# Patient Record
Sex: FEMALE | Race: WHITE | NOT HISPANIC OR LATINO | Employment: OTHER | URBAN - METROPOLITAN AREA
[De-identification: names, ages, dates, MRNs, and addresses within clinical notes are randomized per-mention and may not be internally consistent; named-entity substitution may affect disease eponyms.]

---

## 2017-01-16 DIAGNOSIS — I82.B11 RIGHT SUBCLAVIAN VEIN THROMBOSIS (H): Primary | ICD-10-CM

## 2017-01-16 DIAGNOSIS — I87.1 SVC SYNDROME: ICD-10-CM

## 2017-01-16 DIAGNOSIS — R00.0 SINUS TACHYCARDIA: ICD-10-CM

## 2017-01-16 RX ORDER — METOPROLOL SUCCINATE 25 MG/1
25 TABLET, EXTENDED RELEASE ORAL DAILY
Qty: 90 TABLET | Refills: 3 | Status: SHIPPED
Start: 2017-01-16 | End: 2018-03-27

## 2017-01-17 NOTE — TELEPHONE ENCOUNTER
METOPROLOL  25MG      Last Written Prescription Date:  11/10/15  Last Fill Quantity: 100,   # refills: 3  Last Office Visit : 11/2/16  Future Office visit:  NONE  BP Readings from Last 3 Encounters:   11/02/16 128/85   03/02/16 119/86   11/10/15 138/95

## 2017-02-08 ENCOUNTER — OFFICE VISIT (OUTPATIENT)
Dept: INTERNAL MEDICINE | Facility: CLINIC | Age: 52
End: 2017-02-08

## 2017-02-08 VITALS
BODY MASS INDEX: 19.53 KG/M2 | DIASTOLIC BLOOD PRESSURE: 85 MMHG | WEIGHT: 110.2 LBS | HEART RATE: 75 BPM | SYSTOLIC BLOOD PRESSURE: 126 MMHG

## 2017-02-08 DIAGNOSIS — Z12.11 SCREEN FOR COLON CANCER: Primary | ICD-10-CM

## 2017-02-08 DIAGNOSIS — Z12.31 VISIT FOR SCREENING MAMMOGRAM: ICD-10-CM

## 2017-02-08 DIAGNOSIS — I82.B11 SUBCLAVIAN VEIN THROMBOSIS, RIGHT (H): ICD-10-CM

## 2017-02-08 DIAGNOSIS — Z11.59 NEED FOR HEPATITIS C SCREENING TEST: ICD-10-CM

## 2017-02-08 RX ORDER — OXYCODONE HCL 10 MG/1
10 TABLET, FILM COATED, EXTENDED RELEASE ORAL EVERY 12 HOURS
Qty: 30 TABLET | Refills: 0 | Status: ON HOLD | OUTPATIENT
Start: 2017-02-08 | End: 2022-02-17

## 2017-02-08 RX ORDER — ASPIRIN 81 MG/1
81 TABLET, CHEWABLE ORAL
COMMUNITY
Start: 2017-02-01 | End: 2018-03-19

## 2017-02-08 RX ORDER — OXYBUTYNIN CHLORIDE 5 MG/1
5 TABLET, EXTENDED RELEASE ORAL
COMMUNITY
End: 2017-11-07 | Stop reason: SINTOL

## 2017-02-08 RX ORDER — RIBOFLAVIN (VITAMIN B2) 100 MG
400 TABLET ORAL DAILY
Status: ON HOLD | COMMUNITY
Start: 2013-10-28 | End: 2022-02-17

## 2017-02-08 RX ORDER — HYDROMORPHONE HYDROCHLORIDE 3 MG/1
3 SUPPOSITORY RECTAL EVERY 8 HOURS PRN
Status: ON HOLD | COMMUNITY
Start: 2014-07-16 | End: 2022-02-17

## 2017-02-08 RX ORDER — ARIPIPRAZOLE 20 MG/1
30 TABLET ORAL
COMMUNITY
Start: 2016-04-01 | End: 2019-10-14

## 2017-02-08 RX ORDER — METOPROLOL TARTRATE 25 MG/1
25 TABLET, FILM COATED ORAL
COMMUNITY
Start: 2016-10-18 | End: 2017-04-11

## 2017-02-08 ASSESSMENT — PAIN SCALES - GENERAL: PAINLEVEL: NO PAIN (0)

## 2017-02-08 NOTE — MR AVS SNAPSHOT
After Visit Summary   2/8/2017    Sherly Yeung    MRN: [id number]           Patient Information     Date Of Birth          1965        Visit Information        Provider Department      2/8/2017 6:45 PM Chadwick Mg MD [facility]        Today's Diagnoses     Screen for colon cancer    -  1     Need for hepatitis C screening test         Visit for screening mammogram         Subclavian vein thrombosis, right           Care Instructions    Primary Care Center Medication Refill Request Information:  * Please contact your pharmacy regarding ANY request for medication refills.  ** [facility] Prescription Fax = [phone]  * Please allow 3 business days for routine medication refills.  * Please allow 5 business days for controlled substance medication refills.     Primary Care Center Test Result notification information:  *You will be notified with in 7-10 days of your appointment day regarding the results of your test.  If you are on MyChart you will be notified as soon as the provider has reviewed the results and signed off on them.      Breast Center/Oncology ([facility]) [phone]   Breast Center ([facility]) [phone]           Follow-ups after your visit        Future tests that were ordered for you today     Open Future Orders        Priority Expected Expires Ordered    Hepatitis C Screen Reflex to HCV RNA Quant and Genotype Routine 2/8/2017 2/8/2018 2/8/2017    MA SCREENING DIGITAL BILAT - Future  (s+30) Routine  2/8/2018 2/8/2017            Who to contact     Please call your clinic at [phone] to:    Ask questions about your health    Make or cancel appointments    Discuss your medicines    Learn about your test results    Speak to your doctor   If you have compliments or concerns about an experience at your clinic, or if you wish to file a complaint, please contact [facility] Patient Relations at [phone] or email us  at [email or web address]         Additional Information About Your Visit        [employer] Information     [employer] gives you secure access to your electronic health record. If you see a primary care provider, you can also send messages to your care team and make appointments. If you have questions, please call your primary care clinic.  If you do not have a primary care provider, please call [phone] and they will assist you.      [employer] is an electronic gateway that provides easy, online access to your medical records. With [employer], you can request a clinic appointment, read your test results, renew a prescription or communicate with your care team.     To access your existing account, please contact your [facility] or call [phone] for assistance.        Care EveryWhere ID     This is your Care EveryWhere ID. This could be used by other organizations to access your [place] medical records  [id number]        Your Vitals Were     Pulse Breastfeeding?                75 No           Blood Pressure from Last 3 Encounters:   02/08/17 126/85   11/02/16 128/85   03/02/16 119/86    Weight from Last 3 Encounters:   02/08/17 49.986 kg (110 lb 3.2 oz)   03/02/16 51.71 kg (114 lb)   11/10/15 49.896 kg (110 lb)                 Today's Medication Changes          These changes are accurate as of: 2/8/17  7:23 PM.  If you have any questions, ask your nurse or doctor.               These medicines have changed or have updated prescriptions.        Dose/Directions    ABILIFY 20 MG tablet   This may have changed:  Another medication with the same name was removed. Continue taking this medication, and follow the directions you see here.   Generic drug:  ARIPiprazole   Changed by:  Chadwick Mg MD        Dose:  20 mg   Take 20 mg by mouth   Refills:  0       * enoxaparin 30 MG/0.3ML injection   Commonly known as:  LOVENOX   This may have changed:  Another medication  with the same name was added. Make sure you understand how and when to take each.   Changed by:  Chadwick Mg MD        Dose:  60 mg   Inject 60 mg Subcutaneous   Refills:  0       * enoxaparin 60 MG/0.6ML injection   Commonly known as:  LOVENOX   This may have changed:  You were already taking a medication with the same name, and this prescription was added. Make sure you understand how and when to take each.   Used for:  Subclavian vein thrombosis, right   Changed by:  Chadwick Mg MD        Dose:  50 mg   Inject 0.5 mLs (50 mg) Subcutaneous 2 times daily   Quantity:  100 Syringe   Refills:  11       * Notice:  This list has 2 medication(s) that are the same as other medications prescribed for you. Read the directions carefully, and ask your doctor or other care provider to review them with you.      Stop taking these medicines if you haven't already. Please contact your care team if you have questions.     rivaroxaban ANTICOAGULANT 20 MG Tabs tablet   Commonly known as:  XARELTO   Stopped by:  Chadwick Mg MD                Where to get your medicines      These medications were sent to [id number] - [place] - [address]  [address] [id number]     Phone:  [phone]    - enoxaparin 60 MG/0.6ML injection      Some of these will need a paper prescription and others can be bought over the counter.  Ask your nurse if you have questions.     Bring a paper prescription for each of these medications    - oxyCODONE 10 MG 12 hr tablet             Primary Care Provider Office Phone # Fax #    Chadwick Mg [phone]        [facility] [address]  [facility] [address]        Thank you!     Thank you for choosing [facility]  for your care. Our goal is always to provide you with excellent care. Hearing back from our patients is one way we can continue to improve our  services. Please take a few minutes to complete the written survey that you may receive in the mail after your visit with us. Thank you!             Your Updated Medication List - Protect others around you: Learn how to safely use, store and throw away your medicines at www.disposemymeds.org.          This list is accurate as of: 2/8/17  7:23 PM.  Always use your most recent med list.                   Brand Name Dispense Instructions for use    ABILIFY 20 MG tablet   Generic drug:  ARIPiprazole      Take 20 mg by mouth       aspirin 81 MG chewable tablet      81 mg       B COMPLEX 1 PO      Take 1 tablet by mouth daily.       CYMBALTA PO      Take 120 mg by mouth daily       DITROPAN XL 5 MG 24 hr tablet   Generic drug:  oxybutynin      Take 5 mg by mouth       doxycycline 100 MG capsule    VIBRAMYCIN     Take 1 capsule (100 mg) by mouth every 12 hours       * enoxaparin 30 MG/0.3ML injection    LOVENOX     Inject 60 mg Subcutaneous       * enoxaparin 60 MG/0.6ML injection    LOVENOX    100 Syringe    Inject 0.5 mLs (50 mg) Subcutaneous 2 times daily       HYDROmorphone 3 MG Suppository    DILAUDID     Place 3 mg rectally       LANsoprazole 30 MG CR capsule    PREVACID    90 capsule    Take 1 capsule (30 mg) by mouth daily       LORazepam 0.5 MG tablet    ATIVAN    30 tablet    Take 1 tablet by mouth every 6 hours as needed (for dizziness ).       magnesium 250 MG tablet      Take 4 tablets by mouth daily       metoprolol 25 MG 24 hr tablet    TOPROL-XL    90 tablet    Take 1 tablet (25 mg) by mouth daily       metoprolol 25 MG tablet    LOPRESSOR     Take 25 mg by mouth       nortriptyline 50 MG capsule    PAMELOR     Take 50 mg by mouth At Bedtime.       oxyCODONE 10 MG 12 hr tablet    OxyCONTIN    30 tablet    Take 1 tablet (10 mg) by mouth every 12 hours       predniSONE 10 MG tablet    DELTASONE    42 tablet    Take 1 tablet (10 mg) by mouth daily Take 4 tabs (40 mg) orally daily for 4 days, 3 tabs (30 mg)  orally daily for 4 days, 2 tabs (20 mg) orally daily for 4 days, 1 tab (10mg) orally daily for 4 days, then 1/2 tab (5 mg) orally for 4 days.       riboflavin 100 MG Tabs tablet    vitamin  B-2     Take 400 mg by mouth       RITALIN 20 MG tablet   Generic drug:  methylphenidate          senna-docusate 8.6-50 MG per tablet    SENOKOT-S;PERICOLACE    60 tablet    Take 1-2 tablets by mouth 2 times daily.       SUMAtriptan Succinate 6 MG/0.5ML Kit      Inject 1 dose Subcutaneous at onset of headache. Indications: Migraine Headache       TOPAMAX 25 MG tablet   Generic drug:  topiramate      Take 100-200 mg by mouth 2 times daily       triamcinolone 0.1 % cream    KENALOG    30 g    Apply topically 2 times daily       vitamin D 1000 UNITS capsule      Take 2 capsules by mouth daily.       Zinc 50 MG Caps      Take 1 tablet by mouth daily.       ZUPLENZ 8 MG Film   Generic drug:  Ondansetron      Take 8 mg by mouth every 6 hours as needed.       * Notice:  This list has 2 medication(s) that are the same as other medications prescribed for you. Read the directions carefully, and ask your doctor or other care provider to review them with you.

## 2017-02-09 NOTE — PATIENT INSTRUCTIONS
Primary Care Center Medication Refill Request Information:  * Please contact your pharmacy regarding ANY request for medication refills.  ** [facility] Prescription Fax = [phone]  * Please allow 3 business days for routine medication refills.  * Please allow 5 business days for controlled substance medication refills.     Primary Care Center Test Result notification information:  *You will be notified with in 7-10 days of your appointment day regarding the results of your test.  If you are on MyChart you will be notified as soon as the provider has reviewed the results and signed off on them.      Breast Center/Oncology ([facility]) [phone]   Breast Center ([facility]) [phone]

## 2017-02-09 NOTE — PROGRESS NOTES
HISTORY OF PRESENT ILLNESS:  A 51-year-old presents today for reevaluation following treatment of her subclavian stenosis in [place].  She again suffered stenosis here, underwent stenting and angioplasty, has been doing well since that time.  As she clotted while she was taking the rivaroxaban, this was discontinued and she was started on Lovenox after a consultation with hematology.  She been taking 60 mg b.i.d.  despite weighing 50 kg and she has had some problems with easy bruising and bleeding with this.  She has otherwise been doing well, no swelling, pain, discomfort in the hands or otherwise.      OBJECTIVE:  Alert, oriented with a clear sensorium.  The lungs are clear.  The heart is negative.  She does have a bruit over the left subclavian artery.      ASSESSMENT:  Recurrent stenosis of the right subclavian, status post angioplasty and stenting.      PLAN:  I am going to keep her on the Lovenox.  I am going to reduce her dose to 50 mg subcu b.i.d. and gave her a prescription for this.  I also gave her an additional 30 oxycodone to have on hand for pain associated with any future clots or obstructions here.

## 2017-02-09 NOTE — NURSING NOTE
Chief Complaint   Patient presents with     Hospital F/U     Patient here for hospital follow up for blood clot.       Carole Sandoval CMA at 7:04 PM on 2/8/2017.

## 2017-04-11 ENCOUNTER — RADIANT APPOINTMENT (OUTPATIENT)
Dept: MAMMOGRAPHY | Facility: CLINIC | Age: 52
End: 2017-04-11

## 2017-04-11 ENCOUNTER — OFFICE VISIT (OUTPATIENT)
Dept: INTERNAL MEDICINE | Facility: CLINIC | Age: 52
End: 2017-04-11

## 2017-04-11 VITALS
DIASTOLIC BLOOD PRESSURE: 94 MMHG | OXYGEN SATURATION: 100 % | RESPIRATION RATE: 20 BRPM | BODY MASS INDEX: 19.61 KG/M2 | HEART RATE: 131 BPM | SYSTOLIC BLOOD PRESSURE: 137 MMHG | WEIGHT: 110.7 LBS

## 2017-04-11 DIAGNOSIS — Z11.59 NEED FOR HEPATITIS C SCREENING TEST: ICD-10-CM

## 2017-04-11 DIAGNOSIS — R00.0 SINUS TACHYCARDIA: ICD-10-CM

## 2017-04-11 DIAGNOSIS — D62 ANEMIA DUE TO BLOOD LOSS, ACUTE: Primary | ICD-10-CM

## 2017-04-11 DIAGNOSIS — E87.6 HYPOKALEMIA: ICD-10-CM

## 2017-04-11 DIAGNOSIS — Z12.11 SCREEN FOR COLON CANCER: ICD-10-CM

## 2017-04-11 DIAGNOSIS — D62 ANEMIA DUE TO BLOOD LOSS, ACUTE: ICD-10-CM

## 2017-04-11 DIAGNOSIS — Z12.31 VISIT FOR SCREENING MAMMOGRAM: ICD-10-CM

## 2017-04-11 LAB
ANION GAP SERPL CALCULATED.3IONS-SCNC: 7 MMOL/L (ref 3–14)
BASOPHILS # BLD AUTO: 0 10E9/L (ref 0–0.2)
BASOPHILS NFR BLD AUTO: 0.1 %
BUN SERPL-MCNC: 12 MG/DL (ref 7–30)
CALCIUM SERPL-MCNC: 9 MG/DL (ref 8.5–10.1)
CHLORIDE SERPL-SCNC: 112 MMOL/L (ref 94–109)
CO2 SERPL-SCNC: 25 MMOL/L (ref 20–32)
CREAT SERPL-MCNC: 0.75 MG/DL (ref 0.52–1.04)
DIFFERENTIAL METHOD BLD: ABNORMAL
EOSINOPHIL # BLD AUTO: 0 10E9/L (ref 0–0.7)
EOSINOPHIL NFR BLD AUTO: 0 %
ERYTHROCYTE [DISTWIDTH] IN BLOOD BY AUTOMATED COUNT: 14.5 % (ref 10–15)
GFR SERPL CREATININE-BSD FRML MDRD: 81 ML/MIN/1.7M2
GLUCOSE SERPL-MCNC: 96 MG/DL (ref 70–99)
HCT VFR BLD AUTO: 36.1 % (ref 35–47)
HCV AB SERPL QL IA: NORMAL
HGB BLD-MCNC: 11.6 G/DL (ref 11.7–15.7)
IMM GRANULOCYTES # BLD: 0.1 10E9/L (ref 0–0.4)
IMM GRANULOCYTES NFR BLD: 0.6 %
LYMPHOCYTES # BLD AUTO: 0.8 10E9/L (ref 0.8–5.3)
LYMPHOCYTES NFR BLD AUTO: 10.1 %
MCH RBC QN AUTO: 30.1 PG (ref 26.5–33)
MCHC RBC AUTO-ENTMCNC: 32.1 G/DL (ref 31.5–36.5)
MCV RBC AUTO: 94 FL (ref 78–100)
MONOCYTES # BLD AUTO: 0.6 10E9/L (ref 0–1.3)
MONOCYTES NFR BLD AUTO: 7.1 %
NEUTROPHILS # BLD AUTO: 6.9 10E9/L (ref 1.6–8.3)
NEUTROPHILS NFR BLD AUTO: 82.1 %
NRBC # BLD AUTO: 0 10*3/UL
NRBC BLD AUTO-RTO: 0 /100
PLATELET # BLD AUTO: 357 10E9/L (ref 150–450)
POTASSIUM SERPL-SCNC: 3.6 MMOL/L (ref 3.4–5.3)
RBC # BLD AUTO: 3.86 10E12/L (ref 3.8–5.2)
SODIUM SERPL-SCNC: 144 MMOL/L (ref 133–144)
WBC # BLD AUTO: 8.4 10E9/L (ref 4–11)

## 2017-04-11 PROCEDURE — 86803 HEPATITIS C AB TEST: CPT | Performed by: INTERNAL MEDICINE

## 2017-04-11 PROCEDURE — G0472 HEP C SCREEN HIGH RISK/OTHER: HCPCS | Performed by: INTERNAL MEDICINE

## 2017-04-11 RX ORDER — FONDAPARINUX SODIUM 7.5 MG/.6ML
7.5 INJECTION SUBCUTANEOUS DAILY
COMMUNITY
End: 2019-08-09 | Stop reason: ALTCHOICE

## 2017-04-11 ASSESSMENT — PAIN SCALES - GENERAL: PAINLEVEL: NO PAIN (0)

## 2017-04-11 NOTE — NURSING NOTE
Chief Complaint   Patient presents with     Hospital F/U     hospitalized 46/-4/7/17 at [facility] for clot right subclavian stent   Delaney Redd LPN 9:34 AM on 4/11/2017
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
## 2017-04-11 NOTE — PATIENT INSTRUCTIONS
Primary Care Center Medication Refill Request Information:  * Please contact your pharmacy regarding ANY request for medication refills.  ** [facility] Prescription Fax = [phone]  * Please allow 3 business days for routine medication refills.  * Please allow 5 business days for controlled substance medication refills.     Primary Care Center Test Result notification information:  *You will be notified with in 7-10 days of your appointment day regarding the results of your test.  If you are on MyChart you will be notified as soon as the provider has reviewed the results and signed off on them.

## 2017-04-11 NOTE — MR AVS SNAPSHOT
After Visit Summary   4/11/2017    Sherly Yeung    MRN: [id number]           Patient Information     Date Of Birth          1965        Visit Information        Provider Department      4/11/2017 9:25 AM Jose Arroyo MD [facility]        Today's Diagnoses     Anemia due to blood loss, acute    -  1    Screen for colon cancer        Need for hepatitis C screening test        Hypokalemia          Care Instructions    Primary Care Center Medication Refill Request Information:  * Please contact your pharmacy regarding ANY request for medication refills.  ** [facility] Prescription Fax = [phone]  * Please allow 3 business days for routine medication refills.  * Please allow 5 business days for controlled substance medication refills.     Primary Care Center Test Result notification information:  *You will be notified with in 7-10 days of your appointment day regarding the results of your test.  If you are on MyChart you will be notified as soon as the provider has reviewed the results and signed off on them.        Follow-ups after your visit        Who to contact     Please call your clinic at [phone] to:    Ask questions about your health    Make or cancel appointments    Discuss your medicines    Learn about your test results    Speak to your doctor   If you have compliments or concerns about an experience at your clinic, or if you wish to file a complaint, please contact [facility] Patient Relations at [phone] or email us at [email or web address]         Additional Information About Your Visit        MyChart Information     [employer] gives you secure access to your electronic health record. If you see a primary care provider, you can also send messages to your care team and make appointments. If you have questions, please call your primary care clinic.  If you do not have a primary care provider, please call [phone] and they  will assist you.      [employer] is an electronic gateway that provides easy, online access to your medical records. With [employer], you can request a clinic appointment, read your test results, renew a prescription or communicate with your care team.     To access your existing account, please contact your [facility] or call [phone] for assistance.        Care EveryWhere ID     This is your Care EveryWhere ID. This could be used by other organizations to access your [place] medical records  [id number]        Your Vitals Were     Pulse Respirations Pulse Oximetry BMI (Body Mass Index)          131 20 100% 19.61 kg/m2         Blood Pressure from Last 3 Encounters:   04/11/17 (!) 137/94   02/08/17 126/85   11/02/16 128/85    Weight from Last 3 Encounters:   04/11/17 50.2 kg (110 lb 11.2 oz)   02/08/17 50 kg (110 lb 3.2 oz)   03/02/16 51.7 kg (114 lb)                 Today's Medication Changes          These changes are accurate as of: 4/11/17 11:36 AM.  If you have any questions, ask your nurse or doctor.               Stop taking these medicines if you haven't already. Please contact your care team if you have questions.     enoxaparin 30 MG/0.3ML injection   Commonly known as:  LOVENOX   Stopped by:  Jose Arroyo MD           enoxaparin 60 MG/0.6ML injection   Commonly known as:  LOVENOX   Stopped by:  Jose Arroyo MD           metoprolol 25 MG tablet   Commonly known as:  LOPRESSOR   Stopped by:  Jose Arroyo MD           triamcinolone 0.1 % cream   Commonly known as:  KENALOG   Stopped by:  Jose Arroyo MD                    Primary Care Provider Office Phone # Fax #    [facility] [phone]        [facility] [address]  [facility] [address]        Thank you!     Thank you for choosing [facility]  for your care. Our goal is always to provide you with excellent care. Hearing back from our  patients is one way we can continue to improve our services. Please take a few minutes to complete the written survey that you may receive in the mail after your visit with us. Thank you!             Your Updated Medication List - Protect others around you: Learn how to safely use, store and throw away your medicines at www.disposemymeds.org.          This list is accurate as of: 4/11/17 11:36 AM.  Always use your most recent med list.                   Brand Name Dispense Instructions for use    ABILIFY 20 MG tablet   Generic drug:  ARIPiprazole      Take 20 mg by mouth       aspirin 81 MG chewable tablet      81 mg       B COMPLEX 1 PO      Take 1 tablet by mouth daily.       CYMBALTA PO      Take 120 mg by mouth daily       DITROPAN XL 5 MG 24 hr tablet   Generic drug:  oxybutynin      Take 5 mg by mouth       doxycycline 100 MG capsule    VIBRAMYCIN     Take 1 capsule (100 mg) by mouth every 12 hours       fondaparinux 7.5MG/0.6ML injection    ARIXTRA     Inject 7.5 mg Subcutaneous daily       HYDROmorphone 3 MG Suppository    DILAUDID     Place 3 mg rectally       LANsoprazole 30 MG CR capsule    PREVACID    90 capsule    Take 1 capsule (30 mg) by mouth daily       LORazepam 0.5 MG tablet    ATIVAN    30 tablet    Take 1 tablet by mouth every 6 hours as needed (for dizziness ).       magnesium 250 MG tablet      Take 4 tablets by mouth daily       metoprolol 25 MG 24 hr tablet    TOPROL-XL    90 tablet    Take 1 tablet (25 mg) by mouth daily       nortriptyline 50 MG capsule    PAMELOR     Take 50 mg by mouth At Bedtime.       oxyCODONE 10 MG 12 hr tablet    OxyCONTIN    30 tablet    Take 1 tablet (10 mg) by mouth every 12 hours       PLAVIX PO      Take 75 mg by mouth daily       predniSONE 10 MG tablet    DELTASONE    42 tablet    Take 1 tablet (10 mg) by mouth daily Take 4 tabs (40 mg) orally daily for 4 days, 3 tabs (30 mg) orally daily for 4 days, 2 tabs (20 mg) orally daily for 4 days, 1 tab (10mg) orally  daily for 4 days, then 1/2 tab (5 mg) orally for 4 days.       riboflavin 100 MG Tabs tablet    vitamin  B-2     Take 400 mg by mouth       RITALIN 20 MG tablet   Generic drug:  methylphenidate          senna-docusate 8.6-50 MG per tablet    SENOKOT-S;PERICOLACE    60 tablet    Take 1-2 tablets by mouth 2 times daily.       SUMAtriptan Succinate 6 MG/0.5ML Kit      Inject 1 dose Subcutaneous at onset of headache. Indications: Migraine Headache       TOPAMAX 25 MG tablet   Generic drug:  topiramate      Take 100-200 mg by mouth 2 times daily       vitamin D 1000 UNITS capsule      Take 2 capsules by mouth daily.       Zinc 50 MG Caps      Take 1 tablet by mouth daily.       ZUPLENZ 8 MG Film   Generic drug:  Ondansetron      Take 8 mg by mouth every 6 hours as needed.

## 2017-07-24 ENCOUNTER — MYC MEDICAL ADVICE (OUTPATIENT)
Dept: INTERNAL MEDICINE | Facility: CLINIC | Age: 52
End: 2017-07-24

## 2017-07-24 ENCOUNTER — OFFICE VISIT (OUTPATIENT)
Dept: INTERNAL MEDICINE | Facility: CLINIC | Age: 52
End: 2017-07-24

## 2017-07-24 VITALS
DIASTOLIC BLOOD PRESSURE: 90 MMHG | BODY MASS INDEX: 19.86 KG/M2 | HEART RATE: 98 BPM | SYSTOLIC BLOOD PRESSURE: 134 MMHG | WEIGHT: 112.1 LBS | RESPIRATION RATE: 20 BRPM

## 2017-07-24 DIAGNOSIS — I87.1 SUBCLAVIAN VEIN STENOSIS: ICD-10-CM

## 2017-07-24 DIAGNOSIS — Z12.11 SCREEN FOR COLON CANCER: ICD-10-CM

## 2017-07-24 DIAGNOSIS — I87.1 SVC SYNDROME: ICD-10-CM

## 2017-07-24 DIAGNOSIS — I82.B11 SUBCLAVIAN VEIN THROMBOSIS, RIGHT (H): Primary | ICD-10-CM

## 2017-07-24 DIAGNOSIS — I87.1 SUPERIOR VENA CAVA STENOSIS: ICD-10-CM

## 2017-07-24 DIAGNOSIS — M79.89 SWELLING OF LEFT LOWER EXTREMITY: ICD-10-CM

## 2017-07-24 ASSESSMENT — PAIN SCALES - GENERAL: PAINLEVEL: MODERATE PAIN (4)

## 2017-07-24 NOTE — PROGRESS NOTES
ASSESSMENT/PLAN:  Right chest pressure similar to previous episodes of stenosis.  She had a lot of venoplasties on the right subclavian on July 20th but she does have a history of restenosis.  Therefore, I would like her to have an ultrasound today to ensure that she is still patent.  She does not have the swelling or the pain typically associated with stenosis.    She also has swelling of the left leg.  I will check for DVT.  If this is not present, then I will ask for venous competency testing.    Floyd Tong MD, FACP      Chief complaint:  Sherly Yeung is a 51 year old female presents for   Chief Complaint   Patient presents with     Hospital F/U     patient states she is here for a hospital follow up        SUBJECTIVE:  She was in the hospital with right pressure and pain and right swelling.  She has a standing ultrasound order PRN this happening.  She went from April to July for the span for this procedure.  She has a lot of pressure now which is unusual for after the procedure.  It is hard to tell if this is because so much plasty work was done.  No warmth or redness or arm swelling.  The pressure has been there since the day after she had the last procedure and is getting worse.    On 7/20/17 she had:  1. Ultrasound-guided access right common femoral vein.    2. Subselection of the right innominate vein and subclavian vein.    3. Venography of the axillary vein, subclavian vein and innominate vein.    4. Venoplasty within the stents to 10 mm x 8 cm to 20 verenice x 3 m with a conquest balloon.    5. Venoplasty to 12 mm x 4 cm - 20 verenice x 2 m with a [employer] balloon    6. Venoplasty to 14 mm x 6 cm - 15 verenice into locations with Wilber balloon.    7. Completion venography of the right axillary, subclavian and innominate veins.    8. Ultrasound of the right internal jugular vein.      ROS:  Constitutional: no fevers or chills  Musc: She has left leg swelling for several months.  She says it is swelling despite  compression stockings.  No injury.  No pain or redness or warmth.      Medications and allergies were reviewed by me today.       Patient Active Problem List    Diagnosis Date Noted     Subclavian vein thrombosis, right (H) 09/29/2011     Priority: High     Nausea 04/28/2014     Priority: Medium     Headache 04/28/2014     Priority: Medium     Problem list name updated by automated process. Provider to review       Postoperative nausea 03/28/2014     Priority: Medium     Migraine 03/04/2014     Priority: Medium     Post-op pain 12/13/2013     Priority: Medium     Intestinal malabsorption 10/21/2013     Priority: Medium     Problem list name updated by automated process. Provider to review       Iron deficiency anemia 09/27/2013     Priority: Medium     Problem list name updated by automated process. 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 Removal of one of the stents in the vein implanted previously. Vein patch angioplasty of the subclavian vein from axillary to the innominate vein using saphenous vein harvested from the left upper thigh. Transsternal incision with repair of the manubrium. 7/10'  Then had restenosis of the right subclavian vein. She underwent venoplasty 1/11'.  5/11' underwent right axillary vein for venography; balloon venoplasty using 10 and 12 mm balloon.  08/15/2011, the patient underwent right axillary and subclavian venogram with placement of a right subclavian infusion catheter via right common femoral vein  access by Interventional Radiology. A long segment occlusion of the right axillary and subclavian vein was noted. Infusion catheter was placed across the occlusion and the patient placed on TPA 0.6 mg per hour through the catheter along with 500 units of heparin per hour through the sheath.  On 08/16/2011, right subclavian vein venogram was again performed with AngioJet thrombolysis of the right subclavian vein followed by venoplasty of the right subclavian vein and superior vena cava. Right upper extremity venous Doppler ultrasound on 08/17/2011 again revealed a nonocclusive thrombus within the mid right subclavian vein stent in the mid right subclavian vein.       Migraine headache 03/25/2011     Priority: Medium     (Problem list name updated by automated process. Provider to review and confirm.)       Depression 03/25/2011     Priority: Medium       PHYSICAL EXAM:  /90  Pulse 98  Resp 20  Wt 50.8 kg (112 lb 1.6 oz)  BMI 19.86 kg/m2  Constitutional: no distress, comfortable, pleasant   Musc: no swelling of the right arm, there is 1+ edema to the left calf/shin compared to the right

## 2017-07-24 NOTE — NURSING NOTE
Chief Complaint   Patient presents with     Hospital F/U     patient states she is here for a hospital follow up     NIDIA LUQUE at 8:17 AM on 7/24/2017.

## 2017-09-01 PROCEDURE — 82274 ASSAY TEST FOR BLOOD FECAL: CPT | Performed by: INTERNAL MEDICINE

## 2017-09-03 LAB — HEMOCCULT STL QL IA: NEGATIVE

## 2017-09-05 DIAGNOSIS — Z12.11 SCREEN FOR COLON CANCER: ICD-10-CM

## 2017-09-08 ENCOUNTER — TELEPHONE (OUTPATIENT)
Dept: INTERNAL MEDICINE | Facility: CLINIC | Age: 52
End: 2017-09-08

## 2017-09-08 NOTE — TELEPHONE ENCOUNTER
I spoke to Sherly today. She stated that she had blood drawn yesterday and is on blood thinners. She stated that she noticed that there is a hardened, purplish area where the blood was drawn on her left elbow area. She would like to know if she should come in or go to the ED. She states that it is not increasing in size or getting darker in color. She stated she has some tingling in her hand, and that it is sore in the area where blood was drawn, but otherwise not painful. She stated that there is slight swelling right in the area where blood was drawn, but no other swelling in her arm, neither below or above the blood draw area. I informed pt that she should apply heat to her arm. Informed her this should help relieve symptoms, but that the discoloration might spread with heat but should lighten in color. Informed her that if color darkens, if swelling/hardened area increases in size, if she has any pain or increased tingling, she should be seen in ED today. She voiced understanding and stated she has no further questions at this time.    Aleah Melton RN

## 2017-10-24 ASSESSMENT — ENCOUNTER SYMPTOMS
DEPRESSION: 1
JAUNDICE: 0
LIGHT-HEADEDNESS: 1
EXERCISE INTOLERANCE: 0
POOR WOUND HEALING: 1
TACHYCARDIA: 0
DIARRHEA: 0
NAIL CHANGES: 0
HYPOTENSION: 0
ABDOMINAL PAIN: 1
PALPITATIONS: 1
SLEEP DISTURBANCES DUE TO BREATHING: 0
CONSTIPATION: 0
BLOOD IN STOOL: 0
SYNCOPE: 1
PANIC: 0
BLOATING: 0
RECTAL PAIN: 0
HEARTBURN: 1
LEG SWELLING: 1
LEG PAIN: 0
NAUSEA: 1
ORTHOPNEA: 0
INSOMNIA: 0
BOWEL INCONTINENCE: 0
RECTAL BLEEDING: 0
NERVOUS/ANXIOUS: 1
CLAUDICATION: 0
DECREASED CONCENTRATION: 0
SKIN CHANGES: 0
HYPERTENSION: 0

## 2017-10-24 ASSESSMENT — PATIENT HEALTH QUESTIONNAIRE - PHQ9
10. IF YOU CHECKED OFF ANY PROBLEMS, HOW DIFFICULT HAVE THESE PROBLEMS MADE IT FOR YOU TO DO YOUR WORK, TAKE CARE OF THINGS AT HOME, OR GET ALONG WITH OTHER PEOPLE: SOMEWHAT DIFFICULT
SUM OF ALL RESPONSES TO PHQ QUESTIONS 1-9: 9
SUM OF ALL RESPONSES TO PHQ QUESTIONS 1-9: 9

## 2017-10-25 ASSESSMENT — PATIENT HEALTH QUESTIONNAIRE - PHQ9: SUM OF ALL RESPONSES TO PHQ QUESTIONS 1-9: 9

## 2017-11-07 ENCOUNTER — OFFICE VISIT (OUTPATIENT)
Dept: INTERNAL MEDICINE | Facility: CLINIC | Age: 52
End: 2017-11-07

## 2017-11-07 VITALS
DIASTOLIC BLOOD PRESSURE: 91 MMHG | OXYGEN SATURATION: 100 % | HEART RATE: 122 BPM | HEIGHT: 63 IN | WEIGHT: 113.3 LBS | BODY MASS INDEX: 20.07 KG/M2 | SYSTOLIC BLOOD PRESSURE: 125 MMHG | TEMPERATURE: 98.3 F

## 2017-11-07 DIAGNOSIS — I87.1 SUBCLAVIAN VEIN STENOSIS: ICD-10-CM

## 2017-11-07 DIAGNOSIS — K21.9 GASTROESOPHAGEAL REFLUX DISEASE WITHOUT ESOPHAGITIS: ICD-10-CM

## 2017-11-07 DIAGNOSIS — S43.101D: ICD-10-CM

## 2017-11-07 DIAGNOSIS — I82.B11 SUBCLAVIAN VEIN THROMBOSIS, RIGHT (H): Primary | ICD-10-CM

## 2017-11-07 DIAGNOSIS — I87.1 SUPERIOR VENA CAVA STENOSIS: ICD-10-CM

## 2017-11-07 DIAGNOSIS — N39.41 URGE INCONTINENCE: ICD-10-CM

## 2017-11-07 LAB
ALBUMIN SERPL-MCNC: 3.9 G/DL (ref 3.4–5)
ALP SERPL-CCNC: 112 U/L (ref 40–150)
ALT SERPL W P-5'-P-CCNC: 17 U/L (ref 0–50)
ANION GAP SERPL CALCULATED.3IONS-SCNC: 6 MMOL/L (ref 3–14)
AST SERPL W P-5'-P-CCNC: 15 U/L (ref 0–45)
BASOPHILS # BLD AUTO: 0 10E9/L (ref 0–0.2)
BASOPHILS NFR BLD AUTO: 0.4 %
BILIRUB SERPL-MCNC: 0.4 MG/DL (ref 0.2–1.3)
BUN SERPL-MCNC: 12 MG/DL (ref 7–30)
CALCIUM SERPL-MCNC: 8.9 MG/DL (ref 8.5–10.1)
CHLORIDE SERPL-SCNC: 110 MMOL/L (ref 94–109)
CO2 SERPL-SCNC: 25 MMOL/L (ref 20–32)
CREAT SERPL-MCNC: 0.82 MG/DL (ref 0.52–1.04)
DIFFERENTIAL METHOD BLD: NORMAL
EOSINOPHIL # BLD AUTO: 0.1 10E9/L (ref 0–0.7)
EOSINOPHIL NFR BLD AUTO: 0.9 %
ERYTHROCYTE [DISTWIDTH] IN BLOOD BY AUTOMATED COUNT: 13 % (ref 10–15)
GFR SERPL CREATININE-BSD FRML MDRD: 74 ML/MIN/1.7M2
GLUCOSE SERPL-MCNC: 96 MG/DL (ref 70–99)
HCT VFR BLD AUTO: 41.1 % (ref 35–47)
HGB BLD-MCNC: 13.1 G/DL (ref 11.7–15.7)
IMM GRANULOCYTES # BLD: 0 10E9/L (ref 0–0.4)
IMM GRANULOCYTES NFR BLD: 0.4 %
LYMPHOCYTES # BLD AUTO: 1.1 10E9/L (ref 0.8–5.3)
LYMPHOCYTES NFR BLD AUTO: 19.3 %
MCH RBC QN AUTO: 29.2 PG (ref 26.5–33)
MCHC RBC AUTO-ENTMCNC: 31.9 G/DL (ref 31.5–36.5)
MCV RBC AUTO: 92 FL (ref 78–100)
MONOCYTES # BLD AUTO: 0.4 10E9/L (ref 0–1.3)
MONOCYTES NFR BLD AUTO: 6.7 %
NEUTROPHILS # BLD AUTO: 4.1 10E9/L (ref 1.6–8.3)
NEUTROPHILS NFR BLD AUTO: 72.3 %
NRBC # BLD AUTO: 0 10*3/UL
NRBC BLD AUTO-RTO: 0 /100
PLATELET # BLD AUTO: 305 10E9/L (ref 150–450)
POTASSIUM SERPL-SCNC: 3.7 MMOL/L (ref 3.4–5.3)
PROT SERPL-MCNC: 7.1 G/DL (ref 6.8–8.8)
RBC # BLD AUTO: 4.48 10E12/L (ref 3.8–5.2)
SODIUM SERPL-SCNC: 141 MMOL/L (ref 133–144)
WBC # BLD AUTO: 5.7 10E9/L (ref 4–11)

## 2017-11-07 RX ORDER — TOLTERODINE 2 MG/1
2 CAPSULE, EXTENDED RELEASE ORAL DAILY
Qty: 60 CAPSULE | Refills: 6 | Status: SHIPPED | OUTPATIENT
Start: 2017-11-07 | End: 2018-02-07

## 2017-11-07 RX ORDER — LANSOPRAZOLE 30 MG/1
30 CAPSULE, DELAYED RELEASE ORAL 2 TIMES DAILY
Qty: 90 CAPSULE | Refills: 3 | Status: SHIPPED | OUTPATIENT
Start: 2017-11-07 | End: 2020-01-21

## 2017-11-07 ASSESSMENT — PAIN SCALES - GENERAL: PAINLEVEL: NO PAIN (0)

## 2017-11-07 NOTE — PATIENT INSTRUCTIONS
[facility]: [phone]     [facility] Medication Refill Request Information:  * Please contact your pharmacy regarding ANY request for medication refills.  ** [facility] Prescription Fax = [phone]  * Please allow 3 business days for routine medication refills.  * Please allow 5 business days for controlled substance medication refills.     [facility] Test Result notification information:  *You will be notified with in 7-10 days of your appointment day regarding the results of your test.  If you are on MyChart you will be notified as soon as the provider has reviewed the results and signed off on them.

## 2017-11-07 NOTE — NURSING NOTE
Chief Complaint   Patient presents with     Physical     Patient here for a physical and GI pain.     Marleny Johnson LPN at 9:35 AM on 11/7/2017.

## 2017-11-07 NOTE — MR AVS SNAPSHOT
After Visit Summary   11/7/2017    Sherly Yeung    MRN: [id number]           Patient Information     Date Of Birth          1965        Visit Information        Provider Department      11/7/2017 9:00 AM Chadwick Mg MD [facility]        Today's Diagnoses     Subclavian vein thrombosis, right (H)    -  1    Subclavian vein stenosis        Superior vena cava stenosis        Gastroesophageal reflux disease without esophagitis        Urge incontinence        Closed dislocation of right clavicle, subsequent encounter          Care Instructions    Primary Care Center: [phone]     Primary Care Center Medication Refill Request Information:  * Please contact your pharmacy regarding ANY request for medication refills.  ** [facility] Prescription Fax = [phone]  * Please allow 3 business days for routine medication refills.  * Please allow 5 business days for controlled substance medication refills.     Primary Care Center Test Result notification information:  *You will be notified with in 7-10 days of your appointment day regarding the results of your test.  If you are on MyChart you will be notified as soon as the provider has reviewed the results and signed off on them.            Follow-ups after your visit        Additional Services     THORACIC SURGERY REFERRAL       Your provider has referred you to:  [facility]: Thoracic Surgery Clinic [facility] [phone]   [email or web address]    Please be aware that coverage of these services is subject to the terms and limitations of your health insurance plan.  Call member services at your health plan with any benefit or coverage questions.      Please bring the following to your appointment:    >>   Any x-rays, CTs or MRIs which have been performed.  Contact the facility where they were done to arrange for  prior to your scheduled appointment.    >>   List of current medications    >>   This referral request   >>   Any documents/labs given to you for this referral                  Future tests that were ordered for you today     Open Standing Orders        Priority Remaining Interval Expires Ordered    US Upper Extremity Venous Duplex Right Routine 6/6 11/7/2018 11/7/2017          Open Future Orders        Priority Expected Expires Ordered    Comprehensive metabolic panel Routine  11/7/2018 11/7/2017    CBC with platelets differential Routine  11/7/2018 11/7/2017    H Pylori antigen stool Routine  12/7/2017 11/7/2017            Who to contact     Please call your clinic at [phone] to:    Ask questions about your health    Make or cancel appointments    Discuss your medicines    Learn about your test results    Speak to your doctor   If you have compliments or concerns about an experience at your clinic, or if you wish to file a complaint, please contact [facility] Patient Relations at [phone] or email us at [email or web address]         Additional Information About Your Visit        [employer] [facility] Information     [employer] gives you secure access to your electronic health record. If you see a primary care provider, you can also send messages to your care team and make appointments. If you have questions, please call your primary care clinic.  If you do not have a primary care provider, please call [phone] and they will assist you.      [employer] is an electronic gateway that provides easy, online access to your medical records. With [employer], you can request a clinic appointment, read your test results, renew a prescription or communicate with your care team.     To access your existing account, please contact your [facility] or call [phone] for assistance.        Care EveryWhere ID     This is your Care EveryWhere ID. This could be used by other organizations to access your [facility] medical records  [id number]    "     Your Vitals Were     Pulse Temperature Height Pulse Oximetry Breastfeeding? BMI (Body Mass Index)    122 98.3  F (36.8  C) (Oral) 1.595 m (5' 2.8\") 100% No 20.2 kg/m2       Blood Pressure from Last 3 Encounters:   11/07/17 (!) 125/91   07/24/17 134/90   04/11/17 (!) 137/94    Weight from Last 3 Encounters:   11/07/17 51.4 kg (113 lb 4.8 oz)   07/24/17 50.8 kg (112 lb 1.6 oz)   04/11/17 50.2 kg (110 lb 11.2 oz)              We Performed the Following     THORACIC SURGERY REFERRAL          Today's Medication Changes          These changes are accurate as of: 11/7/17 10:33 AM.  If you have any questions, ask your nurse or doctor.               Start taking these medicines.        Dose/Directions    tolterodine 2 MG 24 hr capsule   Commonly known as:  DETROL LA   Used for:  Urge incontinence   Started by:  Chadwick Mg MD        Dose:  2 mg   Take 1 capsule (2 mg) by mouth daily   Quantity:  60 capsule   Refills:  6         These medicines have changed or have updated prescriptions.        Dose/Directions    LANsoprazole 30 MG CR capsule   Commonly known as:  PREVACID   This may have changed:  when to take this   Used for:  Gastroesophageal reflux disease without esophagitis, Subclavian vein thrombosis, right (H)   Changed by:  Chadwick Mg MD        Dose:  30 mg   Take 1 capsule (30 mg) by mouth 2 times daily   Quantity:  90 capsule   Refills:  3         Stop taking these medicines if you haven't already. Please contact your care team if you have questions.     DITROPAN XL 5 MG 24 hr tablet   Generic drug:  oxybutynin   Stopped by:  Chadwick Mg MD                Where to get your medicines      These medications were sent to [id number] - [place] - [address]  [address] [address]     Phone:  [phone]     LANsoprazole 30 MG CR capsule    tolterodine 2 MG 24 hr capsule                Primary Care Provider Office " Phone # Fax #    Chadwick Kulwant Mg [phone]       [address] [place] [id number]        Equal Access to Services     SUSANNA PITTMAN : Hadmirna laurel [address], waaxda luqadaha, qaybta kaalmada elvia, tuan koenig laKrzysztofradha crump. So [facility] [phone].    ATENCIÓN: Si habla español, tiene a randle disposición servicios gratuitos de asistencia lingüística. Llame al [phone].    We comply with applicable federal civil rights laws and [place] laws. We do not discriminate on the basis of race, color, national origin, age, disability, sex, sexual orientation, or gender identity.            Thank you!     Thank you for choosing [facility]  for your care. Our goal is always to provide you with excellent care. Hearing back from our patients is one way we can continue to improve our services. Please take a few minutes to complete the written survey that you may receive in the mail after your visit with us. Thank you!             Your Updated Medication List - Protect others around you: Learn how to safely use, store and throw away your medicines at www.disposemymeds.org.          This list is accurate as of: 11/7/17 10:33 AM.  Always use your most recent med list.                   Brand Name Dispense Instructions for use Diagnosis    ABILIFY 20 MG tablet   Generic drug:  ARIPiprazole      Take 20 mg by mouth        aspirin 81 MG chewable tablet      81 mg        B COMPLEX 1 PO      Take 1 tablet by mouth daily.    Sternal pain, Disorder of bone and cartilage, unspecified, Fatigue, Anemia, unspecified       CYMBALTA PO      Take 120 mg by mouth daily        doxycycline 100 MG capsule    VIBRAMYCIN     Take 1 capsule (100 mg) by mouth every 12 hours    Rosacea       fondaparinux 7.5MG/0.6ML injection    ARIXTRA     Inject 7.5 mg Subcutaneous daily        HYDROmorphone 3 MG Suppository    DILAUDID     Place 3 mg rectally        LANsoprazole 30 MG  CR capsule    PREVACID    90 capsule    Take 1 capsule (30 mg) by mouth 2 times daily    Gastroesophageal reflux disease without esophagitis, Subclavian vein thrombosis, right (H)       LORazepam 0.5 MG tablet    ATIVAN    30 tablet    Take 1 tablet by mouth every 6 hours as needed (for dizziness ).    Dizziness and giddiness       magnesium 250 MG tablet      Take 4 tablets by mouth daily    Iron deficiency anemia, unspecified       metoprolol 25 MG 24 hr tablet    TOPROL-XL    90 tablet    Take 1 tablet (25 mg) by mouth daily    Right subclavian vein thrombosis (H), Sinus tachycardia, SVC syndrome       nortriptyline 50 MG capsule    PAMELOR     Take 50 mg by mouth At Bedtime.        oxyCODONE 10 MG 12 hr tablet    OxyCONTIN    30 tablet    Take 1 tablet (10 mg) by mouth every 12 hours    Subclavian vein thrombosis, right (H)       predniSONE 10 MG tablet    DELTASONE    42 tablet    Take 1 tablet (10 mg) by mouth daily Take 4 tabs (40 mg) orally daily for 4 days, 3 tabs (30 mg) orally daily for 4 days, 2 tabs (20 mg) orally daily for 4 days, 1 tab (10mg) orally daily for 4 days, then 1/2 tab (5 mg) orally for 4 days.    Migraine headache       riboflavin 100 MG Tabs tablet    vitamin  B-2     Take 400 mg by mouth        RITALIN 20 MG tablet   Generic drug:  methylphenidate       Subclavian vein thrombosis, right (H), SVC syndrome, Sinus tachycardia       senna-docusate 8.6-50 MG per tablet    SENOKOT-S;PERICOLACE    60 tablet    Take 1-2 tablets by mouth 2 times daily.    Constipation       SUMAtriptan Succinate 6 MG/0.5ML Kit      Inject 1 dose Subcutaneous at onset of headache. Indications: Migraine Headache        tolterodine 2 MG 24 hr capsule    DETROL LA    60 capsule    Take 1 capsule (2 mg) by mouth daily    Urge incontinence       TOPAMAX 25 MG tablet   Generic drug:  topiramate      Take 100-200 mg by mouth 2 times daily        vitamin D 1000 UNITS capsule      Take 2 capsules by mouth daily.    Sternal  pain, Disorder of bone and cartilage, unspecified, Fatigue, Anemia, unspecified       Zinc 50 MG Caps      Take 1 tablet by mouth daily.    Sternal pain, Disorder of bone and cartilage, unspecified, Fatigue, Anemia, unspecified       ZUPLENZ 8 MG Film   Generic drug:  Ondansetron      Take 8 mg by mouth every 6 hours as needed.    Sternal pain, Disorder of bone and cartilage, unspecified, Fatigue, Anemia, unspecified
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
## 2017-11-13 PROCEDURE — 87338 HPYLORI STOOL AG IA: CPT | Performed by: INTERNAL MEDICINE

## 2017-11-14 ENCOUNTER — OFFICE VISIT (OUTPATIENT)
Dept: ORTHOPEDICS | Facility: CLINIC | Age: 52
End: 2017-11-14

## 2017-11-14 VITALS
HEART RATE: 102 BPM | HEIGHT: 63 IN | DIASTOLIC BLOOD PRESSURE: 93 MMHG | WEIGHT: 113 LBS | SYSTOLIC BLOOD PRESSURE: 130 MMHG | BODY MASS INDEX: 20.02 KG/M2

## 2017-11-14 DIAGNOSIS — K21.9 GASTROESOPHAGEAL REFLUX DISEASE WITHOUT ESOPHAGITIS: ICD-10-CM

## 2017-11-14 DIAGNOSIS — M89.8X1 PAIN OF RIGHT CLAVICLE: Primary | ICD-10-CM

## 2017-11-14 DIAGNOSIS — Z86.59 HISTORY OF ANOREXIA NERVOSA: ICD-10-CM

## 2017-11-14 DIAGNOSIS — Z78.0 POSTMENOPAUSAL: ICD-10-CM

## 2017-11-14 LAB — DEPRECATED CALCIDIOL+CALCIFEROL SERPL-MC: 27 UG/L (ref 20–75)

## 2017-11-14 PROCEDURE — 82306 VITAMIN D 25 HYDROXY: CPT | Performed by: FAMILY MEDICINE

## 2017-11-14 NOTE — MR AVS SNAPSHOT
After Visit Summary   11/14/2017    Sherly Yeung    MRN: [id number]           Patient Information     Date Of Birth          1965        Visit Information        Provider Department      11/14/2017 11:30 AM Clemente Garsia MD [facility]        Today's Diagnoses     Pain of right clavicle    -  1    History of anorexia nervosa        Postmenopausal           Follow-ups after your visit        Your next 10 appointments already scheduled     Nov 14, 2017  2:00 PM CST   US VENOUS with UCUSV2   [facility] ([facility])    [address] [place] [id number] [phone]           Please bring a list of your medicines (including vitamins, minerals and over-the-counter drugs). Also, tell your doctor about any allergies you may have. Wear comfortable clothes and leave your valuables at home.  You do not need to do anything special to prepare for your exam.  Please call the Imaging Department at your exam site with any questions.              Future tests that were ordered for you today     Open Future Orders        Priority Expected Expires Ordered    CT Upper Extremity Right w/o Contrast Routine  11/14/2018 11/14/2017    Dexa hip/pelvis/spine* Routine  11/14/2018 11/14/2017    Vitamin D Deficiency Routine 11/14/2017 12/14/2017 11/14/2017    CT Chest w/o contrast Routine  11/14/2018 11/14/2017            Who to contact     Please call your clinic at [phone] to:    Ask questions about your health    Make or cancel appointments    Discuss your medicines    Learn about your test results    Speak to your doctor   If you have compliments or concerns about an experience at your clinic, or if you wish to file a complaint, please contact [facility] Patient Relations at [phone] or email us at [email or web address]         Additional Information About Your Visit        [employer]  "Information     [employer] gives you secure access to your electronic health record. If you see a primary care provider, you can also send messages to your care team and make appointments. If you have questions, please call your primary care clinic.  If you do not have a primary care provider, please call [phone] and they will assist you.      [employer] is an electronic gateway that provides easy, online access to your medical records. With [employer], you can request a clinic appointment, read your test results, renew a prescription or communicate with your care team.     To access your existing account, please contact your [facility] or call [phone] for assistance.        Care EveryWhere ID     This is your Care EveryWhere ID. This could be used by other organizations to access your [place] medical records  [id number]        Your Vitals Were     Pulse Height BMI (Body Mass Index)             102 5' 2.8\" (1.595 m) 20.14 kg/m2          Blood Pressure from Last 3 Encounters:   11/14/17 (!) 130/93   11/07/17 (!) 125/91   07/24/17 134/90    Weight from Last 3 Encounters:   11/14/17 113 lb (51.3 kg)   11/07/17 113 lb 4.8 oz (51.4 kg)   07/24/17 112 lb 1.6 oz (50.8 kg)               Primary Care Provider Office Phone # Fax #    Chadwick Kulwant [facility] [phone]       [address] [place] [id number]        Equal Access to Services     SUSANNA PITTMAN AH: Hadii aad ku hadasho Soomaali, waaxda luqadaha, qaybta kaalmada adeegyada, waxlaverne asaf gibbs . So [facility] [phone].    ATENCIÓN: Si habla español, tiene a randle disposición servicios gratuitos de asistencia lingüística. Llame al [phone].    We comply with applicable federal civil rights laws and [place] laws. We do not discriminate on the basis of race, color, national origin, age, disability, sex, sexual orientation, or gender identity.            Thank you!     Thank you for " choosing [facility]  for your care. Our goal is always to provide you with excellent care. Hearing back from our patients is one way we can continue to improve our services. Please take a few minutes to complete the written survey that you may receive in the mail after your visit with us. Thank you!             Your Updated Medication List - Protect others around you: Learn how to safely use, store and throw away your medicines at www.disposemymeds.org.          This list is accurate as of: 11/14/17 12:04 PM.  Always use your most recent med list.                   Brand Name Dispense Instructions for use Diagnosis    ABILIFY 20 MG tablet   Generic drug:  ARIPiprazole      Take 20 mg by mouth        aspirin 81 MG chewable tablet      81 mg        B COMPLEX 1 PO      Take 1 tablet by mouth daily.    Sternal pain, Disorder of bone and cartilage, unspecified, Fatigue, Anemia, unspecified       CYMBALTA PO      Take 120 mg by mouth daily        doxycycline 100 MG capsule    VIBRAMYCIN     Take 1 capsule (100 mg) by mouth every 12 hours    Rosacea       fondaparinux 7.5MG/0.6ML injection    ARIXTRA     Inject 7.5 mg Subcutaneous daily        HYDROmorphone 3 MG Suppository    DILAUDID     Place 3 mg rectally        LANsoprazole 30 MG CR capsule    PREVACID    90 capsule    Take 1 capsule (30 mg) by mouth 2 times daily    Gastroesophageal reflux disease without esophagitis, Subclavian vein thrombosis, right (H)       LORazepam 0.5 MG tablet    ATIVAN    30 tablet    Take 1 tablet by mouth every 6 hours as needed (for dizziness ).    Dizziness and giddiness       magnesium 250 MG tablet      Take 4 tablets by mouth daily    Iron deficiency anemia, unspecified       metoprolol 25 MG 24 hr tablet    TOPROL-XL    90 tablet    Take 1 tablet (25 mg) by mouth daily    Right subclavian vein thrombosis (H), Sinus tachycardia, SVC syndrome       nortriptyline 50 MG capsule    PAMELOR     Take 50 mg by mouth At Bedtime.         oxyCODONE 10 MG 12 hr tablet    OxyCONTIN    30 tablet    Take 1 tablet (10 mg) by mouth every 12 hours    Subclavian vein thrombosis, right (H)       predniSONE 10 MG tablet    DELTASONE    42 tablet    Take 1 tablet (10 mg) by mouth daily Take 4 tabs (40 mg) orally daily for 4 days, 3 tabs (30 mg) orally daily for 4 days, 2 tabs (20 mg) orally daily for 4 days, 1 tab (10mg) orally daily for 4 days, then 1/2 tab (5 mg) orally for 4 days.    Migraine headache       riboflavin 100 MG Tabs tablet    vitamin  B-2     Take 400 mg by mouth        RITALIN 20 MG tablet   Generic drug:  methylphenidate       Subclavian vein thrombosis, right (H), SVC syndrome, Sinus tachycardia       senna-docusate 8.6-50 MG per tablet    SENOKOT-S;PERICOLACE    60 tablet    Take 1-2 tablets by mouth 2 times daily.    Constipation       SUMAtriptan Succinate 6 MG/0.5ML Kit      Inject 1 dose Subcutaneous at onset of headache. Indications: Migraine Headache        tolterodine 2 MG 24 hr capsule    DETROL LA    60 capsule    Take 1 capsule (2 mg) by mouth daily    Urge incontinence       TOPAMAX 25 MG tablet   Generic drug:  topiramate      Take 100-200 mg by mouth 2 times daily        vitamin D 1000 UNITS capsule      Take 2 capsules by mouth daily.    Sternal pain, Disorder of bone and cartilage, unspecified, Fatigue, Anemia, unspecified       Zinc 50 MG Caps      Take 1 tablet by mouth daily.    Sternal pain, Disorder of bone and cartilage, unspecified, Fatigue, Anemia, unspecified       ZUPLENZ 8 MG Film   Generic drug:  Ondansetron      Take 8 mg by mouth every 6 hours as needed.    Sternal pain, Disorder of bone and cartilage, unspecified, Fatigue, Anemia, unspecified

## 2017-11-14 NOTE — PROGRESS NOTES
" Subjective:   Sherly Yeung is a 52 year old female who is c/o right clavicle pain. She had previous surgery for proximal fracture back in 2013. The pt reports reaching out and heard a click. She feels like clavicle is over sternum. She says the pain is in same area as before.    Jan 2013 she had a proximal right clavicular fracture and subsequently underwent surgery for fixation. Fracture etiology was unclear. In Nov 2012 she had a proximal right subclavian graft placed due to right subclavian stenosis. About 3 weeks ago she was reaching across her chest to grab a computer bag and felt a click in the area of the right sternoclavicular articulation and since that time it has not felt \"right\". She denies any obvious swelling or bruising following the incident. It is painful in the area and worse with right shoulder flexion or lifting or direct pressure to the area. No sob, no cough, no right arm pain or dysesthesia. She is no longer taking prednisone but takes it frequently with migraine HA.    She does not smoke. She does not take calcium or vitamin D. She does not have a special diet. Menopausal symptoms started about 6 months ago and she has not menstruated in the past 4 months. She denies a family history of osteoporosis. She is of  descent. She is fair skinned. She has a history of AN as a teenage. Has been or typical weight for the past 30+ years.     Background:   Date of injury: ~ 3 weeks ago  Duration of symptoms: 3 weeks  Mechanism of Injury: Acute; Unknown   Intensity: 3/10 at rest, 5/10 with activity   Aggravating factors: Reaching, pressure  Relieving Factors: Oxycodone, Tylenol  Prior Evaluation: Prior Physician Evalutation: PCP    PAST MEDICAL, SOCIAL, SURGICAL AND FAMILY HISTORY: She  has a past medical history of Degloving injury of arm (2009); Depression; Endometriosis (4/2012); Hypertension; Migraine headache; Rosacea; Subdural haemorrhage; SVC syndrome; Thoracic outlet syndrome; and " "Thrombophlebitis. She also has no past medical history of Arthritis; Asthma; Congestive heart failure, unspecified; COPD (chronic obstructive pulmonary disease) (H); Coronary artery disease; Diabetes mellitus (H); Difficult intubation; Malignant hyperthermia; Malignant neoplasm (H); Spinal headache; Thyroid disease; Type 2 diabetes mellitus without complications (H); or Unspecified cerebral artery occlusion with cerebral infarction.  She  has a past surgical history that includes vascular surgery; Head and neck surgery; Cardiac surgery; Abdomen surgery (1998); Soft tissue surgery (Feb 2009); Thoracic surgery (July 2010); angioplasty (03/20/2012); GYN surgery; orthopedic surgery; Resect first rib with subclavian vein patch (11/16/2012); and Incision and closure of sternum (1/3/2013).  Her family history includes CANCER in her mother.  She reports that she has never smoked. She has never used smokeless tobacco. She reports that she does not drink alcohol or use illicit drugs.    ALLERGIES: She is allergic to droperidol; phenergan dm [promethazine-dm]; androgens; bicitra [citric acid-sodium citrate]; d.h.e. 45 [dihydroergotamine mesylate]; depakote [divalproex sodium]; metoclopramide hcl; verapamil hcl cr; olanzapine; and prochlorperazine maleate.    CURRENT MEDICATIONS: She has a current medication list which includes the following prescription(s): lansoprazole, tolterodine, fondaparinux, aspirin, hydromorphone, riboflavin, aripiprazole, oxycodone, metoprolol, methylphenidate, prednisone, topiramate, doxycycline, duloxetine hcl, magnesium, ondansetron, zinc, b complex vitamins, vitamin d, lorazepam, senna-docusate, sumatriptan succinate, and nortriptyline.     REVIEW OF SYSTEMS: 10 point review of systems is negative except as noted above.     Exam:   BP (!) 130/93 (BP Location: Right arm, Patient Position: Sitting, Cuff Size: Adult Regular)  Pulse 102  Ht 5' 2.8\" (1.595 m)  Wt 113 lb (51.3 kg)  BMI 20.14 kg/m2    "   CONSTITUTIONAL: healthy, alert and no distress  HEAD: Normocephalic. No masses, lesions, tenderness or abnormalities  SKIN: no suspicious lesions or rashes; fair complexion  GAIT: normal  NEUROLOGIC: Non-focal  PSYCHIATRIC: affect normal/bright and mentation appears normal.    MUSCULOSKELETAL:   RIGHT CLAVICLE: TTP over the proximal clavicle. No clear crepitus. The proximal end is enlarged and irregular on palpation.    RIGHT SHOULDER: FROM, +crepitus, negative impingement signs.       Assessment/Plan:   (M89.8X1) Pain of right clavicle  (primary encounter diagnosis)  Comment: Will proceed with a limited CT of the right clavicle for further evaluation of prior fracture, fixation, possible OA of the SC junction or small avulsion fracture.  Plan: X-ray rt Clavicle, CT Chest w/o contrast, Dexa         hip/pelvis/spine*            (Z86.59) History of anorexia nervosa  Comment: Will check vit D and DXA. Patient has risk factors and currently menopausal. She has a history of fragility fracture and strong suspicion of underlying osteoporosis particularly with history of anorexia nervosa.  Plan: Vitamin D Deficiency, Dexa hip/pelvis/spine*            (Z78.0) Postmenopausal  Comment: As above. DXA for strong suspicion of osteoporosis.   Plan: Dexa hip/pelvis/spine*          She will obtain limited CT and vit D. Schedule DXA for next week and then follow up with me for 45 minutes pending her studies.       X-RAY INTERPRETATION:   2 views of the right clavicle are reviewed and the SC articulation is obstructed and not well seen due to overlying graft.     Thank you for allowing me to participate in her care. Please feel free to contact me if I can provide any further information. I will keep you apprised of her results.

## 2017-11-14 NOTE — LETTER
"  11/14/2017      RE: Sherly Yeung  [address] [place] [address]        Subjective:   Sherly Yeung is a 52 year old female who is c/o right clavicle pain. She had previous surgery for proximal fracture back in 2013. The pt reports reaching out and heard a click. She feels like clavicle is over sternum. She says the pain is in same area as before.    Jan 2013 she had a proximal right clavicular fracture and subsequently underwent surgery for fixation. Fracture etiology was unclear. In Nov 2012 she had a proximal right subclavian graft placed due to right subclavian stenosis. About 3 weeks ago she was reaching across her chest to grab a computer bag and felt a click in the area of the right sternoclavicular articulation and since that time it has not felt \"right\". She denies any obvious swelling or bruising following the incident. It is painful in the area and worse with right shoulder flexion or lifting or direct pressure to the area. No sob, no cough, no right arm pain or dysesthesia. She is no longer taking prednisone but takes it frequently with migraine HA.    She does not smoke. She does not take calcium or vitamin D. She does not have a special diet. Menopausal symptoms started about 6 months ago and she has not menstruated in the past 4 months. She denies a family history of osteoporosis. She is of  descent. She is fair skinned. She has a history of AN as a teenage. Has been or typical weight for the past 30+ years.     Background:   Date of injury: ~ 3 weeks ago  Duration of symptoms: 3 weeks  Mechanism of Injury: Acute; Unknown   Intensity: 3/10 at rest, 5/10 with activity   Aggravating factors: Reaching, pressure  Relieving Factors: Oxycodone, Tylenol  Prior Evaluation: Prior Physician Evalutation: PCP    PAST MEDICAL, SOCIAL, SURGICAL AND FAMILY HISTORY: She  has a past medical history of Degloving injury of arm (2009); Depression; Endometriosis (4/2012); Hypertension; Migraine headache; " Rosacea; Subdural haemorrhage; SVC syndrome; Thoracic outlet syndrome; and Thrombophlebitis. She also has no past medical history of Arthritis; Asthma; Congestive heart failure, unspecified; COPD (chronic obstructive pulmonary disease) (H); Coronary artery disease; Diabetes mellitus (H); Difficult intubation; Malignant hyperthermia; Malignant neoplasm (H); Spinal headache; Thyroid disease; Type 2 diabetes mellitus without complications (H); or Unspecified cerebral artery occlusion with cerebral infarction.  She  has a past surgical history that includes vascular surgery; Head and neck surgery; Cardiac surgery; Abdomen surgery (1998); Soft tissue surgery (Feb 2009); Thoracic surgery (July 2010); angioplasty (03/20/2012); GYN surgery; orthopedic surgery; Resect first rib with subclavian vein patch (11/16/2012); and Incision and closure of sternum (1/3/2013).  Her family history includes CANCER in her mother.  She reports that she has never smoked. She has never used smokeless tobacco. She reports that she does not drink alcohol or use illicit drugs.    ALLERGIES: She is allergic to droperidol; phenergan dm [promethazine-dm]; androgens; bicitra [citric acid-sodium citrate]; d.h.e. 45 [dihydroergotamine mesylate]; depakote [divalproex sodium]; metoclopramide hcl; verapamil hcl cr; olanzapine; and prochlorperazine maleate.    CURRENT MEDICATIONS: She has a current medication list which includes the following prescription(s): lansoprazole, tolterodine, fondaparinux, aspirin, hydromorphone, riboflavin, aripiprazole, oxycodone, metoprolol, methylphenidate, prednisone, topiramate, doxycycline, duloxetine hcl, magnesium, ondansetron, zinc, b complex vitamins, vitamin d, lorazepam, senna-docusate, sumatriptan succinate, and nortriptyline.     REVIEW OF SYSTEMS: 10 point review of systems is negative except as noted above.     Exam:   BP (!) 130/93 (BP Location: Right arm, Patient Position: Sitting, Cuff Size: Adult Regular)   "Pulse 102  Ht 5' 2.8\" (1.595 m)  Wt 113 lb (51.3 kg)  BMI 20.14 kg/m2      CONSTITUTIONAL: healthy, alert and no distress  HEAD: Normocephalic. No masses, lesions, tenderness or abnormalities  SKIN: no suspicious lesions or rashes; fair complexion  GAIT: normal  NEUROLOGIC: Non-focal  PSYCHIATRIC: affect normal/bright and mentation appears normal.    MUSCULOSKELETAL:   RIGHT CLAVICLE: TTP over the proximal clavicle. No clear crepitus. The proximal end is enlarged and irregular on palpation.    RIGHT SHOULDER: FROM, +crepitus, negative impingement signs.       Assessment/Plan:   (M89.8X1) Pain of right clavicle  (primary encounter diagnosis)  Comment: Will proceed with a limited CT of the right clavicle for further evaluation of prior fracture, fixation, possible OA of the SC junction or small avulsion fracture.  Plan: X-ray rt Clavicle, CT Chest w/o contrast, Dexa         hip/pelvis/spine*            (Z86.59) History of anorexia nervosa  Comment: Will check vit D and DXA. Patient has risk factors and currently menopausal. She has a history of fragility fracture and strong suspicion of underlying osteoporosis particularly with history of anorexia nervosa.  Plan: Vitamin D Deficiency, Dexa hip/pelvis/spine*            (Z78.0) Postmenopausal  Comment: As above. DXA for strong suspicion of osteoporosis.   Plan: Dexa hip/pelvis/spine*          She will obtain limited CT and vit D. Schedule DXA for next week and then follow up with me for 45 minutes pending her studies.       X-RAY INTERPRETATION:   2 views of the right clavicle are reviewed and the SC articulation is obstructed and not well seen due to overlying graft.     Thank you for allowing me to participate in her care. Please feel free to contact me if I can provide any further information. I will keep you apprised of her results.        Clemente Garsia MD    "

## 2017-11-15 LAB
H PYLORI AG STL QL IA: NORMAL
SPECIMEN SOURCE: NORMAL

## 2017-11-16 ENCOUNTER — TELEPHONE (OUTPATIENT)
Dept: ORTHOPEDICS | Facility: CLINIC | Age: 52
End: 2017-11-16

## 2017-11-16 NOTE — TELEPHONE ENCOUNTER
In basket message sent to [facility] T[facility] and Dr. Garsia regarding an incidental radiology finding on her clavicle xray from 11/14/17.

## 2017-12-05 ENCOUNTER — OFFICE VISIT (OUTPATIENT)
Dept: ORTHOPEDICS | Facility: CLINIC | Age: 52
End: 2017-12-05

## 2017-12-05 VITALS — HEIGHT: 63 IN | WEIGHT: 113 LBS | BODY MASS INDEX: 20.02 KG/M2

## 2017-12-05 DIAGNOSIS — T84.318S: Primary | ICD-10-CM

## 2017-12-05 DIAGNOSIS — M85.89 OSTEOPENIA OF MULTIPLE SITES: ICD-10-CM

## 2017-12-05 NOTE — LETTER
12/5/2017      RE: Sherly Yeung  [address] [place] [address]       Sherly is a 52-year-old female who comes in today for followup on her right proximal clavicle pain.  CT demonstrated that she had broken a wire from her prior operative fixation.  She notes that the medial right clavicle is already markedly improved with marked decrease in pain.  We have reviewed her DEXA which demonstrates that she has osteopenia.  We have reviewed her vitamin D level.  I have recommended watchful waiting for her broken wire.  If she is not feeling much improved at approximately 3 months, then she might consider a followup.  With regard to her osteopenia and a low normal vitamin D, I have recommended calcium 500 mg b.i.d. and vitamin D 2000 units daily.       I spent 25 minutes in face to face time with the patient and greater than 20 minutes in counseling and coordination of care.        Clemente Garsia MD

## 2017-12-05 NOTE — PROGRESS NOTES
Sherly is a 52-year-old female who comes in today for followup on her right proximal clavicle pain.  CT demonstrated that she had broken a wire from her prior operative fixation.  She notes that the medial right clavicle is already markedly improved with marked decrease in pain.  We have reviewed her DEXA which demonstrates that she has osteopenia.  We have reviewed her vitamin D level.  I have recommended watchful waiting for her broken wire.  If she is not feeling much improved at approximately 3 months, then she might consider a followup.  With regard to her osteopenia and a low normal vitamin D, I have recommended calcium 500 mg b.i.d. and vitamin D 2000 units daily.       I spent 25 minutes in face to face time with the patient and greater than 20 minutes in counseling and coordination of care.

## 2017-12-05 NOTE — MR AVS SNAPSHOT
"              After Visit Summary   12/5/2017    Sherly Yeung    MRN: [id number]           Patient Information     Date Of Birth          1965        Visit Information        Provider Department      12/5/2017 4:30 PM Clemente Garsia MD [facility]        Today's Diagnoses     Broken external fixator wire, sequela    -  1    Osteopenia of multiple sites           Follow-ups after your visit        Who to contact     Please call your clinic at [phone] to:    Ask questions about your health    Make or cancel appointments    Discuss your medicines    Learn about your test results    Speak to your doctor   If you have compliments or concerns about an experience at your clinic, or if you wish to file a complaint, please contact [facility] Patient Relations at [phone] or email us at [email or web address]         Additional Information About Your Visit        MyChart Information     [employer] gives you secure access to your electronic health record. If you see a primary care provider, you can also send messages to your care team and make appointments. If you have questions, please call your primary care clinic.  If you do not have a primary care provider, please call [phone] and they will assist you.      [employer] is an electronic gateway that provides easy, online access to your medical records. With [employer], you can request a clinic appointment, read your test results, renew a prescription or communicate with your care team.     To access your existing account, please contact your [facility] or call [phone] for assistance.        Care EveryWhere ID     This is your Care EveryWhere ID. This could be used by other organizations to access your [place] medical records  [id number]        Your Vitals Were     Height BMI (Body Mass Index)                5' 2.8\" (1.595 m) 20.14 kg/m2           Blood Pressure from " Last 3 Encounters:   11/14/17 (!) 130/93   11/07/17 (!) 125/91   07/24/17 134/90    Weight from Last 3 Encounters:   12/05/17 113 lb (51.3 kg)   11/14/17 113 lb (51.3 kg)   11/07/17 113 lb 4.8 oz (51.4 kg)              Today, you had the following     No orders found for display       Primary Care Provider Office Phone # Fax #    [facility]ad[facility] [phone]       [address] [place] [id number]        Equal Access to Services     [facility]: Hadii laurel correa hadbret Sojocy, waaxda luleora, qaybta kaalmada ademayonr, tuan gibbs . So [facility] [phone].    ATENCIÓN: Si habla español, tiene a randle disposición servicios gratuitos de asistencia lingüística. [facility] [phone].    We comply with applicable federal civil rights laws and [place] laws. We do not discriminate on the basis of race, color, national origin, age, disability, sex, sexual orientation, or gender identity.            Thank you!     Thank you for choosing [facility]  for your care. Our goal is always to provide you with excellent care. Hearing back from our patients is one way we can continue to improve our services. Please take a few minutes to complete the written survey that you may receive in the mail after your visit with us. Thank you!             Your Updated Medication List - Protect others around you: Learn how to safely use, store and throw away your medicines at www.disposemymeds.org.          This list is accurate as of: 12/5/17 11:59 PM.  Always use your most recent med list.                   Brand Name Dispense Instructions for use Diagnosis    ABILIFY 20 MG tablet   Generic drug:  ARIPiprazole      Take 20 mg by mouth        aspirin 81 MG chewable tablet      81 mg        B COMPLEX 1 PO      Take 1 tablet by mouth daily.    Sternal pain, Disorder of bone and cartilage, unspecified, Fatigue, Anemia, unspecified       CYMBALTA PO      Take 120 mg by  mouth daily        doxycycline 100 MG capsule    VIBRAMYCIN     Take 1 capsule (100 mg) by mouth every 12 hours    Rosacea       fondaparinux 7.5MG/0.6ML injection    ARIXTRA     Inject 7.5 mg Subcutaneous daily        HYDROmorphone 3 MG Suppository    DILAUDID     Place 3 mg rectally        LANsoprazole 30 MG CR capsule    PREVACID    90 capsule    Take 1 capsule (30 mg) by mouth 2 times daily    Gastroesophageal reflux disease without esophagitis, Subclavian vein thrombosis, right (H)       LORazepam 0.5 MG tablet    ATIVAN    30 tablet    Take 1 tablet by mouth every 6 hours as needed (for dizziness ).    Dizziness and giddiness       magnesium 250 MG tablet      Take 4 tablets by mouth daily    Iron deficiency anemia, unspecified       metoprolol 25 MG 24 hr tablet    TOPROL-XL    90 tablet    Take 1 tablet (25 mg) by mouth daily    Right subclavian vein thrombosis (H), Sinus tachycardia, SVC syndrome       nortriptyline 50 MG capsule    PAMELOR     Take 50 mg by mouth At Bedtime.        oxyCODONE 10 MG 12 hr tablet    OxyCONTIN    30 tablet    Take 1 tablet (10 mg) by mouth every 12 hours    Subclavian vein thrombosis, right (H)       predniSONE 10 MG tablet    DELTASONE    42 tablet    Take 1 tablet (10 mg) by mouth daily Take 4 tabs (40 mg) orally daily for 4 days, 3 tabs (30 mg) orally daily for 4 days, 2 tabs (20 mg) orally daily for 4 days, 1 tab (10mg) orally daily for 4 days, then 1/2 tab (5 mg) orally for 4 days.    Migraine headache       riboflavin 100 MG Tabs tablet    vitamin  B-2     Take 400 mg by mouth        RITALIN 20 MG tablet   Generic drug:  methylphenidate       Subclavian vein thrombosis, right (H), SVC syndrome, Sinus tachycardia       senna-docusate 8.6-50 MG per tablet    SENOKOT-S;PERICOLACE    60 tablet    Take 1-2 tablets by mouth 2 times daily.    Constipation       SUMAtriptan Succinate 6 MG/0.5ML Kit      Inject 1 dose Subcutaneous at onset of headache. Indications: Migraine  Headache        tolterodine 2 MG 24 hr capsule    DETROL LA    60 capsule    Take 1 capsule (2 mg) by mouth daily    Urge incontinence       TOPAMAX 25 MG tablet   Generic drug:  topiramate      Take 100-200 mg by mouth 2 times daily        vitamin D 1000 UNITS capsule      Take 2 capsules by mouth daily.    Sternal pain, Disorder of bone and cartilage, unspecified, Fatigue, Anemia, unspecified       Zinc 50 MG Caps      Take 1 tablet by mouth daily.    Sternal pain, Disorder of bone and cartilage, unspecified, Fatigue, Anemia, unspecified       ZUPLENZ 8 MG Film   Generic drug:  Ondansetron      Take 8 mg by mouth every 6 hours as needed.    Sternal pain, Disorder of bone and cartilage, unspecified, Fatigue, Anemia, unspecified

## 2018-01-15 ENCOUNTER — MYC MEDICAL ADVICE (OUTPATIENT)
Dept: INTERNAL MEDICINE | Facility: CLINIC | Age: 53
End: 2018-01-15

## 2018-01-15 DIAGNOSIS — R30.0 DYSURIA: Primary | ICD-10-CM

## 2018-01-15 RX ORDER — SULFAMETHOXAZOLE/TRIMETHOPRIM 800-160 MG
1 TABLET ORAL 2 TIMES DAILY
Qty: 6 TABLET | Refills: 0 | Status: SHIPPED | OUTPATIENT
Start: 2018-01-15 | End: 2018-01-22

## 2018-01-15 NOTE — TELEPHONE ENCOUNTER
Pt confirmed no fever, chills, nausea, abdominal pain, flank/back pain, or any other complications. Only frequency, urgency, dysuria, and odor. Confirmed allergies. Rx sent to pt's pharmacy per protocol. Advised pt to follow up in clinic if symptoms worsen or persist. She voiced understanding.    Aleah Melton RN

## 2018-01-22 ENCOUNTER — HOSPITAL ENCOUNTER (EMERGENCY)
Facility: CLINIC | Age: 53
Discharge: HOME | End: 2018-01-22
Attending: EMERGENCY MEDICINE | Admitting: EMERGENCY MEDICINE
Payer: MEDICARE

## 2018-01-22 ENCOUNTER — RADIANT APPOINTMENT (OUTPATIENT)
Dept: ULTRASOUND IMAGING | Facility: CLINIC | Age: 53
End: 2018-01-22
Attending: INTERNAL MEDICINE
Payer: MEDICARE

## 2018-01-22 ENCOUNTER — APPOINTMENT (OUTPATIENT)
Dept: CT IMAGING | Facility: CLINIC | Age: 53
End: 2018-01-22
Attending: EMERGENCY MEDICINE
Payer: MEDICARE

## 2018-01-22 ENCOUNTER — OFFICE VISIT (OUTPATIENT)
Dept: INTERNAL MEDICINE | Facility: CLINIC | Age: 53
End: 2018-01-22
Payer: MEDICARE

## 2018-01-22 ENCOUNTER — APPOINTMENT (OUTPATIENT)
Dept: GENERAL RADIOLOGY | Facility: CLINIC | Age: 53
End: 2018-01-22
Attending: EMERGENCY MEDICINE
Payer: MEDICARE

## 2018-01-22 VITALS
RESPIRATION RATE: 18 BRPM | HEART RATE: 99 BPM | TEMPERATURE: 97.8 F | WEIGHT: 117.4 LBS | DIASTOLIC BLOOD PRESSURE: 78 MMHG | OXYGEN SATURATION: 94 % | SYSTOLIC BLOOD PRESSURE: 109 MMHG | BODY MASS INDEX: 20.93 KG/M2

## 2018-01-22 VITALS
OXYGEN SATURATION: 99 % | WEIGHT: 116.4 LBS | DIASTOLIC BLOOD PRESSURE: 91 MMHG | HEART RATE: 91 BPM | SYSTOLIC BLOOD PRESSURE: 127 MMHG | BODY MASS INDEX: 20.75 KG/M2

## 2018-01-22 DIAGNOSIS — M54.2 NECK PAIN: ICD-10-CM

## 2018-01-22 DIAGNOSIS — R07.9 CHEST PAIN, UNSPECIFIED TYPE: ICD-10-CM

## 2018-01-22 DIAGNOSIS — R07.89 CHEST PRESSURE: Primary | ICD-10-CM

## 2018-01-22 DIAGNOSIS — I87.1 SUBCLAVIAN VEIN STENOSIS: ICD-10-CM

## 2018-01-22 DIAGNOSIS — I82.B11 SUBCLAVIAN VEIN THROMBOSIS, RIGHT (H): ICD-10-CM

## 2018-01-22 DIAGNOSIS — I87.1 SUPERIOR VENA CAVA STENOSIS: ICD-10-CM

## 2018-01-22 LAB
ALBUMIN SERPL-MCNC: 4.2 G/DL (ref 3.4–5)
ALBUMIN UR-MCNC: NEGATIVE MG/DL
ALP SERPL-CCNC: 126 U/L (ref 40–150)
ALT SERPL W P-5'-P-CCNC: 20 U/L (ref 0–50)
ANION GAP SERPL CALCULATED.3IONS-SCNC: 6 MMOL/L (ref 3–14)
APPEARANCE UR: CLEAR
AST SERPL W P-5'-P-CCNC: 18 U/L (ref 0–45)
BASOPHILS # BLD AUTO: 0 10E9/L (ref 0–0.2)
BASOPHILS NFR BLD AUTO: 0.6 %
BILIRUB SERPL-MCNC: 0.5 MG/DL (ref 0.2–1.3)
BILIRUB UR QL STRIP: NEGATIVE
BUN SERPL-MCNC: 14 MG/DL (ref 7–30)
CALCIUM SERPL-MCNC: 9 MG/DL (ref 8.5–10.1)
CHLORIDE SERPL-SCNC: 106 MMOL/L (ref 94–109)
CO2 SERPL-SCNC: 25 MMOL/L (ref 20–32)
COLOR UR AUTO: ABNORMAL
CREAT SERPL-MCNC: 0.98 MG/DL (ref 0.52–1.04)
DIFFERENTIAL METHOD BLD: NORMAL
EOSINOPHIL # BLD AUTO: 0.1 10E9/L (ref 0–0.7)
EOSINOPHIL NFR BLD AUTO: 1.3 %
ERYTHROCYTE [DISTWIDTH] IN BLOOD BY AUTOMATED COUNT: 13.8 % (ref 10–15)
GFR SERPL CREATININE-BSD FRML MDRD: 59 ML/MIN/1.7M2
GLUCOSE SERPL-MCNC: 82 MG/DL (ref 70–99)
GLUCOSE UR STRIP-MCNC: NEGATIVE MG/DL
HCT VFR BLD AUTO: 41.8 % (ref 35–47)
HGB BLD-MCNC: 13.7 G/DL (ref 11.7–15.7)
HGB UR QL STRIP: NEGATIVE
IMM GRANULOCYTES # BLD: 0 10E9/L (ref 0–0.4)
IMM GRANULOCYTES NFR BLD: 0.2 %
INTERPRETATION ECG - MUSE: NORMAL
INTERPRETATION ECG - MUSE: NORMAL
KETONES UR STRIP-MCNC: NEGATIVE MG/DL
LEUKOCYTE ESTERASE UR QL STRIP: ABNORMAL
LYMPHOCYTES # BLD AUTO: 0.8 10E9/L (ref 0.8–5.3)
LYMPHOCYTES NFR BLD AUTO: 15.5 %
MCH RBC QN AUTO: 29.9 PG (ref 26.5–33)
MCHC RBC AUTO-ENTMCNC: 32.8 G/DL (ref 31.5–36.5)
MCV RBC AUTO: 91 FL (ref 78–100)
MONOCYTES # BLD AUTO: 0.4 10E9/L (ref 0–1.3)
MONOCYTES NFR BLD AUTO: 7.3 %
NEUTROPHILS # BLD AUTO: 4 10E9/L (ref 1.6–8.3)
NEUTROPHILS NFR BLD AUTO: 75.1 %
NITRATE UR QL: NEGATIVE
NRBC # BLD AUTO: 0 10*3/UL
NRBC BLD AUTO-RTO: 0 /100
PH UR STRIP: 5.5 PH (ref 5–7)
PLATELET # BLD AUTO: 265 10E9/L (ref 150–450)
POTASSIUM SERPL-SCNC: 3.9 MMOL/L (ref 3.4–5.3)
PROT SERPL-MCNC: 7.8 G/DL (ref 6.8–8.8)
RBC # BLD AUTO: 4.58 10E12/L (ref 3.8–5.2)
RBC #/AREA URNS AUTO: 1 /HPF (ref 0–2)
SODIUM SERPL-SCNC: 138 MMOL/L (ref 133–144)
SOURCE: ABNORMAL
SP GR UR STRIP: 1.03 (ref 1–1.03)
SQUAMOUS #/AREA URNS AUTO: <1 /HPF (ref 0–1)
TRANS CELLS #/AREA URNS HPF: <1 /HPF (ref 0–1)
TROPONIN I SERPL-MCNC: <0.015 UG/L (ref 0–0.04)
UROBILINOGEN UR STRIP-MCNC: NORMAL MG/DL (ref 0–2)
WBC # BLD AUTO: 5.4 10E9/L (ref 4–11)
WBC #/AREA URNS AUTO: 9 /HPF (ref 0–2)

## 2018-01-22 PROCEDURE — 99284 EMERGENCY DEPT VISIT MOD MDM: CPT | Mod: Z6 | Performed by: EMERGENCY MEDICINE

## 2018-01-22 PROCEDURE — 71260 CT THORAX DX C+: CPT

## 2018-01-22 PROCEDURE — 25000128 H RX IP 250 OP 636: Performed by: STUDENT IN AN ORGANIZED HEALTH CARE EDUCATION/TRAINING PROGRAM

## 2018-01-22 PROCEDURE — 93005 ELECTROCARDIOGRAM TRACING: CPT | Performed by: EMERGENCY MEDICINE

## 2018-01-22 PROCEDURE — 80053 COMPREHEN METABOLIC PANEL: CPT | Performed by: EMERGENCY MEDICINE

## 2018-01-22 PROCEDURE — 71046 X-RAY EXAM CHEST 2 VIEWS: CPT

## 2018-01-22 PROCEDURE — 99285 EMERGENCY DEPT VISIT HI MDM: CPT | Mod: 25 | Performed by: EMERGENCY MEDICINE

## 2018-01-22 PROCEDURE — 84484 ASSAY OF TROPONIN QUANT: CPT | Performed by: EMERGENCY MEDICINE

## 2018-01-22 PROCEDURE — 25000125 ZZHC RX 250: Performed by: STUDENT IN AN ORGANIZED HEALTH CARE EDUCATION/TRAINING PROGRAM

## 2018-01-22 PROCEDURE — 85025 COMPLETE CBC W/AUTO DIFF WBC: CPT | Performed by: EMERGENCY MEDICINE

## 2018-01-22 PROCEDURE — 81001 URINALYSIS AUTO W/SCOPE: CPT | Performed by: EMERGENCY MEDICINE

## 2018-01-22 RX ORDER — IOPAMIDOL 755 MG/ML
51 INJECTION, SOLUTION INTRAVASCULAR ONCE
Status: COMPLETED | OUTPATIENT
Start: 2018-01-22 | End: 2018-01-22

## 2018-01-22 RX ADMIN — SODIUM CHLORIDE, PRESERVATIVE FREE 82 ML: 5 INJECTION INTRAVENOUS at 17:49

## 2018-01-22 RX ADMIN — IOPAMIDOL 51 ML: 755 INJECTION, SOLUTION INTRAVENOUS at 17:49

## 2018-01-22 ASSESSMENT — ENCOUNTER SYMPTOMS
ABDOMINAL PAIN: 0
BRUISES/BLEEDS EASILY: 1
NECK PAIN: 1
FEVER: 0
SHORTNESS OF BREATH: 1

## 2018-01-22 ASSESSMENT — PAIN SCALES - GENERAL: PAINLEVEL: MODERATE PAIN (4)

## 2018-01-22 NOTE — ED AVS SNAPSHOT
[facility], Emergency Department    [address] [address]    Phone:  [phone]                                       Sherly Yeung   MRN: [id number]    Department:  [facility], Emergency Department   Date of Visit:  1/22/2018           Patient Information     Date Of Birth          1965        Your diagnoses for this visit were:     Chest pain, unspecified type        You were seen by Giuseppe Michaud MD.        Discharge Instructions          *CHEST PAIN, UNCERTAIN CAUSE    Based on your exam today, the exact cause of your chest pain is not certain. Your condition does not seem serious at this time, and your pain does not appear to be coming from your heart. However, sometimes the signs of a serious problem take more time to appear. Therefore, watch for the warning signs listed below.  HOME CARE:  1. Rest today and avoid strenuous activity.  2. Take any prescribed medicine as directed.  FOLLOW UP with your doctor in 1-3 days.   GET PROMPT MEDICAL ATTENTION if any of the following occur:    A change in the type of pain: if it feels different, becomes more severe, lasts longer, or begins to spread into your shoulder, arm, neck, jaw or back    Shortness of breath or increased pain with breathing    Weakness, dizziness, or fainting    Cough with blood or dark colored sputum (phlegm)    Fever over 101  F (38.3  C)    Swelling, pain or redness in one leg    8123-0687 [employer]. [address] [place] [address]. All rights reserved. This information is not intended as a substitute for professional medical care. Always follow your healthcare professional's instructions.  This information has been modified by your health care provider with permission from the publisher.      24 Hour Appointment Hotline       To make an appointment at any [facility] clinic, call [phone] ([phone]). If you don't have a family doctor or clinic, we will help you find one.  [place] clinics are conveniently located to serve the needs of you and your family.             Review of your medicines      Our records show that you are taking the medicines listed below. If these are incorrect, please call your family doctor or clinic.        Dose / Directions Last dose taken    ABILIFY 20 MG tablet   Dose:  20 mg   Generic drug:  ARIPiprazole        Take 20 mg by mouth   Refills:  0        aspirin 81 MG chewable tablet   Dose:  81 mg        81 mg   Refills:  0        B COMPLEX 1 PO   Dose:  1 tablet        Take 1 tablet by mouth daily.   Refills:  0        CYMBALTA PO   Dose:  120 mg        Take 120 mg by mouth daily   Refills:  0        doxycycline 100 MG capsule   Commonly known as:  VIBRAMYCIN   Dose:  100 mg   Indication:  Rosacea        Take 1 capsule (100 mg) by mouth every 12 hours   Refills:  0        fondaparinux 7.5MG/0.6ML injection   Commonly known as:  ARIXTRA   Dose:  7.5 mg        Inject 7.5 mg Subcutaneous daily   Refills:  0        HYDROmorphone 3 MG Suppository   Commonly known as:  DILAUDID   Dose:  3 mg        Place 3 mg rectally   Refills:  0        LANsoprazole 30 MG CR capsule   Commonly known as:  PREVACID   Dose:  30 mg   Quantity:  90 capsule        Take 1 capsule (30 mg) by mouth 2 times daily   Refills:  3        LORazepam 0.5 MG tablet   Commonly known as:  ATIVAN   Dose:  0.5 mg   Quantity:  30 tablet        Take 1 tablet by mouth every 6 hours as needed (for dizziness ).   Refills:  0        magnesium 250 MG tablet   Dose:  4 tablet        Take 4 tablets by mouth daily   Refills:  0        metoprolol succinate 25 MG 24 hr tablet   Commonly known as:  TOPROL-XL   Dose:  25 mg   Quantity:  90 tablet        Take 1 tablet (25 mg) by mouth daily   Refills:  3        nortriptyline 50 MG capsule   Commonly known as:  PAMELOR   Dose:  50 mg        Take 50 mg by mouth At Bedtime.   Refills:  0        oxyCODONE 10 MG 12 hr tablet   Commonly known as:  OxyCONTIN   Dose:  10  mg   Quantity:  30 tablet        Take 1 tablet (10 mg) by mouth every 12 hours   Refills:  0        predniSONE 10 MG tablet   Commonly known as:  DELTASONE   Dose:  10 mg   Quantity:  42 tablet        Take 1 tablet (10 mg) by mouth daily Take 4 tabs (40 mg) orally daily for 4 days, 3 tabs (30 mg) orally daily for 4 days, 2 tabs (20 mg) orally daily for 4 days, 1 tab (10mg) orally daily for 4 days, then 1/2 tab (5 mg) orally for 4 days.   Refills:  0        riboflavin 100 MG Tabs tablet   Commonly known as:  vitamin  B-2   Dose:  400 mg        Take 400 mg by mouth   Refills:  0        RITALIN 20 MG tablet   Generic drug:  methylphenidate        Refills:  0        senna-docusate 8.6-50 MG per tablet   Commonly known as:  SENOKOT-S;PERICOLACE   Dose:  1-2 tablet   Quantity:  60 tablet        Take 1-2 tablets by mouth 2 times daily.   Refills:  1        SUMAtriptan Succinate 6 MG/0.5ML Kit   Dose:  1 dose.   Indication:  Migraine Headache        Inject 1 dose Subcutaneous at onset of headache. Indications: Migraine Headache   Refills:  0        tolterodine 2 MG 24 hr capsule   Commonly known as:  DETROL LA   Dose:  2 mg   Quantity:  60 capsule        Take 1 capsule (2 mg) by mouth daily   Refills:  6        TOPAMAX 25 MG tablet   Dose:  100-200 mg   Indication:  100 in AM and 200 in PM   Generic drug:  topiramate        Take 100-200 mg by mouth 2 times daily   Refills:  0        vitamin D 1000 UNITS capsule   Dose:  2 capsule        Take 2 capsules by mouth daily.   Refills:  0        Zinc 50 MG Caps   Dose:  1 tablet        Take 1 tablet by mouth daily.   Refills:  0        ZUPLENZ 8 MG Film   Dose:  8 mg   Generic drug:  Ondansetron        Take 8 mg by mouth every 6 hours as needed.   Refills:  0                Procedures and tests performed during your visit     CBC with platelets differential    Chest CT, IV contrast only - PE protocol    Comprehensive metabolic panel    EKG 12 lead    EKG 12-lead, tracing only     Peripheral IV catheter    Troponin I    UA with Microscopic    XR Chest 2 Views      Orders Needing Specimen Collection     None      Pending Results     Date and Time Order Name Status Description    1/22/2018 1727 Chest CT, IV contrast only - PE protocol Preliminary     1/22/2018 0847 US UPPER EXTREMITY VENOUS DUPLEX RIGHT Preliminary             Pending Culture Results     No orders found from 1/20/2018 to 1/23/2018.            Pending Results Instructions     If you had any lab results that were not finalized at the time of your Discharge, you can call the ED Lab Result RN at [phone]. You will be contacted by this team for any positive Lab results or changes in treatment. The nurses are available 7 days a week from 10A to 6:30P.  You can leave a message 24 hours per day and they will return your call.        Thank you for choosing [facility]       Thank you for choosing [facility] for your care. Our goal is always to provide you with excellent care. Hearing back from our patients is one way we can continue to improve our services. Please take a few minutes to complete the written survey that you may receive in the mail after you visit with us. Thank you!        [employer] Information     [employer] gives you secure access to your electronic health record. If you see a primary care provider, you can also send messages to your care team and make appointments. If you have questions, please call your primary care clinic.  If you do not have a primary care provider, please call [phone] and they will assist you.        Care EveryWhere ID     This is your Care EveryWhere ID. This could be used by other organizations to access your [facility] medical records  [id number]        Equal Access to Services     SUSANNA PITTMAN : Melina Barbour, wazhao brewer, qaybta kaaldesiree alcalaay asaf crump. So [facility] [phone].    ATENCIÓN: Si habla español, tiene a randle disposición servicios  katharine de asistencia lingüística. Alejandro skelton [phone].    We comply with applicable federal civil rights laws and [place] laws. We do not discriminate on the basis of race, color, national origin, age, disability, sex, sexual orientation, or gender identity.            After Visit Summary       This is your record. Keep this with you and show to your community pharmacist(s) and doctor(s) at your next visit.

## 2018-01-22 NOTE — NURSING NOTE
EKG done, patient tolerated procedure well.Delaney Redd LPN 1:45 PM on 1/22/2018  Lying and standing blood pressures done..Delaney Redd LPN 1:45 PM on 1/22/2018

## 2018-01-22 NOTE — PATIENT INSTRUCTIONS
[facility]: [phone]     [facility] Medication Refill Request Information:  * Please contact your pharmacy regarding ANY request for medication refills.  ** [facility] Prescription Fax = [phone]  * Please allow 3 business days for routine medication refills.  * Please allow 5 business days for controlled substance medication refills.     [facility] Test Result notification information:  *You will be notified with in 7-10 days of your appointment day regarding the results of your test.  If you are on MyChart you will be notified as soon as the provider has reviewed the results and signed off on them.

## 2018-01-22 NOTE — NURSING NOTE
Chief Complaint   Patient presents with     Chest Pain     Patient is here to discuss chest pressure. Pt first noticed it on 01/19/18.     Vera Leslie LPN at 12:40 PM on 1/22/2018.

## 2018-01-22 NOTE — ED AVS SNAPSHOT
[facility] [place], Emergency Department    [address] [place] [id number]    Phone:  [phone]                                       Sherly Yeung   MRN: [id number]    Department:  [facility], Emergency Department   Date of Visit:  1/22/2018           After Visit Summary Signature Page     I have received my discharge instructions, and my questions have been answered. I have discussed any challenges I see with this plan with the nurse or doctor.    ..........................................................................................................................................  Patient/Patient Representative Signature      ..........................................................................................................................................  Patient Representative Print Name and Relationship to Patient    ..................................................               ................................................  Date                                            Time    ..........................................................................................................................................  Reviewed by Signature/Title    ...................................................              ..............................................  Date                                                            Time

## 2018-01-22 NOTE — ED NOTES
Bed: IN02  Expected date: 1/22/18  Expected time: 2:35 PM  Means of arrival: Assist from Vehicle  Comments:  CC: chest pain x 2 d, h/o UE DVT      ETA:   2:30p  Plan: Out Pt U/s results, eval      Call taken by:  Jaswant  Patient Name: Sherly Yeung    [id number]
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
## 2018-01-22 NOTE — MR AVS SNAPSHOT
After Visit Summary   1/22/2018    Sherly Yeung    MRN: [id number]           Patient Information     Date Of Birth          1965        Visit Information        Provider Department      1/22/2018 12:40 PM Judy Lynch MD [facility]        Today's Diagnoses     Chest pressure    -  1      Care Instructions    [facility]: [phone]     Primary Care Center Medication Refill Request Information:  * Please contact your pharmacy regarding ANY request for medication refills.  ** [facility] Prescription Fax = [phone]  * Please allow 3 business days for routine medication refills.  * Please allow 5 business days for controlled substance medication refills.     Primary Care Center Test Result notification information:  *You will be notified with in 7-10 days of your appointment day regarding the results of your test.  If you are on MyChart you will be notified as soon as the provider has reviewed the results and signed off on them.            Follow-ups after your visit        Your next 10 appointments already scheduled     Jan 22, 2018  2:40 PM CST   (Arrive by 2:25 PM)   XR CHEST 2 VIEWS with [facility]   [facility], [place],  Radiology ([facility] [place])    [address] [id number] [phone]           Please bring a list of your current medicines to your exam. (Include vitamins, minerals and over-thecounter medicines.) Leave your valuables at home.  Tell your doctor if there is a chance you may be pregnant.  You do not need to do anything special for this exam.              Who to contact     Please call your clinic at [phone] to:    Ask questions about your health    Make or cancel appointments    Discuss your medicines    Learn about your test results    Speak to your doctor   If you have compliments or concerns about an experience at your clinic, or if you wish to file a complaint, please contact  [facility] Patient Relations at [phone] or email us at [email or web address]         Additional Information About Your Visit        [employer] Information     [employer] gives you secure access to your electronic health record. If you see a primary care provider, you can also send messages to your care team and make appointments. If you have questions, please call your primary care clinic.  If you do not have a primary care provider, please call [phone] and they will assist you.      [employer] is an electronic gateway that provides easy, online access to your medical records. With [employer], you can request a clinic appointment, read your test results, renew a prescription or communicate with your care team.     To access your existing account, please contact your [facility] Clinic or call [phone] for assistance.        Care EveryWhere ID     This is your Care EveryWhere ID. This could be used by other organizations to access your [place] medical records  [id number]        Your Vitals Were     Pulse Last Period Pulse Oximetry BMI (Body Mass Index)          91 08/22/2017 (Approximate) 99% 20.75 kg/m2         Blood Pressure from Last 3 Encounters:   01/22/18 (!) 149/101   01/22/18 (!) 127/91   11/14/17 (!) 130/93    Weight from Last 3 Encounters:   01/22/18 53.3 kg (117 lb 6.4 oz)   01/22/18 52.8 kg (116 lb 6.4 oz)   12/05/17 51.3 kg (113 lb)              Today, you had the following     No orders found for display         Today's Medication Changes          These changes are accurate as of: 1/22/18  2:38 PM.  If you have any questions, ask your nurse or doctor.               Stop taking these medicines if you haven't already. Please contact your care team if you have questions.     sulfamethoxazole-trimethoprim 800-160 MG per tablet   Commonly known as:  BACTRIM DS/SEPTRA DS   Stopped by:  Judy Lynch MD                    Primary Care  Provider Office Phone # Fax #    [facility] [phone]       [address] [place] [address]        Equal Access to Services     SUSANAN PITTMAN : Hadmirna laurel correa zack Barbour, waaxda luqadaha, qaybta kaalmada elvia, tuan koenig laKrzysztofradha crump. So [facility] [phone].    ATENCIÓN: Si habla español, tiene a randle disposición servicios gratuitos de asistencia lingüística. Llame al [phone].    We comply with applicable federal civil rights laws and [place] laws. We do not discriminate on the basis of race, color, national origin, age, disability, sex, sexual orientation, or gender identity.            Thank you!     Thank you for choosing [facility]  for your care. Our goal is always to provide you with excellent care. Hearing back from our patients is one way we can continue to improve our services. Please take a few minutes to complete the written survey that you may receive in the mail after your visit with us. Thank you!             Your Updated Medication List - Protect others around you: Learn how to safely use, store and throw away your medicines at www.disposemymeds.org.          This list is accurate as of: 1/22/18  2:38 PM.  Always use your most recent med list.                   Brand Name Dispense Instructions for use Diagnosis    ABILIFY 20 MG tablet   Generic drug:  ARIPiprazole      Take 20 mg by mouth        aspirin 81 MG chewable tablet      81 mg        B COMPLEX 1 PO      Take 1 tablet by mouth daily.    Sternal pain, Disorder of bone and cartilage, unspecified, Fatigue, Anemia, unspecified       CYMBALTA PO      Take 120 mg by mouth daily        doxycycline 100 MG capsule    VIBRAMYCIN     Take 1 capsule (100 mg) by mouth every 12 hours    Rosacea       fondaparinux 7.5MG/0.6ML injection    ARIXTRA     Inject 7.5 mg Subcutaneous daily        HYDROmorphone 3 MG Suppository    DILAUDID     Place 3 mg rectally         LANsoprazole 30 MG CR capsule    PREVACID    90 capsule    Take 1 capsule (30 mg) by mouth 2 times daily    Gastroesophageal reflux disease without esophagitis, Subclavian vein thrombosis, right (H)       LORazepam 0.5 MG tablet    ATIVAN    30 tablet    Take 1 tablet by mouth every 6 hours as needed (for dizziness ).    Dizziness and giddiness       magnesium 250 MG tablet      Take 4 tablets by mouth daily    Iron deficiency anemia, unspecified       metoprolol succinate 25 MG 24 hr tablet    TOPROL-XL    90 tablet    Take 1 tablet (25 mg) by mouth daily    Right subclavian vein thrombosis (H), Sinus tachycardia, SVC syndrome       nortriptyline 50 MG capsule    PAMELOR     Take 50 mg by mouth At Bedtime.        oxyCODONE 10 MG 12 hr tablet    OxyCONTIN    30 tablet    Take 1 tablet (10 mg) by mouth every 12 hours    Subclavian vein thrombosis, right (H)       predniSONE 10 MG tablet    DELTASONE    42 tablet    Take 1 tablet (10 mg) by mouth daily Take 4 tabs (40 mg) orally daily for 4 days, 3 tabs (30 mg) orally daily for 4 days, 2 tabs (20 mg) orally daily for 4 days, 1 tab (10mg) orally daily for 4 days, then 1/2 tab (5 mg) orally for 4 days.    Migraine headache       riboflavin 100 MG Tabs tablet    vitamin  B-2     Take 400 mg by mouth        RITALIN 20 MG tablet   Generic drug:  methylphenidate       Subclavian vein thrombosis, right (H), SVC syndrome, Sinus tachycardia       senna-docusate 8.6-50 MG per tablet    SENOKOT-S;PERICOLACE    60 tablet    Take 1-2 tablets by mouth 2 times daily.    Constipation       SUMAtriptan Succinate 6 MG/0.5ML Kit      Inject 1 dose Subcutaneous at onset of headache. Indications: Migraine Headache        tolterodine 2 MG 24 hr capsule    DETROL LA    60 capsule    Take 1 capsule (2 mg) by mouth daily    Urge incontinence       TOPAMAX 25 MG tablet   Generic drug:  topiramate      Take 100-200 mg by mouth 2 times daily        vitamin D 1000 UNITS capsule      Take 2  capsules by mouth daily.    Sternal pain, Disorder of bone and cartilage, unspecified, Fatigue, Anemia, unspecified       Zinc 50 MG Caps      Take 1 tablet by mouth daily.    Sternal pain, Disorder of bone and cartilage, unspecified, Fatigue, Anemia, unspecified       ZUPLENZ 8 MG Film   Generic drug:  Ondansetron      Take 8 mg by mouth every 6 hours as needed.    Sternal pain, Disorder of bone and cartilage, unspecified, Fatigue, Anemia, unspecified

## 2018-01-23 NOTE — DISCHARGE INSTRUCTIONS
*CHEST PAIN, UNCERTAIN CAUSE    Based on your exam today, the exact cause of your chest pain is not certain. Your condition does not seem serious at this time, and your pain does not appear to be coming from your heart. However, sometimes the signs of a serious problem take more time to appear. Therefore, watch for the warning signs listed below.  HOME CARE:  1. Rest today and avoid strenuous activity.  2. Take any prescribed medicine as directed.  FOLLOW UP with your doctor in 1-3 days.   GET PROMPT MEDICAL ATTENTION if any of the following occur:    A change in the type of pain: if it feels different, becomes more severe, lasts longer, or begins to spread into your shoulder, arm, neck, jaw or back    Shortness of breath or increased pain with breathing    Weakness, dizziness, or fainting    Cough with blood or dark colored sputum (phlegm)    Fever over 101  F (38.3  C)    Swelling, pain or redness in one leg    9972-8153 [employer]. [address] [place] [address]. All rights reserved. This information is not intended as a substitute for professional medical care. Always follow your healthcare professional's instructions.  This information has been modified by your health care provider with permission from the publisher.

## 2018-02-07 ENCOUNTER — OFFICE VISIT (OUTPATIENT)
Dept: INTERNAL MEDICINE | Facility: CLINIC | Age: 53
End: 2018-02-07
Payer: MEDICARE

## 2018-02-07 VITALS — SYSTOLIC BLOOD PRESSURE: 129 MMHG | HEART RATE: 116 BPM | DIASTOLIC BLOOD PRESSURE: 91 MMHG

## 2018-02-07 DIAGNOSIS — N39.41 URGE INCONTINENCE: ICD-10-CM

## 2018-02-07 RX ORDER — TOLTERODINE 4 MG/1
4 CAPSULE, EXTENDED RELEASE ORAL DAILY
Qty: 100 CAPSULE | Refills: 3 | Status: SHIPPED | OUTPATIENT
Start: 2018-02-07 | End: 2019-02-11

## 2018-02-07 RX ORDER — SPIRONOLACTONE 25 MG/1
25 TABLET ORAL EVERY EVENING
Status: ON HOLD | COMMUNITY
Start: 2018-01-15 | End: 2022-02-17

## 2018-02-07 ASSESSMENT — PAIN SCALES - GENERAL: PAINLEVEL: NO PAIN (0)

## 2018-02-07 NOTE — PATIENT INSTRUCTIONS
[facility]: [phone]     [facility] Medication Refill Request Information:  * Please contact your pharmacy regarding ANY request for medication refills.  ** [facility] Prescription Fax = [phone]  * Please allow 3 business days for routine medication refills.  * Please allow 5 business days for controlled substance medication refills.     [facility] Test Result notification information:  *You will be notified with in 7-10 days of your appointment day regarding the results of your test.  If you are on MyChart you will be notified as soon as the provider has reviewed the results and signed off on them.

## 2018-02-07 NOTE — PROGRESS NOTES
HPI       Ms. Yeung is a 52 year old  female who presents today for hospital follow up. 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 The venous congestion from this condition triggered a migraine headache, which was quite difficult to treat. She is feeling better now after dilatation of the involved veins. Unfortunately, she had to recently resign from her job because of stress related to her medication condition and headaches. She says that this has been an adjustment, and she doesn't have a specific mode of dealing with her stress. She is not exercising regularly, but is aware of the mental health and medical benefits. Otherwise, she is feeling well. She only notes that her urinary incontinence has been acting up a little (daily episodes, was previously completely controlled). She denies chest pain, shortness of breath, abdominal discomfort, and neurological symptoms. She does not smoke.    Patient Active Problem List   Diagnosis     SVC syndrome     Migraine headache     Depression     Chronic anticoagulation     Subclavian vein thrombosis, right (H)     Subclavian vein stenosis     Pain     Superior vena cava stenosis     Chest wall abscess     Sternal pain     Iron deficiency anemia     Intestinal malabsorption     Post-op pain     Migraine     Postoperative nausea     Nausea     Headache       Current Outpatient Prescriptions   Medication Sig Dispense Refill     spironolactone (ALDACTONE) 25 MG tablet Take 25 mg by mouth daily       LANsoprazole (PREVACID) 30 MG CR capsule Take 1 capsule (30 mg) by mouth 2 times daily 90 capsule 3     tolterodine (DETROL LA) 2 MG 24 hr capsule Take 1 capsule (2 mg) by mouth daily 60 capsule 6     fondaparinux (ARIXTRA) 7.5MG/0.6ML injection Inject 7.5 mg Subcutaneous daily       aspirin 81 MG chewable tablet 81 mg       HYDROmorphone (DILAUDID) 3 MG Suppository Place 3 mg rectally        riboflavin (VITAMIN  B-2) 100 MG TABS tablet Take 400 mg by mouth       ARIPiprazole (ABILIFY) 20 MG tablet Take 20 mg by mouth       oxyCODONE (OXYCONTIN) 10 MG 12 hr tablet Take 1 tablet (10 mg) by mouth every 12 hours 30 tablet 0     metoprolol (TOPROL-XL) 25 MG 24 hr tablet Take 1 tablet (25 mg) by mouth daily 90 tablet 3     methylphenidate (RITALIN) 20 MG tablet        topiramate (TOPAMAX) 25 MG tablet Take 100-200 mg by mouth 2 times daily        DULoxetine HCl (CYMBALTA PO) Take 120 mg by mouth daily       magnesium 250 MG tablet Take 4 tablets by mouth daily       Ondansetron (ZUPLENZ) 8 MG FILM Take 8 mg by mouth every 6 hours as needed.       Zinc 50 MG CAPS Take 1 tablet by mouth daily.       B Complex Vitamins (B COMPLEX 1 PO) Take 1 tablet by mouth daily.       Cholecalciferol (VITAMIN D) 1000 UNITS capsule Take 2 capsules by mouth daily.        LORazepam (ATIVAN) 0.5 MG tablet Take 1 tablet by mouth every 6 hours as needed (for dizziness ). 30 tablet 0     senna-docusate (SENOKOT-S;PERICOLACE) 8.6-50 MG per tablet Take 1-2 tablets by mouth 2 times daily. 60 tablet 1     SUMAtriptan Succinate 6 MG/0.5ML KIT Inject 1 dose Subcutaneous at onset of headache. Indications: Migraine Headache       nortriptyline (PAMELOR) 50 MG capsule Take 50 mg by mouth At Bedtime.         Allergies   Allergen Reactions     Droperidol Anxiety and Palpitations     Phenergan Dm [Promethazine-Dm] Rash     Androgens      Pt is unsure.  She was told to avoid them     Bicitra [Citric Acid-Sodium Citrate] Nausea and Vomiting     SOLN     D.H.E. 45 [Dihydroergotamine Mesylate] Nausea     SOLN     Depakote [Divalproex Sodium] Other (See Comments)     Exacerbate depression     Metoclopramide Hcl Nausea and Vomiting     Verapamil Hcl Cr Other (See Comments)     CP24; hypotension     Olanzapine Rash     Prochlorperazine Maleate Rash            Review of Systems:     CONSTITUTIONAL: no fatigue, no unexpected change in weight  SKIN:  no worrisome rashes  EYES: no vision problems or changes  ENT: no ear problems, no mouth problems, no throat problems  RESP: no cough, no shortness of breath  CV: no chest pain, no palpitations, no new or worsening peripheral edema  GI: no nausea, no vomiting, no constipation, no diarrhea  NEURO: no weakness, no dizziness, no syncope            Physical Exam:     BP (!) 129/91  Pulse 116  Breastfeeding? No     Appearance: Alert and appropriate, euthymic affect, nontoxic.  HEENT: NCAT, EOM are intact, normal conjuctivae, MMM.  Neck: Supple, no thyromegaly.  Pulmonary: CTAB. No crackles, rhonchi, or wheezing.   Cardiovascular: Tachy but regular, normal S1 and S2, without murmurs.     Neurologic: Alert and oriented x3, MAEE. Sensation to light touch intact. Normal gait.  Skin: No visible rashes or ecchymoses.    Assessment and Plan     Ms. Yeung is a 52 year old female with a history of SVC syndrome, thoracic outlet syndrome, and subclavian vein thrombosis/stenosis (s/p multiple angioplasty procedures) who presents for post-hospitalization follow up.     #Right subclavian stenosis  She has a history of repeated stenoses of the R subclavian as well as thoracic outlet syndrome. Episodes of stenosis usually manifest as anterior chest pressure radiating to the right side of the neck. 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 IR venogram identified stenoses of the axillary, subclavian, and brachiocephalic veins that improved with angioplasty. The last time this procedure was required was about 6 months ago. Decreasing the surveillance interval (3 mo) should help to identify this issue before it becomes severe.    - Continue fondaparinux and aspirin   - Repeat subclavian vein U/S in 3 months to reassess stenosis    #Migraine  She typically requires hospitalization for refractory migraines when her stenosis recurs. Otherwise, she gets about two  per week. Her pain management is currently organized by Dr Tyson (neurology) in [facility] She is now completing a prednisone taper following her recent hospitalization. Otherwise, prophylactic medications have been useful to reduce the frequency of episodes.   - Prednisone taper per d/c paperwork  - Continue prophylactic medications (beta blocker, topiramate, nortriptyline)    #Urge incontinence  Pt notes intermittent urinary incontinence accompanied by a sudden need to urinate. Rarely, this happens with increased intra-abdominal pressure (eg, sneezing). This was previously controlled with oxybutynin, but this medication caused dry mouth. She is now experiencing daily symptoms with tolterodine. We will try to increase the dose and see if this controls her symptoms.  - Increase tolterodine to 4 mg daily     #Health care maintenance  - Up to date with mammogram, lipid screen, colonoscopy    #Follow up  - As needed    Options for treatment and follow-up care were reviewed with the patient. Sherly Yeung engaged in the decision making process and verbalized understanding of the options discussed and agreed with the final plan.    Carlito Esparza, MS4 2/7/2018 9:46 AM   Medical Student, Internal Medicine  [facility]      Provider Disclosure:     The above student acted as a scribe for this encounter.  I was present and performed the service. I have reviewed the review of systems and past medical, family and social history.  I have reviewed the student's documentation of the exam and I have performed the exam.   I have reviewed and verified the medical student's documentation and it is correct except as follows: None     The medical student acted as scribe and the encounter documented above was completely performed by myself.  Supervising Doctor Chadwick Mg

## 2018-02-07 NOTE — NURSING NOTE
Chief Complaint   Patient presents with     Hospital F/U     Patient here for hospital follow up.       Carole Sandoval CMA at 9:25 AM on 2/7/2018.

## 2018-02-07 NOTE — MR AVS SNAPSHOT
After Visit Summary   2/7/2018    Sherly Yeung    MRN: [id number]           Patient Information     Date Of Birth          1965        Visit Information        Provider Department      2/7/2018 9:00 AM Chadwick Mg MD [facility]        Today's Diagnoses     Urge incontinence          Care Instructions    [facility]: [phone]     Primary Care Center Medication Refill Request Information:  * Please contact your pharmacy regarding ANY request for medication refills.  ** [facility] Prescription Fax = [phone]  * Please allow 3 business days for routine medication refills.  * Please allow 5 business days for controlled substance medication refills.     Primary Care Center Test Result notification information:  *You will be notified with in 7-10 days of your appointment day regarding the results of your test.  If you are on MyChart you will be notified as soon as the provider has reviewed the results and signed off on them.            Follow-ups after your visit        Who to contact     Please call your clinic at [phone] to:    Ask questions about your health    Make or cancel appointments    Discuss your medicines    Learn about your test results    Speak to your doctor            Additional Information About Your Visit        MyChart Information     [employer] gives you secure access to your electronic health record. If you see a primary care provider, you can also send messages to your care team and make appointments. If you have questions, please call your primary care clinic.  If you do not have a primary care provider, please call [phone] and they will assist you.      [employer] is an electronic gateway that provides easy, online access to your medical records. With [employer], you can request a clinic appointment, read your test results, renew a prescription or communicate with your care team.     To access your existing account, please contact your  [facility] or call [phone] for assistance.        Care EveryWhere ID     This is your Care EveryWhere ID. This could be used by other organizations to access your [facility] medical records  [id number]        Your Vitals Were     Pulse Breastfeeding?                116 No           Blood Pressure from Last 3 Encounters:   02/07/18 (!) 129/91   01/22/18 109/78   01/22/18 (!) 127/91    Weight from Last 3 Encounters:   01/22/18 53.3 kg (117 lb 6.4 oz)   01/22/18 52.8 kg (116 lb 6.4 oz)   12/05/17 51.3 kg (113 lb)              Today, you had the following     No orders found for display         Today's Medication Changes          These changes are accurate as of 2/7/18 11:59 PM.  If you have any questions, ask your nurse or doctor.               These medicines have changed or have updated prescriptions.        Dose/Directions    tolterodine 4 MG 24 hr capsule   Commonly known as:  DETROL LA   This may have changed:    - medication strength  - how much to take   Used for:  Urge incontinence   Changed by:  Chadwick Mg MD        Dose:  4 mg   Take 1 capsule (4 mg) by mouth daily   Quantity:  100 capsule   Refills:  3         Stop taking these medicines if you haven't already. Please contact your care team if you have questions.     doxycycline 100 MG capsule   Commonly known as:  VIBRAMYCIN   Stopped by:  Chadwick Mg MD           predniSONE 10 MG tablet   Commonly known as:  DELTASONE   Stopped by:  Chadwick Mg MD                Where to get your medicines      These medications were sent to [id number] - [place] [address]  [address] [address]     Phone:  [phone]     tolterodine 4 MG 24 hr capsule                Primary Care Provider Office Phone # Fax #    Chadwick Mg [phone]       [address] [place] [address]        Equal  Access to Services     [facility]: Hadii laurel correa zack Barbour, wapageda luqadaha, qaybta kaalgrayson carenminniejersey, waxlaverne asaf barnesmamepadmini crump. So [facility] [place] [phone].    ATENCIÓN: Si habla español, tiene a randle disposición servicios gratuitos de asistencia lingüística. Llame al [phone].    We comply with applicable federal civil rights laws and [place] laws. We do not discriminate on the basis of race, color, national origin, age, disability, sex, sexual orientation, or gender identity.            Thank you!     Thank you for choosing [facility]  for your care. Our goal is always to provide you with excellent care. Hearing back from our patients is one way we can continue to improve our services. Please take a few minutes to complete the written survey that you may receive in the mail after your visit with us. Thank you!             Your Updated Medication List - Protect others around you: Learn how to safely use, store and throw away your medicines at www.disposemymeds.org.          This list is accurate as of 2/7/18 11:59 PM.  Always use your most recent med list.                   Brand Name Dispense Instructions for use Diagnosis    ABILIFY 20 MG tablet   Generic drug:  ARIPiprazole      Take 20 mg by mouth        aspirin 81 MG chewable tablet      81 mg        B COMPLEX 1 PO      Take 1 tablet by mouth daily.    Sternal pain, Disorder of bone and cartilage, unspecified, Fatigue, Anemia, unspecified       CYMBALTA PO      Take 120 mg by mouth daily        fondaparinux 7.5MG/0.6ML injection    ARIXTRA     Inject 7.5 mg Subcutaneous daily        HYDROmorphone 3 MG Suppository    DILAUDID     Place 3 mg rectally        LANsoprazole 30 MG CR capsule    PREVACID    90 capsule    Take 1 capsule (30 mg) by mouth 2 times daily    Gastroesophageal reflux disease without esophagitis, Subclavian vein thrombosis, right (H)       LORazepam 0.5 MG tablet    ATIVAN    30 tablet    Take 1  tablet by mouth every 6 hours as needed (for dizziness ).    Dizziness and giddiness       magnesium 250 MG tablet      Take 4 tablets by mouth daily    Iron deficiency anemia, unspecified       metoprolol succinate 25 MG 24 hr tablet    TOPROL-XL    90 tablet    Take 1 tablet (25 mg) by mouth daily    Right subclavian vein thrombosis (H), Sinus tachycardia, SVC syndrome       nortriptyline 50 MG capsule    PAMELOR     Take 50 mg by mouth At Bedtime.        oxyCODONE 10 MG 12 hr tablet    OxyCONTIN    30 tablet    Take 1 tablet (10 mg) by mouth every 12 hours    Subclavian vein thrombosis, right (H)       riboflavin 100 MG Tabs tablet    vitamin  B-2     Take 400 mg by mouth        RITALIN 20 MG tablet   Generic drug:  methylphenidate       Subclavian vein thrombosis, right (H), SVC syndrome, Sinus tachycardia       senna-docusate 8.6-50 MG per tablet    SENOKOT-S;PERICOLACE    60 tablet    Take 1-2 tablets by mouth 2 times daily.    Constipation       spironolactone 25 MG tablet    ALDACTONE     Take 25 mg by mouth daily        SUMAtriptan Succinate 6 MG/0.5ML Kit      Inject 1 dose Subcutaneous at onset of headache. Indications: Migraine Headache        tolterodine 4 MG 24 hr capsule    DETROL LA    100 capsule    Take 1 capsule (4 mg) by mouth daily    Urge incontinence       TOPAMAX 25 MG tablet   Generic drug:  topiramate      Take 100-200 mg by mouth 2 times daily        vitamin D 1000 UNITS capsule      Take 2 capsules by mouth daily.    Sternal pain, Disorder of bone and cartilage, unspecified, Fatigue, Anemia, unspecified       Zinc 50 MG Caps      Take 1 tablet by mouth daily.    Sternal pain, Disorder of bone and cartilage, unspecified, Fatigue, Anemia, unspecified       ZUPLENZ 8 MG Film   Generic drug:  Ondansetron      Take 8 mg by mouth every 6 hours as needed.    Sternal pain, Disorder of bone and cartilage, unspecified, Fatigue, Anemia, unspecified

## 2018-03-16 ENCOUNTER — MYC MEDICAL ADVICE (OUTPATIENT)
Dept: INTERNAL MEDICINE | Facility: CLINIC | Age: 53
End: 2018-03-16

## 2018-03-16 DIAGNOSIS — I87.1 SVC SYNDROME: Primary | ICD-10-CM

## 2018-03-19 RX ORDER — ASPIRIN 81 MG/1
81 TABLET, CHEWABLE ORAL DAILY
Qty: 30 TABLET | Refills: 2 | Status: SHIPPED | OUTPATIENT
Start: 2018-03-19 | End: 2018-04-30

## 2018-03-27 DIAGNOSIS — I82.B11 RIGHT SUBCLAVIAN VEIN THROMBOSIS (H): ICD-10-CM

## 2018-03-27 DIAGNOSIS — R00.0 SINUS TACHYCARDIA: ICD-10-CM

## 2018-03-27 DIAGNOSIS — I87.1 SVC SYNDROME: ICD-10-CM

## 2018-03-28 RX ORDER — METOPROLOL SUCCINATE 25 MG/1
25 TABLET, EXTENDED RELEASE ORAL DAILY
Qty: 90 TABLET | Refills: 0 | Status: SHIPPED | OUTPATIENT
Start: 2018-03-28 | End: 2018-09-20

## 2018-03-28 NOTE — TELEPHONE ENCOUNTER
metoprolol (TOPROL-XL) 25 MG 24 hr tablet  Last Written Prescription Date:  1/16/17  Last Fill Quantity: 90,   # refills: 3  Last Office Visit :2/7/18  Future Office visit:  None    Routing  because: bp

## 2018-04-30 DIAGNOSIS — I87.1 SVC SYNDROME: ICD-10-CM

## 2018-04-30 RX ORDER — ASPIRIN 81 MG/1
81 TABLET, CHEWABLE ORAL DAILY
Qty: 36 TABLET | Refills: 8 | Status: SHIPPED | OUTPATIENT
Start: 2018-04-30 | End: 2019-09-18

## 2018-05-01 ENCOUNTER — RADIANT APPOINTMENT (OUTPATIENT)
Dept: ULTRASOUND IMAGING | Facility: CLINIC | Age: 53
End: 2018-05-01
Attending: INTERNAL MEDICINE
Payer: MEDICARE

## 2018-05-01 DIAGNOSIS — I87.1 SUPERIOR VENA CAVA STENOSIS: ICD-10-CM

## 2018-05-01 DIAGNOSIS — I87.1 SUBCLAVIAN VEIN STENOSIS: ICD-10-CM

## 2018-05-01 DIAGNOSIS — I82.B11 SUBCLAVIAN VEIN THROMBOSIS, RIGHT (H): ICD-10-CM

## 2018-05-07 ENCOUNTER — RADIANT APPOINTMENT (OUTPATIENT)
Dept: MAMMOGRAPHY | Facility: CLINIC | Age: 53
End: 2018-05-07
Payer: MEDICARE

## 2018-05-07 DIAGNOSIS — Z12.31 VISIT FOR SCREENING MAMMOGRAM: ICD-10-CM

## 2018-05-15 ENCOUNTER — MYC MEDICAL ADVICE (OUTPATIENT)
Dept: INTERNAL MEDICINE | Facility: CLINIC | Age: 53
End: 2018-05-15

## 2018-05-15 DIAGNOSIS — R30.0 DYSURIA: Primary | ICD-10-CM

## 2018-05-16 RX ORDER — SULFAMETHOXAZOLE/TRIMETHOPRIM 800-160 MG
1 TABLET ORAL 2 TIMES DAILY
Qty: 6 TABLET | Refills: 0 | Status: SHIPPED | OUTPATIENT
Start: 2018-05-16 | End: 2018-11-21

## 2018-05-16 NOTE — TELEPHONE ENCOUNTER
Dear Dr. Mg,    I'm quite positive I have a UTI and would greatly appreciate if you could send in a script for treatment of it.  I have frequency, urgency, burning with urination and no fever or back pain.  The pharmacy I use is [facility] in [facility].  You can call me at [phone] if you have any questions.  Thank you,  Marcia Yeung    Pt called and she states she has had symptoms of UTI(as above) for 3 days. Denies blood in urine or back pain. Per UTI protocol will prescribe Bactrim DS for 3 days.  RX sent to [facility] in [place], [place].  Sumaya Marques RN 10:13 AM on 5/16/2018.

## 2018-07-27 ENCOUNTER — OFFICE VISIT (OUTPATIENT)
Dept: ONCOLOGY | Facility: CLINIC | Age: 53
End: 2018-07-27
Attending: GENETIC COUNSELOR, MS
Payer: MEDICARE

## 2018-07-27 DIAGNOSIS — Z80.41 FAMILY HISTORY OF MALIGNANT NEOPLASM OF OVARY: ICD-10-CM

## 2018-07-27 DIAGNOSIS — Z80.3 FAMILY HISTORY OF MALIGNANT NEOPLASM OF BREAST: ICD-10-CM

## 2018-07-27 DIAGNOSIS — Z80.41 FAMILY HISTORY OF MALIGNANT NEOPLASM OF OVARY: Primary | ICD-10-CM

## 2018-07-27 PROCEDURE — 36415 COLL VENOUS BLD VENIPUNCTURE: CPT

## 2018-07-27 PROCEDURE — 96040 ZZH GENETIC COUNSELING, EACH 30 MINUTES: CPT | Mod: ZF | Performed by: GENETIC COUNSELOR, MS

## 2018-07-27 NOTE — LETTER
7/27/2018       RE: Sherly Yeung  [address] [place] [address]     Dear Colleague,    Thank you for referring your patient, Sherly Yeung, to the [facility]. Please see a copy of my visit note below.    7/27/2018    Referring Provider: self referred     Presenting Information:   I met with Sherly Yeung today for genetic counseling at the Cancer Risk Management Program at the [facility] to discuss her family history of breast and ovarian cancer.  She is here today to review this history, cancer screening recommendations, and available genetic testing options.    Personal History:  Sherly is a 52 year old female.  She does not have any personal history of cancer.  She had her first menstrual period at age 17, her first child at age 27, and reports that she is perimenopause.  Sherly has her left ovary, fallopian tubes and uterus in place, and reports having a surgery to remove her right ovary due to endometriosis.  She is not regularly followed for ovarian cancer screening. She reports a past history of oral contraceptive use for approximately 8 years and no hormone replacement therapy.      Her most recent OB-GYN exam and Pap smear in December of 2017 were normal.  She has annual clinical breast exams and mammograms, and her most recent mammogram from 5/2018 was negative/normal.  Her breast density was noted as heterogeneously dense.  Fecal colorectal cancer screen (FIT) from 9/2017 was negative; she has not had a colonoscopy reportedly due to her use of blood thinners/risk for blood clot.   She had a history of one basal cell carcinoma on her face, and sees a dermatologist annually. She also sees a dentist regularly for oral cancer screening. She does not regularly do any other cancer screening at this time.     Family History: (Please see scanned pedigree for detailed family history information)    Her mother was diagnosed with breast cancer at age 67 and is now 76    Her  maternal grandmother reportedly underwent bilateral mastectomies in her early 40's due to fibrous breasts.  She passed away at age 92 and did not have a history of cancer    Her maternal grandfather passed away at age 40 reportedly due to heart problems.    One of her mother's paternal half sisters was diagnosed with bladder cancer at age 78    One paternal aunt was diagnosed with ovarian cancer at age 60 and passed away at 61     One paternal aunt was diagnosed with breast cancer at age 66 and is now 82.      One paternal uncle was diagnosed with a blood cancer in his 70's which is being treated with chemotherapy.     Her maternal ethnicity is English/Greenlandic. Her paternal ethnicity is [place]or[place].  There is no known Ashkenazi Jew ancestry on either side of her family.     Discussion:    Sherly's family history of breast and ovarian cancer is suggestive of a possible cancer susceptibility gene in the family. Given this history, we discussed Marcelino syndrome and Hereditary Breast and Ovarian Cancer syndrome as the two most likely hereditary explanations.    We reviewed the features of sporadic, familial, and hereditary cancers.  Based on her family history of ovarian cancer, Sherly meets current National Comprehensive Cancer Network (NCCN) criteria for genetic testing of BRCA1 and BRCA2.    We discussed the natural history and genetics of Hereditary Breast and Ovarian cancer syndrome due to BRCA1 and BRCA2 gene mutations. We also discussed the natural history and genetics of Marcelino syndrome due to mismatch repair gene mutations (MLH1, MSH2, MSH6, PMS2, EPCAM).  A detailed handout regarding hereditary gynecologic cancer risk genes and the information we discussed was provided to Sherly at the end of our appointment today and can be found in the after visit summary.  Topics included: inheritance pattern, cancer risks, cancer screening recommendations, and also risks, benefits and limitations of testing.    We discussed  that there are additional genes that could cause increased risk for ovarian and breast cancer.  As many of these genes present with overlapping features in a family and accurate cancer risk cannot always be established based upon the pedigree analysis alone, it would be reasonable for Sherly to consider panel genetic testing to analyze multiple genes at once.    We discussed that genetic testing for ovarian or breast cancer susceptibility genes is typically most informative when it is first performed on a family member with a personal history of one of these cancers cancer.  Testing is available to Sherly, but with limitations. If Sherly pursues testing at this time and receives a negative result, this does not rule out the possibility of a hereditary cancer syndrome in her and/or her family. Despite these limitations, Sherly expressed interest in proceeding with testing.    We reviewed genetic testing options for hereditary gynecologic and breast cancers: actionable high/moderate risk panel testing (CustomNext-Cancer, 16 genes, a combination of GynPlus and BRCAplus) and expanded high and moderate risk panel testing (OvaNext, 25 genes).  Sherly expressed an interest in learning as much information as possible from the testing. She opted for the OvaNext panel.    Genetic testing is available for 25 genes associated with hereditary gynecologic and breast cancers: OvaNext (PEPE, BARD1, BRCA1, BRCA2, BRIP1, CDH1, CHEK2, DICER1, EPCAM, MLH1, MRE11A, MSH2, MSH6, MUTYH, NBN, NF1, PALB2, PMS2, PTEN, RAD50, RAD51C, RAD51D, SMARCA4, STK11, and TP53).  o We discussed that some of the genes in the OvaNext panel are associated with specific hereditary cancer syndromes and have published management guidelines: Hereditary Breast and Ovarian Cancer syndrome (BRCA1, BRCA2), Marcelino syndrome (MLH1, MSH2, MSH6, PMS2, EPCAM), Hereditary Diffuse Gastric Cancer (CDH1), Cowden syndrome (PTEN), Li Fraumeni syndrome (TP53), Peutz-Jeghers  syndrome (STK11), MUTYH Associated Polyposis syndrome (MUTYH), and Neurofibromatosis type 1 (NF1).    o Risk-reducing salpingo-oophorectomy can be considered in women with mutations in BRIP1, RAD51C, or RAD51D.  Breast cancer risk management guidelines are available for PEPE, CHEK2, PALB2, NF1, and NBN.  o The remaining genes (BARD1, DICER1, MRE11A, RAD50, and SMARCA4) are associated with increased cancer risk and may allow us to make medical recommendations when mutations are identified.    o Sherly was provided with a handout from [employer] which lists the genes included in the OvaNext testing.    Consent was obtained and genetic testing for OvaNext was sent to [facility]. Turn around time: approximately 3-4 weeks after insurance authorization is obtained.    Medical Management: For Sherly, we reviewed that the information from genetic testing may determine:    additional cancer screening for which Sherly may qualify (i.e. mammogram and breast MRI, more frequent colonoscopies, more frequent dermatologic exams, etc.),    options for risk reducing surgeries Sherly could consider (i.e. bilateral mastectomy, surgery to remove the ovary/fallopian tubes and/or uterus, etc.),      and targeted chemotherapies for certain cancers in the future (i.e. immunotherapy for individuals with Marcelino syndrome, PARP inhibitors, etc.).     These recommendations will be discussed in detail once genetic testing is completed.     Plan:  1) Today Sherly elected to proceed with the OvaNext panel offered through [employer].  2) This information should be available 3-4 weeks after insurance authorization is obtained.  3) Sherly will return to clinic to discuss the results    Face to face time: 45 minutes    Petrona Melendez MS, [facility]  Licensed Genetic Counselor  [phone]

## 2018-07-27 NOTE — PROGRESS NOTES
7/27/2018    Referring Provider: self referred     Presenting Information:   I met with Sherly Yeung today for genetic counseling at the Cancer Risk Management Program at the [facility] to discuss her family history of breast and ovarian cancer.  She is here today to review this history, cancer screening recommendations, and available genetic testing options.    Personal History:  Sherly is a 52 year old female.  She does not have any personal history of cancer.  She had her first menstrual period at age 17, her first child at age 27, and reports that she is perimenopause.  Sherly has her left ovary, fallopian tubes and uterus in place, and reports having a surgery to remove her right ovary due to endometriosis.  She is not regularly followed for ovarian cancer screening. She reports a past history of oral contraceptive use for approximately 8 years and no hormone replacement therapy.      Her most recent OB-GYN exam and Pap smear in December of 2017 were normal.  She has annual clinical breast exams and mammograms, and her most recent mammogram from 5/2018 was negative/normal.  Her breast density was noted as heterogeneously dense.  Fecal colorectal cancer screen (FIT) from 9/2017 was negative; she has not had a colonoscopy reportedly due to her use of blood thinners/risk for blood clot.   She had a history of one basal cell carcinoma on her face, and sees a dermatologist annually. She also sees a dentist regularly for oral cancer screening. She does not regularly do any other cancer screening at this time.     Family History: (Please see scanned pedigree for detailed family history information)    Her mother was diagnosed with breast cancer at age 67 and is now 76    Her maternal grandmother reportedly underwent bilateral mastectomies in her early 40's due to fibrous breasts.  She passed away at age 92 and did not have a history of cancer    Her maternal grandfather passed away at age 40 reportedly due to  heart problems.    One of her mother's paternal half sisters was diagnosed with bladder cancer at age 78    One paternal aunt was diagnosed with ovarian cancer at age 60 and passed away at 61     One paternal aunt was diagnosed with breast cancer at age 66 and is now 82.      One paternal uncle was diagnosed with a blood cancer in his 70's which is being treated with chemotherapy.     Her maternal ethnicity is English/Colombian. Her paternal ethnicity is Bulgarian.  There is no known Ashkenazi Spiritism ancestry on either side of her family.     Discussion:    Sherly's family history of breast and ovarian cancer is suggestive of a possible cancer susceptibility gene in the family. Given this history, we discussed Marcelino syndrome and Hereditary Breast and Ovarian Cancer syndrome as the two most likely hereditary explanations.    We reviewed the features of sporadic, familial, and hereditary cancers.  Based on her family history of ovarian cancer, Sherly meets current National Comprehensive Cancer Network (NCCN) criteria for genetic testing of BRCA1 and BRCA2.    We discussed the natural history and genetics of Hereditary Breast and Ovarian cancer syndrome due to BRCA1 and BRCA2 gene mutations. We also discussed the natural history and genetics of Marcelino syndrome due to mismatch repair gene mutations (MLH1, MSH2, MSH6, PMS2, EPCAM).  A detailed handout regarding hereditary gynecologic cancer risk genes and the information we discussed was provided to Sherly at the end of our appointment today and can be found in the after visit summary.  Topics included: inheritance pattern, cancer risks, cancer screening recommendations, and also risks, benefits and limitations of testing.    We discussed that there are additional genes that could cause increased risk for ovarian and breast cancer.  As many of these genes present with overlapping features in a family and accurate cancer risk cannot always be established based upon the  pedigree analysis alone, it would be reasonable for Sherly to consider panel genetic testing to analyze multiple genes at once.    We discussed that genetic testing for ovarian or breast cancer susceptibility genes is typically most informative when it is first performed on a family member with a personal history of one of these cancers cancer.  Testing is available to Sherly, but with limitations. If Sherly pursues testing at this time and receives a negative result, this does not rule out the possibility of a hereditary cancer syndrome in her and/or her family. Despite these limitations, Sherly expressed interest in proceeding with testing.    We reviewed genetic testing options for hereditary gynecologic and breast cancers: actionable high/moderate risk panel testing (CustomNext-Cancer, 16 genes, a combination of GynPlus and BRCAplus) and expanded high and moderate risk panel testing (OvaNext, 25 genes).  Sherly expressed an interest in learning as much information as possible from the testing. She opted for the OvaNext panel.    Genetic testing is available for 25 genes associated with hereditary gynecologic and breast cancers: OvaNext (PEPE, BARD1, BRCA1, BRCA2, BRIP1, CDH1, CHEK2, DICER1, EPCAM, MLH1, MRE11A, MSH2, MSH6, MUTYH, NBN, NF1, PALB2, PMS2, PTEN, RAD50, RAD51C, RAD51D, SMARCA4, STK11, and TP53).  o We discussed that some of the genes in the OvaNext panel are associated with specific hereditary cancer syndromes and have published management guidelines: Hereditary Breast and Ovarian Cancer syndrome (BRCA1, BRCA2), Marcelino syndrome (MLH1, MSH2, MSH6, PMS2, EPCAM), Hereditary Diffuse Gastric Cancer (CDH1), Cowden syndrome (PTEN), Li Fraumeni syndrome (TP53), Peutz-Jeghers syndrome (STK11), MUTYH Associated Polyposis syndrome (MUTYH), and Neurofibromatosis type 1 (NF1).    o Risk-reducing salpingo-oophorectomy can be considered in women with mutations in BRIP1, RAD51C, or RAD51D.  Breast cancer risk  management guidelines are available for PEPE, CHEK2, PALB2, NF1, and NBN.  o The remaining genes (BARD1, DICER1, MRE11A, RAD50, and SMARCA4) are associated with increased cancer risk and may allow us to make medical recommendations when mutations are identified.    o Sherly was provided with a handout from [facility] [facility] which lists the genes included in the OvaNext testing.    Consent was obtained and genetic testing for OvaNext was sent to [facility]. Turn around time: approximately 3-4 weeks after insurance authorization is obtained.    Medical Management: For Sherly, we reviewed that the information from genetic testing may determine:    additional cancer screening for which Sherly may qualify (i.e. mammogram and breast MRI, more frequent colonoscopies, more frequent dermatologic exams, etc.),    options for risk reducing surgeries Sherly could consider (i.e. bilateral mastectomy, surgery to remove the ovary/fallopian tubes and/or uterus, etc.),      and targeted chemotherapies for certain cancers in the future (i.e. immunotherapy for individuals with Marcelino syndrome, PARP inhibitors, etc.).     These recommendations will be discussed in detail once genetic testing is completed.     Plan:  1) Today Sherly elected to proceed with the OvaNext panel offered through [facility].  2) This information should be available 3-4 weeks after insurance authorization is obtained.  3) Sherly will return to clinic to discuss the results    Face to face time: 45 minutes    Petrona Melendez MS, [facility]  Licensed Genetic Counselor  [phone]

## 2018-07-27 NOTE — LETTER
Cancer Risk Management  Program Locations    [facility]  [facility]  Mailing Address  Cancer Risk Management Program  [facility]  [address]  [place] [address]    New patient appointments  [phone]  July 27, 2018    Sherly Yeung  [address] [place] [address]      Dear Sherly,    It was a pleasure meeting with you at the [facility] on 7/27/2018.  Here is a copy of the progress note from your recent genetic counseling visit to the Cancer Risk Management Program.  If you have any additional questions, please feel free to call.    Referring Provider: self referred     Presenting Information:   I met with Sherly Yeung today for genetic counseling at the Cancer Risk Management Program at the [facility] to discuss her family history of breast and ovarian cancer.  She is here today to review this history, cancer screening recommendations, and available genetic testing options.    Personal History:  Sherly is a 52 year old female.  She does not have any personal history of cancer.  She had her first menstrual period at age 17, her first child at age 27, and reports that she is perimenopause.  Sherly has her left ovary, fallopian tubes and uterus in place, and reports having a surgery to remove her right ovary due to endometriosis.  She is not regularly followed for ovarian cancer screening. She reports a past history of oral contraceptive use for approximately 8 years and no hormone replacement therapy.      Her most recent OB-GYN exam and Pap smear in December of 2017 were normal.  She has annual clinical breast exams and mammograms, and her most recent mammogram from 5/2018 was negative/normal.  Her breast density was noted as heterogeneously dense.  Fecal colorectal cancer screen (FIT)  from 9/2017 was negative; she has not had a colonoscopy reportedly due to her use of blood thinners/risk for blood clot.   She had a history of one basal cell carcinoma on her face, and sees a dermatologist annually. She also sees a dentist regularly for oral cancer screening. She does not regularly do any other cancer screening at this time.     Family History: (Please see scanned pedigree for detailed family history information)    Her mother was diagnosed with breast cancer at age 67 and is now 76    Her maternal grandmother reportedly underwent bilateral mastectomies in her early 40's due to fibrous breasts.  She passed away at age 92 and did not have a history of cancer    Her maternal grandfather passed away at age 40 reportedly due to heart problems.    One of her mother's paternal half sisters was diagnosed with bladder cancer at age 78    One paternal aunt was diagnosed with ovarian cancer at age 60 and passed away at 61     One paternal aunt was diagnosed with breast cancer at age 66 and is now 82.      One paternal uncle was diagnosed with a blood cancer in his 70's which is being treated with chemotherapy.     Her maternal ethnicity is English/Bahraini. Her paternal ethnicity is [place].  There is no known Ashkenazi Muslim ancestry on either side of her family.     Discussion:    Sherly's family history of breast and ovarian cancer is suggestive of a possible cancer susceptibility gene in the family. Given this history, we discussed Marcelino syndrome and Hereditary Breast and Ovarian Cancer syndrome as the two most likely hereditary explanations.    We reviewed the features of sporadic, familial, and hereditary cancers.  Based on her family history of ovarian cancer, Sherly meets current National Comprehensive Cancer Network (NCCN) criteria for genetic testing of BRCA1 and BRCA2.    We discussed the natural history and genetics of Hereditary Breast and Ovarian cancer syndrome due to BRCA1 and BRCA2 gene  mutations. We also discussed the natural history and genetics of Marcelino syndrome due to mismatch repair gene mutations (MLH1, MSH2, MSH6, PMS2, EPCAM).  A detailed handout regarding hereditary gynecologic cancer risk genes and the information we discussed was provided to Sherly at the end of our appointment today and can be found in the after visit summary.  Topics included: inheritance pattern, cancer risks, cancer screening recommendations, and also risks, benefits and limitations of testing.    We discussed that there are additional genes that could cause increased risk for ovarian and breast cancer.  As many of these genes present with overlapping features in a family and accurate cancer risk cannot always be established based upon the pedigree analysis alone, it would be reasonable for Sherly to consider panel genetic testing to analyze multiple genes at once.    We discussed that genetic testing for ovarian or breast cancer susceptibility genes is typically most informative when it is first performed on a family member with a personal history of one of these cancers cancer.  Testing is available to Sherly, but with limitations. If Sherly pursues testing at this time and receives a negative result, this does not rule out the possibility of a hereditary cancer syndrome in her and/or her family. Despite these limitations, Sherly expressed interest in proceeding with testing.    We reviewed genetic testing options for hereditary gynecologic and breast cancers: actionable high/moderate risk panel testing (CustomNext-Cancer, 16 genes, a combination of GynPlus and BRCAplus) and expanded high and moderate risk panel testing (OvaNext, 25 genes).  Sherly expressed an interest in learning as much information as possible from the testing. She opted for the OvaNext panel.    Genetic testing is available for 25 genes associated with hereditary gynecologic and breast cancers: OvaNext (PEPE, BARD1, BRCA1, BRCA2, BRIP1, CDH1,  CHEK2, DICER1, EPCAM, MLH1, MRE11A, MSH2, MSH6, MUTYH, NBN, NF1, PALB2, PMS2, PTEN, RAD50, RAD51C, RAD51D, SMARCA4, STK11, and TP53).  o We discussed that some of the genes in the OvaNext panel are associated with specific hereditary cancer syndromes and have published management guidelines: Hereditary Breast and Ovarian Cancer syndrome (BRCA1, BRCA2), Marcelino syndrome (MLH1, MSH2, MSH6, PMS2, EPCAM), Hereditary Diffuse Gastric Cancer (CDH1), Cowden syndrome (PTEN), Li Fraumeni syndrome (TP53), Peutz-Jeghers syndrome (STK11), MUTYH Associated Polyposis syndrome (MUTYH), and Neurofibromatosis type 1 (NF1).    o Risk-reducing salpingo-oophorectomy can be considered in women with mutations in BRIP1, RAD51C, or RAD51D.  Breast cancer risk management guidelines are available for PEPE, CHEK2, PALB2, NF1, and NBN.  o The remaining genes (BARD1, DICER1, MRE11A, RAD50, and SMARCA4) are associated with increased cancer risk and may allow us to make medical recommendations when mutations are identified.    o Sherly was provided with a handout from [facility] which lists the genes included in the OvaNext testing.    Consent was obtained and genetic testing for OvaNext was sent to [facility]. Turn around time: approximately 3-4 weeks after insurance authorization is obtained.    Medical Management: For Sherly, we reviewed that the information from genetic testing may determine:    additional cancer screening for which Sherly may qualify (i.e. mammogram and breast MRI, more frequent colonoscopies, more frequent dermatologic exams, etc.),    options for risk reducing surgeries Sherly could consider (i.e. bilateral mastectomy, surgery to remove the ovary/fallopian tubes and/or uterus, etc.),      and targeted chemotherapies for certain cancers in the future (i.e. immunotherapy for individuals with Marcelino syndrome, PARP inhibitors, etc.).     These recommendations will be discussed in detail once genetic testing is  completed.     Plan:  1) Today Sherly elected to proceed with the OvaNext panel offered through [facility].  2) This information should be available 3-4 weeks after insurance authorization is obtained.  3) Sherly will return to clinic to discuss the results    Petrona Melendez MS, [facility]  Licensed Genetic Counselor  [phone]

## 2018-07-27 NOTE — NURSING NOTE
Chief Complaint   Patient presents with     Blood Draw     Genetics labs drawn from vpt by RN.     Genetics labs collected from venipuncture by RN.    Angela Bello RN

## 2018-07-27 NOTE — MR AVS SNAPSHOT
After Visit Summary   7/27/2018    Sherly Yeung    MRN: [id number]           Patient Information     Date Of Birth          1965        Visit Information        Provider Department      7/27/2018 11:15 AM Petrona Melendez GC;  2 [address] CONSULT [facility]        Today's Diagnoses     Family history of malignant neoplasm of ovary    -  1    Family history of malignant neoplasm of breast          Care Instructions        Assessing Cancer Risk  Only about 5-10% of cancers are thought to be due to an inherited cancer susceptibility gene.    These families often have:    Several people with the same or related types of cancer    Cancers diagnosed at a young age (before age 50)    Individuals with more than one primary cancer    Multiple generations of the family affected with cancer    Some people may be candidates for genetic testing of more than one gene.  For these families, genetic testing using a cancer panel may be offered.  These panels will test different genes known to increase the risk for breast, ovarian, uterine, and/or other cancers. All of the genes discussed below have published clinical management guidelines for individuals who are found to carry a mutation. The purpose of this handout is to serve as a brief summary of the genes analyzed by the panels used to inquire about hereditary breast and gynecologic cancer:  PEPE, BRCA1, BRCA2, BRIP1, CDH1, CHEK2, MLH1, MSH2, MSH6, PMS2, EPCAM, PTEN, PALB2, RAD51C, RAD51D, and TP53.  ______________________________________________________________________________  Hereditary Breast and Ovarian Cancer Syndrome   (BRCA1 and BRCA2)  A single mutation in one of the copies of BRCA1 or BRCA2 increases the risk for breast and ovarian cancer, among others.  The risk for pancreatic cancer and melanoma may also be slightly increased in some families.  The chart below shows the chance that someone with a BRCA mutation would develop cancer in  his or her lifetime1,2,3,4.        A person s ethnic background is also important to consider, as individuals of Ashkenazi [employer] ancestry have a higher chance of having a BRCA gene mutation.  There are three BRCA mutations that occur more frequently in this population.    Marcelino Syndrome   (MLH1, MSH2, MSH6, PMS2, and EPCAM)  Currently five genes are known to cause Marcelino Syndrome: MLH1, MSH2, MSH6, PMS2, and EPCAM.  A single mutation in one of the Marcelino Syndrome genes increases the risk for colon, endometrial, ovarian, and stomach cancers.  Other cancers that occur less commonly in Marcelino Syndrome include urinary tract, skin, and brain cancers.  The chart below shows the chance that a person with Marcelino syndrome would develop cancer in his or her lifetime5.      *Cancer risk varies depending on Marcelino syndrome gene found    Cowden Syndrome   (PTEN)  Cowden syndrome is a hereditary condition that increases the risk for breast, thyroid, endometrial, colon, and kidney cancer.  Cowden syndrome is caused by a mutation in the PTEN gene.  A single mutation in one of the copies of PTEN causes Cowden syndrome and increases cancer risk.  The chart below shows the chance that someone with a PTEN mutation would develop cancer in their lifetime6,7.  Other benign features seen in some individuals with Cowden syndrome include benign skin lesions (facial papules, keratoses, lipomas), learning disability, autism, thyroid nodules, colon polyps, and larger head size.      *One recent study found breast cancer risk to be increased to 85%    Li-Fraumeni Syndrome   (TP53)  Li-Fraumeni Syndrome (LFS) is a cancer predisposition syndrome caused by a mutation in the TP53 gene. A single mutation in one of the copies of TP53 increases the risk for multiple cancers. Individuals with LFS are at an increased risk for developing cancer at a young age. The lifetime risk for development of a LFS-associated cancer is 50% by age 30 and 90% by age 60.      Core Cancers: Sarcomas, Breast, Brain, Lung, Leukemias/Lymphomas, Adrenocortical carcinomas  Other Cancers: Gastrointestinal, Thyroid, Skin, Genitourinary    Hereditary Diffuse Gastric Cancer   (CDH1)  Currently, one gene is known to cause hereditary diffuse gastric cancer (HDGC): CDH1.  Individuals with HDGC are at increased risk for diffuse gastric cancer and lobular breast cancer. Of people diagnosed with HDGC, 30-50% have a mutation in the CDH1 gene.  This suggests there are likely other genes that may cause HDGC that have not been identified yet.      Lifetime Cancer Risks    General Population HDGC    Diffuse Gastric  <1% ~80%   Breast 12% 39-52%         Additional Genes  PEPE  PEPE is a moderate-risk breast cancer gene. Women who have a mutation in PEPE can have between a 2-4 fold increased risk for breast cancer compared to the general population8. PEPE mutations have also been associated with increased risk for pancreatic cancer, however an estimate of this cancer risk is not well understood9. Individuals who inherit two PEPE mutations have a condition called ataxia-telangiectasia (AT).  This rare autosomal recessive condition affects the nervous system and immune system, and is associated with progressive cerebellar ataxia beginning in childhood.  Individuals with ataxia-telangiectasia often have a weakened immune system and have an increased risk for childhood cancers.    PALB2  Mutations in PALB2 have been shown to increase the risk of breast cancer up to 33-58% in some families; where individuals fall within this risk range is dependent upon family [id number]. PALB2 mutations have also been associated with increased risk for pancreatic cancer, although this risk has not been quantified yet.  Individuals who inherit two PALB2 mutations--one from their mother and one from their father--have a condition called Fanconi Anemia.  This rare autosomal recessive condition is associated with short stature,  developmental delay, bone marrow failure, and increased risk for childhood cancers.    CHEK2   CHEK2 is a moderate-risk breast cancer gene.  Women who have a mutation in CHEK2 have around a 2-fold increased risk for breast cancer compared to the general population, and this risk may be higher depending upon family history.11,12,13 Mutations in CHEK2 have also been shown to increase the risk of a number of other cancers, including colon and prostate, however these cancer risks are currently not well understood.    BRIP1, RAD51C and RAD51D  Mutations in BRIP1, RAD51C, and RAD51D have been shown to increase the risk of ovarian cancer and possibly female breast cancer as well14,15 .       Lifetime Cancer Risk    General Population BRIP1 RAD51C RAD51D   Ovarian 1-2% ~5-8% ~5-9% ~7-15%               Inheritance  All of the cancer syndromes reviewed above are inherited in an autosomal dominant pattern.  This means that if a parent has a mutation, each of his or her children will have a 50% chance of inheriting that same mutation.  Therefore, each child--male or female--would have a 50% chance of being at increased risk for developing cancer.      Image obtained from Genetics Home Reference, 2013     Mutations in some genes can occur de sarah, which means that a person s mutation occurred for the first time in them and was not inherited from a parent.  Now that they have the mutation, however, it can be passed on to future generations.    Genetic Testing  Genetic testing involves a blood test and will look at the genetic information in the PEPE, BRCA1, BRCA2, BRIP1, CDH1, CHEK2, MLH1, MSH2, MSH6, PMS2, EPCAM, PTEN, PALB2, RAD51C, RAD51D, and TP53 genes for any harmful mutations that are associated with increased cancer risk.  If possible, it is recommended that the person(s) who has had cancer be tested before other family members.  That person will give us the most useful information about whether or not a specific gene is  associated with the cancer in the family.    Results  There are three possible results of genetic testing:    Positive--a harmful mutation was identified in one or more of the genes    Negative--no mutation was identified in any of the genes on this panel    Variant of unknown significance--a variation in one of the genes was identified, but it is unclear how this impacts cancer risk in the family    Advantages and Disadvantages   There are advantages and disadvantages to genetic testing.    Advantages    May clarify your cancer risk    Can help you make medical decisions    May explain the cancers in your family    May give useful information to your family members (if you share your results)    Disadvantages    Possible negative emotional impact of learning about inherited cancer risk    Uncertainty in interpreting a negative test result in some situations    Possible genetic discrimination concerns (see below)    Genetic Information Nondiscrimination Act (KRISTY)  KRISTY is a federal law that protects individuals from health insurance or employment discrimination based on a genetic test result alone.  Although rare, there are currently no legal discrimination protections in terms of life insurance, long term care, or disability insurances.  Visit the National Human Genome Research [place] website to learn more.    Reducing Cancer Risk  All of the genes described above have nationally recognized cancer screening guidelines that would be recommended for individuals who test positive.  In addition to increased cancer screening, surgeries may be offered or recommended to reduce cancer risk.  Recommendations are based upon an individual s genetic test result as well as their personal and family history of cancer.    Questions to Think About Regarding Genetic Testing:    What effect will the test result have on me and my relationship with my family members if I have an inherited gene mutation?  If I don t have a gene  mutation?    Should I share my test results, and how will my family react to this news, which may also affect them?    Are my children ready to learn new information that may one day affect their own health?    Hereditary Cancer Resources    FORCE: Facing Our Risk of Cancer Empowered facingourrisk.org   Bright Pink bebrightpink.org   Li-Fraumeni Syndrome Association lfsassociation.org   PTEN World PTENworld.[employer]   No stomach for cancer, Inc. nostomacforcancer.org   Stomach cancer relief network Scrnet.org   Collaborative Group of the Americas on Inherited Colorectal Cancer (CGA) cgaicc.com    Cancer Care cancercare.org   American Cancer Society (ACS) cancer.org   National Cancer [place] (NCI) cancer.gov     Cancer Risk Management Program [phone] ([phone])  ? Briana Nguyen, [place], [facility] [place]  [phone]  ? Hortensia Martini, MS, [facility] [place]  [phone]  ? Petrona Melendez, [place], [facility] [place]  [phone]  ? Fran Sebastian, [place], [facility] [place]  [phone]    References  1. Jarad MUÑOZ, Esequiel PDP, Alexander S, Risch CALLAWAY, Yadira JE, Edgardo JL, Dixieman N, Ashkan H, Jerry O, Laurent A, Katlyn B, Aurelia P, Mansarkis S, Ac DM, Igor N, Brenda E, Saroj H, Kareem E, Lubinski J, Gronwald J, Marcio B, Tulinius H, Thorlacius S, Eerola H, Mihaela H, Brittni K, Wesly OP. Average risks of breast and ovarian cancer associated with BRCA1 or BRCA2 mutations detected in case series unselected for family history: a combined analysis of 222 studies. Am J Hum Kim. 2003;72:1117-30.  2. Dain N, Jodie M, Marck G.  BRCA1 and BRCA2 Hereditary Breast and Ovarian Cancer. Gene Reviews online. 2013.  3. Reyes YC, Shaun S, Leilani G, Barragan S. Breast cancer risk among male BRCA1 and BRCA2 mutation carriers. J Natl Cancer Inst. 2007;99:1811-4.  4. Boyd AGUERO, Carroll I, Reggie J, Suzanna E, Jonathan ER, Jann F. Risk of breast cancer in male BRCA2 carriers. J Med Kim. 2010;47:710-1.  5. National Comprehensive Cancer Network. Clinical practice  guidelines in oncology, colorectal cancer screening. Available online (registration required). 2015.  6. Dmitri MH, Anthony J, Moriah J, Niles LA, Vaishali MS, Petar C. Lifetime cancer risks in individuals with germline PTEN mutations. Clin Cancer Res. 2012;18:400-7.  7. Ha STARKS. Cowden Syndrome: A Critical Review of the Clinical Literature. J Kim . 2009:18:13-27.  8. Ivonne A, Dre D, Olena S, Мария P, Trina T, Deep M, Yadiel B, Jayleen H, Tarun R, Tucker K, Mila L, Boyd DG, Ac D, Juliocesar DF, Noé MR, The Breast Cancer Susceptibility Collaboration (UK) & Kirit CM. PEPE mutations that cause ataxia-telangiectasia are breast cancer susceptibility alleles. Nature Genetics. 2006;38:873-875  9. Nawaf N , Delaney Y, Jeanette J, Iván L, Felicita GM , Madyson ML, Gallinger S, Camarillo AG, Syngal S, Shelly ML, Marlene J , Alvarez R, Gold SZ, Ernst JR, Mario VE, Andres M, Vogelstein B, Renzo N, Estradan RH, Brandee KW, and Edge AP. PEPE mutations in patients with hereditary pancreatic cancer. Cancer Discover. 2012;2:41-46  10. Jarad WOOD, et al. Breast-Cancer Risk in Families with Mutations in PALB2. NEJM. 2014; 371(6):497-506.  11. CHEK2 Breast Cancer Case-Control Consortium. CHEK2*1100delC and susceptibility to breast cancer: A collaborative analysis involving 10,860 breast cancer cases and 9,065 controls from 10 studies. Am J Hum Kim, 74 (2004), pp. 6623-6651  12. Rebekah T, Hilda S, Colby K, et al. Spectrum of Mutations in BRCA1, BRCA2, CHEK2, and TP53 in Families at High Risk of Breast Cancer. KRAIG. 2006;295(12):5434-7710.   13. Luzmaria CAN, Trav CARRANZA, Dee MUÑOZ, et al. Risk of breast cancer in women with a CHEK2 mutation with and without a family history of breast cancer. J Clin Oncol. 2011;29:7683-2006.  14. Saúl H, Deacon E, Sreedhar CARBALLO, et al. Contribution of germline mutations in the RAD51B, RAD51C, and RAD51D genes to ovarian cancer in the population. J Clin Oncol.  2015;33(26):6916-3805. Doi:10.1200/JCO.2015.61.2408.  15. Emily T, Jeane DF, Mehran P, et al. Mutations in BRIP1 confer high risk of ovarian cancer. Erin Kim. 2011;43(11):5213-1510. doi:10.1038/ng.955.            Follow-ups after your visit        Future tests that were ordered for you today     Open Future Orders        Priority Expected Expires Ordered    [facility]; [facility]: Laboratory Miscellaneous Order Routine  7/27/2019 7/27/2018            Who to contact     If you have questions or need follow up information about today's clinic visit or your schedule please contact [facility] directly at [phone].  Normal or non-critical lab and imaging results will be communicated to you by Flimmerhart, letter or phone within 4 business days after the clinic has received the results. If you do not hear from us within 7 days, please contact the clinic through VENNCOM[employer]t or phone. If you have a critical or abnormal lab result, we will notify you by phone as soon as possible.  Submit refill requests through [employer] or call your pharmacy and they will forward the refill request to us. Please allow 3 business days for your refill to be completed.          Additional Information About Your Visit        FlimmerharRingly Information     [employer] gives you secure access to your electronic health record. If you see a primary care provider, you can also send messages to your care team and make appointments. If you have questions, please call your primary care clinic.  If you do not have a primary care provider, please call [phone] and they will assist you.        Care EveryWhere ID     This is your Care EveryWhere ID. This could be used by other organizations to access your [place] medical records  [id number]         Blood Pressure from Last 3 Encounters:   02/07/18 (!) 129/91   01/22/18 109/78   01/22/18 (!) 127/91    Weight from Last 3 Encounters:   01/22/18 53.3 kg (117 lb 6.4 oz)   01/22/18 52.8 kg  (116 lb 6.4 oz)   12/05/17 51.3 kg (113 lb)               Primary Care Provider Office Phone # Fax #    [facility] [phone]       [address] [place] [id number]        Equal Access to Services     SUSANNA PITTMAN : Hadii aad ku hadasho Soomaali, waaxda luqadaha, qaybta kaalmada adeegyada, waxay kenyin hayaan tamar travispadmini larufino crump. So [facility] [phone].    ATENCIÓN: Si habla español, tiene a randle disposición servicios gratuitos de asistencia lingüística. [facility] [phone].    We comply with applicable federal civil rights laws and [place] laws. We do not discriminate on the basis of race, color, national origin, age, disability, sex, sexual orientation, or gender identity.            Thank you!     Thank you for choosing [facility]  for your care. Our goal is always to provide you with excellent care. Hearing back from our patients is one way we can continue to improve our services. Please take a few minutes to complete the written survey that you may receive in the mail after your visit with us. Thank you!             Your Updated Medication List - Protect others around you: Learn how to safely use, store and throw away your medicines at www.disposemymeds.org.          This list is accurate as of 7/27/18 12:34 PM.  Always use your most recent med list.                   Brand Name Dispense Instructions for use Diagnosis    ABILIFY 20 MG tablet   Generic drug:  ARIPiprazole      Take 20 mg by mouth        aspirin 81 MG chewable tablet     36 tablet    Take 1 tablet (81 mg) by mouth daily    SVC syndrome       B COMPLEX 1 PO      Take 1 tablet by mouth daily.    Sternal pain, Disorder of bone and cartilage, unspecified, Fatigue, Anemia, unspecified       CYMBALTA PO      Take 120 mg by mouth daily        fondaparinux 7.5MG/0.6ML injection    ARIXTRA     Inject 7.5 mg Subcutaneous daily        HYDROmorphone 3 MG Suppository    DILAUDID     Place 3 mg  rectally        LANsoprazole 30 MG CR capsule    PREVACID    90 capsule    Take 1 capsule (30 mg) by mouth 2 times daily    Gastroesophageal reflux disease without esophagitis, Subclavian vein thrombosis, right (H)       LORazepam 0.5 MG tablet    ATIVAN    30 tablet    Take 1 tablet by mouth every 6 hours as needed (for dizziness ).    Dizziness and giddiness       magnesium 250 MG tablet      Take 4 tablets by mouth daily    Iron deficiency anemia, unspecified       metoprolol succinate 25 MG 24 hr tablet    TOPROL-XL    90 tablet    Take 1 tablet (25 mg) by mouth daily    Right subclavian vein thrombosis (H), Sinus tachycardia, SVC syndrome       nortriptyline 50 MG capsule    PAMELOR     Take 50 mg by mouth At Bedtime.        oxyCODONE 10 MG 12 hr tablet    OxyCONTIN    30 tablet    Take 1 tablet (10 mg) by mouth every 12 hours    Subclavian vein thrombosis, right (H)       riboflavin 100 MG Tabs tablet    vitamin  B-2     Take 400 mg by mouth        RITALIN 20 MG tablet   Generic drug:  methylphenidate       Subclavian vein thrombosis, right (H), SVC syndrome, Sinus tachycardia       senna-docusate 8.6-50 MG per tablet    SENOKOT-S;PERICOLACE    60 tablet    Take 1-2 tablets by mouth 2 times daily.    Constipation       spironolactone 25 MG tablet    ALDACTONE     Take 25 mg by mouth daily        sulfamethoxazole-trimethoprim 800-160 MG per tablet    BACTRIM DS/SEPTRA DS    6 tablet    Take 1 tablet by mouth 2 times daily    Dysuria       SUMAtriptan Succinate 6 MG/0.5ML Kit      Inject 1 dose Subcutaneous at onset of headache. Indications: Migraine Headache        tolterodine 4 MG 24 hr capsule    DETROL LA    100 capsule    Take 1 capsule (4 mg) by mouth daily    Urge incontinence       TOPAMAX 25 MG tablet   Generic drug:  topiramate      Take 100-200 mg by mouth 2 times daily        vitamin D 1000 units capsule      Take 2 capsules by mouth daily.    Sternal pain, Disorder of bone and cartilage,  unspecified, Fatigue, Anemia, unspecified       Zinc 50 MG Caps      Take 1 tablet by mouth daily.    Sternal pain, Disorder of bone and cartilage, unspecified, Fatigue, Anemia, unspecified       ZUPLENZ 8 MG Film   Generic drug:  Ondansetron      Take 8 mg by mouth every 6 hours as needed.    Sternal pain, Disorder of bone and cartilage, unspecified, Fatigue, Anemia, unspecified

## 2018-07-29 LAB — MISCELLANEOUS TEST: NORMAL

## 2018-08-22 ENCOUNTER — OFFICE VISIT (OUTPATIENT)
Dept: ONCOLOGY | Facility: CLINIC | Age: 53
End: 2018-08-22
Attending: GENETIC COUNSELOR, MS
Payer: MEDICARE

## 2018-08-22 DIAGNOSIS — Z80.41 FAMILY HISTORY OF MALIGNANT NEOPLASM OF OVARY: Primary | ICD-10-CM

## 2018-08-22 DIAGNOSIS — Z80.3 FAMILY HISTORY OF MALIGNANT NEOPLASM OF BREAST: ICD-10-CM

## 2018-08-22 PROCEDURE — 40000072 ZZH STATISTIC GENETIC COUNSELING, < 16 MIN: Mod: ZF | Performed by: GENETIC COUNSELOR, MS

## 2018-08-22 NOTE — LETTER
8/22/2018       RE: Sherly Yeung  [address] [place] [address]     Dear Colleague,    Thank you for referring your patient, Sherly Yeung, to the [facility]. Please see a copy of my visit note below.    8/22/2018    Referring Provider: self referred    Presenting Information:  Sherly returned to the Cancer Risk Management Program at the [facility] to discuss her genetic testing results. Her blood was drawn on 7/27/2018.  OvaNext testing was ordered from [facility]. This testing was done because of her family history of breast and ovarian cancer.    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  This variant is called p.T460M.  She was also found to have a variant of uncertain significance (VUS) in the RAD51C gene.  This variant is called p.G264S.  Medical management decisions should NOT be made based on this test result alone.    Of note, Sherly is negative for mutations in the  PEPE, BARD1, BRCA1, BRCA2, BRIP1, CDH1, CHEK2, DICER1, EPCAM, MLH1, MRE11A, MSH2, MSH6, MUTYH, NBN, NF1, PALB2, PMS2, PTEN, RAD50, RAD51C, RAD51D, SMARCA4, STK11, and TP53  genes.    No mutations were found in any of the 25 genes analyzed.  This test involved sequencing and deletion/duplication analysis of all genes with the exception of EPCAM (deletions/duplications only).  We reviewed the autosomal dominant inheritance of these genes.  Sherly cannot pass on a mutation in any of these genes to her children based on this test result.  Mutations in these genes do not skip generations.      Testing did not detect an identifiable mutation associated with  Hereditary Breast and Ovarian Cancer syndrome (BRCA1, BRCA2), Marcelino syndrome (MLH1, MSH2, MSH6, PMS2, EPCAM), Hereditary Diffuse Gastric Cancer (CDH1), Cowden syndrome (PTEN), Li Fraumeni syndrome (TP53), Peutz-Jeghers syndrome (STK11), MUTYH  "Associated Polyposis (MAP), or Neurofibromatosis type 1 (NF1).     A copy of the test report can be found in the Laboratory tab, dated 7/27/2018, and named \"SEND OUTS MISC TEST\". The report is scanned in as a linked document.    Interpretation:  We discussed several different interpretations of this inconclusive test result.  It is not clear if these variants in the PEPE or RAD51C genes are associated with increased cancer risk.  1. These variants may be a benign changes that do not increase cancer risk.  2. One or both of these variants may be a harmful mutations associated with an increased risk for certain cancers:    We reviewed that PEPE is a moderate-risk breast cancer gene.  The risk for breast cancer in women with an PEPE mutation are estimated to be at a 2-4 fold increased risk for breast cancer compared to the general population.  This is approximately 24-48%, based on the population risk for breast cancer of ~12%. Some mutations in the PEPE gene may confer a higher risk for breast cancer.  We also discussed the association of PEPE mutations with increased risk for pancreatic and prostate cancer; however data is still limited in this area. We also discussed reproductive implications of having an PEPE mutation, as it pertains to having a child with ataxia-telangiectasia.  Ataxia-telangiectasia is a rare autosomal recessive disorder that typically presents in childhood, and affects the nervous system, immune system, and other systems. This condition occurs when a mother and father both have a mutation in the PEPE gene and pass it on to a child.  Individuals with this disorder have trouble with balance and coordinating movements (ataxia).  Affected individuals also have widened blood vessels called telangiectasias. People with ataxia-telangiectasia have an increased risk of developing childhood cancers.  If this variant is later classified as harmful, Sherly would be considered a carrier for ataxia-telangiectasia.  " For individuals of childbearing age with RAD51C mutations, genetic counseling and genetic testing may be advised for their partners.    Harmful mutations in RAD51C have been shown to increase the risk for ovarian cancer as well as female breast cancer.  The risk for ovarian cancer for women who have a RAD51C mutation is estimated to be 5-9%.  The risk for breast cancer has been reported to be increased in women who have a RAD51C mutation, however these risk numbers are not well defined.  Cancer risks in men are not currently available. In rare situations in which both parents have a mutation in the RAD51C gene, their children each have a 25% risk for Fanconi anemia. Fanconi anemia is a rare condition with onset in childhood.  Fanconi anemia often results in physical abnormalities, growth problems, bone marrow failure, and increased risk for cancer.  If this variant is later classified as harmful, Sherly would be considered a carrier for Fanconi Anemia.  For individuals of childbearing age with RAD51C mutations, genetic counseling and genetic testing may be advised for their partners.    The lab is working to determine if this variant is harmful or benign, and they will contact me if it is reclassified.  If these variants are determined to be a benign changes, there may be a different gene or combination of genes and environment that are associated with the cancers in Sherly's relatives.  It is also important to consider that her paternal aunt, who had a history of ovarian cancer, may have had a mutation in one of the genes tested and she did not inherit it.      Inheritance:  We reviewed the autosomal dominant inheritance of these variants in the PEPE and RAD51C genes.  Sherly has a 50% chance to pass each variant to each of her children.   Likewise, each of her siblings also has a 50% risk of having the same variant.  Because it is unclear what, if any, risk is associated with this variant, clinical genetic testing  is not recommended for relatives.    Family Studies:  The laboratory may offer additional research based testing for specific relatives in order to help reclassify this variant.    Screening:  Based on this inconclusive test result, it is important for Sherly and her relatives to refer back to the family history for appropriate cancer screening.      Sherly has a family history of breast cancer. Based on her personal and family history, Sherly has a 10.1-10.3% lifetime risk of developing breast cancer based on the MERARI(v8) and Brendan models, respectively.  Therefore, Sherly does not meet current National Comprehensive Cancer Network (NCCN) guidelines for high risk breast screening, which is offered to women with a 20% lifetime risk or higher.  However, it is still important for Sherly to continue with routine breast screening under the care of her physicians.    Due to her paternal aunt's history of ovarian cancer, Sherly and her close paternal relatives may remain at slightly increased risk for ovarian cancer.  We discussed available ovarian cancer screening (pelvic exams, CA-125 blood tests, and transvaginal ultrasounds) as well as the significant limitations of this screening.  As such, this screening is not typically recommended.  That being said, women in this family should discuss this screening and the signs and symptoms of ovarian cancer with their primary OB/GYN provider, as they may have individualized recommendations.    Other population cancer screening, such as recommendations by the American Cancer Society and the National Comprehensive Cancer Network (NCCN), are also appropriate for Sherly and her family.  These screening recommendations may change if there are changes to Sherly's personal and/or family history.  Final screening recommendations should be made by each individual's managing physician.      Additional Testing Considerations:  Although Sherly's genetic testing result was inconclusive,  other relatives may still carry a gene mutation associated with an increased risk for ovarian and breast cancer. Genetic counseling is recommended for her paternal aunt's close relatives, who was diagnosed with ovarian cancer, to discuss genetic testing options an screening recommendations.    Summary:  We do not have an explanation for Sherly's family history of breast and ovarian cancer.  Because of that, it is important that she continue with cancer screening based on her personal and family history as discussed above.    Genetic testing is rapidly advancing, and new cancer susceptibility genes will most likely be identified in the future.  Therefore, I encouraged Sherly to contact me annually or if there are changes in her personal or family history.  This may change how we assess her cancer risk, screening, and the testing we would offer.    Plan:  1.  I provided Sherly with a copy of her test results today.    2. She plans to follow-up with her primary care provider as needed.  3. She should contact me annually, or sooner if family history changes.  4. I will contact Sherly if the lab informs me that this VUS has been reclassified.  This may change screening and testing recommendations for relatives.    If Sherly has any further questions, I encouraged her to contact me at [phone].      Time spent face to face: 15 minutes.    Petrona Melendez MS, [facility]  Licensed Genetic Counselor  [phone]

## 2018-08-22 NOTE — PROGRESS NOTES
"8/22/2018    Referring Provider: self referred    Presenting Information:  Sherly returned to the Cancer Risk Management Program at the [facility] to discuss her genetic testing results. Her blood was drawn on 7/27/2018.  OvaNext testing was ordered from [facility]. This testing was done because of her family history of breast and ovarian cancer.    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  This variant is called p.T460M.  She was also found to have a variant of uncertain significance (VUS) in the RAD51C gene.  This variant is called p.G264S.  Medical management decisions should NOT be made based on this test result alone.    Of note, Sherly is negative for mutations in the  PEPE, BARD1, BRCA1, BRCA2, BRIP1, CDH1, CHEK2, DICER1, EPCAM, MLH1, MRE11A, MSH2, MSH6, MUTYH, NBN, NF1, PALB2, PMS2, PTEN, RAD50, RAD51C, RAD51D, SMARCA4, STK11, and TP53  genes.    No mutations were found in any of the 25 genes analyzed.  This test involved sequencing and deletion/duplication analysis of all genes with the exception of EPCAM (deletions/duplications only).  We reviewed the autosomal dominant inheritance of these genes.  Sherly cannot pass on a mutation in any of these genes to her children based on this test result.  Mutations in these genes do not skip generations.      Testing did not detect an identifiable mutation associated with  Hereditary Breast and Ovarian Cancer syndrome (BRCA1, BRCA2), Marcelino syndrome (MLH1, MSH2, MSH6, PMS2, EPCAM), Hereditary Diffuse Gastric Cancer (CDH1), Cowden syndrome (PTEN), Li Fraumeni syndrome (TP53), Peutz-Jeghers syndrome (STK11), MUTYH Associated Polyposis (MAP), or Neurofibromatosis type 1 (NF1).     A copy of the test report can be found in the Laboratory tab, dated 7/27/2018, and named \"SEND OUTS MISC TEST\". The report is scanned in as a linked " document.    Interpretation:  We discussed several different interpretations of this inconclusive test result.  It is not clear if these variants in the PEPE or RAD51C genes are associated with increased cancer risk.  1. These variants may be a benign changes that do not increase cancer risk.  2. One or both of these variants may be a harmful mutations associated with an increased risk for certain cancers:    We reviewed that PEPE is a moderate-risk breast cancer gene.  The risk for breast cancer in women with an PEPE mutation are estimated to be at a 2-4 fold increased risk for breast cancer compared to the general population.  This is approximately 24-48%, based on the population risk for breast cancer of ~12%. Some mutations in the PEPE gene may confer a higher risk for breast cancer.  We also discussed the association of PEPE mutations with increased risk for pancreatic and prostate cancer; however data is still limited in this area. We also discussed reproductive implications of having an PEPE mutation, as it pertains to having a child with ataxia-telangiectasia.  Ataxia-telangiectasia is a rare autosomal recessive disorder that typically presents in childhood, and affects the nervous system, immune system, and other systems. This condition occurs when a mother and father both have a mutation in the PEPE gene and pass it on to a child.  Individuals with this disorder have trouble with balance and coordinating movements (ataxia).  Affected individuals also have widened blood vessels called telangiectasias. People with ataxia-telangiectasia have an increased risk of developing childhood cancers.  If this variant is later classified as harmful, Sherly would be considered a carrier for ataxia-telangiectasia.  For individuals of childbearing age with RAD51C mutations, genetic counseling and genetic testing may be advised for their partners.    Harmful mutations in RAD51C have been shown to increase the risk for ovarian  cancer as well as female breast cancer.  The risk for ovarian cancer for women who have a RAD51C mutation is estimated to be 5-9%.  The risk for breast cancer has been reported to be increased in women who have a RAD51C mutation, however these risk numbers are not well defined.  Cancer risks in men are not currently available. In rare situations in which both parents have a mutation in the RAD51C gene, their children each have a 25% risk for Fanconi anemia. Fanconi anemia is a rare condition with onset in childhood.  Fanconi anemia often results in physical abnormalities, growth problems, bone marrow failure, and increased risk for cancer.  If this variant is later classified as harmful, Sherly would be considered a carrier for Fanconi Anemia.  For individuals of childbearing age with RAD51C mutations, genetic counseling and genetic testing may be advised for their partners.    The lab is working to determine if this variant is harmful or benign, and they will contact me if it is reclassified.  If these variants are determined to be a benign changes, there may be a different gene or combination of genes and environment that are associated with the cancers in Sherly's relatives.  It is also important to consider that her paternal aunt, who had a history of ovarian cancer, may have had a mutation in one of the genes tested and she did not inherit it.      Inheritance:  We reviewed the autosomal dominant inheritance of these variants in the PEPE and RAD51C genes.  Sherly has a 50% chance to pass each variant to each of her children.   Likewise, each of her siblings also has a 50% risk of having the same variant.  Because it is unclear what, if any, risk is associated with this variant, clinical genetic testing is not recommended for relatives.    Family Studies:  The laboratory may offer additional research based testing for specific relatives in order to help reclassify this variant.    Screening:  Based on this  inconclusive test result, it is important for Sherly and her relatives to refer back to the family history for appropriate cancer screening.      Sherly has a family history of breast cancer. Based on her personal and family history, Sherly has a 10.1-10.3% lifetime risk of developing breast cancer based on the MERARI(v8) and Brendan models, respectively.  Therefore, Sherly does not meet current National Comprehensive Cancer Network (NCCN) guidelines for high risk breast screening, which is offered to women with a 20% lifetime risk or higher.  However, it is still important for Sherly to continue with routine breast screening under the care of her physicians.    Due to her paternal aunt's history of ovarian cancer, Sherly and her close paternal relatives may remain at slightly increased risk for ovarian cancer.  We discussed available ovarian cancer screening (pelvic exams, CA-125 blood tests, and transvaginal ultrasounds) as well as the significant limitations of this screening.  As such, this screening is not typically recommended.  That being said, women in this family should discuss this screening and the signs and symptoms of ovarian cancer with their primary OB/GYN provider, as they may have individualized recommendations.    Other population cancer screening, such as recommendations by the American Cancer Society and the National Comprehensive Cancer Network (NCCN), are also appropriate for Sherly and her family.  These screening recommendations may change if there are changes to Sherly's personal and/or family history.  Final screening recommendations should be made by each individual's managing physician.      Additional Testing Considerations:  Although Sherly's genetic testing result was inconclusive, other relatives may still carry a gene mutation associated with an increased risk for ovarian and breast cancer. Genetic counseling is recommended for her paternal aunt's close relatives, who was diagnosed with  ovarian cancer, to discuss genetic testing options an screening recommendations.    Summary:  We do not have an explanation for Sherly's family history of breast and ovarian cancer.  Because of that, it is important that she continue with cancer screening based on her personal and family history as discussed above.    Genetic testing is rapidly advancing, and new cancer susceptibility genes will most likely be identified in the future.  Therefore, I encouraged Sherly to contact me annually or if there are changes in her personal or family history.  This may change how we assess her cancer risk, screening, and the testing we would offer.    Plan:  1.  I provided Sherly with a copy of her test results today.    2. She plans to follow-up with her primary care provider as needed.  3. She should contact me annually, or sooner if family history changes.  4. I will contact Sherly if the lab informs me that this VUS has been reclassified.  This may change screening and testing recommendations for relatives.    If Sherly has any further questions, I encouraged her to contact me at [phone].      Time spent face to face: 15 minutes.    Petrona Melendez MS, [facility]  Licensed Genetic Counselor  [phone]

## 2018-08-22 NOTE — LETTER
Cancer Risk Management  Program Locations    [facility]  [facility]  Mailing Address  Cancer Risk Management Program  [facility]  [address]  [place] [address]    New patient appointments  [phone]  August 24, 2018    Sherly Yeung  [address] [place] [address]      Dear Sherly,    It was a pleasure meeting with you at the [facility] on 8/22/2018.  Here is a copy of the progress note from your recent genetic counseling visit to the Cancer Risk Management Program.  If you have any additional questions, please feel free to call.    Referring Provider: self referred    Presenting Information:  Sherly returned to the Cancer Risk Management Program at the [facility] to discuss her genetic testing results. Her blood was drawn on 7/27/2018.  OvaNext testing was ordered from [facility]. This testing was done because of her family history of breast and ovarian cancer.    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  This variant is called p.T460M.  She was also found to have a variant of uncertain significance (VUS) in the RAD51C gene.  This variant is called p.G264S.  Medical management decisions should NOT be made based on this test result alone.    Of note, Sherly is negative for mutations in the  PEPE, BARD1, BRCA1, BRCA2, BRIP1, CDH1, CHEK2, DICER1, EPCAM, MLH1, MRE11A, MSH2, MSH6, MUTYH, NBN, NF1, PALB2, PMS2, PTEN, RAD50, RAD51C, RAD51D, SMARCA4, STK11, and TP53  genes.    No mutations were found in any of the 25 genes analyzed.  This test involved sequencing and deletion/duplication analysis of all genes with the exception of EPCAM (deletions/duplications only).  We reviewed  the autosomal dominant inheritance of these genes.  Sherly cannot pass on a mutation in any of these genes to her children based on this test result.  Mutations in these genes do not skip generations.      Testing did not detect an identifiable mutation associated with  Hereditary Breast and Ovarian Cancer syndrome (BRCA1, BRCA2), Marcelino syndrome (MLH1, MSH2, MSH6, PMS2, EPCAM), Hereditary Diffuse Gastric Cancer (CDH1), Cowden syndrome (PTEN), Li Fraumeni syndrome (TP53), Peutz-Jeghers syndrome (STK11), MUTYH Associated Polyposis (MAP), or Neurofibromatosis type 1 (NF1).     Interpretation:  We discussed several different interpretations of this inconclusive test result.  It is not clear if these variants in the PEPE or RAD51C genes are associated with increased cancer risk.  1. These variants may be a benign changes that do not increase cancer risk.  2. One or both of these variants may be a harmful mutations associated with an increased risk for certain cancers:    We reviewed that PEPE is a moderate-risk breast cancer gene.  The risk for breast cancer in women with an PEPE mutation are estimated to be at a 2-4 fold increased risk for breast cancer compared to the general population.  This is approximately 24-48%, based on the population risk for breast cancer of ~12%. Some mutations in the PEPE gene may confer a higher risk for breast cancer.  We also discussed the association of PEPE mutations with increased risk for pancreatic and prostate cancer; however data is still limited in this area. We also discussed reproductive implications of having an PEPE mutation, as it pertains to having a child with ataxia-telangiectasia.  Ataxia-telangiectasia is a rare autosomal recessive disorder that typically presents in childhood, and affects the nervous system, immune system, and other systems. This condition occurs when a mother and father both have a mutation in the PEPE gene and pass it on to a child.  Individuals with this  disorder have trouble with balance and coordinating movements (ataxia).  Affected individuals also have widened blood vessels called telangiectasias. People with ataxia-telangiectasia have an increased risk of developing childhood cancers.  If this variant is later classified as harmful, Sherly would be considered a carrier for ataxia-telangiectasia.  For individuals of childbearing age with RAD51C mutations, genetic counseling and genetic testing may be advised for their partners.    Harmful mutations in RAD51C have been shown to increase the risk for ovarian cancer as well as female breast cancer.  The risk for ovarian cancer for women who have a RAD51C mutation is estimated to be 5-9%.  The risk for breast cancer has been reported to be increased in women who have a RAD51C mutation, however these risk numbers are not well defined.  Cancer risks in men are not currently available. In rare situations in which both parents have a mutation in the RAD51C gene, their children each have a 25% risk for Fanconi anemia. Fanconi anemia is a rare condition with onset in childhood.  Fanconi anemia often results in physical abnormalities, growth problems, bone marrow failure, and increased risk for cancer.  If this variant is later classified as harmful, Sherly would be considered a carrier for Fanconi Anemia.  For individuals of childbearing age with RAD51C mutations, genetic counseling and genetic testing may be advised for their partners.    The lab is working to determine if this variant is harmful or benign, and they will contact me if it is reclassified.  If these variants are determined to be a benign changes, there may be a different gene or combination of genes and environment that are associated with the cancers in Sherly's relatives.  It is also important to consider that her paternal aunt, who had a history of ovarian cancer, may have had a mutation in one of the genes tested and she did not inherit it.       Inheritance:  We reviewed the autosomal dominant inheritance of these variants in the PEPE and RAD51C genes.  Sherly has a 50% chance to pass each variant to each of her children.   Likewise, each of her siblings also has a 50% risk of having the same variant.  Because it is unclear what, if any, risk is associated with this variant, clinical genetic testing is not recommended for relatives.    Family Studies:  The laboratory may offer additional research based testing for specific relatives in order to help reclassify this variant.    Screening:  Based on this inconclusive test result, it is important for Sherly and her relatives to refer back to the family history for appropriate cancer screening.      Sherly has a family history of breast cancer. Based on her personal and family history, Sherly has a 10.1-10.3% lifetime risk of developing breast cancer based on the MERARI(v8) and Brendan models, respectively.  Therefore, Sherly does not meet current National Comprehensive Cancer Network (NCCN) guidelines for high risk breast screening, which is offered to women with a 20% lifetime risk or higher.  However, it is still important for Sherly to continue with routine breast screening under the care of her physicians.    Due to her paternal aunt's history of ovarian cancer, Sherly and her close paternal relatives may remain at slightly increased risk for ovarian cancer.  We discussed available ovarian cancer screening (pelvic exams, CA-125 blood tests, and transvaginal ultrasounds) as well as the significant limitations of this screening.  As such, this screening is not typically recommended.  That being said, women in this family should discuss this screening and the signs and symptoms of ovarian cancer with their primary OB/GYN provider, as they may have individualized recommendations.    Other population cancer screening, such as recommendations by the American Cancer Society and the National Comprehensive Cancer  Network (NCCN), are also appropriate for Sherly and her family.  These screening recommendations may change if there are changes to Sherly's personal and/or family history.  Final screening recommendations should be made by each individual's managing physician.      Additional Testing Considerations:  Although Sherly's genetic testing result was inconclusive, other relatives may still carry a gene mutation associated with an increased risk for ovarian and breast cancer. Genetic counseling is recommended for her paternal aunt's close relatives, who was diagnosed with ovarian cancer, to discuss genetic testing options an screening recommendations.    Summary:  We do not have an explanation for Sherly's family history of breast and ovarian cancer.  Because of that, it is important that she continue with cancer screening based on her personal and family history as discussed above.    Genetic testing is rapidly advancing, and new cancer susceptibility genes will most likely be identified in the future.  Therefore, I encouraged Sherly to contact me annually or if there are changes in her personal or family history.  This may change how we assess her cancer risk, screening, and the testing we would offer.    Plan:  1.  I provided Sherly with a copy of her test results today.    2. She plans to follow-up with her primary care provider as needed.  3. She should contact me annually, or sooner if family history changes.  4. I will contact Sherly if the lab informs me that this VUS has been reclassified.  This may change screening and testing recommendations for relatives.    If Sherly has any further questions, I encouraged her to contact me at [phone].    Petrona Melendez MS, [facility]  Licensed Genetic Counselor  [phone]

## 2018-08-22 NOTE — PATIENT INSTRUCTIONS
Genetic Testing  You had a blood test that looked at the genetic information in one or more genes associated with increased cancer risk.  The testing looked for any harmful changes that would stop this particular gene from working like it should.  It is important to remember that everyone has variants in multiple genes in their bodies that do not affect how the gene works.  These changes are benign and do not cause disease or increase cancer risk.  The term often used to describe these variants is benign polymorphism.  If an individual is found to have a benign polymorphism, their genetic test result is reported as Negative.    Results  There are three possible results of any genetic test:    Positive--a harmful mutation was identified    Negative--no mutation was identified    Variant of unknown significance--a variation in one of the genes was identified, but it is unclear how this impacts cancer risk in the family    Variant of Unknown Significance (VUS)  A VUS was identified in your blood sample.  Scientists currently do not know if this variant is harmful or if it is a benign polymorphism not associated with any increased risk of cancer.   There are several reasons for this lack of knowledge, but likely your VUS has either never been seen before or has only been seen in a small number of individuals.  Until your VUS is studied in more detail and/or seen in more families, scientists are not able to predict if it is a harmful mutation or a benign polymorphism.  Therefore, the cause of the cancer in you and your relatives is still unknown.          Reclassification  Genetic testing laboratories and researchers are constantly working to determine the importance of variants of unknown significance.  Most laboratories will notify the genetic counselor when a patient s VUS has been reclassified as a harmful mutation or a benign polymorphism.      Some families may be candidates for participation in the  laboratory s variant research programs to help determine the importance of their VUS.  Only the testing laboratory can decide who is eligible for participation.  Participating in these programs does not guarantee that families will learn the significance of their VUS immediately.  It could be months or years before a VUS is reclassified.       Screening Recommendations  Cancer screening recommendations for patients with a VUS should be based on their personal and family history rather than the VUS itself.  This may include increased cancer screening for certain individuals in the family.  Your genetic counselor and health care provider can help make appropriate recommendations for you and your relatives.      It is usually not recommended that other relatives have genetic testing for the VUS unless it is done as part of the laboratory s variant research program because an individual s test results should not influence their cancer screening until we determine the importance of the VUS.  Your genetic counselor can help you and your relatives understand the risks and benefits of all genetic testing and cancer screening options.    Please call us if you have any questions or concerns.     Please call us if you have any questions or concerns.   Cancer Risk Management Program [phone]-[facility] ([phone])  ? Briana Nguyen, MS, [facility]  [phone]  ? Lana Werner, MS, [facility]  [phone]  ? Petrona Melendez, MS, [facility]  [phone]  ? Fran Sebastian, MS, [facility]  [phone]  ? Hortensia Martini, MS, [facility] [phone]

## 2018-08-22 NOTE — MR AVS SNAPSHOT
After Visit Summary   8/22/2018    Sherly Yeung    MRN: [id number]           Patient Information     Date Of Birth          1965        Visit Information        Provider Department      8/22/2018 1:15 PM Petrona Melendez GC;  [address] CONSULT [facility]        Today's Diagnoses     Family history of malignant neoplasm of ovary    -  1    Family history of malignant neoplasm of breast          Care Instructions        Genetic Testing  You had a blood test that looked at the genetic information in one or more genes associated with increased cancer risk.  The testing looked for any harmful changes that would stop this particular gene from working like it should.  It is important to remember that everyone has variants in multiple genes in their bodies that do not affect how the gene works.  These changes are benign and do not cause disease or increase cancer risk.  The term often used to describe these variants is benign polymorphism.  If an individual is found to have a benign polymorphism, their genetic test result is reported as Negative.    Results  There are three possible results of any genetic test:    Positive--a harmful mutation was identified    Negative--no mutation was identified    Variant of unknown significance--a variation in one of the genes was identified, but it is unclear how this impacts cancer risk in the family    Variant of Unknown Significance (VUS)  A VUS was identified in your blood sample.  Scientists currently do not know if this variant is harmful or if it is a benign polymorphism not associated with any increased risk of cancer.   There are several reasons for this lack of knowledge, but likely your VUS has either never been seen before or has only been seen in a small number of individuals.  Until your VUS is studied in more detail and/or seen in more families, scientists are not able to predict if it is a harmful mutation or a benign polymorphism.   Therefore, the cause of the cancer in you and your relatives is still unknown.          Reclassification  Genetic testing laboratories and researchers are constantly working to determine the importance of variants of unknown significance.  Most laboratories will notify the genetic counselor when a patient s VUS has been reclassified as a harmful mutation or a benign polymorphism.      Some families may be candidates for participation in the laboratory s variant research programs to help determine the importance of their VUS.  Only the testing laboratory can decide who is eligible for participation.  Participating in these programs does not guarantee that families will learn the significance of their VUS immediately.  It could be months or years before a VUS is reclassified.       Screening Recommendations  Cancer screening recommendations for patients with a VUS should be based on their personal and family history rather than the VUS itself.  This may include increased cancer screening for certain individuals in the family.  Your genetic counselor and health care provider can help make appropriate recommendations for you and your relatives.      It is usually not recommended that other relatives have genetic testing for the VUS unless it is done as part of the laboratory s variant research program because an individual s test results should not influence their cancer screening until we determine the importance of the VUS.  Your genetic counselor can help you and your relatives understand the risks and benefits of all genetic testing and cancer screening options.    Please call us if you have any questions or concerns.     Please call us if you have any questions or concerns.   Cancer Risk Management Program [phone]-[facility] ([phone])  ? Briana Nguyen, MS, [facility]  [phone]  ? Lana Werner, MS, [facility]  [phone]  ? Petrona Melendez, MS, [facility]  [phone]  ? Fran Sebastian, MS, [facility]  [phone]  ? Hortensia Martini MS,  [facility] [phone]            Follow-ups after your visit        Your next 10 appointments already scheduled     Aug 22, 2018  1:15 PM CDT   (Arrive by 1:00 PM)   RETURN WITH ROOM with Petrona Melendez GC,  [address] CONSULT [facility] ([facility])    [address]  [address]  [place] [address] [phone]              Who to contact     If you have questions or need follow up information about today's clinic visit or your schedule please contact [facility] directly at [phone].  Normal or non-critical lab and imaging results will be communicated to you by MyChart, letter or phone within 4 business days after the clinic has received the results. If you do not hear from us within 7 days, please contact the clinic through [employer] or phone. If you have a critical or abnormal lab result, we will notify you by phone as soon as possible.  Submit refill requests through [employer] or call your pharmacy and they will forward the refill request to us. Please allow 3 business days for your refill to be completed.          Additional Information About Your Visit        MyChart Information     [employer] gives you secure access to your electronic health record. If you see a primary care provider, you can also send messages to your care team and make appointments. If you have questions, please call your primary care clinic.  If you do not have a primary care provider, please call [phone] and they will assist you.        Care EveryWhere ID     This is your Care EveryWhere ID. This could be used by other organizations to access your [place] medical records  [id number]         Blood Pressure from Last 3 Encounters:   02/07/18 (!) 129/91   01/22/18 109/78   01/22/18 (!) 127/91    Weight from Last 3 Encounters:   01/22/18 53.3 kg (117 lb 6.4 oz)   01/22/18 52.8 kg (116 lb 6.4 oz)   12/05/17 51.3 kg (113 lb)              Today, you had the following      No orders found for display       Primary Care Provider Office Phone # Fax #    [facility] [phone]       [address] [place] [address]        Equal Access to Services     SUSANNA PITTMAN : Hadmirna laurel correa vanessao Soronaldali, waaxda luqadaha, qaybta kaalmada adeegyada, tuan koenig bernie crump. So [facility] [phone].    ATENCIÓN: Si habla español, tiene a randle disposición servicios gratuitos de asistencia lingüística. [facility] [phone].    We comply with applicable federal civil rights laws and [place] laws. We do not discriminate on the basis of race, color, national origin, age, disability, sex, sexual orientation, or gender identity.            Thank you!     Thank you for choosing [facility]  for your care. Our goal is always to provide you with excellent care. Hearing back from our patients is one way we can continue to improve our services. Please take a few minutes to complete the written survey that you may receive in the mail after your visit with us. Thank you!             Your Updated Medication List - Protect others around you: Learn how to safely use, store and throw away your medicines at www.disposemymeds.org.          This list is accurate as of 8/22/18 12:55 PM.  Always use your most recent med list.                   Brand Name Dispense Instructions for use Diagnosis    ABILIFY 20 MG tablet   Generic drug:  ARIPiprazole      Take 20 mg by mouth        aspirin 81 MG chewable tablet     36 tablet    Take 1 tablet (81 mg) by mouth daily    SVC syndrome       B COMPLEX 1 PO      Take 1 tablet by mouth daily.    Sternal pain, Disorder of bone and cartilage, unspecified, Fatigue, Anemia, unspecified       CYMBALTA PO      Take 120 mg by mouth daily        fondaparinux 7.5MG/0.6ML injection    ARIXTRA     Inject 7.5 mg Subcutaneous daily        HYDROmorphone 3 MG Suppository    DILAUDID     Place 3 mg rectally         LANsoprazole 30 MG CR capsule    PREVACID    90 capsule    Take 1 capsule (30 mg) by mouth 2 times daily    Gastroesophageal reflux disease without esophagitis, Subclavian vein thrombosis, right (H)       LORazepam 0.5 MG tablet    ATIVAN    30 tablet    Take 1 tablet by mouth every 6 hours as needed (for dizziness ).    Dizziness and giddiness       magnesium 250 MG tablet      Take 4 tablets by mouth daily    Iron deficiency anemia, unspecified       metoprolol succinate 25 MG 24 hr tablet    TOPROL-XL    90 tablet    Take 1 tablet (25 mg) by mouth daily    Right subclavian vein thrombosis (H), Sinus tachycardia, SVC syndrome       nortriptyline 50 MG capsule    PAMELOR     Take 50 mg by mouth At Bedtime.        oxyCODONE 10 MG 12 hr tablet    OxyCONTIN    30 tablet    Take 1 tablet (10 mg) by mouth every 12 hours    Subclavian vein thrombosis, right (H)       riboflavin 100 MG Tabs tablet    vitamin  B-2     Take 400 mg by mouth        RITALIN 20 MG tablet   Generic drug:  methylphenidate       Subclavian vein thrombosis, right (H), SVC syndrome, Sinus tachycardia       senna-docusate 8.6-50 MG per tablet    SENOKOT-S;PERICOLACE    60 tablet    Take 1-2 tablets by mouth 2 times daily.    Constipation       spironolactone 25 MG tablet    ALDACTONE     Take 25 mg by mouth daily        sulfamethoxazole-trimethoprim 800-160 MG per tablet    BACTRIM DS/SEPTRA DS    6 tablet    Take 1 tablet by mouth 2 times daily    Dysuria       SUMAtriptan Succinate 6 MG/0.5ML Kit      Inject 1 dose Subcutaneous at onset of headache. Indications: Migraine Headache        tolterodine 4 MG 24 hr capsule    DETROL LA    100 capsule    Take 1 capsule (4 mg) by mouth daily    Urge incontinence       TOPAMAX 25 MG tablet   Generic drug:  topiramate      Take 100-200 mg by mouth 2 times daily        vitamin D 1000 units capsule      Take 2 capsules by mouth daily.    Sternal pain, Disorder of bone and cartilage, unspecified, Fatigue,  Anemia, unspecified       Zinc 50 MG Caps      Take 1 tablet by mouth daily.    Sternal pain, Disorder of bone and cartilage, unspecified, Fatigue, Anemia, unspecified       ZUPLENZ 8 MG Film   Generic drug:  Ondansetron      Take 8 mg by mouth every 6 hours as needed.    Sternal pain, Disorder of bone and cartilage, unspecified, Fatigue, Anemia, unspecified

## 2018-08-29 ENCOUNTER — TELEPHONE (OUTPATIENT)
Dept: INTERNAL MEDICINE | Facility: CLINIC | Age: 53
End: 2018-08-29

## 2018-08-29 DIAGNOSIS — R30.0 DYSURIA: Primary | ICD-10-CM

## 2018-08-29 RX ORDER — SULFAMETHOXAZOLE/TRIMETHOPRIM 800-160 MG
1 TABLET ORAL 2 TIMES DAILY
Qty: 6 TABLET | Refills: 0 | Status: SHIPPED | OUTPATIENT
Start: 2018-08-29 | End: 2018-11-21

## 2018-08-29 NOTE — TELEPHONE ENCOUNTER
RN called patient and she states she has had urgency, frequency, burning on urination, and some odor to urine.  Denies blood in urine, fever, or back pain. Per UTI protocol will prescribe Bactrim DS for 3 days.    RX sent to [facility] in [place], [place].    Leslie Borrego RN on 8/29/2018 at 1:34 PM

## 2018-08-29 NOTE — TELEPHONE ENCOUNTER
[facility] Call Center    Phone Message    May a detailed message be left on voicemail: yes    Reason for Call: Other: Pt requesting a script for a UTI. Pt stated she has one and last time she just called the clinic and they sent a script to her pharmacy. Pt would like it sent to [facility] in [place]     Action Taken: Message routed to:  [facility] primary care

## 2018-09-16 LAB — LAB SCANNED RESULT: ABNORMAL

## 2018-09-20 DIAGNOSIS — R00.0 SINUS TACHYCARDIA: ICD-10-CM

## 2018-09-20 DIAGNOSIS — I87.1 SVC SYNDROME: ICD-10-CM

## 2018-09-20 DIAGNOSIS — I82.B11 RIGHT SUBCLAVIAN VEIN THROMBOSIS (H): ICD-10-CM

## 2018-09-23 RX ORDER — METOPROLOL SUCCINATE 25 MG/1
25 TABLET, EXTENDED RELEASE ORAL DAILY
Qty: 90 TABLET | Refills: 1 | Status: SHIPPED | OUTPATIENT
Start: 2018-09-23 | End: 2019-05-29

## 2018-11-21 ENCOUNTER — MYC MEDICAL ADVICE (OUTPATIENT)
Dept: OTHER | Age: 53
End: 2018-11-21

## 2018-11-21 DIAGNOSIS — R30.0 DYSURIA: ICD-10-CM

## 2018-11-21 RX ORDER — SULFAMETHOXAZOLE/TRIMETHOPRIM 800-160 MG
1 TABLET ORAL 2 TIMES DAILY
Qty: 6 TABLET | Refills: 0 | Status: SHIPPED | OUTPATIENT
Start: 2018-11-21 | End: 2018-11-21

## 2018-11-21 RX ORDER — SULFAMETHOXAZOLE/TRIMETHOPRIM 800-160 MG
1 TABLET ORAL 2 TIMES DAILY
Qty: 6 TABLET | Refills: 0 | Status: SHIPPED | OUTPATIENT
Start: 2018-11-21 | End: 2019-02-06

## 2019-02-06 ENCOUNTER — OFFICE VISIT (OUTPATIENT)
Dept: INTERNAL MEDICINE | Facility: CLINIC | Age: 54
End: 2019-02-06
Payer: MEDICARE

## 2019-02-06 VITALS
HEART RATE: 97 BPM | HEIGHT: 63 IN | DIASTOLIC BLOOD PRESSURE: 85 MMHG | WEIGHT: 114.4 LBS | BODY MASS INDEX: 20.27 KG/M2 | SYSTOLIC BLOOD PRESSURE: 120 MMHG | OXYGEN SATURATION: 100 %

## 2019-02-06 DIAGNOSIS — I82.B11 THROMBOSIS OF RIGHT SUBCLAVIAN VEIN (H): ICD-10-CM

## 2019-02-06 DIAGNOSIS — Z00.00 ENCOUNTER FOR ROUTINE ADULT HEALTH EXAMINATION WITHOUT ABNORMAL FINDINGS: Primary | ICD-10-CM

## 2019-02-06 DIAGNOSIS — Z12.31 ENCOUNTER FOR SCREENING MAMMOGRAM FOR BREAST CANCER: ICD-10-CM

## 2019-02-06 DIAGNOSIS — L30.9 ECZEMA, UNSPECIFIED TYPE: ICD-10-CM

## 2019-02-06 DIAGNOSIS — Z87.440 HISTORY OF RECURRENT UTIS: ICD-10-CM

## 2019-02-06 DIAGNOSIS — Z12.12 SCREENING FOR COLORECTAL CANCER: ICD-10-CM

## 2019-02-06 DIAGNOSIS — Z12.11 SCREENING FOR COLORECTAL CANCER: ICD-10-CM

## 2019-02-06 DIAGNOSIS — Z13.220 LIPID SCREENING: ICD-10-CM

## 2019-02-06 RX ORDER — TRIAMCINOLONE ACETONIDE 1 MG/G
CREAM TOPICAL 2 TIMES DAILY
Qty: 28.4 G | Refills: 1 | Status: SHIPPED | OUTPATIENT
Start: 2019-02-06 | End: 2019-10-14

## 2019-02-06 RX ORDER — SULFAMETHOXAZOLE AND TRIMETHOPRIM 400; 80 MG/1; MG/1
1 TABLET ORAL 2 TIMES DAILY
Qty: 6 TABLET | Refills: 3 | Status: SHIPPED | OUTPATIENT
Start: 2019-02-06 | End: 2019-09-28

## 2019-02-06 RX ORDER — TRIAMCINOLONE ACETONIDE 1 MG/G
CREAM TOPICAL AT BEDTIME
Qty: 15 G | Refills: 0 | Status: SHIPPED | OUTPATIENT
Start: 2019-02-06 | End: 2019-02-06

## 2019-02-06 ASSESSMENT — ENCOUNTER SYMPTOMS
ABDOMINAL PAIN: 1
DEPRESSION: 1
BLOATING: 0
JAUNDICE: 0
NERVOUS/ANXIOUS: 1
MEMORY LOSS: 0
RECTAL PAIN: 0
VOMITING: 1
HEADACHES: 1
HEARTBURN: 0
SPEECH CHANGE: 0
WEAKNESS: 0
HEMATURIA: 0
DISTURBANCES IN COORDINATION: 0
DIARRHEA: 0
NAUSEA: 1
SKIN CHANGES: 0
BLOOD IN STOOL: 0
DYSURIA: 1
POOR WOUND HEALING: 0
NAIL CHANGES: 0
NUMBNESS: 0
PANIC: 0
DIFFICULTY URINATING: 0
TINGLING: 0
SEIZURES: 0
INSOMNIA: 0
CONSTIPATION: 0
TREMORS: 0
FLANK PAIN: 0
PARALYSIS: 0
BOWEL INCONTINENCE: 0
DECREASED CONCENTRATION: 0
DIZZINESS: 0
LOSS OF CONSCIOUSNESS: 0

## 2019-02-06 ASSESSMENT — MIFFLIN-ST. JEOR: SCORE: 1085.1

## 2019-02-06 ASSESSMENT — PAIN SCALES - GENERAL: PAINLEVEL: NO PAIN (0)

## 2019-02-06 NOTE — PROGRESS NOTES
PRIMARY CARE CENTER      SUBJECTIVE:     Sherly Yeung is a 53 year old female with a PMHx of SVC syndrome, thoracic outlet syndrome, subclavian vein thrombosis/stenosis, and migraines presenting for annual physical exam.    Hospitalized in August 2018 for right subclavian veins.  Otherwise no hospitalizations or ER visits.      Continues to experience migraines that are not well controlled with current medication regimen.  Followed by [facility].  Significant nausea and vomiting associated with episodes.    Three episodes of urinary frequency, urgency, dysuria treated for UTI with three day courses of Bactrim with resolution in symptoms.      New onset rash on dorsal left hand associated with dry weather and increased handwashing in setting. Trains in handwashing as part of her new job.    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    Other concerns discussed today  1.  Raynaud's phenomena-- patient reports onset of discoloration of the bilateral hands with exposure to cold.  She denies any numbness or burning.  No symptoms in the feet.  2.  Right upper quadrant pain-- patient complains of right upper quadrant pain that is sharp, catching in nature with episodes lasting for approximately 30 minutes with resolution without intervention.  3.  Menopause--patient is perimenopausal with no menstrual periods for the last 11 months.  Followed by OB/GYN.   4.  Breast cancer screening-- annual breast cancer screening with mammography.  Patient with family history of breast cancer in first and second-degree relatives.  Recent genetic screening completed July 2018.  Genetic testing significant for variant of uncertain significance which is recommended not to be used in medical decision-making.    Routine health maintenance  Mammogram May 2018--normal mammography  Followed by OB/GYN for routine Pap smears     MEDICATIONS/ALLERGIES:     Medications and  allergies reviewed by me today.      ROS:     Constitutional, HEENT, cardiovascular, pulmonary, gi and gu systems are negative, except as otherwise noted.  Occasional swelling in right lower extremity worse than left   Answers for HPI/ROS submitted by the patient on 2/6/2019   General Symptoms: No  Skin Symptoms: Yes  HENT Symptoms: No  EYE SYMPTOMS: No  HEART SYMPTOMS: No  LUNG SYMPTOMS: No  INTESTINAL SYMPTOMS: Yes  URINARY SYMPTOMS: Yes  GYNECOLOGIC SYMPTOMS: No  BREAST SYMPTOMS: No  SKELETAL SYMPTOMS: No  BLOOD SYMPTOMS: No  NERVOUS SYSTEM SYMPTOMS: Yes  MENTAL HEALTH SYMPTOMS: Yes  Changes in hair: No  Changes in moles/birth marks: No  Itching: No  Rashes: No  Changes in nails: No  Acne: No  Hair in places you don't want it: No  Change in facial hair: No  Warts: No  Non-healing sores: No  Scarring: No  Flaking of skin: No  Color changes of hands/feet in cold : Yes  Sun sensitivity: No  Skin thickening: No  Heart burn or indigestion: No  Nausea: Yes  Vomiting: Yes  Abdominal pain: Yes  Bloating: No  Constipation: No  Diarrhea: No  Blood in stool: No  Black stools: No  Rectal or Anal pain: No  Fecal incontinence: No  Yellowing of skin or eyes: No  Vomit with blood: No  Change in stools: No  Trouble holding urine or incontinence: Yes  Pain or burning: Yes  Trouble starting or stopping: No  Increased frequency of urination: Yes  Blood in urine: No  Decreased frequency of urination: No  Frequent nighttime urination: No  Flank pain: No  Difficulty emptying bladder: No  Trouble with coordination: No  Dizziness or trouble with balance: No  Fainting or black-out spells: No  Memory loss: No  Headache: Yes  Seizures: No  Speech problems: No  Tingling: No  Tremor: No  Weakness: No  Difficulty walking: No  Paralysis: No  Numbness: No  Nervous or Anxious: Yes  Depression: Yes  Trouble sleeping: No  Trouble thinking or concentrating: No  Mood changes: No  Panic attacks: No   OBJECTIVE:     /85   Pulse 97   Ht 1.588 m  "(5' 2.5\")   Wt 51.9 kg (114 lb 6.4 oz)   SpO2 100%   BMI 20.59 kg/m     Wt Readings from Last 1 Encounters:   01/22/18 53.3 kg (117 lb 6.4 oz)       GENERAL APPEARANCE: healthy, alert and no distress     EYES: EOMI, PERRL      HENT: ear canals and TM's normal and mouth without ulcers or lesions     NECK: no asymmetry, masses, or scars and thyroid normal to palpation     RESP: lungs clear to auscultation bilaterally from the anterior- no rales, rhonchi or wheezes     CV: regular rate and rhythm, normal S1 S2, no S3 or S4 and no murmur, click or rub     ABDOMEN:  soft, nontender, no HSM or masses and bowel sounds normal     MS: Left upper extremity associated with prior degloving injury, Lower extremity edema of right greater than left     SKIN: 6-7 cm well-circumscribed erythematous and xerotic lesion of the dorsal aspect of the right left hand     NEURO: mentation intact and speech normal     PSYCH: Affect appropriate      ASSESSMENT/PLAN:     Sherly was seen today for physical.    Diagnoses and all orders for this visit:    # Encounter for routine adult health examination without abnormal findings    # History of recurrent UTIs  Multiple UTIs with urgency, frequency, dysuria for the past year.  Will prescribe Bactrim to be filled at onset of urinary symptoms.  -     sulfamethoxazole-trimethoprim (BACTRIM/SEPTRA) 400-80 MG tablet; Take 1 tablet by mouth 2 times daily for 3 days Take 1 tablet by mouth 2 times daily for 3days at onset of UTI symptoms    # Eczema, unspecified type  New onset multiple well-circumscribed eczematous rash over the left hand at site of graft.  Likely related to hypersensitivity in setting of increased handwashing.  Treat with topical triamcinolone.  -     triamcinolone (KENALOG) 0.1 % external cream; Apply topically 2 times daily Apply topically 2-3 times daily    # Screening for colorectal cancer  Annual colorectal cancer screening with FIT test.  Fit negative September 2017.  " Colonoscopy deferred in setting of chronic anticoagulation.  -     Fecal cancer screen FIT; Future    # History of thrombosis of right subclavian vein (H)  -     US Upper Extremity Venous Duplex Right; Standing    # Encounter for screening mammogram for breast cancer  -     Mammogram, routine screening; Future    # Lipid screening  Last lipid screen in 2016.  Cholesterol previously stable.  Patient not fasting this a.m.  Will defer lipid screening given low risk.       Pt should return to clinic for f/u with Dr. Mg in 1 year.     Options for treatment and follow-up care were reviewed with the patient. Sherly Yeung engaged in the decision making process and verbalized understanding of the options discussed and agreed with the final plan.    Tiara Cain MD  PGY-1, Internal Medicine  Feb 6, 2019    Pt was seen and plan of care discussed with Dr. Mg.   Pt was seen and examined with Dr. Cain.  I agree with her documentation as noted above.    My additional comments: None    Chadwick Mg

## 2019-02-06 NOTE — PATIENT INSTRUCTIONS
Primary Care Center Medication Refill Request Information:  * Please contact your pharmacy regarding ANY request for medication refills.  ** [facility] Prescription Fax = [phone]  * Please allow 3 business days for routine medication refills.  * Please allow 5 business days for controlled substance medication refills.     Primary Care Center Test Result notification information:  *You will be notified with in 7-10 days of your appointment day regarding the results of your test.  If you are on MyChart you will be notified as soon as the provider has reviewed the results and signed off on them.    Primary Care Center: [phone]     Breast Clinic [phone] ([facility], 2nd Floor N)    Radiology (Imaging Center) [phone] ([facility], 1st Floor S)

## 2019-02-06 NOTE — NURSING NOTE
Chief Complaint   Patient presents with     Physical     here for annual physical       MICKI Fregoso 8:33 AM on 2/6/2019.

## 2019-02-11 DIAGNOSIS — N39.41 URGE INCONTINENCE: ICD-10-CM

## 2019-02-12 RX ORDER — TOLTERODINE 4 MG/1
4 CAPSULE, EXTENDED RELEASE ORAL DAILY
Qty: 90 CAPSULE | Refills: 3 | Status: SHIPPED | OUTPATIENT
Start: 2019-02-12 | End: 2020-02-27

## 2019-02-15 ENCOUNTER — HEALTH MAINTENANCE LETTER (OUTPATIENT)
Age: 54
End: 2019-02-15

## 2019-02-25 ENCOUNTER — TRANSFERRED RECORDS (OUTPATIENT)
Dept: HEALTH INFORMATION MANAGEMENT | Facility: CLINIC | Age: 54
End: 2019-02-25

## 2019-04-10 NOTE — MR AVS SNAPSHOT
After Visit Summary   7/24/2017    Sherly Yeung    MRN: [id number]           Patient Information     Date Of Birth          1965        Visit Information        Provider Department      7/24/2017 8:00 AM Floyd Tong MD [facility]        Today's Diagnoses     Screen for colon cancer    -  1    Swelling of left lower extremity        Subclavian vein stenosis        Subclavian vein thrombosis, right (H)        Superior vena cava stenosis        SVC syndrome           Follow-ups after your visit        Future tests that were ordered for you today     Open Future Orders        Priority Expected Expires Ordered    Fecal colorectal cancer screen FIT - Future (S+30) Routine 8/14/2017 8/23/2017 7/24/2017     Lower Extremity Venous Duplex Left Routine  7/24/2018 7/24/2017            Who to contact     Please call your clinic at [phone] to:    Ask questions about your health    Make or cancel appointments    Discuss your medicines    Learn about your test results    Speak to your doctor   If you have compliments or concerns about an experience at your clinic, or if you wish to file a complaint, please contact [facility] Patient Relations at [phone] or email us at [email or web address]         Additional Information About Your Visit        MyChart Information     [employer] gives you secure access to your electronic health record. If you see a primary care provider, you can also send messages to your care team and make appointments. If you have questions, please call your primary care clinic.  If you do not have a primary care provider, please call [phone] and they will assist you.      [employer] is an electronic gateway that provides easy, online access to your medical records. With [employer], you can request a clinic appointment, read your test results, renew a prescription or communicate with your care team.     To access your existing  account, please contact your [facility] or call [phone] for assistance.        Care EveryWhere ID     This is your Care EveryWhere ID. This could be used by other organizations to access your [place] medical records  [id number]        Your Vitals Were     Pulse Respirations BMI (Body Mass Index)             98 20 19.86 kg/m2          Blood Pressure from Last 3 Encounters:   07/24/17 134/90   04/11/17 (!) 137/94   02/08/17 126/85    Weight from Last 3 Encounters:   07/24/17 50.8 kg (112 lb 1.6 oz)   04/11/17 50.2 kg (110 lb 11.2 oz)   02/08/17 50 kg (110 lb 3.2 oz)              We Performed the Following     US Upper Extremity Venous Duplex Right          Today's Medication Changes          These changes are accurate as of: 7/24/17  8:57 AM.  If you have any questions, ask your nurse or doctor.               Stop taking these medicines if you haven't already. Please contact your care team if you have questions.     PLAVIX PO   Stopped by:  Floyd Tong MD                    Primary Care Provider Office Phone # Fax #    Chadwick Kulwant Mg [phone]        [facility] [address]  [facility] [address]        Equal Access to Services     SUSANNA PITTMAN AH: Hadii laurel correa hadasho Soomaali, waaxda luqadaha, qaybta kaalmada adeegyada, tuan crump. So [facility] [phone].    ATENCIÓN: Si habla español, tiene a randle disposición servicios gratAlbuquerque Indian Health Centeros de asistencia lingüística. Llame al [phone].    We comply with applicable federal civil rights laws and [place] laws. We do not discriminate on the basis of race, color, national origin, age, disability sex, sexual orientation or gender identity.            Thank you!     Thank you for choosing [facility]  for your care. Our goal is always to provide you with excellent care. Hearing back from our patients is one way we can continue to improve our services.  Please take a few minutes to complete the written survey that you may receive in the mail after your visit with us. Thank you!             Your Updated Medication List - Protect others around you: Learn how to safely use, store and throw away your medicines at www.disposemymeds.org.          This list is accurate as of: 7/24/17  8:57 AM.  Always use your most recent med list.                   Brand Name Dispense Instructions for use Diagnosis    ABILIFY 20 MG tablet   Generic drug:  ARIPiprazole      Take 20 mg by mouth        aspirin 81 MG chewable tablet      81 mg        B COMPLEX 1 PO      Take 1 tablet by mouth daily.    Sternal pain, Disorder of bone and cartilage, unspecified, Fatigue, Anemia, unspecified       CYMBALTA PO      Take 120 mg by mouth daily        DITROPAN XL 5 MG 24 hr tablet   Generic drug:  oxybutynin      Take 5 mg by mouth        doxycycline 100 MG capsule    VIBRAMYCIN     Take 1 capsule (100 mg) by mouth every 12 hours    Rosacea       fondaparinux 7.5MG/0.6ML injection    ARIXTRA     Inject 7.5 mg Subcutaneous daily        HYDROmorphone 3 MG Suppository    DILAUDID     Place 3 mg rectally        LANsoprazole 30 MG CR capsule    PREVACID    90 capsule    Take 1 capsule (30 mg) by mouth daily    Gastroesophageal reflux disease without esophagitis, Subclavian vein thrombosis, right (H)       LORazepam 0.5 MG tablet    ATIVAN    30 tablet    Take 1 tablet by mouth every 6 hours as needed (for dizziness ).    Dizziness and giddiness       magnesium 250 MG tablet      Take 4 tablets by mouth daily    Iron deficiency anemia, unspecified       metoprolol 25 MG 24 hr tablet    TOPROL-XL    90 tablet    Take 1 tablet (25 mg) by mouth daily    Right subclavian vein thrombosis (H), Sinus tachycardia, SVC syndrome       nortriptyline 50 MG capsule    PAMELOR     Take 50 mg by mouth At Bedtime.        oxyCODONE 10 MG 12 hr tablet    OxyCONTIN    30 tablet    Take 1 tablet (10 mg) by mouth every 12  hours    Subclavian vein thrombosis, right (H)       predniSONE 10 MG tablet    DELTASONE    42 tablet    Take 1 tablet (10 mg) by mouth daily Take 4 tabs (40 mg) orally daily for 4 days, 3 tabs (30 mg) orally daily for 4 days, 2 tabs (20 mg) orally daily for 4 days, 1 tab (10mg) orally daily for 4 days, then 1/2 tab (5 mg) orally for 4 days.    Migraine headache       riboflavin 100 MG Tabs tablet    vitamin  B-2     Take 400 mg by mouth        RITALIN 20 MG tablet   Generic drug:  methylphenidate       Subclavian vein thrombosis, right (H), SVC syndrome, Sinus tachycardia       senna-docusate 8.6-50 MG per tablet    SENOKOT-S;PERICOLACE    60 tablet    Take 1-2 tablets by mouth 2 times daily.    Constipation       SUMAtriptan Succinate 6 MG/0.5ML Kit      Inject 1 dose Subcutaneous at onset of headache. Indications: Migraine Headache        TOPAMAX 25 MG tablet   Generic drug:  topiramate      Take 100-200 mg by mouth 2 times daily        vitamin D 1000 UNITS capsule      Take 2 capsules by mouth daily.    Sternal pain, Disorder of bone and cartilage, unspecified, Fatigue, Anemia, unspecified       Zinc 50 MG Caps      Take 1 tablet by mouth daily.    Sternal pain, Disorder of bone and cartilage, unspecified, Fatigue, Anemia, unspecified       ZUPLENZ 8 MG Film   Generic drug:  Ondansetron      Take 8 mg by mouth every 6 hours as needed.    Sternal pain, Disorder of bone and cartilage, unspecified, Fatigue, Anemia, unspecified          unable to assess

## 2019-05-10 ENCOUNTER — ANCILLARY PROCEDURE (OUTPATIENT)
Dept: MAMMOGRAPHY | Facility: CLINIC | Age: 54
End: 2019-05-10
Attending: INTERNAL MEDICINE
Payer: MEDICARE

## 2019-05-10 DIAGNOSIS — Z12.31 ENCOUNTER FOR SCREENING MAMMOGRAM FOR BREAST CANCER: ICD-10-CM

## 2019-05-29 ENCOUNTER — MYC REFILL (OUTPATIENT)
Dept: INTERNAL MEDICINE | Facility: CLINIC | Age: 54
End: 2019-05-29

## 2019-05-29 DIAGNOSIS — I82.B11 RIGHT SUBCLAVIAN VEIN THROMBOSIS (H): ICD-10-CM

## 2019-05-29 DIAGNOSIS — I87.1 SVC SYNDROME: ICD-10-CM

## 2019-05-29 DIAGNOSIS — R00.0 SINUS TACHYCARDIA: ICD-10-CM

## 2019-05-29 RX ORDER — METOPROLOL SUCCINATE 25 MG/1
25 TABLET, EXTENDED RELEASE ORAL DAILY
Qty: 90 TABLET | Refills: 2 | Status: SHIPPED | OUTPATIENT
Start: 2019-05-29 | End: 2020-03-03

## 2019-05-31 ENCOUNTER — TRANSFERRED RECORDS (OUTPATIENT)
Dept: HEALTH INFORMATION MANAGEMENT | Facility: CLINIC | Age: 54
End: 2019-05-31

## 2019-07-19 ENCOUNTER — TRANSFERRED RECORDS (OUTPATIENT)
Dept: HEALTH INFORMATION MANAGEMENT | Facility: CLINIC | Age: 54
End: 2019-07-19

## 2019-08-05 ENCOUNTER — TRANSFERRED RECORDS (OUTPATIENT)
Dept: HEALTH INFORMATION MANAGEMENT | Facility: CLINIC | Age: 54
End: 2019-08-05

## 2019-08-09 ENCOUNTER — TELEPHONE (OUTPATIENT)
Dept: INTERNAL MEDICINE | Facility: CLINIC | Age: 54
End: 2019-08-09

## 2019-08-09 ENCOUNTER — OFFICE VISIT (OUTPATIENT)
Dept: URGENT CARE | Facility: URGENT CARE | Age: 54
End: 2019-08-09
Payer: MEDICARE

## 2019-08-09 ENCOUNTER — TRANSFERRED RECORDS (OUTPATIENT)
Dept: HEALTH INFORMATION MANAGEMENT | Facility: CLINIC | Age: 54
End: 2019-08-09

## 2019-08-09 VITALS
OXYGEN SATURATION: 97 % | RESPIRATION RATE: 20 BRPM | SYSTOLIC BLOOD PRESSURE: 130 MMHG | HEART RATE: 83 BPM | BODY MASS INDEX: 20.52 KG/M2 | TEMPERATURE: 98 F | DIASTOLIC BLOOD PRESSURE: 82 MMHG | WEIGHT: 114 LBS

## 2019-08-09 DIAGNOSIS — K92.2 GASTROINTESTINAL HEMORRHAGE, UNSPECIFIED GASTROINTESTINAL HEMORRHAGE TYPE: Primary | ICD-10-CM

## 2019-08-09 DIAGNOSIS — Z79.01 CHRONIC ANTICOAGULATION: ICD-10-CM

## 2019-08-09 NOTE — TELEPHONE ENCOUNTER
Message left for patient. Recommendation for evaluation. Requested callback if she has not gone in for evaluation already.    Aleah Melton RN

## 2019-08-09 NOTE — PROGRESS NOTES
S: patient here with ?rectal bleeding, dark purplish blood, occurred this morning twice.  Currently on Eliquis due to chronic thromboembolism.  Patient denies any pain currently.  No vomiting, no diarrhea.  Patient states that had 1 episode before but was very light and never sought treatment    O: /82   Pulse 83   Temp 98  F (36.7  C)   Resp 20   Wt 51.7 kg (114 lb)   SpO2 97%   BMI 20.52 kg/m    Gen: alert, NAD  Psych: affect bright    A/P: GI bleed, on Eliquis.  Discuss with patient that due to blood thinner that further evaluation is needed in ER setting to assess for bleed.  Patient's vital is stable, will go to [facility] in [facility] ER for further evaluation via private car.    No charge for visit.  Luke Bhatia MD  August 9, 2019 4:33 PM

## 2019-08-09 NOTE — TELEPHONE ENCOUNTER
[facility] Call Center    Phone Message    May a detailed message be left on voicemail: yes    Reason for Call: Symptoms or Concerns     If patient has red-flag symptoms, warm transfer to triage line    Current symptom or concern: Per Pt is wanting to let Dr. Mg know that Pt is on blood thinner and Pt has passed a large amount of old blood in stool this morning, 8/9/2019     Symptoms have been present for:  1 day(s)    Has patient previously been seen for this? No    By Chadwick Mg    Date: 8/9/2019    Are there any new or worsening symptoms? Yes: Blood in stool      Action Taken: Message routed to:  [facility] [place]

## 2019-08-09 NOTE — TELEPHONE ENCOUNTER
"I received a callback from Marcia today. She reported 2 episodes of dark blood in her stool. She reported a large amount this morning as well as a smaller clot (\"the size of an egg yolk\") that was passed around 11am. She denies dizziness/lightheadedness, abdominal pain, chest pain, nausea, shortness of breath. She is currently taking eliquis. Last CBC from 2018.    We discussed follow up in clinic regarding new episode of bleeding. Recommended ED/911 for any new or worsening symptoms. No appointments available in clinic, so Marcia will be evaluated at her local urgent care.    Aleah Melton RN  "

## 2019-08-13 ENCOUNTER — TELEPHONE (OUTPATIENT)
Dept: GASTROENTEROLOGY | Facility: CLINIC | Age: 54
End: 2019-08-13

## 2019-08-13 ENCOUNTER — TELEPHONE (OUTPATIENT)
Dept: INTERNAL MEDICINE | Facility: CLINIC | Age: 54
End: 2019-08-13

## 2019-08-13 DIAGNOSIS — K62.5 RECTAL BLEEDING: Primary | ICD-10-CM

## 2019-08-13 NOTE — TELEPHONE ENCOUNTER
Email:  Dear Dr. Mg,    I was seen in the ER in [place](referred there from Urgent care) on Friday, Aug.9 for bleeding from the rectum.  I passed a large amount of dark maroon blood with a stool and then 2 hours later passed an egg yolk sized blood clot, again dark maroon.  Vitals were stable, blood work unremarkable, but they found diverticulosis on CT scan and figured that that's where the bleeding came from.  I'd had no further bleeding since 11am that morning.  They wanted to keep me overnight, but I convinced them to discharge me.  By the way, I just had a clean out procedure with Dr. Villarreal on July 19 and back in February he took me off of Arixtra and put me on Eliquis 5mg bid, which I'm still on.  Anyway, the recommendations were for me to see a GI Dr. and have a colonoscopy.  I tried to explain how I'm between a rock and a hard place for getting a colonoscopy.  I see you on September 9...should I see you sooner or what?  Sorry for the long winded email.  Let me know your thoughts.  Thanks,  Marcia Yeung    Called her and left a message that I will refer for the colonoscopy if she is having any additional bleeding she needs to be seen immediately otherwise we will assess as scheduled on September 9.  DAVID    She called back concerned about going off the Eliquis in light of her other issues.  She has Lovenox on hand and we will bridge with Lovenox.  Plan to stop the Eliquis 2 days prior to colonoscopy and bridge with 75 mg of Lovenox daily for 2 days prior to the colonoscopy and then hope to probably restart the Eliquis.  DAVID

## 2019-09-09 ENCOUNTER — OFFICE VISIT (OUTPATIENT)
Dept: INTERNAL MEDICINE | Facility: CLINIC | Age: 54
End: 2019-09-09
Payer: MEDICARE

## 2019-09-09 VITALS
SYSTOLIC BLOOD PRESSURE: 95 MMHG | BODY MASS INDEX: 22.3 KG/M2 | WEIGHT: 123.9 LBS | DIASTOLIC BLOOD PRESSURE: 68 MMHG | HEART RATE: 90 BPM | OXYGEN SATURATION: 95 %

## 2019-09-09 DIAGNOSIS — I82.B11 SUBCLAVIAN VEIN THROMBOSIS, RIGHT (H): Primary | ICD-10-CM

## 2019-09-09 DIAGNOSIS — R10.13 EPIGASTRIC PAIN: ICD-10-CM

## 2019-09-09 DIAGNOSIS — Z00.00 ROUTINE HEALTH MAINTENANCE: ICD-10-CM

## 2019-09-09 ASSESSMENT — PATIENT HEALTH QUESTIONNAIRE - PHQ9: SUM OF ALL RESPONSES TO PHQ QUESTIONS 1-9: 2

## 2019-09-09 ASSESSMENT — PAIN SCALES - GENERAL: PAINLEVEL: NO PAIN (0)

## 2019-09-09 NOTE — NURSING NOTE
Chief Complaint   Patient presents with     Hospital F/U     Pt here to follow up on two separate hospital visits: GI bleeding and subclavian clots      Letha Stinson EMT at 4:54 PM sign on 9/9/2019
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
## 2019-09-16 ENCOUNTER — TELEPHONE (OUTPATIENT)
Dept: ONCOLOGY | Facility: CLINIC | Age: 54
End: 2019-09-16

## 2019-09-18 DIAGNOSIS — I87.1 SVC SYNDROME: Primary | ICD-10-CM

## 2019-09-19 ENCOUNTER — TELEPHONE (OUTPATIENT)
Dept: GASTROENTEROLOGY | Facility: CLINIC | Age: 54
End: 2019-09-19

## 2019-09-19 NOTE — TELEPHONE ENCOUNTER
"Per staff message, [facility] anesthesia is requesting patient be rescheduled to [address] due to \"multiple co-morbidities.\"  Called patient and left a message informing her of this.  Provided direct number to reschedule.  "

## 2019-09-20 ENCOUNTER — TELEPHONE (OUTPATIENT)
Dept: INTERNAL MEDICINE | Facility: CLINIC | Age: 54
End: 2019-09-20

## 2019-09-20 RX ORDER — ASPIRIN 81 MG/1
81 TABLET, CHEWABLE ORAL DAILY
Qty: 36 TABLET | Refills: 9 | Status: SHIPPED | OUTPATIENT
Start: 2019-09-20 | End: 2020-10-23

## 2019-09-20 NOTE — TELEPHONE ENCOUNTER
[facility] Call Center    Phone Message    May a detailed message be left on voicemail: yes    Reason for Call: Other: Per Pt is wnating to get a call back in regards to knowing if the Physical that was done by Chadwick Royal would work as a Pre-op Pt states she is wanting to get a Pre-op done by Dr. Mg but cannot wait until 11/14/2019 after the surgery date. Pt is wanting to get a call back.  Pt state she is also wanting to know if that is not an option if Pt can be seen sooner for a pre-op with Chadwick Royal     Action Taken: Message routed to:  [facility] ([facility] [place]akeem

## 2019-09-20 NOTE — TELEPHONE ENCOUNTER
Spoke to patient to schedule her for a pre op, as she was just in on 9/9/19 for a hospital follow up and she would be out of the 30 days window for the pre-op and also it would not be able to be used as it was for ER follow up. Appointment was scheduled for pre-op. Marleny Johnson LPN 9/20/2019 11:54 AM

## 2019-09-25 ENCOUNTER — TELEPHONE (OUTPATIENT)
Dept: ONCOLOGY | Facility: CLINIC | Age: 54
End: 2019-09-25

## 2019-09-25 NOTE — TELEPHONE ENCOUNTER
9/25/2019    Presenting Information:  I spoke to Sherly Yeung by phone today to discuss her genetic testing results.  Her blood was originally drawn on 7/27/2018, in which O[employer] testing was ordered  from [facility] [employer]. This testing was done because of Sherly's family history of breast and ovarian cancer.  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    Recently [employer]ul [employer]ing. contacted me with a new report (dated 9/16/2019). 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  This means that the RAD51C variant found in Sherly is most likely NOT associated with an increased risk for cancer.  Sherly should continue with cancer screening based on personal medical and family history, as previously discussed.  She will be notified if there are updates regarding the classification of the remaining PEPE variant, and is encouraged to follow up annually. If there are any further questions or concerns, she should not hesitate to contact me at [phone].    Petrona Melendez MS, [facility]  Licensed Genetic Counselor  [phone]

## 2019-09-26 DIAGNOSIS — Z87.440 HISTORY OF RECURRENT UTIS: ICD-10-CM

## 2019-09-28 NOTE — TELEPHONE ENCOUNTER
sulfamethoxazole-trimethoprim (BACTRIM/SEPTRA) 400-80 MG tablet    Last Written Prescription Date: 2/6/19  Last Fill Quantity:6,   # refills: 3  Last Office Visit : 2/6/19  Future Office visit: none      Routing refill request to provider for review/approval because: med end date 2/9/19.   Not on protocol for assc dx.

## 2019-10-02 RX ORDER — SULFAMETHOXAZOLE AND TRIMETHOPRIM 400; 80 MG/1; MG/1
1 TABLET ORAL 2 TIMES DAILY
Qty: 6 TABLET | Refills: 0 | Status: SHIPPED | OUTPATIENT
Start: 2019-10-02 | End: 2019-12-09

## 2019-10-03 ENCOUNTER — TELEPHONE (OUTPATIENT)
Dept: GASTROENTEROLOGY | Facility: CLINIC | Age: 54
End: 2019-10-03

## 2019-10-08 ENCOUNTER — PRE VISIT (OUTPATIENT)
Dept: SURGERY | Facility: CLINIC | Age: 54
End: 2019-10-08

## 2019-10-08 PROBLEM — L70.8 OTHER ACNE: Status: ACTIVE | Noted: 2019-07-19

## 2019-10-08 PROBLEM — F50.00 ANOREXIA NERVOSA (H): Status: ACTIVE | Noted: 2019-07-19

## 2019-10-08 PROBLEM — Q67.6 PECTUS EXCAVATUM: Status: ACTIVE | Noted: 2019-07-19

## 2019-10-08 PROBLEM — K92.2 INTESTINAL BLEEDING: Status: ACTIVE | Noted: 2019-08-09

## 2019-10-08 PROBLEM — K21.9 GASTROESOPHAGEAL REFLUX DISEASE: Status: ACTIVE | Noted: 2019-02-15

## 2019-10-08 PROBLEM — R94.31 PROLONGED QT INTERVAL: Status: ACTIVE | Noted: 2019-07-20

## 2019-10-08 PROBLEM — R50.9 FEVER: Status: ACTIVE | Noted: 2019-10-08

## 2019-10-08 PROBLEM — E55.9 VITAMIN D DEFICIENCY: Status: ACTIVE | Noted: 2019-07-19

## 2019-10-08 PROBLEM — N60.19 DIFFUSE CYSTIC MASTOPATHY: Status: ACTIVE | Noted: 2019-10-08

## 2019-10-08 PROBLEM — F60.9 PERSONALITY DISORDER (H): Status: ACTIVE | Noted: 2019-07-19

## 2019-10-08 PROBLEM — F33.9 MAJOR DEPRESSIVE DISORDER, RECURRENT EPISODE (H): Status: ACTIVE | Noted: 2019-07-19

## 2019-10-08 PROBLEM — Z85.828 HISTORY OF BASAL CELL CARCINOMA: Status: ACTIVE | Noted: 2018-06-05

## 2019-10-08 PROBLEM — G56.00 CARPAL TUNNEL SYNDROME: Status: ACTIVE | Noted: 2019-07-19

## 2019-10-08 PROBLEM — S06.5XAA SUBDURAL HEMATOMA (H): Status: ACTIVE | Noted: 2019-10-08

## 2019-10-08 PROBLEM — F33.42 RECURRENT MAJOR DEPRESSIVE DISORDER, IN FULL REMISSION (H): Status: ACTIVE | Noted: 2017-01-31

## 2019-10-08 NOTE — TELEPHONE ENCOUNTER
FUTURE VISIT INFORMATION      SURGERY INFORMATION:    Date: 10/17/19    Location:  GI    Surgeon:  Kerwin Browne    Anesthesia Type:  MAC    RECORDS REQUESTED FROM:       [facility]    Pertinent Medical History: Subclavian vein thrombosis, SVC syndrome    Most recent EKG+ Tracin19- [place]- requested tracing- received and sent to scanning    Most recent ECHO: 14    Most recent PFT's: 2010

## 2019-10-11 ENCOUNTER — TELEPHONE (OUTPATIENT)
Dept: GASTROENTEROLOGY | Facility: CLINIC | Age: 54
End: 2019-10-11

## 2019-10-11 DIAGNOSIS — K62.5 RECTAL BLEEDING: Primary | ICD-10-CM

## 2019-10-11 RX ORDER — BISACODYL 5 MG/1
10 TABLET, DELAYED RELEASE ORAL DAILY
Qty: 4 TABLET | Refills: 0 | Status: SHIPPED | OUTPATIENT
Start: 2019-10-11 | End: 2019-10-13

## 2019-10-11 NOTE — TELEPHONE ENCOUNTER
Patient Name: Sherly Yeung   : 1965  MRN: [id number]       : [x] N/A         Patient scheduled for:  [x] EGD  [x] Colonoscopy     Indication for procedure.  [x] Rectal bleeding    Sedation Type:  [x] MAC       Procedure Provider:  Yuan      Referring Provider. Chadwick Mg    Arrival time verified: Thurs / 10.17.19 / 0730    Facility location verified:   [x][facility] - [address], [address]st Floor, [address]    Pt meets medical necessity for outpatient procedure in hospital Endoscopy Unit:     [x] N/A for this Payor (non-BCBS)      Prep Type:   [x]2-day Golytely eRx: [id number] - [place], [place] - [address]  [x]NPO /p Golytely    Anticoagulants or blood thinners:  [x] ASA 81mg  - may continue     [x] Eliquis +_ Lovenox  Eliquis LAST dose: Sun 10/13/19 - Lovenox  LAST dose Wed 10/16/19  INR: N/A    Electronic implanted devices: [x] No      H&P / Pre op physical completed: [x] Scheduled, Date 10/14/19 PAC     Additional Information: Questions answered from previous VM    _______________________________________________      Instructions given: [x] Rec'd & Read   [x] Reviewed         Pre procedure teaching completed: [x] Yes - Reviewed    [x] No questions regarding Sedation as ordered    Transportation from procedure & responsible adult to be with patient following procedure for a minimum of 6 hrs (Conscious Sedation) 24 hrs (MAC): [x] Yes Herminio Booth - confirmed will have post-procedure companionship as required    Flakita Black, RN, RN  [facility]

## 2019-10-14 ENCOUNTER — ANESTHESIA EVENT (OUTPATIENT)
Dept: SURGERY | Facility: CLINIC | Age: 54
End: 2019-10-14

## 2019-10-14 ENCOUNTER — OFFICE VISIT (OUTPATIENT)
Dept: SURGERY | Facility: CLINIC | Age: 54
End: 2019-10-14
Payer: MEDICARE

## 2019-10-14 VITALS
HEART RATE: 88 BPM | DIASTOLIC BLOOD PRESSURE: 80 MMHG | RESPIRATION RATE: 19 BRPM | OXYGEN SATURATION: 100 % | TEMPERATURE: 98.4 F | BODY MASS INDEX: 22.55 KG/M2 | SYSTOLIC BLOOD PRESSURE: 114 MMHG | WEIGHT: 127.3 LBS | HEIGHT: 63 IN

## 2019-10-14 DIAGNOSIS — Z01.818 PREOP EXAMINATION: Primary | ICD-10-CM

## 2019-10-14 RX ORDER — OXYCODONE HYDROCHLORIDE 5 MG/1
5-10 CAPSULE ORAL EVERY 4 HOURS PRN
Status: ON HOLD | COMMUNITY
End: 2022-02-17

## 2019-10-14 RX ORDER — TOPIRAMATE 100 MG/1
TABLET, FILM COATED ORAL
Status: ON HOLD | COMMUNITY
End: 2022-01-12

## 2019-10-14 ASSESSMENT — MIFFLIN-ST. JEOR: SCORE: 1151.56

## 2019-10-14 ASSESSMENT — PAIN SCALES - GENERAL: PAINLEVEL: NO PAIN (0)

## 2019-10-14 NOTE — PROGRESS NOTES
Preoperative Assessment Center medication history for October 14, 2019 is complete.    See Epic admission navigator for prior to admission medications.   Operating room staff will still need to confirm medications and last dose information on day of surgery.     Medication history interview sources:  patient, payer info    Changes made to PTA medication list (reason)  Added: oxycodone, emgality.   Deleted: kenalog  Changed: oxyContin, ritalin dose/sig, vit B2, topamax    Additional medication history information (including reliability of information, actions taken by pharmacist):  -- Patient is on multiple Opioids for her migraine headaches. Oral morphine equivalents/day <60 mg OME  -- Did take a course of bactrim for UTI w/in the past 30 days.   -- No recent (within 30 days) course of steroids  -- No recent (within 30 days) chronic daily medications stopped   -- Patient declines being on any other prescription or over-the-counter medications    Prior to Admission medications    Medication Sig Last Dose Taking? Auth Provider   ARIPiprazole (ABILIFY PO) Take 30 mg by mouth every evening Taking Yes Unknown, Entered By History   aspirin (EQL ASPIRIN LOW DOSE) 81 MG chewable tablet Take 1 tablet (81 mg) by mouth daily  Patient taking differently: Take 81 mg by mouth every evening  Taking Yes Chadwick Mg MD   B Complex Vitamins (B COMPLEX 1 PO) Take 1 tablet by mouth daily. Taking Yes Reported, Patient   Cholecalciferol (VITAMIN D) 1000 UNITS capsule Take 2,000 Units by mouth daily  Taking Yes Reported, Patient   DULoxetine HCl (CYMBALTA PO) Take 120 mg by mouth every evening  Taking Yes Unknown, Entered By History   enoxaparin (LOVENOX) 80 MG/0.8ML syringe Take 1 syringe/day for 3 days prior to colonoscopy when not taking apixaban Taking Yes Chadwick Mg MD   galcanezumab-gnlm (EMGALITY) 120 MG/ML injection Inject 120 mg Subcutaneous every 28 days Taking Yes Unknown, Entered By History    HYDROmorphone (DILAUDID) 3 MG Suppository Place 3 mg rectally every 8 hours as needed  Taking Yes Reported, Patient   LANsoprazole (PREVACID) 30 MG CR capsule Take 1 capsule (30 mg) by mouth 2 times daily Taking Yes Chadwick Mg MD   LORazepam (ATIVAN) 0.5 MG tablet Take 1 tablet by mouth every 6 hours as needed (for dizziness ). Taking Yes Chadwick Mg MD   magnesium 250 MG tablet Take 1 tablet by mouth 4 times daily  Taking Yes Reported, Patient   methylphenidate (RITALIN) 20 MG tablet Take 60 mg by mouth every morning  Taking Yes Reported, Patient   metoprolol succinate ER (TOPROL-XL) 25 MG 24 hr tablet Take 1 tablet (25 mg) by mouth daily  Patient taking differently: Take 25 mg by mouth every evening  Taking Yes Chadwick Mg MD   Ondansetron (ZUPLENZ) 8 MG FILM Take 8 mg by mouth every 6 hours as needed. Taking Yes Reported, Patient   oxyCODONE (OXY-IR) 5 MG capsule Take 5-10 mg by mouth every 4 hours as needed (severe chronic migraine) Taking Yes Unknown, Entered By History   oxyCODONE (OXYCONTIN) 10 MG 12 hr tablet Take 1 tablet (10 mg) by mouth every 12 hours  Patient taking differently: Take 10 mg by mouth every 12 hours as needed  Taking Yes Chadwick Mg MD   riboflavin (VITAMIN  B-2) 100 MG TABS tablet Take 400 mg by mouth daily  Taking Yes Reported, Patient   senna-docusate (SENOKOT-S;PERICOLACE) 8.6-50 MG per tablet Take 1-2 tablets by mouth 2 times daily.  Patient taking differently: Take 1-2 tablets by mouth 2 times daily as needed  Taking Yes Olivia Fischer PA-C   spironolactone (ALDACTONE) 25 MG tablet Take 25 mg by mouth every evening  Taking Yes Reported, Patient   SUMAtriptan Succinate 6 MG/0.5ML KIT Inject 1 dose Subcutaneous at onset of headache. Indications: Migraine Headache Taking Yes Reported, Patient   tolterodine ER (DETROL LA) 4 MG 24 hr capsule Take 1 capsule (4 mg) by mouth daily Taking Yes Chadwick Mg MD    topiramate (TOPAMAX) 100 MG tablet Take 100 mg by mouth in the morning and take 200 mg by mouth in the evening. Taking Yes Unknown, Entered By History   Zinc 50 MG CAPS Take 1 tablet by mouth daily. Taking Yes Reported, Patient   apixaban ANTICOAGULANT (ELIQUIS) 5 MG tablet Take 5 mg by mouth 2 times daily Managed by Kenneth Villarreal per patient. Not Taking  Reported, Patient   sulfamethoxazole-trimethoprim (BACTRIM/SEPTRA) 400-80 MG tablet Take 1 tablet by mouth 2 times daily Take 1 tablet by mouth 2 times daily for 3days at onset of UTI symptoms  Patient not taking: Reported on 10/14/2019 Not Taking  Carolyn Holguin MD          Medication history completed by: Wyatt Ward RP

## 2019-10-14 NOTE — PHARMACY - PREOPERATIVE ASSESSMENT CENTER
Anticoagulation Note - Preoperative Assessment Center (PAC) Pharmacist     Patient seen and interviewed during time of PAC Clinic appointment October 14, 2019.  The purpose of this note is to document the perioperative anticoagulation plan outlined by the providers caring for Sherly Yeung.     Current Regimen  Anticoagulation Regimen as of October 14, 2019: apixaban (Eliquis) 5 mg PO BID   Indication: subclavian syndrome with stenosis and thrombosis  Prescriber:  Dr. Villarreal   Expected Duration of therapy: indefinite  Current medications that may interact with this include: aspirin    Perioperative plan  Sherly Yeung is scheduled for EGD and colonoscopy on 10/17/19 with Dr. Yuan Browne and the perioperative anticoagulation plan is outlined by Dr. Mg in his 9/9/19 note. Plan is to hold apixaban x3 days pre op and bridge with lovenox 80 mg subcutaneous daily in the AM (last dose of enoxaparin early AM on 10/16/19).  Plan to resume apixaban immediately after colonoscopy.     Wyatt Ward RPH  October 14, 2019  9:42 AM
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
## 2019-10-17 ENCOUNTER — ANESTHESIA EVENT (OUTPATIENT)
Dept: GASTROENTEROLOGY | Facility: CLINIC | Age: 54
End: 2019-10-17
Payer: MEDICARE

## 2019-10-17 ENCOUNTER — ANESTHESIA (OUTPATIENT)
Dept: GASTROENTEROLOGY | Facility: CLINIC | Age: 54
End: 2019-10-17
Payer: MEDICARE

## 2019-10-17 ENCOUNTER — HOSPITAL ENCOUNTER (OUTPATIENT)
Facility: CLINIC | Age: 54
Discharge: HOME | End: 2019-10-17
Attending: INTERNAL MEDICINE | Admitting: INTERNAL MEDICINE
Payer: MEDICARE

## 2019-10-17 VITALS
SYSTOLIC BLOOD PRESSURE: 123 MMHG | HEART RATE: 85 BPM | OXYGEN SATURATION: 100 % | DIASTOLIC BLOOD PRESSURE: 90 MMHG | RESPIRATION RATE: 9 BRPM

## 2019-10-17 LAB
COLONOSCOPY: NORMAL
UPPER GI ENDOSCOPY: NORMAL

## 2019-10-17 PROCEDURE — 25000125 ZZHC RX 250: Performed by: NURSE ANESTHETIST, CERTIFIED REGISTERED

## 2019-10-17 PROCEDURE — 45378 DIAGNOSTIC COLONOSCOPY: CPT | Performed by: INTERNAL MEDICINE

## 2019-10-17 PROCEDURE — 37000009 ZZH ANESTHESIA TECHNICAL FEE, EACH ADDTL 15 MIN: Performed by: INTERNAL MEDICINE

## 2019-10-17 PROCEDURE — 25000128 H RX IP 250 OP 636: Performed by: NURSE ANESTHETIST, CERTIFIED REGISTERED

## 2019-10-17 PROCEDURE — 25800030 ZZH RX IP 258 OP 636: Performed by: NURSE ANESTHETIST, CERTIFIED REGISTERED

## 2019-10-17 PROCEDURE — 43235 EGD DIAGNOSTIC BRUSH WASH: CPT | Performed by: INTERNAL MEDICINE

## 2019-10-17 PROCEDURE — 37000008 ZZH ANESTHESIA TECHNICAL FEE, 1ST 30 MIN: Performed by: INTERNAL MEDICINE

## 2019-10-17 RX ORDER — PROPOFOL 10 MG/ML
INJECTION, EMULSION INTRAVENOUS CONTINUOUS PRN
Status: DISCONTINUED | OUTPATIENT
Start: 2019-10-17 | End: 2019-10-17

## 2019-10-17 RX ORDER — ONDANSETRON 4 MG/1
4 TABLET, ORALLY DISINTEGRATING ORAL EVERY 6 HOURS PRN
Status: DISCONTINUED | OUTPATIENT
Start: 2019-10-17 | End: 2019-10-17 | Stop reason: HOSPADM

## 2019-10-17 RX ORDER — FLUMAZENIL 0.1 MG/ML
0.2 INJECTION, SOLUTION INTRAVENOUS
Status: DISCONTINUED | OUTPATIENT
Start: 2019-10-17 | End: 2019-10-17 | Stop reason: HOSPADM

## 2019-10-17 RX ORDER — LIDOCAINE HYDROCHLORIDE 20 MG/ML
SOLUTION OROPHARYNGEAL PRN
Status: DISCONTINUED | OUTPATIENT
Start: 2019-10-17 | End: 2019-10-17

## 2019-10-17 RX ORDER — ONDANSETRON 2 MG/ML
INJECTION INTRAMUSCULAR; INTRAVENOUS PRN
Status: DISCONTINUED | OUTPATIENT
Start: 2019-10-17 | End: 2019-10-17

## 2019-10-17 RX ORDER — LIDOCAINE 40 MG/G
CREAM TOPICAL
Status: DISCONTINUED | OUTPATIENT
Start: 2019-10-17 | End: 2019-10-17 | Stop reason: HOSPADM

## 2019-10-17 RX ORDER — LIDOCAINE HYDROCHLORIDE 40 MG/ML
SOLUTION TOPICAL PRN
Status: DISCONTINUED | OUTPATIENT
Start: 2019-10-17 | End: 2019-10-17

## 2019-10-17 RX ORDER — ONDANSETRON 2 MG/ML
4 INJECTION INTRAMUSCULAR; INTRAVENOUS EVERY 6 HOURS PRN
Status: DISCONTINUED | OUTPATIENT
Start: 2019-10-17 | End: 2019-10-17 | Stop reason: HOSPADM

## 2019-10-17 RX ORDER — NALOXONE HYDROCHLORIDE 0.4 MG/ML
.1-.4 INJECTION, SOLUTION INTRAMUSCULAR; INTRAVENOUS; SUBCUTANEOUS
Status: DISCONTINUED | OUTPATIENT
Start: 2019-10-17 | End: 2019-10-17 | Stop reason: HOSPADM

## 2019-10-17 RX ORDER — LIDOCAINE HYDROCHLORIDE 20 MG/ML
INJECTION, SOLUTION INFILTRATION; PERINEURAL PRN
Status: DISCONTINUED | OUTPATIENT
Start: 2019-10-17 | End: 2019-10-17

## 2019-10-17 RX ORDER — SODIUM CHLORIDE, SODIUM LACTATE, POTASSIUM CHLORIDE, CALCIUM CHLORIDE 600; 310; 30; 20 MG/100ML; MG/100ML; MG/100ML; MG/100ML
INJECTION, SOLUTION INTRAVENOUS CONTINUOUS PRN
Status: DISCONTINUED | OUTPATIENT
Start: 2019-10-17 | End: 2019-10-17

## 2019-10-17 RX ORDER — ONDANSETRON 2 MG/ML
4 INJECTION INTRAMUSCULAR; INTRAVENOUS
Status: DISCONTINUED | OUTPATIENT
Start: 2019-10-17 | End: 2019-10-17 | Stop reason: HOSPADM

## 2019-10-17 RX ADMIN — LIDOCAINE HYDROCHLORIDE 40 MG: 20 INJECTION, SOLUTION INFILTRATION; PERINEURAL at 09:07

## 2019-10-17 RX ADMIN — ONDANSETRON 8 MG: 2 INJECTION INTRAMUSCULAR; INTRAVENOUS at 09:11

## 2019-10-17 RX ADMIN — LIDOCAINE HYDROCHLORIDE 5 ML: 20 SOLUTION ORAL; TOPICAL at 09:10

## 2019-10-17 RX ADMIN — SODIUM CHLORIDE, POTASSIUM CHLORIDE, SODIUM LACTATE AND CALCIUM CHLORIDE: 600; 310; 30; 20 INJECTION, SOLUTION INTRAVENOUS at 09:06

## 2019-10-17 RX ADMIN — HYDROMORPHONE HYDROCHLORIDE 0.5 MG: 1 INJECTION, SOLUTION INTRAMUSCULAR; INTRAVENOUS; SUBCUTANEOUS at 09:08

## 2019-10-17 RX ADMIN — MIDAZOLAM 2 MG: 1 INJECTION INTRAMUSCULAR; INTRAVENOUS at 09:02

## 2019-10-17 RX ADMIN — HYDROCORTISONE SODIUM SUCCINATE 100 MG: 100 INJECTION, POWDER, FOR SOLUTION INTRAMUSCULAR; INTRAVENOUS at 09:10

## 2019-10-17 RX ADMIN — PROPOFOL 100 MCG/KG/MIN: 10 INJECTION, EMULSION INTRAVENOUS at 09:07

## 2019-10-17 NOTE — ANESTHESIA CARE TRANSFER NOTE
Patient: Sherly Yeung    Procedure(s):  ESOPHAGOGASTRODUODENOSCOPY (EGD)  COLONOSCOPY    Diagnosis: Rectal bleeding [K62.5]; Colonoscopy EGD w/MAC- prep mailed (Anticoag/Oxycodone/Dilaudid)- 2- day prep mailed (nyasia. from 9/26 @ [facility])  Diagnosis Additional Information: No value filed.    Anesthesia Type:   MAC     Note:  Airway :Room Air  Patient transferred to:PACU  Comments: Sleepy but talking in PACUHandoff Report: Identifed the Patient, Identified the Reponsible Provider, Reviewed the pertinent medical history, Discussed the surgical course, Reviewed Intra-OP anesthesia mangement and issues during anesthesia, Set expectations for post-procedure period and Allowed opportunity for questions and acknowledgement of understanding      Vitals: (Last set prior to Anesthesia Care Transfer)    CRNA VITALS  10/17/2019 0917 - 10/17/2019 0956      10/17/2019             Pulse:  78    Ht Rate:  79    SpO2:  100 %                Electronically Signed By: ZAIRA Novoa CRNA  October 17, 2019  9:56 AM

## 2019-10-17 NOTE — ANESTHESIA POSTPROCEDURE EVALUATION
Anesthesia POST Procedure Evaluation    Patient: Sherly Yeung   MRN:     [id number] Gender:   female   Age:    53 year old :      1965        Preoperative Diagnosis: Rectal bleeding [K62.5]; Colonoscopy EGD w/MAC- prep mailed (Anticoag/Oxycodone/Dilaudid)- 2- day prep mailed (nyasia. from  @ [facility])   Procedure(s):  ESOPHAGOGASTRODUODENOSCOPY (EGD)  COLONOSCOPY   Postop Comments: No value filed.       Anesthesia Type:  Not documented  MAC    Reportable Event: NO     PAIN: Uncomplicated   Sign Out status: Comfortable, Well controlled pain     PONV: No PONV   Sign Out status:  No Nausea or Vomiting     Neuro/Psych: Uneventful perioperative course   Sign Out Status: Preoperative baseline; Age appropriate mentation     Airway/Resp.: Uneventful perioperative course   Sign Out Status: Non labored breathing, age appropriate RR; Resp. Status within EXPECTED Parameters     CV: Uneventful perioperative course   Sign Out status: Appropriate BP and perfusion indices; Appropriate HR/Rhythm     Disposition:   Sign Out in:  PACU  Disposition:  Phase II; Home  Recovery Course: Uneventful  Follow-Up: Not required           Last Anesthesia Record Vitals:  CRNA VITALS  10/17/2019 0917 - 10/17/2019 1017      10/17/2019             Pulse:  78    Ht Rate:  79    SpO2:  100 %          Last PACU Vitals:  Vitals Value Taken Time   BP     Temp     Pulse     Resp     SpO2     Temp src     NIBP 108/64 10/17/2019  9:46 AM   Pulse 78 10/17/2019  9:47 AM   SpO2 100 % 10/17/2019  9:47 AM   Resp     Temp     Ht Rate 79 10/17/2019  9:47 AM   Temp 2           Electronically Signed By: Celio Batista MD, 2019, 11:09 AM
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
## 2019-11-06 ENCOUNTER — HEALTH MAINTENANCE LETTER (OUTPATIENT)
Age: 54
End: 2019-11-06

## 2019-11-11 ENCOUNTER — TRANSFERRED RECORDS (OUTPATIENT)
Dept: HEALTH INFORMATION MANAGEMENT | Facility: CLINIC | Age: 54
End: 2019-11-11

## 2019-11-19 ENCOUNTER — OFFICE VISIT (OUTPATIENT)
Dept: INTERNAL MEDICINE | Facility: CLINIC | Age: 54
End: 2019-11-19
Payer: MEDICARE

## 2019-11-19 ENCOUNTER — ANCILLARY PROCEDURE (OUTPATIENT)
Dept: GENERAL RADIOLOGY | Facility: CLINIC | Age: 54
End: 2019-11-19
Attending: NURSE PRACTITIONER
Payer: MEDICARE

## 2019-11-19 VITALS — SYSTOLIC BLOOD PRESSURE: 123 MMHG | DIASTOLIC BLOOD PRESSURE: 89 MMHG | OXYGEN SATURATION: 99 % | HEART RATE: 99 BPM

## 2019-11-19 DIAGNOSIS — R06.89 DECREASED BREATH SOUNDS AT RIGHT LUNG BASE: Primary | ICD-10-CM

## 2019-11-19 ASSESSMENT — PAIN SCALES - GENERAL: PAINLEVEL: SEVERE PAIN (6)

## 2019-11-19 NOTE — NURSING NOTE
Chief Complaint   Patient presents with     Rib Pain     right rib pain       Raoul Curry CMA (AAMA) at 11:31 AM on 11/19/2019
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
## 2019-12-06 DIAGNOSIS — S22.39XA RIB FRACTURE: ICD-10-CM

## 2019-12-06 DIAGNOSIS — Z87.440 HISTORY OF RECURRENT UTIS: ICD-10-CM

## 2019-12-06 DIAGNOSIS — Z78.0 POSTMENOPAUSAL: ICD-10-CM

## 2019-12-06 DIAGNOSIS — M85.80 OSTEOPENIA: Primary | ICD-10-CM

## 2019-12-06 NOTE — TELEPHONE ENCOUNTER
sulfamethoxazole-trimethoprim (BACTRIM/SEPTRA) 400-80 MG tablet     Last Written Prescription Date:  10/2/2019  Last Fill Quantity: 6,   # refills: 0  Last Office Visit : 11/19/2019  Future Office visit:  None    Routing refill request to provider for review/approval because:  Refer to Provider for confirmation of diagnosis.         Olivia Khoury RN  Central Triage Red Flags/Med Refills

## 2019-12-09 RX ORDER — SULFAMETHOXAZOLE AND TRIMETHOPRIM 400; 80 MG/1; MG/1
1 TABLET ORAL 2 TIMES DAILY
Qty: 6 TABLET | Refills: 1 | Status: SHIPPED | OUTPATIENT
Start: 2019-12-09 | End: 2020-04-06

## 2019-12-09 NOTE — TELEPHONE ENCOUNTER
Per PCP, ok for Bactrim/Septra refills for patient due to history of frequent UTIs.     PCP also recommended dexa scan for patient. Last scan completed two years ago, patient over age of 50 with recent rib fracture. Order placed.    Message left for patient regarding orders. Provided phone number for imaging scheduling.    Aleah Genao RN ([place]ras[place])

## 2020-01-17 ENCOUNTER — ANCILLARY PROCEDURE (OUTPATIENT)
Dept: BONE DENSITY | Facility: CLINIC | Age: 55
End: 2020-01-17
Attending: INTERNAL MEDICINE
Payer: MEDICARE

## 2020-01-17 DIAGNOSIS — S22.39XA RIB FRACTURE: ICD-10-CM

## 2020-01-17 DIAGNOSIS — M85.80 OSTEOPENIA: ICD-10-CM

## 2020-01-17 DIAGNOSIS — Z78.0 POSTMENOPAUSAL: ICD-10-CM

## 2020-01-17 DIAGNOSIS — Z00.00 ROUTINE HEALTH MAINTENANCE: ICD-10-CM

## 2020-01-17 LAB
CHOLEST SERPL-MCNC: 188 MG/DL
HDLC SERPL-MCNC: 72 MG/DL
LDLC SERPL CALC-MCNC: 98 MG/DL
NONHDLC SERPL-MCNC: 116 MG/DL
TRIGL SERPL-MCNC: 90 MG/DL

## 2020-01-21 DIAGNOSIS — I82.B11 SUBCLAVIAN VEIN THROMBOSIS, RIGHT (H): ICD-10-CM

## 2020-01-21 DIAGNOSIS — M81.0 OSTEOPOROSIS, UNSPECIFIED OSTEOPOROSIS TYPE, UNSPECIFIED PATHOLOGICAL FRACTURE PRESENCE: Primary | ICD-10-CM

## 2020-01-21 DIAGNOSIS — K21.9 GASTROESOPHAGEAL REFLUX DISEASE WITHOUT ESOPHAGITIS: ICD-10-CM

## 2020-01-21 RX ORDER — LANSOPRAZOLE 30 MG/1
30 CAPSULE, DELAYED RELEASE ORAL 2 TIMES DAILY
Qty: 90 CAPSULE | Refills: 3 | Status: SHIPPED | OUTPATIENT
Start: 2020-01-21 | End: 2020-08-19

## 2020-01-21 RX ORDER — ALENDRONATE SODIUM 70 MG/1
70 TABLET ORAL
Qty: 12 TABLET | Refills: 3 | Status: SHIPPED | OUTPATIENT
Start: 2020-01-21

## 2020-01-21 NOTE — PROGRESS NOTES
Hi Dr. Mg,    Thank you so very much for the phone call this morning.  So sorry I missed it!    I was pretty sure Fosamax would be the drug you would suggest.  I have given it to many a patient, therefore I know about the specifics on how to take it.  So, if you could send a script for it to [facility] in [place], I would greatly appreciate it.  Also, while you're at it, please refill my Prevacid.  By the way, my  is 1965.    Thanks so much,  Marcia

## 2020-01-23 ENCOUNTER — ANCILLARY PROCEDURE (OUTPATIENT)
Dept: ULTRASOUND IMAGING | Facility: CLINIC | Age: 55
End: 2020-01-23
Attending: INTERNAL MEDICINE
Payer: MEDICARE

## 2020-01-23 DIAGNOSIS — I82.B11 THROMBOSIS OF RIGHT SUBCLAVIAN VEIN (H): ICD-10-CM

## 2020-02-11 ENCOUNTER — TRANSFERRED RECORDS (OUTPATIENT)
Dept: HEALTH INFORMATION MANAGEMENT | Facility: CLINIC | Age: 55
End: 2020-02-11

## 2020-02-16 ENCOUNTER — HEALTH MAINTENANCE LETTER (OUTPATIENT)
Age: 55
End: 2020-02-16

## 2020-02-18 ENCOUNTER — TELEPHONE (OUTPATIENT)
Dept: PSYCHIATRY | Facility: CLINIC | Age: 55
End: 2020-02-18

## 2020-02-18 NOTE — TELEPHONE ENCOUNTER
PSYCHIATRY CLINIC PHONE INTAKE     SERVICES REQUESTED / INTERESTED IN          Med Management    Presenting Problem and Brief History                              What would you like to be seen for? (brief description):  Pt is looking for a new provider because her current one is retiring. Current mental health medications on file are up to date. She was initially diagnosed in 2005. It took her a long time to find the proper combination of medications. No issues with the current medications. No issues with sleep.     Have you received a mental health diagnosis? Yes   Which one (s): Major Depression  Is there any history of developmental delay?  No   Are you currently seeing a mental health provider?  Yes            Who / month last seen:  Jena Yap Psychiatrist (private) in [place], [place]  Do you have mental health records elsewhere?  Yes  Will you sign a release so we can obtain them?  Yes    Have you ever been hospitalized for psychiatric reasons?  Yes  Describe:  Nothing in the last ten years.     Do you have current thoughts of self-harm?  No    Do you currently have thoughts of harming others?  No       Substance Use History     Do you have any history of alcohol / illicit drug use?  No  Describe:  NA  Have you ever received treatment for this?  No    Describe:  NA     Social History     Who is the patient's a guardian?  No    Name / number: NA  Have you had an ACT team in last 12 months?  No  Describe: NA   Do you have any current or past legal issues?  No  Describe: NA   OK to leave a detailed voicemail?  Yes    Medical/ Surgical History                                   Patient Active Problem List   Diagnosis     SVC syndrome     Migraine headache     Depression     Chronic anticoagulation     Subclavian vein thrombosis, right (H)     Subclavian vein stenosis     Pain     Superior vena cava stenosis     Chest wall abscess     Sternal pain     Iron deficiency anemia     Intestinal malabsorption     Post-op  pain     Migraine     Postoperative nausea     Nausea     Headache     AC separation     Acute blood loss anemia     Carpal tunnel syndrome     Closed fracture of clavicle     Closed fracture of metacarpal bone     Compression of vein     Constipation     Crushing injury of upper arm     Diffuse cystic mastopathy     Disorder of rotator cuff     Dysfunction of thyroid     Anorexia nervosa     Eating disorder     Endometriosis     Essential hypertension     Fever     Finger stiffness     Gastroesophageal reflux disease     History of basal cell carcinoma     Hypertensive heart and chronic kidney disease stage 2     Impaired cognition     Intestinal bleeding     Irritable bowel syndrome     Major depressive disorder, recurrent episode (H)     Median nerve dysfunction     Non-healing surgical wound     Obsessive-compulsive disorder     Other acne     Left shoulder pain     Pectus excavatum     Personality disorder (H)     Postprocedural hypotension     Prolonged QT interval     Pulmonary embolism and infarction (H)     Recurrent major depressive disorder, in full remission (H)     Status post skin graft     Subdural hematoma (H)     Traumatic brain injury (H)     Vitamin D deficiency          Medications             Current Outpatient Medications   Medication Sig Dispense Refill     alendronate (FOSAMAX) 70 MG tablet Take 1 tablet (70 mg) by mouth every 7 days Take with a full glass of water and do not eat or lay down for 30 minutes 12 tablet 3     apixaban ANTICOAGULANT (ELIQUIS) 5 MG tablet Take 5 mg by mouth 2 times daily Managed by Kenneth Villarreal per patient.       ARIPiprazole (ABILIFY PO) Take 30 mg by mouth every evening       aspirin (EQL ASPIRIN LOW DOSE) 81 MG chewable tablet Take 1 tablet (81 mg) by mouth daily (Patient taking differently: Take 81 mg by mouth every evening ) 36 tablet 9     B Complex Vitamins (B COMPLEX 1 PO) Take 1 tablet by mouth daily.       Cholecalciferol (VITAMIN D) 1000 UNITS capsule  Take 2,000 Units by mouth daily        DULoxetine HCl (CYMBALTA PO) Take 120 mg by mouth every evening        enoxaparin (LOVENOX) 80 MG/0.8ML syringe Take 1 syringe/day for 3 days prior to colonoscopy when not taking apixaban 3 Syringe 1     galcanezumab-gnlm (EMGALITY) 120 MG/ML injection Inject 120 mg Subcutaneous every 28 days       HYDROmorphone (DILAUDID) 3 MG Suppository Place 3 mg rectally every 8 hours as needed        LANsoprazole (PREVACID) 30 MG DR capsule Take 1 capsule (30 mg) by mouth 2 times daily 90 capsule 3     LORazepam (ATIVAN) 0.5 MG tablet Take 1 tablet by mouth every 6 hours as needed (for dizziness ). 30 tablet 0     magnesium 250 MG tablet Take 1 tablet by mouth 4 times daily        methylphenidate (RITALIN) 20 MG tablet Take 60 mg by mouth every morning        metoprolol succinate ER (TOPROL-XL) 25 MG 24 hr tablet Take 1 tablet (25 mg) by mouth daily (Patient taking differently: Take 25 mg by mouth every evening ) 90 tablet 2     Ondansetron (ZUPLENZ) 8 MG FILM Take 8 mg by mouth every 6 hours as needed.       oxyCODONE (OXY-IR) 5 MG capsule Take 5-10 mg by mouth every 4 hours as needed (severe chronic migraine)       oxyCODONE (OXYCONTIN) 10 MG 12 hr tablet Take 1 tablet (10 mg) by mouth every 12 hours (Patient taking differently: Take 10 mg by mouth every 12 hours as needed ) 30 tablet 0     riboflavin (VITAMIN  B-2) 100 MG TABS tablet Take 400 mg by mouth daily        senna-docusate (SENOKOT-S;PERICOLACE) 8.6-50 MG per tablet Take 1-2 tablets by mouth 2 times daily. (Patient taking differently: Take 1-2 tablets by mouth 2 times daily as needed ) 60 tablet 1     spironolactone (ALDACTONE) 25 MG tablet Take 25 mg by mouth every evening        sulfamethoxazole-trimethoprim (BACTRIM/SEPTRA) 400-80 MG tablet Take 1 tablet by mouth 2 times daily Take 1 tablet by mouth 2 times daily for 3days at onset of UTI symptoms 6 tablet 1     SUMAtriptan Succinate 6 MG/0.5ML KIT Inject 1 dose  Subcutaneous at onset of headache. Indications: Migraine Headache       tolterodine ER (DETROL LA) 4 MG 24 hr capsule Take 1 capsule (4 mg) by mouth daily 90 capsule 3     topiramate (TOPAMAX) 100 MG tablet Take 100 mg by mouth in the morning and take 200 mg by mouth in the evening.       Zinc 50 MG CAPS Take 1 tablet by mouth daily.           DISPOSITION      2/18/20 Intake completed. Prefers resident. Adding to resident/NP [facility].  Pily Blake,

## 2020-02-27 DIAGNOSIS — N39.41 URGE INCONTINENCE: ICD-10-CM

## 2020-02-27 RX ORDER — TOLTERODINE 4 MG/1
4 CAPSULE, EXTENDED RELEASE ORAL DAILY
Qty: 90 CAPSULE | Refills: 2 | Status: SHIPPED | OUTPATIENT
Start: 2020-02-27 | End: 2020-12-07

## 2020-02-29 DIAGNOSIS — I82.B11 RIGHT SUBCLAVIAN VEIN THROMBOSIS (H): ICD-10-CM

## 2020-02-29 DIAGNOSIS — I87.1 SVC SYNDROME: ICD-10-CM

## 2020-02-29 DIAGNOSIS — R00.0 SINUS TACHYCARDIA: ICD-10-CM

## 2020-03-03 RX ORDER — METOPROLOL SUCCINATE 25 MG/1
25 TABLET, EXTENDED RELEASE ORAL DAILY
Qty: 90 TABLET | Refills: 3 | Status: SHIPPED | OUTPATIENT
Start: 2020-03-03 | End: 2021-04-02

## 2020-03-03 NOTE — TELEPHONE ENCOUNTER
Metoprolol Succinate ER Oral Tablet Extended Release 24 Hour 25 MG  Last Written Prescription Date:  5/29/2019  Last Fill Quantity: 90,   # refills: 2  Last Office Visit : 11/19/2019  Future Office visit:  3/25/2020  90 Tabs, 3 Refills sent to pharmacy 3/3/2020.    Olivia Khoury RN  Central Triage Red Flags/Med Refills

## 2020-03-17 ENCOUNTER — TELEPHONE (OUTPATIENT)
Dept: INTERNAL MEDICINE | Facility: CLINIC | Age: 55
End: 2020-03-17

## 2020-03-17 NOTE — TELEPHONE ENCOUNTER
Prior Authorization Retail Medication Request    Medication/Dose: Lansoprazole DR 30mg cap    Insurance Name:  Medicare, Secondary: [facility]  Insurance ID:  [id number]      Pharmacy Information (if different than what is on RX)  [id number] - [place], [place] - [address] [facility]

## 2020-03-17 NOTE — TELEPHONE ENCOUNTER
Central Prior Authorization Team   [phone]    PA Initiation    Medication: LANsoprazole (PREVACID) 30 MG DR capsule  Insurance Company: [employer] - Phone [phone] Fax [phone]  Pharmacy Filling the Rx: [facility] #[id number] - [place], [place] - [address]  Filling Pharmacy Phone: [phone]  Filling Pharmacy Fax: [phone]  Start Date: 3/17/2020

## 2020-03-18 NOTE — TELEPHONE ENCOUNTER
Prior Authorization Approval    Authorization Effective Date: 2/16/2020  Authorization Expiration Date: 3/17/2021  Medication: LANsoprazole (PREVACID) 30 MG DR capsule-PA APPROVED   Approved Dose/Quantity:   Reference #:     Insurance Company: [employer] - Phone [phone] Fax [phone]  Expected CoPay:       CoPay Card Available:      Foundation Assistance Needed:    Which Pharmacy is filling the prescription (Not needed for infusion/clinic administered): [facility][id number] - [place], [place] - [address]  Pharmacy Notified: Yes- **Instructed pharmacy to notify patient when script is ready to /ship.**  Patient Notified: Yes

## 2020-03-24 ASSESSMENT — PAIN SCALES - GENERAL: PAINLEVEL: NO PAIN (0)

## 2020-03-25 ENCOUNTER — VIRTUAL VISIT (OUTPATIENT)
Dept: INTERNAL MEDICINE | Facility: CLINIC | Age: 55
End: 2020-03-25
Payer: MEDICARE

## 2020-03-25 DIAGNOSIS — I87.1 SVC SYNDROME: ICD-10-CM

## 2020-03-25 DIAGNOSIS — I82.B11 SUBCLAVIAN VEIN THROMBOSIS, RIGHT (H): ICD-10-CM

## 2020-03-25 DIAGNOSIS — R00.0 SINUS TACHYCARDIA: ICD-10-CM

## 2020-03-25 RX ORDER — METHYLPHENIDATE HYDROCHLORIDE 20 MG/1
60 TABLET ORAL EVERY MORNING
Qty: 90 TABLET | Refills: 0 | Status: SHIPPED | OUTPATIENT
Start: 2020-03-25 | End: 2020-07-27

## 2020-03-25 NOTE — PROGRESS NOTES
Called patient regarding her use of ritalin.  She has followed with psychiatry and been on ritalin 60mg daily along with cymbalta and abilify for 5 years by her report.  Her psychiatrist retired and she is unable to establish here for up to 6 months.  I agreed to continue to prescribe this for a limited time period until she establishes with psychiatry.  She reports no side effects or ill effects on the medication. Otherwise she is doing well by her report.  [place] rx monitoring confirms she has been getting #90 of the 20 mg ritalin/month.  I will bridge her on this dose until she see psychiatry.  Also asked for psychiatric records to be sent to us.  Chadwick Mg MD

## 2020-04-02 ENCOUNTER — TELEPHONE (OUTPATIENT)
Dept: ONCOLOGY | Facility: CLINIC | Age: 55
End: 2020-04-02

## 2020-04-02 DIAGNOSIS — Z87.440 HISTORY OF RECURRENT UTIS: ICD-10-CM

## 2020-04-02 NOTE — TELEPHONE ENCOUNTER
Marcia contacted me today with an update to her family history.  She reports that her paternal aunt, who was diagnosed with breast cancer at age 66, has had a recurrence of that cancer.  We reviewed that genetic counseling is recommended for her aunt (due to her personal history of breast cancer and family history of ovarian cancer).  Marcia is encouraged to contact me again if there are changes in her family history, or should other relatives complete genetic testing.  Additional questions were denied at this time.    Petrona Melendez MS, [facility]  Licensed, Certified Genetic Counselor  [facility]  Phone: [phone]

## 2020-04-06 RX ORDER — SULFAMETHOXAZOLE AND TRIMETHOPRIM 400; 80 MG/1; MG/1
TABLET ORAL
Qty: 6 TABLET | Refills: 1 | Status: SHIPPED | OUTPATIENT
Start: 2020-04-06 | End: 2020-07-29

## 2020-04-06 NOTE — TELEPHONE ENCOUNTER
sulfamethoxazole-trimethoprim (BACTRIM) 400-80 MG tablet       Last Written Prescription Date:  12-9-19  Last Fill Quantity: 6,   # refills: 1  Last Office Visit : 3-  Future Office visit:  none    Routing refill request to provider for review/approval because:  Not on protocol.

## 2020-04-28 ENCOUNTER — TRANSFERRED RECORDS (OUTPATIENT)
Dept: HEALTH INFORMATION MANAGEMENT | Facility: CLINIC | Age: 55
End: 2020-04-28

## 2020-05-11 ENCOUNTER — TRANSFERRED RECORDS (OUTPATIENT)
Dept: HEALTH INFORMATION MANAGEMENT | Facility: CLINIC | Age: 55
End: 2020-05-11

## 2020-07-01 DIAGNOSIS — N39.0 URINARY TRACT INFECTION WITHOUT HEMATURIA, SITE UNSPECIFIED: Primary | ICD-10-CM

## 2020-07-02 DIAGNOSIS — R82.90 NONSPECIFIC FINDING ON EXAMINATION OF URINE: Primary | ICD-10-CM

## 2020-07-02 DIAGNOSIS — Z87.440 HISTORY OF RECURRENT UTIS: Primary | ICD-10-CM

## 2020-07-02 DIAGNOSIS — N39.0 URINARY TRACT INFECTION WITHOUT HEMATURIA, SITE UNSPECIFIED: ICD-10-CM

## 2020-07-02 LAB
ALBUMIN UR-MCNC: >=300 MG/DL
APPEARANCE UR: ABNORMAL
BACTERIA #/AREA URNS HPF: ABNORMAL /HPF
BILIRUB UR QL STRIP: NEGATIVE
COLOR UR AUTO: ABNORMAL
GLUCOSE UR STRIP-MCNC: NEGATIVE MG/DL
HGB UR QL STRIP: ABNORMAL
KETONES UR STRIP-MCNC: NEGATIVE MG/DL
LEUKOCYTE ESTERASE UR QL STRIP: ABNORMAL
NITRATE UR QL: NEGATIVE
NON-SQ EPI CELLS #/AREA URNS LPF: ABNORMAL /LPF
PH UR STRIP: 6 PH (ref 5–7)
RBC #/AREA URNS AUTO: >100 /HPF
SOURCE: ABNORMAL
SP GR UR STRIP: >1.03 (ref 1–1.03)
URNS CMNT MICRO: ABNORMAL
UROBILINOGEN UR STRIP-ACNC: 0.2 EU/DL (ref 0.2–1)
WBC #/AREA URNS AUTO: ABNORMAL /HPF

## 2020-07-02 PROCEDURE — 87086 URINE CULTURE/COLONY COUNT: CPT | Performed by: NURSE PRACTITIONER

## 2020-07-02 PROCEDURE — 81001 URINALYSIS AUTO W/SCOPE: CPT | Performed by: NURSE PRACTITIONER

## 2020-07-02 RX ORDER — NITROFURANTOIN MACROCRYSTAL 100 MG
100 CAPSULE ORAL 2 TIMES DAILY
Qty: 10 CAPSULE | Refills: 1 | Status: SHIPPED | OUTPATIENT
Start: 2020-07-02 | End: 2020-07-29

## 2020-07-03 LAB
BACTERIA SPEC CULT: NORMAL
SPECIMEN SOURCE: NORMAL

## 2020-07-14 ENCOUNTER — ANCILLARY PROCEDURE (OUTPATIENT)
Dept: MAMMOGRAPHY | Facility: CLINIC | Age: 55
End: 2020-07-14
Attending: INTERNAL MEDICINE
Payer: MEDICARE

## 2020-07-14 DIAGNOSIS — Z12.31 VISIT FOR SCREENING MAMMOGRAM: ICD-10-CM

## 2020-07-24 NOTE — PATIENT INSTRUCTIONS
Treatment Plan Today:   - Restart taking methylphenidate, try splitting the dose with 40mg (2 tablets) in the morning and 20mg (1 tablet) around lunchtime.  - Get fasting labs (don't eat after midnight) at any [facility] lab.    ------------------------------------------------------------------------    Thank you for coming to the PSYCHIATRY CLINIC.    Lab Testing:  If you had lab testing today and your results are reassuring or normal they will be mailed to you or sent through [facility] within 7 days. If the lab tests need quick action we will call you with the results. The phone number we will call with results is # [phone] (home) . If this is not the best number please call our clinic and change the number.    Medication Refills:  If you need any refills please call your pharmacy and they will contact us. Our fax number for refills is [phone]. Please allow three business for refill processing. If you need to  your refill at a new pharmacy, please contact the new pharmacy directly. The new pharmacy will help you get your medications transferred.     Scheduling:  If you have any concerns about today's visit or wish to schedule another appointment please call our office during normal business hours [phone] (8-5:00 M-F)    Contact Us:  Please call [phone] during business hours (8-5:00 M-F).  If after clinic hours, or on the weekend, please call  [phone].    Financial Assistance [phone]  [facility] Billing [phone]  Central Billing Office, [employer]: [phone]  [facility]illing [phone]  Medical Records [phone]      MENTAL HEALTH CRISIS NUMBERS:  For a medical emergency please call  911 or go to the nearest ER.     [facility]:   [facility] -[phone]   Crisis Residence [facility] -[phone]   Walk-In Counseling Center [facility] -[phone]   [facility] -[phone] (adults)/[phone] (child)  CHILD: [facility]  needs assessment team - [phone]      [facility]:   [facility] - [phone]   Walk-in counseling [facility] - [phone]   Walk-in counseling [facility] - [phone]   Crisis Residence [facility] - [phone]  Urgent Care Adult Mental Clxsvk-[phone] mobile unit/ 24/7 crisis line    National Crisis Numbers:   National Suicide Prevention Lifeline: 8-535-238-TALK (934-775-1975)  Poison Control Center - 3-919-998-4395  [employer]/resources for a list of additional resources (SOS)  Trans Lifeline a hotline for transgender people 1-534.568.8048  [employer] a hotline for LGBT youth 4-833-098-3085  Crisis Text Line: For any crisis 24/7   To: 903525  see www.crisistextline.org  - IF MAKING A CALL FEELS TOO HARD, send a text!         Again thank you for choosing PSYCHIATRY CLINIC and please let us know how we can best partner with you to improve you and your family's health.    You may be receiving a survey regarding this appointment. We would love to have your feedback, both positive and negative. The survey is done by an external company, so your answers are anonymous.
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
## 2020-07-27 ENCOUNTER — VIRTUAL VISIT (OUTPATIENT)
Dept: PSYCHIATRY | Facility: CLINIC | Age: 55
End: 2020-07-27
Attending: PSYCHIATRY & NEUROLOGY
Payer: MEDICARE

## 2020-07-27 DIAGNOSIS — I82.B11 SUBCLAVIAN VEIN THROMBOSIS, RIGHT (H): ICD-10-CM

## 2020-07-27 DIAGNOSIS — R00.0 SINUS TACHYCARDIA: ICD-10-CM

## 2020-07-27 DIAGNOSIS — I87.1 SVC SYNDROME: ICD-10-CM

## 2020-07-27 DIAGNOSIS — F33.1 MODERATE EPISODE OF RECURRENT MAJOR DEPRESSIVE DISORDER (H): Primary | ICD-10-CM

## 2020-07-27 DIAGNOSIS — Z79.899 ENCOUNTER FOR LONG-TERM CURRENT USE OF MEDICATION: ICD-10-CM

## 2020-07-27 DIAGNOSIS — S06.9X5D TRAUMATIC BRAIN INJURY, WITH LOSS OF CONSCIOUSNESS GREATER THAN 24 HOURS WITH RETURN TO PRE-EXISTING CONSCIOUS LEVEL, SUBSEQUENT ENCOUNTER: ICD-10-CM

## 2020-07-27 PROCEDURE — 82607 VITAMIN B-12: CPT | Mod: GT | Performed by: STUDENT IN AN ORGANIZED HEALTH CARE EDUCATION/TRAINING PROGRAM

## 2020-07-27 RX ORDER — ARIPIPRAZOLE 30 MG/1
30 TABLET ORAL EVERY EVENING
Qty: 30 TABLET | Refills: 2 | Status: SHIPPED | OUTPATIENT
Start: 2020-07-27 | End: 2020-09-08

## 2020-07-27 RX ORDER — METHYLPHENIDATE HYDROCHLORIDE 20 MG/1
TABLET ORAL
Qty: 90 TABLET | Refills: 0 | Status: SHIPPED | OUTPATIENT
Start: 2020-07-27 | End: 2020-09-08

## 2020-07-27 RX ORDER — METHYLPHENIDATE HYDROCHLORIDE 20 MG/1
TABLET ORAL
Qty: 90 TABLET | Refills: 0 | Status: SHIPPED | OUTPATIENT
Start: 2020-08-24 | End: 2020-10-20

## 2020-07-27 RX ORDER — DULOXETIN HYDROCHLORIDE 60 MG/1
120 CAPSULE, DELAYED RELEASE ORAL EVERY EVENING
Qty: 60 CAPSULE | Refills: 2 | Status: SHIPPED | OUTPATIENT
Start: 2020-07-27 | End: 2020-09-08

## 2020-07-27 ASSESSMENT — PAIN SCALES - GENERAL: PAINLEVEL: NO PAIN (0)

## 2020-07-27 NOTE — PROGRESS NOTES
"VIDEO VISIT  Sherly Yeung is a 54 year old patient who is being evaluated via a billable video visit.      The patient has been notified of following:   \"This video visit will be conducted via a call between you and your physician/provider. We have found that certain health care needs can be provided without the need for an in-person physical exam. This service lets us provide the care you need with a video conversation. If a prescription is necessary we can send it directly to your pharmacy. If lab work is needed we can place an order for that and you can then stop by our lab to have the test done at a later time. Insurers are generally covering virtual visits as they would in-office visits so billing should not be different than normal.  If for some reason you do get billed incorrectly, you should contact the billing office to correct it and that number is in the AVS .    Patient has given verbal consent for video visit?:  Yes    How would you like to obtain your AVS?:  Kirstin    Patient would prefer that any video invitations be sent by: Send to e-mail at: [email or web address]      AVS SmartPhrase [PsychAVS] has been placed in 'Patient Instructions':  Yes     "
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
## 2020-07-29 ENCOUNTER — TELEPHONE (OUTPATIENT)
Dept: PSYCHIATRY | Facility: CLINIC | Age: 55
End: 2020-07-29

## 2020-07-29 NOTE — TELEPHONE ENCOUNTER
Mail ALONDRA   Received: Today   Message Contents   Coffin, Richard Breyen, MD Valena, Victoria, RN               Would you please mail Marcia an ALONDRA for paper records and verbal communication with her previous psychiatrist:   Jena Yap   [facility], [address]   [place] [address]   Phone: [phone]   Fax: [phone]     Thanks!   Kendall     Routed to Gudelia CARRANZA CMA to mail one ALONDRA and one consent to communicate for Jena Yap, previous psychiatrist with a note on the cover sheet to email or fax the release back to the clinic.

## 2020-08-03 NOTE — TELEPHONE ENCOUNTER
ALONDRA     Gudelia Kidd CMA  You 2 days ago       Done!    Message text       Gudelia Kidd CMA You 2 days ago       I will do this today    Message text      Placed a call to patient to update of the ALONDRA sent out in the mail. No answer at the number provided. LVM, requesting a call back. Clinic number provided.

## 2020-08-04 ENCOUNTER — TELEPHONE (OUTPATIENT)
Dept: PSYCHIATRY | Facility: CLINIC | Age: 55
End: 2020-08-04

## 2020-08-04 DIAGNOSIS — Z87.440 HISTORY OF RECURRENT UTIS: Primary | ICD-10-CM

## 2020-08-04 NOTE — TELEPHONE ENCOUNTER
[facility] Call Center    Phone Message    May a detailed message be left on voicemail: yes     Reason for Call: Other: Pt called stating the pharmacy informed her that she would need a PA for her methylphenidate     Action Taken: Message routed to:  Other: [facility] [place]    Travel Screening: Not Applicable

## 2020-08-04 NOTE — PROGRESS NOTES
Jacob Mg,    Just me, Marcia Yeung, wondering what I should do?  I'm continuing to have intermittent fairly intense burning/pain with urination, frequency, and incontinence(despite the Detrol LA).  When I have no burning or frequency, I have no incontinence, but when I do, the incontinence is bad.    I'm faithful at wiping front to back, drinking plenty of fluids(except when I have a migraine) and emptying my bladder fully every 2 hours.    Any ideas?  It's driving me crazy that I can't cure this problem.    Anxiously awaiting your response,    Marcia

## 2020-08-04 NOTE — TELEPHONE ENCOUNTER
Called the pharmacist to find out if the PA is needed for a formulary exception or quantity limit. She indicated that the rejection did not specify, and only stated that a PA is needed.     They are billing her prescriptions through [employer], with the following information:  ALEX [id number]  N FEPRX  ID # [id number]  [facility] [id number]    Submitted the PA through [employer]  Key: [id number]  Last name: Gamal  : 1965      APPROVAL  Rec'd approval of methylphenidate 20 mg tablets.  Effective 2020 to 2021.  PA # [id number].  Faxed approval letter to pharmacy.     Called the pharmacy to confirm that the prescription went through and no second PA is needed for quantity limits.    Notified patient via phone  that it's been approved.  She expressed appreciation.      Appeal approval letter sent to scanning.    Routed to Dr. Tolentino for KARI.

## 2020-08-05 ENCOUNTER — PRE VISIT (OUTPATIENT)
Dept: UROLOGY | Facility: CLINIC | Age: 55
End: 2020-08-05

## 2020-08-05 NOTE — TELEPHONE ENCOUNTER
Received 1 faxed page from [facility] [employer].  Received approval letter for medication Methylphenidate HCL 20 mg OR Tablets, the authorization is valid from 07/05/2020 to 08/04/2021.  Original copy was sent to scanning.  Routed to Suzie Rodarte RN.  .Olivia Redd LPN

## 2020-08-05 NOTE — TELEPHONE ENCOUNTER
MEDICAL RECORDS REQUEST   [facility]  Urology Clinic  [address] [place] [address]  PHONE: [phone]  Fax: [phone]        FUTURE VISIT INFORMATION                                                   Sherly Yeung, : 1965 scheduled for future visit at [facility]    APPOINTMENT INFORMATION:    Date: 20 10AM    Provider:  Justine Kelly PA-C    Reason for Visit/Diagnosis: Recurrent UTI     REFERRAL INFORMATION:    Referring provider:  JENNY BANUELOS    Specialty: MD    Referring providers clinic:  [facility]    Clinic contact number:  [phone]    RECORDS REQUESTED FOR VISIT                                                     NOTES  STATUS/DETAILS   OFFICE NOTE from referring provider  yes   OFFICE NOTE from other specialist  no   DISCHARGE SUMMARY from hospital  no   DISCHARGE REPORT from the ER  no   OPERATIVE REPORT  no   MEDICATION LIST  no   LABS     URINALYSIS (UA)  yes     PRE-VISIT CHECKLIST      Record collection complete Yes   Appointment appropriately scheduled           (right time/right provider) Yes   MyChart activation Yes   Questionnaire complete If no, please explain: In process     Completed by: Margie Curry

## 2020-08-05 NOTE — TELEPHONE ENCOUNTER
Visit Type : Recurrent UTI    Records/Orders: Record in epic     Pt Contacted: no    At Rooming: video

## 2020-08-06 DIAGNOSIS — Z87.440 HISTORY OF RECURRENT UTIS: ICD-10-CM

## 2020-08-06 DIAGNOSIS — Z79.899 ENCOUNTER FOR LONG-TERM CURRENT USE OF MEDICATION: ICD-10-CM

## 2020-08-06 LAB
ALBUMIN UR-MCNC: NEGATIVE MG/DL
APPEARANCE UR: CLEAR
BASOPHILS # BLD AUTO: 0 10E9/L (ref 0–0.2)
BASOPHILS NFR BLD AUTO: 0.3 %
BILIRUB UR QL STRIP: NEGATIVE
COLOR UR AUTO: YELLOW
DIFFERENTIAL METHOD BLD: NORMAL
EOSINOPHIL # BLD AUTO: 0.1 10E9/L (ref 0–0.7)
EOSINOPHIL NFR BLD AUTO: 1.6 %
ERYTHROCYTE [DISTWIDTH] IN BLOOD BY AUTOMATED COUNT: 12.3 % (ref 10–15)
GLUCOSE UR STRIP-MCNC: NEGATIVE MG/DL
HBA1C MFR BLD: 5.3 % (ref 0–5.6)
HCT VFR BLD AUTO: 40.9 % (ref 35–47)
HGB BLD-MCNC: 13.3 G/DL (ref 11.7–15.7)
HGB UR QL STRIP: ABNORMAL
KETONES UR STRIP-MCNC: NEGATIVE MG/DL
LEUKOCYTE ESTERASE UR QL STRIP: ABNORMAL
LYMPHOCYTES # BLD AUTO: 1.5 10E9/L (ref 0.8–5.3)
LYMPHOCYTES NFR BLD AUTO: 21.9 %
MCH RBC QN AUTO: 30.6 PG (ref 26.5–33)
MCHC RBC AUTO-ENTMCNC: 32.5 G/DL (ref 31.5–36.5)
MCV RBC AUTO: 94 FL (ref 78–100)
MONOCYTES # BLD AUTO: 0.5 10E9/L (ref 0–1.3)
MONOCYTES NFR BLD AUTO: 6.6 %
NEUTROPHILS # BLD AUTO: 4.7 10E9/L (ref 1.6–8.3)
NEUTROPHILS NFR BLD AUTO: 69.6 %
NITRATE UR QL: NEGATIVE
NON-SQ EPI CELLS #/AREA URNS LPF: ABNORMAL /LPF
PH UR STRIP: 6.5 PH (ref 5–7)
PLATELET # BLD AUTO: 319 10E9/L (ref 150–450)
RBC # BLD AUTO: 4.34 10E12/L (ref 3.8–5.2)
RBC #/AREA URNS AUTO: ABNORMAL /HPF
SOURCE: ABNORMAL
SP GR UR STRIP: 1.02 (ref 1–1.03)
UROBILINOGEN UR STRIP-ACNC: 0.2 EU/DL (ref 0.2–1)
VIT B12 SERPL-MCNC: 329 PG/ML (ref 193–986)
WBC # BLD AUTO: 6.8 10E9/L (ref 4–11)
WBC #/AREA URNS AUTO: ABNORMAL /HPF

## 2020-08-06 PROCEDURE — 82306 VITAMIN D 25 HYDROXY: CPT | Performed by: STUDENT IN AN ORGANIZED HEALTH CARE EDUCATION/TRAINING PROGRAM

## 2020-08-06 PROCEDURE — 99000 SPECIMEN HANDLING OFFICE-LAB: CPT | Performed by: PHYSICIAN ASSISTANT

## 2020-08-06 PROCEDURE — 82747 ASSAY OF FOLIC ACID RBC: CPT | Mod: 90 | Performed by: PHYSICIAN ASSISTANT

## 2020-08-06 PROCEDURE — 36415 COLL VENOUS BLD VENIPUNCTURE: CPT | Performed by: STUDENT IN AN ORGANIZED HEALTH CARE EDUCATION/TRAINING PROGRAM

## 2020-08-06 PROCEDURE — 80061 LIPID PANEL: CPT | Performed by: STUDENT IN AN ORGANIZED HEALTH CARE EDUCATION/TRAINING PROGRAM

## 2020-08-06 PROCEDURE — 81001 URINALYSIS AUTO W/SCOPE: CPT | Performed by: PHYSICIAN ASSISTANT

## 2020-08-06 PROCEDURE — 84443 ASSAY THYROID STIM HORMONE: CPT | Performed by: PHYSICIAN ASSISTANT

## 2020-08-06 PROCEDURE — 80053 COMPREHEN METABOLIC PANEL: CPT | Performed by: PHYSICIAN ASSISTANT

## 2020-08-06 PROCEDURE — 85025 COMPLETE CBC W/AUTO DIFF WBC: CPT | Performed by: PHYSICIAN ASSISTANT

## 2020-08-06 PROCEDURE — 82607 VITAMIN B-12: CPT | Performed by: STUDENT IN AN ORGANIZED HEALTH CARE EDUCATION/TRAINING PROGRAM

## 2020-08-06 PROCEDURE — 83036 HEMOGLOBIN GLYCOSYLATED A1C: CPT | Performed by: PHYSICIAN ASSISTANT

## 2020-08-07 LAB
ALBUMIN SERPL-MCNC: 3.7 G/DL (ref 3.4–5)
ALP SERPL-CCNC: 107 U/L (ref 40–150)
ALT SERPL W P-5'-P-CCNC: 17 U/L (ref 0–50)
ANION GAP SERPL CALCULATED.3IONS-SCNC: 6 MMOL/L (ref 3–14)
AST SERPL W P-5'-P-CCNC: 14 U/L (ref 0–45)
BILIRUB SERPL-MCNC: 0.3 MG/DL (ref 0.2–1.3)
BUN SERPL-MCNC: 14 MG/DL (ref 7–30)
CALCIUM SERPL-MCNC: 8.8 MG/DL (ref 8.5–10.1)
CHLORIDE SERPL-SCNC: 111 MMOL/L (ref 94–109)
CHOLEST SERPL-MCNC: 200 MG/DL
CO2 SERPL-SCNC: 23 MMOL/L (ref 20–32)
CREAT SERPL-MCNC: 0.92 MG/DL (ref 0.52–1.04)
DEPRECATED CALCIDIOL+CALCIFEROL SERPL-MC: 34 UG/L (ref 20–75)
FOLATE RBC-MCNC: 389 NG/ML
GFR SERPL CREATININE-BSD FRML MDRD: 70 ML/MIN/{1.73_M2}
GLUCOSE SERPL-MCNC: 85 MG/DL (ref 70–99)
HCT VFR BLD CALC: 40.9 %
HDLC SERPL-MCNC: 52 MG/DL
LDLC SERPL CALC-MCNC: 127 MG/DL
NONHDLC SERPL-MCNC: 148 MG/DL
POTASSIUM SERPL-SCNC: 4.6 MMOL/L (ref 3.4–5.3)
PROT SERPL-MCNC: 7.3 G/DL (ref 6.8–8.8)
SODIUM SERPL-SCNC: 140 MMOL/L (ref 133–144)
TRIGL SERPL-MCNC: 104 MG/DL
TSH SERPL DL<=0.005 MIU/L-ACNC: 1.87 MU/L (ref 0.4–4)

## 2020-08-11 ENCOUNTER — VIRTUAL VISIT (OUTPATIENT)
Dept: UROLOGY | Facility: CLINIC | Age: 55
End: 2020-08-11
Attending: INTERNAL MEDICINE
Payer: MEDICARE

## 2020-08-11 ENCOUNTER — PRE VISIT (OUTPATIENT)
Dept: UROLOGY | Facility: CLINIC | Age: 55
End: 2020-08-11

## 2020-08-11 ENCOUNTER — TELEPHONE (OUTPATIENT)
Dept: UROLOGY | Facility: CLINIC | Age: 55
End: 2020-08-11

## 2020-08-11 DIAGNOSIS — N39.0 RECURRENT UTI: Primary | ICD-10-CM

## 2020-08-11 DIAGNOSIS — R31.29 MICROSCOPIC HEMATURIA: ICD-10-CM

## 2020-08-11 ASSESSMENT — PAIN SCALES - GENERAL: PAINLEVEL: NO PAIN (0)
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
## 2020-08-11 NOTE — PATIENT INSTRUCTIONS
UROLOGY CLINIC VISIT PATIENT INSTRUCTIONS    Drop off a urine specimen at week or two before your cystoscopy appointment to check for infection. You can do this at any local [facility].     Follow up for a CT scan and cystoscopy with one of our urologists next available.    CYSTOSCOPY    What is a Cystoscopy?  This is a procedure done to check for problems inside the bladder.  Problems may include polyps (growths), tumors, inflammation (swelling and redness) and other concerns.    The doctor inserts a thin tube (called a cystoscope) into the bladder.  The tube is about the size of a pencil.  We will give you numbing medicine to reduce the pain or discomfort you may feel.    The tube allows the doctor to:  The doctor will be able to see inside the bladder by filling the bladder with water.  The water makes it easier to see any problems that may be present.    If needed, the doctor may use the tube to:  The doctor is able to take tissue samples (biopsies).  Samples are sent to the lab for testing.  The doctor can also burn off any small growths or tumors that are found.  This is call fulguration.    How should I get ready for the exam?  To prepare, stop taking any medications as instructed. Ask whether you should avoid eating or drinking anything after midnight before the procedure. Follow any other instructions your doctor gives you.    If you are having this procedure done at the clinic, you will be there for up to an hour.  You will receive care before and after the procedure.    Please tell your doctor if:  - You have a history of urinary tract infections.  - You know that you have a tumor in your bladder.  - You have bleeding problems.  - You have any allergies.  - You are or may be pregnant.      What happens after the exam?  You may go back to your normal diet and activity as you feel ready, unless your doctor tells you not to.    For the next two days, you may notice:  - Some blood in your  urine.  - Some burning when you urinate (use the toilet).  - An urge to urinate more often.  - Bladder spasms.    These are normal after the procedure. They should go away on their own after a day or two.      - You can help to relieve the above listed symptoms by:  - Drinking 6 to 8 large glasses of water each day (includes drinks at meals).  This will help clear the urine.  - Take warm baths to relieve pain and bladder spasms.  Do not add anything to the bath water.  - Your doctor may prescribe pain medicine.  You may also take Tylenol (acetaminophen) for pain.    When should I call my doctor?  - A fever over 100.0 F (38 C) for more than a day.  (Before you call the doctor, check your temperature under your tongue.)  - Chills.  - Failure to urinate: No urine comes out when you try to use the toilet.  (Try soaking in a bathtub full of warm water.  If still no urine, call your doctor.)  - A lot of blood in the urine or blood clots larger than a nickel.  - Pain in the back or abdomen (belly / stomach area).  - Pain or spasms that are not relieved by warm tub baths and pain medicine.  - Severe pain, burning or other problems while passing urine.  - Pain that gets worse after two days.        If you have any issues, questions or concerns in the meantime, do not hesitate to contact us at [phone] or via [email or web address].     It was a pleasure meeting with you today.  Thank you for allowing me and my team the privilege of caring for you today.  YOU are the reason we are here, and I truly hope we provided you with the excellent service you deserve.  Please let us know if there is anything else we can do for you so that we can be sure you are leaving completely satisfied with your care experience.

## 2020-08-14 DIAGNOSIS — K21.9 GASTROESOPHAGEAL REFLUX DISEASE WITHOUT ESOPHAGITIS: ICD-10-CM

## 2020-08-14 DIAGNOSIS — I82.B11 SUBCLAVIAN VEIN THROMBOSIS, RIGHT (H): ICD-10-CM

## 2020-08-18 ENCOUNTER — TELEPHONE (OUTPATIENT)
Dept: UROLOGY | Facility: CLINIC | Age: 55
End: 2020-08-18

## 2020-08-18 NOTE — TELEPHONE ENCOUNTER
[facility] Call Center    Phone Message    May a detailed message be left on voicemail: yes     Reason for Call: Other: On 8/18 Justine Kelly placed several orders for this Pt, She was to schedule a CT and  Cysto for the same day but there was no direction regarding the blood work that was ordered.  Did the Doctor want the Pt to do bloodwork asap, or prior to the Cysto on the same day to rule out infections?  Please provide direction to the Pt so she can schedule.     Action Taken: Message routed to:  [facility] ([facility] urology    Travel Screening: Not Applicable

## 2020-08-18 NOTE — TELEPHONE ENCOUNTER
LVM for Marcia stating that there is no blood work that we ordered. There is clear instructions on getting a urine sample a week prior to cystoscopy.  Elizabeth Oliver LPN

## 2020-08-19 RX ORDER — LANSOPRAZOLE 30 MG/1
30 CAPSULE, DELAYED RELEASE ORAL
Qty: 180 CAPSULE | Refills: 2 | Status: SHIPPED | OUTPATIENT
Start: 2020-08-19 | End: 2021-06-18

## 2020-08-25 ENCOUNTER — TRANSFERRED RECORDS (OUTPATIENT)
Dept: HEALTH INFORMATION MANAGEMENT | Facility: CLINIC | Age: 55
End: 2020-08-25

## 2020-09-08 ENCOUNTER — VIRTUAL VISIT (OUTPATIENT)
Dept: PSYCHIATRY | Facility: CLINIC | Age: 55
End: 2020-09-08
Attending: PSYCHIATRY & NEUROLOGY
Payer: MEDICARE

## 2020-09-08 ENCOUNTER — TELEPHONE (OUTPATIENT)
Dept: PSYCHIATRY | Facility: CLINIC | Age: 55
End: 2020-09-08

## 2020-09-08 DIAGNOSIS — F33.1 MODERATE EPISODE OF RECURRENT MAJOR DEPRESSIVE DISORDER (H): ICD-10-CM

## 2020-09-08 DIAGNOSIS — S06.9X5D TRAUMATIC BRAIN INJURY, WITH LOSS OF CONSCIOUSNESS GREATER THAN 24 HOURS WITH RETURN TO PRE-EXISTING CONSCIOUS LEVEL, SUBSEQUENT ENCOUNTER: ICD-10-CM

## 2020-09-08 DIAGNOSIS — R42 DIZZINESS AND GIDDINESS: ICD-10-CM

## 2020-09-08 RX ORDER — DULOXETIN HYDROCHLORIDE 60 MG/1
120 CAPSULE, DELAYED RELEASE ORAL EVERY EVENING
Qty: 60 CAPSULE | Refills: 1 | Status: SHIPPED | OUTPATIENT
Start: 2020-09-08 | End: 2020-10-20

## 2020-09-08 RX ORDER — LORAZEPAM 0.5 MG/1
0.5 TABLET ORAL EVERY 6 HOURS PRN
Qty: 30 TABLET | Refills: 0 | Status: CANCELLED | OUTPATIENT
Start: 2020-09-08

## 2020-09-08 RX ORDER — METHYLPHENIDATE HYDROCHLORIDE 20 MG/1
TABLET ORAL
Qty: 90 TABLET | Refills: 0 | Status: SHIPPED | OUTPATIENT
Start: 2020-09-24 | End: 2020-10-20

## 2020-09-08 RX ORDER — ARIPIPRAZOLE 30 MG/1
30 TABLET ORAL EVERY EVENING
Qty: 30 TABLET | Refills: 1 | Status: SHIPPED | OUTPATIENT
Start: 2020-09-08 | End: 2020-10-20

## 2020-09-08 RX ORDER — METHYLPHENIDATE HYDROCHLORIDE 20 MG/1
TABLET ORAL
Qty: 90 TABLET | Refills: 0 | Status: CANCELLED | OUTPATIENT
Start: 2020-09-08
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
## 2020-09-08 NOTE — TELEPHONE ENCOUNTER
On 9/8/2020, at 1032, writer called patient again at mobile to confirm Virtual Visit. Writer unable to make contact with patient. NOMAN Counsell, EMT

## 2020-09-08 NOTE — TELEPHONE ENCOUNTER
On 9/8/2020, at 10:08, writer called patient at [phone] to confirm Virtual Visit. Writer unable to make contact with patient. Writer left detailed voice message for call back. [phone] left as call back number. Dorene Kidd, [facility]

## 2020-09-08 NOTE — PATIENT INSTRUCTIONS
Nice to see you again today Marcia. As we discussed:   - Continue current medications  - Sign ALONDRA for previous psychiatrist (we will mail this to you)    ------------------------------------------------------------------------    Thank you for coming to the PSYCHIATRY CLINIC.    Lab Testing:  If you had lab testing today and your results are reassuring or normal they will be mailed to you or sent through [employer] within 7 days. If the lab tests need quick action we will call you with the results. The phone number we will call with results is # [phone] (home) . If this is not the best number please call our clinic and change the number.    Medication Refills:  If you need any refills please call your pharmacy and they will contact us. Our fax number for refills is [phone]. Please allow three business for refill processing. If you need to  your refill at a new pharmacy, please contact the new pharmacy directly. The new pharmacy will help you get your medications transferred.     Scheduling:  If you have any concerns about today's visit or wish to schedule another appointment please call our office during normal business hours [phone] (8-5:00 M-F)    Contact Us:  Please call [phone] during business hours (8-5:00 M-F).  If after clinic hours, or on the weekend, please call  [phone].    Financial Assistance [phone]  [facility] Billing [phone]  Central Billing Office, [facility] [facility]: [phone]  [facility] Billing [phone]  Medical Records [phone]      MENTAL HEALTH CRISIS NUMBERS:  For a medical emergency please call  911 or go to the nearest ER.     [facility]:   [facility] -[phone]   Crisis Residence [facility] -[phone]   Walk-In Counseling Center [facility] -[phone]   COPE 24/7 [facility] Mobile Team -[phone] (adults)/[phone] (child)  CHILD: [facility] needs assessment team - [phone]      [facility]  [place] - [phone]   Walk-in counseling [facility] - [phone]   Walk-in counseling [facility] - [phone]   Crisis Residence [facility] - [phone]  Urgent Care Adult Mental [facility]-[phone] mobile unit/ 24/7 crisis line    National Crisis Numbers:   National Suicide Prevention Lifeline: 2-310-150-TALK (554-900-0941)  Poison Control Center - 4-178-020-4739  [employer]/resources for a list of additional resources (SOS)  Trans Lifeline a hotline for transgender people 1-321.931.9776  The [employer] a hotline for LGBT youth 1-303.719.7766  Crisis Text Line: For any crisis 24/7   To: 075904  see www.crisistextline.org  - IF MAKING A CALL FEELS TOO HARD, send a text!         Again thank you for choosing PSYCHIATRY CLINIC and please let us know how we can best partner with you to improve you and your family's health.    You may be receiving a survey regarding this appointment. We would love to have your feedback, both positive and negative. The survey is done by an external company, so your answers are anonymous.

## 2020-09-23 ENCOUNTER — PRE VISIT (OUTPATIENT)
Dept: UROLOGY | Facility: CLINIC | Age: 55
End: 2020-09-23

## 2020-09-23 NOTE — TELEPHONE ENCOUNTER
Reason for visit: cystoscopy      Relevant information: Zainab, incontinence    Records/imaging/labs/orders: pt saw Justine Kelly    Pt called: no need for a call    At Rooming: dip

## 2020-09-30 DIAGNOSIS — R31.29 MICROSCOPIC HEMATURIA: ICD-10-CM

## 2020-09-30 DIAGNOSIS — N39.0 RECURRENT UTI: ICD-10-CM

## 2020-09-30 LAB
ALBUMIN UR-MCNC: NEGATIVE MG/DL
AMORPH CRY #/AREA URNS HPF: ABNORMAL /HPF
APPEARANCE UR: ABNORMAL
BILIRUB UR QL STRIP: NEGATIVE
COLOR UR AUTO: YELLOW
GLUCOSE UR STRIP-MCNC: NEGATIVE MG/DL
HGB UR QL STRIP: NEGATIVE
KETONES UR STRIP-MCNC: NEGATIVE MG/DL
LEUKOCYTE ESTERASE UR QL STRIP: NEGATIVE
NITRATE UR QL: NEGATIVE
PH UR STRIP: 8.5 PH (ref 5–7)
RBC #/AREA URNS AUTO: ABNORMAL /HPF
SOURCE: ABNORMAL
SP GR UR STRIP: 1.02 (ref 1–1.03)
UROBILINOGEN UR STRIP-ACNC: 0.2 EU/DL (ref 0.2–1)
WBC #/AREA URNS AUTO: ABNORMAL /HPF

## 2020-09-30 PROCEDURE — 81001 URINALYSIS AUTO W/SCOPE: CPT | Performed by: PHYSICIAN ASSISTANT

## 2020-09-30 PROCEDURE — 87086 URINE CULTURE/COLONY COUNT: CPT | Performed by: PHYSICIAN ASSISTANT

## 2020-10-01 ENCOUNTER — ANCILLARY PROCEDURE (OUTPATIENT)
Dept: CT IMAGING | Facility: CLINIC | Age: 55
End: 2020-10-01
Attending: PHYSICIAN ASSISTANT
Payer: MEDICARE

## 2020-10-01 ENCOUNTER — OFFICE VISIT (OUTPATIENT)
Dept: UROLOGY | Facility: CLINIC | Age: 55
End: 2020-10-01
Payer: MEDICARE

## 2020-10-01 VITALS — DIASTOLIC BLOOD PRESSURE: 93 MMHG | SYSTOLIC BLOOD PRESSURE: 135 MMHG | HEART RATE: 94 BPM

## 2020-10-01 DIAGNOSIS — S06.9X5D TRAUMATIC BRAIN INJURY, WITH LOSS OF CONSCIOUSNESS GREATER THAN 24 HOURS WITH RETURN TO PRE-EXISTING CONSCIOUS LEVEL, SUBSEQUENT ENCOUNTER: ICD-10-CM

## 2020-10-01 DIAGNOSIS — K59.00 CONSTIPATION, UNSPECIFIED CONSTIPATION TYPE: ICD-10-CM

## 2020-10-01 DIAGNOSIS — M62.89 PELVIC FLOOR DYSFUNCTION: ICD-10-CM

## 2020-10-01 DIAGNOSIS — N39.0 RECURRENT UTI: ICD-10-CM

## 2020-10-01 DIAGNOSIS — R31.29 MICROSCOPIC HEMATURIA: ICD-10-CM

## 2020-10-01 DIAGNOSIS — N39.41 URGENCY INCONTINENCE: ICD-10-CM

## 2020-10-01 DIAGNOSIS — N39.0 RECURRENT UTI: Primary | ICD-10-CM

## 2020-10-01 LAB
APPEARANCE UR: CLEAR
BACTERIA SPEC CULT: NORMAL
BILIRUB UR QL: NORMAL
COLOR UR: YELLOW
CREAT BLD-MCNC: 0.8 MG/DL (ref 0.52–1.04)
GFR SERPL CREATININE-BSD FRML MDRD: 75 ML/MIN/{1.73_M2}
GLUCOSE URINE: NORMAL MG/DL
HGB UR QL: NORMAL
KETONES UR QL: NORMAL MG/DL
LEUKOCYTE ESTERASE URINE: NORMAL
NITRITE UR QL STRIP: NORMAL
PH UR STRIP: 6.5 PH (ref 5–7)
PROTEIN ALBUMIN URINE: NORMAL MG/DL
SOURCE: NORMAL
SP GR UR STRIP: 1 (ref 1–1.03)
SPECIMEN SOURCE: NORMAL
UROBILINOGEN UR QL STRIP: 0.2 EU/DL (ref 0.2–1)

## 2020-10-01 PROCEDURE — 81003 URINALYSIS AUTO W/O SCOPE: CPT | Performed by: PATHOLOGY

## 2020-10-01 PROCEDURE — 36415 COLL VENOUS BLD VENIPUNCTURE: CPT | Performed by: PATHOLOGY

## 2020-10-01 PROCEDURE — 82565 ASSAY OF CREATININE: CPT | Performed by: PATHOLOGY

## 2020-10-01 PROCEDURE — 74178 CT ABD&PLV WO CNTR FLWD CNTR: CPT | Performed by: RADIOLOGY

## 2020-10-01 RX ORDER — METOPROLOL TARTRATE 25 MG/1
25 TABLET, FILM COATED ORAL
COMMUNITY
End: 2021-06-23

## 2020-10-01 RX ORDER — BUTALBITAL, ACETAMINOPHEN AND CAFFEINE 50; 325; 40 MG/1; MG/1; MG/1
TABLET ORAL
COMMUNITY
Start: 2020-07-02

## 2020-10-01 RX ORDER — ZINC GLUCONATE 50 MG
50 TABLET ORAL
Status: ON HOLD | COMMUNITY
End: 2022-01-12

## 2020-10-01 RX ORDER — CEFUROXIME AXETIL 250 MG/1
TABLET ORAL
COMMUNITY
Start: 2020-02-13

## 2020-10-01 RX ORDER — LIDOCAINE HYDROCHLORIDE 20 MG/ML
JELLY TOPICAL ONCE
Status: COMPLETED | OUTPATIENT
Start: 2020-10-01 | End: 2020-10-01

## 2020-10-01 RX ORDER — IOPAMIDOL 755 MG/ML
78 INJECTION, SOLUTION INTRAVASCULAR ONCE
Status: COMPLETED | OUTPATIENT
Start: 2020-10-01 | End: 2020-10-01

## 2020-10-01 RX ADMIN — IOPAMIDOL 78 ML: 755 INJECTION, SOLUTION INTRAVASCULAR at 12:01

## 2020-10-01 RX ADMIN — LIDOCAINE HYDROCHLORIDE: 20 JELLY TOPICAL at 13:10

## 2020-10-01 ASSESSMENT — PATIENT HEALTH QUESTIONNAIRE - PHQ9: SUM OF ALL RESPONSES TO PHQ QUESTIONS 1-9: 0

## 2020-10-01 ASSESSMENT — PAIN SCALES - GENERAL: PAINLEVEL: MILD PAIN (2)

## 2020-10-01 NOTE — NURSING NOTE
Chief Complaint   Patient presents with     Cystoscopy     Zainab, Incontinence       Blood pressure (!) 135/93, pulse 94, not currently breastfeeding. There is no height or weight on file to calculate BMI.    Patient Active Problem List   Diagnosis     SVC syndrome     Migraine headache     Depression     Chronic anticoagulation     Subclavian vein thrombosis, right (H)     Subclavian vein stenosis     Pain     Superior vena cava stenosis     Chest wall abscess     Sternal pain     Iron deficiency anemia     Intestinal malabsorption     Post-op pain     Migraine     Postoperative nausea     Nausea     Headache     AC separation     Acute blood loss anemia     Carpal tunnel syndrome     Closed fracture of clavicle     Closed fracture of metacarpal bone     Compression of vein     Constipation     Crushing injury of upper arm     Diffuse cystic mastopathy     Disorder of rotator cuff     Dysfunction of thyroid     Anorexia nervosa     Eating disorder     Endometriosis     Essential hypertension     Fever     Finger stiffness     Gastroesophageal reflux disease     History of basal cell carcinoma     Hypertensive heart and chronic kidney disease stage 2     Impaired cognition     Intestinal bleeding     Irritable bowel syndrome     Major depressive disorder, recurrent episode (H)     Median nerve dysfunction     Non-healing surgical wound     Obsessive-compulsive disorder     Other acne     Left shoulder pain     Pectus excavatum     Personality disorder (H)     Postprocedural hypotension     Prolonged QT interval     Pulmonary embolism and infarction (H)     Recurrent major depressive disorder, in full remission (H)     Status post skin graft     Subdural hematoma (H)     Traumatic brain injury (H)     Vitamin D deficiency       Allergies   Allergen Reactions     Droperidol Anxiety and Palpitations     Phenergan Dm [Promethazine-Dm] Rash     Aimovig [Erenumab-Aooe]      Pt reports swelling and rash      Androgens       Pt is unsure.  She was told to avoid them     Bicitra [Citric Acid-Sodium Citrate] Nausea and Vomiting     SOLN     D.H.E. 45 [Dihydroergotamine Mesylate] Nausea     SOLN     Depakote [Divalproex Sodium] Other (See Comments)     Exacerbate depression     Metoclopramide Hcl Nausea and Vomiting     Verapamil Hcl Cr Other (See Comments)     CP24; hypotension     Dihydroergotamine Nausea and Rash     SOLN  PN: LW Reaction: Nausea, vomitting   (DHE)     Olanzapine Rash     Prochlorperazine Maleate Rash       Current Outpatient Medications   Medication Sig Dispense Refill     LANsoprazole (PREVACID) 30 MG DR capsule Take 1 capsule (30 mg) by mouth 2 times daily 180 capsule 2     oxyCODONE (OXY-IR) 5 MG capsule Take 5-10 mg by mouth every 4 hours as needed (severe chronic migraine)       oxyCODONE (OXYCONTIN) 10 MG 12 hr tablet Take 1 tablet (10 mg) by mouth every 12 hours (Patient taking differently: Take 10 mg by mouth every 12 hours as needed ) 30 tablet 0     alendronate (FOSAMAX) 70 MG tablet Take 1 tablet (70 mg) by mouth every 7 days Take with a full glass of water and do not eat or lay down for 30 minutes 12 tablet 3     apixaban ANTICOAGULANT (ELIQUIS) 5 MG tablet Take 5 mg by mouth 2 times daily Managed by Kenneth Villarreal per patient.       ARIPiprazole (ABILIFY) 30 MG tablet Take 1 tablet (30 mg) by mouth every evening 30 tablet 1     aspirin (EQL ASPIRIN LOW DOSE) 81 MG chewable tablet Take 1 tablet (81 mg) by mouth daily (Patient taking differently: Take 81 mg by mouth every evening ) 36 tablet 9     B Complex Vitamins (B COMPLEX 1 PO) Take 1 tablet by mouth daily.       butalbital-acetaminophen-caffeine (ESGIC) -40 MG tablet Take 1-2 tablets by mouth at onset of headache. May repeat 1-2 tablets after 4 hrs. Max 6 tabets in 24 hrs. LIMIT to 2 days a week.       Cholecalciferol (VITAMIN D) 1000 UNITS capsule Take 2,000 Units by mouth daily        DULoxetine (CYMBALTA) 60 MG capsule Take 2 capsules (120 mg) by  mouth every evening 60 capsule 1     enoxaparin (LOVENOX) 80 MG/0.8ML syringe Take 1 syringe/day for 3 days prior to colonoscopy when not taking apixaban 3 Syringe 1     galcanezumab-gnlm (EMGALITY) 120 MG/ML injection Inject 120 mg Subcutaneous every 28 days       HYDROmorphone (DILAUDID) 3 MG Suppository Place 3 mg rectally every 8 hours as needed        LORazepam (ATIVAN) 0.5 MG tablet Take 1 tablet by mouth every 6 hours as needed (for dizziness ). 30 tablet 0     magnesium 250 MG tablet Take 1 tablet by mouth 4 times daily        Magnesium Oxide -Mg Supplement 400 MG CAPS Take 400 mg by mouth       methylphenidate (RITALIN) 20 MG tablet Take 2 tablets in the morning and 1 tablet at lunchtime. 90 tablet 0     methylphenidate (RITALIN) 20 MG tablet Take 2 tablets in the morning and 1 tablet at lunchtime 90 tablet 0     metoprolol succinate ER (TOPROL-XL) 25 MG 24 hr tablet Take 1 tablet (25 mg) by mouth daily 90 tablet 3     metoprolol tartrate (LOPRESSOR) 25 MG tablet Take 25 mg by mouth       Ondansetron (ZUPLENZ) 8 MG FILM Take 8 mg by mouth every 6 hours as needed.       riboflavin (VITAMIN  B-2) 100 MG TABS tablet Take 400 mg by mouth daily        senna-docusate (SENOKOT-S;PERICOLACE) 8.6-50 MG per tablet Take 1-2 tablets by mouth 2 times daily. (Patient taking differently: Take 1-2 tablets by mouth 2 times daily as needed ) 60 tablet 1     spironolactone (ALDACTONE) 25 MG tablet Take 25 mg by mouth every evening        SUMAtriptan (IMITREX STATDOSE) 6 MG/0.5ML pen injector kit 1 injection at onset of migraine. May repeat once after 2 hrs. Max 2 injections in 24 hrs. LIMIT TO 2 days a week.  (#10 for 30 days)       SUMAtriptan Succinate 6 MG/0.5ML KIT Inject 1 dose Subcutaneous at onset of headache. Indications: Migraine Headache       tolterodine ER (DETROL LA) 4 MG 24 hr capsule Take 1 capsule (4 mg) by mouth daily 90 capsule 2     topiramate (TOPAMAX) 100 MG tablet Take 100 mg by mouth in the morning and  take 200 mg by mouth in the evening.       Zinc 50 MG CAPS Take 1 tablet by mouth daily.       zinc gluconate 50 MG tablet Take 50 mg by mouth         Social History     Tobacco Use     Smoking status: Never Smoker     Smokeless tobacco: Never Used   Substance Use Topics     Alcohol use: No     Drug use: No       Invasive Procedure Safety Checklist:    Procedure: Cystoscopy    Action: Complete sections and checkboxes as appropriate.    Pre-procedure:  1. Patient ID Verified with 2 identifiers (Negin and  or MRN) : YES    2. Procedure and site verified with patient/designee (when able) : YES    3. Accurate consent documentation in medical record : YES    4. H&P (or appropriate assessment) documented in medical record : N/A  H&P must be up to 30 days prior to procedure an updated within 24 hours of                 Procedure as applicable.     5. Relevant diagnostic and radiology test results appropriately labeled and displayed as applicable : YES    6. Blood products, implants, devices, and/or special equipment available for the procedure as applicable : YES    7. Procedure site(s) marked with provider initials [Exclusions: none] : NO    8. Marking not required. Reason : Yes  Procedure does not require site marking    Time Out:     Time-Out performed immediately prior to starting procedure, including verbal and active participation of all team members addressing: YES    1. Correct patient identity.  2. Confirmed that the correct side and site are marked.  3. An accurate procedure to be done.  4. Agreement on the procedure to be done.  5. Correct patient position.  6. Relevant images and results are properly labeled and appropriately displayed.  7. The need to administer antibiotics or fluids for irrigation purposes during the procedure as applicable.  8. Safety precautions based on patient history or medication use.    During Procedure: Verification of correct person, site, and procedure occurs any time the  responsibility for care of the patient is transferred to another member of the care team.    The following medication was given:     MEDICATION: Lidocaine Uro-Jet 2% 200mg (20mg/mL)  ROUTE: Urethral   SITE: Urethra   DOSE: 10mL  LOT #: [id number]  : [employer]   EXPIRATION DATE: 6-22  NDC#: [address]-27   Was there drug waste? No    Prior to injection, verified patient identity using patient's name and date of birth.  Due to injection administration, patient instructed to remain in clinic for 15 minutes  afterwards, and to report any adverse reaction to me immediately.    Drug Amount Wasted:  None.  Vial/Syringe: Single dose vial      MICKI Gil  10/1/2020  12:46 PM

## 2020-10-01 NOTE — PATIENT INSTRUCTIONS
"Please use a daily fiber supplement that you mix in the water for constipation    Websites with free information:    [employer] patient website: www.voicesforpfd.org    Total Control Program: www.totalcontrolprogram.com    Please see one of the dedicated pelvic floor physical therapist ([facility] [phone])    Please contact us if you think you have an infection [phone]    Please return to see me in 6 months, sooner if needed    It was a pleasure meeting with you today.  Thank you for allowing me and my team the privilege of caring for you today.  YOU are the reason we are here, and I truly hope we provided you with the excellent service you deserve.  Please let us know if there is anything else we can do for you so that we can be sure you are leaving completely satisfied with your care experience.    AFTER YOUR CYSTOSCOPY        You have just completed a cystoscopy, or \"cysto\", which allowed your physician to learn more about your bladder (or to remove a stent placed after surgery). We suggest that you continue to avoid caffeine, fruit juice, and alcohol for the next 24 hours, however, you are encouraged to return to your normal activities.         A few things that are considered normal after your cystoscopy:     * Small amount of bleeding (or spotting) that clears within the next 24 hours     * Slight burning sensation with urination     * Sensation to of needing to avoid more frequently     * The feeling of \"air\" in your urine     * Mild discomfort that is relieved with Tylenol        Please contact our office promptly if you:     * Develop a fever above 101 degrees     * Are unable to urinate     * Develop bright red blood that does not stop     * Severe pain or swelling         Please contact our office with any concerns or questions @[phone]  "

## 2020-10-01 NOTE — LETTER
10/1/2020       RE: Sherly Yeugn  [address]  [facility] [address]     Dear Colleague,    Thank you for referring your patient, Sherly Yeung, to the [facility] [place] at [facility]. Please see a copy of my visit note below.    October 1, 2020    Return visit    Patient returns today for follow up of recurrent UTIs and microscopic hematuria.  She had a CT Urogram today - final read pending but no obvious abnormalities aside from constipation and uterine fibroids. She has not had urine cytology. She denies any changes in her health since last visit.    BP (!) 135/93 (BP Location: Left arm, Patient Position: Sitting)   Pulse 94   She is comfortable, in no distress, non-labored breathing.  Abdomen is soft, non-tender, non-distended.  Normal external female genitalia.  Negative CST.  Pelvic exam is remarkable for inability to engage pelvic floor musculature    Cystoscopy Note: After informed consent was obtained patient was prepped and draped in the standard fashion.  The flexible cystoscope was inserted into a normal appearing urethral meatus.  The urothelium was carefully examined and there were no tumors, masses, stones, foreign bodies, or other urothelial abnormalities noted.  Bilateral ureteral orifices were noted in the normal orthotopic position and both effluxed clera urine.  The cystoscope was retroflexed and the bladder neck was unremarkable.  The urethra was carefully examined upon removing the cystoscope and was unremarkable.  Patient tolerated the procedure without complications noted.      CT images reviewed by me without obvious nidus for the UTI or hematuria    A/P: 54 year old F with history of TBI and recureent UTI, microscopic hematuria s/p negative evaluation, constipation, and worsening urinary urgency incontinence, pelvic floor dysfunction    Fiber for the constipation    We discussed how her pelvic floor symptoms are related to  the physical exam findings and her pelvic floor myofascial dysfunction.  We discussed how the recommended treatment is dedicated pelvic floor therapy.  We discussed how the pelvic floor physical therapy works and patient is agreeable.  Referral was placed.      RTC 6 months, sooner if needed    Addendum:    The patient was seen and evaluated with the resident.  I was present for the entire cystoscopy.  The plan was formulated in conjunction with me and I agree with the above note with changes made as necessary.    Anne Youngblood MD MPH   of Urology    CC  Patient Care Team:  Chadwick Mg MD as PCP - [facility], Floyd JACKSON MD as MD (Internal Medicine)  Clemente Garsia MD as MD (Family Medicine - Sports Medicine)  Chadwick Mg MD as Assigned PCP  Justine Kelly PA-C as Physician Assistant (Physician Assistant)  Kimberly Yun, RN as Specialty Care Coordinator (Urology)  Anne Youngblood MD as MD (Urology)  SELF, REFERRED

## 2020-10-01 NOTE — PROGRESS NOTES
October 1, 2020    Return visit    Patient returns today for follow up of recurrent UTIs and microscopic hematuria.  She had a CT Urogram today - final read pending but no obvious abnormalities aside from constipation and uterine fibroids. She has not had urine cytology. She denies any changes in her health since last visit.    BP (!) 135/93 (BP Location: Left arm, Patient Position: Sitting)   Pulse 94   She is comfortable, in no distress, non-labored breathing.  Abdomen is soft, non-tender, non-distended.  Normal external female genitalia.  Negative CST.  Pelvic exam is remarkable for inability to engage pelvic floor musculature    Cystoscopy Note: After informed consent was obtained patient was prepped and draped in the standard fashion.  The flexible cystoscope was inserted into a normal appearing urethral meatus.  The urothelium was carefully examined and there were no tumors, masses, stones, foreign bodies, or other urothelial abnormalities noted.  Bilateral ureteral orifices were noted in the normal orthotopic position and both effluxed clera urine.  The cystoscope was retroflexed and the bladder neck was unremarkable.  The urethra was carefully examined upon removing the cystoscope and was unremarkable.  Patient tolerated the procedure without complications noted.      CT images reviewed by me without obvious nidus for the UTI or hematuria    A/P: 54 year old F with history of TBI and recureent UTI, microscopic hematuria s/p negative evaluation, constipation, and worsening urinary urgency incontinence, pelvic floor dysfunction    Fiber for the constipation    We discussed how her pelvic floor symptoms are related to the physical exam findings and her pelvic floor myofascial dysfunction.  We discussed how the recommended treatment is dedicated pelvic floor therapy.  We discussed how the pelvic floor physical therapy works and patient is agreeable.  Referral was placed.      RTC 6 months, sooner if  needed    Addendum:    The patient was seen and evaluated with the resident.  I was present for the entire cystoscopy.  The plan was formulated in conjunction with me and I agree with the above note with changes made as necessary.    Anne Youngblood MD MPH   of Urology    CC  Patient Care Team:  Chadwick Mg MD as PCP - [facility], Floyd JACKSON MD as MD (Internal Medicine)  Clemente Garsia MD as MD (Family Medicine - Sports Medicine)  Chadwick Mg MD as Assigned PCP  Justine Kelly PA-C as Physician Assistant (Physician Assistant)  Kimberly Yun, RN as Specialty Care Coordinator (Urology)  Anne Youngblood MD as MD (Urology)  SELF, REFERRED

## 2020-10-06 PROBLEM — N39.41 URGENCY INCONTINENCE: Status: ACTIVE | Noted: 2020-10-06

## 2020-10-06 PROBLEM — N39.0 RECURRENT UTI: Status: ACTIVE | Noted: 2020-10-06

## 2020-10-06 PROBLEM — R31.29 MICROSCOPIC HEMATURIA: Status: ACTIVE | Noted: 2020-10-06

## 2020-10-06 PROBLEM — M62.89 PELVIC FLOOR DYSFUNCTION: Status: ACTIVE | Noted: 2020-10-06

## 2020-10-14 ENCOUNTER — TRANSFERRED RECORDS (OUTPATIENT)
Dept: HEALTH INFORMATION MANAGEMENT | Facility: CLINIC | Age: 55
End: 2020-10-14

## 2020-10-20 ENCOUNTER — VIRTUAL VISIT (OUTPATIENT)
Dept: PSYCHIATRY | Facility: CLINIC | Age: 55
End: 2020-10-20
Attending: PSYCHIATRY & NEUROLOGY
Payer: MEDICARE

## 2020-10-20 DIAGNOSIS — F33.1 MODERATE EPISODE OF RECURRENT MAJOR DEPRESSIVE DISORDER (H): ICD-10-CM

## 2020-10-20 DIAGNOSIS — F33.42 RECURRENT MAJOR DEPRESSIVE DISORDER, IN FULL REMISSION (H): Primary | ICD-10-CM

## 2020-10-20 DIAGNOSIS — S06.9X5D TRAUMATIC BRAIN INJURY, WITH LOSS OF CONSCIOUSNESS GREATER THAN 24 HOURS WITH RETURN TO PRE-EXISTING CONSCIOUS LEVEL, SUBSEQUENT ENCOUNTER: ICD-10-CM

## 2020-10-20 PROBLEM — F60.9 PERSONALITY DISORDER (H): Status: RESOLVED | Noted: 2019-07-19 | Resolved: 2020-10-20

## 2020-10-20 PROBLEM — S06.5XAA SUBDURAL HEMATOMA (H): Status: RESOLVED | Noted: 2019-10-08 | Resolved: 2020-10-20

## 2020-10-20 PROBLEM — F50.00 ANOREXIA NERVOSA (H): Status: RESOLVED | Noted: 2019-07-19 | Resolved: 2020-10-20

## 2020-10-20 PROBLEM — K92.2 INTESTINAL BLEEDING: Status: RESOLVED | Noted: 2019-08-09 | Resolved: 2020-10-20

## 2020-10-20 PROCEDURE — 99207 PR SERVICE NOT STAFFED W/SUPERV PROV: CPT | Performed by: STUDENT IN AN ORGANIZED HEALTH CARE EDUCATION/TRAINING PROGRAM

## 2020-10-20 RX ORDER — METHYLPHENIDATE HYDROCHLORIDE 20 MG/1
TABLET ORAL
Qty: 90 TABLET | Refills: 0 | Status: SHIPPED | OUTPATIENT
Start: 2020-10-20 | End: 2021-01-04

## 2020-10-20 RX ORDER — ARIPIPRAZOLE 30 MG/1
30 TABLET ORAL EVERY EVENING
Qty: 30 TABLET | Refills: 2 | Status: SHIPPED | OUTPATIENT
Start: 2020-10-20 | End: 2021-01-04

## 2020-10-20 RX ORDER — DULOXETIN HYDROCHLORIDE 60 MG/1
120 CAPSULE, DELAYED RELEASE ORAL EVERY EVENING
Qty: 60 CAPSULE | Refills: 2 | Status: SHIPPED | OUTPATIENT
Start: 2020-10-20 | End: 2021-01-04

## 2020-10-20 RX ORDER — METHYLPHENIDATE HYDROCHLORIDE 20 MG/1
TABLET ORAL
Qty: 90 TABLET | Refills: 0 | Status: SHIPPED | OUTPATIENT
Start: 2020-11-20 | End: 2021-01-04

## 2020-10-20 ASSESSMENT — PAIN SCALES - GENERAL: PAINLEVEL: MILD PAIN (3)
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
## 2020-10-20 NOTE — PATIENT INSTRUCTIONS
Nice to see you again today Marcia. As we discussed:   - Continue current medications    ------------------------------------------------------------------------    Thank you for coming to the PSYCHIATRY CLINIC.    Lab Testing:  If you had lab testing today and your results are reassuring or normal they will be mailed to you or sent through [email or web address] within 7 days. If the lab tests need quick action we will call you with the results. The phone number we will call with results is # [phone] (home) . If this is not the best number please call our clinic and change the number.    Medication Refills:  If you need any refills please call your pharmacy and they will contact us. Our fax number for refills is [phone]. Please allow three business for refill processing. If you need to  your refill at a new pharmacy, please contact the new pharmacy directly. The new pharmacy will help you get your medications transferred.     Scheduling:  If you have any concerns about today's visit or wish to schedule another appointment please call our office during normal business hours [phone] (8-5:00 M-F)    Contact Us:  Please call [phone] during business hours (8-5:00 M-F).  If after clinic hours, or on the weekend, please call  [phone].    Financial Assistance [phone]  [facility] Billing [phone]  Central Billing Office, [facility]: [phone]  [facility] Billing [phone]  Medical Records [phone]      MENTAL HEALTH CRISIS NUMBERS:  For a medical emergency please call  [phone] or go to the nearest ER.     [facility]:   [facility] -[phone]   Crisis Residence [facility] -[phone]   Walk-In Counseling [facility] -[phone]   COPE 24/7 [facility] Mobile Team -[phone] (adults)/[phone] (child)  CHILD: [facility] needs assessment team - [phone]      [facility] - [phone]   Walk-in [facility]  [place] - [phone]   Walk-in counseling [facility] - [phone]   Crisis Residence [facility] - [phone]  Urgent Care Adult Mental Rlufxn-[phone] mobile unit/ 24/7 crisis line    National Crisis Numbers:   National Suicide Prevention Lifeline: 9-392-723-TALK (327-295-9029)  Poison Control Center - 1-307.696.7815  The Interest Network/resources for a list of additional resources (SOS)  Trans Lifeline a hotline for transgender people 1-939-156-0549  The Suresh Project a hotline for LGBT youth 1-636.782.5329  Crisis Text Line: For any crisis 24/7   To: 161986  see www.crisistextline.org  - IF MAKING A CALL FEELS TOO HARD, send a text!         Again thank you for choosing PSYCHIATRY CLINIC and please let us know how we can best partner with you to improve you and your family's health.    You may be receiving a survey regarding this appointment. We would love to have your feedback, both positive and negative. The survey is done by an external company, so your answers are anonymous.

## 2020-10-20 NOTE — PROGRESS NOTES
"VIDEO VISIT  Sherly Yeung is a 54 year old patient who is being evaluated via a billable video visit.      The patient has been notified of following:   \"This video visit will be conducted via a call between you and your physician/provider. We have found that certain health care needs can be provided without the need for an in-person physical exam. This service lets us provide the care you need with a video conversation. If a prescription is necessary we can send it directly to your pharmacy. If lab work is needed we can place an order for that and you can then stop by our lab to have the test done at a later time. Insurers are generally covering virtual visits as they would in-office visits so billing should not be different than normal.  If for some reason you do get billed incorrectly, you should contact the billing office to correct it and that number is in the AVS .    Video Conference to be completed via:  Ruth    Patient has given verbal consent for video visit?:  Yes    Patient would prefer that any video invitations be sent by: Send to e-mail at: [email or web address]      How would patient like to obtain AVS?:  Kirstin    AVS SmartPhrase [PsychAVS] has been placed in 'Patient Instructions':  Yes    "

## 2020-10-21 DIAGNOSIS — I87.1 SVC SYNDROME: ICD-10-CM

## 2020-10-22 ENCOUNTER — MYC MEDICAL ADVICE (OUTPATIENT)
Dept: INTERNAL MEDICINE | Facility: CLINIC | Age: 55
End: 2020-10-22

## 2020-10-23 RX ORDER — ASPIRIN 81 MG/1
81 TABLET, CHEWABLE ORAL DAILY
Qty: 36 TABLET | Refills: 9 | Status: SHIPPED | OUTPATIENT
Start: 2020-10-23 | End: 2022-03-16

## 2020-10-23 NOTE — TELEPHONE ENCOUNTER
Aspirin Oral Tablet Chewable 81 MG   Last Written Prescription Date:  9/20/2019  Last Fill Quantity: 36,   # refills: 9  Last Office Visit : 3/25/2020  Future Office visit:  12/2/2020  36 Tabs, 9 Refills sent to pharm 10/22/2020    Olivia Khoury RN  Central Triage Red Flags/Med Refills  Warnings Override History for aspirin (EQL ASPIRIN LOW DOSE) 81 MG chewable tablet [[id number]]    Overridden by Za Barrera RN on Sep 20, 2019 10:06 AM   Drug-Drug   1. SALICYLATES / DIRECT FACTOR XA INHIBITORS [Level: Major] [Reason: Tolerated medication/side effects in past]   Other Orders: apixaban ANTICOAGULANT (ELIQUIS) 5 MG tablet

## 2020-10-27 ENCOUNTER — RECORDS - HEALTHEAST (OUTPATIENT)
Dept: RADIOLOGY | Facility: CLINIC | Age: 55
End: 2020-10-27

## 2020-11-12 ENCOUNTER — TRANSFERRED RECORDS (OUTPATIENT)
Dept: HEALTH INFORMATION MANAGEMENT | Facility: CLINIC | Age: 55
End: 2020-11-12

## 2020-11-29 ENCOUNTER — HEALTH MAINTENANCE LETTER (OUTPATIENT)
Age: 55
End: 2020-11-29

## 2020-12-02 ENCOUNTER — OFFICE VISIT (OUTPATIENT)
Dept: INTERNAL MEDICINE | Facility: CLINIC | Age: 55
End: 2020-12-02
Payer: MEDICARE

## 2020-12-02 VITALS
DIASTOLIC BLOOD PRESSURE: 79 MMHG | BODY MASS INDEX: 22.82 KG/M2 | SYSTOLIC BLOOD PRESSURE: 115 MMHG | TEMPERATURE: 98.1 F | HEIGHT: 63 IN | WEIGHT: 128.8 LBS | HEART RATE: 52 BPM | OXYGEN SATURATION: 97 % | RESPIRATION RATE: 16 BRPM

## 2020-12-02 DIAGNOSIS — I87.1 SUBCLAVIAN VEIN STENOSIS: Primary | ICD-10-CM

## 2020-12-02 DIAGNOSIS — I82.B11 SUBCLAVIAN VEIN THROMBOSIS, RIGHT (H): ICD-10-CM

## 2020-12-02 PROCEDURE — 99214 OFFICE O/P EST MOD 30 MIN: CPT | Performed by: INTERNAL MEDICINE

## 2020-12-02 ASSESSMENT — MIFFLIN-ST. JEOR: SCORE: 1148.36

## 2020-12-02 ASSESSMENT — PAIN SCALES - GENERAL: PAINLEVEL: NO PAIN (0)

## 2020-12-02 NOTE — NURSING NOTE
Chief Complaint   Patient presents with     Hospital F/U     Patient comes in for a hospital follow up.          Nazario Root MA on 12/2/2020 at 1:58 PM
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
## 2020-12-02 NOTE — PATIENT INSTRUCTIONS
[facility] Medication Refill Request Information:  * Please contact your pharmacy regarding ANY request for medication refills.  ** [facility] Prescription Fax = [phone]  * Please allow 3 business days for routine medication refills.  * Please allow 5 business days for controlled substance medication refills.     [facility] Test Result notification information:  *You will be notified with in 7-10 days of your appointment day regarding the results of your test.  If you are on MyChart you will be notified as soon as the provider has reviewed the results and signed off on them.    [facility]: [phone]

## 2020-12-04 DIAGNOSIS — N39.41 URGE INCONTINENCE: ICD-10-CM

## 2020-12-07 RX ORDER — TOLTERODINE 4 MG/1
4 CAPSULE, EXTENDED RELEASE ORAL DAILY
Qty: 90 CAPSULE | Refills: 3 | Status: SHIPPED | OUTPATIENT
Start: 2020-12-07 | End: 2022-05-16

## 2021-01-04 ENCOUNTER — TELEPHONE (OUTPATIENT)
Dept: PSYCHIATRY | Facility: CLINIC | Age: 56
End: 2021-01-04

## 2021-01-04 ENCOUNTER — VIRTUAL VISIT (OUTPATIENT)
Dept: PSYCHIATRY | Facility: CLINIC | Age: 56
End: 2021-01-04
Attending: PSYCHIATRY & NEUROLOGY
Payer: MEDICARE

## 2021-01-04 DIAGNOSIS — F41.0 ANXIETY ATTACK: Primary | ICD-10-CM

## 2021-01-04 DIAGNOSIS — F33.42 RECURRENT MAJOR DEPRESSIVE DISORDER, IN FULL REMISSION (H): ICD-10-CM

## 2021-01-04 DIAGNOSIS — R42 DIZZINESS AND GIDDINESS: ICD-10-CM

## 2021-01-04 DIAGNOSIS — F33.1 MODERATE EPISODE OF RECURRENT MAJOR DEPRESSIVE DISORDER (H): ICD-10-CM

## 2021-01-04 DIAGNOSIS — F41.9 ANXIETY: ICD-10-CM

## 2021-01-04 DIAGNOSIS — F41.0 PANIC ATTACK: ICD-10-CM

## 2021-01-04 DIAGNOSIS — S06.9X5D TRAUMATIC BRAIN INJURY, WITH LOSS OF CONSCIOUSNESS GREATER THAN 24 HOURS WITH RETURN TO PRE-EXISTING CONSCIOUS LEVEL, SUBSEQUENT ENCOUNTER: ICD-10-CM

## 2021-01-04 PROCEDURE — 99215 OFFICE O/P EST HI 40 MIN: CPT | Mod: GC | Performed by: STUDENT IN AN ORGANIZED HEALTH CARE EDUCATION/TRAINING PROGRAM

## 2021-01-04 RX ORDER — ARIPIPRAZOLE 20 MG/1
20 TABLET ORAL EVERY EVENING
Qty: 30 TABLET | Refills: 3 | Status: SHIPPED | OUTPATIENT
Start: 2021-01-04 | End: 2021-03-01

## 2021-01-04 RX ORDER — METHYLPHENIDATE HYDROCHLORIDE 20 MG/1
TABLET ORAL
Qty: 90 TABLET | Refills: 0 | Status: SHIPPED | OUTPATIENT
Start: 2021-01-04 | End: 2021-03-01

## 2021-01-04 RX ORDER — QUETIAPINE FUMARATE 50 MG/1
50 TABLET, FILM COATED ORAL DAILY PRN
Qty: 30 TABLET | Refills: 0 | Status: SHIPPED | OUTPATIENT
Start: 2021-01-04 | End: 2022-03-16

## 2021-01-04 RX ORDER — METHYLPHENIDATE HYDROCHLORIDE 20 MG/1
TABLET ORAL
Qty: 90 TABLET | Refills: 0 | Status: SHIPPED | OUTPATIENT
Start: 2021-03-01 | End: 2021-04-19

## 2021-01-04 RX ORDER — METHYLPHENIDATE HYDROCHLORIDE 20 MG/1
TABLET ORAL
Qty: 90 TABLET | Refills: 0 | Status: SHIPPED | OUTPATIENT
Start: 2021-02-01 | End: 2021-04-19

## 2021-01-04 RX ORDER — DULOXETIN HYDROCHLORIDE 60 MG/1
120 CAPSULE, DELAYED RELEASE ORAL EVERY EVENING
Qty: 60 CAPSULE | Refills: 2 | Status: SHIPPED | OUTPATIENT
Start: 2021-01-04 | End: 2021-03-01

## 2021-01-04 RX ORDER — ARIPIPRAZOLE 5 MG/1
5 TABLET ORAL DAILY
Qty: 30 TABLET | Refills: 3 | Status: SHIPPED | OUTPATIENT
Start: 2021-01-04 | End: 2021-03-01
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
## 2021-01-04 NOTE — PATIENT INSTRUCTIONS
Nice to see you again today Marcia. As we discussed:   - Start to take quetiapine 50mg as needed for severe anxiety/panic instead of lorazepam.  - Decrease Abilify to 25mg daily.    ------------------------------------------------------------------------    Thank you for coming to the PSYCHIATRY CLINIC.    Lab Testing:  If you had lab testing today and your results are reassuring or normal they will be mailed to you or sent through [facility] within 7 days. If the lab tests need quick action we will call you with the results. The phone number we will call with results is # [phone] (home) . If this is not the best number please call our clinic and change the number.    Medication Refills:  If you need any refills please call your pharmacy and they will contact us. Our fax number for refills is [phone]. Please allow three business for refill processing. If you need to  your refill at a new pharmacy, please contact the new pharmacy directly. The new pharmacy will help you get your medications transferred.     Scheduling:  If you have any concerns about today's visit or wish to schedule another appointment please call our office during normal business hours [phone] (8-5:00 M-F)    Contact Us:  Please call [phone] during business hours (8-5:00 M-F).  If after clinic hours, or on the weekend, please call  [phone].    Financial Assistance [phone]  [facility] Billing [phone]  Central Billing Office, [facility]: [phone]  [facility] Billing [phone]  Medical Records [phone]      MENTAL HEALTH CRISIS NUMBERS:  For a medical emergency please call  911 or go to the nearest ER.     [facility]:   [facility] -[phone]   Crisis Residence [facility] -[phone]   Walk-In Counseling Center [facility] -[phone]   COPE 24/7 [facility] Team -[phone] (adults)/[phone] (child)  CHILD: [facility] needs assessment team - [phone]      [place]:   [facility] - [phone]   Walk-in counseling [facility] - [phone]   Walk-in counseling [facility] - [phone]   Crisis Residence [facility] - [phone]  Urgent Care Adult Mental Cvqnsd-[phone] mobile unit/ 24/7 crisis line    National Crisis Numbers:   National Suicide Prevention Lifeline: 6-219-120-TALK (582-746-5785)  Poison Control Center - 3-920-015-6535  Phone [facility]/resources for a list of additional resources (SOS)  Trans Lifeline a hotline for transgender people 1-570.125.1023  The [employer] Project a hotline for LGBT youth 1-313.133.9012  Crisis Text Line: For any crisis 24/7   To: 030511  see www.crisistextline.org  - IF MAKING A CALL FEELS TOO HARD, send a text!         Again thank you for choosing PSYCHIATRY CLINIC and please let us know how we can best partner with you to improve you and your family's health.    You may be receiving a survey regarding this appointment. We would love to have your feedback, both positive and negative. The survey is done by an external company, so your answers are anonymous.

## 2021-01-04 NOTE — TELEPHONE ENCOUNTER
On January 4, 2021, at 9:04 AM, writer called patient at [phone] to confirm Virtual Visit. Writer unable to make contact with patient. Writer left detailed voice message for call back. [phone] left as call back number. Angie Mclean, [facility]

## 2021-01-13 ENCOUNTER — THERAPY VISIT (OUTPATIENT)
Dept: PHYSICAL THERAPY | Facility: CLINIC | Age: 56
End: 2021-01-13
Payer: MEDICARE

## 2021-01-13 DIAGNOSIS — N39.41 URGENCY INCONTINENCE: ICD-10-CM

## 2021-01-13 DIAGNOSIS — M62.89 PELVIC FLOOR DYSFUNCTION: ICD-10-CM

## 2021-01-13 PROCEDURE — 97112 NEUROMUSCULAR REEDUCATION: CPT | Mod: GP | Performed by: PHYSICAL THERAPIST

## 2021-01-13 PROCEDURE — 97535 SELF CARE MNGMENT TRAINING: CPT | Mod: GP | Performed by: PHYSICAL THERAPIST

## 2021-01-13 PROCEDURE — 97162 PT EVAL MOD COMPLEX 30 MIN: CPT | Mod: GP | Performed by: PHYSICAL THERAPIST

## 2021-01-13 NOTE — LETTER
"DEPARTMENT OF HEALTH AND HUMAN SERVICES  CENTERS FOR MEDICARE & MEDICAID SERVICES    PLAN/UPDATED PLAN OF PROGRESS FOR OUTPATIENT REHABILITATION        PATIENTS NAME:  Sherly Yeung   : 1965  PROVIDER NUMBER:    [id number]  HICN:  [id number]  PROVIDER NAME: MAHAD AMARAL PT  MEDICAL RECORD NUMBER: [id number]   START OF CARE DATE:  SOC Date: 21   TYPE:  PT  PRIMARY/TREATMENT DIAGNOSIS: (Pertinent Medical Diagnosis)  Urgency incontinence  Pelvic floor dysfunction  VISITS FROM START OF CARE:        [facility] [facility] Initial Evaluation  Subjective:  Pelvic Dysfunction    SUBJECTIVE:  Patient reports onset of symptoms 4 years ago started with a little leaking, OB GYN sent to urologist, put of medication, \"went great\". Now in last year (2020) had a few UTI's, which are accompanied by worse leaking. UTI went away but leaking got significantly worse. Saw neurologist, found diverticulosis and constipation. Also completed cystoscopy, no significant with findings. Symptoms include bladder leaking, particularly with standing from sitting. Constipation better, softer and more regular stool. Not a current concern. No pain with urination, but has pelvic pain due to endometriosis. Has gone through menopause, but still having pain.Rates pain as 0/10 at its best and 8/10 at its worst. Typically have painful bouts 8-10 times a month. Uses medication and heat to relieve pain. Pain does not usually effect sleep.      Urination:   Going to the bathroom every 2 hours during the daytime, sometimes sooner.   Do you leak on the way to the bathroom or with a strong urge to void? Yes   Do you leak with cough,sneeze, jumping, running?Yes   Any other activities that cause leaking? Yes   Do you have triggers that make you feel you can't wait to go to the bathroom? Yes, hearing running water and thinking about going to the bathroom.  Type of pad and number used per day? Using biggest pad, 5-6 pads per " "day  When you leak what is the amount? Usually large amount, \"flood\"  How long can you delay the need to urinate? Not at all. Maybe 30 seconds.   How many times do you get up to urinate at night? 0-1   Can you stop the flow of urine when on the toilet? No  Do you feel empty when you are done? Yes, most of the time  Do you strain to pass urine? No  Do you have a slow or hesitant urinary stream? No  Do you have difficulty initiating the urine stream? No  Is urination painful? No  How many bladder infections have you had in last 12 months? 6  Fluid intake(one glass is 8oz or one cup) 4 glasses/day (mixture of water and Gatorade), 1 caffinated glasses/day, 0 alcohol glasses/day.  PATIENTS NAME:  Sherly Yeung   : 1965    Menstration:  Do you have a history of painful periods? Yes    Sexual History:  Are you currently sexually active? Yes  Do you experience pain with sexual activity? Yes. Nothing new, from endometriosis    Obstetrical History:  Have you ever been pregnant? Yes  Have you given birth? Yes  Did you have a vaginal delivery? Yes How many? 2  Both early delivery at 30 weeks, on bedrest for 4 months.     Did you have an episiotomy or tear? Yes. With first. Healed well.      Did you have a urodynamics study?  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 Now get them 1-2 times at week. Caused by stress, lack of sleep, dehydration, not eating regular. Also has hx of having blood clots.     PT goals: Not as much leaking, wearing thinner pads.     Verbal permission from patient to do internal pelvis muscle assessment? Yes       The history is provided by the patient. No  was used.   Patient Health History  Sherly Yeung being seen for Urinary incontinence, frequent UTI's.     Problem began: 2020.   Problem occurred: Started back in August when the incontinence became uncontrollable, even with the Detrol.   Pain is reported " as 2/10 on pain scale.  General health as reported by patient is good.  Pertinent medical history includes: anemia, changes in bowel/bladder, depression, history of fractures, menopausal, migraines/headaches and other. Other medical history details: Blood clots, Endometriosis, Thoracic outlet syndrome.   Red flags:  None as reported by patient.  Medical allergies: other. Other medical allergies details: several meds, listed on chart.   Surgeries include:  Orthopedic surgery and other. Other surgery history details: Removal R Ovary, Thoracic surgery x2, Venoplasty of R subclavian x25.    Current medications:  Anti-depressants, anti-seizure medication, bone density, pain medication and other. Other medications details: Blood thinner(Eliquis)and baby ASA, anti-anxiety, bladder med, stimulant, migraine med, botox,.    Current occupation is RN, currently on disability.   Primary job tasks include:  Computer work, driving, prolonged sitting, prolonged standing and other.   Other job/home tasks details: Vacuuming, mopping, washing clothes etc..                                PATIENTS NAME:  Sherly Yeung   : 1965        Pelvic Dysfunction Evaluation:      Diagnostic Tests:    Pelvic Exam:  Yes  Cystoscopy:  Yes, no significant finding reported     Flexibility:    Tightness present at:Adductors and Gluteals    Pelvic Clock Exam:  Pelvic clock exam: No specific trigger point felt. Generalized tension, left greater than right.     External Assessment:    Skin Condition:  Normal  Scars:  Non-tender and well healed  Bearing Down/Coughing:  Normal  Tissue Symmetry:  Normal  Introitus:  Normal  Muscle Contraction/Perineal Mobility:  Slight lift, no urogential triangle descent    Internal Assessment:    Sensory Exam:  Normal  Contraction/Grade:  Weak squeeze, 2 second hold (2)    Additional History:  Delivery History:  Episiotomies and vaginal delivery  Number of Pregnancies: 2  Number of Live Births: 2  Caffeine  Consumption:  1         Assessment/Plan:    Patient is a 55 year old female with pelvic complaints.    Patient has the following significant findings with corresponding treatment plan.                Diagnosis 1:  Pelvic Dysfunction    Pain -  manual therapy  Decreased strength - therapeutic exercise, therapeutic activities and home program  Decreased proprioception - neuro re-education, therapeutic activities and home program    Therapy Evaluation Codes:   1) History comprised of:   Personal factors that impact the plan of care:      Anxiety.    Comorbidity factors that impact the plan of care are:      Depression, Menopausal, Migraines/headaches and Endometriosis, anemia, hx of fractures.     Medications impacting care: Anti-depressant, Bone density and Anti-seizure.      PATIENTS NAME:  Sherly Yeung   : 1965      2) Examination of Body Systems comprised of:   Body structures and functions that impact the plan of care:      Pelvis.   Activity limitations that impact the plan of care are:      Frequency, [place], Stress incontinence, Urgency and Urge incontinence.  3) Clinical presentation characteristics are:   Evolving/Changing.  4) Decision-Making    Moderate complexity using standardized patient assessment instrument and/or measureable assessment of functional outcome.  Cumulative Therapy Evaluation is: Moderate complexity.    Previous and current functional limitations:  (See Goal Flow Sheet for this information)    Short term and Long term goals: (See Goal Flow Sheet for this information)     Communication ability:  Patient appears to be able to clearly communicate and understand verbal and written communication and follow directions correctly.  Treatment Explanation - The following has been discussed with the patient:   RX ordered/plan of care  Anticipated outcomes  Possible risks and side effects  This patient would benefit from PT intervention to resume normal activities.   Rehab potential is  "good.    Frequency:  1 X week, once daily  Duration:  for 4 weeks, then taper to 2x month for 2 months  Discharge Plan:  Achieve all LTG.  Independent in home treatment program.  Reach maximal therapeutic benefit.    Caregiver Signature/Credentials _____________________________ Date ________   Treating Provider: Brina Alcaraz SPT/Carolyn Jaeger PT, OCS       I have reviewed and certified the need for these services and plan of treatment while under my care.        PHYSICIAN'S SIGNATURE:   _____________________________________  Date___________   Anne See Quinn Youngblood    Certification period:  Beginning of Cert date period: 01/13/21 to  End of Cert period date: 04/12/21   Functional Level Progress Report: Please see attached \"Goal Flow sheet for Functional level.\"  ____X____ Continue Services or       ________ DC Services              Service dates: From  SOC Date: 01/13/21 date to present                         "

## 2021-01-13 NOTE — LETTER
"DEPARTMENT OF HEALTH AND HUMAN SERVICES  [facility] FOR [facility]IC[facility] & MEDICAID SERVICES    PLAN/UPDATED PLAN OF PROGRESS FOR OUTPATIENT REHABILITATION          PATIENTS NAME:  Sherly Yeung   : 1965  PROVIDER NUMBER:    [id number]  HICN:  [id number]   PROVIDER NAME: MAHAD AMARAL PT  MEDICAL RECORD NUMBER: [id number]   START OF CARE DATE: 21   TYPE:  PT  PRIMARY/TREATMENT DIAGNOSIS:  Urgency incontinence  Pelvic floor dysfunction    VISITS FROM START OF CARE:  Rxs Used: 1     [place] [facility] Initial Evaluation  Subjective:  Pelvic Dysfunction    SUBJECTIVE:  Patient referred to PT 10/01/20. Patient reports onset of symptoms 4 years ago started with a little leaking, OB GYN sent to urologist, put of medication, \"went great\". Now in last year (2020) had a few UTI's, which are accompanied by worse leaking. UTI went away but leaking got significantly worse. Saw neurologist, found diverticulosis and constipation. Also completed cystoscopy, no significant with findings. Symptoms include bladder leaking, particularly with standing from sitting. Constipation better, softer and more regular stool. Not a current concern. No pain with urination, but has pelvic pain due to endometriosis. Has gone through menopause, but still having pain.Rates pain as 0/10 at its best and 8/10 at its worst. Typically have painful bouts 8-10 times a month. Uses medication and heat to relieve pain. Pain does not usually effect sleep.      Urination:   Going to the bathroom every 2 hours during the daytime, sometimes sooner.   Do you leak on the way to the bathroom or with a strong urge to void? Yes   Do you leak with cough,sneeze, jumping, running?Yes   Any other activities that cause leaking? Yes   Do you have triggers that make you feel you can't wait to go to the bathroom? Yes, hearing running water and thinking about going to the bathroom.  Type of pad and number used per day? Using biggest pad, 5-6 pads per " "day  When you leak what is the amount? Usually large amount, \"flood\"  How long can you delay the need to urinate? Not at all. Maybe 30 seconds.   How many times do you get up to urinate at night? 0-1   Can you stop the flow of urine when on the toilet? No  Do you feel empty when you are done? Yes, most of the time  Do you strain to pass urine? No  Do you have a slow or hesitant urinary stream? No  Do you have difficulty initiating the urine stream? No  Is urination painful? No  How many bladder infections have you had in last 12 months? 6  Fluid intake(one glass is 8oz or one cup) 4 glasses/day (mixture of water and Gatorade), 1 caffinated glasses/day, 0 alcohol glasses/day.      PATIENTS NAME:  Sherly Yeung   : 1965    Menstration:  Do you have a history of painful periods? Yes    Sexual History:  Are you currently sexually active? Yes  Do you experience pain with sexual activity? Yes. Nothing new, from endometriosis    Obstetrical History:  Have you ever been pregnant? Yes  Have you given birth? Yes  Did you have a vaginal delivery? Yes How many? 2  Both early delivery at 30 weeks, on bedrest for 4 months.     Did you have an episiotomy or tear? Yes. With first. Healed well.      Did you have a urodynamics study?  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 Now get them 1-2 times at week. Caused by stress, lack of sleep, dehydration, not eating regular. Also has hx of having blood clots.     PT goals: Not as much leaking, wearing thinner pads.     Verbal permission from patient to do internal pelvis muscle assessment? Yes     The history is provided by the patient. No  was used.   Patient Health History  Sherly Yeung being seen for Urinary incontinence, frequent UTI's.     Problem began: 2020.   Problem occurred: Started back in August when the incontinence became uncontrollable, even with the Detrol.   Pain is reported " as 2/10 on pain scale.  General health as reported by patient is good.  Pertinent medical history includes: anemia, changes in bowel/bladder, depression, history of fractures, menopausal, migraines/headaches and other. Other medical history details: Blood clots, Endometriosis, Thoracic outlet syndrome.   Red flags:  None as reported by patient.  Medical allergies: other. Other medical allergies details: several meds, listed on chart.   Surgeries include:  Orthopedic surgery and other. Other surgery history details: Removal R Ovary, Thoracic surgery x2, Venoplasty of R subclavian x25.    Current medications:  Anti-depressants, anti-seizure medication, bone density, pain medication and other. Other medications details: Blood thinner(Eliquis)and baby ASA, anti-anxiety, bladder med, stimulant, migraine med, botox,.    Current occupation is RN, currently on disability.   Primary job tasks include:  Computer work, driving, prolonged sitting, prolonged standing and other.   Other job/home tasks details: Vacuuming, mopping, washing clothes etc..                   Pelvic Dysfunction Evaluation:      Diagnostic Tests:    Pelvic Exam:  Yes        PATIENTS NAME:  Sherly Yeung   : 1965    Cystoscopy:  Yes, no significant finding reported       Flexibility:    Tightness present at:Adductors and Gluteals    Pelvic Clock Exam:  Pelvic clock exam: No specific trigger point felt. Generalized tension, left greater than right.     External Assessment:    Skin Condition:  Normal  Scars:  Non-tender and well healed  Bearing Down/Coughing:  Normal  Tissue Symmetry:  Normal  Introitus:  Normal  Muscle Contraction/Perineal Mobility:  Slight lift, no urogential triangle descent  Internal Assessment:    Sensory Exam:  Normal  Contraction/Grade:  Weak squeeze, 2 second hold (2)    Additional History:  Delivery History:  Episiotomies and vaginal delivery  Number of Pregnancies: 2  Number of Live Births: 2  Caffeine Consumption:  1        Assessment/Plan:    Patient is a 55 year old female with pelvic complaints.    Patient has the following significant findings with corresponding treatment plan.                Diagnosis 1:  Pelvic Dysfunction    Pain -  manual therapy  Decreased strength - therapeutic exercise, therapeutic activities and home program  Decreased proprioception - neuro re-education, therapeutic activities and home program    Therapy Evaluation Codes:   1) History comprised of:   Personal factors that impact the plan of care:      Anxiety.    Comorbidity factors that impact the plan of care are:      Depression, Menopausal, Migraines/headaches and Endometriosis, anemia, hx of fractures.     Medications impacting care: Anti-depressant, Bone density and Anti-seizure.  2) Examination of Body Systems comprised of:   Body structures and functions that impact the plan of care:      Pelvis.   Activity limitations that impact the plan of care are:      Frequency, [place], Stress incontinence, Urgency and Urge incontinence.  3) Clinical presentation characteristics are:   Evolving/Changing.  4) Decision-Making    Moderate complexity using standardized patient assessment instrument and/or measureable assessment of functional outcome.  Cumulative Therapy Evaluation is: Moderate complexity.    Previous and current functional limitations:  (See Goal Flow Sheet for this information)    Short term and Long term goals: (See Goal Flow Sheet for this information)     PATIENTS NAME:  Sherly Yeung   : 1965    Communication ability:  Patient appears to be able to clearly communicate and understand verbal and written communication and follow directions correctly.  Treatment Explanation - The following has been discussed with the patient:   RX ordered/plan of care  Anticipated outcomes  Possible risks and side effects  This patient would benefit from PT intervention to resume normal activities.   Rehab potential is good.    Frequency:  1 X  "week, once daily  Duration:  for 4 weeks, then taper to 2x month for 2 months  Discharge Plan:  Achieve all LTG.  Independent in home treatment program.  Reach maximal therapeutic benefit.      Caregiver Signature/Credentials _____________________________ Date ________    Treating Provider: Brina Alcaraz SPT/Carolyn Jaeger PT, OCS     I have reviewed and certified the need for these services and plan of treatment while under my care.        PHYSICIAN'S SIGNATURE:   _____________________________________  Date___________                         Anne See Quinn Youngblood MD    Certification period:  Beginning of Cert date period: 01/13/21 to  End of Cert period date: 04/12/21     Functional Level Progress Report: Please see attached \"Goal Flow sheet for Functional level.\"    ____X____ Continue Services or       ________ DC Services                Service dates: From  SOC Date: 01/13/21 date to present                         "

## 2021-01-13 NOTE — PROGRESS NOTES
"[facility] Initial Evaluation  Subjective:  Pelvic Dysfunction    SUBJECTIVE:  Patient referred to PT 10/01/20. Patient reports onset of symptoms 4 years ago started with a little leaking, OB GYN sent to urologist, put of medication, \"went great\". Now in last year (August 2020) had a few UTI's, which are accompanied by worse leaking. UTI went away but leaking got significantly worse. Saw neurologist, found diverticulosis and constipation. Also completed cystoscopy, no significant with findings. Symptoms include bladder leaking, particularly with standing from sitting. Constipation better, softer and more regular stool. Not a current concern. No pain with urination, but has pelvic pain due to endometriosis. Has gone through menopause, but still having pain.Rates pain as 0/10 at its best and 8/10 at its worst. Typically have painful bouts 8-10 times a month. Uses medication and heat to relieve pain. Pain does not usually effect sleep.      Urination:   Going to the bathroom every 2 hours during the daytime, sometimes sooner.   Do you leak on the way to the bathroom or with a strong urge to void? Yes   Do you leak with cough,sneeze, jumping, running?Yes   Any other activities that cause leaking? Yes   Do you have triggers that make you feel you can't wait to go to the bathroom? Yes, hearing running water and thinking about going to the bathroom.  Type of pad and number used per day? Using biggest pad, 5-6 pads per day  When you leak what is the amount? Usually large amount, \"flood\"  How long can you delay the need to urinate? Not at all. Maybe 30 seconds.   How many times do you get up to urinate at night? 0-1   Can you stop the flow of urine when on the toilet? No  Do you feel empty when you are done? Yes, most of the time  Do you strain to pass urine? No  Do you have a slow or hesitant urinary stream? No  Do you have difficulty initiating the urine stream? No  Is urination painful? No  How many " "bladder infections have you had in last 12 months? 6  Fluid intake(one glass is 8oz or one cup) 4 glasses/day (mixture of water and Gatorade), 1 caffinated glasses/day, 0 alcohol glasses/day.    Menstration:  Do you have a history of painful periods? Yes    Sexual History:  Are you currently sexually active? Yes  Do you experience pain with sexual activity? Yes. Nothing new, from endometriosis    Obstetrical History:  Have you ever been pregnant? Yes  Have you given birth? Yes  Did you have a vaginal delivery? Yes How many? 2  Both early delivery at 30 weeks, on bedrest for 4 months.     Did you have an episiotomy or tear? Yes. With first. Healed well.      Did you have a urodynamics study?  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 Now get them 1-2 times at week. Caused by stress, lack of sleep, dehydration, not eating regular. Also has hx of having blood clots.     PT goals: Not as much leaking, wearing thinner pads.     Verbal permission from patient to do internal pelvis muscle assessment? Yes       The history is provided by the patient. No  was used.   Patient Health History  Sherly Yeung being seen for Urinary incontinence, frequent UTI's.     Problem began: 8/1/2020.   Problem occurred: Started back in August when the incontinence became uncontrollable, even with the Detrol.   Pain is reported as 2/10 on pain scale.  General health as reported by patient is good.  Pertinent medical history includes: anemia, changes in bowel/bladder, depression, history of fractures, menopausal, migraines/headaches and other. Other medical history details: Blood clots, Endometriosis, Thoracic outlet syndrome.   Red flags:  None as reported by patient.  Medical allergies: other. Other medical allergies details: several meds, listed on chart.   Surgeries include:  Orthopedic surgery and other. Other surgery history details: Removal R Ovary, " Thoracic surgery x2, Venoplasty of R subclavian x25.    Current medications:  Anti-depressants, anti-seizure medication, bone density, pain medication and other. Other medications details: Blood thinner(Eliquis)and baby ASA, anti-anxiety, bladder med, stimulant, migraine med, botox,.    Current occupation is RN, currently on disability.   Primary job tasks include:  Computer work, driving, prolonged sitting, prolonged standing and other.   Other job/home tasks details: Vacuuming, mopping, washing clothes etc..                                  Objective:  System                                 Pelvic Dysfunction Evaluation:      Diagnostic Tests:    Pelvic Exam:  Yes          Cystoscopy:  Yes, no significant finding reported             Flexibility:    Tightness present at:Adductors and Gluteals      Pelvic Clock Exam:  Pelvic clock exam: No specific trigger point felt. Generalized tension, left greater than right.                 External Assessment:    Skin Condition:  Normal  Scars:  Non-tender and well healed  Bearing Down/Coughing:  Normal  Tissue Symmetry:  Normal  Introitus:  Normal  Muscle Contraction/Perineal Mobility:  Slight lift, no urogential triangle descent  Internal Assessment:    Sensory Exam:  Normal  Contraction/Grade:  Weak squeeze, 2 second hold (2)          Additional History:  Delivery History:  Episiotomies and vaginal delivery  Number of Pregnancies: 2  Number of Live Births: 2  Caffeine Consumption:  1                     General     ROS    Assessment/Plan:    Patient is a 55 year old female with pelvic complaints.    Patient has the following significant findings with corresponding treatment plan.                Diagnosis 1:  Pelvic Dysfunction    Pain -  manual therapy  Decreased strength - therapeutic exercise, therapeutic activities and home program  Decreased proprioception - neuro re-education, therapeutic activities and home program    Therapy Evaluation Codes:   1) History comprised  of:   Personal factors that impact the plan of care:      Anxiety.    Comorbidity factors that impact the plan of care are:      Depression, Menopausal, Migraines/headaches and Endometriosis, anemia, hx of fractures.     Medications impacting care: Anti-depressant, Bone density and Anti-seizure.  2) Examination of Body Systems comprised of:   Body structures and functions that impact the plan of care:      Pelvis.   Activity limitations that impact the plan of care are:      Frequency, [place], Stress incontinence, Urgency and Urge incontinence.  3) Clinical presentation characteristics are:   Evolving/Changing.  4) Decision-Making    Moderate complexity using standardized patient assessment instrument and/or measureable assessment of functional outcome.  Cumulative Therapy Evaluation is: Moderate complexity.    Previous and current functional limitations:  (See Goal Flow Sheet for this information)    Short term and Long term goals: (See Goal Flow Sheet for this information)     Communication ability:  Patient appears to be able to clearly communicate and understand verbal and written communication and follow directions correctly.  Treatment Explanation - The following has been discussed with the patient:   RX ordered/plan of care  Anticipated outcomes  Possible risks and side effects  This patient would benefit from PT intervention to resume normal activities.   Rehab potential is good.    Frequency:  1 X week, once daily  Duration:  for 4 weeks, then taper to 2x month for 2 months  Discharge Plan:  Achieve all LTG.  Independent in home treatment program.  Reach maximal therapeutic benefit.    Please refer to the daily flowsheet for treatment today, total treatment time and time spent performing 1:1 timed codes.

## 2021-01-20 ENCOUNTER — THERAPY VISIT (OUTPATIENT)
Dept: PHYSICAL THERAPY | Facility: CLINIC | Age: 56
End: 2021-01-20
Payer: MEDICARE

## 2021-01-20 DIAGNOSIS — N39.41 URGENCY INCONTINENCE: Primary | ICD-10-CM

## 2021-01-20 DIAGNOSIS — M62.89 PELVIC FLOOR DYSFUNCTION: ICD-10-CM

## 2021-01-20 PROCEDURE — 97140 MANUAL THERAPY 1/> REGIONS: CPT | Mod: GP | Performed by: PHYSICAL THERAPIST

## 2021-01-20 PROCEDURE — 97535 SELF CARE MNGMENT TRAINING: CPT | Mod: GP | Performed by: PHYSICAL THERAPIST

## 2021-01-25 ENCOUNTER — THERAPY VISIT (OUTPATIENT)
Dept: PHYSICAL THERAPY | Facility: CLINIC | Age: 56
End: 2021-01-25
Payer: MEDICARE

## 2021-01-25 DIAGNOSIS — M62.89 PELVIC FLOOR DYSFUNCTION: ICD-10-CM

## 2021-01-25 DIAGNOSIS — N39.41 URGENCY INCONTINENCE: Primary | ICD-10-CM

## 2021-01-25 PROCEDURE — 97535 SELF CARE MNGMENT TRAINING: CPT | Mod: GP | Performed by: PHYSICAL THERAPIST

## 2021-01-25 PROCEDURE — 97112 NEUROMUSCULAR REEDUCATION: CPT | Mod: GP | Performed by: PHYSICAL THERAPIST

## 2021-02-04 ENCOUNTER — TRANSFERRED RECORDS (OUTPATIENT)
Dept: HEALTH INFORMATION MANAGEMENT | Facility: CLINIC | Age: 56
End: 2021-02-04

## 2021-02-11 ENCOUNTER — TRANSFERRED RECORDS (OUTPATIENT)
Dept: HEALTH INFORMATION MANAGEMENT | Facility: CLINIC | Age: 56
End: 2021-02-11

## 2021-02-26 ENCOUNTER — TRANSFERRED RECORDS (OUTPATIENT)
Dept: HEALTH INFORMATION MANAGEMENT | Facility: CLINIC | Age: 56
End: 2021-02-26

## 2021-02-28 ENCOUNTER — TRANSFERRED RECORDS (OUTPATIENT)
Dept: HEALTH INFORMATION MANAGEMENT | Facility: CLINIC | Age: 56
End: 2021-02-28

## 2021-03-01 ENCOUNTER — TELEPHONE (OUTPATIENT)
Dept: PSYCHIATRY | Facility: CLINIC | Age: 56
End: 2021-03-01

## 2021-03-01 ENCOUNTER — VIRTUAL VISIT (OUTPATIENT)
Dept: PSYCHIATRY | Facility: CLINIC | Age: 56
End: 2021-03-01
Attending: PSYCHIATRY & NEUROLOGY
Payer: MEDICARE

## 2021-03-01 DIAGNOSIS — S06.9X5D TRAUMATIC BRAIN INJURY, WITH LOSS OF CONSCIOUSNESS GREATER THAN 24 HOURS WITH RETURN TO PRE-EXISTING CONSCIOUS LEVEL, SUBSEQUENT ENCOUNTER: ICD-10-CM

## 2021-03-01 DIAGNOSIS — F33.1 MODERATE EPISODE OF RECURRENT MAJOR DEPRESSIVE DISORDER (H): ICD-10-CM

## 2021-03-01 DIAGNOSIS — F33.42 RECURRENT MAJOR DEPRESSIVE DISORDER, IN FULL REMISSION (H): ICD-10-CM

## 2021-03-01 PROCEDURE — 99214 OFFICE O/P EST MOD 30 MIN: CPT | Mod: GC | Performed by: STUDENT IN AN ORGANIZED HEALTH CARE EDUCATION/TRAINING PROGRAM

## 2021-03-01 RX ORDER — ARIPIPRAZOLE 20 MG/1
20 TABLET ORAL EVERY EVENING
Qty: 30 TABLET | Refills: 1 | Status: SHIPPED | OUTPATIENT
Start: 2021-03-01 | End: 2021-04-19 | Stop reason: DRUGHIGH

## 2021-03-01 RX ORDER — ARIPIPRAZOLE 5 MG/1
5 TABLET ORAL DAILY
Qty: 30 TABLET | Refills: 1 | Status: SHIPPED | OUTPATIENT
Start: 2021-03-01 | End: 2021-04-19

## 2021-03-01 RX ORDER — METHYLPHENIDATE HYDROCHLORIDE 20 MG/1
TABLET ORAL
Qty: 90 TABLET | Refills: 0 | Status: SHIPPED | OUTPATIENT
Start: 2021-03-29 | End: 2021-04-19

## 2021-03-01 RX ORDER — DULOXETIN HYDROCHLORIDE 60 MG/1
120 CAPSULE, DELAYED RELEASE ORAL EVERY EVENING
Qty: 60 CAPSULE | Refills: 2 | Status: SHIPPED | OUTPATIENT
Start: 2021-03-01 | End: 2021-04-19
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
## 2021-03-01 NOTE — PATIENT INSTRUCTIONS
Nice to see you again today Marcia. As we discussed:   - Start to take quetiapine 50mg as needed for severe anxiety/panic instead of lorazepam.  - Decrease Abilify to 15mg daily after you return from [place].    ------------------------------------------------------------------------    Thank you for coming to the PSYCHIATRY CLINIC.    Lab Testing:  If you had lab testing today and your results are reassuring or normal they will be mailed to you or sent through [employer] within 7 days. If the lab tests need quick action we will call you with the results. The phone number we will call with results is # [phone] (home) . If this is not the best number please call our clinic and change the number.    Medication Refills:  If you need any refills please call your pharmacy and they will contact us. Our fax number for refills is [phone]. Please allow three business for refill processing. If you need to  your refill at a new pharmacy, please contact the new pharmacy directly. The new pharmacy will help you get your medications transferred.     Scheduling:  If you have any concerns about today's visit or wish to schedule another appointment please call our office during normal business hours [phone] (8-5:00 M-F)    Contact Us:  Please call [phone] during business hours (8-5:00 M-F).  If after clinic hours, or on the weekend, please call  [phone].    Financial Assistance [phone]  [facility] Billing [phone]  Central Billing Office, [facility]: [phone]  [facility] Billing [phone]  Medical Records [phone]      MENTAL HEALTH CRISIS NUMBERS:  For a medical emergency please call  911 or go to the nearest ER.     [facility]:   [facility] -[phone]   Crisis Residence [facility] -[phone]   Walk-In Counseling Center [phone]   [facility] -[phone] (adults)/[phone] (child)  CHILD: [facility] needs assessment  team - [phone]      [facility]:   [facility] - [phone]   Walk-in counseling [facility] - [phone]   Walk-in counseling [facility] - [phone]   Crisis Residence [facility] - [phone]  Urgent Care Adult Mental [facility]-[phone] mobile unit/ 24/7 crisis line    National Crisis Numbers:   National Suicide Prevention Lifeline: 1-308-231-TALK (981-445-5817)  Poison Control Center - 7-076-770-8261  [employer]resources for a list of additional resources (SOS)  Trans Lifeline a hotline for transgender people 1-533.975.7980  [employer] a hotline for LGBT youth 6-866-937-1875  Crisis Text Line: For any crisis 24/7   To: 809991  see www.crisistextline.org  - IF MAKING A CALL FEELS TOO HARD, send a text!         Again thank you for choosing PSYCHIATRY CLINIC and please let us know how we can best partner with you to improve you and your family's health.    You may be receiving a survey regarding this appointment. We would love to have your feedback, both positive and negative. The survey is done by an external company, so your answers are anonymous.

## 2021-03-01 NOTE — TELEPHONE ENCOUNTER
On 3/1/2021, at 9:17, writer called patient at [phone] to confirm Virtual Visit. Writer unable to make contact with patient. Writer left detailed voice message for call back. [phone] left as call back number. Dorene Kidd CMA    On 3/1/2021, at 0941, writer called patient at mobile to confirm Virtual Visit. Writer unable to make contact with patient. NOMAN Counsell, EMT

## 2021-03-23 ENCOUNTER — THERAPY VISIT (OUTPATIENT)
Dept: PHYSICAL THERAPY | Facility: CLINIC | Age: 56
End: 2021-03-23
Payer: MEDICARE

## 2021-03-23 DIAGNOSIS — M62.89 PELVIC FLOOR DYSFUNCTION: ICD-10-CM

## 2021-03-23 DIAGNOSIS — N39.41 URGENCY INCONTINENCE: Primary | ICD-10-CM

## 2021-03-23 PROCEDURE — 97140 MANUAL THERAPY 1/> REGIONS: CPT | Mod: GP | Performed by: PHYSICAL THERAPIST

## 2021-03-23 PROCEDURE — 97110 THERAPEUTIC EXERCISES: CPT | Mod: GP | Performed by: PHYSICAL THERAPIST

## 2021-03-23 PROCEDURE — 97535 SELF CARE MNGMENT TRAINING: CPT | Mod: GP | Performed by: PHYSICAL THERAPIST

## 2021-03-30 DIAGNOSIS — I82.B11 RIGHT SUBCLAVIAN VEIN THROMBOSIS (H): ICD-10-CM

## 2021-03-30 DIAGNOSIS — I87.1 SVC SYNDROME: ICD-10-CM

## 2021-03-30 DIAGNOSIS — R00.0 SINUS TACHYCARDIA: ICD-10-CM

## 2021-04-01 ENCOUNTER — TELEPHONE (OUTPATIENT)
Dept: INTERNAL MEDICINE | Facility: CLINIC | Age: 56
End: 2021-04-01

## 2021-04-01 ENCOUNTER — MYC MEDICAL ADVICE (OUTPATIENT)
Dept: INTERNAL MEDICINE | Facility: CLINIC | Age: 56
End: 2021-04-01

## 2021-04-01 NOTE — TELEPHONE ENCOUNTER
[facility] Call Center    Phone Message    May a detailed message be left on voicemail: yes     Reason for Call: Order(s): Other: US of R chest and arm  Reason for requested: pt requesting had a clot and new stent was placed. Dr Villarreal would like a checkup on this.  Date needed: May 2021   Discuss with patient when order is placed  Provider name: Dr Mg    Also pt would like her medication approved before the weekend if possible.       Action Taken: Message routed to:  [facility] ([facility]    Travel Screening: Not Applicable

## 2021-04-02 RX ORDER — METOPROLOL SUCCINATE 25 MG/1
25 TABLET, EXTENDED RELEASE ORAL DAILY
Qty: 90 TABLET | Refills: 3 | Status: SHIPPED | OUTPATIENT
Start: 2021-04-02 | End: 2022-04-26

## 2021-04-02 NOTE — TELEPHONE ENCOUNTER
Metoprolol Succinate ER Oral Tablet Extended Release 24 Hour 25 MG  Last Written Prescription Date:  3/3/2020  Last Fill Quantity: 90,   # refills: 3  Last Office Visit : 12/2/2020  Future Office visit:  None  90 Tabs, 3 Refills sent to pharm 4/2/2021      Olivia Khoury RN  Central Triage Red Flags/Med Refills

## 2021-04-02 NOTE — TELEPHONE ENCOUNTER
Patient will need to request the US order from Dr. Villarreal or schedule a virtual hospital follow up with Dr. Mg or any available providers.       Raoul Curry CMA ([facility]) at 10:40 AM on 4/2/2021       Called and left patient VM that she will need to  schedule a virtual hospital follow up and US order      Raoul Curry CMA ([facility]) at 10:46 AM on 4/2/2021

## 2021-04-19 ENCOUNTER — VIRTUAL VISIT (OUTPATIENT)
Dept: PSYCHIATRY | Facility: CLINIC | Age: 56
End: 2021-04-19
Attending: PSYCHIATRY & NEUROLOGY
Payer: MEDICARE

## 2021-04-19 ENCOUNTER — TELEPHONE (OUTPATIENT)
Dept: PSYCHIATRY | Facility: CLINIC | Age: 56
End: 2021-04-19

## 2021-04-19 DIAGNOSIS — S06.9X5D TRAUMATIC BRAIN INJURY, WITH LOSS OF CONSCIOUSNESS GREATER THAN 24 HOURS WITH RETURN TO PRE-EXISTING CONSCIOUS LEVEL, SUBSEQUENT ENCOUNTER: ICD-10-CM

## 2021-04-19 DIAGNOSIS — F33.1 MODERATE EPISODE OF RECURRENT MAJOR DEPRESSIVE DISORDER (H): ICD-10-CM

## 2021-04-19 DIAGNOSIS — F33.42 RECURRENT MAJOR DEPRESSIVE DISORDER, IN FULL REMISSION (H): ICD-10-CM

## 2021-04-19 PROCEDURE — 99214 OFFICE O/P EST MOD 30 MIN: CPT | Mod: 95 | Performed by: STUDENT IN AN ORGANIZED HEALTH CARE EDUCATION/TRAINING PROGRAM

## 2021-04-19 RX ORDER — ARIPIPRAZOLE 5 MG/1
10 TABLET ORAL DAILY
Qty: 60 TABLET | Refills: 1 | Status: SHIPPED | OUTPATIENT
Start: 2021-04-19 | End: 2021-06-28

## 2021-04-19 RX ORDER — METHYLPHENIDATE HYDROCHLORIDE 20 MG/1
TABLET ORAL
Qty: 90 TABLET | Refills: 0 | Status: SHIPPED | OUTPATIENT
Start: 2021-05-03 | End: 2021-05-18

## 2021-04-19 RX ORDER — DULOXETIN HYDROCHLORIDE 60 MG/1
120 CAPSULE, DELAYED RELEASE ORAL EVERY EVENING
Qty: 60 CAPSULE | Refills: 2 | Status: SHIPPED | OUTPATIENT
Start: 2021-04-19 | End: 2021-05-18

## 2021-04-19 NOTE — PATIENT INSTRUCTIONS
Nice to see you again today Marcia. As we discussed:   - Decrease Abilify to 10mg daily.    ------------------------------------------------------------------------    **For crisis resources, please see the information at the end of this document**     Patient Education      Thank you for coming to the [facility] [place] [facility].    Lab Testing:  If you had lab testing today and your results are reassuring or normal they will be mailed to you or sent through [employer] within 7 days. If the lab tests need quick action we will call you with the results. The phone number we will call with results is # [phone] (home) . If this is not the best number please call our clinic and change the number.    Medication Refills:  If you need any refills please call your pharmacy and they will contact us. Our fax number for refills is [phone]. Please allow three business for refill processing. If you need to  your refill at a new pharmacy, please contact the new pharmacy directly. The new pharmacy will help you get your medications transferred.     Scheduling:  If you have any concerns about today's visit or wish to schedule another appointment please call our office during normal business hours [phone] (8-5:00 M-F)    Contact Us:  Please call [phone] during business hours (8-5:00 M-F).  If after clinic hours, or on the weekend, please call  [phone].    Financial Assistance [phone]  [facility] Billing [phone]  Central Billing Office, [facility]: [phone]  [place] Billing [phone]  Medical Records [phone]  [place] Patient Bill of Rights [email or web address]       MENTAL HEALTH CRISIS NUMBERS:  For a medical emergency please call  911 or go to the nearest ER.     [facility]:   [facility] -[phone]   Crisis Residence [facility] [place] [facility] -[phone]    Walk-In Counseling Center \[facility] [place] [facility]\"" -[phone]   COPE 24/7 [facility] Mobile Team -[phone] (adults)/[phone] (child)  CHILD: [facility] needs assessment team - [phone]      [facility]:   [facility] - [phone]   Walk-in counseling [facility] - [phone]   Walk-in counseling [facility] - [phone]   Crisis Residence [facility] - [phone]  Urgent Care Adult Mental Olvtxu-[phone] mobile unit/ 24/7 crisis line    National Crisis Numbers:   National Suicide Prevention Lifeline: 4-761-860-TALK (056-115-4147)  Poison Control Center - 1-769.644.3841  [place]/resources for a list of additional resources (SOS)  Trans Lifeline a hotline for transgender people 1-114.517.4667  The [employer] a hotline for LGBT youth 1-995.756.6953  Crisis Text Line: For any crisis 24/7   To: 159308  see www.crisistextline.org  - IF MAKING A CALL FEELS TOO HARD, send a text!         Again thank you for choosing [facility] and please let us know how we can best partner with you to improve you and your family's health.    You may be receiving a survey regarding this appointment. We would love to have your feedback, both positive and negative. The survey is done by an external company, so your answers are anonymous.

## 2021-04-19 NOTE — TELEPHONE ENCOUNTER
On 4/19/2021, at 9:53, writer called patient at [phone] to confirm Virtual Visit. Writer unable to make contact with patient. Writer left detailed voice message for call back. [phone] left as call back number. Dorene Kidd, [facility]
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
## 2021-04-26 ENCOUNTER — OFFICE VISIT (OUTPATIENT)
Dept: INTERNAL MEDICINE | Facility: CLINIC | Age: 56
End: 2021-04-26
Payer: MEDICARE

## 2021-04-26 VITALS
OXYGEN SATURATION: 100 % | HEART RATE: 99 BPM | WEIGHT: 137 LBS | BODY MASS INDEX: 24.27 KG/M2 | SYSTOLIC BLOOD PRESSURE: 115 MMHG | DIASTOLIC BLOOD PRESSURE: 80 MMHG

## 2021-04-26 DIAGNOSIS — D64.9 ANEMIA, UNSPECIFIED TYPE: ICD-10-CM

## 2021-04-26 DIAGNOSIS — R10.11 RUQ ABDOMINAL PAIN: ICD-10-CM

## 2021-04-26 DIAGNOSIS — I82.B11 SUBCLAVIAN VEIN THROMBOSIS, RIGHT (H): Primary | ICD-10-CM

## 2021-04-26 DIAGNOSIS — N39.0 URINARY TRACT INFECTION WITHOUT HEMATURIA, SITE UNSPECIFIED: ICD-10-CM

## 2021-04-26 DIAGNOSIS — I87.1 SVC SYNDROME: ICD-10-CM

## 2021-04-26 LAB
ALBUMIN SERPL-MCNC: 3.4 G/DL (ref 3.4–5)
ALBUMIN UR-MCNC: NEGATIVE MG/DL
ALP SERPL-CCNC: 107 U/L (ref 40–150)
ALT SERPL W P-5'-P-CCNC: 19 U/L (ref 0–50)
ANION GAP SERPL CALCULATED.3IONS-SCNC: 5 MMOL/L (ref 3–14)
APPEARANCE UR: CLEAR
AST SERPL W P-5'-P-CCNC: 13 U/L (ref 0–45)
BACTERIA #/AREA URNS HPF: ABNORMAL /HPF
BASOPHILS # BLD AUTO: 0 10E9/L (ref 0–0.2)
BASOPHILS NFR BLD AUTO: 0.2 %
BILIRUB SERPL-MCNC: 0.2 MG/DL (ref 0.2–1.3)
BILIRUB UR QL STRIP: NEGATIVE
BUN SERPL-MCNC: 12 MG/DL (ref 7–30)
CALCIUM SERPL-MCNC: 8.9 MG/DL (ref 8.5–10.1)
CHLORIDE SERPL-SCNC: 113 MMOL/L (ref 94–109)
CO2 SERPL-SCNC: 25 MMOL/L (ref 20–32)
COLOR UR AUTO: YELLOW
CREAT SERPL-MCNC: 0.91 MG/DL (ref 0.52–1.04)
CRP SERPL-MCNC: 3.8 MG/L (ref 0–8)
DIFFERENTIAL METHOD BLD: ABNORMAL
EOSINOPHIL # BLD AUTO: 0.1 10E9/L (ref 0–0.7)
EOSINOPHIL NFR BLD AUTO: 1.6 %
ERYTHROCYTE [DISTWIDTH] IN BLOOD BY AUTOMATED COUNT: 13.8 % (ref 10–15)
ERYTHROCYTE [SEDIMENTATION RATE] IN BLOOD BY WESTERGREN METHOD: 18 MM/H (ref 0–30)
GFR SERPL CREATININE-BSD FRML MDRD: 71 ML/MIN/{1.73_M2}
GLUCOSE SERPL-MCNC: 83 MG/DL (ref 70–99)
GLUCOSE UR STRIP-MCNC: NEGATIVE MG/DL
HCT VFR BLD AUTO: 35 % (ref 35–47)
HGB BLD-MCNC: 10.9 G/DL (ref 11.7–15.7)
HGB UR QL STRIP: ABNORMAL
IMM GRANULOCYTES # BLD: 0.1 10E9/L (ref 0–0.4)
IMM GRANULOCYTES NFR BLD: 1 %
KETONES UR STRIP-MCNC: NEGATIVE MG/DL
LEUKOCYTE ESTERASE UR QL STRIP: NEGATIVE
LIPASE SERPL-CCNC: 120 U/L (ref 73–393)
LYMPHOCYTES # BLD AUTO: 1.4 10E9/L (ref 0.8–5.3)
LYMPHOCYTES NFR BLD AUTO: 16.6 %
MCH RBC QN AUTO: 29.3 PG (ref 26.5–33)
MCHC RBC AUTO-ENTMCNC: 31.1 G/DL (ref 31.5–36.5)
MCV RBC AUTO: 94 FL (ref 78–100)
MONOCYTES # BLD AUTO: 0.7 10E9/L (ref 0–1.3)
MONOCYTES NFR BLD AUTO: 8.1 %
MUCOUS THREADS #/AREA URNS LPF: PRESENT /LPF
NEUTROPHILS # BLD AUTO: 6 10E9/L (ref 1.6–8.3)
NEUTROPHILS NFR BLD AUTO: 72.5 %
NITRATE UR QL: NEGATIVE
NRBC # BLD AUTO: 0 10*3/UL
NRBC BLD AUTO-RTO: 0 /100
PH UR STRIP: 6 PH (ref 5–7)
PLATELET # BLD AUTO: 331 10E9/L (ref 150–450)
POTASSIUM SERPL-SCNC: 4.3 MMOL/L (ref 3.4–5.3)
PROT SERPL-MCNC: 6.9 G/DL (ref 6.8–8.8)
RBC # BLD AUTO: 3.72 10E12/L (ref 3.8–5.2)
RBC #/AREA URNS AUTO: 14 /HPF (ref 0–2)
SODIUM SERPL-SCNC: 142 MMOL/L (ref 133–144)
SOURCE: ABNORMAL
SP GR UR STRIP: 1.02 (ref 1–1.03)
SQUAMOUS #/AREA URNS AUTO: <1 /HPF (ref 0–1)
UROBILINOGEN UR STRIP-MCNC: 0 MG/DL (ref 0–2)
WBC # BLD AUTO: 8.3 10E9/L (ref 4–11)
WBC #/AREA URNS AUTO: 15 /HPF (ref 0–5)

## 2021-04-26 PROCEDURE — 83690 ASSAY OF LIPASE: CPT | Performed by: PATHOLOGY

## 2021-04-26 PROCEDURE — 80053 COMPREHEN METABOLIC PANEL: CPT | Performed by: PATHOLOGY

## 2021-04-26 PROCEDURE — 81001 URINALYSIS AUTO W/SCOPE: CPT | Performed by: PATHOLOGY

## 2021-04-26 PROCEDURE — 85652 RBC SED RATE AUTOMATED: CPT | Performed by: PATHOLOGY

## 2021-04-26 PROCEDURE — 86140 C-REACTIVE PROTEIN: CPT | Performed by: PATHOLOGY

## 2021-04-26 PROCEDURE — 36415 COLL VENOUS BLD VENIPUNCTURE: CPT | Performed by: PATHOLOGY

## 2021-04-26 PROCEDURE — 85025 COMPLETE CBC W/AUTO DIFF WBC: CPT | Performed by: PATHOLOGY

## 2021-04-26 PROCEDURE — 87186 SC STD MICRODIL/AGAR DIL: CPT | Performed by: INTERNAL MEDICINE

## 2021-04-26 PROCEDURE — 87088 URINE BACTERIA CULTURE: CPT | Performed by: INTERNAL MEDICINE

## 2021-04-26 PROCEDURE — 87086 URINE CULTURE/COLONY COUNT: CPT | Performed by: INTERNAL MEDICINE

## 2021-04-26 PROCEDURE — 99214 OFFICE O/P EST MOD 30 MIN: CPT | Performed by: INTERNAL MEDICINE

## 2021-04-26 ASSESSMENT — PAIN SCALES - GENERAL: PAINLEVEL: NO PAIN (0)

## 2021-04-26 ASSESSMENT — PATIENT HEALTH QUESTIONNAIRE - PHQ9: SUM OF ALL RESPONSES TO PHQ QUESTIONS 1-9: 1
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
## 2021-04-27 ENCOUNTER — THERAPY VISIT (OUTPATIENT)
Dept: PHYSICAL THERAPY | Facility: CLINIC | Age: 56
End: 2021-04-27
Payer: MEDICARE

## 2021-04-27 DIAGNOSIS — M62.89 PELVIC FLOOR DYSFUNCTION: ICD-10-CM

## 2021-04-27 DIAGNOSIS — N39.41 URGENCY INCONTINENCE: Primary | ICD-10-CM

## 2021-04-27 PROCEDURE — 97112 NEUROMUSCULAR REEDUCATION: CPT | Mod: GP | Performed by: PHYSICAL THERAPIST

## 2021-04-27 PROCEDURE — 97140 MANUAL THERAPY 1/> REGIONS: CPT | Mod: GP | Performed by: PHYSICAL THERAPIST

## 2021-04-27 PROCEDURE — 97110 THERAPEUTIC EXERCISES: CPT | Mod: GP | Performed by: PHYSICAL THERAPIST

## 2021-04-27 NOTE — PROGRESS NOTES
Subjective:  HPI  Physical Exam                    Objective:  System    Physical Exam    General     ROS    Assessment/Plan:    PROGRESS  REPORT    Progress reporting period is from 04/27/21.       SUBJECTIVE  Subjective changes noted by patient:   8 min late. Doing well except for getting checked for gall bladder. Bladder has been worse the past week. Notices more leakage with urge, hard to stop once starts. Sit to stand also leaks. Voiding every 2 hours daytime, however, was able to delay for 6 hours.Very busy right now b/c getting  in 2-3 weeks and also new home just started to be built.     Current pain level is NA  .     Previous pain level was  NA  .   Changes in function:  Yes (See Goal flowsheet attached for changes in current functional level)  Adverse reaction to treatment or activity: None, last week was worse but not sure why, possibly hormonal or stress    OBJECTIVE  Changes noted in objective findings:  Yes,   Objective: Kegel 2+, slight delayed relaxation but only 1-2 seconds. Change focus of HEP to strength. No tenderness to palpation to pelvic floor.      ASSESSMENT/PLAN  Updated problem list and treatment plan: Diagnosis 1:  Pelvic floor dysfunction  Decreased ROM/flexibility - manual therapy and therapeutic exercise  Decreased strength - therapeutic exercise and therapeutic activities  Decreased proprioception - neuro re-education and therapeutic activities  Inflammation - self management/home program  Impaired muscle performance - neuro re-education  Decreased function - therapeutic activities  Impaired posture - neuro re-education  STG/LTGs have been met or progress has been made towards goals:  Yes (See Goal flow sheet completed today.)  Assessment of Progress: The patient's condition is improving.  Self Management Plans:  Patient has been instructed in a home treatment program.  Patient  has been instructed in self management of symptoms.    Sherly continues to require the following  intervention to meet STG and LTG's:  PT    Recommendations:  This patient would benefit from continued therapy.     Frequency:  2 X a month, once daily  Duration:  for 2 months        Please refer to the daily flowsheet for treatment today, total treatment time and time spent performing 1:1 timed codes.

## 2021-04-27 NOTE — LETTER
DEPARTMENT OF HEALTH AND HUMAN SERVICES  CENTERS FOR MEDICARE & MEDICAID SERVICES    PLAN/UPDATED PLAN OF PROGRESS FOR OUTPATIENT REHABILITATION          PATIENTS NAME:  Sherly Yeung   : 1965  PROVIDER NUMBER:    [id number]  HICN: [id number]  PROVIDER NAME: [facility]  MEDICAL RECORD NUMBER: [id number]   START OF CARE DATE:  21   TYPE:  PT  PRIMARY/TREATMENT DIAGNOSIS:   Urgency incontinence  Pelvic floor dysfunction    VISITS FROM START OF CARE:  Rxs Used: 5     PROGRESS  REPORT    Progress reporting period is from 21.       SUBJECTIVE  Subjective changes noted by patient:   8 min late. Doing well except for getting checked for gall bladder. Bladder has been worse the past week. Notices more leakage with urge, hard to stop once starts. Sit to stand also leaks. Voiding every 2 hours daytime, however, was able to delay for 6 hours.Very busy right now b/c getting  in 2-3 weeks and also new home just started to be built.     Current pain level is NA  .     Previous pain level was  NA  .   Changes in function:  Yes (See Goal flowsheet attached for changes in current functional level)  Adverse reaction to treatment or activity: None, last week was worse but not sure why, possibly hormonal or stress    OBJECTIVE  Changes noted in objective findings:  Yes,   Objective: Kegel 2+, slight delayed relaxation but only 1-2 seconds. Change focus of HEP to strength. No tenderness to palpation to pelvic floor.      ASSESSMENT/PLAN  Updated problem list and treatment plan: Diagnosis 1:  Pelvic floor dysfunction  Decreased ROM/flexibility - manual therapy and therapeutic exercise  Decreased strength - therapeutic exercise and therapeutic activities  Decreased proprioception - neuro re-education and therapeutic activities  Inflammation - self management/home program  Impaired muscle performance - neuro re-education  Decreased function - therapeutic  "activities  Impaired posture - neuro re-education  STG/LTGs have been met or progress has been made towards goals:  Yes (See Goal flow sheet completed today.)  Assessment of Progress: The patient's condition is improving.  Self Management Plans:  Patient has been instructed in a home treatment program.  Patient  has been instructed in self management of symptoms.    Sherly continues to require the following intervention to meet STG and LTG's:  PT     PATIENTS NAME:  Sherly Yeung   : 1965    Recommendations:  This patient would benefit from continued therapy.     Frequency:  2 X a month, once daily  Duration:  for 2 months        Caregiver Signature/Credentials _____________________________ Date ________       Treating Provider: Carolyn Jaeger, PT, OCS   I have reviewed and certified the need for these services and plan of treatment while under my care.        PHYSICIAN'S SIGNATURE:   _____________________________________  Date___________                         Anne See Quinn Youngblood MD     Certification period:  Beginning of Cert date period: 21 to  End of Cert period date: 21     Functional Level Progress Report: Please see attached \"Goal Flow sheet for Functional level.\"    ____X____ Continue Services or       ________ DC Services                Service dates: From  SOC Date: 21 date to present                         "

## 2021-04-28 ENCOUNTER — ANCILLARY PROCEDURE (OUTPATIENT)
Dept: ULTRASOUND IMAGING | Facility: CLINIC | Age: 56
End: 2021-04-28
Attending: INTERNAL MEDICINE
Payer: MEDICARE

## 2021-04-28 DIAGNOSIS — R10.11 RUQ ABDOMINAL PAIN: ICD-10-CM

## 2021-04-28 PROCEDURE — 76700 US EXAM ABDOM COMPLETE: CPT | Performed by: RADIOLOGY

## 2021-04-29 LAB
BACTERIA SPEC CULT: ABNORMAL
BACTERIA SPEC CULT: ABNORMAL
Lab: ABNORMAL
SPECIMEN SOURCE: ABNORMAL

## 2021-04-29 RX ORDER — SULFAMETHOXAZOLE/TRIMETHOPRIM 800-160 MG
1 TABLET ORAL 2 TIMES DAILY
Qty: 6 TABLET | Refills: 0 | Status: SHIPPED | OUTPATIENT
Start: 2021-04-29 | End: 2021-05-02

## 2021-04-30 ENCOUNTER — TELEPHONE (OUTPATIENT)
Dept: GASTROENTEROLOGY | Facility: CLINIC | Age: 56
End: 2021-04-30

## 2021-04-30 DIAGNOSIS — R10.11 RUQ ABDOMINAL PAIN: Primary | ICD-10-CM

## 2021-04-30 NOTE — TELEPHONE ENCOUNTER
Screening Questions    1. What insurance is in the chart? Medicare- primary; BCBS- secondary    2.  Ordering/Referring Provider: Chadwick Mg MD    3. BMI 23.0  5'3  130lb    4. Are you on daily home oxygen? no    5. Do you have a history of difficult airway? no    6. Have you had a heart, lung, or liver transplant? no    7. Have you had a stroke or Transient ischemic atttack (TIA) within 3 months? no    8. In the past year, have you had any heart related issues including cardiomyopathy or a MI within 6 months, that required cardiac stenting or other implantable devices? no    9. What type of implantable device do you have (any pacemaker, defib, LVAD)? no    10. Do you take nitroglycerin? If yes, how often? no    11. Are you currently taking any blood thinners? Yes, Art    12. Are you a diabetic? no    13. (Females) Are you currently pregnant? n/a  If yes, how many weeks?    14. Have you had a procedure in the past that was difficult to tolerate with conscious sedation?YES Any allergies to Fentanyl or Versed : Not allergic- cause major migaraine    15. Are you taking any scheduled prescription narcotics more than once daily? no    16. Do you have any chemical dependencies such as alcohol, street drugs, or methadone? no    17. Do you have any history of post-traumatic stress syndrome or mental health issues? Depression    18. Do you transfer independently? yes    19.  Do you have any issues with constipation? n/a      Additional comments: Sent message to order provider to get order updated to MAC. Will call patient to schedule once new order is in.

## 2021-05-12 ENCOUNTER — E-VISIT (OUTPATIENT)
Dept: URGENT CARE | Facility: URGENT CARE | Age: 56
End: 2021-05-12
Payer: MEDICARE

## 2021-05-12 DIAGNOSIS — J01.90 ACUTE BACTERIAL SINUSITIS: Primary | ICD-10-CM

## 2021-05-12 DIAGNOSIS — B96.89 ACUTE BACTERIAL SINUSITIS: Primary | ICD-10-CM

## 2021-05-12 PROCEDURE — 99422 OL DIG E/M SVC 11-20 MIN: CPT | Performed by: PREVENTIVE MEDICINE

## 2021-05-12 RX ORDER — FLUTICASONE PROPIONATE 50 MCG
2 SPRAY, SUSPENSION (ML) NASAL DAILY
Qty: 9.9 ML | Refills: 0 | Status: SHIPPED | OUTPATIENT
Start: 2021-05-12 | End: 2021-05-19

## 2021-05-18 ENCOUNTER — VIRTUAL VISIT (OUTPATIENT)
Dept: PSYCHIATRY | Facility: CLINIC | Age: 56
End: 2021-05-18
Attending: PSYCHIATRY & NEUROLOGY
Payer: MEDICARE

## 2021-05-18 ENCOUNTER — TELEPHONE (OUTPATIENT)
Dept: PSYCHIATRY | Facility: CLINIC | Age: 56
End: 2021-05-18

## 2021-05-18 DIAGNOSIS — S06.9X5D TRAUMATIC BRAIN INJURY, WITH LOSS OF CONSCIOUSNESS GREATER THAN 24 HOURS WITH RETURN TO PRE-EXISTING CONSCIOUS LEVEL, SUBSEQUENT ENCOUNTER: ICD-10-CM

## 2021-05-18 DIAGNOSIS — F33.42 RECURRENT MAJOR DEPRESSIVE DISORDER, IN FULL REMISSION (H): ICD-10-CM

## 2021-05-18 DIAGNOSIS — F33.1 MODERATE EPISODE OF RECURRENT MAJOR DEPRESSIVE DISORDER (H): ICD-10-CM

## 2021-05-18 PROCEDURE — 99214 OFFICE O/P EST MOD 30 MIN: CPT | Mod: 95 | Performed by: STUDENT IN AN ORGANIZED HEALTH CARE EDUCATION/TRAINING PROGRAM

## 2021-05-18 RX ORDER — METHYLPHENIDATE HYDROCHLORIDE 20 MG/1
TABLET ORAL
Qty: 90 TABLET | Refills: 0 | Status: SHIPPED | OUTPATIENT
Start: 2021-06-01 | End: 2021-06-28

## 2021-05-18 RX ORDER — DULOXETIN HYDROCHLORIDE 60 MG/1
120 CAPSULE, DELAYED RELEASE ORAL EVERY EVENING
Qty: 60 CAPSULE | Refills: 1 | Status: SHIPPED | OUTPATIENT
Start: 2021-05-18 | End: 2021-06-28

## 2021-05-18 NOTE — PATIENT INSTRUCTIONS
Nice to see you again today Marcia. As we discussed:   - Can decrease Abilify to 5mg daily when you return from your honeymoon at the end of May.    ------------------------------------------------------------------------    **For crisis resources, please see the information at the end of this document**     Patient Education      Thank you for coming to the [facility] [place] [facility].    Lab Testing:  If you had lab testing today and your results are reassuring or normal they will be mailed to you or sent through [employer] within 7 days. If the lab tests need quick action we will call you with the results. The phone number we will call with results is # [phone] (home) . If this is not the best number please call our clinic and change the number.    Medication Refills:  If you need any refills please call your pharmacy and they will contact us. Our fax number for refills is [phone]. Please allow three business for refill processing. If you need to  your refill at a new pharmacy, please contact the new pharmacy directly. The new pharmacy will help you get your medications transferred.     Scheduling:  If you have any concerns about today's visit or wish to schedule another appointment please call our office during normal business hours [phone] (8-5:00 M-F)    Contact Us:  Please call [phone] during business hours (8-5:00 M-F).  If after clinic hours, or on the weekend, please call  [phone].    Financial Assistance [phone]  [facility] Billing [phone]  Central Billing Office, [facility]: [phone]  [facility] Billing [phone]  Medical Records [phone]  [facility] Patient Bill of Rights [email or web address]       MENTAL HEALTH CRISIS NUMBERS:  For a medical emergency please call  911 or go to the nearest ER.     [facility]:   [facility] -[phone]    Crisis Residence [facility] -[phone]   Walk-In Counseling Center [facility] -[phone]   COPE 24/7 [facility] Mobile Team -[phone] (adults)/[phone] (child)  CHILD: [facility] needs assessment team - [phone]      [facility]:   [facility] - [phone]   Walk-in counseling [facility] - [phone]   Walk-in counseling [facility] - [phone]   Crisis Residence [facility]idence - [phone]  Urgent Care Adult Mental [facility]-[phone] mobile unit/ 24/7 crisis line    National Crisis Numbers:   National Suicide Prevention Lifeline: 4-877-396-TALK (278-079-1410)  Poison Control Center - 1-140.853.8352  [employer]/resources for a list of additional resources (SOS)  Trans Lifeline a hotline for transgender people 1-642.863.7141  The [employer] a hotline for LGBT youth 0-586-487-0499  Crisis Text Line: For any crisis 24/7   To: 470331  see www.crisistextline.org  - IF MAKING A CALL FEELS TOO HARD, send a text!         Again thank you for choosing [facility] M[facility] and please let us know how we can best partner with you to improve you and your family's health.    You may be receiving a survey regarding this appointment. We would love to have your feedback, both positive and negative. The survey is done by an external company, so your answers are anonymous.

## 2021-05-18 NOTE — TELEPHONE ENCOUNTER
On May 18, 2021, at 10:26 AM, writer called patient at [phone] to confirm Virtual Visit. Writer unable to make contact with patient. Writer left detailed voice message for call back. [phone] left as call back number. Angie Mclean, [facility]
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
## 2021-05-30 DIAGNOSIS — Z11.59 ENCOUNTER FOR SCREENING FOR OTHER VIRAL DISEASES: ICD-10-CM

## 2021-06-03 ENCOUNTER — ANCILLARY PROCEDURE (OUTPATIENT)
Dept: ULTRASOUND IMAGING | Facility: CLINIC | Age: 56
End: 2021-06-03
Attending: INTERNAL MEDICINE
Payer: MEDICARE

## 2021-06-03 DIAGNOSIS — I87.1 SVC SYNDROME: ICD-10-CM

## 2021-06-03 DIAGNOSIS — I82.B11 SUBCLAVIAN VEIN THROMBOSIS, RIGHT (H): ICD-10-CM

## 2021-06-03 DIAGNOSIS — R10.11 RUQ ABDOMINAL PAIN: ICD-10-CM

## 2021-06-03 LAB — RADIOLOGIST FLAGS: ABNORMAL

## 2021-06-03 PROCEDURE — 87338 HPYLORI STOOL AG IA: CPT | Performed by: INTERNAL MEDICINE

## 2021-06-03 PROCEDURE — 93971 EXTREMITY STUDY: CPT | Mod: RT | Performed by: RADIOLOGY

## 2021-06-04 ENCOUNTER — TELEPHONE (OUTPATIENT)
Dept: VASCULAR SURGERY | Facility: CLINIC | Age: 56
End: 2021-06-04

## 2021-06-04 LAB — H PYLORI AG STL QL IA: NEGATIVE

## 2021-06-04 NOTE — TELEPHONE ENCOUNTER
I called and spoke with Marcia.  She has been following Dr Villarreal, who currently is working in [place] regarding her stent.  She has no symptoms she normally experiences when her stent is occluded.  She had stent extention by Dr Villarreal 3 months ago.  She usually gets pain across right side chest, an pressure, she gets right arm swelling, right hand tingling and purplish color when her stent is occluded. She currently is asymptomatic, she has a sent a hard copy of her imaging done yesterday to Dr Villarreal.  She chooses to follow with him.  I have sent a [employer] message with my contact info should she ever need vascular needs here in [facility].   BENITA Lindsey RN, BSN  Interventional Radiology Nurse Coordinator   Phone:  [phone]

## 2021-06-11 ENCOUNTER — TELEPHONE (OUTPATIENT)
Dept: GASTROENTEROLOGY | Facility: CLINIC | Age: 56
End: 2021-06-11

## 2021-06-11 NOTE — TELEPHONE ENCOUNTER
Attempted to contact patient regarding upcoming EGD procedure on 6.23.2021 for pre assessment questions.  No answer.  Left message to return call to [phone] #2    COVID test 6.19.2021    Pt needs to f/u with PCP re: Eliquis dosing prior to procedure.    Paz Contreras RN

## 2021-06-14 ENCOUNTER — THERAPY VISIT (OUTPATIENT)
Dept: PHYSICAL THERAPY | Facility: CLINIC | Age: 56
End: 2021-06-14
Payer: MEDICARE

## 2021-06-14 DIAGNOSIS — M62.89 PELVIC FLOOR DYSFUNCTION: ICD-10-CM

## 2021-06-14 DIAGNOSIS — N39.41 URGENCY INCONTINENCE: Primary | ICD-10-CM

## 2021-06-14 PROCEDURE — 97110 THERAPEUTIC EXERCISES: CPT | Mod: GP | Performed by: PHYSICAL THERAPIST

## 2021-06-14 NOTE — TELEPHONE ENCOUNTER
2nd attempt to contact patient regarding upcoming EGD procedure on 6.23.2021 for pre assessment questions.  No answer.  Left message to return call to [phone] #2    COVID test 6.19.2021    Pt needs to f/u with PCP re: Eliquis dosing prior to procedure.    Paz Contreras RN

## 2021-06-15 ENCOUNTER — TRANSFERRED RECORDS (OUTPATIENT)
Dept: HEALTH INFORMATION MANAGEMENT | Facility: CLINIC | Age: 56
End: 2021-06-15

## 2021-06-15 DIAGNOSIS — I82.B11 SUBCLAVIAN VEIN THROMBOSIS, RIGHT (H): ICD-10-CM

## 2021-06-15 DIAGNOSIS — K21.9 GASTROESOPHAGEAL REFLUX DISEASE WITHOUT ESOPHAGITIS: ICD-10-CM

## 2021-06-15 NOTE — TELEPHONE ENCOUNTER
Writer reviewed pre-assessment questions with patient prior to upcoming EGD on 6.23.2021.  COVID test scheduled for 6.19.2021.    Reviewed EGD prep instructions with patient.      Pt instructed to f/u with PCP re: Eliquis dosing prior to procedure.    Designated  policy reviewed with patient.     Patient verbalized understanding.  No further questions or concerns.    Paz Contreras RN

## 2021-06-18 RX ORDER — LANSOPRAZOLE 30 MG/1
30 CAPSULE, DELAYED RELEASE ORAL
Qty: 180 CAPSULE | Refills: 3 | Status: ON HOLD | OUTPATIENT
Start: 2021-06-18 | End: 2022-02-17

## 2021-06-18 NOTE — TELEPHONE ENCOUNTER
Lansoprazole Oral Capsule Delayed Release 30 MG  Last Written Prescription Date:  8/19/2020  Last Fill Quantity: 180,   # refills: 2  Last Office Visit : 4/26/2021  Future Office visit:  None  180 Caps, 3 Refills sent to pharm 6/18/2021      Olivia Khoury RN  Central Triage Red Flags/Med Refills

## 2021-06-19 DIAGNOSIS — Z11.59 ENCOUNTER FOR SCREENING FOR OTHER VIRAL DISEASES: ICD-10-CM

## 2021-06-19 LAB
LABORATORY COMMENT REPORT: NORMAL
SARS-COV-2 RNA RESP QL NAA+PROBE: NEGATIVE
SARS-COV-2 RNA RESP QL NAA+PROBE: NORMAL
SPECIMEN SOURCE: NORMAL
SPECIMEN SOURCE: NORMAL

## 2021-06-19 PROCEDURE — U0003 INFECTIOUS AGENT DETECTION BY NUCLEIC ACID (DNA OR RNA); SEVERE ACUTE RESPIRATORY SYNDROME CORONAVIRUS 2 (SARS-COV-2) (CORONAVIRUS DISEASE [COVID-19]), AMPLIFIED PROBE TECHNIQUE, MAKING USE OF HIGH THROUGHPUT TECHNOLOGIES AS DESCRIBED BY CMS-2020-01-R: HCPCS | Performed by: INTERNAL MEDICINE

## 2021-06-19 PROCEDURE — U0005 INFEC AGEN DETEC AMPLI PROBE: HCPCS | Performed by: INTERNAL MEDICINE

## 2021-06-22 ENCOUNTER — ANESTHESIA EVENT (OUTPATIENT)
Dept: SURGERY | Facility: AMBULATORY SURGERY CENTER | Age: 56
End: 2021-06-22
Payer: MEDICARE
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
## 2021-06-23 ENCOUNTER — CARE COORDINATION (OUTPATIENT)
Dept: GASTROENTEROLOGY | Facility: CLINIC | Age: 56
End: 2021-06-23

## 2021-06-23 ENCOUNTER — ANESTHESIA (OUTPATIENT)
Dept: SURGERY | Facility: AMBULATORY SURGERY CENTER | Age: 56
End: 2021-06-23
Payer: MEDICARE

## 2021-06-23 ENCOUNTER — HOSPITAL ENCOUNTER (OUTPATIENT)
Facility: AMBULATORY SURGERY CENTER | Age: 56
End: 2021-06-23
Attending: INTERNAL MEDICINE
Payer: MEDICARE

## 2021-06-23 VITALS
HEART RATE: 87 BPM | BODY MASS INDEX: 22.15 KG/M2 | WEIGHT: 125 LBS | TEMPERATURE: 97.8 F | RESPIRATION RATE: 14 BRPM | OXYGEN SATURATION: 100 % | DIASTOLIC BLOOD PRESSURE: 82 MMHG | HEIGHT: 63 IN | SYSTOLIC BLOOD PRESSURE: 113 MMHG

## 2021-06-23 VITALS — HEART RATE: 78 BPM

## 2021-06-23 DIAGNOSIS — R10.13 ABDOMINAL PAIN, EPIGASTRIC: Primary | ICD-10-CM

## 2021-06-23 DIAGNOSIS — K21.9 GASTROESOPHAGEAL REFLUX DISEASE WITHOUT ESOPHAGITIS: ICD-10-CM

## 2021-06-23 LAB — UPPER GI ENDOSCOPY: NORMAL

## 2021-06-23 PROCEDURE — 43239 EGD BIOPSY SINGLE/MULTIPLE: CPT | Mod: 59

## 2021-06-23 PROCEDURE — 43251 EGD REMOVE LESION SNARE: CPT

## 2021-06-23 PROCEDURE — 88305 TISSUE EXAM BY PATHOLOGIST: CPT | Performed by: PATHOLOGY

## 2021-06-23 RX ORDER — NALOXONE HYDROCHLORIDE 0.4 MG/ML
0.4 INJECTION, SOLUTION INTRAMUSCULAR; INTRAVENOUS; SUBCUTANEOUS
Status: DISCONTINUED | OUTPATIENT
Start: 2021-06-23 | End: 2021-06-24 | Stop reason: HOSPADM

## 2021-06-23 RX ORDER — KETAMINE HYDROCHLORIDE 10 MG/ML
INJECTION, SOLUTION INTRAMUSCULAR; INTRAVENOUS PRN
Status: DISCONTINUED | OUTPATIENT
Start: 2021-06-23 | End: 2021-06-23

## 2021-06-23 RX ORDER — ONDANSETRON 4 MG/1
4 TABLET, ORALLY DISINTEGRATING ORAL EVERY 6 HOURS PRN
Status: DISCONTINUED | OUTPATIENT
Start: 2021-06-23 | End: 2021-06-24 | Stop reason: HOSPADM

## 2021-06-23 RX ORDER — NALOXONE HYDROCHLORIDE 0.4 MG/ML
0.2 INJECTION, SOLUTION INTRAMUSCULAR; INTRAVENOUS; SUBCUTANEOUS
Status: DISCONTINUED | OUTPATIENT
Start: 2021-06-23 | End: 2021-06-24 | Stop reason: HOSPADM

## 2021-06-23 RX ORDER — LIDOCAINE HYDROCHLORIDE 10 MG/ML
INJECTION, SOLUTION INFILTRATION; PERINEURAL PRN
Status: DISCONTINUED | OUTPATIENT
Start: 2021-06-23 | End: 2021-06-23

## 2021-06-23 RX ORDER — PROPOFOL 10 MG/ML
INJECTION, EMULSION INTRAVENOUS PRN
Status: DISCONTINUED | OUTPATIENT
Start: 2021-06-23 | End: 2021-06-23

## 2021-06-23 RX ORDER — GLYCOPYRROLATE 0.2 MG/ML
INJECTION, SOLUTION INTRAMUSCULAR; INTRAVENOUS PRN
Status: DISCONTINUED | OUTPATIENT
Start: 2021-06-23 | End: 2021-06-23

## 2021-06-23 RX ORDER — LIDOCAINE 40 MG/G
CREAM TOPICAL
Status: DISCONTINUED | OUTPATIENT
Start: 2021-06-23 | End: 2021-06-23 | Stop reason: HOSPADM

## 2021-06-23 RX ORDER — PROPOFOL 10 MG/ML
INJECTION, EMULSION INTRAVENOUS CONTINUOUS PRN
Status: DISCONTINUED | OUTPATIENT
Start: 2021-06-23 | End: 2021-06-23

## 2021-06-23 RX ORDER — SIMETHICONE
LIQUID (ML) MISCELLANEOUS PRN
Status: DISCONTINUED | OUTPATIENT
Start: 2021-06-23 | End: 2021-06-23 | Stop reason: HOSPADM

## 2021-06-23 RX ORDER — FLUMAZENIL 0.1 MG/ML
0.2 INJECTION, SOLUTION INTRAVENOUS
Status: ACTIVE | OUTPATIENT
Start: 2021-06-23 | End: 2021-06-23

## 2021-06-23 RX ORDER — ONDANSETRON 2 MG/ML
4 INJECTION INTRAMUSCULAR; INTRAVENOUS EVERY 6 HOURS PRN
Status: DISCONTINUED | OUTPATIENT
Start: 2021-06-23 | End: 2021-06-24 | Stop reason: HOSPADM

## 2021-06-23 RX ORDER — ONDANSETRON 2 MG/ML
4 INJECTION INTRAMUSCULAR; INTRAVENOUS
Status: COMPLETED | OUTPATIENT
Start: 2021-06-23 | End: 2021-06-23

## 2021-06-23 RX ADMIN — PROPOFOL 150 MCG/KG/MIN: 10 INJECTION, EMULSION INTRAVENOUS at 07:36

## 2021-06-23 RX ADMIN — KETAMINE HYDROCHLORIDE 15 MG: 10 INJECTION, SOLUTION INTRAMUSCULAR; INTRAVENOUS at 07:36

## 2021-06-23 RX ADMIN — PROPOFOL 60 MG: 10 INJECTION, EMULSION INTRAVENOUS at 07:37

## 2021-06-23 RX ADMIN — ONDANSETRON 4 MG: 2 INJECTION INTRAMUSCULAR; INTRAVENOUS at 08:17

## 2021-06-23 RX ADMIN — LIDOCAINE HYDROCHLORIDE 100 MG: 10 INJECTION, SOLUTION INFILTRATION; PERINEURAL at 07:36

## 2021-06-23 RX ADMIN — ONDANSETRON 4 MG: 2 INJECTION INTRAMUSCULAR; INTRAVENOUS at 07:36

## 2021-06-23 RX ADMIN — GLYCOPYRROLATE 0.2 MG: 0.2 INJECTION, SOLUTION INTRAMUSCULAR; INTRAVENOUS at 07:36

## 2021-06-23 ASSESSMENT — MIFFLIN-ST. JEOR: SCORE: 1131.13
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
## 2021-06-23 NOTE — ANESTHESIA POSTPROCEDURE EVALUATION
Patient: Sherly Yeung    Procedure(s):  ESOPHAGOGASTRODUODENOSCOPY, WITH BIOPSY AND POLYPECTOMY    Diagnosis:RUQ abdominal pain [R10.11]  Diagnosis Additional Information: No value filed.    Anesthesia Type:  MAC    Note:  Disposition: Outpatient   Postop Pain Control: Uneventful            Sign Out: Well controlled pain   PONV: No   Neuro/Psych: Uneventful            Sign Out: Acceptable/Baseline neuro status   Airway/Respiratory: Uneventful            Sign Out: Acceptable/Baseline resp. status   CV/Hemodynamics: Uneventful            Sign Out: Acceptable CV status; No obvious hypovolemia; No obvious fluid overload   Other NRE: NONE   DID A NON-ROUTINE EVENT OCCUR? No           Last vitals:  Vitals:    06/23/21 0809 06/23/21 0821 06/23/21 0840   BP: 116/79 117/84 113/82   Pulse:      Resp: 14 14 14   Temp: 36.6  C (97.8  F)     SpO2: 100% 100% 100%       Last vitals prior to Anesthesia Care Transfer:  CRNA VITALS  6/23/2021 0725 - 6/23/2021 0825      6/23/2021             Pulse:  81    SpO2:  100 %          Electronically Signed By: Chadwick Horvath MD  June 23, 2021  12:24 PM

## 2021-06-24 LAB — COPATH REPORT: NORMAL

## 2021-06-25 ENCOUNTER — TELEPHONE (OUTPATIENT)
Dept: INTERNAL MEDICINE | Facility: CLINIC | Age: 56
End: 2021-06-25

## 2021-06-25 NOTE — TELEPHONE ENCOUNTER
Prior Authorization Approval    Authorization Effective Date: 5/26/2021  Authorization Expiration Date: 6/25/2022  Medication: prior auth lansoprazole 30mg  - APPROVED  Approved Dose/Quantity: 60 FOR 30 DAYS    Insurance Company: [employer] - Phone [phone] Fax [phone]  Which Pharmacy is filling the prescription (Not needed for infusion/clinic administered): [id number] - [place], [place] - [address]  Pharmacy Notified: Yes  Patient Notified: Yes Pharmacy will notify patient once order is ready.

## 2021-06-25 NOTE — TELEPHONE ENCOUNTER
Prior Authorization Retail Medication Request    Medication/Dose:   LANsoprazole (PREVACID) 30 MG DR capsule 180 capsule 3 6/18/2021  No   Sig - Route: Take 1 capsule (30 mg) by mouth 2 times daily. - Oral       ICD code (if different than what is on RX):  Gastroparesis, Gastroesophageal reflux disease without esophagitis   Previously Tried and Failed:  na  Rationale:  na    Insurance Name:  medicare  Insurance ID:  [id number]    [facility] [employer]  [id number]    Pharmacy Information (if different than what is on RX)  Name:  [facility]  Phone:  na

## 2021-06-28 ENCOUNTER — VIRTUAL VISIT (OUTPATIENT)
Dept: PSYCHIATRY | Facility: CLINIC | Age: 56
End: 2021-06-28
Attending: PSYCHIATRY & NEUROLOGY
Payer: MEDICARE

## 2021-06-28 ENCOUNTER — PATIENT OUTREACH (OUTPATIENT)
Dept: GASTROENTEROLOGY | Facility: CLINIC | Age: 56
End: 2021-06-28

## 2021-06-28 DIAGNOSIS — F33.1 MODERATE EPISODE OF RECURRENT MAJOR DEPRESSIVE DISORDER (H): Primary | ICD-10-CM

## 2021-06-28 DIAGNOSIS — S06.9X5D TRAUMATIC BRAIN INJURY, WITH LOSS OF CONSCIOUSNESS GREATER THAN 24 HOURS WITH RETURN TO PRE-EXISTING CONSCIOUS LEVEL, SUBSEQUENT ENCOUNTER: ICD-10-CM

## 2021-06-28 DIAGNOSIS — F33.42 RECURRENT MAJOR DEPRESSIVE DISORDER, IN FULL REMISSION (H): ICD-10-CM

## 2021-06-28 PROCEDURE — 99214 OFFICE O/P EST MOD 30 MIN: CPT | Mod: 95 | Performed by: STUDENT IN AN ORGANIZED HEALTH CARE EDUCATION/TRAINING PROGRAM

## 2021-06-28 RX ORDER — METHYLPHENIDATE HYDROCHLORIDE 20 MG/1
TABLET ORAL
Qty: 90 TABLET | Refills: 0 | Status: SHIPPED | OUTPATIENT
Start: 2021-07-05 | End: 2021-08-16

## 2021-06-28 RX ORDER — METHYLPHENIDATE HYDROCHLORIDE 20 MG/1
TABLET ORAL
Qty: 90 TABLET | Refills: 0 | Status: ON HOLD | OUTPATIENT
Start: 2021-08-02 | End: 2022-02-17

## 2021-06-28 RX ORDER — DULOXETIN HYDROCHLORIDE 60 MG/1
120 CAPSULE, DELAYED RELEASE ORAL EVERY EVENING
Qty: 60 CAPSULE | Refills: 1 | Status: SHIPPED | OUTPATIENT
Start: 2021-06-28 | End: 2021-08-16

## 2021-06-28 RX ORDER — ARIPIPRAZOLE 5 MG/1
10 TABLET ORAL DAILY
Qty: 60 TABLET | Refills: 1 | Status: SHIPPED | OUTPATIENT
Start: 2021-06-28 | End: 2021-08-16

## 2021-06-28 ASSESSMENT — PAIN SCALES - GENERAL: PAINLEVEL: NO PAIN (0)
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
## 2021-06-28 NOTE — PATIENT INSTRUCTIONS
Nice to see you again today Marcia. As we discussed:   - No medication changes today.      ------------------------------------------------------------------------    **For crisis resources, please see the information at the end of this document**     Patient Education      Thank you for coming to the [facility] [place] [facility].    Lab Testing:  If you had lab testing today and your results are reassuring or normal they will be mailed to you or sent through [employer] within 7 days. If the lab tests need quick action we will call you with the results. The phone number we will call with results is # [phone] (home) . If this is not the best number please call our clinic and change the number.    Medication Refills:  If you need any refills please call your pharmacy and they will contact us. Our fax number for refills is [phone]. Please allow three business for refill processing. If you need to  your refill at a new pharmacy, please contact the new pharmacy directly. The new pharmacy will help you get your medications transferred.     Scheduling:  If you have any concerns about today's visit or wish to schedule another appointment please call our office during normal business hours [phone] (8-5:00 M-F)    Contact Us:  Please call [phone] during business hours (8-5:00 M-F).  If after clinic hours, or on the weekend, please call  [phone].    Financial Assistance [phone]  [facility] Billing [phone]  Central Billing Office, [facility]: [phone]  [facility] Billing [phone]  Medical Records [phone]  [facility] Patient Bill of Rights [email or web address]       MENTAL HEALTH CRISIS NUMBERS:  For a medical emergency please call  911 or go to the nearest ER.     [facility]:   [facility] -[phone]   [facility] -[phone]    Walk-In Counseling Center [facility] -[phone]   COPE 24/7 [facility] Mobile Team -[phone] (adults)/[phone] (child)  CHILD: [facility] needs assessment team - [phone]      [facility]:   [facility] - [phone]   Walk-in counseling [facility] - [phone]   Walk-in counseling [facility] - [phone]   Crisis Residence [facility] - [phone]  Urgent Care Adult Mental [facility]-[phone] mobile unit/ 24/7 crisis line    National Crisis Numbers:   National Suicide Prevention Lifeline: 4-188-824-TALK (416-830-0591)  Poison Control Center - 1-155.346.4662  RawData/resources for a list of additional resources (SOS)  Trans Lifeline a hotline for transgender people 1-713.868.4303  The [employer] Project a hotline for LGBT youth 1-934.573.7808  Crisis Text Line: For any crisis 24/7   To: 622302  see www.crisistextline.org  - IF MAKING A CALL FEELS TOO HARD, send a text!         Again thank you for choosing [facility] and please let us know how we can best partner with you to improve you and your family's health.    You may be receiving a survey regarding this appointment. We would love to have your feedback, both positive and negative. The survey is done by an external company, so your answers are anonymous.

## 2021-06-28 NOTE — PROGRESS NOTES
Pt has refractory GERD.     EGD with food in stomach. See report if needed     Needs gastric emptying study and then needs to be seen in esophageal clinic      Contacted patient to discuss gastric emptying   Discussed the test and patient given number to call to schedule.

## 2021-06-28 NOTE — PROGRESS NOTES
Assessment incorrectly stated decreased Abilify 10mg daily (was actually decrease to 5mg daily in June)

## 2021-06-28 NOTE — PROGRESS NOTES
"VIDEO VISIT  Sherly Yeung is a 55 year old patient who is being evaluated via a billable video visit.      The patient has been notified of following:   \"This video visit will be conducted via a call between you and your physician/provider. We have found that certain health care needs can be provided without the need for an in-person physical exam. This service lets us provide the care you need with a video conversation. If a prescription is necessary we can send it directly to your pharmacy. If lab work is needed we can place an order for that and you can then stop by our lab to have the test done at a later time. Insurers are generally covering virtual visits as they would in-office visits so billing should not be different than normal.  If for some reason you do get billed incorrectly, you should contact the billing office to correct it and that number is in the AVS .    Video Conference to be completed via:  Ruth    Patient has given verbal consent for video visit?:  Yes    Patient would prefer that any video invitations be sent by: Send to e-mail at: [email or web address]      How would patient like to obtain AVS?:  Kirstin    AVS SmartPhrase [PsychAVS] has been placed in 'Patient Instructions':  Yes    "

## 2021-06-28 NOTE — TELEPHONE ENCOUNTER
REFERRAL INFORMATION:    Referring Provider:  Dr. Mccartney     Referring Clinic:  [facility]     Reason for Visit/Diagnosis: EGD follow up      FUTURE VISIT INFORMATION:    Appointment Date: 2021    Appointment Time: 1:20 PM      NOTES STATUS DETAILS   OFFICE NOTE from Referring Provider N/A    OFFICE NOTE from Other Specialist Internal 2021 Office visit with Kobi Rodriges RD ([facility])     19 Office visit with Dr. Luke Bhatia ([facility])     HOSPITAL DISCHARGE SUMMARY/  ED VISITS Care Everywhere 19 ([facility])    OPERATIVE REPORT N/A    MEDICATION LIST Internal         ENDOSCOPY  Internal EGD: 2021, 10/17/19   COLONOSCOPY Internal 10/17/19   ERCP N/A    EUS N/A    STOOL TESTING Internal 6/3/2021   PERTINENT LABS Internal/ Care Everywhere    PATHOLOGY REPORTS (RELATED) Internal 2021   IMAGING (CT, MRI, EGD, MRCP, Small Bowel Follow Through/SBT, MR/CT Enterography) Internal [place] Gastric Emptyin2021  US Abdomen: 2021

## 2021-07-09 ENCOUNTER — HOSPITAL ENCOUNTER (OUTPATIENT)
Dept: NUCLEAR MEDICINE | Facility: CLINIC | Age: 56
Setting detail: NUCLEAR MEDICINE
Discharge: HOME | End: 2021-07-09
Attending: INTERNAL MEDICINE | Admitting: INTERNAL MEDICINE
Payer: MEDICARE

## 2021-07-09 DIAGNOSIS — R10.13 ABDOMINAL PAIN, EPIGASTRIC: ICD-10-CM

## 2021-07-09 DIAGNOSIS — K21.9 GASTROESOPHAGEAL REFLUX DISEASE WITHOUT ESOPHAGITIS: ICD-10-CM

## 2021-07-09 PROCEDURE — 343N000001 HC RX 343: Performed by: INTERNAL MEDICINE

## 2021-07-09 PROCEDURE — A9541 TC99M SULFUR COLLOID: HCPCS | Performed by: INTERNAL MEDICINE

## 2021-07-09 PROCEDURE — G1004 CDSM NDSC: HCPCS

## 2021-07-09 PROCEDURE — G1004 CDSM NDSC: HCPCS | Mod: GC | Performed by: RADIOLOGY

## 2021-07-09 PROCEDURE — 78264 GASTRIC EMPTYING IMG STUDY: CPT | Mod: 26 | Performed by: RADIOLOGY

## 2021-07-09 RX ADMIN — Medication 2 MILLICURIE: at 08:39

## 2021-07-19 ENCOUNTER — ANCILLARY PROCEDURE (OUTPATIENT)
Dept: MAMMOGRAPHY | Facility: CLINIC | Age: 56
End: 2021-07-19
Attending: INTERNAL MEDICINE
Payer: MEDICARE

## 2021-07-19 DIAGNOSIS — Z12.31 VISIT FOR SCREENING MAMMOGRAM: ICD-10-CM

## 2021-07-19 PROCEDURE — 77063 BREAST TOMOSYNTHESIS BI: CPT | Performed by: RADIOLOGY

## 2021-07-19 PROCEDURE — 77067 SCR MAMMO BI INCL CAD: CPT | Performed by: RADIOLOGY

## 2021-07-20 ENCOUNTER — PATIENT OUTREACH (OUTPATIENT)
Dept: GASTROENTEROLOGY | Facility: CLINIC | Age: 56
End: 2021-07-20

## 2021-07-27 ENCOUNTER — TELEPHONE (OUTPATIENT)
Dept: GASTROENTEROLOGY | Facility: CLINIC | Age: 56
End: 2021-07-27

## 2021-07-27 NOTE — TELEPHONE ENCOUNTER
Could not lvm. Sent mychart and mailed letter, to notify pt that 8/17 visit with Dr Montes is cancelled due to provider unavailable that date. Pt can reschedule to next available with Dr Montes or other GI provider for dx.

## 2021-07-30 NOTE — PROGRESS NOTES
[facility] Outpatient Medical Nutrition Therapy      Marcia is a 55 year old who is being evaluated via a billable video visit.      How would you like to obtain your AVS? MyChart  If the video visit is dropped, the invitation should be resent by: Send to e-mail at: [email or web address]  Will anyone else be joining your video visit? No      Video Start Time: 9:54 AM     Video-Visit Details    Type of service:  Video Visit    Video End Time:10:16 AM    Originating Location (pt. Location): Home    Distant Location (provider location):  [facility] [place]     Platform used for Video Visit: Network Hardware Resale    During this virtual visit the patient is located in [place], patient verifies this as the location during the entirety of this visit.     Kobi Rodriges, PhD, RD    Additional provider notes:  Referring Physician: Bib  Reason for RD Visit: Gastroparesis    Nutrition Plan: Gastroparesis diet    Recommendations for MD/Provider to order: None at this time    Malnutrition Diagnosis: Patient does not meet the criteria necessary for diagnosing malnutrition    Nutrition Assessment:  Patient is here for intial visit with Registered Dietitian (RD). Patient is a 55 year old female found to have mildly delayed gastric emptying on gastric emptying study 7/9/2021. Presents today for gastroparesis diet education. Today recalls that symptoms have remained relatively unchanged since emptying study 7/9. Continues to struggle with upper right quadrant pain and reflux. Symptoms worsen as the day goes on and are worst in the evening.    Symptom Review  1. Nausea/vomiting? No  2. Heartburn? Yes: NA  3. Bloating? No  4. Belching? NA  5. Feeling full quickly? No  6. Decreased appetite? Yes: Maybe a little bit  7. Weight loss/gain? No  8. Constipation/Diarrhea? NA  9. Urgency? NA  10. Incomplete Bowel Emptying? NA  11. Abdominal pain/pain with or without eating? NA  12. Feeling tired? NA    Dietary beliefs and  Practices  1.  Have you modified your diet since symptoms started?  No   2.  Are there specific foods you avoid? No  3.  Have you received prior advice on diet?  No   A. If so, source? NA  4.  Have you, or do you follow, a specific diet?  No   A. Which one(s)?  NA   B. Did/Do you find it beneficial?  NA    I. If able to articulate, what specifically is the benefit? NA  5.  Do you take any vitamin, mineral, or herbal supplements? Yes: Vitamin D; Magnesium  6.  Do you use any calorie/protein supplements?  No    Diet Recall:  (Typical Day)  Meal Name Time Food    Breakfast  Bowl of cereal (special K with berries OR raisin bran) or scrambled eggs or pancakes AND glass of OJ or water        Lunch  Cottage cheese and pretzels OR greek yogurt with blueberries        Dinner  Pasta and chicken OR pizza OR hamburger          Sometimes popcorn        Snacks  None   Beverages  Typically water or gatorade   Alcohol   None     Frequency of eating/taking out meals: 1/week  Food access/availability: Fine  Food preparation confidence/abilities: Fine    Anthropometrics:   Height: Data Unavailable  Weight: 123 pounds  BMI: There is no height or weight on file to calculate BMI.    Weight History:  Wt Readings from Last 10 Encounters:   06/23/21 56.7 kg (125 lb)   04/26/21 62.1 kg (137 lb)   12/02/20 58.4 kg (128 lb 12.8 oz)   10/14/19 57.7 kg (127 lb 4.8 oz)   09/09/19 56.2 kg (123 lb 14.4 oz)   08/09/19 51.7 kg (114 lb)   02/06/19 51.9 kg (114 lb 6.4 oz)   01/22/18 53.3 kg (117 lb 6.4 oz)   01/22/18 52.8 kg (116 lb 6.4 oz)   12/05/17 51.3 kg (113 lb)     Usual Weight: 115-120 pounds  Weight change in past 6 months: Stable    Labs: Reviewed  Pertinent Medications/vitamin and mineral supplements:   Current Outpatient Medications   Medication     alendronate (FOSAMAX) 70 MG tablet     ARIPiprazole (ABILIFY) 5 MG tablet     aspirin (ASA) 81 MG chewable tablet     B Complex Vitamins (B COMPLEX 1 PO)     butalbital-acetaminophen-caffeine  (ESGIC) -40 MG tablet     Cholecalciferol (VITAMIN D) 1000 UNITS capsule     DULoxetine (CYMBALTA) 60 MG capsule     HYDROmorphone (DILAUDID) 3 MG Suppository     LANsoprazole (PREVACID) 30 MG DR capsule     Magnesium Oxide -Mg Supplement 400 MG CAPS     methylphenidate (RITALIN) 20 MG tablet     [START ON 8/2/2021] methylphenidate (RITALIN) 20 MG tablet     metoprolol succinate ER (TOPROL-XL) 25 MG 24 hr tablet     Ondansetron (ZUPLENZ) 8 MG FILM     oxyCODONE (OXY-IR) 5 MG capsule     oxyCODONE (OXYCONTIN) 10 MG 12 hr tablet     QUEtiapine (SEROQUEL) 50 MG tablet     riboflavin (VITAMIN  B-2) 100 MG TABS tablet     spironolactone (ALDACTONE) 25 MG tablet     SUMAtriptan (IMITREX STATDOSE) 6 MG/0.5ML pen injector kit     SUMAtriptan Succinate 6 MG/0.5ML KIT     tolterodine ER (DETROL LA) 4 MG 24 hr capsule     topiramate (TOPAMAX) 100 MG tablet     Zinc 50 MG CAPS     zinc gluconate 50 MG tablet     No current facility-administered medications for this visit.       Food Allergies: None  Food Intolerances: None  Physical Activity: Tries to go for walks    MALNUTRITION:  % Weight Loss:  None noted  % Intake:  No decreased intake noted  Subcutaneous Fat Loss:  None observed  Muscle Loss:  None observed  Fluid Retention:  None noted    Nutrition Diagnosis:    Food and nutrition related knowledge deficit related to IBD as evidenced by need for diet education.    Nutrition Prescription: Gastroparesis diet    Nutrition Intervention:    Nutrition Education/Counseling:  The texture, volume, frequency, and make up of foods can affect how quickly or slowly they empty from your stomach. Liquids empty the stomach easier than solids. Studies have demonstrated increased symptoms, such as nausea, associated with meals in the following order: high-fat solid > low-fat solid > high-fat liquid > low-fat liquid.  If symptoms get worse during day, try solid food meals in morning, switching to more liquid meals later in the day.      Educational Materials Provided: None at this time  Patient verbalized understanding of education provided. See all recommendations under Goals.    Goals:  1. Eat small, frequent meals, and chew food thoroughly (especially meat). The larger the meal, the slower the stomach will empty. Smaller meals more often (6-8 or more if needed) may allow you to eat enough.    2. Switch to more liquid-type foods. Liquids empty the stomach more easily than solids do  --If symptoms get worse during day, try solid food meals in morning, switching to more liquid meals later in the day, such as soups and pureed foods     3. Eat well-cooked fruits and vegetables rather than raw fruits and vegetables    4. Choose mostly low-fat foods, especially solids    5. Avoid carbonated drinks, alcohol and smoking    6. Sit upright for at least 1 hour after eating. If you can take a walk or get up and move around after eating, this will help the stomach to empty faster.     Nutrition Monitoring and Evaluation: Will monitor adherence to nutrition recommendations at future RD visits.     Further Medical Nutrition Therapy: As needed  Next Appointment (if applicable): PRN  Patient was encouraged to call/contact RD with any further questions.

## 2021-08-02 ENCOUNTER — TELEPHONE (OUTPATIENT)
Dept: PSYCHIATRY | Facility: CLINIC | Age: 56
End: 2021-08-02

## 2021-08-02 ENCOUNTER — VIRTUAL VISIT (OUTPATIENT)
Dept: GASTROENTEROLOGY | Facility: CLINIC | Age: 56
End: 2021-08-02
Payer: MEDICARE

## 2021-08-02 DIAGNOSIS — Z71.3 NUTRITIONAL COUNSELING: ICD-10-CM

## 2021-08-02 DIAGNOSIS — R10.13 ABDOMINAL PAIN, EPIGASTRIC: ICD-10-CM

## 2021-08-02 DIAGNOSIS — K21.9 GASTROESOPHAGEAL REFLUX DISEASE WITHOUT ESOPHAGITIS: ICD-10-CM

## 2021-08-02 DIAGNOSIS — K30 DELAYED GASTRIC EMPTYING: Primary | ICD-10-CM

## 2021-08-02 PROCEDURE — 99207 PR MNT INDIVIDUAL INITIAL EA 15 MIN: CPT | Mod: 95 | Performed by: DIETITIAN, REGISTERED

## 2021-08-02 NOTE — TELEPHONE ENCOUNTER
Prior Authorization Approval    Authorization Effective Date: 7/3/2021  Authorization Expiration Date: 8/2/2022  Medication: methylphenidate (RITALIN) 20 MG tablet-APPROVED  Approved Dose/Quantity:   Reference #:     Insurance Company: [employer] - Phone [phone] Fax [phone]  Expected CoPay:       CoPay Card Available:      Foundation Assistance Needed:    Which Pharmacy is filling the prescription (Not needed for infusion/clinic administered): [id number] - [place], [place] - [address]  Pharmacy Notified: Yes-Pharmacy will notify patient when ready.  Patient Notified: No

## 2021-08-02 NOTE — TELEPHONE ENCOUNTER
Prior Authorization Retail Medication Request    Renewal    Medication/Dose:  methylphenidate (RITALIN) 20 MG tablet  ICD code (if different than what is on RX):  Same  Previously Tried and Failed:  See 08/04/2020 encounter for previous approval  Rationale:  See 08/04/2020 encounter for previous approval    Insurance Name:  MEDICARE  Insurance ID:  [id number]      Pharmacy Information (if different than what is on RX)  Name:  [id number] [place] - [address]  Phone:  [phone]

## 2021-08-02 NOTE — PATIENT INSTRUCTIONS
Hi Marcia,    It was great meeting you today. Below is a summary of what we discussed:    1. Eat small, frequent meals, and chew food thoroughly (especially meat). The larger the meal, the slower the stomach will empty. Smaller meals more often (6-8 or more if needed) may allow you to eat enough.    2. Switch to more liquid-type foods. Liquids empty the stomach more easily than solids do  --If symptoms get worse during day, try solid food meals in morning, switching to more liquid meals later in the day, such as soups and pureed foods     3. Eat well-cooked fruits and vegetables rather than raw fruits and vegetables    4. Choose mostly low-fat foods, especially solids    5. Avoid carbonated drinks, alcohol and smoking    6. Sit upright for at least 1 hour after eating. If you can take a walk or get up and move around after eating, this will help the stomach to empty faster.     Best regards,  Kobi Rodriges, PhD, RD

## 2021-08-13 ENCOUNTER — TELEPHONE (OUTPATIENT)
Dept: INTERNAL MEDICINE | Facility: CLINIC | Age: 56
End: 2021-08-13

## 2021-08-13 DIAGNOSIS — Z87.440 PERSONAL HISTORY OF URINARY TRACT INFECTION: Primary | ICD-10-CM

## 2021-08-13 RX ORDER — SULFAMETHOXAZOLE/TRIMETHOPRIM 800-160 MG
1 TABLET ORAL 2 TIMES DAILY
Qty: 6 TABLET | Refills: 0 | Status: SHIPPED | OUTPATIENT
Start: 2021-08-13 | End: 2022-01-12

## 2021-08-13 NOTE — TELEPHONE ENCOUNTER
Spoke to Marcia via telephone, starting yesterday she began experiencing burning with urination, urinary frequency and painful urination. Denies flank pain, fever, nausea, vomiting, abdominal pain, or flank pain. She meets protocol for UTI treatment. Sent Bactrim treatment to pharmacy. I told her that if symptoms do not resolve after treatment that we'd like for her to be evaluated. Patient agreed and voiced understanding.  Serenity Harrington RN

## 2021-08-13 NOTE — TELEPHONE ENCOUNTER
[facility] Call Center    Phone Message    May a detailed message be left on voicemail: yes     Reason for Call: Symptoms or Concerns     If patient has red-flag symptoms, warm transfer to triage line    Current symptom or concern: pt calling with an UTI, she has burning,urgency, frequency and urine looks cloudy.     Symptoms have been present for:  1 day(s)    Has patient previously been seen for this? Yes    By Dr Mg    Date: Doesn't remember    Are there any new or worsening symptoms? No      Action Taken: Message routed to:  [facility]    Travel Screening: Not Applicable

## 2021-08-16 ENCOUNTER — VIRTUAL VISIT (OUTPATIENT)
Dept: PSYCHIATRY | Facility: CLINIC | Age: 56
End: 2021-08-16
Attending: PSYCHIATRY & NEUROLOGY
Payer: MEDICARE

## 2021-08-16 DIAGNOSIS — F33.42 RECURRENT MAJOR DEPRESSIVE DISORDER, IN FULL REMISSION (H): ICD-10-CM

## 2021-08-16 DIAGNOSIS — S06.9X5D TRAUMATIC BRAIN INJURY, WITH LOSS OF CONSCIOUSNESS GREATER THAN 24 HOURS WITH RETURN TO PRE-EXISTING CONSCIOUS LEVEL, SUBSEQUENT ENCOUNTER: ICD-10-CM

## 2021-08-16 DIAGNOSIS — Z79.899 ENCOUNTER FOR LONG-TERM (CURRENT) USE OF MEDICATIONS: Primary | ICD-10-CM

## 2021-08-16 DIAGNOSIS — F33.1 MODERATE EPISODE OF RECURRENT MAJOR DEPRESSIVE DISORDER (H): ICD-10-CM

## 2021-08-16 PROCEDURE — 90792 PSYCH DIAG EVAL W/MED SRVCS: CPT | Mod: 95 | Performed by: STUDENT IN AN ORGANIZED HEALTH CARE EDUCATION/TRAINING PROGRAM

## 2021-08-16 RX ORDER — DULOXETIN HYDROCHLORIDE 60 MG/1
120 CAPSULE, DELAYED RELEASE ORAL EVERY EVENING
Qty: 60 CAPSULE | Refills: 1 | Status: SHIPPED | OUTPATIENT
Start: 2021-08-16 | End: 2021-08-16

## 2021-08-16 RX ORDER — DULOXETIN HYDROCHLORIDE 60 MG/1
120 CAPSULE, DELAYED RELEASE ORAL EVERY EVENING
Qty: 60 CAPSULE | Refills: 1 | Status: SHIPPED | OUTPATIENT
Start: 2021-08-16 | End: 2022-04-18

## 2021-08-16 RX ORDER — ARIPIPRAZOLE 5 MG/1
10 TABLET ORAL DAILY
Qty: 60 TABLET | Refills: 1 | Status: SHIPPED | OUTPATIENT
Start: 2021-08-16 | End: 2021-08-16

## 2021-08-16 RX ORDER — METHYLPHENIDATE HYDROCHLORIDE 20 MG/1
TABLET ORAL
Qty: 90 TABLET | Refills: 0 | Status: ON HOLD | OUTPATIENT
Start: 2021-08-30 | End: 2022-02-17

## 2021-08-16 RX ORDER — ARIPIPRAZOLE 5 MG/1
5 TABLET ORAL DAILY
Qty: 60 TABLET | Refills: 1 | Status: SHIPPED | OUTPATIENT
Start: 2021-08-16 | End: 2022-05-16

## 2021-08-16 ASSESSMENT — PAIN SCALES - GENERAL: PAINLEVEL: NO PAIN (0)

## 2021-08-16 NOTE — PATIENT INSTRUCTIONS
**For crisis resources, please see the information at the end of this document**     Patient Education      Thank you for coming to the [facility] [place] [facility].    Lab Testing:  If you had lab testing today and your results are reassuring or normal they will be mailed to you or sent through [employer] within 7 days. If the lab tests need quick action we will call you with the results. The phone number we will call with results is # [phone] (home) . If this is not the best number please call our clinic and change the number.    Medication Refills:  If you need any refills please call your pharmacy and they will contact us. Our fax number for refills is [phone]. Please allow three business for refill processing. If you need to  your refill at a new pharmacy, please contact the new pharmacy directly. The new pharmacy will help you get your medications transferred.     Scheduling:  If you have any concerns about today's visit or wish to schedule another appointment please call our office during normal business hours [phone] (8-5:00 M-F)    Contact Us:  Please call [phone] during business hours (8-5:00 M-F).  If after clinic hours, or on the weekend, please call  [phone].    Financial Assistance [phone]  [facility] Billing [phone]  Central Billing Office, [facility]: [phone]  [facility] Billing [phone]  Medical Records [phone]  [facility] Patient Bill of Rights [email or web address]       MENTAL HEALTH CRISIS NUMBERS:  For a medical emergency please call  911 or go to the nearest ER.     [facility]:   [facility] -[phone]   Crisis Residence [facility] Residence -[phone]   Walk-In Counseling Center [facility] -[phone]   COPE 24/7 [place] Mobile Team -[phone] (adults)/[phone] (child)  CHILD: [facility] needs assessment  team - [phone]      [facility]:   [facility] - [phone]   Walk-in counseling [facility] - [phone]   Walk-in counseling [facility] - [phone]   Crisis Residence [facility] Residence - [phone]  Urgent Care Adult [facility]-[phone] mobile unit/ 24/7 crisis line    National Crisis Numbers:   National Suicide Prevention Lifeline: 9-255-326-TALK (112-770-5741)  Poison Control Center - 3-347-857-7764  [employer]/resources for a list of additional resources (SOS)  Trans Lifeline a hotline for transgender people 1-693.874.2974  The [employer] a hotline for LGBT youth 8-371-957-9309  Crisis Text Line: For any crisis 24/7   To: 931451  see www.crisistextline.org  - IF MAKING A CALL FEELS TOO HARD, send a text!         Again thank you for choosing [facility] MENTAL HEALTH & ADDICTION [facility] and please let us know how we can best partner with you to improve you and your family's health.    You may be receiving a survey regarding this appointment. We would love to have your feedback, both positive and negative. The survey is done by an external company, so your answers are anonymous.
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
## 2021-08-16 NOTE — PROGRESS NOTES
"VIDEO VISIT  Sherly Yeung is a 55 year old patient who is being evaluated via a billable video visit.      The patient has been notified of following:   \"This video visit will be conducted via a call between you and your physician/provider. We have found that certain health care needs can be provided without the need for an in-person physical exam. This service lets us provide the care you need with a video conversation. If a prescription is necessary we can send it directly to your pharmacy. If lab work is needed we can place an order for that and you can then stop by our lab to have the test done at a later time. Insurers are generally covering virtual visits as they would in-office visits so billing should not be different than normal.  If for some reason you do get billed incorrectly, you should contact the billing office to correct it and that number is in the AVS .    Video Conference to be completed via:  Ruth    Patient has given verbal consent for video visit?:  Yes    Patient would prefer that any video invitations be sent by: Send to e-mail at: [email or web address] ?[email or web address]??      How would patient like to obtain AVS?:  Kirstin    AVS SmartPhrase [PsychAVS] has been placed in 'Patient Instructions':  Yes    "

## 2021-08-17 ENCOUNTER — PRE VISIT (OUTPATIENT)
Dept: GASTROENTEROLOGY | Facility: CLINIC | Age: 56
End: 2021-08-17

## 2021-09-18 DIAGNOSIS — N39.41 URGE INCONTINENCE: ICD-10-CM

## 2021-09-19 ENCOUNTER — HEALTH MAINTENANCE LETTER (OUTPATIENT)
Age: 56
End: 2021-09-19

## 2021-09-20 RX ORDER — TOLTERODINE 4 MG/1
CAPSULE, EXTENDED RELEASE ORAL
Qty: 90 CAPSULE | Refills: 0 | OUTPATIENT
Start: 2021-09-20

## 2021-09-22 ENCOUNTER — VIRTUAL VISIT (OUTPATIENT)
Dept: URGENT CARE | Facility: CLINIC | Age: 56
End: 2021-09-22
Payer: MEDICARE

## 2021-09-22 DIAGNOSIS — Z20.822 ENCOUNTER FOR LABORATORY TESTING FOR COVID-19 VIRUS: ICD-10-CM

## 2021-09-22 DIAGNOSIS — R05.9 COUGH: Primary | ICD-10-CM

## 2021-09-22 PROCEDURE — 99441 PR PHYSICIAN TELEPHONE EVALUATION 5-10 MIN: CPT | Mod: 95

## 2021-09-22 NOTE — PATIENT INSTRUCTIONS
Your symptoms show that you may have coronavirus (COVID-19). This illness can cause fever, cough and trouble breathing. Many people get a mild case and get better on their own. Some people can get very sick.    Will I be tested for COVID-19?  We would like to test you for Covid-19 virus. I have placed orders for this test.     To schedule: go to your [employer] home page and scroll down to the section that says  You have an appointment that needs to be scheduled  and click the large green button that says  Schedule Now  and follow the steps to find the next available openings.    If you are unable to complete these [employer] scheduling steps, please call [phone] to schedule your testing.     Return to work/school/ guidance:  Please let your workplace manager and staffing office know when your quarantine ends     We can t give you an exact date as it depends on the above. You can calculate this on your own or work with your manager/staffing office to calculate this. (For example if you were exposed on 10/4, you would have to quarantine for 14 full days. That would be through 10/18. You could return on 10/19.)      If you receive a positive COVID-19 test result, follow the guidance of the those who are giving you the results. Usually the return to work is 10 (or in some cases 20 days from symptom onset.) If you work at [employer] [employer], you must also be cleared by Employee Occupational Health and Safety to return to work.        If you receive a negative COVID-19 test result and did not have a high risk exposure to someone with a known positive COVID-19 test, you can return to work once you're free of fever for 24 hours without fever-reducing medication and your symptoms are improving or resolved.      If you receive a negative COVID-19 test and If you had a high risk exposure to someone who has tested positive for COVID-19 then you can return to work 14 days after your last contact with the positive  individual    Note: If you have ongoing exposure to the covid positive person, this quarantine period may be more than 14 days. (For example, if you are continued to be exposed to your child who tested positive and cannot isolate from them, then the quarantine of 7-14 days can't start until your child is no longer contagious. This is typically 10 days from onset of the child's symptoms. So the total duration may be 17-24 days in this case.)    Sign up for [facility].   We know it's scary to hear that you might have COVID-19. We want to track your symptoms to make sure you're okay over the next 2 weeks. Please look for an email from [facility]--this is a free, online program that we'll use to keep in touch. To sign up, follow the link in the email you will receive. Learn more at [email or web address]    How can I take care of myself?    Get lots of rest. Drink extra fluids (unless a doctor has told you not to)    Take Tylenol (acetaminophen) or ibuprofen for fever or pain. If you have liver or kidney problems, ask your family doctor if it's okay to take Tylenol o ibuprofen    If you have other health problems (like cancer, heart failure, an organ transplant or severe kidney disease): Call your specialty clinic if you don't feel better in the next 2 days.    Know when to call 911. Emergency warning signs include:  o Trouble breathing or shortness of breath  o Pain or pressure in the chest that doesn't go away  o Feeling confused like you haven't felt before, or not being able to wake up  o Bluish-colored lips or face    Where can I get more information?  [facility] - About COVID-19:   [email or web address]    CDC - What to Do If You're Sick:   www.cdc.gov/coronavirus/2019-ncov/about/steps-when-sick.html

## 2021-09-22 NOTE — PROGRESS NOTES
Sherly is a 55 year old female who presents for a billable telephone visit.   ASSESSMENT/PLAN:  Diagnoses and all orders for this visit:    Cough    Encounter for laboratory testing for COVID-19 virus    COVID test pending. Patient given number [phone] to schedule test. At this time, quarantine at home. Monitor for new or worsening symptoms.     SUBJECTIVE:  Marcia calls with reports of cough, congestion for several days. She denies known exposure to COVID. She has not been vaccinated.     ROS: Pertinent ROS neg other than the symptoms noted above in the HPI.     OBJECTIVE:  Vitals not done due to this being a virtual visit  GEN: No distress  RESP: No cough, no audible wheezing, able to talk in full sentences  Remainder of exam unable to be completed due to telephone visits    Grant Lorenzana PA-C    Call length: 5 minutes

## 2021-09-23 ENCOUNTER — LAB (OUTPATIENT)
Dept: FAMILY MEDICINE | Facility: CLINIC | Age: 56
End: 2021-09-23
Attending: PHYSICIAN ASSISTANT
Payer: MEDICARE

## 2021-09-23 DIAGNOSIS — R05.9 COUGH: ICD-10-CM

## 2021-09-23 DIAGNOSIS — Z20.822 ENCOUNTER FOR LABORATORY TESTING FOR COVID-19 VIRUS: ICD-10-CM

## 2021-09-23 PROCEDURE — U0003 INFECTIOUS AGENT DETECTION BY NUCLEIC ACID (DNA OR RNA); SEVERE ACUTE RESPIRATORY SYNDROME CORONAVIRUS 2 (SARS-COV-2) (CORONAVIRUS DISEASE [COVID-19]), AMPLIFIED PROBE TECHNIQUE, MAKING USE OF HIGH THROUGHPUT TECHNOLOGIES AS DESCRIBED BY CMS-2020-01-R: HCPCS

## 2021-09-23 PROCEDURE — U0005 INFEC AGEN DETEC AMPLI PROBE: HCPCS

## 2021-09-24 LAB — SARS-COV-2 RNA RESP QL NAA+PROBE: POSITIVE

## 2021-10-02 ENCOUNTER — PATIENT OUTREACH (OUTPATIENT)
Dept: CARE COORDINATION | Facility: CLINIC | Age: 56
End: 2021-10-02

## 2021-10-02 NOTE — TELEPHONE ENCOUNTER
Second attempt made to reach patient. Left message to send message via [employer] if there is a better time to call. No further attempts will be made at this time.

## 2021-10-02 NOTE — TELEPHONE ENCOUNTER
Received notification of patient's report of home oximetry reading last night of 89%. Called and left message. Will make second attempt shortly.

## 2021-10-04 ENCOUNTER — TELEPHONE (OUTPATIENT)
Dept: PSYCHIATRY | Facility: CLINIC | Age: 56
End: 2021-10-04

## 2021-10-04 NOTE — TELEPHONE ENCOUNTER
[facility] Call Center    Phone Message    May a detailed message be left on voicemail: yes     Reason for Call: Other:   Patient called to cancel today's appointment due to being in the hospital with COVID pneumonia. She said she will call to reschedule and request med refills once she is out of the hospital.      Action Taken: Message routed to:  Other: Suzie Rodarte, RNCC    Travel Screening: Not Applicable

## 2021-10-05 ENCOUNTER — TRANSFERRED RECORDS (OUTPATIENT)
Dept: HEALTH INFORMATION MANAGEMENT | Facility: CLINIC | Age: 56
End: 2021-10-05

## 2021-10-06 ENCOUNTER — TRANSFERRED RECORDS (OUTPATIENT)
Dept: HEALTH INFORMATION MANAGEMENT | Facility: CLINIC | Age: 56
End: 2021-10-06

## 2021-10-06 ENCOUNTER — TELEPHONE (OUTPATIENT)
Dept: PSYCHIATRY | Facility: CLINIC | Age: 56
End: 2021-10-06

## 2021-10-06 NOTE — TELEPHONE ENCOUNTER
[facility] Call Center    Phone Message    May a detailed message be left on voicemail: yes     Reason for Call: Other: Concerns: Received call from Dr. Mantilla at [facility]. Patient is hospitalized there with COVID. Dr. Mantilla looking to connect with patient's psychiatrist as patient's condition is worsening and Dr. Mantilla would like to consult on what steps should be taken next related to patient's mental health  Dr. Mantilla left his direct personal line hoping to speak with Dr. Dove or an attending ASAP: [phone]    Action Taken: Message routed to:  Other: [facility]    Travel Screening: Not Applicable

## 2021-10-06 NOTE — TELEPHONE ENCOUNTER
Received message that Marcia is hospitalized for Covid-19 and her physician was calling to discuss psychiatric care. Called Dr. Mantilla, who is her primary team physician and [facility] in [place]. He reports that they have restarted her home medication regimen for now. He states that patient's mother was worried patient was having worsening depression and asked her primary team to call this writer for updates. Dr. Mantilla reports that the patient herself is not describing worsening mood, however has some anxiety about being hospitalized. He says if her mood worsens, he will call their inpatient on-call psychiatrist - says they do not do specialty rounding as [facility] [place] a [facility]. He reports that he plans to ensure patient has close follow up appointment at the clinic when she is discharged - but he is unsure when that will be at this time. This writer agreed with this plan of having close follow up and that the on-call psychiatrist should be called if there were any mood changes since patient is out of state and without an assessment, it is difficult to advise med changes.     Edda Ward DO, MPH  PGY-3 Psychiatry Resident

## 2021-10-09 ENCOUNTER — NURSE TRIAGE (OUTPATIENT)
Dept: NURSING | Facility: CLINIC | Age: 56
End: 2021-10-09

## 2021-10-10 ENCOUNTER — HOSPITAL ENCOUNTER (OUTPATIENT)
Age: 56
End: 2021-10-10
Payer: MEDICARE

## 2021-10-10 ENCOUNTER — TRANSFERRED RECORDS (OUTPATIENT)
Dept: HEALTH INFORMATION MANAGEMENT | Facility: CLINIC | Age: 56
End: 2021-10-10

## 2021-10-10 NOTE — TELEPHONE ENCOUNTER
Patient's niece Justine calling reporting patient is currently at at [facility] and requesting patient to be transferred to to the [facility]. Informed Justine the provider at [facility] needs to call Patient Placement at [phone] to request patient to be transferred to the hospital. Caller verbalized understanding. Denies further questions.      Wilfredo Macias RN  [facility] Nurse [facility]visors

## 2021-12-23 ENCOUNTER — TRANSFERRED RECORDS (OUTPATIENT)
Dept: HEALTH INFORMATION MANAGEMENT | Facility: CLINIC | Age: 56
End: 2021-12-23
Payer: MEDICARE

## 2022-01-03 ENCOUNTER — TELEPHONE (OUTPATIENT)
Dept: PSYCHIATRY | Facility: CLINIC | Age: 57
End: 2022-01-03
Payer: MEDICARE

## 2022-01-03 NOTE — TELEPHONE ENCOUNTER
[facility] Call Center    Phone Message    May a detailed message be left on voicemail: yes     Reason for Call: Medication Refill Request    Has the patient contacted the pharmacy for the refill? Yes   Name of medication being requested: Ativan  Provider who prescribed the medication: Derrell   Pharmacy:      [facility], [address]      Date medication is needed: Pt taking med PRN, will be out in three days    Action Taken: Message routed to:  Other: nursing pool    Travel Screening: Not Applicable

## 2022-01-04 NOTE — TELEPHONE ENCOUNTER
Writer reviewed patient's chart. Patient last seen by Dr. Dove in August. Appears she was hospitalized in October and discharged to a rehab center. Reviewed notes from the admission to the center and Ativan was prescribed by that team.    Called [facility] back and confirmed that writer can disregard this refill request. EUGENE Anne, confirmed the patient has two short-term Ativan scripts. One is from the in-home physician and the other is from her PCP. Dayana agreed to follow up with both of these providers.

## 2022-01-07 NOTE — TELEPHONE ENCOUNTER
[facility] Call Center    Phone Message    May a detailed message be left on voicemail: yes     Reason for Call: Medication Refill Request    Has the patient contacted the pharmacy for the refill? Yes   Name of medication being requested: ativan  Provider who prescribed the medication: unknown  [facility]acy: [facility]# [phone]  Date medication is needed: ASAP. Pt is out as of today. Nurse requested the order is sent to pharmacy and a faxed to [facility] at [phone]         Action Taken: Other: [facility] psych pool    Travel Screening: Not Applicable

## 2022-01-09 ENCOUNTER — HEALTH MAINTENANCE LETTER (OUTPATIENT)
Age: 57
End: 2022-01-09

## 2022-01-11 ENCOUNTER — APPOINTMENT (OUTPATIENT)
Dept: ULTRASOUND IMAGING | Facility: CLINIC | Age: 57
DRG: 871 | End: 2022-01-11
Attending: EMERGENCY MEDICINE
Payer: MEDICARE

## 2022-01-11 ENCOUNTER — HOSPITAL ENCOUNTER (INPATIENT)
Facility: CLINIC | Age: 57
LOS: 37 days | Discharge: NURSING, REHAB OR LONG-TERM CARE | DRG: 871 | End: 2022-02-18
Attending: EMERGENCY MEDICINE | Admitting: PEDIATRICS
Payer: MEDICARE

## 2022-01-11 ENCOUNTER — APPOINTMENT (OUTPATIENT)
Dept: CT IMAGING | Facility: CLINIC | Age: 57
DRG: 871 | End: 2022-01-11
Attending: EMERGENCY MEDICINE
Payer: MEDICARE

## 2022-01-11 ENCOUNTER — APPOINTMENT (OUTPATIENT)
Dept: MRI IMAGING | Facility: CLINIC | Age: 57
DRG: 871 | End: 2022-01-11
Attending: EMERGENCY MEDICINE
Payer: MEDICARE

## 2022-01-11 DIAGNOSIS — K83.8 BILE DUCT PROLIFERATION: ICD-10-CM

## 2022-01-11 DIAGNOSIS — R74.01 TRANSAMINITIS: ICD-10-CM

## 2022-01-11 DIAGNOSIS — K83.8 DILATED BILE DUCT: ICD-10-CM

## 2022-01-11 DIAGNOSIS — U07.1 ACUTE RESPIRATORY DISTRESS SYNDROME (ARDS) DUE TO COVID-19 VIRUS (H): Primary | ICD-10-CM

## 2022-01-11 DIAGNOSIS — S06.9X5D TRAUMATIC BRAIN INJURY, WITH LOSS OF CONSCIOUSNESS GREATER THAN 24 HOURS WITH RETURN TO PRE-EXISTING CONSCIOUS LEVEL, SUBSEQUENT ENCOUNTER: ICD-10-CM

## 2022-01-11 DIAGNOSIS — Z11.52 ENCOUNTER FOR SCREENING LABORATORY TESTING FOR SEVERE ACUTE RESPIRATORY SYNDROME CORONAVIRUS 2 (SARS-COV-2): ICD-10-CM

## 2022-01-11 DIAGNOSIS — J80 ACUTE RESPIRATORY DISTRESS SYNDROME (ARDS) DUE TO COVID-19 VIRUS (H): Primary | ICD-10-CM

## 2022-01-11 LAB
ABO/RH(D): NORMAL
ALBUMIN SERPL-MCNC: 3.2 G/DL (ref 3.4–5)
ALP SERPL-CCNC: 213 U/L (ref 40–150)
ALT SERPL W P-5'-P-CCNC: 200 U/L (ref 0–50)
ANION GAP SERPL CALCULATED.3IONS-SCNC: 8 MMOL/L (ref 3–14)
ANTIBODY SCREEN: NEGATIVE
AST SERPL W P-5'-P-CCNC: 219 U/L (ref 0–45)
BASOPHILS # BLD AUTO: 0 10E3/UL (ref 0–0.2)
BASOPHILS NFR BLD AUTO: 0 %
BILIRUB SERPL-MCNC: 0.9 MG/DL (ref 0.2–1.3)
BUN SERPL-MCNC: 10 MG/DL (ref 7–30)
CA-I BLD-MCNC: 5.5 MG/DL (ref 4.4–5.2)
CALCIUM SERPL-MCNC: 10.3 MG/DL (ref 8.5–10.1)
CHLORIDE BLD-SCNC: 107 MMOL/L (ref 94–109)
CO2 SERPL-SCNC: 25 MMOL/L (ref 20–32)
CPB POCT: NO
CREAT SERPL-MCNC: 0.63 MG/DL (ref 0.52–1.04)
CRP SERPL-MCNC: 5.1 MG/L (ref 0–8)
EOSINOPHIL # BLD AUTO: 0.1 10E3/UL (ref 0–0.7)
EOSINOPHIL NFR BLD AUTO: 1 %
ERYTHROCYTE [DISTWIDTH] IN BLOOD BY AUTOMATED COUNT: 15.7 % (ref 10–15)
ERYTHROCYTE [SEDIMENTATION RATE] IN BLOOD BY WESTERGREN METHOD: 38 MM/HR (ref 0–30)
GFR SERPL CREATININE-BSD FRML MDRD: >90 ML/MIN/1.73M2
GLUCOSE BLD-MCNC: 110 MG/DL (ref 70–99)
GLUCOSE BLD-MCNC: 113 MG/DL (ref 70–99)
HCO3 BLDV-SCNC: 27 MMOL/L (ref 21–28)
HCT VFR BLD AUTO: 38.9 % (ref 35–47)
HCT VFR BLD CALC: 41 % (ref 35–47)
HGB BLD-MCNC: 12.2 G/DL (ref 11.7–15.7)
HGB BLD-MCNC: 13.9 G/DL (ref 11.7–15.7)
IMM GRANULOCYTES # BLD: 0.1 10E3/UL
IMM GRANULOCYTES NFR BLD: 1 %
INR PPP: 1.27 (ref 0.85–1.15)
LIPASE SERPL-CCNC: 162 U/L (ref 73–393)
LYMPHOCYTES # BLD AUTO: 1 10E3/UL (ref 0.8–5.3)
LYMPHOCYTES NFR BLD AUTO: 9 %
MAGNESIUM SERPL-MCNC: 2.2 MG/DL (ref 1.6–2.3)
MCH RBC QN AUTO: 29 PG (ref 26.5–33)
MCHC RBC AUTO-ENTMCNC: 31.4 G/DL (ref 31.5–36.5)
MCV RBC AUTO: 93 FL (ref 78–100)
MONOCYTES # BLD AUTO: 0.7 10E3/UL (ref 0–1.3)
MONOCYTES NFR BLD AUTO: 6 %
NEUTROPHILS # BLD AUTO: 9 10E3/UL (ref 1.6–8.3)
NEUTROPHILS NFR BLD AUTO: 83 %
NRBC # BLD AUTO: 0 10E3/UL
NRBC BLD AUTO-RTO: 0 /100
PCO2 BLDV: 48 MM HG (ref 40–50)
PH BLDV: 7.36 [PH] (ref 7.32–7.43)
PLATELET # BLD AUTO: 390 10E3/UL (ref 150–450)
PO2 BLDV: 38 MM HG (ref 25–47)
POTASSIUM BLD-SCNC: 3 MMOL/L (ref 3.4–5.3)
POTASSIUM BLD-SCNC: 3.1 MMOL/L (ref 3.4–5.3)
PROT SERPL-MCNC: 8.1 G/DL (ref 6.8–8.8)
RADIOLOGIST FLAGS: ABNORMAL
RBC # BLD AUTO: 4.2 10E6/UL (ref 3.8–5.2)
SAO2 % BLDV: 69 % (ref 94–100)
SODIUM BLD-SCNC: 142 MMOL/L (ref 133–144)
SODIUM SERPL-SCNC: 140 MMOL/L (ref 133–144)
SPECIMEN EXPIRATION DATE: NORMAL
TROPONIN I SERPL HS-MCNC: 4 NG/L
WBC # BLD AUTO: 10.8 10E3/UL (ref 4–11)

## 2022-01-11 PROCEDURE — 74177 CT ABD & PELVIS W/CONTRAST: CPT | Mod: ME

## 2022-01-11 PROCEDURE — 85610 PROTHROMBIN TIME: CPT | Performed by: EMERGENCY MEDICINE

## 2022-01-11 PROCEDURE — 84484 ASSAY OF TROPONIN QUANT: CPT | Performed by: EMERGENCY MEDICINE

## 2022-01-11 PROCEDURE — 74183 MRI ABD W/O CNTR FLWD CNTR: CPT | Mod: MG

## 2022-01-11 PROCEDURE — G1004 CDSM NDSC: HCPCS | Mod: GC | Performed by: RADIOLOGY

## 2022-01-11 PROCEDURE — A9585 GADOBUTROL INJECTION: HCPCS | Performed by: EMERGENCY MEDICINE

## 2022-01-11 PROCEDURE — 76705 ECHO EXAM OF ABDOMEN: CPT

## 2022-01-11 PROCEDURE — 82947 ASSAY GLUCOSE BLOOD QUANT: CPT

## 2022-01-11 PROCEDURE — 96376 TX/PRO/DX INJ SAME DRUG ADON: CPT | Performed by: EMERGENCY MEDICINE

## 2022-01-11 PROCEDURE — 74177 CT ABD & PELVIS W/CONTRAST: CPT | Mod: 26 | Performed by: RADIOLOGY

## 2022-01-11 PROCEDURE — 93005 ELECTROCARDIOGRAM TRACING: CPT | Performed by: EMERGENCY MEDICINE

## 2022-01-11 PROCEDURE — 85025 COMPLETE CBC W/AUTO DIFF WBC: CPT | Performed by: EMERGENCY MEDICINE

## 2022-01-11 PROCEDURE — 255N000002 HC RX 255 OP 636: Performed by: EMERGENCY MEDICINE

## 2022-01-11 PROCEDURE — 86140 C-REACTIVE PROTEIN: CPT | Performed by: EMERGENCY MEDICINE

## 2022-01-11 PROCEDURE — G1004 CDSM NDSC: HCPCS | Performed by: RADIOLOGY

## 2022-01-11 PROCEDURE — 74183 MRI ABD W/O CNTR FLWD CNTR: CPT | Mod: 26 | Performed by: RADIOLOGY

## 2022-01-11 PROCEDURE — 99285 EMERGENCY DEPT VISIT HI MDM: CPT | Mod: 25 | Performed by: EMERGENCY MEDICINE

## 2022-01-11 PROCEDURE — 82947 ASSAY GLUCOSE BLOOD QUANT: CPT | Performed by: EMERGENCY MEDICINE

## 2022-01-11 PROCEDURE — 96375 TX/PRO/DX INJ NEW DRUG ADDON: CPT | Performed by: EMERGENCY MEDICINE

## 2022-01-11 PROCEDURE — 85652 RBC SED RATE AUTOMATED: CPT | Performed by: EMERGENCY MEDICINE

## 2022-01-11 PROCEDURE — 250N000011 HC RX IP 250 OP 636: Performed by: EMERGENCY MEDICINE

## 2022-01-11 PROCEDURE — 36415 COLL VENOUS BLD VENIPUNCTURE: CPT | Performed by: EMERGENCY MEDICINE

## 2022-01-11 PROCEDURE — 82330 ASSAY OF CALCIUM: CPT

## 2022-01-11 PROCEDURE — 83735 ASSAY OF MAGNESIUM: CPT | Performed by: EMERGENCY MEDICINE

## 2022-01-11 PROCEDURE — 258N000003 HC RX IP 258 OP 636: Performed by: EMERGENCY MEDICINE

## 2022-01-11 PROCEDURE — 83690 ASSAY OF LIPASE: CPT | Performed by: EMERGENCY MEDICINE

## 2022-01-11 PROCEDURE — 76705 ECHO EXAM OF ABDOMEN: CPT | Mod: 26 | Performed by: RADIOLOGY

## 2022-01-11 PROCEDURE — 99285 EMERGENCY DEPT VISIT HI MDM: CPT | Performed by: EMERGENCY MEDICINE

## 2022-01-11 PROCEDURE — 96365 THER/PROPH/DIAG IV INF INIT: CPT | Mod: 59 | Performed by: EMERGENCY MEDICINE

## 2022-01-11 PROCEDURE — 250N000009 HC RX 250: Performed by: EMERGENCY MEDICINE

## 2022-01-11 PROCEDURE — 96361 HYDRATE IV INFUSION ADD-ON: CPT | Performed by: EMERGENCY MEDICINE

## 2022-01-11 PROCEDURE — 86901 BLOOD TYPING SEROLOGIC RH(D): CPT | Performed by: EMERGENCY MEDICINE

## 2022-01-11 RX ORDER — SODIUM CHLORIDE 9 MG/ML
INJECTION, SOLUTION INTRAVENOUS CONTINUOUS
Status: DISCONTINUED | OUTPATIENT
Start: 2022-01-11 | End: 2022-01-12

## 2022-01-11 RX ORDER — ONDANSETRON 2 MG/ML
4 INJECTION INTRAMUSCULAR; INTRAVENOUS ONCE
Status: COMPLETED | OUTPATIENT
Start: 2022-01-11 | End: 2022-01-11

## 2022-01-11 RX ORDER — POTASSIUM CHLORIDE 7.45 MG/ML
10 INJECTION INTRAVENOUS ONCE
Status: COMPLETED | OUTPATIENT
Start: 2022-01-11 | End: 2022-01-11

## 2022-01-11 RX ORDER — IOPAMIDOL 755 MG/ML
70 INJECTION, SOLUTION INTRAVASCULAR ONCE
Status: COMPLETED | OUTPATIENT
Start: 2022-01-11 | End: 2022-01-11

## 2022-01-11 RX ORDER — GADOBUTROL 604.72 MG/ML
7.5 INJECTION INTRAVENOUS ONCE
Status: COMPLETED | OUTPATIENT
Start: 2022-01-11 | End: 2022-01-11

## 2022-01-11 RX ORDER — POTASSIUM CHLORIDE 7.45 MG/ML
10 INJECTION INTRAVENOUS ONCE
Status: COMPLETED | OUTPATIENT
Start: 2022-01-11 | End: 2022-01-12

## 2022-01-11 RX ORDER — HYDROMORPHONE HYDROCHLORIDE 1 MG/ML
0.5 INJECTION, SOLUTION INTRAMUSCULAR; INTRAVENOUS; SUBCUTANEOUS
Status: DISCONTINUED | OUTPATIENT
Start: 2022-01-11 | End: 2022-01-12

## 2022-01-11 RX ORDER — HYDROMORPHONE HYDROCHLORIDE 1 MG/ML
0.5 INJECTION, SOLUTION INTRAMUSCULAR; INTRAVENOUS; SUBCUTANEOUS EVERY 30 MIN PRN
Status: DISCONTINUED | OUTPATIENT
Start: 2022-01-11 | End: 2022-01-12

## 2022-01-11 RX ORDER — LORAZEPAM 2 MG/ML
0.5 INJECTION INTRAMUSCULAR ONCE
Status: COMPLETED | OUTPATIENT
Start: 2022-01-11 | End: 2022-01-11

## 2022-01-11 RX ADMIN — SODIUM CHLORIDE: 9 INJECTION, SOLUTION INTRAVENOUS at 23:43

## 2022-01-11 RX ADMIN — HYDROMORPHONE HYDROCHLORIDE 0.5 MG: 1 INJECTION, SOLUTION INTRAMUSCULAR; INTRAVENOUS; SUBCUTANEOUS at 19:31

## 2022-01-11 RX ADMIN — SODIUM CHLORIDE 1000 ML: 9 INJECTION, SOLUTION INTRAVENOUS at 17:45

## 2022-01-11 RX ADMIN — POTASSIUM CHLORIDE 10 MEQ: 7.46 INJECTION, SOLUTION INTRAVENOUS at 19:23

## 2022-01-11 RX ADMIN — SODIUM CHLORIDE, PRESERVATIVE FREE 68 ML: 5 INJECTION INTRAVENOUS at 18:52

## 2022-01-11 RX ADMIN — IOPAMIDOL 70 ML: 755 INJECTION, SOLUTION INTRAVENOUS at 18:52

## 2022-01-11 RX ADMIN — HYDROMORPHONE HYDROCHLORIDE 0.5 MG: 1 INJECTION, SOLUTION INTRAMUSCULAR; INTRAVENOUS; SUBCUTANEOUS at 20:32

## 2022-01-11 RX ADMIN — GADOBUTROL 5 ML: 604.72 INJECTION INTRAVENOUS at 23:02

## 2022-01-11 RX ADMIN — LORAZEPAM 0.5 MG: 2 INJECTION INTRAMUSCULAR; INTRAVENOUS at 21:58

## 2022-01-11 RX ADMIN — ONDANSETRON 4 MG: 2 INJECTION INTRAMUSCULAR; INTRAVENOUS at 18:45

## 2022-01-11 RX ADMIN — HYDROMORPHONE HYDROCHLORIDE 0.5 MG: 1 INJECTION, SOLUTION INTRAMUSCULAR; INTRAVENOUS; SUBCUTANEOUS at 18:40

## 2022-01-11 ASSESSMENT — ENCOUNTER SYMPTOMS
ABDOMINAL PAIN: 1
VOMITING: 1
SHORTNESS OF BREATH: 0
NAUSEA: 1
FEVER: 0
CHILLS: 0

## 2022-01-11 ASSESSMENT — MIFFLIN-ST. JEOR: SCORE: 1080.77

## 2022-01-11 NOTE — LETTER
Transition Communication Hand-off for Care Transitions to Next Level of Care Provider    Name: Sherly Yeung  : 1965  MRN #: [id number]  Primary Care Provider: Chadwick Mg     Primary Clinic: [address] [place] [id number]     Reason for Hospitalization:  Transaminitis [R74.01]  Dilated bile duct [K83.8]  Admit Date/Time: 2022  3:51 PM  Discharge Date: 22  Payor Source: Payor: MEDICARE / Plan: MEDICARE / Product Type: Medicare /     Readmission Assessment Measure (GEOVANNY) Risk Score/category: 26%         Reason for Communication Hand-off Referral: Avoidable readmission within 30 days    Discharge Plan: [place] [facility]    Concern for non-adherence with plan of care:   N  Discharge Needs Assessment:  Needs      Most Recent Value   Equipment Currently Used at Home none   # of Referrals Placed by CTS Post Acute Facilities        Follow-up plan:    Future Appointments   Date Time Provider Department Center   2022  3:30 PM Catrachito Angeles, PT [facility] O   2022  7:00 PM Esthela Hilliard, OTR [facility] O   3/8/2022 10:00 AM Herminio Espinosa MD [facility]   3/16/2022  3:00 PM Chadwick Mg MD [facility]       Any outstanding tests or procedures:        Referrals     Future Labs/Procedures    General Surg Adult Referral     Comments:    Please be aware that coverage of these services is subject to the terms and limitations of your health insurance plan.  Call member services at your health plan with any benefit or coverage questions.  Please call to schedule your appointment      Occupational Therapy Adult Consult     Comments:    Evaluate and treat as clinically indicated.    Reason:  DIAGNOSE AND TREAT    Physical Therapy Adult Consult     Comments:    Evaluate and treat as clinically indicated.    Reason:  DIAGNOSE AND TREAT            Key Recommendations:      Reny Dawson, LICSW    AVS/Discharge Summary is the source of truth; this  is a helpful guide for improved communication of patient story

## 2022-01-11 NOTE — LETTER
March 14, 2022      Marcia Yeung  [id number]  [place] [address]        Dear MsDeniz,    We are writing to inform you of your test results.    {results letter list:125020}    No results found from the In Basket message.    If you have any questions or concerns, please call the clinic at the number listed above.       Sincerely,

## 2022-01-11 NOTE — ED TRIAGE NOTES
Pt BIBA from TCU for abdominal pain that radiates down to RLQ since sunday. Has been having nausea and vomiting. Had tried Zofran, omeprazole and miralax at the TCU with no improvement. Minimal bowel movements. Had Covid in September. Wears 3L NC baseline now.
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
## 2022-01-11 NOTE — LETTER
Health Information Management Services               Recipient:  [facility]      Sender:  Keiry Garcia, [facility], MSW  Social Work Services, 6D support   [facility]  Direct: [phone] Pager: [phone]      Date: January 26, 2022  Patient Name:  Sherly Yeung  Routing Message:  Marcia would like to return, if you'll take her!  10 day quarantine for COVID+ ends today 1/26.           The documents accompanying this notice contain confidential information belonging to the sender.  This information is intended only for the use of the individual or entity named above.  The authorized recipient of this information is prohibited from disclosing this information to any other party and is required to destroy the information after its stated need has been fulfilled, unless otherwise required by state law.      If you are not the intended recipient, you are hereby notified that any disclosure, copy, distribution or action taken in reliance on the contents of these documents is strictly prohibited.  If you have received this document in error, please return it by fax to [phone] with a note on the cover sheet explaining why you are returning it (e.g. not your patient, not your provider, etc.).  If you need further assistance, please call [facility] Centralized Transcription at [phone].  Documents may also be returned by mail to [facility], , [facility] [address], [place], [place] [address].

## 2022-01-11 NOTE — LETTER
Health Information Management Services               Recipient: [facility]          Sender: Leanne HINTON; PH: [phone]          Date: February 4, 2022  Patient Name:  Sherly Yeung  Routing Message:  Referral for Marcia Yeung. Please let me know if you can accept her next week. Thank you, Leanne.           The documents accompanying this notice contain confidential information belonging to the sender.  This information is intended only for the use of the individual or entity named above.  The authorized recipient of this information is prohibited from disclosing this information to any other party and is required to destroy the information after its stated need has been fulfilled, unless otherwise required by state law.      If you are not the intended recipient, you are hereby notified that any disclosure, copy, distribution or action taken in reliance on the contents of these documents is strictly prohibited.  If you have received this document in error, please return it by fax to [phone] with a note on the cover sheet explaining why you are returning it (e.g. not your patient, not your provider, etc.).  If you need further assistance, please call [facility] Centralized Transcription at [phone].  Documents may also be returned by mail to [employer], , [facility] [address], [place], [place] [address].

## 2022-01-12 ENCOUNTER — ANESTHESIA EVENT (OUTPATIENT)
Dept: SURGERY | Facility: CLINIC | Age: 57
DRG: 871 | End: 2022-01-12
Payer: MEDICARE

## 2022-01-12 ENCOUNTER — APPOINTMENT (OUTPATIENT)
Dept: GENERAL RADIOLOGY | Facility: CLINIC | Age: 57
DRG: 871 | End: 2022-01-12
Attending: INTERNAL MEDICINE
Payer: MEDICARE

## 2022-01-12 ENCOUNTER — ANESTHESIA (OUTPATIENT)
Dept: SURGERY | Facility: CLINIC | Age: 57
DRG: 871 | End: 2022-01-12
Payer: MEDICARE

## 2022-01-12 ENCOUNTER — APPOINTMENT (OUTPATIENT)
Dept: GENERAL RADIOLOGY | Facility: CLINIC | Age: 57
DRG: 871 | End: 2022-01-12
Attending: EMERGENCY MEDICINE
Payer: MEDICARE

## 2022-01-12 PROBLEM — K83.8 DILATED BILE DUCT: Status: ACTIVE | Noted: 2022-01-12

## 2022-01-12 PROBLEM — R74.01 TRANSAMINITIS: Status: ACTIVE | Noted: 2022-01-12

## 2022-01-12 LAB
ALBUMIN SERPL-MCNC: 2.6 G/DL (ref 3.4–5)
ALBUMIN SERPL-MCNC: 2.8 G/DL (ref 3.4–5)
ALP SERPL-CCNC: 169 U/L (ref 40–150)
ALP SERPL-CCNC: 182 U/L (ref 40–150)
ALT SERPL W P-5'-P-CCNC: 191 U/L (ref 0–50)
ALT SERPL W P-5'-P-CCNC: 205 U/L (ref 0–50)
ANION GAP SERPL CALCULATED.3IONS-SCNC: 5 MMOL/L (ref 3–14)
ANION GAP SERPL CALCULATED.3IONS-SCNC: 5 MMOL/L (ref 3–14)
AST SERPL W P-5'-P-CCNC: 178 U/L (ref 0–45)
AST SERPL W P-5'-P-CCNC: 212 U/L (ref 0–45)
ATRIAL RATE - MUSE: 106 BPM
BILIRUB SERPL-MCNC: 0.9 MG/DL (ref 0.2–1.3)
BILIRUB SERPL-MCNC: 1.9 MG/DL (ref 0.2–1.3)
BUN SERPL-MCNC: 8 MG/DL (ref 7–30)
BUN SERPL-MCNC: 8 MG/DL (ref 7–30)
CALCIUM SERPL-MCNC: 9.4 MG/DL (ref 8.5–10.1)
CALCIUM SERPL-MCNC: 9.6 MG/DL (ref 8.5–10.1)
CHLORIDE BLD-SCNC: 114 MMOL/L (ref 94–109)
CHLORIDE BLD-SCNC: 116 MMOL/L (ref 94–109)
CO2 SERPL-SCNC: 23 MMOL/L (ref 20–32)
CO2 SERPL-SCNC: 25 MMOL/L (ref 20–32)
CREAT SERPL-MCNC: 0.55 MG/DL (ref 0.52–1.04)
CREAT SERPL-MCNC: 0.56 MG/DL (ref 0.52–1.04)
DIASTOLIC BLOOD PRESSURE - MUSE: NORMAL MMHG
ERCP: NORMAL
ERYTHROCYTE [DISTWIDTH] IN BLOOD BY AUTOMATED COUNT: 15.9 % (ref 10–15)
ERYTHROCYTE [DISTWIDTH] IN BLOOD BY AUTOMATED COUNT: 16.2 % (ref 10–15)
GFR SERPL CREATININE-BSD FRML MDRD: >90 ML/MIN/1.73M2
GFR SERPL CREATININE-BSD FRML MDRD: >90 ML/MIN/1.73M2
GLUCOSE BLD-MCNC: 93 MG/DL (ref 70–99)
GLUCOSE BLD-MCNC: 97 MG/DL (ref 70–99)
GLUCOSE BLDC GLUCOMTR-MCNC: 78 MG/DL (ref 70–99)
HCT VFR BLD AUTO: 32.5 % (ref 35–47)
HCT VFR BLD AUTO: 36.8 % (ref 35–47)
HGB BLD-MCNC: 11.3 G/DL (ref 11.7–15.7)
HGB BLD-MCNC: 9.8 G/DL (ref 11.7–15.7)
HOLD SPECIMEN: NORMAL
INTERPRETATION ECG - MUSE: NORMAL
LACTATE SERPL-SCNC: 0.7 MMOL/L (ref 0.7–2)
MCH RBC QN AUTO: 28.7 PG (ref 26.5–33)
MCH RBC QN AUTO: 28.9 PG (ref 26.5–33)
MCHC RBC AUTO-ENTMCNC: 30.2 G/DL (ref 31.5–36.5)
MCHC RBC AUTO-ENTMCNC: 30.7 G/DL (ref 31.5–36.5)
MCV RBC AUTO: 93 FL (ref 78–100)
MCV RBC AUTO: 96 FL (ref 78–100)
P AXIS - MUSE: 82 DEGREES
PLATELET # BLD AUTO: 290 10E3/UL (ref 150–450)
PLATELET # BLD AUTO: 344 10E3/UL (ref 150–450)
POTASSIUM BLD-SCNC: 3.2 MMOL/L (ref 3.4–5.3)
POTASSIUM BLD-SCNC: 3.6 MMOL/L (ref 3.4–5.3)
POTASSIUM BLD-SCNC: 3.9 MMOL/L (ref 3.4–5.3)
PR INTERVAL - MUSE: 130 MS
PROT SERPL-MCNC: 6.8 G/DL (ref 6.8–8.8)
PROT SERPL-MCNC: 7 G/DL (ref 6.8–8.8)
QRS DURATION - MUSE: 68 MS
QT - MUSE: 322 MS
QTC - MUSE: 427 MS
R AXIS - MUSE: -17 DEGREES
RBC # BLD AUTO: 3.39 10E6/UL (ref 3.8–5.2)
RBC # BLD AUTO: 3.94 10E6/UL (ref 3.8–5.2)
SARS-COV-2 RNA RESP QL NAA+PROBE: NEGATIVE
SODIUM SERPL-SCNC: 144 MMOL/L (ref 133–144)
SODIUM SERPL-SCNC: 144 MMOL/L (ref 133–144)
SYSTOLIC BLOOD PRESSURE - MUSE: NORMAL MMHG
T AXIS - MUSE: 0 DEGREES
VENTRICULAR RATE- MUSE: 106 BPM
WBC # BLD AUTO: 13 10E3/UL (ref 4–11)
WBC # BLD AUTO: 13.3 10E3/UL (ref 4–11)

## 2022-01-12 PROCEDURE — 250N000009 HC RX 250: Performed by: NURSE ANESTHETIST, CERTIFIED REGISTERED

## 2022-01-12 PROCEDURE — 99222 1ST HOSP IP/OBS MODERATE 55: CPT | Mod: GC | Performed by: SURGERY

## 2022-01-12 PROCEDURE — 250N000013 HC RX MED GY IP 250 OP 250 PS 637: Performed by: PEDIATRICS

## 2022-01-12 PROCEDURE — C1726 CATH, BAL DIL, NON-VASCULAR: HCPCS | Performed by: INTERNAL MEDICINE

## 2022-01-12 PROCEDURE — G0378 HOSPITAL OBSERVATION PER HR: HCPCS

## 2022-01-12 PROCEDURE — 84450 TRANSFERASE (AST) (SGOT): CPT | Performed by: NURSE PRACTITIONER

## 2022-01-12 PROCEDURE — 258N000003 HC RX IP 258 OP 636: Performed by: EMERGENCY MEDICINE

## 2022-01-12 PROCEDURE — 250N000013 HC RX MED GY IP 250 OP 250 PS 637: Performed by: NURSE PRACTITIONER

## 2022-01-12 PROCEDURE — 250N000011 HC RX IP 250 OP 636: Performed by: EMERGENCY MEDICINE

## 2022-01-12 PROCEDURE — 36415 COLL VENOUS BLD VENIPUNCTURE: CPT | Performed by: NURSE PRACTITIONER

## 2022-01-12 PROCEDURE — 96374 THER/PROPH/DIAG INJ IV PUSH: CPT

## 2022-01-12 PROCEDURE — 258N000003 HC RX IP 258 OP 636: Performed by: INTERNAL MEDICINE

## 2022-01-12 PROCEDURE — 250N000011 HC RX IP 250 OP 636: Performed by: NURSE PRACTITIONER

## 2022-01-12 PROCEDURE — 71045 X-RAY EXAM CHEST 1 VIEW: CPT

## 2022-01-12 PROCEDURE — C9803 HOPD COVID-19 SPEC COLLECT: HCPCS | Performed by: EMERGENCY MEDICINE

## 2022-01-12 PROCEDURE — 272N000001 HC OR GENERAL SUPPLY STERILE: Performed by: INTERNAL MEDICINE

## 2022-01-12 PROCEDURE — 255N000002 HC RX 255 OP 636: Performed by: INTERNAL MEDICINE

## 2022-01-12 PROCEDURE — 0F798DZ DILATION OF COMMON BILE DUCT WITH INTRALUMINAL DEVICE, VIA NATURAL OR ARTIFICIAL OPENING ENDOSCOPIC: ICD-10-PCS | Performed by: INTERNAL MEDICINE

## 2022-01-12 PROCEDURE — 99222 1ST HOSP IP/OBS MODERATE 55: CPT | Mod: GC | Performed by: INTERNAL MEDICINE

## 2022-01-12 PROCEDURE — 250N000011 HC RX IP 250 OP 636: Performed by: NURSE ANESTHETIST, CERTIFIED REGISTERED

## 2022-01-12 PROCEDURE — 84155 ASSAY OF PROTEIN SERUM: CPT | Performed by: NURSE PRACTITIONER

## 2022-01-12 PROCEDURE — 258N000003 HC RX IP 258 OP 636: Performed by: NURSE PRACTITIONER

## 2022-01-12 PROCEDURE — 258N000003 HC RX IP 258 OP 636: Performed by: ANESTHESIOLOGY

## 2022-01-12 PROCEDURE — 83605 ASSAY OF LACTIC ACID: CPT | Performed by: NURSE PRACTITIONER

## 2022-01-12 PROCEDURE — 96376 TX/PRO/DX INJ SAME DRUG ADON: CPT

## 2022-01-12 PROCEDURE — 71045 X-RAY EXAM CHEST 1 VIEW: CPT | Mod: 26 | Performed by: RADIOLOGY

## 2022-01-12 PROCEDURE — 999N000181 XR SURGERY CARM FLUORO GREATER THAN 5 MIN W STILLS: Mod: TC

## 2022-01-12 PROCEDURE — 0FC98ZZ EXTIRPATION OF MATTER FROM COMMON BILE DUCT, VIA NATURAL OR ARTIFICIAL OPENING ENDOSCOPIC: ICD-10-PCS | Performed by: INTERNAL MEDICINE

## 2022-01-12 PROCEDURE — U0005 INFEC AGEN DETEC AMPLI PROBE: HCPCS | Performed by: EMERGENCY MEDICINE

## 2022-01-12 PROCEDURE — 250N000013 HC RX MED GY IP 250 OP 250 PS 637: Performed by: PHYSICIAN ASSISTANT

## 2022-01-12 PROCEDURE — 99224 PR SUBSEQUENT OBSERVATION CARE,LEVEL I: CPT | Performed by: EMERGENCY MEDICINE

## 2022-01-12 PROCEDURE — 93005 ELECTROCARDIOGRAM TRACING: CPT

## 2022-01-12 PROCEDURE — C1769 GUIDE WIRE: HCPCS | Performed by: INTERNAL MEDICINE

## 2022-01-12 PROCEDURE — 250N000011 HC RX IP 250 OP 636: Performed by: PEDIATRICS

## 2022-01-12 PROCEDURE — 85014 HEMATOCRIT: CPT | Performed by: NURSE PRACTITIONER

## 2022-01-12 PROCEDURE — 96376 TX/PRO/DX INJ SAME DRUG ADON: CPT | Performed by: EMERGENCY MEDICINE

## 2022-01-12 PROCEDURE — 710N000010 HC RECOVERY PHASE 1, LEVEL 2, PER MIN: Performed by: INTERNAL MEDICINE

## 2022-01-12 PROCEDURE — 999N000141 HC STATISTIC PRE-PROCEDURE NURSING ASSESSMENT: Performed by: INTERNAL MEDICINE

## 2022-01-12 PROCEDURE — 96375 TX/PRO/DX INJ NEW DRUG ADDON: CPT

## 2022-01-12 PROCEDURE — 250N000011 HC RX IP 250 OP 636: Performed by: INTERNAL MEDICINE

## 2022-01-12 PROCEDURE — 250N000011 HC RX IP 250 OP 636: Performed by: ANESTHESIOLOGY

## 2022-01-12 PROCEDURE — 250N000011 HC RX IP 250 OP 636: Performed by: PHYSICIAN ASSISTANT

## 2022-01-12 PROCEDURE — 87040 BLOOD CULTURE FOR BACTERIA: CPT | Performed by: STUDENT IN AN ORGANIZED HEALTH CARE EDUCATION/TRAINING PROGRAM

## 2022-01-12 PROCEDURE — 96361 HYDRATE IV INFUSION ADD-ON: CPT | Performed by: EMERGENCY MEDICINE

## 2022-01-12 PROCEDURE — 99223 1ST HOSP IP/OBS HIGH 75: CPT | Mod: AI | Performed by: PEDIATRICS

## 2022-01-12 PROCEDURE — 250N000009 HC RX 250: Performed by: NURSE PRACTITIONER

## 2022-01-12 PROCEDURE — 84132 ASSAY OF SERUM POTASSIUM: CPT | Performed by: NURSE PRACTITIONER

## 2022-01-12 PROCEDURE — 82962 GLUCOSE BLOOD TEST: CPT

## 2022-01-12 PROCEDURE — 96366 THER/PROPH/DIAG IV INF ADDON: CPT | Performed by: EMERGENCY MEDICINE

## 2022-01-12 PROCEDURE — 250N000009 HC RX 250: Performed by: INTERNAL MEDICINE

## 2022-01-12 PROCEDURE — C1877 STENT, NON-COAT/COV W/O DEL: HCPCS | Performed by: INTERNAL MEDICINE

## 2022-01-12 PROCEDURE — 120N000002 HC R&B MED SURG/OB UMMC

## 2022-01-12 PROCEDURE — 370N000017 HC ANESTHESIA TECHNICAL FEE, PER MIN: Performed by: INTERNAL MEDICINE

## 2022-01-12 PROCEDURE — 250N000009 HC RX 250: Performed by: ANESTHESIOLOGY

## 2022-01-12 PROCEDURE — 360N000083 HC SURGERY LEVEL 3 W/ FLUORO, PER MIN: Performed by: INTERNAL MEDICINE

## 2022-01-12 DEVICE — ZIMMON BILIARY STENT
Type: IMPLANTABLE DEVICE | Site: BILE DUCT | Status: NON-FUNCTIONAL
Brand: ZIMMON
Removed: 2022-02-14

## 2022-01-12 DEVICE — IMPLANTABLE DEVICE
Type: IMPLANTABLE DEVICE | Site: BILE DUCT | Status: NON-FUNCTIONAL
Removed: 2022-02-14

## 2022-01-12 RX ORDER — NALOXONE HYDROCHLORIDE 0.4 MG/ML
0.2 INJECTION, SOLUTION INTRAMUSCULAR; INTRAVENOUS; SUBCUTANEOUS
Status: DISCONTINUED | OUTPATIENT
Start: 2022-01-12 | End: 2022-02-18 | Stop reason: HOSPADM

## 2022-01-12 RX ORDER — HYDROMORPHONE HYDROCHLORIDE 1 MG/ML
.5-1 INJECTION, SOLUTION INTRAMUSCULAR; INTRAVENOUS; SUBCUTANEOUS
Status: DISCONTINUED | OUTPATIENT
Start: 2022-01-12 | End: 2022-01-13

## 2022-01-12 RX ORDER — SODIUM CHLORIDE 9 MG/ML
INJECTION, SOLUTION INTRAVENOUS CONTINUOUS
Status: DISCONTINUED | OUTPATIENT
Start: 2022-01-12 | End: 2022-01-14

## 2022-01-12 RX ORDER — PROPOFOL 10 MG/ML
INJECTION, EMULSION INTRAVENOUS CONTINUOUS PRN
Status: DISCONTINUED | OUTPATIENT
Start: 2022-01-12 | End: 2022-01-12

## 2022-01-12 RX ORDER — AMPICILLIN AND SULBACTAM 2; 1 G/1; G/1
3 INJECTION, POWDER, FOR SOLUTION INTRAMUSCULAR; INTRAVENOUS ONCE
Status: DISCONTINUED | OUTPATIENT
Start: 2022-01-12 | End: 2022-01-12

## 2022-01-12 RX ORDER — GUAIFENESIN 600 MG/1
15 TABLET, EXTENDED RELEASE ORAL DAILY
Status: DISCONTINUED | OUTPATIENT
Start: 2022-01-13 | End: 2022-02-18 | Stop reason: HOSPADM

## 2022-01-12 RX ORDER — IOPAMIDOL 510 MG/ML
INJECTION, SOLUTION INTRAVASCULAR PRN
Status: DISCONTINUED | OUTPATIENT
Start: 2022-01-12 | End: 2022-01-12 | Stop reason: HOSPADM

## 2022-01-12 RX ORDER — ONDANSETRON 2 MG/ML
4 INJECTION INTRAMUSCULAR; INTRAVENOUS EVERY 6 HOURS PRN
Status: DISCONTINUED | OUTPATIENT
Start: 2022-01-12 | End: 2022-01-12

## 2022-01-12 RX ORDER — LIDOCAINE 40 MG/G
CREAM TOPICAL
Status: DISCONTINUED | OUTPATIENT
Start: 2022-01-12 | End: 2022-01-20

## 2022-01-12 RX ORDER — LORAZEPAM 1 MG/1
1 TABLET ORAL EVERY 6 HOURS PRN
COMMUNITY
End: 2022-03-17

## 2022-01-12 RX ORDER — ONDANSETRON 4 MG/1
4 TABLET, ORALLY DISINTEGRATING ORAL EVERY 6 HOURS PRN
Status: DISCONTINUED | OUTPATIENT
Start: 2022-01-12 | End: 2022-01-12

## 2022-01-12 RX ORDER — AMOXICILLIN 250 MG
2 CAPSULE ORAL 2 TIMES DAILY PRN
Status: DISCONTINUED | OUTPATIENT
Start: 2022-01-12 | End: 2022-02-18 | Stop reason: HOSPADM

## 2022-01-12 RX ORDER — NALOXONE HYDROCHLORIDE 0.4 MG/ML
0.4 INJECTION, SOLUTION INTRAMUSCULAR; INTRAVENOUS; SUBCUTANEOUS
Status: DISCONTINUED | OUTPATIENT
Start: 2022-01-12 | End: 2022-02-18 | Stop reason: HOSPADM

## 2022-01-12 RX ORDER — EPINEPHRINE IN SOD CHLOR,ISO 1 MG/10 ML
SYRINGE (ML) INTRAVENOUS PRN
Status: DISCONTINUED | OUTPATIENT
Start: 2022-01-12 | End: 2022-01-12 | Stop reason: HOSPADM

## 2022-01-12 RX ORDER — INDOMETHACIN 50 MG/1
100 SUPPOSITORY RECTAL
Status: DISCONTINUED | OUTPATIENT
Start: 2022-01-12 | End: 2022-01-12 | Stop reason: HOSPADM

## 2022-01-12 RX ORDER — TOLTERODINE TARTRATE 2 MG/1
2 TABLET, EXTENDED RELEASE ORAL 2 TIMES DAILY
Status: DISCONTINUED | OUTPATIENT
Start: 2022-01-12 | End: 2022-02-18 | Stop reason: HOSPADM

## 2022-01-12 RX ORDER — CHLORHEXIDINE GLUCONATE ORAL RINSE 1.2 MG/ML
15 SOLUTION DENTAL 2 TIMES DAILY
Status: ON HOLD | COMMUNITY
End: 2022-01-12

## 2022-01-12 RX ORDER — VENLAFAXINE HYDROCHLORIDE 75 MG/1
75 CAPSULE, EXTENDED RELEASE ORAL 2 TIMES DAILY
Status: DISCONTINUED | OUTPATIENT
Start: 2022-01-12 | End: 2022-01-12

## 2022-01-12 RX ORDER — DULOXETIN HYDROCHLORIDE 60 MG/1
120 CAPSULE, DELAYED RELEASE ORAL EVERY EVENING
Status: DISCONTINUED | OUTPATIENT
Start: 2022-01-12 | End: 2022-01-30

## 2022-01-12 RX ORDER — VENLAFAXINE 75 MG/1
75 TABLET ORAL 2 TIMES DAILY WITH MEALS
Status: DISCONTINUED | OUTPATIENT
Start: 2022-01-12 | End: 2022-01-12

## 2022-01-12 RX ORDER — AMOXICILLIN 250 MG
1 CAPSULE ORAL 2 TIMES DAILY PRN
COMMUNITY

## 2022-01-12 RX ORDER — SODIUM CHLORIDE, SODIUM LACTATE, POTASSIUM CHLORIDE, CALCIUM CHLORIDE 600; 310; 30; 20 MG/100ML; MG/100ML; MG/100ML; MG/100ML
INJECTION, SOLUTION INTRAVENOUS CONTINUOUS PRN
Status: DISCONTINUED | OUTPATIENT
Start: 2022-01-12 | End: 2022-01-12

## 2022-01-12 RX ORDER — VENLAFAXINE 75 MG/1
37.5 TABLET ORAL 2 TIMES DAILY
COMMUNITY
End: 2022-04-26

## 2022-01-12 RX ORDER — VORICONAZOLE 200 MG/1
200 TABLET, FILM COATED ORAL 2 TIMES DAILY
Status: DISCONTINUED | OUTPATIENT
Start: 2022-01-12 | End: 2022-01-12

## 2022-01-12 RX ORDER — TOPIRAMATE 200 MG/1
200 TABLET, FILM COATED ORAL EVERY EVENING
Status: DISCONTINUED | OUTPATIENT
Start: 2022-01-12 | End: 2022-01-12

## 2022-01-12 RX ORDER — HYDROMORPHONE HYDROCHLORIDE 1 MG/ML
0.5 INJECTION, SOLUTION INTRAMUSCULAR; INTRAVENOUS; SUBCUTANEOUS
Status: DISCONTINUED | OUTPATIENT
Start: 2022-01-12 | End: 2022-01-12

## 2022-01-12 RX ORDER — VORICONAZOLE 200 MG/1
200 TABLET, FILM COATED ORAL 2 TIMES DAILY
Status: ON HOLD | COMMUNITY
End: 2022-02-17

## 2022-01-12 RX ORDER — DEXAMETHASONE SODIUM PHOSPHATE 4 MG/ML
INJECTION, SOLUTION INTRA-ARTICULAR; INTRALESIONAL; INTRAMUSCULAR; INTRAVENOUS; SOFT TISSUE PRN
Status: DISCONTINUED | OUTPATIENT
Start: 2022-01-12 | End: 2022-01-12

## 2022-01-12 RX ORDER — PANTOPRAZOLE SODIUM 40 MG/1
40 TABLET, DELAYED RELEASE ORAL 2 TIMES DAILY
Status: ON HOLD | COMMUNITY
End: 2022-01-12

## 2022-01-12 RX ORDER — VORICONAZOLE 40 MG/ML
200 POWDER, FOR SUSPENSION ORAL EVERY 12 HOURS SCHEDULED
Status: DISCONTINUED | OUTPATIENT
Start: 2022-01-12 | End: 2022-01-12

## 2022-01-12 RX ORDER — FENTANYL CITRATE 50 UG/ML
INJECTION, SOLUTION INTRAMUSCULAR; INTRAVENOUS PRN
Status: DISCONTINUED | OUTPATIENT
Start: 2022-01-12 | End: 2022-01-12

## 2022-01-12 RX ORDER — INDOMETHACIN 50 MG/1
SUPPOSITORY RECTAL PRN
Status: DISCONTINUED | OUTPATIENT
Start: 2022-01-12 | End: 2022-01-12 | Stop reason: HOSPADM

## 2022-01-12 RX ORDER — PIPERACILLIN SODIUM, TAZOBACTAM SODIUM 3; .375 G/15ML; G/15ML
3.38 INJECTION, POWDER, LYOPHILIZED, FOR SOLUTION INTRAVENOUS EVERY 6 HOURS
Status: DISCONTINUED | OUTPATIENT
Start: 2022-01-13 | End: 2022-01-13

## 2022-01-12 RX ORDER — LIDOCAINE HYDROCHLORIDE 20 MG/ML
INJECTION, SOLUTION INFILTRATION; PERINEURAL PRN
Status: DISCONTINUED | OUTPATIENT
Start: 2022-01-12 | End: 2022-01-12

## 2022-01-12 RX ORDER — LORAZEPAM 2 MG/ML
0.5 INJECTION INTRAMUSCULAR EVERY 4 HOURS PRN
Status: DISCONTINUED | OUTPATIENT
Start: 2022-01-12 | End: 2022-01-13

## 2022-01-12 RX ORDER — AMPICILLIN AND SULBACTAM 2; 1 G/1; G/1
3 INJECTION, POWDER, FOR SOLUTION INTRAMUSCULAR; INTRAVENOUS EVERY 6 HOURS
Status: DISCONTINUED | OUTPATIENT
Start: 2022-01-12 | End: 2022-01-12

## 2022-01-12 RX ORDER — LIDOCAINE 40 MG/G
CREAM TOPICAL
Status: DISCONTINUED | OUTPATIENT
Start: 2022-01-12 | End: 2022-01-12 | Stop reason: HOSPADM

## 2022-01-12 RX ORDER — ONDANSETRON 4 MG/1
4 TABLET, ORALLY DISINTEGRATING ORAL EVERY 6 HOURS PRN
Status: DISCONTINUED | OUTPATIENT
Start: 2022-01-12 | End: 2022-01-14

## 2022-01-12 RX ORDER — TOPIRAMATE 100 MG/1
100 TABLET, FILM COATED ORAL DAILY
Status: DISCONTINUED | OUTPATIENT
Start: 2022-01-12 | End: 2022-02-18 | Stop reason: HOSPADM

## 2022-01-12 RX ORDER — TOPIRAMATE 200 MG/1
100 TABLET, FILM COATED ORAL 2 TIMES DAILY
COMMUNITY
End: 2022-02-18

## 2022-01-12 RX ORDER — AMOXICILLIN 250 MG
1 CAPSULE ORAL 2 TIMES DAILY
Status: DISCONTINUED | OUTPATIENT
Start: 2022-01-12 | End: 2022-01-23

## 2022-01-12 RX ORDER — POTASSIUM CHLORIDE 7.45 MG/ML
10 INJECTION INTRAVENOUS
Status: COMPLETED | OUTPATIENT
Start: 2022-01-12 | End: 2022-01-12

## 2022-01-12 RX ORDER — CEFTRIAXONE 2 G/1
2 INJECTION, POWDER, FOR SOLUTION INTRAMUSCULAR; INTRAVENOUS ONCE
Status: COMPLETED | OUTPATIENT
Start: 2022-01-12 | End: 2022-01-12

## 2022-01-12 RX ORDER — VENLAFAXINE 75 MG/1
75 TABLET ORAL 2 TIMES DAILY
Status: DISCONTINUED | OUTPATIENT
Start: 2022-01-12 | End: 2022-01-12

## 2022-01-12 RX ORDER — LIDOCAINE 40 MG/G
CREAM TOPICAL
Status: DISCONTINUED | OUTPATIENT
Start: 2022-01-12 | End: 2022-02-18 | Stop reason: HOSPADM

## 2022-01-12 RX ORDER — AMOXICILLIN 250 MG
1 CAPSULE ORAL 2 TIMES DAILY
Status: DISCONTINUED | OUTPATIENT
Start: 2022-01-12 | End: 2022-01-12

## 2022-01-12 RX ORDER — VENLAFAXINE 75 MG/1
75 TABLET ORAL 2 TIMES DAILY
Status: DISCONTINUED | OUTPATIENT
Start: 2022-01-12 | End: 2022-02-18 | Stop reason: HOSPADM

## 2022-01-12 RX ORDER — FLUMAZENIL 0.1 MG/ML
0.2 INJECTION, SOLUTION INTRAVENOUS
Status: ACTIVE | OUTPATIENT
Start: 2022-01-12 | End: 2022-01-13

## 2022-01-12 RX ORDER — TOLTERODINE 4 MG/1
4 CAPSULE, EXTENDED RELEASE ORAL DAILY
Status: DISCONTINUED | OUTPATIENT
Start: 2022-01-12 | End: 2022-01-12

## 2022-01-12 RX ORDER — VORICONAZOLE 40 MG/ML
200 POWDER, FOR SUSPENSION ORAL EVERY 12 HOURS SCHEDULED
Status: DISCONTINUED | OUTPATIENT
Start: 2022-01-12 | End: 2022-01-24

## 2022-01-12 RX ORDER — SODIUM CHLORIDE, SODIUM LACTATE, POTASSIUM CHLORIDE, CALCIUM CHLORIDE 600; 310; 30; 20 MG/100ML; MG/100ML; MG/100ML; MG/100ML
INJECTION, SOLUTION INTRAVENOUS CONTINUOUS
Status: DISCONTINUED | OUTPATIENT
Start: 2022-01-12 | End: 2022-01-12

## 2022-01-12 RX ORDER — QUETIAPINE FUMARATE 50 MG/1
50 TABLET, FILM COATED ORAL DAILY PRN
Status: DISCONTINUED | OUTPATIENT
Start: 2022-01-12 | End: 2022-02-18 | Stop reason: HOSPADM

## 2022-01-12 RX ORDER — ONDANSETRON 2 MG/ML
INJECTION INTRAMUSCULAR; INTRAVENOUS PRN
Status: DISCONTINUED | OUTPATIENT
Start: 2022-01-12 | End: 2022-01-12

## 2022-01-12 RX ORDER — PIPERACILLIN SODIUM, TAZOBACTAM SODIUM 3; .375 G/15ML; G/15ML
3.38 INJECTION, POWDER, LYOPHILIZED, FOR SOLUTION INTRAVENOUS EVERY 6 HOURS
Status: DISCONTINUED | OUTPATIENT
Start: 2022-01-12 | End: 2022-01-12

## 2022-01-12 RX ORDER — ONDANSETRON 2 MG/ML
4 INJECTION INTRAMUSCULAR; INTRAVENOUS EVERY 6 HOURS PRN
Status: DISCONTINUED | OUTPATIENT
Start: 2022-01-12 | End: 2022-01-14

## 2022-01-12 RX ORDER — PROPOFOL 10 MG/ML
INJECTION, EMULSION INTRAVENOUS PRN
Status: DISCONTINUED | OUTPATIENT
Start: 2022-01-12 | End: 2022-01-12

## 2022-01-12 RX ORDER — AMOXICILLIN 250 MG
1 CAPSULE ORAL 2 TIMES DAILY PRN
Status: DISCONTINUED | OUTPATIENT
Start: 2022-01-12 | End: 2022-02-18 | Stop reason: HOSPADM

## 2022-01-12 RX ADMIN — SENNOSIDES AND DOCUSATE SODIUM 1 TABLET: 8.6; 5 TABLET ORAL at 21:52

## 2022-01-12 RX ADMIN — HYDROMORPHONE HYDROCHLORIDE 0.5 MG: 1 INJECTION, SOLUTION INTRAMUSCULAR; INTRAVENOUS; SUBCUTANEOUS at 03:27

## 2022-01-12 RX ADMIN — ONDANSETRON 4 MG: 2 INJECTION INTRAMUSCULAR; INTRAVENOUS at 15:49

## 2022-01-12 RX ADMIN — TOLTERODINE TARTRATE 2 MG: 2 TABLET, FILM COATED ORAL at 21:46

## 2022-01-12 RX ADMIN — TOPIRAMATE 100 MG: 100 TABLET, FILM COATED ORAL at 09:23

## 2022-01-12 RX ADMIN — POTASSIUM CHLORIDE 10 MEQ: 7.46 INJECTION, SOLUTION INTRAVENOUS at 06:04

## 2022-01-12 RX ADMIN — TOLTERODINE 4 MG: 4 CAPSULE, EXTENDED RELEASE ORAL at 18:23

## 2022-01-12 RX ADMIN — SUGAMMADEX 150 MG: 100 INJECTION, SOLUTION INTRAVENOUS at 15:21

## 2022-01-12 RX ADMIN — ONDANSETRON 4 MG: 2 INJECTION INTRAMUSCULAR; INTRAVENOUS at 03:27

## 2022-01-12 RX ADMIN — PROPOFOL 150 MCG/KG/MIN: 10 INJECTION, EMULSION INTRAVENOUS at 14:27

## 2022-01-12 RX ADMIN — POTASSIUM CHLORIDE 10 MEQ: 7.46 INJECTION, SOLUTION INTRAVENOUS at 00:18

## 2022-01-12 RX ADMIN — VENLAFAXINE 75 MG: 75 TABLET ORAL at 21:47

## 2022-01-12 RX ADMIN — CEFTRIAXONE SODIUM 2 G: 2 INJECTION, POWDER, FOR SOLUTION INTRAMUSCULAR; INTRAVENOUS at 11:23

## 2022-01-12 RX ADMIN — HYDROMORPHONE HYDROCHLORIDE 0.5 MG: 1 INJECTION, SOLUTION INTRAMUSCULAR; INTRAVENOUS; SUBCUTANEOUS at 19:58

## 2022-01-12 RX ADMIN — HYDROMORPHONE HYDROCHLORIDE 0.5 MG: 1 INJECTION, SOLUTION INTRAMUSCULAR; INTRAVENOUS; SUBCUTANEOUS at 12:30

## 2022-01-12 RX ADMIN — HYDROMORPHONE HYDROCHLORIDE 0.5 MG: 1 INJECTION, SOLUTION INTRAMUSCULAR; INTRAVENOUS; SUBCUTANEOUS at 05:23

## 2022-01-12 RX ADMIN — VORICONAZOLE 200 MG: 40 POWDER, FOR SUSPENSION ORAL at 21:47

## 2022-01-12 RX ADMIN — ONDANSETRON 4 MG: 2 INJECTION INTRAMUSCULAR; INTRAVENOUS at 15:16

## 2022-01-12 RX ADMIN — PHENYLEPHRINE HYDROCHLORIDE 150 MCG: 10 INJECTION INTRAVENOUS at 14:44

## 2022-01-12 RX ADMIN — VORICONAZOLE 200 MG: 40 POWDER, FOR SUSPENSION ORAL at 11:37

## 2022-01-12 RX ADMIN — VENLAFAXINE 75 MG: 75 TABLET ORAL at 18:23

## 2022-01-12 RX ADMIN — POTASSIUM CHLORIDE 10 MEQ: 7.46 INJECTION, SOLUTION INTRAVENOUS at 04:57

## 2022-01-12 RX ADMIN — SODIUM CHLORIDE: 9 INJECTION, SOLUTION INTRAVENOUS at 11:23

## 2022-01-12 RX ADMIN — HYDROMORPHONE HYDROCHLORIDE 0.5 MG: 1 INJECTION, SOLUTION INTRAMUSCULAR; INTRAVENOUS; SUBCUTANEOUS at 09:20

## 2022-01-12 RX ADMIN — HYDROMORPHONE HYDROCHLORIDE 0.5 MG: 1 INJECTION, SOLUTION INTRAMUSCULAR; INTRAVENOUS; SUBCUTANEOUS at 17:48

## 2022-01-12 RX ADMIN — AMPICILLIN SODIUM AND SULBACTAM SODIUM 3 G: 2; 1 INJECTION, POWDER, FOR SOLUTION INTRAMUSCULAR; INTRAVENOUS at 18:06

## 2022-01-12 RX ADMIN — DEXAMETHASONE SODIUM PHOSPHATE 4 MG: 4 INJECTION, SOLUTION INTRA-ARTICULAR; INTRALESIONAL; INTRAMUSCULAR; INTRAVENOUS; SOFT TISSUE at 14:22

## 2022-01-12 RX ADMIN — PHENYLEPHRINE HYDROCHLORIDE 100 MCG: 10 INJECTION INTRAVENOUS at 14:41

## 2022-01-12 RX ADMIN — PROPOFOL 50 MG: 10 INJECTION, EMULSION INTRAVENOUS at 14:22

## 2022-01-12 RX ADMIN — SODIUM CHLORIDE 1000 ML: 9 INJECTION, SOLUTION INTRAVENOUS at 01:25

## 2022-01-12 RX ADMIN — FENTANYL CITRATE 50 MCG: 50 INJECTION, SOLUTION INTRAMUSCULAR; INTRAVENOUS at 14:20

## 2022-01-12 RX ADMIN — SODIUM CHLORIDE, POTASSIUM CHLORIDE, SODIUM LACTATE AND CALCIUM CHLORIDE: 600; 310; 30; 20 INJECTION, SOLUTION INTRAVENOUS at 14:13

## 2022-01-12 RX ADMIN — LIDOCAINE HYDROCHLORIDE 60 MG: 20 INJECTION, SOLUTION INFILTRATION; PERINEURAL at 14:22

## 2022-01-12 RX ADMIN — ORAL VEHICLES - SUSP 12.5 MG: SUSPENSION at 11:38

## 2022-01-12 RX ADMIN — TOPIRAMATE 200 MG: 100 TABLET ORAL at 21:47

## 2022-01-12 RX ADMIN — HYDROMORPHONE HYDROCHLORIDE 0.5 MG: 1 INJECTION, SOLUTION INTRAMUSCULAR; INTRAVENOUS; SUBCUTANEOUS at 00:13

## 2022-01-12 RX ADMIN — SODIUM CHLORIDE, POTASSIUM CHLORIDE, SODIUM LACTATE AND CALCIUM CHLORIDE: 600; 310; 30; 20 INJECTION, SOLUTION INTRAVENOUS at 03:33

## 2022-01-12 RX ADMIN — SODIUM CHLORIDE, POTASSIUM CHLORIDE, SODIUM LACTATE AND CALCIUM CHLORIDE 500 ML: 600; 310; 30; 20 INJECTION, SOLUTION INTRAVENOUS at 16:18

## 2022-01-12 RX ADMIN — Medication 7.5 MG: at 18:23

## 2022-01-12 RX ADMIN — METOPROLOL TARTRATE 12.5 MG: 25 TABLET ORAL at 05:18

## 2022-01-12 RX ADMIN — HYDROMORPHONE HYDROCHLORIDE 0.5 MG: 1 INJECTION, SOLUTION INTRAMUSCULAR; INTRAVENOUS; SUBCUTANEOUS at 06:33

## 2022-01-12 RX ADMIN — ROCURONIUM BROMIDE 40 MG: 50 INJECTION, SOLUTION INTRAVENOUS at 14:22

## 2022-01-12 RX ADMIN — AMPICILLIN SODIUM AND SULBACTAM SODIUM 3 G: 2; 1 INJECTION, POWDER, FOR SOLUTION INTRAMUSCULAR; INTRAVENOUS at 10:52

## 2022-01-12 ASSESSMENT — ACTIVITIES OF DAILY LIVING (ADL)
ADLS_ACUITY_SCORE: 21
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
## 2022-01-12 NOTE — PHARMACY-ADMISSION MEDICATION HISTORY
Admission Medication History Completed by Pharmacy    See [facility] Admission Navigator for allergy information, preferred outpatient pharmacy, prior to admission medications and immunization status.     Medication History Sources:     Patient    Sure Scripts    Changes made to PTA medication list (reason):    Added: None    Deleted: fosamax, peridex, vitamin D, pantoprazole    Changed: oxycodone to every 4-6 hours as needed, topiramate to 200 mg in the AM and 100 mg in the PM    Additional Information:    Per patient, quetiapine is a new prescription as needed for anxiety and she hasn't needed to use it yet    Prior to Admission medications    Medication Sig Last Dose Taking? Auth Provider   apixaban ANTICOAGULANT (ELIQUIS) 5 MG tablet Take 5 mg by mouth 2 times daily  Yes Reported, Patient   ARIPiprazole (ABILIFY) 5 MG tablet Take 1 tablet (5 mg) by mouth daily  Yes Edda Sanchez MD   aspirin (ASA) 81 MG chewable tablet Take 1 tablet (81 mg) by mouth daily  Yes Chadwick Mg MD   B Complex Vitamins (B COMPLEX 1 PO) Take 1 tablet by mouth daily.  Yes Reported, Patient   butalbital-acetaminophen-caffeine (ESGIC) -40 MG tablet Take 1-2 tablets by mouth at onset of headache. May repeat 1-2 tablets after 4 hrs. Max 6 tabets in 24 hrs. LIMIT to 2 days a week.  Yes Reported, Patient   DULoxetine (CYMBALTA) 60 MG capsule Take 2 capsules (120 mg) by mouth every evening  Yes Edda Sanchez MD   HYDROmorphone (DILAUDID) 3 MG Suppository Place 3 mg rectally every 8 hours as needed   Yes Reported, Patient   LANsoprazole (PREVACID) 30 MG DR capsule Take 1 capsule (30 mg) by mouth 2 times daily.  Yes Chadwick Mg MD   LORazepam (ATIVAN) 1 MG tablet Take 1 mg by mouth every 6 hours as needed for agitation or anxiety  Yes Reported, Patient   Magnesium Oxide -Mg Supplement 400 MG CAPS Take 400 mg by mouth  Yes Reported, Patient   methylphenidate (RITALIN) 20 MG tablet Take 2 tablets  in the morning and 1 tablet at lunchtime  Yes Coffin, Richard Breyen, MD   metoprolol succinate ER (TOPROL-XL) 25 MG 24 hr tablet Take 1 tablet (25 mg) by mouth daily.   Yes Chadwick Mg MD   Ondansetron (ZUPLENZ) 8 MG FILM Take 8 mg by mouth every 6 hours as needed.  Yes Reported, Patient   oxyCODONE (OXY-IR) 5 MG capsule Take 5-10 mg by mouth every 4 hours as needed (severe chronic migraine)  Yes Unknown, Entered By History   QUEtiapine (SEROQUEL) 50 MG tablet Take 1 tablet (50 mg) by mouth daily as needed (for severe anxiety)  Patient taking differently: Take 50 mg by mouth daily as needed (for severe anxiety) Recently started on but hasn't needed this yet  Yes Coffin, Richard Breyen, MD   riboflavin (VITAMIN  B-2) 100 MG TABS tablet Take 400 mg by mouth daily   Yes Reported, Patient   senna-docusate (SENOKOT-S/PERICOLACE) 8.6-50 MG tablet Take 1 tablet by mouth 2 times daily  Yes Reported, Patient   spironolactone (ALDACTONE) 25 MG tablet Take 25 mg by mouth every evening   Yes Reported, Patient   SUMAtriptan (IMITREX STATDOSE) 6 MG/0.5ML pen injector kit 1 injection at onset of migraine. May repeat once after 2 hrs. Max 2 injections in 24 hrs. LIMIT TO 2 days a week.  (#10 for 30 days)  Yes Reported, Patient   tolterodine ER (DETROL LA) 4 MG 24 hr capsule Take 1 capsule (4 mg) by mouth daily  Yes Chadwick Mg MD   topiramate (TOPAMAX) 200 MG tablet Take 100 mg by mouth 2 times daily 200 mg in the AM and 100 mg in the PM  Yes Reported, Patient   venlafaxine (EFFEXOR) 75 MG tablet Take 75 mg by mouth 2 times daily  Yes Reported, Patient   voriconazole (VFEND) 200 MG tablet Take 200 mg by mouth 2 times daily  Yes Reported, Patient   alendronate (FOSAMAX) 70 MG tablet Take 1 tablet (70 mg) by mouth every 7 days Take with a full glass of water and do not eat or lay down for 30 minutes  no Chadwick Mg MD                            Date completed: 01/12/22    Medication history  completed by: AM[facility]

## 2022-01-12 NOTE — ANESTHESIA POSTPROCEDURE EVALUATION
Patient: Sherly Yeung    Procedure: Procedure(s):  ENDOSCOPIC RETROGRADE CHOLANGIOPANCREATOGRAPHY with stone removal, gallbladder and common bile duct stent placement       Diagnosis:Bowel obstruction (H) [K56.609]  Diagnosis Additional Information: No value filed.    Anesthesia Type:  No value filed.    Note:  Disposition: Inpatient   Postop Pain Control: Uneventful            Sign Out: Well controlled pain   PONV: No   Neuro/Psych: Uneventful            Sign Out: Acceptable/Baseline neuro status   Airway/Respiratory: Uneventful            Sign Out: Acceptable/Baseline resp. status   CV/Hemodynamics: Uneventful            Sign Out: Acceptable CV status; No obvious hypovolemia; No obvious fluid overload   Other NRE: NONE   DID A NON-ROUTINE EVENT OCCUR? No           Last vitals:  Vitals Value Taken Time   /89 01/12/22 1600   Temp     Pulse 99 01/12/22 1605   Resp     SpO2 100 % 01/12/22 1605   Vitals shown include unvalidated device data.    Electronically Signed By: Sanjay Mcgowan MD  January 12, 2022  4:18 PM
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
## 2022-01-12 NOTE — BRIEF OP NOTE
[facility] Brief Operative Note    Pre-operative diagnosis: Bowel obstruction (H) [K56.609]   Post-operative diagnosis As prior    Procedure: Procedure(s):  ENDOSCOPIC RETROGRADE CHOLANGIOPANCREATOGRAPHY with stone removal, gallbladder and common bile duct stent placement   Surgeon(s): Surgeon(s) and Role:     * Guru Khari Wilson MD - Primary     * Deep Ordoñez MD   Estimated blood loss: * No values recorded between 1/12/2022  2:43 PM and 1/12/2022  3:29 PM *    Specimens: * No specimens in log *   Findings:    - Selective biliary cannulation and uncomplicated biliary sphincterotomy.  - Cholangiogram with moderately dilated common bile and intrahepatic ducts.   - Choledocholithiasis was found.  Complete removal was accomplished by biliary sphincterotomy and balloon extraction.   - Cystic duct successfully cannulated and dilated to 4 mm.   - Placement of a 7 Fr x 14 cm DPT transpapillary Zimmon plastic stent was placed into the gallbladder.   - Placement of a 10 Fr x 5 cm Sofflex plastic stent was placed into the common bile duct.       Plan  - [facility] to [facility] care.   - Avoid anticoagulants, aspirin and nonsteroidal anti-inflammatory medicines for 3 days.   - Continue with antimicrobial therapy for now, and observe patient's clinical course. Expect improvement in symptoms and LFTs with stones removal, gallbladder and biliary stenting.   - Repeat ERCP with Dr. Wilson in 4 weeks to remove common bile duct stent. If patient develops fever, chills, jaundice or passes dark urine or has worsening of LFTs before scheduled ERCP, will recommend patient to call us immediately for consideration of an earlier urgent ERCP  - Inpatient pancreas-biliary consult service will closely follow patient and aid in further management decisions.  - The findings and recommendations were discussed with the patient and their family.
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
## 2022-01-12 NOTE — CONSULTS
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 test was validated by the [facility]. This laboratory is certified under  the Clinical Laboratory Improvement Amendments of 1988 (CLIA-88) as  qualified to perform high complexity laboratory testing.     CBC with platelets   Result Value Ref Range    WBC Count 13.0 (H) 4.0 - 11.0 10e3/uL    RBC Count 3.94 3.80 - 5.20 10e6/uL    Hemoglobin 11.3 (L) 11.7 - 15.7 g/dL    Hematocrit 36.8 35.0 - 47.0 %    MCV 93 78 - 100 fL    MCH 28.7 26.5 - 33.0 pg    MCHC 30.7 (L) 31.5 - 36.5 g/dL    RDW 15.9 (H) 10.0 - 15.0 %    Platelet Count 344 150 - 450 10e3/uL   Comprehensive metabolic panel   Result Value Ref Range    Sodium 144 133 - 144 mmol/L    Potassium 3.2 (L) 3.4 - 5.3 mmol/L    Chloride 116 (H) 94 - 109 mmol/L    Carbon Dioxide (CO2) 23 20 - 32 mmol/L    Anion Gap 5 3 - 14 mmol/L    Urea Nitrogen 8 7 - 30 mg/dL    Creatinine 0.55 0.52 - 1.04 mg/dL    Calcium 9.4 8.5 - 10.1 mg/dL    Glucose 97 70 - 99 mg/dL    Alkaline Phosphatase 182 (H) 40 - 150 U/L     (H) 0 - 45 U/L     (H) 0 - 50 U/L    Protein Total 7.0 6.8 - 8.8 g/dL    Albumin 2.8 (L) 3.4 - 5.0 g/dL    Bilirubin Total 1.9 (H) 0.2 - 1.3 mg/dL    GFR  Estimate >90 >60 mL/min/1.73m2
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
## 2022-01-12 NOTE — ED NOTES
"     Emergency Department Patient Sign-out       Brief HPI:  This is a 56 year old female signed out to me by Dr. Michaud .  See initial ED Provider note for details of the presentation.            Significant Events prior to my assuming care: Patient with US concerning for choleycstitis. MRCP pending. ED obs for GI if MRCP does not show cholecystitis.       Exam:   Patient Vitals for the past 24 hrs:   BP Temp Temp src Pulse Resp SpO2 Height Weight   01/11/22 2330 126/78 -- -- 112 -- 99 % -- --   01/11/22 2321 -- -- -- 111 -- 98 % -- --   01/11/22 2130 118/74 -- -- 119 -- 97 % -- --   01/11/22 2100 126/80 -- -- (!) 121 -- 98 % -- --   01/11/22 2045 -- -- -- (!) 122 -- 99 % -- --   01/11/22 2030 (!) 144/95 -- -- 112 -- 100 % -- --   01/11/22 2015 -- -- -- 108 -- 100 % -- --   01/11/22 2000 (!) 146/102 -- -- 114 -- 100 % -- --   01/11/22 1945 -- -- -- 114 -- 100 % -- --   01/11/22 1930 (!) 143/94 -- -- 120 -- 100 % -- --   01/11/22 1830 (!) 166/99 -- -- 107 -- 100 % -- --   01/11/22 1815 (!) 134/102 -- -- 111 -- 100 % -- --   01/11/22 1800 (!) 137/107 -- -- 117 -- 99 % -- --   01/11/22 1745 (!) 154/103 -- -- 115 -- 100 % -- --   01/11/22 1730 -- -- -- 117 -- 99 % -- --   01/11/22 1715 -- -- -- 116 -- 100 % -- --   01/11/22 1700 -- -- -- 118 -- 100 % -- --   01/11/22 1645 -- -- -- 103 -- 100 % -- --   01/11/22 1630 -- -- -- 116 -- 100 % -- --   01/11/22 1615 -- -- -- 109 -- 100 % -- --   01/11/22 1558 (!) 162/98 99.2  F (37.3  C) Oral 112 16 -- 1.6 m (5' 3\") 52.2 kg (115 lb)           ED RESULTS:   Results for orders placed or performed during the hospital encounter of 01/11/22 (from the past 24 hour(s))   CBC with platelets differential     Status: Abnormal    Collection Time: 01/11/22  4:07 PM    Narrative    The following orders were created for panel order CBC with platelets differential.  Procedure                               Abnormality         Status                     ---------                               " -----------         ------                     CBC with platelets and d...[[id number]]  Abnormal            Final result                 Please view results for these tests on the individual orders.   Comprehensive metabolic panel     Status: Abnormal    Collection Time: 01/11/22  4:07 PM   Result Value Ref Range    Sodium 140 133 - 144 mmol/L    Potassium 3.0 (L) 3.4 - 5.3 mmol/L    Chloride 107 94 - 109 mmol/L    Carbon Dioxide (CO2) 25 20 - 32 mmol/L    Anion Gap 8 3 - 14 mmol/L    Urea Nitrogen 10 7 - 30 mg/dL    Creatinine 0.63 0.52 - 1.04 mg/dL    Calcium 10.3 (H) 8.5 - 10.1 mg/dL    Glucose 110 (H) 70 - 99 mg/dL    Alkaline Phosphatase 213 (H) 40 - 150 U/L     (H) 0 - 45 U/L     (H) 0 - 50 U/L    Protein Total 8.1 6.8 - 8.8 g/dL    Albumin 3.2 (L) 3.4 - 5.0 g/dL    Bilirubin Total 0.9 0.2 - 1.3 mg/dL    GFR Estimate >90 >60 mL/min/1.73m2   Lipase     Status: Normal    Collection Time: 01/11/22  4:07 PM   Result Value Ref Range    Lipase 162 73 - 393 U/L   Troponin I     Status: Normal    Collection Time: 01/11/22  4:07 PM   Result Value Ref Range    Troponin I High Sensitivity 4 <54 ng/L   Magnesium     Status: Normal    Collection Time: 01/11/22  4:07 PM   Result Value Ref Range    Magnesium 2.2 1.6 - 2.3 mg/dL   CRP inflammation     Status: Normal    Collection Time: 01/11/22  4:07 PM   Result Value Ref Range    CRP Inflammation 5.1 0.0 - 8.0 mg/L   Erythrocyte sedimentation rate auto     Status: Abnormal    Collection Time: 01/11/22  4:07 PM   Result Value Ref Range    Erythrocyte Sedimentation Rate 38 (H) 0 - 30 mm/hr   ABO/Rh type and screen     Status: None    Collection Time: 01/11/22  4:07 PM    Narrative    The following orders were created for panel order ABO/Rh type and screen.  Procedure                               Abnormality         Status                     ---------                               -----------         ------                     Adult Type and Screen[[id number]]                             Final result                 Please view results for these tests on the individual orders.   INR     Status: Abnormal    Collection Time: 01/11/22  4:07 PM   Result Value Ref Range    INR 1.27 (H) 0.85 - 1.15   CBC with platelets and differential     Status: Abnormal    Collection Time: 01/11/22  4:07 PM   Result Value Ref Range    WBC Count 10.8 4.0 - 11.0 10e3/uL    RBC Count 4.20 3.80 - 5.20 10e6/uL    Hemoglobin 12.2 11.7 - 15.7 g/dL    Hematocrit 38.9 35.0 - 47.0 %    MCV 93 78 - 100 fL    MCH 29.0 26.5 - 33.0 pg    MCHC 31.4 (L) 31.5 - 36.5 g/dL    RDW 15.7 (H) 10.0 - 15.0 %    Platelet Count 390 150 - 450 10e3/uL    % Neutrophils 83 %    % Lymphocytes 9 %    % Monocytes 6 %    % Eosinophils 1 %    % Basophils 0 %    % Immature Granulocytes 1 %    NRBCs per 100 WBC 0 <1 /100    Absolute Neutrophils 9.0 (H) 1.6 - 8.3 10e3/uL    Absolute Lymphocytes 1.0 0.8 - 5.3 10e3/uL    Absolute Monocytes 0.7 0.0 - 1.3 10e3/uL    Absolute Eosinophils 0.1 0.0 - 0.7 10e3/uL    Absolute Basophils 0.0 0.0 - 0.2 10e3/uL    Absolute Immature Granulocytes 0.1 <=0.4 10e3/uL    Absolute NRBCs 0.0 10e3/uL   Adult Type and Screen     Status: None    Collection Time: 01/11/22  4:07 PM   Result Value Ref Range    ABO/RH(D) AB POS     Antibody Screen Negative Negative    SPECIMEN EXPIRATION DATE [id number]    iStat Gases Electrolytes ICA Glucose Venous, POCT     Status: Abnormal    Collection Time: 01/11/22  4:15 PM   Result Value Ref Range    CPB Applied No     Hematocrit POCT 41 35 - 47 %    Calcium, Ionized Whole Blood POCT 5.5 (H) 4.4 - 5.2 mg/dL    Glucose Whole Blood POCT 113 (H) 70 - 99 mg/dL    Bicarbonate Venous POCT 27 21 - 28 mmol/L    Hemoglobin POCT 13.9 11.7 - 15.7 g/dL    Potassium POCT 3.1 (L) 3.4 - 5.3 mmol/L    Sodium POCT 142 133 - 144 mmol/L    pCO2 Venous POCT 48 40 - 50 mm Hg    pO2 Venous POCT 38 25 - 47 mm Hg    pH Venous POCT 7.36 7.32 - 7.43    O2 Sat, Venous POCT 69 (L) 94 - 100 %    EKG 12-lead, tracing only     Status: None (Preliminary result)    Collection Time: 01/11/22  6:04 PM   Result Value Ref Range    Systolic Blood Pressure  mmHg    Diastolic Blood Pressure  mmHg    Ventricular Rate 106 BPM    Atrial Rate 106 BPM    SC Interval 130 ms    QRS Duration 68 ms     ms    QTc 427 ms    P Axis 82 degrees    R AXIS -17 degrees    T Axis 0 degrees    Interpretation ECG       Sinus tachycardia  Anterior infarct , age undetermined  Abnormal ECG     CT Abdomen Pelvis w Contrast     Status: Abnormal    Collection Time: 01/11/22  7:03 PM   Result Value Ref Range    Radiologist flags Distended gallbladder and biliary ductal dilation (Urgent)     Narrative    EXAMINATION: CT ABDOMEN PELVIS W CONTRAST, 1/11/2022 7:03 PM    TECHNIQUE:  Helical CT images from the lung bases through the  symphysis pubis were obtained with IV contrast. Contrast dose:  iopamidol (ISOVUE-370) solution 70 mL    COMPARISON: CT abdomen 10/1/2020 and CT chest abdomen and pelvis  1/2/2013    HISTORY: Bowel obstruction suspected    FINDINGS:    Abdomen and pelvis:   Subcentimeter cyst in the left hepatic lobe. No focal images hepatic  lesion. Largely distended gallbladder with mild surrounding  inflammatory changes in the common bile duct is dilated to 1.5 cm.  Mild intrahepatic biliary ductal dilation. The spleen, adrenal glands  and pancreas are unremarkable. No pancreatic ductal dilation.  Bilateral renal cysts. Symmetric nephrographic enhancement of the  kidneys. Focal volume loss in the mid to lower pole of the left  kidney, likely sequela of prior infectious or ischemic insult. Urinary  bladder is unremarkable. No hydronephrosis or hydroureter.    No pelvic mass. Uterus is unremarkable. Large rectal stool ball.  Colonic diverticulosis without adjacent inflammatory change. Normal  appendix. Duodenal diverticulum. The small and large bowel is normal  in caliber without evidence of obstruction. No intra-abdominal  free  air or free fluid. Percutaneous gastrostomy tube.    Lung bases: Bibasilar, right greater than left interstitial fibrotic  changes and traction bronchiectasis in the bilateral lower lobes  lingula and right middle lobe. Interstitial thickening and hazy  pulmonary opacities. No pleural effusion. Pectus excavatum. Heart size  is normal    Bones and soft tissues: No acute or suspicious appearing osseous  lesion.      Impression    IMPRESSION:   1. Hydropic gallbladder with mild surrounding inflammatory changes.  Associated extra and intrahepatic biliary ductal dilation. No  calcified stones or other obstructing lesion is visualized. Consider  initial further evaluation with ultrasound to better assess the  gallbladder. If there is clinical concern for biliary obstruction,  ERCP or MRCP may be considered as well.  2. Large rectal stool ball. No findings to suggest bowel obstruction.  3. Colonic diverticulosis without adjacent inflammatory change.  4. Bibasilar reticular opacities and traction bronchiectasis  suspicious for fibrosis related to prior infection or interstitial  lung disease.     [Urgent Result: Distended gallbladder and biliary ductal dilation  concerning for biliary obstruction.]    Finding was identified on 1/11/2022 7:04 PM.     Dr. Michaud was contacted by Dr. Hoffman at 1/11/2022 7:20 PM and  verbalized understanding of the urgent finding.      I have personally reviewed the examination and initial interpretation  and I agree with the findings.    CARLOS HORNE,          SYSTEM ID:  [id number]   Abdomen US, limited (RUQ only)     Status: None    Collection Time: 01/11/22  8:53 PM    Narrative    EXAMINATION: Limited Abdominal Ultrasound, 1/11/2022 8:53 PM     COMPARISON: Same-day CT    HISTORY: Ductal dilation, abdominal pain, atelectasis or nausea  vomiting    FINDINGS:   Fluid: No evidence of ascites or pleural effusions.    Liver: The liver demonstrates normal echotexture, measuring 13.6 cm  in  craniocaudal dimension. There is no focal mass.     Gallbladder: Distended gallbladder containing layering echogenic,  nonshadowing sludge. Small amount of pericholecystic fluid is present  between the gallbladder and the liver. There is gallbladder wall  thickening which is largely isolated to the aspects of the gallbladder  wall adjacent to the gallbladder fossa. Sonographic Carson sign is  negative although patient has been medicated. Right hepatic duct  measures 4.1 cm and left hepatic duct measures 3.2 cm    Bile Ducts: Intra and extra hepatic ducts are dilated.  The common  bile duct measures 8.5 mm in diameter and contains low-level internal  echoes, which may be artifactual.    Pancreas: Visualized portions of the head and body of the pancreas are  unremarkable.     Kidney: The right kidney measures 9.7 cm long. There is no  hydronephrosis or hydroureter, no shadowing renal calculi, cystic  lesion or mass.       Impression    IMPRESSION:     1. Sludge in the distended gallbladder with mild gallbladder wall  thickening and pericholecystic fluid, equivocal for acute  cholecystitis given lack of a sonographic Carson's sign (noting the  patient has received pain medications).   2. Mild intra and extrahepatic biliary dilation, as seen on same-day  CT, with questionable echogenic debris/sludge but no appreciable stone  within the visualized upper common bile duct. The lower common bile  duct is not well visualized and could be further evaluated with MRCP  or ERCP, as clinically indicated.  3. The constellation of findings described above can be seen with  biliary stasis or obstruction.    I have personally reviewed the examination and initial interpretation  and I agree with the findings.    CARLOS HORNE DO         SYSTEM ID:  [id number]       ED MEDICATIONS:   Medications   0.9% sodium chloride BOLUS (0 mLs Intravenous Stopped 1/11/22 2024)     Followed by   sodium chloride 0.9% infusion ( Intravenous New  Bag 1/11/22 2343)   HYDROmorphone (PF) (DILAUDID) injection 0.5 mg (0.5 mg Intravenous Given 1/12/22 0013)   HYDROmorphone (PF) (DILAUDID) injection 0.5 mg (0.5 mg Intravenous Given 1/11/22 1931)   potassium chloride 10 mEq in 100 mL sterile water intermittent infusion (premix) (10 mEq Intravenous New Bag 1/12/22 0018)   potassium chloride 10 mEq in 100 mL sterile water intermittent infusion (premix) (0 mEq Intravenous Stopped 1/11/22 2024)   CT scan flush (68 mLs Intravenous Given 1/11/22 1852)   iopamidol (ISOVUE-370) solution 70 mL (70 mLs Intravenous Given 1/11/22 1852)   ondansetron (ZOFRAN) injection 4 mg (4 mg Intravenous Given 1/11/22 1845)   LORazepam (ATIVAN) injection 0.5 mg (0.5 mg Intravenous Given 1/11/22 2158)   gadobutrol (GADAVIST) injection 7.5 mL (5 mLs Intravenous Given 1/11/22 2302)         Impression:    ICD-10-CM    1. Transaminitis  R74.01    2. Dilated bile duct  K83.8        Plan:    MRCP without obvious signs of cholecystitis. Patient will go to ED obs for GI consult, likely ERCP.        MD Reid Velez, Juan Cornejo MD  01/12/22 8999

## 2022-01-12 NOTE — PROGRESS NOTES
CLINICAL NUTRITION SERVICES - ASSESSMENT NOTE     Nutrition Prescription      Malnutrition Status:    Unable to determine due to incomplete information at this time    Recommendations already ordered by Registered Dietitian (RD):  (When appropriate to resume TF per MD):  If patient consumes <50% of a meal, she receives a 237 ml (1 can) bolus of Jevity 1.5   - 60 ml free water flush before/after each bolus feeding  - daily MVI with minerals to ensure micronutrient needs being met    Future/Additional Recommendations:  -Close monitoring of ongoing weight values to determine if change to regimen needs to be made  -Monitor hydration status, need for more free water     REASON FOR ASSESSMENT  hSerly Yeung is a/an 56 year old female assessed by the dietitian for Provider Order - Registered Dietitian to Assess and Order TF per Medical Nutrition Therapy Protocol    NUTRITION HISTORY  Per chart review, patient received TF during hospitalization for COVID pneumonia.  She had a PEG placed on 11/22/21 and discharged to TCU on 11/23/21 with the following TF orders: Osmolite 1.5 @ 40 ml/hr x 22 hours + 1 pkt Prosource TID and free water 150 ml q 4 hours.     Per patient, she reports that about 2.5 weeks ago her TCU changed her feeding regimen to receive 200 ml Jevity 1.5 if she eats <50% of her meal.  She reports this typically happens about twice daily.  She seems to tolerate this well.  She last received her TF/PO intake yesterday around noon.  However, her overall intake was been decreased quite a bit since Sunday d/t poor tolerance.      She receives all of her medications crushed via her G-tube.  She does not receive free water flushes via her FT.  She admits to struggling with getting in adequate water by mouth.     CURRENT NUTRITION ORDERS  Diet: Full Liquids tonight (following ERCP procedure)  She reports she doesn't plan to order any food tonight.     LABS  Labs reviewed    MEDICATIONS  Senokot  "BID    ANTHROPOMETRICS  Height: 160 cm (5' 3\")  Most Recent Weight: 52.2 kg (115 lb)    IBW: 52.3 kg  BMI: Normal BMI  Weight History: Weight is down slightly 3.2 kg (5.8%) from discharge to TCU (55.4 kg on 11/23).   Dosing Weight: 52 kg (actual wt)    ASSESSED NUTRITION NEEDS  Estimated Energy Needs: 0528-1031 kcals/day (25 - 30 kcals/kg)  Justification: Maintenance  Estimated Protein Needs: 42-52 grams protein/day (0.8 - 1 grams of pro/kg)+  Justification: Maintenance  Estimated Fluid Needs: 1 mL/kcal  Justification: Maintenance    PHYSICAL FINDINGS  See malnutrition section below.    MALNUTRITION  % Intake: Decreased intake does not meet criteria  % Weight Loss: Weight loss does not meet criteria  Subcutaneous Fat Loss: Unable to assess  Muscle Loss: Unable to assess  Fluid Accumulation/Edema: Unable to assess  Malnutrition Diagnosis: Unable to determine due to incomplete information at this time  (Interview with patient conducted over the phone as patient was in the OR during the afternoon).     NUTRITION DIAGNOSIS  Inadequate oral intake related to nausea/vomiting as evidenced by reduced intake over the last 3-4 days, weight loss of 5.8% in the last ~6 weeks.       INTERVENTIONS  Implementation  Nutrition Education: Provided education on RD role, reviewed usual TF regimen with patient and plan for resumption when appropriate per MD.    Enteral Nutrition - see above     Goals  Weight maintenance ~52 kg.      Monitoring/Evaluation  Progress toward goals will be monitored and evaluated per protocol.    Eve Allen MS, RD, LD, CCTD  7A/Obs units, pager [phone]    "

## 2022-01-12 NOTE — PLAN OF CARE
"/69 (BP Location: Left arm, Patient Position: Supine)   Pulse 113   Temp 99.4  F (37.4  C) (Axillary)   Resp 17   Ht 1.6 m (5' 3\")   Wt 52.2 kg (115 lb)   SpO2 99%   BMI 20.37 kg/m      Time: 3329-0958  Status:Admitted under observation status from TCU with 3-day history of intermittent abdominal pain, nausea, vomiting. CT showing distended gallbladder without stones and mild surrounding inflammatory changes. Was hospitalized in 10/2021 for COVID-19.   Neuro/Pain:  A&Ox4, c/o abdominal pain, Dilaudid 0.5 mgQ1hr, given x 3 since in Obs.   Activity: stayed in bed. Can turn in bed with minimal assist.   Cardiac/vs: tachycardia with -140s, Metoprolol 12.5 mg once. Afebrile.   Respiratory: on 3 LNC sating > 95%, diminished lung sound.   GI/: no bm this shift. Active bowel sound. Incontinent of bladder once.   Diet: NPO   Skin: no skin deficit 3  LDAs: PIV infusing LR at 125 ml/hr, G tube clamped.   Labs/Imaging: K+ replaced recheck at 0800. Triggered sepsis, LA 0.7.   Change this shift: transferred to OBS from ER.   Plan: continue on NPO, no surgical intervention per surgery team note. GI to see her.     Goals to be met before discharging note:    diagnostic tests and consults completed and resulted: in progress  -vital signs normal or at patient baseline: in progress, tachycardia   -adequate pain control on oral analgesics: not met.   -returns to baseline functional status: not met   "

## 2022-01-12 NOTE — ANESTHESIA POSTPROCEDURE EVALUATION
Patient: Sherly Yeung    Procedure: Procedure(s):  ENDOSCOPIC RETROGRADE CHOLANGIOPANCREATOGRAPHY with stone removal, gallbladder and common bile duct stent placement       Diagnosis:Bowel obstruction (H) [K56.609]  Diagnosis Additional Information: No value filed.    Anesthesia Type:  General    Note:  Disposition: Outpatient   Postop Pain Control: Uneventful            Sign Out: Well controlled pain   PONV: No   Neuro/Psych: Uneventful            Sign Out: Acceptable/Baseline neuro status   Airway/Respiratory: Uneventful            Sign Out: Acceptable/Baseline resp. status   CV/Hemodynamics: Uneventful            Sign Out: Acceptable CV status; No obvious hypovolemia; No obvious fluid overload   Other NRE: NONE   DID A NON-ROUTINE EVENT OCCUR? No           Last vitals:  Vitals Value Taken Time   /89 01/12/22 1600   Temp 36.8  C (98.3  F) 01/12/22 1600   Pulse 99 01/12/22 1605   Resp 16 01/12/22 1600   SpO2 100 % 01/12/22 1605   Vitals shown include unvalidated device data.    Electronically Signed By: Angel Luis Whitehead MD  January 12, 2022  4:44 PM

## 2022-01-12 NOTE — PROGRESS NOTES
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     SOLN     D.H.E. 45 [Dihydroergotamine Mesylate] Nausea     SOLN     Depakote [Divalproex Sodium] Other (See Comments)     Exacerbate depression     Metoclopramide Hcl Nausea and Vomiting     Verapamil Hcl Cr Other (See Comments)     CP24; hypotension     Dihydroergotamine Nausea and Rash     SOLN  PN: LW Reaction: Nausea, vomitting   (DHE)     Olanzapine Rash     Prochlorperazine Maleate Rash             Medications:   No current facility-administered medications on file prior to   encounter.  apixaban ANTICOAGULANT (ELIQUIS) 5 MG tablet, Take 5 mg by mouth   2 times daily  chlorhexidine (PERIDEX) 0.12 % solution, Swish and spit 15 mLs in   mouth 2 times daily  LORazepam (ATIVAN) 1 MG tablet, Take 1 mg by mouth every 6 hours   as needed for agitation or anxiety  pantoprazole (PROTONIX) 40 MG EC tablet, Take 40 mg by mouth 2   times daily  senna-docusate (SENOKOT-S/PERICOLACE) 8.6-50 MG tablet, Take 1   tablet by mouth 2 times daily  topiramate (TOPAMAX) 200 MG tablet, Take 200 mg by mouth every   evening  venlafaxine (EFFEXOR) 75 MG tablet, Take 75 mg by mouth 2 times   daily  voriconazole  (VFEND) 200 MG tablet, Take 200 mg by mouth 2 times   daily  alendronate (FOSAMAX) 70 MG tablet, Take 1 tablet (70 mg) by   mouth every 7 days Take with a full glass of water and do not eat   or lay down for 30 minutes  ARIPiprazole (ABILIFY) 5 MG tablet, Take 1 tablet (5 mg) by mouth   daily  aspirin (ASA) 81 MG chewable tablet, Take 1 tablet (81 mg) by   mouth daily  B Complex Vitamins (B COMPLEX 1 PO), Take 1 tablet by mouth   daily.  butalbital-acetaminophen-caffeine (ESGIC) -40 MG tablet,   Take 1-2 tablets by mouth at onset of headache. May repeat 1-2   tablets after 4 hrs. Max 6 tabets in 24 hrs. LIMIT to 2 days a   week.  Cholecalciferol (VITAMIN D) 1000 UNITS capsule, Take 2,000 Units   by mouth daily   DULoxetine (CYMBALTA) 60 MG capsule, Take 2 capsules (120 mg) by   mouth every evening  HYDROmorphone (DILAUDID) 3 MG Suppository, Place 3 mg rectally   every 8 hours as needed   LANsoprazole (PREVACID) 30 MG DR capsule, Take 1 capsule (30 mg)   by mouth 2 times daily.  Magnesium Oxide -Mg Supplement 400 MG CAPS, Take 400 mg by mouth  methylphenidate (RITALIN) 20 MG tablet, Take 2 tablets in the   morning and 1 tablet at lunchtime  methylphenidate (RITALIN) 20 MG tablet, Take 2 tablets in the   morning and 1 tablet at lunchtime  metoprolol succinate ER (TOPROL-XL) 25 MG 24 hr tablet, Take 1   tablet (25 mg) by mouth daily.   Ondansetron (ZUPLENZ) 8 MG FILM, Take 8 mg by mouth every 6 hours   as needed.  oxyCODONE (OXY-IR) 5 MG capsule, Take 5-10 mg by mouth every 4   hours as needed (severe chronic migraine)  oxyCODONE (OXYCONTIN) 10 MG 12 hr tablet, Take 1 tablet (10 mg)   by mouth every 12 hours (Patient taking differently: Take 10 mg   by mouth every 12 hours as needed )  QUEtiapine (SEROQUEL) 50 MG tablet, Take 1 tablet (50 mg) by   mouth daily as needed (for severe anxiety)  riboflavin (VITAMIN  B-2) 100 MG TABS tablet, Take 400 mg by   mouth daily   spironolactone (ALDACTONE) 25 MG tablet, Take  "25 mg by mouth   every evening   SUMAtriptan (IMITREX STATDOSE) 6 MG/0.5ML pen injector kit, 1   injection at onset of migraine. May repeat once after 2 hrs. Max   2 injections in 24 hrs. LIMIT TO 2 days a week.  (#10 for 30   days)  SUMAtriptan Succinate 6 MG/0.5ML KIT, Inject 1 dose Subcutaneous   at onset of headache. Indications: Migraine Headache  tolterodine ER (DETROL LA) 4 MG 24 hr capsule, Take 1 capsule (4   mg) by mouth daily  topiramate (TOPAMAX) 100 MG tablet, Take 100 mg by mouth in the   morning and take 200 mg by mouth in the evening.  Zinc 50 MG CAPS, Take 1 tablet by mouth daily.  zinc gluconate 50 MG tablet, Take 50 mg by mouth              Review of Systems:      All other review of systems negative, except for what is   mentioned above        Physical Exam:   BP (!) 156/97 (BP Location: Left arm, Patient Position:   Semi-Brown's)   Pulse (!) 139   Temp 99  F (37.2  C) (Oral)     Resp 16   Ht 1.6 m (5' 3\")   Wt 52.2 kg (115 lb)   SpO2 100%     BMI 20.37 kg/m    General: sitting up in bed, no acute distress  Resp: non-labored on supplemental O2, no accessory muscle use  Cardiac: Sinus tachycardia  Abdomen: Soft, non-distended, minimally tender RUQ (rates it   4/10), negative Carson's sign, no rebound or guarding, no   peritoneal signs, no erythema around PEG site.  Extremities: No LE edema or obvious joint abnormalities  Skin: Warm and dry, no jaundice or rash  Neuro: A&Ox3, CN 2-12 intact, GOMES          Data:     Results for orders placed or performed during the hospital   encounter of 01/11/22 (from the past 24 hour(s))   CBC with platelets differential    Narrative    The following orders were created for panel order CBC with   platelets differential.  Procedure                               Abnormality           Status                     ---------                               -----------           ------                     CBC with platelets and d...[[id number]]  Abnormal            " Final   result                 Please view results for these tests on the individual orders.   Comprehensive metabolic panel   Result Value Ref Range    Sodium 140 133 - 144 mmol/L    Potassium 3.0 (L) 3.4 - 5.3 mmol/L    Chloride 107 94 - 109 mmol/L    Carbon Dioxide (CO2) 25 20 - 32 mmol/L    Anion Gap 8 3 - 14 mmol/L    Urea Nitrogen 10 7 - 30 mg/dL    Creatinine 0.63 0.52 - 1.04 mg/dL    Calcium 10.3 (H) 8.5 - 10.1 mg/dL    Glucose 110 (H) 70 - 99 mg/dL    Alkaline Phosphatase 213 (H) 40 - 150 U/L     (H) 0 - 45 U/L     (H) 0 - 50 U/L    Protein Total 8.1 6.8 - 8.8 g/dL    Albumin 3.2 (L) 3.4 - 5.0 g/dL    Bilirubin Total 0.9 0.2 - 1.3 mg/dL    GFR Estimate >90 >60 mL/min/1.73m2   Lipase   Result Value Ref Range    Lipase 162 73 - 393 U/L   Troponin I   Result Value Ref Range    Troponin I High Sensitivity 4 <54 ng/L   Magnesium   Result Value Ref Range    Magnesium 2.2 1.6 - 2.3 mg/dL   CRP inflammation   Result Value Ref Range    CRP Inflammation 5.1 0.0 - 8.0 mg/L   Erythrocyte sedimentation rate auto   Result Value Ref Range    Erythrocyte Sedimentation Rate 38 (H) 0 - 30 mm/hr   ABO/Rh type and screen    Narrative    The following orders were created for panel order ABO/Rh type   and screen.  Procedure                               Abnormality           Status                     ---------                               -----------           ------                     Adult Type and Screen[[id number]]                            Final   result                 Please view results for these tests on the individual orders.   INR   Result Value Ref Range    INR 1.27 (H) 0.85 - 1.15   CBC with platelets and differential   Result Value Ref Range    WBC Count 10.8 4.0 - 11.0 10e3/uL    RBC Count 4.20 3.80 - 5.20 10e6/uL    Hemoglobin 12.2 11.7 - 15.7 g/dL    Hematocrit 38.9 35.0 - 47.0 %    MCV 93 78 - 100 fL    MCH 29.0 26.5 - 33.0 pg    MCHC 31.4 (L) 31.5 - 36.5 g/dL    RDW 15.7 (H) 10.0 - 15.0 %     Platelet Count 390 150 - 450 10e3/uL    % Neutrophils 83 %    % Lymphocytes 9 %    % Monocytes 6 %    % Eosinophils 1 %    % Basophils 0 %    % Immature Granulocytes 1 %    NRBCs per 100 WBC 0 <1 /100    Absolute Neutrophils 9.0 (H) 1.6 - 8.3 10e3/uL    Absolute Lymphocytes 1.0 0.8 - 5.3 10e3/uL    Absolute Monocytes 0.7 0.0 - 1.3 10e3/uL    Absolute Eosinophils 0.1 0.0 - 0.7 10e3/uL    Absolute Basophils 0.0 0.0 - 0.2 10e3/uL    Absolute Immature Granulocytes 0.1 <=0.4 10e3/uL    Absolute NRBCs 0.0 10e3/uL   Adult Type and Screen   Result Value Ref Range    ABO/RH(D) AB POS     Antibody Screen Negative Negative    SPECIMEN EXPIRATION DATE [id number]    iStat Gases Electrolytes ICA Glucose Venous, POCT   Result Value Ref Range    CPB Applied No     Hematocrit POCT 41 35 - 47 %    Calcium, Ionized Whole Blood POCT 5.5 (H) 4.4 - 5.2 mg/dL    Glucose Whole Blood POCT 113 (H) 70 - 99 mg/dL    Bicarbonate Venous POCT 27 21 - 28 mmol/L    Hemoglobin POCT 13.9 11.7 - 15.7 g/dL    Potassium POCT 3.1 (L) 3.4 - 5.3 mmol/L    Sodium POCT 142 133 - 144 mmol/L    pCO2 Venous POCT 48 40 - 50 mm Hg    pO2 Venous POCT 38 25 - 47 mm Hg    pH Venous POCT 7.36 7.32 - 7.43    O2 Sat, Venous POCT 69 (L) 94 - 100 %   EKG 12-lead, tracing only   Result Value Ref Range    Systolic Blood Pressure  mmHg    Diastolic Blood Pressure  mmHg    Ventricular Rate 106 BPM    Atrial Rate 106 BPM    WY Interval 130 ms    QRS Duration 68 ms     ms    QTc 427 ms    P Axis 82 degrees    R AXIS -17 degrees    T Axis 0 degrees    Interpretation ECG       Sinus tachycardia  Anterior infarct , age undetermined  Abnormal ECG     CT Abdomen Pelvis w Contrast   Result Value Ref Range    Radiologist flags Distended gallbladder and biliary ductal   dilation (Urgent)     Narrative    EXAMINATION: CT ABDOMEN PELVIS W CONTRAST, 1/11/2022 7:03 PM    TECHNIQUE:  Helical CT images from the lung bases through the  symphysis pubis were obtained with IV  contrast. Contrast dose:  iopamidol (ISOVUE-370) solution 70 mL    COMPARISON: CT abdomen 10/1/2020 and CT chest abdomen and pelvis  1/2/2013    HISTORY: Bowel obstruction suspected    FINDINGS:    Abdomen and pelvis:   Subcentimeter cyst in the left hepatic lobe. No focal images   hepatic  lesion. Largely distended gallbladder with mild surrounding  inflammatory changes in the common bile duct is dilated to 1.5   cm.  Mild intrahepatic biliary ductal dilation. The spleen, adrenal   glands  and pancreas are unremarkable. No pancreatic ductal dilation.  Bilateral renal cysts. Symmetric nephrographic enhancement of the  kidneys. Focal volume loss in the mid to lower pole of the left  kidney, likely sequela of prior infectious or ischemic insult.   Urinary  bladder is unremarkable. No hydronephrosis or hydroureter.    No pelvic mass. Uterus is unremarkable. Large rectal stool ball.  Colonic diverticulosis without adjacent inflammatory change.   Normal  appendix. Duodenal diverticulum. The small and large bowel is   normal  in caliber without evidence of obstruction. No intra-abdominal   free  air or free fluid. Percutaneous gastrostomy tube.    Lung bases: Bibasilar, right greater than left interstitial   fibrotic  changes and traction bronchiectasis in the bilateral lower lobes  lingula and right middle lobe. Interstitial thickening and hazy  pulmonary opacities. No pleural effusion. Pectus excavatum. Heart   size  is normal    Bones and soft tissues: No acute or suspicious appearing osseous  lesion.      Impression    IMPRESSION:   1. Hydropic gallbladder with mild surrounding inflammatory   changes.  Associated extra and intrahepatic biliary ductal dilation. No  calcified stones or other obstructing lesion is visualized.   Consider  initial further evaluation with ultrasound to better assess the  gallbladder. If there is clinical concern for biliary   obstruction,  ERCP or MRCP may be considered as well.  2. Large  rectal stool ball. No findings to suggest bowel   obstruction.  3. Colonic diverticulosis without adjacent inflammatory change.  4. Bibasilar reticular opacities and traction bronchiectasis  suspicious for fibrosis related to prior infection or   interstitial  lung disease.     [Urgent Result: Distended gallbladder and biliary ductal dilation  concerning for biliary obstruction.]    Finding was identified on 1/11/2022 7:04 PM.     Dr. Michaud was contacted by Dr. Hoffman at 1/11/2022 7:20 PM   and  verbalized understanding of the urgent finding.      I have personally reviewed the examination and initial   interpretation  and I agree with the findings.    CARLOS HORNE DO         SYSTEM ID:  [id number]   Abdomen US, limited (RUQ only)    Narrative    EXAMINATION: Limited Abdominal Ultrasound, 1/11/2022 8:53 PM     COMPARISON: Same-day CT    HISTORY: Ductal dilation, abdominal pain, atelectasis or nausea  vomiting    FINDINGS:   Fluid: No evidence of ascites or pleural effusions.    Liver: The liver demonstrates normal echotexture, measuring 13.6   cm in  craniocaudal dimension. There is no focal mass.     Gallbladder: Distended gallbladder containing layering echogenic,  nonshadowing sludge. Small amount of pericholecystic fluid is   present  between the gallbladder and the liver. There is gallbladder wall  thickening which is largely isolated to the aspects of the   gallbladder  wall adjacent to the gallbladder fossa. Sonographic Carson sign   is  negative although patient has been medicated. Right hepatic duct  measures 4.1 cm and left hepatic duct measures 3.2 cm    Bile Ducts: Intra and extra hepatic ducts are dilated.  The   common  bile duct measures 8.5 mm in diameter and contains low-level   internal  echoes, which may be artifactual.    Pancreas: Visualized portions of the head and body of the   pancreas are  unremarkable.     Kidney: The right kidney measures 9.7 cm long. There is no  hydronephrosis or  hydroureter, no shadowing renal calculi, cystic  lesion or mass.       Impression    IMPRESSION:     1. Sludge in the distended gallbladder with mild gallbladder wall  thickening and pericholecystic fluid, equivocal for acute  cholecystitis given lack of a sonographic Carson's sign (noting   the  patient has received pain medications).   2. Mild intra and extrahepatic biliary dilation, as seen on   same-day  CT, with questionable echogenic debris/sludge but no appreciable   stone  within the visualized upper common bile duct. The lower common   bile  duct is not well visualized and could be further evaluated with   MRCP  or ERCP, as clinically indicated.  3. The constellation of findings described above can be seen with  biliary stasis or obstruction.    I have personally reviewed the examination and initial   interpretation  and I agree with the findings.    CARLOS HORNE DO         SYSTEM ID:  [id number]   MR Abdomen MRCP w/o & w Contrast    Narrative    EXAM: MR ABDOMEN MRCP W/O and W CONTRAST  LOCATION: [facility]  DATE/TIME: 1/11/2022 11:00 PM    INDICATION: Abdominal pain, biliary obstruction suspected (Ped   0-18y)  COMPARISON: Ultrasound and CT from today.  TECHNIQUE: Routine MR liver/pancreas protocol including axial and   coronal MRCP sequences. 2D and 3D reconstruction performed by MR   technologist including MIP reconstruction and slab   cholangiograms. If performed with contrast, additional dynamic T1   post   IV contrast images.  CONTRAST: 5 mL Gadavist.     FINDINGS:     MRCP: There is modest dilatation of the extrahepatic bile duct   that measures approximately 9 mm throughout its course from harjit   hepatis to the ampulla. No filling defects or evidence for   choledocholithiasis identified. No obstructing mass. Ampullary   stenosis possible. There is minimal prominence of central   intrahepatic bile ducts as well as the cystic duct.    Gallbladder is  dilated but no definite stones are seen within the   gallbladder. There is no gallbladder wall thickening but there is   localized pericholecystic edema surrounding the gallbladder.   Phrygian cap is also present.    LIVER: Liver parenchyma unremarkable.    PANCREAS: Normal.    ADDITIONAL FINDINGS: Duodenal diverticulum near the level of the   pancreatic head and ampulla.  Pectus excavatum configuration of lower sternum. Gastric tube   present. Small left renal cyst.      Impression    IMPRESSION:  1.  There is localized pericholecystic edema which is concerning   but no definite gallbladder wall thickening and no stones evident   on the this exam. Does patient have clinical signs/symptoms of   cholecystitis?  2.  Modest dilatation of extrahepatic bile duct which measures   approximately 9 mm from harjit hepatis to the ampulla without   stone or obstructing mass. Ampullary stenosis not excluded.   Asymptomatic COVID-19 Virus (Coronavirus) by PCR Midturbinate    Specimen: Midturbinate; Swab   Result Value Ref Range    SARS CoV2 PCR Negative Negative, Testing sent to reference lab.   Results will be returned via unsolicited result    Narrative    Testing was performed using the Xpert Xpress SARS-CoV-2 Assay on   the  Cepheid Gene-Xpert Instrument Systems. Additional information   about  this Emergency Use Authorization (EUA) assay can be found via the   Lab  Guide. This test should be ordered for the detection of   SARS-CoV-2 in  individuals who meet SARS-CoV-2 clinical and/or epidemiological  [Narrative was truncated due to length]   Lactic Acid STAT   Result Value Ref Range    Lactic Acid 0.7 0.7 - 2.0 mmol/L   Potassium   Result Value Ref Range    Potassium 3.9 3.4 - 5.3 mmol/L   Extra Tube    Narrative    The following orders were created for panel order Extra Tube.  Procedure                               Abnormality         Status                     ---------                               -----------          "------                     Extra Purple Top Tube[[id number]]                            Final result                 Please view results for these tests on the individual orders.   Extra Purple Top Tube   Result Value Ref Range    Hold Specimen JIC        Currently the patient states that she has seen gastroenterology and she is aware that she is headed for ERCP.  Patient states her symptoms have not improved that much and she still complains of abdominal pain in her upper abdomen and ongoing nausea.    Objective:  General Appearance:  Ill-appearing (And in some mild distress with O2 sats around 90% on supplemental oxygen).    Vital signs: (most recent): Blood pressure 117/72, pulse 113, temperature 99.4  F (37.4  C), temperature source Axillary, resp. rate 17, height 1.6 m (5' 3\"), weight 52.2 kg (115 lb), SpO2 99 %, not currently breastfeeding.      Labs still pending this morning include a comprehensive panel CBC 2 blood cultures    Chest x-ray done a few minutes ago reveals probable infiltrates in the left lung field.  Official dictation pending.    Gastroenterology wanted the patient to receive some Unasyn prior to their ERCP at this time the patient will receive Unasyn 3 g IV every 6 hours along with a dose of Rocephin because of her chest x-ray findings.    Assessment:    Condition: In stable condition.  Unchanged.   (From all the testing above the patient appears to have a mild cholecystitis causing her to be ill and now associated with a left pulmonary infiltrate for which she has been started on IV antibiotics and will be transferred to the medicine service.).     Case was discussed with medicine and patient will be transferred to their service for further management.      Wally Orta MD, MD  1/12/2022    "

## 2022-01-12 NOTE — ANESTHESIA CARE TRANSFER NOTE
Patient: Sherly Yeung    Procedure: Procedure(s):  ENDOSCOPIC RETROGRADE CHOLANGIOPANCREATOGRAPHY with stone removal, gallbladder and common bile duct stent placement       Diagnosis: Bowel obstruction (H) [K56.609]  Diagnosis Additional Information: No value filed.    Anesthesia Type:   No value filed.     Note:    Oropharynx: oropharynx clear of all foreign objects  Level of Consciousness: awake  Oxygen Supplementation: nasal cannula  Level of Supplemental Oxygen (L/min / FiO2): 3  Independent Airway: airway patency satisfactory and stable  Dentition: dentition unchanged  Vital Signs Stable: post-procedure vital signs reviewed and stable  Report to RN Given: handoff report given  Patient transferred to: PACU    Handoff Report: Identifed the Patient, Identified the Reponsible Provider, Reviewed the pertinent medical history, Discussed the surgical course, Reviewed Intra-OP anesthesia mangement and issues during anesthesia, Set expectations for post-procedure period and Allowed opportunity for questions and acknowledgement of understanding      Vitals:  Vitals Value Taken Time   BP     Temp     Pulse     Resp     SpO2         Electronically Signed By: ZAIRA Brown CRNA  January 12, 2022  3:31 PM

## 2022-01-13 ENCOUNTER — APPOINTMENT (OUTPATIENT)
Dept: ULTRASOUND IMAGING | Facility: CLINIC | Age: 57
DRG: 871 | End: 2022-01-13
Attending: PHYSICIAN ASSISTANT
Payer: MEDICARE

## 2022-01-13 ENCOUNTER — APPOINTMENT (OUTPATIENT)
Dept: PHYSICAL THERAPY | Facility: CLINIC | Age: 57
DRG: 871 | End: 2022-01-13
Attending: PHYSICIAN ASSISTANT
Payer: MEDICARE

## 2022-01-13 LAB
ALBUMIN SERPL-MCNC: 2.5 G/DL (ref 3.4–5)
ALP SERPL-CCNC: 158 U/L (ref 40–150)
ALT SERPL W P-5'-P-CCNC: 167 U/L (ref 0–50)
ANION GAP SERPL CALCULATED.3IONS-SCNC: 9 MMOL/L (ref 3–14)
AST SERPL W P-5'-P-CCNC: 126 U/L (ref 0–45)
ATRIAL RATE - MUSE: 113 BPM
BILIRUB SERPL-MCNC: 0.4 MG/DL (ref 0.2–1.3)
BUN SERPL-MCNC: 11 MG/DL (ref 7–30)
CALCIUM SERPL-MCNC: 9.2 MG/DL (ref 8.5–10.1)
CHLORIDE BLD-SCNC: 110 MMOL/L (ref 94–109)
CO2 SERPL-SCNC: 23 MMOL/L (ref 20–32)
CREAT SERPL-MCNC: 0.51 MG/DL (ref 0.52–1.04)
DIASTOLIC BLOOD PRESSURE - MUSE: NORMAL MMHG
ERYTHROCYTE [DISTWIDTH] IN BLOOD BY AUTOMATED COUNT: 15.8 % (ref 10–15)
GFR SERPL CREATININE-BSD FRML MDRD: >90 ML/MIN/1.73M2
GLUCOSE BLD-MCNC: 97 MG/DL (ref 70–99)
GLUCOSE BLDC GLUCOMTR-MCNC: 118 MG/DL (ref 70–99)
GLUCOSE BLDC GLUCOMTR-MCNC: 96 MG/DL (ref 70–99)
HCT VFR BLD AUTO: 31.1 % (ref 35–47)
HGB BLD-MCNC: 9.9 G/DL (ref 11.7–15.7)
INTERPRETATION ECG - MUSE: NORMAL
MCH RBC QN AUTO: 29.1 PG (ref 26.5–33)
MCHC RBC AUTO-ENTMCNC: 31.8 G/DL (ref 31.5–36.5)
MCV RBC AUTO: 92 FL (ref 78–100)
P AXIS - MUSE: 66 DEGREES
PLATELET # BLD AUTO: 258 10E3/UL (ref 150–450)
POTASSIUM BLD-SCNC: 3.5 MMOL/L (ref 3.4–5.3)
PR INTERVAL - MUSE: 132 MS
PROT SERPL-MCNC: 6.8 G/DL (ref 6.8–8.8)
QRS DURATION - MUSE: 72 MS
QT - MUSE: 336 MS
QTC - MUSE: 460 MS
R AXIS - MUSE: -10 DEGREES
RADIOLOGIST FLAGS: ABNORMAL
RBC # BLD AUTO: 3.4 10E6/UL (ref 3.8–5.2)
SODIUM SERPL-SCNC: 142 MMOL/L (ref 133–144)
SYSTOLIC BLOOD PRESSURE - MUSE: NORMAL MMHG
T AXIS - MUSE: -32 DEGREES
VENTRICULAR RATE- MUSE: 113 BPM
WBC # BLD AUTO: 8 10E3/UL (ref 4–11)

## 2022-01-13 PROCEDURE — 97530 THERAPEUTIC ACTIVITIES: CPT | Mod: GP

## 2022-01-13 PROCEDURE — 93971 EXTREMITY STUDY: CPT | Mod: RT

## 2022-01-13 PROCEDURE — 250N000013 HC RX MED GY IP 250 OP 250 PS 637: Performed by: NURSE PRACTITIONER

## 2022-01-13 PROCEDURE — 93971 EXTREMITY STUDY: CPT | Mod: 26 | Performed by: RADIOLOGY

## 2022-01-13 PROCEDURE — 97161 PT EVAL LOW COMPLEX 20 MIN: CPT | Mod: GP

## 2022-01-13 PROCEDURE — 82040 ASSAY OF SERUM ALBUMIN: CPT | Performed by: PHYSICIAN ASSISTANT

## 2022-01-13 PROCEDURE — 250N000013 HC RX MED GY IP 250 OP 250 PS 637: Performed by: INTERNAL MEDICINE

## 2022-01-13 PROCEDURE — 96361 HYDRATE IV INFUSION ADD-ON: CPT

## 2022-01-13 PROCEDURE — 93005 ELECTROCARDIOGRAM TRACING: CPT

## 2022-01-13 PROCEDURE — 250N000011 HC RX IP 250 OP 636: Performed by: PHYSICIAN ASSISTANT

## 2022-01-13 PROCEDURE — 250N000011 HC RX IP 250 OP 636: Performed by: INTERNAL MEDICINE

## 2022-01-13 PROCEDURE — 120N000002 HC R&B MED SURG/OB UMMC

## 2022-01-13 PROCEDURE — 999N000128 HC STATISTIC PERIPHERAL IV START W/O US GUIDANCE

## 2022-01-13 PROCEDURE — 36415 COLL VENOUS BLD VENIPUNCTURE: CPT | Performed by: PHYSICIAN ASSISTANT

## 2022-01-13 PROCEDURE — 80053 COMPREHEN METABOLIC PANEL: CPT | Performed by: PHYSICIAN ASSISTANT

## 2022-01-13 PROCEDURE — 99233 SBSQ HOSP IP/OBS HIGH 50: CPT | Performed by: INTERNAL MEDICINE

## 2022-01-13 PROCEDURE — 3E0G76Z INTRODUCTION OF NUTRITIONAL SUBSTANCE INTO UPPER GI, VIA NATURAL OR ARTIFICIAL OPENING: ICD-10-PCS | Performed by: INTERNAL MEDICINE

## 2022-01-13 PROCEDURE — 85027 COMPLETE CBC AUTOMATED: CPT | Performed by: PHYSICIAN ASSISTANT

## 2022-01-13 PROCEDURE — 250N000013 HC RX MED GY IP 250 OP 250 PS 637: Performed by: PEDIATRICS

## 2022-01-13 PROCEDURE — 97110 THERAPEUTIC EXERCISES: CPT | Mod: GP

## 2022-01-13 PROCEDURE — 93010 ELECTROCARDIOGRAM REPORT: CPT | Performed by: INTERNAL MEDICINE

## 2022-01-13 PROCEDURE — 250N000011 HC RX IP 250 OP 636: Performed by: PEDIATRICS

## 2022-01-13 PROCEDURE — 250N000013 HC RX MED GY IP 250 OP 250 PS 637: Performed by: PHYSICIAN ASSISTANT

## 2022-01-13 PROCEDURE — 258N000003 HC RX IP 258 OP 636: Performed by: EMERGENCY MEDICINE

## 2022-01-13 RX ORDER — LEVOFLOXACIN 5 MG/ML
750 INJECTION, SOLUTION INTRAVENOUS EVERY 24 HOURS
Status: DISCONTINUED | OUTPATIENT
Start: 2022-01-13 | End: 2022-01-13

## 2022-01-13 RX ORDER — PIPERACILLIN SODIUM, TAZOBACTAM SODIUM 3; .375 G/15ML; G/15ML
3.38 INJECTION, POWDER, LYOPHILIZED, FOR SOLUTION INTRAVENOUS EVERY 6 HOURS
Status: DISCONTINUED | OUTPATIENT
Start: 2022-01-13 | End: 2022-01-14

## 2022-01-13 RX ORDER — HYDROMORPHONE HYDROCHLORIDE 1 MG/ML
.5-1 INJECTION, SOLUTION INTRAMUSCULAR; INTRAVENOUS; SUBCUTANEOUS EVERY 4 HOURS PRN
Status: DISCONTINUED | OUTPATIENT
Start: 2022-01-13 | End: 2022-01-14

## 2022-01-13 RX ORDER — OXYCODONE HYDROCHLORIDE 5 MG/1
5-10 TABLET ORAL EVERY 4 HOURS PRN
Status: DISCONTINUED | OUTPATIENT
Start: 2022-01-13 | End: 2022-01-14

## 2022-01-13 RX ADMIN — TOLTERODINE TARTRATE 2 MG: 2 TABLET, FILM COATED ORAL at 07:47

## 2022-01-13 RX ADMIN — HYDROMORPHONE HYDROCHLORIDE 0.5 MG: 1 INJECTION, SOLUTION INTRAMUSCULAR; INTRAVENOUS; SUBCUTANEOUS at 00:35

## 2022-01-13 RX ADMIN — TOPIRAMATE 100 MG: 100 TABLET, FILM COATED ORAL at 07:47

## 2022-01-13 RX ADMIN — HYDROMORPHONE HYDROCHLORIDE 0.5 MG: 1 INJECTION, SOLUTION INTRAMUSCULAR; INTRAVENOUS; SUBCUTANEOUS at 14:02

## 2022-01-13 RX ADMIN — VENLAFAXINE 75 MG: 75 TABLET ORAL at 20:59

## 2022-01-13 RX ADMIN — SODIUM CHLORIDE: 9 INJECTION, SOLUTION INTRAVENOUS at 00:06

## 2022-01-13 RX ADMIN — HYDROMORPHONE HYDROCHLORIDE 0.5 MG: 1 INJECTION, SOLUTION INTRAMUSCULAR; INTRAVENOUS; SUBCUTANEOUS at 19:40

## 2022-01-13 RX ADMIN — TOPIRAMATE 200 MG: 100 TABLET ORAL at 18:03

## 2022-01-13 RX ADMIN — OXYCODONE HYDROCHLORIDE 5 MG: 5 TABLET ORAL at 12:17

## 2022-01-13 RX ADMIN — SENNOSIDES AND DOCUSATE SODIUM 1 TABLET: 8.6; 5 TABLET ORAL at 07:47

## 2022-01-13 RX ADMIN — SENNOSIDES AND DOCUSATE SODIUM 1 TABLET: 8.6; 5 TABLET ORAL at 21:03

## 2022-01-13 RX ADMIN — SODIUM CHLORIDE: 9 INJECTION, SOLUTION INTRAVENOUS at 12:23

## 2022-01-13 RX ADMIN — Medication 7.5 MG: at 09:53

## 2022-01-13 RX ADMIN — PIPERACILLIN AND TAZOBACTAM 3.38 G: 3; .375 INJECTION, POWDER, LYOPHILIZED, FOR SOLUTION INTRAVENOUS at 00:05

## 2022-01-13 RX ADMIN — OXYCODONE HYDROCHLORIDE 5 MG: 5 TABLET ORAL at 18:02

## 2022-01-13 RX ADMIN — HYDROMORPHONE HYDROCHLORIDE 0.5 MG: 1 INJECTION, SOLUTION INTRAMUSCULAR; INTRAVENOUS; SUBCUTANEOUS at 08:01

## 2022-01-13 RX ADMIN — VORICONAZOLE 200 MG: 40 POWDER, FOR SUSPENSION ORAL at 21:02

## 2022-01-13 RX ADMIN — VORICONAZOLE 200 MG: 40 POWDER, FOR SUSPENSION ORAL at 07:48

## 2022-01-13 RX ADMIN — PIPERACILLIN AND TAZOBACTAM 3.38 G: 3; .375 INJECTION, POWDER, LYOPHILIZED, FOR SOLUTION INTRAVENOUS at 14:02

## 2022-01-13 RX ADMIN — PIPERACILLIN AND TAZOBACTAM 3.38 G: 3; .375 INJECTION, POWDER, LYOPHILIZED, FOR SOLUTION INTRAVENOUS at 05:53

## 2022-01-13 RX ADMIN — DULOXETINE 120 MG: 30 CAPSULE, DELAYED RELEASE ORAL at 21:00

## 2022-01-13 RX ADMIN — DOCUSATE SODIUM 50 MG AND SENNOSIDES 8.6 MG 1 TABLET: 8.6; 5 TABLET, FILM COATED ORAL at 21:00

## 2022-01-13 RX ADMIN — TOLTERODINE TARTRATE 2 MG: 2 TABLET, FILM COATED ORAL at 21:00

## 2022-01-13 RX ADMIN — HYDROMORPHONE HYDROCHLORIDE 0.5 MG: 1 INJECTION, SOLUTION INTRAMUSCULAR; INTRAVENOUS; SUBCUTANEOUS at 03:44

## 2022-01-13 RX ADMIN — HYDROMORPHONE HYDROCHLORIDE 0.5 MG: 1 INJECTION, SOLUTION INTRAMUSCULAR; INTRAVENOUS; SUBCUTANEOUS at 05:53

## 2022-01-13 RX ADMIN — PIPERACILLIN AND TAZOBACTAM 3.38 G: 3; .375 INJECTION, POWDER, LYOPHILIZED, FOR SOLUTION INTRAVENOUS at 19:42

## 2022-01-13 RX ADMIN — HYDROMORPHONE HYDROCHLORIDE 0.5 MG: 1 INJECTION, SOLUTION INTRAMUSCULAR; INTRAVENOUS; SUBCUTANEOUS at 09:53

## 2022-01-13 RX ADMIN — Medication 15 ML: at 07:47

## 2022-01-13 RX ADMIN — VENLAFAXINE 75 MG: 75 TABLET ORAL at 07:47

## 2022-01-13 ASSESSMENT — ACTIVITIES OF DAILY LIVING (ADL)
ADLS_ACUITY_SCORE: 15
ADLS_ACUITY_SCORE: 13
ADLS_ACUITY_SCORE: 21
ADLS_ACUITY_SCORE: 13
ADLS_ACUITY_SCORE: 19
ADLS_ACUITY_SCORE: 19
ADLS_ACUITY_SCORE: 13
ADLS_ACUITY_SCORE: 19
ADLS_ACUITY_SCORE: 19
ADLS_ACUITY_SCORE: 13
ADLS_ACUITY_SCORE: 19
ADLS_ACUITY_SCORE: 13
ADLS_ACUITY_SCORE: 19
ADLS_ACUITY_SCORE: 19
ADLS_ACUITY_SCORE: 15
ADLS_ACUITY_SCORE: 15
ADLS_ACUITY_SCORE: 13
ADLS_ACUITY_SCORE: 19
ADLS_ACUITY_SCORE: 13
ADLS_ACUITY_SCORE: 19
ADLS_ACUITY_SCORE: 19
ADLS_ACUITY_SCORE: 21
ADLS_ACUITY_SCORE: 15
ADLS_ACUITY_SCORE: 19

## 2022-01-13 NOTE — CONSULTS
Care Management Initial Consult    General Information  Assessment completed with: patient, care team members (MARY JANE Shaikh), chart review       Primary Care Provider verified and updated as needed:  yes  Readmission within the last 30 days: no         Advance Care Planning: none      Communication Assessment  Patient's communication style: english   Hearing Difficulty or Deaf: no   Wear Glasses or Blind: no    Cognitive  Cognitive/Neuro/Behavioral: WDL    Living Environment:   People in home: Patient typically lives with her  Higinio in a house in [facility]   Current living Arrangements: Facility resident- [facility]  Able to return to prior arrangements: yes     Family/Social Support:  Care provided by: Facility staff  Provides care for: no one - limited ability to care for self     Current Resources:   Patient receiving home care services: no       Community Resources:  none  Equipment currently used at home: NA  Supplies currently used at home: NA    Employment/Financial:  Employment Status:  Pt is unemployed and receives disability (SSDI) due to migraines per pt report. Pt reported she had been working up until a couple years ago.     Financial Concerns: none      Lifestyle & Psychosocial Needs:  Social Determinants of Health     Tobacco Use: Low Risk      Smoking Tobacco Use: Never Smoker     Smokeless Tobacco Use: Never Used   Alcohol Use: Not on file   Financial Resource Strain: Not on file   Food Insecurity: Not on file   Transportation Needs: Not on file   Physical Activity: Not on file   Stress: Not on file   Social Connections: Not on file   Intimate Partner Violence: Not on file   Depression: Not at risk     PHQ-2 Score: 0   Housing Stability: Not on file       Functional Status:  Prior to admission patient needed assistance: no     Mental Health Status: Pt reports she was hospitalized for depression in 2005 and has been on antidepressants since then.       Per H&P  "pt has the following diagnoses: anorexia, depression, major depression, OCD, and personality disorder.     Patient has a history of mental illness. Patient reported she was diagnosed with anorexia as a teenager. Patient was also receiving ECT treatments \"a long time ago\". Patient reports being hospitalized in 2005 for \"a major depression\", and that she has been taking antidepressants ever since. Patient reports her mental illness is well managed.    In terms of her current mental health pt reported that \"everything is fine\" and noted that her previous concerns with depression are \"all taken care of\".     Chemical Dependency Status: No concerns         Values/Beliefs:  Spiritual, Cultural Beliefs, Caodaism Practices, Values that affect care: Pt is currently \"trying out new churches\". Pt reports being very spiritual and currently identifies as Spiritism.              Additional Information:    Sherly Yeung is a 56 year old female admitted on 1/11/2022. She has a history of SDH, PE/DVT (on Xarelto), HTN, Covid infection subclavian vein stenosis s/p stenting who presents to the Emergency Department with abdominal pain, nausea, and vomiting beginning Sunday, 1/9.     SW met with patient in her room and introduced self. Patient reports she has been hospitalized multiple times since late September 2021 due to COVID. Patient had previously been staying at [facility] for about 4 weeks before admitting to [facility]. Patient reports that prior to COVID, her and her  Higinio had been living in a camper with her brother in [facility] while they were in the process of buying a house. Patient reports they closed on their house, which is located in [facility], on September 30th 2021.     Patient has been with her  Higinio for three years. They were  in May of 2021. Patient reports that Higinio has been very supportive and reported \"god sent him to me\". Patient has two children, as 29 year old son, and a " "26 year old daughter. Both children live in [place]. Patient denies having any community resources, or needing assistance in any way. Patient is unemployed and receiving SSDI. Patient does not have any financial concerns.     Patient reports being very spiritual and [place], and that this is a great support for her. Pt is Adventist, and currently \"trying out new churches\" due to having recently moved.     Patient reported she plans to return to [facility], reported things have been \"ok\" there and said she has been there almost 4 weeks. Patient reports that she cannot safely utilize wheelchair transportation at this time due to concerns about ability to sit upright for duration of transport. Stretcher transportation may be necessary. SW explained that insurance may not cover transportation.     Patient reported no further questions or concerns at this time. Patient encouraged to reach out to  if she does have any further questions or if needs arise.     SW contacted [facility] (p. [phone], f. [phone])- left vm for Apoorva in admissions inquiring if pt has a bed hold, waiting to hear back. Vera later left a vm reporting pt has a TCU bed hold and can return when medically ready.     JORGITO Ayala, LGSW   7C    Phone: [phone]  Pager: [phone]      "

## 2022-01-13 NOTE — PROGRESS NOTES
General Surgery Progress Note  2022   ------------------------------------------------------------------------------------------------  Subjective:  Seen in AM. Placed on Zosyn . ERCP performed , did demonstrate stones - removed with stent placement x2. Potential discharge today.   ------------------------------------------------------------------------------------------------  Objective:  Temp:  [96  F (35.6  C)-99.9  F (37.7  C)] 97.8  F (36.6  C)  Pulse:  [] 96  Resp:  [12-20] 18  BP: (117-166)/(67-93) 131/67  SpO2:  [99 %-100 %] 99 %    I/O last 3 completed shifts:  In: 2156.67 [I.V.:1976.67; NG/GT:180]  Out: 0       Gen: Awake, interactive, NAD  Resp: breathing comfortably on room air  CV: regular rate, appears well perfused  Abd: soft, non distended, but some tenderness in RUQ w/ medium palpation - non guarding, non rebound, non peritonitic.   Ext: palpable pulses, no edema     Labs:  Recent Labs   Lab 22  1135 22  0338 22  1615 22  1607   WBC 13.3* 13.0*  --  10.8   HGB 9.8* 11.3* 13.9 12.2    344  --  390       Recent Labs   Lab 22  0256 22  0009 22  1353 22  1135 22  0813 22  0338 22  1615 22  1607   NA  --   --   --  144  --  144 142 140   POTASSIUM  --   --   --  3.6 3.9 3.2* 3.1* 3.0*   CHLORIDE  --   --   --  114*  --  116*  --  107   CO2  --   --   --  25  --  23  --  25   BUN  --   --   --  8  --  8  --  10   CR  --   --   --  0.56  --  0.55  --  0.63   GLC 96 118* 78 93  --  97 113* 110*   TERESA  --   --   --  9.6  --  9.4  --  10.3*   MAG  --   --   --   --   --   --   --  2.2       Imagin/11  CT Abdomen Pelvis  Distended gallbladder and bilary duct dilation, but no visualization of stones or obstructing lesions. Large rectal stool w/o obstruction.     US RUQ  Distended gallbladder w/ mild wall thickening and pericholecystic fluid. Mild extra/intra hepatic dilation. No stone appreciated. CBD  diameter 8.5mm.     MRCP  Gallbladder dilation w/o definitive wall thickening. Modest dilatation of extrahepatic duct measuring 9mm. No obstructing mass or stone visualized.      =======================================================    Assessment/Plan:   56F PMHx GERD, COVID 10/2021 (on Eliquis s/p infection 2/2 PE/DVT), Trach/PEG s/p trach decannulation, continued PEG feedings + PO nutrition, HTN, Subclavian vein stenosis sp stenting. Presents from TCU for 3 day prior to admit hx of intermittent abdominal pain / nausea / emesis. CT demonstrates distended gallbladder w/ mild surrounding inflammation but  w/o stones. RUQ US trace pericholecystic fluids and non uniform gallbladder wall thickening. CBD diameter 8.5mm. MRCP showing edema w/o wall thickening or stones.     Surgical consult requested 11/12 2/2 new onset tachycardia. WBC has improved since yesterday (13 -> 8). Now s/p ERCP w/ removal of found choledocholithiasis and placement of stents.     - No acute surgical intervention at this time  - Continue current management per GI and primary   -> Full liquids   -> Zosyn, Voriconazole, s/p CTX x1 (11/12)   -> Holding anticoag   -> Zofran PRN   -> Dilaudid PRN    -> f/u as an outpatient at patient's convenience (no time constraints), to discuss elective cholecystectomy     Seen, examined, and discussed with chief resident, who will discuss with staff.    Donny Padilla DO  PGY1 Family Medicine Resident   [facility]    Department of Family Medicine and Replaced by [facility]

## 2022-01-13 NOTE — PROGRESS NOTES
01/13/22 1035   Quick Adds   Type of Visit Initial PT Evaluation       Present no   Living Environment   People in home spouse   Current Living Arrangements house   Home Accessibility no concerns   Transportation Anticipated family or friend will provide   Living Environment Comments Pt reports her and her  just recently had a new home built that is all one level, no stairs, and is wheelchair accessible. Pt reports a walk in shower with built in shower seat and grab bars. Pt was hospitalized in September and has been in hospital or at rehab since September. Pt admitted to hospital currently from [facility]. Pt reports she was at [facility] for about [facility] and had been working with PT/OT.    Self-Care   Usual Activity Tolerance good   Current Activity Tolerance fair   Equipment Currently Used at Home none   Activity/Exercise/Self-Care Comment Pt reports at her baseline is completely independent with ADL's, IADL's, and mobility without use of AD. Since her hospitalization and time at [facility], pt reports she has not done much walking. At [facility] she was able to progress to taking a few steps at EOB with walker, but has not ambulated more than that since before September.    Disability/Function   Hearing Difficulty or Deaf no   Wear Glasses or Blind no   Concentrating, Remembering or Making Decisions Difficulty no   Difficulty Communicating no   Difficulty Eating/Swallowing no   Walking or Climbing Stairs Difficulty no   Dressing/Bathing Difficulty no   Toileting issues no   Doing Errands Independently Difficulty (such as shopping) no   Fall history within last six months no   Change in Functional Status Since Onset of Current Illness/Injury yes   General Information   Onset of Illness/Injury or Date of Surgery 01/11/22   Referring Physician Juan Aguilar PA-C   Patient/Family Therapy Goals Statement (PT) to get stronger before returning home   Pertinent History of Current Problem (include personal  factors and/or comorbidities that impact the POC) per chart: 56 year old female with a history of SDH, DVT and PE (on rivaroxaban), subclavian vein stenosis s/p stenting, COVID 19 on 10/2021 complicated by AHRF and intubation/trach placement, PEG placement presents to us from TCU with central abdominal pain radiating rightwards (upper and lower quadrants) onset Sunday (1/9). Since arrival to ED patient has newly elevated WBCs, LFTs and bilirubin. CT scan revealed GB distention and mild inflammation associated with intra and extra hepatic biliary dilation. Follow up US negative for stones revealed GB wall thickening and pericholecystic fluid collection. Suspecting biliary obstruction for which MRCP was done which showed moderate extrahepatic biliary ductal dilation but did not show choledocholithiasis or any filling defects. S/p ERCP on 1/12/22 with both cystic and common bile duct stenting. Continues to have mild to moderate RUQ pain following her procedure.    Existing Precautions/Restrictions abdominal;oxygen therapy device and L/min;fall   Weight-Bearing Status - LUE full weight-bearing   Weight-Bearing Status - RUE full weight-bearing   Weight-Bearing Status - LLE full weight-bearing   Weight-Bearing Status - RLE full weight-bearing   General Observations Activity: up with assist   Cognition   Orientation Status (Cognition) oriented x 4   Affect/Mental Status (Cognition) WFL   Follows Commands (Cognition) WFL   Pain Assessment   Patient Currently in Pain Yes, see Vital Sign flowsheet  (5/10 abdominal pain)   Integumentary/Edema   Integumentary/Edema Comments G tube site   Posture    Posture Forward head position;Protracted shoulders   Range of Motion (ROM)   ROM Comment B LE WFL   Strength   Strength Comments B LE grossly antigravity strength, general weakness observed   Bed Mobility   Comment (Bed Mobility) sup to sit with CGA   Transfers   Transfer Safety Comments sit to stand with CGA-Christelle   Gait/Stairs  (Locomotion)   Comment (Gait/Stairs) Did not ambulate during session as pt with tachycardia/symptoms when sitting EOB. Will assess in future session as appropriate as pt ambulates at baseline.   Balance   Balance Comments good seated balance; Ax1 standing balance   Sensory Examination   Sensory Perception patient reports no sensory changes   Clinical Impression   Criteria for Skilled Therapeutic Intervention yes, treatment indicated   PT Diagnosis (PT) impaired functional mobility   Influenced by the following impairments pain, decreased activity tolerance, weakness   Functional limitations due to impairments bed mobility, transfers, gait   Clinical Presentation Stable/Uncomplicated   Clinical Presentation Rationale clinical judgement   Clinical Decision Making (Complexity) low complexity   Therapy Frequency (PT) 5x/week   Predicted Duration of Therapy Intervention (days/wks) 1-2 weeks   Planned Therapy Interventions (PT) balance training;bed mobility training;gait training;home exercise program;neuromuscular re-education;patient/family education;stair training;strengthening;transfer training;progressive activity/exercise   Risk & Benefits of therapy have been explained evaluation/treatment results reviewed;care plan/treatment goals reviewed   Clinical Impression Comments Pt presents from TCU with the aformentioned impairments that are affecting safety and independence with functional mobility tasks. Pt to benefit from skilled inpatient PT to progress towards PLOF and maximize functional outcomes.   PT Discharge Planning    PT Discharge Recommendation (DC Rec) Transitional Care Facility;home with assist;home with home care physical therapy   PT Rationale for DC Rec Pt currently below functional mobility baseline, deconditioned, and requiring assist with all mobility. Pt admitted from TCU, recommend to return to TCU for progression of functional independence prior to return to home. Pt ind at baseline, currently not  ambulating safe household distances. If pt were to d/c home, would require Ax1 with mobility, wheelchair, FWW, commode, home therapies.    PT Brief overview of current status  Ax1 bed mobility and transfers   Total Evaluation Time   Total Evaluation Time (Minutes) 8

## 2022-01-13 NOTE — PHARMACY-CONSULT NOTE
Pharmacy Tube Feeding Consult    Medication reviewed for administration by feeding tube and for potential food/drug interactions.    Recommendation: No changes are needed at this time.     Pharmacy will continue to follow as new medications are ordered.    Ozzie CasianoD, [facility]  Inpatient clinical pharmacist

## 2022-01-13 NOTE — PROGRESS NOTES
"[id number] progress note. Care from: 07:00 - 15:00     BP (!) 148/85 (BP Location: Left arm)   Pulse 111   Temp 97.3  F (36.3  C) (Oral)   Resp 16   Ht 1.6 m (5' 3\")   Wt 52.2 kg (115 lb)   SpO2 100%   BMI 20.37 kg/m         Vitals: VSS     Neuro: WDL   o Pain: Persistent abdominal pain requiring IV dilaudid and PO oxycodone PRN. Attempting to wean off IV pain control and switch to PO   o Mood/Behavior: Calm, cooperative, withdrawn     Activity: Pt worked with PT today but was unable to do more than sit at bedside due to tachycardia to the 140s. Pt has fair bed mobility otherwise     Cardiovascular: Persistent hypertension and tachycardia    Respiratory: SpO2 stable on 1-2 LPM O2 via NC at rest. Pt easily desaturates with activity requiring 3+ LPM    GI & Nutrition: Full liquid diet though patient not eating much. Pt had jello for breakfast. Pt has orders for bolus feedings TID via J tube. However, pt thinks she was on a different enteral feeding solution at the TCU than what is ordered now. She is calling to verify and will let us know. She's refusing the bolus feeds until she verifies with the TCU which formula she was on.   o Nausea: Denies   o Bowel Movement: No BM for 3+ days. PRN senna given     : Urine incontinence - pt states this is not her baseline but has been incontinent ever since getting sick     Skin, Wounds & LDAs: Skin largely intact. PIV intact, infusing NS at 100 mL/hr.     Events & Plan Updates: Working towards adequate pain control and eventual transfer back to TCU.   "

## 2022-01-13 NOTE — PLAN OF CARE
"/79 (BP Location: Left arm)   Pulse 94   Temp (!) 96  F (35.6  C) (Axillary)   Resp 18   Ht 1.6 m (5' 3\")   Wt 52.2 kg (115 lb)   SpO2 100%   BMI 20.37 kg/m        Neuros: Alert and oriented x4. Denies any numbness/tingling.     Cardiac: Tachy but asymptomatic. Denies any chest pain.     Respiratory: 3L via NC at baseline. Denies any SOB.     GI/Gu: Incontinent. Pt changed x2 during shift. No BM this shift.     Skin/Incisions: pt very fair in color. G tube and PIV. PIV infusing with NS @ 100ml/hr.     Pain: Abd pain- PRN IV dilaudid given.     Diet: Full liquid diet. Pt supposed to have bolus TF, but pt refused.     Activity: Help with repositioning in bed. Not out of bed this shift.     Plan: Continue with POC. Possibly discharge back to facility today.     "

## 2022-01-13 NOTE — PLAN OF CARE
Arrived on 7B approximately 1645. Alert and able to make needs known.  at bedside. LR flush started in PACU completed. Nausea 4/10. Aromatherapy helpful. Too early to give more Zofran. Skin check done with only finding is old trach site and skin that is very fair and sensitive and pink but blanches easily. Pt had abdominal pain after being here an hour and was given Dilaudid 0.5 with improvement. Swallow check done with water with passing. Able to have full liquids but only wanted water and ice chips this evening. Large incontinence of urine. Pt states she uses briefs and does not use bedpan. Plan is to stay overnight and discharge back to facility tomorrow. Unasyn given and home meds restarted. All medication goes down g-tube. HR and pulse elevated with pain and improved after pain meds.

## 2022-01-13 NOTE — PROGRESS NOTES
Brief Medicine Note:    Patient transferred from ED OBS. Has telemetry orders but unable to complete on current unit. -115. Prior EKG with sinus tachycardia.    - Discontinue telemetry  - Obtain EKG to ensure sinus rhythm  - If abnormal rhythm, may need to transfer to different unit    Angela Redd PA-C  Internal Medicine [facility] Hospitalist  [facility]  Pager: [phone]

## 2022-01-13 NOTE — PROGRESS NOTES
[facility]    Medicine Progress Note - Hospitalist Service, Gold 8       Date of Admission:  1/11/2022    Assessment & Plan          Sherly Yeung is a 56 year old female admitted on 1/11/2022. She has a history of SDH, PE/DVT (on Xarelto), HTN, Covid infection subclavian vein stenosis s/p stenting who presents to the Emergency Department with abdominal pain, nausea, and vomiting beginning Sunday, 1/9 and found to have cholangitis and possible acute cholecystitis.     # Choledocholithiasis c/b cholangitis, s/p CBD stenting (1/12/22)  # Probable Acute cholecystitis s/p transpapillary cystic duct stenting (1/12/22)  # Transaminitis   # Sepsis (Tachy, Leukocytosis)  - S/p ERCP, removal of stone and stent placement  - Will continue Abx for 7-10 days unless BCx are positive    - Holding AC for 72 hrs  - Pain control, ADAT  - Awaiting Gen-Surg recommendations for possible cholecystectomy   - Appreciate GI rec's    # Tachycardia  - Likely related to infection, pain and anxiety  - Will get EKG, monitor     # Hypokalemia  - replete and monitor     # Hx of Covid Pneumonia  # Pulmonary aspergillosis  - COVID-pneumonia requiring intubation in October, not vaccinated. Was c/b aspergillus pna, currently on vori.   - Continue voriconazole through 1/22/22    # Paget Banegas syndrome  # 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  She has a history of multiple blood clots.  She has been maintained on apixaban.  - HOLD apixaban for 72 days after ERCP     # HTN:   -Continue PTA metoprolol     # Depression  # Hx of Eating Disorder:  -Continue PTA venlafaxine, abilify, ativan PRN     # Adult failure to thrive  Patient has a gastric tube.  She does eat 3 meals a day, with tube feed to follow each feed as she does not consume enough calories by mouth at this time.  - Continue PTA Jevity 200 mL tube feed after each meal when patient when  diet is advanced    # Normocytic anemia  - stable, monitor        Diet: Full Liquid Diet  Adult Formula Bolus Feeding: TID Jevity 1.5; Route: Gastrostomy; 1; Can(s); Medication - Feeding Tube Flush Frequency: At least 15-30 mL water before and after medication administration and with tube clogging; Amount to Send (Nutrition use): 3 can...    DVT Prophylaxis: Pneumatic Compression Devices (hold AC due to ERCP)  Scott Catheter: Not present  Central Lines: None  Code Status: Full Code      Disposition Plan   Expected Discharge: 01/15/2022     Anticipated discharge location:  Awaiting care coordination huddle  Delays:           The patient's care was discussed with the Bedside Nurse and Patient.    Catrachito De La O MD  Hospitalist Service, [address]  Securely message with the Vocera Web Console (learn more here)  Text page via [employer] Paging/Directory    Please see sign in/sign out for up to date coverage information    Clinically Significant Risk Factors Present on Admission             # Coagulation Defect: home medication list includes an anticoagulant medication  # Platelet Defect: home medication list includes an antiplatelet medication       ______________________________________________________________________    Interval History   Patient s/p ERCP, having pain but improved from prior to ERCP, denies fever or chills, does have sanford palpitations     Data reviewed today: I reviewed all medications, new labs and imaging results over the last 24 hours. I personally reviewed no images or EKG's today.    Physical Exam   Vital Signs: Temp: 97.3  F (36.3  C) Temp src: Oral BP: (!) 148/85 Pulse: 111   Resp: 16 SpO2: 100 % O2 Device: Nasal cannula Oxygen Delivery: 3 LPM  Weight: 115 lbs 0 oz    Constitutional: Awake, alert, cooperative, appears in pain   Respiratory: Clear to auscultation bilaterally  Cardiovascular: Tachycardic but regular, no edema   GI: Normal bowel  sounds, mild TTP on RUQ  Skin/Integumen: No rashes, no cyanosis    Data   Recent Labs   Lab 01/13/22  0716 01/13/22  0256 01/13/22  0009 01/12/22  1353 01/12/22  1135 01/12/22  0813 01/12/22  0338 01/11/22  1615 01/11/22  1607   WBC 8.0  --   --   --  13.3*  --  13.0*  --  10.8   HGB 9.9*  --   --   --  9.8*  --  11.3*   < > 12.2   MCV 92  --   --   --  96  --  93  --  93     --   --   --  290  --  344  --  390   INR  --   --   --   --   --   --   --   --  1.27*     --   --   --  144  --  144   < > 140   POTASSIUM 3.5  --   --   --  3.6 3.9 3.2*   < > 3.0*   CHLORIDE 110*  --   --   --  114*  --  116*  --  107   CO2 23  --   --   --  25  --  23  --  25   BUN 11  --   --   --  8  --  8  --  10   CR 0.51*  --   --   --  0.56  --  0.55  --  0.63   ANIONGAP 9  --   --   --  5  --  5  --  8   TERESA 9.2  --   --   --  9.6  --  9.4  --  10.3*   GLC 97 96 118*   < > 93  --  97   < > 110*   ALBUMIN 2.5*  --   --   --  2.6*  --  2.8*  --  3.2*   PROTTOTAL 6.8  --   --   --  6.8  --  7.0  --  8.1   BILITOTAL 0.4  --   --   --  0.9  --  1.9*  --  0.9   ALKPHOS 158*  --   --   --  169*  --  182*  --  213*   *  --   --   --  191*  --  205*  --  200*   *  --   --   --  178*  --  212*  --  219*   LIPASE  --   --   --   --   --   --   --   --  162    < > = values in this interval not displayed.     Recent Results (from the past 24 hour(s))   XR Surgery JUVENAL G/T 5 Min Fluoro w Stills    Narrative    This exam was marked as non-reportable because it will not be read by a   radiologist or a [place] non-radiologist provider.

## 2022-01-13 NOTE — PROGRESS NOTES
BRIEF GI PROGRESS NOTE    DOS: 1/13/2022    ASSESSMENT & PLAN:    #. Acute cholecystitis s/p transpapillary cystic duct stenting (1/12/22)  #. Choledocholithiasis c/b cholangitis, s/p CBD stenting (1/12/22)  #. Direct hyperbilirubinemia, abnormal liver chemistries  #. Sepsis, leukocytosis      56 year old female with a history of SDH, DVT and PE (on rivaroxaban), subclavian vein stenosis s/p stenting, COVID 19 on 10/2021 complicated by AHRF and intubation/trach placement, PEG placement presents to us from TCU with central abdominal pain radiating rightwards (upper and lower quadrants) onset Sunday (1/9). Since arrival to ED patient has newly elevated WBCs, LFTs and bilirubin. CT scan revealed GB distention and mild inflammation associated with intra and extra hepatic biliary dilation. Follow up US negative for stones revealed GB wall thickening and pericholecystic fluid collection. Suspecting biliary obstruction for which MRCP was done which showed moderate extrahepatic biliary ductal dilation but did not show choledocholithiasis or any filling defects. S/p ERCP on 1/12/22 with both cystic and common bile duct stenting. Continues to have mild to moderate RUQ pain following her procedure.     RECOMMENDATIONS:  -- wait 72 hours from ERCP to resume AC  -- recommend ongoing consultation with general surgery as patient continues to have pain despite transcystic stenting  -- for cholangitis: antibiotic course: 7-10 days if blood cultures remain negative, 14 days if they become positive  -- ADAT from GI perspective  -- daily liver chemistries, WBC while hospitalized  -- if pain ongoing without improvement during the day today (1/13/22), would repeat CTAP with IV contrast to evaluate GB and stent position  -- pancreaticobiliary GI will continue to follow, please do not hesitate to reach out with questions or concerns     Discussed with GI Staff Dr. Wilson.    Rick Lua MD, PGY4  Gastroenterology  "Fellow  [facility]  See AMCOM/Tami for GI on-call information    I, Rick Lua MD, independently examined this patient and am in agreement with the exam, assessment, and plan as written by the medical student. Edits as appropriate.     Tolerated ERCP yesterday though continues to have RUQ pain on exam today. May need to repeat cross sectional imaging if pain does not improve. WBC and liver chemistry trend reassuring. Continue antibiotics.     ====================================================  S: Tolerated ERCP yesterday. Continues to have RUQ pain, with only minimal improvement following stent.     Patient joined at bedside by , all questions answered and plan of care from a GI perspective understood.     O:   Physical Exam:  BP (!) 148/85 (BP Location: Left arm)   Pulse 111   Temp 97.3  F (36.3  C) (Oral)   Resp 16   Ht 1.6 m (5' 3\")   Wt 52.2 kg (115 lb)   SpO2 100%   BMI 20.37 kg/m      CONSTITUTIONAL: NAD, lying down in bed  HEENT:  NC/AT.  OP Clear.  MMM. PERRLA.  EOMI.   LUNGS: CTAB w/ no wheezes. Speaking in full sentences.  CARDIOVASCULAR: tachycardic, nonplethoric   ABDOMEN: soft, tender in RUQ and epigastrium, no rebound  NEURO: A&Ox3, CN II-XII grossly intact, non-focal.   PSYCHIATRIC:  Normal affect.  SKIN: Warm, Dry, No rashes.     BMPRecent Labs   Lab 01/13/22  0716 01/13/22  0256 01/13/22  0009 01/12/22  1353 01/12/22  1135 01/12/22  0813 01/12/22  0338 01/11/22  1615 01/11/22  1607     --   --   --  144  --  144 142 140   POTASSIUM 3.5  --   --   --  3.6 3.9 3.2* 3.1* 3.0*   CHLORIDE 110*  --   --   --  114*  --  116*  --  107   TERESA 9.2  --   --   --  9.6  --  9.4  --  10.3*   CO2 23  --   --   --  25  --  23  --  25   BUN 11  --   --   --  8  --  8  --  10   CR 0.51*  --   --   --  0.56  --  0.55  --  0.63   GLC 97 96 118* 78 93  --  97 113* 110*     CBC  Recent Labs   Lab 01/13/22  0716 01/12/22  1135 01/12/22  0338 01/11/22  1615 01/11/22  1607   WBC 8.0 " 13.3* 13.0*  --  10.8   RBC 3.40* 3.39* 3.94  --  4.20   HGB 9.9* 9.8* 11.3* 13.9 12.2   HCT 31.1* 32.5* 36.8 41 38.9   MCV 92 96 93  --  93   MCH 29.1 28.9 28.7  --  29.0   MCHC 31.8 30.2* 30.7*  --  31.4*   RDW 15.8* 16.2* 15.9*  --  15.7*    290 344  --  390     INR  Recent Labs   Lab 01/11/22  1607   INR 1.27*     LFTs  Recent Labs   Lab 01/13/22  0716 01/12/22  1135 01/12/22  0338 01/11/22  1607   ALKPHOS 158* 169* 182* 213*   * 178* 212* 219*   * 191* 205* 200*   BILITOTAL 0.4 0.9 1.9* 0.9   PROTTOTAL 6.8 6.8 7.0 8.1   ALBUMIN 2.5* 2.6* 2.8* 3.2*      PANC  Recent Labs   Lab 01/11/22  1607   LIPASE 162     ERCP: 1/12/22    Findings:        The  film was normal. The esophagus was successfully intubated        under direct vision. The scope was advanced to a normal major papilla in        the descending duodenum without detailed examination of the pharynx,        larynx and associated structures, and upper GI tract. The upper GI tract        was grossly normal. The major papilla was adjacent to a duodenal        diverticulum. The bile duct was deeply cannulated with the short-nosed        traction sphincterotome. Contrast was injected. I personally interpreted        the bile duct images. Ductal flow of contrast was adequate. Image        quality was excellent. Contrast extended to the hepatic ducts. The main        bile duct was moderately dilated and diffusely dilated. The largest        diameter was 10 mm. A 10 mm biliary sphincterotomy was made with a        braided traction (standard) sphincterotome using ERBE electrocautery.        There was no post-sphincterotomy bleeding. The biliary tree was swept        with a 12 mm balloon starting at the bifurcation. All stones were        removed. The bile duct was deeply cannulated with the short-nosed        traction sphincterotome. Contrast was injected. Dilation of the cystic        duct with a 4 mm balloon dilator was successful. One 7  Fr by 15 cm        transpapillary plastic stent with a single external pigtail and a single        internal pigtail was placed into the gallbladder. Bile flowed through        the stent. The stent was in good position. One 10 Fr by 5 cm plastic        stent with a single external flap and a single internal flap was placed        into the common bile duct. Bile flowed through the stent. The stent was        in good position.                                                                                     Impression:            - Major papilla adjacent to a medium-size duodenal                          diverticulum.                          - Selective biliary cannulation and uncomplicated                          biliary sphincterotomy.                          - Cholangiogram with moderately dilated common bile                          and intrahepatic ducts.                          - Choledocholithiasis was found. Complete removal was                          accomplished by biliary sphincterotomy and balloon                          extraction.                          - Cystic duct successfully cannulated and dilated to 4                          mm.                          - Placement of a 7 Fr x 15 cm double pigtailed                          transpapillary Zimmon plastic stent was placed into                          the gallbladder.                          - Placement of a 10 Fr x 5 cm Sofflex plastic stent                          was placed into the common bile duct.   Recommendation:        - [facility] to [facility] care.                          - Avoid anticoagulants (Apixiban), aspirin and                          nonsteroidal anti-inflammatory medicines for 3 days.                          - Continue with antimicrobial therapy for now, and                          observe patient's clinical course. Expect improvement                          in symptoms and LFTs with stones  removal, gallbladder                          and biliary stenting.                          - Repeat ERCP with Dr. Wilson in 4 weeks to                          remove common bile duct stent and place second gall                          bladder stent if she is not getting cholecystectomy.                          If she is scheduled for cholecystectomy we can remove                          biliary and transcystic stent during the same session                          in the OR with an EGD. If patient develops fever,                          chills, jaundice or passes dark urine or has worsening                          of LFTs before scheduled ERCP, will recommend patient                          to call us immediately for consideration of an earlier                          urgent ERCP                          - Inpatient pancreas-biliary consult service will                          closely follow patient and aid in further management                          decisions.                          - The findings and recommendations were discussed with                          the patient and their family.                                                                                       ____________________   Guru Wilson,   1/12/2022 4:20:14 PM   I was physically present for the entire viewing portion of the exam.

## 2022-01-14 ENCOUNTER — APPOINTMENT (OUTPATIENT)
Dept: PHYSICAL THERAPY | Facility: CLINIC | Age: 57
DRG: 871 | End: 2022-01-14
Payer: MEDICARE

## 2022-01-14 LAB
ALBUMIN SERPL-MCNC: 2.4 G/DL (ref 3.4–5)
ALP SERPL-CCNC: 139 U/L (ref 40–150)
ALT SERPL W P-5'-P-CCNC: 159 U/L (ref 0–50)
ANION GAP SERPL CALCULATED.3IONS-SCNC: 7 MMOL/L (ref 3–14)
AST SERPL W P-5'-P-CCNC: 104 U/L (ref 0–45)
ATRIAL RATE - MUSE: 117 BPM
BILIRUB SERPL-MCNC: 0.3 MG/DL (ref 0.2–1.3)
BUN SERPL-MCNC: 6 MG/DL (ref 7–30)
CALCIUM SERPL-MCNC: 9.1 MG/DL (ref 8.5–10.1)
CHLORIDE BLD-SCNC: 115 MMOL/L (ref 94–109)
CO2 SERPL-SCNC: 24 MMOL/L (ref 20–32)
CREAT SERPL-MCNC: 0.59 MG/DL (ref 0.52–1.04)
DIASTOLIC BLOOD PRESSURE - MUSE: NORMAL MMHG
ERYTHROCYTE [DISTWIDTH] IN BLOOD BY AUTOMATED COUNT: 15.9 % (ref 10–15)
GFR SERPL CREATININE-BSD FRML MDRD: >90 ML/MIN/1.73M2
GLUCOSE BLD-MCNC: 78 MG/DL (ref 70–99)
HCT VFR BLD AUTO: 31 % (ref 35–47)
HGB BLD-MCNC: 9.6 G/DL (ref 11.7–15.7)
INTERPRETATION ECG - MUSE: NORMAL
MAGNESIUM SERPL-MCNC: 1.8 MG/DL (ref 1.6–2.3)
MCH RBC QN AUTO: 28.8 PG (ref 26.5–33)
MCHC RBC AUTO-ENTMCNC: 31 G/DL (ref 31.5–36.5)
MCV RBC AUTO: 93 FL (ref 78–100)
P AXIS - MUSE: NORMAL DEGREES
PLATELET # BLD AUTO: 298 10E3/UL (ref 150–450)
POTASSIUM BLD-SCNC: 2.8 MMOL/L (ref 3.4–5.3)
POTASSIUM BLD-SCNC: 3.1 MMOL/L (ref 3.4–5.3)
POTASSIUM BLD-SCNC: 3.3 MMOL/L (ref 3.4–5.3)
PR INTERVAL - MUSE: 120 MS
PROT SERPL-MCNC: 6.5 G/DL (ref 6.8–8.8)
QRS DURATION - MUSE: 74 MS
QT - MUSE: 440 MS
QTC - MUSE: 613 MS
R AXIS - MUSE: -5 DEGREES
RBC # BLD AUTO: 3.33 10E6/UL (ref 3.8–5.2)
SODIUM SERPL-SCNC: 146 MMOL/L (ref 133–144)
SYSTOLIC BLOOD PRESSURE - MUSE: NORMAL MMHG
T AXIS - MUSE: 15 DEGREES
VENTRICULAR RATE- MUSE: 117 BPM
WBC # BLD AUTO: 10.7 10E3/UL (ref 4–11)

## 2022-01-14 PROCEDURE — 80053 COMPREHEN METABOLIC PANEL: CPT | Performed by: PHYSICIAN ASSISTANT

## 2022-01-14 PROCEDURE — 93010 ELECTROCARDIOGRAM REPORT: CPT | Performed by: INTERNAL MEDICINE

## 2022-01-14 PROCEDURE — 93005 ELECTROCARDIOGRAM TRACING: CPT

## 2022-01-14 PROCEDURE — 250N000013 HC RX MED GY IP 250 OP 250 PS 637: Performed by: PEDIATRICS

## 2022-01-14 PROCEDURE — 99231 SBSQ HOSP IP/OBS SF/LOW 25: CPT | Performed by: INTERNAL MEDICINE

## 2022-01-14 PROCEDURE — 84132 ASSAY OF SERUM POTASSIUM: CPT | Performed by: INTERNAL MEDICINE

## 2022-01-14 PROCEDURE — 250N000013 HC RX MED GY IP 250 OP 250 PS 637: Performed by: INTERNAL MEDICINE

## 2022-01-14 PROCEDURE — 99233 SBSQ HOSP IP/OBS HIGH 50: CPT | Performed by: INTERNAL MEDICINE

## 2022-01-14 PROCEDURE — 36415 COLL VENOUS BLD VENIPUNCTURE: CPT | Performed by: INTERNAL MEDICINE

## 2022-01-14 PROCEDURE — 97110 THERAPEUTIC EXERCISES: CPT | Mod: GP

## 2022-01-14 PROCEDURE — 83735 ASSAY OF MAGNESIUM: CPT | Performed by: INTERNAL MEDICINE

## 2022-01-14 PROCEDURE — 250N000013 HC RX MED GY IP 250 OP 250 PS 637: Performed by: NURSE PRACTITIONER

## 2022-01-14 PROCEDURE — 250N000011 HC RX IP 250 OP 636: Performed by: INTERNAL MEDICINE

## 2022-01-14 PROCEDURE — 250N000013 HC RX MED GY IP 250 OP 250 PS 637: Performed by: PHYSICIAN ASSISTANT

## 2022-01-14 PROCEDURE — 120N000002 HC R&B MED SURG/OB UMMC

## 2022-01-14 PROCEDURE — 85027 COMPLETE CBC AUTOMATED: CPT | Performed by: PHYSICIAN ASSISTANT

## 2022-01-14 PROCEDURE — 97530 THERAPEUTIC ACTIVITIES: CPT | Mod: GP

## 2022-01-14 PROCEDURE — 250N000011 HC RX IP 250 OP 636: Performed by: NURSE PRACTITIONER

## 2022-01-14 PROCEDURE — 36415 COLL VENOUS BLD VENIPUNCTURE: CPT | Performed by: PHYSICIAN ASSISTANT

## 2022-01-14 RX ORDER — LEVOFLOXACIN 5 MG/ML
750 INJECTION, SOLUTION INTRAVENOUS EVERY 24 HOURS
Status: DISCONTINUED | OUTPATIENT
Start: 2022-01-14 | End: 2022-01-14

## 2022-01-14 RX ORDER — MAGNESIUM SULFATE HEPTAHYDRATE 40 MG/ML
2 INJECTION, SOLUTION INTRAVENOUS ONCE
Status: COMPLETED | OUTPATIENT
Start: 2022-01-14 | End: 2022-01-14

## 2022-01-14 RX ORDER — POTASSIUM CHLORIDE 750 MG/1
20 TABLET, EXTENDED RELEASE ORAL ONCE
Status: COMPLETED | OUTPATIENT
Start: 2022-01-15 | End: 2022-01-15

## 2022-01-14 RX ORDER — AMOXICILLIN AND CLAVULANATE POTASSIUM 400; 57 MG/5ML; MG/5ML
875 POWDER, FOR SUSPENSION ORAL 2 TIMES DAILY
Status: DISCONTINUED | OUTPATIENT
Start: 2022-01-14 | End: 2022-01-15

## 2022-01-14 RX ORDER — DULOXETIN HYDROCHLORIDE 60 MG/1
120 CAPSULE, DELAYED RELEASE ORAL EVERY EVENING
Status: DISCONTINUED | OUTPATIENT
Start: 2022-01-14 | End: 2022-01-14

## 2022-01-14 RX ORDER — POTASSIUM CHLORIDE 1.5 G/1.58G
40 POWDER, FOR SOLUTION ORAL ONCE
Status: COMPLETED | OUTPATIENT
Start: 2022-01-14 | End: 2022-01-14

## 2022-01-14 RX ORDER — ONDANSETRON 4 MG/1
8 TABLET, ORALLY DISINTEGRATING ORAL EVERY 6 HOURS PRN
Status: DISCONTINUED | OUTPATIENT
Start: 2022-01-14 | End: 2022-01-21

## 2022-01-14 RX ORDER — LORAZEPAM 1 MG/1
1 TABLET ORAL EVERY 6 HOURS PRN
Status: DISCONTINUED | OUTPATIENT
Start: 2022-01-14 | End: 2022-01-19

## 2022-01-14 RX ORDER — OXYCODONE HYDROCHLORIDE 5 MG/1
5-10 TABLET ORAL
Status: DISCONTINUED | OUTPATIENT
Start: 2022-01-14 | End: 2022-01-19

## 2022-01-14 RX ORDER — POTASSIUM CHLORIDE 7.45 MG/ML
10 INJECTION INTRAVENOUS
Status: COMPLETED | OUTPATIENT
Start: 2022-01-14 | End: 2022-01-14

## 2022-01-14 RX ADMIN — POTASSIUM CHLORIDE 40 MEQ: 1.5 POWDER, FOR SOLUTION ORAL at 11:16

## 2022-01-14 RX ADMIN — OXYCODONE HYDROCHLORIDE 10 MG: 5 TABLET ORAL at 17:41

## 2022-01-14 RX ADMIN — OXYCODONE HYDROCHLORIDE 10 MG: 5 TABLET ORAL at 13:24

## 2022-01-14 RX ADMIN — PIPERACILLIN AND TAZOBACTAM 3.38 G: 3; .375 INJECTION, POWDER, LYOPHILIZED, FOR SOLUTION INTRAVENOUS at 02:00

## 2022-01-14 RX ADMIN — ONDANSETRON 4 MG: 2 INJECTION INTRAMUSCULAR; INTRAVENOUS at 02:00

## 2022-01-14 RX ADMIN — TOLTERODINE TARTRATE 2 MG: 2 TABLET, FILM COATED ORAL at 20:24

## 2022-01-14 RX ADMIN — AMOXICILLIN AND CLAVULANATE POTASSIUM 875 MG: 400; 57 POWDER, FOR SUSPENSION ORAL at 14:36

## 2022-01-14 RX ADMIN — AMOXICILLIN AND CLAVULANATE POTASSIUM 875 MG: 400; 57 POWDER, FOR SUSPENSION ORAL at 22:48

## 2022-01-14 RX ADMIN — POTASSIUM CHLORIDE 10 MEQ: 7.46 INJECTION, SOLUTION INTRAVENOUS at 19:02

## 2022-01-14 RX ADMIN — TOPIRAMATE 100 MG: 100 TABLET, FILM COATED ORAL at 08:39

## 2022-01-14 RX ADMIN — TOPIRAMATE 200 MG: 100 TABLET ORAL at 17:42

## 2022-01-14 RX ADMIN — HYDROMORPHONE HYDROCHLORIDE 0.5 MG: 1 INJECTION, SOLUTION INTRAMUSCULAR; INTRAVENOUS; SUBCUTANEOUS at 05:39

## 2022-01-14 RX ADMIN — PIPERACILLIN AND TAZOBACTAM 3.38 G: 3; .375 INJECTION, POWDER, LYOPHILIZED, FOR SOLUTION INTRAVENOUS at 08:33

## 2022-01-14 RX ADMIN — POTASSIUM CHLORIDE 10 MEQ: 7.46 INJECTION, SOLUTION INTRAVENOUS at 17:42

## 2022-01-14 RX ADMIN — OXYCODONE HYDROCHLORIDE 5 MG: 5 TABLET ORAL at 02:00

## 2022-01-14 RX ADMIN — DULOXETINE 120 MG: 30 CAPSULE, DELAYED RELEASE ORAL at 20:24

## 2022-01-14 RX ADMIN — TOLTERODINE TARTRATE 2 MG: 2 TABLET, FILM COATED ORAL at 08:39

## 2022-01-14 RX ADMIN — VORICONAZOLE 200 MG: 40 POWDER, FOR SUSPENSION ORAL at 08:38

## 2022-01-14 RX ADMIN — OXYCODONE HYDROCHLORIDE 10 MG: 5 TABLET ORAL at 08:38

## 2022-01-14 RX ADMIN — SENNOSIDES AND DOCUSATE SODIUM 1 TABLET: 8.6; 5 TABLET ORAL at 08:39

## 2022-01-14 RX ADMIN — MAGNESIUM SULFATE HEPTAHYDRATE 2 G: 40 INJECTION, SOLUTION INTRAVENOUS at 14:36

## 2022-01-14 RX ADMIN — SENNOSIDES AND DOCUSATE SODIUM 1 TABLET: 8.6; 5 TABLET ORAL at 20:24

## 2022-01-14 RX ADMIN — VORICONAZOLE 200 MG: 40 POWDER, FOR SUSPENSION ORAL at 20:25

## 2022-01-14 RX ADMIN — Medication 7.5 MG: at 08:40

## 2022-01-14 RX ADMIN — VENLAFAXINE 75 MG: 75 TABLET ORAL at 08:38

## 2022-01-14 RX ADMIN — HYDROMORPHONE HYDROCHLORIDE 0.5 MG: 1 INJECTION, SOLUTION INTRAMUSCULAR; INTRAVENOUS; SUBCUTANEOUS at 05:37

## 2022-01-14 RX ADMIN — OXYCODONE HYDROCHLORIDE 10 MG: 5 TABLET ORAL at 20:26

## 2022-01-14 RX ADMIN — VENLAFAXINE 75 MG: 75 TABLET ORAL at 20:24

## 2022-01-14 RX ADMIN — Medication 15 ML: at 08:38

## 2022-01-14 ASSESSMENT — ACTIVITIES OF DAILY LIVING (ADL)
ADLS_ACUITY_SCORE: 15
ADLS_ACUITY_SCORE: 19
ADLS_ACUITY_SCORE: 19
ADLS_ACUITY_SCORE: 15
ADLS_ACUITY_SCORE: 19
ADLS_ACUITY_SCORE: 15
ADLS_ACUITY_SCORE: 19
ADLS_ACUITY_SCORE: 15
ADLS_ACUITY_SCORE: 19
ADLS_ACUITY_SCORE: 15
ADLS_ACUITY_SCORE: 15

## 2022-01-14 ASSESSMENT — MIFFLIN-ST. JEOR: SCORE: 940.15

## 2022-01-14 NOTE — PROGRESS NOTES
"Care Management Follow Up    Length of Stay (days): 2    Expected Discharge Date: 01/15/2022     Concerns to be Addressed: discharge planning     Patient plan of care discussed at interdisciplinary rounds: Yes    Anticipated Discharge Disposition: Transitional Care- return to [facility]     Anticipated Discharge Services:    Anticipated Discharge DME:      Patient/family educated on Medicare website which has current facility and service quality ratings: No as pt admitted from a facility and has a bed hold  Education Provided on the Discharge Plan:  Yes  Patient/Family in Agreement with the Plan: yes    Referrals Placed by CM/SW: Post Acute Facilities  Private pay costs discussed: Not applicable    Additional Information:  \"Sherly Yeung is a 56 year old female admitted on 1/11/2022. She has a history of SDH, PE/DVT (on Xarelto), HTN, Covid infection subclavian vein stenosis s/p stenting who presents to the Emergency Department with abdominal pain, nausea, and vomiting beginning Sunday, 1/9\".     Pt has a TCU bed hold at [facility] and plans to return when ready.     Received update from Dr. De La O reporting he anticipates pt will be ready for discharge Saturday without IV abx. MARY JANE informed Dr. De La O that pt has been receiving IV pain meds and IV Zofran and that pt needs to be stable without either of these for at least 24 hours prior to discharge.     MARY JANE contacted [facility] (p. [phone], f. [phone])- [facility] for admissions providing update and inquiring if pt would be able to return over the weekend. Updated referral/clinical information was sent for review via Epic, waiting to hear back. MARY JANE called admissions again at 3:45pm- no answer. MARY JANE then called the main facility number to confirm admissions staff are in the building today and was told they are and there is no different admissions phone number. MARY JANE asked to speak with the Director of Nursing and was transferred to [facility] ([phone])- left  " providing update about potential discharge Saturday and needing to know if this is ok with the facility and asked for weekend admissions contact information, waiting to hear back.     JORGITO Brar, [address]   [phone] (pager) [phone]  1/14/2022

## 2022-01-14 NOTE — PLAN OF CARE
"/59 (BP Location: Left arm)   Pulse 113   Temp 97  F (36.1  C) (Oral)   Resp 18   Ht 1.6 m (5' 3\")   Wt 52.2 kg (115 lb)   SpO2 98%   BMI 20.37 kg/m      VSS, continues to be tachy, 2L O2, g-tube clamped, complained of pain gave oxycodone and IV dilaudid each x 1, NS at 100 ml/hr, large incontinence of urine, slept between cares, tolerating taking PO medications, gave IV zofran x 1, zosyn given as ordered, continue with plan of care  "

## 2022-01-14 NOTE — PROGRESS NOTES
Brief Medicine Note:    Cross cover notified patient with worsening swelling of RUE this evening. Patient with hx of paget martinez syndrome with prior thrombosis of RUE at right axillary vein. Did have IVF running in this arm as well today, however apixaban also on hold due to recent ERCP.    Ordered repeat RUE venous US. Discussed with radiology - known thrombosis is essentially unchanged from prior. Updated RN and patients swelling is improving with elevation of arm and able to move arm more easily.     Angela Redd PA-C  Internal Medicine [facility] Hospitalist  [facility]  Pager: [phone]

## 2022-01-14 NOTE — PROVIDER NOTIFICATION
Purpose of Notification: BP parameters?   Notified Person: Dr. De La O  Notification Time: 1641  Notification Interaction: Paged    [id number]. Sherly Yeung. Metoprolol started today. Do you want to add parameters? Pt initial BP of 100/58 with recheck of 114/62 with HR of 110. Thanks, Annee [phone]    Addendum: 1423  Received callback from Dr. De La O. Hold metoprolol dose.

## 2022-01-14 NOTE — PLAN OF CARE
Activity: patient not out of bed this shift  Neuros: alert and oriented x 4  Cardiac:denies chest pain  Respiratory:room air  GI/:LBM 1/9 - senna given, incontinent of urine  Diet:full liquid diet, patient give 1 bolus tube feed this shift, patient took medication orally this shift  Skin:right arm swollen this shift - peripheral IV removed - service notified and ultrasound ordered - no change from previous ultrasound  Lines/Drains:PIV LFA  Plan:monitor right arm - keep elevated, pain management

## 2022-01-14 NOTE — PROGRESS NOTES
[facility]    Medicine Progress Note - Hospitalist Service, Gold 8       Date of Admission:  1/11/2022    Assessment & Plan          Sherly Yeung is a 56 year old female admitted on 1/11/2022. She has a history of SDH, PE/DVT (on Xarelto), HTN, Covid infection subclavian vein stenosis s/p stenting who presents to the Emergency Department with abdominal pain, nausea, and vomiting beginning Sunday, 1/9 and found to have cholangitis and possible acute cholecystitis.     Today's plan  - Change abx to Augmentin  - Advance diet  - Avoid IV narcotics and antiemetics  - Likely will be ready for discharge tomorrow, follow-up with Surgery and GI    # Choledocholithiasis c/b cholangitis, s/p CBD stenting (1/12/22)  # Probable Acute cholecystitis s/p transpapillary cystic duct stenting (1/12/22)  # Transaminitis   # Sepsis (Tachy, Leukocytosis)  - S/p ERCP, removal of stone and stent placement  - Will continue Abx for 7-10 days unless BCx are positive    - Restart AC tomorrow  - Pain control, ADAT  - Follow as outpatient with Gen-surg for cholecystectomy   - Appreciate GI rec's    # Sinus Tachycardia  - Likely related to infection, pain and anxiety  - Monitor     # Prolonged QTc  - In the setting of tachycardia  - Recheck EKG     # Hypokalemia  - Replete per nursing protocol     # Hx of Covid Pneumonia  # Pulmonary aspergillosis  - COVID-pneumonia requiring intubation in October, not vaccinated. Was c/b aspergillus pna, currently on vori.   - Continue voriconazole through 1/22/22    # Paget Banegas syndrome  # 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  She has a history of multiple blood clots.  She has been maintained on apixaban.  - HOLD apixaban for 72 days after ERCP     # HTN:   -Continue PTA metoprolol     # Depression  # Hx of Eating Disorder:  -Continue PTA venlafaxine, abilify, ativan PRN     # Adult failure to  thrive  Patient has a gastric tube.  She does eat 3 meals a day, with tube feed to follow each feed as she does not consume enough calories by mouth at this time.  - Continue PTA Jevity 200 mL tube feed after each meal when patient when diet is advanced    # Normocytic anemia  - stable, monitor        Diet: Adult Formula Bolus Feeding: TID Jevity 1.5; Route: Gastrostomy; 1; Can(s); Medication - Feeding Tube Flush Frequency: At least 15-30 mL water before and after medication administration and with tube clogging; Amount to Send (Nutrition use): 3 can...  Regular Diet Adult    DVT Prophylaxis: Pneumatic Compression Devices (hold AC due to ERCP)  Scott Catheter: Not present  Central Lines: None  Code Status: Full Code      Disposition Plan   Expected Discharge: 01/15/2022     Anticipated discharge location:  Awaiting care coordination huddle  Delays:           The patient's care was discussed with the Bedside Nurse and Patient.    Catrachito De La O MD  Hospitalist Service, [address]  Securely message with the Vocera Web Console (learn more here)  Text page via [employer] Paging/Directory    Please see sign in/sign out for up to date coverage information    Clinically Significant Risk Factors Present on Admission                  ______________________________________________________________________    Interval History   Much more comfortable today, having some pain but significantly improved, denies fever or chills      Data reviewed today: I reviewed all medications, new labs and imaging results over the last 24 hours. I personally reviewed no images or EKG's today.    Physical Exam   Vital Signs: Temp: (!) 95.7  F (35.4  C) Temp src: Axillary BP: (!) 143/80 Pulse: 97   Resp: 20 SpO2: 97 % O2 Device: Nasal cannula Oxygen Delivery: 1.5 LPM  Weight: 115 lbs 0 oz    Constitutional: Awake, alert, cooperative, appears in pain   Respiratory: Clear to auscultation  bilaterally  Cardiovascular: Tachycardic but regular, no edema   GI: Normal bowel sounds, mild TTP on RUQ  Skin/Integumen: No rashes, no cyanosis    Data   Recent Labs   Lab 01/14/22  0751 01/13/22  0716 01/13/22  0256 01/12/22  1353 01/12/22  1135 01/11/22  1615 01/11/22  1607   WBC 10.7 8.0  --   --  13.3*   < > 10.8   HGB 9.6* 9.9*  --   --  9.8*   < > 12.2   MCV 93 92  --   --  96   < > 93    258  --   --  290   < > 390   INR  --   --   --   --   --   --  1.27*   * 142  --   --  144   < > 140   POTASSIUM 2.8* 3.5  --   --  3.6   < > 3.0*   CHLORIDE 115* 110*  --   --  114*   < > 107   CO2 24 23  --   --  25   < > 25   BUN 6* 11  --   --  8   < > 10   CR 0.59 0.51*  --   --  0.56   < > 0.63   ANIONGAP 7 9  --   --  5   < > 8   TERESA 9.1 9.2  --   --  9.6   < > 10.3*   GLC 78 97 96   < > 93   < > 110*   ALBUMIN 2.4* 2.5*  --   --  2.6*   < > 3.2*   PROTTOTAL 6.5* 6.8  --   --  6.8   < > 8.1   BILITOTAL 0.3 0.4  --   --  0.9   < > 0.9   ALKPHOS 139 158*  --   --  169*   < > 213*   * 167*  --   --  191*   < > 200*   * 126*  --   --  178*   < > 219*   LIPASE  --   --   --   --   --   --  162    < > = values in this interval not displayed.     Recent Results (from the past 24 hour(s))   US Upper Extremity Venous Duplex Right   Result Value    Radiologist flags Occlusion of the low right internal jugular vein, (Urgent)    Narrative    EXAMINATION: DOPPLER VENOUS ULTRASOUND OF THE RIGHT UPPER EXTREMITY,  1/13/2022 5:59 PM     COMPARISON: Right upper extremity ultrasound 6/3/2021    HISTORY: Hx paget martinez syndrome with known right axillary vein  thrombosis; worsening swelling and AC on hold currently;     TECHNIQUE:  Gray-scale evaluation with compression, spectral flow and  color Doppler assessment of the deep venous system of the right upper  extremity.    FINDINGS:  Right: Partial compressibility of the low right internal jugular vein.  Completed occlusion of the stent within the  innominate, subclavian and  axillary vein. There is normal compressibility of the brachial,  basilic and cephalic veins.      Impression    IMPRESSION:  Occlusion of the low right internal jugular vein, innominate,  subclavian, and axillary vein.    [Urgent Result: Occlusion of the low right internal jugular vein,  innominate vein stent, and axillary vein.]    Finding was identified on 1/13/2022 6:15 PM.     Angela Tramaine was contacted by Dr. Jean Turpin at 1/13/2022 6:25 PM  and verbalized understanding of the urgent finding.     I have personally reviewed the examination and initial interpretation  and I agree with the findings.    JUDI AVALOS MD         SYSTEM ID:  [id number]

## 2022-01-14 NOTE — PROGRESS NOTES
"CLINICAL NUTRITION SERVICES - BRIEF NOTE   (See RD note on 1/12 for full assessment)     Reason for RD note:   Checking with pt on diet and TF tolerance.     New Findings/Chart Review:  Pt notes tolerating full liquid diet and just advancing to solids today.  Pt only had one of 3 boluses of TF as she was concerned TF formula was incorrect. She was able to reach her care facility and confirm the TF ordered was correct.  RD was not made aware of this and only noted 1/14 in prior day's charting.  Pt denies any LUQ pain after meals or TF boluses.     Noted 1/14 wt of 84kg.  This is obviously inaccurate.  Pt's admission wt of 115 # is also not correct as pt appears to be obese. NST showed RN scale which read \"84 lb.\"     Nutrition focused physical assessment completed: no noted fat or muscle wasting.      Interventions:  Requested pt be reweighed.  Continue current plan.        Future/Additional Recommendations:       Nutrition will continue to follow per protocol.    Fanny Payne RD, LD   7B (M-F) Pager: [phone]  RD Weekend Pager: [phone]    "

## 2022-01-14 NOTE — PROGRESS NOTES
BRIEF GI PROGRESS NOTE    DOS: 1/13/2022    ASSESSMENT & PLAN:    #. Acute cholecystitis s/p transpapillary cystic duct stenting (1/12/22)  #. Choledocholithiasis c/b cholangitis, s/p CBD stenting (1/12/22)  #. Direct hyperbilirubinemia, abnormal liver chemistries  #. Sepsis, leukocytosis      56 year old female with a history of SDH, DVT and PE (on rivaroxaban), subclavian vein stenosis s/p stenting, COVID 19 on 10/2021 complicated by AHRF and intubation/trach placement, PEG placement presents to us from TCU with central abdominal pain radiating rightwards (upper and lower quadrants) onset Sunday (1/9). Since arrival to ED patient has newly elevated WBCs, LFTs and bilirubin. CT scan revealed GB distention and mild inflammation associated with intra and extra hepatic biliary dilation. Follow up US negative for stones revealed GB wall thickening and pericholecystic fluid collection. Suspecting biliary obstruction for which MRCP was done which showed moderate extrahepatic biliary ductal dilation but did not show choledocholithiasis or any filling defects. S/p ERCP on 1/12/22 with both cystic and common bile duct stenting. Continues to have mild to moderate RUQ pain following her procedure demonstrating improvement in pain 1/14/21 with increased PO fluid diet tolerance.     RECOMMENDATIONS:  -- OK to resume anticoagulation on 1/15/2021  -- timing of outpatient evaluation for cholecystectomy per general surgery service, they will arrange clinic visit first  -- For cholangitis/cholecystitis: antibiotic course: 7-10 days if blood cultures remain negative, 14 days if they become positive, when transitioning to PO can target biliary-enteric pathogens (ciprofloxacin/metronidazole or cefpodoxime/metronidazole, etc)  -- Aggressive bowel regimen, patient will benefit from daily osmotic laxative at time of discharge  -- ADAT from GI perspective  -- Daily liver chemistries, WBC while hospitalized  -- If pain worsens this  "hospitalization would repeat CTAP with IV contrast to evaluate GB and stent position but unlikely given patients improvement.   -- pancreaticobiliary GI will sign off at this time. Thank you for involving us in the care of patient. Please feel free to reach out if you have any questions.     FOLLOW UP PLANS:  -- outpatient ERCP in 4 weeks for removal of CBD stent and placement of additional cystic duct stent (we have arranged)    Discussed with GI Staff Dr. Wilson.    Zahira Lua MD, PGY4  Gastroenterology Fellow  [facility]  See [facility] for GI on-call information    I, Rick Lua MD, independently examined this patient and am in agreement with the exam, assessment, and plan as written by the medical student. Edits as appropriate.     ==================================================================    S:  Continues to have RUQ pain with palpation but reports improvement since yesterday. Tolerating oral liquid diet.   O:   Physical Exam:  BP (!) 143/80 (BP Location: Left arm, Patient Position: Semi-Brown's)   Pulse 97   Temp (!) 95.7  F (35.4  C) (Axillary)   Resp 20   Ht 1.6 m (5' 3\")   Wt 52.2 kg (115 lb)   SpO2 97%   BMI 20.37 kg/m      CONSTITUTIONAL: NAD, lying down in bed  HEENT:  NC/AT.  OP Clear.  MMM. PERRLA.  EOMI.   LUNGS: CTAB w/ no wheezes. Speaking in full sentences.  CARDIOVASCULAR: tachycardic, nonplethoric   ABDOMEN: soft, tender in RUQ and epigastrium, no rebound  NEURO: A&Ox3, CN II-XII grossly intact, non-focal.   PSYCHIATRIC:  Normal affect.  SKIN: Warm, Dry, No rashes.     BMP  Recent Labs   Lab 01/14/22  0751 01/13/22  0716 01/13/22  0256 01/13/22  0009 01/12/22  1353 01/12/22  1135 01/12/22  0813 01/12/22  0338   * 142  --   --   --  144  --  144   POTASSIUM 2.8* 3.5  --   --   --  3.6 3.9 3.2*   CHLORIDE 115* 110*  --   --   --  114*  --  116*   TERESA 9.1 9.2  --   --   --  9.6  --  9.4   CO2 24 23  --   --   --  25  --  23 "   BUN 6* 11  --   --   --  8  --  8   CR 0.59 0.51*  --   --   --  0.56  --  0.55   GLC 78 97 96 118*   < > 93  --  97    < > = values in this interval not displayed.     CBC  Recent Labs   Lab 01/14/22  0751 01/13/22  0716 01/12/22  1135 01/12/22  0338   WBC 10.7 8.0 13.3* 13.0*   RBC 3.33* 3.40* 3.39* 3.94   HGB 9.6* 9.9* 9.8* 11.3*   HCT 31.0* 31.1* 32.5* 36.8   MCV 93 92 96 93   MCH 28.8 29.1 28.9 28.7   MCHC 31.0* 31.8 30.2* 30.7*   RDW 15.9* 15.8* 16.2* 15.9*    258 290 344     INR  Recent Labs   Lab 01/11/22  1607   INR 1.27*     LFTs  Recent Labs   Lab 01/14/22  0751 01/13/22  0716 01/12/22  1135 01/12/22  0338   ALKPHOS 139 158* 169* 182*   * 126* 178* 212*   * 167* 191* 205*   BILITOTAL 0.3 0.4 0.9 1.9*   PROTTOTAL 6.5* 6.8 6.8 7.0   ALBUMIN 2.4* 2.5* 2.6* 2.8*      PANC  Recent Labs   Lab 01/11/22  1607   LIPASE 162     ERCP: 1/12/22    Findings:        The  film was normal. The esophagus was successfully intubated        under direct vision. The scope was advanced to a normal major papilla in        the descending duodenum without detailed examination of the pharynx,        larynx and associated structures, and upper GI tract. The upper GI tract        was grossly normal. The major papilla was adjacent to a duodenal        diverticulum. The bile duct was deeply cannulated with the short-nosed        traction sphincterotome. Contrast was injected. I personally interpreted        the bile duct images. Ductal flow of contrast was adequate. Image        quality was excellent. Contrast extended to the hepatic ducts. The main        bile duct was moderately dilated and diffusely dilated. The largest        diameter was 10 mm. A 10 mm biliary sphincterotomy was made with a        braided traction (standard) sphincterotome using ERBE electrocautery.        There was no post-sphincterotomy bleeding. The biliary tree was swept        with a 12 mm balloon starting at the bifurcation. All  stones were        removed. The bile duct was deeply cannulated with the short-nosed        traction sphincterotome. Contrast was injected. Dilation of the cystic        duct with a 4 mm balloon dilator was successful. One 7 Fr by 15 cm        transpapillary plastic stent with a single external pigtail and a single        internal pigtail was placed into the gallbladder. Bile flowed through        the stent. The stent was in good position. One 10 Fr by 5 cm plastic        stent with a single external flap and a single internal flap was placed        into the common bile duct. Bile flowed through the stent. The stent was        in good position.                                                                                     Impression:            - Major papilla adjacent to a medium-size duodenal                          diverticulum.                          - Selective biliary cannulation and uncomplicated                          biliary sphincterotomy.                          - Cholangiogram with moderately dilated common bile                          and intrahepatic ducts.                          - Choledocholithiasis was found. Complete removal was                          accomplished by biliary sphincterotomy and balloon                          extraction.                          - Cystic duct successfully cannulated and dilated to 4                          mm.                          - Placement of a 7 Fr x 15 cm double pigtailed                          transpapillary Zimmon plastic stent was placed into                          the gallbladder.                          - Placement of a 10 Fr x 5 cm Sofflex plastic stent                          was placed into the common bile duct.   Recommendation:        - [facility] care.                          - Avoid anticoagulants (Apixiban), aspirin and                          nonsteroidal anti-inflammatory medicines  for 3 days.                          - Continue with antimicrobial therapy for now, and                          observe patient's clinical course. Expect improvement                          in symptoms and LFTs with stones removal, gallbladder                          and biliary stenting.                          - Repeat ERCP with Dr. Wilson in 4 weeks to                          remove common bile duct stent and place second gall                          bladder stent if she is not getting cholecystectomy.                          If she is scheduled for cholecystectomy we can remove                          biliary and transcystic stent during the same session                          in the OR with an EGD. If patient develops fever,                          chills, jaundice or passes dark urine or has worsening                          of LFTs before scheduled ERCP, will recommend patient                          to call us immediately for consideration of an earlier                          urgent ERCP                          - Inpatient pancreas-biliary consult service will                          closely follow patient and aid in further management                          decisions.                          - The findings and recommendations were discussed with                          the patient and their family.                                                                                       ____________________   Guru Wilson,   1/12/2022 4:20:14 PM   I was physically present for the entire viewing portion of the exam.

## 2022-01-14 NOTE — PLAN OF CARE
"/58 (BP Location: Left arm)   Pulse 105   Temp 97.2  F (36.2  C) (Oral)   Resp 18   Ht 1.6 m (5' 3\")   Wt 38.1 kg (84 lb)   SpO2 98%   BMI 14.88 kg/m      Neuros: A&Ox4; calls appropriately.    Cardiac: Continues to be tachy but asymptomatic. EKG completed at bedside. Denies cardiac chest pain.   Respiratory: sats mid 90s on 1.5L NC. Denies any SOB.   GI/: +BS, no BM this shift. Incontinent of urine.    Drains: G tube clamped with bolus TFs. (L) PIV infusing TKO with intermittent abx and electrolyte replacements.    Pain/Nausea: C/O abdominal pain. Currently managed with PRN oxycodone. Denies nausea.  Diet: Regular diet. Pt ate approximately 50% of lunch, pt agreed to have one tube feed can bolus.    Activity: Worked with PT today. Stood at bedside.    Lab: M.8; replaced. Re-check tomorrow. K: 3.1; currently being replaced. Recheck scheduled at .   Plan: Continue with POC. Possibly discharge back to TCU tomorrow.      "

## 2022-01-15 ENCOUNTER — APPOINTMENT (OUTPATIENT)
Dept: CT IMAGING | Facility: CLINIC | Age: 57
DRG: 871 | End: 2022-01-15
Attending: INTERNAL MEDICINE
Payer: MEDICARE

## 2022-01-15 LAB
ALBUMIN SERPL-MCNC: 2.4 G/DL (ref 3.4–5)
ALP SERPL-CCNC: 148 U/L (ref 40–150)
ALT SERPL W P-5'-P-CCNC: 131 U/L (ref 0–50)
ANION GAP SERPL CALCULATED.3IONS-SCNC: 7 MMOL/L (ref 3–14)
AST SERPL W P-5'-P-CCNC: 66 U/L (ref 0–45)
BILIRUB SERPL-MCNC: 0.3 MG/DL (ref 0.2–1.3)
BUN SERPL-MCNC: 6 MG/DL (ref 7–30)
CALCIUM SERPL-MCNC: 8.9 MG/DL (ref 8.5–10.1)
CHLORIDE BLD-SCNC: 111 MMOL/L (ref 94–109)
CO2 SERPL-SCNC: 24 MMOL/L (ref 20–32)
CREAT SERPL-MCNC: 0.51 MG/DL (ref 0.52–1.04)
ERYTHROCYTE [DISTWIDTH] IN BLOOD BY AUTOMATED COUNT: 16.1 % (ref 10–15)
GFR SERPL CREATININE-BSD FRML MDRD: >90 ML/MIN/1.73M2
GLUCOSE BLD-MCNC: 78 MG/DL (ref 70–99)
HCT VFR BLD AUTO: 31.4 % (ref 35–47)
HGB BLD-MCNC: 9.5 G/DL (ref 11.7–15.7)
HOLD SPECIMEN: NORMAL
HOLD SPECIMEN: NORMAL
MAGNESIUM SERPL-MCNC: 2.4 MG/DL (ref 1.6–2.3)
MCH RBC QN AUTO: 28.5 PG (ref 26.5–33)
MCHC RBC AUTO-ENTMCNC: 30.3 G/DL (ref 31.5–36.5)
MCV RBC AUTO: 94 FL (ref 78–100)
PLATELET # BLD AUTO: 321 10E3/UL (ref 150–450)
POTASSIUM BLD-SCNC: 3.4 MMOL/L (ref 3.4–5.3)
POTASSIUM BLD-SCNC: 3.5 MMOL/L (ref 3.4–5.3)
PROT SERPL-MCNC: 6.3 G/DL (ref 6.8–8.8)
RBC # BLD AUTO: 3.33 10E6/UL (ref 3.8–5.2)
SODIUM SERPL-SCNC: 142 MMOL/L (ref 133–144)
WBC # BLD AUTO: 11.7 10E3/UL (ref 4–11)

## 2022-01-15 PROCEDURE — 74177 CT ABD & PELVIS W/CONTRAST: CPT | Mod: MG

## 2022-01-15 PROCEDURE — 36415 COLL VENOUS BLD VENIPUNCTURE: CPT | Performed by: STUDENT IN AN ORGANIZED HEALTH CARE EDUCATION/TRAINING PROGRAM

## 2022-01-15 PROCEDURE — 250N000013 HC RX MED GY IP 250 OP 250 PS 637: Performed by: PHYSICIAN ASSISTANT

## 2022-01-15 PROCEDURE — 74177 CT ABD & PELVIS W/CONTRAST: CPT | Mod: 26 | Performed by: RADIOLOGY

## 2022-01-15 PROCEDURE — 85014 HEMATOCRIT: CPT | Performed by: PHYSICIAN ASSISTANT

## 2022-01-15 PROCEDURE — 250N000013 HC RX MED GY IP 250 OP 250 PS 637: Performed by: PEDIATRICS

## 2022-01-15 PROCEDURE — 250N000013 HC RX MED GY IP 250 OP 250 PS 637: Performed by: INTERNAL MEDICINE

## 2022-01-15 PROCEDURE — 250N000013 HC RX MED GY IP 250 OP 250 PS 637: Performed by: NURSE PRACTITIONER

## 2022-01-15 PROCEDURE — 120N000002 HC R&B MED SURG/OB UMMC

## 2022-01-15 PROCEDURE — 80053 COMPREHEN METABOLIC PANEL: CPT | Performed by: PHYSICIAN ASSISTANT

## 2022-01-15 PROCEDURE — 99233 SBSQ HOSP IP/OBS HIGH 50: CPT | Performed by: INTERNAL MEDICINE

## 2022-01-15 PROCEDURE — 84132 ASSAY OF SERUM POTASSIUM: CPT | Performed by: STUDENT IN AN ORGANIZED HEALTH CARE EDUCATION/TRAINING PROGRAM

## 2022-01-15 PROCEDURE — 36415 COLL VENOUS BLD VENIPUNCTURE: CPT | Performed by: PHYSICIAN ASSISTANT

## 2022-01-15 PROCEDURE — 250N000013 HC RX MED GY IP 250 OP 250 PS 637: Performed by: STUDENT IN AN ORGANIZED HEALTH CARE EDUCATION/TRAINING PROGRAM

## 2022-01-15 PROCEDURE — 83735 ASSAY OF MAGNESIUM: CPT | Performed by: INTERNAL MEDICINE

## 2022-01-15 PROCEDURE — 250N000011 HC RX IP 250 OP 636: Performed by: INTERNAL MEDICINE

## 2022-01-15 RX ORDER — POLYETHYLENE GLYCOL 3350 17 G/17G
17 POWDER, FOR SOLUTION ORAL 2 TIMES DAILY
Status: DISCONTINUED | OUTPATIENT
Start: 2022-01-15 | End: 2022-01-24

## 2022-01-15 RX ORDER — IOPAMIDOL 755 MG/ML
53 INJECTION, SOLUTION INTRAVASCULAR ONCE
Status: COMPLETED | OUTPATIENT
Start: 2022-01-15 | End: 2022-01-15

## 2022-01-15 RX ORDER — OXYCODONE HYDROCHLORIDE 5 MG/1
5 TABLET ORAL ONCE
Status: COMPLETED | OUTPATIENT
Start: 2022-01-15 | End: 2022-01-15

## 2022-01-15 RX ORDER — CIPROFLOXACIN 500 MG/1
500 TABLET, FILM COATED ORAL EVERY 12 HOURS SCHEDULED
Status: DISCONTINUED | OUTPATIENT
Start: 2022-01-15 | End: 2022-01-16

## 2022-01-15 RX ADMIN — DULOXETINE 120 MG: 30 CAPSULE, DELAYED RELEASE ORAL at 22:04

## 2022-01-15 RX ADMIN — VENLAFAXINE 75 MG: 75 TABLET ORAL at 08:34

## 2022-01-15 RX ADMIN — CIPROFLOXACIN HYDROCHLORIDE 500 MG: 500 TABLET, FILM COATED ORAL at 08:56

## 2022-01-15 RX ADMIN — OXYCODONE HYDROCHLORIDE 10 MG: 5 TABLET ORAL at 21:56

## 2022-01-15 RX ADMIN — VENLAFAXINE 75 MG: 75 TABLET ORAL at 22:04

## 2022-01-15 RX ADMIN — SODIUM PHOSPHATE 1 ENEMA: 7; 19 ENEMA RECTAL at 14:20

## 2022-01-15 RX ADMIN — OXYCODONE HYDROCHLORIDE 5 MG: 5 TABLET ORAL at 11:01

## 2022-01-15 RX ADMIN — Medication 7.5 MG: at 08:35

## 2022-01-15 RX ADMIN — Medication 500 MG: at 11:01

## 2022-01-15 RX ADMIN — APIXABAN 5 MG: 5 TABLET, FILM COATED ORAL at 08:56

## 2022-01-15 RX ADMIN — Medication 500 MG: at 21:58

## 2022-01-15 RX ADMIN — TOPIRAMATE 200 MG: 100 TABLET ORAL at 18:17

## 2022-01-15 RX ADMIN — Medication 15 ML: at 11:01

## 2022-01-15 RX ADMIN — Medication 500 MG: at 14:21

## 2022-01-15 RX ADMIN — SENNOSIDES AND DOCUSATE SODIUM 1 TABLET: 8.6; 5 TABLET ORAL at 08:34

## 2022-01-15 RX ADMIN — IOPAMIDOL 53 ML: 755 INJECTION, SOLUTION INTRAVENOUS at 11:59

## 2022-01-15 RX ADMIN — CIPROFLOXACIN HYDROCHLORIDE 500 MG: 500 TABLET, FILM COATED ORAL at 21:59

## 2022-01-15 RX ADMIN — OXYCODONE HYDROCHLORIDE 10 MG: 5 TABLET ORAL at 08:32

## 2022-01-15 RX ADMIN — VORICONAZOLE 200 MG: 40 POWDER, FOR SUSPENSION ORAL at 08:33

## 2022-01-15 RX ADMIN — VORICONAZOLE 200 MG: 40 POWDER, FOR SUSPENSION ORAL at 21:58

## 2022-01-15 RX ADMIN — TOPIRAMATE 100 MG: 100 TABLET, FILM COATED ORAL at 08:35

## 2022-01-15 RX ADMIN — POLYETHYLENE GLYCOL 3350, SODIUM SULFATE ANHYDROUS, SODIUM BICARBONATE, SODIUM CHLORIDE, POTASSIUM CHLORIDE 2000 ML: 236; 22.74; 6.74; 5.86; 2.97 POWDER, FOR SOLUTION ORAL at 16:53

## 2022-01-15 RX ADMIN — OXYCODONE HYDROCHLORIDE 10 MG: 5 TABLET ORAL at 18:27

## 2022-01-15 RX ADMIN — POLYETHYLENE GLYCOL 3350 17 G: 17 POWDER, FOR SOLUTION ORAL at 08:56

## 2022-01-15 RX ADMIN — POTASSIUM CHLORIDE 20 MEQ: 750 TABLET, EXTENDED RELEASE ORAL at 00:52

## 2022-01-15 RX ADMIN — APIXABAN 5 MG: 5 TABLET, FILM COATED ORAL at 21:58

## 2022-01-15 RX ADMIN — OXYCODONE HYDROCHLORIDE 10 MG: 5 TABLET ORAL at 00:52

## 2022-01-15 RX ADMIN — TOLTERODINE TARTRATE 2 MG: 2 TABLET, FILM COATED ORAL at 08:35

## 2022-01-15 RX ADMIN — TOLTERODINE TARTRATE 2 MG: 2 TABLET, FILM COATED ORAL at 21:59

## 2022-01-15 RX ADMIN — OXYCODONE HYDROCHLORIDE 10 MG: 5 TABLET ORAL at 12:14

## 2022-01-15 ASSESSMENT — ACTIVITIES OF DAILY LIVING (ADL)
ADLS_ACUITY_SCORE: 19
ADLS_ACUITY_SCORE: 15
ADLS_ACUITY_SCORE: 19
ADLS_ACUITY_SCORE: 15
ADLS_ACUITY_SCORE: 15
ADLS_ACUITY_SCORE: 19
ADLS_ACUITY_SCORE: 15
ADLS_ACUITY_SCORE: 19
ADLS_ACUITY_SCORE: 15
ADLS_ACUITY_SCORE: 19

## 2022-01-15 ASSESSMENT — MIFFLIN-ST. JEOR: SCORE: 1127.03

## 2022-01-15 NOTE — PLAN OF CARE
"[id number]  Afebrile, VSS.   Abdominal pain continue, abdominal CT done and finding is \"Moderate colonic stool burden in the cecum, ascending and  transverse colon\".   Miralax, senna and enema given with no success.  GoLytely (2000 mL) started at 1700, and to be given about 200 mL every 1 hour. x2 given.   Today 3x PRN 10 mg oxycodone and one time 5 mg given.         NEURO: Alert and oriented x4.      RESPIRATORY: Room Air, denies dizziness, and SOB. Lungs sound, clear, equal bilateral.     CARDIO: VSS, denies dizziness, and extremity pain.      GI/:  Denies N/V, BS active, No BM, AUOP     SKIN: Intact.      ACTIVITY: Assist x1     PAIN: Yes, Abdominal    DLA: PIV, NS locked.     BG: No      PLAN: Continue monitoring.     "

## 2022-01-15 NOTE — PROGRESS NOTES
[facility]    Medicine Progress Note - Hospitalist Service, Gold 8       Date of Admission:  1/11/2022    Assessment & Plan          Sherly Yeung is a 56 year old female admitted on 1/11/2022. She has a history of SDH, PE/DVT (on Xarelto), HTN, Covid infection subclavian vein stenosis s/p stenting who presents to the Emergency Department with abdominal pain, nausea, and vomiting beginning Sunday, 1/9 and found to have cholangitis and possible acute cholecystitis.     Today's plan  - Switch abx to cipro/flagyl  - Repeat CT today given ongoing pain  - Avoid IV narcotics and antiemetics  - Restart apixaban    # Choledocholithiasis c/b cholangitis, s/p CBD stenting (1/12/22)  # Probable Acute cholecystitis s/p transpapillary cystic duct stenting (1/12/22)  # Transaminitis   # Sepsis (Tachy, Leukocytosis)  - S/p ERCP, removal of stone and stent placement  - Will continue Abx for 7-10 days unless BCx are positive    - Restart AC  - Pain control, ADAT  - Follow as outpatient with Gen-surg for cholecystectomy   - Appreciate GI rec's  - Repeat CT with contrast     # Sinus Tachycardia  - Likely related to infection, pain and anxiety  - Monitor     # Prolonged QTc, resolved  - In the setting of tachycardia  - Recheck EKG, resolved    # Hypokalemia  - Replete per nursing protocol     # Hx of Covid Pneumonia  # Pulmonary aspergillosis  - COVID-pneumonia requiring intubation in October, not vaccinated. Was c/b aspergillus pna, currently on vori.   - Continue voriconazole through 1/22/22    # Paget Banegas syndrome  # 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  She has a history of multiple blood clots.  She has been maintained on apixaban.  - HOLD apixaban for 72 days after ERCP     # HTN:   -Continue PTA metoprolol     # Depression  # Hx of Eating Disorder:  -Continue PTA venlafaxine, abilify, ativan PRN     # Adult failure  to thrive  Patient has a gastric tube.  She does eat 3 meals a day, with tube feed to follow each feed as she does not consume enough calories by mouth at this time.  - Continue PTA Jevity 200 mL tube feed after each meal when patient when diet is advanced    # Normocytic anemia  - stable, monitor        Diet: Adult Formula Bolus Feeding: TID Jevity 1.5; Route: Gastrostomy; 1; Can(s); Medication - Feeding Tube Flush Frequency: At least 15-30 mL water before and after medication administration and with tube clogging; Amount to Send (Nutrition use): 3 can...  Regular Diet Adult    DVT Prophylaxis: Pneumatic Compression Devices (hold AC due to ERCP)  Scott Catheter: Not present  Central Lines: None  Code Status: Full Code      Disposition Plan   Expected Discharge: 01/15/2022     Anticipated discharge location:  Awaiting care coordination huddle  Delays:           The patient's care was discussed with the Bedside Nurse and Patient.    Catrachito De La O MD  Hospitalist Service, [address]  Securely message with the Vocera Web Console (learn more here)  Text page via [employer] Paging/Directory    Please see sign in/sign out for up to date coverage information    Clinically Significant Risk Factors Present on Admission                  ______________________________________________________________________    Interval History   Patient complaining of ongoing RUQ pain, no fever or chills,     Data reviewed today: I reviewed all medications, new labs and imaging results over the last 24 hours. I personally reviewed no images or EKG's today.    Physical Exam   Vital Signs: Temp: 98.1  F (36.7  C) Temp src: Oral BP: 109/68 Pulse: 116   Resp: 18 SpO2: 93 % O2 Device: Nasal cannula Oxygen Delivery: 1.5 LPM  Weight: 84 lbs 0 oz    Constitutional: Awake, alert, cooperative, appears in pain   Respiratory: Clear to auscultation bilaterally  Cardiovascular: Tachycardic but regular, no  edema   GI: Normal bowel sounds, mild TTP on RUQ  Skin/Integumen: No rashes, no cyanosis    Data   Recent Labs   Lab 01/15/22  0905 01/15/22  0732 01/14/22  2206 01/14/22  1551 01/14/22  0751 01/13/22  0716 01/11/22  1615 01/11/22  1607   WBC  --  11.7*  --   --  10.7 8.0   < > 10.8   HGB  --  9.5*  --   --  9.6* 9.9*   < > 12.2   MCV  --  94  --   --  93 92   < > 93   PLT  --  321  --   --  298 258   < > 390   INR  --   --   --   --   --   --   --  1.27*   NA  --  142  --   --  146* 142   < > 140   POTASSIUM 3.5 3.4 3.3*   < > 2.8* 3.5   < > 3.0*   CHLORIDE  --  111*  --   --  115* 110*   < > 107   CO2  --  24  --   --  24 23   < > 25   BUN  --  6*  --   --  6* 11   < > 10   CR  --  0.51*  --   --  0.59 0.51*   < > 0.63   ANIONGAP  --  7  --   --  7 9   < > 8   TERESA  --  8.9  --   --  9.1 9.2   < > 10.3*   GLC  --  78  --   --  78 97   < > 110*   ALBUMIN  --  2.4*  --   --  2.4* 2.5*   < > 3.2*   PROTTOTAL  --  6.3*  --   --  6.5* 6.8   < > 8.1   BILITOTAL  --  0.3  --   --  0.3 0.4   < > 0.9   ALKPHOS  --  148  --   --  139 158*   < > 213*   ALT  --  131*  --   --  159* 167*   < > 200*   AST  --  66*  --   --  104* 126*   < > 219*   LIPASE  --   --   --   --   --   --   --  162    < > = values in this interval not displayed.     No results found for this or any previous visit (from the past 24 hour(s)).

## 2022-01-15 NOTE — PROGRESS NOTES
Care Management Follow Up    Length of Stay (days): 3    Expected Discharge Date: 01/15/2022     Concerns to be Addressed: discharge planning     Patient plan of care discussed at interdisciplinary rounds: No, weekend    Anticipated Discharge Disposition: Transitional Care - returning to [facility]    Patient/family educated on Medicare website which has current facility and service quality ratings:  (No as pt admitted from a facility and has a bed hold)  Education Provided on the Discharge Plan:    Patient/Family in Agreement with the Plan: yes    Referrals Placed by CM/SW: Post Acute Facilities  Private pay costs discussed: Not applicable    Additional Information:  SW following for TCU discharge with a bed hold at [facility].    SW paged Dr. De La O for an update on medical status.    SW called [facility] (p. [phone], f. [phone]), spoke to Stefanie who confirmed they are able to accept her back over the weekend.     SW heard back from Dr. De La O who informed MARY JANE that Stefanie will remain inpt at least until Sunday due to increased pain.  SW will f/u on Sunday with  for update.  [facility] admissions updated as well.     SW will continue to follow.    JORGITO Sesay, [facility]  Weekend Adult Acute Care     Searchble on [facility] - search SOCIAL WORK - for text paging options    4A, 4C, 4E, 5A and 5B; pager [phone]  6A, 6B, 6C and 6D; pager [phone]  7A, 7B, 7C, 7D and 5C; pager [phone]  [facility] pager: [phone]     RNCC/Care Coordinator; job code [id number] or [phone]    -For hours 1600 - midnight Pager: [phone]    Radha Garcia [facility]

## 2022-01-15 NOTE — PROVIDER NOTIFICATION
#[id number]    Marcia Yeung   Pt c/o ABD pain, Last oxycodone given at 830.  she ask more pain medications.   Can she get any other dose or wait till 1130 for next dose?  She is about going to [place] soon.   Thank you  RN [id number]

## 2022-01-15 NOTE — PLAN OF CARE
"/61 (BP Location: Left arm)   Pulse 107   Temp (!) 96.6  F (35.9  C) (Oral)   Resp 18   Ht 1.6 m (5' 3\")   Wt 38.1 kg (84 lb)   SpO2 96%   BMI 14.88 kg/m   1.5L n/c.  Neuros: A&Ox4; calls appropriately.    Cardiac: Continues to be tachy but asymptomatic. Denies chest pain.   Respiratory: sats mid 90s on 1.5L NC. Denies any SOB at rest  but SOB with excertion.  GI/: +BS, no BM this shift. Incontinent of urine.    Drains: G tube clamped with bolus TFs. (L) PIV infusing TKO with intermittent abx and electrolyte replacements.    Pain/Nausea: C/O abdominal pain. Currently managed with PRN oxycodone. Denies nausea.  Diet: Regular diet. Has tube feed can bolus when not eating.Did eat yesterday.  Activity:Up with assist of 1  Lab: . K: 3.3 at 2200 and replacd at midnight.Recheck with am labs.  Plan: Continue with POC. Discharge to TCU but still on O2 and desats if off.     "

## 2022-01-16 ENCOUNTER — APPOINTMENT (OUTPATIENT)
Dept: CT IMAGING | Facility: CLINIC | Age: 57
DRG: 871 | End: 2022-01-16
Attending: PHYSICIAN ASSISTANT
Payer: MEDICARE

## 2022-01-16 ENCOUNTER — APPOINTMENT (OUTPATIENT)
Dept: GENERAL RADIOLOGY | Facility: CLINIC | Age: 57
DRG: 871 | End: 2022-01-16
Attending: PHYSICIAN ASSISTANT
Payer: MEDICARE

## 2022-01-16 LAB
ALBUMIN SERPL-MCNC: 2.3 G/DL (ref 3.4–5)
ALP SERPL-CCNC: 154 U/L (ref 40–150)
ALT SERPL W P-5'-P-CCNC: 101 U/L (ref 0–50)
ANION GAP SERPL CALCULATED.3IONS-SCNC: 6 MMOL/L (ref 3–14)
AST SERPL W P-5'-P-CCNC: 46 U/L (ref 0–45)
BASE EXCESS BLDV CALC-SCNC: -0.7 MMOL/L (ref -7.7–1.9)
BILIRUB SERPL-MCNC: 0.3 MG/DL (ref 0.2–1.3)
BUN SERPL-MCNC: 4 MG/DL (ref 7–30)
CALCIUM SERPL-MCNC: 9 MG/DL (ref 8.5–10.1)
CHLORIDE BLD-SCNC: 110 MMOL/L (ref 94–109)
CO2 SERPL-SCNC: 25 MMOL/L (ref 20–32)
CREAT SERPL-MCNC: 0.49 MG/DL (ref 0.52–1.04)
CRP SERPL-MCNC: 180 MG/L (ref 0–8)
ERYTHROCYTE [DISTWIDTH] IN BLOOD BY AUTOMATED COUNT: 16.1 % (ref 10–15)
FLUAV RNA SPEC QL NAA+PROBE: NEGATIVE
FLUBV RNA RESP QL NAA+PROBE: NEGATIVE
GFR SERPL CREATININE-BSD FRML MDRD: >90 ML/MIN/1.73M2
GLUCOSE BLD-MCNC: 126 MG/DL (ref 70–99)
HCO3 BLDV-SCNC: 25 MMOL/L (ref 21–28)
HCT VFR BLD AUTO: 29.7 % (ref 35–47)
HGB BLD-MCNC: 9.2 G/DL (ref 11.7–15.7)
LACTATE SERPL-SCNC: 0.7 MMOL/L (ref 0.7–2)
LACTATE SERPL-SCNC: 1.7 MMOL/L (ref 0.7–2)
MAGNESIUM SERPL-MCNC: 2.1 MG/DL (ref 1.6–2.3)
MCH RBC QN AUTO: 28.8 PG (ref 26.5–33)
MCHC RBC AUTO-ENTMCNC: 31 G/DL (ref 31.5–36.5)
MCV RBC AUTO: 93 FL (ref 78–100)
MRSA DNA SPEC QL NAA+PROBE: NEGATIVE
NT-PROBNP SERPL-MCNC: 912 PG/ML (ref 0–125)
O2/TOTAL GAS SETTING VFR VENT: 7 %
PCO2 BLDV: 43 MM HG (ref 40–50)
PH BLDV: 7.37 [PH] (ref 7.32–7.43)
PLATELET # BLD AUTO: 310 10E3/UL (ref 150–450)
PO2 BLDV: 70 MM HG (ref 25–47)
POTASSIUM BLD-SCNC: 3.1 MMOL/L (ref 3.4–5.3)
PROCALCITONIN SERPL-MCNC: 0.28 NG/ML
PROT SERPL-MCNC: 6.2 G/DL (ref 6.8–8.8)
RBC # BLD AUTO: 3.19 10E6/UL (ref 3.8–5.2)
RSV RNA SPEC NAA+PROBE: NEGATIVE
SA TARGET DNA: NEGATIVE
SARS-COV-2 RNA RESP QL NAA+PROBE: POSITIVE
SODIUM SERPL-SCNC: 141 MMOL/L (ref 133–144)
TROPONIN I SERPL HS-MCNC: 4 NG/L
WBC # BLD AUTO: 14.1 10E3/UL (ref 4–11)

## 2022-01-16 PROCEDURE — 87641 MR-STAPH DNA AMP PROBE: CPT | Performed by: PHYSICIAN ASSISTANT

## 2022-01-16 PROCEDURE — 82550 ASSAY OF CK (CPK): CPT | Performed by: PHYSICIAN ASSISTANT

## 2022-01-16 PROCEDURE — 36415 COLL VENOUS BLD VENIPUNCTURE: CPT | Performed by: PHYSICIAN ASSISTANT

## 2022-01-16 PROCEDURE — 250N000013 HC RX MED GY IP 250 OP 250 PS 637: Performed by: INTERNAL MEDICINE

## 2022-01-16 PROCEDURE — 250N000011 HC RX IP 250 OP 636: Performed by: INTERNAL MEDICINE

## 2022-01-16 PROCEDURE — 93005 ELECTROCARDIOGRAM TRACING: CPT

## 2022-01-16 PROCEDURE — 83880 ASSAY OF NATRIURETIC PEPTIDE: CPT | Performed by: PHYSICIAN ASSISTANT

## 2022-01-16 PROCEDURE — 80053 COMPREHEN METABOLIC PANEL: CPT | Performed by: PHYSICIAN ASSISTANT

## 2022-01-16 PROCEDURE — 120N000003 HC R&B IMCU UMMC

## 2022-01-16 PROCEDURE — 82040 ASSAY OF SERUM ALBUMIN: CPT | Performed by: PHYSICIAN ASSISTANT

## 2022-01-16 PROCEDURE — 250N000013 HC RX MED GY IP 250 OP 250 PS 637: Performed by: NURSE PRACTITIONER

## 2022-01-16 PROCEDURE — 83605 ASSAY OF LACTIC ACID: CPT | Performed by: PHYSICIAN ASSISTANT

## 2022-01-16 PROCEDURE — 71045 X-RAY EXAM CHEST 1 VIEW: CPT | Mod: 26 | Performed by: RADIOLOGY

## 2022-01-16 PROCEDURE — 82803 BLOOD GASES ANY COMBINATION: CPT | Performed by: PHYSICIAN ASSISTANT

## 2022-01-16 PROCEDURE — 84484 ASSAY OF TROPONIN QUANT: CPT | Performed by: PHYSICIAN ASSISTANT

## 2022-01-16 PROCEDURE — 258N000003 HC RX IP 258 OP 636: Performed by: INTERNAL MEDICINE

## 2022-01-16 PROCEDURE — 83735 ASSAY OF MAGNESIUM: CPT | Performed by: INTERNAL MEDICINE

## 2022-01-16 PROCEDURE — 83605 ASSAY OF LACTIC ACID: CPT | Performed by: INTERNAL MEDICINE

## 2022-01-16 PROCEDURE — 87040 BLOOD CULTURE FOR BACTERIA: CPT | Performed by: PHYSICIAN ASSISTANT

## 2022-01-16 PROCEDURE — 93010 ELECTROCARDIOGRAM REPORT: CPT | Performed by: INTERNAL MEDICINE

## 2022-01-16 PROCEDURE — 84145 PROCALCITONIN (PCT): CPT | Performed by: PHYSICIAN ASSISTANT

## 2022-01-16 PROCEDURE — 250N000013 HC RX MED GY IP 250 OP 250 PS 637: Performed by: PHYSICIAN ASSISTANT

## 2022-01-16 PROCEDURE — 71275 CT ANGIOGRAPHY CHEST: CPT | Mod: ME

## 2022-01-16 PROCEDURE — 87633 RESP VIRUS 12-25 TARGETS: CPT | Performed by: PHYSICIAN ASSISTANT

## 2022-01-16 PROCEDURE — 99233 SBSQ HOSP IP/OBS HIGH 50: CPT | Performed by: INTERNAL MEDICINE

## 2022-01-16 PROCEDURE — 85027 COMPLETE CBC AUTOMATED: CPT | Performed by: PHYSICIAN ASSISTANT

## 2022-01-16 PROCEDURE — 86140 C-REACTIVE PROTEIN: CPT | Performed by: PHYSICIAN ASSISTANT

## 2022-01-16 PROCEDURE — 250N000013 HC RX MED GY IP 250 OP 250 PS 637: Performed by: PEDIATRICS

## 2022-01-16 PROCEDURE — 36415 COLL VENOUS BLD VENIPUNCTURE: CPT | Performed by: INTERNAL MEDICINE

## 2022-01-16 PROCEDURE — 87637 SARSCOV2&INF A&B&RSV AMP PRB: CPT | Performed by: PHYSICIAN ASSISTANT

## 2022-01-16 PROCEDURE — 250N000011 HC RX IP 250 OP 636: Performed by: PHYSICIAN ASSISTANT

## 2022-01-16 PROCEDURE — 71275 CT ANGIOGRAPHY CHEST: CPT | Mod: 26 | Performed by: RADIOLOGY

## 2022-01-16 PROCEDURE — G1004 CDSM NDSC: HCPCS

## 2022-01-16 PROCEDURE — 71045 X-RAY EXAM CHEST 1 VIEW: CPT

## 2022-01-16 RX ORDER — IOPAMIDOL 755 MG/ML
60 INJECTION, SOLUTION INTRAVASCULAR ONCE
Status: COMPLETED | OUTPATIENT
Start: 2022-01-16 | End: 2022-01-16

## 2022-01-16 RX ORDER — VANCOMYCIN HYDROCHLORIDE 1 G/200ML
1000 INJECTION, SOLUTION INTRAVENOUS EVERY 12 HOURS
Status: DISCONTINUED | OUTPATIENT
Start: 2022-01-17 | End: 2022-01-17

## 2022-01-16 RX ADMIN — VORICONAZOLE 200 MG: 40 POWDER, FOR SUSPENSION ORAL at 09:17

## 2022-01-16 RX ADMIN — Medication 7.5 MG: at 09:17

## 2022-01-16 RX ADMIN — POLYETHYLENE GLYCOL 3350 17 G: 17 POWDER, FOR SOLUTION ORAL at 09:17

## 2022-01-16 RX ADMIN — OXYCODONE HYDROCHLORIDE 10 MG: 5 TABLET ORAL at 14:42

## 2022-01-16 RX ADMIN — CEFEPIME HYDROCHLORIDE 2 G: 2 INJECTION, POWDER, FOR SOLUTION INTRAVENOUS at 22:27

## 2022-01-16 RX ADMIN — TOLTERODINE TARTRATE 2 MG: 2 TABLET, FILM COATED ORAL at 09:18

## 2022-01-16 RX ADMIN — APIXABAN 5 MG: 5 TABLET, FILM COATED ORAL at 22:32

## 2022-01-16 RX ADMIN — CIPROFLOXACIN HYDROCHLORIDE 500 MG: 500 TABLET, FILM COATED ORAL at 09:18

## 2022-01-16 RX ADMIN — APIXABAN 5 MG: 5 TABLET, FILM COATED ORAL at 09:18

## 2022-01-16 RX ADMIN — Medication 500 MG: at 14:47

## 2022-01-16 RX ADMIN — TOLTERODINE TARTRATE 2 MG: 2 TABLET, FILM COATED ORAL at 22:32

## 2022-01-16 RX ADMIN — Medication 15 ML: at 12:03

## 2022-01-16 RX ADMIN — VORICONAZOLE 200 MG: 40 POWDER, FOR SUSPENSION ORAL at 22:32

## 2022-01-16 RX ADMIN — VANCOMYCIN HYDROCHLORIDE 1500 MG: 10 INJECTION, POWDER, LYOPHILIZED, FOR SOLUTION INTRAVENOUS at 23:18

## 2022-01-16 RX ADMIN — TOPIRAMATE 100 MG: 100 TABLET, FILM COATED ORAL at 09:17

## 2022-01-16 RX ADMIN — VENLAFAXINE 75 MG: 75 TABLET ORAL at 09:18

## 2022-01-16 RX ADMIN — OXYCODONE HYDROCHLORIDE 10 MG: 5 TABLET ORAL at 00:57

## 2022-01-16 RX ADMIN — SENNOSIDES AND DOCUSATE SODIUM 1 TABLET: 8.6; 5 TABLET ORAL at 09:18

## 2022-01-16 RX ADMIN — DULOXETINE 120 MG: 30 CAPSULE, DELAYED RELEASE ORAL at 22:32

## 2022-01-16 RX ADMIN — ONDANSETRON 8 MG: 4 TABLET, ORALLY DISINTEGRATING ORAL at 12:01

## 2022-01-16 RX ADMIN — OXYCODONE HYDROCHLORIDE 10 MG: 5 TABLET ORAL at 23:40

## 2022-01-16 RX ADMIN — VENLAFAXINE 75 MG: 75 TABLET ORAL at 22:32

## 2022-01-16 RX ADMIN — Medication 500 MG: at 22:32

## 2022-01-16 RX ADMIN — Medication 500 MG: at 09:18

## 2022-01-16 RX ADMIN — IOPAMIDOL 60 ML: 755 INJECTION, SOLUTION INTRAVENOUS at 21:04

## 2022-01-16 RX ADMIN — OXYCODONE HYDROCHLORIDE 10 MG: 5 TABLET ORAL at 09:16

## 2022-01-16 RX ADMIN — TOPIRAMATE 200 MG: 100 TABLET ORAL at 19:04

## 2022-01-16 ASSESSMENT — ACTIVITIES OF DAILY LIVING (ADL)
ADLS_ACUITY_SCORE: 15

## 2022-01-16 ASSESSMENT — MIFFLIN-ST. JEOR: SCORE: 1116.13

## 2022-01-16 NOTE — PLAN OF CARE
[id number]  Pt felt better for most of the day till 1700 when felt acutely hypoxic. O2 increased to 3 to 4 to keep the O2 saturation above 90%, and at 1730 pt felt saturation up to 93% on reassessment. At 1830 pt started eating dinner and at 1900 her sat dropped again, and required up to 5 LPM of O2 to keep above 90%. Provider notified.     NEURO: Alert and oriented x4.      RESPIRATORY: Room Air, denies dizziness, and SOB. Lungs sound, clear at UL, and diminished at LL bilateral.     CARDIO: VSS, denies dizziness, and extremity pain.      GI/:  Denies N/V, BS active, Incontinence of BM and urine.      SKIN: No change.      ACTIVITY: Assist x1      PAIN: Denies pain.    DLA: PIV     BG: No      PLAN: Continue monitoring.

## 2022-01-16 NOTE — PLAN OF CARE
Vital signs:  Temp: 97.2  F (36.2  C) Temp src: Oral BP: 115/63 Pulse: 108   Resp: 18 SpO2: 99 % O2 Device: Nasal cannula Oxygen Delivery: 1.5 LPM     NEURO:  A&Ox4. Forgetful. Generalized weakness.  RESPIRATORY: LSC, denies SOB. sats high 90s on 1.5LNC.   CARDIAC: tachycardic within parameters  GI/: voids spont. Loose watery BMx2 this shift. Occasional urinary and bowel incontinence.   DIET: reg. Bolus tube feeds during day/evening. Fair appetite, requesting breakfast at 0600.   PAIN/NAUSEA: R abdominal pain, controlled w/ prn oxycodone x1.   INCISION/DRAINS/SKIN: G-tube clamped.   IV ACCESS: PIV SL   ACTIVITY: in bed most of shift, up to commode w/ A1.   LAB: reviewed.   CHANGES: no acute changes. Is having bowel movements.   PLAN: continue w/ POC.

## 2022-01-16 NOTE — PROGRESS NOTES
Care Management Follow Up    Length of Stay (days): 4    Expected Discharge Date: 1/17/22     Concerns to be Addressed: discharge planning     Patient plan of care discussed at interdisciplinary rounds: No, weekend    Anticipated Discharge Disposition: Transitional Care     Anticipated Discharge Services:  Therapies    Patient/family educated on Medicare website which has current facility and service quality ratings:  (No as pt admitted from a facility and has a bed hold)  Education Provided on the Discharge Plan:  yes  Patient/Family in Agreement with the Plan: yes    Referrals Placed by CM/SW: Post Acute Facilities  Private pay costs discussed: Not applicable    Additional Information:  SW following for TCU discharge with a bed hold at [facility].     SW paged Dr. De La O for an update on medical status.  Dr. De La O states Marcia is not med ready today, possibly will be on Mon 1/17.      SW called [facility] (p. [phone], f. [phone]), spoke to Stefanie and provided update.      SW team will continue to follow.     JORGITO Sesay, [facility]  Weekend Adult Acute Care     Searchble on [facility] - search SOCIAL WORK - for text paging options    4A, 4C, 4E, 5A and 5B; pager [phone]  6A, 6B, 6C and 6D; pager [phone]  7A, 7B, 7C, 7D and 5C; pager [phone]  [facility] pager: [phone]     RNCC/Care Coordinator; job code [phone] or [phone]    -For hours 1600 - midnight Pager: [phone]    GERMAN Vega

## 2022-01-16 NOTE — PLAN OF CARE
[id number]  Status: admitted 1/11 with abd pain, transaminitis  Vitals: tachycardic within parameters. Sating 95% on 2L. Other VSS  Neuros: A&Ox4. Forgetful. Generalized weakness  IV: PIV SL'd  Diet: regular diet. Bolus TF ordered, didn't get feedings on day shift, pt refused feedings tonight.  Bowel status: having BMs now after go lytely  : voids  Skin: G tube clamped.   Pain: abd pain, oxycodone q3h PRN  Activity: up with assist of  to commode  Plan: pain control. Will need TCU at discharge.

## 2022-01-16 NOTE — PROGRESS NOTES
[facility]    Medicine Progress Note - Hospitalist Service, Gold 8       Date of Admission:  1/11/2022    Assessment & Plan          Sherly Yeung is a 56 year old female admitted on 1/11/2022. She has a history of SDH, PE/DVT (on Xarelto), HTN, Covid infection subclavian vein stenosis s/p stenting who presents to the Emergency Department with abdominal pain, nausea, and vomiting beginning Sunday, 1/9 and found to have cholangitis and possible acute cholecystitis.     Today's plan  - Cont cipro/flagyl  - CT with constipation, s/p Golytely   - Avoid IV narcotics and antiemetics  - Restarted apixaban    # Choledocholithiasis c/b cholangitis, s/p CBD stenting (1/12/22)  # Probable Acute cholecystitis s/p transpapillary cystic duct stenting (1/12/22)  # Transaminitis   # Sepsis (Tachy, Leukocytosis)  - S/p ERCP, removal of stone and stent placement  - Will continue Abx for 7-10 days unless BCx are positive    - Restart AC  - Pain control, ADAT  - Follow as outpatient with Gen-surg for cholecystectomy   - Appreciate GI rec's  - Repeat CT with contrast     # Opioid induced constipation  - Ongoing abdominal pain, CT with large stool burden.   - S/p Golytly with improvement in pain     # Sinus Tachycardia  - Likely related to infection, pain and anxiety  - Monitor     # Prolonged QTc, resolved  - In the setting of tachycardia  - Recheck EKG, resolved    # Hypokalemia  - Replete per nursing protocol     # Hx of Covid Pneumonia  # Pulmonary aspergillosis  - COVID-pneumonia requiring intubation in October, not vaccinated. Was c/b aspergillus pna, currently on vori.   - Continue voriconazole through 1/22/22    # Paget Banegas syndrome  # 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  She has a history of multiple blood clots.  She has been maintained on apixaban.  - HOLD apixaban for 72 days after ERCP     # HTN:   -Continue PTA  metoprolol     # Depression  # Hx of Eating Disorder:  -Continue PTA venlafaxine, abilify, ativan PRN     # Adult failure to thrive  Patient has a gastric tube.  She does eat 3 meals a day, with tube feed to follow each feed as she does not consume enough calories by mouth at this time.  - Continue PTA Jevity 200 mL tube feed after each meal when patient when diet is advanced    # Normocytic anemia  - stable, monitor        Diet: Adult Formula Bolus Feeding: TID Jevity 1.5; Route: Gastrostomy; 1; Can(s); Medication - Feeding Tube Flush Frequency: At least 15-30 mL water before and after medication administration and with tube clogging; Amount to Send (Nutrition use): 3 can...  Regular Diet Adult    DVT Prophylaxis: Pneumatic Compression Devices (hold AC due to ERCP)  Scott Catheter: Not present  Central Lines: None  Code Status: Full Code      Disposition Plan   Expected Discharge:      Anticipated discharge location:  Awaiting care coordination huddle  Delays:           The patient's care was discussed with the Bedside Nurse and Patient.    Catrachito De La O MD  Hospitalist Service, [address]  Securely message with the Vocera Web Console (learn more here)  Text page via my[employer] Paging/Directory    Please see sign in/sign out for up to date coverage information    Clinically Significant Risk Factors Present on Admission                  ______________________________________________________________________    Interval History   Patient much more improved today, having frequent stool since starting Golytly     Data reviewed today: I reviewed all medications, new labs and imaging results over the last 24 hours. I personally reviewed no images or EKG's today.    Physical Exam   Vital Signs: Temp: 98.1  F (36.7  C) Temp src: Oral BP: 114/73 Pulse: 117   Resp: 18 SpO2: 93 % O2 Device: Nasal cannula Oxygen Delivery: 1.5 LPM  Weight: 125 lbs 3.2  oz    Constitutional: Awake, alert, cooperative, appears in pain   Respiratory: Clear to auscultation bilaterally  Cardiovascular: Tachycardic but regular, no edema   GI: Normal bowel sounds, mild TTP on RUQ  Skin/Integumen: No rashes, no cyanosis    Data   Recent Labs   Lab 01/16/22  0844 01/15/22  0905 01/15/22  0732 01/14/22  1551 01/14/22  0751 01/11/22  1615 01/11/22  1607   WBC 14.1*  --  11.7*  --  10.7   < > 10.8   HGB 9.2*  --  9.5*  --  9.6*   < > 12.2   MCV 93  --  94  --  93   < > 93     --  321  --  298   < > 390   INR  --   --   --   --   --   --  1.27*     --  142  --  146*   < > 140   POTASSIUM 3.1* 3.5 3.4   < > 2.8*   < > 3.0*   CHLORIDE 110*  --  111*  --  115*   < > 107   CO2 25  --  24  --  24   < > 25   BUN 4*  --  6*  --  6*   < > 10   CR 0.49*  --  0.51*  --  0.59   < > 0.63   ANIONGAP 6  --  7  --  7   < > 8   TERESA 9.0  --  8.9  --  9.1   < > 10.3*   *  --  78  --  78   < > 110*   ALBUMIN 2.3*  --  2.4*  --  2.4*   < > 3.2*   PROTTOTAL 6.2*  --  6.3*  --  6.5*   < > 8.1   BILITOTAL 0.3  --  0.3  --  0.3   < > 0.9   ALKPHOS 154*  --  148  --  139   < > 213*   *  --  131*  --  159*   < > 200*   AST 46*  --  66*  --  104*   < > 219*   LIPASE  --   --   --   --   --   --  162    < > = values in this interval not displayed.     No results found for this or any previous visit (from the past 24 hour(s)).

## 2022-01-17 LAB
ALBUMIN SERPL-MCNC: 2.4 G/DL (ref 3.4–5)
ALP SERPL-CCNC: 138 U/L (ref 40–150)
ALT SERPL W P-5'-P-CCNC: 78 U/L (ref 0–50)
ANION GAP SERPL CALCULATED.3IONS-SCNC: 7 MMOL/L (ref 3–14)
AST SERPL W P-5'-P-CCNC: 29 U/L (ref 0–45)
ATRIAL RATE - MUSE: 114 BPM
ATRIAL RATE - MUSE: 129 BPM
BACTERIA BLD CULT: NO GROWTH
BACTERIA BLD CULT: NO GROWTH
BILIRUB SERPL-MCNC: 0.3 MG/DL (ref 0.2–1.3)
BUN SERPL-MCNC: 4 MG/DL (ref 7–30)
C PNEUM DNA SPEC QL NAA+PROBE: NOT DETECTED
CALCIUM SERPL-MCNC: 9.4 MG/DL (ref 8.5–10.1)
CHLORIDE BLD-SCNC: 109 MMOL/L (ref 94–109)
CK SERPL-CCNC: 47 U/L (ref 30–225)
CO2 SERPL-SCNC: 25 MMOL/L (ref 20–32)
CREAT SERPL-MCNC: 0.53 MG/DL (ref 0.52–1.04)
CRP SERPL-MCNC: 220 MG/L (ref 0–8)
D DIMER PPP FEU-MCNC: 0.87 UG/ML FEU (ref 0–0.5)
DIASTOLIC BLOOD PRESSURE - MUSE: NORMAL MMHG
DIASTOLIC BLOOD PRESSURE - MUSE: NORMAL MMHG
ERYTHROCYTE [DISTWIDTH] IN BLOOD BY AUTOMATED COUNT: 16.2 % (ref 10–15)
FLUAV H1 2009 PAND RNA SPEC QL NAA+PROBE: NOT DETECTED
FLUAV H1 RNA SPEC QL NAA+PROBE: NOT DETECTED
FLUAV H3 RNA SPEC QL NAA+PROBE: NOT DETECTED
FLUAV RNA SPEC QL NAA+PROBE: NOT DETECTED
FLUBV RNA SPEC QL NAA+PROBE: NOT DETECTED
GFR SERPL CREATININE-BSD FRML MDRD: >90 ML/MIN/1.73M2
GLUCOSE BLD-MCNC: 123 MG/DL (ref 70–99)
HADV DNA SPEC QL NAA+PROBE: NOT DETECTED
HCOV PNL SPEC NAA+PROBE: NOT DETECTED
HCT VFR BLD AUTO: 29.3 % (ref 35–47)
HGB BLD-MCNC: 9.2 G/DL (ref 11.7–15.7)
HMPV RNA SPEC QL NAA+PROBE: NOT DETECTED
HPIV1 RNA SPEC QL NAA+PROBE: NOT DETECTED
HPIV2 RNA SPEC QL NAA+PROBE: NOT DETECTED
HPIV3 RNA SPEC QL NAA+PROBE: NOT DETECTED
HPIV4 RNA SPEC QL NAA+PROBE: NOT DETECTED
INTERPRETATION ECG - MUSE: NORMAL
INTERPRETATION ECG - MUSE: NORMAL
LDH SERPL L TO P-CCNC: 372 U/L (ref 81–234)
M PNEUMO DNA SPEC QL NAA+PROBE: NOT DETECTED
MAGNESIUM SERPL-MCNC: 2.1 MG/DL (ref 1.6–2.3)
MCH RBC QN AUTO: 29.1 PG (ref 26.5–33)
MCHC RBC AUTO-ENTMCNC: 31.4 G/DL (ref 31.5–36.5)
MCV RBC AUTO: 93 FL (ref 78–100)
P AXIS - MUSE: 55 DEGREES
P AXIS - MUSE: 70 DEGREES
PLATELET # BLD AUTO: 357 10E3/UL (ref 150–450)
POTASSIUM BLD-SCNC: 2.8 MMOL/L (ref 3.4–5.3)
POTASSIUM BLD-SCNC: 3.4 MMOL/L (ref 3.4–5.3)
POTASSIUM BLD-SCNC: 3.5 MMOL/L (ref 3.4–5.3)
PR INTERVAL - MUSE: 126 MS
PR INTERVAL - MUSE: 156 MS
PROT SERPL-MCNC: 6.5 G/DL (ref 6.8–8.8)
QRS DURATION - MUSE: 68 MS
QRS DURATION - MUSE: 80 MS
QT - MUSE: 274 MS
QT - MUSE: 338 MS
QTC - MUSE: 401 MS
QTC - MUSE: 465 MS
R AXIS - MUSE: -13 DEGREES
R AXIS - MUSE: 29 DEGREES
RBC # BLD AUTO: 3.16 10E6/UL (ref 3.8–5.2)
RSV RNA SPEC QL NAA+PROBE: NOT DETECTED
RSV RNA SPEC QL NAA+PROBE: NOT DETECTED
RV+EV RNA SPEC QL NAA+PROBE: NOT DETECTED
SODIUM SERPL-SCNC: 141 MMOL/L (ref 133–144)
SYSTOLIC BLOOD PRESSURE - MUSE: NORMAL MMHG
SYSTOLIC BLOOD PRESSURE - MUSE: NORMAL MMHG
T AXIS - MUSE: -34 DEGREES
T AXIS - MUSE: -38 DEGREES
TROPONIN I SERPL HS-MCNC: 5 NG/L
VENTRICULAR RATE- MUSE: 114 BPM
VENTRICULAR RATE- MUSE: 129 BPM
WBC # BLD AUTO: 16.4 10E3/UL (ref 4–11)

## 2022-01-17 PROCEDURE — XW043E5 INTRODUCTION OF REMDESIVIR ANTI-INFECTIVE INTO CENTRAL VEIN, PERCUTANEOUS APPROACH, NEW TECHNOLOGY GROUP 5: ICD-10-PCS | Performed by: PHYSICIAN ASSISTANT

## 2022-01-17 PROCEDURE — 99233 SBSQ HOSP IP/OBS HIGH 50: CPT | Performed by: INTERNAL MEDICINE

## 2022-01-17 PROCEDURE — 250N000011 HC RX IP 250 OP 636: Performed by: PHYSICIAN ASSISTANT

## 2022-01-17 PROCEDURE — 250N000013 HC RX MED GY IP 250 OP 250 PS 637: Performed by: NURSE PRACTITIONER

## 2022-01-17 PROCEDURE — 84132 ASSAY OF SERUM POTASSIUM: CPT | Performed by: INTERNAL MEDICINE

## 2022-01-17 PROCEDURE — 36415 COLL VENOUS BLD VENIPUNCTURE: CPT | Performed by: INTERNAL MEDICINE

## 2022-01-17 PROCEDURE — 120N000003 HC R&B IMCU UMMC

## 2022-01-17 PROCEDURE — 80053 COMPREHEN METABOLIC PANEL: CPT | Performed by: PHYSICIAN ASSISTANT

## 2022-01-17 PROCEDURE — 83735 ASSAY OF MAGNESIUM: CPT | Performed by: INTERNAL MEDICINE

## 2022-01-17 PROCEDURE — 82040 ASSAY OF SERUM ALBUMIN: CPT | Performed by: PHYSICIAN ASSISTANT

## 2022-01-17 PROCEDURE — 83615 LACTATE (LD) (LDH) ENZYME: CPT | Performed by: PHYSICIAN ASSISTANT

## 2022-01-17 PROCEDURE — 250N000013 HC RX MED GY IP 250 OP 250 PS 637: Performed by: PHYSICIAN ASSISTANT

## 2022-01-17 PROCEDURE — 250N000013 HC RX MED GY IP 250 OP 250 PS 637: Performed by: INTERNAL MEDICINE

## 2022-01-17 PROCEDURE — 85027 COMPLETE CBC AUTOMATED: CPT | Performed by: PHYSICIAN ASSISTANT

## 2022-01-17 PROCEDURE — 258N000003 HC RX IP 258 OP 636: Performed by: PHYSICIAN ASSISTANT

## 2022-01-17 PROCEDURE — 36415 COLL VENOUS BLD VENIPUNCTURE: CPT | Performed by: PHYSICIAN ASSISTANT

## 2022-01-17 PROCEDURE — 250N000011 HC RX IP 250 OP 636: Performed by: HOSPITALIST

## 2022-01-17 PROCEDURE — 250N000011 HC RX IP 250 OP 636: Performed by: INTERNAL MEDICINE

## 2022-01-17 PROCEDURE — 86140 C-REACTIVE PROTEIN: CPT | Performed by: PHYSICIAN ASSISTANT

## 2022-01-17 PROCEDURE — 999N000157 HC STATISTIC RCP TIME EA 10 MIN

## 2022-01-17 PROCEDURE — 250N000013 HC RX MED GY IP 250 OP 250 PS 637: Performed by: PEDIATRICS

## 2022-01-17 PROCEDURE — 85379 FIBRIN DEGRADATION QUANT: CPT | Performed by: PHYSICIAN ASSISTANT

## 2022-01-17 PROCEDURE — 250N000009 HC RX 250: Performed by: PHYSICIAN ASSISTANT

## 2022-01-17 RX ORDER — TOPIRAMATE 100 MG/1
200 TABLET, FILM COATED ORAL EVERY EVENING
Status: DISCONTINUED | OUTPATIENT
Start: 2022-01-17 | End: 2022-02-18 | Stop reason: HOSPADM

## 2022-01-17 RX ORDER — POTASSIUM CHLORIDE 1.5 G/1.58G
20 POWDER, FOR SOLUTION ORAL ONCE
Status: COMPLETED | OUTPATIENT
Start: 2022-01-17 | End: 2022-01-17

## 2022-01-17 RX ORDER — ONDANSETRON 2 MG/ML
4 INJECTION INTRAMUSCULAR; INTRAVENOUS ONCE
Status: COMPLETED | OUTPATIENT
Start: 2022-01-17 | End: 2022-01-17

## 2022-01-17 RX ORDER — CARBOXYMETHYLCELLULOSE SODIUM 5 MG/ML
1 SOLUTION/ DROPS OPHTHALMIC
Status: DISCONTINUED | OUTPATIENT
Start: 2022-01-17 | End: 2022-02-18 | Stop reason: HOSPADM

## 2022-01-17 RX ORDER — POTASSIUM CHLORIDE 1.5 G/1.58G
40 POWDER, FOR SOLUTION ORAL ONCE
Status: COMPLETED | OUTPATIENT
Start: 2022-01-17 | End: 2022-01-17

## 2022-01-17 RX ORDER — FUROSEMIDE 10 MG/ML
20 INJECTION INTRAMUSCULAR; INTRAVENOUS ONCE
Status: COMPLETED | OUTPATIENT
Start: 2022-01-17 | End: 2022-01-17

## 2022-01-17 RX ORDER — ONDANSETRON 2 MG/ML
4 INJECTION INTRAMUSCULAR; INTRAVENOUS EVERY 6 HOURS PRN
Status: DISCONTINUED | OUTPATIENT
Start: 2022-01-17 | End: 2022-02-18 | Stop reason: HOSPADM

## 2022-01-17 RX ADMIN — OXYCODONE HYDROCHLORIDE 5 MG: 5 TABLET ORAL at 22:06

## 2022-01-17 RX ADMIN — Medication 500 MG: at 09:19

## 2022-01-17 RX ADMIN — SODIUM CHLORIDE 50 ML: 9 INJECTION, SOLUTION INTRAVENOUS at 01:31

## 2022-01-17 RX ADMIN — OXYCODONE HYDROCHLORIDE 5 MG: 5 TABLET ORAL at 12:25

## 2022-01-17 RX ADMIN — TOLTERODINE TARTRATE 2 MG: 2 TABLET, FILM COATED ORAL at 20:38

## 2022-01-17 RX ADMIN — POTASSIUM CHLORIDE 20 MEQ: 1.5 POWDER, FOR SOLUTION ORAL at 16:42

## 2022-01-17 RX ADMIN — Medication 15 ML: at 12:25

## 2022-01-17 RX ADMIN — REMDESIVIR 200 MG: 100 INJECTION, POWDER, LYOPHILIZED, FOR SOLUTION INTRAVENOUS at 01:28

## 2022-01-17 RX ADMIN — OXYCODONE HYDROCHLORIDE 5 MG: 5 TABLET ORAL at 09:16

## 2022-01-17 RX ADMIN — Medication 37.5 MG: at 09:16

## 2022-01-17 RX ADMIN — APIXABAN 5 MG: 5 TABLET, FILM COATED ORAL at 09:16

## 2022-01-17 RX ADMIN — BARICITINIB 4 MG: 2 TABLET, FILM COATED ORAL at 09:17

## 2022-01-17 RX ADMIN — VENLAFAXINE 75 MG: 75 TABLET ORAL at 20:37

## 2022-01-17 RX ADMIN — VENLAFAXINE 75 MG: 75 TABLET ORAL at 09:28

## 2022-01-17 RX ADMIN — OXYCODONE HYDROCHLORIDE 5 MG: 5 TABLET ORAL at 05:45

## 2022-01-17 RX ADMIN — VORICONAZOLE 200 MG: 40 POWDER, FOR SUSPENSION ORAL at 20:37

## 2022-01-17 RX ADMIN — DEXAMETHASONE 6 MG: 2 TABLET ORAL at 01:32

## 2022-01-17 RX ADMIN — DULOXETINE 120 MG: 30 CAPSULE, DELAYED RELEASE ORAL at 20:37

## 2022-01-17 RX ADMIN — CEFEPIME HYDROCHLORIDE 2 G: 2 INJECTION, POWDER, FOR SOLUTION INTRAVENOUS at 22:06

## 2022-01-17 RX ADMIN — POTASSIUM CHLORIDE 40 MEQ: 1.5 POWDER, FOR SOLUTION ORAL at 09:18

## 2022-01-17 RX ADMIN — Medication 500 MG: at 14:46

## 2022-01-17 RX ADMIN — DEXAMETHASONE 6 MG: 2 TABLET ORAL at 12:25

## 2022-01-17 RX ADMIN — ONDANSETRON 4 MG: 2 INJECTION INTRAMUSCULAR; INTRAVENOUS at 01:45

## 2022-01-17 RX ADMIN — FUROSEMIDE 20 MG: 10 INJECTION, SOLUTION INTRAVENOUS at 09:23

## 2022-01-17 RX ADMIN — Medication 500 MG: at 20:37

## 2022-01-17 RX ADMIN — TOPIRAMATE 100 MG: 100 TABLET, FILM COATED ORAL at 09:18

## 2022-01-17 RX ADMIN — CEFEPIME HYDROCHLORIDE 2 G: 2 INJECTION, POWDER, FOR SOLUTION INTRAVENOUS at 05:35

## 2022-01-17 RX ADMIN — Medication 7.5 MG: at 09:16

## 2022-01-17 RX ADMIN — VORICONAZOLE 200 MG: 40 POWDER, FOR SUSPENSION ORAL at 09:18

## 2022-01-17 RX ADMIN — TOLTERODINE TARTRATE 2 MG: 2 TABLET, FILM COATED ORAL at 09:28

## 2022-01-17 RX ADMIN — ONDANSETRON 4 MG: 2 INJECTION INTRAMUSCULAR; INTRAVENOUS at 10:53

## 2022-01-17 RX ADMIN — CEFEPIME HYDROCHLORIDE 2 G: 2 INJECTION, POWDER, FOR SOLUTION INTRAVENOUS at 14:46

## 2022-01-17 RX ADMIN — TOPIRAMATE 200 MG: 200 TABLET ORAL at 20:37

## 2022-01-17 RX ADMIN — OXYCODONE HYDROCHLORIDE 5 MG: 5 TABLET ORAL at 19:04

## 2022-01-17 RX ADMIN — REMDESIVIR 100 MG: 100 INJECTION, POWDER, LYOPHILIZED, FOR SOLUTION INTRAVENOUS at 23:22

## 2022-01-17 RX ADMIN — APIXABAN 5 MG: 5 TABLET, FILM COATED ORAL at 20:38

## 2022-01-17 ASSESSMENT — ACTIVITIES OF DAILY LIVING (ADL)
ADLS_ACUITY_SCORE: 15
ADLS_ACUITY_SCORE: 16
ADLS_ACUITY_SCORE: 15
ADLS_ACUITY_SCORE: 16
ADLS_ACUITY_SCORE: 15
ADLS_ACUITY_SCORE: 16
ADLS_ACUITY_SCORE: 16
ADLS_ACUITY_SCORE: 15
ADLS_ACUITY_SCORE: 16
ADLS_ACUITY_SCORE: 16
ADLS_ACUITY_SCORE: 15
ADLS_ACUITY_SCORE: 16
ADLS_ACUITY_SCORE: 15
ADLS_ACUITY_SCORE: 16

## 2022-01-17 ASSESSMENT — MIFFLIN-ST. JEOR: SCORE: 1144.13

## 2022-01-17 NOTE — CODE/RAPID RESPONSE
Rapid Response Team Note    Assessment   In assessment a rapid response was called on Sherly Yeung due to acute hypoxic respiratory failure. This presentation is likely due to possible PE vs. HAP vs pulmonary edema worsened by cholangitis. .     Plan   -  CXR, ABG, Lactic acid, EKG, NT-proBNP, trop, CTA chest to rule out PE  -  Telemetry   -  The Internal Medicine primary team was able to be reached and they are in agreement with the above plan.  -  Disposition: The patient will be transferred to [facility].  -  Reassessment and plan follow-up will be performed by the primary team    ADDENDUM:  Elevated procal, rising WBC, and bilateral interstitial opacities. Will obtain blood cultures and broaden Abx to Vanc/Cefepime, and continue flagyl. Hold cipro. MRSA swab and RVP, and COVID 19 PCR added.     Susan Wyatt PA-C  [facility] RRT [facility] Job Code Contact #[phone]    Hospital Course   Brief Summary of events leading to rapid response:   RRT was called for acute hypoxic respiratory failure. Patient with increasing O2 needs today, initially on 1.5L earlier today, with intermittent desaturation with increasing O2 needs, now at 7L oxymask. Patient reports having dyspnea starting earlier today. Denies any F/C, cough, hemoptysis, CP or pleurisy. Denies any swelling in LE.     Of note, patient has a hx of DVT/PE on anti-coagulation, which was held for ERCP, and resumed today.     Admission Diagnosis:   Transaminitis [R74.01]  Dilated bile duct [K83.8]     Physical Exam   Temp: 98.1  F (36.7  C) Temp  Min: 97.2  F (36.2  C)  Max: 98.1  F (36.7  C)  Resp: 18 Resp  Min: 18  Max: 18  SpO2: 92 % SpO2  Min: 92 %  Max: 99 %  Pulse: 119 Pulse  Min: 103  Max: 119    No data recorded  BP: 115/61 Systolic (24hrs), Av , Min:104 , Max:115   Diastolic (24hrs), Av, Min:60, Max:73     I/Os: I/O last 3 completed shifts:  In: 1620 [P.O.:1350; NG/GT:270]  Out: 400 [Urine:400]     Exam:   General: acutely ill appearing  Mental Status:  baseline mental status.  CV: tachycardic rate, regular rhythm  Resp: Moderately labored breathing on 7L oximask. Rales in middle and lower lobes bilaterally. No Wheezing or rhonchi.   Abd: Soft. Non-tender. Normoactive BS.   Extremities: No LE edema.     Significant Results and Procedures   Lactic Acid:   Recent Labs   Lab Test 01/16/22  0948 01/12/22  0452   LACT 1.7 0.7     CBC:   Recent Labs   Lab Test 01/16/22  0844 01/15/22  0732 01/14/22  0751   WBC 14.1* 11.7* 10.7   HGB 9.2* 9.5* 9.6*   HCT 29.7* 31.4* 31.0*    321 298        Sepsis Evaluation   The patient is known to have an infection.  NO EVIDENCE OF SEPSIS at this time.  Vital sign, physical exam, and lab findings are due to PE vs HAP vs pulmonary edema. .

## 2022-01-17 NOTE — PROGRESS NOTES
Care Management Follow Up    Length of Stay (days): 5    Expected Discharge Date: 01/24/2022     Concerns to be Addressed:  Disposition planning  Patient plan of care discussed at interdisciplinary rounds: Yes    Anticipated Discharge Disposition: Return to TCU placement     Anticipated Discharge Services:  TCU placement  Anticipated Discharge DME:  Not applicable at this time    Patient/family educated on Medicare website which has current facility and service quality ratings:  No as pt prefers return to SNF of origin where bed is being held  Education Provided on the Discharge Plan:  Yes  Patient/Family in Agreement with the Plan: Yes    Referrals Placed by CM/SW: Post Acute Facilities  Private pay costs discussed: Not applicable at this time    Additional Information:  SW is following pt for discharge planning.  TCU placement is anticipated and pt has a bed hold at [facility]. SW phoned Dr. De La O who states that pt was ready for discharge however, pt tested Covid positive on 1/16/2022 and is not receiving high flow 02 (30 LPM, 70%%FI02).    SW will require covid isolation for 10 days.  SW phoned Admissions at [facility] ([facility] who confirms that they will continue to hold pt's bed.  [facility] states that they accept covid positive patients, however, 02 needs need to be 6 liters or less nasal cannula.      SW will continue to follow for discharge planning.    SAEID Schneider  Social Work, 6A  Phone:  [phone]  Pager:  [phone]  1/17/2022          TRISTAN Noland

## 2022-01-17 NOTE — PLAN OF CARE
Neuro: A&Ox4. PERRLA. Deconditioned. Other neuros grossly intact.  Cardiac: Sinus Tach. SBPs 120s-130s. Afebrile.  Respiratory: Sating >92% on HFNC. Needs increased from 70%->90% FiO2 @ 30 LPM. Tachypneic. Abdominal muscle use noted.  GI/: Incontinent of B/B. Adequate urine output via purewick. Loose BMx2.  Diet/appetite: Tolerating reg diet. Poor/no appetite. Bolus feeding given x2  Activity:  Declined OOB this shift.  Pain: C/O abdominal pain. PRN oxy 5mg x2 with relief.  Skin: No new deficits noted.  LDA's: 1x PIV LUE TKO    Mg protocol: Hi protocol. WDL. Recheck in AM  K+ protocol: Reg protocol. Low. Replaced x2 recheck pending.  Phos protocol: N/A    Plan: Continue with POC. Notify primary team with changes.

## 2022-01-17 NOTE — PLAN OF CARE
"Transfer  Transferred from:   Via:bed  Reason for transfer: Pt appropriate for 6B-worsened patient condition  Family: Aware of transfer  Belongings: Received with pt  Chart: Received with pt  Medications: Meds received from old unit with pt  Code Status verified on armband: yes  2 RN Skin Assessment Completed By: Luis Miguel KNIGHT & Elizabeth KNIGHT  Med rec completed: yes (pharm)  Bed surface reassessed with algorithm and charted: yes  New bed surface ordered: no  Suction/Ambu bag/Flowmeter at bedside: yes    Report received from: Elvira KNIGHT  Pt status:  Vital signs:  Temp: 99.2  F (37.3  C) Temp src: Oral BP: 126/77 Pulse: 118   Resp: 22 SpO2: 96 % O2 Device: Oxymask  Height: 160 cm (5' 3\") Weight: 55.7 kg (122 lb 12.7 oz)              "

## 2022-01-17 NOTE — PROGRESS NOTES
Transfer  Transferred to:6B  Via:bed  Reason for transfer: Pt inappropriate for 7B- worsening patient condition( pt needing more oxygen, 90 to 91% at 5 liters and c/o shortness of breath. Oxymask applied and oxygen increased to 7 liters and saturation went up to 95 to 96%). Provider and rapid team came to assess.  Family: Aware of transfer. Left a message for Herminio to call writer(significant other).  Belongings: Sent with pt  Chart: Sent with pt  Medications: Meds from bin sent with pt  Report called to: 6B RN ar 2034.

## 2022-01-17 NOTE — PLAN OF CARE
Neuro: A&Ox4. Calm and cooperative with gloria DURANT.    Cardiac: ST. HR ranging between 110-120s. SBP ranging between 110-120.    Respiratory: Sating 94% on HFNC 70% 30 LPM.    GI/: PEG tube in place, clamped. Adequate urine output using purewick. BM X1 this shift.    Diet/appetite: Regular diet ordered. Pt has requested to have medications crushed and administered via PEG tube.    Activity:  Assist of 1-2, using lift. Pt has not been OOB throughout this shift.    Pain: At acceptable level on current regimen. PRN Oxycodone administered twice this shift.    Skin: R-sided chest bruise, Scaling/Peeling on both LE. Old tracheostomy site.    LDA's: HFNC 70% 30 LPM. L-PIV infusing. Purewick in place. PEG tube clamped.    Plan: Continue with POC. Notify primary team with changes.

## 2022-01-17 NOTE — PROGRESS NOTES
Brief Cross Cover Medicine Note    Please see prior rapid response note, for details regarding rising O2 requirements. COVID 19 PCR returned positive. Patient had COVID 19 back in September 2021, but had a negative COVID test on admission on 1/12/22.    With infiltrates on imaging and hypoxia, will start Dexamethasone 6 mg daily x 10 days and Remdesivir 200 mg x1 followed by 100 mg daily for 5 days. Patient also meets criteria for baricitinib, and patient agreed to take. Trend COVID 19 labs. With elevated procal, will continue with broad spectrum abx to cover for possible overlying HAP.     Susan Wyatt PA-C  Hospitalist Service

## 2022-01-17 NOTE — PROVIDER NOTIFICATION
"   01/16/22 2000   Call Information   Date of Call 01/16/22   Time of Call 1958   Name of person requesting the team Merly   Title of person requesting team RN   RRT Arrival time 2000   Time RRT ended 2115   Reason for call   Type of RRT Adult   Primary reason for call Respiratory   Respiratory Rate greater than 24;Labored breathing;Other (describe)  (Sudden increase in O2 needs)   Was patient transferred from the ED, ICU, or PACU within last 24 hours prior to RRT call? No   SBAR   Situation Increase in O2 need 1.5L NC today to 7L OxyMask.   Background per MD note \"Sherly Yeung is a 56 year old female admitted on 1/11/2022. She has a history of SDH, PE/DVT (on Xarelto), HTN, Covid infection subclavian vein stenosis s/p stenting who presents to the Emergency Department with abdominal pain, nausea, and vomiting beginning Sunday, 1/9 and found to have cholangitis and possible acute cholecystitis.\"   Notable History/Conditions Cardiac;Hypertension;Neurological   Assessment A/O, sitting up in bed, interactive, pleasant. Describes some mild SOB currently, but waxes and wanes, and worsens slightly with activity. RR 24, SpO2 95% on 7L OxyMask. Temp 99.2 oral. BP stable.    Interventions Labs;Other (describe);O2 per N/C or mask;Portable monitor  (CT)   Patient Outcome   Patient Outcome Transferred to  ([facility])   RRT Team   Attending/Primary/Covering Physician Cedrick 8   Date Attending Physician notified 01/16/22   Time Attending Physician notified 2005   Physician(s) NANCY Pennington   Lead RN Мария Tucker   Other staff NANCY Roth   Post RRT Intervention Assessment   Post RRT Assessment Other (see comment)  (Stable)   Date Follow Up Done 01/16/22   Time Follow Up Done 0025     "

## 2022-01-17 NOTE — PROGRESS NOTES
[facility]    Medicine Progress Note - Hospitalist Service, Gold 8       Date of Admission:  1/11/2022    Assessment & Plan            Sherly Yeung is a 56 year old female admitted on 1/11/2022. She has a history of SDH, PE/DVT (on Xarelto), HTN, Covid infection subclavian vein stenosis s/p stenting who presents to the Emergency Department with abdominal pain, nausea, and vomiting beginning Sunday, 1/9 and found to have cholangitis and possible acute cholecystitis.     Today's plan  - Resp failure overnight, on HFNC, tested positive for COVID, possible inhouse exposure  - Started on Dex/Rem/Baricitinib   - Broadened to Cefepime/vanc (was on cipro/flagyl), will stop vanc today (Nares -ve)  - Avoid IV narcotics and antiemetics    # Acute hypoxic respiratory failure   # COVID 19 PNA  # Concern for bacterial PNA  # Hx of Recent COVID PNA in October 2021 requiring MV  # Hx of Aspergillus PNA  Patient became hypoxic and required HFNC on 1/6, CT with diffuse GGO c/w COVID which was positive. Unclear exposure as she was negative on admission but did have visitors through the weekend. Does have recent COVID PNA requiring intubation in October, not vaccinated. Was c/b aspergillus pna, currently on vori.   - Abx broadened to cefepime/vanc. Vanc stopped on 1/17 given negative nares  - Cont Dex/Remdisivir/Baricitinib   - Supplemental O2 to keep sat >92  - Will diurese gently   - Continue voriconazole through 1/22/22    # Choledocholithiasis c/b cholangitis, s/p CBD stenting (1/12/22)  # Probable Acute cholecystitis s/p transpapillary cystic duct stenting (1/12/22)  # Transaminitis   # Sepsis (Tachy, Leukocytosis)  - S/p ERCP, removal of stone and stent placement  - Abx for 10 days (Was cipro/flagyl, broadened to cef/flagyl due to resp failure)  - Follow as outpatient with Gen-surg for cholecystectomy   - Appreciate GI rec's (F/u in 1 month)    # Opioid induced constipation  - Ongoing  abdominal pain, CT with large stool burden.   - S/p Golytly with improvement in pain     # Sinus Tachycardia  - Likely related to infection, pain and anxiety  - Monitor     # Prolonged QTc, resolved  - In the setting of tachycardia  - Recheck EKG, resolved    # Hypokalemia  - Replete per nursing protocol    # Paget Banegas syndrome  # 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  She has a history of multiple blood clots.  She has been maintained on apixaban.  - HOLD apixaban for 72 days after ERCP     # HTN:   -Continue PTA metoprolol     # Depression  # Hx of Eating Disorder:  -Continue PTA venlafaxine, abilify, ativan PRN     # Adult failure to thrive  Patient has a gastric tube.  She does eat 3 meals a day, with tube feed to follow each feed as she does not consume enough calories by mouth at this time.  - Continue PTA Jevity 200 mL tube feed after each meal when patient when diet is advanced    # Normocytic anemia  - stable, monitor        Diet: Adult Formula Bolus Feeding: TID Jevity 1.5; Route: Gastrostomy; 1; Can(s); Medication - Feeding Tube Flush Frequency: At least 15-30 mL water before and after medication administration and with tube clogging; Amount to Send (Nutrition use): 3 can...  Regular Diet Adult    DVT Prophylaxis: Pneumatic Compression Devices (hold AC due to ERCP)  Scott Catheter: Not present  Central Lines: None  Code Status: Full Code      Disposition Plan   Expected Discharge: 01/24/2022     Anticipated discharge location:  Awaiting care coordination huddle  Delays:         The patient's care was discussed with the Bedside Nurse and Patient.    Catrachito De La O MD  Hospitalist Service, [address]  Securely message with the Vocera Web Console (learn more here)  Text page via [employer] Paging/Directory    Please see sign in/sign out for up to date coverage information    Clinically  Significant Risk Factors Present on Admission                     ______________________________________________________________________    Interval History   Patient developed significant resp distress last night, RRT called, required HFNC. Does report SOB. Tested positive for COVID, chest CT with infiltrates but no PE    Data reviewed today: I reviewed all medications, new labs and imaging results over the last 24 hours. I personally reviewed no images or EKG's today.    Physical Exam   Vital Signs: Temp: 98  F (36.7  C) Temp src: Oral BP: 138/87 Pulse: (!) 123   Resp: 24 SpO2: 95 % O2 Device: High Flow Nasal Cannula (HFNC) Oxygen Delivery: 30 LPM  Weight: 128 lbs 15.51 oz    Constitutional: Awake, alert, cooperative, appears in pain   Respiratory: Clear to auscultation bilaterally  Cardiovascular: Tachycardic but regular, no edema   GI: Normal bowel sounds, mild TTP on RUQ  Skin/Integumen: No rashes, no cyanosis    Data   Recent Labs   Lab 01/17/22  0456 01/16/22  0844 01/15/22  0905 01/15/22  0732 01/11/22  1615 01/11/22  1607   WBC 16.4* 14.1*  --  11.7*   < > 10.8   HGB 9.2* 9.2*  --  9.5*   < > 12.2   MCV 93 93  --  94   < > 93    310  --  321   < > 390   INR  --   --   --   --   --  1.27*    141  --  142   < > 140   POTASSIUM 2.8* 3.1* 3.5 3.4   < > 3.0*   CHLORIDE 109 110*  --  111*   < > 107   CO2 25 25  --  24   < > 25   BUN 4* 4*  --  6*   < > 10   CR 0.53 0.49*  --  0.51*   < > 0.63   ANIONGAP 7 6  --  7   < > 8   TERESA 9.4 9.0  --  8.9   < > 10.3*   * 126*  --  78   < > 110*   ALBUMIN 2.4* 2.3*  --  2.4*   < > 3.2*   PROTTOTAL 6.5* 6.2*  --  6.3*   < > 8.1   BILITOTAL 0.3 0.3  --  0.3   < > 0.9   ALKPHOS 138 154*  --  148   < > 213*   ALT 78* 101*  --  131*   < > 200*   AST 29 46*  --  66*   < > 219*   LIPASE  --   --   --   --   --  162    < > = values in this interval not displayed.     Recent Results (from the past 24 hour(s))   XR Chest Port 1 View    Narrative    EXAMINATION:  XR  CHEST PORT 1 VIEW 1/16/2022 7:52 PM.    COMPARISON: 1/12/2022..    HISTORY:  dyspnea    FINDINGS: AP view of the chest. Vascular stents project over the right  upper chest with numerous clips. Multiple fractured cerclage wires  again in the right upper hemithorax. Increasing interstitial and  airspace opacities throughout both lungs. Small left-sided pleural  effusion. Heart size normal.      Impression    IMPRESSION: Increasing diffuse interstitial and airspace opacities  throughout both lungs, left greater than right. Likely small  left-sided pleural effusion. Posttreatment changes similar to prior.    I have personally reviewed the examination and initial interpretation  and I agree with the findings.    NUBIA LOPEZ MD         SYSTEM ID:  [id number]   CT Chest Pulmonary Embolism w Contrast    Narrative    Examination: CT CHEST PULMONARY EMBOLISM W CONTRAST, 1/16/2022 9:14 PM    Comparison: CT 1/22/2018    History: PE suspected, high prob.    Technique: Axial thin slice images from the lung apices to the  diaphragm were obtained following the injection of intravenous  contrast media in the pulmonary arterial phase.     Contrast dose: isovue 370    Findings:   Right subclavian stent, nonopacified with contrast although possibly  due to bolus timing. Cerclage wires within the sternoclavicular joint  on the right similar to prior. Pectus excavatum deformity. This is  best categorized as severe given the Halterr Index of 252.4 mm/21.4 mm  =11.8.    Mediastinum/hilum: The heart size is normal with unchanged leftward  displacement. The great vessels are normal in caliber. There is no  evidence of a filling defect in the pulmonary arteries to suggest a  pulmonary embolism. Pulmonary artery normal size, 2.7 cm. No evidence  of axillary, mediastinal or hilar lymphadenopathy by size criteria. No  pericardial effusion is noted.    Diffuse interstitial changes of the lungs with diffuse areas of  groundglass opacities  and peripheral predominant areas of  consolidation and reticulation. Mosaic appearance in the right upper  lobe. Cystic changes within the anterior left upper lobe. Areas of  septal thickening and groundglass (crazy paving). Trace bilateral  pleural effusions.. No evidence of pleural effusion is noted.    Upper Abdomen: Gastrostomy tube balloon within the lumen of the  stomach. Other solid organs of the upper abdomen are unremarkable.  Simple renal cyst. Elevated right hemidiaphragm. Biliary stent with  pigtail in the gallbladder. Other biliary stent in adequate position.    Bones/Soft tissues: No suspicious bony lesions.      Impression    IMPRESSION:     1. No evidence for pulmonary embolism.  2. Diffuse groundglass and interstitial opacities throughout the lungs  with peripheral predominant areas of consolidation. There is also some  background interstitial appearance of the lungs with cysts in the left  upper lobe. COVID-19 can have this appearance. ARDS is a  consideration. Other causes of atypical infection are not excluded.  3. Right subclavian stent patency is not well assessed due to bolus  timing.  4. Severe pectus excavatum.    In the event of a positive result for acute pulmonary embolism  resulting in right heart strain, consider calling the   [facility] patient placement ([phone]) for PERT (Pulmonary Embolism  Response Team) Activation?    PERT -- Pulmonary Embolism Response Team (Multidisciplinary team  including cardiology, interventional radiology, critical care,  hematology)    I have personally reviewed the examination and initial interpretation  and I agree with the findings.    NUBIA LOPEZ MD         SYSTEM ID:  [id number]

## 2022-01-17 NOTE — PHARMACY-VANCOMYCIN DOSING SERVICE
Pharmacy Vancomycin Initial Note  Date of Service 2022  Patient's  1965  56 year old, female    Indication: Sepsis    Current estimated CrCl = Estimated Creatinine Clearance: 112.7 mL/min (A) (based on SCr of 0.49 mg/dL (L)).    Creatinine for last 3 days  2022:  7:51 AM Creatinine 0.59 mg/dL  1/15/2022:  7:32 AM Creatinine 0.51 mg/dL  2022:  8:44 AM Creatinine 0.49 mg/dL    Recent Vancomycin Level(s) for last 3 days  No results found for requested labs within last 72 hours.      Vancomycin IV Administrations (past 72 hours)      No vancomycin orders with administrations in past 72 hours.                Nephrotoxins and other renal medications (From now, onward)    None          Contrast Orders - past 72 hours (72h ago, onward)            Start     Dose/Rate Route Frequency Ordered Stop    22 2100  iopamidol (ISOVUE-370) solution 60 mL         60 mL Intravenous ONCE 228 22 2104    01/15/22 1130  iopamidol (ISOVUE-370) solution 53 mL         53 mL Intravenous ONCE 01/15/22 1118 01/15/22 1159          Insight[employer] Prediction of Planned Initial Vancomycin Regimen    Loading dose: 1500 mg   Regimen: 1000 mg IV every 12 hours  Exposure target: AUC24 (range)400-600 mg/L.hr   AUC24,ss: 466 mg/L.hr  Probability of AUC24 > 400: 65 %  Ctrough,ss: 13 mg/L  Probability of Ctrough,ss > 20: 20 %  Probability of nephrotoxicity (Lodise JOHNATHON ): 8 %        Plan:  1. Start vancomycin 1500 mg IV x 1 dose then 1000 mg IV q12h.   2. Vancomycin monitoring method: AUC  3. Vancomycin therapeutic monitoring goal: 400-600 mg*h/L  4. Pharmacy will check vancomycin levels as appropriate in 1-3 Days.    5. Serum creatinine levels will be ordered daily for the first week of therapy and at least twice weekly for subsequent weeks.      Sara Shelton, PharmD, BCPS

## 2022-01-18 ENCOUNTER — HOSPITAL ENCOUNTER (OUTPATIENT)
Facility: CLINIC | Age: 57
End: 2022-01-18
Attending: INTERNAL MEDICINE | Admitting: INTERNAL MEDICINE
Payer: MEDICARE

## 2022-01-18 ENCOUNTER — CARE COORDINATION (OUTPATIENT)
Dept: GASTROENTEROLOGY | Facility: CLINIC | Age: 57
End: 2022-01-18
Payer: MEDICARE

## 2022-01-18 ENCOUNTER — PREP FOR PROCEDURE (OUTPATIENT)
Dept: GASTROENTEROLOGY | Facility: CLINIC | Age: 57
End: 2022-01-18
Payer: MEDICARE

## 2022-01-18 DIAGNOSIS — Z46.89 ENCOUNTER FOR REMOVAL OF BILIARY STENT: Primary | ICD-10-CM

## 2022-01-18 LAB
ANION GAP SERPL CALCULATED.3IONS-SCNC: 6 MMOL/L (ref 3–14)
BASE EXCESS BLDV CALC-SCNC: 1.1 MMOL/L (ref -7.7–1.9)
BUN SERPL-MCNC: 13 MG/DL (ref 7–30)
CALCIUM SERPL-MCNC: 9.2 MG/DL (ref 8.5–10.1)
CHLORIDE BLD-SCNC: 110 MMOL/L (ref 94–109)
CO2 SERPL-SCNC: 26 MMOL/L (ref 20–32)
CREAT SERPL-MCNC: 0.49 MG/DL (ref 0.52–1.04)
CRP SERPL-MCNC: 220 MG/L (ref 0–8)
D DIMER PPP FEU-MCNC: 1.46 UG/ML FEU (ref 0–0.5)
ERYTHROCYTE [DISTWIDTH] IN BLOOD BY AUTOMATED COUNT: 16.8 % (ref 10–15)
GFR SERPL CREATININE-BSD FRML MDRD: >90 ML/MIN/1.73M2
GLUCOSE BLD-MCNC: 200 MG/DL (ref 70–99)
GLUCOSE BLDC GLUCOMTR-MCNC: 157 MG/DL (ref 70–99)
GLUCOSE BLDC GLUCOMTR-MCNC: 161 MG/DL (ref 70–99)
GLUCOSE BLDC GLUCOMTR-MCNC: 177 MG/DL (ref 70–99)
HBA1C MFR BLD: 5.5 % (ref 0–5.6)
HCO3 BLDV-SCNC: 27 MMOL/L (ref 21–28)
HCT VFR BLD AUTO: 29.3 % (ref 35–47)
HGB BLD-MCNC: 9 G/DL (ref 11.7–15.7)
MAGNESIUM SERPL-MCNC: 2.3 MG/DL (ref 1.6–2.3)
MCH RBC QN AUTO: 28.4 PG (ref 26.5–33)
MCHC RBC AUTO-ENTMCNC: 30.7 G/DL (ref 31.5–36.5)
MCV RBC AUTO: 92 FL (ref 78–100)
O2/TOTAL GAS SETTING VFR VENT: 40 %
PCO2 BLDV: 48 MM HG (ref 40–50)
PH BLDV: 7.37 [PH] (ref 7.32–7.43)
PLATELET # BLD AUTO: 453 10E3/UL (ref 150–450)
PO2 BLDV: 42 MM HG (ref 25–47)
POTASSIUM BLD-SCNC: 3.4 MMOL/L (ref 3.4–5.3)
POTASSIUM BLD-SCNC: 3.9 MMOL/L (ref 3.4–5.3)
RBC # BLD AUTO: 3.17 10E6/UL (ref 3.8–5.2)
SODIUM SERPL-SCNC: 142 MMOL/L (ref 133–144)
WBC # BLD AUTO: 17.4 10E3/UL (ref 4–11)

## 2022-01-18 PROCEDURE — 83735 ASSAY OF MAGNESIUM: CPT | Performed by: INTERNAL MEDICINE

## 2022-01-18 PROCEDURE — 250N000013 HC RX MED GY IP 250 OP 250 PS 637: Performed by: NURSE PRACTITIONER

## 2022-01-18 PROCEDURE — 36415 COLL VENOUS BLD VENIPUNCTURE: CPT | Performed by: PHYSICIAN ASSISTANT

## 2022-01-18 PROCEDURE — 86140 C-REACTIVE PROTEIN: CPT | Performed by: PHYSICIAN ASSISTANT

## 2022-01-18 PROCEDURE — 250N000012 HC RX MED GY IP 250 OP 636 PS 637: Performed by: STUDENT IN AN ORGANIZED HEALTH CARE EDUCATION/TRAINING PROGRAM

## 2022-01-18 PROCEDURE — 999N000043 HC STATISTIC CTO2 CONT OXYGEN TECH TIME EA 90 MIN

## 2022-01-18 PROCEDURE — 999N000215 HC STATISTIC HFNC ADULT NON-CPAP

## 2022-01-18 PROCEDURE — 250N000013 HC RX MED GY IP 250 OP 250 PS 637: Performed by: PHYSICIAN ASSISTANT

## 2022-01-18 PROCEDURE — 36415 COLL VENOUS BLD VENIPUNCTURE: CPT | Performed by: STUDENT IN AN ORGANIZED HEALTH CARE EDUCATION/TRAINING PROGRAM

## 2022-01-18 PROCEDURE — 250N000009 HC RX 250: Performed by: STUDENT IN AN ORGANIZED HEALTH CARE EDUCATION/TRAINING PROGRAM

## 2022-01-18 PROCEDURE — 85379 FIBRIN DEGRADATION QUANT: CPT | Performed by: PHYSICIAN ASSISTANT

## 2022-01-18 PROCEDURE — 250N000011 HC RX IP 250 OP 636: Performed by: PHYSICIAN ASSISTANT

## 2022-01-18 PROCEDURE — 250N000013 HC RX MED GY IP 250 OP 250 PS 637: Performed by: INTERNAL MEDICINE

## 2022-01-18 PROCEDURE — 83036 HEMOGLOBIN GLYCOSYLATED A1C: CPT | Performed by: STUDENT IN AN ORGANIZED HEALTH CARE EDUCATION/TRAINING PROGRAM

## 2022-01-18 PROCEDURE — 85027 COMPLETE CBC AUTOMATED: CPT | Performed by: PHYSICIAN ASSISTANT

## 2022-01-18 PROCEDURE — 120N000003 HC R&B IMCU UMMC

## 2022-01-18 PROCEDURE — 258N000003 HC RX IP 258 OP 636: Performed by: STUDENT IN AN ORGANIZED HEALTH CARE EDUCATION/TRAINING PROGRAM

## 2022-01-18 PROCEDURE — 250N000013 HC RX MED GY IP 250 OP 250 PS 637: Performed by: STUDENT IN AN ORGANIZED HEALTH CARE EDUCATION/TRAINING PROGRAM

## 2022-01-18 PROCEDURE — 99233 SBSQ HOSP IP/OBS HIGH 50: CPT | Performed by: STUDENT IN AN ORGANIZED HEALTH CARE EDUCATION/TRAINING PROGRAM

## 2022-01-18 PROCEDURE — 84132 ASSAY OF SERUM POTASSIUM: CPT | Performed by: STUDENT IN AN ORGANIZED HEALTH CARE EDUCATION/TRAINING PROGRAM

## 2022-01-18 PROCEDURE — 250N000013 HC RX MED GY IP 250 OP 250 PS 637: Performed by: PEDIATRICS

## 2022-01-18 PROCEDURE — 258N000003 HC RX IP 258 OP 636: Performed by: PHYSICIAN ASSISTANT

## 2022-01-18 PROCEDURE — 250N000011 HC RX IP 250 OP 636: Performed by: STUDENT IN AN ORGANIZED HEALTH CARE EDUCATION/TRAINING PROGRAM

## 2022-01-18 PROCEDURE — 82803 BLOOD GASES ANY COMBINATION: CPT | Performed by: STUDENT IN AN ORGANIZED HEALTH CARE EDUCATION/TRAINING PROGRAM

## 2022-01-18 PROCEDURE — 999N000157 HC STATISTIC RCP TIME EA 10 MIN

## 2022-01-18 PROCEDURE — 250N000013 HC RX MED GY IP 250 OP 250 PS 637: Performed by: HOSPITALIST

## 2022-01-18 PROCEDURE — 82310 ASSAY OF CALCIUM: CPT | Performed by: PHYSICIAN ASSISTANT

## 2022-01-18 RX ORDER — FUROSEMIDE 10 MG/ML
20 INJECTION INTRAMUSCULAR; INTRAVENOUS ONCE
Status: COMPLETED | OUTPATIENT
Start: 2022-01-18 | End: 2022-01-18

## 2022-01-18 RX ORDER — DEXTROSE MONOHYDRATE 25 G/50ML
25-50 INJECTION, SOLUTION INTRAVENOUS
Status: DISCONTINUED | OUTPATIENT
Start: 2022-01-18 | End: 2022-01-20

## 2022-01-18 RX ORDER — POTASSIUM CHLORIDE 20MEQ/15ML
20 LIQUID (ML) ORAL ONCE
Status: COMPLETED | OUTPATIENT
Start: 2022-01-18 | End: 2022-01-18

## 2022-01-18 RX ORDER — NICOTINE POLACRILEX 4 MG
15-30 LOZENGE BUCCAL
Status: DISCONTINUED | OUTPATIENT
Start: 2022-01-18 | End: 2022-01-20

## 2022-01-18 RX ADMIN — SODIUM CHLORIDE 50 ML: 9 INJECTION, SOLUTION INTRAVENOUS at 23:38

## 2022-01-18 RX ADMIN — TOPIRAMATE 200 MG: 200 TABLET ORAL at 20:06

## 2022-01-18 RX ADMIN — LORAZEPAM 1 MG: 1 TABLET ORAL at 13:07

## 2022-01-18 RX ADMIN — Medication 500 MG: at 08:01

## 2022-01-18 RX ADMIN — INSULIN ASPART 1 UNITS: 100 INJECTION, SOLUTION INTRAVENOUS; SUBCUTANEOUS at 16:08

## 2022-01-18 RX ADMIN — VENLAFAXINE 75 MG: 75 TABLET ORAL at 08:04

## 2022-01-18 RX ADMIN — Medication 18.75 MG: at 11:33

## 2022-01-18 RX ADMIN — BARICITINIB 4 MG: 2 TABLET, FILM COATED ORAL at 08:05

## 2022-01-18 RX ADMIN — Medication 15 ML: at 11:27

## 2022-01-18 RX ADMIN — VORICONAZOLE 200 MG: 40 POWDER, FOR SUSPENSION ORAL at 08:01

## 2022-01-18 RX ADMIN — VENLAFAXINE 75 MG: 75 TABLET ORAL at 20:06

## 2022-01-18 RX ADMIN — Medication 500 MG: at 13:07

## 2022-01-18 RX ADMIN — SODIUM CHLORIDE 50 ML: 9 INJECTION, SOLUTION INTRAVENOUS at 00:29

## 2022-01-18 RX ADMIN — OXYCODONE HYDROCHLORIDE 5 MG: 5 TABLET ORAL at 20:30

## 2022-01-18 RX ADMIN — DEXAMETHASONE 6 MG: 2 TABLET ORAL at 13:06

## 2022-01-18 RX ADMIN — SENNOSIDES AND DOCUSATE SODIUM 1 TABLET: 8.6; 5 TABLET ORAL at 08:06

## 2022-01-18 RX ADMIN — FUROSEMIDE 20 MG: 10 INJECTION, SOLUTION INTRAVENOUS at 11:27

## 2022-01-18 RX ADMIN — QUETIAPINE FUMARATE 50 MG: 50 TABLET ORAL at 04:41

## 2022-01-18 RX ADMIN — CEFEPIME HYDROCHLORIDE 2 G: 2 INJECTION, POWDER, FOR SOLUTION INTRAVENOUS at 05:43

## 2022-01-18 RX ADMIN — APIXABAN 5 MG: 5 TABLET, FILM COATED ORAL at 08:04

## 2022-01-18 RX ADMIN — TOLTERODINE TARTRATE 2 MG: 2 TABLET, FILM COATED ORAL at 08:05

## 2022-01-18 RX ADMIN — VORICONAZOLE 200 MG: 40 POWDER, FOR SUSPENSION ORAL at 20:05

## 2022-01-18 RX ADMIN — CEFEPIME HYDROCHLORIDE 2 G: 2 INJECTION, POWDER, FOR SOLUTION INTRAVENOUS at 14:15

## 2022-01-18 RX ADMIN — Medication 500 MG: at 20:05

## 2022-01-18 RX ADMIN — TOPIRAMATE 100 MG: 100 TABLET, FILM COATED ORAL at 08:03

## 2022-01-18 RX ADMIN — Medication 7.5 MG: at 08:05

## 2022-01-18 RX ADMIN — DULOXETINE 120 MG: 30 CAPSULE, DELAYED RELEASE ORAL at 20:05

## 2022-01-18 RX ADMIN — CEFEPIME HYDROCHLORIDE 2 G: 2 INJECTION, POWDER, FOR SOLUTION INTRAVENOUS at 23:39

## 2022-01-18 RX ADMIN — APIXABAN 5 MG: 5 TABLET, FILM COATED ORAL at 20:07

## 2022-01-18 RX ADMIN — POTASSIUM CHLORIDE 20 MEQ: 20 SOLUTION ORAL at 08:01

## 2022-01-18 RX ADMIN — TOLTERODINE TARTRATE 2 MG: 2 TABLET, FILM COATED ORAL at 20:07

## 2022-01-18 RX ADMIN — Medication 18.75 MG: at 21:38

## 2022-01-18 RX ADMIN — INSULIN ASPART 1 UNITS: 100 INJECTION, SOLUTION INTRAVENOUS; SUBCUTANEOUS at 11:45

## 2022-01-18 RX ADMIN — REMDESIVIR 100 MG: 100 INJECTION, POWDER, LYOPHILIZED, FOR SOLUTION INTRAVENOUS at 21:38

## 2022-01-18 ASSESSMENT — ACTIVITIES OF DAILY LIVING (ADL)
ADLS_ACUITY_SCORE: 15

## 2022-01-18 ASSESSMENT — MIFFLIN-ST. JEOR: SCORE: 1142.13

## 2022-01-18 NOTE — PROGRESS NOTES
Pt still admitted, per ERCP note from 22    4 weeks to   remove common bile duct stent and place second gall  bladder stent if she is not getting cholecystectomy.    If she is scheduled for cholecystectomy we can remove   biliary and transcystic stent during the same session    in the OR with an EGD  Please assist in scheduling:     Procedure/Imaging/Clinic: ERCP  Physician: Dr. Wilson  Timin weeks  Procedure length:provider average  Anesthesia:gen  Dx: biliary stent removal  Tier:2  Location: [facility]     Orders placed, message sent to scheduling

## 2022-01-18 NOTE — PROGRESS NOTES
[facility]    Medicine Progress Note - Hospitalist Service, Gold 8       Date of Admission:  1/11/2022    Assessment & Plan                   Sherly Yeung is a 56 year old female admitted on 1/11/2022. She has a history of SDH, PE/DVT (on Xarelto), HTN, Covid infection subclavian vein stenosis s/p stenting who presents to the Emergency Department with abdominal pain, nausea, and vomiting beginning Sunday, 1/9 and found to have cholangitis and possible acute cholecystitis. Hospital stay complicated by COVID and acute hypoxemic respiratory failure on 1/17    Interval Changes:  -Continue barcitinib, remdesivir, dexamethasone  -Discussed potential ICU transfer with patient and family and they are agreeable if she were to decompensate further  -IV lasix  -CHange metoprolol XL to tartage to allow crushing  -Sliding scale insulin    # Acute hypoxic respiratory failure   # COVID 19 PNA  # Concern for bacterial PNA  # Hx of Recent COVID PNA in October 2021 requiring MV  # Hx of Aspergillus PNA  Patient became hypoxic and required HFNC on 1/6, CT with diffuse GGO c/w COVID which was positive. Unclear exposure as she was negative on admission but did have visitors through the weekend.   Does have recent COVID PNA requiring intubation in October 2020  - not vaccinated.  -Previous hospital stay was complicated by aspergillus pna  -Vancomycin stopped on 1/17 when MRSA nares negative  -Barcitinib started on 1/17, remdesivir/dexamethasone started on 1/17  Plan:  - Continue cefepime/flagyl (coverage of choledocolithiasis)   - Cont Dex/Remdisivir/Baricitinib   - Supplemental O2 to keep sat >92  - Will diurese gently   - Continue voriconazole through 1/22/22    # Choledocholithiasis c/b cholangitis, s/p CBD stenting (1/12/22)  # Probable Acute cholecystitis s/p transpapillary cystic duct stenting (1/12/22)  # Transaminitis   # Sepsis (Tachy, Leukocytosis)  - S/p ERCP, removal of stone and  stent placement  - Abx for 10 days (1/12 - 1/22) - (Was cipro/flagyl, broadened to cef/flagyl due to resp failure)  - Follow as outpatient with Gen-surg for cholecystectomy (order on discharge placed)  - Appreciate GI rec's (F/u in 1 month)    # Opioid induced constipation  - Ongoing abdominal pain, CT with large stool burden.   - S/p Golytly with improvement in pain     # Sinus Tachycardia  - Likely related to infection, pain and anxiety  - Monitor     # Prolonged QTc, resolved    # Hypokalemia- Replete per nursing protocol    # Paget Banegas syndrome  # 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  She has a history of multiple blood clots.  She has been maintained on apixaban.  -Continue PTA apixaban     # HTN-Continue PTA metoprolol     # Depression  # Hx of Eating Disorder:  -Continue PTA venlafaxine, abilify, ativan PRN     # Adult failure to thrive  Patient has a gastric tube.  She does eat 3 meals a day, with tube feed to follow each feed as she does not consume enough calories by mouth at this time.  - Continue PTA Jevity 200 mL tube feed after each meal when patient when diet is advanced    # Normocytic anemia  - stable, monitor          Diet: Adult Formula Bolus Feeding: TID Jevity 1.5; Route: Gastrostomy; 1; Can(s); Medication - Feeding Tube Flush Frequency: At least 15-30 mL water before and after medication administration and with tube clogging; Amount to Send (Nutrition use): 3 can...  Regular Diet Adult    DVT Prophylaxis: DOAC  Scott Catheter: Not present  Central Lines: None  Code Status: Full Code      Disposition Plan   Expected Discharge: 01/24/2022     Anticipated discharge location:  Awaiting care coordination huddle  Delays:            The patient's care was discussed with the Bedside Nurse, Care Coordinator/, Patient and Patient's Family.    Rodolfo Hennessy MD  Hospitalist Service, [address]  [facility]  Securely message with the [employer] Web Console (learn more here)  Text page via [facility] Paging/Directory    Please see sign in/sign out for up to date coverage information    Clinically Significant Risk Factors Present on Admission                    ______________________________________________________________________    Interval History   Patient states she is feeling worse today. She notes her breathing is more labored. SHe notes some improvement with lasix.She has sore throat. No chest pain. No nausea or vomiting. Reports swallowing pill is more difficult. DIsucssed her tenous respiratory status and discussed potential of ICU transfer. SHe is okay with intubation and ICU level care.    Her  was notified this morning. He states that there preference would be for early tracheostomy if she needed intubation because she was so difficult to wean off the ventilator and had significant anxiety.     Data reviewed today: I reviewed all medications, new labs and imaging results over the last 24 hours. I personally reviewed no images or EKG's today.    Physical Exam   Vital Signs: Temp: 97.8  F (36.6  C) Temp src: Oral BP: (!) 134/90 Pulse: 99   Resp: 24 SpO2: 91 % O2 Device: High Flow Nasal Cannula (HFNC) Oxygen Delivery: 35 LPM  Weight: 128 lbs 15.51 oz  Constitutional: ill appearing, lying in bed  Eyes: no icteurs  Nose: HFNC in place  ENT: supple, elevated JVD  Respiratory: crackles in bilateral lung fields, no wheezing, tachypnea, accessory muscle usage  Cardiovascular: tachycardic, normal s1, s2, no murmur or rub  GI: soft, non-tender, G tube in place, no guarding  Skin: no rash  Musculoskeletal: no lower extremity edema  Neurologic: holding eye contact, following commands, moving all extremities     Data   Recent Labs   Lab 01/18/22  1232 01/18/22  1136 01/18/22  0432 01/17/22 2010 01/17/22  1349 01/17/22  0456 01/16/22  0844 01/11/22  1615 01/11/22  1607   WBC  --   --  17.4*  --    --  16.4* 14.1*   < > 10.8   HGB  --   --  9.0*  --   --  9.2* 9.2*   < > 12.2   MCV  --   --  92  --   --  93 93   < > 93   PLT  --   --  453*  --   --  357 310   < > 390   INR  --   --   --   --   --   --   --   --  1.27*   NA  --   --  142  --   --  141 141   < > 140   POTASSIUM 3.9  --  3.4 3.5   < > 2.8* 3.1*   < > 3.0*   CHLORIDE  --   --  110*  --   --  109 110*   < > 107   CO2  --   --  26  --   --  25 25   < > 25   BUN  --   --  13  --   --  4* 4*   < > 10   CR  --   --  0.49*  --   --  0.53 0.49*   < > 0.63   ANIONGAP  --   --  6  --   --  7 6   < > 8   TERESA  --   --  9.2  --   --  9.4 9.0   < > 10.3*   GLC  --  161* 200*  --   --  123* 126*   < > 110*   ALBUMIN  --   --   --   --   --  2.4* 2.3*   < > 3.2*   PROTTOTAL  --   --   --   --   --  6.5* 6.2*   < > 8.1   BILITOTAL  --   --   --   --   --  0.3 0.3   < > 0.9   ALKPHOS  --   --   --   --   --  138 154*   < > 213*   ALT  --   --   --   --   --  78* 101*   < > 200*   AST  --   --   --   --   --  29 46*   < > 219*   LIPASE  --   --   --   --   --   --   --   --  162    < > = values in this interval not displayed.

## 2022-01-18 NOTE — PLAN OF CARE
"Vital signs:  Temp: 97.8  F (36.6  C) Temp src: Oral BP: (!) 134/90 Pulse: 99   Resp: 24 SpO2: 91 % O2 Device: High Flow Nasal Cannula (HFNC) Oxygen Delivery: 35 LPM Height: 160 cm (5' 3\") Weight: 58.5 kg (128 lb 15.5 oz)      Neuro: A&Ox4. Calm and able to follow commands. Intermittently anxious, PRN Seroquel given 1x. Deconditioned.   Cardiac: STach -120s.  VSS. Afebrile.    Respiratory: Sating > 90% on HFNC. Needs increased form 75% FiO2 30L when sleeping to 95% FiO2 35L to maintain sats. Currently on 85% FiO2, 35L. Tachypneic. Abdominal use noted. Desats with minimal activity.   GI/: Adequate urine output via purewick. No BM during shift. Intermittent nausea.   Diet/appetite: Regular diet. No appetite. 1 bolus feed given.   Activity:  Not oob during shift.   Pain: C/O abdominal and back pain. PRN Oxycodone given 2x per MAR.   Skin: No new deficits noted.  LDA's: PIV x1 running TKO for antibiotics. PEG. Purewick.     Plan: Continue with POC. Notify primary team with changes.   "

## 2022-01-18 NOTE — PLAN OF CARE
Neuro: Patient alert and oriented x3. Follows commands. Intermittently anxious, PRN Ativan given 1x.   Cardiac: ST, -120s. BP's stable. Afebrile.            Respiratory: Sating > 90% on HFNC % at 40L. Tachypneic. Abdominal muscle use. VBG WNL. Desats with minimal activity.   GI/: Adequate urine output via purewick, lasix given x1. 1 BM this shift.   Diet/appetite: Regular diet. No appetite. 2 out of 3 bolus feeds given via peg tube.   Activity: Turns in bed. Has not gotten up d/t oxygen needs.   Pain: Complains of some abdominal pain but improving. Has not requested pain medication.   Skin: No new deficits noted.   LDA's: PIV x1 running TKO for antibiotics.  Plan: Potassium replaced, redraw in AM. Continue with POC. Notify primary team with changes.

## 2022-01-19 ENCOUNTER — ANESTHESIA EVENT (OUTPATIENT)
Dept: INTENSIVE CARE | Facility: CLINIC | Age: 57
DRG: 871 | End: 2022-01-19
Payer: MEDICARE

## 2022-01-19 ENCOUNTER — APPOINTMENT (OUTPATIENT)
Dept: GENERAL RADIOLOGY | Facility: CLINIC | Age: 57
DRG: 871 | End: 2022-01-19
Payer: MEDICARE

## 2022-01-19 ENCOUNTER — ANESTHESIA (OUTPATIENT)
Dept: INTENSIVE CARE | Facility: CLINIC | Age: 57
DRG: 871 | End: 2022-01-19
Payer: MEDICARE

## 2022-01-19 PROBLEM — J80 ACUTE RESPIRATORY DISTRESS SYNDROME (ARDS) DUE TO COVID-19 VIRUS (H): Status: ACTIVE | Noted: 2022-01-19

## 2022-01-19 PROBLEM — U07.1 ACUTE RESPIRATORY DISTRESS SYNDROME (ARDS) DUE TO COVID-19 VIRUS (H): Status: ACTIVE | Noted: 2022-01-19

## 2022-01-19 LAB
ALBUMIN SERPL-MCNC: 2.6 G/DL (ref 3.4–5)
ALP SERPL-CCNC: 121 U/L (ref 40–150)
ALT SERPL W P-5'-P-CCNC: 47 U/L (ref 0–50)
ANION GAP SERPL CALCULATED.3IONS-SCNC: 3 MMOL/L (ref 3–14)
AST SERPL W P-5'-P-CCNC: 18 U/L (ref 0–45)
BASE EXCESS BLDA CALC-SCNC: 0.1 MMOL/L (ref -9–1.8)
BASE EXCESS BLDA CALC-SCNC: 0.6 MMOL/L (ref -9–1.8)
BASE EXCESS BLDA CALC-SCNC: 1.8 MMOL/L (ref -9–1.8)
BASE EXCESS BLDV CALC-SCNC: -1.8 MMOL/L (ref -7.7–1.9)
BASE EXCESS BLDV CALC-SCNC: 1.7 MMOL/L (ref -7.7–1.9)
BASOPHILS # BLD AUTO: 0 10E3/UL (ref 0–0.2)
BASOPHILS NFR BLD AUTO: 0 %
BILIRUB DIRECT SERPL-MCNC: 0.1 MG/DL (ref 0–0.2)
BILIRUB SERPL-MCNC: 0.3 MG/DL (ref 0.2–1.3)
BUN SERPL-MCNC: 22 MG/DL (ref 7–30)
CALCIUM SERPL-MCNC: 9.4 MG/DL (ref 8.5–10.1)
CHLORIDE BLD-SCNC: 111 MMOL/L (ref 94–109)
CO2 SERPL-SCNC: 29 MMOL/L (ref 20–32)
CREAT SERPL-MCNC: 0.48 MG/DL (ref 0.52–1.04)
CRP SERPL-MCNC: 120 MG/L (ref 0–8)
D DIMER PPP FEU-MCNC: 1.23 UG/ML FEU (ref 0–0.5)
EOSINOPHIL # BLD AUTO: 0 10E3/UL (ref 0–0.7)
EOSINOPHIL NFR BLD AUTO: 0 %
ERYTHROCYTE [DISTWIDTH] IN BLOOD BY AUTOMATED COUNT: 17 % (ref 10–15)
GFR SERPL CREATININE-BSD FRML MDRD: >90 ML/MIN/1.73M2
GLUCOSE BLD-MCNC: 95 MG/DL (ref 70–99)
GLUCOSE BLDC GLUCOMTR-MCNC: 102 MG/DL (ref 70–99)
GLUCOSE BLDC GLUCOMTR-MCNC: 146 MG/DL (ref 70–99)
GLUCOSE BLDC GLUCOMTR-MCNC: 151 MG/DL (ref 70–99)
GLUCOSE BLDC GLUCOMTR-MCNC: 159 MG/DL (ref 70–99)
HCO3 BLD-SCNC: 29 MMOL/L (ref 21–28)
HCO3 BLD-SCNC: 29 MMOL/L (ref 21–28)
HCO3 BLD-SCNC: 30 MMOL/L (ref 21–28)
HCO3 BLDV-SCNC: 27 MMOL/L (ref 21–28)
HCO3 BLDV-SCNC: 29 MMOL/L (ref 21–28)
HCT VFR BLD AUTO: 30.3 % (ref 35–47)
HGB BLD-MCNC: 9.3 G/DL (ref 11.7–15.7)
IMM GRANULOCYTES # BLD: 0.2 10E3/UL
IMM GRANULOCYTES NFR BLD: 1 %
LYMPHOCYTES # BLD AUTO: 0.4 10E3/UL (ref 0.8–5.3)
LYMPHOCYTES NFR BLD AUTO: 3 %
MAGNESIUM SERPL-MCNC: 2.4 MG/DL (ref 1.6–2.3)
MCH RBC QN AUTO: 28.6 PG (ref 26.5–33)
MCHC RBC AUTO-ENTMCNC: 30.7 G/DL (ref 31.5–36.5)
MCV RBC AUTO: 93 FL (ref 78–100)
MONOCYTES # BLD AUTO: 0.8 10E3/UL (ref 0–1.3)
MONOCYTES NFR BLD AUTO: 5 %
NEUTROPHILS # BLD AUTO: 14 10E3/UL (ref 1.6–8.3)
NEUTROPHILS NFR BLD AUTO: 91 %
O2/TOTAL GAS SETTING VFR VENT: 50 %
O2/TOTAL GAS SETTING VFR VENT: 50 %
O2/TOTAL GAS SETTING VFR VENT: 55 %
O2/TOTAL GAS SETTING VFR VENT: 55 %
O2/TOTAL GAS SETTING VFR VENT: 60 %
OXYHGB MFR BLD: 95 % (ref 92–100)
PCO2 BLD: 67 MM HG (ref 35–45)
PCO2 BLD: 70 MM HG (ref 35–45)
PCO2 BLD: 73 MM HG (ref 35–45)
PCO2 BLDV: 52 MM HG (ref 40–50)
PCO2 BLDV: 65 MM HG (ref 40–50)
PH BLD: 7.21 [PH] (ref 7.35–7.45)
PH BLD: 7.24 [PH] (ref 7.35–7.45)
PH BLD: 7.24 [PH] (ref 7.35–7.45)
PH BLDV: 7.23 [PH] (ref 7.32–7.43)
PH BLDV: 7.35 [PH] (ref 7.32–7.43)
PHOSPHATE SERPL-MCNC: 1.2 MG/DL (ref 2.5–4.5)
PHOSPHATE SERPL-MCNC: 3.1 MG/DL (ref 2.5–4.5)
PLATELET # BLD AUTO: 445 10E3/UL (ref 150–450)
PO2 BLD: 111 MM HG (ref 80–105)
PO2 BLD: 76 MM HG (ref 80–105)
PO2 BLD: 84 MM HG (ref 80–105)
PO2 BLDV: 28 MM HG (ref 25–47)
PO2 BLDV: 64 MM HG (ref 25–47)
POTASSIUM BLD-SCNC: 3.4 MMOL/L (ref 3.4–5.3)
POTASSIUM BLD-SCNC: 3.5 MMOL/L (ref 3.4–5.3)
POTASSIUM BLD-SCNC: 3.9 MMOL/L (ref 3.4–5.3)
PROT SERPL-MCNC: 7 G/DL (ref 6.8–8.8)
RBC # BLD AUTO: 3.25 10E6/UL (ref 3.8–5.2)
SODIUM SERPL-SCNC: 143 MMOL/L (ref 133–144)
WBC # BLD AUTO: 15.7 10E3/UL (ref 4–11)
WBC # BLD AUTO: ABNORMAL 10*3/UL

## 2022-01-19 PROCEDURE — 93010 ELECTROCARDIOGRAM REPORT: CPT | Performed by: INTERNAL MEDICINE

## 2022-01-19 PROCEDURE — 250N000013 HC RX MED GY IP 250 OP 250 PS 637: Performed by: PHYSICIAN ASSISTANT

## 2022-01-19 PROCEDURE — 36600 WITHDRAWAL OF ARTERIAL BLOOD: CPT

## 2022-01-19 PROCEDURE — 84132 ASSAY OF SERUM POTASSIUM: CPT | Performed by: STUDENT IN AN ORGANIZED HEALTH CARE EDUCATION/TRAINING PROGRAM

## 2022-01-19 PROCEDURE — 999N000157 HC STATISTIC RCP TIME EA 10 MIN

## 2022-01-19 PROCEDURE — 999N000043 HC STATISTIC CTO2 CONT OXYGEN TECH TIME EA 90 MIN

## 2022-01-19 PROCEDURE — 250N000013 HC RX MED GY IP 250 OP 250 PS 637: Performed by: HOSPITALIST

## 2022-01-19 PROCEDURE — 250N000011 HC RX IP 250 OP 636: Performed by: NURSE ANESTHETIST, CERTIFIED REGISTERED

## 2022-01-19 PROCEDURE — 80053 COMPREHEN METABOLIC PANEL: CPT | Performed by: PHYSICIAN ASSISTANT

## 2022-01-19 PROCEDURE — 999N000065 XR CHEST PORT 1 VIEW

## 2022-01-19 PROCEDURE — 85027 COMPLETE CBC AUTOMATED: CPT | Performed by: PHYSICIAN ASSISTANT

## 2022-01-19 PROCEDURE — 94660 CPAP INITIATION&MGMT: CPT

## 2022-01-19 PROCEDURE — 71045 X-RAY EXAM CHEST 1 VIEW: CPT | Mod: 26

## 2022-01-19 PROCEDURE — 258N000003 HC RX IP 258 OP 636: Performed by: NURSE PRACTITIONER

## 2022-01-19 PROCEDURE — 84100 ASSAY OF PHOSPHORUS: CPT | Performed by: STUDENT IN AN ORGANIZED HEALTH CARE EDUCATION/TRAINING PROGRAM

## 2022-01-19 PROCEDURE — 999N000215 HC STATISTIC HFNC ADULT NON-CPAP

## 2022-01-19 PROCEDURE — 36415 COLL VENOUS BLD VENIPUNCTURE: CPT | Performed by: STUDENT IN AN ORGANIZED HEALTH CARE EDUCATION/TRAINING PROGRAM

## 2022-01-19 PROCEDURE — 82803 BLOOD GASES ANY COMBINATION: CPT | Performed by: PHYSICIAN ASSISTANT

## 2022-01-19 PROCEDURE — 999N000248 HC STATISTIC IV INSERT WITH US BY RN

## 2022-01-19 PROCEDURE — 86140 C-REACTIVE PROTEIN: CPT | Performed by: PHYSICIAN ASSISTANT

## 2022-01-19 PROCEDURE — 250N000009 HC RX 250: Performed by: NURSE ANESTHETIST, CERTIFIED REGISTERED

## 2022-01-19 PROCEDURE — 250N000013 HC RX MED GY IP 250 OP 250 PS 637: Performed by: NURSE PRACTITIONER

## 2022-01-19 PROCEDURE — 93005 ELECTROCARDIOGRAM TRACING: CPT

## 2022-01-19 PROCEDURE — 250N000011 HC RX IP 250 OP 636: Performed by: PHYSICIAN ASSISTANT

## 2022-01-19 PROCEDURE — 258N000003 HC RX IP 258 OP 636: Performed by: NURSE ANESTHETIST, CERTIFIED REGISTERED

## 2022-01-19 PROCEDURE — 82805 BLOOD GASES W/O2 SATURATION: CPT | Performed by: STUDENT IN AN ORGANIZED HEALTH CARE EDUCATION/TRAINING PROGRAM

## 2022-01-19 PROCEDURE — 250N000011 HC RX IP 250 OP 636: Performed by: STUDENT IN AN ORGANIZED HEALTH CARE EDUCATION/TRAINING PROGRAM

## 2022-01-19 PROCEDURE — 250N000013 HC RX MED GY IP 250 OP 250 PS 637: Performed by: PEDIATRICS

## 2022-01-19 PROCEDURE — 99291 CRITICAL CARE FIRST HOUR: CPT | Performed by: STUDENT IN AN ORGANIZED HEALTH CARE EDUCATION/TRAINING PROGRAM

## 2022-01-19 PROCEDURE — 5A09457 ASSISTANCE WITH RESPIRATORY VENTILATION, 24-96 CONSECUTIVE HOURS, CONTINUOUS POSITIVE AIRWAY PRESSURE: ICD-10-PCS | Performed by: STUDENT IN AN ORGANIZED HEALTH CARE EDUCATION/TRAINING PROGRAM

## 2022-01-19 PROCEDURE — 250N000013 HC RX MED GY IP 250 OP 250 PS 637: Performed by: STUDENT IN AN ORGANIZED HEALTH CARE EDUCATION/TRAINING PROGRAM

## 2022-01-19 PROCEDURE — 94002 VENT MGMT INPAT INIT DAY: CPT

## 2022-01-19 PROCEDURE — 250N000009 HC RX 250: Performed by: PHYSICIAN ASSISTANT

## 2022-01-19 PROCEDURE — 250N000009 HC RX 250: Performed by: STUDENT IN AN ORGANIZED HEALTH CARE EDUCATION/TRAINING PROGRAM

## 2022-01-19 PROCEDURE — 82803 BLOOD GASES ANY COMBINATION: CPT | Performed by: STUDENT IN AN ORGANIZED HEALTH CARE EDUCATION/TRAINING PROGRAM

## 2022-01-19 PROCEDURE — 71045 X-RAY EXAM CHEST 1 VIEW: CPT

## 2022-01-19 PROCEDURE — 84100 ASSAY OF PHOSPHORUS: CPT | Performed by: PHYSICIAN ASSISTANT

## 2022-01-19 PROCEDURE — 370N000003 HC ANESTHESIA WARD SERVICE

## 2022-01-19 PROCEDURE — 999N000128 HC STATISTIC PERIPHERAL IV START W/O US GUIDANCE

## 2022-01-19 PROCEDURE — 03HY32Z INSERTION OF MONITORING DEVICE INTO UPPER ARTERY, PERCUTANEOUS APPROACH: ICD-10-PCS | Performed by: STUDENT IN AN ORGANIZED HEALTH CARE EDUCATION/TRAINING PROGRAM

## 2022-01-19 PROCEDURE — 250N000009 HC RX 250: Performed by: NURSE PRACTITIONER

## 2022-01-19 PROCEDURE — 85379 FIBRIN DEGRADATION QUANT: CPT | Performed by: PHYSICIAN ASSISTANT

## 2022-01-19 PROCEDURE — 200N000002 HC R&B ICU UMMC

## 2022-01-19 PROCEDURE — 82248 BILIRUBIN DIRECT: CPT | Performed by: STUDENT IN AN ORGANIZED HEALTH CARE EDUCATION/TRAINING PROGRAM

## 2022-01-19 PROCEDURE — 250N000013 HC RX MED GY IP 250 OP 250 PS 637: Performed by: INTERNAL MEDICINE

## 2022-01-19 PROCEDURE — 36415 COLL VENOUS BLD VENIPUNCTURE: CPT | Performed by: PHYSICIAN ASSISTANT

## 2022-01-19 PROCEDURE — 250N000011 HC RX IP 250 OP 636

## 2022-01-19 PROCEDURE — 83735 ASSAY OF MAGNESIUM: CPT | Performed by: HOSPITALIST

## 2022-01-19 PROCEDURE — 71045 X-RAY EXAM CHEST 1 VIEW: CPT | Mod: 26 | Performed by: STUDENT IN AN ORGANIZED HEALTH CARE EDUCATION/TRAINING PROGRAM

## 2022-01-19 RX ORDER — ACETAMINOPHEN 325 MG/1
650 TABLET ORAL EVERY 4 HOURS PRN
Status: DISCONTINUED | OUTPATIENT
Start: 2022-01-19 | End: 2022-02-18 | Stop reason: HOSPADM

## 2022-01-19 RX ORDER — DEXMEDETOMIDINE HYDROCHLORIDE 4 UG/ML
.1-1.2 INJECTION, SOLUTION INTRAVENOUS CONTINUOUS
Status: DISCONTINUED | OUTPATIENT
Start: 2022-01-19 | End: 2022-01-20

## 2022-01-19 RX ORDER — FLUMAZENIL 0.1 MG/ML
0.2 INJECTION, SOLUTION INTRAVENOUS ONCE
Status: COMPLETED | OUTPATIENT
Start: 2022-01-19 | End: 2022-01-19

## 2022-01-19 RX ORDER — METOPROLOL TARTRATE 1 MG/ML
5 INJECTION, SOLUTION INTRAVENOUS ONCE
Status: COMPLETED | OUTPATIENT
Start: 2022-01-19 | End: 2022-01-19

## 2022-01-19 RX ORDER — ETOMIDATE 2 MG/ML
INJECTION INTRAVENOUS PRN
Status: DISCONTINUED | OUTPATIENT
Start: 2022-01-19 | End: 2022-01-19

## 2022-01-19 RX ORDER — AMINO AC/PROTEIN HYDR/WHEY PRO 10G-100/30
1 LIQUID (ML) ORAL
Status: DISCONTINUED | OUTPATIENT
Start: 2022-01-19 | End: 2022-02-03

## 2022-01-19 RX ORDER — METOPROLOL TARTRATE 25 MG/1
25 TABLET, FILM COATED ORAL 2 TIMES DAILY
Status: DISCONTINUED | OUTPATIENT
Start: 2022-01-19 | End: 2022-01-22

## 2022-01-19 RX ORDER — POTASSIUM CHLORIDE 20MEQ/15ML
20 LIQUID (ML) ORAL ONCE
Status: COMPLETED | OUTPATIENT
Start: 2022-01-19 | End: 2022-01-19

## 2022-01-19 RX ORDER — ACETAMINOPHEN 650 MG/1
650 SUPPOSITORY RECTAL EVERY 4 HOURS PRN
Status: DISCONTINUED | OUTPATIENT
Start: 2022-01-19 | End: 2022-02-18 | Stop reason: HOSPADM

## 2022-01-19 RX ORDER — DEXTROSE MONOHYDRATE 100 MG/ML
INJECTION, SOLUTION INTRAVENOUS CONTINUOUS PRN
Status: DISCONTINUED | OUTPATIENT
Start: 2022-01-19 | End: 2022-02-18 | Stop reason: HOSPADM

## 2022-01-19 RX ADMIN — QUETIAPINE FUMARATE 50 MG: 50 TABLET ORAL at 01:37

## 2022-01-19 RX ADMIN — PHENYLEPHRINE HYDROCHLORIDE 100 MCG: 10 INJECTION INTRAVENOUS at 21:03

## 2022-01-19 RX ADMIN — FENTANYL CITRATE 50 MCG/HR: 50 INJECTION, SOLUTION INTRAMUSCULAR; INTRAVENOUS at 21:20

## 2022-01-19 RX ADMIN — Medication 15 ML: at 11:26

## 2022-01-19 RX ADMIN — DULOXETINE 120 MG: 30 CAPSULE, DELAYED RELEASE ORAL at 21:38

## 2022-01-19 RX ADMIN — LORAZEPAM 1 MG: 1 TABLET ORAL at 14:05

## 2022-01-19 RX ADMIN — Medication 500 MG: at 13:19

## 2022-01-19 RX ADMIN — CEFEPIME HYDROCHLORIDE 2 G: 2 INJECTION, POWDER, FOR SOLUTION INTRAVENOUS at 08:49

## 2022-01-19 RX ADMIN — Medication 500 MG: at 08:37

## 2022-01-19 RX ADMIN — TOLTERODINE TARTRATE 2 MG: 2 TABLET, FILM COATED ORAL at 08:47

## 2022-01-19 RX ADMIN — APIXABAN 5 MG: 5 TABLET, FILM COATED ORAL at 08:35

## 2022-01-19 RX ADMIN — POTASSIUM CHLORIDE 20 MEQ: 20 SOLUTION ORAL at 08:36

## 2022-01-19 RX ADMIN — DEXAMETHASONE 6 MG: 2 TABLET ORAL at 13:19

## 2022-01-19 RX ADMIN — Medication 150 MCG: at 22:38

## 2022-01-19 RX ADMIN — CEFEPIME HYDROCHLORIDE 2 G: 2 INJECTION, POWDER, FOR SOLUTION INTRAVENOUS at 16:06

## 2022-01-19 RX ADMIN — SENNOSIDES AND DOCUSATE SODIUM 1 TABLET: 8.6; 5 TABLET ORAL at 08:38

## 2022-01-19 RX ADMIN — FLUMAZENIL 0.2 MG: 0.1 INJECTION, SOLUTION INTRAVENOUS at 18:33

## 2022-01-19 RX ADMIN — APIXABAN 5 MG: 5 TABLET, FILM COATED ORAL at 21:37

## 2022-01-19 RX ADMIN — Medication 7.5 MG: at 08:34

## 2022-01-19 RX ADMIN — INSULIN ASPART 1 UNITS: 100 INJECTION, SOLUTION INTRAVENOUS; SUBCUTANEOUS at 16:52

## 2022-01-19 RX ADMIN — Medication 500 MG: at 21:46

## 2022-01-19 RX ADMIN — TOPIRAMATE 200 MG: 200 TABLET ORAL at 21:44

## 2022-01-19 RX ADMIN — SODIUM PHOSPHATE, MONOBASIC, MONOHYDRATE AND SODIUM PHOSPHATE, DIBASIC, ANHYDROUS 15 MMOL: 276; 142 INJECTION, SOLUTION INTRAVENOUS at 18:34

## 2022-01-19 RX ADMIN — VORICONAZOLE 200 MG: 40 POWDER, FOR SUSPENSION ORAL at 22:32

## 2022-01-19 RX ADMIN — METOPROLOL TARTRATE 5 MG: 1 INJECTION, SOLUTION INTRAVENOUS at 16:01

## 2022-01-19 RX ADMIN — VENLAFAXINE 75 MG: 75 TABLET ORAL at 21:48

## 2022-01-19 RX ADMIN — VENLAFAXINE 75 MG: 75 TABLET ORAL at 08:36

## 2022-01-19 RX ADMIN — VORICONAZOLE 200 MG: 40 POWDER, FOR SUSPENSION ORAL at 08:37

## 2022-01-19 RX ADMIN — FLUMAZENIL 0.2 MG: 0.1 INJECTION, SOLUTION INTRAVENOUS at 18:27

## 2022-01-19 RX ADMIN — ACETAMINOPHEN 650 MG: 325 TABLET, FILM COATED ORAL at 16:46

## 2022-01-19 RX ADMIN — TOLTERODINE TARTRATE 2 MG: 2 TABLET, FILM COATED ORAL at 21:49

## 2022-01-19 RX ADMIN — BARICITINIB 4 MG: 2 TABLET, FILM COATED ORAL at 08:33

## 2022-01-19 RX ADMIN — MIDAZOLAM 4 MG: 1 INJECTION INTRAMUSCULAR; INTRAVENOUS at 22:05

## 2022-01-19 RX ADMIN — ETOMIDATE 16 MG: 40 INJECTION, SOLUTION INTRAVENOUS at 21:03

## 2022-01-19 RX ADMIN — ROCURONIUM BROMIDE 100 MG: 50 INJECTION, SOLUTION INTRAVENOUS at 21:03

## 2022-01-19 RX ADMIN — TOPIRAMATE 100 MG: 100 TABLET, FILM COATED ORAL at 08:45

## 2022-01-19 RX ADMIN — Medication 18.75 MG: at 10:21

## 2022-01-19 ASSESSMENT — ACTIVITIES OF DAILY LIVING (ADL)
ADLS_ACUITY_SCORE: 15
ADLS_ACUITY_SCORE: 13
ADLS_ACUITY_SCORE: 13
ADLS_ACUITY_SCORE: 11
ADLS_ACUITY_SCORE: 13
ADLS_ACUITY_SCORE: 15
ADLS_ACUITY_SCORE: 13
ADLS_ACUITY_SCORE: 15
ADLS_ACUITY_SCORE: 13
ADLS_ACUITY_SCORE: 13
ADLS_ACUITY_SCORE: 15
ADLS_ACUITY_SCORE: 13
ADLS_ACUITY_SCORE: 13
ADLS_ACUITY_SCORE: 15
ADLS_ACUITY_SCORE: 15
ADLS_ACUITY_SCORE: 13
ADLS_ACUITY_SCORE: 13
ADLS_ACUITY_SCORE: 15
ADLS_ACUITY_SCORE: 11
ADLS_ACUITY_SCORE: 15
ADLS_ACUITY_SCORE: 13
ADLS_ACUITY_SCORE: 11

## 2022-01-19 ASSESSMENT — MIFFLIN-ST. JEOR: SCORE: 1140.13

## 2022-01-19 NOTE — PROGRESS NOTES
CLINICAL NUTRITION SERVICES - REASSESSMENT NOTE     Nutrition Prescription    RECOMMENDATIONS FOR MDs/PROVIDERS TO ORDER:  Modify from conditional bolus feedings to 24-hr TFs as below given increased respiratory needs.     Malnutrition Status:    Unable to assess due to inability to perform NFPA. At risk.     Recommendations already ordered by Registered Dietitian (RD):   Addendum: Modify TF regimen as below:   Osmolite 1.5 Markie @ goal of  40ml/hr  (960ml/day)  will provide: 1440 kcals, 60 g PRO, 731 ml free H20, 195 g CHO, and 0 g fiber daily + 2 pkt Prosource daily for total provisions of 1520 Kcals (29 Kcal/kg) and 82 gm protein (1.6 gm/kg).     Maintain HOB >30-45 degrees per gastric feeding access     Future/Additional Recommendations:  --Monitor TF tolerance, PO intake, lytes, stooling, respiratory needs, weights, POC     EVALUATION OF THE PROGRESS TOWARD GOALS   Diet: Regular since 1/14    PO Intake: PO intake variable; % of meals per food record flowsheet review. Has a conditional TF order in place PRN, see below. Per review of recent meal orders, has only been ordering fruit ice.     Nutrition Support: Conditional bolus feeding regimen as below  --If patient consumes <50% of a meal, she receives a 237 ml (1 can) bolus of Jevity 1.5 60 ml free water flush before/after each bolus feeding     EN Intake: Average 7-day TF intakes: 205 mL formula   --1 carton 1/14, 2 cartons 1/17, 3 cartons 1/18   308 Kcals (6 Kcal/kg), and 13 g pro (0.3 g/kg). This is meeting 24% of low-end est Kcal needs, and 31% of low-end est protein needs. + PO intake     NEW FINDINGS   General:    Transferred from  to [facility] 1/16    Pt now on Covid isolation after testing positive after RRT for acute hypoxic respiratory failure 1/16. On BiPAP today. Possible need to transfer to ICU noted.     Nutrition/GI:    Refusing conditional bolus feeds at times per chart review; increased need d/t respiratory status. Needed full 3 cartons  "yesterday. Monitor for need to increase nutrition provisions given new BiPAP need, and current order does not meet 100% of est nutrition needs. Intake so far today has only been 2 fruit ice per RN.     \"Hx of eating disorder\" per MD notes; appears be anorexia nervosa, in remission per chart review.     Having 2-5 BMS per day per review of flowsheet. On scheduled bowel meds (Miralax held today, but Senna given)     Weights:    Current weight up from admit; monitor trends for accuracy and monitor fluid status    Labs:    Reviewed available labs and glucose trends    Medications:    Decadron    MSSI     MVI with minerals    Miralax, Senna     Topiramate     MALNUTRITION  % Intake: </= 50% for >/= 5 days (severe)  % Weight Loss: None noted  Muscle Loss: Unable to assess  Subcutaneous Fat Loss: Unable to assess  Fluid Accumulation/Edema: Does not meet criteria [nutrition related vs other etiology?]  Malnutrition Diagnosis: Unable to assess due to inability to perform NFPA. At risk.     Previous Goals   Weight maintenance ~52 kg.   Evaluation: Met but possibly confounded d/t fluid status.     Previous Nutrition Diagnosis  Inadequate oral intake related to nausea/vomiting as evidenced by reduced intake over the last 3-4 days, weight loss of 5.8% in the last ~6 weeks.     Evaluation: Declining    CURRENT NUTRITION DIAGNOSIS  Inadequate protein-energy intake related to respiratory status, decreased appetite/intakes, refusal of some bolus feedings as evidenced by food intake/room-service review, avg EN intake only meeting 24% low-end energy needs and 31% low-end protein needs.      INTERVENTIONS  Implementation  Collaboration with other providers - Bedside RN  Enteral Nutrition - Modify to continuous regimen    Goals  Patient to consume % of nutritionally adequate meal trays TID, or the equivalent with conditional TFs.    Monitoring/Evaluation  Progress toward goals will be monitored and evaluated per " protocol.    Alberto Jackson RDN, LD, CNSC  [address] pager: [phone]   ASCOM: [id number]  [address] Phone: *[phone]

## 2022-01-19 NOTE — PLAN OF CARE
Neuro: Patient alert and oriented x4 up until this afternoon. After bipap, unresponsive. Will arouse to voice. Flumazenil given.   Cardiac: ST, 110's-140's. Once time dose of IV metoprolol given. BP's stable. Febrile, 100.2 - tylenol given.  Respiratory: Sating > 92% on HFNC 50-60% at 40L. Bipap this afternoon, now at 14/5 and 55% FIO2. Tachypneic. Abdominal muscle use. Desats with minimal activity.   GI/: Adequate urine output via purewick. 2 loose BM this shift.   Diet/appetite: Regular diet. No appetite. Bolus feeds switched to continuous feeds d/t respiratory status. TF now at 40 ml/hr into PEG tube.   Activity: Turns in bed. Has not gotten up d/t oxygen needs and desatting with any activity.   Pain: Complains of some abdominal pain but improving. Has not requested pain medication.   Skin: No new deficits noted. Mepilex on coccyx.   LDA's: PIV x1 running TKO for antibiotics.  Plan: Potassium replaced, redraw in AM. Chest x-ray completed. Transferring to ICU, report given. Continue with POC. Notify primary team with changes.

## 2022-01-19 NOTE — PROGRESS NOTES
"Brief Medicine Note    Contacted by nursing regarding worsening VBG, clinically appearing worse.     From signout, patient has been tachypneic in the 30s for 3 days, was on HFNC, now on BiPAP, repeat VBG @ 1600. Appears to be tiring out. COVID+, repeat infection. Previously vented/trached w/ last COVID infection, not trached currently.    VB.23/65/64/27 (7.35/52/28/29) on BiPAP 12/6, 50% FiO2    /86 (BP Location: Left arm)   Pulse 119   Temp 100.2  F (37.9  C)   Resp (!) 33   Ht 1.6 m (5' 3\")   Wt 58.1 kg (128 lb 1.4 oz)   SpO2 95%   BMI 22.69 kg/m    General: Drowsy. Opens eyes to voice, nods yes/no responses.   CV: Tachycardic  Resp: Tachypneic, increased WOB. Lungs diminished bilaterally.    A/P:  #Worsening AHRF 2/2 COVID PNA  - BiPAP settings changed to 14/6 by RT, on 55% FiO2.  - Discussed w/ MICU attending, Dr. Conley. Recommends pCXR, ABG, reverse lorazepam (received 1mg @ 1400) w/ flumazenil. Consider diuresis. Likely will need ICU, will attempt to maximize IMC level care in the interim. Appreciate MICU recs.    *ADDENDUM 1830:  CXR w/ no significant change  ABG worse: 7.21/73/76/29  Increased somnolence, difficult to arouse. Flumazenil not yet given d/t PRN order had been previously discontinued - this was reordered and requested be given now -> no improvement in mental status.  - D/W MICU attending Dr. Izaguirre and Fellow Dr. Bennett. Pt accepted in transfer to MICU for anticipated intubation.  - Family (spouse Herminio) updated    Fawn Velarde PA-C  Hospitalist Service  Contact information available through [facility]    "

## 2022-01-19 NOTE — H&P
MEDICAL ICU H&P  01/12/2022     Date of Hospital Admission: 1/11/2022  Date of ICU Admission: 1/19/2022  Reason for Critical Care Admission: Hypercarbic Respiratory Failure  Date of Service (when I saw the patient): 1/19/2022     ASSESSMENT: Sherly Yeung is a 56 year old woman with SDH, PE/DVT, HTN and prior COVID PNA (+ in 10/2021, required tracheostomy) who was admitted on 1/11/2022 for acute cholecystitis/cholangitis s/p CBD stent who became progressively more hypoxemic on 1/17 and was transferred to the ICU on 1/19 intubated for acute hypoxemic hypercarbic respiratory failure    PLAN:     Neuro:    # Depression  # Anorexia Nervosa  -Continue PTA venlafaxine, abilify, ativan PRN    # Pain and sedation  - precedex, fentanyl     Pulmonary:  # Acute hypoxic respiratory failure  # Acute hypercapnic respiratory failure   # ARDS  Patient became hypoxic and required HFNC, CT-PE on 1/16 negative for PE but showed diffuse GGO c/w COVID+ test on 1/16. Unclear exposure as she was negative on admission but did have visitors through the weekend.  - ARDS protocol  - ABG PRN off A-Line  - Daily SBT,  asking about risks/benefits of early tracheostomy.      Cardiovascular:  # HTN- Holding PTA metoprolol    # Prolonged QTc - monitor QTC with EKG PRN     GI/Nutrition:  # Choledocholithiasis c/b cholangitis, s/p CBD stenting (1/12/22)  # Probable Acute cholecystitis s/p transpapillary cystic duct stenting (1/12/22)  # Transaminitis   - S/p ERCP, removal of stone and stent placement  - Abx for 10 days (1/12 - 1/22) - (Was cipro/flagyl, broadened to cef/flagyl due to resp failure)  - Follow as outpatient with Gen-surg for cholecystectomy (order on discharge placed)  - Appreciate GI rec's (F/u in 1 month)    # Severe Malnutrition  Patient has a gastric tube.  She does eat 3 meals a day, with tube feed to follow each feed as she does not consume enough calories by mouth at this time.  - Continue PTA Jevity 200 mL tube feed  -  Nutrition to adjust tube feeds in AM      Renal/Fluids/Electrolytes:  # Respiratory Acidosis  Will attempt to correct with increased minute ventillation and monitor with blood gases.    # Hypophosphatemia  Phosphorous 1.2 in the setting of increased breathing  - Phosphorous replacement protocol     Endocrine:  #High Dose Steroids  - Monitor for hyperglycemia and maintain goal of <200   - px PPI while intubated and on high dose steroids  - OK to defer oscal, PJP prophylaxis in setting of short steroid course  - Will evaluate for adrenal insufficiency in the future if requiring pressors     ID:  # COVID 19 PNA  # Aspergillus PNA  # Hospital Acquired Pneumonia  Recent history of COVID PNA in 10/21 requiring tracheostomy. Unvaccinated. Previous hospital stay complicated by Aspergillus PNA.  MRSA nares negative 1/16; vanco stopped 1/17. Started on barcitinib, remdesivir, and dexamethasone on 1/17.  - Discontinue remdesivir now that she's intubated. (No mortality benfit in intubated patient as per COV-BARRIER trial)  - Consider increasing steroid dosing to Dex 12 mg daily for 10 days (Based on COVID-STEROID-2 Trail) vs. Dex 20 mg x 5 days, 10 mg x5 days (based on CODEX & COVID-ARDS trial).  - Continue voriconazole until 1/22/2022  - Continue cefepime+flagyl for empiric HAP coverage (1/16-1/23), repeat sputum culture ordered from ETT.     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  She has a history of multiple blood clots.  She has been maintained on apixaban.  -Continue PTA apixaban    # Normocytic anemia- baseline 9-10, stable, monitor      Musculoskeletal:  No acute issues     Skin:  No acute issues     General Cares/Prophylaxis:    DVT Prophylaxis: DOAC  GI Prophylaxis: PPI  Restraints: none  Family Communication:  updated  Code Status: FULL     Lines/tubes/drains:  - A-line for frequent ABGs      Disposition:  - Medical ICU     Patient seen and findings/plan discussed with medical ICU staff, Dr. Catrachito Izaguirre MD.     Ashok Conteh MD  [phone]     -----------------------------------------------------------------------     HISTORY PRESENTING ILLNESS:     History obtained through chart review.    Patient with increasing hypoxemia on 1/16 and found to be COVID+ in the hospital (previously negative on admission). On 1/19 at 1700, patient had increasing somnolence and hypercarbia. She had been given ativan 1 mg prior to mental status change at 1600. Flumazenil given at 1830 with minimal improvement in mental status. She was transferred to the ICU for closer monitoring and possible intubation. Around 2000, she was unarousable by sternal rub.  updated and patient was intubated.     REVIEW OF SYSTEMS: Unable to obtain due to BIPAP     PAST MEDICAL HISTORY: See prior H&P from admit     SURGICAL HISTORY: See prior H&P from admit     SOCIAL HISTORY: See prior H&P from admit     FAMILY HISTORY: See prior H&P from admit     ALLERGIES: See prior H&P from admit     MEDICATIONS:  Current Facility-Administered Medications   Medication     acetaminophen (TYLENOL) tablet 650 mg    Or     acetaminophen (TYLENOL) Suppository 650 mg     apixaban ANTICOAGULANT (ELIQUIS) tablet 5 mg     ARIPiprazole (ABILIFY) half-tab 7.5 mg     [START ON 1/20/2022] baricitinib (OLUMIANT) tablet 4 mg     carboxymethylcellulose PF (REFRESH PLUS) 0.5 % ophthalmic solution 1 drop     ceFEPIme (MAXIPIME) 2 g vial to attach to  ml bag for ADULTS or 50 ml bag for PEDS     [START ON 1/20/2022] dexamethasone (DECADRON) tablet 6 mg     dexmedetomidine (PRECEDEX) 400 mcg in 0.9% sodium chloride 100 mL     dextrose 10% infusion     glucose gel 15-30 g    Or     dextrose 50 % injection 25-50 mL    Or     glucagon injection 1 mg     DULoxetine (CYMBALTA) DR capsule 120 mg     fentaNYL (SUBLIMAZE) 50 mcg/mL bolus from infusion pump 12.5-25  mcg     fentaNYL (SUBLIMAZE) infusion     insulin aspart (NovoLOG) injection (RAPID ACTING)     insulin aspart (NovoLOG) injection (RAPID ACTING)     lidocaine (LMX4) cream     lidocaine (LMX4) cream     lidocaine 1 % 0.1-1 mL     lidocaine 1 % 0.1-1 mL     Medication instructions: Do NOT use nebulized medications     melatonin tablet 1 mg     [Held by provider] metoprolol tartrate (LOPRESSOR) tablet 25 mg     metronidazole (FIRST-METRONIDAZOLE) suspension 500 mg     multivitamins w/minerals liquid 15 mL     naloxone (NARCAN) injection 0.2 mg    Or     naloxone (NARCAN) injection 0.4 mg    Or     naloxone (NARCAN) injection 0.2 mg    Or     naloxone (NARCAN) injection 0.4 mg     No lozenges or gum should be given while patient on BIPAP/AVAPS/AVAPS AE     norepinephrine (LEVOPHED) 4 mg/250 mL NS infusion ADULT (PERIPHERAL)     ondansetron (ZOFRAN) injection 4 mg     ondansetron (ZOFRAN-ODT) ODT tab 8 mg     [START ON 1/20/2022] pantoprazole (PROTONIX) 2 mg/mL suspension 40 mg    Or     [START ON 1/20/2022] pantoprazole (PROTONIX) IV push injection 40 mg     Patient is already receiving anticoagulation with heparin, enoxaparin (LOVENOX), warfarin (COUMADIN)  or other anticoagulant medication     Patient may continue current oral medications     polyethylene glycol (MIRALAX) Packet 17 g     Potassium Phosphate 15 mMol/250 mL intermittent infusion 15 mmol     protein modular (PROSOURCE TF) 1 packet     QUEtiapine (SEROquel) tablet 50 mg     senna-docusate (SENOKOT-S/PERICOLACE) 8.6-50 MG per tablet 1 tablet    Or     senna-docusate (SENOKOT-S/PERICOLACE) 8.6-50 MG per tablet 2 tablet     senna-docusate (SENOKOT-S/PERICOLACE) 8.6-50 MG per tablet 1 tablet     sodium chloride (PF) 0.9% PF flush 3 mL     sodium chloride (PF) 0.9% PF flush 3 mL     sodium chloride (PF) 0.9% PF flush 3 mL     sodium phosphate 15 mmol in D5W intermittent infusion     tolterodine (DETROL) tablet 2 mg     topiramate (TOPAMAX) tablet 100 mg      "topiramate (TOPAMAX) tablet 200 mg     venlafaxine (EFFEXOR) tablet 75 mg     voriconazole (VFEND) suspension 200 mg        PHYSICAL EXAMINATION:  BP 91/58   Pulse 76   Temp 98.8  F (37.1  C) (Axillary)   Resp 16   Ht 1.6 m (5' 3\")   Wt 58.1 kg (128 lb 1.4 oz)   SpO2 100%   BMI 22.69 kg/m      General: Somnolent, respiratory distress  HEENT: No scleral icterus, MMM  Neuro: Spontaneously moving all 4 extremities  Pulm/Resp: Labored breathing, tachypnic, bilateral coarse lung sounds on BIPAP prior to intubation  CV: tachycardic, no m/g/r  Abdomen: Soft, non-distended, non-tender  Extremities: Right arm 4+ edema, 2+ bilateral pedal edema     LABS: Reviewed.     IMAGING:Reviewed.    "

## 2022-01-19 NOTE — PLAN OF CARE
"Vital signs:  Temp: 99  F (37.2  C) Temp src: Oral BP: 117/71 Pulse: 111   Resp: (!) 32 SpO2: 96 % O2 Device: High Flow Nasal Cannula (HFNC) Oxygen Delivery: 40 LPM Height: 160 cm (5' 3\") Weight: 58.1 kg (128 lb 1.4 oz)      Neuro: A&Ox4. Calm and able to follow commands. Intermittently anxious, PRN Seroquel given 1x. Deconditioned.   Cardiac: STach -120s.  VSS. Afebrile.    Respiratory: Sating > 90% on HFNC. 55% FiO2 and 40L. O2 needs increase to 75% FiO2 40L with activity. Tachypneic. Abdominal and accessory muscle use noted. Desats with minimal activity.   GI/: Adequate urine output via purewick. No BM during shift. No nausea during shift  Diet/appetite: Regular diet. No appetite. 1 bolus feed given.   Activity:  Not oob during shift.   Pain: C/O abdominal and back pain. PRN Oxycodone given 1x per MAR.   Skin: No new deficits noted.  LDA's: PIV x1 running TKO for antibiotics. PEG. Purewick.     Plan: Continue with POC. Notify primary team with changes.   "

## 2022-01-19 NOTE — PROGRESS NOTES
[facility]    Medicine Progress Note - Hospitalist Service, Gold 8       Date of Admission:  1/11/2022    Assessment & Plan                     Sherly Yeung is a 56 year old female admitted on 1/11/2022. She has a history of SDH, PE/DVT (on Xarelto), HTN, Covid infection subclavian vein stenosis s/p stenting who presents to the Emergency Department with abdominal pain, nausea, and vomiting beginning Sunday, 1/9 and found to have cholangitis and possible acute cholecystitis. Hospital stay complicated by COVID and acute hypoxemic respiratory failure on 1/17    Interval Changes:  -More somnolent this AM, repeat VBG with mild hypercarbia, likely CO2 retention in setting of significant tachypnea  -Plan for bipap this afternoon for a few hours and repeat VBG  -Plan for Bipap at bedtime and with naps, discussed with patient and in agreement  -Continue barctibinb, remdesivir, dexamethasone  -Continue IV antibiotics       # Acute hypoxic respiratory failure  #Acute hypercapnic respiratory failure   # COVID 19 PNA  # Concern for bacterial PNA  # Hx of Recent COVID PNA in October 2021 requiring MV  # Hx of Aspergillus PNA  Patient became hypoxic and required HFNC on 1/6, CT with diffuse GGO c/w COVID which was positive. Unclear exposure as she was negative on admission but did have visitors through the weekend.   Does have recent COVID PNA requiring intubation in October 2020  - not vaccinated.  -Previous hospital stay was complicated by aspergillus pna  -Vancomycin stopped on 1/17 when MRSA nares negative  -Barcitinib started on 1/17, remdesivir/dexamethasone started on 1/17  -Progressive hypercarbia on 1/19, requiring bipap with naps and qhs  Plan:  - Continue cefepime/flagyl (coverage of choledocolithiasis)   - Cont Dex/Remdisivir/Baricitinib   - Supplemental O2 to keep sat >92  - Continue voriconazole through 1/22/22  -Bipap with naps and qhs    # Choledocholithiasis c/b  cholangitis, s/p CBD stenting (1/12/22)  # Probable Acute cholecystitis s/p transpapillary cystic duct stenting (1/12/22)  # Transaminitis   # Sepsis (Tachy, Leukocytosis)  - S/p ERCP, removal of stone and stent placement  - Abx for 10 days (1/12 - 1/22) - (Was cipro/flagyl, broadened to cef/flagyl due to resp failure)  - Follow as outpatient with Gen-surg for cholecystectomy (order on discharge placed)  - Appreciate GI rec's (F/u in 1 month)    # Opioid induced constipation  - Ongoing abdominal pain, CT with large stool burden.   - S/p Golytly with improvement in pain     # Sinus Tachycardia  - Likely related to infection, pain and anxiety  - Monitor     # Prolonged QTc, resolved    # Hypokalemia- Replete per nursing protocol    # Paget Banegas syndrome  # 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  She has a history of multiple blood clots.  She has been maintained on apixaban.  -Continue PTA apixaban     # HTN-Continue PTA metoprolol     # Depression  # Hx of Eating Disorder:  -Continue PTA venlafaxine, abilify, ativan PRN     # Adult failure to thrive  Patient has a gastric tube.  She does eat 3 meals a day, with tube feed to follow each feed as she does not consume enough calories by mouth at this time.  - Continue PTA Jevity 200 mL tube feed after each meal when patient when diet is advanced    # Normocytic anemia- stable, monitor          Diet: Adult Formula Bolus Feeding: TID Jevity 1.5; Route: Gastrostomy; 1; Can(s); Medication - Feeding Tube Flush Frequency: At least 15-30 mL water before and after medication administration and with tube clogging; Amount to Send (Nutrition use): 3 can...  Regular Diet Adult    DVT Prophylaxis: DOAC  Scott Catheter: Not present  Central Lines: None  Code Status: Full Code      Disposition Plan   Expected Discharge: 01/24/2022     Anticipated discharge location:  Awaiting care coordination huddle  Delays:             The patient's care was discussed with the Bedside Nurse, Care Coordinator/, Patient and Patient's Family.    Rodolfo Hennessy MD  Hospitalist Service, [address]  Securely message with the Vocera Web Console (learn more here)  Text page via [employer] Paging/Directory    Please see sign in/sign out for up to date coverage information    Clinically Significant Risk Factors Present on Admission                   Critical Care Services Progress Note:     Sherly Yeung remains critically ill with mental status changes, severe respiratory distress, hypoxic respiratory failure and hypercarbic respiratory failure     I personally examined and evaluated the patient today.   The patient s prognosis today is tentative  I have evaluated all laboratory values and imaging studies from the past 24 hours.  Key findings and decisions made today included   Hypercarbia on VBG - 7.35/52 along with CBC notable for leukocytosis and anemia  I personally managed the metabolic abnormalities, antibiotic therapy and non-invasive positive pressure ventilator.   Consults ongoing and ordered are none  Procedures that will happen today are: non-invasive positive pressure ventilation  All treatments were placed at my direction.  I formulated today s plan with the house staff team or resident(s) and agree with the findings and plan in the associated note.       The above plans and care have been discussed with self and spouse and all questions and concerns were addressed.     I spent a total of >40 minutes (excluding procedure time) personally providing and directing critical care services at the bedside and on the critical care unit for Sherly Yeung including monitoring and titration of advanced respiratory symptoms (bipap and HFNC) as well as intrepretation of blood gas along with anti-microbial administration for COVID 19 and lab monitoring for toxicity in conjunction with  nursing staff.      Rodolfo Hennessy MD        ______________________________________________________________________    Interval History   This morning patient somnolent but denies new complaints. She reports her breathing is better today than yesterday. No fever or chill. She continues to desaturates with minimal activity. Dysphagia still present.     Data reviewed today: I reviewed all medications, new labs and imaging results over the last 24 hours. I personally reviewed no images or EKG's today.    Physical Exam   Vital Signs: Temp: 99  F (37.2  C) Temp src: Oral BP: 117/71 Pulse: 111   Resp: (!) 32 SpO2: 96 % O2 Device: High Flow Nasal Cannula (HFNC) Oxygen Delivery: 40 LPM  Weight: 128 lbs 1.4 oz  Constitutional: ill appearing, lying in bed  Eyes: no icteurs  Nose: HFNC in place  ENT: supple  Respiratory: crackles in bilateral lung fields, no wheezing, + tachypnea, + accessory muscle usage  Cardiovascular: tachycardic, normal s1, s2, no murmur or rub  GI: soft, non-tender, G tube in place, no guarding  : purewick catheter in place  Skin: no rash  Musculoskeletal: no lower extremity edema  Neurologic: holding eye contact, following commands, moving all extremities     Data   Recent Labs   Lab 01/19/22  0519 01/18/22  2229 01/18/22  1608 01/18/22  1232 01/18/22  1136 01/18/22  0432 01/17/22  1349 01/17/22  0456   WBC 15.7*  --   --   --   --  17.4*  --  16.4*   HGB 9.3*  --   --   --   --  9.0*  --  9.2*   MCV 93  --   --   --   --  92  --  93     --   --   --   --  453*  --  357     --   --   --   --  142  --  141   POTASSIUM 3.4  --   --  3.9  --  3.4   < > 2.8*   CHLORIDE 111*  --   --   --   --  110*  --  109   CO2 29  --   --   --   --  26  --  25   BUN 22  --   --   --   --  13  --  4*   CR 0.48*  --   --   --   --  0.49*  --  0.53   ANIONGAP 3  --   --   --   --  6  --  7   TERESA 9.4  --   --   --   --  9.2  --  9.4   GLC 95 177* 157*  --    < > 200*  --  123*   ALBUMIN 2.6*  --   --   --    --   --   --  2.4*   PROTTOTAL 7.0  --   --   --   --   --   --  6.5*   BILITOTAL 0.3  --   --   --   --   --   --  0.3   ALKPHOS 121  --   --   --   --   --   --  138   ALT 47  --   --   --   --   --   --  78*   AST 18  --   --   --   --   --   --  29    < > = values in this interval not displayed.

## 2022-01-19 NOTE — PROVIDER NOTIFICATION
Time of notification: 1640 PM  Provider notified: NANCY Day   Patient status: Provider notified of worsening venous blood gas. Provider to bedside. Patient lethargic but arousing to voice. HR was 140's, 5 mg of IV metoprolol given, now 120's. BP's 130's/70's. Febrile 100.2 - tylenol given, fan on patient. Patient has been on bipap since 1415, 10/5 and 55%. After Blood gas, switched to 14/5 and 55%. Tachypnea in 30's.   Orders received: ABG to be drawn at 1730. Chest x-ray ordered.   Will continue to monitor and notify provider with any changes.     Time of notification: 6:09 PM  Provider notified: NANCY Day   Patient status: Patient less arousable.  Will open eyes to sternal rub but will not keep eyes open long.   ABG drawn. Waiting for results.

## 2022-01-20 LAB
ANION GAP SERPL CALCULATED.3IONS-SCNC: 3 MMOL/L (ref 3–14)
BASE EXCESS BLDA CALC-SCNC: 0.7 MMOL/L (ref -9–1.8)
BASE EXCESS BLDA CALC-SCNC: 0.8 MMOL/L (ref -9–1.8)
BASE EXCESS BLDA CALC-SCNC: 0.9 MMOL/L (ref -9–1.8)
BASE EXCESS BLDA CALC-SCNC: 1.1 MMOL/L (ref -9–1.8)
BASE EXCESS BLDV CALC-SCNC: NORMAL MMOL/L
BUN SERPL-MCNC: 21 MG/DL (ref 7–30)
CALCIUM SERPL-MCNC: 8.3 MG/DL (ref 8.5–10.1)
CHLORIDE BLD-SCNC: 112 MMOL/L (ref 94–109)
CO2 SERPL-SCNC: 29 MMOL/L (ref 20–32)
CREAT SERPL-MCNC: 0.41 MG/DL (ref 0.52–1.04)
CRP SERPL-MCNC: 52 MG/L (ref 0–8)
D DIMER PPP FEU-MCNC: 0.9 UG/ML FEU (ref 0–0.5)
ERYTHROCYTE [DISTWIDTH] IN BLOOD BY AUTOMATED COUNT: 17.1 % (ref 10–15)
GFR SERPL CREATININE-BSD FRML MDRD: >90 ML/MIN/1.73M2
GLUCOSE BLD-MCNC: 250 MG/DL (ref 70–99)
GLUCOSE BLDC GLUCOMTR-MCNC: 136 MG/DL (ref 70–99)
GLUCOSE BLDC GLUCOMTR-MCNC: 166 MG/DL (ref 70–99)
GLUCOSE BLDC GLUCOMTR-MCNC: 173 MG/DL (ref 70–99)
GLUCOSE BLDC GLUCOMTR-MCNC: 176 MG/DL (ref 70–99)
HCO3 BLD-SCNC: 27 MMOL/L (ref 21–28)
HCO3 BLD-SCNC: 27 MMOL/L (ref 21–28)
HCO3 BLD-SCNC: 28 MMOL/L (ref 21–28)
HCO3 BLD-SCNC: 28 MMOL/L (ref 21–28)
HCO3 BLDV-SCNC: NORMAL MMOL/L
HCT VFR BLD AUTO: 25.8 % (ref 35–47)
HGB BLD-MCNC: 7.8 G/DL (ref 11.7–15.7)
INR PPP: 3 (ref 0.85–1.15)
MAGNESIUM SERPL-MCNC: 2.5 MG/DL (ref 1.6–2.3)
MCH RBC QN AUTO: 28.6 PG (ref 26.5–33)
MCHC RBC AUTO-ENTMCNC: 30.2 G/DL (ref 31.5–36.5)
MCV RBC AUTO: 95 FL (ref 78–100)
O2/TOTAL GAS SETTING VFR VENT: 40 %
O2/TOTAL GAS SETTING VFR VENT: 40 %
O2/TOTAL GAS SETTING VFR VENT: 45 %
O2/TOTAL GAS SETTING VFR VENT: 45 %
O2/TOTAL GAS SETTING VFR VENT: 60 %
OXYHGB MFR BLD: 99 % (ref 92–100)
OXYHGB MFR BLDV: NORMAL %
PCO2 BLD: 51 MM HG (ref 35–45)
PCO2 BLD: 52 MM HG (ref 35–45)
PCO2 BLD: 57 MM HG (ref 35–45)
PCO2 BLD: 59 MM HG (ref 35–45)
PCO2 BLDV: NORMAL MM[HG]
PH BLD: 7.29 [PH] (ref 7.35–7.45)
PH BLD: 7.3 [PH] (ref 7.35–7.45)
PH BLD: 7.33 [PH] (ref 7.35–7.45)
PH BLD: 7.33 [PH] (ref 7.35–7.45)
PH BLDV: NORMAL [PH]
PHOSPHATE SERPL-MCNC: 1.8 MG/DL (ref 2.5–4.5)
PHOSPHATE SERPL-MCNC: 2.8 MG/DL (ref 2.5–4.5)
PLATELET # BLD AUTO: 278 10E3/UL (ref 150–450)
PO2 BLD: 101 MM HG (ref 80–105)
PO2 BLD: 115 MM HG (ref 80–105)
PO2 BLD: 160 MM HG (ref 80–105)
PO2 BLD: 233 MM HG (ref 80–105)
PO2 BLDV: NORMAL MM[HG]
POTASSIUM BLD-SCNC: 3.5 MMOL/L (ref 3.4–5.3)
PROCALCITONIN SERPL-MCNC: <0.05 NG/ML
RBC # BLD AUTO: 2.73 10E6/UL (ref 3.8–5.2)
SODIUM SERPL-SCNC: 144 MMOL/L (ref 133–144)
WBC # BLD AUTO: 5.9 10E3/UL (ref 4–11)

## 2022-01-20 PROCEDURE — 82805 BLOOD GASES W/O2 SATURATION: CPT | Performed by: NURSE PRACTITIONER

## 2022-01-20 PROCEDURE — 84100 ASSAY OF PHOSPHORUS: CPT | Performed by: INTERNAL MEDICINE

## 2022-01-20 PROCEDURE — 82803 BLOOD GASES ANY COMBINATION: CPT | Performed by: INTERNAL MEDICINE

## 2022-01-20 PROCEDURE — 250N000013 HC RX MED GY IP 250 OP 250 PS 637: Performed by: NURSE PRACTITIONER

## 2022-01-20 PROCEDURE — 258N000003 HC RX IP 258 OP 636: Performed by: STUDENT IN AN ORGANIZED HEALTH CARE EDUCATION/TRAINING PROGRAM

## 2022-01-20 PROCEDURE — 86140 C-REACTIVE PROTEIN: CPT | Performed by: PHYSICIAN ASSISTANT

## 2022-01-20 PROCEDURE — 250N000013 HC RX MED GY IP 250 OP 250 PS 637: Performed by: STUDENT IN AN ORGANIZED HEALTH CARE EDUCATION/TRAINING PROGRAM

## 2022-01-20 PROCEDURE — 250N000013 HC RX MED GY IP 250 OP 250 PS 637: Performed by: INTERNAL MEDICINE

## 2022-01-20 PROCEDURE — 250N000011 HC RX IP 250 OP 636: Performed by: PHYSICIAN ASSISTANT

## 2022-01-20 PROCEDURE — 94003 VENT MGMT INPAT SUBQ DAY: CPT

## 2022-01-20 PROCEDURE — 36620 INSERTION CATHETER ARTERY: CPT

## 2022-01-20 PROCEDURE — 85379 FIBRIN DEGRADATION QUANT: CPT | Performed by: PHYSICIAN ASSISTANT

## 2022-01-20 PROCEDURE — 87040 BLOOD CULTURE FOR BACTERIA: CPT | Performed by: STUDENT IN AN ORGANIZED HEALTH CARE EDUCATION/TRAINING PROGRAM

## 2022-01-20 PROCEDURE — 85027 COMPLETE CBC AUTOMATED: CPT | Performed by: STUDENT IN AN ORGANIZED HEALTH CARE EDUCATION/TRAINING PROGRAM

## 2022-01-20 PROCEDURE — 84145 PROCALCITONIN (PCT): CPT | Performed by: STUDENT IN AN ORGANIZED HEALTH CARE EDUCATION/TRAINING PROGRAM

## 2022-01-20 PROCEDURE — 250N000009 HC RX 250: Performed by: STUDENT IN AN ORGANIZED HEALTH CARE EDUCATION/TRAINING PROGRAM

## 2022-01-20 PROCEDURE — 200N000002 HC R&B ICU UMMC

## 2022-01-20 PROCEDURE — 999N000015 HC STATISTIC ARTERIAL MONITORING DAILY

## 2022-01-20 PROCEDURE — C9113 INJ PANTOPRAZOLE SODIUM, VIA: HCPCS | Performed by: STUDENT IN AN ORGANIZED HEALTH CARE EDUCATION/TRAINING PROGRAM

## 2022-01-20 PROCEDURE — 36415 COLL VENOUS BLD VENIPUNCTURE: CPT | Performed by: STUDENT IN AN ORGANIZED HEALTH CARE EDUCATION/TRAINING PROGRAM

## 2022-01-20 PROCEDURE — 84100 ASSAY OF PHOSPHORUS: CPT | Performed by: STUDENT IN AN ORGANIZED HEALTH CARE EDUCATION/TRAINING PROGRAM

## 2022-01-20 PROCEDURE — 999N000157 HC STATISTIC RCP TIME EA 10 MIN

## 2022-01-20 PROCEDURE — 87205 SMEAR GRAM STAIN: CPT | Performed by: INTERNAL MEDICINE

## 2022-01-20 PROCEDURE — 83735 ASSAY OF MAGNESIUM: CPT | Performed by: HOSPITALIST

## 2022-01-20 PROCEDURE — 80048 BASIC METABOLIC PNL TOTAL CA: CPT | Performed by: PHYSICIAN ASSISTANT

## 2022-01-20 PROCEDURE — 250N000011 HC RX IP 250 OP 636: Performed by: STUDENT IN AN ORGANIZED HEALTH CARE EDUCATION/TRAINING PROGRAM

## 2022-01-20 PROCEDURE — 250N000013 HC RX MED GY IP 250 OP 250 PS 637: Performed by: PEDIATRICS

## 2022-01-20 PROCEDURE — 250N000013 HC RX MED GY IP 250 OP 250 PS 637: Performed by: PHYSICIAN ASSISTANT

## 2022-01-20 PROCEDURE — 85610 PROTHROMBIN TIME: CPT | Performed by: STUDENT IN AN ORGANIZED HEALTH CARE EDUCATION/TRAINING PROGRAM

## 2022-01-20 RX ORDER — HYDROXYZINE HYDROCHLORIDE 25 MG/1
25 TABLET, FILM COATED ORAL EVERY 6 HOURS PRN
Status: DISCONTINUED | OUTPATIENT
Start: 2022-01-20 | End: 2022-02-18 | Stop reason: HOSPADM

## 2022-01-20 RX ORDER — DEXMEDETOMIDINE HYDROCHLORIDE 4 UG/ML
.1-1.2 INJECTION, SOLUTION INTRAVENOUS CONTINUOUS
Status: DISCONTINUED | OUTPATIENT
Start: 2022-01-20 | End: 2022-01-20

## 2022-01-20 RX ORDER — NICOTINE POLACRILEX 4 MG
15-30 LOZENGE BUCCAL
Status: DISCONTINUED | OUTPATIENT
Start: 2022-01-20 | End: 2022-02-18 | Stop reason: HOSPADM

## 2022-01-20 RX ORDER — HYDROXYZINE HYDROCHLORIDE 25 MG/1
50 TABLET, FILM COATED ORAL EVERY 6 HOURS PRN
Status: DISCONTINUED | OUTPATIENT
Start: 2022-01-20 | End: 2022-02-18 | Stop reason: HOSPADM

## 2022-01-20 RX ORDER — DEXTROSE MONOHYDRATE 25 G/50ML
25-50 INJECTION, SOLUTION INTRAVENOUS
Status: DISCONTINUED | OUTPATIENT
Start: 2022-01-20 | End: 2022-02-18 | Stop reason: HOSPADM

## 2022-01-20 RX ADMIN — CEFEPIME HYDROCHLORIDE 2 G: 2 INJECTION, POWDER, FOR SOLUTION INTRAVENOUS at 08:43

## 2022-01-20 RX ADMIN — BARICITINIB 4 MG: 2 TABLET, FILM COATED ORAL at 08:42

## 2022-01-20 RX ADMIN — SENNOSIDES AND DOCUSATE SODIUM 1 TABLET: 8.6; 5 TABLET ORAL at 20:03

## 2022-01-20 RX ADMIN — POTASSIUM & SODIUM PHOSPHATES POWDER PACK 280-160-250 MG 2 PACKET: 280-160-250 PACK at 14:18

## 2022-01-20 RX ADMIN — VORICONAZOLE 200 MG: 40 POWDER, FOR SUSPENSION ORAL at 08:43

## 2022-01-20 RX ADMIN — PANTOPRAZOLE SODIUM 40 MG: 40 INJECTION, POWDER, FOR SOLUTION INTRAVENOUS at 08:43

## 2022-01-20 RX ADMIN — TOPIRAMATE 200 MG: 200 TABLET ORAL at 20:02

## 2022-01-20 RX ADMIN — POTASSIUM & SODIUM PHOSPHATES POWDER PACK 280-160-250 MG 2 PACKET: 280-160-250 PACK at 08:43

## 2022-01-20 RX ADMIN — Medication 500 MG: at 20:03

## 2022-01-20 RX ADMIN — Medication 15 ML: at 12:14

## 2022-01-20 RX ADMIN — VENLAFAXINE 75 MG: 75 TABLET ORAL at 20:03

## 2022-01-20 RX ADMIN — POTASSIUM & SODIUM PHOSPHATES POWDER PACK 280-160-250 MG 2 PACKET: 280-160-250 PACK at 20:03

## 2022-01-20 RX ADMIN — CEFEPIME HYDROCHLORIDE 2 G: 2 INJECTION, POWDER, FOR SOLUTION INTRAVENOUS at 16:13

## 2022-01-20 RX ADMIN — Medication 25 MCG: at 05:03

## 2022-01-20 RX ADMIN — Medication 25 MCG: at 03:29

## 2022-01-20 RX ADMIN — Medication 500 MG: at 14:18

## 2022-01-20 RX ADMIN — POLYETHYLENE GLYCOL 3350 17 G: 17 POWDER, FOR SOLUTION ORAL at 08:43

## 2022-01-20 RX ADMIN — APIXABAN 5 MG: 5 TABLET, FILM COATED ORAL at 20:03

## 2022-01-20 RX ADMIN — FENTANYL CITRATE 100 MCG/HR: 50 INJECTION, SOLUTION INTRAMUSCULAR; INTRAVENOUS at 08:24

## 2022-01-20 RX ADMIN — Medication 500 MG: at 08:42

## 2022-01-20 RX ADMIN — DEXAMETHASONE 6 MG: 2 TABLET ORAL at 12:14

## 2022-01-20 RX ADMIN — VENLAFAXINE 75 MG: 75 TABLET ORAL at 08:42

## 2022-01-20 RX ADMIN — TOLTERODINE TARTRATE 2 MG: 2 TABLET, FILM COATED ORAL at 08:43

## 2022-01-20 RX ADMIN — CEFEPIME HYDROCHLORIDE 2 G: 2 INJECTION, POWDER, FOR SOLUTION INTRAVENOUS at 00:37

## 2022-01-20 RX ADMIN — NOREPINEPHRINE BITARTRATE 0.03 MCG/KG/MIN: 1 SOLUTION INTRAVENOUS at 01:41

## 2022-01-20 RX ADMIN — Medication 7.5 MG: at 10:17

## 2022-01-20 RX ADMIN — INSULIN ASPART 1 UNITS: 100 INJECTION, SOLUTION INTRAVENOUS; SUBCUTANEOUS at 08:43

## 2022-01-20 RX ADMIN — TOLTERODINE TARTRATE 2 MG: 2 TABLET, FILM COATED ORAL at 20:02

## 2022-01-20 RX ADMIN — TOPIRAMATE 100 MG: 100 TABLET, FILM COATED ORAL at 08:42

## 2022-01-20 RX ADMIN — DEXMEDETOMIDINE HYDROCHLORIDE 0.2 MCG/KG/HR: 400 INJECTION INTRAVENOUS at 00:26

## 2022-01-20 RX ADMIN — Medication 1 PACKET: at 16:13

## 2022-01-20 RX ADMIN — SENNOSIDES AND DOCUSATE SODIUM 1 TABLET: 8.6; 5 TABLET ORAL at 08:43

## 2022-01-20 RX ADMIN — APIXABAN 5 MG: 5 TABLET, FILM COATED ORAL at 08:42

## 2022-01-20 RX ADMIN — VORICONAZOLE 200 MG: 40 POWDER, FOR SUSPENSION ORAL at 20:17

## 2022-01-20 RX ADMIN — POTASSIUM PHOSPHATE, MONOBASIC AND POTASSIUM PHOSPHATE, DIBASIC 15 MMOL: 224; 236 INJECTION, SOLUTION, CONCENTRATE INTRAVENOUS at 05:03

## 2022-01-20 RX ADMIN — Medication 1 PACKET: at 08:43

## 2022-01-20 RX ADMIN — POLYETHYLENE GLYCOL 3350 17 G: 17 POWDER, FOR SOLUTION ORAL at 20:02

## 2022-01-20 ASSESSMENT — ACTIVITIES OF DAILY LIVING (ADL)
ADLS_ACUITY_SCORE: 13
ADLS_ACUITY_SCORE: 15
ADLS_ACUITY_SCORE: 15
ADLS_ACUITY_SCORE: 13

## 2022-01-20 ASSESSMENT — MIFFLIN-ST. JEOR: SCORE: 1117.13

## 2022-01-20 NOTE — PLAN OF CARE
ICU End of Shift Summary. See flowsheets for vital signs and detailed assessment.    Changes this shift:   1900: Pt arrived to MICU alert and answering questions..tenous breathing on bipap 55% fiO2. MD assessed pt and wanted another ABG.   2030: RN went into room to assess pt. Pt was unresponsive to sternal rub. MD notified, decision was to intubate for airway protection.   2100: anesthesia at bedside to intubate. Bedside RN pushed etomidate, rocuronium, phenylephrine given IV push. Hemodynamically stable, no pressors needed. Initial Vent settings: CMV, 60/16/450/12. PIPs high in 50s. Chest xray done, ETT advanced 2 cm. Recheck ETT in right place, 24 cm @lip. Trending ABGs and adjusted vent settings. Vent changed to CMV, 40/16/320/5. PIPs now less than 20.   0000: Neuro assessment done, pt answering yes/no questions appropriately, denies pain. Fent. 100 mcg. Dex was started, pt became hypotensive. Dex was turned off.  0400: Pt has not made urine. Pt nodded she needed to urinate. Bladder scan was performed after urination, 536 ml still present. MD put in order for avila d/t retention.   0500: Pt urinated 200 ml before Avila being  Placed, 400 ml out.     Phosphate replaced d/t lab 1.8. Recheck 4 hrs after infusion is complete.     Plan: Wean vent settings as able, continue to plan of care.     Problem: Ventilator-Induced Lung Injury (Mechanical Ventilation, Invasive)  Goal: Absence of Ventilator-Induced Lung Injury  Outcome: No Change  Intervention: Prevent Ventilator-Associated Pneumonia  Recent Flowsheet Documentation  Taken 1/20/2022 0400 by Belkys Crouch, RN  VAP Prevention Bundle:    HOB elevation maintained    oral care regularly provided    vent circuit breaks minimized  Oral Care: swabbed with antiseptic solution  Head of Bed (HOB) Positioning: HOB at 30 degrees  VAP Prevention Contraindications: condition unstable  VAP Prevention Measures: completed  Taken 1/20/2022 0000 by Belkys Crouch, RN  Oral  Care: swabbed with antiseptic solution  Head of Bed (HOB) Positioning: HOB at 30 degrees  Taken 1/19/2022 2100 by Belkys Crouch, RN  Head of Bed ([facility]) Positioning: HOB at 30 degrees  Taken 1/19/2022 2000 by Belkys Crouch, RN  Head of Bed ([facility]) Positioning: HOB at 30 degrees

## 2022-01-20 NOTE — PROGRESS NOTES
Admitted/transferred from: 6B  Reason for admission/transfer: Increased O2 needs  Patient status upon admission/transfer: On BiPAP, 55% 14/5, tachypneic.   Interventions: Monitor O2 needs  Plan: Possible intubation  2 RN skin assessment: completed by Devika Rodrigez V  Result of skin assessment and interventions/actions: Scaling and peeling on bilateral heels. Generalized bruising. Flushing of the cheeks.   Height, weight, drug calc weight: Done  Patient belongings (see Flowsheet - Adult Profile for details): Phone - in safe.

## 2022-01-20 NOTE — PROCEDURES
PROCEDURE:   Ultrasound guided left Radial artery line placement.     INDICATION: Blood pressure monitoring, shock    PROCEDURE :   Ashok Conteh MD    SUPERVISOR:  Dr. Catrachito Izaguirre MD    CONSENT: Obtained and in the paper chart    UNIVERSAL PROTOCOL: Patient Identification was verified, time out was performed, site marking was done. Imaging data reviewed. Full Barrier precaution done: Hands washed, mask, gloves, gown, cap and eye protection all used.     PROCEDURE SUMMARY:   The patient was prepped and draped in the usual sterile manner using chlorhexidine scrub.    The left radial artery was palpated and visualized on ultrasound.  The artery was successfully cannulated on the first pass. Pulsatile, arterial blood was visualized and the artery was then threaded with a guide wire using the Seldinger technique.  The needle was removed and ultrasound visualized the wire in the artery, a catheter was threaded over the wire, the wire was removed, and the catheter was sutured into place. A good wave-form was obtained. The patient tolerated the procedure well without any immediate complications. The area was cleaned and a dressing was applied.     Dr Catrachito Izaguirre MD was available for the key portions of the procedure.    ESTIMATED BLOOD LOSS: Minimal    Ashok Conteh MD  Internal Medicine  Pager# [phone]

## 2022-01-20 NOTE — ANESTHESIA PROCEDURE NOTES
Airway       Patient location during procedure: ICU       Procedure Start/Stop Times: 1/19/2022 9:02 PM and 1/19/2022 9:05 PM  Staff -        Anesthesiologist:  Catrachito Izaguirre MD       CRNA: Jessica Padilla APRN CRNA       Performed By: CRNA  Consent for Airway        Urgency: emergent       Consent: The procedure was performed in an emergent situation.  Report Obtained from Primary Care Team       History regarding most recent potassium obtained: Yes       History regarding presence/absence of renal failure obtained:Yes       History regarding stroke/CVA obtained:Yes       History regarding presence/absence of NM disorder: YesIndications and Patient Condition       Indications for airway management: airway protection, altered level of consciousness and respiratory insufficiency       Mallampati: Not Assessed     Induction type:intravenous       Mask difficulty assessment: 0 - not attempted    Final Airway Details       Final airway type: endotracheal airway       Successful airway: ETT - single  Endotracheal Airway Details        ETT size (mm): 7.0       Successful intubation technique: video laryngoscopy       Grade View of Cords: 1       Adjucts: stylet       Position: Center       Measured from: lips       Secured at (cm): 22    Post intubation assessment        ETT secured, Vent settings by primary/ICU team, Primary/ICU team to review CXR and Sedation to be ordered by primary/ICU team       Placement verified by: capnometry, equal breath sounds and chest rise        Number of attempts at approach: 1       Secured with: commercial tube mora       Ease of procedure: easy       Dentition: Intact and Unchanged

## 2022-01-20 NOTE — PROGRESS NOTES
MICU PROGRESS NOTE  01/12/2022     Date of Hospital Admission: 1/11/2022  Date of ICU Admission: 1/19/2022  Reason for Critical Care Admission: Hypercarbic Respiratory Failure  Date of Service (when I saw the patient): 1/20/2022     ASSESSMENT: Sherly Yeung is a 56 year old woman with SDH, PE/DVT, HTN and prior COVID PNA (+ in 10/2021, required tracheostomy) who was admitted on 1/11/2022 for acute cholecystitis/cholangitis s/p CBD stent who became progressively more hypoxemic on 1/17 and was transferred to the ICU on 1/19 intubated for acute hypoxemic hypercarbic respiratory failure    PLAN:  Increase RR to 16>>20  Stop sedation  PST 7/5 as tolerated  Consider extubation if mental status improved     Neuro:  # Depression  # Anorexia Nervosa  -Continue PTA venlafaxine and abilify    # Pain and sedation  - Precedex drip off overnight  - Fentanyl drip with PRN boluses      Pulmonary:  # Acute on chronic hypercapnic/hypoxic respiratory failure   # ARDS in the setting of hx of COVID pneumonia  Patient became hypoxic and required HFNC, CT-PE on 1/16 negative for PE but showed diffuse GGO c/w COVID+ test on 1/16. Unclear exposure as she was negative on admission but did have visitors through the weekend. She also has hx of COVID in October 2021, s/p trach and PEG (now decannulated), has been at TCU with 3L NC  - Daily SBT,  asking about risks/benefits of early tracheostomy.    - Intubated 1/19 due to increase somnolence, likely 2/2 C02 retention     Cardiovascular:  # HTN  - Holding PTA metoprolol    # Prolonged QTc   - monitor QTC with EKG PRN     GI/Nutrition:  # Choledocholithiasis c/b cholangitis, s/p CBD stenting (1/12/22)  # Probable Acute cholecystitis s/p transpapillary cystic duct stenting (1/12/22)  # Transaminitis, resolved   - S/p ERCP, removal of stone and stent placement  - Abx for 10 days (1/12 - 1/22) - (Was cipro/flagyl, broadened to cef/flagyl due to resp failure)  - Follow as outpatient with  Gen-surg for cholecystectomy (order on discharge placed)  - Appreciate GI rec's (F/u in 1 month)    # Severe Malnutrition  Patient has a gastric tube.  She does eat 3 meals a day, with tube feed to follow each feed as she does not consume enough calories by mouth at this time.  - Continue PTA Jevity 200 mL tube feed  - Nutrition to adjust tube feeds in AM   - PPI while intubated     Renal/Fluids/Electrolytes:  # uncompensated respiratory acidosis  Will attempt to correct with increased minute ventillation and monitor with blood gases.    # Hypophosphatemia  - Phosphorous replacement protocol     Endocrine:  # Stress and steroid induced hyperglycemia  - Monitor for hyperglycemia and maintain goal of <180   - Sl  - Will evaluate for adrenal insufficiency in the future if requiring pressors     ID:  # COVID 19 PNA  # Aspergillus PNA  # Hospital Acquired Pneumonia  Recent history of COVID PNA in 10/21 requiring tracheostomy. Unvaccinated. Previous hospital stay complicated by Aspergillus PNA.  MRSA nares negative 1/16; vanco stopped 1/17. Started on barcitinib, remdesivir, and dexamethasone on 1/17.  - Discontinue remdesivir now that she's intubated. (No mortality benfit in intubated patient as per COV-BARRIER trial)  - Will not increase steroid dose at this time with her aspergillus pna  - Continue voriconazole until 1/22/2022  - Continue cefepime+flagyl for empiric HAP coverage and GI coverage (1/16-1/23)  - Sputum culture ordered  - Blood cultures ordered  - Procal negative, CRP trending down, WBC count normalized     Hematology:    # Paget Schroetter syndrome  # Hx of Acute embolism and thrombosis of right axillary vein  Patient is status post stent placement in right arm.  She has a history of multiple blood clots.  She has been maintained on apixaban.  -Continue PTA apixaban    # Normocytic anemia  - baseline Hgb 9-10, stable, monitor      Musculoskeletal:  No acute issues     Skin:  No acute issues     General  "Cares/Prophylaxis:    DVT Prophylaxis: DOAC  GI Prophylaxis: PPI  Restraints: none  Family Communication: Will update  via phone  Code Status: FULL     Lines/tubes/drains:  - A-line for frequent ABGs  - ETT  - PIV  - Scott catheter  - PEG tube     Disposition:  - Medical ICU     Patient seen and findings/plan discussed with medical ICU staff, Dr. Toby Garcia.     Kerry Bowers NP-C     -----------------------------------------------------------------------  Interval Events:  RRT called on patient up on the floor for worsening mental status and hypercapneic respiratory failure. BiPAP was initiated without improvement in symptoms.  Patient transferred to ICU, intubated on arrival.  ABG improving since admit.        PHYSICAL EXAMINATION:  BP 91/58 (BP Location: Right arm)   Pulse 101   Temp 97.4  F (36.3  C) (Axillary)   Resp 19   Ht 1.6 m (5' 3\")   Wt 55.8 kg (123 lb 0.3 oz)   SpO2 98%   BMI 21.79 kg/m      General: Lying in bed, intubated, sedated  HEENT: No scleral icterus, MMM  Neuro: Spontaneously moving all 4 extremities; follows commands, wakes to voice  Pulm/Resp: Breath sounds clear bilaterally throughout  CV: NSR, no m/g/r; extremities warm and well perfused, pulses palpable  Abdomen: Soft, non-distended, non-tender  Extremities: Warm, trace edema     LABS: Reviewed.     IMAGING:Reviewed.    "

## 2022-01-21 LAB
ALBUMIN SERPL-MCNC: 2.4 G/DL (ref 3.4–5)
ALP SERPL-CCNC: 112 U/L (ref 40–150)
ALT SERPL W P-5'-P-CCNC: 31 U/L (ref 0–50)
ANION GAP SERPL CALCULATED.3IONS-SCNC: 3 MMOL/L (ref 3–14)
AST SERPL W P-5'-P-CCNC: 12 U/L (ref 0–45)
BACTERIA BLD CULT: NO GROWTH
BACTERIA BLD CULT: NO GROWTH
BASE EXCESS BLDA CALC-SCNC: 1.9 MMOL/L (ref -9–1.8)
BASE EXCESS BLDA CALC-SCNC: 3 MMOL/L (ref -9–1.8)
BILIRUB SERPL-MCNC: 0.2 MG/DL (ref 0.2–1.3)
BUN SERPL-MCNC: 22 MG/DL (ref 7–30)
CALCIUM SERPL-MCNC: 8.6 MG/DL (ref 8.5–10.1)
CHLORIDE BLD-SCNC: 114 MMOL/L (ref 94–109)
CO2 SERPL-SCNC: 27 MMOL/L (ref 20–32)
CREAT SERPL-MCNC: 0.4 MG/DL (ref 0.52–1.04)
ERYTHROCYTE [DISTWIDTH] IN BLOOD BY AUTOMATED COUNT: 17.1 % (ref 10–15)
GFR SERPL CREATININE-BSD FRML MDRD: >90 ML/MIN/1.73M2
GLUCOSE BLD-MCNC: 178 MG/DL (ref 70–99)
GLUCOSE BLDC GLUCOMTR-MCNC: 100 MG/DL (ref 70–99)
GLUCOSE BLDC GLUCOMTR-MCNC: 107 MG/DL (ref 70–99)
GLUCOSE BLDC GLUCOMTR-MCNC: 131 MG/DL (ref 70–99)
GLUCOSE BLDC GLUCOMTR-MCNC: 132 MG/DL (ref 70–99)
GLUCOSE BLDC GLUCOMTR-MCNC: 157 MG/DL (ref 70–99)
GLUCOSE BLDC GLUCOMTR-MCNC: 97 MG/DL (ref 70–99)
HCO3 BLD-SCNC: 29 MMOL/L (ref 21–28)
HCO3 BLD-SCNC: 30 MMOL/L (ref 21–28)
HCT VFR BLD AUTO: 30.7 % (ref 35–47)
HGB BLD-MCNC: 9.1 G/DL (ref 11.7–15.7)
INR PPP: 1.56 (ref 0.85–1.15)
MAGNESIUM SERPL-MCNC: 2.6 MG/DL (ref 1.6–2.3)
MCH RBC QN AUTO: 28.4 PG (ref 26.5–33)
MCHC RBC AUTO-ENTMCNC: 29.6 G/DL (ref 31.5–36.5)
MCV RBC AUTO: 96 FL (ref 78–100)
O2/TOTAL GAS SETTING VFR VENT: 40 %
O2/TOTAL GAS SETTING VFR VENT: 40 %
PCO2 BLD: 52 MM HG (ref 35–45)
PCO2 BLD: 69 MM HG (ref 35–45)
PH BLD: 7.25 [PH] (ref 7.35–7.45)
PH BLD: 7.36 [PH] (ref 7.35–7.45)
PHOSPHATE SERPL-MCNC: 2.2 MG/DL (ref 2.5–4.5)
PLATELET # BLD AUTO: 352 10E3/UL (ref 150–450)
PO2 BLD: 116 MM HG (ref 80–105)
PO2 BLD: 26 MM HG (ref 80–105)
POTASSIUM BLD-SCNC: 4.1 MMOL/L (ref 3.4–5.3)
PROT SERPL-MCNC: 6.4 G/DL (ref 6.8–8.8)
RBC # BLD AUTO: 3.2 10E6/UL (ref 3.8–5.2)
SODIUM SERPL-SCNC: 144 MMOL/L (ref 133–144)
WBC # BLD AUTO: 9 10E3/UL (ref 4–11)

## 2022-01-21 PROCEDURE — 999N000157 HC STATISTIC RCP TIME EA 10 MIN

## 2022-01-21 PROCEDURE — 82803 BLOOD GASES ANY COMBINATION: CPT | Performed by: STUDENT IN AN ORGANIZED HEALTH CARE EDUCATION/TRAINING PROGRAM

## 2022-01-21 PROCEDURE — 82040 ASSAY OF SERUM ALBUMIN: CPT | Performed by: INTERNAL MEDICINE

## 2022-01-21 PROCEDURE — 250N000013 HC RX MED GY IP 250 OP 250 PS 637: Performed by: NURSE PRACTITIONER

## 2022-01-21 PROCEDURE — 250N000011 HC RX IP 250 OP 636: Performed by: STUDENT IN AN ORGANIZED HEALTH CARE EDUCATION/TRAINING PROGRAM

## 2022-01-21 PROCEDURE — 80053 COMPREHEN METABOLIC PANEL: CPT | Performed by: INTERNAL MEDICINE

## 2022-01-21 PROCEDURE — 250N000013 HC RX MED GY IP 250 OP 250 PS 637: Performed by: PEDIATRICS

## 2022-01-21 PROCEDURE — 250N000013 HC RX MED GY IP 250 OP 250 PS 637: Performed by: INTERNAL MEDICINE

## 2022-01-21 PROCEDURE — 84100 ASSAY OF PHOSPHORUS: CPT | Performed by: STUDENT IN AN ORGANIZED HEALTH CARE EDUCATION/TRAINING PROGRAM

## 2022-01-21 PROCEDURE — 36415 COLL VENOUS BLD VENIPUNCTURE: CPT | Performed by: INTERNAL MEDICINE

## 2022-01-21 PROCEDURE — 85610 PROTHROMBIN TIME: CPT | Performed by: STUDENT IN AN ORGANIZED HEALTH CARE EDUCATION/TRAINING PROGRAM

## 2022-01-21 PROCEDURE — 250N000013 HC RX MED GY IP 250 OP 250 PS 637: Performed by: STUDENT IN AN ORGANIZED HEALTH CARE EDUCATION/TRAINING PROGRAM

## 2022-01-21 PROCEDURE — 36600 WITHDRAWAL OF ARTERIAL BLOOD: CPT

## 2022-01-21 PROCEDURE — 999N000015 HC STATISTIC ARTERIAL MONITORING DAILY

## 2022-01-21 PROCEDURE — 250N000011 HC RX IP 250 OP 636: Performed by: PHYSICIAN ASSISTANT

## 2022-01-21 PROCEDURE — 200N000002 HC R&B ICU UMMC

## 2022-01-21 PROCEDURE — 94003 VENT MGMT INPAT SUBQ DAY: CPT

## 2022-01-21 PROCEDURE — 83735 ASSAY OF MAGNESIUM: CPT | Performed by: INTERNAL MEDICINE

## 2022-01-21 PROCEDURE — 99291 CRITICAL CARE FIRST HOUR: CPT | Performed by: INTERNAL MEDICINE

## 2022-01-21 PROCEDURE — C9113 INJ PANTOPRAZOLE SODIUM, VIA: HCPCS | Performed by: STUDENT IN AN ORGANIZED HEALTH CARE EDUCATION/TRAINING PROGRAM

## 2022-01-21 PROCEDURE — 82803 BLOOD GASES ANY COMBINATION: CPT | Performed by: INTERNAL MEDICINE

## 2022-01-21 PROCEDURE — 250N000011 HC RX IP 250 OP 636: Performed by: INTERNAL MEDICINE

## 2022-01-21 PROCEDURE — 250N000013 HC RX MED GY IP 250 OP 250 PS 637: Performed by: PHYSICIAN ASSISTANT

## 2022-01-21 PROCEDURE — 85027 COMPLETE CBC AUTOMATED: CPT | Performed by: STUDENT IN AN ORGANIZED HEALTH CARE EDUCATION/TRAINING PROGRAM

## 2022-01-21 PROCEDURE — 999N000248 HC STATISTIC IV INSERT WITH US BY RN

## 2022-01-21 RX ADMIN — TOLTERODINE TARTRATE 2 MG: 2 TABLET, FILM COATED ORAL at 10:31

## 2022-01-21 RX ADMIN — CEFEPIME HYDROCHLORIDE 2 G: 2 INJECTION, POWDER, FOR SOLUTION INTRAVENOUS at 17:31

## 2022-01-21 RX ADMIN — PANTOPRAZOLE SODIUM 40 MG: 40 INJECTION, POWDER, FOR SOLUTION INTRAVENOUS at 07:48

## 2022-01-21 RX ADMIN — DEXAMETHASONE 6 MG: 2 TABLET ORAL at 13:13

## 2022-01-21 RX ADMIN — Medication 15 ML: at 13:13

## 2022-01-21 RX ADMIN — TOLTERODINE TARTRATE 2 MG: 2 TABLET, FILM COATED ORAL at 20:18

## 2022-01-21 RX ADMIN — TOPIRAMATE 100 MG: 100 TABLET, FILM COATED ORAL at 07:45

## 2022-01-21 RX ADMIN — Medication 7.5 MG: at 07:47

## 2022-01-21 RX ADMIN — POTASSIUM & SODIUM PHOSPHATES POWDER PACK 280-160-250 MG 1 PACKET: 280-160-250 PACK at 13:13

## 2022-01-21 RX ADMIN — APIXABAN 5 MG: 5 TABLET, FILM COATED ORAL at 20:16

## 2022-01-21 RX ADMIN — HYDROXYZINE HYDROCHLORIDE 50 MG: 50 TABLET ORAL at 00:52

## 2022-01-21 RX ADMIN — BARICITINIB 4 MG: 2 TABLET, FILM COATED ORAL at 07:48

## 2022-01-21 RX ADMIN — POTASSIUM & SODIUM PHOSPHATES POWDER PACK 280-160-250 MG 1 PACKET: 280-160-250 PACK at 17:09

## 2022-01-21 RX ADMIN — POTASSIUM & SODIUM PHOSPHATES POWDER PACK 280-160-250 MG 1 PACKET: 280-160-250 PACK at 20:15

## 2022-01-21 RX ADMIN — CEFEPIME HYDROCHLORIDE 2 G: 2 INJECTION, POWDER, FOR SOLUTION INTRAVENOUS at 00:51

## 2022-01-21 RX ADMIN — VENLAFAXINE 75 MG: 75 TABLET ORAL at 20:18

## 2022-01-21 RX ADMIN — TOPIRAMATE 200 MG: 200 TABLET ORAL at 20:16

## 2022-01-21 RX ADMIN — VENLAFAXINE 75 MG: 75 TABLET ORAL at 07:49

## 2022-01-21 RX ADMIN — POLYETHYLENE GLYCOL 3350 17 G: 17 POWDER, FOR SOLUTION ORAL at 07:46

## 2022-01-21 RX ADMIN — CEFEPIME HYDROCHLORIDE 2 G: 2 INJECTION, POWDER, FOR SOLUTION INTRAVENOUS at 07:45

## 2022-01-21 RX ADMIN — Medication 1 PACKET: at 17:09

## 2022-01-21 RX ADMIN — Medication 1 PACKET: at 08:22

## 2022-01-21 RX ADMIN — Medication 500 MG: at 20:16

## 2022-01-21 RX ADMIN — SENNOSIDES AND DOCUSATE SODIUM 1 TABLET: 8.6; 5 TABLET ORAL at 07:45

## 2022-01-21 RX ADMIN — HYDROXYZINE HYDROCHLORIDE 50 MG: 50 TABLET ORAL at 07:45

## 2022-01-21 RX ADMIN — Medication 40 MG: at 20:18

## 2022-01-21 RX ADMIN — Medication 500 MG: at 07:47

## 2022-01-21 RX ADMIN — VORICONAZOLE 200 MG: 40 POWDER, FOR SUSPENSION ORAL at 07:47

## 2022-01-21 RX ADMIN — Medication 500 MG: at 13:14

## 2022-01-21 RX ADMIN — VORICONAZOLE 200 MG: 40 POWDER, FOR SUSPENSION ORAL at 22:00

## 2022-01-21 RX ADMIN — APIXABAN 5 MG: 5 TABLET, FILM COATED ORAL at 07:45

## 2022-01-21 ASSESSMENT — ACTIVITIES OF DAILY LIVING (ADL)
ADLS_ACUITY_SCORE: 13
ADLS_ACUITY_SCORE: 17
ADLS_ACUITY_SCORE: 13
ADLS_ACUITY_SCORE: 13
ADLS_ACUITY_SCORE: 17
ADLS_ACUITY_SCORE: 13
ADLS_ACUITY_SCORE: 17
ADLS_ACUITY_SCORE: 17
ADLS_ACUITY_SCORE: 13
ADLS_ACUITY_SCORE: 17
ADLS_ACUITY_SCORE: 17
ADLS_ACUITY_SCORE: 13
ADLS_ACUITY_SCORE: 13
ADLS_ACUITY_SCORE: 17
ADLS_ACUITY_SCORE: 13
ADLS_ACUITY_SCORE: 17
ADLS_ACUITY_SCORE: 17

## 2022-01-21 NOTE — PROGRESS NOTES
Patient was extubated to 3L NC at 0915 without immediate complications. Cuff leak present and had good productive cough. Encouraged deep breathing with IS and coughing.     Will continue to monitor    Louie Fitch, RT

## 2022-01-21 NOTE — PLAN OF CARE
ICU End of Shift Summary. See flowsheets for vital signs and detailed assessment.    Changes this shift: Patient alert and oriented x 4.  Extubated this morning at 0915.  3L via NC, tolerating well.  Up in chair at 1700.  Speaking with family on her personal cell phone.  Multiple loose BMs through shift.  Sinus tachycardia in the 100-120 range. Afebrile. Passed swallow study, will notify team of this.     Plan: Continue to assess patient and notify team of any changes. BiPap overnight.    Problem: Respiratory Compromise (Pneumonia)  Goal: Effective Oxygenation and Ventilation  Intervention: Promote Airway Secretion Clearance  Recent Flowsheet Documentation  Taken 1/21/2022 1200 by Pao Cannon RN  Cough And Deep Breathing: unable to perform  Taken 1/21/2022 0800 by Pao Cannon RN  Cough And Deep Breathing: unable to perform     Problem: Respiratory Compromise (Pneumonia)  Goal: Effective Oxygenation and Ventilation  1/21/2022 1525 by Pao Cannon RN  Outcome: Improving  1/21/2022 1525 by Pao Cannon RN  Outcome: Improving  Intervention: Promote Airway Secretion Clearance  Recent Flowsheet Documentation  Taken 1/21/2022 1200 by Pao Cannon RN  Cough And Deep Breathing: unable to perform  Taken 1/21/2022 0800 by Pao Cannon RN  Cough And Deep Breathing: unable to perform  Intervention: Optimize Oxygenation and Ventilation  Recent Flowsheet Documentation  Taken 1/21/2022 1400 by Pao Cannon RN  Head of Bed (HOB) Positioning: HOB at 30 degrees  Taken 1/21/2022 1200 by Pao Cannon RN  Head of Bed (HOB) Positioning: HOB at 30 degrees  Taken 1/21/2022 1000 by Pao Cannon RN  Head of Bed (HOB) Positioning: HOB at 30 degrees  Taken 1/21/2022 0800 by Pao aCnnon RN  Airway/Ventilation Management: airway patency maintained     Problem: Adult Inpatient Plan of Care  Goal: Plan of Care Review  Outcome: Improving

## 2022-01-21 NOTE — PLAN OF CARE
ICU End of Shift Summary. See flowsheets for vital signs and detailed assessment.    Changes this shift: No acute events overnight. Drowsy, following commands. Endorses some anxiety, PRN atarax ordered, given x1 with good effect. Afebrile, -120's. BP stable with no interventions. Art line not drawing back, no waveform, MD notified and removed, pt tolerated well. PS 7/5 40% overnight, tolerated well. No BM overnight despite bowel meds. Scott remains with good output.     Plan:  Extubate in morning. Continue to monitor and alert team of any changes.    REMA, SAÚL  79y, Male  Allergy: No Known Allergies      LOS  4d    CHIEF COMPLAINT: COVID PNA (16 Dec 2021 09:32)      INTERVAL EVENTS/HPI  - No acute events overnight  - T(F): , Max: 98.6 (12-15-21 @ 20:30)  - WBC Count: 6.60 (12-16-21 @ 04:30)  WBC Count: 7.86 (12-15-21 @ 04:30)   - remains on 6L NC -- denies any shortness of breath   - Creatinine, Serum: 1.9 (12-16-21 @ 04:30)  Creatinine, Serum: 1.7 (12-15-21 @ 04:30)       ROS  General: Denies rigors, nightsweats  HEENT: Denies headache, rhinorrhea, sore throat, eye pain  CV: Denies CP, palpitations  PULM: Denies wheezing, hemoptysis  GI: Denies hematemesis, hematochezia, melena  : Denies discharge, hematuria  MSK: Denies arthralgias, myalgias  SKIN: Denies rash, lesions  NEURO: Denies paresthesias, weakness  PSYCH: Denies depression, anxiety    VITALS:  T(F): 97.5, Max: 98.6 (12-15-21 @ 20:30)  HR: 80  BP: 138/68  RR: 20Vital Signs Last 24 Hrs  T(C): 36.4 (16 Dec 2021 13:00), Max: 37 (15 Dec 2021 20:30)  T(F): 97.5 (16 Dec 2021 13:00), Max: 98.6 (15 Dec 2021 20:30)  HR: 80 (16 Dec 2021 13:00) (72 - 80)  BP: 138/68 (16 Dec 2021 13:00) (138/68 - 189/93)  BP(mean): --  RR: 20 (16 Dec 2021 13:00) (18 - 20)  SpO2: 98% (16 Dec 2021 13:00) (93% - 98%)    PHYSICAL EXAM:  Gen: NAD, resting in bed  HEENT: Normocephalic, atraumatic  Neck: supple, no lymphadenopathy  CV: Regular rate & regular rhythm  Lungs: decreased BS at bases, no fremitus  Abdomen: Soft, BS present  Ext: Warm, well perfused  Neuro: non focal, awake  Skin: no rash, no erythema  Lines: no phlebitis    FH: Non-contributory  Social Hx: Non-contributory    TESTS & MEASUREMENTS:                        13.3   6.60  )-----------( 267      ( 16 Dec 2021 04:30 )             39.0     12-16    147<H>  |  103  |  71<HH>  ----------------------------<  119<H>  3.9   |  21  |  1.9<H>    Ca    9.5      16 Dec 2021 04:30  Mg     2.4     12-16    TPro  6.5  /  Alb  3.4<L>  /  TBili  0.6  /  DBili  x   /  AST  80<H>  /  ALT  65<H>  /  AlkPhos  59  12-16    eGFR if : 38 mL/min/1.73M2 (12-16-21 @ 04:30)  eGFR if Non African American: 33 mL/min/1.73M2 (12-16-21 @ 04:30)    LIVER FUNCTIONS - ( 16 Dec 2021 04:30 )  Alb: 3.4 g/dL / Pro: 6.5 g/dL / ALK PHOS: 59 U/L / ALT: 65 U/L / AST: 80 U/L / GGT: x               Culture - Blood (collected 12-12-21 @ 13:28)  Source: .Blood Blood  Preliminary Report (12-13-21 @ 19:02):    No growth to date.    Culture - Blood (collected 12-12-21 @ 13:28)  Source: .Blood Blood  Preliminary Report (12-13-21 @ 19:02):    No growth to date.        Blood Gas Venous - Lactate: 1.60 mmol/L (12-12-21 @ 12:10)      INFECTIOUS DISEASES TESTING  Procalcitonin, Serum: 0.21 (12-15-21 @ 04:30)  Procalcitonin, Serum: 0.25 (12-12-21 @ 20:00)  Rapid RVP Result: Detected (12-12-21 @ 11:40)      INFLAMMATORY MARKERS  C-Reactive Protein, Serum: 42 mg/L (12-15-21 @ 04:30)  Sedimentation Rate, Erythrocyte: 88 mm/Hr (12-14-21 @ 04:30)  C-Reactive Protein, Serum: 85 mg/L (12-12-21 @ 20:00)      RADIOLOGY & ADDITIONAL TESTS:  I have personally reviewed the last available Chest xray  CXR  Xray Chest 1 View- PORTABLE-Urgent:   ACC: [id number] EXAM:  XR CHEST PORTABLE URGENT 1V                          PROCEDURE DATE:  12/14/2021          INTERPRETATION:  STUDY INDICATION: worsening SOB    TECHNIQUE:  Portable frontal view of the chest obtained.    COMPARISON: 12/13/2021.    FINDINGS/  IMPRESSION:    Increased left mid to lower lung opacities. Apparently decreased right   basal opacity which may be in part secondary to differences in   diaphragmatic positioning.    Stable cardiomediastinal silhouette. Unchanged osseous structures.      --- End of Report ---            NOEMI MOREIRA MD; Attending Radiologist  This document has been electronically signed. Dec 14 2021  2:16PM (12-14-21 @ 10:47)      CT      CARDIOLOGY TESTING  12 Lead ECG:   Ventricular Rate 77 BPM    Atrial Rate 250 BPM    QRS Duration 138 ms    Q-T Interval 444 ms    QTC Calculation(Bazett) 502 ms    R Axis 89 degrees    T Axis -73 degrees    Diagnosis Line Sinus rhythm with frequent Premature ventricular complexes  Left bundle branch block  Abnormal ECG    Confirmed by Shari Khan MD ([id number]) on 12/12/2021 6:35:14 PM (12-12-21 @ 12:43)      MEDICATIONS  apixaban 5 Oral every 12 hours  aspirin  chewable 81 Oral daily  atorvastatin 20 Oral at bedtime  dexAMETHasone  Injectable 6 IV Push daily  influenza  Vaccine (HIGH DOSE) 0.7 IntraMuscular once  latanoprost 0.005% Ophthalmic Solution 1 Both EYES at bedtime  metoprolol succinate ER 50 Oral daily  sodium bicarbonate 1300 Oral three times a day      WEIGHT  Weight (kg): 104.326 (12-12-21 @ 11:48)  Creatinine, Serum: 1.9 mg/dL (12-16-21 @ 04:30)      ANTIBIOTICS:      All available historical records have been reviewed

## 2022-01-21 NOTE — PROGRESS NOTES
MICU PROGRESS NOTE  01/12/2022     Date of Hospital Admission: 1/11/2022  Date of ICU Admission: 1/19/2022  Reason for Critical Care Admission: Hypercarbic Respiratory Failure  Date of Service (when I saw the patient): 1/20/2022     ASSESSMENT: Sherly Yeung is a 56 year old woman with SDH, PE/DVT, HTN and prior COVID PNA (+ in 10/2021, required tracheostomy) who was admitted on 1/11/2022 for acute cholecystitis/cholangitis s/p CBD stent who became progressively more hypoxemic on 1/17 and was transferred to the ICU on 1/19 intubated for acute hypoxemic hypercarbic respiratory failure.    PLAN:  Extubate to NC   BiPAP at night to help with retention  Bedside swallow study this afternoon  Continue Flagyl and Cefepime per GI recs    Neuro:  # Major Depressive disorder  # Anorexia Nervosa  - Continue PTA venlafaxine and abilify  - Does have a remote history of needing ECT weekly x2 years    # Pain and sedation  - All sedation held since 1/20 AM  - Discontinue in anticipation for extubation     Pulmonary:  # Acute on chronic hypercapnic/hypoxic respiratory failure   # ARDS in the setting of hx of COVID pneumonia  Patient became hypoxic and required HFNC, CT-PE on 1/16 negative for PE but showed diffuse GGO c/w COVID+ test on 1/16. Unclear exposure as she was negative on admission but did have visitors through the weekend. She also has hx of COVID in October 2021, s/p trach and PEG (now decannulated), has been at TCU with 3L NC  - Daily SBT,  asking about risks/benefits of early tracheostomy.    - Intubated 1/19 due to increase somnolence, likely 2/2 C02 retention     Cardiovascular:  # HTN  - Holding PTA metoprolol    # Prolonged QTc   - monitor QTC with EKG PRN     GI/Nutrition:  # Choledocholithiasis c/b cholangitis, s/p CBD stenting (1/12/22)  # Probable Acute cholecystitis s/p transpapillary cystic duct stenting (1/12/22)  # Transaminitis, resolved   - S/p ERCP, removal of stone and stent placement  - Abx for  10 days (1/12 - 1/22) - (Was cipro/flagyl, broadened to cef/flagyl due to resp failure)  - Follow as outpatient with Gen-surg for cholecystectomy (order on discharge placed)  - Appreciate GI rec's (F/u in 1 month)    # Severe Malnutrition  Patient has a gastric tube.  She does eat 3 meals a day, with tube feed to follow each feed as she does not consume enough calories by mouth at this time.  - Continue PTA Jevity 200 mL tube feed  - Nutrition to adjust tube feeds as patient begins to eat again  - Continue PTA PPI     Renal/Fluids/Electrolytes:  # Hypophosphatemia, improved  - Phosphorous replacement protocol     Endocrine:  # Stress and steroid induced hyperglycemia  - Monitor for hyperglycemia and maintain goal of <180   - Medium resistance SSI     ID:  # COVID 19 PNA  # Aspergillus PNA  # Hospital Acquired Pneumonia  Recent history of COVID PNA in 10/21 requiring tracheostomy. Unvaccinated. Previous hospital stay complicated by Aspergillus PNA.  MRSA nares negative 1/16; vanco stopped 1/17. Started on barcitinib, remdesivir, and dexamethasone on 1/17.  - Discontinue remdesivir now that she's intubated. (No mortality benfit in intubated patient as per COV-BARRIER trial)  - Will not increase steroid dose at this time with her aspergillus pna  - Continue voriconazole until 1/22/2022  - Continue cefepime+flagyl for empiric HAP coverage and GI coverage (1/16-1/23)  - Cultures with NGTD  - Procal negative, CRP trending down, WBC count normalized     Hematology:    # Paget Schroetter syndrome  # Hx of Acute embolism and thrombosis of right axillary vein  Patient is status post stent placement in right arm.  She has a history of multiple blood clots.  She has been maintained on apixaban.  -Continue PTA apixaban    # Normocytic anemia  - baseline Hgb 9-10, stable, monitor      Musculoskeletal:  No acute issues     Skin:  No acute issues     General Cares/Prophylaxis:    DVT Prophylaxis: DOAC  GI Prophylaxis:  "PPI  Restraints: none  Family Communication: Will update  via phone  Code Status: FULL     Lines/tubes/drains:  - PIV  - Scott catheter  - PEG tube     Disposition:  - Medical ICU     Patient seen and findings/plan discussed with medical ICU staff, Dr. Cristina Bowers NP-C     Critical care time spent on the issues related to this note: 40 mins   -----------------------------------------------------------------------  Interval Events:  Overnight events reviewed.  Patient remained on pressure support all night 7/5 and tolerated that well.  Her ABGs were trended and improved.      PHYSICAL EXAMINATION:  BP (!) 141/87   Pulse 118   Temp 98.5  F (36.9  C) (Axillary)   Resp 24   Ht 1.6 m (5' 3\")   Wt 55.8 kg (123 lb 0.3 oz)   SpO2 100%   BMI 21.79 kg/m      General: Lying in bed, intubated, sedated  HEENT: No scleral icterus, MMM  Neuro: Spontaneously moving all 4 extremities; follows commands, wakes to voice  Pulm/Resp: Breath sounds clear bilaterally throughout  CV: NSR, no m/g/r; extremities warm and well perfused, pulses palpable  Abdomen: Soft, non-distended, non-tender  Extremities: cool, right arm swelling (chronic), trace edema to lower extremities     LABS: Reviewed.     IMAGING:Reviewed.    "

## 2022-01-21 NOTE — PLAN OF CARE
ICU End of Shift Summary. See flowsheets for vital signs and detailed assessment.    Changes this shift: Sedation turned off, pt alert and following commands. Sinus tach in the low 100s, afebrile, normotensive. Pressure support 7/5 since 1030. No bowel movement. Tube feeds advanced to goal. Phosphate replaced, recheck within normal limits. Updated  over the phone.    Plan: Pressure support throughout the night if able, trend ABGs, possible extubation tomorrow.

## 2022-01-22 ENCOUNTER — APPOINTMENT (OUTPATIENT)
Dept: PHYSICAL THERAPY | Facility: CLINIC | Age: 57
DRG: 871 | End: 2022-01-22
Payer: MEDICARE

## 2022-01-22 ENCOUNTER — APPOINTMENT (OUTPATIENT)
Dept: GENERAL RADIOLOGY | Facility: CLINIC | Age: 57
DRG: 871 | End: 2022-01-22
Attending: NURSE PRACTITIONER
Payer: MEDICARE

## 2022-01-22 LAB
ALBUMIN SERPL-MCNC: 2.6 G/DL (ref 3.4–5)
ALP SERPL-CCNC: 110 U/L (ref 40–150)
ALT SERPL W P-5'-P-CCNC: 29 U/L (ref 0–50)
ANION GAP SERPL CALCULATED.3IONS-SCNC: 3 MMOL/L (ref 3–14)
AST SERPL W P-5'-P-CCNC: 13 U/L (ref 0–45)
BACTERIA SPT CULT: NO GROWTH
BASE EXCESS BLDV CALC-SCNC: 1 MMOL/L (ref -7.7–1.9)
BILIRUB SERPL-MCNC: 0.2 MG/DL (ref 0.2–1.3)
BUN SERPL-MCNC: 22 MG/DL (ref 7–30)
C DIFF TOX B STL QL: POSITIVE
CALCIUM SERPL-MCNC: 8.7 MG/DL (ref 8.5–10.1)
CHLORIDE BLD-SCNC: 111 MMOL/L (ref 94–109)
CO2 SERPL-SCNC: 28 MMOL/L (ref 20–32)
CREAT SERPL-MCNC: 0.48 MG/DL (ref 0.52–1.04)
ERYTHROCYTE [DISTWIDTH] IN BLOOD BY AUTOMATED COUNT: 17.1 % (ref 10–15)
GFR SERPL CREATININE-BSD FRML MDRD: >90 ML/MIN/1.73M2
GLUCOSE BLD-MCNC: 149 MG/DL (ref 70–99)
GLUCOSE BLDC GLUCOMTR-MCNC: 112 MG/DL (ref 70–99)
GLUCOSE BLDC GLUCOMTR-MCNC: 137 MG/DL (ref 70–99)
GLUCOSE BLDC GLUCOMTR-MCNC: 184 MG/DL (ref 70–99)
GLUCOSE BLDC GLUCOMTR-MCNC: 213 MG/DL (ref 70–99)
GLUCOSE BLDC GLUCOMTR-MCNC: 216 MG/DL (ref 70–99)
GLUCOSE BLDC GLUCOMTR-MCNC: 98 MG/DL (ref 70–99)
GRAM STAIN RESULT: NORMAL
GRAM STAIN RESULT: NORMAL
HCO3 BLDV-SCNC: 30 MMOL/L (ref 21–28)
HCT VFR BLD AUTO: 30.5 % (ref 35–47)
HGB BLD-MCNC: 9.1 G/DL (ref 11.7–15.7)
INR PPP: 1.27 (ref 0.85–1.15)
MAGNESIUM SERPL-MCNC: 2.7 MG/DL (ref 1.6–2.3)
MCH RBC QN AUTO: 28.7 PG (ref 26.5–33)
MCHC RBC AUTO-ENTMCNC: 29.8 G/DL (ref 31.5–36.5)
MCV RBC AUTO: 96 FL (ref 78–100)
O2/TOTAL GAS SETTING VFR VENT: 55 %
OXYHGB MFR BLDV: 55 % (ref 70–75)
PCO2 BLDV: 66 MM HG (ref 40–50)
PH BLDV: 7.26 [PH] (ref 7.32–7.43)
PHOSPHATE SERPL-MCNC: 2.6 MG/DL (ref 2.5–4.5)
PLATELET # BLD AUTO: 420 10E3/UL (ref 150–450)
PO2 BLDV: 32 MM HG (ref 25–47)
POTASSIUM BLD-SCNC: 4.2 MMOL/L (ref 3.4–5.3)
PROT SERPL-MCNC: 6.6 G/DL (ref 6.8–8.8)
RBC # BLD AUTO: 3.17 10E6/UL (ref 3.8–5.2)
SODIUM SERPL-SCNC: 142 MMOL/L (ref 133–144)
WBC # BLD AUTO: 16.7 10E3/UL (ref 4–11)

## 2022-01-22 PROCEDURE — 97530 THERAPEUTIC ACTIVITIES: CPT | Mod: GP | Performed by: PHYSICAL THERAPIST

## 2022-01-22 PROCEDURE — 250N000013 HC RX MED GY IP 250 OP 250 PS 637: Performed by: STUDENT IN AN ORGANIZED HEALTH CARE EDUCATION/TRAINING PROGRAM

## 2022-01-22 PROCEDURE — 250N000013 HC RX MED GY IP 250 OP 250 PS 637: Performed by: PEDIATRICS

## 2022-01-22 PROCEDURE — 99291 CRITICAL CARE FIRST HOUR: CPT | Performed by: INTERNAL MEDICINE

## 2022-01-22 PROCEDURE — 82040 ASSAY OF SERUM ALBUMIN: CPT | Performed by: INTERNAL MEDICINE

## 2022-01-22 PROCEDURE — 83735 ASSAY OF MAGNESIUM: CPT | Performed by: INTERNAL MEDICINE

## 2022-01-22 PROCEDURE — 250N000011 HC RX IP 250 OP 636: Performed by: INTERNAL MEDICINE

## 2022-01-22 PROCEDURE — 85610 PROTHROMBIN TIME: CPT | Performed by: STUDENT IN AN ORGANIZED HEALTH CARE EDUCATION/TRAINING PROGRAM

## 2022-01-22 PROCEDURE — 999N000157 HC STATISTIC RCP TIME EA 10 MIN

## 2022-01-22 PROCEDURE — 71045 X-RAY EXAM CHEST 1 VIEW: CPT | Mod: 26 | Performed by: RADIOLOGY

## 2022-01-22 PROCEDURE — 250N000013 HC RX MED GY IP 250 OP 250 PS 637: Performed by: PHYSICIAN ASSISTANT

## 2022-01-22 PROCEDURE — 85014 HEMATOCRIT: CPT | Performed by: STUDENT IN AN ORGANIZED HEALTH CARE EDUCATION/TRAINING PROGRAM

## 2022-01-22 PROCEDURE — 36415 COLL VENOUS BLD VENIPUNCTURE: CPT | Performed by: NURSE PRACTITIONER

## 2022-01-22 PROCEDURE — 82785 ASSAY OF IGE: CPT | Performed by: NURSE PRACTITIONER

## 2022-01-22 PROCEDURE — 94660 CPAP INITIATION&MGMT: CPT

## 2022-01-22 PROCEDURE — 87493 C DIFF AMPLIFIED PROBE: CPT | Performed by: NURSE PRACTITIONER

## 2022-01-22 PROCEDURE — 250N000013 HC RX MED GY IP 250 OP 250 PS 637: Performed by: NURSE PRACTITIONER

## 2022-01-22 PROCEDURE — 200N000002 HC R&B ICU UMMC

## 2022-01-22 PROCEDURE — 80053 COMPREHEN METABOLIC PANEL: CPT | Performed by: INTERNAL MEDICINE

## 2022-01-22 PROCEDURE — 82805 BLOOD GASES W/O2 SATURATION: CPT | Performed by: NURSE PRACTITIONER

## 2022-01-22 PROCEDURE — 84100 ASSAY OF PHOSPHORUS: CPT | Performed by: INTERNAL MEDICINE

## 2022-01-22 PROCEDURE — 250N000013 HC RX MED GY IP 250 OP 250 PS 637: Performed by: INTERNAL MEDICINE

## 2022-01-22 PROCEDURE — 250N000011 HC RX IP 250 OP 636: Performed by: STUDENT IN AN ORGANIZED HEALTH CARE EDUCATION/TRAINING PROGRAM

## 2022-01-22 PROCEDURE — 71045 X-RAY EXAM CHEST 1 VIEW: CPT

## 2022-01-22 RX ORDER — VANCOMYCIN HYDROCHLORIDE 50 MG/ML
125 KIT ORAL 4 TIMES DAILY
Status: DISCONTINUED | OUTPATIENT
Start: 2022-01-22 | End: 2022-01-22

## 2022-01-22 RX ORDER — VANCOMYCIN HYDROCHLORIDE 125 MG/1
125 CAPSULE ORAL 4 TIMES DAILY
Status: DISCONTINUED | OUTPATIENT
Start: 2022-01-22 | End: 2022-01-22 | Stop reason: CLARIF

## 2022-01-22 RX ORDER — VANCOMYCIN HYDROCHLORIDE 50 MG/ML
125 KIT ORAL 4 TIMES DAILY
Status: DISCONTINUED | OUTPATIENT
Start: 2022-01-22 | End: 2022-02-07

## 2022-01-22 RX ADMIN — Medication 6.25 MG: at 10:11

## 2022-01-22 RX ADMIN — VANCOMYCIN HYDROCHLORIDE 125 MG: KIT at 16:57

## 2022-01-22 RX ADMIN — TOPIRAMATE 200 MG: 200 TABLET ORAL at 20:33

## 2022-01-22 RX ADMIN — APIXABAN 5 MG: 5 TABLET, FILM COATED ORAL at 09:22

## 2022-01-22 RX ADMIN — Medication 40 MG: at 20:34

## 2022-01-22 RX ADMIN — CEFEPIME HYDROCHLORIDE 2 G: 2 INJECTION, POWDER, FOR SOLUTION INTRAVENOUS at 09:22

## 2022-01-22 RX ADMIN — BARICITINIB 4 MG: 2 TABLET, FILM COATED ORAL at 09:52

## 2022-01-22 RX ADMIN — Medication 500 MG: at 09:21

## 2022-01-22 RX ADMIN — Medication 1 PACKET: at 16:58

## 2022-01-22 RX ADMIN — Medication 40 MG: at 09:50

## 2022-01-22 RX ADMIN — VORICONAZOLE 200 MG: 40 POWDER, FOR SUSPENSION ORAL at 20:35

## 2022-01-22 RX ADMIN — VENLAFAXINE 75 MG: 75 TABLET ORAL at 20:37

## 2022-01-22 RX ADMIN — TOLTERODINE TARTRATE 2 MG: 2 TABLET, FILM COATED ORAL at 20:37

## 2022-01-22 RX ADMIN — Medication 500 MG: at 14:32

## 2022-01-22 RX ADMIN — Medication 6.25 MG: at 20:34

## 2022-01-22 RX ADMIN — Medication 500 MG: at 20:34

## 2022-01-22 RX ADMIN — VENLAFAXINE 75 MG: 75 TABLET ORAL at 09:52

## 2022-01-22 RX ADMIN — VANCOMYCIN HYDROCHLORIDE 125 MG: KIT at 20:36

## 2022-01-22 RX ADMIN — CEFEPIME HYDROCHLORIDE 2 G: 2 INJECTION, POWDER, FOR SOLUTION INTRAVENOUS at 23:00

## 2022-01-22 RX ADMIN — Medication 1 PACKET: at 09:55

## 2022-01-22 RX ADMIN — APIXABAN 5 MG: 5 TABLET, FILM COATED ORAL at 20:33

## 2022-01-22 RX ADMIN — TOPIRAMATE 100 MG: 100 TABLET, FILM COATED ORAL at 09:22

## 2022-01-22 RX ADMIN — HYDROXYZINE HYDROCHLORIDE 50 MG: 50 TABLET ORAL at 22:09

## 2022-01-22 RX ADMIN — CEFEPIME HYDROCHLORIDE 2 G: 2 INJECTION, POWDER, FOR SOLUTION INTRAVENOUS at 00:15

## 2022-01-22 RX ADMIN — HYDROXYZINE HYDROCHLORIDE 50 MG: 50 TABLET ORAL at 00:15

## 2022-01-22 RX ADMIN — Medication 7.5 MG: at 09:23

## 2022-01-22 RX ADMIN — TOPIRAMATE 100 MG: 100 TABLET, FILM COATED ORAL at 09:50

## 2022-01-22 RX ADMIN — CEFEPIME HYDROCHLORIDE 2 G: 2 INJECTION, POWDER, FOR SOLUTION INTRAVENOUS at 16:57

## 2022-01-22 RX ADMIN — TOLTERODINE TARTRATE 2 MG: 2 TABLET, FILM COATED ORAL at 09:54

## 2022-01-22 RX ADMIN — Medication 15 ML: at 12:29

## 2022-01-22 RX ADMIN — VORICONAZOLE 200 MG: 40 POWDER, FOR SUSPENSION ORAL at 09:22

## 2022-01-22 RX ADMIN — DEXAMETHASONE 6 MG: 2 TABLET ORAL at 12:29

## 2022-01-22 ASSESSMENT — ACTIVITIES OF DAILY LIVING (ADL)
ADLS_ACUITY_SCORE: 17
ADLS_ACUITY_SCORE: 19
ADLS_ACUITY_SCORE: 17
ADLS_ACUITY_SCORE: 17
ADLS_ACUITY_SCORE: 19
ADLS_ACUITY_SCORE: 17
ADLS_ACUITY_SCORE: 19
ADLS_ACUITY_SCORE: 17

## 2022-01-22 ASSESSMENT — MIFFLIN-ST. JEOR
SCORE: 1091.13
SCORE: 1085.13

## 2022-01-22 NOTE — PLAN OF CARE
ICU End of Shift Summary. See flowsheets for vital signs and detailed assessment.     Changes this shift: Patient remained alert and oriented x 4. Placed on Bipap around 0100, pt on and off sleeping for approx 4 hrs. Atarax given per pt request prior to Bipap. Two large loose BMs overnightt. Sinus tachycardia in the 100-130's range. Afebrile.      Plan: Continue to assess patient and notify team of any changes. Advance diet as able.

## 2022-01-22 NOTE — PLAN OF CARE
Major Shift Events:  Pt positive for C. DIFF today and placed in enteric precautions. Multiple loose bowel movements today. Up to chair with therapy and RN today. VBG  CO2 was 66 this am and will therefore stay on the 4C today. Metoprolol (home med) started today. Regular diet initiated.     Plan: BiPAP overnight and when sleeping. Start vancomycin treatment for C Diff.      Pt alert and oriented. -120s. BP stable. 3L via NC. TF@40cc/hr (goal).     For vital signs and complete assessments, please see documentation flowsheets.

## 2022-01-22 NOTE — PROGRESS NOTES
MEDICAL ICU PROGRESS NOTE  01/22/2022      Date of Service (when I saw the patient): 01/22/2022    ASSESSMENT: Sherly Yeung is a 56 year old woman with SDH, PE/DVT, HTN and prior COVID PNA (+ in 10/2021, required tracheostomy) who was admitted on 1/11/2022 for acute cholecystitis/cholangitis s/p CBD stent (1/12/22) who became progressively more hypoxemic on 1/17 and was transferred to the ICU on 1/19 intubated for acute hypoxemic hypercarbic respiratory failure.     PLAN:  Remain in ICU today for monitoring  CXR  Repeat VBG in the morning, BiPAP with sleep  Resume half home dose metoprolol  Resume regular diet  Check C diff     Neuro:  # Major depressive disorder  # Anorexia nervosa  - Continue PTA venlafaxine, Abilify, Topamax  - Does have a remote history of needing ECT weekly x2 years     Pulmonary:  # Acute on chronic hypercapnic/hypoxic respiratory failure   # ARDS in the setting of hx of COVID pneumonia  Patient became hypoxic and required HFNC, CT-PE on 1/16 negative for PE but showed diffuse GGO c/w COVID+ test on 1/16. Unclear exposure as she was negative on admission but did have visitors through the weekend. She also has hx of COVID in October 2021, s/p trach and PEG (now decannulated), has been at TCU with 3L NC. Extubated 1/21/22 with persistent hypercarbia.   - Pulmonary hygiene with deep breathing, OOB  - BiPAP with sleep  - CXR today given persistent hypercarbia  - Monitor in ICU one more night  - VBG in the morning     Cardiovascular:  # HTN  - Resume half dose of PTA metoprolol     # Prolonged QTc   - Monitor QTC with EKG PRN (415 on 1/19)     GI/Nutrition:  # Choledocholithiasis c/b cholangitis, s/p CBD stenting (1/12/22)  # Probable acute cholecystitis s/p transpapillary cystic duct stenting (1/12/22)  # Transaminitis, resolved   S/p ERCP, removal of stone and stent placement 1/12/22.  - Abx for 10 days (1/12 - 1/22) - (Was cipro/flagyl, broadened to cef/flagyl due to resp failure)  - Follow as  outpatient with Gen-surg for cholecystectomy (order on discharge placed)  - Appreciate GI rec's (F/u in 1 month)     # At risk for malnutrition  Patient has a gastric tube. She did eat 3 meals a day, with tube feed to follow each feed as she had not been consuming enough calories by mouth at this time.  - Continue PTA Jevity 200 mL tube feed  - Nutrition to adjust tube feeds as patient begins to eat again  - Start regular diet     Renal/Fluids/Electrolytes:  # Hypophosphatemia, improved  - Phosphorous replacement protocol     Endocrine:  # Stress and steroid induced hyperglycemia  - Medium resistance sliding scale insulin      ID:  # COVID 19 PNA  # Aspergillus PNA  Recent history of COVID PNA in 10/21 requiring tracheostomy. Unvaccinated. Previous hospital stay complicated by Aspergillus PNA.  MRSA nares negative 1/16; vanco stopped 1/17. Started on barcitinib, remdesivir, and dexamethasone on 1/17. Remdesivir stopped with intubation.   - Continue barcitinib and dexamethasone  - Continue voriconazole for the time being (had initially planned 60 days of therapy from day of discharge - which would be 11/23/21-1/22/22)    # Concern for HCAP (1/16)  # New infection? (1/22)  Had been broadened to cefepime/Flagyl 1/16 with decompensation. 1/22/22 with new leukocytosis. Afebrile, no new cough or abdominal pain. Diarrhea present (although had been getting bowel regimen)  - Continue cefepime+flagyl for empiric HAP coverage and GI coverage (1/16-1/23)  - Blood and sputum cultures from 1/20/22 with NGTD  - Check C diff     Hematology:    # Paget Schroetter syndrome  # Hx of Acute embolism and thrombosis of right axillary vein  Patient is status post stent placement in right arm.  She has a history of multiple blood clots. She has been maintained on apixaban.  - Continue PTA apixaban     # Normocytic anemia of critical illness  Baseline Hgb prior to initial Covid infection was 11-13.  - CBC daily     Musculoskeletal:  No acute  issues     Skin:  No acute issues     General Cares/Prophylaxis:    DVT Prophylaxis: Apixaban  GI Prophylaxis: PPI  Restraints: none  Family Communication: , Herminio, updated via phone at 12:00 pm   Code Status: FULL     Lines/tubes/drains:  - PIV  - Scott catheter  - PEG tube     Disposition:  - Medical ICU    Patient seen and findings/plan discussed with medical ICU staff, Dr. Curiel.    Honey Remy, ACNP    ====================================  INTERVAL HISTORY:   No acute events overnight. This morning has no complaints. Denies cough, chest pain, abdominal pain, nausea, vomiting, headache. Wore BiPAP overnight.    OBJECTIVE:   1. VITAL SIGNS:   Temp:  [97.1  F (36.2  C)-98.9  F (37.2  C)] 98  F (36.7  C)  Pulse:  [104-135] 113  Resp:  [18-24] 22  BP: (132-163)/() 145/84  FiO2 (%):  [40 %-55 %] 55 %  SpO2:  [88 %-100 %] 98 %  Ventilation Mode: CPAP/PS  (Continuous positive airway pressure with Pressure Support)  FiO2 (%): 55 %  PEEP (cm H2O): 5 cmH2O  Pressure Support (cm H2O): 7 cmH2O  Oxygen Concentration (%): 40 %  Resp: 22    2. INTAKE/ OUTPUT:   I/O last 3 completed shifts:  In: 2466 [P.O.:100; I.V.:396; NG/GT:1010]  Out: 2525 [Urine:2525]    3. PHYSICAL EXAMINATION:  General: Awake, alert, pleasant  HEENT: Mucous membranes dry  Neuro: Alert, oriented; no focal deficits  Pulm/Resp: Few crackles and diminished breath sounds in bases; paradoxical breathing, nonlabored  CV: RRR, S1/S2 without m/r/g; pedal pulses 2+ without LE edema  Abdomen: Soft, non-distended, non-tender  : Scott catheter in place, urine yellow and clear  Incisions/Skin: No rashes noted    4. LABS:   Arterial Blood Gases   Recent Labs   Lab 01/21/22  0829 01/21/22  0103 01/20/22  1822 01/20/22  1247   PH 7.25* 7.36 7.30* 7.29*   PCO2 69* 52* 57* 59*   PO2 26* 116* 115* 101   HCO3 30* 29* 28 28     Complete Blood Count   Recent Labs   Lab 01/21/22  0501 01/20/22  0304 01/19/22  0519 01/18/22  0432   WBC 9.0 5.9 15.7* 17.4*    HGB 9.1* 7.8* 9.3* 9.0*    278 445 453*     Basic Metabolic Panel  Recent Labs   Lab 01/22/22  0436 01/22/22  0022 01/21/22 2027 01/21/22  1701 01/21/22  0754 01/21/22  0501 01/20/22  0847 01/20/22  0304 01/19/22 2057 01/19/22  1652 01/19/22  1137 01/19/22  1025 01/19/22  0519 01/18/22  1136 01/18/22  0432   NA  --   --   --   --   --  144  --  144  --   --   --   --  143  --  142   POTASSIUM  --   --   --   --   --  4.1  --  3.5 3.9  --  3.5  --  3.4   < > 3.4   CHLORIDE  --   --   --   --   --  114*  --  112*  --   --   --   --  111*  --  110*   CO2  --   --   --   --   --  27  --  29  --   --   --   --  29  --  26   BUN  --   --   --   --   --  22  --  21  --   --   --   --  22  --  13   CR  --   --   --   --   --  0.40*  --  0.41*  --   --   --   --  0.48*  --  0.49*   * 98 97 107*   < > 178*   < > 250*  --    < >  --    < > 95   < > 200*    < > = values in this interval not displayed.     Liver Function Tests  Recent Labs   Lab 01/21/22  0501 01/21/22 0342 01/20/22 0304 01/19/22  0519 01/17/22  0456 01/16/22  0844   AST 12  --   --  18 29 46*   ALT 31  --   --  47 78* 101*   ALKPHOS 112  --   --  121 138 154*   BILITOTAL 0.2  --   --  0.3 0.3 0.3   ALBUMIN 2.4*  --   --  2.6* 2.4* 2.3*   INR  --  1.56* 3.00*  --   --   --      Coagulation Profile  Recent Labs   Lab 01/21/22 0342 01/20/22  0304   INR 1.56* 3.00*       5. RADIOLOGY:   No results found for this or any previous visit (from the past 24 hour(s)).

## 2022-01-23 ENCOUNTER — APPOINTMENT (OUTPATIENT)
Dept: OCCUPATIONAL THERAPY | Facility: CLINIC | Age: 57
DRG: 871 | End: 2022-01-23
Attending: PHYSICIAN ASSISTANT
Payer: MEDICARE

## 2022-01-23 ENCOUNTER — APPOINTMENT (OUTPATIENT)
Dept: GENERAL RADIOLOGY | Facility: CLINIC | Age: 57
DRG: 871 | End: 2022-01-23
Attending: INTERNAL MEDICINE
Payer: MEDICARE

## 2022-01-23 LAB
ALBUMIN SERPL-MCNC: 2.6 G/DL (ref 3.4–5)
ALP SERPL-CCNC: 115 U/L (ref 40–150)
ALT SERPL W P-5'-P-CCNC: 28 U/L (ref 0–50)
ANION GAP SERPL CALCULATED.3IONS-SCNC: 6 MMOL/L (ref 3–14)
AST SERPL W P-5'-P-CCNC: 16 U/L (ref 0–45)
BASE EXCESS BLDV CALC-SCNC: 0.5 MMOL/L (ref -7.7–1.9)
BILIRUB SERPL-MCNC: 0.3 MG/DL (ref 0.2–1.3)
BUN SERPL-MCNC: 18 MG/DL (ref 7–30)
CALCIUM SERPL-MCNC: 8.8 MG/DL (ref 8.5–10.1)
CHLORIDE BLD-SCNC: 108 MMOL/L (ref 94–109)
CO2 SERPL-SCNC: 25 MMOL/L (ref 20–32)
CREAT SERPL-MCNC: 0.41 MG/DL (ref 0.52–1.04)
ERYTHROCYTE [DISTWIDTH] IN BLOOD BY AUTOMATED COUNT: 17.2 % (ref 10–15)
GFR SERPL CREATININE-BSD FRML MDRD: >90 ML/MIN/1.73M2
GLUCOSE BLD-MCNC: 137 MG/DL (ref 70–99)
GLUCOSE BLDC GLUCOMTR-MCNC: 112 MG/DL (ref 70–99)
GLUCOSE BLDC GLUCOMTR-MCNC: 128 MG/DL (ref 70–99)
GLUCOSE BLDC GLUCOMTR-MCNC: 129 MG/DL (ref 70–99)
GLUCOSE BLDC GLUCOMTR-MCNC: 133 MG/DL (ref 70–99)
GLUCOSE BLDC GLUCOMTR-MCNC: 135 MG/DL (ref 70–99)
HCO3 BLDV-SCNC: 28 MMOL/L (ref 21–28)
HCT VFR BLD AUTO: 33 % (ref 35–47)
HGB BLD-MCNC: 9.8 G/DL (ref 11.7–15.7)
INR PPP: 1.19 (ref 0.85–1.15)
MAGNESIUM SERPL-MCNC: 2.7 MG/DL (ref 1.6–2.3)
MCH RBC QN AUTO: 28.6 PG (ref 26.5–33)
MCHC RBC AUTO-ENTMCNC: 29.7 G/DL (ref 31.5–36.5)
MCV RBC AUTO: 96 FL (ref 78–100)
O2/TOTAL GAS SETTING VFR VENT: 50 %
OXYHGB MFR BLDV: 66 % (ref 70–75)
PCO2 BLDV: 58 MM HG (ref 40–50)
PH BLDV: 7.29 [PH] (ref 7.32–7.43)
PLATELET # BLD AUTO: 555 10E3/UL (ref 150–450)
PO2 BLDV: 37 MM HG (ref 25–47)
POTASSIUM BLD-SCNC: 4.4 MMOL/L (ref 3.4–5.3)
PROCALCITONIN SERPL-MCNC: <0.05 NG/ML
PROT SERPL-MCNC: 6.9 G/DL (ref 6.8–8.8)
RBC # BLD AUTO: 3.43 10E6/UL (ref 3.8–5.2)
SODIUM SERPL-SCNC: 139 MMOL/L (ref 133–144)
WBC # BLD AUTO: 15.8 10E3/UL (ref 4–11)

## 2022-01-23 PROCEDURE — 250N000013 HC RX MED GY IP 250 OP 250 PS 637: Performed by: STUDENT IN AN ORGANIZED HEALTH CARE EDUCATION/TRAINING PROGRAM

## 2022-01-23 PROCEDURE — 71045 X-RAY EXAM CHEST 1 VIEW: CPT | Mod: 26 | Performed by: RADIOLOGY

## 2022-01-23 PROCEDURE — 97530 THERAPEUTIC ACTIVITIES: CPT | Mod: GO

## 2022-01-23 PROCEDURE — 250N000013 HC RX MED GY IP 250 OP 250 PS 637: Performed by: NURSE PRACTITIONER

## 2022-01-23 PROCEDURE — 999N000157 HC STATISTIC RCP TIME EA 10 MIN

## 2022-01-23 PROCEDURE — 99233 SBSQ HOSP IP/OBS HIGH 50: CPT | Performed by: INTERNAL MEDICINE

## 2022-01-23 PROCEDURE — 250N000013 HC RX MED GY IP 250 OP 250 PS 637: Performed by: INTERNAL MEDICINE

## 2022-01-23 PROCEDURE — 82040 ASSAY OF SERUM ALBUMIN: CPT | Performed by: INTERNAL MEDICINE

## 2022-01-23 PROCEDURE — 80053 COMPREHEN METABOLIC PANEL: CPT | Performed by: INTERNAL MEDICINE

## 2022-01-23 PROCEDURE — 85027 COMPLETE CBC AUTOMATED: CPT | Performed by: STUDENT IN AN ORGANIZED HEALTH CARE EDUCATION/TRAINING PROGRAM

## 2022-01-23 PROCEDURE — 99232 SBSQ HOSP IP/OBS MODERATE 35: CPT | Performed by: STUDENT IN AN ORGANIZED HEALTH CARE EDUCATION/TRAINING PROGRAM

## 2022-01-23 PROCEDURE — 94660 CPAP INITIATION&MGMT: CPT

## 2022-01-23 PROCEDURE — 250N000013 HC RX MED GY IP 250 OP 250 PS 637: Performed by: PHYSICIAN ASSISTANT

## 2022-01-23 PROCEDURE — 36415 COLL VENOUS BLD VENIPUNCTURE: CPT | Performed by: NURSE PRACTITIONER

## 2022-01-23 PROCEDURE — 85610 PROTHROMBIN TIME: CPT | Performed by: STUDENT IN AN ORGANIZED HEALTH CARE EDUCATION/TRAINING PROGRAM

## 2022-01-23 PROCEDURE — 84145 PROCALCITONIN (PCT): CPT | Performed by: INTERNAL MEDICINE

## 2022-01-23 PROCEDURE — 120N000003 HC R&B IMCU UMMC

## 2022-01-23 PROCEDURE — 83735 ASSAY OF MAGNESIUM: CPT | Performed by: INTERNAL MEDICINE

## 2022-01-23 PROCEDURE — 250N000011 HC RX IP 250 OP 636: Performed by: INTERNAL MEDICINE

## 2022-01-23 PROCEDURE — 250N000011 HC RX IP 250 OP 636: Performed by: STUDENT IN AN ORGANIZED HEALTH CARE EDUCATION/TRAINING PROGRAM

## 2022-01-23 PROCEDURE — 250N000013 HC RX MED GY IP 250 OP 250 PS 637: Performed by: PEDIATRICS

## 2022-01-23 PROCEDURE — 82805 BLOOD GASES W/O2 SATURATION: CPT | Performed by: NURSE PRACTITIONER

## 2022-01-23 PROCEDURE — 97165 OT EVAL LOW COMPLEX 30 MIN: CPT | Mod: GO

## 2022-01-23 PROCEDURE — 71045 X-RAY EXAM CHEST 1 VIEW: CPT

## 2022-01-23 RX ORDER — LANOLIN ALCOHOL/MO/W.PET/CERES
3 CREAM (GRAM) TOPICAL
Status: DISCONTINUED | OUTPATIENT
Start: 2022-01-23 | End: 2022-02-18 | Stop reason: HOSPADM

## 2022-01-23 RX ORDER — SPIRONOLACTONE 25 MG/1
25 TABLET ORAL DAILY
Status: DISCONTINUED | OUTPATIENT
Start: 2022-01-24 | End: 2022-02-18 | Stop reason: HOSPADM

## 2022-01-23 RX ORDER — METOPROLOL TARTRATE 25 MG/1
25 TABLET, FILM COATED ORAL 2 TIMES DAILY
Status: DISCONTINUED | OUTPATIENT
Start: 2022-01-24 | End: 2022-01-27

## 2022-01-23 RX ADMIN — TOPIRAMATE 200 MG: 200 TABLET ORAL at 20:20

## 2022-01-23 RX ADMIN — VANCOMYCIN HYDROCHLORIDE 125 MG: KIT at 20:19

## 2022-01-23 RX ADMIN — Medication 40 MG: at 20:19

## 2022-01-23 RX ADMIN — Medication 1 PACKET: at 08:21

## 2022-01-23 RX ADMIN — DEXAMETHASONE 6 MG: 2 TABLET ORAL at 12:36

## 2022-01-23 RX ADMIN — VORICONAZOLE 200 MG: 40 POWDER, FOR SUSPENSION ORAL at 20:19

## 2022-01-23 RX ADMIN — Medication 6.25 MG: at 08:21

## 2022-01-23 RX ADMIN — VANCOMYCIN HYDROCHLORIDE 125 MG: KIT at 08:22

## 2022-01-23 RX ADMIN — TOLTERODINE TARTRATE 2 MG: 2 TABLET, FILM COATED ORAL at 20:20

## 2022-01-23 RX ADMIN — TOLTERODINE TARTRATE 2 MG: 2 TABLET, FILM COATED ORAL at 08:22

## 2022-01-23 RX ADMIN — Medication 15 ML: at 12:36

## 2022-01-23 RX ADMIN — Medication 40 MG: at 08:19

## 2022-01-23 RX ADMIN — HYDROXYZINE HYDROCHLORIDE 50 MG: 50 TABLET ORAL at 22:12

## 2022-01-23 RX ADMIN — VENLAFAXINE 75 MG: 75 TABLET ORAL at 08:22

## 2022-01-23 RX ADMIN — Medication 6.25 MG: at 12:36

## 2022-01-23 RX ADMIN — CEFEPIME HYDROCHLORIDE 2 G: 2 INJECTION, POWDER, FOR SOLUTION INTRAVENOUS at 08:19

## 2022-01-23 RX ADMIN — HYDROXYZINE HYDROCHLORIDE 50 MG: 50 TABLET ORAL at 03:33

## 2022-01-23 RX ADMIN — Medication 12.5 MG: at 20:20

## 2022-01-23 RX ADMIN — VENLAFAXINE 75 MG: 75 TABLET ORAL at 20:21

## 2022-01-23 RX ADMIN — VORICONAZOLE 200 MG: 40 POWDER, FOR SUSPENSION ORAL at 08:19

## 2022-01-23 RX ADMIN — TOPIRAMATE 100 MG: 100 TABLET, FILM COATED ORAL at 08:19

## 2022-01-23 RX ADMIN — VANCOMYCIN HYDROCHLORIDE 125 MG: KIT at 17:59

## 2022-01-23 RX ADMIN — Medication 500 MG: at 08:19

## 2022-01-23 RX ADMIN — Medication 500 MG: at 14:23

## 2022-01-23 RX ADMIN — Medication 500 MG: at 20:20

## 2022-01-23 RX ADMIN — Medication 3 MG: at 22:12

## 2022-01-23 RX ADMIN — VANCOMYCIN HYDROCHLORIDE 125 MG: KIT at 12:36

## 2022-01-23 RX ADMIN — APIXABAN 5 MG: 5 TABLET, FILM COATED ORAL at 08:19

## 2022-01-23 RX ADMIN — BARICITINIB 4 MG: 2 TABLET, FILM COATED ORAL at 08:20

## 2022-01-23 RX ADMIN — Medication 1 PACKET: at 17:05

## 2022-01-23 RX ADMIN — CEFEPIME HYDROCHLORIDE 2 G: 2 INJECTION, POWDER, FOR SOLUTION INTRAVENOUS at 17:10

## 2022-01-23 RX ADMIN — APIXABAN 5 MG: 5 TABLET, FILM COATED ORAL at 20:21

## 2022-01-23 RX ADMIN — Medication 7.5 MG: at 08:20

## 2022-01-23 ASSESSMENT — ACTIVITIES OF DAILY LIVING (ADL)
ADLS_ACUITY_SCORE: 19
ADLS_ACUITY_SCORE: 20
ADLS_ACUITY_SCORE: 19
ADLS_ACUITY_SCORE: 20
ADLS_ACUITY_SCORE: 19
ADLS_ACUITY_SCORE: 19
ADLS_ACUITY_SCORE: 20
ADLS_ACUITY_SCORE: 19
ADLS_ACUITY_SCORE: 20
ADLS_ACUITY_SCORE: 20
ADLS_ACUITY_SCORE: 19
ADLS_ACUITY_SCORE: 19
ADLS_ACUITY_SCORE: 21
ADLS_ACUITY_SCORE: 19
ADLS_ACUITY_SCORE: 19

## 2022-01-23 ASSESSMENT — MIFFLIN-ST. JEOR: SCORE: 1075.13

## 2022-01-23 NOTE — PROGRESS NOTES
[facility]    ICU Accept Note - Hospitalist Service, GOLD TEAM 8       Date of Admission:  1/11/2022    Assessment & Plan   Sherly Yeung is a 56 year old female admitted on 1/11/2022 who is being transferred out of the ICU on 1/23/2022. She has a history of COVID 10/2021 requiring trachoestomy who presented on 1/11 with acute cholecystitis and cholangitis s/p biliary stent placement. Hospital course was complicated by respiratory failure on 1/17 and repeat COVID infection. She developed respiratory failure and required ICU transfer and intubation on 1/19. She was extubated on 1/21 but had recurrent hypercapnia requiring bipap at night and while sleeping ICU stay was complicated by development of new leukocytosis and C. Diff colitis with diarrhea.    Active issues:   # Acute hypercapnia respiratory failure   #ARDS  -Patient extubated on 1/21 but recurrent hypercapnia  -Plan for bipap at bedtime and with naps    #C. Diff colitis  -Leukocytosis on 1/21 and diarrhea  -PO vancomycin    #Aspergillus PNA  -During initial COVID hospitalization 10/21. However, devleoped recurrent infection and requiring steroids  -Plan for 2 weeks of voriconazole past decadron stop date due to prevent recurrent fungal pneumonia    #Choledocolithaisis s/p CBD stenting  -Plan for 10 days of antibiotics (set to complete 1/22 or 1/23)    Plan for next 24 hours:  Bipap at bedtime and with naps      ICU Transfer Checklist    YES NO Links/Additional comments   Current meds & orders reviewed & updated? [x] [] Transfer Navigator   O2 requirements appropriate for accepting floor? [x] [] O2 Device: Nasal cannula   Oxygen Delivery: 3 LPM  FiO2 (%): 55 %   Appropriate antibiotics ordered? [x] [] Fever Report  30 Day Micro   Appropriate fluids ordered? [x] []    Appropriate diet ordered? [x] [] Adult Formula Drip Feeding: Continuous Osmolite 1.5; Gastrostomy; Goal Rate: 40; mL/hr; Medication - Feeding  Tube Flush Frequency: At least 15-30 mL water before and after medication administration and with tube clogging; Amount to Send (Nutrition...  Regular Diet Adult   Telemetry indicated/ordered?    [x] [] Cardiac Monitoring: ACTIVE order. Indication: ICU     Appropriate DVT prophy ordered? [x] [] Anticoag/VTE   Scott indicated/ordered?    [x] [] PRESENT, indication: Strict 1-2 Hour I&O   Central lines indicated? [] [x] LDA Avatar      PT/OT indicated/ordered? [x] [] D/C Planner  [facility]   Code status reviewed? [x] [] Full Code   Preferred contact on file? [x] []      Disposition Plan   Expected Discharge: 01/25/2022     Anticipated discharge location:  Awaiting care coordination huddle  Delays:            The patient's care was discussed with the Patient and SOC Consultant.    Rodolfo Hennessy MD  Hospitalist Service, [address]  Securely message with the Vocera Web Console (learn more here)  Text page via [facility] Paging/Directory   Please see signed in provider for up to date coverage information      Clinically Significant Risk Factors Present on Admission                    ______________________________________________________________________    Interval History   Patient seen at bedside this afternoon. SHe reports her breathing has improved but is worse with talking. We discussed and reviewed using bipap with naps and at night and she is in agreement. She reports she continues to have diarrhea. We discussed continuing the rectal tube in the interim. She denies fever or chills. SHe has some mild abdominal pain.     Data reviewed today: I reviewed all medications, new labs and imaging results over the last 24 hours. I personally reviewed no images or EKG's today.    Physical Exam   Vital Signs: Temp: 97.7  F (36.5  C) Temp src: Oral BP: (!) 143/89 Pulse: 101   Resp: 21 SpO2: 95 % O2 Device: Nasal cannula Oxygen Delivery: 3 LPM  Weight: 117 lbs  4.56 oz  Constitutional: alert, ill appearing, mild distress  Eyes: no icterus  ENT: supple, no JVD  Respiratory: crackles in anterior lung fields, tachypnea, short of breath after speaking a sentence  Cardiovascular: tachycardia, normal s1, s2, systolic murmur 1/6  GI: G tube in place, tenderness to palpation in bilatearl lower quadrant, no guarding, rigidity, or peritoneal signs  Genitounirinary: avila catheter draining yellow urine  Rectal: rectal tube in place  Neurologic: holding eye contact, following commands, responding to questions appropriately     Data   Unresulted Labs Ordered in the Past 30 Days of this Admission     Date and Time Order Name Status Description    1/22/2022 11:53 AM IgE In process     1/20/2022  8:49 AM Blood Culture Hand, Right Preliminary     1/20/2022  8:49 AM Blood Culture Hand, Left Preliminary         Recent Labs   Lab 01/23/22  1225 01/23/22  0818 01/23/22  0654 01/22/22  1232 01/22/22  0813 01/21/22  0754 01/21/22  0501 01/21/22  0342   WBC  --   --  15.8*  --  16.7*  --  9.0  --    HGB  --   --  9.8*  --  9.1*  --  9.1*  --    MCV  --   --  96  --  96  --  96  --    PLT  --   --  555*  --  420  --  352  --    INR  --   --  1.19*  --  1.27*  --   --  1.56*   NA  --   --  139  --  142  --  144  --    POTASSIUM  --   --  4.4  --  4.2  --  4.1  --    CHLORIDE  --   --  108  --  111*  --  114*  --    CO2  --   --  25  --  28  --  27  --    BUN  --   --  18  --  22  --  22  --    CR  --   --  0.41*  --  0.48*  --  0.40*  --    ANIONGAP  --   --  6  --  3  --  3  --    TERESA  --   --  8.8  --  8.7  --  8.6  --    * 129* 137*   < > 149*   < > 178* 131*   ALBUMIN  --   --  2.6*  --  2.6*  --  2.4*  --    PROTTOTAL  --   --  6.9  --  6.6*  --  6.4*  --    BILITOTAL  --   --  0.3  --  0.2  --  0.2  --    ALKPHOS  --   --  115  --  110  --  112  --    ALT  --   --  28  --  29  --  31  --    AST  --   --  16  --  13  --  12  --     < > = values in this interval not displayed.

## 2022-01-23 NOTE — PROGRESS NOTES
Crticial Care Follow Up Note   January 23, 2022            Assessment and Plan:   Date of Service (when I saw the patient): 01/23/2022     ASSESSMENT: Sherly Yeung is a 56 year old woman with SDH, PE/DVT, HTN and prior COVID PNA (+ in 10/2021, required tracheostomy) who was admitted on 1/11/2022 for acute cholecystitis/cholangitis s/p CBD stent (1/12/22). SARS CoV2 (+) on  1/16. She became progressively more hypoxemic on 1/17 and was transferred to the ICU on 1/19 intubated for acute hypoxemic hypercarbic respiratory failure. Extubated 1/21, now with BiPAP at night and nasal canula during the day with persistent CO2 retention. C dif colitis. Gradual symptomatic improvement.     PLAN:  Transfer to   CXR (done)  Daily VBG  BiPAP at night and with naps, NC O2 during the day.  Increase metoprolol  Melatonin for sleep  Rectal tube     Neuro:  # Major depressive disorder  # Anorexia nervosa  - Continue PTA venlafaxine, Abilify, Topamax  - Does have a remote history of needing ECT weekly x2 years     Pulmonary:  # Acute on chronic hypercapnic/hypoxic respiratory failure   # ARDS in the setting of hx of COVID pneumonia  Patient became hypoxic and required HFNC, CT-PE on 1/16 negative for PE but showed diffuse GGO c/w COVID+ test on 1/16. Unclear exposure as she was negative on admission but did have visitors through the weekend. She also has hx of COVID in October 2021, s/p trach and PEG (now decannulated), has been at TCU with 3L NC. Extubated 1/21/22 with persistent hypercarbia, improving, etiology unclear, possibly related to pectus excavatum and COVID pneumonia.  - Pulmonary hygiene with deep breathing, OOB  - BiPAP with sleep  - CXR today, reviewed by me with improved left sided opacities.  - 1/23 - VBG 7.29/58, PCO2 improving     Cardiovascular:  # HTN - Persistent hypertension and tachycardia  - Increase metoprolol to 12.5mg BID. May need to increase to home dose of 25mg BID.     # Prolonged QTc   - Monitor QTC  with EKG PRN (415 on 1/19)     GI/Nutrition:  # Choledocholithiasis c/b cholangitis, s/p CBD stenting (1/12/22)  # Probable acute cholecystitis s/p transpapillary cystic duct stenting (1/12/22)  # Transaminitis, resolved   S/p ERCP, removal of stone and stent placement 1/12/22.  - Abx for 10 days (1/12 - 1/22) - (Was cipro/flagyl, broadened to cef/flagyl due to resp failure)  - Follow as outpatient with Gen-surg for cholecystectomy (order on discharge placed)  - Appreciate GI rec's (F/u in 1 month)     # At risk for malnutrition  Patient has a gastric tube. She did eat 3 meals a day, with tube feed to follow each feed as she had not been consuming enough calories by mouth at this time.  - Continue PTA Jevity 200 mL tube feed  - Nutrition to adjust tube feeds as patient begins to eat again  - Start regular diet     Renal/Fluids/Electrolytes:  # Hypophosphatemia, improved  - Phosphorous replacement protocol     Endocrine:  # Stress and steroid induced hyperglycemia  - Medium resistance sliding scale insulin      ID:  # COVID 19 PNA  # Aspergillus PNA  Recent history of COVID PNA in 10/21 requiring tracheostomy. Unvaccinated. Previous hospital stay complicated by Aspergillus PNA.  MRSA nares negative 1/16; vanco stopped 1/17. Started on barcitinib, remdesivir, and dexamethasone on 1/17. Remdesivir stopped with intubation.   - Continue barcitinib and dexamethasone  - Continue voriconazole  (had initially planned 60 days of therapy from day of discharge - which would be 11/23/21-1/22/22). If no evidence of fungus,consider stopping 2 weeks after decadron stopped.     # Concern for HCAP (1/16)  # New infection? (1/22)  Had been broadened to cefepime/Flagyl 1/16 with decompensation. 1/22/22 with new leukocytosis. Afebrile, no new cough or abdominal pain.  - C dif (+) on 1/22, PO vanco initiated 1/22.  Rectal tub for perirectal breakdown.  - Continue cefepime+flagyl for empiric HAP coverage and GI coverage (1/16-1/23), would  discontinue in the next couple of days if continued respiratory recovery.  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  She has a history of multiple blood clots. She has been maintained on apixaban.  - Continue PTA apixaban     # Normocytic anemia of critical illness  Baseline Hgb prior to initial Covid infection was 11-13.  - CBC daily     Musculoskeletal:  No acute issues     Skin:  Gisela breakdown, rectaltube and continue avila     General Cares/Prophylaxis:    DVT Prophylaxis: Apixaban  GI Prophylaxis: PPI  Restraints: none  Family Communication: , Herminio, updated via phone at 12:00 pm   Code Status: FULL     Lines/tubes/drains:  - PIV  - Avila catheter  - PEG tube     Disposition:  - Transfer to [facility]        Jermain Curiel MD  [phone]             Interval History:     Slept poorly.  Breathing is comfortable at rest, gradually improving.  Dyspnea with mild exertion.  Occasional cough.  Minimal sputum, swallowed.  No chest pain.           Review of Systems:   C: NEGATIVE for fever, chills, appetite poor but tolerating tube feeding  INTEGUMENTARY/SKIN: no rash or obvious new lesions  ENT/MOUTH: no sore throat, new sinus pain or nasal drainage  RESP: see interval history  CV: NEGATIVE for chest pain, palpitations or peripheral edema  GI: Mild diffuse abdominal pain, unchanged.  No nausea, vomiting.  Persistent frequent loose stools requiring rectal tube. : Avila  MUSCULOSKELETAL: no myalgias, arthralgias  ENDOCRINE: blood sugars with adequate control  PSYCHIATRIC: mood stable          Medications:       apixaban ANTICOAGULANT  5 mg Oral or Feeding Tube BID     ARIPiprazole  7.5 mg Oral or Feeding Tube Daily     baricitinib  4 mg Oral or Feeding Tube Daily     ceFEPIme (MAXIPIME) IV  2 g Intravenous Q8H     dexamethasone   6 mg Oral or Feeding Tube Daily     [Held by provider] DULoxetine  120 mg Oral or Feeding Tube QPM     insulin aspart  1-6 Units Subcutaneous Q4H     metoprolol tartrate  6.25 mg Oral BID     metroNIDAZOLE  500 mg Oral or Feeding Tube TID     multivitamins w/minerals  15 mL Per Feeding Tube Daily     pantoprazole  40 mg Oral BID     polyethylene glycol  17 g Oral BID     protein modular  1 packet Per Feeding Tube BID     senna-docusate  1 tablet Oral or Feeding Tube BID     sodium chloride (PF)  3 mL Intracatheter Q8H     tolterodine  2 mg Oral or Feeding Tube BID     topiramate  100 mg Oral Daily     topiramate  200 mg Oral or Feeding Tube QPM     vancomycin  125 mg Oral or Feeding Tube 4x Daily     venlafaxine  75 mg Oral or Feeding Tube BID     voriconazole  200 mg Oral or Feeding Tube Q12H [facility]     acetaminophen **OR** acetaminophen, artificial tears ophthalmic solution, dextrose, glucose **OR** dextrose **OR** glucagon, hydrOXYzine **OR** hydrOXYzine, lidocaine 4%, lidocaine (buffered or not buffered), melatonin, naloxone **OR** naloxone **OR** naloxone **OR** naloxone, ondansetron, - MEDICATION INSTRUCTIONS -, - MEDICATION INSTRUCTIONS -, QUEtiapine, senna-docusate **OR** senna-docusate, sodium chloride (PF), sodium chloride (PF)         Physical Exam:   Temp:  [97.7  F (36.5  C)-98.6  F (37  C)] 97.7  F (36.5  C)  Pulse:  [] 101  Resp:  [18-22] 21  BP: (129-158)/() 143/89  FiO2 (%):  [55 %] 55 %  SpO2:  [92 %-100 %] 95 %    Intake/Output Summary (Last 24 hours) at 1/23/2022 1019  Last data filed at 1/23/2022 0900  Gross per 24 hour   Intake 2885 ml   Output 2620 ml   Net 265 ml     Constitutional:   Awake, alert and in no apparent distress     Eyes:   Nonicteric, PERRL     ENT:    oral mucosa moist without lesions     Neck:   Supple without supraclavicular or cervical lymphadenopathy     Lungs:   Mildly diminished air flow.  Faint, fine basilar crackles. No rhonchi.  No wheezes.      Cardiovascular:   Normal S1 and S2. Tachycardia tachycardia, regular.  No murmur. No gallop. No rub.     Abdomen:   NABS, soft, nondistended, nontender.  No HSM.     Musculoskeletal:   No edema     Neurologic:   Alert and conversant.     Skin:   Warm, dry.  No rash on limited exam.             Data:   All laboratory and imaging data reviewed.    Results for orders placed or performed during the hospital encounter of 01/11/22 (from the past 24 hour(s))   Clostridium difficile toxin B PCR    Specimen: Per Rectum; Stool   Result Value Ref Range    C Difficile Toxin B by PCR Positive (A) Negative    Narrative    The [employer]3D Xpert C. difficile Assay, performed on the [id number]  Instrument Systems, is a qualitative in vitro diagnostic test for rapid detection of toxin B gene sequences from unformed (liquid or soft) stool specimens collected from patients suspected of having Clostridioides difficile infection (CDI). The test utilizes automated real-time polymerase chain reaction (PCR) to detect toxin gene sequences associated with toxin producing C. difficile. The Xpert C. difficile Assay is intended as an aid in the diagnosis of CDI.   Glucose by meter   Result Value Ref Range    GLUCOSE BY METER POCT 112 (H) 70 - 99 mg/dL   Glucose by meter   Result Value Ref Range    GLUCOSE BY METER POCT 216 (H) 70 - 99 mg/dL   Glucose by meter   Result Value Ref Range    GLUCOSE BY METER POCT 213 (H) 70 - 99 mg/dL   Glucose by meter   Result Value Ref Range    GLUCOSE BY METER POCT 184 (H) 70 - 99 mg/dL   Glucose by meter   Result Value Ref Range    GLUCOSE BY METER POCT 133 (H) 70 - 99 mg/dL   INR   Result Value Ref Range    INR 1.19 (H) 0.85 - 1.15   CBC with platelets   Result Value Ref Range    WBC Count 15.8 (H) 4.0 - 11.0 10e3/uL    RBC Count 3.43 (L) 3.80 - 5.20 10e6/uL    Hemoglobin 9.8 (L) 11.7 - 15.7 g/dL    Hematocrit 33.0 (L) 35.0 - 47.0 %    MCV 96 78 - 100 fL    MCH 28.6 26.5 - 33.0 pg    MCHC 29.7 (L) 31.5 -  36.5 g/dL    RDW 17.2 (H) 10.0 - 15.0 %    Platelet Count 555 (H) 150 - 450 10e3/uL   Comprehensive metabolic panel   Result Value Ref Range    Sodium 139 133 - 144 mmol/L    Potassium 4.4 3.4 - 5.3 mmol/L    Chloride 108 94 - 109 mmol/L    Carbon Dioxide (CO2) 25 20 - 32 mmol/L    Anion Gap 6 3 - 14 mmol/L    Urea Nitrogen 18 7 - 30 mg/dL    Creatinine 0.41 (L) 0.52 - 1.04 mg/dL    Calcium 8.8 8.5 - 10.1 mg/dL    Glucose 137 (H) 70 - 99 mg/dL    Alkaline Phosphatase 115 40 - 150 U/L    AST 16 0 - 45 U/L    ALT 28 0 - 50 U/L    Protein Total 6.9 6.8 - 8.8 g/dL    Albumin 2.6 (L) 3.4 - 5.0 g/dL    Bilirubin Total 0.3 0.2 - 1.3 mg/dL    GFR Estimate >90 >60 mL/min/1.73m2   Blood gas venous with oxyhemoglobin   Result Value Ref Range    pH Venous 7.29 (L) 7.32 - 7.43    pCO2 Venous 58 (H) 40 - 50 mm Hg    pO2 Venous 37 25 - 47 mm Hg    Bicarbonate Venous 28 21 - 28 mmol/L    FIO2 50     Oxyhemoglobin Venous 66 (L) 70 - 75 %    Base Excess/Deficit (+/-) 0.5 -7.7 - 1.9 mmol/L   Magnesium   Result Value Ref Range    Magnesium 2.7 (H) 1.6 - 2.3 mg/dL   Glucose by meter   Result Value Ref Range    GLUCOSE BY METER POCT 129 (H) 70 - 99 mg/dL

## 2022-01-23 NOTE — PROGRESS NOTES
01/23/22 1100   Quick Adds   Type of Visit Initial Occupational Therapy Evaluation   Living Environment   People in home spouse   Current Living Arrangements house   Home Accessibility no concerns   Transportation Anticipated family or friend will provide   Living Environment Comments Per PT luz marina, Pt reports her and her  just recently had a new home built that is all one level, no stairs, and is wheelchair accessible. Pt reports a walk in shower with built in shower seat and grab bars. Pt was hospitalized in September and has been in hospital or at rehab since September. Pt admitted to hospital currently from [facility]. Pt reports she was at [facility] for about 3 weeks and had been working with PT/OT.    Self-Care   Usual Activity Tolerance good   Current Activity Tolerance fair   Equipment Currently Used at Home none   Activity/Exercise/Self-Care Comment Pt reports at her baseline is completely independent with ADL's, IADL's, and mobility without use of AD. Since her hospitalization and time at [facility], pt reports she has not done much walking. At [facility] she was able to progress to taking a few steps at EOB with walker, but has not ambulated more than that since before September.    Disability/Function   Hearing Difficulty or Deaf no   Wear Glasses or Blind no   Concentrating, Remembering or Making Decisions Difficulty no   Difficulty Communicating no   Difficulty Eating/Swallowing no   Walking or Climbing Stairs Difficulty no   Walking or Climbing Stairs ambulation difficulty, requires equipment;transferring difficulty, assistance 1 person;stair climbing difficulty, requires equipment   Dressing/Bathing Difficulty no   Toileting issues no   Doing Errands Independently Difficulty (such as shopping) no   Fall history within last six months no   Change in Functional Status Since Onset of Current Illness/Injury yes   General Information   Onset of Illness/Injury or Date of Surgery 01/19/22   Referring Physician  Juan Genao  Jeff   Patient/Family Therapy Goal Statement (OT) return to PLOF    Additional Occupational Profile Info/Pertinent History of Current Problem Sherly Yeung is a 56 year old woman with SDH, PE/DVT, HTN and prior COVID PNA (+ in 10/2021, required tracheostomy) who was admitted on 1/11/2022 for acute cholecystitis/cholangitis s/p CBD stent who became progressively more hypoxemic on 1/17 and was transferred to the ICU on 1/19 intubated for acute hypoxemic hypercarbic respiratory failure.   Heart Disease Risk Factors High blood pressure   Cognitive Status Examination   Orientation Status orientation to person, place and time   Affect/Mental Status (Cognitive) WNL   Follows Commands WNL   Visual Perception   Visual Impairment/Limitations corrective lenses full-time   Sensory   Sensory Quick Adds No deficits were identified   Posture   Posture forward head position;protracted shoulders;kyphosis   Range of Motion Comprehensive   Comment, General Range of Motion decreased shoulder AROM ~90 degrees flex/abd   Strength Comprehensive (MMT)   Comment, General Manual Muscle Testing (MMT) Assessment Pt with generalized weakness grossly 3/5 MMT    Bed Mobility   Comment (Bed Mobility) mod A for supine > sit    Transfers   Transfer Comments pt dangled EOB with CGA. Pt min A for sit > stand and transfer bed > chair    Activities of Daily Living   BADL Assessment lower body dressing   Lower Body Dressing Assessment   [place] Level (Lower Body Dressing) maximum assist (25% patient effort)   Clinical Impression   Criteria for Skilled Therapeutic Interventions Met (OT) yes   OT Diagnosis decreased ADL I    OT Problem List-Impairments impacting ADL activity tolerance impaired;balance;strength;postural control   Assessment of Occupational Performance 5 or more Performance Deficits   Identified Performance Deficits bed mobility, dressing, bathing, toileting, g/h    Planned Therapy Interventions (OT) ADL retraining;IADL  retraining;balance training;bed mobility training;strengthening;transfer training;home program guidelines;progressive activity/exercise   Clinical Decision Making Complexity (OT) low complexity   Therapy Frequency (OT) 5x/week   Predicted Duration of Therapy 2 weeks    Risk & Benefits of therapy have been explained evaluation/treatment results reviewed   Comment-Clinical Impression Pt presents below baseline in ADLs. Recommend continued rehab to facilitate return to PLOF.    OT Discharge Planning    OT Discharge Recommendation (DC Rec) Transitional Care Facility   OT Rationale for DC Rec Pt presents below baseline in ADLs. Recommend continued rehab to facilitate return to PLOF.    OT Brief overview of current status  mod A bed mobility, min A for transfer bed > chair    Total Evaluation Time (Minutes)   Total Evaluation Time (Minutes) 5

## 2022-01-23 NOTE — PLAN OF CARE
ICU End of Shift Summary. See flowsheets for vital signs and detailed assessment.     Changes this shift: Patient remained alert and oriented x 4. Sinus tachycardia in the 100-130's range. Afebrile. Placed on bipap around 2200, remained on all night. Pt sleeping between cares. Atarax given x2 per pt request prior to bipap and again around 0400. Two moderate loose BMs overnight, will need rectal tube if stool persists. New sandy-rectal breakdown present. Barrier cream applied. Adequate UOP via avila.      Plan: Continue to assess patient and notify team of any changes. Rectal tube if loose stools persist.

## 2022-01-23 NOTE — PROGRESS NOTES
Transferred to:  6503 @6320  Status at time of transfer: AAOX4, NC 4 L, hemodynamically stable  Belongings: in bed with patient  Scott removed? (if no, why?): strictr I+O  Chart and medications: sent to   Family notified: , Herminio made aware   PH#[phone]

## 2022-01-24 ENCOUNTER — APPOINTMENT (OUTPATIENT)
Dept: GENERAL RADIOLOGY | Facility: CLINIC | Age: 57
DRG: 871 | End: 2022-01-24
Attending: STUDENT IN AN ORGANIZED HEALTH CARE EDUCATION/TRAINING PROGRAM
Payer: MEDICARE

## 2022-01-24 ENCOUNTER — APPOINTMENT (OUTPATIENT)
Dept: OCCUPATIONAL THERAPY | Facility: CLINIC | Age: 57
DRG: 871 | End: 2022-01-24
Payer: MEDICARE

## 2022-01-24 ENCOUNTER — APPOINTMENT (OUTPATIENT)
Dept: PHYSICAL THERAPY | Facility: CLINIC | Age: 57
DRG: 871 | End: 2022-01-24
Payer: MEDICARE

## 2022-01-24 ENCOUNTER — APPOINTMENT (OUTPATIENT)
Dept: CT IMAGING | Facility: CLINIC | Age: 57
DRG: 871 | End: 2022-01-24
Attending: STUDENT IN AN ORGANIZED HEALTH CARE EDUCATION/TRAINING PROGRAM
Payer: MEDICARE

## 2022-01-24 LAB
ALBUMIN SERPL-MCNC: 3 G/DL (ref 3.4–5)
ALP SERPL-CCNC: 120 U/L (ref 40–150)
ALT SERPL W P-5'-P-CCNC: 29 U/L (ref 0–50)
ANION GAP SERPL CALCULATED.3IONS-SCNC: 7 MMOL/L (ref 3–14)
AST SERPL W P-5'-P-CCNC: 16 U/L (ref 0–45)
ATRIAL RATE - MUSE: 138 BPM
BASE EXCESS BLDV CALC-SCNC: 0 MMOL/L (ref -7.7–1.9)
BILIRUB SERPL-MCNC: 0.3 MG/DL (ref 0.2–1.3)
BUN SERPL-MCNC: 20 MG/DL (ref 7–30)
CALCIUM SERPL-MCNC: 9 MG/DL (ref 8.5–10.1)
CHLORIDE BLD-SCNC: 108 MMOL/L (ref 94–109)
CO2 SERPL-SCNC: 25 MMOL/L (ref 20–32)
CREAT SERPL-MCNC: 0.41 MG/DL (ref 0.52–1.04)
DIASTOLIC BLOOD PRESSURE - MUSE: NORMAL MMHG
ERYTHROCYTE [DISTWIDTH] IN BLOOD BY AUTOMATED COUNT: 17.1 % (ref 10–15)
GFR SERPL CREATININE-BSD FRML MDRD: >90 ML/MIN/1.73M2
GLUCOSE BLD-MCNC: 107 MG/DL (ref 70–99)
GLUCOSE BLDC GLUCOMTR-MCNC: 109 MG/DL (ref 70–99)
GLUCOSE BLDC GLUCOMTR-MCNC: 113 MG/DL (ref 70–99)
GLUCOSE BLDC GLUCOMTR-MCNC: 130 MG/DL (ref 70–99)
GLUCOSE BLDC GLUCOMTR-MCNC: 136 MG/DL (ref 70–99)
GLUCOSE BLDC GLUCOMTR-MCNC: 143 MG/DL (ref 70–99)
GLUCOSE BLDC GLUCOMTR-MCNC: 262 MG/DL (ref 70–99)
HCO3 BLDV-SCNC: 27 MMOL/L (ref 21–28)
HCT VFR BLD AUTO: 34.8 % (ref 35–47)
HGB BLD-MCNC: 10.5 G/DL (ref 11.7–15.7)
IGE SERPL-ACNC: 58 KU/L (ref 0–114)
INR PPP: 1.15 (ref 0.85–1.15)
INTERPRETATION ECG - MUSE: NORMAL
MAGNESIUM SERPL-MCNC: 2.7 MG/DL (ref 1.6–2.3)
MCH RBC QN AUTO: 28.5 PG (ref 26.5–33)
MCHC RBC AUTO-ENTMCNC: 30.2 G/DL (ref 31.5–36.5)
MCV RBC AUTO: 94 FL (ref 78–100)
O2/TOTAL GAS SETTING VFR VENT: 45 %
P AXIS - MUSE: 66 DEGREES
PCO2 BLDV: 50 MM HG (ref 40–50)
PH BLDV: 7.33 [PH] (ref 7.32–7.43)
PHOSPHATE SERPL-MCNC: 1.8 MG/DL (ref 2.5–4.5)
PLATELET # BLD AUTO: 646 10E3/UL (ref 150–450)
PO2 BLDV: 43 MM HG (ref 25–47)
POTASSIUM BLD-SCNC: 4.2 MMOL/L (ref 3.4–5.3)
PR INTERVAL - MUSE: 138 MS
PROT SERPL-MCNC: 7.6 G/DL (ref 6.8–8.8)
QRS DURATION - MUSE: 76 MS
QT - MUSE: 274 MS
QTC - MUSE: 415 MS
R AXIS - MUSE: -5 DEGREES
RBC # BLD AUTO: 3.69 10E6/UL (ref 3.8–5.2)
SODIUM SERPL-SCNC: 140 MMOL/L (ref 133–144)
SYSTOLIC BLOOD PRESSURE - MUSE: NORMAL MMHG
T AXIS - MUSE: -1 DEGREES
VENTRICULAR RATE- MUSE: 138 BPM
WBC # BLD AUTO: 20.4 10E3/UL (ref 4–11)

## 2022-01-24 PROCEDURE — 71045 X-RAY EXAM CHEST 1 VIEW: CPT | Mod: 26 | Performed by: RADIOLOGY

## 2022-01-24 PROCEDURE — 97530 THERAPEUTIC ACTIVITIES: CPT | Mod: GP

## 2022-01-24 PROCEDURE — 71045 X-RAY EXAM CHEST 1 VIEW: CPT

## 2022-01-24 PROCEDURE — 94660 CPAP INITIATION&MGMT: CPT

## 2022-01-24 PROCEDURE — 84100 ASSAY OF PHOSPHORUS: CPT | Performed by: STUDENT IN AN ORGANIZED HEALTH CARE EDUCATION/TRAINING PROGRAM

## 2022-01-24 PROCEDURE — 250N000013 HC RX MED GY IP 250 OP 250 PS 637: Performed by: STUDENT IN AN ORGANIZED HEALTH CARE EDUCATION/TRAINING PROGRAM

## 2022-01-24 PROCEDURE — 85610 PROTHROMBIN TIME: CPT | Performed by: STUDENT IN AN ORGANIZED HEALTH CARE EDUCATION/TRAINING PROGRAM

## 2022-01-24 PROCEDURE — 82803 BLOOD GASES ANY COMBINATION: CPT | Performed by: STUDENT IN AN ORGANIZED HEALTH CARE EDUCATION/TRAINING PROGRAM

## 2022-01-24 PROCEDURE — 85027 COMPLETE CBC AUTOMATED: CPT | Performed by: STUDENT IN AN ORGANIZED HEALTH CARE EDUCATION/TRAINING PROGRAM

## 2022-01-24 PROCEDURE — 74177 CT ABD & PELVIS W/CONTRAST: CPT | Mod: 26 | Performed by: STUDENT IN AN ORGANIZED HEALTH CARE EDUCATION/TRAINING PROGRAM

## 2022-01-24 PROCEDURE — 83735 ASSAY OF MAGNESIUM: CPT | Performed by: STUDENT IN AN ORGANIZED HEALTH CARE EDUCATION/TRAINING PROGRAM

## 2022-01-24 PROCEDURE — 82040 ASSAY OF SERUM ALBUMIN: CPT | Performed by: STUDENT IN AN ORGANIZED HEALTH CARE EDUCATION/TRAINING PROGRAM

## 2022-01-24 PROCEDURE — 250N000011 HC RX IP 250 OP 636: Performed by: STUDENT IN AN ORGANIZED HEALTH CARE EDUCATION/TRAINING PROGRAM

## 2022-01-24 PROCEDURE — 36415 COLL VENOUS BLD VENIPUNCTURE: CPT | Performed by: STUDENT IN AN ORGANIZED HEALTH CARE EDUCATION/TRAINING PROGRAM

## 2022-01-24 PROCEDURE — 250N000009 HC RX 250: Performed by: STUDENT IN AN ORGANIZED HEALTH CARE EDUCATION/TRAINING PROGRAM

## 2022-01-24 PROCEDURE — 120N000003 HC R&B IMCU UMMC

## 2022-01-24 PROCEDURE — 99233 SBSQ HOSP IP/OBS HIGH 50: CPT | Performed by: STUDENT IN AN ORGANIZED HEALTH CARE EDUCATION/TRAINING PROGRAM

## 2022-01-24 PROCEDURE — 999N000157 HC STATISTIC RCP TIME EA 10 MIN

## 2022-01-24 PROCEDURE — 80053 COMPREHEN METABOLIC PANEL: CPT | Performed by: STUDENT IN AN ORGANIZED HEALTH CARE EDUCATION/TRAINING PROGRAM

## 2022-01-24 PROCEDURE — 74177 CT ABD & PELVIS W/CONTRAST: CPT | Mod: MG

## 2022-01-24 PROCEDURE — 97535 SELF CARE MNGMENT TRAINING: CPT | Mod: GO

## 2022-01-24 PROCEDURE — 97530 THERAPEUTIC ACTIVITIES: CPT | Mod: GO

## 2022-01-24 RX ORDER — POLYETHYLENE GLYCOL 3350 17 G/17G
17 POWDER, FOR SOLUTION ORAL 2 TIMES DAILY PRN
Status: DISCONTINUED | OUTPATIENT
Start: 2022-01-24 | End: 2022-02-18 | Stop reason: HOSPADM

## 2022-01-24 RX ORDER — VORICONAZOLE 40 MG/ML
200 POWDER, FOR SUSPENSION ORAL EVERY 12 HOURS SCHEDULED
Status: COMPLETED | OUTPATIENT
Start: 2022-01-24 | End: 2022-02-09

## 2022-01-24 RX ORDER — IOPAMIDOL 755 MG/ML
68 INJECTION, SOLUTION INTRAVASCULAR ONCE
Status: COMPLETED | OUTPATIENT
Start: 2022-01-24 | End: 2022-01-24

## 2022-01-24 RX ADMIN — TOLTERODINE TARTRATE 2 MG: 2 TABLET, FILM COATED ORAL at 20:46

## 2022-01-24 RX ADMIN — CEFEPIME HYDROCHLORIDE 2 G: 2 INJECTION, POWDER, FOR SOLUTION INTRAVENOUS at 01:23

## 2022-01-24 RX ADMIN — Medication 1 PACKET: at 17:08

## 2022-01-24 RX ADMIN — DEXAMETHASONE 6 MG: 2 TABLET ORAL at 12:49

## 2022-01-24 RX ADMIN — METOPROLOL TARTRATE 25 MG: 25 TABLET, FILM COATED ORAL at 08:49

## 2022-01-24 RX ADMIN — VANCOMYCIN HYDROCHLORIDE 125 MG: KIT at 20:46

## 2022-01-24 RX ADMIN — Medication 15 ML: at 12:49

## 2022-01-24 RX ADMIN — SPIRONOLACTONE 25 MG: 25 TABLET, FILM COATED ORAL at 08:49

## 2022-01-24 RX ADMIN — POTASSIUM & SODIUM PHOSPHATES POWDER PACK 280-160-250 MG 2 PACKET: 280-160-250 PACK at 13:00

## 2022-01-24 RX ADMIN — Medication 40 MG: at 08:51

## 2022-01-24 RX ADMIN — HYDROXYZINE HYDROCHLORIDE 50 MG: 50 TABLET ORAL at 04:22

## 2022-01-24 RX ADMIN — BARICITINIB 4 MG: 2 TABLET, FILM COATED ORAL at 08:49

## 2022-01-24 RX ADMIN — TOPIRAMATE 200 MG: 200 TABLET ORAL at 20:45

## 2022-01-24 RX ADMIN — Medication 1 PACKET: at 08:52

## 2022-01-24 RX ADMIN — VANCOMYCIN HYDROCHLORIDE 125 MG: KIT at 09:06

## 2022-01-24 RX ADMIN — METOPROLOL TARTRATE 25 MG: 25 TABLET, FILM COATED ORAL at 20:46

## 2022-01-24 RX ADMIN — POTASSIUM & SODIUM PHOSPHATES POWDER PACK 280-160-250 MG 2 PACKET: 280-160-250 PACK at 08:48

## 2022-01-24 RX ADMIN — VORICONAZOLE 200 MG: 40 POWDER, FOR SUSPENSION ORAL at 20:46

## 2022-01-24 RX ADMIN — CEFEPIME HYDROCHLORIDE 2 G: 2 INJECTION, POWDER, FOR SOLUTION INTRAVENOUS at 17:08

## 2022-01-24 RX ADMIN — IOPAMIDOL 68 ML: 755 INJECTION, SOLUTION INTRAVENOUS at 12:24

## 2022-01-24 RX ADMIN — APIXABAN 5 MG: 5 TABLET, FILM COATED ORAL at 08:48

## 2022-01-24 RX ADMIN — Medication 40 MG: at 20:45

## 2022-01-24 RX ADMIN — VORICONAZOLE 200 MG: 40 POWDER, FOR SUSPENSION ORAL at 08:50

## 2022-01-24 RX ADMIN — VANCOMYCIN HYDROCHLORIDE 125 MG: KIT at 12:55

## 2022-01-24 RX ADMIN — APIXABAN 5 MG: 5 TABLET, FILM COATED ORAL at 20:46

## 2022-01-24 RX ADMIN — VANCOMYCIN HYDROCHLORIDE 125 MG: KIT at 17:08

## 2022-01-24 RX ADMIN — VENLAFAXINE 75 MG: 75 TABLET ORAL at 08:50

## 2022-01-24 RX ADMIN — CEFEPIME HYDROCHLORIDE 2 G: 2 INJECTION, POWDER, FOR SOLUTION INTRAVENOUS at 08:50

## 2022-01-24 RX ADMIN — TOPIRAMATE 100 MG: 100 TABLET, FILM COATED ORAL at 08:48

## 2022-01-24 RX ADMIN — VENLAFAXINE 75 MG: 75 TABLET ORAL at 20:45

## 2022-01-24 RX ADMIN — Medication 500 MG: at 13:00

## 2022-01-24 RX ADMIN — Medication 500 MG: at 20:45

## 2022-01-24 RX ADMIN — TOLTERODINE TARTRATE 2 MG: 2 TABLET, FILM COATED ORAL at 08:49

## 2022-01-24 RX ADMIN — SODIUM CHLORIDE, PRESERVATIVE FREE 67 ML: 5 INJECTION INTRAVENOUS at 12:24

## 2022-01-24 RX ADMIN — Medication 500 MG: at 08:51

## 2022-01-24 RX ADMIN — Medication 7.5 MG: at 08:50

## 2022-01-24 RX ADMIN — POTASSIUM & SODIUM PHOSPHATES POWDER PACK 280-160-250 MG 2 PACKET: 280-160-250 PACK at 20:46

## 2022-01-24 ASSESSMENT — ACTIVITIES OF DAILY LIVING (ADL)
ADLS_ACUITY_SCORE: 21
ADLS_ACUITY_SCORE: 14
ADLS_ACUITY_SCORE: 21
ADLS_ACUITY_SCORE: 21
ADLS_ACUITY_SCORE: 14
ADLS_ACUITY_SCORE: 21
ADLS_ACUITY_SCORE: 21
ADLS_ACUITY_SCORE: 14
ADLS_ACUITY_SCORE: 21
ADLS_ACUITY_SCORE: 14
ADLS_ACUITY_SCORE: 21
ADLS_ACUITY_SCORE: 14

## 2022-01-24 NOTE — PLAN OF CARE
"Vss and afebrile. Transferred to [facility] late afternoon. 4 eyes on skin check completed with Sara KNIGHT, Weaned to 1L NC- has been on 3L since decannulation in late December. Sats 95-98%. Pt states she sat in the chair \"a long long time today\", 6 hours total. She is fatigued tonight and does not wish to sit in the chair again when offered. Waiting for dinner tray to come up, cheese quesadilla and pound cake ordered. Continues on tube feeds to supplement intake. No calorie counts ordered. Weight 51.6kg on arrival to unit. redness noted in perineum, pt states getting worse the last few days. Barrier applied, no open areas. Rectal tube in, with some stool in tubing not yet in bag/measurable. Scott patent, urine clear/yellow, generalized edema noted.  Plan to go back on bipap overnight with VBG ordered in the morning.     Stefanie Matt RN CCRN on 1/23/2022 at 6:40 PM    "

## 2022-01-24 NOTE — PLAN OF CARE
Major Shift Events:  A & O but anxious, gave prn atarax 50 mg x2 via g-tube. VSS except HR tachy 100-115. Poor appetite but on TF @ goal rate of 40 mL/hr. Rectal tube leaked x 2 and replaced, has not leaked since placing new tube; possible bladder spasm causing avila to leak x 1, but patent now and no leaks since. Gisela area red and tender 2nd to loose stools- barrier cream applied and rectal tube and avila in place to aid in healing, no open areas. On 3L O2 NC while awake, bibap on overnight @ 45% and sats maintained in mid 90's.   Plan: Continue plan of care and notify team w/any changes.   For vital signs and complete assessments, please see documentation flowsheets.

## 2022-01-24 NOTE — PROGRESS NOTES
CLINICAL NUTRITION SERVICES - BRIEF NOTE     Nutrition Prescription    Recommendations already ordered by Registered Dietitian (RD):  3 cans of Osmolite 1.5 + 2 pkts Prosource provides: 1147 kcal (22 kcal/kg) and 67 g PRO (1.3 g/kg).   - If patient consumes <50% of a meal or only a small meal, she receives a 237 ml (1 can) bolus of Osmolite 1.5   - If patient consumes 50% or greater of a balanced meal, no need to provide the TF bolus.  - 60 ml free water flush before/after each bolus feeding  - daily MVI with minerals to ensure micronutrient needs being met    Future/Additional Recommendations:  - Monitor PO intake and tolerance of bolus feeds.   - Monitor stool trends, consider changing formula to Jevity 1.5.    See RD note from 1/19 for full assessment      NEW FINDINGS   Diet: Regular since 1/22  Intake: Spoke with pt via phone. Marcia reports that her appetite has been okay but she's only been eating foods like fruit ice. Discussed current continuous EN regimen and plan to transition back to conditional bolus regimen.     INTERVENTIONS  Implementation  Nutrition Education: Discussed current PO intake and EN plan with pt. Encouraged ordering meals TID and ordering foods other than fruit ice.   Collaboration with other providers: Discussed transitioning back to conditional bolus feeds with primary team and bedside RN.   Enteral Nutrition: Bolus feeds    Monitoring/Evaluation  Will continue to monitor and evaluate per protocol.    Zarina Del Angel, RD, LD  6D RD pager [phone]

## 2022-01-24 NOTE — PROGRESS NOTES
[facility]    Medicine Progress Note - Hospitalist Service, GOLD TEAM 8    Date of Admission:  1/11/2022    Assessment & Plan          Sherly Yeung is a 56 year old female admitted on 1/11/2022 who is being transferred out of the ICU on 1/23/2022. She has a history of COVID 10/2021 requiring trachoestomy who presented on 1/11 with acute cholecystitis and cholangitis s/p biliary stent placement. Hospital course was complicated by respiratory failure on 1/17 and repeat COVID infection. She developed respiratory failure and required ICU transfer and intubation on 1/19. She was extubated on 1/21 but had recurrent hypercapnia requiring bipap at night and while sleeping ICU stay was complicated by development of new leukocytosis and C. Diff colitis with diarrhea.    Interval Changes:  -CT abdomen/pelvis with leukocytosis and abdominal pain  -CXR  -VBG  -Continue dexamethasone and barcitinib   -End date for cefepime, flagyl (1/26)  -Continue voriconaozle for 2 weeks past end of dexamethasone (end date 2/9) due to increased risk for fungal infection with repeated steroid dosing with recent confirmed aspergillus pneumonia  -Continue vancomycin (2 weeks past IV antibiotic end date)  -Resume metoprolol   -bipap at bedtime and with naps  -Remove avila catheter  -Adjust tube feeding to limit diarrhea  -If improved diarrhea in Am, plan to remove rectal tube  -Repeat blood cultures    #ARDS secondary to COVID 19 pneumonia - improving  #Acute hypercapnic and hypoxemic respiratory failure  -Recent COVID pneumonia requiring ICU stay and tracheostomy in 10/2021. Decannulated later  -Developed respiratory symptoms on 1/17 and + COVID  -Worsened respiratory failure on 1/19 and required ICu transfer and intubation  -Extubated on 1/21  -Requiring bipap at bedtime and with naps for persistent hypercapnia  Plan:  >Bipap at bedtime and with naps  >Repeat VBG in AM  >Repeat CXR in AM for possible  worsening infiltrates  >Continue dexamethasone (end date 1/26) for 10 day course  >Continue barcitinib (end date 1/31) - does not need to be completed at time of discharge    C. Diff colitis  -ICU stay complicated by leukocytosis on 1/21 and diarrhea. C. Diff + on 1/22  Plan:  Plan for 2 weeks of PO vancomycin past last day of IV antibiotics     Aspergillus Pneumonia  -Occurred during initial COVID treatment in 10/2021. Was planned for 60 day of voriconazole  -Was due to end on 1/21 but had recurrent COVID and respiratory failure  Plan:  >Plan for 2 additional weeks of voriconazole past last day of dexamethasone (end date February 9th)    # Choledocholithiasis c/b cholangitis, s/p CBD stenting (1/12/22)  # Probable Acute cholecystitis s/p transpapillary cystic duct stenting (1/12/22)  # Transaminitis   # Sepsis (Tachy, Leukocytosis)  - S/p ERCP, removal of stone and stent placement  - Abx for 10 days (1/12 - 1/22) - (Was cipro/flagyl, broadened to cef/flagyl due to resp failure)  -Developed worsened respiratory failure on 1/19 and antibiotics were extended to provide additional bacterial coverage  Plan:  >Stop IV cefepime, flagyl on 1/26 (14 day course)  >Follow as outpatient with Gen-surg for cholecystectomy (order on discharge placed)  >Follow up with GI approx February 2/12/22    # Paget Banegas syndrome  # 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  She has a history of multiple blood clots.  She has been maintained on apixaban.  -Continue PTA apixaban    #Anemia - secondary to critical illness  #HTN - metoprolol  #Depression. Anorexia - venlflaxine, abilify, topirmate     Diet: Adult Formula Drip Feeding: Continuous Osmolite 1.5; Gastrostomy; Goal Rate: 40; mL/hr; Medication - Feeding Tube Flush Frequency: At least 15-30 mL water before and after medication administration and with tube clogging; Amount to Send (Nutrition...  Regular Diet Adult    DVT  Prophylaxis: DOAC  Avial Catheter: PRESENT, indication: Strict 1-2 Hour I&O  Central Lines: None  Cardiac Monitoring: ACTIVE order. Indication: ICU  Code Status: Full Code      Disposition Plan   Expected Discharge: 01/25/2022     Anticipated discharge location:  Awaiting care coordination huddle  Delays:            The patient's care was discussed with the Bedside Nurse, Care Coordinator/ and Patient.    Rodolfo Hennessy MD  Hospitalist Service, GOLD TEAM [address] [place] [address]  Securely message with the [employer]oc[employer] Web Console (learn more here)  Text page via [facility] Paging/Directory   Please see signed in provider for up to date coverage information      Clinically Significant Risk Factors Present on Admission                    ______________________________________________________________________    Interval History   Overnight, rectal tube required replacement twice. This morning she states she is tired due to poor sleep. States she still gets short of breath with activity. She notes some abdominal pain. NO fever or chills. Continues to have significant stool output.     Data reviewed today: I reviewed all medications, new labs and imaging results over the last 24 hours. I personally reviewed the chest x-ray image(s) showing increased left opacities .    Physical Exam   Vital Signs: Temp: 98  F (36.7  C) Temp src: Axillary BP: (!) 142/90 Pulse: 106   Resp: 22 SpO2: 100 % O2 Device: BiPAP/CPAP Oxygen Delivery: 3 LPM  Weight: 113 lbs 12.12 oz  Constitutional: alert, sitting at bedside, mild distress  Eyes: no icterus  Respiratory: crackles in bilatearl posterior lung fields, occasional wheezing, tachypnea  Cardiovascular: tachycardic, regular rhythm, normal s1, s2, systolic mur 1/6  GI: soft, tender to palpation in left lower and left upper quadrant, no guarding or rigidty  Rectal: Rectal tube draining brown stool  Genitounirinary:avila catheter draining yellow  urine  Musculoskeletal: trace lower extremity edema  Neurologic: holding eye contact, following commands    Data   Recent Labs   Lab 01/24/22  1252 01/24/22  0908 01/24/22  0639 01/23/22  0818 01/23/22  0654 01/22/22  1232 01/22/22  0813   WBC  --   --  20.4*  --  15.8*  --  16.7*   HGB  --   --  10.5*  --  9.8*  --  9.1*   MCV  --   --  94  --  96  --  96   PLT  --   --  646*  --  555*  --  420   INR  --   --  1.15  --  1.19*  --  1.27*   NA  --   --  140  --  139  --  142   POTASSIUM  --   --  4.2  --  4.4  --  4.2   CHLORIDE  --   --  108  --  108  --  111*   CO2  --   --  25  --  25  --  28   BUN  --   --  20  --  18  --  22   CR  --   --  0.41*  --  0.41*  --  0.48*   ANIONGAP  --   --  7  --  6  --  3   TERESA  --   --  9.0  --  8.8  --  8.7   * 136* 107*   < > 137*   < > 149*   ALBUMIN  --   --  3.0*  --  2.6*  --  2.6*   PROTTOTAL  --   --  7.6  --  6.9  --  6.6*   BILITOTAL  --   --  0.3  --  0.3  --  0.2   ALKPHOS  --   --  120  --  115  --  110   ALT  --   --  29  --  28  --  29   AST  --   --  16  --  16  --  13    < > = values in this interval not displayed.

## 2022-01-25 ENCOUNTER — APPOINTMENT (OUTPATIENT)
Dept: GENERAL RADIOLOGY | Facility: CLINIC | Age: 57
DRG: 871 | End: 2022-01-25
Attending: STUDENT IN AN ORGANIZED HEALTH CARE EDUCATION/TRAINING PROGRAM
Payer: MEDICARE

## 2022-01-25 ENCOUNTER — APPOINTMENT (OUTPATIENT)
Dept: PHYSICAL THERAPY | Facility: CLINIC | Age: 57
DRG: 871 | End: 2022-01-25
Payer: MEDICARE

## 2022-01-25 ENCOUNTER — APPOINTMENT (OUTPATIENT)
Dept: OCCUPATIONAL THERAPY | Facility: CLINIC | Age: 57
DRG: 871 | End: 2022-01-25
Payer: MEDICARE

## 2022-01-25 LAB
ALBUMIN SERPL-MCNC: 3.1 G/DL (ref 3.4–5)
ALP SERPL-CCNC: 108 U/L (ref 40–150)
ALT SERPL W P-5'-P-CCNC: 35 U/L (ref 0–50)
ANION GAP SERPL CALCULATED.3IONS-SCNC: 4 MMOL/L (ref 3–14)
AST SERPL W P-5'-P-CCNC: 22 U/L (ref 0–45)
BACTERIA BLD CULT: NO GROWTH
BACTERIA BLD CULT: NO GROWTH
BASE EXCESS BLDV CALC-SCNC: 0.8 MMOL/L (ref -7.7–1.9)
BILIRUB SERPL-MCNC: 0.3 MG/DL (ref 0.2–1.3)
BUN SERPL-MCNC: 20 MG/DL (ref 7–30)
CALCIUM SERPL-MCNC: 9 MG/DL (ref 8.5–10.1)
CHLORIDE BLD-SCNC: 107 MMOL/L (ref 94–109)
CO2 SERPL-SCNC: 27 MMOL/L (ref 20–32)
CREAT SERPL-MCNC: 0.43 MG/DL (ref 0.52–1.04)
CRP SERPL-MCNC: <2.9 MG/L (ref 0–8)
ERYTHROCYTE [DISTWIDTH] IN BLOOD BY AUTOMATED COUNT: 17.2 % (ref 10–15)
GFR SERPL CREATININE-BSD FRML MDRD: >90 ML/MIN/1.73M2
GLUCOSE BLD-MCNC: 81 MG/DL (ref 70–99)
GLUCOSE BLDC GLUCOMTR-MCNC: 112 MG/DL (ref 70–99)
GLUCOSE BLDC GLUCOMTR-MCNC: 133 MG/DL (ref 70–99)
GLUCOSE BLDC GLUCOMTR-MCNC: 148 MG/DL (ref 70–99)
GLUCOSE BLDC GLUCOMTR-MCNC: 161 MG/DL (ref 70–99)
GLUCOSE BLDC GLUCOMTR-MCNC: 196 MG/DL (ref 70–99)
GLUCOSE BLDC GLUCOMTR-MCNC: 65 MG/DL (ref 70–99)
GLUCOSE BLDC GLUCOMTR-MCNC: 68 MG/DL (ref 70–99)
GLUCOSE BLDC GLUCOMTR-MCNC: 74 MG/DL (ref 70–99)
GLUCOSE BLDC GLUCOMTR-MCNC: 77 MG/DL (ref 70–99)
GLUCOSE BLDC GLUCOMTR-MCNC: 85 MG/DL (ref 70–99)
GLUCOSE BLDC GLUCOMTR-MCNC: 91 MG/DL (ref 70–99)
HCO3 BLDV-SCNC: 28 MMOL/L (ref 21–28)
HCT VFR BLD AUTO: 36 % (ref 35–47)
HGB BLD-MCNC: 10.9 G/DL (ref 11.7–15.7)
INR PPP: 1.2 (ref 0.85–1.15)
MCH RBC QN AUTO: 28 PG (ref 26.5–33)
MCHC RBC AUTO-ENTMCNC: 30.3 G/DL (ref 31.5–36.5)
MCV RBC AUTO: 93 FL (ref 78–100)
O2/TOTAL GAS SETTING VFR VENT: 3 %
PCO2 BLDV: 54 MM HG (ref 40–50)
PH BLDV: 7.32 [PH] (ref 7.32–7.43)
PHOSPHATE SERPL-MCNC: 3.1 MG/DL (ref 2.5–4.5)
PLATELET # BLD AUTO: 691 10E3/UL (ref 150–450)
PO2 BLDV: 26 MM HG (ref 25–47)
POTASSIUM BLD-SCNC: 4.4 MMOL/L (ref 3.4–5.3)
PROCALCITONIN SERPL-MCNC: <0.05 NG/ML
PROT SERPL-MCNC: 7.6 G/DL (ref 6.8–8.8)
RBC # BLD AUTO: 3.89 10E6/UL (ref 3.8–5.2)
SODIUM SERPL-SCNC: 138 MMOL/L (ref 133–144)
WBC # BLD AUTO: 17.2 10E3/UL (ref 4–11)

## 2022-01-25 PROCEDURE — 120N000003 HC R&B IMCU UMMC

## 2022-01-25 PROCEDURE — 250N000011 HC RX IP 250 OP 636: Performed by: STUDENT IN AN ORGANIZED HEALTH CARE EDUCATION/TRAINING PROGRAM

## 2022-01-25 PROCEDURE — 250N000013 HC RX MED GY IP 250 OP 250 PS 637: Performed by: STUDENT IN AN ORGANIZED HEALTH CARE EDUCATION/TRAINING PROGRAM

## 2022-01-25 PROCEDURE — 82803 BLOOD GASES ANY COMBINATION: CPT | Performed by: STUDENT IN AN ORGANIZED HEALTH CARE EDUCATION/TRAINING PROGRAM

## 2022-01-25 PROCEDURE — 85610 PROTHROMBIN TIME: CPT | Performed by: STUDENT IN AN ORGANIZED HEALTH CARE EDUCATION/TRAINING PROGRAM

## 2022-01-25 PROCEDURE — 71045 X-RAY EXAM CHEST 1 VIEW: CPT | Mod: 26 | Performed by: RADIOLOGY

## 2022-01-25 PROCEDURE — 36415 COLL VENOUS BLD VENIPUNCTURE: CPT | Performed by: STUDENT IN AN ORGANIZED HEALTH CARE EDUCATION/TRAINING PROGRAM

## 2022-01-25 PROCEDURE — 99233 SBSQ HOSP IP/OBS HIGH 50: CPT | Performed by: INTERNAL MEDICINE

## 2022-01-25 PROCEDURE — 80053 COMPREHEN METABOLIC PANEL: CPT | Performed by: STUDENT IN AN ORGANIZED HEALTH CARE EDUCATION/TRAINING PROGRAM

## 2022-01-25 PROCEDURE — 999N000157 HC STATISTIC RCP TIME EA 10 MIN

## 2022-01-25 PROCEDURE — 85027 COMPLETE CBC AUTOMATED: CPT | Performed by: STUDENT IN AN ORGANIZED HEALTH CARE EDUCATION/TRAINING PROGRAM

## 2022-01-25 PROCEDURE — 84145 PROCALCITONIN (PCT): CPT | Performed by: STUDENT IN AN ORGANIZED HEALTH CARE EDUCATION/TRAINING PROGRAM

## 2022-01-25 PROCEDURE — 97530 THERAPEUTIC ACTIVITIES: CPT | Mod: GP

## 2022-01-25 PROCEDURE — 87040 BLOOD CULTURE FOR BACTERIA: CPT | Performed by: STUDENT IN AN ORGANIZED HEALTH CARE EDUCATION/TRAINING PROGRAM

## 2022-01-25 PROCEDURE — 94660 CPAP INITIATION&MGMT: CPT

## 2022-01-25 PROCEDURE — 71045 X-RAY EXAM CHEST 1 VIEW: CPT

## 2022-01-25 PROCEDURE — 84100 ASSAY OF PHOSPHORUS: CPT | Performed by: STUDENT IN AN ORGANIZED HEALTH CARE EDUCATION/TRAINING PROGRAM

## 2022-01-25 PROCEDURE — 97535 SELF CARE MNGMENT TRAINING: CPT | Mod: GO

## 2022-01-25 PROCEDURE — 86140 C-REACTIVE PROTEIN: CPT | Performed by: STUDENT IN AN ORGANIZED HEALTH CARE EDUCATION/TRAINING PROGRAM

## 2022-01-25 RX ADMIN — Medication 1 PACKET: at 16:07

## 2022-01-25 RX ADMIN — SPIRONOLACTONE 25 MG: 25 TABLET, FILM COATED ORAL at 08:26

## 2022-01-25 RX ADMIN — VORICONAZOLE 200 MG: 40 POWDER, FOR SUSPENSION ORAL at 19:46

## 2022-01-25 RX ADMIN — Medication 500 MG: at 19:46

## 2022-01-25 RX ADMIN — Medication 15 ML: at 11:58

## 2022-01-25 RX ADMIN — VENLAFAXINE 75 MG: 75 TABLET ORAL at 19:47

## 2022-01-25 RX ADMIN — BARICITINIB 4 MG: 2 TABLET, FILM COATED ORAL at 08:27

## 2022-01-25 RX ADMIN — APIXABAN 5 MG: 5 TABLET, FILM COATED ORAL at 08:26

## 2022-01-25 RX ADMIN — METOPROLOL TARTRATE 25 MG: 25 TABLET, FILM COATED ORAL at 19:46

## 2022-01-25 RX ADMIN — DEXTROSE 15 G: 15 GEL ORAL at 05:39

## 2022-01-25 RX ADMIN — TOLTERODINE TARTRATE 2 MG: 2 TABLET, FILM COATED ORAL at 08:26

## 2022-01-25 RX ADMIN — Medication 3 MG: at 02:10

## 2022-01-25 RX ADMIN — CEFEPIME HYDROCHLORIDE 2 G: 2 INJECTION, POWDER, FOR SOLUTION INTRAVENOUS at 08:25

## 2022-01-25 RX ADMIN — VANCOMYCIN HYDROCHLORIDE 125 MG: KIT at 19:46

## 2022-01-25 RX ADMIN — VORICONAZOLE 200 MG: 40 POWDER, FOR SUSPENSION ORAL at 08:29

## 2022-01-25 RX ADMIN — Medication 3 MG: at 22:48

## 2022-01-25 RX ADMIN — VANCOMYCIN HYDROCHLORIDE 125 MG: KIT at 08:29

## 2022-01-25 RX ADMIN — DEXTROSE 15 G: 15 GEL ORAL at 12:06

## 2022-01-25 RX ADMIN — CEFEPIME HYDROCHLORIDE 2 G: 2 INJECTION, POWDER, FOR SOLUTION INTRAVENOUS at 16:06

## 2022-01-25 RX ADMIN — VANCOMYCIN HYDROCHLORIDE 125 MG: KIT at 11:58

## 2022-01-25 RX ADMIN — METOPROLOL TARTRATE 25 MG: 25 TABLET, FILM COATED ORAL at 08:26

## 2022-01-25 RX ADMIN — HYDROXYZINE HYDROCHLORIDE 50 MG: 50 TABLET ORAL at 02:10

## 2022-01-25 RX ADMIN — Medication 500 MG: at 08:28

## 2022-01-25 RX ADMIN — TOPIRAMATE 200 MG: 200 TABLET ORAL at 19:46

## 2022-01-25 RX ADMIN — CEFEPIME HYDROCHLORIDE 2 G: 2 INJECTION, POWDER, FOR SOLUTION INTRAVENOUS at 23:00

## 2022-01-25 RX ADMIN — Medication 40 MG: at 19:46

## 2022-01-25 RX ADMIN — APIXABAN 5 MG: 5 TABLET, FILM COATED ORAL at 19:47

## 2022-01-25 RX ADMIN — DEXAMETHASONE 6 MG: 2 TABLET ORAL at 16:06

## 2022-01-25 RX ADMIN — Medication 40 MG: at 08:28

## 2022-01-25 RX ADMIN — CEFEPIME HYDROCHLORIDE 2 G: 2 INJECTION, POWDER, FOR SOLUTION INTRAVENOUS at 00:34

## 2022-01-25 RX ADMIN — VANCOMYCIN HYDROCHLORIDE 125 MG: KIT at 16:07

## 2022-01-25 RX ADMIN — HYDROXYZINE HYDROCHLORIDE 50 MG: 50 TABLET ORAL at 22:48

## 2022-01-25 RX ADMIN — Medication 1 PACKET: at 08:29

## 2022-01-25 RX ADMIN — TOLTERODINE TARTRATE 2 MG: 2 TABLET, FILM COATED ORAL at 19:46

## 2022-01-25 RX ADMIN — Medication 500 MG: at 16:07

## 2022-01-25 RX ADMIN — TOPIRAMATE 100 MG: 100 TABLET, FILM COATED ORAL at 08:26

## 2022-01-25 RX ADMIN — Medication 7.5 MG: at 08:27

## 2022-01-25 RX ADMIN — VENLAFAXINE 75 MG: 75 TABLET ORAL at 08:26

## 2022-01-25 ASSESSMENT — ACTIVITIES OF DAILY LIVING (ADL)
ADLS_ACUITY_SCORE: 14

## 2022-01-25 NOTE — PROGRESS NOTES
Shift Summary ([id number]):    Neuro: A/O x4. PRN atarax and melatonin given.     Cardiac: NSR/ST 90-110s. BP WDL, scheduled metoprolol given. Afebrile.     Respiratory: 3L NC during the day, Bipap at night, 45% FiO2, 10/5.     GI/: regular diet with bolus tube feedings as needed. FMS in place. Scott removed, external catheter in place.     0400 blood sugar check 68, alexandria crackers and orange juice given, 15 minute recheck 77. Glucose gel & more orange juice given, recheck 91, and subsequent recheck 161.

## 2022-01-25 NOTE — PROGRESS NOTES
[facility]    Medicine Progress Note - Hospitalist Service, GOLD TEAM 8    Date of Admission:  1/11/2022    Assessment & Plan        Sherly Yeung is a 56 year old female admitted on 1/11/2022 who is being transferred out of the ICU on 1/23/2022. She has a history of COVID 10/2021 requiring trachoestomy who presented on 1/11 with acute cholecystitis and cholangitis s/p biliary stent placement. Hospital course was complicated by respiratory failure on 1/17 and repeat COVID infection. She developed respiratory failure and required ICU transfer and intubation on 1/19. She was extubated on 1/21 but had recurrent hypercapnia requiring bipap at night and while sleeping ICU stay was complicated by development of new leukocytosis and C. Diff colitis with diarrhea.    #ARDS secondary to COVID 19 pneumonia - improving  #Acute hypercapnic and hypoxemic respiratory failure  -Recent COVID pneumonia requiring ICU stay and tracheostomy in 10/2021. Decannulated later  -Developed respiratory symptoms on 1/17 and + COVID  -Worsened respiratory failure on 1/19 and required ICu transfer and intubation  -Extubated on 1/21  -Requiring bipap at bedtime and with naps for persistent hypercapnia  -Leukocytosis down-trending  Plan:  >BiPAP at bedtime and with naps  >Hypercapnia (venous PCO2 = 54)  >No change in CXR (1/25)  >Continue dexamethasone (end date 1/26) for 10 day course  >Continue barcitinib (end date 1/31)  >Does not need to be completed at time of discharge    C. Diff colitis  -ICU stay complicated by leukocytosis on 1/21 and diarrhea. C. Diff + on 1/22  -Persistent diarrhea  -Leukocytosis down-trending  Plan:  Plan for 2 weeks of PO vancomycin past last day of IV antibiotics     Aspergillus Pneumonia  -Occurred during initial COVID treatment in 10/2021. Was planned for 60 day of voriconazole  -Was due to end on 1/21 but had recurrent COVID and respiratory failure  Plan:  >Plan for 2  additional weeks of voriconazole past last day of dexamethasone  >End date February 9th)    #Choledocholithiasis c/b cholangitis, s/p CBD stenting (1/12/22)  #Probable Acute cholecystitis s/p transpapillary cystic duct stenting (1/12/22)  #Transaminitis   #Sepsis (tachy, leukocytosis)  - S/p ERCP, removal of stone and stent placement  - Abx for 10 days (1/12 - 1/22) - (Was cipro/flagyl, broadened to cef/flagyl due to resp failure)  -Developed worsened respiratory failure on 1/19 and antibiotics were extended to provide additional bacterial coverage  Plan:  >Stop IV cefepime, flagyl on 1/26 (14 day course)  >Follow as outpatient with Gen-surg for cholecystectomy (order on discharge placed)  >Follow up with GI approx February 2/12/22    #Paget Banegas syndrome  #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  She has a history of multiple blood clots.  She has been maintained on apixaban.  -Continue PTA apixaban    #Anemia  -Secondary to critical illness  #HTN  -Metoprolol  #Depression. Anorexia  -Venlflaxine, Abilify, topiramate       Diet: Regular Diet Adult  Adult Formula Bolus Feeding: TID Osmolite 1.5; Route: Gastrostomy; 3; Can(s); Medication - Feeding Tube Flush Frequency: At least 15-30 mL water before and after medication administration and with tube clogging; Amount to Send (Nutrition use): 3 c...    DVT Prophylaxis: DOAC  Scott Catheter: Not present  Central Lines: None  Cardiac Monitoring: ACTIVE order. Indication: ICU  Code Status: Full Code      Disposition Plan   Expected Discharge: 01/26/2022     Anticipated discharge location:  Awaiting care coordination huddle  Delays:     Placement - TCU  Covid Test Positive  Oxygen Needs            The patient's care was discussed with the Bedside Nurse, Care Coordinator/, Patient and Patient's Family.    Luis Manuel Nunes DO  Hospitalist Service, GOLD TEAM [address] [place] [address]  [facility]  Securely message with the [employer] Web Console (learn more here)  Text page via [facility] Paging/Directory   Please see signed in provider for up to date coverage information  ______________________________________________________________________    Interval History   Patient reports persistent, intermittent RUQ discomfort.  Per nursing staff, persistent diarrhea via rectal tube.  Per nursing staff, patient remains on 6L O2 via NC.  Patient reports persistent dyspnea on exertion.  Patient working with physical and occupational therapy.    Data reviewed today: I reviewed all medications, new labs and imaging results over the last 24 hours. I personally reviewed CXR (1/25/2022):  IMPRESSION:  1. Unchanged trace bilateral pleural effusions.  2. Unchanged interstitial predominant opacities throughout the left  lung and right lung base consistent with the patient's history of  COVID-19 and/or pulmonary edema.    Physical Exam   Vital Signs: Temp: 98.8  F (37.1  C) Temp src: Oral BP: (!) 139/105 Pulse: 107   Resp: 25 SpO2: 100 % O2 Device: Nasal cannula Oxygen Delivery: 3 LPM  Weight: 113 lbs 12.12 oz   Physical Exam  Constitutional:       General: She is not in acute distress.     Appearance: Normal appearance. She is ill-appearing and toxic-appearing. She is not diaphoretic.   HENT:      Head: Normocephalic.      Nose: Nose normal.      Mouth/Throat:      Mouth: Mucous membranes are moist.      Pharynx: Oropharynx is clear.   Eyes:      General: No scleral icterus.     Extraocular Movements: Extraocular movements intact.      Conjunctiva/sclera: Conjunctivae normal.      Pupils: Pupils are equal, round, and reactive to light.   Cardiovascular:      Rate and Rhythm: Regular rhythm. Tachycardia present.      Pulses: Normal pulses.      Heart sounds: Normal heart sounds. No murmur heard.  No friction rub. No gallop.    Pulmonary:      Effort: Pulmonary effort is normal.      Breath sounds: Normal breath sounds.       Comments: Course crepitations  Abdominal:      Palpations: Abdomen is soft.      Tenderness: There is abdominal tenderness.      Comments: RUQ   Musculoskeletal:         General: Normal range of motion.      Cervical back: Normal range of motion. No rigidity.   Skin:     General: Skin is warm and dry.   Neurological:      General: No focal deficit present.      Mental Status: She is alert. Mental status is at baseline. She is disoriented.   Psychiatric:         Mood and Affect: Mood normal.         Behavior: Behavior normal.         Thought Content: Thought content normal.         Judgment: Judgment normal.           Data   Recent Labs   Lab 01/25/22  1228 01/25/22  1205 01/25/22  0736 01/25/22  0550 01/25/22  0530 01/24/22  0908 01/24/22  0639 01/23/22  0818 01/23/22  0654   WBC  --   --   --   --  17.2*  --  20.4*  --  15.8*   HGB  --   --   --   --  10.9*  --  10.5*  --  9.8*   MCV  --   --   --   --  93  --  94  --  96   PLT  --   --   --   --  691*  --  646*  --  555*   INR  --   --   --   --  1.20*  --  1.15  --  1.19*   NA  --   --   --   --  138  --  140  --  139   POTASSIUM  --   --   --   --  4.4  --  4.2  --  4.4   CHLORIDE  --   --   --   --  107  --  108  --  108   CO2  --   --   --   --  27  --  25  --  25   BUN  --   --   --   --  20  --  20  --  18   CR  --   --   --   --  0.43*  --  0.41*  --  0.41*   ANIONGAP  --   --   --   --  4  --  7  --  6   TERESA  --   --   --   --  9.0  --  9.0  --  8.8   GLC 85 65* 112*   < > 81   < > 107*   < > 137*   ALBUMIN  --   --   --   --  3.1*  --  3.0*  --  2.6*   PROTTOTAL  --   --   --   --  7.6  --  7.6  --  6.9   BILITOTAL  --   --   --   --  0.3  --  0.3  --  0.3   ALKPHOS  --   --   --   --  108  --  120  --  115   ALT  --   --   --   --  35  --  29  --  28   AST  --   --   --   --  22  --  16  --  16    < > = values in this interval not displayed.     Recent Results (from the past 24 hour(s))   XR Chest Port 1 View    Narrative    EXAM: XR CHEST PORT 1  VIEW  1/25/2022 8:41 AM     HISTORY:  progression of effusion and infiltrate with leukocytosis       COMPARISON:  Chest x-ray 1/24/2022    FINDINGS: AP radiograph of the chest. Stable position of right  subclavian venous stent with surgical clips and fractured sternotomy  wires overlying the superior chest.    Stable cardiomediastinal silhouette. No pneumothorax. Question trace  bilateral pleural effusions. Unchanged interstitial predominant  opacities throughout the left lung and right lung base. Partially  visualized upper abdomen is unremarkable. No acute osseous  abnormality.      Impression    IMPRESSION:  1. Unchanged trace bilateral pleural effusions.  2. Unchanged interstitial predominant opacities throughout the left  lung and right lung base consistent with the patient's history of  COVID-19 and/or pulmonary edema.    I have personally reviewed the examination and initial interpretation  and I agree with the findings.    CARINA ROCK MD         SYSTEM ID:  [id number]     Medications     dextrose       - MEDICATION INSTRUCTIONS -       - MEDICATION INSTRUCTIONS -         apixaban ANTICOAGULANT  5 mg Oral or Feeding Tube BID     ARIPiprazole  7.5 mg Oral or Feeding Tube Daily     baricitinib  4 mg Oral or Feeding Tube Daily     ceFEPIme (MAXIPIME) IV  2 g Intravenous Q8H     [Held by provider] DULoxetine  120 mg Oral or Feeding Tube QPM     insulin aspart  1-6 Units Subcutaneous Q4H     metoprolol tartrate  25 mg Oral BID     metroNIDAZOLE  500 mg Oral or Feeding Tube TID     multivitamins w/minerals  15 mL Per Feeding Tube Daily     pantoprazole  40 mg Oral BID     protein modular  1 packet Per Feeding Tube BID     sodium chloride (PF)  3 mL Intracatheter Q8H     spironolactone  25 mg Oral Daily     tolterodine  2 mg Oral or Feeding Tube BID     topiramate  100 mg Oral Daily     topiramate  200 mg Oral or Feeding Tube QPM     vancomycin  125 mg Oral or Feeding Tube 4x Daily     venlafaxine  75 mg Oral or  Feeding Tube BID     voriconazole  200 mg Oral or Feeding Tube Q12H TANNER

## 2022-01-25 NOTE — PROGRESS NOTES
Care Management Follow Up    Length of Stay (days): 13    Expected Discharge Date: 01/26/2022     Concerns to be Addressed: discharge planning     Patient plan of care discussed at interdisciplinary rounds: Yes    Anticipated Discharge Disposition: Transitional Care - Patient has a bed hold at [facility]     Anticipated Discharge Services:  Therapies  Anticipated Discharge DME:  TBD    Patient/family educated on Medicare website which has current facility and service quality ratings:  No as pt admitted from a facility and has a bed hold.  Education Provided on the Discharge Plan: yes  Patient/Family in Agreement with the Plan: yes    Referrals Placed by CM/SW: Post Acute Facilities  Private pay costs discussed: Not applicable    Additional Information:  MARY JANE following for disposition planning.  Patient is still on COVID precautions but anticipating off soon.  MARY JANE paged medical team for update on status of precautions ending, awaiting callback.     MARY JANE called [facility] (p. [phone], f. [phone]), and left  with Traceyelif in Admissions with request to confirm continued bed hold, awaiting callback.    Callback from Vera, states Marcia's  came to facility on 1/18 and removed all of Marcia's possessions on that date and stated he did not plan for her to return.  MARY JANE will inquire further with patient and spouse and update facility after further discussion with them.      MARY JANE team will continue to follow.     GERMAN Sesay, MSW  Social Work Services, 6D support   [facility]  Direct: [phone] Pager: [phone]    GERMAN Vega

## 2022-01-25 NOTE — PROGRESS NOTES
Major Shift Events:  Pt tachycardic in the 100-120s, as high as 130's with activity. BP's have been 130-140's systolic. No complaints of any pain on shift. Up to the chair for a couple of hours, RN microturned patient, but pt would quickly reposition back to leaning on right side. OT/PT worked with patient and stood and pivot to chair. Shortness of breath reported with activity. CXR remains unchanged from AM. Scott was removed, purewick in place, rectal tube in place- loose stools continue. Gisela area cleaned, applied barrier cream, reddness in that area. Low blood sugar this morning, gave Dextrose Gel, recheck was again under 100 so gave apple juice via feeding tube per patient request. One time bolu sof the osmolite because pt only eating about 25% of lunch. Turned and repositioned patient every two hours.       Plan: Awaiting care coordination, TCU placement?

## 2022-01-25 NOTE — PLAN OF CARE
Problem: Adult Inpatient Plan of Care  Goal: Optimal Comfort and Wellbeing  Outcome: Improving     Problem: Fluid Imbalance (Pneumonia)  Goal: Fluid Balance  Outcome: Improving     Problem: Respiratory Compromise (Pneumonia)  Goal: Effective Oxygenation and Ventilation  Outcome: Improving     Problem: Airway Clearance Ineffective  Goal: Effective Airway Clearance  Outcome: Improving    Pt had uneventful shift today. Pt up in the chair most of the day. Minimal oral intake. Bolus tube feedings given, continuous tube feedings off. Rectal tube remains in place. Vital signs stable.

## 2022-01-26 ENCOUNTER — APPOINTMENT (OUTPATIENT)
Dept: PHYSICAL THERAPY | Facility: CLINIC | Age: 57
DRG: 871 | End: 2022-01-26
Payer: MEDICARE

## 2022-01-26 ENCOUNTER — APPOINTMENT (OUTPATIENT)
Dept: OCCUPATIONAL THERAPY | Facility: CLINIC | Age: 57
DRG: 871 | End: 2022-01-26
Payer: MEDICARE

## 2022-01-26 LAB
ALBUMIN SERPL-MCNC: 3 G/DL (ref 3.4–5)
ALP SERPL-CCNC: 101 U/L (ref 40–150)
ALT SERPL W P-5'-P-CCNC: 26 U/L (ref 0–50)
ANION GAP SERPL CALCULATED.3IONS-SCNC: 4 MMOL/L (ref 3–14)
AST SERPL W P-5'-P-CCNC: 20 U/L (ref 0–45)
BILIRUB SERPL-MCNC: 0.4 MG/DL (ref 0.2–1.3)
BUN SERPL-MCNC: 19 MG/DL (ref 7–30)
CALCIUM SERPL-MCNC: 9.2 MG/DL (ref 8.5–10.1)
CHLORIDE BLD-SCNC: 106 MMOL/L (ref 94–109)
CO2 SERPL-SCNC: 28 MMOL/L (ref 20–32)
CREAT SERPL-MCNC: 0.44 MG/DL (ref 0.52–1.04)
ERYTHROCYTE [DISTWIDTH] IN BLOOD BY AUTOMATED COUNT: 16.9 % (ref 10–15)
GFR SERPL CREATININE-BSD FRML MDRD: >90 ML/MIN/1.73M2
GLUCOSE BLD-MCNC: 76 MG/DL (ref 70–99)
GLUCOSE BLDC GLUCOMTR-MCNC: 115 MG/DL (ref 70–99)
GLUCOSE BLDC GLUCOMTR-MCNC: 243 MG/DL (ref 70–99)
GLUCOSE BLDC GLUCOMTR-MCNC: 64 MG/DL (ref 70–99)
GLUCOSE BLDC GLUCOMTR-MCNC: 69 MG/DL (ref 70–99)
GLUCOSE BLDC GLUCOMTR-MCNC: 81 MG/DL (ref 70–99)
GLUCOSE BLDC GLUCOMTR-MCNC: 91 MG/DL (ref 70–99)
HCT VFR BLD AUTO: 35.4 % (ref 35–47)
HGB BLD-MCNC: 10.9 G/DL (ref 11.7–15.7)
INR PPP: 1.16 (ref 0.85–1.15)
MCH RBC QN AUTO: 28.8 PG (ref 26.5–33)
MCHC RBC AUTO-ENTMCNC: 30.8 G/DL (ref 31.5–36.5)
MCV RBC AUTO: 94 FL (ref 78–100)
PLATELET # BLD AUTO: 667 10E3/UL (ref 150–450)
POTASSIUM BLD-SCNC: 4.9 MMOL/L (ref 3.4–5.3)
PROT SERPL-MCNC: 7.8 G/DL (ref 6.8–8.8)
RBC # BLD AUTO: 3.78 10E6/UL (ref 3.8–5.2)
SODIUM SERPL-SCNC: 138 MMOL/L (ref 133–144)
WBC # BLD AUTO: 17.5 10E3/UL (ref 4–11)

## 2022-01-26 PROCEDURE — 97535 SELF CARE MNGMENT TRAINING: CPT | Mod: GO

## 2022-01-26 PROCEDURE — 85610 PROTHROMBIN TIME: CPT | Performed by: STUDENT IN AN ORGANIZED HEALTH CARE EDUCATION/TRAINING PROGRAM

## 2022-01-26 PROCEDURE — 97530 THERAPEUTIC ACTIVITIES: CPT | Mod: GO

## 2022-01-26 PROCEDURE — 120N000003 HC R&B IMCU UMMC

## 2022-01-26 PROCEDURE — 97116 GAIT TRAINING THERAPY: CPT | Mod: GP

## 2022-01-26 PROCEDURE — 36415 COLL VENOUS BLD VENIPUNCTURE: CPT | Performed by: STUDENT IN AN ORGANIZED HEALTH CARE EDUCATION/TRAINING PROGRAM

## 2022-01-26 PROCEDURE — 258N000001 HC RX 258: Performed by: STUDENT IN AN ORGANIZED HEALTH CARE EDUCATION/TRAINING PROGRAM

## 2022-01-26 PROCEDURE — 258N000003 HC RX IP 258 OP 636: Performed by: INTERNAL MEDICINE

## 2022-01-26 PROCEDURE — 250N000013 HC RX MED GY IP 250 OP 250 PS 637: Performed by: STUDENT IN AN ORGANIZED HEALTH CARE EDUCATION/TRAINING PROGRAM

## 2022-01-26 PROCEDURE — 80053 COMPREHEN METABOLIC PANEL: CPT | Performed by: STUDENT IN AN ORGANIZED HEALTH CARE EDUCATION/TRAINING PROGRAM

## 2022-01-26 PROCEDURE — 250N000011 HC RX IP 250 OP 636: Performed by: STUDENT IN AN ORGANIZED HEALTH CARE EDUCATION/TRAINING PROGRAM

## 2022-01-26 PROCEDURE — 97530 THERAPEUTIC ACTIVITIES: CPT | Mod: GP

## 2022-01-26 PROCEDURE — 250N000013 HC RX MED GY IP 250 OP 250 PS 637: Performed by: PHYSICIAN ASSISTANT

## 2022-01-26 PROCEDURE — 82040 ASSAY OF SERUM ALBUMIN: CPT | Performed by: STUDENT IN AN ORGANIZED HEALTH CARE EDUCATION/TRAINING PROGRAM

## 2022-01-26 PROCEDURE — 99233 SBSQ HOSP IP/OBS HIGH 50: CPT | Performed by: INTERNAL MEDICINE

## 2022-01-26 PROCEDURE — 85027 COMPLETE CBC AUTOMATED: CPT | Performed by: STUDENT IN AN ORGANIZED HEALTH CARE EDUCATION/TRAINING PROGRAM

## 2022-01-26 PROCEDURE — 97110 THERAPEUTIC EXERCISES: CPT | Mod: GO

## 2022-01-26 RX ADMIN — Medication 3 MG: at 20:11

## 2022-01-26 RX ADMIN — DEXTROSE AND SODIUM CHLORIDE: 5; 900 INJECTION, SOLUTION INTRAVENOUS at 13:52

## 2022-01-26 RX ADMIN — Medication 1 PACKET: at 08:23

## 2022-01-26 RX ADMIN — VORICONAZOLE 200 MG: 40 POWDER, FOR SUSPENSION ORAL at 20:12

## 2022-01-26 RX ADMIN — Medication 7.5 MG: at 08:30

## 2022-01-26 RX ADMIN — VANCOMYCIN HYDROCHLORIDE 125 MG: KIT at 15:53

## 2022-01-26 RX ADMIN — VANCOMYCIN HYDROCHLORIDE 125 MG: KIT at 20:12

## 2022-01-26 RX ADMIN — CEFEPIME HYDROCHLORIDE 2 G: 2 INJECTION, POWDER, FOR SOLUTION INTRAVENOUS at 15:53

## 2022-01-26 RX ADMIN — Medication 500 MG: at 08:17

## 2022-01-26 RX ADMIN — VANCOMYCIN HYDROCHLORIDE 125 MG: KIT at 12:33

## 2022-01-26 RX ADMIN — VANCOMYCIN HYDROCHLORIDE 125 MG: KIT at 08:17

## 2022-01-26 RX ADMIN — Medication 40 MG: at 20:12

## 2022-01-26 RX ADMIN — Medication 15 ML: at 12:33

## 2022-01-26 RX ADMIN — TOLTERODINE TARTRATE 2 MG: 2 TABLET, FILM COATED ORAL at 20:11

## 2022-01-26 RX ADMIN — VENLAFAXINE 75 MG: 75 TABLET ORAL at 20:11

## 2022-01-26 RX ADMIN — TOPIRAMATE 200 MG: 200 TABLET ORAL at 20:11

## 2022-01-26 RX ADMIN — SPIRONOLACTONE 25 MG: 25 TABLET, FILM COATED ORAL at 08:19

## 2022-01-26 RX ADMIN — METOPROLOL TARTRATE 25 MG: 25 TABLET, FILM COATED ORAL at 08:19

## 2022-01-26 RX ADMIN — DEXTROSE MONOHYDRATE 50 ML: 25 INJECTION, SOLUTION INTRAVENOUS at 10:11

## 2022-01-26 RX ADMIN — APIXABAN 5 MG: 5 TABLET, FILM COATED ORAL at 08:18

## 2022-01-26 RX ADMIN — Medication 500 MG: at 13:52

## 2022-01-26 RX ADMIN — VORICONAZOLE 200 MG: 40 POWDER, FOR SUSPENSION ORAL at 08:17

## 2022-01-26 RX ADMIN — APIXABAN 5 MG: 5 TABLET, FILM COATED ORAL at 20:11

## 2022-01-26 RX ADMIN — HYDROXYZINE HYDROCHLORIDE 50 MG: 50 TABLET ORAL at 23:02

## 2022-01-26 RX ADMIN — Medication 1 PACKET: at 15:54

## 2022-01-26 RX ADMIN — Medication 1 PACKET: at 20:12

## 2022-01-26 RX ADMIN — METOPROLOL TARTRATE 25 MG: 25 TABLET, FILM COATED ORAL at 20:11

## 2022-01-26 RX ADMIN — CEFEPIME HYDROCHLORIDE 2 G: 2 INJECTION, POWDER, FOR SOLUTION INTRAVENOUS at 08:14

## 2022-01-26 RX ADMIN — Medication 40 MG: at 08:17

## 2022-01-26 RX ADMIN — TOPIRAMATE 100 MG: 100 TABLET, FILM COATED ORAL at 08:18

## 2022-01-26 RX ADMIN — TOLTERODINE TARTRATE 2 MG: 2 TABLET, FILM COATED ORAL at 08:19

## 2022-01-26 RX ADMIN — BARICITINIB 4 MG: 2 TABLET, FILM COATED ORAL at 08:19

## 2022-01-26 RX ADMIN — VENLAFAXINE 75 MG: 75 TABLET ORAL at 08:19

## 2022-01-26 RX ADMIN — Medication 500 MG: at 20:12

## 2022-01-26 ASSESSMENT — ACTIVITIES OF DAILY LIVING (ADL)
ADLS_ACUITY_SCORE: 15
ADLS_ACUITY_SCORE: 14
ADLS_ACUITY_SCORE: 15
ADLS_ACUITY_SCORE: 14
ADLS_ACUITY_SCORE: 15
ADLS_ACUITY_SCORE: 15
ADLS_ACUITY_SCORE: 14
ADLS_ACUITY_SCORE: 15
ADLS_ACUITY_SCORE: 14
ADLS_ACUITY_SCORE: 14
ADLS_ACUITY_SCORE: 15
ADLS_ACUITY_SCORE: 14
ADLS_ACUITY_SCORE: 14
ADLS_ACUITY_SCORE: 15
ADLS_ACUITY_SCORE: 14
ADLS_ACUITY_SCORE: 15
ADLS_ACUITY_SCORE: 15
ADLS_ACUITY_SCORE: 14
ADLS_ACUITY_SCORE: 14

## 2022-01-26 NOTE — PROGRESS NOTES
Shift Summary ([id number]):    Neuro: A/O x4. PRN atarax and melatonin given.     Cardiac: NSR/ST 80-110s. Afebrile. BP WDL.     Respiratory: 3L NC, O2 sats >95%.     GI/: 1 bolus TF given & regular diet. External catheter in place, 1100 ml UO. Rectal tube in place due to loose stools.     0400 blood sugar 81, orange juice given.

## 2022-01-26 NOTE — PROGRESS NOTES
[facility]    Medicine Progress Note - Hospitalist Service, GOLD TEAM 8    Date of Admission:  1/11/2022    Sherly Yeung is a 56 year old female admitted on 1/11/2022 who is being transferred out of the ICU on 1/23/2022. She has a history of COVID 10/2021 requiring trachoestomy who presented on 1/11 with acute cholecystitis and cholangitis s/p biliary stent placement. Hospital course was complicated by respiratory failure on 1/17 and repeat COVID infection. She developed respiratory failure and required ICU transfer and intubation on 1/19. She was extubated on 1/21 but had recurrent hypercapnia requiring bipap at night and while sleeping ICU stay was complicated by development of new leukocytosis and C. Diff colitis with diarrhea.    #ARDS secondary to COVID 19 pneumonia (improving)  #Acute hypercapnic and hypoxemic respiratory failure  -Recent COVID pneumonia requiring ICU stay and tracheostomy in 10/2021. Decannulated later  -Developed respiratory symptoms on 1/17 and + COVID  -Worsened respiratory failure on 1/19 and required ICu transfer and intubation  -Extubated on 1/21  -Requiring bipap at bedtime and with naps for persistent hypercapnia  -Leukocytosis down-trending  Plan:  >BiPAP at bedtime and with naps  >Hypercapnia (venous PCO2 = 54)  >No change in CXR (1/25)  >Continue dexamethasone (end date 1/26) for 10 day course  >Continue barcitinib (end date 1/31)  >Does not need to be completed at time of discharge    C. Diff colitis  -ICU stay complicated by leukocytosis on 1/21 and diarrhea. C. Diff + on 1/22  -Persistent diarrhea  -Leukocytosis stable (sight up-tick)  Plan:  Plan for 2 weeks of PO vancomycin past last day of IV antibiotics     Aspergillus Pneumonia  -Occurred during initial COVID treatment in 10/2021. Was planned for 60 day of voriconazole  -Was due to end on 1/21 but had recurrent COVID and respiratory failure  Plan:  >Plan for 2 additional weeks  of voriconazole past last day of dexamethasone  >End date February 9th)    #Choledocholithiasis c/b cholangitis, s/p CBD stenting (1/12/22)  #Probable Acute cholecystitis s/p transpapillary cystic duct stenting (1/12/22)  #Transaminitis   #Sepsis (tachy, leukocytosis)  - S/p ERCP, removal of stone and stent placement  - Abx for 10 days (1/12 - 1/22) - (Was cipro/flagyl, broadened to cef/flagyl due to resp failure)  - Developed worsened respiratory failure on 1/19 and antibiotics were extended to provide additional bacterial coverage  Plan:  >Stop IV cefepime, flagyl on 1/26 (14 day course)  >Follow as outpatient with Gen-surg for cholecystectomy (order on discharge placed)  >Follow up with GI approx February 2/12/22    #Paget Banegas syndrome  #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  She has a history of multiple blood clots.  She has been maintained on apixaban.  -Continue PTA apixaban    #Anemia  -Secondary to critical illness  #HTN  -Metoprolol  #Depression. Anorexia  -Venlflaxine, Abilify, topiramate       Diet: Regular Diet Adult  Adult Formula Bolus Feeding: TID Osmolite 1.5; Route: Gastrostomy; 3; Can(s); Medication - Feeding Tube Flush Frequency: At least 15-30 mL water before and after medication administration and with tube clogging; Amount to Send (Nutrition use): 3 c...    DVT Prophylaxis: DOAC  Scott Catheter: PRESENT, indication: Strict 1-2 Hour I&O, Wound Healing  Central Lines: None  Cardiac Monitoring: ACTIVE order. Indication: ICU  Code Status: Full Code      Disposition Plan   Expected Discharge: 01/26/2022     Anticipated discharge location: TBD  Delays:     Placement - TCU  Covid Test Positive  Oxygen Needs            The patient's care was discussed with the Bedside Nurse, Care Coordinator/ and Patient.    Luis Manuel Nunes,   Hospitalist Service, GOLD TEAM [address] [place] [address]  Center  Securely message with the Vocera Web Console (learn more here)  Text page via [facility] Paging/Directory   Please see signed in provider for up to date coverage information  ________________________________________________________    Interval History   Patient feels improved today.  Patient reports work of breathing is improved.  Per nursing staff, there is concern for irritation and diarrhea contaminating PureWick.  Will go ahead and insert Scott catheter to avoid contamination and to monitor strict I&Os.  Per nursing staff, reports persistent hypoglycemia.  Added D5NS to stabilize blood sugars.  Reactive hyperglycemia; will continue to closely monitor.    Data reviewed today: I reviewed all medications, new labs and imaging results over the last 24 hours. I personally reviewed no images or EKG's today.    Physical Exam   Vital Signs: Temp: 98  F (36.7  C) Temp src: Oral BP: (!) 147/98 Pulse: 114   Resp: 29 SpO2: 100 % O2 Device: Nasal cannula Oxygen Delivery: 3 LPM  Weight: 113 lbs 12.12 oz  Physical Exam  Constitutional:       General: She is not in acute distress.     Appearance: Normal appearance. She is normal weight. She is ill-appearing.   HENT:      Head: Normocephalic.      Mouth/Throat:      Mouth: Mucous membranes are moist.      Pharynx: Oropharynx is clear.   Eyes:      Extraocular Movements: Extraocular movements intact.      Conjunctiva/sclera: Conjunctivae normal.      Pupils: Pupils are equal, round, and reactive to light.   Cardiovascular:      Rate and Rhythm: Regular rhythm. Tachycardia present.      Pulses: Normal pulses.      Heart sounds: Normal heart sounds. No murmur heard.  No friction rub. No gallop.    Pulmonary:      Breath sounds: No wheezing.      Comments: Crepitations improved  Abdominal:      General: Bowel sounds are normal.      Palpations: Abdomen is soft.      Comments: Persistent diarrhea via rectal tube   Skin:     General: Skin is warm and dry.   Neurological:       General: No focal deficit present.      Mental Status: She is alert and oriented to person, place, and time. Mental status is at baseline.   Psychiatric:         Mood and Affect: Mood normal.         Behavior: Behavior normal.         Thought Content: Thought content normal.         Judgment: Judgment normal.           Data   Recent Labs   Lab 01/26/22  1244 01/26/22  1040 01/26/22  0939 01/26/22  0813 01/26/22  0717 01/25/22  0550 01/25/22  0530 01/24/22  0908 01/24/22  0639   WBC  --   --   --   --  17.5*  --  17.2*  --  20.4*   HGB  --   --   --   --  10.9*  --  10.9*  --  10.5*   MCV  --   --   --   --  94  --  93  --  94   PLT  --   --   --   --  667*  --  691*  --  646*   INR  --   --   --   --  1.16*  --  1.20*  --  1.15   NA  --   --   --   --  138  --  138  --  140   POTASSIUM  --   --   --   --  4.9  --  4.4  --  4.2   CHLORIDE  --   --   --   --  106  --  107  --  108   CO2  --   --   --   --  28  --  27  --  25   BUN  --   --   --   --  19  --  20  --  20   CR  --   --   --   --  0.44*  --  0.43*  --  0.41*   ANIONGAP  --   --   --   --  4  --  4  --  7   TERESA  --   --   --   --  9.2  --  9.0  --  9.0   * 243* 69*   < > 76   < > 81   < > 107*   ALBUMIN  --   --   --   --  3.0*  --  3.1*  --  3.0*   PROTTOTAL  --   --   --   --  7.8  --  7.6  --  7.6   BILITOTAL  --   --   --   --  0.4  --  0.3  --  0.3   ALKPHOS  --   --   --   --  101  --  108  --  120   ALT  --   --   --   --  26  --  35  --  29   AST  --   --   --   --  20  --  22  --  16    < > = values in this interval not displayed.     No results found for this or any previous visit (from the past 24 hour(s)).  Medications     dextrose       dextrose 5% and 0.9% NaCl 75 mL/hr at 01/26/22 1600     - MEDICATION INSTRUCTIONS -       - MEDICATION INSTRUCTIONS -         apixaban ANTICOAGULANT  5 mg Oral or Feeding Tube BID     ARIPiprazole  7.5 mg Oral or Feeding Tube Daily     banatrol plus  1 packet Per Feeding Tube BID     baricitinib  4 mg  Oral or Feeding Tube Daily     [Held by provider] DULoxetine  120 mg Oral or Feeding Tube QPM     insulin aspart  1-6 Units Subcutaneous Q4H     metoprolol tartrate  25 mg Oral BID     metroNIDAZOLE  500 mg Oral or Feeding Tube TID     multivitamins w/minerals  15 mL Per Feeding Tube Daily     pantoprazole  40 mg Oral BID     protein modular  1 packet Per Feeding Tube BID     sodium chloride (PF)  3 mL Intracatheter Q8H     spironolactone  25 mg Oral Daily     tolterodine  2 mg Oral or Feeding Tube BID     topiramate  100 mg Oral Daily     topiramate  200 mg Oral or Feeding Tube QPM     vancomycin  125 mg Oral or Feeding Tube 4x Daily     venlafaxine  75 mg Oral or Feeding Tube BID     voriconazole  200 mg Oral or Feeding Tube Q12H TANNER

## 2022-01-26 NOTE — PROGRESS NOTES
Care Management Follow Up    Length of Stay (days): 14    Expected Discharge Date: 01/26/2022     Concerns to be Addressed: discharge planning     Patient plan of care discussed at interdisciplinary rounds: Yes    Anticipated Discharge Disposition: Transitional Care     Anticipated Discharge Services:  Therapies  Anticipated Discharge DME:  TBD    Patient/family educated on Medicare website which has current facility and service quality ratings:  (No as pt admitted from a facility and has a bed hold)   Education Provided on the Discharge Plan:  yes  Patient/Family in Agreement with the Plan: yes    Referrals Placed by CM/SW: Post Acute Facilities  Private pay costs discussed: Not applicable    Additional Information:  MARY JANE continuing to follow for TCU placement.  MARY JANE learned end of day on 1/25 that Marcia's spouse removed all of her items from [facility] (where she was placed for TCU prior to this admission) last week and does not intend for her to return to that location, despite bed hold that was in place.      MARY JANE called Marcia via bedside phone, introduced self and role.  MARY JANE asked Marcia to confirm whether she and spouse Herminio are open to her returning to [facility] and Marcia stated that she would like to return there, if able.  MARY JANE agreed to reach back out to [facility] and update Marcia shortly.      [facility]p. [phone], f. [phone]), and left vm with admissions and faxed new referral, just in case it's needed.     Call back from [facility], no beds today but will review referral for poss bed tomorrow.  MARY JANE explained that per Gold [facility], Marcia will not be medically ready for 3 more days.  [facility] will continue to follow and MARY JANE re-faxed referral to ([phone]) per [facility]'s request.    MARY JANE updated Marcia via bedside phone and she remains in agreement with a return to [facility] once medically ready to discharge.  Marcia did ask MARY JANE if there are other TCU options and MARY JANE explained  that we can certainly send additional referrals, however it is difficult to find placement at this time so the fact that she has a likely bed at [facility] is hopeful.  Marcia states she will let  team know if she wants any additional TCU referrals sent, however is holding off at this time.       SW team will continue to follow.    GERMAN Sesay, MSW  Social Work Services, 6D support   [facility]  Direct: [phone] Pager: [phone]    GERMAN Vega

## 2022-01-26 NOTE — PROGRESS NOTES
CLINICAL NUTRITION SERVICES - REASSESSMENT NOTE     Nutrition Prescription    RECOMMENDATIONS FOR MDs/PROVIDERS TO ORDER:  Encourage PO intake     Malnutrition Status:    Unable to assess due to inability to perform NFPA. At risk.     Recommendations already ordered by Registered Dietitian (RD):  - Continue current conditional bolus feeds.   - Banatrol via FT BID.     Future/Additional Recommendations:  Monitor TF tolerance, PO intake, stooling, and weights trends.      EVALUATION OF THE PROGRESS TOWARD GOALS   Diet: Regular  Nutrition Support: 3 cans of Osmolite 1.5 + 2 pkts Prosource provides: 1147 kcal (22 kcal/kg) and 67 g PRO (1.3 g/kg).   Free Water: 60 mL before and after each feeding     Intake: Marcia reports that her appetite has been okay. She ate pretty well yesterday and reports that she tolerated her bolus feeds well. Marcia reports that she continues to have diarrhea.   Per chart review on 1/25, pt ate 25% of lunch and received 1 bolus feed. Intake of 75% of breakfast and intake of 50% of dinner on 1/25. 0-25% intake on 1/24. With PO + bolus feed + 2 pkts prosource, nutrition intake of 1190 kcal and 53 g PRO on 1/25. Meets 92% estimated minimum energy needs and 85% estimated protein needs. Prior to 1/25, Marcia was receiving continuous tube feeds and meeting 100% of minimum nutrition needs.      NEW FINDINGS   Weight: 51.6 kg on 1/23, weight down over the past week. Weight loss of 4.1 kg (7%) over the past week.     Labs:   Cr 0.44 (L)  BG 1/25-1/26:     Meds:   Novolog- correction scale  Liquid multivitamin   Protonix  Prosource 1 pkt BID  D5 and 0.9% NaCl @ 75 mL/hr    GI: Pt with rectal tube- 125 mL on 1/25  C. Diff positive on 1/22    MALNUTRITION  % Intake: Decreased intake does not meet criteria- EN + PO  % Weight Loss: > 2% in 1 week (severe)  Subcutaneous Fat Loss: Unable to assess- COVID+  Muscle Loss: Unable to assess- COVID+  Fluid Accumulation/Edema: Mild [nutrition related vs other  etiology?]  Malnutrition Diagnosis: Unable to determine due to no NFPA at this time     Previous Goals   Patient to consume % of nutritionally adequate meal trays TID, or the equivalent with conditional TFs.  Evaluation: Met    Previous Nutrition Diagnosis  Inadequate protein-energy intake related to respiratory status, decreased appetite/intakes, refusal of some bolus feedings as evidenced by food intake/room-service review, avg EN intake only meeting 24% low-end energy needs and 31% low-end protein needs.    Evaluation: Improving    CURRENT NUTRITION DIAGNOSIS  Inadequate oral intake related to variable PO intake as evidenced by reliant on supplemental bolus feeds to meet full nutrition needs      INTERVENTIONS  Implementation  Nutrition Education: Discussed current PO intake and EN. Discussed plan to start Banatrol.   Enteral Nutrition: Add Banatrol BID.     Goals  Patient to consume % of nutritionally adequate meal trays TID, or the equivalent with conditional TFs.    Monitoring/Evaluation  Progress toward goals will be monitored and evaluated per protocol.    Zarina Del Angel, RD, LD  6D RD pager [phone]

## 2022-01-26 NOTE — PROGRESS NOTES
Major Shift Events:  Pt remains tachycardic with rates between 100-110's.Turned down O2 to 2L from 3L. Pt respiratory rates in the 20-30's. Blood  Sugars have been low in the 60's- gave D50ml and apple juice, but paged about starting a D5 infusion. Pt seems to be dehydrated. Encourage fluid and food intake, but ended up starting D5 0.9%NS at 75ml. Rectal tube leaking but still working okay, inserted a avila d/t rectal tube leaking stool into purewick and being able to adequately measures I/O's. Worked with PT/OT today. Refused to sit in chair.     Plan: continue therapies, Monitor tachcardia.

## 2022-01-27 ENCOUNTER — APPOINTMENT (OUTPATIENT)
Dept: PHYSICAL THERAPY | Facility: CLINIC | Age: 57
DRG: 871 | End: 2022-01-27
Payer: MEDICARE

## 2022-01-27 LAB
ALBUMIN SERPL-MCNC: 3.4 G/DL (ref 3.4–5)
ALP SERPL-CCNC: 97 U/L (ref 40–150)
ALT SERPL W P-5'-P-CCNC: 36 U/L (ref 0–50)
ANION GAP SERPL CALCULATED.3IONS-SCNC: 6 MMOL/L (ref 3–14)
AST SERPL W P-5'-P-CCNC: 18 U/L (ref 0–45)
BILIRUB SERPL-MCNC: 0.3 MG/DL (ref 0.2–1.3)
BUN SERPL-MCNC: 14 MG/DL (ref 7–30)
CALCIUM SERPL-MCNC: 8.7 MG/DL (ref 8.5–10.1)
CHLORIDE BLD-SCNC: 109 MMOL/L (ref 94–109)
CO2 SERPL-SCNC: 26 MMOL/L (ref 20–32)
CREAT SERPL-MCNC: 0.5 MG/DL (ref 0.52–1.04)
ERYTHROCYTE [DISTWIDTH] IN BLOOD BY AUTOMATED COUNT: 17.1 % (ref 10–15)
GFR SERPL CREATININE-BSD FRML MDRD: >90 ML/MIN/1.73M2
GLUCOSE BLD-MCNC: 142 MG/DL (ref 70–99)
GLUCOSE BLDC GLUCOMTR-MCNC: 114 MG/DL (ref 70–99)
GLUCOSE BLDC GLUCOMTR-MCNC: 126 MG/DL (ref 70–99)
GLUCOSE BLDC GLUCOMTR-MCNC: 127 MG/DL (ref 70–99)
GLUCOSE BLDC GLUCOMTR-MCNC: 148 MG/DL (ref 70–99)
GLUCOSE BLDC GLUCOMTR-MCNC: 165 MG/DL (ref 70–99)
GLUCOSE BLDC GLUCOMTR-MCNC: 216 MG/DL (ref 70–99)
GLUCOSE BLDC GLUCOMTR-MCNC: 53 MG/DL (ref 70–99)
GLUCOSE BLDC GLUCOMTR-MCNC: 55 MG/DL (ref 70–99)
GLUCOSE BLDC GLUCOMTR-MCNC: 58 MG/DL (ref 70–99)
GLUCOSE BLDC GLUCOMTR-MCNC: 72 MG/DL (ref 70–99)
HCT VFR BLD AUTO: 35 % (ref 35–47)
HGB BLD-MCNC: 10.6 G/DL (ref 11.7–15.7)
INR PPP: 1.14 (ref 0.85–1.15)
MCH RBC QN AUTO: 29 PG (ref 26.5–33)
MCHC RBC AUTO-ENTMCNC: 30.3 G/DL (ref 31.5–36.5)
MCV RBC AUTO: 96 FL (ref 78–100)
PLATELET # BLD AUTO: 626 10E3/UL (ref 150–450)
POTASSIUM BLD-SCNC: 4.2 MMOL/L (ref 3.4–5.3)
PROT SERPL-MCNC: 6.6 G/DL (ref 6.8–8.8)
RBC # BLD AUTO: 3.66 10E6/UL (ref 3.8–5.2)
SODIUM SERPL-SCNC: 141 MMOL/L (ref 133–144)
WBC # BLD AUTO: 17.9 10E3/UL (ref 4–11)

## 2022-01-27 PROCEDURE — 85610 PROTHROMBIN TIME: CPT | Performed by: STUDENT IN AN ORGANIZED HEALTH CARE EDUCATION/TRAINING PROGRAM

## 2022-01-27 PROCEDURE — 250N000013 HC RX MED GY IP 250 OP 250 PS 637: Performed by: STUDENT IN AN ORGANIZED HEALTH CARE EDUCATION/TRAINING PROGRAM

## 2022-01-27 PROCEDURE — 999N000157 HC STATISTIC RCP TIME EA 10 MIN

## 2022-01-27 PROCEDURE — 97530 THERAPEUTIC ACTIVITIES: CPT | Mod: GP

## 2022-01-27 PROCEDURE — 120N000003 HC R&B IMCU UMMC

## 2022-01-27 PROCEDURE — 36415 COLL VENOUS BLD VENIPUNCTURE: CPT | Performed by: STUDENT IN AN ORGANIZED HEALTH CARE EDUCATION/TRAINING PROGRAM

## 2022-01-27 PROCEDURE — 250N000013 HC RX MED GY IP 250 OP 250 PS 637: Performed by: PHYSICIAN ASSISTANT

## 2022-01-27 PROCEDURE — 250N000013 HC RX MED GY IP 250 OP 250 PS 637: Performed by: INTERNAL MEDICINE

## 2022-01-27 PROCEDURE — 258N000003 HC RX IP 258 OP 636: Performed by: INTERNAL MEDICINE

## 2022-01-27 PROCEDURE — 258N000001 HC RX 258: Performed by: STUDENT IN AN ORGANIZED HEALTH CARE EDUCATION/TRAINING PROGRAM

## 2022-01-27 PROCEDURE — 99233 SBSQ HOSP IP/OBS HIGH 50: CPT | Performed by: INTERNAL MEDICINE

## 2022-01-27 PROCEDURE — 94660 CPAP INITIATION&MGMT: CPT

## 2022-01-27 PROCEDURE — 85027 COMPLETE CBC AUTOMATED: CPT | Performed by: STUDENT IN AN ORGANIZED HEALTH CARE EDUCATION/TRAINING PROGRAM

## 2022-01-27 PROCEDURE — 80053 COMPREHEN METABOLIC PANEL: CPT | Performed by: STUDENT IN AN ORGANIZED HEALTH CARE EDUCATION/TRAINING PROGRAM

## 2022-01-27 RX ORDER — METOPROLOL TARTRATE 50 MG
50 TABLET ORAL 2 TIMES DAILY
Status: DISCONTINUED | OUTPATIENT
Start: 2022-01-27 | End: 2022-01-30

## 2022-01-27 RX ADMIN — DEXTROSE MONOHYDRATE 50 ML: 25 INJECTION, SOLUTION INTRAVENOUS at 06:20

## 2022-01-27 RX ADMIN — VENLAFAXINE 75 MG: 75 TABLET ORAL at 20:52

## 2022-01-27 RX ADMIN — VANCOMYCIN HYDROCHLORIDE 125 MG: KIT at 18:12

## 2022-01-27 RX ADMIN — VORICONAZOLE 200 MG: 40 POWDER, FOR SUSPENSION ORAL at 20:51

## 2022-01-27 RX ADMIN — Medication 1 PACKET: at 20:54

## 2022-01-27 RX ADMIN — Medication 40 MG: at 20:51

## 2022-01-27 RX ADMIN — APIXABAN 5 MG: 5 TABLET, FILM COATED ORAL at 20:52

## 2022-01-27 RX ADMIN — APIXABAN 5 MG: 5 TABLET, FILM COATED ORAL at 08:09

## 2022-01-27 RX ADMIN — DEXTROSE 15 G: 15 GEL ORAL at 05:53

## 2022-01-27 RX ADMIN — VANCOMYCIN HYDROCHLORIDE 125 MG: KIT at 08:08

## 2022-01-27 RX ADMIN — DEXTROSE MONOHYDRATE 1000 ML: 100 INJECTION, SOLUTION INTRAVENOUS at 12:02

## 2022-01-27 RX ADMIN — SPIRONOLACTONE 25 MG: 25 TABLET, FILM COATED ORAL at 08:09

## 2022-01-27 RX ADMIN — Medication 15 ML: at 12:04

## 2022-01-27 RX ADMIN — TOLTERODINE TARTRATE 2 MG: 2 TABLET, FILM COATED ORAL at 08:08

## 2022-01-27 RX ADMIN — Medication 7.5 MG: at 12:04

## 2022-01-27 RX ADMIN — TOPIRAMATE 100 MG: 100 TABLET, FILM COATED ORAL at 08:09

## 2022-01-27 RX ADMIN — Medication 1 PACKET: at 08:12

## 2022-01-27 RX ADMIN — TOPIRAMATE 200 MG: 200 TABLET ORAL at 20:52

## 2022-01-27 RX ADMIN — DEXTROSE 15 G: 15 GEL ORAL at 05:30

## 2022-01-27 RX ADMIN — TOLTERODINE TARTRATE 2 MG: 2 TABLET, FILM COATED ORAL at 20:52

## 2022-01-27 RX ADMIN — DEXTROSE AND SODIUM CHLORIDE: 5; 900 INJECTION, SOLUTION INTRAVENOUS at 20:41

## 2022-01-27 RX ADMIN — Medication 40 MG: at 08:09

## 2022-01-27 RX ADMIN — VANCOMYCIN HYDROCHLORIDE 125 MG: KIT at 20:51

## 2022-01-27 RX ADMIN — METOPROLOL TARTRATE 25 MG: 25 TABLET, FILM COATED ORAL at 08:09

## 2022-01-27 RX ADMIN — METOPROLOL TARTRATE 50 MG: 50 TABLET, FILM COATED ORAL at 20:52

## 2022-01-27 RX ADMIN — VORICONAZOLE 200 MG: 40 POWDER, FOR SUSPENSION ORAL at 08:09

## 2022-01-27 RX ADMIN — BARICITINIB 4 MG: 2 TABLET, FILM COATED ORAL at 08:09

## 2022-01-27 RX ADMIN — Medication 1 PACKET: at 08:10

## 2022-01-27 RX ADMIN — Medication 1 PACKET: at 18:12

## 2022-01-27 RX ADMIN — VANCOMYCIN HYDROCHLORIDE 125 MG: KIT at 12:04

## 2022-01-27 RX ADMIN — VENLAFAXINE 75 MG: 75 TABLET ORAL at 08:09

## 2022-01-27 ASSESSMENT — ACTIVITIES OF DAILY LIVING (ADL)
ADLS_ACUITY_SCORE: 15

## 2022-01-27 NOTE — PROGRESS NOTES
Major Shift Events: PT remains alert and oriented x4. Sinus tachy throughout the day, as high as 130's with activity. On 1L NC, sats in the %. D10 at 75ml started with consistent low blood sugars. Sugars within normal limits since then. Pt has better appetite today. Working with therapy. Continues to have liquid stools, avila in place. Gisela area is red, barrier cream applied. CXR ordered with increased tachycardia, no pain reported.     Plan: TCU placement, pt qualifies for 20 days of isolation due to critical illness.     1200: Tachcardia continues at rest, paged MD about maybe giving fluids or albumin, possibly dehydrated. MD declined. Ordered CXR.

## 2022-01-27 NOTE — PROGRESS NOTES
Major Shift Events:  Pt A&Ox4. Tachycardic, when sleeping NSR. On BiPap overnight when sleeping, pt dislikes but encouraged and pt kept on all night. On 2L O2 NC during day. D5NS at 75mL/hr. Rectal tube and avila, good output. Gave 1 bolus feeding, pt ate 25% of dinner. Standard Novolog order in place, BS at 0000 was 148, gave 1 unit of insulin. BS 72 at 0400. D5NS off for 1 hour, needed replacement. BS at 0520 was 55. Pt alert. Gave 15g glucose gel. Recheck BS was 53, gave 15g more of glucose gel. Recheck 58. Gave dextrose injection IV. Recheck 216. No insulin given. PEG. Pt slept overnight.    Plan: Clarify insulin order, Novolog Q4h. Monitor blood sugars. Continue w/ plan of care.  For vital signs and complete assessments, please see documentation flowsheets.

## 2022-01-27 NOTE — PROGRESS NOTES
[facility]     Medicine Progress Note - Hospitalist Service, GOLD TEAM 8     Date of Admission:  1/11/2022        Assessment & Plan        Sherly Yeung is a 56 year old female admitted on 1/11/2022 who is being transferred out of the ICU on 1/23/2022. She has a history of COVID 10/2021 requiring trachoestomy who presented on 1/11 with acute cholecystitis and cholangitis s/p biliary stent placement. Hospital course was complicated by respiratory failure on 1/17 and repeat COVID infection. She developed respiratory failure and required ICU transfer and intubation on 1/19. She was extubated on 1/21 but had recurrent hypercapnia requiring bipap at night and while sleeping ICU stay was complicated by development of new leukocytosis and C. Diff colitis with diarrhea.     #ARDS secondary to COVID 19 pneumonia (improving)  #Acute hypercapnic and hypoxemic respiratory failure  -Recent COVID pneumonia requiring ICU stay and tracheostomy in 10/2021. Decannulated later  -Developed respiratory symptoms on 1/17 and + COVID  -Worsened respiratory failure on 1/19 and required ICu transfer and intubation  -Extubated on 1/21  -Requiring bipap at bedtime and with naps for persistent hypercapnia  -Leukocytosis down-trending  -Patient's COVID-19 precaution expires 2/5/22  Plan:  >BiPAP at bedtime and with naps  >Hypercapnia (venous PCO2 = 54)  >No change in CXR (1/25)  >Continue dexamethasone (end date 1/26) for 10 day course  >Continue barcitinib (end date 1/31)  >Does not need to be completed at time of discharge     #C. diff colitis  -ICU stay complicated by leukocytosis on 1/21 and diarrhea. C. Diff + on 1/22  -Persistent diarrhea  -Leukocytosis ticking upwards  Plan:  Plan for 2 weeks of PO vancomycin past last day of IV antibiotics      #Aspergillus Pneumonia  -Occurred during initial COVID treatment in 10/2021. Was planned for 60 day of voriconazole  -Was due to end on 1/21 but had  recurrent COVID and respiratory failure  Plan:  >Plan for 2 additional weeks of voriconazole past last day of dexamethasone  >End date February 9th)     #Choledocholithiasis c/b cholangitis, s/p CBD stenting (1/12/22)  #Probable Acute cholecystitis s/p transpapillary cystic duct stenting (1/12/22)  #Transaminitis   #Sepsis (tachy, leukocytosis)  -Status post ERCP, removal of stone and stent placement  -Abx for 10 days (1/12 - 1/22) - (Was cipro/flagyl, broadened to cef/flagyl due to resp failure)  -Developed worsened respiratory failure on 1/19 and antibiotics were extended to provide additional bacterial coverage  -Patient reports persistent RUQ discomfort  -Leukocytosis up-ticking  -Will re-check RUQ U/S  Plan:  >Stop IV cefepime, flagyl on 1/26 (14 day course)  >Follow as outpatient with Gen-surg for cholecystectomy (order on discharge placed)  >Follow up with GI approx February 2/12/22     #Paget Banegas syndrome  #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  She has a history of multiple blood clots.  She has been maintained on apixaban.  -Continue PTA apixaban     #Anemia  -Secondary to critical illness  #HTN  -Metoprolol  #Depression. Anorexia  -Venlflaxine, Abilify, topiramate              Diet: Regular Diet Adult  Adult Formula Bolus Feeding: TID Osmolite 1.5; Route: Gastrostomy; 3; Can(s); Medication - Feeding Tube Flush Frequency: At least 15-30 mL water before and after medication administration and with tube clogging; Amount to Send (Nutrition use): 3 c...    DVT Prophylaxis: DOAC  Scott Catheter: PRESENT, indication: Strict 1-2 Hour I&O, Wound Healing  Central Lines: None  Cardiac Monitoring: None  Code Status: Full Code          Disposition Plan     Expected Discharge: 01/29/2022     Anticipated discharge location:  Awaiting care coordination huddle  Delays:     Placement - TCU  Covid Test Positive  Oxygen Needs                The patient's  care was discussed with the Bedside Nurse, Care Coordinator/ and Patient.     Luis Manuel Nunes,   Hospitalist Service, GOLD TEAM 8  [facility]  Securely message with the [employer] Web Console (learn more here)  Text page via [employer] Paging/Directory   Please see signed in provider for up to date coverage information  ______________________________________________________________________        Interval History      Patient much improved today.  Per nursing staff, patient is no longer requiring supplemental oxygen.  Patient reports persistent RUQ abdominal discomfort.  Persistent diarrhea.  Leukocytosis up-ticking.     Data reviewed today: I reviewed all medications, new labs and imaging results over the last 24 hours. I personally reviewed no images or EKG's today.           Physical Exam     Vital Signs: Temp: 96.8  F (36  C) Temp src: Axillary BP: (!) 147/105 Pulse: (!) 124   Resp: 21 SpO2: 99 % O2 Device: None (Room air) Oxygen Delivery: 2 LPM  Weight: 113 lbs 12.12 oz     Physical Exam  Constitutional:       General: She is not in acute distress.     Appearance: Normal appearance. She is normal weight. She is not ill-appearing, toxic-appearing or diaphoretic.   HENT:      Head: Normocephalic.      Mouth/Throat:      Mouth: Mucous membranes are moist.      Pharynx: Oropharynx is clear.   Eyes:      General: No scleral icterus.     Extraocular Movements: Extraocular movements intact.      Conjunctiva/sclera: Conjunctivae normal.      Pupils: Pupils are equal, round, and reactive to light.   Cardiovascular:      Rate and Rhythm: Regular rhythm. Tachycardia present.      Pulses: Normal pulses.      Heart sounds: Normal heart sounds.   Pulmonary:      Effort: Pulmonary effort is normal.      Breath sounds: Normal breath sounds.   Abdominal:      General: Bowel sounds are normal. There is no distension.      Palpations: Abdomen is soft.      Tenderness: There is no  abdominal tenderness. There is no guarding or rebound.   Neurological:      General: No focal deficit present.      Mental Status: She is alert and oriented to person, place, and time. Mental status is at baseline.   Psychiatric:         Mood and Affect: Mood normal.         Behavior: Behavior normal.         Thought Content: Thought content normal.         Judgment: Judgment normal.                     Data                 Recent Labs   Lab 01/27/22  1359 01/27/22  1213 01/27/22  0643 01/27/22  0619 01/26/22  0813 01/26/22  0717 01/25/22  0550 01/25/22  0530   WBC  --   --   --  17.9*  --  17.5*  --  17.2*   HGB  --   --   --  10.6*  --  10.9*  --  10.9*   MCV  --   --   --  96  --  94  --  93   PLT  --   --   --  626*  --  667*  --  691*   INR  --   --   --  1.14  --  1.16*  --  1.20*   NA  --   --   --  141  --  138  --  138   POTASSIUM  --   --   --  4.2  --  4.9  --  4.4   CHLORIDE  --   --   --  109  --  106  --  107   CO2  --   --   --  26  --  28  --  27   BUN  --   --   --  14  --  19  --  20   CR  --   --   --  0.50*  --  0.44*  --  0.43*   ANIONGAP  --   --   --  6  --  4  --  4   TERESA  --   --   --  8.7  --  9.2  --  9.0   * 126* 216* 142*   < > 76   < > 81   ALBUMIN  --   --   --  3.4  --  3.0*  --  3.1*   PROTTOTAL  --   --   --  6.6*  --  7.8  --  7.6   BILITOTAL  --   --   --  0.3  --  0.4  --  0.3   ALKPHOS  --   --   --  97  --  101  --  108   ALT  --   --   --  36  --  26  --  35   AST  --   --   --  18  --  20  --  22    < > = values in this interval not displayed.      No results found for this or any previous visit (from the past 24 hour(s)).        Medications        dextrose 1,000 mL (01/27/22 1202)     dextrose 5% and 0.9% NaCl       - MEDICATION INSTRUCTIONS -       - MEDICATION INSTRUCTIONS -          apixaban ANTICOAGULANT  5 mg Oral or Feeding Tube BID     ARIPiprazole  7.5 mg Oral or Feeding Tube Daily     banatrol plus  1 packet Per Feeding Tube BID     baricitinib  4 mg Oral or  Feeding Tube Daily     [Held by provider] DULoxetine  120 mg Oral or Feeding Tube QPM     insulin aspart  1-6 Units Subcutaneous Q4H     metoprolol tartrate  25 mg Oral BID     multivitamins w/minerals  15 mL Per Feeding Tube Daily     pantoprazole  40 mg Oral BID     protein modular  1 packet Per Feeding Tube BID     sodium chloride (PF)  3 mL Intracatheter Q8H     spironolactone  25 mg Oral Daily     tolterodine  2 mg Oral or Feeding Tube BID     topiramate  100 mg Oral Daily     topiramate  200 mg Oral or Feeding Tube QPM     vancomycin  125 mg Oral or Feeding Tube 4x Daily     venlafaxine  75 mg Oral or Feeding Tube BID     voriconazole  200 mg Oral or Feeding Tube Q12H TANNER

## 2022-01-27 NOTE — PROGRESS NOTES
[facility]    Medicine Progress Note - Hospitalist Service, GOLD TEAM 8    Date of Admission:  1/11/2022    Assessment & Plan     Sherly Yeung is a 56 year old female admitted on 1/11/2022 who is being transferred out of the ICU on 1/23/2022. She has a history of COVID 10/2021 requiring trachoestomy who presented on 1/11 with acute cholecystitis and cholangitis s/p biliary stent placement. Hospital course was complicated by respiratory failure on 1/17 and repeat COVID infection. She developed respiratory failure and required ICU transfer and intubation on 1/19. She was extubated on 1/21 but had recurrent hypercapnia requiring bipap at night and while sleeping ICU stay was complicated by development of new leukocytosis and C. Diff colitis with diarrhea.    #ARDS secondary to COVID 19 pneumonia (improving)  #Acute hypercapnic and hypoxemic respiratory failure  -Recent COVID pneumonia requiring ICU stay and tracheostomy in 10/2021. Decannulated later  -Developed respiratory symptoms on 1/17 and + COVID  -Worsened respiratory failure on 1/19 and required ICu transfer and intubation  -Extubated on 1/21  -Requiring bipap at bedtime and with naps for persistent hypercapnia  -Leukocytosis down-trending  -Patient's COVID-19 precaution expires 2/5/22  Plan:  >BiPAP at bedtime and with naps  >Hypercapnia (venous PCO2 = 54)  >No change in CXR (1/25)  >Continue dexamethasone (end date 1/26) for 10 day course  >Continue barcitinib (end date 1/31)  >Does not need to be completed at time of discharge    #C. diff colitis  -ICU stay complicated by leukocytosis on 1/21 and diarrhea. C. Diff + on 1/22  -Persistent diarrhea  -Leukocytosis ticking upwards  Plan:  Plan for 2 weeks of PO vancomycin past last day of IV antibiotics     #Aspergillus Pneumonia  -Occurred during initial COVID treatment in 10/2021. Was planned for 60 day of voriconazole  -Was due to end on 1/21 but had recurrent  COVID and respiratory failure  Plan:  >Plan for 2 additional weeks of voriconazole past last day of dexamethasone  >End date February 9th)    #Choledocholithiasis c/b cholangitis, s/p CBD stenting (1/12/22)  #Probable Acute cholecystitis s/p transpapillary cystic duct stenting (1/12/22)  #Transaminitis   #Sepsis (tachy, leukocytosis)  -Status post ERCP, removal of stone and stent placement  -Abx for 10 days (1/12 - 1/22) - (Was cipro/flagyl, broadened to cef/flagyl due to resp failure)  -Developed worsened respiratory failure on 1/19 and antibiotics were extended to provide additional bacterial coverage  -Patient reports persistent RUQ discomfort  -Leukocytosis up-ticking  -Will re-check RUQ U/S  Plan:  >Stop IV cefepime, flagyl on 1/26 (14 day course)  >Follow as outpatient with Gen-surg for cholecystectomy (order on discharge placed)  >Follow up with GI approx February 2/12/22    #Paget Banegas syndrome  #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  She has a history of multiple blood clots.  She has been maintained on apixaban.  -Continue PTA apixaban    #Anemia  -Secondary to critical illness  #HTN  -Metoprolol  #Depression. Anorexia  -Venlflaxine, Abilify, topiramate         Diet: Regular Diet Adult  Adult Formula Bolus Feeding: TID Osmolite 1.5; Route: Gastrostomy; 3; Can(s); Medication - Feeding Tube Flush Frequency: At least 15-30 mL water before and after medication administration and with tube clogging; Amount to Send (Nutrition use): 3 c...    DVT Prophylaxis: DOAC  Scott Catheter: PRESENT, indication: Strict 1-2 Hour I&O, Wound Healing  Central Lines: None  Cardiac Monitoring: None  Code Status: Full Code      Disposition Plan   Expected Discharge: 01/29/2022     Anticipated discharge location:  Awaiting care coordination huddle  Delays:     Placement - TCU  Covid Test Positive  Oxygen Needs            The patient's care was discussed with the  Bedside Nurse, Care Coordinator/ and Patient.    Luis Manuel Nunes, DO  Hospitalist Service, GOLD TEAM 8  [facility]  Securely message with the [employer] Web Console (learn more here)  Text page via [facility] Paging/Directory   Please see signed in provider for up to date coverage information  ______________________________________________________________________    Interval History    Patient much improved today.  Per nursing staff, patient is no longer requiring supplemental oxygen.  Patient reports persistent RUQ abdominal discomfort.  Persistent diarrhea.  Leukocytosis up-ticking.    Data reviewed today: I reviewed all medications, new labs and imaging results over the last 24 hours. I personally reviewed no images or EKG's today.    Physical Exam   Vital Signs: Temp: 96.8  F (36  C) Temp src: Axillary BP: (!) 147/105 Pulse: (!) 124   Resp: 21 SpO2: 99 % O2 Device: None (Room air) Oxygen Delivery: 2 LPM  Weight: 113 lbs 12.12 oz    Physical Exam  Constitutional:       General: She is not in acute distress.     Appearance: Normal appearance. She is normal weight. She is not ill-appearing, toxic-appearing or diaphoretic.   HENT:      Head: Normocephalic.      Mouth/Throat:      Mouth: Mucous membranes are moist.      Pharynx: Oropharynx is clear.   Eyes:      General: No scleral icterus.     Extraocular Movements: Extraocular movements intact.      Conjunctiva/sclera: Conjunctivae normal.      Pupils: Pupils are equal, round, and reactive to light.   Cardiovascular:      Rate and Rhythm: Regular rhythm. Tachycardia present.      Pulses: Normal pulses.      Heart sounds: Normal heart sounds.   Pulmonary:      Effort: Pulmonary effort is normal.      Breath sounds: Normal breath sounds.   Abdominal:      General: Bowel sounds are normal. There is no distension.      Palpations: Abdomen is soft.      Tenderness: There is no abdominal tenderness. There is no guarding or  rebound.   Neurological:      General: No focal deficit present.      Mental Status: She is alert and oriented to person, place, and time. Mental status is at baseline.   Psychiatric:         Mood and Affect: Mood normal.         Behavior: Behavior normal.         Thought Content: Thought content normal.         Judgment: Judgment normal.           Data   Recent Labs   Lab 01/27/22  1359 01/27/22  1213 01/27/22  0643 01/27/22  0619 01/26/22  0813 01/26/22  0717 01/25/22  0550 01/25/22  0530   WBC  --   --   --  17.9*  --  17.5*  --  17.2*   HGB  --   --   --  10.6*  --  10.9*  --  10.9*   MCV  --   --   --  96  --  94  --  93   PLT  --   --   --  626*  --  667*  --  691*   INR  --   --   --  1.14  --  1.16*  --  1.20*   NA  --   --   --  141  --  138  --  138   POTASSIUM  --   --   --  4.2  --  4.9  --  4.4   CHLORIDE  --   --   --  109  --  106  --  107   CO2  --   --   --  26  --  28  --  27   BUN  --   --   --  14  --  19  --  20   CR  --   --   --  0.50*  --  0.44*  --  0.43*   ANIONGAP  --   --   --  6  --  4  --  4   TERESA  --   --   --  8.7  --  9.2  --  9.0   * 126* 216* 142*   < > 76   < > 81   ALBUMIN  --   --   --  3.4  --  3.0*  --  3.1*   PROTTOTAL  --   --   --  6.6*  --  7.8  --  7.6   BILITOTAL  --   --   --  0.3  --  0.4  --  0.3   ALKPHOS  --   --   --  97  --  101  --  108   ALT  --   --   --  36  --  26  --  35   AST  --   --   --  18  --  20  --  22    < > = values in this interval not displayed.     No results found for this or any previous visit (from the past 24 hour(s)).  Medications     dextrose 1,000 mL (01/27/22 1202)     dextrose 5% and 0.9% NaCl       - MEDICATION INSTRUCTIONS -       - MEDICATION INSTRUCTIONS -         apixaban ANTICOAGULANT  5 mg Oral or Feeding Tube BID     ARIPiprazole  7.5 mg Oral or Feeding Tube Daily     banatrol plus  1 packet Per Feeding Tube BID     baricitinib  4 mg Oral or Feeding Tube Daily     [Held by provider] DULoxetine  120 mg Oral or Feeding Tube  QPM     insulin aspart  1-6 Units Subcutaneous Q4H     metoprolol tartrate  25 mg Oral BID     multivitamins w/minerals  15 mL Per Feeding Tube Daily     pantoprazole  40 mg Oral BID     protein modular  1 packet Per Feeding Tube BID     sodium chloride (PF)  3 mL Intracatheter Q8H     spironolactone  25 mg Oral Daily     tolterodine  2 mg Oral or Feeding Tube BID     topiramate  100 mg Oral Daily     topiramate  200 mg Oral or Feeding Tube QPM     vancomycin  125 mg Oral or Feeding Tube 4x Daily     venlafaxine  75 mg Oral or Feeding Tube BID     voriconazole  200 mg Oral or Feeding Tube Q12H TANNER

## 2022-01-28 ENCOUNTER — APPOINTMENT (OUTPATIENT)
Dept: OCCUPATIONAL THERAPY | Facility: CLINIC | Age: 57
DRG: 871 | End: 2022-01-28
Payer: MEDICARE

## 2022-01-28 ENCOUNTER — APPOINTMENT (OUTPATIENT)
Dept: ULTRASOUND IMAGING | Facility: CLINIC | Age: 57
DRG: 871 | End: 2022-01-28
Attending: INTERNAL MEDICINE
Payer: MEDICARE

## 2022-01-28 ENCOUNTER — APPOINTMENT (OUTPATIENT)
Dept: GENERAL RADIOLOGY | Facility: CLINIC | Age: 57
DRG: 871 | End: 2022-01-28
Attending: INTERNAL MEDICINE
Payer: MEDICARE

## 2022-01-28 LAB
ALBUMIN SERPL-MCNC: 2.7 G/DL (ref 3.4–5)
ALBUMIN UR-MCNC: NEGATIVE MG/DL
ALP SERPL-CCNC: 95 U/L (ref 40–150)
ALT SERPL W P-5'-P-CCNC: 33 U/L (ref 0–50)
ANION GAP SERPL CALCULATED.3IONS-SCNC: 1 MMOL/L (ref 3–14)
APPEARANCE UR: CLEAR
AST SERPL W P-5'-P-CCNC: 21 U/L (ref 0–45)
BILIRUB SERPL-MCNC: 0.3 MG/DL (ref 0.2–1.3)
BILIRUB UR QL STRIP: NEGATIVE
BUN SERPL-MCNC: 9 MG/DL (ref 7–30)
CALCIUM SERPL-MCNC: 9.3 MG/DL (ref 8.5–10.1)
CHLORIDE BLD-SCNC: 111 MMOL/L (ref 94–109)
CO2 SERPL-SCNC: 29 MMOL/L (ref 20–32)
COLOR UR AUTO: ABNORMAL
CREAT SERPL-MCNC: 0.48 MG/DL (ref 0.52–1.04)
ERYTHROCYTE [DISTWIDTH] IN BLOOD BY AUTOMATED COUNT: 17.2 % (ref 10–15)
GFR SERPL CREATININE-BSD FRML MDRD: >90 ML/MIN/1.73M2
GLUCOSE BLD-MCNC: 98 MG/DL (ref 70–99)
GLUCOSE BLDC GLUCOMTR-MCNC: 105 MG/DL (ref 70–99)
GLUCOSE BLDC GLUCOMTR-MCNC: 113 MG/DL (ref 70–99)
GLUCOSE BLDC GLUCOMTR-MCNC: 121 MG/DL (ref 70–99)
GLUCOSE BLDC GLUCOMTR-MCNC: 130 MG/DL (ref 70–99)
GLUCOSE BLDC GLUCOMTR-MCNC: 143 MG/DL (ref 70–99)
GLUCOSE BLDC GLUCOMTR-MCNC: 143 MG/DL (ref 70–99)
GLUCOSE BLDC GLUCOMTR-MCNC: 99 MG/DL (ref 70–99)
GLUCOSE UR STRIP-MCNC: NEGATIVE MG/DL
HCT VFR BLD AUTO: 34.8 % (ref 35–47)
HGB BLD-MCNC: 10.4 G/DL (ref 11.7–15.7)
HGB UR QL STRIP: ABNORMAL
INR PPP: 1.09 (ref 0.85–1.15)
KETONES UR STRIP-MCNC: NEGATIVE MG/DL
LEUKOCYTE ESTERASE UR QL STRIP: NEGATIVE
MAGNESIUM SERPL-MCNC: 2.3 MG/DL (ref 1.6–2.3)
MCH RBC QN AUTO: 28.8 PG (ref 26.5–33)
MCHC RBC AUTO-ENTMCNC: 29.9 G/DL (ref 31.5–36.5)
MCV RBC AUTO: 96 FL (ref 78–100)
MUCOUS THREADS #/AREA URNS LPF: PRESENT /LPF
NITRATE UR QL: NEGATIVE
PH UR STRIP: 7 [PH] (ref 5–7)
PHOSPHATE SERPL-MCNC: 3.3 MG/DL (ref 2.5–4.5)
PLATELET # BLD AUTO: 666 10E3/UL (ref 150–450)
POTASSIUM BLD-SCNC: 4.8 MMOL/L (ref 3.4–5.3)
PROT SERPL-MCNC: 6.8 G/DL (ref 6.8–8.8)
RBC # BLD AUTO: 3.61 10E6/UL (ref 3.8–5.2)
RBC URINE: <1 /HPF
SODIUM SERPL-SCNC: 141 MMOL/L (ref 133–144)
SP GR UR STRIP: 1.02 (ref 1–1.03)
UROBILINOGEN UR STRIP-MCNC: NORMAL MG/DL
WBC # BLD AUTO: 19.3 10E3/UL (ref 4–11)
WBC URINE: <1 /HPF

## 2022-01-28 PROCEDURE — 76705 ECHO EXAM OF ABDOMEN: CPT | Mod: 26 | Performed by: RADIOLOGY

## 2022-01-28 PROCEDURE — 71045 X-RAY EXAM CHEST 1 VIEW: CPT | Mod: 26 | Performed by: RADIOLOGY

## 2022-01-28 PROCEDURE — 999N000157 HC STATISTIC RCP TIME EA 10 MIN

## 2022-01-28 PROCEDURE — 250N000013 HC RX MED GY IP 250 OP 250 PS 637: Performed by: STUDENT IN AN ORGANIZED HEALTH CARE EDUCATION/TRAINING PROGRAM

## 2022-01-28 PROCEDURE — 97530 THERAPEUTIC ACTIVITIES: CPT | Mod: GO

## 2022-01-28 PROCEDURE — 85610 PROTHROMBIN TIME: CPT | Performed by: STUDENT IN AN ORGANIZED HEALTH CARE EDUCATION/TRAINING PROGRAM

## 2022-01-28 PROCEDURE — 85027 COMPLETE CBC AUTOMATED: CPT | Performed by: STUDENT IN AN ORGANIZED HEALTH CARE EDUCATION/TRAINING PROGRAM

## 2022-01-28 PROCEDURE — 80053 COMPREHEN METABOLIC PANEL: CPT | Performed by: STUDENT IN AN ORGANIZED HEALTH CARE EDUCATION/TRAINING PROGRAM

## 2022-01-28 PROCEDURE — 258N000003 HC RX IP 258 OP 636: Performed by: INTERNAL MEDICINE

## 2022-01-28 PROCEDURE — 76705 ECHO EXAM OF ABDOMEN: CPT

## 2022-01-28 PROCEDURE — 83735 ASSAY OF MAGNESIUM: CPT | Performed by: STUDENT IN AN ORGANIZED HEALTH CARE EDUCATION/TRAINING PROGRAM

## 2022-01-28 PROCEDURE — 81001 URINALYSIS AUTO W/SCOPE: CPT | Performed by: INTERNAL MEDICINE

## 2022-01-28 PROCEDURE — 250N000013 HC RX MED GY IP 250 OP 250 PS 637: Performed by: PHYSICIAN ASSISTANT

## 2022-01-28 PROCEDURE — 250N000013 HC RX MED GY IP 250 OP 250 PS 637: Performed by: INTERNAL MEDICINE

## 2022-01-28 PROCEDURE — 99233 SBSQ HOSP IP/OBS HIGH 50: CPT | Performed by: INTERNAL MEDICINE

## 2022-01-28 PROCEDURE — 120N000003 HC R&B IMCU UMMC

## 2022-01-28 PROCEDURE — 84100 ASSAY OF PHOSPHORUS: CPT | Performed by: STUDENT IN AN ORGANIZED HEALTH CARE EDUCATION/TRAINING PROGRAM

## 2022-01-28 PROCEDURE — 71045 X-RAY EXAM CHEST 1 VIEW: CPT

## 2022-01-28 PROCEDURE — 94660 CPAP INITIATION&MGMT: CPT

## 2022-01-28 PROCEDURE — 36415 COLL VENOUS BLD VENIPUNCTURE: CPT | Performed by: STUDENT IN AN ORGANIZED HEALTH CARE EDUCATION/TRAINING PROGRAM

## 2022-01-28 RX ADMIN — VANCOMYCIN HYDROCHLORIDE 125 MG: KIT at 15:58

## 2022-01-28 RX ADMIN — Medication 1 PACKET: at 20:47

## 2022-01-28 RX ADMIN — VORICONAZOLE 200 MG: 40 POWDER, FOR SUSPENSION ORAL at 08:54

## 2022-01-28 RX ADMIN — TOPIRAMATE 200 MG: 200 TABLET ORAL at 20:40

## 2022-01-28 RX ADMIN — VANCOMYCIN HYDROCHLORIDE 125 MG: KIT at 20:40

## 2022-01-28 RX ADMIN — VANCOMYCIN HYDROCHLORIDE 125 MG: KIT at 12:36

## 2022-01-28 RX ADMIN — Medication 1 PACKET: at 10:14

## 2022-01-28 RX ADMIN — VORICONAZOLE 200 MG: 40 POWDER, FOR SUSPENSION ORAL at 20:40

## 2022-01-28 RX ADMIN — DEXTROSE AND SODIUM CHLORIDE: 5; 900 INJECTION, SOLUTION INTRAVENOUS at 23:27

## 2022-01-28 RX ADMIN — HYDROXYZINE HYDROCHLORIDE 50 MG: 50 TABLET ORAL at 01:21

## 2022-01-28 RX ADMIN — Medication 15 ML: at 12:36

## 2022-01-28 RX ADMIN — APIXABAN 5 MG: 5 TABLET, FILM COATED ORAL at 08:54

## 2022-01-28 RX ADMIN — Medication 3 MG: at 01:21

## 2022-01-28 RX ADMIN — Medication 7.5 MG: at 09:09

## 2022-01-28 RX ADMIN — BARICITINIB 4 MG: 2 TABLET, FILM COATED ORAL at 08:53

## 2022-01-28 RX ADMIN — TOLTERODINE TARTRATE 2 MG: 2 TABLET, FILM COATED ORAL at 08:54

## 2022-01-28 RX ADMIN — DEXTROSE AND SODIUM CHLORIDE: 5; 900 INJECTION, SOLUTION INTRAVENOUS at 10:13

## 2022-01-28 RX ADMIN — VENLAFAXINE 75 MG: 75 TABLET ORAL at 08:54

## 2022-01-28 RX ADMIN — Medication 1 PACKET: at 15:58

## 2022-01-28 RX ADMIN — APIXABAN 5 MG: 5 TABLET, FILM COATED ORAL at 20:40

## 2022-01-28 RX ADMIN — METOPROLOL TARTRATE 50 MG: 50 TABLET, FILM COATED ORAL at 08:55

## 2022-01-28 RX ADMIN — Medication 40 MG: at 08:53

## 2022-01-28 RX ADMIN — METOPROLOL TARTRATE 50 MG: 50 TABLET, FILM COATED ORAL at 20:40

## 2022-01-28 RX ADMIN — VENLAFAXINE 75 MG: 75 TABLET ORAL at 20:40

## 2022-01-28 RX ADMIN — TOLTERODINE TARTRATE 2 MG: 2 TABLET, FILM COATED ORAL at 20:40

## 2022-01-28 RX ADMIN — VANCOMYCIN HYDROCHLORIDE 125 MG: KIT at 08:54

## 2022-01-28 RX ADMIN — SPIRONOLACTONE 25 MG: 25 TABLET, FILM COATED ORAL at 08:54

## 2022-01-28 RX ADMIN — Medication 3 MG: at 22:12

## 2022-01-28 RX ADMIN — TOPIRAMATE 100 MG: 100 TABLET, FILM COATED ORAL at 08:54

## 2022-01-28 RX ADMIN — Medication 40 MG: at 20:47

## 2022-01-28 ASSESSMENT — ACTIVITIES OF DAILY LIVING (ADL)
ADLS_ACUITY_SCORE: 12
ADLS_ACUITY_SCORE: 15
ADLS_ACUITY_SCORE: 12
ADLS_ACUITY_SCORE: 12
ADLS_ACUITY_SCORE: 13
ADLS_ACUITY_SCORE: 13
ADLS_ACUITY_SCORE: 12
ADLS_ACUITY_SCORE: 12
ADLS_ACUITY_SCORE: 13
ADLS_ACUITY_SCORE: 12
ADLS_ACUITY_SCORE: 13
ADLS_ACUITY_SCORE: 13
ADLS_ACUITY_SCORE: 12
ADLS_ACUITY_SCORE: 13
ADLS_ACUITY_SCORE: 11
ADLS_ACUITY_SCORE: 12
ADLS_ACUITY_SCORE: 13
ADLS_ACUITY_SCORE: 13
ADLS_ACUITY_SCORE: 12

## 2022-01-28 ASSESSMENT — MIFFLIN-ST. JEOR: SCORE: 1052.19

## 2022-01-28 NOTE — PROGRESS NOTES
[facility]    Medicine Progress Note - Hospitalist Service, GOLD TEAM 8    Date of Admission:  1/11/2022    Assessment & Plan     Sherly Yenug is a 56 year old female admitted on 1/11/2022 who is being transferred out of the ICU on 1/23/2022. She has a history of COVID 10/2021 requiring trachoestomy who presented on 1/11 with acute cholecystitis and cholangitis s/p biliary stent placement. Hospital course was complicated by respiratory failure on 1/17 and repeat COVID infection. She developed respiratory failure and required ICU transfer and intubation on 1/19. She was extubated on 1/21 but had recurrent hypercapnia requiring bipap at night and while sleeping ICU stay was complicated by development of new leukocytosis and C. Diff colitis with diarrhea.    #ARDS secondary to COVID 19 pneumonia (improving)  #Acute hypercapnic and hypoxemic respiratory failure  -Recent COVID pneumonia requiring ICU stay and tracheostomy in 10/2021. Decannulated later  -Developed respiratory symptoms on 1/17 and + COVID  -Worsened respiratory failure on 1/19 and required ICU transfer and intubation  -Extubated on 1/21  -Requiring bipap at bedtime and with naps for persistent hypercapnia  -Leukocytosis up-trending again  -May re-engage infectious disease  Plan:  >BiPAP at bedtime and with naps  >Hypercapnia (venous PCO2 = 54)  >No change in CXR (1/25)  >Continue dexamethasone (end date 1/26) for 10 day course  >Continue barcitinib (end date 1/31)  >Does not need to be completed at time of discharge    #C. diff colitis  -ICU stay complicated by leukocytosis on 1/21 and diarrhea. C. Diff + on 1/22  -Persistent diarrhea  -Leukocytosis down-trending  Plan:  Plan for 2 weeks of PO vancomycin past last day of IV antibiotics     #Aspergillus pneumonia  -Occurred during initial COVID treatment in 10/2021. Was planned for 60 day of voriconazole  -Was due to end on 1/21 but had recurrent COVID and  respiratory failure  Plan:  >Plan for 2 additional weeks of voriconazole past last day of dexamethasone  >End date February 9th)    #Choledocholithiasis c/b cholangitis, s/p CBD stenting (1/12/22)  #Probable acute cholecystitis s/p transpapillary cystic duct stenting (1/12/22)  #Transaminitis   #Sepsis (tachy, leukocytosis)  - S/p ERCP, removal of stone and stent placement  - Abx for 10 days (1/12 - 1/22) - (Was cipro/flagyl, broadened to cef/flagyl due to resp failure)  -Developed worsened respiratory failure on 1/19 and antibiotics were extended to provide additional bacterial coverage  Plan:  >Stop IV cefepime, flagyl on 1/26 (14 day course)  >Follow as outpatient with Gen-surg for cholecystectomy (order on discharge placed)  >Follow up with GI approx February 2/12/22    #Paget Banegas syndrome  #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  She has a history of multiple blood clots.  She has been maintained on apixaban.  -Continue PTA apixaban    #Anemia  -Secondary to critical illness  #HTN  -Metoprolol  #Depression. Anorexia  -Venlflaxine, Abilify, topiramate         Diet: Regular Diet Adult  Adult Formula Bolus Feeding: TID Osmolite 1.5; Route: Gastrostomy; 3; Can(s); Medication - Feeding Tube Flush Frequency: At least 15-30 mL water before and after medication administration and with tube clogging; Amount to Send (Nutrition use): 3 c...    DVT Prophylaxis: DOAC  Scott Catheter: PRESENT, indication: Strict 1-2 Hour I&O, Wound Healing;Strict 1-2 Hour I&O  Central Lines: None  Cardiac Monitoring: None  Code Status: Full Code      Disposition Plan   Expected Discharge: 01/31/2022     Anticipated discharge location:  Awaiting care coordination huddle  Delays:     Placement - TCU  Covid Test Positive  Oxygen Needs            The patient's care was discussed with the Bedside Nurse, Care Coordinator/ and Patient.    Luis Manuel Nunes DO  Hospitalist  Service, GOLD TEAM 8  [facility]  Securely message with the [employer] Web Console (learn more here)  Text page via [employer] Paging/Directory   Please see signed in provider for up to date coverage information  ______________________________________________________________________    Interval History   Patient reports feeling improved.  Patient reports cough, work of breathing improved.  Concern with up-trending leukocytosis; will check U/A.  Per nursing staff, volume of diarrhea improved (150 ml)  Discussed patient's clinical condition with patient's .    Data reviewed today: I reviewed all medications, new labs and imaging results over the last 24 hours. I personally reviewed the chest x-ray image(s) showing stable pathology.    Physical Exam   Vital Signs: Temp: 98  F (36.7  C) Temp src: Oral BP: (!) 150/105 Pulse: 105   Resp: 20 SpO2: 100 % O2 Device: Nasal cannula Oxygen Delivery: 2 LPM  Weight: 108 lbs 11.2 oz    Physical Exam  Constitutional:       General: She is not in acute distress.     Appearance: Normal appearance. She is diaphoretic. She is not ill-appearing or toxic-appearing.   HENT:      Head: Normocephalic.      Mouth/Throat:      Mouth: Mucous membranes are moist.      Pharynx: Oropharynx is clear.   Eyes:      Extraocular Movements: Extraocular movements intact.      Conjunctiva/sclera: Conjunctivae normal.      Pupils: Pupils are equal, round, and reactive to light.   Cardiovascular:      Rate and Rhythm: Normal rate and regular rhythm.      Pulses: Normal pulses.      Heart sounds: Normal heart sounds.   Pulmonary:      Effort: No respiratory distress.      Breath sounds: No wheezing, rhonchi or rales.      Comments: Shallow respiratory effort  Abdominal:      General: Bowel sounds are normal.      Palpations: Abdomen is soft.   Skin:     General: Skin is warm.   Neurological:      General: No focal deficit present.      Mental Status: She is alert  and oriented to person, place, and time. Mental status is at baseline.   Psychiatric:         Mood and Affect: Mood normal.         Behavior: Behavior normal.         Thought Content: Thought content normal.         Judgment: Judgment normal.           Data   Recent Labs   Lab 01/28/22  1117 01/28/22  0906 01/28/22  0702 01/27/22  0643 01/27/22  0619 01/26/22  0813 01/26/22  0717   WBC  --   --  19.3*  --  17.9*  --  17.5*   HGB  --   --  10.4*  --  10.6*  --  10.9*   MCV  --   --  96  --  96  --  94   PLT  --   --  666*  --  626*  --  667*   INR  --   --  1.09  --  1.14  --  1.16*   NA  --   --  141  --  141  --  138   POTASSIUM  --   --  4.8  --  4.2  --  4.9   CHLORIDE  --   --  111*  --  109  --  106   CO2  --   --  29  --  26  --  28   BUN  --   --  9  --  14  --  19   CR  --   --  0.48*  --  0.50*  --  0.44*   ANIONGAP  --   --  1*  --  6  --  4   TERESA  --   --  9.3  --  8.7  --  9.2   * 99 98   < > 142*   < > 76   ALBUMIN  --   --  2.7*  --  3.4  --  3.0*   PROTTOTAL  --   --  6.8  --  6.6*  --  7.8   BILITOTAL  --   --  0.3  --  0.3  --  0.4   ALKPHOS  --   --  95  --  97  --  101   ALT  --   --  33  --  36  --  26   AST  --   --  21  --  18  --  20    < > = values in this interval not displayed.     Recent Results (from the past 24 hour(s))   XR Chest Port 1 View    Narrative    EXAM: XR CHEST PORT 1 VIEW  1/28/2022 8:48 AM     HISTORY:  Monitor for fluid overload       COMPARISON:  Chest x-ray 1/25/2022    FINDINGS: AP radiograph of the chest. Stable positioning of right  subclavian venous stent with surgical clips and fractured sternotomy  wires overlying the superior right chest/clavicle    Stable enlargement of the cardiomediastinal silhouette. Unchanged  interstitial predominant opacities throughout the left lung and at the  right lung base. Trace bilateral pleural effusions. No pneumothorax.  Partially visualized biliary drain with pigtail in the right upper  quadrant. Visualized upper abdomen  is unremarkable.      Impression    IMPRESSION:  1. Unchanged bilateral pleural effusions.  2. Stable interstitial pulmonary opacities throughout the left lung  and at the right lung base. Consistent with the patient's history of  COVID-19 and/or pulmonary edema.    I have personally reviewed the examination and initial interpretation  and I agree with the findings.    CARINA ROCK MD         SYSTEM ID:  [id number]   US Abdomen Limited    Narrative    ULTRASOUND ABDOMEN COMPLETE 1/28/2022 10:08 AM    CLINICAL HISTORY: Persistent RUQ abdominal discomfort, increasing  leukocytosis. History of ERCP with gallbladder and common bile duct  stent placement 1/12/2022.    COMPARISONS: CT 1/24/2022    REFERRING PROVIDER: SUSSY CHARLTON    TECHNIQUE: Right upper quadrant abdominal viscera evaluated with  grayscale imaging. Main portal vein evaluated with color Doppler  ultrasound.     FINDINGS:  Liver: 14.2 cm. No focal hepatic lesion. No biliary dilation.  US visualization score: A - No or minimal limitations    Right kidney: 8.7 cm. Normal. No mass, stone, or hydronephrosis.    Ascites: None demonstrated.    Gallbladder: Neither the gallbladder nor the gallbladder stent were  visualized. No sonographic Carson's sign elicited.    Common bile duct: 3.0 mm    Pancreas: Partially visualized. No abnormality demonstrated.    Main portal vein: antegrade      Impression    IMPRESSION: Neither the gallbladder nor the gallbladder stent were  visualized. No sonographic Carson's sign elicited. No biliary dilation  demonstrated.    JAMES LAWRENCE MD         SYSTEM ID:  [id number]     Medications     dextrose Stopped (01/27/22 2040)     dextrose 5% and 0.9% NaCl 75 mL/hr at 01/28/22 1200     - MEDICATION INSTRUCTIONS -       - MEDICATION INSTRUCTIONS -         apixaban ANTICOAGULANT  5 mg Oral or Feeding Tube BID     ARIPiprazole  7.5 mg Oral or Feeding Tube Daily     banatrol plus  1 packet Per Feeding Tube BID     baricitinib  4 mg Oral or  Feeding Tube Daily     [Held by provider] DULoxetine  120 mg Oral or Feeding Tube QPM     insulin aspart  1-6 Units Subcutaneous Q4H     metoprolol tartrate  50 mg Oral BID     multivitamins w/minerals  15 mL Per Feeding Tube Daily     pantoprazole  40 mg Oral BID     protein modular  1 packet Per Feeding Tube BID     sodium chloride (PF)  3 mL Intracatheter Q8H     spironolactone  25 mg Oral Daily     tolterodine  2 mg Oral or Feeding Tube BID     topiramate  100 mg Oral Daily     topiramate  200 mg Oral or Feeding Tube QPM     vancomycin  125 mg Oral or Feeding Tube 4x Daily     venlafaxine  75 mg Oral or Feeding Tube BID     voriconazole  200 mg Oral or Feeding Tube Q12H TANNER

## 2022-01-28 NOTE — PLAN OF CARE
NURSING PROGRESS NOTE  Shift Summary    Neuro/Musculoskeletal:  A&Ox4. Assist of 1 to turn in bed.   Cardiac:  SR.  VSS.  Tachy in 120s at start of shift, then 90s-100s.    Respiratory:  Sating in the 90s on 1L NC. BiPAP overnight to sleep.  GI/:  Adequate urine output via avila. Rectal tube in place.   Diet/Appetite:  Tolerating regular diet with supplements via PEG tube. BG checks Q4H.  Activity:  Assist of 1.   Pain:  Denies.   Skin:  No new deficits noted. Barrier cream applied to sandy area.  LDAs + Drips/IVF:  1 PIV in LUE infusing D5 NS at 7mL/hr.   Protocols/Labs:  Mg, Phos, K+ protocol.     Pertinent Shift Updates:  Patient slept well overnight. Initially refused BiPAP but writer was able to convince her later.       Plan:  Continue to monitor patient.       Sofia Ngo RN  .................................................... January 28, 2022   6:30 AM  [facility] ([facility]): [facility] ICU (Unit 6D)

## 2022-01-28 NOTE — PROGRESS NOTES
Care Management Follow Up    Length of Stay (days): 16    Expected Discharge Date: 01/31/2022     Concerns to be Addressed: discharge planning     Patient plan of care discussed at interdisciplinary rounds: Yes    Anticipated Discharge Disposition: Transitional Care     Anticipated Discharge Services:  TCU   Anticipated Discharge DME:  NA    Patient/family educated on Medicare website which has current facility and service quality ratings:  (No as pt admitted from a facility and has a bed hold)  Education Provided on the Discharge Plan:  yes  Patient/Family in Agreement with the Plan: yes    Referrals Placed by CM/SW: Post Acute Facilities  [facility]   [address] [place] [address]  * Pt has a bed hold at this facility   Private pay costs discussed: Not applicable    Additional Information:    SW confirmed with Vera in admissions at [facility] [phone] that Pt still has a hold. The Pt will be in COVID precautions until 2/5. Pt was initially interested in choosing more TCU recs- has not yet notified SW. It is likely that Pt will required to return to [facility] as she has a BH and it is a safe discharge plan. SW will continue to follow for psychosocial support, resources and advocate on behalf of the patient.    Joyce Banuelos- [address]  [facility]   [address]  [place] [address]  [email or web address]  [email or web address]  Office: [phone] I Fax: [phone]      GERMAN Jorge

## 2022-01-29 LAB
ALBUMIN SERPL-MCNC: 2.5 G/DL (ref 3.4–5)
ALP SERPL-CCNC: 90 U/L (ref 40–150)
ALT SERPL W P-5'-P-CCNC: 30 U/L (ref 0–50)
ANION GAP SERPL CALCULATED.3IONS-SCNC: 1 MMOL/L (ref 3–14)
AST SERPL W P-5'-P-CCNC: 17 U/L (ref 0–45)
BILIRUB SERPL-MCNC: 0.3 MG/DL (ref 0.2–1.3)
BUN SERPL-MCNC: 10 MG/DL (ref 7–30)
CALCIUM SERPL-MCNC: 8.7 MG/DL (ref 8.5–10.1)
CHLORIDE BLD-SCNC: 112 MMOL/L (ref 94–109)
CO2 SERPL-SCNC: 28 MMOL/L (ref 20–32)
CREAT SERPL-MCNC: 0.37 MG/DL (ref 0.52–1.04)
ERYTHROCYTE [DISTWIDTH] IN BLOOD BY AUTOMATED COUNT: 17.4 % (ref 10–15)
GFR SERPL CREATININE-BSD FRML MDRD: >90 ML/MIN/1.73M2
GLUCOSE BLD-MCNC: 98 MG/DL (ref 70–99)
GLUCOSE BLDC GLUCOMTR-MCNC: 100 MG/DL (ref 70–99)
GLUCOSE BLDC GLUCOMTR-MCNC: 104 MG/DL (ref 70–99)
GLUCOSE BLDC GLUCOMTR-MCNC: 106 MG/DL (ref 70–99)
GLUCOSE BLDC GLUCOMTR-MCNC: 92 MG/DL (ref 70–99)
HCT VFR BLD AUTO: 32.8 % (ref 35–47)
HGB BLD-MCNC: 9.7 G/DL (ref 11.7–15.7)
INR PPP: 1.1 (ref 0.85–1.15)
MCH RBC QN AUTO: 28.7 PG (ref 26.5–33)
MCHC RBC AUTO-ENTMCNC: 29.6 G/DL (ref 31.5–36.5)
MCV RBC AUTO: 97 FL (ref 78–100)
PLATELET # BLD AUTO: 586 10E3/UL (ref 150–450)
POTASSIUM BLD-SCNC: 4.2 MMOL/L (ref 3.4–5.3)
PROT SERPL-MCNC: 6.3 G/DL (ref 6.8–8.8)
RBC # BLD AUTO: 3.38 10E6/UL (ref 3.8–5.2)
SODIUM SERPL-SCNC: 141 MMOL/L (ref 133–144)
WBC # BLD AUTO: 18.7 10E3/UL (ref 4–11)

## 2022-01-29 PROCEDURE — 36415 COLL VENOUS BLD VENIPUNCTURE: CPT | Performed by: STUDENT IN AN ORGANIZED HEALTH CARE EDUCATION/TRAINING PROGRAM

## 2022-01-29 PROCEDURE — 250N000013 HC RX MED GY IP 250 OP 250 PS 637: Performed by: STUDENT IN AN ORGANIZED HEALTH CARE EDUCATION/TRAINING PROGRAM

## 2022-01-29 PROCEDURE — 99233 SBSQ HOSP IP/OBS HIGH 50: CPT | Performed by: INTERNAL MEDICINE

## 2022-01-29 PROCEDURE — 250N000013 HC RX MED GY IP 250 OP 250 PS 637: Performed by: PHYSICIAN ASSISTANT

## 2022-01-29 PROCEDURE — 250N000013 HC RX MED GY IP 250 OP 250 PS 637: Performed by: INTERNAL MEDICINE

## 2022-01-29 PROCEDURE — 85027 COMPLETE CBC AUTOMATED: CPT | Performed by: STUDENT IN AN ORGANIZED HEALTH CARE EDUCATION/TRAINING PROGRAM

## 2022-01-29 PROCEDURE — 120N000003 HC R&B IMCU UMMC

## 2022-01-29 PROCEDURE — 85610 PROTHROMBIN TIME: CPT | Performed by: STUDENT IN AN ORGANIZED HEALTH CARE EDUCATION/TRAINING PROGRAM

## 2022-01-29 PROCEDURE — 80053 COMPREHEN METABOLIC PANEL: CPT | Performed by: STUDENT IN AN ORGANIZED HEALTH CARE EDUCATION/TRAINING PROGRAM

## 2022-01-29 RX ADMIN — APIXABAN 5 MG: 5 TABLET, FILM COATED ORAL at 09:37

## 2022-01-29 RX ADMIN — Medication 40 MG: at 19:58

## 2022-01-29 RX ADMIN — Medication 1 PACKET: at 09:34

## 2022-01-29 RX ADMIN — APIXABAN 5 MG: 5 TABLET, FILM COATED ORAL at 19:59

## 2022-01-29 RX ADMIN — TOLTERODINE TARTRATE 2 MG: 2 TABLET, FILM COATED ORAL at 19:59

## 2022-01-29 RX ADMIN — Medication 15 ML: at 15:18

## 2022-01-29 RX ADMIN — Medication 7.5 MG: at 13:17

## 2022-01-29 RX ADMIN — SPIRONOLACTONE 25 MG: 25 TABLET, FILM COATED ORAL at 09:38

## 2022-01-29 RX ADMIN — TOPIRAMATE 200 MG: 200 TABLET ORAL at 19:58

## 2022-01-29 RX ADMIN — VENLAFAXINE 75 MG: 75 TABLET ORAL at 19:58

## 2022-01-29 RX ADMIN — VANCOMYCIN HYDROCHLORIDE 125 MG: KIT at 19:58

## 2022-01-29 RX ADMIN — TOPIRAMATE 100 MG: 100 TABLET, FILM COATED ORAL at 09:38

## 2022-01-29 RX ADMIN — VANCOMYCIN HYDROCHLORIDE 125 MG: KIT at 13:17

## 2022-01-29 RX ADMIN — Medication 40 MG: at 09:37

## 2022-01-29 RX ADMIN — BARICITINIB 4 MG: 2 TABLET, FILM COATED ORAL at 13:17

## 2022-01-29 RX ADMIN — METOPROLOL TARTRATE 50 MG: 50 TABLET, FILM COATED ORAL at 09:37

## 2022-01-29 RX ADMIN — VORICONAZOLE 200 MG: 40 POWDER, FOR SUSPENSION ORAL at 09:34

## 2022-01-29 RX ADMIN — TOLTERODINE TARTRATE 2 MG: 2 TABLET, FILM COATED ORAL at 09:38

## 2022-01-29 RX ADMIN — VANCOMYCIN HYDROCHLORIDE 125 MG: KIT at 09:34

## 2022-01-29 RX ADMIN — VORICONAZOLE 200 MG: 40 POWDER, FOR SUSPENSION ORAL at 19:58

## 2022-01-29 RX ADMIN — Medication 3 MG: at 23:09

## 2022-01-29 RX ADMIN — METOPROLOL TARTRATE 50 MG: 50 TABLET, FILM COATED ORAL at 19:58

## 2022-01-29 RX ADMIN — Medication 1 PACKET: at 19:59

## 2022-01-29 RX ADMIN — VANCOMYCIN HYDROCHLORIDE 125 MG: KIT at 15:20

## 2022-01-29 RX ADMIN — VENLAFAXINE 75 MG: 75 TABLET ORAL at 09:38

## 2022-01-29 RX ADMIN — Medication 1 PACKET: at 13:15

## 2022-01-29 RX ADMIN — Medication 1 PACKET: at 15:18

## 2022-01-29 ASSESSMENT — ACTIVITIES OF DAILY LIVING (ADL)
ADLS_ACUITY_SCORE: 12
ADLS_ACUITY_SCORE: 14
ADLS_ACUITY_SCORE: 12
ADLS_ACUITY_SCORE: 14
ADLS_ACUITY_SCORE: 12
ADLS_ACUITY_SCORE: 14
ADLS_ACUITY_SCORE: 12
ADLS_ACUITY_SCORE: 14
ADLS_ACUITY_SCORE: 12
ADLS_ACUITY_SCORE: 12
ADLS_ACUITY_SCORE: 14
ADLS_ACUITY_SCORE: 12
ADLS_ACUITY_SCORE: 14
ADLS_ACUITY_SCORE: 12
ADLS_ACUITY_SCORE: 12

## 2022-01-29 ASSESSMENT — MIFFLIN-ST. JEOR: SCORE: 1053.13

## 2022-01-29 NOTE — PROGRESS NOTES
[facility]    Medicine Progress Note - Hospitalist Service, GOLD TEAM 8    Date of Admission:  1/11/2022    Assessment & Plan     Sherly Yeung is a 56 year old female admitted on 1/11/2022 who is being transferred out of the ICU on 1/23/2022. She has a history of COVID 10/2021 requiring trachoestomy who presented on 1/11 with acute cholecystitis and cholangitis s/p biliary stent placement. Hospital course was complicated by respiratory failure on 1/17 and repeat COVID infection. She developed respiratory failure and required ICU transfer and intubation on 1/19. She was extubated on 1/21 but had recurrent hypercapnia requiring bipap at night and while sleeping ICU stay was complicated by development of new leukocytosis and C. Diff colitis with diarrhea.    #ARDS secondary to COVID 19 pneumonia (improving)  #Acute hypercapnic and hypoxemic respiratory failure  -Recent COVID pneumonia requiring ICU stay and tracheostomy in 10/2021. Decannulated later  -Developed respiratory symptoms on 1/17 and + COVID  -Worsened respiratory failure on 1/19 and required ICU transfer and intubation  -Extubated on 1/21  -Requiring bipap at bedtime and with naps for persistent hypercapnia  -Leukocytosis up-trending again  -May re-engage infectious disease  Plan:  >BiPAP at bedtime and with naps  >Hypercapnia (venous PCO2 = 54)  >No change in CXR (1/25)  >Continue dexamethasone (end date 1/26) for 10 day course  >Continue barcitinib (end date 1/31)  >Does not need to be completed at time of discharge    #C. diff colitis  -ICU stay complicated by leukocytosis on 1/21 and diarrhea. C. Diff + on 1/22  -Persistent diarrhea  -Leukocytosis down-trending  Plan:  Plan for 2 weeks of PO vancomycin past last day of IV antibiotics     #Aspergillus pneumonia  -Occurred during initial COVID treatment in 10/2021. Was planned for 60 day of voriconazole  -Was due to end on 1/21 but had recurrent COVID and  respiratory failure  Plan:  >Plan for 2 additional weeks of voriconazole past last day of dexamethasone  >End date February 9th    #Choledocholithiasis c/b cholangitis, s/p CBD stenting (1/12/22)  #Probable acute cholecystitis s/p transpapillary cystic duct stenting (1/12/22)  #Transaminitis   #Sepsis (tachy, leukocytosis)  - S/p ERCP, removal of stone and stent placement  - Abx for 10 days (1/12 - 1/22) - (Was cipro/flagyl, broadened to cef/flagyl due to resp failure)  -Developed worsened respiratory failure on 1/19 and antibiotics were extended to provide additional bacterial coverage  Plan:  >Stop IV cefepime, flagyl on 1/26 (14 day course)  >Follow as outpatient with Gen-surg for cholecystectomy (order on discharge placed)  >Follow up with GI approx February 2/12/22    #Paget Banegas syndrome  #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  She has a history of multiple blood clots.  She has been maintained on apixaban.  -Continue PTA apixaban    #Anemia  -Secondary to critical illness  #HTN  -Metoprolol  #Depression. Anorexia  -Venlflaxine, Abilify, topiramate           Diet: Regular Diet Adult  Adult Formula Bolus Feeding: TID Osmolite 1.5; Route: Gastrostomy; 3; Can(s); Medication - Feeding Tube Flush Frequency: At least 15-30 mL water before and after medication administration and with tube clogging; Amount to Send (Nutrition use): 3 c...    DVT Prophylaxis: DOAC  Scott Catheter: Not present  Central Lines: None  Cardiac Monitoring: None  Code Status: Full Code      Disposition Plan   Expected Discharge: 2/5/2022  Anticipated discharge location: TCU  Delays:     Placement - TCU  Covid Test Positive            The patient's care was discussed with the Bedside Nurse and Patient.    Luis Manuel Nunes DO  Hospitalist Service, GOLD TEAM [address] [place] [address]  Securely message with the [employer] Web Console (learn more here)  Text  page via [facility] Paging/Directory   Please see signed in provider for up to date coverage information  ______________________________________________________________________    Interval History    Patient feels great; much improved.  Patient's WBC is down-trending.  Patient reports cough, work of breathing improved.  Patient still requiring supplemental O2; came from rehab facility on O2.  Patient reports conditioning is improving.  Patient reports persistent diarrhea (improved).  Will discontinue Scott catheter.    Data reviewed today: I reviewed all medications, new labs and imaging results over the last 24 hours. I personally reviewed no images or EKG's today.    Physical Exam   Vital Signs: Temp: 97.8  F (36.6  C) Temp src: Oral BP: (!) 136/95 Pulse: 95   Resp: 18 SpO2: 100 % O2 Device: Nasal cannula Oxygen Delivery: 1 LPM  Weight: 108 lbs 14.52 oz     Physical Exam  Constitutional:       General: She is not in acute distress.     Appearance: Normal appearance. She is normal weight. She is not ill-appearing, toxic-appearing or diaphoretic.   HENT:      Head: Normocephalic.      Comments: Wearing NC     Mouth/Throat:      Mouth: Mucous membranes are moist.      Pharynx: Oropharynx is clear.   Eyes:      General: No scleral icterus.     Extraocular Movements: Extraocular movements intact.      Conjunctiva/sclera: Conjunctivae normal.      Pupils: Pupils are equal, round, and reactive to light.   Cardiovascular:      Rate and Rhythm: Normal rate and regular rhythm.      Pulses: Normal pulses.      Heart sounds: Normal heart sounds.   Pulmonary:      Effort: Pulmonary effort is normal.      Comments: Shallow breath sounds  Abdominal:      General: Bowel sounds are normal. There is no distension.      Palpations: Abdomen is soft.      Tenderness: There is no abdominal tenderness. There is no guarding or rebound.   Skin:     General: Skin is warm and dry.   Neurological:      General: No focal deficit present.       Mental Status: She is alert and oriented to person, place, and time. Mental status is at baseline.   Psychiatric:         Mood and Affect: Mood normal.         Behavior: Behavior normal.         Thought Content: Thought content normal.         Judgment: Judgment normal.           Data   Recent Labs   Lab 01/29/22  0958 01/29/22  0807 01/29/22  0651 01/28/22  0906 01/28/22  0702 01/27/22  0643 01/27/22  0619   WBC  --   --  18.7*  --  19.3*  --  17.9*   HGB  --   --  9.7*  --  10.4*  --  10.6*   MCV  --   --  97  --  96  --  96   PLT  --   --  586*  --  666*  --  626*   INR  --   --  1.10  --  1.09  --  1.14   NA  --   --  141  --  141  --  141   POTASSIUM  --   --  4.2  --  4.8  --  4.2   CHLORIDE  --   --  112*  --  111*  --  109   CO2  --   --  28  --  29  --  26   BUN  --   --  10  --  9  --  14   CR  --   --  0.37*  --  0.48*  --  0.50*   ANIONGAP  --   --  1*  --  1*  --  6   TERESA  --   --  8.7  --  9.3  --  8.7   * 92 98   < > 98   < > 142*   ALBUMIN  --   --  2.5*  --  2.7*  --  3.4   PROTTOTAL  --   --  6.3*  --  6.8  --  6.6*   BILITOTAL  --   --  0.3  --  0.3  --  0.3   ALKPHOS  --   --  90  --  95  --  97   ALT  --   --  30  --  33  --  36   AST  --   --  17  --  21  --  18    < > = values in this interval not displayed.     No results found for this or any previous visit (from the past 24 hour(s)).  Medications     dextrose Stopped (01/27/22 2040)     - MEDICATION INSTRUCTIONS -       - MEDICATION INSTRUCTIONS -         apixaban ANTICOAGULANT  5 mg Oral or Feeding Tube BID     ARIPiprazole  7.5 mg Oral or Feeding Tube Daily     banatrol plus  1 packet Per Feeding Tube BID     baricitinib  4 mg Oral or Feeding Tube Daily     [Held by provider] DULoxetine  120 mg Oral or Feeding Tube QPM     metoprolol tartrate  50 mg Oral BID     multivitamins w/minerals  15 mL Per Feeding Tube Daily     pantoprazole  40 mg Oral BID     protein modular  1 packet Per Feeding Tube BID     sodium chloride (PF)  3 mL  Intracatheter Q8H     spironolactone  25 mg Oral Daily     tolterodine  2 mg Oral or Feeding Tube BID     topiramate  100 mg Oral Daily     topiramate  200 mg Oral or Feeding Tube QPM     vancomycin  125 mg Oral or Feeding Tube 4x Daily     venlafaxine  75 mg Oral or Feeding Tube BID     voriconazole  200 mg Oral or Feeding Tube Q12H TANNER

## 2022-01-29 NOTE — PLAN OF CARE
Problem: Adult Inpatient Plan of Care  Goal: Plan of Care Review  Outcome: No Change   Neuro: Alert and oriented x4. Follows commands. Calls appropriately.   Cardiac: SR/ST. VSS. Afebrile  Respiratory: 1 L nc.   GI/: Adequate urine output. Scott. Rectal tube  Diet/appetite: Regular diet. Tube feed boluses.   Activity: Up with 1. Wheeled walker.   Pain: Denies   Skin: No new deficits noted.  LDA's: 1 PIV R forearm.   Plan: Continue with POC. Notify primary team with changes.

## 2022-01-29 NOTE — PLAN OF CARE
Neuro: A&Ox4.   Cardiac: SR. VSS.   Respiratory: Sating at 100 % on 1L of O2. RN tried pt on room air but Pt did not tolerated it well., her breathing increased and the O2 dipped to 92%   GI/: Adequate urine output through avial. Rectal output of 150 ml   Diet/appetite: Tolerating regular diet. Poor appetite. Pt received 1 can of Perative.   Activity:  Assist of 1 with walker   Pain: Denies   Skin: No new deficits noted.  LDA's: 1 PIV on the right forearm, getting 75 ml of D5NaCl for  BG stabilization     Pt urine sample was collected due to increasing WBC. No results yet.     Plan: Continue with POC. Notify primary team with changes.

## 2022-01-30 ENCOUNTER — APPOINTMENT (OUTPATIENT)
Dept: PHYSICAL THERAPY | Facility: CLINIC | Age: 57
DRG: 871 | End: 2022-01-30
Payer: MEDICARE

## 2022-01-30 LAB
ALBUMIN SERPL-MCNC: 2.7 G/DL (ref 3.4–5)
ALP SERPL-CCNC: 116 U/L (ref 40–150)
ALT SERPL W P-5'-P-CCNC: 39 U/L (ref 0–50)
ANION GAP SERPL CALCULATED.3IONS-SCNC: 4 MMOL/L (ref 3–14)
AST SERPL W P-5'-P-CCNC: 24 U/L (ref 0–45)
BACTERIA BLD CULT: NO GROWTH
BACTERIA BLD CULT: NO GROWTH
BILIRUB SERPL-MCNC: 0.2 MG/DL (ref 0.2–1.3)
BUN SERPL-MCNC: 13 MG/DL (ref 7–30)
CALCIUM SERPL-MCNC: 9.2 MG/DL (ref 8.5–10.1)
CHLORIDE BLD-SCNC: 110 MMOL/L (ref 94–109)
CO2 SERPL-SCNC: 26 MMOL/L (ref 20–32)
CREAT SERPL-MCNC: 0.41 MG/DL (ref 0.52–1.04)
ERYTHROCYTE [DISTWIDTH] IN BLOOD BY AUTOMATED COUNT: 17.4 % (ref 10–15)
GFR SERPL CREATININE-BSD FRML MDRD: >90 ML/MIN/1.73M2
GLUCOSE BLD-MCNC: 117 MG/DL (ref 70–99)
GLUCOSE BLDC GLUCOMTR-MCNC: 121 MG/DL (ref 70–99)
HCT VFR BLD AUTO: 32.4 % (ref 35–47)
HGB BLD-MCNC: 9.9 G/DL (ref 11.7–15.7)
LACTATE SERPL-SCNC: 2 MMOL/L (ref 0.7–2)
MCH RBC QN AUTO: 29.6 PG (ref 26.5–33)
MCHC RBC AUTO-ENTMCNC: 30.6 G/DL (ref 31.5–36.5)
MCV RBC AUTO: 97 FL (ref 78–100)
PLATELET # BLD AUTO: 542 10E3/UL (ref 150–450)
POTASSIUM BLD-SCNC: 4.3 MMOL/L (ref 3.4–5.3)
PROT SERPL-MCNC: 6.8 G/DL (ref 6.8–8.8)
RBC # BLD AUTO: 3.34 10E6/UL (ref 3.8–5.2)
SODIUM SERPL-SCNC: 140 MMOL/L (ref 133–144)
WBC # BLD AUTO: 18.6 10E3/UL (ref 4–11)

## 2022-01-30 PROCEDURE — 120N000003 HC R&B IMCU UMMC

## 2022-01-30 PROCEDURE — 250N000013 HC RX MED GY IP 250 OP 250 PS 637: Performed by: STUDENT IN AN ORGANIZED HEALTH CARE EDUCATION/TRAINING PROGRAM

## 2022-01-30 PROCEDURE — 97116 GAIT TRAINING THERAPY: CPT | Mod: GP

## 2022-01-30 PROCEDURE — 97110 THERAPEUTIC EXERCISES: CPT | Mod: GP

## 2022-01-30 PROCEDURE — 82040 ASSAY OF SERUM ALBUMIN: CPT | Performed by: STUDENT IN AN ORGANIZED HEALTH CARE EDUCATION/TRAINING PROGRAM

## 2022-01-30 PROCEDURE — 83605 ASSAY OF LACTIC ACID: CPT | Performed by: STUDENT IN AN ORGANIZED HEALTH CARE EDUCATION/TRAINING PROGRAM

## 2022-01-30 PROCEDURE — 250N000013 HC RX MED GY IP 250 OP 250 PS 637: Performed by: PHYSICIAN ASSISTANT

## 2022-01-30 PROCEDURE — 250N000013 HC RX MED GY IP 250 OP 250 PS 637: Performed by: INTERNAL MEDICINE

## 2022-01-30 PROCEDURE — 97530 THERAPEUTIC ACTIVITIES: CPT | Mod: GP

## 2022-01-30 PROCEDURE — 258N000003 HC RX IP 258 OP 636: Performed by: INTERNAL MEDICINE

## 2022-01-30 PROCEDURE — 85027 COMPLETE CBC AUTOMATED: CPT | Performed by: STUDENT IN AN ORGANIZED HEALTH CARE EDUCATION/TRAINING PROGRAM

## 2022-01-30 PROCEDURE — 80053 COMPREHEN METABOLIC PANEL: CPT | Performed by: STUDENT IN AN ORGANIZED HEALTH CARE EDUCATION/TRAINING PROGRAM

## 2022-01-30 PROCEDURE — 36415 COLL VENOUS BLD VENIPUNCTURE: CPT | Performed by: STUDENT IN AN ORGANIZED HEALTH CARE EDUCATION/TRAINING PROGRAM

## 2022-01-30 PROCEDURE — 99233 SBSQ HOSP IP/OBS HIGH 50: CPT | Performed by: INTERNAL MEDICINE

## 2022-01-30 RX ORDER — METOPROLOL TARTRATE 25 MG/1
75 TABLET, FILM COATED ORAL 2 TIMES DAILY
Status: DISCONTINUED | OUTPATIENT
Start: 2022-01-30 | End: 2022-02-18 | Stop reason: HOSPADM

## 2022-01-30 RX ORDER — SODIUM CHLORIDE 9 MG/ML
INJECTION, SOLUTION INTRAVENOUS CONTINUOUS
Status: ACTIVE | OUTPATIENT
Start: 2022-01-30 | End: 2022-02-01

## 2022-01-30 RX ADMIN — Medication 40 MG: at 10:34

## 2022-01-30 RX ADMIN — Medication 1 PACKET: at 10:33

## 2022-01-30 RX ADMIN — BARICITINIB 4 MG: 2 TABLET, FILM COATED ORAL at 13:47

## 2022-01-30 RX ADMIN — VANCOMYCIN HYDROCHLORIDE 125 MG: KIT at 13:47

## 2022-01-30 RX ADMIN — VANCOMYCIN HYDROCHLORIDE 125 MG: KIT at 17:09

## 2022-01-30 RX ADMIN — Medication 3 MG: at 23:14

## 2022-01-30 RX ADMIN — Medication 15 ML: at 13:47

## 2022-01-30 RX ADMIN — Medication 1 PACKET: at 20:26

## 2022-01-30 RX ADMIN — Medication 7.5 MG: at 10:37

## 2022-01-30 RX ADMIN — TOPIRAMATE 100 MG: 100 TABLET, FILM COATED ORAL at 10:34

## 2022-01-30 RX ADMIN — VANCOMYCIN HYDROCHLORIDE 125 MG: KIT at 10:34

## 2022-01-30 RX ADMIN — VENLAFAXINE 75 MG: 75 TABLET ORAL at 10:38

## 2022-01-30 RX ADMIN — VANCOMYCIN HYDROCHLORIDE 125 MG: KIT at 20:24

## 2022-01-30 RX ADMIN — HYDROXYZINE HYDROCHLORIDE 50 MG: 50 TABLET ORAL at 04:18

## 2022-01-30 RX ADMIN — APIXABAN 5 MG: 5 TABLET, FILM COATED ORAL at 10:34

## 2022-01-30 RX ADMIN — METOPROLOL TARTRATE 75 MG: 50 TABLET, FILM COATED ORAL at 20:25

## 2022-01-30 RX ADMIN — SODIUM CHLORIDE: 9 INJECTION, SOLUTION INTRAVENOUS at 13:57

## 2022-01-30 RX ADMIN — VORICONAZOLE 200 MG: 40 POWDER, FOR SUSPENSION ORAL at 20:25

## 2022-01-30 RX ADMIN — TOLTERODINE TARTRATE 2 MG: 2 TABLET, FILM COATED ORAL at 10:34

## 2022-01-30 RX ADMIN — TOLTERODINE TARTRATE 2 MG: 2 TABLET, FILM COATED ORAL at 20:26

## 2022-01-30 RX ADMIN — Medication 1 PACKET: at 17:09

## 2022-01-30 RX ADMIN — Medication 40 MG: at 20:25

## 2022-01-30 RX ADMIN — SPIRONOLACTONE 25 MG: 25 TABLET, FILM COATED ORAL at 10:34

## 2022-01-30 RX ADMIN — VORICONAZOLE 200 MG: 40 POWDER, FOR SUSPENSION ORAL at 10:39

## 2022-01-30 RX ADMIN — APIXABAN 5 MG: 5 TABLET, FILM COATED ORAL at 20:26

## 2022-01-30 RX ADMIN — METOPROLOL TARTRATE 50 MG: 50 TABLET, FILM COATED ORAL at 10:34

## 2022-01-30 RX ADMIN — TOPIRAMATE 200 MG: 200 TABLET ORAL at 20:26

## 2022-01-30 RX ADMIN — VENLAFAXINE 75 MG: 75 TABLET ORAL at 20:25

## 2022-01-30 ASSESSMENT — ACTIVITIES OF DAILY LIVING (ADL)
ADLS_ACUITY_SCORE: 16
ADLS_ACUITY_SCORE: 14
ADLS_ACUITY_SCORE: 18
ADLS_ACUITY_SCORE: 14
ADLS_ACUITY_SCORE: 18
ADLS_ACUITY_SCORE: 14
ADLS_ACUITY_SCORE: 16
ADLS_ACUITY_SCORE: 14
ADLS_ACUITY_SCORE: 14
ADLS_ACUITY_SCORE: 16
ADLS_ACUITY_SCORE: 14
ADLS_ACUITY_SCORE: 14
ADLS_ACUITY_SCORE: 16
ADLS_ACUITY_SCORE: 16
ADLS_ACUITY_SCORE: 14

## 2022-01-30 ASSESSMENT — MIFFLIN-ST. JEOR: SCORE: 1043.13

## 2022-01-30 NOTE — PROGRESS NOTES
Patient was AAOX4, patient c/o abdominal pain but refused interventions; afebrile; sbps 130s-150s; Hr SR/ST; she remained on 1L of O2, avila was removed this afternoon; rectal tube remained in place; she had bolus feeds after lunch this afternoon; D5 in NS was discontinue. QID Blood sugars discontinue. Vitals stable. Continue to monitor.     Jie Funez RN

## 2022-01-30 NOTE — PROGRESS NOTES
[facility]    Medicine Progress Note - Hospitalist Service, GOLD TEAM 8    Date of Admission:  1/11/2022    Assessment & Plan     Sherly Yeung is a 56 year old female admitted on 1/11/2022 who is being transferred out of the ICU on 1/23/2022. She has a history of COVID 10/2021 requiring trachoestomy who presented on 1/11 with acute cholecystitis and cholangitis s/p biliary stent placement. Hospital course was complicated by respiratory failure on 1/17 and repeat COVID infection. She developed respiratory failure and required ICU transfer and intubation on 1/19. She was extubated on 1/21 but had recurrent hypercapnia requiring bipap at night and while sleeping ICU stay was complicated by development of new leukocytosis and C. Diff colitis with diarrhea.    #ARDS secondary to COVID 19 pneumonia (improving)  #Acute hypercapnic and hypoxemic respiratory failure  -Recent COVID pneumonia requiring ICU stay and tracheostomy in 10/2021. Decannulated later  -Developed respiratory symptoms on 1/17 and + COVID  -Worsened respiratory failure on 1/19 and required ICU transfer and intubation  -Extubated on 1/21  -Requiring bipap at bedtime and with naps for persistent hypercapnia  -Leukocytosis up-trending again  -May re-engage infectious disease  Plan:  >BiPAP at bedtime and with naps  >Hypercapnia (venous PCO2 = 54)  >No change in CXR (1/25)  >Continue dexamethasone (end date 1/26) for 10 day course  >Continue barcitinib (end date 1/31)  >Does not need to be completed at time of discharge    #C. diff colitis  -ICU stay complicated by leukocytosis on 1/21 and diarrhea. C. Diff + on 1/22  -Persistent diarrhea  -Leukocytosis down-trending  Plan:  Plan for 2 weeks of PO vancomycin past last day of IV antibiotics     #Aspergillus pneumonia  -Occurred during initial COVID treatment in 10/2021. Was planned for 60 day of voriconazole  -Was due to end on 1/21 but had recurrent COVID and  respiratory failure  Plan:  >Plan for 2 additional weeks of voriconazole past last day of dexamethasone  >End date February 9th    #Choledocholithiasis c/b cholangitis, s/p CBD stenting (1/12/22)  #Probable acute cholecystitis s/p transpapillary cystic duct stenting (1/12/22)  #Transaminitis   #Sepsis (tachy, leukocytosis)  - S/p ERCP, removal of stone and stent placement  - Abx for 10 days (1/12 - 1/22) - (Was cipro/flagyl, broadened to cef/flagyl due to resp failure)  -Developed worsened respiratory failure on 1/19 and antibiotics were extended to provide additional bacterial coverage  Plan:  >Stop IV cefepime, flagyl on 1/26 (14 day course)  >Follow as outpatient with Gen-surg for cholecystectomy (order on discharge placed)  >Follow up with GI approx February 2/12/22    #Paget Banegas syndrome  #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  She has a history of multiple blood clots.  She has been maintained on apixaban.  -Continue PTA apixaban    #Anemia  -Secondary to critical illness  #HTN  -Up-titrating to control tachycardia, blood pressure  #Depression. Anorexia  -Venlflaxine, Abilify, topiramate              Diet: Regular Diet Adult  Adult Formula Bolus Feeding: TID Osmolite 1.5; Route: Gastrostomy; 3; Can(s); Medication - Feeding Tube Flush Frequency: At least 15-30 mL water before and after medication administration and with tube clogging; Amount to Send (Nutrition use): 3 c...    DVT Prophylaxis: DOAC  Scott Catheter: Not present  Central Lines: None  Cardiac Monitoring: None  Code Status: Full Code      Disposition Plan   Expected Discharge: 02/05/2022     Anticipated discharge location:  Awaiting care coordination huddle  Delays:     Placement - TCU  Covid Test Positive            The patient's care was discussed with the Bedside Nurse and Patient.    Luis Manuel Nunes DO  Hospitalist Service, GOLD TEAM [address] [place] [address]  [facility]  Securely message with the [employer] Web Console (learn more here)  Text page via [facility] Paging/Directory   Please see signed in provider for up to date coverage information  ______________________________________________________________________    Interval History   Patient reports feeling much improved.  Patiet's skin tone/color is much improved.  Per nursing staff, patient's pulse was elevated this morning.  Patient's metoprolol was reportedly administered late.  Patient's rectal tube discontinued.  Patient reports therapy hasn't been working with her; asked nursing staff to reach out to them.  OK to use the bed pan overPM.  Continue to monitor bowel movements.    Data reviewed today: I reviewed all medications, new labs and imaging results over the last 24 hours. I personally reviewed no images or EKG's today.    Physical Exam   Vital Signs: Temp: 97.8  F (36.6  C) Temp src: Oral BP: (!) 145/107 Pulse: 105   Resp: 24 SpO2: 99 % O2 Device: Nasal cannula Oxygen Delivery: 1 LPM  Weight: 106 lbs 11.24 oz    Physical Exam  Constitutional:       General: She is not in acute distress.     Appearance: She is normal weight. She is not ill-appearing, toxic-appearing or diaphoretic.   HENT:      Head: Normocephalic.      Mouth/Throat:      Mouth: Mucous membranes are moist.      Pharynx: Oropharynx is clear.   Eyes:      Extraocular Movements: Extraocular movements intact.      Conjunctiva/sclera: Conjunctivae normal.      Pupils: Pupils are equal, round, and reactive to light.   Cardiovascular:      Rate and Rhythm: Normal rate and regular rhythm.      Pulses: Normal pulses.      Heart sounds: Normal heart sounds. No murmur heard.  No gallop.    Pulmonary:      Effort: Pulmonary effort is normal. No respiratory distress.      Breath sounds: Normal breath sounds. No wheezing, rhonchi or rales.   Abdominal:      General: Bowel sounds are normal. There is no distension.      Palpations: Abdomen is soft.       Tenderness: There is no abdominal tenderness. There is no guarding.   Musculoskeletal:         General: Normal range of motion.   Skin:     General: Skin is warm and dry.   Neurological:      General: No focal deficit present.      Mental Status: She is alert. Mental status is at baseline.   Psychiatric:         Mood and Affect: Mood normal.         Behavior: Behavior normal.         Thought Content: Thought content normal.         Judgment: Judgment normal.           Data   Recent Labs   Lab 01/30/22  0734 01/30/22  0432 01/29/22 2017 01/29/22  0807 01/29/22  0651 01/28/22  0906 01/28/22  0702 01/27/22  0643 01/27/22  0619   WBC 18.6*  --   --   --  18.7*  --  19.3*  --  17.9*   HGB 9.9*  --   --   --  9.7*  --  10.4*  --  10.6*   MCV 97  --   --   --  97  --  96  --  96   *  --   --   --  586*  --  666*  --  626*   INR  --   --   --   --  1.10  --  1.09  --  1.14     --   --   --  141  --  141  --  141   POTASSIUM 4.3  --   --   --  4.2  --  4.8  --  4.2   CHLORIDE 110*  --   --   --  112*  --  111*  --  109   CO2 26  --   --   --  28  --  29  --  26   BUN 13  --   --   --  10  --  9  --  14   CR 0.41*  --   --   --  0.37*  --  0.48*  --  0.50*   ANIONGAP 4  --   --   --  1*  --  1*  --  6   TERESA 9.2  --   --   --  8.7  --  9.3  --  8.7   * 121* 106*   < > 98   < > 98   < > 142*   ALBUMIN 2.7*  --   --   --  2.5*  --  2.7*  --  3.4   PROTTOTAL 6.8  --   --   --  6.3*  --  6.8  --  6.6*   BILITOTAL 0.2  --   --   --  0.3  --  0.3  --  0.3   ALKPHOS 116  --   --   --  90  --  95  --  97   ALT 39  --   --   --  30  --  33  --  36   AST 24  --   --   --  17  --  21  --  18    < > = values in this interval not displayed.     No results found for this or any previous visit (from the past 24 hour(s)).  Medications     dextrose Stopped (01/27/22 2040)     - MEDICATION INSTRUCTIONS -       - MEDICATION INSTRUCTIONS -       sodium chloride         apixaban ANTICOAGULANT  5 mg Oral or Feeding Tube BID      ARIPiprazole  7.5 mg Oral or Feeding Tube Daily     banatrol plus  1 packet Per Feeding Tube BID     baricitinib  4 mg Oral or Feeding Tube Daily     metoprolol tartrate  75 mg Oral BID     multivitamins w/minerals  15 mL Per Feeding Tube Daily     pantoprazole  40 mg Oral BID     protein modular  1 packet Per Feeding Tube BID     sodium chloride (PF)  3 mL Intracatheter Q8H     spironolactone  25 mg Oral Daily     tolterodine  2 mg Oral or Feeding Tube BID     topiramate  100 mg Oral Daily     topiramate  200 mg Oral or Feeding Tube QPM     vancomycin  125 mg Oral or Feeding Tube 4x Daily     venlafaxine  75 mg Oral or Feeding Tube BID     voriconazole  200 mg Oral or Feeding Tube Q12H TANNER

## 2022-01-30 NOTE — PLAN OF CARE
Problem: Adult Inpatient Plan of Care  Goal: Plan of Care Review  Outcome: Improving  Flowsheets (Taken 1/30/2022 0259)  Plan of Care Reviewed With: patient  Progress: improving  Goal: Patient-Specific Goal (Individualized)  Outcome: Improving  Goal: Absence of Hospital-Acquired Illness or Injury  Outcome: Improving  Intervention: Identify and Manage Fall Risk  Recent Flowsheet Documentation  Taken 1/30/2022 0000 by Elizabeth Curtis RN  Safety Promotion/Fall Prevention:   activity supervised   lighting adjusted   increased rounding and observation   fall prevention program maintained   clutter free environment maintained   safety round/check completed   supervised activity  Taken 1/29/2022 2000 by Elizabeth Curtis RN  Safety Promotion/Fall Prevention:   activity supervised   lighting adjusted   increased rounding and observation   fall prevention program maintained   clutter free environment maintained   safety round/check completed   supervised activity  Intervention: Prevent Skin Injury  Recent Flowsheet Documentation  Taken 1/30/2022 0000 by Elizabeth Curtis RN  Body Position: position changed independently  Taken 1/29/2022 2000 by Elizabeth Curtis RN  Body Position: position changed independently  Intervention: Prevent and Manage VTE (Venous Thromboembolism) Risk  Recent Flowsheet Documentation  Taken 1/30/2022 0000 by Elizabeth Curtis RN  VTE Prevention/Management:   anticoagulant therapy maintained   bleeding precautions maintained  Taken 1/29/2022 2000 by Elizabeth Curtis RN  VTE Prevention/Management:   anticoagulant therapy maintained   bleeding precautions maintained  Intervention: Prevent Infection  Recent Flowsheet Documentation  Taken 1/30/2022 0000 by Elizabeth Curtis RN  Infection Prevention:   cohorting utilized   environmental surveillance performed   equipment surfaces disinfected   hand hygiene promoted   personal protective equipment utilized    rest/sleep promoted   single patient room provided  Taken 1/29/2022 2000 by Elizabeth Curtis RN  Infection Prevention:   cohorting utilized   environmental surveillance performed   equipment surfaces disinfected   hand hygiene promoted   personal protective equipment utilized   rest/sleep promoted   single patient room provided  Goal: Optimal Comfort and Wellbeing  Outcome: Improving     Problem: Fluid Imbalance (Pneumonia)  Goal: Fluid Balance  Outcome: Improving

## 2022-01-30 NOTE — PROGRESS NOTES
Pt is A&Ox4, afebrile , denies pain. On NC 1 L. Cardiac rhythm fluctuating between SR and ST. Able requesting cares and following commands. Diminished lung soundd  in all quadrants. Has bolus feeding ( 247ml). Still has the rectal tube with minimal output (20cc) throughout the shift. Able to call to use commode for voiding.   Patient pupils size on both eyes is 7 mm and positive PERRLA at both eyes. No neurological symptoms noticed.  Patient was given prn hydroxyzine which was effective for elevated BP. No other issues.

## 2022-01-31 ENCOUNTER — APPOINTMENT (OUTPATIENT)
Dept: PHYSICAL THERAPY | Facility: CLINIC | Age: 57
DRG: 871 | End: 2022-01-31
Payer: MEDICARE

## 2022-01-31 LAB
ALBUMIN SERPL-MCNC: 2.5 G/DL (ref 3.4–5)
ALP SERPL-CCNC: 107 U/L (ref 40–150)
ALT SERPL W P-5'-P-CCNC: 37 U/L (ref 0–50)
ANION GAP SERPL CALCULATED.3IONS-SCNC: 4 MMOL/L (ref 3–14)
AST SERPL W P-5'-P-CCNC: 23 U/L (ref 0–45)
BILIRUB SERPL-MCNC: 0.2 MG/DL (ref 0.2–1.3)
BUN SERPL-MCNC: 12 MG/DL (ref 7–30)
CALCIUM SERPL-MCNC: 9.1 MG/DL (ref 8.5–10.1)
CHLORIDE BLD-SCNC: 111 MMOL/L (ref 94–109)
CO2 SERPL-SCNC: 26 MMOL/L (ref 20–32)
CREAT SERPL-MCNC: 0.5 MG/DL (ref 0.52–1.04)
ERYTHROCYTE [DISTWIDTH] IN BLOOD BY AUTOMATED COUNT: 17.4 % (ref 10–15)
GFR SERPL CREATININE-BSD FRML MDRD: >90 ML/MIN/1.73M2
GLUCOSE BLD-MCNC: 131 MG/DL (ref 70–99)
GLUCOSE BLDC GLUCOMTR-MCNC: 113 MG/DL (ref 70–99)
GLUCOSE BLDC GLUCOMTR-MCNC: 59 MG/DL (ref 70–99)
GLUCOSE BLDC GLUCOMTR-MCNC: 59 MG/DL (ref 70–99)
GLUCOSE BLDC GLUCOMTR-MCNC: 79 MG/DL (ref 70–99)
GLUCOSE BLDC GLUCOMTR-MCNC: 82 MG/DL (ref 70–99)
GLUCOSE BLDC GLUCOMTR-MCNC: 93 MG/DL (ref 70–99)
HCT VFR BLD AUTO: 32.1 % (ref 35–47)
HGB BLD-MCNC: 9.5 G/DL (ref 11.7–15.7)
LACTATE SERPL-SCNC: 1.6 MMOL/L (ref 0.7–2)
MAGNESIUM SERPL-MCNC: 2.3 MG/DL (ref 1.6–2.3)
MCH RBC QN AUTO: 29.1 PG (ref 26.5–33)
MCHC RBC AUTO-ENTMCNC: 29.6 G/DL (ref 31.5–36.5)
MCV RBC AUTO: 98 FL (ref 78–100)
PHOSPHATE SERPL-MCNC: 2.5 MG/DL (ref 2.5–4.5)
PLATELET # BLD AUTO: 473 10E3/UL (ref 150–450)
POTASSIUM BLD-SCNC: 4.7 MMOL/L (ref 3.4–5.3)
PROT SERPL-MCNC: 6.6 G/DL (ref 6.8–8.8)
RBC # BLD AUTO: 3.27 10E6/UL (ref 3.8–5.2)
SODIUM SERPL-SCNC: 141 MMOL/L (ref 133–144)
WBC # BLD AUTO: 15.7 10E3/UL (ref 4–11)

## 2022-01-31 PROCEDURE — 258N000003 HC RX IP 258 OP 636: Performed by: INTERNAL MEDICINE

## 2022-01-31 PROCEDURE — 250N000013 HC RX MED GY IP 250 OP 250 PS 637: Performed by: STUDENT IN AN ORGANIZED HEALTH CARE EDUCATION/TRAINING PROGRAM

## 2022-01-31 PROCEDURE — 83605 ASSAY OF LACTIC ACID: CPT | Performed by: STUDENT IN AN ORGANIZED HEALTH CARE EDUCATION/TRAINING PROGRAM

## 2022-01-31 PROCEDURE — 97116 GAIT TRAINING THERAPY: CPT | Mod: GP | Performed by: PHYSICAL THERAPIST

## 2022-01-31 PROCEDURE — 120N000003 HC R&B IMCU UMMC

## 2022-01-31 PROCEDURE — 83735 ASSAY OF MAGNESIUM: CPT | Performed by: STUDENT IN AN ORGANIZED HEALTH CARE EDUCATION/TRAINING PROGRAM

## 2022-01-31 PROCEDURE — 250N000013 HC RX MED GY IP 250 OP 250 PS 637: Performed by: INTERNAL MEDICINE

## 2022-01-31 PROCEDURE — 36415 COLL VENOUS BLD VENIPUNCTURE: CPT | Performed by: STUDENT IN AN ORGANIZED HEALTH CARE EDUCATION/TRAINING PROGRAM

## 2022-01-31 PROCEDURE — 97530 THERAPEUTIC ACTIVITIES: CPT | Mod: GP | Performed by: PHYSICAL THERAPIST

## 2022-01-31 PROCEDURE — 85027 COMPLETE CBC AUTOMATED: CPT | Performed by: STUDENT IN AN ORGANIZED HEALTH CARE EDUCATION/TRAINING PROGRAM

## 2022-01-31 PROCEDURE — 84100 ASSAY OF PHOSPHORUS: CPT | Performed by: STUDENT IN AN ORGANIZED HEALTH CARE EDUCATION/TRAINING PROGRAM

## 2022-01-31 PROCEDURE — 80053 COMPREHEN METABOLIC PANEL: CPT | Performed by: STUDENT IN AN ORGANIZED HEALTH CARE EDUCATION/TRAINING PROGRAM

## 2022-01-31 PROCEDURE — 250N000013 HC RX MED GY IP 250 OP 250 PS 637: Performed by: PHYSICIAN ASSISTANT

## 2022-01-31 RX ADMIN — VANCOMYCIN HYDROCHLORIDE 125 MG: KIT at 20:59

## 2022-01-31 RX ADMIN — METOPROLOL TARTRATE 75 MG: 50 TABLET, FILM COATED ORAL at 08:03

## 2022-01-31 RX ADMIN — Medication 1 PACKET: at 08:06

## 2022-01-31 RX ADMIN — VENLAFAXINE 75 MG: 75 TABLET ORAL at 08:07

## 2022-01-31 RX ADMIN — VANCOMYCIN HYDROCHLORIDE 125 MG: KIT at 08:04

## 2022-01-31 RX ADMIN — VANCOMYCIN HYDROCHLORIDE 125 MG: KIT at 16:44

## 2022-01-31 RX ADMIN — DEXTROSE 15 G: 15 GEL ORAL at 03:37

## 2022-01-31 RX ADMIN — Medication 1 PACKET: at 15:00

## 2022-01-31 RX ADMIN — SPIRONOLACTONE 25 MG: 25 TABLET, FILM COATED ORAL at 08:03

## 2022-01-31 RX ADMIN — TOPIRAMATE 200 MG: 200 TABLET ORAL at 20:58

## 2022-01-31 RX ADMIN — VENLAFAXINE 75 MG: 75 TABLET ORAL at 21:01

## 2022-01-31 RX ADMIN — TOLTERODINE TARTRATE 2 MG: 2 TABLET, FILM COATED ORAL at 08:01

## 2022-01-31 RX ADMIN — Medication 7.5 MG: at 08:04

## 2022-01-31 RX ADMIN — Medication 1 PACKET: at 20:59

## 2022-01-31 RX ADMIN — VANCOMYCIN HYDROCHLORIDE 125 MG: KIT at 12:03

## 2022-01-31 RX ADMIN — Medication 40 MG: at 20:59

## 2022-01-31 RX ADMIN — VORICONAZOLE 200 MG: 40 POWDER, FOR SUSPENSION ORAL at 08:04

## 2022-01-31 RX ADMIN — METOPROLOL TARTRATE 75 MG: 50 TABLET, FILM COATED ORAL at 20:58

## 2022-01-31 RX ADMIN — TOLTERODINE TARTRATE 2 MG: 2 TABLET, FILM COATED ORAL at 20:58

## 2022-01-31 RX ADMIN — SODIUM CHLORIDE: 9 INJECTION, SOLUTION INTRAVENOUS at 03:17

## 2022-01-31 RX ADMIN — TOPIRAMATE 100 MG: 100 TABLET, FILM COATED ORAL at 08:01

## 2022-01-31 RX ADMIN — Medication 40 MG: at 08:04

## 2022-01-31 RX ADMIN — Medication 3 MG: at 23:06

## 2022-01-31 RX ADMIN — APIXABAN 5 MG: 5 TABLET, FILM COATED ORAL at 08:03

## 2022-01-31 RX ADMIN — Medication 15 ML: at 12:03

## 2022-01-31 RX ADMIN — VORICONAZOLE 200 MG: 40 POWDER, FOR SUSPENSION ORAL at 20:59

## 2022-01-31 RX ADMIN — APIXABAN 5 MG: 5 TABLET, FILM COATED ORAL at 20:58

## 2022-01-31 ASSESSMENT — ACTIVITIES OF DAILY LIVING (ADL)
ADLS_ACUITY_SCORE: 16
ADLS_ACUITY_SCORE: 16
ADLS_ACUITY_SCORE: 14
ADLS_ACUITY_SCORE: 16
ADLS_ACUITY_SCORE: 18
ADLS_ACUITY_SCORE: 16
ADLS_ACUITY_SCORE: 14
ADLS_ACUITY_SCORE: 16
ADLS_ACUITY_SCORE: 16
ADLS_ACUITY_SCORE: 14
ADLS_ACUITY_SCORE: 16
ADLS_ACUITY_SCORE: 18
ADLS_ACUITY_SCORE: 16

## 2022-01-31 ASSESSMENT — MIFFLIN-ST. JEOR: SCORE: 1038.13

## 2022-01-31 NOTE — PROGRESS NOTES
Care Management Follow Up    Length of Stay (days): 19    Expected Discharge Date: 2022     Concerns to be Addressed: discharge planning     Patient plan of care discussed at interdisciplinary rounds: Yes    Anticipated Discharge Disposition: Transitional Care     Anticipated Discharge Services:  Transitional care unit  Anticipated Discharge DME:  TBD    Patient/family educated on Medicare website which has current facility and service quality ratings:  Will need to discuss further with patient  Education Provided on the Discharge Plan:    Patient/Family in Agreement with the Plan: yes    Referrals Placed by CM/SW: Post Acute Facilities  Private pay costs discussed: Not applicable    Additional Information  This writer called and spoke with Marcia regarding transfer back to [facility]. Marcia stated she wanted to go back to the facility. Per conversation with Apoorva with admissions today, she will need to stay for her COVID precaution is off- noted to be 2022. This writer made Marcia aware of this and she is agreeable to this as well. She also inquired about transportation back to [facility]. This writer discussed that typically Medicare does not cover for transportation and there could be potential costs. She states that she would like a wheelchair transportation back to the facility.     Upon speaking with Apoorva in admissions at [facility] today, she stated that the bed hold  over the weekend and a new referral will need to be submitted mid week this week closer to  for admission again. They would be able to accept her back, unsure about bed availability at that time.     Care management team will continue to follow for discharge planning.     [facility]   [address] [place] [address]  [phone]     [place]UGENE Call RN Care Coordinator  Pager [phone] (unit SW pager)     For Weekend & Holiday on call RN Care Coordinator:  (Home  discharge with needs including home care, Assisted living facility returns, Durable medical equip, IV antibiotics)   [place]    Pager [phone]  [facility] (Sunday Only) Pager [phone]    For weekend social work needs, contact information below:  (Transitional care unit, Long-term care unit, Hospice, Counseling, Domestic violence,Vulnerable adult, Health Care Directive)  4A, 4C, 4E, 5A, 5B  Pager [phone]  6A, 6B, 6C, 6D   Pager [phone]  7A, 7B, 7C, 7D, 5C  Pager [phone]  [facility] (Saturday Only) Pager [phone]    After hours for all units- (only  is available -[id number]/everyday)  Pager [phone]

## 2022-01-31 NOTE — PLAN OF CARE
Problem: Adult Inpatient Plan of Care  Goal: Plan of Care Review  Outcome: Improving  Flowsheets (Taken 1/30/2022 0259)  Plan of Care Reviewed With: patient  Goal: Patient-Specific Goal (Individualized)  Outcome: Improving  Flowsheets  Taken 1/12/2022 1401 by Rachell Mitchell RN  Anxieties, Fears or Concerns: None  Taken 1/12/2022 0340 by Lyndon Bass RN  Individualized Care Needs: N/A  Goal: Absence of Hospital-Acquired Illness or Injury  Outcome: Improving  Intervention: Identify and Manage Fall Risk  Recent Flowsheet Documentation  Taken 1/31/2022 0000 by Elizabeth Curtis RN  Safety Promotion/Fall Prevention:   activity supervised   lighting adjusted   increased rounding and observation   fall prevention program maintained   clutter free environment maintained   safety round/check completed   supervised activity  Taken 1/30/2022 2000 by Elizabeth Curtis RN  Safety Promotion/Fall Prevention:   activity supervised   lighting adjusted   increased rounding and observation   fall prevention program maintained   clutter free environment maintained   safety round/check completed   supervised activity  Intervention: Prevent Skin Injury  Recent Flowsheet Documentation  Taken 1/31/2022 0000 by Elizabeth Curtis RN  Body Position: position changed independently  Taken 1/30/2022 2000 by Elizabeth Curtis RN  Body Position: position changed independently  Intervention: Prevent and Manage VTE (Venous Thromboembolism) Risk  Recent Flowsheet Documentation  Taken 1/31/2022 0000 by Elizabeth Curtis RN  VTE Prevention/Management:   anticoagulant therapy maintained   bleeding precautions maintained  Taken 1/30/2022 2000 by Elizabeth Curtis RN  VTE Prevention/Management:   anticoagulant therapy maintained   bleeding precautions maintained  Intervention: Prevent Infection  Recent Flowsheet Documentation  Taken 1/31/2022 0000 by Elizabeth Curtis RN  Infection Prevention:   cohorting  utilized   environmental surveillance performed   equipment surfaces disinfected   hand hygiene promoted   personal protective equipment utilized   rest/sleep promoted   single patient room provided  Taken 1/30/2022 2000 by Elizabeth Curtis RN  Infection Prevention:   cohorting utilized   environmental surveillance performed   equipment surfaces disinfected   hand hygiene promoted   personal protective equipment utilized   rest/sleep promoted   single patient room provided  Goal: Optimal Comfort and Wellbeing  Outcome: Improving     Problem: Respiratory Compromise (Pneumonia)  Goal: Effective Oxygenation and Ventilation  Outcome: Improving  Intervention: Optimize Oxygenation and Ventilation  Recent Flowsheet Documentation  Taken 1/31/2022 0000 by Elizabeth Curtis RN  Head of Bed (HOB) Positioning: HOB at 60-90 degrees  Taken 1/30/2022 2000 by Elizabeth Curtis RN  Head of Bed (HOB) Positioning: HOB at 60-90 degrees     Problem: Airway Clearance Ineffective  Goal: Effective Airway Clearance  Outcome: Improving  Intervention: Promote Airway Secretion Clearance  Recent Flowsheet Documentation  Taken 1/31/2022 0000 by Elizabeth Curtis, RN  Activity Management: activity adjusted per tolerance  Taken 1/30/2022 2000 by Elizabeth Curtis RN  Activity Management: activity adjusted per tolerance     Problem: OT General Care Plan  Goal: Toilet Transfer/Toileting (OT)  Description: Toilet Transfer/Toileting (OT)  Outcome: Improving  Flowsheets (Taken 1/23/2022 1155 by Isabel Escobar, OT)  OT goal: toilet transfer/toileting:   Modified independent   toilet transfer   cleaning and garment management     Problem: Malnutrition  Goal: Improved Nutritional Intake  Outcome: Improving

## 2022-01-31 NOTE — PROGRESS NOTES
Blood sugars q4hr not done today as patient does not meet qualifiers for testing and provider Des this morning said every 4 hours was not necessary for patient

## 2022-01-31 NOTE — PROGRESS NOTES
Patient is AAOX4; no complaints of pain; afebrile; sbp in 140s, schedule metoprolol was given; SR/ST; remained on 1L O2; rectal tube was removed this morning, patient using bedside commode; one bolus feed was given after lunch this afternoon; patient worked with PT, ambulated in room and sat up in chair; PIV running NS @ 75ml/hr; sepsis screening generated, provider was notified, lactate was completed, no new orders, vitals stable continue to monitor.         Jie Funez RN

## 2022-01-31 NOTE — PROGRESS NOTES
[facility]    Medicine Progress Note - Hospitalist Service, GOLD TEAM 8    Date of Admission:  1/11/2022    Assessment & Plan     Sherly Yeung is a 56 year old female admitted on 1/11/2022 who is being transferred out of the ICU on 1/23/2022. She has a history of COVID 10/2021 requiring trachoestomy who presented on 1/11 with acute cholecystitis and cholangitis s/p biliary stent placement. Hospital course was complicated by respiratory failure on 1/17 and repeat COVID infection. She developed respiratory failure and required ICU transfer and intubation on 1/19. She was extubated on 1/21 but had recurrent hypercapnia requiring bipap at night and while sleeping ICU stay was complicated by development of new leukocytosis and C. Diff colitis with diarrhea.    #ARDS secondary to COVID 19 pneumonia (improving)  #Acute hypercapnic and hypoxemic respiratory failure  -Recent COVID pneumonia requiring ICU stay and tracheostomy in 10/2021. Decannulated later  -Developed respiratory symptoms on 1/17 and + COVID  -Worsened respiratory failure on 1/19 and required ICU transfer and intubation  -Extubated on 1/21  -Requiring bipap at bedtime and with naps for persistent hypercapnia  -Leukocytosis up-trending again  -May re-engage infectious disease  Plan:  >BiPAP at bedtime and with naps  >Hypercapnia (venous PCO2 = 54)  >No change in CXR (1/25)  >Continue dexamethasone (end date 1/26) for 10 day course  >Continue barcitinib (end date 1/31)  >Does not need to be completed at time of discharge    #C. diff colitis  -ICU stay complicated by leukocytosis on 1/21 and diarrhea. C. Diff + on 1/22  -Persistent diarrhea  -Leukocytosis down-trending  Plan:  Plan for 2 weeks of PO vancomycin past last day of IV antibiotics     #Aspergillus pneumonia  -Occurred during initial COVID treatment in 10/2021. Was planned for 60 day of voriconazole  -Was due to end on 1/21 but had recurrent COVID and  respiratory failure  Plan:  >Plan for 2 additional weeks of voriconazole past last day of dexamethasone  >End date February 9th    #Choledocholithiasis c/b cholangitis, s/p CBD stenting (1/12/22)  #Probable acute cholecystitis s/p transpapillary cystic duct stenting (1/12/22)  #Transaminitis   #Sepsis (tachy, leukocytosis)  - S/p ERCP, removal of stone and stent placement  - Abx for 10 days (1/12 - 1/22) - (Was cipro/flagyl, broadened to cef/flagyl due to resp failure)  - Developed worsened respiratory failure on 1/19 and antibiotics were extended to provide additional bacterial coverage  Plan:  >Stop IV cefepime, flagyl on 1/26 (14 day course)  >Follow as outpatient with Gen-surg for cholecystectomy (order on discharge placed)  >Follow up with GI approx February 2/12/22    #Paget Banegas syndrome  #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  She has a history of multiple blood clots.  She has been maintained on apixaban.  >Continue PTA apixaban  >#Anemia  >Secondary to critical illness  #HTN  >Up-titrating to control tachycardia, blood pressure  #Depression. Anorexia  >Venlflaxine, Abilify, topiramate                Diet: Regular Diet Adult  Adult Formula Bolus Feeding: TID Osmolite 1.5; Route: Gastrostomy; 3; Can(s); Medication - Feeding Tube Flush Frequency: At least 15-30 mL water before and after medication administration and with tube clogging; Amount to Send (Nutrition use): 3 c...    DVT Prophylaxis: DOAC  Scott Catheter: Not present  Central Lines: None  Cardiac Monitoring: None  Code Status: Full Code      Disposition Plan   Expected Discharge: 02/05/2022     Anticipated discharge location:  Awaiting care coordination huddle  Delays:     Placement - TCU  Covid Test Positive            The patient's care was discussed with the Bedside Nurse and Patient.    Luis Manuel Nunes DO  Hospitalist Service, GOLD TEAM [address] [place] [address]  [facility]  Securely message with the [employer] Web Console (learn more here)  Text page via [facility] Paging/Directory   Please see signed in provider for up to date coverage information  ______________________________________________________________________    Interval History   Patient reports feeling improved.  Patient reports cough, work of breathing improved  Patient denies chest pain.  Patient denies abdominal discomfort, nausea.  Patient reports diarrhea improving.  Patient denies urinary symptomatology.    Data reviewed today: I reviewed all medications, new labs and imaging results over the last 24 hours. I personally reviewed no images or EKG's today.    Physical Exam   Vital Signs: Temp: 98.1  F (36.7  C) Temp src: Axillary BP: 128/81 Pulse: 89   Resp: 25 SpO2: 94 % O2 Device: None (Room air) Oxygen Delivery: 1 LPM  Weight: 105 lbs 9.61 oz     Physical Exam  Constitutional:       Appearance: Normal appearance. She is normal weight.   HENT:      Head: Normocephalic.      Mouth/Throat:      Mouth: Mucous membranes are moist.      Pharynx: Oropharynx is clear.   Eyes:      Extraocular Movements: Extraocular movements intact.      Conjunctiva/sclera: Conjunctivae normal.      Pupils: Pupils are equal, round, and reactive to light.   Cardiovascular:      Rate and Rhythm: Normal rate and regular rhythm.      Pulses: Normal pulses.      Heart sounds: Normal heart sounds.   Pulmonary:      Effort: Pulmonary effort is normal.      Breath sounds: Normal breath sounds.   Abdominal:      General: Bowel sounds are normal.      Palpations: Abdomen is soft.   Musculoskeletal:      Cervical back: Normal range of motion and neck supple.   Skin:     General: Skin is warm and dry.   Neurological:      General: No focal deficit present.      Mental Status: She is alert.   Psychiatric:         Mood and Affect: Mood normal.         Behavior: Behavior normal.           Data   Recent Labs   Lab 01/31/22  0900 01/31/22  0839  01/31/22  0600 01/31/22  0331 01/30/22  0734 01/29/22  0807 01/29/22  0651 01/28/22  0906 01/28/22  0702 01/27/22  0643 01/27/22  0619   WBC  --   --  15.7*  --  18.6*  --  18.7*  --  19.3*  --  17.9*   HGB  --   --  9.5*  --  9.9*  --  9.7*  --  10.4*  --  10.6*   MCV  --   --  98  --  97  --  97  --  96  --  96   PLT  --   --  473*  --  542*  --  586*  --  666*  --  626*   INR  --   --   --   --   --   --  1.10  --  1.09  --  1.14   NA  --   --  141  --  140  --  141  --  141  --  141   POTASSIUM  --   --  4.7  --  4.3  --  4.2  --  4.8  --  4.2   CHLORIDE  --   --  111*  --  110*  --  112*  --  111*  --  109   CO2  --   --  26  --  26  --  28  --  29  --  26   BUN  --   --  12  --  13  --  10  --  9  --  14   CR  --   --  0.50*  --  0.41*  --  0.37*  --  0.48*  --  0.50*   ANIONGAP  --   --  4  --  4  --  1*  --  1*  --  6   TERESA  --   --  9.1  --  9.2  --  8.7  --  9.3  --  8.7   GLC 93 59* 131*   < > 117*   < > 98   < > 98   < > 142*   ALBUMIN  --   --  2.5*  --  2.7*  --  2.5*  --  2.7*  --  3.4   PROTTOTAL  --   --  6.6*  --  6.8  --  6.3*  --  6.8  --  6.6*   BILITOTAL  --   --  0.2  --  0.2  --  0.3  --  0.3  --  0.3   ALKPHOS  --   --  107  --  116  --  90  --  95  --  97   ALT  --   --  37  --  39  --  30  --  33  --  36   AST  --   --  23  --  24  --  17  --  21  --  18    < > = values in this interval not displayed.     No results found for this or any previous visit (from the past 24 hour(s)).  Medications     dextrose Stopped (01/27/22 2040)     - MEDICATION INSTRUCTIONS -       - MEDICATION INSTRUCTIONS -       sodium chloride 75 mL/hr at 01/31/22 1500       apixaban ANTICOAGULANT  5 mg Oral or Feeding Tube BID     ARIPiprazole  7.5 mg Oral or Feeding Tube Daily     banatrol plus  1 packet Per Feeding Tube BID     metoprolol tartrate  75 mg Oral BID     multivitamins w/minerals  15 mL Per Feeding Tube Daily     pantoprazole  40 mg Oral BID     protein modular  1 packet Per Feeding Tube BID     sodium  chloride (PF)  3 mL Intracatheter Q8H     spironolactone  25 mg Oral Daily     tolterodine  2 mg Oral or Feeding Tube BID     topiramate  100 mg Oral Daily     topiramate  200 mg Oral or Feeding Tube QPM     vancomycin  125 mg Oral or Feeding Tube 4x Daily     venlafaxine  75 mg Oral or Feeding Tube BID     voriconazole  200 mg Oral or Feeding Tube Q12H TANNER

## 2022-01-31 NOTE — PROGRESS NOTES
Pt is A&Ox4, afebrile , denies pain. On NC 1 L. Cardiac rhythm fluctuating between SR and ST, but mostly SR when sleeping.  Able requesting cares and following commands. Diminished lung sound in RML and RLL. Patient ate 100% of her dinner, no bolus feeding. She is able to call requesting cares. She uses bedpan. She has been encouraged to use the commode, but declined.   She is on continuous 0.9 Nacl infusing at 75 ml/hr.   BS at 0330 was 59, no symptoms; prn 15 g glucose gel was given, then BS went up to 82. Orange and apple juice brought blood sugar up to 113.   Patient is able to self repositioning, however, she has been prompted for repositioned to avoid pressure injury, but declined. She agreed on boosted up.    No other issues.

## 2022-02-01 ENCOUNTER — APPOINTMENT (OUTPATIENT)
Dept: PHYSICAL THERAPY | Facility: CLINIC | Age: 57
DRG: 871 | End: 2022-02-01
Payer: MEDICARE

## 2022-02-01 ENCOUNTER — APPOINTMENT (OUTPATIENT)
Dept: OCCUPATIONAL THERAPY | Facility: CLINIC | Age: 57
DRG: 871 | End: 2022-02-01
Attending: STUDENT IN AN ORGANIZED HEALTH CARE EDUCATION/TRAINING PROGRAM
Payer: MEDICARE

## 2022-02-01 DIAGNOSIS — Z11.59 ENCOUNTER FOR SCREENING FOR OTHER VIRAL DISEASES: Primary | ICD-10-CM

## 2022-02-01 LAB
ALBUMIN SERPL-MCNC: 2.5 G/DL (ref 3.4–5)
ALP SERPL-CCNC: 125 U/L (ref 40–150)
ALT SERPL W P-5'-P-CCNC: 51 U/L (ref 0–50)
ANION GAP SERPL CALCULATED.3IONS-SCNC: 4 MMOL/L (ref 3–14)
AST SERPL W P-5'-P-CCNC: 40 U/L (ref 0–45)
BILIRUB SERPL-MCNC: 0.2 MG/DL (ref 0.2–1.3)
BUN SERPL-MCNC: 11 MG/DL (ref 7–30)
CALCIUM SERPL-MCNC: 9.5 MG/DL (ref 8.5–10.1)
CHLORIDE BLD-SCNC: 112 MMOL/L (ref 94–109)
CO2 SERPL-SCNC: 24 MMOL/L (ref 20–32)
CREAT SERPL-MCNC: 0.46 MG/DL (ref 0.52–1.04)
ERYTHROCYTE [DISTWIDTH] IN BLOOD BY AUTOMATED COUNT: 17.8 % (ref 10–15)
GFR SERPL CREATININE-BSD FRML MDRD: >90 ML/MIN/1.73M2
GLUCOSE BLD-MCNC: 87 MG/DL (ref 70–99)
GLUCOSE BLDC GLUCOMTR-MCNC: 119 MG/DL (ref 70–99)
HCT VFR BLD AUTO: 33.3 % (ref 35–47)
HGB BLD-MCNC: 9.9 G/DL (ref 11.7–15.7)
MAGNESIUM SERPL-MCNC: 2.4 MG/DL (ref 1.6–2.3)
MCH RBC QN AUTO: 29.3 PG (ref 26.5–33)
MCHC RBC AUTO-ENTMCNC: 29.7 G/DL (ref 31.5–36.5)
MCV RBC AUTO: 99 FL (ref 78–100)
PHOSPHATE SERPL-MCNC: 3.6 MG/DL (ref 2.5–4.5)
PLATELET # BLD AUTO: 355 10E3/UL (ref 150–450)
POTASSIUM BLD-SCNC: 5.1 MMOL/L (ref 3.4–5.3)
PROT SERPL-MCNC: 6.7 G/DL (ref 6.8–8.8)
RBC # BLD AUTO: 3.38 10E6/UL (ref 3.8–5.2)
SODIUM SERPL-SCNC: 140 MMOL/L (ref 133–144)
WBC # BLD AUTO: 12.7 10E3/UL (ref 4–11)

## 2022-02-01 PROCEDURE — 82040 ASSAY OF SERUM ALBUMIN: CPT | Performed by: STUDENT IN AN ORGANIZED HEALTH CARE EDUCATION/TRAINING PROGRAM

## 2022-02-01 PROCEDURE — 80053 COMPREHEN METABOLIC PANEL: CPT | Performed by: STUDENT IN AN ORGANIZED HEALTH CARE EDUCATION/TRAINING PROGRAM

## 2022-02-01 PROCEDURE — 250N000013 HC RX MED GY IP 250 OP 250 PS 637: Performed by: STUDENT IN AN ORGANIZED HEALTH CARE EDUCATION/TRAINING PROGRAM

## 2022-02-01 PROCEDURE — 999N000127 HC STATISTIC PERIPHERAL IV START W US GUIDANCE

## 2022-02-01 PROCEDURE — 85027 COMPLETE CBC AUTOMATED: CPT | Performed by: STUDENT IN AN ORGANIZED HEALTH CARE EDUCATION/TRAINING PROGRAM

## 2022-02-01 PROCEDURE — 120N000003 HC R&B IMCU UMMC

## 2022-02-01 PROCEDURE — 97530 THERAPEUTIC ACTIVITIES: CPT | Mod: GP | Performed by: PHYSICAL THERAPIST

## 2022-02-01 PROCEDURE — 250N000013 HC RX MED GY IP 250 OP 250 PS 637: Performed by: INTERNAL MEDICINE

## 2022-02-01 PROCEDURE — 84100 ASSAY OF PHOSPHORUS: CPT | Performed by: STUDENT IN AN ORGANIZED HEALTH CARE EDUCATION/TRAINING PROGRAM

## 2022-02-01 PROCEDURE — 36415 COLL VENOUS BLD VENIPUNCTURE: CPT | Performed by: STUDENT IN AN ORGANIZED HEALTH CARE EDUCATION/TRAINING PROGRAM

## 2022-02-01 PROCEDURE — 99232 SBSQ HOSP IP/OBS MODERATE 35: CPT | Performed by: INTERNAL MEDICINE

## 2022-02-01 PROCEDURE — 97116 GAIT TRAINING THERAPY: CPT | Mod: GP | Performed by: PHYSICAL THERAPIST

## 2022-02-01 PROCEDURE — 97535 SELF CARE MNGMENT TRAINING: CPT | Mod: GO | Performed by: OCCUPATIONAL THERAPIST

## 2022-02-01 PROCEDURE — 83735 ASSAY OF MAGNESIUM: CPT | Performed by: STUDENT IN AN ORGANIZED HEALTH CARE EDUCATION/TRAINING PROGRAM

## 2022-02-01 PROCEDURE — 250N000013 HC RX MED GY IP 250 OP 250 PS 637: Performed by: PHYSICIAN ASSISTANT

## 2022-02-01 RX ADMIN — VANCOMYCIN HYDROCHLORIDE 125 MG: KIT at 15:59

## 2022-02-01 RX ADMIN — APIXABAN 5 MG: 5 TABLET, FILM COATED ORAL at 20:39

## 2022-02-01 RX ADMIN — METOPROLOL TARTRATE 75 MG: 50 TABLET, FILM COATED ORAL at 20:39

## 2022-02-01 RX ADMIN — METOPROLOL TARTRATE 75 MG: 50 TABLET, FILM COATED ORAL at 09:38

## 2022-02-01 RX ADMIN — Medication 40 MG: at 09:37

## 2022-02-01 RX ADMIN — Medication 15 ML: at 12:22

## 2022-02-01 RX ADMIN — Medication 1 PACKET: at 15:58

## 2022-02-01 RX ADMIN — VORICONAZOLE 200 MG: 40 POWDER, FOR SUSPENSION ORAL at 20:39

## 2022-02-01 RX ADMIN — VORICONAZOLE 200 MG: 40 POWDER, FOR SUSPENSION ORAL at 09:37

## 2022-02-01 RX ADMIN — VANCOMYCIN HYDROCHLORIDE 125 MG: KIT at 20:39

## 2022-02-01 RX ADMIN — TOPIRAMATE 100 MG: 100 TABLET, FILM COATED ORAL at 09:37

## 2022-02-01 RX ADMIN — VANCOMYCIN HYDROCHLORIDE 125 MG: KIT at 12:23

## 2022-02-01 RX ADMIN — TOLTERODINE TARTRATE 2 MG: 2 TABLET, FILM COATED ORAL at 09:38

## 2022-02-01 RX ADMIN — Medication 1 PACKET: at 09:37

## 2022-02-01 RX ADMIN — Medication 40 MG: at 20:39

## 2022-02-01 RX ADMIN — Medication 3 MG: at 21:35

## 2022-02-01 RX ADMIN — VANCOMYCIN HYDROCHLORIDE 125 MG: KIT at 09:37

## 2022-02-01 RX ADMIN — VENLAFAXINE 75 MG: 75 TABLET ORAL at 20:39

## 2022-02-01 RX ADMIN — SPIRONOLACTONE 25 MG: 25 TABLET, FILM COATED ORAL at 09:37

## 2022-02-01 RX ADMIN — Medication 1 PACKET: at 20:40

## 2022-02-01 RX ADMIN — APIXABAN 5 MG: 5 TABLET, FILM COATED ORAL at 09:38

## 2022-02-01 RX ADMIN — VENLAFAXINE 75 MG: 75 TABLET ORAL at 09:38

## 2022-02-01 RX ADMIN — Medication 7.5 MG: at 09:38

## 2022-02-01 RX ADMIN — Medication 1 PACKET: at 09:38

## 2022-02-01 RX ADMIN — TOLTERODINE TARTRATE 2 MG: 2 TABLET, FILM COATED ORAL at 20:39

## 2022-02-01 RX ADMIN — TOPIRAMATE 200 MG: 200 TABLET ORAL at 20:39

## 2022-02-01 ASSESSMENT — ACTIVITIES OF DAILY LIVING (ADL)
ADLS_ACUITY_SCORE: 18
ADLS_ACUITY_SCORE: 16
ADLS_ACUITY_SCORE: 18
ADLS_ACUITY_SCORE: 16
ADLS_ACUITY_SCORE: 18

## 2022-02-01 ASSESSMENT — MIFFLIN-ST. JEOR: SCORE: 1060.13

## 2022-02-01 NOTE — PLAN OF CARE
Major Shift Events:  Pt A&Ox4, cooperative, pleasant. SR-sinus tach, afebrile. Pt has been on 1L NC for for comfort, satting %. Pt using bedpan adequately overnight. PIV running NS @75mL/hr. Denies pain. A1.  Plan: use commode during day, encourage oral intake, ARU or TCU when off COVID precautions    For vital signs and complete assessments, please see documentation flowsheets.

## 2022-02-01 NOTE — PLAN OF CARE
Neuro: A&Ox4.     Cardiac: SR. VSS.     Respiratory: Sating *95-98 on RA, 100 on 1L NC.    GI/: Adequate urine output. BM X1    Diet/appetite: Tolerating regular diet. Eating adequately, but may need encouragement to increase intake. Had to give bolus feed for lunch coverage. She does prefer to take all medications through her J tube.     Activity:  Assist of 1, up to chair and commode.    Pain: At acceptable level on current regimen.     Skin: No new deficits noted.    LDA's: PIV in left arm. G/J tube clamped.     Plan: Continue with POC. Notify primary team with changes.

## 2022-02-01 NOTE — PROGRESS NOTES
[facility]    Medicine Progress Note - Hospitalist Service, GOLD TEAM 8    Date of Admission:  1/11/2022    Assessment & Plan     Sherly Yeung is a 56 year old female admitted on 1/11/2022 who is being transferred out of the ICU on 1/23/2022. She has a history of COVID 10/2021 requiring trachoestomy who presented on 1/11 with acute cholecystitis and cholangitis s/p biliary stent placement. Hospital course was complicated by respiratory failure on 1/17 and repeat COVID infection. She developed respiratory failure and required ICU transfer and intubation on 1/19. She was extubated on 1/21 but had recurrent hypercapnia requiring bipap at night and while sleeping ICU stay was complicated by development of new leukocytosis and C. Diff colitis with diarrhea.    #ARDS secondary to COVID 19 pneumonia (improving)  #Acute hypercapnic and hypoxemic respiratory failure  -Recent COVID pneumonia requiring ICU stay and tracheostomy in 10/2021. Decannulated later  -Requiring bipap at bedtime and with naps for persistent hypercapnia  -Leukocytosis down-trending   -May re-engage infectious disease  Plan:  BiPAP at bedtime and with naps  Weaning oxygen. On 1 L    #C. diff colitis  -ICU stay complicated by leukocytosis on 1/21 and diarrhea. C. Diff + on 1/22  -Diarrhea- improved  -Leukocytosis down-trending  Plan:  Plan for 2 weeks of PO vancomycin past last day of IV antibiotics     #Aspergillus pneumonia  -Occurred during initial COVID treatment in 10/2021. Was planned for 60 day of voriconazole  -Was due to end on 1/21 but had recurrent COVID and respiratory failure  Plan:  >Plan for 2 additional weeks of voriconazole past last day of dexamethasone  >End date February 9th    #Choledocholithiasis c/b cholangitis, s/p CBD stenting (1/12/22)  #Probable acute cholecystitis s/p transpapillary cystic duct stenting (1/12/22)  #Transaminitis   #Sepsis (tachy, leukocytosis)  - S/p ERCP, removal  of stone and stent placement  - Abx for 10 days (1/12 - 1/22) - (Was cipro/flagyl, broadened to cef/flagyl due to resp failure)  - Developed worsened respiratory failure on 1/19 and antibiotics were extended to provide additional bacterial coverage  Plan:  >Stop IV cefepime, flagyl on 1/26 (14 day course)  >Follow as outpatient with Gen-surg for cholecystectomy (order on discharge placed)  >Follow up with GI approx February 2/12/22    #Paget Banegas syndrome  #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  She has a history of multiple blood clots.  She has been maintained on apixaban.  >Continue PTA apixaban  >#Anemia  >Secondary to critical illness  #HTN  >Up-titrating to control tachycardia, blood pressure  #Depression. Anorexia  >Venlflaxine, Abilify, topiramate                Diet: Regular Diet Adult  Adult Formula Bolus Feeding: TID Osmolite 1.5; Route: Gastrostomy; 3; Can(s); Medication - Feeding Tube Flush Frequency: At least 15-30 mL water before and after medication administration and with tube clogging; Amount to Send (Nutrition use): 3 c...    DVT Prophylaxis: DOAC  Scott Catheter: Not present  Central Lines: None  Cardiac Monitoring: None  Code Status: Full Code      Disposition Plan   Expected Discharge: 02/05/2022     Anticipated discharge location:  Awaiting care coordination huddle  Delays:     Placement - TCU  Covid Test Positive            The patient's care was discussed with the Bedside Nurse and Patient.    Sunny Montaño MD  Hospitalist Service, [address]  Securely message with the [employer] Web Console (learn more here)  Text page via [facility] Paging/Directory   Please see signed in provider for up to date coverage information  ______________________________________________________________________    Interval History   SOB with exercise   No fever or chills  No Chest pain   Denies  Fever or chills    Data reviewed today: I reviewed all medications, new labs and imaging results over the last 24 hours. I personally reviewed no images or EKG's today.    Physical Exam   Vital Signs: Temp: 97.5  F (36.4  C) Temp src: Axillary BP: (!) 129/91 Pulse: 91   Resp: 23 SpO2: 100 % O2 Device: Nasal cannula Oxygen Delivery: 1 LPM  Weight: 110 lbs 7.21 oz     Physical Exam  Constitutional:       Appearance: Normal appearance. She is normal weight.   HENT:      Head: Normocephalic.      Mouth/Throat:      Mouth: Mucous membranes are moist.      Pharynx: Oropharynx is clear.   Eyes:      Extraocular Movements: Extraocular movements intact.      Conjunctiva/sclera: Conjunctivae normal.      Pupils: Pupils are equal, round, and reactive to light.   Cardiovascular:      Rate and Rhythm: Normal rate and regular rhythm.      Pulses: Normal pulses.      Heart sounds: Normal heart sounds.   Pulmonary:      Effort: Pulmonary effort is normal.      Breath sounds: Normal breath sounds.   Abdominal:      General: Bowel sounds are normal.      Palpations: Abdomen is soft.   Musculoskeletal:      Cervical back: Normal range of motion and neck supple.   Skin:     General: Skin is warm and dry.   Neurological:      General: No focal deficit present.      Mental Status: She is alert.   Psychiatric:         Mood and Affect: Mood normal.         Behavior: Behavior normal.           Data   Recent Labs   Lab 02/01/22  0647 01/31/22  0900 01/31/22  0825 01/31/22  0600 01/31/22  0331 01/30/22  0734 01/29/22  0807 01/29/22  0651 01/28/22  0906 01/28/22  0702 01/27/22  0643 01/27/22  0619   WBC 12.7*  --   --  15.7*  --  18.6*  --  18.7*  --  19.3*  --  17.9*   HGB 9.9*  --   --  9.5*  --  9.9*  --  9.7*  --  10.4*  --  10.6*   MCV 99  --   --  98  --  97  --  97  --  96  --  96     --   --  473*  --  542*  --  586*  --  666*  --  626*   INR  --   --   --   --   --   --   --  1.10  --  1.09  --  1.14     --   --  141  --   140  --  141  --  141  --  141   POTASSIUM 5.1  --   --  4.7  --  4.3  --  4.2  --  4.8  --  4.2   CHLORIDE 112*  --   --  111*  --  110*  --  112*  --  111*  --  109   CO2 24  --   --  26  --  26  --  28  --  29  --  26   BUN 11  --   --  12  --  13  --  10  --  9  --  14   CR 0.46*  --   --  0.50*  --  0.41*  --  0.37*  --  0.48*  --  0.50*   ANIONGAP 4  --   --  4  --  4  --  1*  --  1*  --  6   TERESA 9.5  --   --  9.1  --  9.2  --  8.7  --  9.3  --  8.7   GLC 87 93 59* 131*   < > 117*   < > 98   < > 98   < > 142*   ALBUMIN 2.5*  --   --  2.5*  --  2.7*  --  2.5*  --  2.7*  --  3.4   PROTTOTAL 6.7*  --   --  6.6*  --  6.8  --  6.3*  --  6.8  --  6.6*   BILITOTAL 0.2  --   --  0.2  --  0.2  --  0.3  --  0.3  --  0.3   ALKPHOS 125  --   --  107  --  116  --  90  --  95  --  97   ALT 51*  --   --  37  --  39  --  30  --  33  --  36   AST 40  --   --  23  --  24  --  17  --  21  --  18    < > = values in this interval not displayed.     No results found for this or any previous visit (from the past 24 hour(s)).  Medications     dextrose Stopped (01/27/22 2040)     - MEDICATION INSTRUCTIONS -       - MEDICATION INSTRUCTIONS -         apixaban ANTICOAGULANT  5 mg Oral or Feeding Tube BID     ARIPiprazole  7.5 mg Oral or Feeding Tube Daily     banatrol plus  1 packet Per Feeding Tube BID     metoprolol tartrate  75 mg Oral BID     multivitamins w/minerals  15 mL Per Feeding Tube Daily     pantoprazole  40 mg Oral BID     protein modular  1 packet Per Feeding Tube BID     sodium chloride (PF)  3 mL Intracatheter Q8H     spironolactone  25 mg Oral Daily     tolterodine  2 mg Oral or Feeding Tube BID     topiramate  100 mg Oral Daily     topiramate  200 mg Oral or Feeding Tube QPM     vancomycin  125 mg Oral or Feeding Tube 4x Daily     venlafaxine  75 mg Oral or Feeding Tube BID     voriconazole  200 mg Oral or Feeding Tube Q12H TANNER

## 2022-02-02 ENCOUNTER — APPOINTMENT (OUTPATIENT)
Dept: GENERAL RADIOLOGY | Facility: CLINIC | Age: 57
DRG: 871 | End: 2022-02-02
Attending: INTERNAL MEDICINE
Payer: MEDICARE

## 2022-02-02 ENCOUNTER — APPOINTMENT (OUTPATIENT)
Dept: PHYSICAL THERAPY | Facility: CLINIC | Age: 57
DRG: 871 | End: 2022-02-02
Payer: MEDICARE

## 2022-02-02 ENCOUNTER — APPOINTMENT (OUTPATIENT)
Dept: OCCUPATIONAL THERAPY | Facility: CLINIC | Age: 57
DRG: 871 | End: 2022-02-02
Payer: MEDICARE

## 2022-02-02 LAB
ALBUMIN SERPL-MCNC: 2.6 G/DL (ref 3.4–5)
ALP SERPL-CCNC: 135 U/L (ref 40–150)
ALT SERPL W P-5'-P-CCNC: 55 U/L (ref 0–50)
ANION GAP SERPL CALCULATED.3IONS-SCNC: 6 MMOL/L (ref 3–14)
AST SERPL W P-5'-P-CCNC: 36 U/L (ref 0–45)
BILIRUB SERPL-MCNC: 0.2 MG/DL (ref 0.2–1.3)
BUN SERPL-MCNC: 11 MG/DL (ref 7–30)
CALCIUM SERPL-MCNC: 9.7 MG/DL (ref 8.5–10.1)
CHLORIDE BLD-SCNC: 110 MMOL/L (ref 94–109)
CO2 SERPL-SCNC: 25 MMOL/L (ref 20–32)
CREAT SERPL-MCNC: 0.4 MG/DL (ref 0.52–1.04)
ERYTHROCYTE [DISTWIDTH] IN BLOOD BY AUTOMATED COUNT: 17.6 % (ref 10–15)
GFR SERPL CREATININE-BSD FRML MDRD: >90 ML/MIN/1.73M2
GLUCOSE BLD-MCNC: 104 MG/DL (ref 70–99)
GLUCOSE BLDC GLUCOMTR-MCNC: 116 MG/DL (ref 70–99)
GLUCOSE BLDC GLUCOMTR-MCNC: 66 MG/DL (ref 70–99)
HCT VFR BLD AUTO: 31.5 % (ref 35–47)
HGB BLD-MCNC: 9.6 G/DL (ref 11.7–15.7)
HOLD SPECIMEN: NORMAL
LACTATE SERPL-SCNC: 1.7 MMOL/L (ref 0.7–2)
MCH RBC QN AUTO: 29.8 PG (ref 26.5–33)
MCHC RBC AUTO-ENTMCNC: 30.5 G/DL (ref 31.5–36.5)
MCV RBC AUTO: 98 FL (ref 78–100)
PLATELET # BLD AUTO: 442 10E3/UL (ref 150–450)
POTASSIUM BLD-SCNC: 3.9 MMOL/L (ref 3.4–5.3)
PROT SERPL-MCNC: 6.8 G/DL (ref 6.8–8.8)
RBC # BLD AUTO: 3.22 10E6/UL (ref 3.8–5.2)
SODIUM SERPL-SCNC: 141 MMOL/L (ref 133–144)
WBC # BLD AUTO: 12.8 10E3/UL (ref 4–11)

## 2022-02-02 PROCEDURE — 120N000003 HC R&B IMCU UMMC

## 2022-02-02 PROCEDURE — 82040 ASSAY OF SERUM ALBUMIN: CPT | Performed by: STUDENT IN AN ORGANIZED HEALTH CARE EDUCATION/TRAINING PROGRAM

## 2022-02-02 PROCEDURE — 250N000013 HC RX MED GY IP 250 OP 250 PS 637: Performed by: STUDENT IN AN ORGANIZED HEALTH CARE EDUCATION/TRAINING PROGRAM

## 2022-02-02 PROCEDURE — 97535 SELF CARE MNGMENT TRAINING: CPT | Mod: GO

## 2022-02-02 PROCEDURE — 250N000013 HC RX MED GY IP 250 OP 250 PS 637: Performed by: INTERNAL MEDICINE

## 2022-02-02 PROCEDURE — 83605 ASSAY OF LACTIC ACID: CPT | Performed by: INTERNAL MEDICINE

## 2022-02-02 PROCEDURE — 85027 COMPLETE CBC AUTOMATED: CPT | Performed by: STUDENT IN AN ORGANIZED HEALTH CARE EDUCATION/TRAINING PROGRAM

## 2022-02-02 PROCEDURE — 73630 X-RAY EXAM OF FOOT: CPT | Mod: 26 | Performed by: RADIOLOGY

## 2022-02-02 PROCEDURE — 73630 X-RAY EXAM OF FOOT: CPT | Mod: RT

## 2022-02-02 PROCEDURE — 250N000013 HC RX MED GY IP 250 OP 250 PS 637: Performed by: PHYSICIAN ASSISTANT

## 2022-02-02 PROCEDURE — 36415 COLL VENOUS BLD VENIPUNCTURE: CPT | Performed by: INTERNAL MEDICINE

## 2022-02-02 PROCEDURE — 36415 COLL VENOUS BLD VENIPUNCTURE: CPT | Performed by: STUDENT IN AN ORGANIZED HEALTH CARE EDUCATION/TRAINING PROGRAM

## 2022-02-02 PROCEDURE — 97530 THERAPEUTIC ACTIVITIES: CPT | Mod: GO

## 2022-02-02 PROCEDURE — 999N000248 HC STATISTIC IV INSERT WITH US BY RN

## 2022-02-02 PROCEDURE — 99232 SBSQ HOSP IP/OBS MODERATE 35: CPT | Performed by: INTERNAL MEDICINE

## 2022-02-02 PROCEDURE — 80053 COMPREHEN METABOLIC PANEL: CPT | Performed by: STUDENT IN AN ORGANIZED HEALTH CARE EDUCATION/TRAINING PROGRAM

## 2022-02-02 PROCEDURE — 97110 THERAPEUTIC EXERCISES: CPT | Mod: GP

## 2022-02-02 RX ADMIN — TOLTERODINE TARTRATE 2 MG: 2 TABLET, FILM COATED ORAL at 08:39

## 2022-02-02 RX ADMIN — TOPIRAMATE 100 MG: 100 TABLET, FILM COATED ORAL at 08:39

## 2022-02-02 RX ADMIN — Medication 1 PACKET: at 08:34

## 2022-02-02 RX ADMIN — Medication 40 MG: at 08:40

## 2022-02-02 RX ADMIN — TOPIRAMATE 200 MG: 200 TABLET ORAL at 20:35

## 2022-02-02 RX ADMIN — ACETAMINOPHEN 650 MG: 325 TABLET, FILM COATED ORAL at 09:15

## 2022-02-02 RX ADMIN — VENLAFAXINE 75 MG: 75 TABLET ORAL at 20:34

## 2022-02-02 RX ADMIN — VORICONAZOLE 200 MG: 40 POWDER, FOR SUSPENSION ORAL at 20:41

## 2022-02-02 RX ADMIN — Medication 1 PACKET: at 08:36

## 2022-02-02 RX ADMIN — METOPROLOL TARTRATE 75 MG: 50 TABLET, FILM COATED ORAL at 08:40

## 2022-02-02 RX ADMIN — APIXABAN 5 MG: 5 TABLET, FILM COATED ORAL at 20:34

## 2022-02-02 RX ADMIN — SPIRONOLACTONE 25 MG: 25 TABLET, FILM COATED ORAL at 08:40

## 2022-02-02 RX ADMIN — Medication 40 MG: at 20:33

## 2022-02-02 RX ADMIN — TOLTERODINE TARTRATE 2 MG: 2 TABLET, FILM COATED ORAL at 20:37

## 2022-02-02 RX ADMIN — Medication 1 PACKET: at 20:38

## 2022-02-02 RX ADMIN — Medication 7.5 MG: at 08:39

## 2022-02-02 RX ADMIN — VANCOMYCIN HYDROCHLORIDE 125 MG: KIT at 08:40

## 2022-02-02 RX ADMIN — METOPROLOL TARTRATE 75 MG: 50 TABLET, FILM COATED ORAL at 20:34

## 2022-02-02 RX ADMIN — VANCOMYCIN HYDROCHLORIDE 125 MG: KIT at 13:00

## 2022-02-02 RX ADMIN — APIXABAN 5 MG: 5 TABLET, FILM COATED ORAL at 08:41

## 2022-02-02 RX ADMIN — Medication 1 PACKET: at 16:59

## 2022-02-02 RX ADMIN — Medication 15 ML: at 13:00

## 2022-02-02 RX ADMIN — VORICONAZOLE 200 MG: 40 POWDER, FOR SUSPENSION ORAL at 08:40

## 2022-02-02 RX ADMIN — Medication 3 MG: at 20:33

## 2022-02-02 RX ADMIN — VANCOMYCIN HYDROCHLORIDE 125 MG: KIT at 20:34

## 2022-02-02 RX ADMIN — VANCOMYCIN HYDROCHLORIDE 125 MG: KIT at 17:00

## 2022-02-02 RX ADMIN — VENLAFAXINE 75 MG: 75 TABLET ORAL at 08:40

## 2022-02-02 ASSESSMENT — ACTIVITIES OF DAILY LIVING (ADL)
ADLS_ACUITY_SCORE: 16

## 2022-02-02 ASSESSMENT — MIFFLIN-ST. JEOR: SCORE: 1068.13

## 2022-02-02 NOTE — PROVIDER NOTIFICATION
"Writer notified provider  \"6D rm 503 TITA Yeung  Pt is complaining of R foot pain that started today. R foot is warm in comparison to L foot and R foot/leg has a redness to it  Thank you!  Kristine #[id number]\"  "

## 2022-02-02 NOTE — PLAN OF CARE
Major Shift Events:  pt A&Ox4, cooperative. SR 70-90s this shift, afebrile. Weaned pt from 1L NC to RA, satting 92% and above. Pt using bedpan adequately overnight. LBM 2/1. PIV SL. Pt c/o R foot pain. R foot was warmer in comparison to L foot and had a red tint to it, overnight bilat feet were warm and skin had same red/pale tint to it. A1 when OOB.  Plan: monitor foot pain, encourage OOB activity, notify team of any acute changes,to TCU or ARU when off COVID precautions    For vital signs and complete assessments, please see documentation flowsheets.

## 2022-02-02 NOTE — PROGRESS NOTES
CLINICAL NUTRITION SERVICES - REASSESSMENT NOTE     Nutrition Prescription    RECOMMENDATIONS FOR MDs/PROVIDERS TO ORDER:  Recommend continuing conditional bolus feeds. Pt usually needing 1 per day at lunch.     Malnutrition Status:    Unable to assess due to inability to perform NFPA.    Recommendations already ordered by Registered Dietitian (RD):  - Continue current conditional bolus feeds  - Continue Prosource BID  - Continue Banatrol BID    Future/Additional Recommendations:  Monitor TF tolerance, PO intake, stooling, and weights trends.      EVALUATION OF THE PROGRESS TOWARD GOALS   Diet: Regular  Nutrition Support: Conditional Bolus feeding- 3 cans of Osmolite 1.5 + 2 pkts Prosource provides: 1147 kcal (22 kcal/kg) and 67 g PRO (1.3 g/kg).   - If patient consumes <50% of a meal or only a small meal, she receives a 237 ml (1 can) bolus of Osmolite 1.5    Intake: Pt usually getting 1 bolus feed (after lunch) + 2 pkts Prosource per day. Provides: 436 kcal and 37 g PRO per day. Pt's PO intake is somewhat variable although she is eating 100% of most meals over the past 3 days. Meals over the past week providing an average of 965 kcal and 28 g PRO per day. TF + PO providing ~1400 kcal and ~65 g PRO. Meeting 100% estimated nutrition needs.     Marcia reports that her appetite continues to be poor but she's been working on eating more. Pt reports that she's been eating breakfast and dinner and usually needs a bolus TF for lunch.      NEW FINDINGS   Weight: 50.9 kg on 2/2, weight stable over the past week.     Labs:   Cr 0.4 (L)  BG 2/1-2/2:     Meds:   Banatrol BID  Liquid multivitamin     GI: Rectal tube removed 1/30. Pt reports that she has been having ~2 bowel movements per day.     MALNUTRITION  % Intake: Decreased intake does not meet criteria- EN + PO  % Weight Loss: None noted  Subcutaneous Fat Loss: Unable to assess- COVID+  Muscle Loss: Unable to assess- COVID+  Fluid Accumulation/Edema:  Mild  Malnutrition Diagnosis: Unable to assess due to inability to perform NFPA.    Previous Goals   Patient to consume % of nutritionally adequate meal trays TID, or the equivalent with conditional TFs.  Evaluation: Met    Previous Nutrition Diagnosis  Inadequate oral intake related to variable PO intake as evidenced by reliant on supplemental bolus feeds to meet full nutrition needs    Evaluation: No change    CURRENT NUTRITION DIAGNOSIS  Inadequate oral intake related to variable PO intake as evidenced by reliant on supplemental bolus feeds to meet full nutrition needs      INTERVENTIONS  Implementation  Nutrition Education: Discussed current PO intake and bolus feeds. Discussed continuing Prosource and Banatrol.   Enteral Nutrition: Continue as ordered   Medical Supplement Therapy: Discussed trying a nutrition supplement. Pt reports that she's tried them before and doesn't like them. Pt declined nutrition supplement at this time.     Goals  Patient to consume % of nutritionally adequate meal trays TID, or the equivalent with conditional TFs.    Monitoring/Evaluation  Progress toward goals will be monitored and evaluated per protocol.    Zarina Del Angel, RD, LD  6D RD pager [phone]

## 2022-02-02 NOTE — PROGRESS NOTES
Patient had no major changes during shift. She is AAOX4; had c/o right lower foot pain, sx was notified, no new orders; afebrile; SBPs 130s-150s; HR SR/ST 90s to low 100s; remained on 1 L O2; had one bolus of tube feeds given for lunch; patient is urinating; LBM today; patient sat up in chair for couple of hours; worked with PT/OT; LPIV became infiltrated; new RPIV was placed, running .9 NS at 75 ml/hr; vitals stable continue to monitor.    Jie Funez RN

## 2022-02-02 NOTE — PROGRESS NOTES
[facility]    Medicine Progress Note - Hospitalist Service, GOLD TEAM 8    Date of Admission:  1/11/2022    Assessment & Plan     Sherly Yeung is a 56 year old female admitted on 1/11/2022 who is being transferred out of the ICU on 1/23/2022. She has a history of COVID 10/2021 requiring trachoestomy who presented on 1/11 with acute cholecystitis and cholangitis s/p biliary stent placement. Hospital course was complicated by respiratory failure on 1/17 and repeat COVID infection. She developed respiratory failure and required ICU transfer and intubation on 1/19. She was extubated on 1/21 but had recurrent hypercapnia requiring bipap at night and while sleeping ICU stay was complicated by development of new leukocytosis and C. Diff colitis with diarrhea.    #ARDS secondary to COVID 19 pneumonia (improving)  #Acute hypercapnic and hypoxemic respiratory failure  - weaning oxygen. On 1-1/2 L   - Incentive spirometry     #C. diff colitis  Improved Diarrhea  Encouraged fiber for bulking of 1-2 soft bowel movments    #Aspergillus pneumonia  -Occurred during initial COVID treatment in 10/2021. Was planned for 60 day of voriconazole  -Was due to end on 1/21 but had recurrent COVID and respiratory failure  Plan:  >Plan for 2 additional weeks of voriconazole past last day of dexamethasone  >End date February 9th    #Choledocholithiasis c/b cholangitis, s/p CBD stenting (1/12/22)  #Probable acute cholecystitis s/p transpapillary cystic duct stenting (1/12/22)  #Transaminitis   #Sepsis (tachy, leukocytosis)  - S/p ERCP, removal of stone and stent placement  - Abx for 10 days (1/12 - 1/22) - (Was cipro/flagyl, broadened to cef/flagyl due to resp failure)  - Developed worsened respiratory failure on 1/19 and antibiotics were extended to provide additional bacterial coverage  Plan:  >Stop IV cefepime, flagyl on 1/26 (14 day course)  >Follow as outpatient with Gen-surg for cholecystectomy  (order on discharge placed)  >Follow up with GI approx February 2/12/22    #Paget Banegas syndrome  #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  She has a history of multiple blood clots.  She has been maintained on apixaban.  >Continue PTA apixaban  >#Anemia  >Secondary to critical illness  #HTN  >Up-titrating to control tachycardia, blood pressure  #Depression. Anorexia  >Venlflaxine, Abilify, topiramate     # Right foot pain  Xray to r/o bone involvement              Diet: Regular Diet Adult  Adult Formula Bolus Feeding: TID Osmolite 1.5; Route: Gastrostomy; 3; Can(s); Medication - Feeding Tube Flush Frequency: At least 15-30 mL water before and after medication administration and with tube clogging; Amount to Send (Nutrition use): 3 c...    DVT Prophylaxis: DOAC  Scott Catheter: Not present  Central Lines: None  Cardiac Monitoring: None  Code Status: Full Code      Disposition Plan   Expected Discharge: 02/05/2022     Anticipated discharge location:  Awaiting care coordination huddle  Delays:     Placement - TCU  Covid Test Positive            The patient's care was discussed with the Bedside Nurse and Patient.    Sunny Montaño MD  Hospitalist Service, [address]  Securely message with the Vocera Web Console (learn more here)  Text page via [employer] Paging/Directory   Please see signed in provider for up to date coverage information  ______________________________________________________________________    Interval History   SOB with exercise   No fever or chills  No Chest pain   Denies Fever or chills  Right foot pain for 2 days    Data reviewed today: I reviewed all medications, new labs and imaging results over the last 24 hours. I personally reviewed no images or EKG's today.    Physical Exam   Vital Signs: Temp: 97.8  F (36.6  C) Temp src: Oral BP: (!) 141/96 Pulse: 105   Resp: 20 SpO2: 93 %  O2 Device: None (Room air) Oxygen Delivery: 1/2 LPM  Weight: 112 lbs 3.43 oz     Physical Exam  Constitutional:       Appearance: Normal appearance. She is normal weight.   HENT:      Head: Normocephalic.      Mouth/Throat:      Mouth: Mucous membranes are moist.      Pharynx: Oropharynx is clear.   Eyes:      Extraocular Movements: Extraocular movements intact.      Conjunctiva/sclera: Conjunctivae normal.      Pupils: Pupils are equal, round, and reactive to light.   Cardiovascular:      Rate and Rhythm: Normal rate and regular rhythm.      Pulses: Normal pulses.      Heart sounds: Normal heart sounds.   Pulmonary:      Effort: Pulmonary effort is normal.      Breath sounds: Normal breath sounds.   Abdominal:      General: Bowel sounds are normal.      Palpations: Abdomen is soft.   Musculoskeletal:         General: Tenderness present.      Cervical back: Normal range of motion and neck supple.      Right lower leg: No edema.   Skin:     General: Skin is warm and dry.   Neurological:      General: No focal deficit present.      Mental Status: She is alert.   Psychiatric:         Mood and Affect: Mood normal.         Behavior: Behavior normal.           Data   Recent Labs   Lab 02/02/22  0627 02/01/22  1554 02/01/22  0647 01/31/22  0825 01/31/22  0600 01/29/22  0807 01/29/22  0651 01/28/22  0906 01/28/22  0702 01/27/22  0643 01/27/22  0619   WBC 12.8*  --  12.7*  --  15.7*   < > 18.7*  --  19.3*  --  17.9*   HGB 9.6*  --  9.9*  --  9.5*   < > 9.7*  --  10.4*  --  10.6*   MCV 98  --  99  --  98   < > 97  --  96  --  96     --  355  --  473*   < > 586*  --  666*  --  626*   INR  --   --   --   --   --   --  1.10  --  1.09  --  1.14     --  140  --  141   < > 141  --  141  --  141   POTASSIUM 3.9  --  5.1  --  4.7   < > 4.2  --  4.8  --  4.2   CHLORIDE 110*  --  112*  --  111*   < > 112*  --  111*  --  109   CO2 25  --  24  --  26   < > 28  --  29  --  26   BUN 11  --  11  --  12   < > 10  --  9  --  14    CR 0.40*  --  0.46*  --  0.50*   < > 0.37*  --  0.48*  --  0.50*   ANIONGAP 6  --  4  --  4   < > 1*  --  1*  --  6   TERESA 9.7  --  9.5  --  9.1   < > 8.7  --  9.3  --  8.7   * 119* 87   < > 131*   < > 98   < > 98   < > 142*   ALBUMIN 2.6*  --  2.5*  --  2.5*   < > 2.5*  --  2.7*  --  3.4   PROTTOTAL 6.8  --  6.7*  --  6.6*   < > 6.3*  --  6.8  --  6.6*   BILITOTAL 0.2  --  0.2  --  0.2   < > 0.3  --  0.3  --  0.3   ALKPHOS 135  --  125  --  107   < > 90  --  95  --  97   ALT 55*  --  51*  --  37   < > 30  --  33  --  36   AST 36  --  40  --  23   < > 17  --  21  --  18    < > = values in this interval not displayed.     No results found for this or any previous visit (from the past 24 hour(s)).  Medications     dextrose Stopped (01/27/22 2040)     - MEDICATION INSTRUCTIONS -         apixaban ANTICOAGULANT  5 mg Oral or Feeding Tube BID     ARIPiprazole  7.5 mg Oral or Feeding Tube Daily     banatrol plus  1 packet Per Feeding Tube BID     metoprolol tartrate  75 mg Oral BID     multivitamins w/minerals  15 mL Per Feeding Tube Daily     pantoprazole  40 mg Oral BID     protein modular  1 packet Per Feeding Tube BID     sodium chloride (PF)  3 mL Intracatheter Q8H     spironolactone  25 mg Oral Daily     tolterodine  2 mg Oral or Feeding Tube BID     topiramate  100 mg Oral Daily     topiramate  200 mg Oral or Feeding Tube QPM     vancomycin  125 mg Oral or Feeding Tube 4x Daily     venlafaxine  75 mg Oral or Feeding Tube BID     voriconazole  200 mg Oral or Feeding Tube Q12H TANNER

## 2022-02-03 ENCOUNTER — APPOINTMENT (OUTPATIENT)
Dept: PHYSICAL THERAPY | Facility: CLINIC | Age: 57
DRG: 871 | End: 2022-02-03
Payer: MEDICARE

## 2022-02-03 ENCOUNTER — APPOINTMENT (OUTPATIENT)
Dept: OCCUPATIONAL THERAPY | Facility: CLINIC | Age: 57
DRG: 871 | End: 2022-02-03
Payer: MEDICARE

## 2022-02-03 LAB
ALBUMIN SERPL-MCNC: 2.3 G/DL (ref 3.4–5)
ALP SERPL-CCNC: 145 U/L (ref 40–150)
ALT SERPL W P-5'-P-CCNC: 60 U/L (ref 0–50)
ANION GAP SERPL CALCULATED.3IONS-SCNC: 7 MMOL/L (ref 3–14)
AST SERPL W P-5'-P-CCNC: 48 U/L (ref 0–45)
BILIRUB SERPL-MCNC: 0.9 MG/DL (ref 0.2–1.3)
BUN SERPL-MCNC: 11 MG/DL (ref 7–30)
CALCIUM SERPL-MCNC: 9.5 MG/DL (ref 8.5–10.1)
CHLORIDE BLD-SCNC: 112 MMOL/L (ref 94–109)
CO2 SERPL-SCNC: 19 MMOL/L (ref 20–32)
CREAT SERPL-MCNC: 0.37 MG/DL (ref 0.52–1.04)
ERYTHROCYTE [DISTWIDTH] IN BLOOD BY AUTOMATED COUNT: 18.2 % (ref 10–15)
GFR SERPL CREATININE-BSD FRML MDRD: >90 ML/MIN/1.73M2
GLUCOSE BLD-MCNC: 77 MG/DL (ref 70–99)
HCT VFR BLD AUTO: 34.3 % (ref 35–47)
HGB BLD-MCNC: 10.4 G/DL (ref 11.7–15.7)
MCH RBC QN AUTO: 30.1 PG (ref 26.5–33)
MCHC RBC AUTO-ENTMCNC: 30.3 G/DL (ref 31.5–36.5)
MCV RBC AUTO: 99 FL (ref 78–100)
PLATELET # BLD AUTO: 375 10E3/UL (ref 150–450)
POTASSIUM BLD-SCNC: 4.8 MMOL/L (ref 3.4–5.3)
PROT SERPL-MCNC: 6.6 G/DL (ref 6.8–8.8)
RBC # BLD AUTO: 3.46 10E6/UL (ref 3.8–5.2)
SODIUM SERPL-SCNC: 138 MMOL/L (ref 133–144)
WBC # BLD AUTO: 9.5 10E3/UL (ref 4–11)

## 2022-02-03 PROCEDURE — 97116 GAIT TRAINING THERAPY: CPT | Mod: GP

## 2022-02-03 PROCEDURE — 250N000013 HC RX MED GY IP 250 OP 250 PS 637: Performed by: STUDENT IN AN ORGANIZED HEALTH CARE EDUCATION/TRAINING PROGRAM

## 2022-02-03 PROCEDURE — 36415 COLL VENOUS BLD VENIPUNCTURE: CPT | Performed by: STUDENT IN AN ORGANIZED HEALTH CARE EDUCATION/TRAINING PROGRAM

## 2022-02-03 PROCEDURE — 250N000013 HC RX MED GY IP 250 OP 250 PS 637: Performed by: INTERNAL MEDICINE

## 2022-02-03 PROCEDURE — 80053 COMPREHEN METABOLIC PANEL: CPT | Performed by: STUDENT IN AN ORGANIZED HEALTH CARE EDUCATION/TRAINING PROGRAM

## 2022-02-03 PROCEDURE — 97110 THERAPEUTIC EXERCISES: CPT | Mod: GP

## 2022-02-03 PROCEDURE — 120N000003 HC R&B IMCU UMMC

## 2022-02-03 PROCEDURE — 97535 SELF CARE MNGMENT TRAINING: CPT | Mod: GO

## 2022-02-03 PROCEDURE — 97530 THERAPEUTIC ACTIVITIES: CPT | Mod: GP

## 2022-02-03 PROCEDURE — 99232 SBSQ HOSP IP/OBS MODERATE 35: CPT | Performed by: INTERNAL MEDICINE

## 2022-02-03 PROCEDURE — 97110 THERAPEUTIC EXERCISES: CPT | Mod: GO

## 2022-02-03 PROCEDURE — 80285 DRUG ASSAY VORICONAZOLE: CPT | Performed by: INTERNAL MEDICINE

## 2022-02-03 PROCEDURE — 250N000013 HC RX MED GY IP 250 OP 250 PS 637: Performed by: PHYSICIAN ASSISTANT

## 2022-02-03 PROCEDURE — 85027 COMPLETE CBC AUTOMATED: CPT | Performed by: STUDENT IN AN ORGANIZED HEALTH CARE EDUCATION/TRAINING PROGRAM

## 2022-02-03 PROCEDURE — 85049 AUTOMATED PLATELET COUNT: CPT | Performed by: STUDENT IN AN ORGANIZED HEALTH CARE EDUCATION/TRAINING PROGRAM

## 2022-02-03 RX ORDER — AMINO AC/PROTEIN HYDR/WHEY PRO 10G-100/30
2 LIQUID (ML) ORAL
Status: DISCONTINUED | OUTPATIENT
Start: 2022-02-03 | End: 2022-02-18 | Stop reason: HOSPADM

## 2022-02-03 RX ADMIN — VORICONAZOLE 200 MG: 40 POWDER, FOR SUSPENSION ORAL at 10:20

## 2022-02-03 RX ADMIN — Medication 15 ML: at 13:55

## 2022-02-03 RX ADMIN — SPIRONOLACTONE 25 MG: 25 TABLET, FILM COATED ORAL at 08:18

## 2022-02-03 RX ADMIN — Medication 1 PACKET: at 08:19

## 2022-02-03 RX ADMIN — VANCOMYCIN HYDROCHLORIDE 125 MG: KIT at 22:55

## 2022-02-03 RX ADMIN — TOPIRAMATE 100 MG: 100 TABLET, FILM COATED ORAL at 08:18

## 2022-02-03 RX ADMIN — DICLOFENAC SODIUM 2 G: 10 GEL TOPICAL at 16:30

## 2022-02-03 RX ADMIN — VENLAFAXINE 75 MG: 75 TABLET ORAL at 22:55

## 2022-02-03 RX ADMIN — TOPIRAMATE 200 MG: 200 TABLET ORAL at 22:56

## 2022-02-03 RX ADMIN — METOPROLOL TARTRATE 75 MG: 50 TABLET, FILM COATED ORAL at 08:17

## 2022-02-03 RX ADMIN — TOLTERODINE TARTRATE 2 MG: 2 TABLET, FILM COATED ORAL at 08:17

## 2022-02-03 RX ADMIN — Medication 40 MG: at 22:55

## 2022-02-03 RX ADMIN — TOLTERODINE TARTRATE 2 MG: 2 TABLET, FILM COATED ORAL at 22:55

## 2022-02-03 RX ADMIN — VORICONAZOLE 200 MG: 40 POWDER, FOR SUSPENSION ORAL at 22:57

## 2022-02-03 RX ADMIN — Medication 40 MG: at 10:23

## 2022-02-03 RX ADMIN — VANCOMYCIN HYDROCHLORIDE 125 MG: KIT at 18:09

## 2022-02-03 RX ADMIN — APIXABAN 5 MG: 5 TABLET, FILM COATED ORAL at 08:17

## 2022-02-03 RX ADMIN — Medication 1 PACKET: at 22:56

## 2022-02-03 RX ADMIN — VENLAFAXINE 75 MG: 75 TABLET ORAL at 08:18

## 2022-02-03 RX ADMIN — Medication 7.5 MG: at 10:52

## 2022-02-03 RX ADMIN — METOPROLOL TARTRATE 75 MG: 50 TABLET, FILM COATED ORAL at 22:54

## 2022-02-03 RX ADMIN — APIXABAN 5 MG: 5 TABLET, FILM COATED ORAL at 22:55

## 2022-02-03 RX ADMIN — VANCOMYCIN HYDROCHLORIDE 125 MG: KIT at 10:24

## 2022-02-03 RX ADMIN — Medication 2 PACKET: at 16:29

## 2022-02-03 RX ADMIN — VANCOMYCIN HYDROCHLORIDE 125 MG: KIT at 14:02

## 2022-02-03 RX ADMIN — Medication 3 MG: at 23:21

## 2022-02-03 RX ADMIN — DICLOFENAC SODIUM 2 G: 10 GEL TOPICAL at 22:56

## 2022-02-03 ASSESSMENT — ACTIVITIES OF DAILY LIVING (ADL)
ADLS_ACUITY_SCORE: 12
ADLS_ACUITY_SCORE: 16
ADLS_ACUITY_SCORE: 16
ADLS_ACUITY_SCORE: 12
ADLS_ACUITY_SCORE: 16
ADLS_ACUITY_SCORE: 16
ADLS_ACUITY_SCORE: 12
ADLS_ACUITY_SCORE: 16
ADLS_ACUITY_SCORE: 16
ADLS_ACUITY_SCORE: 12
ADLS_ACUITY_SCORE: 16
ADLS_ACUITY_SCORE: 16
ADLS_ACUITY_SCORE: 12
ADLS_ACUITY_SCORE: 12
ADLS_ACUITY_SCORE: 16
ADLS_ACUITY_SCORE: 12
ADLS_ACUITY_SCORE: 16

## 2022-02-03 NOTE — PLAN OF CARE
Neuro: A&Ox4.   Cardiac: VSS. Bilateral lower extremities elevated.  Respiratory: Sating 95-96% on RA. No use of oxygen this PM shift.  GI/: Pt continent and uses bedpan  Diet/appetite: Regular diet; Pt ate 100% of dinner tray. No TF bolus needed.  Activity:  Pt stayed in bed this PM shift.  Pain: No c/o pain  Skin: Intact; some bruising  LDA's: R PIV         Plan: Wean off oxygen and use incentive spirometry. Improve diarrhea. Notify HCP if any changes.

## 2022-02-03 NOTE — PROGRESS NOTES
Care Management Follow Up    Length of Stay (days): 22    Expected Discharge Date: 2022     Concerns to be Addressed: discharge planning     Patient plan of care discussed at interdisciplinary rounds: Yes    Anticipated Discharge Disposition: [facility]     Anticipated Discharge Services: May need transportation to TCU   Anticipated Discharge DME: None     Patient/family educated on Medicare website which has current facility and service quality ratings:  (No as pt admitted from a facility and has a bed hold)  Education Provided on the Discharge Plan:  Yes  Patient/Family in Agreement with the Plan: yes    Referrals Placed by CM/MARY JANE: See below  Private pay costs discussed: Not applicable    Additional Information:  Per chart review, [facility] pt back at their facility once she is off her COVID precautions (2/), but will need a new referral since pt's bed hold . MARY JANE sent new referral to [facility] via [employer].    Addendum: MARY JANE has not heard back form admissions re: referral. MARY JANE called and left a VM for [facility] admissions requesting update on referral and call back.     [facility]  [address] [facility] [address]  Phone: [phone]  Fax: [phone]    Leanne BULL, SANDRA Raymundo   Phone: [phone]  Pager: [phone]

## 2022-02-03 NOTE — PLAN OF CARE
NURSING PROGRESS NOTE  Shift Summary      Date: February 3, 2022     Neuro/Musculoskeletal:  A&Ox4.   Cardiac:  SR.  VSS.     Respiratory:  Sating in the 98% on RA  GI/:  Adequate urine output.  LBM: 1/3  Diet/Appetite:  Tolerating more than 50% of regular diet. Ate 100% burrito for dinner. No peg tube feedings at this time  Activity:  Assist of x1. Out of bed to the chair. Tolerated well. Increase heart rate with exertion  Pain:  Complain of right foot pain. Diclofenac oint applied to lateal foot  Skin:  No new deficits noted.   LDAs RAC SL. Good blood return noted  Protocols/Labs:     Pertinent Shift Updates: Continues on Apixaban.   Stool soft, no diarrhea noted    Plan: Continues on contact for C-DIFF/COVID 19      Cindy Gamble RN  .................................................... February 3, 2022   5:19 PM  [facility] ([facility]): [facility] ICU (Unit 6D)

## 2022-02-03 NOTE — PROGRESS NOTES
[facility]    Medicine Progress Note - Hospitalist Service, GOLD TEAM 8    Date of Admission:  1/11/2022    Assessment & Plan     Sherly Yeung is a 56 year old female admitted on 1/11/2022 who is being transferred out of the ICU on 1/23/2022. She has a history of COVID 10/2021 requiring trachoestomy who presented on 1/11 with acute cholecystitis and cholangitis s/p biliary stent placement. Hospital course was complicated by respiratory failure on 1/17 and repeat COVID infection. She developed respiratory failure and required ICU transfer and intubation on 1/19. She was extubated on 1/21 but had recurrent hypercapnia requiring bipap at night and while sleeping ICU stay was complicated by development of new leukocytosis and C. Diff colitis with diarrhea.    #ARDS secondary to COVID 19 pneumonia (improving)  #Acute hypercapnic and hypoxemic respiratory failure  - weaning oxygen. On 1/2 L   - Incentive spirometry     #C. diff colitis  Improved Diarrhea  Encouraged fiber for bulking of 1-2 soft bowel movements  Increase Fiber packets     #Aspergillus pneumonia  -Occurred during initial COVID treatment in 10/2021. Was planned for 60 day of voriconazole  -Was due to end on 1/21 but had recurrent COVID and respiratory failure  Plan:  >Plan for 2 additional weeks of voriconazole past last day of dexamethasone  >End date February 9th    #Choledocholithiasis c/b cholangitis, s/p CBD stenting (1/12/22)  #Probable acute cholecystitis s/p transpapillary cystic duct stenting (1/12/22)  #Transaminitis   #Sepsis (tachy, leukocytosis)  - S/p ERCP, removal of stone and stent placement  - Abx for 10 days (1/12 - 1/22) - (Was cipro/flagyl, broadened to cef/flagyl due to resp failure)  - Developed worsened respiratory failure on 1/19 and antibiotics were extended to provide additional bacterial coverage  Plan:  >Stop IV cefepime, flagyl on 1/26 (14 day course)  >Follow as outpatient with  Gen-surg for cholecystectomy (order on discharge placed)  >Follow up with GI approx February 2/12/22    #Paget Banegas syndrome  #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  She has a history of multiple blood clots.  She has been maintained on apixaban.  >Continue PTA apixaban  >#Anemia  >Secondary to critical illness  #HTN  >Up-titrating to control tachycardia, blood pressure  #Depression. Anorexia  >Venlflaxine, Abilify, topiramate     # Right foot pain  Xray shows no evidence fo acute fracture  Trial topical diclofenac              Diet: Regular Diet Adult  Adult Formula Bolus Feeding: TID Osmolite 1.5; Route: Gastrostomy; 3; Can(s); Medication - Feeding Tube Flush Frequency: At least 15-30 mL water before and after medication administration and with tube clogging; Amount to Send (Nutrition use): 3 c...    DVT Prophylaxis: DOAC  Scott Catheter: Not present  Central Lines: None  Cardiac Monitoring: None  Code Status: Full Code      Disposition Plan   Expected Discharge: 02/05/2022     Anticipated discharge location:  Awaiting care coordination huddle  Delays:     Placement - TCU  Covid Test Positive            The patient's care was discussed with the Bedside Nurse and Patient.    Sunny Montaño MD  Hospitalist Service, [address]  Securely message with the Vocera Web Console (learn more here)  Text page via [facility] Paging/Directory   Please see signed in provider for up to date coverage information  ______________________________________________________________________    Interval History   SOB with exercise   No fever or chills  No Chest pain   Denies Fever or chills  Right foot pain for 3 days. Worse with pressure    Data reviewed today: I reviewed all medications, new labs and imaging results over the last 24 hours. I personally reviewed no images or EKG's today.    Physical Exam   Vital Signs:  Temp: 98.1  F (36.7  C) Temp src: Oral BP: 125/79 Pulse: 110   Resp: 18 SpO2: 99 % O2 Device: None (Room air)    Weight: 112 lbs 3.43 oz     Physical Exam  Constitutional:       Appearance: Normal appearance. She is normal weight.   HENT:      Head: Normocephalic.      Mouth/Throat:      Mouth: Mucous membranes are moist.      Pharynx: Oropharynx is clear.   Eyes:      Extraocular Movements: Extraocular movements intact.      Conjunctiva/sclera: Conjunctivae normal.      Pupils: Pupils are equal, round, and reactive to light.   Cardiovascular:      Rate and Rhythm: Normal rate and regular rhythm.      Pulses: Normal pulses.      Heart sounds: Normal heart sounds.   Pulmonary:      Effort: Pulmonary effort is normal.      Breath sounds: Normal breath sounds.   Abdominal:      General: Bowel sounds are normal.      Palpations: Abdomen is soft.   Musculoskeletal:         General: Tenderness present.      Cervical back: Normal range of motion and neck supple.      Right lower leg: No edema.   Skin:     General: Skin is warm and dry.   Neurological:      General: No focal deficit present.      Mental Status: She is alert.   Psychiatric:         Mood and Affect: Mood normal.         Behavior: Behavior normal.           Data   Recent Labs   Lab 02/03/22  0745 02/02/22  2233 02/02/22  2043 02/02/22  0627 02/01/22  1554 02/01/22  0647 01/29/22  0807 01/29/22  0651 01/28/22  0906 01/28/22  0702   WBC 9.5  --   --  12.8*  --  12.7*   < > 18.7*  --  19.3*   HGB 10.4*  --   --  9.6*  --  9.9*   < > 9.7*  --  10.4*   MCV 99  --   --  98  --  99   < > 97  --  96     --   --  442  --  355   < > 586*  --  666*   INR  --   --   --   --   --   --   --  1.10  --  1.09     --   --  141  --  140   < > 141  --  141   POTASSIUM 4.8  --   --  3.9  --  5.1   < > 4.2  --  4.8   CHLORIDE 112*  --   --  110*  --  112*   < > 112*  --  111*   CO2 19*  --   --  25  --  24   < > 28  --  29   BUN 11  --   --  11  --  11   < > 10  --  9    CR 0.37*  --   --  0.40*  --  0.46*   < > 0.37*  --  0.48*   ANIONGAP 7  --   --  6  --  4   < > 1*  --  1*   TERESA 9.5  --   --  9.7  --  9.5   < > 8.7  --  9.3   GLC 77 116* 66* 104*   < > 87   < > 98   < > 98   ALBUMIN 2.3*  --   --  2.6*  --  2.5*   < > 2.5*  --  2.7*   PROTTOTAL 6.6*  --   --  6.8  --  6.7*   < > 6.3*  --  6.8   BILITOTAL 0.9  --   --  0.2  --  0.2   < > 0.3  --  0.3   ALKPHOS 145  --   --  135  --  125   < > 90  --  95   ALT 60*  --   --  55*  --  51*   < > 30  --  33   AST 48*  --   --  36  --  40   < > 17  --  21    < > = values in this interval not displayed.     Recent Results (from the past 24 hour(s))   XR Foot Port Right 3 Views    Narrative    3 views right foot radiographs 2/2/2022 3:58 PM    History: pain with ROM     Comparison: None    Findings:    Portable nonweight bearing AP, oblique, and lateral  views of the  right foot were obtained.     No acute osseous abnormality.  Bones appear significantly osteopenic.    Lisfranc articulation alignment is congruent on this non-weight  bearing study.    Mild degenerative changes of first metatarsophalangeal joint.     Soft tissue is unremarkable.      Impression    Impression:  1. No acute osseous abnormality.  2. No substantial degenerative change.  3. Bones appear significantly osteopenic.    TIFFANIE DURAN MD (Joe)         SYSTEM ID:  [id number]     Medications     dextrose Stopped (01/27/22 2040)     - MEDICATION INSTRUCTIONS -         apixaban ANTICOAGULANT  5 mg Oral or Feeding Tube BID     ARIPiprazole  7.5 mg Oral or Feeding Tube Daily     banatrol plus  1 packet Per Feeding Tube BID     diclofenac  2 g Topical 4x Daily     metoprolol tartrate  75 mg Oral BID     multivitamins w/minerals  15 mL Per Feeding Tube Daily     pantoprazole  40 mg Oral BID     protein modular  1 packet Per Feeding Tube BID     sodium chloride (PF)  3 mL Intracatheter Q8H     spironolactone  25 mg Oral Daily     tolterodine  2 mg Oral or Feeding Tube  BID     topiramate  100 mg Oral Daily     topiramate  200 mg Oral or Feeding Tube QPM     vancomycin  125 mg Oral or Feeding Tube 4x Daily     venlafaxine  75 mg Oral or Feeding Tube BID     voriconazole  200 mg Oral or Feeding Tube Q12H TANNER

## 2022-02-03 NOTE — PROGRESS NOTES
Patient is AAOX4; no fever; she c/o right foot pain, tylenol was given, sx was notified, xray was done; sepsis screening generated, lactic acid was collected; BP 130s-140s; HR ST/St 90s-105; Patient remained on room air majority of shift, O2 was used with activity; patient is urinating; lbm was today; Patient sat up in chair majority of shift; transfer orders were put in; vitals stable, continue to monitor.      Jie Funez RN

## 2022-02-04 ENCOUNTER — APPOINTMENT (OUTPATIENT)
Dept: OCCUPATIONAL THERAPY | Facility: CLINIC | Age: 57
DRG: 871 | End: 2022-02-04
Payer: MEDICARE

## 2022-02-04 ENCOUNTER — APPOINTMENT (OUTPATIENT)
Dept: PHYSICAL THERAPY | Facility: CLINIC | Age: 57
DRG: 871 | End: 2022-02-04
Payer: MEDICARE

## 2022-02-04 LAB
ALBUMIN SERPL-MCNC: 2.6 G/DL (ref 3.4–5)
ALP SERPL-CCNC: 159 U/L (ref 40–150)
ALT SERPL W P-5'-P-CCNC: 88 U/L (ref 0–50)
ANION GAP SERPL CALCULATED.3IONS-SCNC: 5 MMOL/L (ref 3–14)
AST SERPL W P-5'-P-CCNC: 66 U/L (ref 0–45)
BILIRUB SERPL-MCNC: 0.5 MG/DL (ref 0.2–1.3)
BUN SERPL-MCNC: 14 MG/DL (ref 7–30)
CALCIUM SERPL-MCNC: 9.3 MG/DL (ref 8.5–10.1)
CHLORIDE BLD-SCNC: 111 MMOL/L (ref 94–109)
CO2 SERPL-SCNC: 26 MMOL/L (ref 20–32)
CREAT SERPL-MCNC: 0.44 MG/DL (ref 0.52–1.04)
ERYTHROCYTE [DISTWIDTH] IN BLOOD BY AUTOMATED COUNT: 18.2 % (ref 10–15)
GFR SERPL CREATININE-BSD FRML MDRD: >90 ML/MIN/1.73M2
GLUCOSE BLD-MCNC: 87 MG/DL (ref 70–99)
HCT VFR BLD AUTO: 33.1 % (ref 35–47)
HGB BLD-MCNC: 9.9 G/DL (ref 11.7–15.7)
MCH RBC QN AUTO: 29.4 PG (ref 26.5–33)
MCHC RBC AUTO-ENTMCNC: 29.9 G/DL (ref 31.5–36.5)
MCV RBC AUTO: 98 FL (ref 78–100)
PLATELET # BLD AUTO: 396 10E3/UL (ref 150–450)
POTASSIUM BLD-SCNC: 3.8 MMOL/L (ref 3.4–5.3)
PROT SERPL-MCNC: 6.8 G/DL (ref 6.8–8.8)
RBC # BLD AUTO: 3.37 10E6/UL (ref 3.8–5.2)
SODIUM SERPL-SCNC: 142 MMOL/L (ref 133–144)
WBC # BLD AUTO: 8.3 10E3/UL (ref 4–11)

## 2022-02-04 PROCEDURE — 97110 THERAPEUTIC EXERCISES: CPT | Mod: GP

## 2022-02-04 PROCEDURE — 250N000013 HC RX MED GY IP 250 OP 250 PS 637: Performed by: STUDENT IN AN ORGANIZED HEALTH CARE EDUCATION/TRAINING PROGRAM

## 2022-02-04 PROCEDURE — 85041 AUTOMATED RBC COUNT: CPT | Performed by: STUDENT IN AN ORGANIZED HEALTH CARE EDUCATION/TRAINING PROGRAM

## 2022-02-04 PROCEDURE — 250N000013 HC RX MED GY IP 250 OP 250 PS 637: Performed by: INTERNAL MEDICINE

## 2022-02-04 PROCEDURE — 80053 COMPREHEN METABOLIC PANEL: CPT | Performed by: STUDENT IN AN ORGANIZED HEALTH CARE EDUCATION/TRAINING PROGRAM

## 2022-02-04 PROCEDURE — 250N000013 HC RX MED GY IP 250 OP 250 PS 637: Performed by: PHYSICIAN ASSISTANT

## 2022-02-04 PROCEDURE — 85027 COMPLETE CBC AUTOMATED: CPT | Performed by: STUDENT IN AN ORGANIZED HEALTH CARE EDUCATION/TRAINING PROGRAM

## 2022-02-04 PROCEDURE — 97530 THERAPEUTIC ACTIVITIES: CPT | Mod: GP

## 2022-02-04 PROCEDURE — 120N000002 HC R&B MED SURG/OB UMMC

## 2022-02-04 PROCEDURE — 97535 SELF CARE MNGMENT TRAINING: CPT | Mod: GO

## 2022-02-04 PROCEDURE — 97116 GAIT TRAINING THERAPY: CPT | Mod: GP

## 2022-02-04 PROCEDURE — 99232 SBSQ HOSP IP/OBS MODERATE 35: CPT | Performed by: INTERNAL MEDICINE

## 2022-02-04 PROCEDURE — 36415 COLL VENOUS BLD VENIPUNCTURE: CPT | Performed by: STUDENT IN AN ORGANIZED HEALTH CARE EDUCATION/TRAINING PROGRAM

## 2022-02-04 PROCEDURE — 97530 THERAPEUTIC ACTIVITIES: CPT | Mod: GO

## 2022-02-04 RX ORDER — CAPSAICIN 0.75 MG/G
CREAM TOPICAL 3 TIMES DAILY
Status: DISCONTINUED | OUTPATIENT
Start: 2022-02-04 | End: 2022-02-18 | Stop reason: HOSPADM

## 2022-02-04 RX ADMIN — APIXABAN 5 MG: 5 TABLET, FILM COATED ORAL at 08:46

## 2022-02-04 RX ADMIN — DICLOFENAC SODIUM 2 G: 10 GEL TOPICAL at 20:22

## 2022-02-04 RX ADMIN — VORICONAZOLE 200 MG: 40 POWDER, FOR SUSPENSION ORAL at 08:44

## 2022-02-04 RX ADMIN — DICLOFENAC SODIUM 2 G: 10 GEL TOPICAL at 17:38

## 2022-02-04 RX ADMIN — Medication 7.5 MG: at 08:45

## 2022-02-04 RX ADMIN — TOPIRAMATE 200 MG: 200 TABLET ORAL at 20:21

## 2022-02-04 RX ADMIN — Medication 1 PACKET: at 20:22

## 2022-02-04 RX ADMIN — VANCOMYCIN HYDROCHLORIDE 125 MG: KIT at 08:47

## 2022-02-04 RX ADMIN — Medication 40 MG: at 20:21

## 2022-02-04 RX ADMIN — SPIRONOLACTONE 25 MG: 25 TABLET, FILM COATED ORAL at 08:45

## 2022-02-04 RX ADMIN — Medication 2 PACKET: at 08:47

## 2022-02-04 RX ADMIN — Medication 3 MG: at 21:55

## 2022-02-04 RX ADMIN — VENLAFAXINE 75 MG: 75 TABLET ORAL at 20:21

## 2022-02-04 RX ADMIN — DICLOFENAC SODIUM 2 G: 10 GEL TOPICAL at 12:20

## 2022-02-04 RX ADMIN — VANCOMYCIN HYDROCHLORIDE 125 MG: KIT at 12:19

## 2022-02-04 RX ADMIN — VENLAFAXINE 75 MG: 75 TABLET ORAL at 08:46

## 2022-02-04 RX ADMIN — Medication 1 PACKET: at 08:46

## 2022-02-04 RX ADMIN — VORICONAZOLE 200 MG: 40 POWDER, FOR SUSPENSION ORAL at 20:21

## 2022-02-04 RX ADMIN — APIXABAN 5 MG: 5 TABLET, FILM COATED ORAL at 20:21

## 2022-02-04 RX ADMIN — TOLTERODINE TARTRATE 2 MG: 2 TABLET, FILM COATED ORAL at 08:45

## 2022-02-04 RX ADMIN — METOPROLOL TARTRATE 75 MG: 50 TABLET, FILM COATED ORAL at 20:21

## 2022-02-04 RX ADMIN — DICLOFENAC SODIUM 2 G: 10 GEL TOPICAL at 08:47

## 2022-02-04 RX ADMIN — TOLTERODINE TARTRATE 2 MG: 2 TABLET, FILM COATED ORAL at 20:21

## 2022-02-04 RX ADMIN — VANCOMYCIN HYDROCHLORIDE 125 MG: KIT at 17:37

## 2022-02-04 RX ADMIN — METOPROLOL TARTRATE 75 MG: 50 TABLET, FILM COATED ORAL at 08:45

## 2022-02-04 RX ADMIN — Medication 15 ML: at 12:19

## 2022-02-04 RX ADMIN — Medication 40 MG: at 08:44

## 2022-02-04 RX ADMIN — Medication 2 PACKET: at 17:40

## 2022-02-04 RX ADMIN — TOPIRAMATE 100 MG: 100 TABLET, FILM COATED ORAL at 08:46

## 2022-02-04 RX ADMIN — VANCOMYCIN HYDROCHLORIDE 125 MG: KIT at 20:21

## 2022-02-04 ASSESSMENT — ACTIVITIES OF DAILY LIVING (ADL)
ADLS_ACUITY_SCORE: 18
ADLS_ACUITY_SCORE: 16
ADLS_ACUITY_SCORE: 18
ADLS_ACUITY_SCORE: 16
ADLS_ACUITY_SCORE: 18
ADLS_ACUITY_SCORE: 18
ADLS_ACUITY_SCORE: 12
ADLS_ACUITY_SCORE: 18
ADLS_ACUITY_SCORE: 16
ADLS_ACUITY_SCORE: 16
ADLS_ACUITY_SCORE: 18
ADLS_ACUITY_SCORE: 12
ADLS_ACUITY_SCORE: 18
ADLS_ACUITY_SCORE: 16
ADLS_ACUITY_SCORE: 16
ADLS_ACUITY_SCORE: 18
ADLS_ACUITY_SCORE: 18
ADLS_ACUITY_SCORE: 16
ADLS_ACUITY_SCORE: 18
ADLS_ACUITY_SCORE: 16

## 2022-02-04 ASSESSMENT — MIFFLIN-ST. JEOR: SCORE: 1071.13

## 2022-02-04 NOTE — PROGRESS NOTES
Care Management Follow Up     Length of Stay (days): 22     Expected Discharge Date: 2022     Concerns to be Addressed: discharge planning     Patient plan of care discussed at interdisciplinary rounds: Yes     Anticipated Discharge Disposition: [facility]     Anticipated Discharge Services: May need transportation to TCU   Anticipated Discharge DME: None      Patient/family educated on Medicare website which has current facility and service quality ratings:  (No as pt admitted from a facility and has a bed hold)  Education Provided on the Discharge Plan:  Yes  Patient/Family in Agreement with the Plan: yes     Referrals Placed by CM/SW: See below  Private pay costs discussed: Not applicable     Additional Information:  Per jonathan review, it appears pt's bed hold has  at [facility], and they are now unable to accept pt back d/t being unable to meet her needs. SW will f/u with pt to discuss new referrals this weekend.    Referrals have been made to the following facilities and their status is as follows:    [facility]  [address] [facility] [address]  Phone: [phone]  Fax: [phone]  2: Received VM from SNF admissions. SNF is unable to accomodate pt's medical needs. Writer has ceased following this referral.    ________________    JORGITO Awad, [facility]  ED/Observation   [facility]  Phone: [phone]  Pager: [phone]  Fax: [phone]    On-call pager, [phone], 4:00pm to midnight

## 2022-02-04 NOTE — PROGRESS NOTES
[facility]    Medicine Progress Note - Hospitalist Service, GOLD TEAM 8    Date of Admission:  1/11/2022    Assessment & Plan     Sherly Yeung is a 56 year old female admitted on 1/11/2022 who is being transferred out of the ICU on 1/23/2022. She has a history of COVID 10/2021 requiring trachoestomy who presented on 1/11 with acute cholecystitis and cholangitis s/p biliary stent placement. Hospital course was complicated by respiratory failure on 1/17 and repeat COVID infection. She developed respiratory failure and required ICU transfer and intubation on 1/19. She was extubated on 1/21 but had recurrent hypercapnia requiring bipap at night and while sleeping ICU stay was complicated by development of new leukocytosis and C. Diff colitis with diarrhea.    #ARDS secondary to COVID 19 pneumonia (improving)  #Acute hypercapnic and hypoxemic respiratory failure  - weaning oxygen. On RA-1 L   - Incentive spirometry     #C. diff colitis  Improved Diarrhea  Encouraged fiber for bulking of 1-2 soft bowel movements  Increase Fiber packets- bowel movement improved     #Aspergillus pneumonia  -Occurred during initial COVID treatment in 10/2021. Was planned for 60 day of voriconazole  -Was due to end on 1/21 but had recurrent COVID and respiratory failure  Plan:  >Plan for 2 additional weeks of voriconazole past last day of dexamethasone  >End date February 9th    #Choledocholithiasis c/b cholangitis, s/p CBD stenting (1/12/22)  #Probable acute cholecystitis s/p transpapillary cystic duct stenting (1/12/22)  #Transaminitis   #Sepsis (tachy, leukocytosis)  - S/p ERCP, removal of stone and stent placement  - Abx for 10 days (1/12 - 1/22) - (Was cipro/flagyl, broadened to cef/flagyl due to resp failure)  - Developed worsened respiratory failure on 1/19 and antibiotics were extended to provide additional bacterial coverage  Plan:  >Stop IV cefepime, flagyl on 1/26 (14 day  course)  >Follow as outpatient with Gen-surg for cholecystectomy (order on discharge placed)  >Follow up with GI approx February 2/12/22    #Paget Banegas syndrome  #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  She has a history of multiple blood clots.  She has been maintained on apixaban.  >Continue PTA apixaban  >#Anemia  >Secondary to critical illness  #HTN  >Up-titrating to control tachycardia, blood pressure  #Depression. Anorexia  >Venlflaxine, Abilify, topiramate     # Right foot pain  Xray shows no evidence fo acute fracture  Trial topical capsaicin              Diet: Regular Diet Adult  Adult Formula Bolus Feeding: TID Osmolite 1.5; Route: Gastrostomy; 3; Can(s); Medication - Feeding Tube Flush Frequency: At least 15-30 mL water before and after medication administration and with tube clogging; Amount to Send (Nutrition use): 3 c...    DVT Prophylaxis: DOAC  Scott Catheter: Not present  Central Lines: None  Cardiac Monitoring: None  Code Status: Full Code      Disposition Plan   Expected Discharge: 02/06/2022     Anticipated discharge location:  Awaiting care coordination huddle  Delays:     Placement - TCU  Covid Test Positive            The patient's care was discussed with the Bedside Nurse and Patient.    Sunny Montaño MD  Hospitalist Service, [address]  Securely message with the Vocera Web Console (learn more here)  Text page via [facility] Paging/Directory   Please see signed in provider for up to date coverage information  ______________________________________________________________________    Interval History   SOB with exercise   No fever or chills  No Chest pain   Denies Fever or chills  Right foot pain for 4 days. Worse with pressure. Diclofenac did not improve pain    Data reviewed today: I reviewed all medications, new labs and imaging results over the last 24 hours. I personally  reviewed no images or EKG's today.    Physical Exam   Vital Signs: Temp: 98  F (36.7  C) Temp src: Oral BP: (!) 147/98 Pulse: 86   Resp: 15 SpO2: 100 % O2 Device: Nasal cannula Oxygen Delivery: 1 LPM  Weight: 112 lbs 3.43 oz     Physical Exam  Constitutional:       Appearance: Normal appearance. She is normal weight.   HENT:      Head: Normocephalic.      Mouth/Throat:      Mouth: Mucous membranes are moist.      Pharynx: Oropharynx is clear.   Eyes:      Extraocular Movements: Extraocular movements intact.      Conjunctiva/sclera: Conjunctivae normal.      Pupils: Pupils are equal, round, and reactive to light.   Cardiovascular:      Rate and Rhythm: Normal rate and regular rhythm.      Pulses: Normal pulses.      Heart sounds: Normal heart sounds.   Pulmonary:      Effort: Pulmonary effort is normal.      Breath sounds: Normal breath sounds.   Abdominal:      General: Bowel sounds are normal.      Palpations: Abdomen is soft.   Musculoskeletal:         General: Tenderness present.      Cervical back: Normal range of motion and neck supple.      Right lower leg: No edema.   Skin:     General: Skin is warm and dry.   Neurological:      General: No focal deficit present.      Mental Status: She is alert.   Psychiatric:         Mood and Affect: Mood normal.         Behavior: Behavior normal.           Data   Recent Labs   Lab 02/04/22  0621 02/03/22  0745 02/02/22  2233 02/02/22  2043 02/02/22  0627 01/29/22  0807 01/29/22  0651   WBC 8.3 9.5  --   --  12.8*   < > 18.7*   HGB 9.9* 10.4*  --   --  9.6*   < > 9.7*   MCV 98 99  --   --  98   < > 97    375  --   --  442   < > 586*   INR  --   --   --   --   --   --  1.10    138  --   --  141   < > 141   POTASSIUM 3.8 4.8  --   --  3.9   < > 4.2   CHLORIDE 111* 112*  --   --  110*   < > 112*   CO2 26 19*  --   --  25   < > 28   BUN 14 11  --   --  11   < > 10   CR 0.44* 0.37*  --   --  0.40*   < > 0.37*   ANIONGAP 5 7  --   --  6   < > 1*   TERESA 9.3 9.5  --    --  9.7   < > 8.7   GLC 87 77 116*   < > 104*   < > 98   ALBUMIN 2.6* 2.3*  --   --  2.6*   < > 2.5*   PROTTOTAL 6.8 6.6*  --   --  6.8   < > 6.3*   BILITOTAL 0.5 0.9  --   --  0.2   < > 0.3   ALKPHOS 159* 145  --   --  135   < > 90   ALT 88* 60*  --   --  55*   < > 30   AST 66* 48*  --   --  36   < > 17    < > = values in this interval not displayed.     No results found for this or any previous visit (from the past 24 hour(s)).  Medications     dextrose Stopped (01/27/22 2040)     - MEDICATION INSTRUCTIONS -         apixaban ANTICOAGULANT  5 mg Oral or Feeding Tube BID     ARIPiprazole  7.5 mg Oral or Feeding Tube Daily     banatrol plus  1 packet Per Feeding Tube BID     capsaicin   Topical TID     diclofenac  2 g Topical 4x Daily     metoprolol tartrate  75 mg Oral BID     multivitamins w/minerals  15 mL Per Feeding Tube Daily     pantoprazole  40 mg Oral BID     protein modular  2 packet Per Feeding Tube BID     sodium chloride (PF)  3 mL Intracatheter Q8H     spironolactone  25 mg Oral Daily     tolterodine  2 mg Oral or Feeding Tube BID     topiramate  100 mg Oral Daily     topiramate  200 mg Oral or Feeding Tube QPM     vancomycin  125 mg Oral or Feeding Tube 4x Daily     venlafaxine  75 mg Oral or Feeding Tube BID     voriconazole  200 mg Oral or Feeding Tube Q12H TANNER

## 2022-02-04 NOTE — PLAN OF CARE
Date: February 4, 2022      Neuro/Musculoskeletal:  A&Ox4.   Cardiac:  SR.  VSS.     Respiratory:  Sating in the 98% on RA  GI/:  Adequate urine output.  LBM: 1/3  Diet/Appetite:  Snack ( Ice Cream)   Activity:  Assist of x1. Out of bed to the chair. Tolerated well.  Pain:  No Complaints of pain.  Skin:  No new deficits noted.   LDAs RAC SL. Good blood return noted  Protocols/Labs:      Pertinent Shift Updates: No acute Changes Overnight.

## 2022-02-05 ENCOUNTER — APPOINTMENT (OUTPATIENT)
Dept: PHYSICAL THERAPY | Facility: CLINIC | Age: 57
DRG: 871 | End: 2022-02-05
Payer: MEDICARE

## 2022-02-05 LAB
ALBUMIN SERPL-MCNC: 2.6 G/DL (ref 3.4–5)
ALP SERPL-CCNC: 165 U/L (ref 40–150)
ALT SERPL W P-5'-P-CCNC: 112 U/L (ref 0–50)
ANION GAP SERPL CALCULATED.3IONS-SCNC: 7 MMOL/L (ref 3–14)
AST SERPL W P-5'-P-CCNC: 76 U/L (ref 0–45)
BILIRUB SERPL-MCNC: 0.3 MG/DL (ref 0.2–1.3)
BUN SERPL-MCNC: 16 MG/DL (ref 7–30)
CALCIUM SERPL-MCNC: 9.4 MG/DL (ref 8.5–10.1)
CHLORIDE BLD-SCNC: 108 MMOL/L (ref 94–109)
CO2 SERPL-SCNC: 24 MMOL/L (ref 20–32)
CREAT SERPL-MCNC: 0.48 MG/DL (ref 0.52–1.04)
ERYTHROCYTE [DISTWIDTH] IN BLOOD BY AUTOMATED COUNT: 18.3 % (ref 10–15)
GFR SERPL CREATININE-BSD FRML MDRD: >90 ML/MIN/1.73M2
GLUCOSE BLD-MCNC: 108 MG/DL (ref 70–99)
GLUCOSE BLDC GLUCOMTR-MCNC: 99 MG/DL (ref 70–99)
HCT VFR BLD AUTO: 32.4 % (ref 35–47)
HGB BLD-MCNC: 9.7 G/DL (ref 11.7–15.7)
LACTATE SERPL-SCNC: 1.3 MMOL/L (ref 0.7–2)
MAGNESIUM SERPL-MCNC: 2 MG/DL (ref 1.8–2.6)
MCH RBC QN AUTO: 29.4 PG (ref 26.5–33)
MCHC RBC AUTO-ENTMCNC: 29.9 G/DL (ref 31.5–36.5)
MCV RBC AUTO: 98 FL (ref 78–100)
PHOSPHATE SERPL-MCNC: 3.7 MG/DL (ref 2.5–4.5)
PLATELET # BLD AUTO: 389 10E3/UL (ref 150–450)
POTASSIUM BLD-SCNC: 3.9 MMOL/L (ref 3.4–5.3)
PROT SERPL-MCNC: 6.8 G/DL (ref 6.8–8.8)
RBC # BLD AUTO: 3.3 10E6/UL (ref 3.8–5.2)
SODIUM SERPL-SCNC: 139 MMOL/L (ref 133–144)
WBC # BLD AUTO: 9.6 10E3/UL (ref 4–11)

## 2022-02-05 PROCEDURE — 250N000013 HC RX MED GY IP 250 OP 250 PS 637: Performed by: STUDENT IN AN ORGANIZED HEALTH CARE EDUCATION/TRAINING PROGRAM

## 2022-02-05 PROCEDURE — 83735 ASSAY OF MAGNESIUM: CPT | Performed by: INTERNAL MEDICINE

## 2022-02-05 PROCEDURE — 250N000013 HC RX MED GY IP 250 OP 250 PS 637: Performed by: INTERNAL MEDICINE

## 2022-02-05 PROCEDURE — 36415 COLL VENOUS BLD VENIPUNCTURE: CPT | Performed by: STUDENT IN AN ORGANIZED HEALTH CARE EDUCATION/TRAINING PROGRAM

## 2022-02-05 PROCEDURE — 85027 COMPLETE CBC AUTOMATED: CPT | Performed by: STUDENT IN AN ORGANIZED HEALTH CARE EDUCATION/TRAINING PROGRAM

## 2022-02-05 PROCEDURE — 83605 ASSAY OF LACTIC ACID: CPT | Performed by: INTERNAL MEDICINE

## 2022-02-05 PROCEDURE — 97530 THERAPEUTIC ACTIVITIES: CPT | Mod: GP | Performed by: REHABILITATION PRACTITIONER

## 2022-02-05 PROCEDURE — 99232 SBSQ HOSP IP/OBS MODERATE 35: CPT | Performed by: INTERNAL MEDICINE

## 2022-02-05 PROCEDURE — 97110 THERAPEUTIC EXERCISES: CPT | Mod: GP | Performed by: REHABILITATION PRACTITIONER

## 2022-02-05 PROCEDURE — 120N000002 HC R&B MED SURG/OB UMMC

## 2022-02-05 PROCEDURE — 97116 GAIT TRAINING THERAPY: CPT | Mod: GP | Performed by: REHABILITATION PRACTITIONER

## 2022-02-05 PROCEDURE — 84100 ASSAY OF PHOSPHORUS: CPT | Performed by: INTERNAL MEDICINE

## 2022-02-05 PROCEDURE — 250N000013 HC RX MED GY IP 250 OP 250 PS 637: Performed by: PHYSICIAN ASSISTANT

## 2022-02-05 PROCEDURE — 36415 COLL VENOUS BLD VENIPUNCTURE: CPT | Performed by: INTERNAL MEDICINE

## 2022-02-05 PROCEDURE — 80053 COMPREHEN METABOLIC PANEL: CPT | Performed by: STUDENT IN AN ORGANIZED HEALTH CARE EDUCATION/TRAINING PROGRAM

## 2022-02-05 RX ORDER — MAGNESIUM OXIDE 400 MG/1
400 TABLET ORAL 2 TIMES DAILY
Status: COMPLETED | OUTPATIENT
Start: 2022-02-05 | End: 2022-02-07

## 2022-02-05 RX ADMIN — Medication 2 PACKET: at 15:59

## 2022-02-05 RX ADMIN — VANCOMYCIN HYDROCHLORIDE 125 MG: KIT at 21:12

## 2022-02-05 RX ADMIN — Medication 2 PACKET: at 08:31

## 2022-02-05 RX ADMIN — Medication 3 MG: at 21:12

## 2022-02-05 RX ADMIN — VANCOMYCIN HYDROCHLORIDE 125 MG: KIT at 12:31

## 2022-02-05 RX ADMIN — Medication 40 MG: at 08:30

## 2022-02-05 RX ADMIN — METOPROLOL TARTRATE 75 MG: 50 TABLET, FILM COATED ORAL at 08:28

## 2022-02-05 RX ADMIN — DICLOFENAC SODIUM 2 G: 10 GEL TOPICAL at 12:31

## 2022-02-05 RX ADMIN — Medication 40 MG: at 21:12

## 2022-02-05 RX ADMIN — VORICONAZOLE 200 MG: 40 POWDER, FOR SUSPENSION ORAL at 08:30

## 2022-02-05 RX ADMIN — DICLOFENAC SODIUM 2 G: 10 GEL TOPICAL at 08:30

## 2022-02-05 RX ADMIN — SPIRONOLACTONE 25 MG: 25 TABLET, FILM COATED ORAL at 08:28

## 2022-02-05 RX ADMIN — TOLTERODINE TARTRATE 2 MG: 2 TABLET, FILM COATED ORAL at 21:12

## 2022-02-05 RX ADMIN — TOPIRAMATE 100 MG: 100 TABLET, FILM COATED ORAL at 08:28

## 2022-02-05 RX ADMIN — VANCOMYCIN HYDROCHLORIDE 125 MG: KIT at 08:30

## 2022-02-05 RX ADMIN — Medication 7.5 MG: at 08:30

## 2022-02-05 RX ADMIN — TOLTERODINE TARTRATE 2 MG: 2 TABLET, FILM COATED ORAL at 08:28

## 2022-02-05 RX ADMIN — APIXABAN 5 MG: 5 TABLET, FILM COATED ORAL at 08:28

## 2022-02-05 RX ADMIN — APIXABAN 5 MG: 5 TABLET, FILM COATED ORAL at 21:12

## 2022-02-05 RX ADMIN — Medication 1 PACKET: at 21:10

## 2022-02-05 RX ADMIN — VORICONAZOLE 200 MG: 40 POWDER, FOR SUSPENSION ORAL at 21:11

## 2022-02-05 RX ADMIN — VENLAFAXINE 75 MG: 75 TABLET ORAL at 08:28

## 2022-02-05 RX ADMIN — MAGNESIUM OXIDE TAB 400 MG (241.3 MG ELEMENTAL MG) 400 MG: 400 (241.3 MG) TAB at 21:13

## 2022-02-05 RX ADMIN — VANCOMYCIN HYDROCHLORIDE 125 MG: KIT at 15:59

## 2022-02-05 RX ADMIN — METOPROLOL TARTRATE 75 MG: 50 TABLET, FILM COATED ORAL at 21:12

## 2022-02-05 RX ADMIN — DICLOFENAC SODIUM 2 G: 10 GEL TOPICAL at 21:10

## 2022-02-05 RX ADMIN — Medication 1 PACKET: at 08:29

## 2022-02-05 RX ADMIN — DICLOFENAC SODIUM 2 G: 10 GEL TOPICAL at 15:59

## 2022-02-05 RX ADMIN — TOPIRAMATE 200 MG: 200 TABLET ORAL at 21:12

## 2022-02-05 RX ADMIN — Medication 15 ML: at 12:31

## 2022-02-05 RX ADMIN — VENLAFAXINE 75 MG: 75 TABLET ORAL at 21:12

## 2022-02-05 ASSESSMENT — ACTIVITIES OF DAILY LIVING (ADL)
ADLS_ACUITY_SCORE: 18

## 2022-02-05 ASSESSMENT — MIFFLIN-ST. JEOR: SCORE: 1097.13

## 2022-02-05 NOTE — PLAN OF CARE
NURSING PROGRESS NOTE  Shift Summary      Date: February 4, 2022     Neuro/Musculoskeletal:  A&Ox4.   Cardiac:  SR.  VSS.     Respiratory:  Sating in the high 90s on RA  GI/:  Adequate urine output.  LBM: 2/4  Diet/Appetite:  Tolerating regular diet. Patient refused tube feedings at this time. Eating greater than 50% for all meals  Activity:  Assist x1. Out of bed to the chair  Pain:  Diclofenac ointment applied  as ordered for right lateral foot pain.   Skin:  No new deficits noted.  LDA RAC SL    Plan:  Continues on contact for C-DIFF      Cindy Gamble RN  .................................................... February 4, 2022   7:06 PM  [facility] ([facility]): [place]  Stepdown ICU (Unit 6D)

## 2022-02-05 NOTE — PROGRESS NOTES
Pt transferred to room 5226 via bed at 2245. VSS. Pt on RA during transfer, tolerated well. Pt belongings at bedside. Report given to oncoming nurse. Pt's  updated per pt. All questions answered at this time.

## 2022-02-05 NOTE — PLAN OF CARE
RN assumed cares: [id number]    Vitals: VSS on 1L NC ex tachy & elevated BP   Neuro: AOx4   Pain: C/o mild pain on lateral side of R foot controlled w/ Voltaren gel   Activity: Up Ax1 w/ GB & walker   Cardiac: Intermittently tachy, HR 90-100s. Elevated BP at times   Respiratory: ART. Denies cough or SOB. Lungs dim in lower lobes. Increased O2 needs w/ activity. O2 monitoring at desk    GI/: Tolerating regular diet; fair appetite, denies nausea. Using BSC; Soft BM x & voiding adequately. Meds via peg tube  Skin: No Significant deficits   LDAs: R PIV SL. LLQ PEG; dressing CDI  Labs: K 3.9; Phos 3.7; Mag 2.0, oral replacement initiated per protocol. AM BG 99. LA 1.3 after triggering sepsis BPA     Events & Plan: Remains on covid precautions. Worked w/ PT in afternoon. Requiring increased O2 w/ activity to maintain saturations. Declined blood sugar monitoring before meals; not receiving any insulin, no history of diabetes & not on tube feeds- sticky note to provider placed to discontinue order. Call light within reach, able to make needs known. Will continue to monitor & follow POC
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
## 2022-02-05 NOTE — PROGRESS NOTES
[facility]    Medicine Progress Note - Hospitalist Service, GOLD TEAM 8    Date of Admission:  1/11/2022    Assessment & Plan     Sherly Yeung is a 56 year old female admitted on 1/11/2022 who is being transferred out of the ICU on 1/23/2022. She has a history of COVID 10/2021 requiring trachoestomy who presented on 1/11 with acute cholecystitis and cholangitis s/p biliary stent placement. Hospital course was complicated by respiratory failure on 1/17 and repeat COVID infection. She developed respiratory failure and required ICU transfer and intubation on 1/19. She was extubated on 1/21 but had recurrent hypercapnia requiring bipap at night and while sleeping ICU stay was complicated by development of new leukocytosis and C. Diff colitis with diarrhea.    Changes today  No acute changes to management  Continue on diclofenac for foot pain  Wean oxygen as tolerated  Bowel management appears stable in the setting of C. Difficile with several pudding consistency bowel movements.       #ARDS secondary to COVID 19 pneumonia (improving)  #Acute hypercapnic and hypoxemic respiratory failure  - weaning oxygen. On RA-1 L   - Incentive spirometry     #C. diff colitis  Improved Diarrhea  Encouraged fiber for bulking of 1-2 soft bowel movements  Increase Fiber packets- bowel movement improved     #Aspergillus pneumonia  -Occurred during initial COVID treatment in 10/2021. Was planned for 60 day of voriconazole  -Was due to end on 1/21 but had recurrent COVID and respiratory failure  Plan:  >Plan for 2 additional weeks of voriconazole past last day of dexamethasone  >End date February 9th    #Choledocholithiasis c/b cholangitis, s/p CBD stenting (1/12/22)  #Probable acute cholecystitis s/p transpapillary cystic duct stenting (1/12/22)  #Transaminitis   #Sepsis (tachy, leukocytosis)  - S/p ERCP, removal of stone and stent placement  - Abx for 10 days (1/12 - 1/22) - (Was cipro/flagyl,  broadened to cef/flagyl due to resp failure)  - Developed worsened respiratory failure on 1/19 and antibiotics were extended to provide additional bacterial coverage  Plan:  >Stop IV cefepime, flagyl on 1/26 (14 day course)  >Follow as outpatient with Gen-surg for cholecystectomy (order on discharge placed)  >Follow up with GI approx February 2/12/22    #Paget Banegas syndrome  #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  She has a history of multiple blood clots.  She has been maintained on apixaban.  >Continue PTA apixaban  >#Anemia  >Secondary to critical illness  #HTN  >Up-titrating to control tachycardia, blood pressure  #Depression. Anorexia  >Venlflaxine, Abilify, topiramate     # Right foot pain  Xray shows no evidence fo acute fracture  Trial topical capsaicin              Diet: Regular Diet Adult  Adult Formula Bolus Feeding: TID Osmolite 1.5; Route: Gastrostomy; 3; Can(s); Medication - Feeding Tube Flush Frequency: At least 15-30 mL water before and after medication administration and with tube clogging; Amount to Send (Nutrition use): 3 c...    DVT Prophylaxis: DOAC  Scott Catheter: Not present  Central Lines: None  Cardiac Monitoring: None  Code Status: Full Code      Disposition Plan   Expected Discharge: 02/06/2022     Anticipated discharge location:  Awaiting care coordination huddle  Delays:     Placement - TCU  Covid Test Positive            The patient's care was discussed with the Bedside Nurse and Patient.    Sunny Montaño MD  Hospitalist Service, GOLD TEAM [address] [place] [address]  Securely message with the Vocera Web Console (learn more here)  Text page via [facility] Paging/Directory   Please see signed in provider for up to date coverage information  ______________________________________________________________________    Interval History   Patient denies any nausea vomiting fevers chills chest pain or  palpitations  Foot pain is improved.      Data reviewed today: I reviewed all medications, new labs and imaging results over the last 24 hours. I personally reviewed no images or EKG's today.    Physical Exam   Vital Signs: Temp: (!) 96.4  F (35.8  C) Temp src: Axillary (pt just ate cold food) BP: (!) 144/86 Pulse: 100   Resp: 18 SpO2: 98 % O2 Device: Nasal cannula Oxygen Delivery: 2 LPM  Weight: 112 lbs 14.01 oz     Physical Exam  Constitutional:       Appearance: Normal appearance. She is normal weight.   HENT:      Head: Normocephalic.      Mouth/Throat:      Mouth: Mucous membranes are moist.      Pharynx: Oropharynx is clear.   Eyes:      Extraocular Movements: Extraocular movements intact.      Conjunctiva/sclera: Conjunctivae normal.      Pupils: Pupils are equal, round, and reactive to light.   Cardiovascular:      Rate and Rhythm: Normal rate and regular rhythm.      Pulses: Normal pulses.      Heart sounds: Normal heart sounds.   Pulmonary:      Effort: Pulmonary effort is normal.      Breath sounds: Normal breath sounds.   Abdominal:      General: Bowel sounds are normal.      Palpations: Abdomen is soft.   Musculoskeletal:         General: Tenderness present.      Cervical back: Normal range of motion and neck supple.      Right lower leg: No edema.   Skin:     General: Skin is warm and dry.   Neurological:      General: No focal deficit present.      Mental Status: She is alert.   Psychiatric:         Mood and Affect: Mood normal.         Behavior: Behavior normal.           Data   Recent Labs   Lab 02/05/22  0803 02/05/22  0633 02/04/22  0621 02/03/22  0745   WBC  --  9.6 8.3 9.5   HGB  --  9.7* 9.9* 10.4*   MCV  --  98 98 99   PLT  --  389 396 375   NA  --  139 142 138   POTASSIUM  --  3.9 3.8 4.8   CHLORIDE  --  108 111* 112*   CO2  --  24 26 19*   BUN  --  16 14 11   CR  --  0.48* 0.44* 0.37*   ANIONGAP  --  7 5 7   TERESA  --  9.4 9.3 9.5   GLC 99 108* 87 77   ALBUMIN  --  2.6* 2.6* 2.3*   PROTTOTAL   --  6.8 6.8 6.6*   BILITOTAL  --  0.3 0.5 0.9   ALKPHOS  --  165* 159* 145   ALT  --  112* 88* 60*   AST  --  76* 66* 48*     No results found for this or any previous visit (from the past 24 hour(s)).  Medications     dextrose Stopped (01/27/22 2040)     - MEDICATION INSTRUCTIONS -         apixaban ANTICOAGULANT  5 mg Oral or Feeding Tube BID     ARIPiprazole  7.5 mg Oral or Feeding Tube Daily     banatrol plus  1 packet Per Feeding Tube BID     capsaicin   Topical TID     diclofenac  2 g Topical 4x Daily     magnesium oxide  400 mg Oral BID     metoprolol tartrate  75 mg Oral BID     multivitamins w/minerals  15 mL Per Feeding Tube Daily     pantoprazole  40 mg Oral BID     protein modular  2 packet Per Feeding Tube BID     sodium chloride (PF)  3 mL Intracatheter Q8H     spironolactone  25 mg Oral Daily     tolterodine  2 mg Oral or Feeding Tube BID     topiramate  100 mg Oral Daily     topiramate  200 mg Oral or Feeding Tube QPM     vancomycin  125 mg Oral or Feeding Tube 4x Daily     venlafaxine  75 mg Oral or Feeding Tube BID     voriconazole  200 mg Oral or Feeding Tube Q12H TANNER

## 2022-02-06 LAB
ALBUMIN SERPL-MCNC: 2.6 G/DL (ref 3.4–5)
ALP SERPL-CCNC: 178 U/L (ref 40–150)
ALT SERPL W P-5'-P-CCNC: 128 U/L (ref 0–50)
ANION GAP SERPL CALCULATED.3IONS-SCNC: 6 MMOL/L (ref 3–14)
AST SERPL W P-5'-P-CCNC: 88 U/L (ref 0–45)
BILIRUB SERPL-MCNC: 0.9 MG/DL (ref 0.2–1.3)
BUN SERPL-MCNC: 17 MG/DL (ref 7–30)
CALCIUM SERPL-MCNC: 9.4 MG/DL (ref 8.5–10.1)
CHLORIDE BLD-SCNC: 108 MMOL/L (ref 94–109)
CO2 SERPL-SCNC: 28 MMOL/L (ref 20–32)
CREAT SERPL-MCNC: 0.52 MG/DL (ref 0.52–1.04)
ERYTHROCYTE [DISTWIDTH] IN BLOOD BY AUTOMATED COUNT: 18.1 % (ref 10–15)
GFR SERPL CREATININE-BSD FRML MDRD: >90 ML/MIN/1.73M2
GLUCOSE BLD-MCNC: 88 MG/DL (ref 70–99)
HCT VFR BLD AUTO: 32.2 % (ref 35–47)
HGB BLD-MCNC: 9.6 G/DL (ref 11.7–15.7)
MCH RBC QN AUTO: 29.4 PG (ref 26.5–33)
MCHC RBC AUTO-ENTMCNC: 29.8 G/DL (ref 31.5–36.5)
MCV RBC AUTO: 99 FL (ref 78–100)
PHOSPHATE SERPL-MCNC: 4 MG/DL (ref 2.5–4.5)
PLATELET # BLD AUTO: 356 10E3/UL (ref 150–450)
POTASSIUM BLD-SCNC: 3.9 MMOL/L (ref 3.4–5.3)
PROT SERPL-MCNC: 6.6 G/DL (ref 6.8–8.8)
RBC # BLD AUTO: 3.27 10E6/UL (ref 3.8–5.2)
SODIUM SERPL-SCNC: 142 MMOL/L (ref 133–144)
WBC # BLD AUTO: 7.8 10E3/UL (ref 4–11)

## 2022-02-06 PROCEDURE — 84100 ASSAY OF PHOSPHORUS: CPT | Performed by: INTERNAL MEDICINE

## 2022-02-06 PROCEDURE — 99232 SBSQ HOSP IP/OBS MODERATE 35: CPT | Performed by: INTERNAL MEDICINE

## 2022-02-06 PROCEDURE — 36415 COLL VENOUS BLD VENIPUNCTURE: CPT | Performed by: STUDENT IN AN ORGANIZED HEALTH CARE EDUCATION/TRAINING PROGRAM

## 2022-02-06 PROCEDURE — 250N000013 HC RX MED GY IP 250 OP 250 PS 637: Performed by: STUDENT IN AN ORGANIZED HEALTH CARE EDUCATION/TRAINING PROGRAM

## 2022-02-06 PROCEDURE — 250N000013 HC RX MED GY IP 250 OP 250 PS 637: Performed by: PHYSICIAN ASSISTANT

## 2022-02-06 PROCEDURE — 250N000013 HC RX MED GY IP 250 OP 250 PS 637: Performed by: INTERNAL MEDICINE

## 2022-02-06 PROCEDURE — 80053 COMPREHEN METABOLIC PANEL: CPT | Performed by: STUDENT IN AN ORGANIZED HEALTH CARE EDUCATION/TRAINING PROGRAM

## 2022-02-06 PROCEDURE — 85027 COMPLETE CBC AUTOMATED: CPT | Performed by: STUDENT IN AN ORGANIZED HEALTH CARE EDUCATION/TRAINING PROGRAM

## 2022-02-06 PROCEDURE — 120N000002 HC R&B MED SURG/OB UMMC

## 2022-02-06 RX ADMIN — Medication 40 MG: at 20:55

## 2022-02-06 RX ADMIN — DICLOFENAC SODIUM 2 G: 10 GEL TOPICAL at 16:45

## 2022-02-06 RX ADMIN — DICLOFENAC SODIUM 2 G: 10 GEL TOPICAL at 08:00

## 2022-02-06 RX ADMIN — VANCOMYCIN HYDROCHLORIDE 125 MG: KIT at 07:59

## 2022-02-06 RX ADMIN — Medication 2 PACKET: at 16:45

## 2022-02-06 RX ADMIN — Medication 1 PACKET: at 20:52

## 2022-02-06 RX ADMIN — TOPIRAMATE 100 MG: 100 TABLET, FILM COATED ORAL at 08:00

## 2022-02-06 RX ADMIN — VANCOMYCIN HYDROCHLORIDE 125 MG: KIT at 13:33

## 2022-02-06 RX ADMIN — APIXABAN 5 MG: 5 TABLET, FILM COATED ORAL at 08:00

## 2022-02-06 RX ADMIN — DICLOFENAC SODIUM 2 G: 10 GEL TOPICAL at 13:34

## 2022-02-06 RX ADMIN — TOLTERODINE TARTRATE 2 MG: 2 TABLET, FILM COATED ORAL at 20:55

## 2022-02-06 RX ADMIN — SPIRONOLACTONE 25 MG: 25 TABLET, FILM COATED ORAL at 08:00

## 2022-02-06 RX ADMIN — Medication 3 MG: at 20:55

## 2022-02-06 RX ADMIN — VORICONAZOLE 200 MG: 40 POWDER, FOR SUSPENSION ORAL at 20:55

## 2022-02-06 RX ADMIN — Medication 15 ML: at 13:33

## 2022-02-06 RX ADMIN — Medication 1 PACKET: at 08:00

## 2022-02-06 RX ADMIN — MAGNESIUM OXIDE TAB 400 MG (241.3 MG ELEMENTAL MG) 400 MG: 400 (241.3 MG) TAB at 20:55

## 2022-02-06 RX ADMIN — TOPIRAMATE 200 MG: 200 TABLET ORAL at 20:55

## 2022-02-06 RX ADMIN — DICLOFENAC SODIUM 2 G: 10 GEL TOPICAL at 20:53

## 2022-02-06 RX ADMIN — Medication 40 MG: at 07:59

## 2022-02-06 RX ADMIN — TOLTERODINE TARTRATE 2 MG: 2 TABLET, FILM COATED ORAL at 08:00

## 2022-02-06 RX ADMIN — MAGNESIUM OXIDE TAB 400 MG (241.3 MG ELEMENTAL MG) 400 MG: 400 (241.3 MG) TAB at 08:00

## 2022-02-06 RX ADMIN — VENLAFAXINE 75 MG: 75 TABLET ORAL at 20:55

## 2022-02-06 RX ADMIN — Medication 7.5 MG: at 07:59

## 2022-02-06 RX ADMIN — METOPROLOL TARTRATE 75 MG: 50 TABLET, FILM COATED ORAL at 20:55

## 2022-02-06 RX ADMIN — METOPROLOL TARTRATE 75 MG: 50 TABLET, FILM COATED ORAL at 08:00

## 2022-02-06 RX ADMIN — VANCOMYCIN HYDROCHLORIDE 125 MG: KIT at 20:55

## 2022-02-06 RX ADMIN — APIXABAN 5 MG: 5 TABLET, FILM COATED ORAL at 20:55

## 2022-02-06 RX ADMIN — VORICONAZOLE 200 MG: 40 POWDER, FOR SUSPENSION ORAL at 07:59

## 2022-02-06 RX ADMIN — VENLAFAXINE 75 MG: 75 TABLET ORAL at 08:00

## 2022-02-06 RX ADMIN — Medication 2 PACKET: at 08:00

## 2022-02-06 RX ADMIN — VANCOMYCIN HYDROCHLORIDE 125 MG: KIT at 16:45

## 2022-02-06 ASSESSMENT — ACTIVITIES OF DAILY LIVING (ADL)
ADLS_ACUITY_SCORE: 18

## 2022-02-06 ASSESSMENT — MIFFLIN-ST. JEOR: SCORE: 1083.13
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
## 2022-02-06 NOTE — PLAN OF CARE
"Assumed Cares [id number]    /86 (BP Location: Left arm)   Pulse 103   Temp (!) 96.5  F (35.8  C) (Oral)   Resp 18   Ht 1.6 m (5' 3\")   Wt 51.2 kg (112 lb 14 oz)   SpO2 100%   BMI 20.00 kg/m      Ad hx: Acute cholecystitis w/complications in ICU from ARDS  Med hx: Covid requiring trach, depression, HTN, and mental health history.  Pain: Denies pain. Voltaren gel applied to outer right foot d/t nerve pain.  Vitals: VSS w/tachycardia noted. Afebrile.  Neuro: A&Ox4.  Respiratory: On 2 LPM NC; satting/tolerating well. Lungs diminished throughout. Denies SOB, but reports ART.  Cardiac/Neurovascular: Tachycardia noted. Denies numbness/tingling. 1+ pedal pulses. Trace edema present in BL feet.  GI/: Intermittently incontinent; able to call and make needs known. Last BM 2/5. Denies NV.  Nutrition: Regular diet.  Activity: Assist x1 w/gait belt and walker.  Skin: No significant issues noted.  Lines: Right PIV saline locked.  Events this shift: No significant events this shift. Resting quietly between cares.    Plan: Continue to monitor and notify MD of any changes in pt status.    Lucila Orta RN on 2/5/2022 11:00 PM  "

## 2022-02-06 NOTE — PLAN OF CARE
RN assumed cares: [id number]     Vitals: VSS on 1-2L NC ex tachy & elevated BP   Neuro: AOx4   Pain: C/o mild pain on lateral side of R foot controlled w/ Voltaren gel   Activity: Up Ax1 w/ GB & walker   Cardiac: Intermittently tachy, HR 90-100s. Elevated BP at times   Respiratory: ART. Denies cough or SOB. Lungs dim in lower lobes. Increased O2 needs w/ activity. O2 monitoring at desk    GI/: Tolerating regular diet; good appetite, denies nausea. Using BSC; Soft BM x1 & voiding adequately. Meds via peg tube  Skin: No Significant deficits   LDAs: R PIV SL. LLQ PEG; dressing changed & CDI  Labs: K 3.9; Phos 4.0; Pt on oral replacement protocol for mag.      Events & Plan: Remains on covid precautions. Resting comfortably in room. Attempted to wean O2 to 0.5L NC but pt desat to high 80s while eating. No PT today. Discharge pending TCU placement. Call light within reach, able to make needs known. Will continue to monitor & follow POC

## 2022-02-06 NOTE — PROGRESS NOTES
[facility]    Medicine Progress Note - Hospitalist Service, GOLD TEAM 8    Date of Admission:  1/11/2022    Assessment & Plan     Sherly Yeung is a 56 year old female admitted on 1/11/2022 who is being transferred out of the ICU on 1/23/2022. She has a history of COVID 10/2021 requiring trachoestomy who presented on 1/11 with acute cholecystitis and cholangitis s/p biliary stent placement. Hospital course was complicated by respiratory failure on 1/17 and repeat COVID infection. She developed respiratory failure and required ICU transfer and intubation on 1/19. She was extubated on 1/21 but had recurrent hypercapnia requiring bipap at night and while sleeping ICU stay was complicated by development of new leukocytosis and C. Diff colitis with diarrhea.    Changes today  No acute changes to management  Pain well controlled  Vague transaminase elevation in the setting of recently treated cholangitis s/p stenting. Monitor for sx and repeat labs in AM. If worsening will have GI re-eval and obtain CTAP with contrast. Will contact GI regarding ERCP for stent removal on Monday if patient remain here for longer than this next week.  Clear for discharge to TCU. Will need supplemental oxygen at this time of discharge.        #ARDS secondary to COVID 19 pneumonia (improving)  #Acute hypercapnic and hypoxemic respiratory failure  - weaning oxygen. On RA-1 L   - Incentive spirometry     #C. diff colitis  Improved Diarrhea  Encouraged fiber for bulking of 1-2 soft bowel movements  Increase Fiber packets- bowel movement improved     #Aspergillus pneumonia  -Occurred during initial COVID treatment in 10/2021. Was planned for 60 day of voriconazole  -Was due to end on 1/21 but had recurrent COVID and respiratory failure  Plan:  >Plan for 2 additional weeks of voriconazole past last day of dexamethasone  >End date February 9th    #Choledocholithiasis c/b cholangitis, s/p CBD stenting  (1/12/22)  #Probable acute cholecystitis s/p transpapillary cystic duct stenting (1/12/22)  #Transaminitis   #Sepsis (tachy, leukocytosis)  - S/p ERCP, removal of stone and stent placement  - Abx for 10 days (1/12 - 1/22) - (Was cipro/flagyl, broadened to cef/flagyl due to resp failure)  - Developed worsened respiratory failure on 1/19 and antibiotics were extended to provide additional bacterial coverage  Plan:  >Stop IV cefepime, flagyl on 1/26 (14 day course)  >Follow as outpatient with Gen-surg for cholecystectomy (order on discharge placed)  >Follow up with GI approx February 2/12/22    #Paget Banegas syndrome  #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  She has a history of multiple blood clots.  She has been maintained on apixaban.  >Continue PTA apixaban  >#Anemia  >Secondary to critical illness  #HTN  >Up-titrating to control tachycardia, blood pressure  #Depression. Anorexia  >Venlflaxine, Abilify, topiramate     # Right foot pain  Xray shows no evidence fo acute fracture  Trial topical capsaicin              Diet: Regular Diet Adult  Adult Formula Bolus Feeding: TID Osmolite 1.5; Route: Gastrostomy; 3; Can(s); Medication - Feeding Tube Flush Frequency: At least 15-30 mL water before and after medication administration and with tube clogging; Amount to Send (Nutrition use): 3 c...    DVT Prophylaxis: DOAC  Scott Catheter: Not present  Central Lines: None  Cardiac Monitoring: None  Code Status: Full Code      Disposition Plan   Expected Discharge: 02/06/2022     Anticipated discharge location:  Awaiting care coordination huddle  Delays:     Placement - TCU  Covid Test Positive            The patient's care was discussed with the Bedside Nurse and Patient.    Sunny Montaño MD  Hospitalist Service, [address]  Securely message with the Vocera Web Console (learn more here)  Text page via  [facility] Paging/Directory   Please see signed in provider for up to date coverage information  ______________________________________________________________________    Interval History   No fever chills  No AB pain   Improved diarrhea  No CP   Mild SOB with exercise       Data reviewed today: I reviewed all medications, new labs and imaging results over the last 24 hours. I personally reviewed no images or EKG's today.    Physical Exam   Vital Signs: Temp: 97.3  F (36.3  C) Temp src: Oral BP: (!) 145/87 Pulse: 88   Resp: 18 SpO2: 96 % O2 Device: Nasal cannula Oxygen Delivery: 1.5 LPM  Weight: 118 lbs 9.72 oz     Physical Exam  Constitutional:       Appearance: Normal appearance. She is normal weight.   HENT:      Head: Normocephalic.      Mouth/Throat:      Mouth: Mucous membranes are moist.      Pharynx: Oropharynx is clear.   Eyes:      Extraocular Movements: Extraocular movements intact.      Conjunctiva/sclera: Conjunctivae normal.      Pupils: Pupils are equal, round, and reactive to light.   Cardiovascular:      Rate and Rhythm: Normal rate and regular rhythm.      Pulses: Normal pulses.      Heart sounds: Normal heart sounds.   Pulmonary:      Effort: Pulmonary effort is normal.      Breath sounds: Normal breath sounds.   Abdominal:      General: Bowel sounds are normal.      Palpations: Abdomen is soft.   Musculoskeletal:         General: Tenderness present.      Cervical back: Normal range of motion and neck supple.      Right lower leg: No edema.   Skin:     General: Skin is warm and dry.   Neurological:      General: No focal deficit present.      Mental Status: She is alert.   Psychiatric:         Mood and Affect: Mood normal.         Behavior: Behavior normal.           Data   Recent Labs   Lab 02/06/22  0652 02/05/22  0803 02/05/22  0633 02/04/22  0621   WBC 7.8  --  9.6 8.3   HGB 9.6*  --  9.7* 9.9*   MCV 99  --  98 98     --  389 396     --  139 142   POTASSIUM 3.9  --  3.9 3.8   CHLORIDE  108  --  108 111*   CO2 28  --  24 26   BUN 17  --  16 14   CR 0.52  --  0.48* 0.44*   ANIONGAP 6  --  7 5   TERESA 9.4  --  9.4 9.3   GLC 88 99 108* 87   ALBUMIN 2.6*  --  2.6* 2.6*   PROTTOTAL 6.6*  --  6.8 6.8   BILITOTAL 0.9  --  0.3 0.5   ALKPHOS 178*  --  165* 159*   *  --  112* 88*   AST 88*  --  76* 66*     No results found for this or any previous visit (from the past 24 hour(s)).  Medications     dextrose Stopped (01/27/22 2040)     - MEDICATION INSTRUCTIONS -         apixaban ANTICOAGULANT  5 mg Oral or Feeding Tube BID     ARIPiprazole  7.5 mg Oral or Feeding Tube Daily     banatrol plus  1 packet Per Feeding Tube BID     capsaicin   Topical TID     diclofenac  2 g Topical 4x Daily     magnesium oxide  400 mg Oral BID     metoprolol tartrate  75 mg Oral BID     multivitamins w/minerals  15 mL Per Feeding Tube Daily     pantoprazole  40 mg Oral BID     protein modular  2 packet Per Feeding Tube BID     sodium chloride (PF)  3 mL Intracatheter Q8H     spironolactone  25 mg Oral Daily     tolterodine  2 mg Oral or Feeding Tube BID     topiramate  100 mg Oral Daily     topiramate  200 mg Oral or Feeding Tube QPM     vancomycin  125 mg Oral or Feeding Tube 4x Daily     venlafaxine  75 mg Oral or Feeding Tube BID     voriconazole  200 mg Oral or Feeding Tube Q12H TANNER

## 2022-02-07 ENCOUNTER — APPOINTMENT (OUTPATIENT)
Dept: OCCUPATIONAL THERAPY | Facility: CLINIC | Age: 57
DRG: 871 | End: 2022-02-07
Payer: MEDICARE

## 2022-02-07 LAB
ALBUMIN SERPL-MCNC: 2.8 G/DL (ref 3.4–5)
ALP SERPL-CCNC: 204 U/L (ref 40–150)
ALT SERPL W P-5'-P-CCNC: 138 U/L (ref 0–50)
ANION GAP SERPL CALCULATED.3IONS-SCNC: 4 MMOL/L (ref 3–14)
AST SERPL W P-5'-P-CCNC: 84 U/L (ref 0–45)
BILIRUB SERPL-MCNC: 0.2 MG/DL (ref 0.2–1.3)
BUN SERPL-MCNC: 14 MG/DL (ref 7–30)
CALCIUM SERPL-MCNC: 9.4 MG/DL (ref 8.5–10.1)
CHLORIDE BLD-SCNC: 109 MMOL/L (ref 94–109)
CO2 SERPL-SCNC: 27 MMOL/L (ref 20–32)
CREAT SERPL-MCNC: 0.47 MG/DL (ref 0.52–1.04)
ERYTHROCYTE [DISTWIDTH] IN BLOOD BY AUTOMATED COUNT: 18.1 % (ref 10–15)
GFR SERPL CREATININE-BSD FRML MDRD: >90 ML/MIN/1.73M2
GLUCOSE BLD-MCNC: 90 MG/DL (ref 70–99)
HCT VFR BLD AUTO: 33.3 % (ref 35–47)
HGB BLD-MCNC: 9.8 G/DL (ref 11.7–15.7)
MAGNESIUM SERPL-MCNC: 2 MG/DL (ref 1.8–2.6)
MCH RBC QN AUTO: 28.7 PG (ref 26.5–33)
MCHC RBC AUTO-ENTMCNC: 29.4 G/DL (ref 31.5–36.5)
MCV RBC AUTO: 98 FL (ref 78–100)
PHOSPHATE SERPL-MCNC: 3.7 MG/DL (ref 2.5–4.5)
PLATELET # BLD AUTO: 370 10E3/UL (ref 150–450)
POTASSIUM BLD-SCNC: 4 MMOL/L (ref 3.4–5.3)
PROT SERPL-MCNC: 6.8 G/DL (ref 6.8–8.8)
RBC # BLD AUTO: 3.41 10E6/UL (ref 3.8–5.2)
SODIUM SERPL-SCNC: 140 MMOL/L (ref 133–144)
WBC # BLD AUTO: 8.4 10E3/UL (ref 4–11)

## 2022-02-07 PROCEDURE — 250N000013 HC RX MED GY IP 250 OP 250 PS 637: Performed by: STUDENT IN AN ORGANIZED HEALTH CARE EDUCATION/TRAINING PROGRAM

## 2022-02-07 PROCEDURE — 80053 COMPREHEN METABOLIC PANEL: CPT | Performed by: STUDENT IN AN ORGANIZED HEALTH CARE EDUCATION/TRAINING PROGRAM

## 2022-02-07 PROCEDURE — 250N000013 HC RX MED GY IP 250 OP 250 PS 637: Performed by: INTERNAL MEDICINE

## 2022-02-07 PROCEDURE — 84100 ASSAY OF PHOSPHORUS: CPT | Performed by: INTERNAL MEDICINE

## 2022-02-07 PROCEDURE — 250N000013 HC RX MED GY IP 250 OP 250 PS 637: Performed by: PHYSICIAN ASSISTANT

## 2022-02-07 PROCEDURE — 97110 THERAPEUTIC EXERCISES: CPT | Mod: GO

## 2022-02-07 PROCEDURE — 36415 COLL VENOUS BLD VENIPUNCTURE: CPT | Performed by: STUDENT IN AN ORGANIZED HEALTH CARE EDUCATION/TRAINING PROGRAM

## 2022-02-07 PROCEDURE — 99232 SBSQ HOSP IP/OBS MODERATE 35: CPT | Performed by: INTERNAL MEDICINE

## 2022-02-07 PROCEDURE — 83735 ASSAY OF MAGNESIUM: CPT | Performed by: INTERNAL MEDICINE

## 2022-02-07 PROCEDURE — 97535 SELF CARE MNGMENT TRAINING: CPT | Mod: GO

## 2022-02-07 PROCEDURE — 120N000002 HC R&B MED SURG/OB UMMC

## 2022-02-07 PROCEDURE — 85027 COMPLETE CBC AUTOMATED: CPT | Performed by: STUDENT IN AN ORGANIZED HEALTH CARE EDUCATION/TRAINING PROGRAM

## 2022-02-07 RX ADMIN — VENLAFAXINE 75 MG: 75 TABLET ORAL at 09:31

## 2022-02-07 RX ADMIN — TOLTERODINE TARTRATE 2 MG: 2 TABLET, FILM COATED ORAL at 09:32

## 2022-02-07 RX ADMIN — Medication 40 MG: at 20:56

## 2022-02-07 RX ADMIN — VENLAFAXINE 75 MG: 75 TABLET ORAL at 20:57

## 2022-02-07 RX ADMIN — VORICONAZOLE 200 MG: 40 POWDER, FOR SUSPENSION ORAL at 20:56

## 2022-02-07 RX ADMIN — CAPSAICIN: 0.75 CREAM TOPICAL at 19:56

## 2022-02-07 RX ADMIN — VANCOMYCIN HYDROCHLORIDE 125 MG: KIT at 09:31

## 2022-02-07 RX ADMIN — Medication 2 PACKET: at 16:57

## 2022-02-07 RX ADMIN — VORICONAZOLE 200 MG: 40 POWDER, FOR SUSPENSION ORAL at 09:31

## 2022-02-07 RX ADMIN — TOPIRAMATE 100 MG: 100 TABLET, FILM COATED ORAL at 09:31

## 2022-02-07 RX ADMIN — DICLOFENAC SODIUM 2 G: 10 GEL TOPICAL at 09:30

## 2022-02-07 RX ADMIN — METOPROLOL TARTRATE 75 MG: 50 TABLET, FILM COATED ORAL at 09:31

## 2022-02-07 RX ADMIN — TOPIRAMATE 200 MG: 200 TABLET ORAL at 20:56

## 2022-02-07 RX ADMIN — Medication 7.5 MG: at 09:32

## 2022-02-07 RX ADMIN — SPIRONOLACTONE 25 MG: 25 TABLET, FILM COATED ORAL at 09:32

## 2022-02-07 RX ADMIN — APIXABAN 5 MG: 5 TABLET, FILM COATED ORAL at 20:56

## 2022-02-07 RX ADMIN — METOPROLOL TARTRATE 75 MG: 50 TABLET, FILM COATED ORAL at 20:57

## 2022-02-07 RX ADMIN — TOLTERODINE TARTRATE 2 MG: 2 TABLET, FILM COATED ORAL at 20:56

## 2022-02-07 RX ADMIN — MAGNESIUM OXIDE TAB 400 MG (241.3 MG ELEMENTAL MG) 400 MG: 400 (241.3 MG) TAB at 09:31

## 2022-02-07 RX ADMIN — Medication 15 ML: at 11:31

## 2022-02-07 RX ADMIN — Medication 2 PACKET: at 09:47

## 2022-02-07 RX ADMIN — Medication 1 PACKET: at 09:47

## 2022-02-07 RX ADMIN — Medication 1 PACKET: at 20:56

## 2022-02-07 RX ADMIN — APIXABAN 5 MG: 5 TABLET, FILM COATED ORAL at 09:32

## 2022-02-07 RX ADMIN — DICLOFENAC SODIUM 2 G: 10 GEL TOPICAL at 11:31

## 2022-02-07 RX ADMIN — Medication 40 MG: at 09:31

## 2022-02-07 ASSESSMENT — ACTIVITIES OF DAILY LIVING (ADL)
ADLS_ACUITY_SCORE: 18

## 2022-02-07 ASSESSMENT — MIFFLIN-ST. JEOR: SCORE: 1061.26

## 2022-02-07 NOTE — PROGRESS NOTES
Care Management Follow Up    Length of Stay (days): 26  Expected Discharge Date: 02/06/2022  Concerns to be Addressed: discharge planning     Patient plan of care discussed at interdisciplinary rounds: Yes  Anticipated Discharge Disposition: Transitional Care  Anticipated Discharge Services:    Anticipated Discharge DME:    Patient/family educated on Medicare website which has current facility and service quality ratings:  (No as pt admitted from a facility and has a bed hold)  Education Provided on the Discharge Plan:    Patient/Family in Agreement with the Plan: yes  Referrals Placed by CM/SW: Post Acute Facilities    [facility]  [address] [facility] [address]  Phone: [phone]  Fax: [phone]  2/7: New referral sent; patient declined due to behaviors.    janessa [facility]wood   [address] [address] [phone] (F) [phone]    [facility]   (Ph: [phone], Admissions: [phone], F: [phone])    [facility]  [address] [place] [address]  (p) [phone]    [facility]  [address] [place] [address]    [facility]  [address] [facility] [address] [phone]    [facility] (Ph: [phone], F: [phone])    Private pay costs discussed: insurance costs co-pays  Additional Information:  SW resent TCU referral to [facility] via [employer]. SW called TCU to request update on any barriers to re-admission. They advised she was denied due to behaviors.    SW met with pt at bedside to provide the medicare.gov printout and request that pt provide TCU choices so SW can make referral. Pt provided her choices to SW. SW made referrals, as outlined above.     will continue to follow for discharge planning, psychosocial support, resources, and to advocate on behalf of patient.    JORGITO Valencia, LICSW  [address] Social  Worker  [email or web address]  Office: [phone]   Pager: [phone]    Reny Dawson, DEBISW

## 2022-02-07 NOTE — PLAN OF CARE
"Assumed Cares 5640-6273    BP (!) 146/82 (BP Location: Left arm)   Pulse 93   Temp (!) 96.4  F (35.8  C) (Oral)   Resp 16   Ht 1.6 m (5' 3\")   Wt 52.4 kg (115 lb 8.3 oz)   SpO2 100%   BMI 20.46 kg/m      Ad hx: Acute cholecystitis w/complications in ICU from ARDS.  Med hx: Covid requiring trach, depression, HTN, and mental health history.  Pain: Denies pain. Voltaren gel applied to outer right foot d/t nerve pain.  Vitals: VSS w/HTN noted. Afebrile.  Neuro: A&Ox4.  Respiratory: On 1 LPM NC; satting/tolerating well. Lungs diminished throughout. Denies SOB, but reports ART.  Cardiac/Neurovascular: HTN noted. Denies numbness/tingling. 1+ pedal pulses. Trace edema present in BL feet.  GI/: Intermittently incontinent; able to call and make needs known. Last BM 2/6. Denies NV.  Nutrition: Regular diet.  Activity: Assist x1 w/gait belt and walker.  Skin: No significant issues noted.  Lines: Right PIV saline locked.  Events this shift: No significant events this shift. Resting quietly between cares.    Plan: Continue to monitor and notify MD of any changes in pt status.    Lucila Orta RN on 2/6/2022 11:00 PM  "
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
## 2022-02-07 NOTE — PROGRESS NOTES
[facility]    Medicine Progress Note - Hospitalist Service, GOLD TEAM 8    Date of Admission:  1/11/2022    Assessment & Plan     Sherly Yeung is a 56 year old female admitted on 1/11/2022 who is being transferred out of the ICU on 1/23/2022. She has a history of COVID 10/2021 requiring trachoestomy who presented on 1/11 with acute cholecystitis and cholangitis s/p biliary stent placement. Hospital course was complicated by respiratory failure on 1/17 and repeat COVID infection. She developed respiratory failure and required ICU transfer and intubation on 1/19. She was extubated on 1/21 but had recurrent hypercapnia requiring bipap at night and while sleeping ICU stay was complicated by development of new leukocytosis and C. Diff colitis with diarrhea.    Changes today  No acute changes to management  Pain well controlled  Low level transaminates elevation. Incrementally increasing transaminitis without overt pain. If becoming symptomatic will obtain CT ab/pelvis    Completed greater than 14 days of oral vancomycin. Stopped   Monitor off abx  Discharge on Fiber packets  Continue meds through G tube as patient tolerates  Pending placement     #ARDS secondary to COVID 19 pneumonia (improving)  #Acute hypercapnic and hypoxemic respiratory failure  - weaning oxygen. On RA-1 L   - Incentive spirometry     #C. diff colitis  Improved Diarrhea  Encouraged fiber for bulking of 1-2 soft bowel movements  Increase Fiber packets- bowel movement improved     #Aspergillus pneumonia  -Occurred during initial COVID treatment in 10/2021. Was planned for 60 day of voriconazole  -Was due to end on 1/21 but had recurrent COVID and respiratory failure  Plan:  >Plan for 2 additional weeks of voriconazole past last day of dexamethasone  >End date February 9th    #Choledocholithiasis c/b cholangitis, s/p CBD stenting (1/12/22)  #Probable acute cholecystitis s/p transpapillary cystic duct  stenting (1/12/22)  #Transaminitis   #Sepsis (tachy, leukocytosis)  - S/p ERCP, removal of stone and stent placement  - Abx for 10 days (1/12 - 1/22) - (Was cipro/flagyl, broadened to cef/flagyl due to resp failure)  - Developed worsened respiratory failure on 1/19 and antibiotics were extended to provide additional bacterial coverage  Plan:  >Stop IV cefepime, flagyl on 1/26 (14 day course)  >Follow as outpatient with Gen-surg for cholecystectomy (order on discharge placed)  >Follow up with GI approx February 2/12/22    #Paget Banegas syndrome  #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  She has a history of multiple blood clots.  She has been maintained on apixaban.  >Continue PTA apixaban  >#Anemia  >Secondary to critical illness  #HTN  >Up-titrating to control tachycardia, blood pressure  #Depression. Anorexia  >Venlflaxine, Abilify, topiramate     # Right foot pain- resolved  Xray shows no evidence fo acute fracture  Trial topical capsaicin              Diet: Regular Diet Adult  Adult Formula Bolus Feeding: TID Osmolite 1.5; Route: Gastrostomy; 3; Can(s); Medication - Feeding Tube Flush Frequency: At least 15-30 mL water before and after medication administration and with tube clogging; Amount to Send (Nutrition use): 3 c...    DVT Prophylaxis: DOAC  Scott Catheter: Not present  Central Lines: None  Cardiac Monitoring: None  Code Status: Full Code      Disposition Plan   Expected Discharge:      Anticipated discharge location:  Awaiting care coordination huddle  Delays:     Placement - TCU  Covid Test Positive            The patient's care was discussed with the Bedside Nurse and Patient.    Sunny Montaño MD  Hospitalist Service, GOLD TEAM [address] [place] [address]  Securely message with the Vocera Web Console (learn more here)  Text page via [employer] Paging/Directory   Please see signed in provider for up to date  coverage information  ______________________________________________________________________    Interval History   No fever chills  No AB pain   Resolved diarrhea  No CP   Mild SOB      Data reviewed today: I reviewed all medications, new labs and imaging results over the last 24 hours. I personally reviewed no images or EKG's today.    Physical Exam   Vital Signs: Temp: 97.9  F (36.6  C) Temp src: Oral BP: (!) 161/81 Pulse: 85   Resp: 16 SpO2: 95 % O2 Device: Nasal cannula Oxygen Delivery: 1 LPM  Weight: 115 lbs 8.34 oz     Physical Exam  Constitutional:       Appearance: Normal appearance. She is normal weight.   HENT:      Head: Normocephalic.      Mouth/Throat:      Mouth: Mucous membranes are moist.      Pharynx: Oropharynx is clear.   Eyes:      Extraocular Movements: Extraocular movements intact.      Conjunctiva/sclera: Conjunctivae normal.      Pupils: Pupils are equal, round, and reactive to light.   Cardiovascular:      Rate and Rhythm: Normal rate and regular rhythm.      Pulses: Normal pulses.      Heart sounds: Normal heart sounds.   Pulmonary:      Effort: Pulmonary effort is normal.      Breath sounds: Normal breath sounds.   Abdominal:      General: Bowel sounds are normal.      Palpations: Abdomen is soft.   Musculoskeletal:         General: No tenderness.      Cervical back: Normal range of motion and neck supple.      Right lower leg: No edema.   Skin:     General: Skin is warm and dry.   Neurological:      General: No focal deficit present.      Mental Status: She is alert.   Psychiatric:         Mood and Affect: Mood normal.         Behavior: Behavior normal.           Data   Recent Labs   Lab 02/07/22  0554 02/06/22  0652 02/05/22  0803 02/05/22  0633   WBC 8.4 7.8  --  9.6   HGB 9.8* 9.6*  --  9.7*   MCV 98 99  --  98    356  --  389    142  --  139   POTASSIUM 4.0 3.9  --  3.9   CHLORIDE 109 108  --  108   CO2 27 28  --  24   BUN 14 17  --  16   CR 0.47* 0.52  --  0.48*    ANIONGAP 4 6  --  7   TERESA 9.4 9.4  --  9.4   GLC 90 88 99 108*   ALBUMIN 2.8* 2.6*  --  2.6*   PROTTOTAL 6.8 6.6*  --  6.8   BILITOTAL 0.2 0.9  --  0.3   ALKPHOS 204* 178*  --  165*   * 128*  --  112*   AST 84* 88*  --  76*     No results found for this or any previous visit (from the past 24 hour(s)).  Medications     dextrose Stopped (01/27/22 2040)     - MEDICATION INSTRUCTIONS -         apixaban ANTICOAGULANT  5 mg Oral or Feeding Tube BID     ARIPiprazole  7.5 mg Oral or Feeding Tube Daily     banatrol plus  1 packet Per Feeding Tube BID     capsaicin   Topical TID     diclofenac  2 g Topical 4x Daily     metoprolol tartrate  75 mg Oral BID     multivitamins w/minerals  15 mL Per Feeding Tube Daily     pantoprazole  40 mg Oral BID     protein modular  2 packet Per Feeding Tube BID     sodium chloride (PF)  3 mL Intracatheter Q8H     spironolactone  25 mg Oral Daily     tolterodine  2 mg Oral or Feeding Tube BID     topiramate  100 mg Oral Daily     topiramate  200 mg Oral or Feeding Tube QPM     venlafaxine  75 mg Oral or Feeding Tube BID     voriconazole  200 mg Oral or Feeding Tube Q12H TANNER
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
## 2022-02-08 ENCOUNTER — APPOINTMENT (OUTPATIENT)
Dept: PHYSICAL THERAPY | Facility: CLINIC | Age: 57
DRG: 871 | End: 2022-02-08
Payer: MEDICARE

## 2022-02-08 LAB
ALBUMIN SERPL-MCNC: 2.7 G/DL (ref 3.4–5)
ALP SERPL-CCNC: 253 U/L (ref 40–150)
ALT SERPL W P-5'-P-CCNC: 151 U/L (ref 0–50)
ANION GAP SERPL CALCULATED.3IONS-SCNC: 8 MMOL/L (ref 3–14)
AST SERPL W P-5'-P-CCNC: 99 U/L (ref 0–45)
BILIRUB SERPL-MCNC: 0.3 MG/DL (ref 0.2–1.3)
BUN SERPL-MCNC: 16 MG/DL (ref 7–30)
CALCIUM SERPL-MCNC: 9.5 MG/DL (ref 8.5–10.1)
CHLORIDE BLD-SCNC: 109 MMOL/L (ref 94–109)
CO2 SERPL-SCNC: 24 MMOL/L (ref 20–32)
CREAT SERPL-MCNC: 0.55 MG/DL (ref 0.52–1.04)
ERYTHROCYTE [DISTWIDTH] IN BLOOD BY AUTOMATED COUNT: 18 % (ref 10–15)
GFR SERPL CREATININE-BSD FRML MDRD: >90 ML/MIN/1.73M2
GLUCOSE BLD-MCNC: 85 MG/DL (ref 70–99)
HCT VFR BLD AUTO: 34.5 % (ref 35–47)
HGB BLD-MCNC: 10.2 G/DL (ref 11.7–15.7)
MAGNESIUM SERPL-MCNC: 2 MG/DL (ref 1.8–2.6)
MCH RBC QN AUTO: 29.6 PG (ref 26.5–33)
MCHC RBC AUTO-ENTMCNC: 29.6 G/DL (ref 31.5–36.5)
MCV RBC AUTO: 100 FL (ref 78–100)
PHOSPHATE SERPL-MCNC: 4.1 MG/DL (ref 2.5–4.5)
PLATELET # BLD AUTO: 392 10E3/UL (ref 150–450)
POTASSIUM BLD-SCNC: 4 MMOL/L (ref 3.4–5.3)
PROT SERPL-MCNC: 6.8 G/DL (ref 6.8–8.8)
RBC # BLD AUTO: 3.45 10E6/UL (ref 3.8–5.2)
SODIUM SERPL-SCNC: 141 MMOL/L (ref 133–144)
WBC # BLD AUTO: 8.1 10E3/UL (ref 4–11)

## 2022-02-08 PROCEDURE — 250N000013 HC RX MED GY IP 250 OP 250 PS 637: Performed by: PHYSICIAN ASSISTANT

## 2022-02-08 PROCEDURE — 97530 THERAPEUTIC ACTIVITIES: CPT | Mod: GP

## 2022-02-08 PROCEDURE — 250N000013 HC RX MED GY IP 250 OP 250 PS 637: Performed by: STUDENT IN AN ORGANIZED HEALTH CARE EDUCATION/TRAINING PROGRAM

## 2022-02-08 PROCEDURE — 250N000013 HC RX MED GY IP 250 OP 250 PS 637: Performed by: INTERNAL MEDICINE

## 2022-02-08 PROCEDURE — 80053 COMPREHEN METABOLIC PANEL: CPT | Performed by: STUDENT IN AN ORGANIZED HEALTH CARE EDUCATION/TRAINING PROGRAM

## 2022-02-08 PROCEDURE — 250N000011 HC RX IP 250 OP 636: Performed by: INTERNAL MEDICINE

## 2022-02-08 PROCEDURE — 82040 ASSAY OF SERUM ALBUMIN: CPT | Performed by: STUDENT IN AN ORGANIZED HEALTH CARE EDUCATION/TRAINING PROGRAM

## 2022-02-08 PROCEDURE — 85027 COMPLETE CBC AUTOMATED: CPT | Performed by: STUDENT IN AN ORGANIZED HEALTH CARE EDUCATION/TRAINING PROGRAM

## 2022-02-08 PROCEDURE — 36415 COLL VENOUS BLD VENIPUNCTURE: CPT | Performed by: STUDENT IN AN ORGANIZED HEALTH CARE EDUCATION/TRAINING PROGRAM

## 2022-02-08 PROCEDURE — 99233 SBSQ HOSP IP/OBS HIGH 50: CPT | Performed by: INTERNAL MEDICINE

## 2022-02-08 PROCEDURE — 120N000002 HC R&B MED SURG/OB UMMC

## 2022-02-08 PROCEDURE — 83735 ASSAY OF MAGNESIUM: CPT | Performed by: INTERNAL MEDICINE

## 2022-02-08 PROCEDURE — 84100 ASSAY OF PHOSPHORUS: CPT | Performed by: INTERNAL MEDICINE

## 2022-02-08 RX ORDER — MAGNESIUM SULFATE HEPTAHYDRATE 40 MG/ML
2 INJECTION, SOLUTION INTRAVENOUS ONCE
Status: COMPLETED | OUTPATIENT
Start: 2022-02-08 | End: 2022-02-08

## 2022-02-08 RX ADMIN — CAPSAICIN: 0.75 CREAM TOPICAL at 09:42

## 2022-02-08 RX ADMIN — Medication 7.5 MG: at 09:40

## 2022-02-08 RX ADMIN — Medication 15 ML: at 12:30

## 2022-02-08 RX ADMIN — VENLAFAXINE 75 MG: 75 TABLET ORAL at 20:10

## 2022-02-08 RX ADMIN — MAGNESIUM SULFATE IN WATER 2 G: 40 INJECTION, SOLUTION INTRAVENOUS at 12:29

## 2022-02-08 RX ADMIN — Medication 1 PACKET: at 20:12

## 2022-02-08 RX ADMIN — SPIRONOLACTONE 25 MG: 25 TABLET, FILM COATED ORAL at 09:43

## 2022-02-08 RX ADMIN — TOLTERODINE TARTRATE 2 MG: 2 TABLET, FILM COATED ORAL at 20:10

## 2022-02-08 RX ADMIN — METOPROLOL TARTRATE 75 MG: 50 TABLET, FILM COATED ORAL at 20:10

## 2022-02-08 RX ADMIN — TOLTERODINE TARTRATE 2 MG: 2 TABLET, FILM COATED ORAL at 09:40

## 2022-02-08 RX ADMIN — Medication 40 MG: at 09:40

## 2022-02-08 RX ADMIN — APIXABAN 5 MG: 5 TABLET, FILM COATED ORAL at 20:10

## 2022-02-08 RX ADMIN — TOPIRAMATE 200 MG: 200 TABLET ORAL at 20:10

## 2022-02-08 RX ADMIN — Medication 2 PACKET: at 09:42

## 2022-02-08 RX ADMIN — Medication 1 PACKET: at 09:42

## 2022-02-08 RX ADMIN — Medication 2 PACKET: at 16:22

## 2022-02-08 RX ADMIN — CAPSAICIN: 0.75 CREAM TOPICAL at 20:29

## 2022-02-08 RX ADMIN — Medication 40 MG: at 20:10

## 2022-02-08 RX ADMIN — VORICONAZOLE 200 MG: 40 POWDER, FOR SUSPENSION ORAL at 20:09

## 2022-02-08 RX ADMIN — VORICONAZOLE 200 MG: 40 POWDER, FOR SUSPENSION ORAL at 09:40

## 2022-02-08 RX ADMIN — TOPIRAMATE 100 MG: 100 TABLET, FILM COATED ORAL at 09:41

## 2022-02-08 RX ADMIN — METOPROLOL TARTRATE 75 MG: 50 TABLET, FILM COATED ORAL at 09:40

## 2022-02-08 RX ADMIN — CAPSAICIN: 0.75 CREAM TOPICAL at 14:27

## 2022-02-08 RX ADMIN — VENLAFAXINE 75 MG: 75 TABLET ORAL at 09:40

## 2022-02-08 RX ADMIN — Medication 3 MG: at 20:35

## 2022-02-08 RX ADMIN — APIXABAN 5 MG: 5 TABLET, FILM COATED ORAL at 09:42

## 2022-02-08 ASSESSMENT — ACTIVITIES OF DAILY LIVING (ADL)
ADLS_ACUITY_SCORE: 20
ADLS_ACUITY_SCORE: 18
ADLS_ACUITY_SCORE: 18
ADLS_ACUITY_SCORE: 20
ADLS_ACUITY_SCORE: 18
ADLS_ACUITY_SCORE: 20
ADLS_ACUITY_SCORE: 18
ADLS_ACUITY_SCORE: 20
ADLS_ACUITY_SCORE: 20
ADLS_ACUITY_SCORE: 18
ADLS_ACUITY_SCORE: 18
ADLS_ACUITY_SCORE: 20

## 2022-02-08 ASSESSMENT — MIFFLIN-ST. JEOR
SCORE: 1061.13
SCORE: 1063.13

## 2022-02-08 NOTE — PLAN OF CARE
Assumed cares 9705-3768. A&O and able to make needs known. VSS on 1 LPM via nc. Pt does endorse some mild pain to R foot. Capsaicin cream applied. Gtube clamped. All medications given through gtube per pt preference. Good appetite at dinner. Ambulates with 1 assist to bedside commode. Continue with plan of care.

## 2022-02-08 NOTE — CONSULTS
Brief Administrative Pancreaticobiliary Note:    02/08/22    Patient not seen on this date, we have been peripherally engaged with this patient throughout her stay.     At present, we would elect to keep our plan as is for completing scheduled ERCP on 2/14/22 with Dr. Wilson. If bilirubin rises sharply, patient develops fever/abdominal pain in the interim, we would re-evaluate our timeline.     Please page the pancreaticobiliary GI fellow on call 48 hours prior to scheduled ERCP to determine if any change to anticoagulation is required prior to her procedure.     Our service remains available, please reach out with questions or concerns.     Case discussed with both Dr. Clarke (outgoing advanced GI staff) and Dr. Mccall (incoming advanced GI staff).    Rick Lua MD, PGY4  Gastroenterology Fellow  [facility]  See [facility] for GI on-call information

## 2022-02-08 NOTE — PLAN OF CARE
3387-8527    AOX4. AVSS on 1L NC. Meds given crushed & given via G-Tube per pt preference due to difficulty swallowing pills. Capsacian cream to R foot for pain. Barrier cream applied to coccyx for blanchable redness. No stool this shift. Ambulates with 1 assist to commode, but pt used bedpan tonight. Bipap on at HS-oncoming RN aware. K+ & P=WNL, recheck scheduled for AM. Magnesium=2.0, today was the last dose of the replacement protocol, recheck scheduled for AM. Cont with poc.     Problem: Adult Inpatient Plan of Care  Goal: Plan of Care Review  Outcome: Improving  Flowsheets (Taken 2/7/2022 2119)  Plan of Care Reviewed With: patient  Goal: Patient-Specific Goal (Individualized)  Outcome: Improving  Goal: Absence of Hospital-Acquired Illness or Injury  Outcome: Improving  Goal: Optimal Comfort and Wellbeing  Outcome: Improving  Goal: Readiness for Transition of Care  Outcome: Improving     Problem: Discharge Planning  Goal: Discharge Planning (Adult, OB, Behavioral, Peds)  Outcome: Improving

## 2022-02-08 NOTE — PLAN OF CARE
Assumed cares 4664-8695    Pt sleeping between cares. Ax1 to get on bedpan overnight, no BM overnight. Barrier cream reapplied overnight. PIV SL. Wearing 1L NC overnight. G tube clamped between medication administration, no tube feeds running overnight.     Continue to provide cares per poc.

## 2022-02-08 NOTE — PLAN OF CARE
Assumed cares: 6643-1187  Vitals: VSS on 1 L of O2 via NC  Pain: Pt denies any pain  Neuro: A&Ox4  Cardiac: WDL- intermittent tachycardic  Respiratory: WDL except dyspnea on exertion; diminished lower lung sounds  GI/: Incontinence; uses bedside commode and bed pan  Skin: WDL- boots on both feet, arthritic cream applied to R foot  IV/Drains: R PIV- saline locked; J tube- clamped except for medication administration  Activity: Assist x1 with walker  Behavior: Pt is calm and cooperative    Plan of Care: Follow patient plan of care
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
## 2022-02-08 NOTE — PROGRESS NOTES
Care Management Follow Up    Length of Stay (days): 27  Expected Discharge Date: 02/06/2022  Concerns to be Addressed: discharge planning     Patient plan of care discussed at interdisciplinary rounds: Yes  Anticipated Discharge Disposition: Transitional Care  Anticipated Discharge Services:  PT/OT  Anticipated Discharge DME: TBD  Patient/family educated on Medicare website which has current facility and service quality ratings:  (No as pt admitted from a facility and has a bed hold)  Education Provided on the Discharge Plan:    Patient/Family in Agreement with the Plan: yes    Referrals Placed by CM/SW: Post Acute Facilities     [facility]   [address] [place] [address]  [phone] (f) [phone]  2/7: Referral made.  2/8: SW called to f/u on referral. No openings this week.     [facility]   (Ph: [phone], Admissions: [phone], F: [phone])  2/8: SW called and LVM for Joan in admissions to f/u on referral. No openings until Friday.  2/7: Referral made.     [facility] [id number] [place] [address] (p) [phone]  2/8: SW called and LVM for Tea/Director of Nurses.  2/7: Referral made.     [facility]  [address] [facility] [address]  [phone]  2/8: SW called and LVM for Mary with admissions to f/u on referral.  2/7: Referral made.     [facility]   (Ph: [phone], F: [phone])  2/8: SW called to f/u with admissions, who requested that SW resend.  2/7: Referral made.    Inactive Referrals:  [facility]  [address] [facility] [address]  Phone: [phone]  Fax: [phone]  2/7: New referral sent; patient declined d/t unspecified behaviors.    [facility]  [address] [place] [address] [phone]  2/8: Declined d/t COVID vaccination status.  2/7: Referral made.     Private pay costs discussed: insurance costs out of pocket expenses    Additional  Information:  SW called facilities to f/u on TCU referrals for pt as indicated above in bold    SW spoke with pt, who does not want to return to previous placement. SW will continue to pursue TCU for pt.     will continue to follow for discharge planning, psychosocial support, resources, and to advocate on behalf of patient.    JORGITO Valencia, [facility]  [address]   [email or web address]  Office: [phone]   Pager: [phone]

## 2022-02-08 NOTE — PROGRESS NOTES
[facility]    Medicine Progress Note - Hospitalist Service, GOLD TEAM 8    Date of Admission:  1/11/2022    Assessment & Plan            Sherly Yeung is a 56 year old female admitted on 1/11/2022 who is being transferred out of the ICU on 1/23/2022. She has a history of COVID 10/2021 requiring trachoestomy who presented on 1/11 with acute cholecystitis and cholangitis s/p biliary stent placement. Hospital course was complicated by respiratory failure on 1/17 and repeat COVID infection. She developed respiratory failure and required ICU transfer and intubation on 1/19. She was extubated on 1/21 but had recurrent hypercapnia requiring bipap at night and while sleeping ICU stay was complicated by development of new leukocytosis and C. Diff colitis with diarrhea.    No acute events overPM.  No changes today.  Pain well-controlled. Patient reporting persistent right lateral foot pain.  Low level transaminates elevation. Incrementally increasing transaminitis without overt pain. Patient has gastroenterology follow-up recommended. Will likely re-consult.  Completed greater than 14 days of oral vancomycin [Stopped]   Monitor off abx  Discharge on Fiber packets  Continue meds through G tube as patient tolerates  Pending placement     #ARDS secondary to COVID 19 pneumonia (improving)  #Acute hypercapnic and hypoxemic respiratory failure  -Weaning off oxygen. On RA-1 L [increased on exertion]  - Incentive spirometry    #C. diff colitis  -Improved Diarrhea  -Encouraged fiber for bulking of 1-2 soft bowel movements  -Increase Fiber packets (bowel movement improved)    #Aspergillus pneumonia  -Occurred during initial COVID treatment in 10/2021. Was planned for 60 day of voriconazole  -Was due to end on 1/21 but had recurrent COVID and respiratory failure  Plan:  >Plan for 2 additional weeks of voriconazole past last day of dexamethasone  >End date February 9th    #Choledocholithiasis  c/b cholangitis, s/p CBD stenting (1/12/22)  #Probable acute cholecystitis s/p transpapillary cystic duct stenting (1/12/22)  #Transaminitis   #Sepsis (tachy, leukocytosis)  -S/p ERCP, removal of stone and stent placement  -Abx for 10 days (1/12 - 1/22) - (Was cipro/flagyl, broadened to cef/flagyl due to resp failure)  -Developed worsened respiratory failure on 1/19 and antibiotics were extended to provide additional bacterial coverage  Plan:  >Stop IV cefepime, flagyl on 1/26 (14 day course)  >Follow as outpatient with Gen-surg for cholecystectomy (order on discharge placed)  >Follow up with GI approx February 2/12/22    #Paget Banegas syndrome  #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  She has a history of multiple blood clots.  She has been maintained on apixaban.  >Continue PTA apixaban  >#Anemia  >Secondary to critical illness  #HTN  >Up-titrating to control tachycardia, blood pressure  #Depression. Anorexia  >Venlflaxine, Abilify, topiramate     # Right foot pain- resolved  Xray shows no evidence fo acute fracture  Trial topical capsaicin  Consider plantar fasciitis               Diet: Regular Diet Adult  Adult Formula Bolus Feeding: TID Osmolite 1.5; Route: Gastrostomy; 3; Can(s); Medication - Feeding Tube Flush Frequency: At least 15-30 mL water before and after medication administration and with tube clogging; Amount to Send (Nutrition use): 3 c...    DVT Prophylaxis: DOAC  Scott Catheter: Not present  Central Lines: None  Cardiac Monitoring: None  Code Status: Full Code      Disposition Plan   Expected Discharge: Pending placement; previous facility declined  Anticipated discharge location:  Awaiting care coordination huddle  Delays:     Placement - TCU  Covid Test Positive            The patient's care was discussed with the Bedside Nurse, Care Coordinator/ and Patient.    Luis Manuel Nunes DO  Hospitalist Service, [address]  [facility]  Securely message with the [employer] Web Console (learn more here)  Text page via [facility] Paging/Directory   Please see signed in provider for up to date coverage information  ______________________________________________________________________    Interval History   Patient reports feeling improved.  Cough, work of breathing improved.  Per nursing staff, O2 requirements increased with exertion.  Patient denies abdominal pain, nausea, vomiting.  Patient reports bowel movements are better formed.    Data reviewed today: I reviewed all medications, new labs and imaging results over the last 24 hours. I personally reviewed no images or EKG's today.    Physical Exam   Vital Signs: Temp: (!) 96.1  F (35.6  C) Temp src: Oral BP: (!) 133/92 Pulse: 105   Resp: 12 SpO2: 100 % O2 Device: Nasal cannula Oxygen Delivery: 1 LPM  Weight: 110 lbs 10.73 oz     Physical Exam  Constitutional:       Appearance: Normal appearance. She is normal weight.   HENT:      Head: Normocephalic and atraumatic.      Mouth/Throat:      Mouth: Mucous membranes are moist.      Pharynx: Oropharynx is clear.   Eyes:      Extraocular Movements: Extraocular movements intact.      Conjunctiva/sclera: Conjunctivae normal.      Pupils: Pupils are equal, round, and reactive to light.   Cardiovascular:      Rate and Rhythm: Normal rate and regular rhythm.      Pulses: Normal pulses.      Heart sounds: Normal heart sounds.   Pulmonary:      Effort: Pulmonary effort is normal.      Breath sounds: Normal breath sounds.   Abdominal:      General: Abdomen is flat. Bowel sounds are normal.      Palpations: Abdomen is soft.   Skin:     General: Skin is warm and dry.   Neurological:      General: No focal deficit present.      Mental Status: She is alert.   Psychiatric:      Comments: Flat affect           Data   Recent Labs   Lab 02/08/22  0602 02/07/22  0554 02/06/22  0652   WBC 8.1 8.4 7.8   HGB 10.2* 9.8* 9.6*     98 99    370 356    140 142   POTASSIUM 4.0 4.0 3.9   CHLORIDE 109 109 108   CO2 24 27 28   BUN 16 14 17   CR 0.55 0.47* 0.52   ANIONGAP 8 4 6   TERESA 9.5 9.4 9.4   GLC 85 90 88   ALBUMIN 2.7* 2.8* 2.6*   PROTTOTAL 6.8 6.8 6.6*   BILITOTAL 0.3 0.2 0.9   ALKPHOS 253* 204* 178*   * 138* 128*   AST 99* 84* 88*     No results found for this or any previous visit (from the past 24 hour(s)).  Medications     dextrose Stopped (01/27/22 2040)     - MEDICATION INSTRUCTIONS -         apixaban ANTICOAGULANT  5 mg Oral or Feeding Tube BID     ARIPiprazole  7.5 mg Oral or Feeding Tube Daily     banatrol plus  1 packet Per Feeding Tube BID     capsaicin   Topical TID     diclofenac  2 g Topical 4x Daily     magnesium sulfate  2 g Intravenous Once     metoprolol tartrate  75 mg Oral BID     multivitamins w/minerals  15 mL Per Feeding Tube Daily     pantoprazole  40 mg Oral BID     protein modular  2 packet Per Feeding Tube BID     sodium chloride (PF)  3 mL Intracatheter Q8H     spironolactone  25 mg Oral Daily     tolterodine  2 mg Oral or Feeding Tube BID     topiramate  100 mg Oral Daily     topiramate  200 mg Oral or Feeding Tube QPM     venlafaxine  75 mg Oral or Feeding Tube BID     voriconazole  200 mg Oral or Feeding Tube Q12H TANNER

## 2022-02-09 ENCOUNTER — APPOINTMENT (OUTPATIENT)
Dept: PHYSICAL THERAPY | Facility: CLINIC | Age: 57
DRG: 871 | End: 2022-02-09
Payer: MEDICARE

## 2022-02-09 ENCOUNTER — APPOINTMENT (OUTPATIENT)
Dept: OCCUPATIONAL THERAPY | Facility: CLINIC | Age: 57
DRG: 871 | End: 2022-02-09
Payer: MEDICARE

## 2022-02-09 LAB
ALBUMIN SERPL-MCNC: 2.8 G/DL (ref 3.4–5)
ALP SERPL-CCNC: 273 U/L (ref 40–150)
ALT SERPL W P-5'-P-CCNC: 160 U/L (ref 0–50)
ANION GAP SERPL CALCULATED.3IONS-SCNC: 7 MMOL/L (ref 3–14)
AST SERPL W P-5'-P-CCNC: 101 U/L (ref 0–45)
BILIRUB SERPL-MCNC: 0.6 MG/DL (ref 0.2–1.3)
BUN SERPL-MCNC: 16 MG/DL (ref 7–30)
CALCIUM SERPL-MCNC: 9.6 MG/DL (ref 8.5–10.1)
CHLORIDE BLD-SCNC: 107 MMOL/L (ref 94–109)
CO2 SERPL-SCNC: 27 MMOL/L (ref 20–32)
CREAT SERPL-MCNC: 0.53 MG/DL (ref 0.52–1.04)
ERYTHROCYTE [DISTWIDTH] IN BLOOD BY AUTOMATED COUNT: 17.7 % (ref 10–15)
GFR SERPL CREATININE-BSD FRML MDRD: >90 ML/MIN/1.73M2
GLUCOSE BLD-MCNC: 85 MG/DL (ref 70–99)
HCT VFR BLD AUTO: 33.7 % (ref 35–47)
HGB BLD-MCNC: 10.1 G/DL (ref 11.7–15.7)
MAGNESIUM SERPL-MCNC: 2.1 MG/DL (ref 1.8–2.6)
MAGNESIUM SERPL-MCNC: 2.1 MG/DL (ref 1.8–2.6)
MCH RBC QN AUTO: 29.1 PG (ref 26.5–33)
MCHC RBC AUTO-ENTMCNC: 30 G/DL (ref 31.5–36.5)
MCV RBC AUTO: 97 FL (ref 78–100)
PHOSPHATE SERPL-MCNC: 3.8 MG/DL (ref 2.5–4.5)
PLATELET # BLD AUTO: 421 10E3/UL (ref 150–450)
POTASSIUM BLD-SCNC: 3.7 MMOL/L (ref 3.4–5.3)
PROT SERPL-MCNC: 7 G/DL (ref 6.8–8.8)
RBC # BLD AUTO: 3.47 10E6/UL (ref 3.8–5.2)
SODIUM SERPL-SCNC: 141 MMOL/L (ref 133–144)
WBC # BLD AUTO: 8.2 10E3/UL (ref 4–11)

## 2022-02-09 PROCEDURE — 36415 COLL VENOUS BLD VENIPUNCTURE: CPT | Performed by: STUDENT IN AN ORGANIZED HEALTH CARE EDUCATION/TRAINING PROGRAM

## 2022-02-09 PROCEDURE — 84100 ASSAY OF PHOSPHORUS: CPT | Performed by: HOSPITALIST

## 2022-02-09 PROCEDURE — 97530 THERAPEUTIC ACTIVITIES: CPT | Mod: GP | Performed by: REHABILITATION PRACTITIONER

## 2022-02-09 PROCEDURE — 250N000013 HC RX MED GY IP 250 OP 250 PS 637: Performed by: STUDENT IN AN ORGANIZED HEALTH CARE EDUCATION/TRAINING PROGRAM

## 2022-02-09 PROCEDURE — 250N000013 HC RX MED GY IP 250 OP 250 PS 637: Performed by: INTERNAL MEDICINE

## 2022-02-09 PROCEDURE — 97535 SELF CARE MNGMENT TRAINING: CPT | Mod: GO

## 2022-02-09 PROCEDURE — 120N000002 HC R&B MED SURG/OB UMMC

## 2022-02-09 PROCEDURE — 99233 SBSQ HOSP IP/OBS HIGH 50: CPT | Performed by: INTERNAL MEDICINE

## 2022-02-09 PROCEDURE — 83735 ASSAY OF MAGNESIUM: CPT | Performed by: INTERNAL MEDICINE

## 2022-02-09 PROCEDURE — 36415 COLL VENOUS BLD VENIPUNCTURE: CPT | Performed by: INTERNAL MEDICINE

## 2022-02-09 PROCEDURE — 82040 ASSAY OF SERUM ALBUMIN: CPT | Performed by: STUDENT IN AN ORGANIZED HEALTH CARE EDUCATION/TRAINING PROGRAM

## 2022-02-09 PROCEDURE — 85027 COMPLETE CBC AUTOMATED: CPT | Performed by: STUDENT IN AN ORGANIZED HEALTH CARE EDUCATION/TRAINING PROGRAM

## 2022-02-09 PROCEDURE — 80053 COMPREHEN METABOLIC PANEL: CPT | Performed by: STUDENT IN AN ORGANIZED HEALTH CARE EDUCATION/TRAINING PROGRAM

## 2022-02-09 RX ORDER — LIDOCAINE 40 MG/G
CREAM TOPICAL
Status: CANCELLED | OUTPATIENT
Start: 2022-02-09

## 2022-02-09 RX ORDER — INDOMETHACIN 50 MG/1
100 SUPPOSITORY RECTAL
Status: CANCELLED | OUTPATIENT
Start: 2022-02-09

## 2022-02-09 RX ADMIN — CAPSAICIN: 0.75 CREAM TOPICAL at 13:15

## 2022-02-09 RX ADMIN — APIXABAN 5 MG: 5 TABLET, FILM COATED ORAL at 10:53

## 2022-02-09 RX ADMIN — Medication 3 MG: at 22:15

## 2022-02-09 RX ADMIN — CAPSAICIN: 0.75 CREAM TOPICAL at 10:56

## 2022-02-09 RX ADMIN — METOPROLOL TARTRATE 75 MG: 50 TABLET, FILM COATED ORAL at 10:54

## 2022-02-09 RX ADMIN — TOLTERODINE TARTRATE 2 MG: 2 TABLET, FILM COATED ORAL at 22:26

## 2022-02-09 RX ADMIN — Medication 40 MG: at 22:16

## 2022-02-09 RX ADMIN — VENLAFAXINE 75 MG: 75 TABLET ORAL at 10:53

## 2022-02-09 RX ADMIN — Medication 2 PACKET: at 16:20

## 2022-02-09 RX ADMIN — VORICONAZOLE 200 MG: 40 POWDER, FOR SUSPENSION ORAL at 22:16

## 2022-02-09 RX ADMIN — METOPROLOL TARTRATE 75 MG: 50 TABLET, FILM COATED ORAL at 22:18

## 2022-02-09 RX ADMIN — VORICONAZOLE 200 MG: 40 POWDER, FOR SUSPENSION ORAL at 13:15

## 2022-02-09 RX ADMIN — TOPIRAMATE 200 MG: 200 TABLET ORAL at 22:16

## 2022-02-09 RX ADMIN — Medication 15 ML: at 13:15

## 2022-02-09 RX ADMIN — SPIRONOLACTONE 25 MG: 25 TABLET, FILM COATED ORAL at 10:53

## 2022-02-09 RX ADMIN — TOLTERODINE TARTRATE 2 MG: 2 TABLET, FILM COATED ORAL at 10:54

## 2022-02-09 RX ADMIN — Medication 40 MG: at 13:15

## 2022-02-09 RX ADMIN — TOPIRAMATE 100 MG: 100 TABLET, FILM COATED ORAL at 10:53

## 2022-02-09 RX ADMIN — VENLAFAXINE 75 MG: 75 TABLET ORAL at 22:16

## 2022-02-09 RX ADMIN — APIXABAN 5 MG: 5 TABLET, FILM COATED ORAL at 22:16

## 2022-02-09 RX ADMIN — Medication 2 PACKET: at 10:57

## 2022-02-09 RX ADMIN — Medication 7.5 MG: at 10:55

## 2022-02-09 RX ADMIN — CAPSAICIN: 0.75 CREAM TOPICAL at 22:27

## 2022-02-09 ASSESSMENT — MIFFLIN-ST. JEOR: SCORE: 1069.13

## 2022-02-09 ASSESSMENT — ACTIVITIES OF DAILY LIVING (ADL)
ADLS_ACUITY_SCORE: 20

## 2022-02-09 NOTE — PROGRESS NOTES
CLINICAL NUTRITION SERVICES - REASSESSMENT NOTE     Nutrition Prescription      Malnutrition Status:     Patient does not meet two of the established criteria necessary for diagnosing malnutrition but is at risk for malnutrition    Recommendations already ordered by Registered Dietitian (RD):  - Continue current conditional bolus feeds  - Continue Prosource BID  - Continue Banatrol BID    Future/Additional Recommendations:  PO adequacy       EVALUATION OF THE PROGRESS TOWARD GOALS   Diet:   - Regular      Nutrition Support:   Access: Gastrostomy tube ( meds and tube feeds through G-tube)  Dosing wt:  EN: Osmolite 1.5, bolus @ 1 container TID   Modules: 1 packet of Prosource BID, Banatrol BID, MVI/Minerals Certravite 15 ml daily  Provision: 1147 kcal (22 kcal/kg) and 67 g PRO (1.3 g/kg).    Water flushes: 60 ml before and after each bolus feed     - If patient consumes <50% of a meal may receive 1 can bolus tube feed       Intake:   PO: consuming % of 2-3 meals per day. Patient typically gets 1 bolus can of tube feeds after lunch.   TF: received 1-2 bolus can from 2/5-2/8.   - Average daily tube feed intake ~ 335 ml/day ( ~ 500 kcal /day + 23 gm protein)  - Visited with patient today. Patient reports tolerating PO and tube feeds. Having a fairly good po intake.     GI/Stool:  x1-2 daily stool  Rectal tube removed 1/30     NEW FINDINGS   Chart reviewed: Admitted on 1/11/2022  PMH: COVID 10/2021 requiring tracheostomy/G-tube.   - Presented on 1/11 with acute cholecystitis and cholangitis s/p ERCP with biliary stent placement 1/12/22.   - Hospital course was complicated by respiratory failure on 1/17 and repeat COVID infection, intubation 1/19-1/21, needing bipap at night and development of new leukocytosis and C. Diff colitis with diarrhea.    Labs reviewed:  LFT's elevated, total bili:0.6  BUN:16, Cr:0.53    Wt trend:  52.2 kg (115 lb) admit wt on 1/11/22 -> 50.4 kg (111 lb 1.8 oz) on 2/8/22  - 3.4% wt loss in 1  month since admit     MALNUTRITION  % Intake: Decreased intake does not meet criteria  % Weight Loss: Weight loss does not meet criteria  Subcutaneous Fat Loss: None observed  Muscle Loss: Temporal:Mild and Facial & jaw region:Mild, clavicle: mild    Fluid Accumulation/Edema: trace foot/hand  Malnutrition Diagnosis: Patient does not meet two of the established criteria necessary for diagnosing malnutrition but is at risk for malnutrition    Previous Goals   Patient to consume % of nutritionally adequate meal trays TID, or the equivalent with conditional TFs.  Evaluation: Met    Previous Nutrition Diagnosis  Inadequate oral intake related to variable PO intake as evidenced by reliant on supplemental bolus feeds to meet full nutrition needs     Evaluation: No change    CURRENT NUTRITION DIAGNOSIS  Inadequate oral intake related to variable PO intake as evidenced by reliant on supplemental bolus feeds to meet full nutrition needs      INTERVENTIONS  Implementation  Enteral Nutrition: no change to TFs + Prosource and Banatrol.     Goals  Patient to consume % of nutritionally adequate meal trays TID, or the equivalent with conditional TFs.    Monitoring/Evaluation  Progress toward goals will be monitored and evaluated per protocol.    Thanh Banuelos RD/DEIDRE  Pager [phone]
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
## 2022-02-09 NOTE — PROGRESS NOTES
[facility]    Medicine Progress Note - Hospitalist Service, GOLD TEAM 8    Date of Admission:  1/11/2022    Assessment & Plan                        Sherly Yeung is a 56 year old female admitted on 1/11/2022 who is being transferred out of the ICU on 1/23/2022. She has a history of COVID 10/2021 requiring trachoestomy who presented on 1/11 with acute cholecystitis and cholangitis s/p biliary stent placement. Hospital course was complicated by respiratory failure on 1/17 and repeat COVID infection. She developed respiratory failure and required ICU transfer and intubation on 1/19. She was extubated on 1/21 but had recurrent hypercapnia requiring bipap at night and while sleeping ICU stay was complicated by development of new leukocytosis and C. Diff colitis with diarrhea.    No acute events overPM.  No changes today.  Pain well-controlled. Patient reporting persistent right lateral foot pain.  Low level transaminates elevation. Incrementally increasing transaminitis without overt pain. Patient has gastroenterology follow-up recommended.  Gastroenterology planning ERCP on 2/14/2022.  Completed greater than 14 days of oral vancomycin [Stopped]   Monitor off abx  Discharge on Fiber packets  Continue meds through G tube as patient tolerates  Pending placement     #ARDS secondary to COVID 19 pneumonia (improving)  #Acute hypercapnic and hypoxemic respiratory failure  -Weaning off oxygen. On RA-1 L [increased on exertion]  -Incentive spirometry    #C. diff colitis  -Improved Diarrhea  -Encouraged fiber for bulking of 1-2 soft bowel movements  -Increase Fiber packets (bowel movement improved)    #Aspergillus pneumonia  -Occurred during initial COVID treatment in 10/2021. Was planned for 60 day of voriconazole  -Was due to end on 1/21 but had recurrent COVID and respiratory failure  Plan:  >Plan for 2 additional weeks of voriconazole past last day of dexamethasone  >End date  February 9th    #Choledocholithiasis c/b cholangitis, s/p CBD stenting (1/12/22)  #Probable acute cholecystitis s/p transpapillary cystic duct stenting (1/12/22)  #Transaminitis   #Sepsis (tachy, leukocytosis)  -S/p ERCP, removal of stone and stent placement  -Abx for 10 days (1/12 - 1/22) - (Was cipro/flagyl, broadened to cef/flagyl due to resp failure)  -Developed worsened respiratory failure on 1/19 and antibiotics were extended to provide additional bacterial coverage  Plan:  >Stop IV cefepime, flagyl on 1/26 (14 day course)  >Follow as outpatient with Gen-surg for cholecystectomy (order on discharge placed)  >Follow up with GI approx February 2/12/22    #Paget Banegas syndrome  #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  She has a history of multiple blood clots.  She has been maintained on apixaban.  >Continue PTA apixaban  >#Anemia  >Secondary to critical illness  #HTN  >Up-titrating to control tachycardia, blood pressure  #Depression. Anorexia  >Venlflaxine, Abilify, topiramate     # Right foot pain- resolved  Xray shows no evidence fo acute fracture  Trial topical capsaicin  Consider plantar fasciitis                 Diet: Regular Diet Adult  Adult Formula Bolus Feeding: TID Osmolite 1.5; Route: Gastrostomy; 3; Can(s); Medication - Feeding Tube Flush Frequency: At least 15-30 mL water before and after medication administration and with tube clogging; Amount to Send (Nutrition use): 3 c...    DVT Prophylaxis: DOAC  Scott Catheter: Not present  Central Lines: None  Cardiac Monitoring: None  Code Status: Full Code      Disposition Plan   Expected Discharge: 02/16/2022     Anticipated discharge location:  Awaiting care coordination huddle  Delays:     Placement - TCU  Covid Test Positive            The patient's care was discussed with the Bedside Nurse, Care Coordinator/ and Patient.    Luis Manuel Nunes DO  Hospitalist Service, [facility] TEAM 8  M  [facility]  Securely message with the [employer] Web Console (learn more here)  Text page via [facility] Paging/Directory   Please see signed in provider for up to date coverage information  ______________________________________________________________________    Interval History   Patient reports feeling well.  Patient does reports some abdominal discomfort.  Patient reports bowel movements are more firm.  Patient denies urinary symptomatology.  Patient reports cough, work of breathing improved.  Patient curious regarding timing on ERCP and/or cholecystectomy.    Data reviewed today: I reviewed all medications, new labs and imaging results over the last 24 hours. I personally reviewed no images or EKG's today.    Physical Exam   Vital Signs: Temp: 97.5  F (36.4  C) Temp src: Axillary BP: 137/76 Pulse: 90   Resp: 16 SpO2: 100 % O2 Device: None (Room air) Oxygen Delivery: 1 LPM  Weight: 111 lbs 1.79 oz     Physical Exam  Constitutional:       Appearance: Normal appearance. She is normal weight.   HENT:      Head: Normocephalic.      Mouth/Throat:      Mouth: Mucous membranes are moist.      Pharynx: Oropharynx is clear. No oropharyngeal exudate.   Eyes:      Extraocular Movements: Extraocular movements intact.      Conjunctiva/sclera: Conjunctivae normal.      Pupils: Pupils are equal, round, and reactive to light.   Cardiovascular:      Rate and Rhythm: Normal rate and regular rhythm.      Pulses: Normal pulses.      Heart sounds: Normal heart sounds.   Pulmonary:      Effort: Pulmonary effort is normal.      Breath sounds: Normal breath sounds.   Abdominal:      General: Abdomen is flat. Bowel sounds are normal.      Palpations: Abdomen is soft.      Comments: PEG tube insertion site clean, dry, and intact   Skin:     General: Skin is warm and dry.   Neurological:      General: No focal deficit present.      Mental Status: She is alert and oriented to person, place, and time.  Mental status is at baseline.   Psychiatric:         Mood and Affect: Mood normal.         Behavior: Behavior normal.         Data   Recent Labs   Lab 02/09/22  0612 02/08/22  0602 02/07/22  0554   WBC 8.2 8.1 8.4   HGB 10.1* 10.2* 9.8*   MCV 97 100 98    392 370    141 140   POTASSIUM 3.7 4.0 4.0   CHLORIDE 107 109 109   CO2 27 24 27   BUN 16 16 14   CR 0.53 0.55 0.47*   ANIONGAP 7 8 4   TERESA 9.6 9.5 9.4   GLC 85 85 90   ALBUMIN 2.8* 2.7* 2.8*   PROTTOTAL 7.0 6.8 6.8   BILITOTAL 0.6 0.3 0.2   ALKPHOS 273* 253* 204*   * 151* 138*   * 99* 84*     No results found for this or any previous visit (from the past 24 hour(s)).  Medications     dextrose Stopped (01/27/22 2040)     - MEDICATION INSTRUCTIONS -         apixaban ANTICOAGULANT  5 mg Oral or Feeding Tube BID     ARIPiprazole  7.5 mg Oral or Feeding Tube Daily     banatrol plus  1 packet Per Feeding Tube BID     capsaicin   Topical TID     diclofenac  2 g Topical 4x Daily     metoprolol tartrate  75 mg Oral BID     multivitamins w/minerals  15 mL Per Feeding Tube Daily     pantoprazole  40 mg Oral BID     protein modular  2 packet Per Feeding Tube BID     sodium chloride (PF)  3 mL Intracatheter Q8H     spironolactone  25 mg Oral Daily     tolterodine  2 mg Oral or Feeding Tube BID     topiramate  100 mg Oral Daily     topiramate  200 mg Oral or Feeding Tube QPM     venlafaxine  75 mg Oral or Feeding Tube BID     voriconazole  200 mg Oral or Feeding Tube Q12H TANNER

## 2022-02-09 NOTE — PLAN OF CARE
Assumed cares [id number]    Pt sleeping between cares. No BM this shift. Not OOB, turns easily with Ax1 while in bed. PIV SL. Wearing 1L NC overnight for comfort. Barrier cream reapplied overnight. G tube clamped between medication administration.    Continue to provide care per poc.

## 2022-02-09 NOTE — PROGRESS NOTES
SPIRITUAL HEALTH SERVICES  SPIRITUAL ASSESSMENT Progress Note  [facility] 5B     Referral source: Initial Visit ( initiated per length of stay)    Attempted to see pt. Pt was on the phone and unavailable but requested that the Spiritual health team follow up another time.     Plan: Will communicate the attempt to the spiritual care team for follow up    __    Rabbi Cruz Mary  Chaplain Resident  Pager [phone]

## 2022-02-10 ENCOUNTER — APPOINTMENT (OUTPATIENT)
Dept: PHYSICAL THERAPY | Facility: CLINIC | Age: 57
DRG: 871 | End: 2022-02-10
Attending: INTERNAL MEDICINE
Payer: MEDICARE

## 2022-02-10 LAB
ALBUMIN SERPL-MCNC: 2.7 G/DL (ref 3.4–5)
ALP SERPL-CCNC: 315 U/L (ref 40–150)
ALT SERPL W P-5'-P-CCNC: 186 U/L (ref 0–50)
ANION GAP SERPL CALCULATED.3IONS-SCNC: 8 MMOL/L (ref 3–14)
AST SERPL W P-5'-P-CCNC: 124 U/L (ref 0–45)
BILIRUB SERPL-MCNC: 0.2 MG/DL (ref 0.2–1.3)
BUN SERPL-MCNC: 15 MG/DL (ref 7–30)
CALCIUM SERPL-MCNC: 9.6 MG/DL (ref 8.5–10.1)
CHLORIDE BLD-SCNC: 108 MMOL/L (ref 94–109)
CO2 SERPL-SCNC: 26 MMOL/L (ref 20–32)
CREAT SERPL-MCNC: 0.54 MG/DL (ref 0.52–1.04)
ERYTHROCYTE [DISTWIDTH] IN BLOOD BY AUTOMATED COUNT: 17.6 % (ref 10–15)
GFR SERPL CREATININE-BSD FRML MDRD: >90 ML/MIN/1.73M2
GLUCOSE BLD-MCNC: 91 MG/DL (ref 70–99)
HCT VFR BLD AUTO: 32.1 % (ref 35–47)
HGB BLD-MCNC: 10 G/DL (ref 11.7–15.7)
MAGNESIUM SERPL-MCNC: 1.9 MG/DL (ref 1.8–2.6)
MCH RBC QN AUTO: 29.9 PG (ref 26.5–33)
MCHC RBC AUTO-ENTMCNC: 31.2 G/DL (ref 31.5–36.5)
MCV RBC AUTO: 96 FL (ref 78–100)
PHOSPHATE SERPL-MCNC: 3.8 MG/DL (ref 2.5–4.5)
PLATELET # BLD AUTO: 411 10E3/UL (ref 150–450)
POTASSIUM BLD-SCNC: 3.5 MMOL/L (ref 3.4–5.3)
PROT SERPL-MCNC: 6.8 G/DL (ref 6.8–8.8)
RBC # BLD AUTO: 3.35 10E6/UL (ref 3.8–5.2)
SODIUM SERPL-SCNC: 142 MMOL/L (ref 133–144)
WBC # BLD AUTO: 7.9 10E3/UL (ref 4–11)

## 2022-02-10 PROCEDURE — 120N000002 HC R&B MED SURG/OB UMMC

## 2022-02-10 PROCEDURE — 250N000013 HC RX MED GY IP 250 OP 250 PS 637: Performed by: INTERNAL MEDICINE

## 2022-02-10 PROCEDURE — 250N000013 HC RX MED GY IP 250 OP 250 PS 637: Performed by: STUDENT IN AN ORGANIZED HEALTH CARE EDUCATION/TRAINING PROGRAM

## 2022-02-10 PROCEDURE — 83735 ASSAY OF MAGNESIUM: CPT | Performed by: INTERNAL MEDICINE

## 2022-02-10 PROCEDURE — 84100 ASSAY OF PHOSPHORUS: CPT | Performed by: INTERNAL MEDICINE

## 2022-02-10 PROCEDURE — 97110 THERAPEUTIC EXERCISES: CPT | Mod: GP

## 2022-02-10 PROCEDURE — 80053 COMPREHEN METABOLIC PANEL: CPT | Performed by: STUDENT IN AN ORGANIZED HEALTH CARE EDUCATION/TRAINING PROGRAM

## 2022-02-10 PROCEDURE — 85027 COMPLETE CBC AUTOMATED: CPT | Performed by: STUDENT IN AN ORGANIZED HEALTH CARE EDUCATION/TRAINING PROGRAM

## 2022-02-10 PROCEDURE — 36415 COLL VENOUS BLD VENIPUNCTURE: CPT | Performed by: STUDENT IN AN ORGANIZED HEALTH CARE EDUCATION/TRAINING PROGRAM

## 2022-02-10 RX ADMIN — CAPSAICIN: 0.75 CREAM TOPICAL at 19:21

## 2022-02-10 RX ADMIN — SPIRONOLACTONE 25 MG: 25 TABLET, FILM COATED ORAL at 08:33

## 2022-02-10 RX ADMIN — Medication 7.5 MG: at 08:33

## 2022-02-10 RX ADMIN — APIXABAN 5 MG: 5 TABLET, FILM COATED ORAL at 08:34

## 2022-02-10 RX ADMIN — METOPROLOL TARTRATE 75 MG: 50 TABLET, FILM COATED ORAL at 08:35

## 2022-02-10 RX ADMIN — Medication 40 MG: at 19:19

## 2022-02-10 RX ADMIN — TOPIRAMATE 100 MG: 100 TABLET, FILM COATED ORAL at 08:34

## 2022-02-10 RX ADMIN — Medication 2 PACKET: at 15:29

## 2022-02-10 RX ADMIN — Medication 2 PACKET: at 08:42

## 2022-02-10 RX ADMIN — VENLAFAXINE 75 MG: 75 TABLET ORAL at 19:19

## 2022-02-10 RX ADMIN — TOLTERODINE TARTRATE 2 MG: 2 TABLET, FILM COATED ORAL at 08:34

## 2022-02-10 RX ADMIN — TOPIRAMATE 200 MG: 200 TABLET ORAL at 19:19

## 2022-02-10 RX ADMIN — Medication 15 ML: at 12:57

## 2022-02-10 RX ADMIN — Medication 40 MG: at 08:34

## 2022-02-10 RX ADMIN — TOLTERODINE TARTRATE 2 MG: 2 TABLET, FILM COATED ORAL at 19:19

## 2022-02-10 RX ADMIN — VENLAFAXINE 75 MG: 75 TABLET ORAL at 08:33

## 2022-02-10 RX ADMIN — CAPSAICIN: 0.75 CREAM TOPICAL at 08:40

## 2022-02-10 RX ADMIN — CAPSAICIN: 0.75 CREAM TOPICAL at 13:00

## 2022-02-10 RX ADMIN — Medication 3 MG: at 21:18

## 2022-02-10 RX ADMIN — APIXABAN 5 MG: 5 TABLET, FILM COATED ORAL at 19:19

## 2022-02-10 RX ADMIN — METOPROLOL TARTRATE 75 MG: 50 TABLET, FILM COATED ORAL at 19:20

## 2022-02-10 ASSESSMENT — ACTIVITIES OF DAILY LIVING (ADL)
ADLS_ACUITY_SCORE: 20
ADLS_ACUITY_SCORE: 18
ADLS_ACUITY_SCORE: 18
ADLS_ACUITY_SCORE: 20
ADLS_ACUITY_SCORE: 18
ADLS_ACUITY_SCORE: 20
ADLS_ACUITY_SCORE: 20
ADLS_ACUITY_SCORE: 18
ADLS_ACUITY_SCORE: 20
ADLS_ACUITY_SCORE: 18
ADLS_ACUITY_SCORE: 20
ADLS_ACUITY_SCORE: 18
ADLS_ACUITY_SCORE: 20

## 2022-02-10 ASSESSMENT — MIFFLIN-ST. JEOR: SCORE: 1074.13

## 2022-02-10 NOTE — PROGRESS NOTES
Care Management Follow Up    Length of Stay (days): 29  Expected Discharge Date: 02/16/2022  Concerns to be Addressed: discharge planning     Patient plan of care discussed at interdisciplinary rounds: Yes  Anticipated Discharge Disposition: Transitional Care  Anticipated Discharge Services:  PT/OT  Anticipated Discharge DME:  TBD  Patient/family educated on Medicare website which has current facility and service quality ratings:  (No as pt admitted from a facility and has a bed hold)  Education Provided on the Discharge Plan:  yes  Patient/Family in Agreement with the Plan: yes  Referrals Placed by CM/SW: Post Acute Facilities    [facility]  [address] [facility] [address]  2/11: Referral made.    [facility]  [address] [place] [address]   (Ph: [phone], F: [phone])  2/11: Referral made.    [facility]   [address] [place] [address]  (Ph: [phone], Admissions: [phone], F: [phone])  2/11: Referral made.    [facility]  [address] [address]  Main Phone: [phone]  Main Fax: [phone]  2/11: Referral made.    [facility]  [address] [place] [address]  [phone]  2/11: Referral made.    [facility]  [address] [place] [address]  [phone]  2/11: Referral made.    [facility]  [address] [place] [address]  [phone]  2/11: Referral made.    [facility]  [address] [place], [place] [address] [phone]  2/11: Referral made.    [facility]  [address] [place] [address]  [phone]  2/11: Referral made.    [facility]  [address] [place] [address]  [phone]  2/11: Referral made.    [facility] [address] [place] [address]  [phone]  2/11: Referral made.    [facility]  [address] [place] [address]  [phone]  2/11: Referral made.    [facility]  [address] [place] [address]  [phone]  2/11: Referral made.    [facility]  [address] [place] [address]  [phone]  2/11: Referral made.    Inactive Referrals:    [facility]   [address] [place] [address]  [phone] (f) [phone]  2/7: Referral made.  2/8: SW called to f/u on referral. No openings this week. Check back 2/14.    [facility] [address] [place] [address] (p) [phone]  2/11: Denied; no openings.  2/10: SW LVM for Fabby HINTON  And requested f/u on referral.  2/8: SW called and LVM for Tea/Director of Nurses.  2/7: Referral made.    [facility]   (Ph: [phone], Admissions: [phone], F: [phone])  2/8: SW called and LVM for Joan in admissions to f/u on referral. Check back 2/11.  2/7: Referral made.     [facility]  [address] [facility] [address]  Phone: [phone]  Fax: [phone]  2/7: New referral sent; patient declined d/t unspecified behaviors.     [facility]  [address] [place] [address] [phone]  2/8: Declined d/t COVID vaccination status.  2/7: Referral made.    [facility]  [address] [facility] [address]  [phone]  2/10: Declined  2/8: SW called and LVM for Mary with admissions to f/u on referral.  2/7: Referral made.    [facility]   (Ph: [phone], F: [phone])  2/10: SW called and spoke with admissions; no private room available.  2/8: SW called to f/u with admissions, who requested that SW resend.  2/7: Referral made.    Additional Information:  MARY JANE f/u on referrals and pt was denied at all facilities. MARY JANE called pt and she gave  approval for SW to make additional TCU referrals to facilities near her home. She confirmed what OT recommended in their flowsheets today that home would not be feasible or safe.     Plan: Continue to look for TCU options for pt.     will continue to follow for discharge planning, psychosocial support, resources, and to advocate on behalf of patient.    JORGITO Valencia, [facility]  Unit 5B   [email or web address]  Office: [phone]   Pager: [phone]

## 2022-02-10 NOTE — PROGRESS NOTES
SPIRITUAL HEALTH SERVICES  [facility] ([place] 5B  ON-CALL VISIT     REFERRAL SOURCE: unit  requested that I check in with pt to assess needs.     As on-call , I was paged to an emergent ICU situation as I was beginning visit with pt. Pt very understanding, said she would appreciate a  visit, and that Friday would be fine.     PLAN: will refer to Friday on-call  for visit.      David Arrington M.Div. (Bill), [facility]  Staff   Pager [phone]     * [facility] remains available 24/7 for emergent requests/referrals, either by having the switchboard page the on-call  or by entering an ASAP/STAT consult in Epic (this will also page the on-call ). Routine Epic consults receive an initial response within 24 hours.*

## 2022-02-10 NOTE — PLAN OF CARE
"/80   Pulse 94   Temp 97.3  F (36.3  C) (Oral)   Resp 18   Ht 1.6 m (5' 3\")   Wt 51 kg (112 lb 7 oz)   SpO2 95%   BMI 19.92 kg/m      Assumed cares [id number]  Neuro: A&Ox4  Pain/Nausea: denies nausea; reports right lateral foot pain, decreased with capsaicin   Cardiac: regular rate and rhythm  Resp: lung sounds clear and equal bilaterally; 1L NC for comfort, pt refused room air  GI/: voids spontaneously, BM this shift  Diet/Appetite: regular diet, fair appetite  Skin: peeling to bilateral feet; PEG button rotated with new gauze applied   Access: PIV s/l  Activity: Ax1 with gait belt and walker; plan for PT and OT today  Plan: discharge to TCU upon placement; possible ERCP 2/14  Will continue with plan of care and notify team of any changes.?    "
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
## 2022-02-10 NOTE — PLAN OF CARE
"/76 (BP Location: Left arm)   Pulse 90   Temp 97.5  F (36.4  C) (Axillary)   Resp 16   Ht 1.6 m (5' 3\")   Wt 50.4 kg (111 lb 1.8 oz)   SpO2 94%   BMI 19.68 kg/m      Care from: [id number]    VSS, weaned from 1 LPM to room air, denied pain. A&O x4. G and J are clamped, dressing is CDI. R PIV is saline locked. Dyspnea on exertion, diminished lung sounds in BLL. Writer applied moisture barrier cream at blanchable redness on sacrum, turned q2h. Generalized weakness, assist of 1, gait belt, and walker. Call light within reach and bed alarm on. Possible stent removal on 2/14. Waiting for long term care placement.  "

## 2022-02-11 ENCOUNTER — APPOINTMENT (OUTPATIENT)
Dept: OCCUPATIONAL THERAPY | Facility: CLINIC | Age: 57
DRG: 871 | End: 2022-02-11
Payer: MEDICARE

## 2022-02-11 LAB
ALBUMIN SERPL-MCNC: 2.7 G/DL (ref 3.4–5)
ALP SERPL-CCNC: 339 U/L (ref 40–150)
ALT SERPL W P-5'-P-CCNC: 226 U/L (ref 0–50)
ANION GAP SERPL CALCULATED.3IONS-SCNC: 8 MMOL/L (ref 3–14)
AST SERPL W P-5'-P-CCNC: 176 U/L (ref 0–45)
BILIRUB SERPL-MCNC: 0.3 MG/DL (ref 0.2–1.3)
BUN SERPL-MCNC: 16 MG/DL (ref 7–30)
CALCIUM SERPL-MCNC: 9.6 MG/DL (ref 8.5–10.1)
CHLORIDE BLD-SCNC: 109 MMOL/L (ref 94–109)
CO2 SERPL-SCNC: 25 MMOL/L (ref 20–32)
CREAT SERPL-MCNC: 0.53 MG/DL (ref 0.52–1.04)
ERYTHROCYTE [DISTWIDTH] IN BLOOD BY AUTOMATED COUNT: 17.4 % (ref 10–15)
GFR SERPL CREATININE-BSD FRML MDRD: >90 ML/MIN/1.73M2
GLUCOSE BLD-MCNC: 84 MG/DL (ref 70–99)
HCT VFR BLD AUTO: 33.2 % (ref 35–47)
HGB BLD-MCNC: 10.1 G/DL (ref 11.7–15.7)
MAGNESIUM SERPL-MCNC: 1.8 MG/DL (ref 1.8–2.6)
MCH RBC QN AUTO: 29.1 PG (ref 26.5–33)
MCHC RBC AUTO-ENTMCNC: 30.4 G/DL (ref 31.5–36.5)
MCV RBC AUTO: 96 FL (ref 78–100)
PHOSPHATE SERPL-MCNC: 3.8 MG/DL (ref 2.5–4.5)
PLATELET # BLD AUTO: 451 10E3/UL (ref 150–450)
POTASSIUM BLD-SCNC: 3.4 MMOL/L (ref 3.4–5.3)
PROT SERPL-MCNC: 6.8 G/DL (ref 6.8–8.8)
RBC # BLD AUTO: 3.47 10E6/UL (ref 3.8–5.2)
SODIUM SERPL-SCNC: 142 MMOL/L (ref 133–144)
WBC # BLD AUTO: 7 10E3/UL (ref 4–11)

## 2022-02-11 PROCEDURE — 36415 COLL VENOUS BLD VENIPUNCTURE: CPT | Performed by: STUDENT IN AN ORGANIZED HEALTH CARE EDUCATION/TRAINING PROGRAM

## 2022-02-11 PROCEDURE — 85027 COMPLETE CBC AUTOMATED: CPT | Performed by: STUDENT IN AN ORGANIZED HEALTH CARE EDUCATION/TRAINING PROGRAM

## 2022-02-11 PROCEDURE — 250N000013 HC RX MED GY IP 250 OP 250 PS 637: Performed by: INTERNAL MEDICINE

## 2022-02-11 PROCEDURE — 97535 SELF CARE MNGMENT TRAINING: CPT | Mod: GO

## 2022-02-11 PROCEDURE — 250N000013 HC RX MED GY IP 250 OP 250 PS 637: Performed by: STUDENT IN AN ORGANIZED HEALTH CARE EDUCATION/TRAINING PROGRAM

## 2022-02-11 PROCEDURE — 250N000011 HC RX IP 250 OP 636: Performed by: STUDENT IN AN ORGANIZED HEALTH CARE EDUCATION/TRAINING PROGRAM

## 2022-02-11 PROCEDURE — 84100 ASSAY OF PHOSPHORUS: CPT | Performed by: INTERNAL MEDICINE

## 2022-02-11 PROCEDURE — 99233 SBSQ HOSP IP/OBS HIGH 50: CPT | Performed by: INTERNAL MEDICINE

## 2022-02-11 PROCEDURE — 120N000002 HC R&B MED SURG/OB UMMC

## 2022-02-11 PROCEDURE — 250N000013 HC RX MED GY IP 250 OP 250 PS 637: Performed by: PHYSICIAN ASSISTANT

## 2022-02-11 PROCEDURE — 80053 COMPREHEN METABOLIC PANEL: CPT | Performed by: STUDENT IN AN ORGANIZED HEALTH CARE EDUCATION/TRAINING PROGRAM

## 2022-02-11 PROCEDURE — 83735 ASSAY OF MAGNESIUM: CPT | Performed by: INTERNAL MEDICINE

## 2022-02-11 RX ADMIN — Medication 2 PACKET: at 20:54

## 2022-02-11 RX ADMIN — Medication 2 PACKET: at 16:56

## 2022-02-11 RX ADMIN — TOLTERODINE TARTRATE 2 MG: 2 TABLET, FILM COATED ORAL at 20:38

## 2022-02-11 RX ADMIN — SPIRONOLACTONE 25 MG: 25 TABLET, FILM COATED ORAL at 09:44

## 2022-02-11 RX ADMIN — CAPSAICIN: 0.75 CREAM TOPICAL at 09:45

## 2022-02-11 RX ADMIN — METOPROLOL TARTRATE 75 MG: 50 TABLET, FILM COATED ORAL at 20:37

## 2022-02-11 RX ADMIN — Medication 15 ML: at 13:29

## 2022-02-11 RX ADMIN — ONDANSETRON 4 MG: 2 INJECTION INTRAMUSCULAR; INTRAVENOUS at 16:55

## 2022-02-11 RX ADMIN — APIXABAN 5 MG: 5 TABLET, FILM COATED ORAL at 09:42

## 2022-02-11 RX ADMIN — CAPSAICIN: 0.75 CREAM TOPICAL at 13:29

## 2022-02-11 RX ADMIN — TOPIRAMATE 200 MG: 200 TABLET ORAL at 20:38

## 2022-02-11 RX ADMIN — APIXABAN 5 MG: 5 TABLET, FILM COATED ORAL at 20:37

## 2022-02-11 RX ADMIN — VENLAFAXINE 75 MG: 75 TABLET ORAL at 09:44

## 2022-02-11 RX ADMIN — Medication 3 MG: at 20:38

## 2022-02-11 RX ADMIN — TOPIRAMATE 100 MG: 100 TABLET, FILM COATED ORAL at 09:42

## 2022-02-11 RX ADMIN — Medication 40 MG: at 20:39

## 2022-02-11 RX ADMIN — TOLTERODINE TARTRATE 2 MG: 2 TABLET, FILM COATED ORAL at 09:44

## 2022-02-11 RX ADMIN — METOPROLOL TARTRATE 75 MG: 50 TABLET, FILM COATED ORAL at 09:43

## 2022-02-11 RX ADMIN — Medication 2 PACKET: at 09:45

## 2022-02-11 RX ADMIN — VENLAFAXINE 75 MG: 75 TABLET ORAL at 20:37

## 2022-02-11 RX ADMIN — CAPSAICIN: 0.75 CREAM TOPICAL at 20:55

## 2022-02-11 RX ADMIN — Medication 7.5 MG: at 09:43

## 2022-02-11 ASSESSMENT — ACTIVITIES OF DAILY LIVING (ADL)
ADLS_ACUITY_SCORE: 18

## 2022-02-11 NOTE — PLAN OF CARE
Patient is alert and able to make needs known. She is an assist of one to the bedside commode, voids spontaneously. She denied pain, nausea, dizziness headache or SOB. VVS on 1L nasal cannula. Waiting on LTC placement. Continue to follow plan of care and update the provider as needed.

## 2022-02-11 NOTE — PLAN OF CARE
Assumed cares: [id number]    Events: No change during shift.   VS: VSS on RA.   IV: PIV saline locked.     Neuro: A&Ox4  GI/: Voiding adequately. Last BM today, pt denies diarrhea.   Nutrition: Pt has fair appetite, ate 50% food delivered from outside the hospital. No TF bolus given.   Pain: Pt c/o pain in R foot w/ minimal relief from PRN capsaicin cream   Activity: Pt up SBA w/ walker.     Plan: Per MD note: potential placement in TCU 02/16/2022

## 2022-02-11 NOTE — PROGRESS NOTES
SPIRITUAL HEALTH SERVICES Progress Note  [facility] ([place]) 5B  On-call Visit    Visited pt, Marcia, per [facility] triage.    Marcia is on day 30 of hospitalization. She says she is allowed to receive a visit from her  for the first time in a month tomorrow. She also reports that she is waiting for a TCU bed to open up and she will go as soon as that happens.  Marcia does not express any great need for emotional/spiritual support but she did welcome a prayer.    Will communicate this encounter to the unit .    Luis Manuel Carney MDiv  Chaplain Resident  Pager [phone]    * [facility] remains available 24/7 for emergent requests/referrals, either by having the switchboard page the on-call  or by entering an ASAP/STAT consult in Epic (this will also page the on-call ). Routine Epic consults receive an initial response within 24 hours.*

## 2022-02-11 NOTE — PROGRESS NOTES
[facility]    Medicine Progress Note - Hospitalist Service, GOLD TEAM 8    Date of Admission:  1/11/2022    Assessment & Plan          Sherly Yeung is a 56 year old female admitted on 1/11/2022 who is being transferred out of the ICU on 1/23/2022. She has a history of COVID 10/2021 requiring trachoestomy who presented on 1/11 with acute cholecystitis and cholangitis s/p biliary stent placement. Hospital course was complicated by respiratory failure on 1/17 and repeat COVID infection. She developed respiratory failure and required ICU transfer and intubation on 1/19. She was extubated on 1/21 but had recurrent hypercapnia requiring bipap at night and while sleeping ICU stay was complicated by development of new leukocytosis and C. Diff colitis with diarrhea.    No acute events overPM.  No changes today.  Pain well-controlled. Patient reporting persistent right lateral foot pain.  Low level transaminates elevation. Incrementally increasing transaminitis without overt pain. Patient has gastroenterology follow-up recommended.  Gastroenterology planning ERCP on 2/14/2022.  Completed greater than 14 days of oral vancomycin [Stopped]   Monitor off abx  Discharge on Fiber packets  Continue meds through G tube as patient tolerates  Pending placement     #ARDS secondary to COVID 19 pneumonia (improving)  #Acute hypercapnic and hypoxemic respiratory failure  -Weaning off oxygen. On RA-1 L [increased on exertion]  -Incentive spirometry    #C. diff colitis  -Improved Diarrhea  -Encouraged fiber for bulking of 1-2 soft bowel movements  -Increase Fiber packets (bowel movement improved)    #Aspergillus pneumonia  -Occurred during initial COVID treatment in 10/2021. Was planned for 60 day of voriconazole  -Was due to end on 1/21 but had recurrent COVID and respiratory failure  Plan:  >Plan for 2 additional weeks of voriconazole past last day of dexamethasone  >End date February  9th    #Choledocholithiasis c/b cholangitis, s/p CBD stenting (1/12/22)  #Probable acute cholecystitis s/p transpapillary cystic duct stenting (1/12/22)  #Transaminitis   #Sepsis (tachy, leukocytosis)  -S/p ERCP, removal of stone and stent placement  -Abx for 10 days (1/12 - 1/22) - (Was cipro/flagyl, broadened to cef/flagyl due to resp failure)  -Developed worsened respiratory failure on 1/19 and antibiotics were extended to provide additional bacterial coverage  Plan:  >Stop IV cefepime, flagyl on 1/26 (14 day course)  >Follow as outpatient with Gen-surg for cholecystectomy (order on discharge placed)  >Follow up with GI approx February 2/12/22    #Paget Banegas syndrome  #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  She has a history of multiple blood clots.  She has been maintained on apixaban.  >Continue PTA apixaban  >#Anemia  >Secondary to critical illness  #HTN  >Up-titrating to control tachycardia, blood pressure  #Depression. Anorexia  >Venlflaxine, Abilify, topiramate     # Right foot pain- resolved  Xray shows no evidence fo acute fracture  Trial topical capsaicin  Consider plantar fasciitis       Diet: Regular Diet Adult  Adult Formula Bolus Feeding: TID Osmolite 1.5; Route: Gastrostomy; 3; Can(s); Medication - Feeding Tube Flush Frequency: At least 15-30 mL water before and after medication administration and with tube clogging; Amount to Send (Nutrition use): 3 c...    DVT Prophylaxis: DOAC  Scott Catheter: Not present  Central Lines: None  Cardiac Monitoring: None  Code Status: Full Code      Disposition Plan   Expected Discharge: ERCP Monday; patient will require TCU  Anticipated discharge location:  Awaiting care coordination huddle  Delays:     Placement - TCU  Covid Test Positive            The patient's care was discussed with the Bedside Nurse, Care Coordinator/ and Patient.    Luis Manuel Nunes DO  Hospitalist Service, [facility] TEAM  8  [facility]  Securely message with the Voc[employer] Web Console (learn more here)  Text page via [facility] Paging/Directory   Please see signed in provider for up to date coverage information      Clinically Significant Risk Factors Present on Admission                    ______________________________________________________________________    Interval History   Patient reports feeling well.  Patient denies chest pain.  Patient reports persistent SOB on exertion.  Patient reports RUQ abdominal discomfort.  Patient curious regarding procedure planning on Monday.  Patient scheduled for ERCP on Monday 2/14/2022.  Patient will have cholecystectomy performed outpatient.    Data reviewed today: I reviewed all medications, new labs and imaging results over the last 24 hours. I personally reviewed no images or EKG's today.    Physical Exam   Vital Signs: Temp: 97.4  F (36.3  C) Temp src: Oral BP: 121/82 Pulse: 94   Resp: 20 SpO2: 97 % O2 Device: Nasal cannula Oxygen Delivery: 1 LPM  Weight: 113 lbs 8.59 oz     Physical Exam  Constitutional:       Appearance: Normal appearance. She is normal weight.   HENT:      Head: Normocephalic.      Mouth/Throat:      Mouth: Mucous membranes are moist.      Pharynx: Oropharynx is clear.   Eyes:      Conjunctiva/sclera: Conjunctivae normal.      Pupils: Pupils are equal, round, and reactive to light.   Cardiovascular:      Rate and Rhythm: Normal rate and regular rhythm.      Pulses: Normal pulses.      Heart sounds: Normal heart sounds.   Pulmonary:      Effort: Pulmonary effort is normal.      Breath sounds: Normal breath sounds.   Abdominal:      General: Abdomen is flat. Bowel sounds are normal.      Palpations: Abdomen is soft.      Comments: PEG insertion site clean, dry, and intact   Musculoskeletal:         General: Normal range of motion.      Cervical back: Normal range of motion and neck supple.   Skin:     General: Skin is warm and dry.    Neurological:      General: No focal deficit present.      Mental Status: She is alert and oriented to person, place, and time. Mental status is at baseline.   Psychiatric:         Mood and Affect: Mood normal.         Behavior: Behavior normal.      Comments: Fluctuating affect           Data   Recent Labs   Lab 02/11/22  0625 02/10/22  0624 02/09/22  0612   WBC 7.0 7.9 8.2   HGB 10.1* 10.0* 10.1*   MCV 96 96 97   * 411 421    142 141   POTASSIUM 3.4 3.5 3.7   CHLORIDE 109 108 107   CO2 25 26 27   BUN 16 15 16   CR 0.53 0.54 0.53   ANIONGAP 8 8 7   TERESA 9.6 9.6 9.6   GLC 84 91 85   ALBUMIN 2.7* 2.7* 2.8*   PROTTOTAL 6.8 6.8 7.0   BILITOTAL 0.3 0.2 0.6   ALKPHOS 339* 315* 273*   * 186* 160*   * 124* 101*     No results found for this or any previous visit (from the past 24 hour(s)).  Medications     dextrose Stopped (01/27/22 2040)     - MEDICATION INSTRUCTIONS -         apixaban ANTICOAGULANT  5 mg Oral or Feeding Tube BID     ARIPiprazole  7.5 mg Oral or Feeding Tube Daily     banatrol plus  1 packet Per Feeding Tube BID     capsaicin   Topical TID     diclofenac  2 g Topical 4x Daily     metoprolol tartrate  75 mg Oral BID     multivitamins w/minerals  15 mL Per Feeding Tube Daily     pantoprazole  40 mg Oral BID     protein modular  2 packet Per Feeding Tube BID     sodium chloride (PF)  3 mL Intracatheter Q8H     spironolactone  25 mg Oral Daily     tolterodine  2 mg Oral or Feeding Tube BID     topiramate  100 mg Oral Daily     topiramate  200 mg Oral or Feeding Tube QPM     venlafaxine  75 mg Oral or Feeding Tube BID

## 2022-02-11 NOTE — PROGRESS NOTES
General Surgery Progress Note  02/11/2022   ------------------------------------------------------------------------------------------------  Subjective:  MIKAELAEON. Pt reports minimal pain in RUQ, well-controlled. Tolerating PO, no n/v. Voiding, stooling, ambulating independently.     ------------------------------------------------------------------------------------------------  Objective:  Temp:  [96.8  F (36  C)-97.1  F (36.2  C)] 96.9  F (36.1  C)  Pulse:  [79-97] 85  Resp:  [18-24] 18  BP: (132-140)/(79-86) 135/86  SpO2:  [95 %-100 %] 97 %    I/O last 3 completed shifts:  In: 1200 [P.O.:960; NG/GT:240]  Out: 500 [Urine:500]      Gen: Awake, interactive, NAD  Resp: breathing comfortably on room air  CV: regular rate, appears well perfused  Abd: soft, nondistended, mild tenderness to deep palpation of RUQ  Ext: moving all extremities, no edema, no obvious deformities     Labs:  Lab Results   Component Value Date    WBC 7.0 02/11/2022    HGB 10.1 (L) 02/11/2022    HCT 33.2 (L) 02/11/2022     (H) 02/11/2022     02/11/2022    POTASSIUM 3.4 02/11/2022    CHLORIDE 109 02/11/2022    CO2 25 02/11/2022    BUN 16 02/11/2022    CR 0.53 02/11/2022    GLC 84 02/11/2022    SED 38 (H) 01/11/2022    DD 0.90 (H) 01/20/2022    NTBNP 912 (H) 01/16/2022    TROPONIN 0.01 11/15/2013    TROPI <0.015 01/22/2018     (H) 02/11/2022     (H) 02/11/2022    GGT 21 02/04/2005    ALKPHOS 339 (H) 02/11/2022    BILITOTAL 0.3 02/11/2022    INR 1.10 01/29/2022        Imaging:  No new imaging.     =======================================================  Assessment/Plan:   Sherly Yeung is a 56 year old female admitted on 1/11/2022 with acute cholecystitis and cholangitis s/p biliary stent placement with stone removal; completed abx course 1/12-1/22. She has a history of COVID, dx 10/2021. Hospital course was complicated by repeat COVID infection and respiratory failure on 1/17, requiring ICU transfer and intubation on 1/19.  She was extubated on 1/21 but had recurrent hypercapnia requiring bipap at night and while sleeping. ICU stay was complicated by development of new leukocytosis and C. Diff colitis with diarrhea. Transferred out of the ICU on 1/23/22. Currently being evaluated for TCU placement.     Case discussed at hepatobiliary conference on 2/10/22. Plan for GI to perform repeat ERCP on 2/14/22 for potential stent removal vs. additional stent placement. Pt stated that she prefers to have an additional stent placed.     - with stents in place, pt biliary tract is currently drained; low risk of recurrent cholecystitis or gallstone pancreatitis   - pt should be surgically optimized on discharge, allow for full recovery from COVID and Cdiff  - plan for outpatient cholecystectomy  - surgery to sign off; f/u in 1 month in clinic to schedule for surgery      Seen, examined, and discussed with chief resident, who will discuss with staff.    Zofia Billingsley MD  Urology PGY-1

## 2022-02-12 LAB
ALBUMIN SERPL-MCNC: 2.7 G/DL (ref 3.4–5)
ALP SERPL-CCNC: 358 U/L (ref 40–150)
ALT SERPL W P-5'-P-CCNC: 248 U/L (ref 0–50)
ANION GAP SERPL CALCULATED.3IONS-SCNC: 4 MMOL/L (ref 3–14)
AST SERPL W P-5'-P-CCNC: 154 U/L (ref 0–45)
BILIRUB SERPL-MCNC: 0.2 MG/DL (ref 0.2–1.3)
BUN SERPL-MCNC: 18 MG/DL (ref 7–30)
CALCIUM SERPL-MCNC: 9.2 MG/DL (ref 8.5–10.1)
CHLORIDE BLD-SCNC: 109 MMOL/L (ref 94–109)
CO2 SERPL-SCNC: 28 MMOL/L (ref 20–32)
CREAT SERPL-MCNC: 0.5 MG/DL (ref 0.52–1.04)
ERYTHROCYTE [DISTWIDTH] IN BLOOD BY AUTOMATED COUNT: 17.2 % (ref 10–15)
GFR SERPL CREATININE-BSD FRML MDRD: >90 ML/MIN/1.73M2
GLUCOSE BLD-MCNC: 93 MG/DL (ref 70–99)
HCT VFR BLD AUTO: 33.7 % (ref 35–47)
HGB BLD-MCNC: 10.2 G/DL (ref 11.7–15.7)
MAGNESIUM SERPL-MCNC: 1.8 MG/DL (ref 1.8–2.6)
MCH RBC QN AUTO: 29.5 PG (ref 26.5–33)
MCHC RBC AUTO-ENTMCNC: 30.3 G/DL (ref 31.5–36.5)
MCV RBC AUTO: 97 FL (ref 78–100)
PHOSPHATE SERPL-MCNC: 3 MG/DL (ref 2.5–4.5)
PLATELET # BLD AUTO: 446 10E3/UL (ref 150–450)
POTASSIUM BLD-SCNC: 3.6 MMOL/L (ref 3.4–5.3)
PROT SERPL-MCNC: 6.8 G/DL (ref 6.8–8.8)
RBC # BLD AUTO: 3.46 10E6/UL (ref 3.8–5.2)
SODIUM SERPL-SCNC: 141 MMOL/L (ref 133–144)
WBC # BLD AUTO: 7.7 10E3/UL (ref 4–11)

## 2022-02-12 PROCEDURE — 84100 ASSAY OF PHOSPHORUS: CPT | Performed by: INTERNAL MEDICINE

## 2022-02-12 PROCEDURE — 250N000013 HC RX MED GY IP 250 OP 250 PS 637: Performed by: STUDENT IN AN ORGANIZED HEALTH CARE EDUCATION/TRAINING PROGRAM

## 2022-02-12 PROCEDURE — 99232 SBSQ HOSP IP/OBS MODERATE 35: CPT | Performed by: INTERNAL MEDICINE

## 2022-02-12 PROCEDURE — 83735 ASSAY OF MAGNESIUM: CPT | Performed by: INTERNAL MEDICINE

## 2022-02-12 PROCEDURE — 250N000013 HC RX MED GY IP 250 OP 250 PS 637: Performed by: INTERNAL MEDICINE

## 2022-02-12 PROCEDURE — 80053 COMPREHEN METABOLIC PANEL: CPT | Performed by: STUDENT IN AN ORGANIZED HEALTH CARE EDUCATION/TRAINING PROGRAM

## 2022-02-12 PROCEDURE — 120N000002 HC R&B MED SURG/OB UMMC

## 2022-02-12 PROCEDURE — 82040 ASSAY OF SERUM ALBUMIN: CPT | Performed by: STUDENT IN AN ORGANIZED HEALTH CARE EDUCATION/TRAINING PROGRAM

## 2022-02-12 PROCEDURE — 85014 HEMATOCRIT: CPT | Performed by: STUDENT IN AN ORGANIZED HEALTH CARE EDUCATION/TRAINING PROGRAM

## 2022-02-12 PROCEDURE — 36415 COLL VENOUS BLD VENIPUNCTURE: CPT | Performed by: STUDENT IN AN ORGANIZED HEALTH CARE EDUCATION/TRAINING PROGRAM

## 2022-02-12 RX ADMIN — CAPSAICIN: 0.75 CREAM TOPICAL at 09:08

## 2022-02-12 RX ADMIN — CAPSAICIN: 0.75 CREAM TOPICAL at 16:20

## 2022-02-12 RX ADMIN — SPIRONOLACTONE 25 MG: 25 TABLET, FILM COATED ORAL at 09:07

## 2022-02-12 RX ADMIN — Medication 15 ML: at 12:30

## 2022-02-12 RX ADMIN — VENLAFAXINE 75 MG: 75 TABLET ORAL at 09:07

## 2022-02-12 RX ADMIN — TOLTERODINE TARTRATE 2 MG: 2 TABLET, FILM COATED ORAL at 20:23

## 2022-02-12 RX ADMIN — TOPIRAMATE 200 MG: 200 TABLET ORAL at 20:22

## 2022-02-12 RX ADMIN — VENLAFAXINE 75 MG: 75 TABLET ORAL at 20:23

## 2022-02-12 RX ADMIN — METOPROLOL TARTRATE 75 MG: 50 TABLET, FILM COATED ORAL at 20:22

## 2022-02-12 RX ADMIN — DICLOFENAC SODIUM 2 G: 10 GEL TOPICAL at 20:23

## 2022-02-12 RX ADMIN — Medication 2 PACKET: at 09:09

## 2022-02-12 RX ADMIN — TOLTERODINE TARTRATE 2 MG: 2 TABLET, FILM COATED ORAL at 09:08

## 2022-02-12 RX ADMIN — TOPIRAMATE 100 MG: 100 TABLET, FILM COATED ORAL at 09:07

## 2022-02-12 RX ADMIN — Medication 3 MG: at 20:57

## 2022-02-12 RX ADMIN — METOPROLOL TARTRATE 75 MG: 50 TABLET, FILM COATED ORAL at 09:07

## 2022-02-12 RX ADMIN — Medication 40 MG: at 09:07

## 2022-02-12 RX ADMIN — Medication 7.5 MG: at 09:08

## 2022-02-12 RX ADMIN — Medication 40 MG: at 20:22

## 2022-02-12 RX ADMIN — Medication 2 PACKET: at 16:21

## 2022-02-12 RX ADMIN — CAPSAICIN: 0.75 CREAM TOPICAL at 21:05

## 2022-02-12 ASSESSMENT — ACTIVITIES OF DAILY LIVING (ADL)
ADLS_ACUITY_SCORE: 18

## 2022-02-12 ASSESSMENT — MIFFLIN-ST. JEOR
SCORE: 1049.13
SCORE: 1059.13

## 2022-02-12 NOTE — PLAN OF CARE
"/89 (BP Location: Left arm)   Pulse 88   Temp 97.7  F (36.5  C) (Oral)   Resp 16   Ht 1.6 m (5' 3\")   Wt 51.5 kg (113 lb 8.6 oz)   SpO2 99%   BMI 20.11 kg/m       Assumed Cares: 1900 - 0700  Neuro: A&O x 4  Respiratory: 1 L of O2 via NL   Cardiac: WNL  GI: Diarrhea; none in the last two days per pt remote  : WNL, voiding well  Skin:G/J tube stoma in place  Lines: PIV on R arm SL  Drains: None  Pain: Denies pain   Diet: Enteral feeding via G/J tube  Activity level: SBA of 1   Events: Pt was observed sleeping for most part of the shift.     POC/Discharge: continue care per care plan; pending  Labs/protocol:      "

## 2022-02-12 NOTE — PLAN OF CARE
Patient nausea this afternoon. Up to commode with sba. Patient on 02 monitoring contiues. Patient had a bolus feeding this brielle. Medications down her g tube. Had a small bowel movement today that was formed

## 2022-02-12 NOTE — PROGRESS NOTES
[facility]    Medicine Progress Note - Hospitalist Service, GOLD TEAM 8    Date of Admission:  1/11/2022    Assessment & Plan          Sherly Yeung is a 56 year old female admitted on 1/11/2022 who is being transferred out of the ICU on 1/23/2022. She has a history of COVID 10/2021 requiring trachoestomy who presented on 1/11 with acute cholecystitis and cholangitis s/p biliary stent placement. Hospital course was complicated by respiratory failure on 1/17 and repeat COVID infection. She developed respiratory failure and required ICU transfer and intubation on 1/19. She was extubated on 1/21 but had recurrent hypercapnia requiring bipap at night and while sleeping ICU stay was complicated by development of new leukocytosis and C. Diff colitis with diarrhea.    No acute events overPM.  No changes today.  Pain well-controlled. Patient reporting persistent right lateral foot pain.  Low level transaminates elevation. Incrementally increasing transaminitis without overt pain. Patient has gastroenterology follow-up recommended.  Gastroenterology planning ERCP on 2/14/2022.  Completed greater than 14 days of oral vancomycin [Stopped]   Monitor off abx  Discharge on Fiber packets  Continue meds through G tube as patient tolerates  Pending placement     #ARDS secondary to COVID 19 pneumonia (improving)  #Acute hypercapnic and hypoxemic respiratory failure  -Weaning off oxygen. On RA-1 L [increased on exertion]  -Incentive spirometry    #C. diff colitis  -Improved Diarrhea  -Encouraged fiber for bulking of 1-2 soft bowel movements  -Increase Fiber packets (bowel movement improved)    #Aspergillus pneumonia  -Occurred during initial COVID treatment in 10/2021. Was planned for 60 day of voriconazole  -Was due to end on 1/21 but had recurrent COVID and respiratory failure  Plan:  >Plan for 2 additional weeks of voriconazole past last day of dexamethasone  >End date February  9th    #Choledocholithiasis c/b cholangitis, s/p CBD stenting (1/12/22)  #Probable acute cholecystitis s/p transpapillary cystic duct stenting (1/12/22)  #Transaminitis   #Sepsis (tachy, leukocytosis)  -S/p ERCP, removal of stone and stent placement  -Abx for 10 days (1/12 - 1/22) - (Was cipro/flagyl, broadened to cef/flagyl due to resp failure)  -Developed worsened respiratory failure on 1/19 and antibiotics were extended to provide additional bacterial coverage  Plan:  >Stop IV cefepime, flagyl on 1/26 (14 day course)  >Follow as outpatient with Gen-surg for cholecystectomy (order on discharge placed)  >Follow up with GI approx February 2/12/22    #Paget Banegas syndrome  #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  She has a history of multiple blood clots.  She has been maintained on apixaban.  >Continue PTA apixaban  >#Anemia  >Secondary to critical illness  #HTN  >Up-titrating to control tachycardia, blood pressure  #Depression. Anorexia  >Venlflaxine, Abilify, topiramate     # Right foot pain (resolved)  Xray shows no evidence fo acute fracture  Trial topical capsaicin  Consider plantar fasciitis       Diet: Regular Diet Adult  Adult Formula Bolus Feeding: TID Osmolite 1.5; Route: Gastrostomy; 3; Can(s); Medication - Feeding Tube Flush Frequency: At least 15-30 mL water before and after medication administration and with tube clogging; Amount to Send (Nutrition use): 3 c...    DVT Prophylaxis: DOAC  Scott Catheter: Not present  Central Lines: None  Cardiac Monitoring: None  Code Status: Full Code      Disposition Plan   Expected Discharge: Pending placement; patient has ERCP scheduled for Monday 12/14/2022.  Anticipated discharge location:  Awaiting care coordination huddle  Delays:     Placement - TCU  Covid Test Positive            The patient's care was discussed with the Bedside Nurse, Patient and Patient's Family.    Luis Manuel Nunes DO  Hospitalist  Service, GOLD TEAM 8  [facility]  Securely message with the [employer] Web Console (learn more here)  Text page via [facility] Paging/Directory   Please see signed in provider for up to date coverage information      Clinically Significant Risk Factors Present on Admission                    ______________________________________________________________________    Interval History   Patient reports feeling well.  Per nursing staff, some nausea overPM.  Patient denies cough, chest pain, SOB.  Patient reports some persistent RUQ abdominal discomfort.  Patient reports regular bowel movements.    Data reviewed today: I reviewed all medications, new labs and imaging results over the last 24 hours. I personally reviewed no images or EKG's today.    Physical Exam   Vital Signs: Temp: 96.8  F (36  C) Temp src: Oral BP: 124/79 Pulse: 85   Resp: 14 SpO2: 100 % O2 Device: Nasal cannula Oxygen Delivery: 2 LPM  Weight: 110 lbs 3.68 oz     Physical Exam  Constitutional:       Appearance: Normal appearance. She is normal weight.   HENT:      Head: Normocephalic.      Mouth/Throat:      Mouth: Mucous membranes are moist.      Pharynx: Oropharynx is clear.   Eyes:      Extraocular Movements: Extraocular movements intact.      Conjunctiva/sclera: Conjunctivae normal.      Pupils: Pupils are equal, round, and reactive to light.   Cardiovascular:      Rate and Rhythm: Normal rate and regular rhythm.      Pulses: Normal pulses.      Heart sounds: Normal heart sounds.   Pulmonary:      Effort: Pulmonary effort is normal.      Breath sounds: Normal breath sounds.   Abdominal:      General: Abdomen is flat. Bowel sounds are normal.      Palpations: Abdomen is soft.   Skin:     General: Skin is warm and dry.   Neurological:      General: No focal deficit present.      Mental Status: She is alert.   Psychiatric:      Comments: Flat affect           Data   Recent Labs   Lab 02/12/22  0621 02/11/22  0625  02/10/22  0624   WBC 7.7 7.0 7.9   HGB 10.2* 10.1* 10.0*   MCV 97 96 96    451* 411    142 142   POTASSIUM 3.6 3.4 3.5   CHLORIDE 109 109 108   CO2 28 25 26   BUN 18 16 15   CR 0.50* 0.53 0.54   ANIONGAP 4 8 8   TERESA 9.2 9.6 9.6   GLC 93 84 91   ALBUMIN 2.7* 2.7* 2.7*   PROTTOTAL 6.8 6.8 6.8   BILITOTAL 0.2 0.3 0.2   ALKPHOS 358* 339* 315*   * 226* 186*   * 176* 124*     No results found for this or any previous visit (from the past 24 hour(s)).  Medications     dextrose Stopped (01/27/22 2040)     - MEDICATION INSTRUCTIONS -         [Held by provider] apixaban ANTICOAGULANT  5 mg Oral or Feeding Tube BID     ARIPiprazole  7.5 mg Oral or Feeding Tube Daily     banatrol plus  1 packet Per Feeding Tube BID     capsaicin   Topical TID     diclofenac  2 g Topical 4x Daily     metoprolol tartrate  75 mg Oral BID     multivitamins w/minerals  15 mL Per Feeding Tube Daily     pantoprazole  40 mg Oral BID     protein modular  2 packet Per Feeding Tube BID     sodium chloride (PF)  3 mL Intracatheter Q8H     spironolactone  25 mg Oral Daily     tolterodine  2 mg Oral or Feeding Tube BID     topiramate  100 mg Oral Daily     topiramate  200 mg Oral or Feeding Tube QPM     venlafaxine  75 mg Oral or Feeding Tube BID

## 2022-02-13 LAB
ALBUMIN SERPL-MCNC: 2.8 G/DL (ref 3.4–5)
ALP SERPL-CCNC: 367 U/L (ref 40–150)
ALT SERPL W P-5'-P-CCNC: 254 U/L (ref 0–50)
ANION GAP SERPL CALCULATED.3IONS-SCNC: 6 MMOL/L (ref 3–14)
AST SERPL W P-5'-P-CCNC: 146 U/L (ref 0–45)
BILIRUB SERPL-MCNC: 0.2 MG/DL (ref 0.2–1.3)
BUN SERPL-MCNC: 12 MG/DL (ref 7–30)
CALCIUM SERPL-MCNC: 9.9 MG/DL (ref 8.5–10.1)
CHLORIDE BLD-SCNC: 108 MMOL/L (ref 94–109)
CO2 SERPL-SCNC: 30 MMOL/L (ref 20–32)
CREAT SERPL-MCNC: 0.52 MG/DL (ref 0.52–1.04)
ERYTHROCYTE [DISTWIDTH] IN BLOOD BY AUTOMATED COUNT: 17.1 % (ref 10–15)
GFR SERPL CREATININE-BSD FRML MDRD: >90 ML/MIN/1.73M2
GLUCOSE BLD-MCNC: 84 MG/DL (ref 70–99)
HCT VFR BLD AUTO: 34.7 % (ref 35–47)
HGB BLD-MCNC: 10.5 G/DL (ref 11.7–15.7)
MCH RBC QN AUTO: 29 PG (ref 26.5–33)
MCHC RBC AUTO-ENTMCNC: 30.3 G/DL (ref 31.5–36.5)
MCV RBC AUTO: 96 FL (ref 78–100)
PLATELET # BLD AUTO: 425 10E3/UL (ref 150–450)
POTASSIUM BLD-SCNC: 3.6 MMOL/L (ref 3.4–5.3)
PROT SERPL-MCNC: 6.8 G/DL (ref 6.8–8.8)
RBC # BLD AUTO: 3.62 10E6/UL (ref 3.8–5.2)
SODIUM SERPL-SCNC: 144 MMOL/L (ref 133–144)
WBC # BLD AUTO: 7.6 10E3/UL (ref 4–11)

## 2022-02-13 PROCEDURE — 250N000013 HC RX MED GY IP 250 OP 250 PS 637: Performed by: STUDENT IN AN ORGANIZED HEALTH CARE EDUCATION/TRAINING PROGRAM

## 2022-02-13 PROCEDURE — 82040 ASSAY OF SERUM ALBUMIN: CPT | Performed by: STUDENT IN AN ORGANIZED HEALTH CARE EDUCATION/TRAINING PROGRAM

## 2022-02-13 PROCEDURE — 99233 SBSQ HOSP IP/OBS HIGH 50: CPT | Performed by: INTERNAL MEDICINE

## 2022-02-13 PROCEDURE — 80053 COMPREHEN METABOLIC PANEL: CPT | Performed by: STUDENT IN AN ORGANIZED HEALTH CARE EDUCATION/TRAINING PROGRAM

## 2022-02-13 PROCEDURE — 85027 COMPLETE CBC AUTOMATED: CPT | Performed by: STUDENT IN AN ORGANIZED HEALTH CARE EDUCATION/TRAINING PROGRAM

## 2022-02-13 PROCEDURE — 120N000002 HC R&B MED SURG/OB UMMC

## 2022-02-13 PROCEDURE — 250N000013 HC RX MED GY IP 250 OP 250 PS 637: Performed by: INTERNAL MEDICINE

## 2022-02-13 PROCEDURE — 36415 COLL VENOUS BLD VENIPUNCTURE: CPT | Performed by: STUDENT IN AN ORGANIZED HEALTH CARE EDUCATION/TRAINING PROGRAM

## 2022-02-13 RX ADMIN — Medication 3 MG: at 21:07

## 2022-02-13 RX ADMIN — TOPIRAMATE 100 MG: 100 TABLET, FILM COATED ORAL at 09:31

## 2022-02-13 RX ADMIN — Medication 15 ML: at 12:22

## 2022-02-13 RX ADMIN — Medication 2 PACKET: at 15:55

## 2022-02-13 RX ADMIN — VENLAFAXINE 75 MG: 75 TABLET ORAL at 21:06

## 2022-02-13 RX ADMIN — METOPROLOL TARTRATE 75 MG: 50 TABLET, FILM COATED ORAL at 21:06

## 2022-02-13 RX ADMIN — TOPIRAMATE 200 MG: 200 TABLET ORAL at 21:07

## 2022-02-13 RX ADMIN — Medication 2 PACKET: at 09:32

## 2022-02-13 RX ADMIN — SPIRONOLACTONE 25 MG: 25 TABLET, FILM COATED ORAL at 09:31

## 2022-02-13 RX ADMIN — VENLAFAXINE 75 MG: 75 TABLET ORAL at 09:31

## 2022-02-13 RX ADMIN — Medication 40 MG: at 21:06

## 2022-02-13 RX ADMIN — Medication 7.5 MG: at 09:30

## 2022-02-13 RX ADMIN — CAPSAICIN: 0.75 CREAM TOPICAL at 14:03

## 2022-02-13 RX ADMIN — Medication 40 MG: at 09:34

## 2022-02-13 RX ADMIN — METOPROLOL TARTRATE 75 MG: 50 TABLET, FILM COATED ORAL at 09:31

## 2022-02-13 RX ADMIN — TOLTERODINE TARTRATE 2 MG: 2 TABLET, FILM COATED ORAL at 21:06

## 2022-02-13 RX ADMIN — CAPSAICIN: 0.75 CREAM TOPICAL at 09:33

## 2022-02-13 RX ADMIN — TOLTERODINE TARTRATE 2 MG: 2 TABLET, FILM COATED ORAL at 09:31

## 2022-02-13 RX ADMIN — CAPSAICIN: 0.75 CREAM TOPICAL at 21:08

## 2022-02-13 ASSESSMENT — ACTIVITIES OF DAILY LIVING (ADL)
ADLS_ACUITY_SCORE: 12
ADLS_ACUITY_SCORE: 12
ADLS_ACUITY_SCORE: 18
ADLS_ACUITY_SCORE: 12
ADLS_ACUITY_SCORE: 18
ADLS_ACUITY_SCORE: 12
ADLS_ACUITY_SCORE: 18
ADLS_ACUITY_SCORE: 16
ADLS_ACUITY_SCORE: 12
ADLS_ACUITY_SCORE: 12
ADLS_ACUITY_SCORE: 18
ADLS_ACUITY_SCORE: 12
ADLS_ACUITY_SCORE: 16
ADLS_ACUITY_SCORE: 18
ADLS_ACUITY_SCORE: 18
ADLS_ACUITY_SCORE: 12
ADLS_ACUITY_SCORE: 12

## 2022-02-13 ASSESSMENT — MIFFLIN-ST. JEOR: SCORE: 1039.13

## 2022-02-13 NOTE — PLAN OF CARE
Patient up with sba. Patient  brought [employer] for lunch. Drinking fluids well. Patient pain better on her rt foot.

## 2022-02-13 NOTE — PLAN OF CARE
Assumed cares [id number]    Vitals: VSS on 1.5L O2   Pain: Denies  Neuro: A&Ox4  Cardiac: WDL  Respiratory: WDL ex dyspnea on exertion  GI/: G-tube clamped. Dressing changed. Takes all meds through g-tube. Voiding adequately. BM x 1. Good appetite. Refused bolus feeds this shift.  IV/Drains: R PIV SL  Activity: Up with SBA to the commode.  Skin: WDL    Plan of Care: Possible ERCP tomorrow(?) per MD note.  at the bedside. Call light within reach, continue POC.

## 2022-02-13 NOTE — PLAN OF CARE
"/76 (BP Location: Left arm)   Pulse 86   Temp 97.2  F (36.2  C) (Oral)   Resp 18   Ht 1.6 m (5' 3\")   Wt 49 kg (108 lb 0.4 oz)   SpO2 100%   BMI 19.14 kg/m      Assumed Cares: [id number]  Neuro: A&O x 4  Respiratory: Dyspnea on exertion; vitally stable on 1.5L O2 via NC  Cardiac: WDL  GI/: G-tube clamped -- site WDL; voiding adequately; 1 small BM  Skin: No new skin concerns  Lines: R PIV SL  Pain: Denies pain  Diet: Regular diet  Activity Level: SBA to BSC  Events: No acute events over night  Plan: Continue with POC; notify provider with changes      "

## 2022-02-13 NOTE — PLAN OF CARE
"/83 (BP Location: Left arm)   Pulse 85   Temp (!) 96.4  F (35.8  C) (Oral)   Resp 14   Ht 1.6 m (5' 3\")   Wt 49 kg (108 lb 0.4 oz)   SpO2 98%   BMI 19.14 kg/m      Care from: [id number]    VSS ex on 1 LPM nc. A&O x4. Declined bolus TF, said will eat dinner. G tube is clamped- dressing is CDI. R PIV is saline locked. Good appetite and oral intake. No BM this shift. Dyspnea on exertion. Skin is dry & flaky, peeing noted at bilateral heels. Generalized weakness. Up with SBA, gait belt, and walker. Call light within reach.  at bedside. Continue to follow poc. Possible ERCP tomorrow, NPO starting midnight. Plan to release pre-procedural sign and help orders tonight.  "

## 2022-02-13 NOTE — PROGRESS NOTES
[facility]    Medicine Progress Note - Hospitalist Service, GOLD TEAM 8    Date of Admission:  1/11/2022    Assessment & Plan          Sherly Yeung is a 56 year old female admitted on 1/11/2022 who is being transferred out of the ICU on 1/23/2022. She has a history of COVID 10/2021 requiring trachoestomy who presented on 1/11 with acute cholecystitis and cholangitis s/p biliary stent placement. Hospital course was complicated by respiratory failure on 1/17 and repeat COVID infection. She developed respiratory failure and required ICU transfer and intubation on 1/19. She was extubated on 1/21 but had recurrent hypercapnia requiring bipap at night and while sleeping ICU stay was complicated by development of new leukocytosis and C. Diff colitis with diarrhea.    No acute events overPM.  No changes today.  Pain well-controlled. Patient reporting persistent right lateral foot pain.  Low level transaminates elevation. Incrementally increasing transaminitis without overt pain. Patient has gastroenterology follow-up recommended.  Gastroenterology planning ERCP on 2/14/2022.  Completed greater than 14 days of oral vancomycin [Stopped]   Monitor off abx.  Discharge on fiber packets.  Continue meds through G tube as patient tolerates.  Pending placement.    #ARDS secondary to COVID 19 pneumonia (improving)  #Acute hypercapnic and hypoxemic respiratory failure  -Weaning off oxygen. On RA-1 L [increased on exertion]  -Incentive spirometry    #C. diff colitis  -Improved Diarrhea  -Encouraged fiber for bulking of 1-2 soft bowel movements  -Increase Fiber packets (bowel movement improved)    #Aspergillus pneumonia  -Occurred during initial COVID treatment in 10/2021. Was planned for 60 day of voriconazole  -Was due to end on 1/21 but had recurrent COVID and respiratory failure  Plan:  >Plan for 2 additional weeks of voriconazole past last day of dexamethasone  >End date February  9th    #Choledocholithiasis c/b cholangitis, s/p CBD stenting (1/12/22)  #Probable acute cholecystitis s/p transpapillary cystic duct stenting (1/12/22)  #Transaminitis   #Sepsis (tachy, leukocytosis)  -S/p ERCP, removal of stone and stent placement  -Abx for 10 days (1/12 - 1/22) - (Was cipro/flagyl, broadened to cef/flagyl due to resp failure)  -Developed worsened respiratory failure on 1/19 and antibiotics were extended to provide additional bacterial coverage  Plan:  >Stop IV cefepime, flagyl on 1/26 (14 day course)  >Follow as outpatient with Gen-surg for cholecystectomy (order on discharge placed)  >Follow up with GI approx February 2/12/22    #Paget Banegas syndrome  #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  She has a history of multiple blood clots.  She has been maintained on apixaban.  >Continue PTA apixaban  >#Anemia  >Secondary to critical illness  #HTN  >Up-titrating to control tachycardia, blood pressure  #Depression. Anorexia  >Venlflaxine, Abilify, topiramate     # Right foot pain (resolved)  Xray shows no evidence fo acute fracture  Trial topical capsaicin  Consider plantar fasciitis       Diet: Regular Diet Adult  Adult Formula Bolus Feeding: TID Osmolite 1.5; Route: Gastrostomy; 3; Can(s); Medication - Feeding Tube Flush Frequency: At least 15-30 mL water before and after medication administration and with tube clogging; Amount to Send (Nutrition use): 3 c...    DVT Prophylaxis: DOAC (held for procedure)  Scott Catheter: Not present  Central Lines: None  Cardiac Monitoring: None  Code Status: Full Code      Disposition Plan   Expected Discharge: ERCP scheduled for tomorrow.  Anticipated discharge location:  Awaiting care coordination huddle  Delays:     Placement - TCU  Covid Test Positive            The patient's care was discussed with the Bedside Nurse and Patient.    Luis Manuel Nunes DO  Hospitalist Service, [address]  [facility]  Securely message with the [employer] Web Console (learn more here)  Text page via [facility] Paging/Directory   Please see signed in provider for up to date coverage information      ______________________________________________________________________    Interval History   Patient reports feeling well.  Patient reports persistent RUQ abdominal discomfort (stable).  Patient denies chest pain, SOB.  Patient denies nausea, vomiting.  Patient reports moderate appetite.  Patent reports regular bowel movements.    Data reviewed today: I reviewed all medications, new labs and imaging results over the last 24 hours. I personally reviewed no images or EKG's today.    Physical Exam   Vital Signs: Temp: (!) 96.4  F (35.8  C) Temp src: Oral BP: 129/83 Pulse: 85   Resp: 14 SpO2: 98 % O2 Device: Nasal cannula Oxygen Delivery: 1 LPM  Weight: 108 lbs .41 oz    Physical Exam  Constitutional:       Appearance: Normal appearance. She is normal weight.   HENT:      Head: Normocephalic.      Mouth/Throat:      Mouth: Mucous membranes are moist.      Pharynx: Oropharynx is clear.   Eyes:      Extraocular Movements: Extraocular movements intact.      Conjunctiva/sclera: Conjunctivae normal.      Pupils: Pupils are equal, round, and reactive to light.   Cardiovascular:      Rate and Rhythm: Normal rate and regular rhythm.      Pulses: Normal pulses.      Heart sounds: Normal heart sounds.   Pulmonary:      Effort: Pulmonary effort is normal.      Breath sounds: Normal breath sounds.   Abdominal:      General: Abdomen is flat. Bowel sounds are normal.      Palpations: Abdomen is soft.      Comments: PEG insertion site clean, dry, and intact   Musculoskeletal:         General: Normal range of motion.      Cervical back: Normal range of motion and neck supple.   Skin:     General: Skin is warm and dry.   Neurological:      General: No focal deficit present.      Mental Status: She is alert.             Data   Recent Labs   Lab 02/13/22  0649 02/12/22  0621 02/11/22  0625   WBC 7.6 7.7 7.0   HGB 10.5* 10.2* 10.1*   MCV 96 97 96    446 451*    141 142   POTASSIUM 3.6 3.6 3.4   CHLORIDE 108 109 109   CO2 30 28 25   BUN 12 18 16   CR 0.52 0.50* 0.53   ANIONGAP 6 4 8   TERESA 9.9 9.2 9.6   GLC 84 93 84   ALBUMIN 2.8* 2.7* 2.7*   PROTTOTAL 6.8 6.8 6.8   BILITOTAL 0.2 0.2 0.3   ALKPHOS 367* 358* 339*   * 248* 226*   * 154* 176*     No results found for this or any previous visit (from the past 24 hour(s)).  Medications     dextrose Stopped (01/27/22 2040)     - MEDICATION INSTRUCTIONS -         [Held by provider] apixaban ANTICOAGULANT  5 mg Oral or Feeding Tube BID     ARIPiprazole  7.5 mg Oral or Feeding Tube Daily     banatrol plus  1 packet Per Feeding Tube BID     capsaicin   Topical TID     diclofenac  2 g Topical 4x Daily     metoprolol tartrate  75 mg Oral BID     multivitamins w/minerals  15 mL Per Feeding Tube Daily     pantoprazole  40 mg Oral BID     protein modular  2 packet Per Feeding Tube BID     sodium chloride (PF)  3 mL Intracatheter Q8H     spironolactone  25 mg Oral Daily     tolterodine  2 mg Oral or Feeding Tube BID     topiramate  100 mg Oral Daily     topiramate  200 mg Oral or Feeding Tube QPM     venlafaxine  75 mg Oral or Feeding Tube BID

## 2022-02-14 ENCOUNTER — ANESTHESIA (OUTPATIENT)
Dept: SURGERY | Facility: CLINIC | Age: 57
DRG: 871 | End: 2022-02-14
Payer: MEDICARE

## 2022-02-14 ENCOUNTER — ANESTHESIA EVENT (OUTPATIENT)
Dept: SURGERY | Facility: CLINIC | Age: 57
DRG: 871 | End: 2022-02-14
Payer: MEDICARE

## 2022-02-14 ENCOUNTER — APPOINTMENT (OUTPATIENT)
Dept: GENERAL RADIOLOGY | Facility: CLINIC | Age: 57
DRG: 871 | End: 2022-02-14
Attending: INTERNAL MEDICINE
Payer: MEDICARE

## 2022-02-14 LAB
ALBUMIN SERPL-MCNC: 2.7 G/DL (ref 3.4–5)
ALBUMIN SERPL-MCNC: 2.8 G/DL (ref 3.4–5)
ALP SERPL-CCNC: 378 U/L (ref 40–150)
ALP SERPL-CCNC: 389 U/L (ref 40–150)
ALT SERPL W P-5'-P-CCNC: 228 U/L (ref 0–50)
ALT SERPL W P-5'-P-CCNC: 250 U/L (ref 0–50)
ANION GAP SERPL CALCULATED.3IONS-SCNC: 5 MMOL/L (ref 3–14)
ANION GAP SERPL CALCULATED.3IONS-SCNC: 5 MMOL/L (ref 3–14)
AST SERPL W P-5'-P-CCNC: 105 U/L (ref 0–45)
AST SERPL W P-5'-P-CCNC: 122 U/L (ref 0–45)
BILIRUB SERPL-MCNC: 0.3 MG/DL (ref 0.2–1.3)
BILIRUB SERPL-MCNC: 0.3 MG/DL (ref 0.2–1.3)
BUN SERPL-MCNC: 12 MG/DL (ref 7–30)
BUN SERPL-MCNC: 14 MG/DL (ref 7–30)
CALCIUM SERPL-MCNC: 9.6 MG/DL (ref 8.5–10.1)
CALCIUM SERPL-MCNC: 9.8 MG/DL (ref 8.5–10.1)
CHLORIDE BLD-SCNC: 107 MMOL/L (ref 94–109)
CHLORIDE BLD-SCNC: 108 MMOL/L (ref 94–109)
CO2 SERPL-SCNC: 28 MMOL/L (ref 20–32)
CO2 SERPL-SCNC: 30 MMOL/L (ref 20–32)
CREAT SERPL-MCNC: 0.52 MG/DL (ref 0.52–1.04)
CREAT SERPL-MCNC: 0.52 MG/DL (ref 0.52–1.04)
ERCP: NORMAL
ERYTHROCYTE [DISTWIDTH] IN BLOOD BY AUTOMATED COUNT: 16.6 % (ref 10–15)
ERYTHROCYTE [DISTWIDTH] IN BLOOD BY AUTOMATED COUNT: 16.7 % (ref 10–15)
GFR SERPL CREATININE-BSD FRML MDRD: >90 ML/MIN/1.73M2
GFR SERPL CREATININE-BSD FRML MDRD: >90 ML/MIN/1.73M2
GLUCOSE BLD-MCNC: 90 MG/DL (ref 70–99)
GLUCOSE BLD-MCNC: 91 MG/DL (ref 70–99)
GLUCOSE BLDC GLUCOMTR-MCNC: 87 MG/DL (ref 70–99)
HCT VFR BLD AUTO: 34.3 % (ref 35–47)
HCT VFR BLD AUTO: 35.5 % (ref 35–47)
HGB BLD-MCNC: 10.3 G/DL (ref 11.7–15.7)
HGB BLD-MCNC: 10.6 G/DL (ref 11.7–15.7)
INR PPP: 1.1 (ref 0.85–1.15)
MCH RBC QN AUTO: 28.5 PG (ref 26.5–33)
MCH RBC QN AUTO: 29 PG (ref 26.5–33)
MCHC RBC AUTO-ENTMCNC: 29.9 G/DL (ref 31.5–36.5)
MCHC RBC AUTO-ENTMCNC: 30 G/DL (ref 31.5–36.5)
MCV RBC AUTO: 95 FL (ref 78–100)
MCV RBC AUTO: 97 FL (ref 78–100)
PLATELET # BLD AUTO: 455 10E3/UL (ref 150–450)
PLATELET # BLD AUTO: 489 10E3/UL (ref 150–450)
POTASSIUM BLD-SCNC: 3.3 MMOL/L (ref 3.4–5.3)
POTASSIUM BLD-SCNC: 3.5 MMOL/L (ref 3.4–5.3)
PROT SERPL-MCNC: 6.7 G/DL (ref 6.8–8.8)
PROT SERPL-MCNC: 7.2 G/DL (ref 6.8–8.8)
RBC # BLD AUTO: 3.61 10E6/UL (ref 3.8–5.2)
RBC # BLD AUTO: 3.65 10E6/UL (ref 3.8–5.2)
SODIUM SERPL-SCNC: 141 MMOL/L (ref 133–144)
SODIUM SERPL-SCNC: 142 MMOL/L (ref 133–144)
WBC # BLD AUTO: 7.4 10E3/UL (ref 4–11)
WBC # BLD AUTO: 8.2 10E3/UL (ref 4–11)

## 2022-02-14 PROCEDURE — 120N000002 HC R&B MED SURG/OB UMMC

## 2022-02-14 PROCEDURE — 80053 COMPREHEN METABOLIC PANEL: CPT | Performed by: STUDENT IN AN ORGANIZED HEALTH CARE EDUCATION/TRAINING PROGRAM

## 2022-02-14 PROCEDURE — 250N000009 HC RX 250: Performed by: INTERNAL MEDICINE

## 2022-02-14 PROCEDURE — 85027 COMPLETE CBC AUTOMATED: CPT | Performed by: STUDENT IN AN ORGANIZED HEALTH CARE EDUCATION/TRAINING PROGRAM

## 2022-02-14 PROCEDURE — 84155 ASSAY OF PROTEIN SERUM: CPT | Performed by: INTERNAL MEDICINE

## 2022-02-14 PROCEDURE — 370N000017 HC ANESTHESIA TECHNICAL FEE, PER MIN: Performed by: INTERNAL MEDICINE

## 2022-02-14 PROCEDURE — 250N000013 HC RX MED GY IP 250 OP 250 PS 637: Performed by: STUDENT IN AN ORGANIZED HEALTH CARE EDUCATION/TRAINING PROGRAM

## 2022-02-14 PROCEDURE — 99231 SBSQ HOSP IP/OBS SF/LOW 25: CPT | Performed by: INTERNAL MEDICINE

## 2022-02-14 PROCEDURE — 82040 ASSAY OF SERUM ALBUMIN: CPT | Performed by: STUDENT IN AN ORGANIZED HEALTH CARE EDUCATION/TRAINING PROGRAM

## 2022-02-14 PROCEDURE — 258N000003 HC RX IP 258 OP 636: Performed by: NURSE ANESTHETIST, CERTIFIED REGISTERED

## 2022-02-14 PROCEDURE — C1769 GUIDE WIRE: HCPCS | Performed by: INTERNAL MEDICINE

## 2022-02-14 PROCEDURE — 85610 PROTHROMBIN TIME: CPT | Performed by: INTERNAL MEDICINE

## 2022-02-14 PROCEDURE — 255N000002 HC RX 255 OP 636: Performed by: INTERNAL MEDICINE

## 2022-02-14 PROCEDURE — 999N000181 XR SURGERY CARM FLUORO GREATER THAN 5 MIN W STILLS: Mod: TC

## 2022-02-14 PROCEDURE — 710N000011 HC RECOVERY PHASE 1, LEVEL 3, PER MIN: Performed by: INTERNAL MEDICINE

## 2022-02-14 PROCEDURE — 250N000011 HC RX IP 250 OP 636: Performed by: NURSE ANESTHETIST, CERTIFIED REGISTERED

## 2022-02-14 PROCEDURE — 272N000001 HC OR GENERAL SUPPLY STERILE: Performed by: INTERNAL MEDICINE

## 2022-02-14 PROCEDURE — 360N000082 HC SURGERY LEVEL 2 W/ FLUORO, PER MIN: Performed by: INTERNAL MEDICINE

## 2022-02-14 PROCEDURE — 99207 PR DOWN CODE DUE TO INITIAL EXAM: CPT | Performed by: INTERNAL MEDICINE

## 2022-02-14 PROCEDURE — 36415 COLL VENOUS BLD VENIPUNCTURE: CPT | Performed by: INTERNAL MEDICINE

## 2022-02-14 PROCEDURE — 250N000011 HC RX IP 250 OP 636: Performed by: STUDENT IN AN ORGANIZED HEALTH CARE EDUCATION/TRAINING PROGRAM

## 2022-02-14 PROCEDURE — C1877 STENT, NON-COAT/COV W/O DEL: HCPCS | Performed by: INTERNAL MEDICINE

## 2022-02-14 PROCEDURE — 85027 COMPLETE CBC AUTOMATED: CPT | Performed by: INTERNAL MEDICINE

## 2022-02-14 PROCEDURE — C1726 CATH, BAL DIL, NON-VASCULAR: HCPCS | Performed by: INTERNAL MEDICINE

## 2022-02-14 PROCEDURE — 250N000009 HC RX 250: Performed by: NURSE ANESTHETIST, CERTIFIED REGISTERED

## 2022-02-14 PROCEDURE — 36415 COLL VENOUS BLD VENIPUNCTURE: CPT | Performed by: STUDENT IN AN ORGANIZED HEALTH CARE EDUCATION/TRAINING PROGRAM

## 2022-02-14 PROCEDURE — 250N000013 HC RX MED GY IP 250 OP 250 PS 637: Performed by: INTERNAL MEDICINE

## 2022-02-14 PROCEDURE — 999N000141 HC STATISTIC PRE-PROCEDURE NURSING ASSESSMENT: Performed by: INTERNAL MEDICINE

## 2022-02-14 DEVICE — STENT ZIMMON BILIARY 07FRX12CM DBL PIGTAIL
Type: IMPLANTABLE DEVICE | Site: BILE DUCT | Status: NON-FUNCTIONAL
Removed: 2022-03-28

## 2022-02-14 RX ORDER — HYDRALAZINE HYDROCHLORIDE 20 MG/ML
2.5-5 INJECTION INTRAMUSCULAR; INTRAVENOUS EVERY 10 MIN PRN
Status: DISCONTINUED | OUTPATIENT
Start: 2022-02-14 | End: 2022-02-14 | Stop reason: HOSPADM

## 2022-02-14 RX ORDER — OXYCODONE HYDROCHLORIDE 5 MG/1
5 TABLET ORAL EVERY 4 HOURS PRN
Status: DISCONTINUED | OUTPATIENT
Start: 2022-02-14 | End: 2022-02-14 | Stop reason: HOSPADM

## 2022-02-14 RX ORDER — ONDANSETRON 4 MG/1
4 TABLET, ORALLY DISINTEGRATING ORAL EVERY 6 HOURS PRN
Status: DISCONTINUED | OUTPATIENT
Start: 2022-02-14 | End: 2022-02-18 | Stop reason: HOSPADM

## 2022-02-14 RX ORDER — POTASSIUM CHLORIDE 20MEQ/15ML
20 LIQUID (ML) ORAL ONCE
Status: DISCONTINUED | OUTPATIENT
Start: 2022-02-14 | End: 2022-02-15

## 2022-02-14 RX ORDER — PROPOFOL 10 MG/ML
INJECTION, EMULSION INTRAVENOUS CONTINUOUS PRN
Status: DISCONTINUED | OUTPATIENT
Start: 2022-02-14 | End: 2022-02-14

## 2022-02-14 RX ORDER — HYDROMORPHONE HYDROCHLORIDE 1 MG/ML
0.2 INJECTION, SOLUTION INTRAMUSCULAR; INTRAVENOUS; SUBCUTANEOUS EVERY 5 MIN PRN
Status: DISCONTINUED | OUTPATIENT
Start: 2022-02-14 | End: 2022-02-14 | Stop reason: HOSPADM

## 2022-02-14 RX ORDER — LIDOCAINE 40 MG/G
CREAM TOPICAL
Status: DISCONTINUED | OUTPATIENT
Start: 2022-02-14 | End: 2022-02-14 | Stop reason: HOSPADM

## 2022-02-14 RX ORDER — IOPAMIDOL 510 MG/ML
INJECTION, SOLUTION INTRAVASCULAR PRN
Status: DISCONTINUED | OUTPATIENT
Start: 2022-02-14 | End: 2022-02-14 | Stop reason: HOSPADM

## 2022-02-14 RX ORDER — ONDANSETRON 2 MG/ML
4 INJECTION INTRAMUSCULAR; INTRAVENOUS EVERY 30 MIN PRN
Status: DISCONTINUED | OUTPATIENT
Start: 2022-02-14 | End: 2022-02-14 | Stop reason: HOSPADM

## 2022-02-14 RX ORDER — ONDANSETRON 2 MG/ML
4 INJECTION INTRAMUSCULAR; INTRAVENOUS EVERY 6 HOURS PRN
Status: DISCONTINUED | OUTPATIENT
Start: 2022-02-14 | End: 2022-02-18 | Stop reason: HOSPADM

## 2022-02-14 RX ORDER — LABETALOL HYDROCHLORIDE 5 MG/ML
10 INJECTION, SOLUTION INTRAVENOUS
Status: DISCONTINUED | OUTPATIENT
Start: 2022-02-14 | End: 2022-02-14 | Stop reason: HOSPADM

## 2022-02-14 RX ORDER — HALOPERIDOL 5 MG/ML
1 INJECTION INTRAMUSCULAR
Status: DISCONTINUED | OUTPATIENT
Start: 2022-02-14 | End: 2022-02-14 | Stop reason: HOSPADM

## 2022-02-14 RX ORDER — SODIUM CHLORIDE, SODIUM LACTATE, POTASSIUM CHLORIDE, CALCIUM CHLORIDE 600; 310; 30; 20 MG/100ML; MG/100ML; MG/100ML; MG/100ML
INJECTION, SOLUTION INTRAVENOUS CONTINUOUS
Status: DISCONTINUED | OUTPATIENT
Start: 2022-02-14 | End: 2022-02-14 | Stop reason: HOSPADM

## 2022-02-14 RX ORDER — INDOMETHACIN 50 MG/1
SUPPOSITORY RECTAL PRN
Status: DISCONTINUED | OUTPATIENT
Start: 2022-02-14 | End: 2022-02-14 | Stop reason: HOSPADM

## 2022-02-14 RX ORDER — MEPERIDINE HYDROCHLORIDE 25 MG/ML
12.5 INJECTION INTRAMUSCULAR; INTRAVENOUS; SUBCUTANEOUS EVERY 5 MIN PRN
Status: DISCONTINUED | OUTPATIENT
Start: 2022-02-14 | End: 2022-02-14 | Stop reason: HOSPADM

## 2022-02-14 RX ORDER — FENTANYL CITRATE 50 UG/ML
25 INJECTION, SOLUTION INTRAMUSCULAR; INTRAVENOUS EVERY 5 MIN PRN
Status: DISCONTINUED | OUTPATIENT
Start: 2022-02-14 | End: 2022-02-14 | Stop reason: HOSPADM

## 2022-02-14 RX ORDER — LEVOFLOXACIN 5 MG/ML
500 INJECTION, SOLUTION INTRAVENOUS ONCE
Status: COMPLETED | OUTPATIENT
Start: 2022-02-14 | End: 2022-02-14

## 2022-02-14 RX ORDER — INDOMETHACIN 50 MG/1
100 SUPPOSITORY RECTAL
Status: DISCONTINUED | OUTPATIENT
Start: 2022-02-14 | End: 2022-02-14 | Stop reason: HOSPADM

## 2022-02-14 RX ORDER — DEXAMETHASONE SODIUM PHOSPHATE 4 MG/ML
INJECTION, SOLUTION INTRA-ARTICULAR; INTRALESIONAL; INTRAMUSCULAR; INTRAVENOUS; SOFT TISSUE PRN
Status: DISCONTINUED | OUTPATIENT
Start: 2022-02-14 | End: 2022-02-14

## 2022-02-14 RX ORDER — SODIUM CHLORIDE, SODIUM LACTATE, POTASSIUM CHLORIDE, CALCIUM CHLORIDE 600; 310; 30; 20 MG/100ML; MG/100ML; MG/100ML; MG/100ML
INJECTION, SOLUTION INTRAVENOUS CONTINUOUS PRN
Status: DISCONTINUED | OUTPATIENT
Start: 2022-02-14 | End: 2022-02-14

## 2022-02-14 RX ORDER — LIDOCAINE HYDROCHLORIDE 20 MG/ML
INJECTION, SOLUTION INFILTRATION; PERINEURAL PRN
Status: DISCONTINUED | OUTPATIENT
Start: 2022-02-14 | End: 2022-02-14

## 2022-02-14 RX ORDER — FLUMAZENIL 0.1 MG/ML
0.2 INJECTION, SOLUTION INTRAVENOUS
Status: ACTIVE | OUTPATIENT
Start: 2022-02-14 | End: 2022-02-14

## 2022-02-14 RX ORDER — ONDANSETRON 4 MG/1
4 TABLET, ORALLY DISINTEGRATING ORAL EVERY 30 MIN PRN
Status: DISCONTINUED | OUTPATIENT
Start: 2022-02-14 | End: 2022-02-14 | Stop reason: HOSPADM

## 2022-02-14 RX ORDER — EPHEDRINE SULFATE 50 MG/ML
INJECTION, SOLUTION INTRAMUSCULAR; INTRAVENOUS; SUBCUTANEOUS PRN
Status: DISCONTINUED | OUTPATIENT
Start: 2022-02-14 | End: 2022-02-14

## 2022-02-14 RX ORDER — DIMENHYDRINATE 50 MG/ML
25 INJECTION, SOLUTION INTRAMUSCULAR; INTRAVENOUS
Status: DISCONTINUED | OUTPATIENT
Start: 2022-02-14 | End: 2022-02-14 | Stop reason: HOSPADM

## 2022-02-14 RX ORDER — FENTANYL CITRATE 50 UG/ML
INJECTION, SOLUTION INTRAMUSCULAR; INTRAVENOUS PRN
Status: DISCONTINUED | OUTPATIENT
Start: 2022-02-14 | End: 2022-02-14

## 2022-02-14 RX ORDER — PROPOFOL 10 MG/ML
INJECTION, EMULSION INTRAVENOUS PRN
Status: DISCONTINUED | OUTPATIENT
Start: 2022-02-14 | End: 2022-02-14

## 2022-02-14 RX ADMIN — CAPSAICIN: 0.75 CREAM TOPICAL at 07:54

## 2022-02-14 RX ADMIN — Medication 7.5 MG: at 07:52

## 2022-02-14 RX ADMIN — PHENYLEPHRINE HYDROCHLORIDE 100 MCG: 10 INJECTION INTRAVENOUS at 09:43

## 2022-02-14 RX ADMIN — Medication 10 MG: at 10:13

## 2022-02-14 RX ADMIN — VENLAFAXINE 75 MG: 75 TABLET ORAL at 07:53

## 2022-02-14 RX ADMIN — SODIUM CHLORIDE, POTASSIUM CHLORIDE, SODIUM LACTATE AND CALCIUM CHLORIDE: 600; 310; 30; 20 INJECTION, SOLUTION INTRAVENOUS at 09:36

## 2022-02-14 RX ADMIN — METOPROLOL TARTRATE 75 MG: 50 TABLET, FILM COATED ORAL at 07:52

## 2022-02-14 RX ADMIN — LEVOFLOXACIN 500 MG: 5 INJECTION, SOLUTION INTRAVENOUS at 09:56

## 2022-02-14 RX ADMIN — PROPOFOL 100 MG: 10 INJECTION, EMULSION INTRAVENOUS at 09:40

## 2022-02-14 RX ADMIN — FENTANYL CITRATE 100 MCG: 50 INJECTION, SOLUTION INTRAMUSCULAR; INTRAVENOUS at 09:40

## 2022-02-14 RX ADMIN — LIDOCAINE HYDROCHLORIDE 60 MG: 20 INJECTION, SOLUTION INFILTRATION; PERINEURAL at 09:40

## 2022-02-14 RX ADMIN — PROPOFOL 150 MCG/KG/MIN: 10 INJECTION, EMULSION INTRAVENOUS at 09:38

## 2022-02-14 RX ADMIN — VENLAFAXINE 75 MG: 75 TABLET ORAL at 20:40

## 2022-02-14 RX ADMIN — TOLTERODINE TARTRATE 2 MG: 2 TABLET, FILM COATED ORAL at 07:52

## 2022-02-14 RX ADMIN — ONDANSETRON 4 MG: 2 INJECTION INTRAMUSCULAR; INTRAVENOUS at 10:00

## 2022-02-14 RX ADMIN — Medication 2 PACKET: at 16:37

## 2022-02-14 RX ADMIN — TOPIRAMATE 200 MG: 200 TABLET ORAL at 20:40

## 2022-02-14 RX ADMIN — ROCURONIUM BROMIDE 25 MG: 50 INJECTION, SOLUTION INTRAVENOUS at 09:41

## 2022-02-14 RX ADMIN — PHENYLEPHRINE HYDROCHLORIDE 100 MCG: 10 INJECTION INTRAVENOUS at 10:34

## 2022-02-14 RX ADMIN — PHENYLEPHRINE HYDROCHLORIDE 150 MCG: 10 INJECTION INTRAVENOUS at 10:05

## 2022-02-14 RX ADMIN — CAPSAICIN: 0.75 CREAM TOPICAL at 20:54

## 2022-02-14 RX ADMIN — PHENYLEPHRINE HYDROCHLORIDE 100 MCG: 10 INJECTION INTRAVENOUS at 10:38

## 2022-02-14 RX ADMIN — PHENYLEPHRINE HYDROCHLORIDE 200 MCG: 10 INJECTION INTRAVENOUS at 09:53

## 2022-02-14 RX ADMIN — TOPIRAMATE 100 MG: 100 TABLET, FILM COATED ORAL at 07:53

## 2022-02-14 RX ADMIN — PHENYLEPHRINE HYDROCHLORIDE 100 MCG: 10 INJECTION INTRAVENOUS at 09:45

## 2022-02-14 RX ADMIN — CAPSAICIN: 0.75 CREAM TOPICAL at 14:08

## 2022-02-14 RX ADMIN — DEXAMETHASONE SODIUM PHOSPHATE 8 MG: 4 INJECTION, SOLUTION INTRA-ARTICULAR; INTRALESIONAL; INTRAMUSCULAR; INTRAVENOUS; SOFT TISSUE at 10:00

## 2022-02-14 RX ADMIN — Medication 40 MG: at 20:38

## 2022-02-14 RX ADMIN — SPIRONOLACTONE 25 MG: 25 TABLET, FILM COATED ORAL at 07:53

## 2022-02-14 RX ADMIN — PHENYLEPHRINE HYDROCHLORIDE 200 MCG: 10 INJECTION INTRAVENOUS at 10:11

## 2022-02-14 RX ADMIN — Medication 5 MG: at 10:05

## 2022-02-14 RX ADMIN — TOLTERODINE TARTRATE 2 MG: 2 TABLET, FILM COATED ORAL at 20:40

## 2022-02-14 RX ADMIN — SUGAMMADEX 200 MG: 100 INJECTION, SOLUTION INTRAVENOUS at 10:45

## 2022-02-14 RX ADMIN — Medication 3 MG: at 21:17

## 2022-02-14 RX ADMIN — METOPROLOL TARTRATE 75 MG: 50 TABLET, FILM COATED ORAL at 20:52

## 2022-02-14 RX ADMIN — PHENYLEPHRINE HYDROCHLORIDE 150 MCG: 10 INJECTION INTRAVENOUS at 10:19

## 2022-02-14 ASSESSMENT — ACTIVITIES OF DAILY LIVING (ADL)
ADLS_ACUITY_SCORE: 12
ADLS_ACUITY_SCORE: 12
ADLS_ACUITY_SCORE: 13
ADLS_ACUITY_SCORE: 12
ADLS_ACUITY_SCORE: 14
ADLS_ACUITY_SCORE: 12
ADLS_ACUITY_SCORE: 12
ADLS_ACUITY_SCORE: 14
ADLS_ACUITY_SCORE: 12
ADLS_ACUITY_SCORE: 12
ADLS_ACUITY_SCORE: 14
ADLS_ACUITY_SCORE: 13
ADLS_ACUITY_SCORE: 12
ADLS_ACUITY_SCORE: 14
ADLS_ACUITY_SCORE: 12

## 2022-02-14 ASSESSMENT — MIFFLIN-ST. JEOR: SCORE: 1089.13

## 2022-02-14 NOTE — PROGRESS NOTES
[facility]    Medicine Progress Note - Hospitalist Service, GOLD TEAM 8    Date of Admission:  1/11/2022    Assessment & Plan          Sherly Yeung is a 56 year old female admitted on 1/11/2022 who is being transferred out of the ICU on 1/23/2022. She has a history of COVID 10/2021 requiring trachoestomy who presented on 1/11 with acute cholecystitis and cholangitis s/p biliary stent placement. Hospital course was complicated by respiratory failure on 1/17 and repeat COVID infection. She developed respiratory failure and required ICU transfer and intubation on 1/19. She was extubated on 1/21 but had recurrent hypercapnia requiring bipap at night and while sleeping ICU stay was complicated by development of new leukocytosis and C. Diff colitis with diarrhea.    No acute events overPM.  No changes today.  Pain well-controlled. Patient reporting persistent right lateral foot pain.  Low level transaminates elevation. Incrementally increasing transaminitis without overt pain. Patient has gastroenterology follow-up recommended.  Gastroenterology planning ERCP on 2/14/2022.  Completed greater than 14 days of oral vancomycin [Stopped]   Monitor off abx.  Discharge on fiber packets.  Continue meds through G tube as patient tolerates.  Pending placement.    #ARDS secondary to COVID 19 pneumonia (improving)  #Acute hypercapnic and hypoxemic respiratory failure  -Weaning off oxygen. On RA-1 L [increased on exertion]  -Incentive spirometry    #C. diff colitis  -Improved Diarrhea  -Encouraged fiber for bulking of 1-2 soft bowel movements  -Increase Fiber packets (bowel movement improved)    #Aspergillus pneumonia  -Occurred during initial COVID treatment in 10/2021. Was planned for 60 day of voriconazole  -Was due to end on 1/21 but had recurrent COVID and respiratory failure  Plan:  >Plan for 2 additional weeks of voriconazole past last day of dexamethasone  >End date February  9th    #Choledocholithiasis c/b cholangitis, s/p CBD stenting (1/12/22)  #Probable acute cholecystitis s/p transpapillary cystic duct stenting (1/12/22)  #Transaminitis   #Sepsis (tachy, leukocytosis)  -S/p ERCP, removal of stone and stent placement  -Abx for 10 days (1/12 - 1/22) - (Was cipro/flagyl, broadened to cef/flagyl due to resp failure)  -Developed worsened respiratory failure on 1/19 and antibiotics were extended to provide additional bacterial coverage  Plan:  >Stop IV cefepime, flagyl on 1/26 (14 day course)  >Follow as outpatient with Gen-surg for cholecystectomy (order on discharge placed)  >Follow up with GI approx February 2/12/22    #Paget Banegas syndrome  #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  She has a history of multiple blood clots.  She has been maintained on apixaban.  >Continue PTA apixaban  >#Anemia  >Secondary to critical illness  #HTN  >Up-titrating to control tachycardia, blood pressure  #Depression. Anorexia  >Venlflaxine, Abilify, topiramate     # Right foot pain (resolved)  Xray shows no evidence fo acute fracture  Trial topical capsaicin  Consider plantar fasciitis       Diet: Adult Formula Bolus Feeding: TID Osmolite 1.5; Route: Gastrostomy; 3; Can(s); Medication - Feeding Tube Flush Frequency: At least 15-30 mL water before and after medication administration and with tube clogging; Amount to Send (Nutrition use): 3 c...  Advance Diet as Tolerated: Clear Liquid Diet    DVT Prophylaxis: DOAC held for ERCP  Scott Catheter: Not present  Central Lines: None  Cardiac Monitoring: None  Code Status: Full Code      Disposition Plan   Expected Discharge: Pending placement  Anticipated discharge location:  Awaiting care coordination huddle  Delays:     Placement - TCU  Covid Test Positive            The patient's care was discussed with the Bedside Nurse and Care Coordinator/.    Luis Manuel Nunes DO  Hospitalist  Service, GOLD TEAM 8  [facility]  Securely message with the [employer] Web Console (learn more here)  Text page via [facility] Paging/Directory   Please see signed in provider for up to date coverage information  ______________________________________________________________________    Interval History   Unable to fully evaluate patient today.  Patient was in OR undergoing ERCP for additional stent placement.  Incoming hospitalist will fully evaluate patient tomorrow.    Data reviewed today: I reviewed all medications, new labs and imaging results over the last 24 hours. I personally reviewed no images or EKG's today.    Physical Exam   Vital Signs: Temp: (!) 96  F (35.6  C) Temp src: Oral BP: 133/88 Pulse: 97   Resp: 20 SpO2: 100 % O2 Device: Nasal cannula Oxygen Delivery: 1 LPM  Weight: 105 lbs 13.13 oz    Unable to fully evaluate patient today.  Patient was in OR undergoing ERCP for additional stent placement.  Incoming hospitalist will fully evaluate patient tomorrow.    Data   Recent Labs   Lab 02/14/22  0918 02/14/22  0855 02/14/22  0601 02/13/22  0649   WBC 8.2  --  7.4 7.6   HGB 10.6*  --  10.3* 10.5*   MCV 97  --  95 96   *  --  455* 425   INR 1.10  --   --   --      --  142 144   POTASSIUM 3.5  --  3.3* 3.6   CHLORIDE 108  --  107 108   CO2 28  --  30 30   BUN 12  --  14 12   CR 0.52  --  0.52 0.52   ANIONGAP 5  --  5 6   TERESA 9.8  --  9.6 9.9   GLC 91 87 90 84   ALBUMIN 2.8*  --  2.7* 2.8*   PROTTOTAL 7.2  --  6.7* 6.8   BILITOTAL 0.3  --  0.3 0.2   ALKPHOS 389*  --  378* 367*   *  --  228* 254*   *  --  105* 146*     Recent Results (from the past 24 hour(s))   XR Surgery JUVENAL Fluoro G/T 5 Min w Stills    Narrative    This exam was marked as non-reportable because it will not be read by a   radiologist or a [place] non-radiologist provider.           Medications     dextrose Stopped (01/27/22 2040)     - MEDICATION INSTRUCTIONS -          [Held by provider] apixaban ANTICOAGULANT  5 mg Oral or Feeding Tube BID     ARIPiprazole  7.5 mg Oral or Feeding Tube Daily     banatrol plus  1 packet Per Feeding Tube BID     capsaicin   Topical TID     diclofenac  2 g Topical 4x Daily     metoprolol tartrate  75 mg Oral BID     multivitamins w/minerals  15 mL Per Feeding Tube Daily     pantoprazole  40 mg Oral BID     potassium chloride  20 mEq Oral or Feeding Tube Once     protein modular  2 packet Per Feeding Tube BID     sodium chloride (PF)  3 mL Intracatheter Q8H     spironolactone  25 mg Oral Daily     tolterodine  2 mg Oral or Feeding Tube BID     topiramate  100 mg Oral Daily     topiramate  200 mg Oral or Feeding Tube QPM     venlafaxine  75 mg Oral or Feeding Tube BID

## 2022-02-14 NOTE — ANESTHESIA POSTPROCEDURE EVALUATION
Patient: Sherly Yeung    Procedure: Procedure(s):  ENDOSCOPIC RETROGRADE CHOLANGIOPANCREATOGRAPHY WITH BILE DUCT STENT AND STONE REMOVAL, GALLBLADDER STENT EXCHANGE, CYSTIC DUCT DILATION       Diagnosis:Encounter for removal of biliary stent [Z46.89]  Diagnosis Additional Information: No value filed.    Anesthesia Type:  General    Note:  Disposition: Outpatient   Postop Pain Control: Uneventful            Sign Out: Well controlled pain   PONV: No   Neuro/Psych: Uneventful            Sign Out: Acceptable/Baseline neuro status   Airway/Respiratory: Uneventful            Sign Out: Acceptable/Baseline resp. status   CV/Hemodynamics: Uneventful            Sign Out: Acceptable CV status; No obvious hypovolemia; No obvious fluid overload   Other NRE: NONE   DID A NON-ROUTINE EVENT OCCUR? No    Event details/Postop Comments:  Hemodynamically stable, denies N/V, well controlled pain.             Last vitals:  Vitals Value Taken Time   /87 02/14/22 1110   Temp     Pulse 86 02/14/22 1112   Resp     SpO2 100 % 02/14/22 1112   Vitals shown include unvalidated device data.    Electronically Signed By: Irineo Parnell MD  February 14, 2022  11:13 AM

## 2022-02-14 NOTE — PLAN OF CARE
"BP (!) 140/78 (BP Location: Left arm)   Pulse 86   Temp (!) 96.7  F (35.9  C) (Oral)   Resp 16   Ht 1.6 m (5' 3\")   Wt 48 kg (105 lb 13.1 oz)   SpO2 100%   BMI 18.75 kg/m      Assumed Cares: [id number]  Neuro: A&O x 4  Respiratory: Dyspnea on exertion; vitally stable on 1L O2 via NC  Cardiac: WDL  GI/: G-tube clamped -- site WDL; voiding adequately; 1 small BM  Skin: No new skin concerns  Lines: R PIV SL  Pain: Denies pain  Diet: NPO after MN for scheduled ERCP  Activity Level: SBA to BSC  Events: No acute events over night  Plan: Continue with POC; notify provider with changes; ERCP scheduled for this AM  "

## 2022-02-14 NOTE — ANESTHESIA CARE TRANSFER NOTE
Patient: Sherly Yeung    Procedure: Procedure(s):  ENDOSCOPIC RETROGRADE CHOLANGIOPANCREATOGRAPHY WITH BILE DUCT STENT AND STONE REMOVAL, GALLBLADDER STENT EXCHANGE, CYSTIC DUCT DILATION       Diagnosis: Encounter for removal of biliary stent [Z46.89]  Diagnosis Additional Information: No value filed.    Anesthesia Type:   General     Note:    Oropharynx: oropharynx clear of all foreign objects and spontaneously breathing  Level of Consciousness: awake  Oxygen Supplementation: face mask  Level of Supplemental Oxygen (L/min / FiO2): 6  Independent Airway: airway patency satisfactory and stable  Dentition: dentition unchanged  Vital Signs Stable: post-procedure vital signs reviewed and stable  Report to RN Given: handoff report given  Patient transferred to: PACU    Handoff Report: Identifed the Patient, Identified the Reponsible Provider, Reviewed the pertinent medical history, Discussed the surgical course, Reviewed Intra-OP anesthesia mangement and issues during anesthesia, Set expectations for post-procedure period and Allowed opportunity for questions and acknowledgement of understanding      Vitals:  Vitals Value Taken Time   /83 02/14/22 1103   Temp     Pulse 86 02/14/22 1110   Resp     SpO2 100 % 02/14/22 1110   Vitals shown include unvalidated device data.    Electronically Signed By: ZAIRA Duffy CRNA  February 14, 2022  11:11 AM
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
## 2022-02-14 NOTE — PROGRESS NOTES
Care Management Follow Up    Length of Stay (days): 33  Expected Discharge Date:  TBD; pending TCU admission  Concerns to be Addressed: discharge planning     Patient plan of care discussed at interdisciplinary rounds: Yes  Anticipated Discharge Disposition: Transitional Care  Anticipated Discharge Services:  TCU  Anticipated Discharge DME:  TBD  Patient/family educated on Medicare website which has current facility and service quality ratings:  Yes  Education Provided on the Discharge Plan:  Yes  Patient/Family in Agreement with the Plan: yes  Referrals Placed by CM/SW: Post Acute Facilities     [facility]  [address] [place] [address]   (Ph: [phone], F: [phone])  2/14: Naty called; reviewing referral today; will call SW with an update.  2/11: Referral made.     [facility]   [address] [place] [address]  (Ph: [phone], Admissions: [phone], F: [phone])  2/14: SW called and LVM for admissions to f/u on referral. They called back and hadn't received referral. SW resent.  2/11: Referral made.     [facility]  [address] [address]  Main Phone: [phone]  Main Fax: [phone]  2/11: Referral made.     [facility]  [address] [place] [address]  [phone]  2/11: Referral made.       [facility]  [address] [place] [address]  [phone]  2/11: Referral made.     [facility]  [address] [place] [address]  [phone]  2/11: Referral made.     [facility]  [address] [place], [place] [address] [phone]  2/11: Referral made.     [facility]  [address] [place] [address]  [phone]  2/11: Referral made.     [facility]  [address] [place] [address]  [phone]  2/11: Referral  made.     [facility]  [address] [place] [address]  [phone]  2/11: Referral made.     Inactive Referrals:  [facility]  [address] [facility] [address]  2/14: Declined; no beds.  2/11: Referral made.    Inactive Referrals:    [facility]  [address] [place] [address]  (p) [phone]  2/11: Denied; no openings.  2/10: SW LVM for Fabby MARY JANE  And requested f/u on referral.  2/8: SW called and LVM for Tea/Director of Nurses.  2/7: Referral made.     [facility]  [address] [place] [address] [phone]  2/8: Declined d/t COVID vaccination status.  2/7: Referral made.     [facility]  [address] [facility] [address]  [phone]  2/10: Declined  2/8: SW called and LVM for Mary with admissions to f/u on referral.  2/7: Referral made.     [facility]   (Ph: [phone], F: [phone])  2/14: Declined.  2/10: SW called and spoke with admissions; no private room available.  2/8: SW called to f/u with admissions, who requested that SW resend.  2/7: Referral made.    [facility]  [address] [place] [address]  [phone]  2/14: Declined; no available beds.  2/11: Referral made.    [facility]  [address] [place] [address]  [phone]  2/14: Declined.  2/11: Referral made.    [facility]  [address] [place] [address]  [phone]  2/14: Declined  2/11: Referral made.    [facility]   [address] [place] [address]  [phone] (F) [phone]  2/14: Declined  2/7: Referral made.  2/8: SW called to f/u on referral. No openings this week. Check back 2/14.    [facility]  [address] [facility] [facility] [address]  Phone: [phone]  Fax: [phone]  2/14: Declined  2/7: New referral sent; patient declined d/t unspecified  behaviors.    [facility]   (Ph: [phone], Admissions: [phone], F: [phone])  2/14: Declined.  2/8: SW called and LVM for Joan in admissions to f/u on referral. Check back 2/11.  2/7: Referral made.     Additional Information:  MARY JANE f/u on referrals and updated the list above on facilities that have declined.        will continue to follow for discharge planning, psychosocial support, resources, and to advocate on behalf of patient.     JORGITO Valencia, LICSW  [address]   [email or web address]  Office: [phone]   Pager: [phone]

## 2022-02-14 NOTE — PLAN OF CARE
Assumed cares [id number]     Vitals: VSS on 1L O2   Pain: Denies  Neuro: A&Ox4  Cardiac: WDL  Respiratory: WDL ex dyspnea on exertion. Capno monitoring.   GI/: G-tube clamped. Dressing changed. Takes all meds through g-tube. Voiding adequately. No BM. Regular diet. Declining bolus feeds this shift.  IV/Drains: 2 R PIVs SL  Activity: Up with SBA to the commode. Uses bedpan at times.   Skin: WDL    Shift Events: ERCP done today. Tolerated well.      Plan of Care: Pending TCU placement. Continue POC.

## 2022-02-14 NOTE — ANESTHESIA PROCEDURE NOTES
Airway       Patient location during procedure: OR       Procedure Start/Stop Times: 2/14/2022 9:44 AM  Staff -        Anesthesiologist:  Lynda Driscoll MD       CRNA: Alfredo Cochran APRN CRNA       Other Anesthesia Staff: Danni Neville       Performed By: SRNA  Consent for Airway        Urgency: elective  Indications and Patient Condition       Indications for airway management: sandy-procedural       Induction type:RSI       Mask difficulty assessment: 0 - not attempted    Final Airway Details       Final airway type: endotracheal airway       Successful airway: ETT - single and Oral  Endotracheal Airway Details        ETT size (mm): 7.0       Cuffed: yes       Successful intubation technique: video laryngoscopy       VL Blade Size: MAC 3       Grade View of Cords: 1       Adjucts: stylet       Position: Right       Measured from: gums/teeth       Secured at (cm): 21       Bite Block used: Endoscopy bite block.    Post intubation assessment        Placement verified by: capnometry, equal breath sounds and chest rise        Number of attempts at approach: 1       Number of other approaches attempted: 0       Secured with: pink tape       Ease of procedure: easy       Dentition: Intact and Unchanged

## 2022-02-15 ENCOUNTER — APPOINTMENT (OUTPATIENT)
Dept: OCCUPATIONAL THERAPY | Facility: CLINIC | Age: 57
DRG: 871 | End: 2022-02-15
Payer: MEDICARE

## 2022-02-15 LAB
ALBUMIN SERPL-MCNC: 2.7 G/DL (ref 3.4–5)
ALP SERPL-CCNC: 334 U/L (ref 40–150)
ALT SERPL W P-5'-P-CCNC: 211 U/L (ref 0–50)
ANION GAP SERPL CALCULATED.3IONS-SCNC: 5 MMOL/L (ref 3–14)
AST SERPL W P-5'-P-CCNC: 90 U/L (ref 0–45)
BILIRUB SERPL-MCNC: 0.2 MG/DL (ref 0.2–1.3)
BUN SERPL-MCNC: 14 MG/DL (ref 7–30)
CALCIUM SERPL-MCNC: 9.4 MG/DL (ref 8.5–10.1)
CHLORIDE BLD-SCNC: 109 MMOL/L (ref 94–109)
CO2 SERPL-SCNC: 28 MMOL/L (ref 20–32)
CREAT SERPL-MCNC: 0.55 MG/DL (ref 0.52–1.04)
ERYTHROCYTE [DISTWIDTH] IN BLOOD BY AUTOMATED COUNT: 17 % (ref 10–15)
FLUAV RNA SPEC QL NAA+PROBE: NEGATIVE
FLUBV RNA RESP QL NAA+PROBE: NEGATIVE
GFR SERPL CREATININE-BSD FRML MDRD: >90 ML/MIN/1.73M2
GLUCOSE BLD-MCNC: 91 MG/DL (ref 70–99)
HCT VFR BLD AUTO: 32.7 % (ref 35–47)
HGB BLD-MCNC: 9.9 G/DL (ref 11.7–15.7)
MAGNESIUM SERPL-MCNC: 1.8 MG/DL (ref 1.8–2.6)
MCH RBC QN AUTO: 28.7 PG (ref 26.5–33)
MCHC RBC AUTO-ENTMCNC: 30.3 G/DL (ref 31.5–36.5)
MCV RBC AUTO: 95 FL (ref 78–100)
PLATELET # BLD AUTO: 435 10E3/UL (ref 150–450)
POTASSIUM BLD-SCNC: 3.2 MMOL/L (ref 3.4–5.3)
POTASSIUM BLD-SCNC: 3.2 MMOL/L (ref 3.4–5.3)
POTASSIUM BLD-SCNC: 3.7 MMOL/L (ref 3.4–5.3)
PROT SERPL-MCNC: 6.6 G/DL (ref 6.8–8.8)
RBC # BLD AUTO: 3.45 10E6/UL (ref 3.8–5.2)
RSV RNA SPEC NAA+PROBE: NEGATIVE
SARS-COV-2 RNA RESP QL NAA+PROBE: NEGATIVE
SODIUM SERPL-SCNC: 142 MMOL/L (ref 133–144)
WBC # BLD AUTO: 7.2 10E3/UL (ref 4–11)

## 2022-02-15 PROCEDURE — 250N000013 HC RX MED GY IP 250 OP 250 PS 637: Performed by: STUDENT IN AN ORGANIZED HEALTH CARE EDUCATION/TRAINING PROGRAM

## 2022-02-15 PROCEDURE — 36415 COLL VENOUS BLD VENIPUNCTURE: CPT | Performed by: STUDENT IN AN ORGANIZED HEALTH CARE EDUCATION/TRAINING PROGRAM

## 2022-02-15 PROCEDURE — 250N000013 HC RX MED GY IP 250 OP 250 PS 637: Performed by: INTERNAL MEDICINE

## 2022-02-15 PROCEDURE — 99233 SBSQ HOSP IP/OBS HIGH 50: CPT | Performed by: STUDENT IN AN ORGANIZED HEALTH CARE EDUCATION/TRAINING PROGRAM

## 2022-02-15 PROCEDURE — 84132 ASSAY OF SERUM POTASSIUM: CPT | Performed by: STUDENT IN AN ORGANIZED HEALTH CARE EDUCATION/TRAINING PROGRAM

## 2022-02-15 PROCEDURE — 85027 COMPLETE CBC AUTOMATED: CPT | Performed by: STUDENT IN AN ORGANIZED HEALTH CARE EDUCATION/TRAINING PROGRAM

## 2022-02-15 PROCEDURE — 83735 ASSAY OF MAGNESIUM: CPT | Performed by: INTERNAL MEDICINE

## 2022-02-15 PROCEDURE — 80053 COMPREHEN METABOLIC PANEL: CPT | Performed by: STUDENT IN AN ORGANIZED HEALTH CARE EDUCATION/TRAINING PROGRAM

## 2022-02-15 PROCEDURE — 87637 SARSCOV2&INF A&B&RSV AMP PRB: CPT | Performed by: STUDENT IN AN ORGANIZED HEALTH CARE EDUCATION/TRAINING PROGRAM

## 2022-02-15 PROCEDURE — 250N000013 HC RX MED GY IP 250 OP 250 PS 637: Performed by: PHYSICIAN ASSISTANT

## 2022-02-15 PROCEDURE — 120N000002 HC R&B MED SURG/OB UMMC

## 2022-02-15 PROCEDURE — 97535 SELF CARE MNGMENT TRAINING: CPT | Mod: GO

## 2022-02-15 RX ORDER — POTASSIUM CHLORIDE 750 MG/1
20 TABLET, EXTENDED RELEASE ORAL ONCE
Status: DISCONTINUED | OUTPATIENT
Start: 2022-02-15 | End: 2022-02-16

## 2022-02-15 RX ORDER — POTASSIUM CHLORIDE 1.5 G/1.58G
20 POWDER, FOR SOLUTION ORAL ONCE
Status: COMPLETED | OUTPATIENT
Start: 2022-02-15 | End: 2022-02-15

## 2022-02-15 RX ORDER — POTASSIUM CHLORIDE 20MEQ/15ML
20 LIQUID (ML) ORAL ONCE
Status: COMPLETED | OUTPATIENT
Start: 2022-02-15 | End: 2022-02-15

## 2022-02-15 RX ADMIN — Medication 7.5 MG: at 08:20

## 2022-02-15 RX ADMIN — Medication 1 PACKET: at 22:26

## 2022-02-15 RX ADMIN — METOPROLOL TARTRATE 75 MG: 50 TABLET, FILM COATED ORAL at 08:20

## 2022-02-15 RX ADMIN — Medication 2 PACKET: at 08:20

## 2022-02-15 RX ADMIN — TOLTERODINE TARTRATE 2 MG: 2 TABLET, FILM COATED ORAL at 08:21

## 2022-02-15 RX ADMIN — Medication 40 MG: at 22:26

## 2022-02-15 RX ADMIN — TOPIRAMATE 200 MG: 200 TABLET ORAL at 22:19

## 2022-02-15 RX ADMIN — Medication 3 MG: at 22:19

## 2022-02-15 RX ADMIN — TOPIRAMATE 100 MG: 100 TABLET, FILM COATED ORAL at 08:21

## 2022-02-15 RX ADMIN — METOPROLOL TARTRATE 75 MG: 50 TABLET, FILM COATED ORAL at 22:24

## 2022-02-15 RX ADMIN — SPIRONOLACTONE 25 MG: 25 TABLET, FILM COATED ORAL at 08:21

## 2022-02-15 RX ADMIN — Medication 2 PACKET: at 15:41

## 2022-02-15 RX ADMIN — Medication 15 ML: at 09:44

## 2022-02-15 RX ADMIN — ACETAMINOPHEN 650 MG: 325 TABLET, FILM COATED ORAL at 22:19

## 2022-02-15 RX ADMIN — TOLTERODINE TARTRATE 2 MG: 2 TABLET, FILM COATED ORAL at 22:19

## 2022-02-15 RX ADMIN — CAPSAICIN: 0.75 CREAM TOPICAL at 08:28

## 2022-02-15 RX ADMIN — POTASSIUM CHLORIDE 20 MEQ: 1.5 POWDER, FOR SOLUTION ORAL at 09:42

## 2022-02-15 RX ADMIN — VENLAFAXINE 75 MG: 75 TABLET ORAL at 08:21

## 2022-02-15 RX ADMIN — APIXABAN 5 MG: 5 TABLET, FILM COATED ORAL at 22:19

## 2022-02-15 RX ADMIN — CAPSAICIN: 0.75 CREAM TOPICAL at 15:44

## 2022-02-15 RX ADMIN — POTASSIUM CHLORIDE 20 MEQ: 20 SOLUTION ORAL at 18:06

## 2022-02-15 RX ADMIN — Medication 40 MG: at 08:20

## 2022-02-15 RX ADMIN — VENLAFAXINE 75 MG: 75 TABLET ORAL at 22:19

## 2022-02-15 RX ADMIN — CAPSAICIN: 0.75 CREAM TOPICAL at 22:25

## 2022-02-15 ASSESSMENT — ACTIVITIES OF DAILY LIVING (ADL)
ADLS_ACUITY_SCORE: 17
ADLS_ACUITY_SCORE: 15
ADLS_ACUITY_SCORE: 16
ADLS_ACUITY_SCORE: 13
ADLS_ACUITY_SCORE: 15
ADLS_ACUITY_SCORE: 13
ADLS_ACUITY_SCORE: 16
ADLS_ACUITY_SCORE: 17
ADLS_ACUITY_SCORE: 16
ADLS_ACUITY_SCORE: 17
ADLS_ACUITY_SCORE: 13
ADLS_ACUITY_SCORE: 15
ADLS_ACUITY_SCORE: 13
ADLS_ACUITY_SCORE: 13
ADLS_ACUITY_SCORE: 16

## 2022-02-15 ASSESSMENT — MIFFLIN-ST. JEOR: SCORE: 1068.13

## 2022-02-15 NOTE — PROGRESS NOTES
[facility]    Medicine Progress Note - Hospitalist Service, GOLD TEAM 8    Date of Admission:  1/11/2022    Assessment & Plan        Sherly Yeung is a 56 year old female admitted on 1/11/2022 who is being transferred out of the ICU on 1/23/2022. She has a history of COVID 10/2021 requiring trachoestomy who presented on 1/11 with acute cholecystitis and cholangitis s/p biliary stent placement 1/12/2022. Hospital course was complicated by respiratory failure on 1/17 and repeat COVID infection. She developed respiratory failure and required ICU transfer and intubation on 1/19. She was extubated on 1/21 but had recurrent hypercapnia requiring bipap at night and while sleeping in ICU stay was complicated by development of new leukocytosis and C. Diff colitis with diarrhea. Developed abdominal pain, Lft rise had stone removal 2/15, Awaiting skilled placement.      Today's plan   -Patients Apixaban resumed   -Noted lft's improved s/p biliary stent placement and stone removal   -Ordered repeat Covid-19 testing with cycle threshold     IV drips and antimicrobials         #ARDS secondary to COVID 19 pneumonia (improving)  #Acute hypercapnic and hypoxemic respiratory failure  -Weaning off oxygen. On RA-1 L [mostly for comfort]  -Incentive spirometry     #C. diff colitis now resolved   -Improved Diarrhea  -Encouraged fiber for bulking of 1-2 soft bowel movements  -Increase Fiber packets (bowel movement improved)     #Aspergillus pneumonia now resolving   -Occurred during initial COVID treatment in 10/2021. Was planned for 60 day of voriconazole  -Was due to end on 1/21 but had recurrent COVID and respiratory failure, d/2 that reason Plan for 2 additional weeks of voriconazole past last day of dexamethasone, therapy ended  February 9th,2022     #Choledocholithiasis c/b cholangitis, s/p CBD stenting (1/12/22,)  #Probable acute cholecystitis s/p transpapillary cystic duct stenting  (1/12/22)  S/p ERCP with bile duct stent and stone removal (2/14/2022)  #Transaminitis   #Sepsis (tachy, leukocytosis)  -S/p ERCP, removal of stone and stent placement 1/12, 2/14  -Abx for 10 days (1/12 - 1/22) - (Was cipro/flagyl, broadened to cef/flagyl due to resp failure)  -Developed worsened respiratory failure on 1/19 and antibiotics were extended to provide additional bacterial coverage    >S/p IV cefepime, flagyl on 1/26 (14 day course)  -Follow as outpatient with Gen-surg for cholecystectomy (order on discharge placed)and GI for multiple biliary stents        #Paget Banegas syndrome  #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  She has a history of multiple blood clots.  She has been maintained on apixaban.  >Continue PTA apixaban    #Anemia  >Secondary to critical illness    #HTN  >Up-titrating to control tachycardia, blood pressure    #Depression. Anorexia  >Venlflaxine, Abilify, topiramate      # Right foot pain (resolved)  Xray shows no evidence of acute fracture  Trial topical capsaicin  Consider plantar fasciitis            Diet: Adult Formula Bolus Feeding: TID Osmolite 1.5; Route: Gastrostomy; 3; Can(s); Medication - Feeding Tube Flush Frequency: At least 15-30 mL water before and after medication administration and with tube clogging; Amount to Send (Nutrition use): 3 c...  Advance Diet as Tolerated: Regular Diet Adult    DVT Prophylaxis: Apixaban for dvt ppx   Scott Catheter: Not present  Central Lines: None  Cardiac Monitoring: None  Code Status: Full Code      Disposition Plan   Expected Discharge:    Anticipated discharge location:  Awaiting care coordination huddle  Delays:     Placement - TCU  Covid Test Positive            The patient's care was discussed with the Bedside Nurse and Patient.    Freddy Jane MD  Hospitalist Service, [address]  Securely message with the [employer]  Web Console (learn more here)  Text page via [facility] Paging/Directory   Please see signed in provider for up to date coverage information      Clinically Significant Risk Factors Present on Admission                    ______________________________________________________________________    Interval History   Patient has no new symptoms, She was comfortable, Didn't report any new abdominal pain, difficulty urinating, fevers or malaise.     Data reviewed today: I reviewed all medications, new labs and imaging results over the last 24 hours. I personally reviewed no images or EKG's today.    Physical Exam   Vital Signs: Temp: 96.8  F (36  C) Temp src: Oral BP: 131/88 Pulse: 89   Resp: 20 SpO2: 96 % O2 Device: Nasal cannula Oxygen Delivery: 1 LPM  Weight: 116 lbs 13.5 oz  Constitutional: awake, alert, cooperative, no apparent distress, and appears stated age  Eyes: Lids and lashes normal, pupils equal, round and reactive to light, extra ocular muscles intact, sclera clear, conjunctiva normal  ENT: Normocephalic, without obvious abnormality, atraumatic, sinuses nontender on palpation, external ears without lesions, oral pharynx with moist mucous membranes, tonsils without erythema or exudates, gums normal and good dentition.  Hematologic / Lymphatic: no cervical lymphadenopathy  Respiratory: No increased work of breathing, good air exchange, clear to auscultation bilaterally, no crackles or wheezing  Cardiovascular: Normal apical impulse, regular rate and rhythm, normal S1 and S2, no S3 or S4, and no murmur noted  GI: No scars, normal bowel sounds, soft, non-distended, non-tender, no masses palpated, no hepatosplenomegally  Genitounirinary:   Skin: no bruising or bleeding  Musculoskeletal: There is no redness, warmth, or swelling of the joints.  Full range of motion noted.  Motor strength is 5 out of 5 all extremities bilaterally.  Tone is normal.  Neurologic: Awake, alert, oriented to name, place and time.  Cranial  nerves II-XII are grossly intact.  Motor is 5 out of 5 bilaterally.  Cerebellar finger to nose, heel to shin intact.  Sensory is intact.  Babinski down going, Romberg negative, and gait is normal.  Neuropsychiatric: General: normal, calm and normal eye contact    Data   Recent Labs   Lab 02/15/22  0657 02/14/22  0918 02/14/22  0855 02/14/22  0601   WBC 7.2 8.2  --  7.4   HGB 9.9* 10.6*  --  10.3*   MCV 95 97  --  95    489*  --  455*   INR  --  1.10  --   --     141  --  142   POTASSIUM 3.2* 3.5  --  3.3*   CHLORIDE 109 108  --  107   CO2 28 28  --  30   BUN 14 12  --  14   CR 0.55 0.52  --  0.52   ANIONGAP 5 5  --  5   TERESA 9.4 9.8  --  9.6   GLC 91 91 87 90   ALBUMIN 2.7* 2.8*  --  2.7*   PROTTOTAL 6.6* 7.2  --  6.7*   BILITOTAL 0.2 0.3  --  0.3   ALKPHOS 334* 389*  --  378*   * 250*  --  228*   AST 90* 122*  --  105*     No results found for this or any previous visit (from the past 24 hour(s)).  Medications     dextrose Stopped (01/27/22 2040)     - MEDICATION INSTRUCTIONS -         apixaban ANTICOAGULANT  5 mg Oral or Feeding Tube BID     ARIPiprazole  7.5 mg Oral or Feeding Tube Daily     banatrol plus  1 packet Per Feeding Tube BID     capsaicin   Topical TID     diclofenac  2 g Topical 4x Daily     metoprolol tartrate  75 mg Oral BID     multivitamins w/minerals  15 mL Per Feeding Tube Daily     pantoprazole  40 mg Oral BID     potassium chloride  20 mEq Oral Once     protein modular  2 packet Per Feeding Tube BID     sodium chloride (PF)  3 mL Intracatheter Q8H     spironolactone  25 mg Oral Daily     tolterodine  2 mg Oral or Feeding Tube BID     topiramate  100 mg Oral Daily     topiramate  200 mg Oral or Feeding Tube QPM     venlafaxine  75 mg Oral or Feeding Tube BID

## 2022-02-15 NOTE — PROGRESS NOTES
Care Management Follow Up    Length of Stay (days): 34  Expected Discharge Date:  TBD  Concerns to be Addressed: discharge planning     Patient plan of care discussed at interdisciplinary rounds: Yes  Anticipated Discharge Disposition: Transitional Care  Anticipated Discharge Services:  TBD  Anticipated Discharge DME:  TBD  Patient/family educated on Medicare website which has current facility and service quality ratings: Yes  Education Provided on the Discharge Plan:  Yes  Patient/Family in Agreement with the Plan: yes  Referrals Placed by CM/SW: Post Acute Facilities  Private pay costs discussed: insurance costs co-pays  Additional Information:    MARY JANE was in contact with Nilesh Alfonso, who has been screening pt for TCU admission. She requested that SW confirm with pt what TCU she was at, how many days she was there, and whether she would be able to pay a private co-pay.    MARY JANE met with pt at bedside to confirm the answers to these questions. [facility] January 2022, having entered the hospital for her current stay on 1/12/22. She has secondary BCBS insurance and would be fine with a private co-pay.    MARY JANE called Naty back to answer these questions for her.     will continue to follow for discharge planning, psychosocial support, resources, and to advocate on behalf of patient.    JOGRITO Valencia, [facility]  [address]   [email or web address]  Office: [phone]   Pager: [phone]

## 2022-02-15 NOTE — PLAN OF CARE
"Blood pressure 122/48, pulse 83, temperature 98.4  F (36.9  C), temperature source Oral, resp. rate 16, height 1.6 m (5' 3\"), weight 50.9 kg (112 lb 3.4 oz), SpO2 100 %,  RA.         Patient A & O X 4 , VSS dyspnea on exertions , Patient denies pain . Calm and cooperative with care. Received scheduled medications via tube feeding . Appetite fair , declined bolus feeding . Patient incontinence  of loose stool x 1 and up to BSC with A x 1, void adequate Inct care done and applied barrier cream to coccyx . Potassium level 3.2 , and replaced per protocol . Call  light within reach, bed alarm on and hourly round completed . Continue with POC and update MD with change.     "
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
## 2022-02-16 ENCOUNTER — APPOINTMENT (OUTPATIENT)
Dept: OCCUPATIONAL THERAPY | Facility: CLINIC | Age: 57
DRG: 871 | End: 2022-02-16
Payer: MEDICARE

## 2022-02-16 ENCOUNTER — APPOINTMENT (OUTPATIENT)
Dept: PHYSICAL THERAPY | Facility: CLINIC | Age: 57
DRG: 871 | End: 2022-02-16
Payer: MEDICARE

## 2022-02-16 LAB
ALBUMIN SERPL-MCNC: 2.7 G/DL (ref 3.4–5)
ALP SERPL-CCNC: 304 U/L (ref 40–150)
ALT SERPL W P-5'-P-CCNC: 181 U/L (ref 0–50)
ANION GAP SERPL CALCULATED.3IONS-SCNC: 6 MMOL/L (ref 3–14)
AST SERPL W P-5'-P-CCNC: 69 U/L (ref 0–45)
BILIRUB SERPL-MCNC: 0.2 MG/DL (ref 0.2–1.3)
BUN SERPL-MCNC: 11 MG/DL (ref 7–30)
CALCIUM SERPL-MCNC: 9.6 MG/DL (ref 8.5–10.1)
CHLORIDE BLD-SCNC: 111 MMOL/L (ref 94–109)
CO2 SERPL-SCNC: 26 MMOL/L (ref 20–32)
CREAT SERPL-MCNC: 0.52 MG/DL (ref 0.52–1.04)
ERYTHROCYTE [DISTWIDTH] IN BLOOD BY AUTOMATED COUNT: 17 % (ref 10–15)
GFR SERPL CREATININE-BSD FRML MDRD: >90 ML/MIN/1.73M2
GLUCOSE BLD-MCNC: 87 MG/DL (ref 70–99)
HCT VFR BLD AUTO: 34.3 % (ref 35–47)
HGB BLD-MCNC: 10.4 G/DL (ref 11.7–15.7)
MCH RBC QN AUTO: 29.1 PG (ref 26.5–33)
MCHC RBC AUTO-ENTMCNC: 30.3 G/DL (ref 31.5–36.5)
MCV RBC AUTO: 96 FL (ref 78–100)
PLATELET # BLD AUTO: 439 10E3/UL (ref 150–450)
POTASSIUM BLD-SCNC: 3.7 MMOL/L (ref 3.4–5.3)
PROT SERPL-MCNC: 6.5 G/DL (ref 6.8–8.8)
RBC # BLD AUTO: 3.58 10E6/UL (ref 3.8–5.2)
SODIUM SERPL-SCNC: 143 MMOL/L (ref 133–144)
WBC # BLD AUTO: 7.7 10E3/UL (ref 4–11)

## 2022-02-16 PROCEDURE — 250N000013 HC RX MED GY IP 250 OP 250 PS 637: Performed by: PHYSICIAN ASSISTANT

## 2022-02-16 PROCEDURE — 250N000013 HC RX MED GY IP 250 OP 250 PS 637: Performed by: STUDENT IN AN ORGANIZED HEALTH CARE EDUCATION/TRAINING PROGRAM

## 2022-02-16 PROCEDURE — 85027 COMPLETE CBC AUTOMATED: CPT | Performed by: STUDENT IN AN ORGANIZED HEALTH CARE EDUCATION/TRAINING PROGRAM

## 2022-02-16 PROCEDURE — 82040 ASSAY OF SERUM ALBUMIN: CPT | Performed by: STUDENT IN AN ORGANIZED HEALTH CARE EDUCATION/TRAINING PROGRAM

## 2022-02-16 PROCEDURE — 97530 THERAPEUTIC ACTIVITIES: CPT | Mod: GP

## 2022-02-16 PROCEDURE — 120N000002 HC R&B MED SURG/OB UMMC

## 2022-02-16 PROCEDURE — 99233 SBSQ HOSP IP/OBS HIGH 50: CPT | Performed by: STUDENT IN AN ORGANIZED HEALTH CARE EDUCATION/TRAINING PROGRAM

## 2022-02-16 PROCEDURE — 36415 COLL VENOUS BLD VENIPUNCTURE: CPT | Performed by: STUDENT IN AN ORGANIZED HEALTH CARE EDUCATION/TRAINING PROGRAM

## 2022-02-16 PROCEDURE — 97535 SELF CARE MNGMENT TRAINING: CPT | Mod: GO

## 2022-02-16 PROCEDURE — 80053 COMPREHEN METABOLIC PANEL: CPT | Performed by: STUDENT IN AN ORGANIZED HEALTH CARE EDUCATION/TRAINING PROGRAM

## 2022-02-16 PROCEDURE — 97110 THERAPEUTIC EXERCISES: CPT | Mod: GP

## 2022-02-16 PROCEDURE — 250N000013 HC RX MED GY IP 250 OP 250 PS 637: Performed by: INTERNAL MEDICINE

## 2022-02-16 RX ADMIN — VENLAFAXINE 75 MG: 75 TABLET ORAL at 20:59

## 2022-02-16 RX ADMIN — TOLTERODINE TARTRATE 2 MG: 2 TABLET, FILM COATED ORAL at 08:45

## 2022-02-16 RX ADMIN — CAPSAICIN: 0.75 CREAM TOPICAL at 21:05

## 2022-02-16 RX ADMIN — METOPROLOL TARTRATE 75 MG: 50 TABLET, FILM COATED ORAL at 08:45

## 2022-02-16 RX ADMIN — TOPIRAMATE 200 MG: 200 TABLET ORAL at 20:59

## 2022-02-16 RX ADMIN — Medication 1 PACKET: at 20:59

## 2022-02-16 RX ADMIN — CAPSAICIN: 0.75 CREAM TOPICAL at 08:48

## 2022-02-16 RX ADMIN — APIXABAN 5 MG: 5 TABLET, FILM COATED ORAL at 21:00

## 2022-02-16 RX ADMIN — Medication 7.5 MG: at 08:46

## 2022-02-16 RX ADMIN — Medication 15 ML: at 10:35

## 2022-02-16 RX ADMIN — TOPIRAMATE 100 MG: 100 TABLET, FILM COATED ORAL at 08:46

## 2022-02-16 RX ADMIN — VENLAFAXINE 75 MG: 75 TABLET ORAL at 08:46

## 2022-02-16 RX ADMIN — Medication 40 MG: at 08:48

## 2022-02-16 RX ADMIN — SPIRONOLACTONE 25 MG: 25 TABLET, FILM COATED ORAL at 08:45

## 2022-02-16 RX ADMIN — Medication 2 PACKET: at 08:48

## 2022-02-16 RX ADMIN — TOLTERODINE TARTRATE 2 MG: 2 TABLET, FILM COATED ORAL at 20:59

## 2022-02-16 RX ADMIN — Medication 2 PACKET: at 16:38

## 2022-02-16 RX ADMIN — Medication 1 PACKET: at 08:47

## 2022-02-16 RX ADMIN — Medication 3 MG: at 20:59

## 2022-02-16 RX ADMIN — Medication 40 MG: at 21:00

## 2022-02-16 RX ADMIN — METOPROLOL TARTRATE 75 MG: 50 TABLET, FILM COATED ORAL at 20:59

## 2022-02-16 RX ADMIN — APIXABAN 5 MG: 5 TABLET, FILM COATED ORAL at 08:46

## 2022-02-16 ASSESSMENT — ACTIVITIES OF DAILY LIVING (ADL)
ADLS_ACUITY_SCORE: 21
ADLS_ACUITY_SCORE: 21
ADLS_ACUITY_SCORE: 15
ADLS_ACUITY_SCORE: 19
ADLS_ACUITY_SCORE: 19
ADLS_ACUITY_SCORE: 15
ADLS_ACUITY_SCORE: 19
ADLS_ACUITY_SCORE: 21
ADLS_ACUITY_SCORE: 15
ADLS_ACUITY_SCORE: 16
ADLS_ACUITY_SCORE: 16
ADLS_ACUITY_SCORE: 15
ADLS_ACUITY_SCORE: 21
ADLS_ACUITY_SCORE: 21
ADLS_ACUITY_SCORE: 19
ADLS_ACUITY_SCORE: 18
ADLS_ACUITY_SCORE: 21
ADLS_ACUITY_SCORE: 21
ADLS_ACUITY_SCORE: 15
ADLS_ACUITY_SCORE: 21
ADLS_ACUITY_SCORE: 21

## 2022-02-16 NOTE — DOWNTIME EVENT NOTE
The EMR was down for 2.75 hours on 2/16/2022.    Geovanny Bravo RN, was responsible for completing the paper charting during this time period.     The following information was re-entered into the system by Geovanny Bravo RN: N/A    The following information will remain in the paper chart: N/A    Geovanny Bravo RN  2/16/2022

## 2022-02-16 NOTE — PROGRESS NOTES
Care Management Follow Up    Length of Stay (days): 35  Expected Discharge Date:  TBD; med ready   Concerns to be Addressed: discharge planning     Patient plan of care discussed at interdisciplinary rounds: Yes  Anticipated Discharge Disposition: Transitional Care  Anticipated Discharge Services:  TBD  Anticipated Discharge DME:  TBD  Patient/family educated on Medicare website which has current facility and service quality ratings: Yes  Education Provided on the Discharge Plan:  Yes  Patient/Family in Agreement with the Plan: Yes  Referrals Placed by CM/SW: Post Acute Facilities  Private pay costs discussed: Not applicable     [facility]  [address] [place] [address]   (Ph: [phone], F: [phone])  2/14: Naty called; reviewing referral today; will call SW with an update.  2/11: Referral made.     [facility]   [address] [place] [address]  (Ph: [phone], Admissions: [phone], F: [phone])  2/14: SW called and LVM for admissions to f/u on referral. They called back and hadn't received referral. SW resent.  2/11: Referral made.     [facility]  [address] [address]  Main Phone: [phone]  Main Fax: [phone]  2/16: SW called and LVM for [facility] with admissions to f/u on referral.  2/11: Referral made.     [facility]  [address] [place] [address]  Phone: [phone] Fax: [phone]  2/16: SW called and spoke admissions to f/u on referral. Unsure if openings; shuffling pts from another facility. SW will refax referral.  2/11: Referral made.     [facility]  [address] [place] [address] [phone]  2/11: Referral made.     [facility]  [address] [place], [place] [address] [phone]  2/11: Referral made.     [facility] [address] [id number] [phone]  2/11: Referral made.     [facility]  [address] [place] [address]  [phone]  2/11: Referral made.     [facility]  [address] [place] [address]  [phone]  2/11: Referral made.     [facility]  [address] [place] [address]  [phone]  2/11: Referral made.     Inactive Referrals (see additional notes on contacts and why facility declined in previous chart notes):  [facility]  [facility]     Additional Information:  MARY JANE Alfonso, liaison with [facility] facilities to f/u on whether they would consider placement for pt.     will continue to follow for discharge planning, psychosocial support, resources, and to advocate on behalf of patient.    JOGRITO Valencia, GERMAN  [address]   [email or web address]  Office: [phone]   Pager: [phone]    GERMAN Bhandari
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
## 2022-02-16 NOTE — PROGRESS NOTES
[facility]    Medicine Progress Note - Hospitalist Service, GOLD TEAM 8    Date of Admission:  1/11/2022    Assessment & Plan        Sherly Yeung is a 56 year old female admitted on 1/11/2022 who is being transferred out of the ICU on 1/23/2022. She has a history of COVID 10/2021 requiring trachoestomy who presented on 1/11 with acute cholecystitis and cholangitis s/p biliary stent placement 1/12/2022. Hospital course was complicated by respiratory failure on 1/17 and repeat COVID infection. She developed respiratory failure and required ICU transfer and intubation on 1/19. She was extubated on 1/21 but had recurrent hypercapnia requiring bipap at night and while sleeping in ICU in hospital stay was complicated by development of new leukocytosis and C. Diff colitis with diarrhea diagnosed 1/22 . Later developed abdominal pain, Lft rise had biliairy stone removal 2/15 by GI, Awaiting skilled placement.      Today's plan   -Noted repeat COVID test negative   -LFT's are continuing to improve   -Social work team working on finding skilled placement     IV drips and antimicrobials         #ARDS secondary to COVID 19 pneumonia (improving)  #Acute hypercapnic and hypoxemic respiratory failure  -Weaning off oxygen. On RA-1 L [mostly for comfort]  -Incentive spirometry     #C. diff colitis now resolved   -Improved Diarrhea  -Encouraged fiber for bulking of 1-2 soft bowel movements  -Increase Fiber packets (bowel movement improved)     #Aspergillus pneumonia now resolving   -Occurred during initial COVID treatment in 10/2021. Was planned for 60 day of voriconazole  -Was due to end on 1/21 but had recurrent COVID and respiratory failure, d/2 that reason Plan for 2 additional weeks of voriconazole past last day of dexamethasone, therapy ended  February 9th,2022     #Choledocholithiasis c/b cholangitis, s/p CBD stenting (1/12/22,)  #Probable acute cholecystitis s/p transpapillary  cystic duct stenting (1/12/22)  S/p ERCP with bile duct stent and stone removal (2/14/2022)  #Transaminitis   #Sepsis (tachy, leukocytosis)  -S/p ERCP, removal of stone and stent placement 1/12, 2/14  -Abx for 10 days (1/12 - 1/22) - (Was cipro/flagyl, broadened to cef/flagyl due to resp failure)  -Developed worsened respiratory failure on 1/19 and antibiotics were extended to provide additional bacterial coverage    >S/p IV cefepime, flagyl on 1/26 (14 day course)  -Follow as outpatient with Gen-surg for cholecystectomy (order on discharge placed)and GI for multiple biliary stents        #Paget Banegas syndrome  #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  She has a history of multiple blood clots.  She has been maintained on apixaban.  >Continue PTA apixaban    #Anemia  >Secondary to critical illness    #HTN  >Up-titrating to control tachycardia, blood pressure    #Depression. Anorexia  >Venlflaxine, Abilify, topiramate      # Right foot pain (resolved)  Xray shows no evidence of acute fracture  Trial topical capsaicin  Consider plantar fasciitis              Diet: Adult Formula Bolus Feeding: TID Osmolite 1.5; Route: Gastrostomy; 3; Can(s); Medication - Feeding Tube Flush Frequency: At least 15-30 mL water before and after medication administration and with tube clogging; Amount to Send (Nutrition use): 3 c...  Advance Diet as Tolerated: Regular Diet Adult    DVT Prophylaxis: Apixaban for dvt ppx   Scott Catheter: Not present  Central Lines: None  Cardiac Monitoring: None  Code Status: Full Code      Disposition Plan   Expected Discharge:    Anticipated discharge location:  Awaiting care coordination huddle  Delays:     Placement - TCU  Covid Test Positive            The patient's care was discussed with the Bedside Nurse and Patient.    Freddy Jane MD  Hospitalist Service, GOLD TEAM [address] [place] [address]  Securely  message with the [employer] Web Console (learn more here)  Text page via [facility] Paging/Directory   Please see signed in provider for up to date coverage information      Clinically Significant Risk Factors Present on Admission                    ______________________________________________________________________    Interval History   Patient has     Data reviewed today: I reviewed all medications, new labs and imaging results over the last 24 hours. I personally reviewed no images or EKG's today.    Physical Exam   Vital Signs: Temp: 97.1  F (36.2  C) Temp src: Oral BP: 132/75 Pulse: 87   Resp: 18 SpO2: 93 % O2 Device: None (Room air) Oxygen Delivery: 1 LPM  Weight: 112 lbs 3.43 oz  Constitutional: awake, alert, cooperative, no apparent distress, and appears stated age  Eyes: Lids and lashes normal, pupils equal, round and reactive to light, extra ocular muscles intact, sclera clear, conjunctiva normal  ENT: Normocephalic, without obvious abnormality, atraumatic, sinuses nontender on palpation, external ears without lesions, oral pharynx with moist mucous membranes, tonsils without erythema or exudates, gums normal and good dentition.  Hematologic / Lymphatic: no cervical lymphadenopathy  Respiratory: No increased work of breathing, good air exchange, clear to auscultation bilaterally, no crackles or wheezing  Cardiovascular: Normal apical impulse, regular rate and rhythm, normal S1 and S2, no S3 or S4, and no murmur noted  GI: No scars, normal bowel sounds, soft, non-distended, non-tender, no masses palpated, no hepatosplenomegally  Genitounirinary:   Skin: no bruising or bleeding  Musculoskeletal: There is no redness, warmth, or swelling of the joints.  Full range of motion noted.  Motor strength is 5 out of 5 all extremities bilaterally.  Tone is normal.  Neurologic: Awake, alert, oriented to name, place and time.  Cranial nerves II-XII are grossly intact.  Motor is 5 out of 5 bilaterally.  Cerebellar finger to  nose, heel to shin intact.  Sensory is intact.  Babinski down going, Romberg negative, and gait is normal.  Neuropsychiatric: General: normal, calm and normal eye contact    Data   Recent Labs   Lab 02/16/22  0629 02/15/22  2141 02/15/22  1602 02/15/22  0657 02/14/22  0918   WBC 7.7  --   --  7.2 8.2   HGB 10.4*  --   --  9.9* 10.6*   MCV 96  --   --  95 97     --   --  435 489*   INR  --   --   --   --  1.10     --   --  142 141   POTASSIUM 3.7 3.7 3.2* 3.2* 3.5   CHLORIDE 111*  --   --  109 108   CO2 26  --   --  28 28   BUN 11  --   --  14 12   CR 0.52  --   --  0.55 0.52   ANIONGAP 6  --   --  5 5   TERESA 9.6  --   --  9.4 9.8   GLC 87  --   --  91 91   ALBUMIN 2.7*  --   --  2.7* 2.8*   PROTTOTAL 6.5*  --   --  6.6* 7.2   BILITOTAL 0.2  --   --  0.2 0.3   ALKPHOS 304*  --   --  334* 389*   *  --   --  211* 250*   AST 69*  --   --  90* 122*     No results found for this or any previous visit (from the past 24 hour(s)).  Medications     dextrose Stopped (01/27/22 2040)     - MEDICATION INSTRUCTIONS -         apixaban ANTICOAGULANT  5 mg Oral or Feeding Tube BID     ARIPiprazole  7.5 mg Oral or Feeding Tube Daily     banatrol plus  1 packet Per Feeding Tube BID     capsaicin   Topical TID     diclofenac  2 g Topical 4x Daily     metoprolol tartrate  75 mg Oral BID     multivitamins w/minerals  15 mL Per Feeding Tube Daily     pantoprazole  40 mg Oral BID     protein modular  2 packet Per Feeding Tube BID     sodium chloride (PF)  3 mL Intracatheter Q8H     spironolactone  25 mg Oral Daily     tolterodine  2 mg Oral or Feeding Tube BID     topiramate  100 mg Oral Daily     topiramate  200 mg Oral or Feeding Tube QPM     venlafaxine  75 mg Oral or Feeding Tube BID

## 2022-02-16 NOTE — PROGRESS NOTES
CLINICAL NUTRITION SERVICES - REASSESSMENT NOTE     Nutrition Prescription    Malnutrition Status:     Patient does not meet two of the established criteria necessary for diagnosing malnutrition but is at risk for malnutrition    Recommendations already ordered by Registered Dietitian (RD):  - Continue current conditional bolus feeds ( - If patient consumes <50% of a meal may receive 1 can bolus tube feed)  - Continue Prosource BID  - Continue Banatrol BID    Future/Additional Recommendations:  PO adequacy       EVALUATION OF THE PROGRESS TOWARD GOALS   Diet:   - Regular    Nutrition Support:   Access: Gastrostomy tube ( meds and tube feeds through G-tube)  Dosing wt:  EN: Osmolite 1.5, bolus @ 1 container TID   Modules: 1 packet of Prosource BID and 1 packet of Banatrol BID,   MVI/Minerals:  Certravite 15 ml daily  Provision TF + Prosource if supplemented at 3 per day: 1147 kcal (22 kcal/kg) and 67 gm PRO (1.3 g/kg).    Water flushes: 60 ml before and after each bolus feed  - If patient consumes <50% of a meal may receive 1 can bolus tube feed       Intake:   PO: consuming small lite meals. Continues to use 1 bolus can of tube feeds.   TF: Daily intake is not being recorded   - Visited with patient today. Patient reports tolerating PO and tube feeds. Appetite is fair.       NEW FINDINGS   Chart reviewed: Admitted on 1/11/2022  PMH: Paget Banegas syndrome with history of multiple blood clots. Has been maintained on apixaban.   - COVID 10/20/21 requiring tracheostomy/G-tube.   - Presented on 1/11 with acute cholecystitis and choledocholithiasis complicated by cholangitis s/p biliary stent placement 1/12/2022.   - Hospital course was complicated by ARDS on 1/17 and repeat COVID infection, intubation 1/19-1/21, with recurrent hypercapnia requiring BiPAP,  - Leukocytosis and C. Diff colitis with diarrhea, now resolving. Receiving Banatrol Fiber packets for bulking stool.  - Developed abdominal pain with rising LFT's,  s/p ERCP with Bile duct stent placement and stone removal on 22 ( previously on ).   - Now Awaiting skilled placement.     Labs reviewed:  LFT's elevated but is improving, total bili:0.2  BUN:11, Cr:0.52  B      Wt trend:  52.2 kg (115 lb) admit wt on 22 -> down to 50.9 kg (112 lb 3.4 oz) on 2/15 ( stable around 50-52 kg)  Patient with 2.5% wt loss in 1 month since admit     GI/Stool:  x1-6 daily stool  Rectal tube removed       MALNUTRITION  % Intake: Decreased intake does not meet criteria  % Weight Loss: Weight loss does not meet criteria  Subcutaneous Fat Loss: None observed  Muscle Loss: Temporal:Mild and Facial & jaw region:Mild, clavicle: mild    Fluid Accumulation/Edema: trace foot/hand  Malnutrition Diagnosis: Patient does not meet two of the established criteria necessary for diagnosing malnutrition but is at risk for malnutrition    Previous Goals   Patient to consume % of nutritionally adequate meal trays TID, or the equivalent with conditional TFs.  Evaluation: Met    Previous Nutrition Diagnosis  Inadequate oral intake related to variable PO intake as evidenced by reliant on supplemental bolus feeds to meet full nutrition needs     Evaluation: No change    CURRENT NUTRITION DIAGNOSIS  Inadequate oral intake related to variable PO intake as evidenced by reliant on supplemental bolus feeds to meet full nutrition needs      INTERVENTIONS  Implementation  Enteral Nutrition: no change to TFs + Prosource and Banatrol.     Goals  Patient to consume % of nutritionally adequate meal trays TID, or the equivalent with conditional TFs.    Monitoring/Evaluation  Progress toward goals will be monitored and evaluated per protocol.    Thanh Banuelos RD/DEIDRE  Pager [phone]

## 2022-02-17 ENCOUNTER — APPOINTMENT (OUTPATIENT)
Dept: OCCUPATIONAL THERAPY | Facility: CLINIC | Age: 57
DRG: 871 | End: 2022-02-17
Payer: MEDICARE

## 2022-02-17 LAB
ALBUMIN SERPL-MCNC: 2.7 G/DL (ref 3.4–5)
ALP SERPL-CCNC: 282 U/L (ref 40–150)
ALT SERPL W P-5'-P-CCNC: 137 U/L (ref 0–50)
ANION GAP SERPL CALCULATED.3IONS-SCNC: 8 MMOL/L (ref 3–14)
AST SERPL W P-5'-P-CCNC: 48 U/L (ref 0–45)
BILIRUB SERPL-MCNC: 0.2 MG/DL (ref 0.2–1.3)
BUN SERPL-MCNC: 13 MG/DL (ref 7–30)
CALCIUM SERPL-MCNC: 9.4 MG/DL (ref 8.5–10.1)
CHLORIDE BLD-SCNC: 108 MMOL/L (ref 94–109)
CO2 SERPL-SCNC: 28 MMOL/L (ref 20–32)
CREAT SERPL-MCNC: 0.52 MG/DL (ref 0.52–1.04)
ERYTHROCYTE [DISTWIDTH] IN BLOOD BY AUTOMATED COUNT: 16.8 % (ref 10–15)
GFR SERPL CREATININE-BSD FRML MDRD: >90 ML/MIN/1.73M2
GLUCOSE BLD-MCNC: 84 MG/DL (ref 70–99)
HCT VFR BLD AUTO: 32 % (ref 35–47)
HGB BLD-MCNC: 9.6 G/DL (ref 11.7–15.7)
MAGNESIUM SERPL-MCNC: 1.9 MG/DL (ref 1.8–2.6)
MCH RBC QN AUTO: 28.7 PG (ref 26.5–33)
MCHC RBC AUTO-ENTMCNC: 30 G/DL (ref 31.5–36.5)
MCV RBC AUTO: 96 FL (ref 78–100)
PLATELET # BLD AUTO: 421 10E3/UL (ref 150–450)
POTASSIUM BLD-SCNC: 3.2 MMOL/L (ref 3.4–5.3)
POTASSIUM BLD-SCNC: 3.8 MMOL/L (ref 3.4–5.3)
PROT SERPL-MCNC: 6.3 G/DL (ref 6.8–8.8)
RBC # BLD AUTO: 3.35 10E6/UL (ref 3.8–5.2)
SODIUM SERPL-SCNC: 144 MMOL/L (ref 133–144)
WBC # BLD AUTO: 8 10E3/UL (ref 4–11)

## 2022-02-17 PROCEDURE — 85027 COMPLETE CBC AUTOMATED: CPT | Performed by: STUDENT IN AN ORGANIZED HEALTH CARE EDUCATION/TRAINING PROGRAM

## 2022-02-17 PROCEDURE — 97110 THERAPEUTIC EXERCISES: CPT | Mod: GO

## 2022-02-17 PROCEDURE — 250N000013 HC RX MED GY IP 250 OP 250 PS 637: Performed by: STUDENT IN AN ORGANIZED HEALTH CARE EDUCATION/TRAINING PROGRAM

## 2022-02-17 PROCEDURE — 120N000002 HC R&B MED SURG/OB UMMC

## 2022-02-17 PROCEDURE — 36415 COLL VENOUS BLD VENIPUNCTURE: CPT | Performed by: STUDENT IN AN ORGANIZED HEALTH CARE EDUCATION/TRAINING PROGRAM

## 2022-02-17 PROCEDURE — 250N000013 HC RX MED GY IP 250 OP 250 PS 637: Performed by: INTERNAL MEDICINE

## 2022-02-17 PROCEDURE — 83735 ASSAY OF MAGNESIUM: CPT | Performed by: STUDENT IN AN ORGANIZED HEALTH CARE EDUCATION/TRAINING PROGRAM

## 2022-02-17 PROCEDURE — 250N000011 HC RX IP 250 OP 636: Performed by: STUDENT IN AN ORGANIZED HEALTH CARE EDUCATION/TRAINING PROGRAM

## 2022-02-17 PROCEDURE — 84132 ASSAY OF SERUM POTASSIUM: CPT | Performed by: STUDENT IN AN ORGANIZED HEALTH CARE EDUCATION/TRAINING PROGRAM

## 2022-02-17 PROCEDURE — 250N000013 HC RX MED GY IP 250 OP 250 PS 637: Performed by: PHYSICIAN ASSISTANT

## 2022-02-17 PROCEDURE — 99233 SBSQ HOSP IP/OBS HIGH 50: CPT | Performed by: STUDENT IN AN ORGANIZED HEALTH CARE EDUCATION/TRAINING PROGRAM

## 2022-02-17 PROCEDURE — 80053 COMPREHEN METABOLIC PANEL: CPT | Performed by: STUDENT IN AN ORGANIZED HEALTH CARE EDUCATION/TRAINING PROGRAM

## 2022-02-17 RX ORDER — POTASSIUM CHLORIDE 20MEQ/15ML
20 LIQUID (ML) ORAL ONCE
Status: COMPLETED | OUTPATIENT
Start: 2022-02-17 | End: 2022-02-17

## 2022-02-17 RX ORDER — MAGNESIUM SULFATE HEPTAHYDRATE 40 MG/ML
2 INJECTION, SOLUTION INTRAVENOUS ONCE
Status: COMPLETED | OUTPATIENT
Start: 2022-02-17 | End: 2022-02-17

## 2022-02-17 RX ADMIN — APIXABAN 5 MG: 5 TABLET, FILM COATED ORAL at 22:38

## 2022-02-17 RX ADMIN — CAPSAICIN: 0.75 CREAM TOPICAL at 23:10

## 2022-02-17 RX ADMIN — Medication 2 PACKET: at 18:56

## 2022-02-17 RX ADMIN — VENLAFAXINE 75 MG: 75 TABLET ORAL at 22:39

## 2022-02-17 RX ADMIN — Medication 15 ML: at 09:10

## 2022-02-17 RX ADMIN — TOPIRAMATE 100 MG: 100 TABLET, FILM COATED ORAL at 09:10

## 2022-02-17 RX ADMIN — METOPROLOL TARTRATE 75 MG: 50 TABLET, FILM COATED ORAL at 22:37

## 2022-02-17 RX ADMIN — METOPROLOL TARTRATE 75 MG: 50 TABLET, FILM COATED ORAL at 09:10

## 2022-02-17 RX ADMIN — Medication 2 PACKET: at 09:12

## 2022-02-17 RX ADMIN — TOPIRAMATE 200 MG: 200 TABLET ORAL at 22:39

## 2022-02-17 RX ADMIN — ACETAMINOPHEN 650 MG: 325 TABLET, FILM COATED ORAL at 22:41

## 2022-02-17 RX ADMIN — CAPSAICIN: 0.75 CREAM TOPICAL at 13:54

## 2022-02-17 RX ADMIN — Medication 40 MG: at 23:07

## 2022-02-17 RX ADMIN — Medication 1 PACKET: at 09:12

## 2022-02-17 RX ADMIN — Medication 7.5 MG: at 09:10

## 2022-02-17 RX ADMIN — TOLTERODINE TARTRATE 2 MG: 2 TABLET, FILM COATED ORAL at 09:11

## 2022-02-17 RX ADMIN — SPIRONOLACTONE 25 MG: 25 TABLET, FILM COATED ORAL at 09:12

## 2022-02-17 RX ADMIN — Medication 3 MG: at 22:42

## 2022-02-17 RX ADMIN — Medication 40 MG: at 09:10

## 2022-02-17 RX ADMIN — CAPSAICIN: 0.75 CREAM TOPICAL at 09:11

## 2022-02-17 RX ADMIN — POTASSIUM CHLORIDE 20 MEQ: 20 SOLUTION ORAL at 13:54

## 2022-02-17 RX ADMIN — APIXABAN 5 MG: 5 TABLET, FILM COATED ORAL at 09:11

## 2022-02-17 RX ADMIN — VENLAFAXINE 75 MG: 75 TABLET ORAL at 09:11

## 2022-02-17 RX ADMIN — MAGNESIUM SULFATE HEPTAHYDRATE 2 G: 40 INJECTION, SOLUTION INTRAVENOUS at 22:29

## 2022-02-17 RX ADMIN — Medication 1 PACKET: at 22:40

## 2022-02-17 RX ADMIN — TOLTERODINE TARTRATE 2 MG: 2 TABLET, FILM COATED ORAL at 22:38

## 2022-02-17 ASSESSMENT — ACTIVITIES OF DAILY LIVING (ADL)
ADLS_ACUITY_SCORE: 20
ADLS_ACUITY_SCORE: 18
ADLS_ACUITY_SCORE: 16
ADLS_ACUITY_SCORE: 18
ADLS_ACUITY_SCORE: 18
ADLS_ACUITY_SCORE: 16
ADLS_ACUITY_SCORE: 20
ADLS_ACUITY_SCORE: 20
ADLS_ACUITY_SCORE: 18
ADLS_ACUITY_SCORE: 18
ADLS_ACUITY_SCORE: 16
ADLS_ACUITY_SCORE: 18
ADLS_ACUITY_SCORE: 16
ADLS_ACUITY_SCORE: 16
ADLS_ACUITY_SCORE: 18
ADLS_ACUITY_SCORE: 16
ADLS_ACUITY_SCORE: 18
ADLS_ACUITY_SCORE: 16
ADLS_ACUITY_SCORE: 16

## 2022-02-17 NOTE — PROGRESS NOTES
[facility]    Medicine Progress Note - Hospitalist Service, GOLD TEAM 8    Date of Admission:  1/11/2022    Assessment & Plan        Sherly Yeung is a 56 year old female admitted on 1/11/2022 who is being transferred out of the ICU on 1/23/2022. She has a history of COVID 10/2021 requiring trachoestomy who presented on 1/11 with acute cholecystitis and cholangitis s/p biliary stent placement 1/12/2022. Hospital course was complicated by respiratory failure on 1/17 and repeat COVID infection. She developed respiratory failure and required ICU transfer and intubation on 1/19. She was extubated on 1/21 but had recurrent hypercapnia requiring bipap at night and while sleeping in ICU in hospital stay was complicated by development of new leukocytosis and C. Diff colitis with diarrhea diagnosed 1/22 . Later developed abdominal pain, Lft rise had biliairy stone removal and stent placement 2/15 by GI, Awaiting skilled placement.      Today's plan   -LFT's are continuing to improve   -Social work team working on finding skilled placement   -Noted to be on 1 litre supplemental oxygen mostly for comfort     IV drips and antimicrobials         #ARDS secondary to COVID 19 pneumonia (improving)  #Acute hypercapnic and hypoxemic respiratory failure  -Weaning off oxygen. On RA-1 L [mostly for comfort]  -Incentive spirometry     #C. diff colitis now resolved diagnosed 1/22  -Improved Diarrhea  -Encouraged fiber for bulking of 1-2 soft bowel movements  -Increase Fiber packets (bowel movement improved)     #Aspergillus pneumonia now resolving   -Occurred during initial COVID treatment in 10/2021. Was planned for 60 day of voriconazole  -Was due to end on 1/21 but had recurrent COVID and respiratory failure, d/2 that reason Plan for 2 additional weeks of voriconazole past last day of dexamethasone, therapy ended  February 9th,2022     #Choledocholithiasis c/b cholangitis, s/p CBD stenting  (1/12/22,)  #Probable acute cholecystitis s/p transpapillary cystic duct stenting (1/12/22)  S/p ERCP with bile duct stent and stone removal (2/14/2022)  #Transaminitis   #Sepsis (tachy, leukocytosis)  -S/p ERCP, removal of stone and stent placement 1/12, 2/14  -Abx for 10 days (1/12 - 1/22) - (Was cipro/flagyl, broadened to cef/flagyl due to resp failure)  -Developed worsened respiratory failure on 1/19 and antibiotics were extended to provide additional bacterial coverage    >S/p IV cefepime, flagyl on 1/26 (14 day course)  -Follow as outpatient with Gen-surg for cholecystectomy (order on discharge placed)and GI for multiple biliary stents        #Paget Banegas syndrome  #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  She has a history of multiple blood clots.  She has been maintained on apixaban.  >Continue PTA apixaban    #Anemia  >Secondary to critical illness    #HTN  >Up-titrating to control tachycardia, blood pressure    #Depression. Anorexia  >Venlflaxine, Abilify, topiramate      # Right foot pain (resolved)  Xray shows no evidence of acute fracture  Trial topical capsaicin  Consider plantar fasciitis                Diet: Adult Formula Bolus Feeding: TID Osmolite 1.5; Route: Gastrostomy; 3; Can(s); Medication - Feeding Tube Flush Frequency: At least 15-30 mL water before and after medication administration and with tube clogging; Amount to Send (Nutrition use): 3 c...  Advance Diet as Tolerated: Regular Diet Adult    DVT Prophylaxis: Apixaban for dvt ppx   Scott Catheter: Not present  Central Lines: None  Cardiac Monitoring: None  Code Status: Full Code      Disposition Plan   Expected Discharge:    Anticipated discharge location:  Awaiting care coordination huddle  Delays:     Placement - TCU  Covid Test Positive            The patient's care was discussed with the Bedside Nurse and Patient.    Freddy Jane MD  Hospitalist Service, [facility] TEAM 8  M  [facility]  Securely message with the [employer] Web Console (learn more here)  Text page via [facility] Paging/Directory   Please see signed in provider for up to date coverage information      Clinically Significant Risk Factors Present on Admission                    ______________________________________________________________________    Interval History   Patient has no new symptoms, She was comfortable in bed. Does report persistent loose stools, no new abdominal pain, difficulty urinating, fevers or malaise.     Data reviewed today: I reviewed all medications, new labs and imaging results over the last 24 hours. I personally reviewed no images or EKG's today.    Physical Exam   Vital Signs: Temp: (!) 96.7  F (35.9  C) Temp src: Oral BP: (!) 145/88 Pulse: 77   Resp: 18 SpO2: 100 % O2 Device: Nasal cannula Oxygen Delivery: 1 LPM  Weight: 111 lbs 15.9 oz  Constitutional: awake, alert, cooperative, no apparent distress, and appears stated age  Eyes: Lids and lashes normal, pupils equal, round and reactive to light, extra ocular muscles intact, sclera clear, conjunctiva normal  ENT: Normocephalic, without obvious abnormality, atraumatic, sinuses nontender on palpation, external ears without lesions, oral pharynx with moist mucous membranes, tonsils without erythema or exudates, gums normal and good dentition.  Hematologic / Lymphatic: no cervical lymphadenopathy  Respiratory: No increased work of breathing, good air exchange, clear to auscultation bilaterally, no crackles or wheezing  Cardiovascular: Normal apical impulse, regular rate and rhythm, normal S1 and S2, no S3 or S4, and no murmur noted  GI: No scars, normal bowel sounds, soft, non-distended, non-tender, no masses palpated, no hepatosplenomegally  Genitounirinary:   Skin: no bruising or bleeding  Musculoskeletal: There is no redness, warmth, or swelling of the joints.  Full range of motion noted.  Motor strength is 5  out of 5 all extremities bilaterally.  Tone is normal.  Neurologic: Awake, alert, oriented to name, place and time.  Cranial nerves II-XII are grossly intact.  Motor is 5 out of 5 bilaterally.  Cerebellar finger to nose, heel to shin intact.  Sensory is intact.  Babinski down going, Romberg negative, and gait is normal.  Neuropsychiatric: General: normal, calm and normal eye contact    Data   Recent Labs   Lab 02/17/22  0605 02/16/22  0629 02/15/22  2141 02/15/22  1602 02/15/22  0657 02/14/22  0918   WBC 8.0 7.7  --   --  7.2 8.2   HGB 9.6* 10.4*  --   --  9.9* 10.6*   MCV 96 96  --   --  95 97    439  --   --  435 489*   INR  --   --   --   --   --  1.10    143  --   --  142 141   POTASSIUM 3.2* 3.7 3.7   < > 3.2* 3.5   CHLORIDE 108 111*  --   --  109 108   CO2 28 26  --   --  28 28   BUN 13 11  --   --  14 12   CR 0.52 0.52  --   --  0.55 0.52   ANIONGAP 8 6  --   --  5 5   TERESA 9.4 9.6  --   --  9.4 9.8   GLC 84 87  --   --  91 91   ALBUMIN 2.7* 2.7*  --   --  2.7* 2.8*   PROTTOTAL 6.3* 6.5*  --   --  6.6* 7.2   BILITOTAL 0.2 0.2  --   --  0.2 0.3   ALKPHOS 282* 304*  --   --  334* 389*   * 181*  --   --  211* 250*   AST 48* 69*  --   --  90* 122*    < > = values in this interval not displayed.     No results found for this or any previous visit (from the past 24 hour(s)).  Medications     dextrose Stopped (01/27/22 2040)     - MEDICATION INSTRUCTIONS -         apixaban ANTICOAGULANT  5 mg Oral or Feeding Tube BID     ARIPiprazole  7.5 mg Oral or Feeding Tube Daily     banatrol plus  1 packet Per Feeding Tube BID     capsaicin   Topical TID     diclofenac  2 g Topical 4x Daily     metoprolol tartrate  75 mg Oral BID     multivitamins w/minerals  15 mL Per Feeding Tube Daily     pantoprazole  40 mg Oral BID     potassium chloride  20 mEq Oral or Feeding Tube Once     protein modular  2 packet Per Feeding Tube BID     sodium chloride (PF)  3 mL Intracatheter Q8H     spironolactone  25 mg Oral  Daily     tolterodine  2 mg Oral or Feeding Tube BID     topiramate  100 mg Oral Daily     topiramate  200 mg Oral or Feeding Tube QPM     venlafaxine  75 mg Oral or Feeding Tube BID

## 2022-02-17 NOTE — PROGRESS NOTES
Care Management Follow Up    Length of Stay (days): 36  Expected Discharge Date:    Concerns to be Addressed: discharge planning     Patient plan of care discussed at interdisciplinary rounds: Yes  Anticipated Discharge Disposition: Transitional Care  Anticipated Discharge Services:    Anticipated Discharge DME:    Patient/family educated on Medicare website which has current facility and service quality ratings:  Yes  Education Provided on the Discharge Plan:    Patient/Family in Agreement with the Plan: yes  Referrals Placed by CM/SW: Post Acute Facilities        [facility]  Fax: [phone]  2/17: referral made.    [facility]   [address] [place] [address]  (Ph: [phone], Admissions: [phone], F: [phone])  2/14: SW called and LVM for admissions to f/u on referral. They called back and hadn't received referral. SW resent.  2/11: Referral made.     [facility]  [address] [place] [address]  Phone: [phone] Fax: [phone]  2/16: SW called and spoke admissions to f/u on referral. Unsure if openings; shuffling pts from another facility. SW will refax referral.  2/11: Referral made.     [facility]  [address] [place] [address]  [phone]  2/11: Referral made.     [facility]  [address] [place] [address]  [phone]  2/11: Referral made.     [facility]  [address] [place] [address]  [phone]  2/11: Referral made.     [facility]  [address] [place] [address]  [phone]  2/11: Referral made.     [facility]  [address] [place] [address]  [phone]  2/11: Referral made.     [facility]  [address] [place] [address]  [phone]  2/11: Referral made.    [facility]  2/17: SW called  Naty with [facility] and requested that pt be reviewed for other facilities.     Inactive Referrals (see additional notes on contacts and why facility declined in previous chart notes):    [facility]  [facility]  [facility]  [facility]    Private pay costs discussed: insurance costs co-pays  Additional Information:  Pt remains medically ready for discharge. She is no longer on COVID precautions. She still is on enteric isolation for C-diff.    SW updated active and inactive referral list, please see above.     will continue to follow for discharge planning, psychosocial support, resources, and to advocate on behalf of patient.    JORGITO Valencia, GERMAN  [address]   [email or web address]  Office: [phone]   Pager: [phone]      GERMAN Bhandari

## 2022-02-17 NOTE — PLAN OF CARE
Assumed cares [id number]     Vitals: VSS on 1L O2   Pain: Denies  Neuro: A&Ox4  Cardiac: WDL  Respiratory: WDL ex dyspnea on exertion. Continuous pulse ox monitoring in place.   GI/: G-tube clamped. Dressing changed. Takes all meds through g-tube. Voiding adequately. BM x 2. Regular diet. Declined bolus feeds this shift.  IV/Drains: R PIVs SL  Activity: Up with SBA to the commode. Uses bedpan at night.  Skin: WDL  Labs: K replaced per protocol- recheck at 1800.      Plan of Care: Discharge to TCU tomorrow. Ride set up for 1200. Continue POC.

## 2022-02-17 NOTE — PLAN OF CARE
Assumed cares [phone]     Vitals: VSS on 1L O2   Pain: Denies  Neuro: A&Ox4  Cardiac: WDL  Respiratory: WDL ex dyspnea on exertion. Continuous pulse ox monitoring in place.   GI/: G-tube clamped. Dressing changed. Takes all meds through g-tube. Voiding adequately. BM x 1. Regular diet. Declined bolus feeds this shift.  IV/Drains: R PIVs SL  Activity: Up with SBA to the commode. Uses bedpan at night.  Skin: WDL     Plan of Care: Pending TCU placement. Continue POC.

## 2022-02-17 NOTE — PLAN OF CARE
Care assumed: [id number]   Vitals  Pain  AM Vitals         Pain in outer right foot   Capsaicin cream applied    Activity  SBA    Neuro/Mood  A&Ox 4   Denies numbness and tingling    Cardiac  No signs of acute distress    Respiratory  No signs of acute respiratory distress    GI/  Voids spontaneously      Diet/ Nutrition  Tolerating Reg diet   Pt does get bolus tube feeds PRN if not eating. Pt is eating at this time.    Skin, Wounds, LDAs Skin mostly intact ex blanchable redness on coccyx and sacrum  PEG TUBE CDI     R PIV SL      Plan of Care  Other notes Follow POC    Looking for TCU placement

## 2022-02-17 NOTE — PROGRESS NOTES
Care Management Discharge Note    Discharge Date:  2/18   Discharge Disposition: [facility]   Discharge Services:  OT/PT  Discharge DME:  Oxygen  Discharge Transportation: [facility] ride   Private pay costs discussed: Not applicable  PAS Confirmation Code:  [id number]  Patient/family educated on Medicare website which has current facility and service quality ratings: Yes  Education Provided on the Discharge Plan:  Yes  Persons Notified of Discharge Plans: Provider, bedside nurse, pt, PCP  Patient/Family in Agreement with the Plan: yes  Handoff Referral Completed: Yes  Additional Information:  [facility] in [place], [place] are able to accept pt on 2/18 at 1300 hours.  set up a [facility] wheelchair ride for pt on 2/18 at 1200 hours.     All parties in agreement with discharge plan.     will continue to follow for discharge planning, psychosocial support, resources, and to advocate on behalf of patient.    JORGITO Valencia LICSW  [address]   [email or web address]  Office: [phone]   Pager: [phone]    GERMAN Bhandari

## 2022-02-18 ENCOUNTER — APPOINTMENT (OUTPATIENT)
Dept: PHYSICAL THERAPY | Facility: CLINIC | Age: 57
DRG: 871 | End: 2022-02-18
Payer: MEDICARE

## 2022-02-18 VITALS
HEIGHT: 63 IN | DIASTOLIC BLOOD PRESSURE: 74 MMHG | WEIGHT: 114.86 LBS | BODY MASS INDEX: 20.35 KG/M2 | SYSTOLIC BLOOD PRESSURE: 137 MMHG | TEMPERATURE: 96.9 F | HEART RATE: 79 BPM | RESPIRATION RATE: 17 BRPM | OXYGEN SATURATION: 99 %

## 2022-02-18 LAB
ALBUMIN SERPL-MCNC: 2.7 G/DL (ref 3.4–5)
ALP SERPL-CCNC: 268 U/L (ref 40–150)
ALT SERPL W P-5'-P-CCNC: 121 U/L (ref 0–50)
ANION GAP SERPL CALCULATED.3IONS-SCNC: 5 MMOL/L (ref 3–14)
AST SERPL W P-5'-P-CCNC: 40 U/L (ref 0–45)
BILIRUB SERPL-MCNC: 0.1 MG/DL (ref 0.2–1.3)
BUN SERPL-MCNC: 13 MG/DL (ref 7–30)
CALCIUM SERPL-MCNC: 9.6 MG/DL (ref 8.5–10.1)
CHLORIDE BLD-SCNC: 110 MMOL/L (ref 94–109)
CO2 SERPL-SCNC: 28 MMOL/L (ref 20–32)
CREAT SERPL-MCNC: 0.52 MG/DL (ref 0.52–1.04)
ERYTHROCYTE [DISTWIDTH] IN BLOOD BY AUTOMATED COUNT: 16.4 % (ref 10–15)
GFR SERPL CREATININE-BSD FRML MDRD: >90 ML/MIN/1.73M2
GLUCOSE BLD-MCNC: 108 MG/DL (ref 70–99)
HCT VFR BLD AUTO: 33.8 % (ref 35–47)
HGB BLD-MCNC: 10.3 G/DL (ref 11.7–15.7)
MAGNESIUM SERPL-MCNC: 2.2 MG/DL (ref 1.8–2.6)
MCH RBC QN AUTO: 28.8 PG (ref 26.5–33)
MCHC RBC AUTO-ENTMCNC: 30.5 G/DL (ref 31.5–36.5)
MCV RBC AUTO: 94 FL (ref 78–100)
PLATELET # BLD AUTO: 438 10E3/UL (ref 150–450)
POTASSIUM BLD-SCNC: 3.7 MMOL/L (ref 3.4–5.3)
PROT SERPL-MCNC: 6.6 G/DL (ref 6.8–8.8)
RBC # BLD AUTO: 3.58 10E6/UL (ref 3.8–5.2)
SODIUM SERPL-SCNC: 143 MMOL/L (ref 133–144)
WBC # BLD AUTO: 7.9 10E3/UL (ref 4–11)

## 2022-02-18 PROCEDURE — 250N000013 HC RX MED GY IP 250 OP 250 PS 637: Performed by: INTERNAL MEDICINE

## 2022-02-18 PROCEDURE — 99239 HOSP IP/OBS DSCHRG MGMT >30: CPT | Performed by: STUDENT IN AN ORGANIZED HEALTH CARE EDUCATION/TRAINING PROGRAM

## 2022-02-18 PROCEDURE — 82040 ASSAY OF SERUM ALBUMIN: CPT | Performed by: STUDENT IN AN ORGANIZED HEALTH CARE EDUCATION/TRAINING PROGRAM

## 2022-02-18 PROCEDURE — 250N000013 HC RX MED GY IP 250 OP 250 PS 637: Performed by: STUDENT IN AN ORGANIZED HEALTH CARE EDUCATION/TRAINING PROGRAM

## 2022-02-18 PROCEDURE — 80053 COMPREHEN METABOLIC PANEL: CPT | Performed by: STUDENT IN AN ORGANIZED HEALTH CARE EDUCATION/TRAINING PROGRAM

## 2022-02-18 PROCEDURE — 83735 ASSAY OF MAGNESIUM: CPT | Performed by: STUDENT IN AN ORGANIZED HEALTH CARE EDUCATION/TRAINING PROGRAM

## 2022-02-18 PROCEDURE — 97530 THERAPEUTIC ACTIVITIES: CPT | Mod: GP

## 2022-02-18 PROCEDURE — 36415 COLL VENOUS BLD VENIPUNCTURE: CPT | Performed by: STUDENT IN AN ORGANIZED HEALTH CARE EDUCATION/TRAINING PROGRAM

## 2022-02-18 PROCEDURE — 85014 HEMATOCRIT: CPT | Performed by: STUDENT IN AN ORGANIZED HEALTH CARE EDUCATION/TRAINING PROGRAM

## 2022-02-18 PROCEDURE — 97116 GAIT TRAINING THERAPY: CPT | Mod: GP

## 2022-02-18 RX ORDER — TOPIRAMATE 200 MG/1
200 TABLET, FILM COATED ORAL EVERY EVENING
DISCHARGE
Start: 2022-02-18 | End: 2023-01-14

## 2022-02-18 RX ORDER — TOPIRAMATE 100 MG/1
100 TABLET, FILM COATED ORAL DAILY
DISCHARGE
Start: 2022-02-18 | End: 2023-01-14

## 2022-02-18 RX ADMIN — METOPROLOL TARTRATE 75 MG: 50 TABLET, FILM COATED ORAL at 09:27

## 2022-02-18 RX ADMIN — Medication 40 MG: at 09:26

## 2022-02-18 RX ADMIN — Medication 2 PACKET: at 09:28

## 2022-02-18 RX ADMIN — APIXABAN 5 MG: 5 TABLET, FILM COATED ORAL at 09:27

## 2022-02-18 RX ADMIN — TOLTERODINE TARTRATE 2 MG: 2 TABLET, FILM COATED ORAL at 09:27

## 2022-02-18 RX ADMIN — TOPIRAMATE 100 MG: 100 TABLET, FILM COATED ORAL at 09:27

## 2022-02-18 RX ADMIN — VENLAFAXINE 75 MG: 75 TABLET ORAL at 09:27

## 2022-02-18 RX ADMIN — SPIRONOLACTONE 25 MG: 25 TABLET, FILM COATED ORAL at 09:27

## 2022-02-18 RX ADMIN — Medication 7.5 MG: at 09:28

## 2022-02-18 ASSESSMENT — ACTIVITIES OF DAILY LIVING (ADL)
ADLS_ACUITY_SCORE: 21
ADLS_ACUITY_SCORE: 16
ADLS_ACUITY_SCORE: 21
ADLS_ACUITY_SCORE: 16
ADLS_ACUITY_SCORE: 21

## 2022-02-18 NOTE — PROGRESS NOTES
Patient discharged to TCU. Patient has all belongings with. Iv removed. Patient G tube is clamped. Report called to TCU and new orders will be written dr. Jane notified

## 2022-02-18 NOTE — PLAN OF CARE
Physical Therapy Discharge Summary    Reason for therapy discharge:    Discharged to transitional care facility.    Progress towards therapy goal(s). See goals on Care Plan in [facility] electronic health record for goal details.  Goals partially met.  Barriers to achieving goals:   discharge from facility.    Therapy recommendation(s):    Continued therapy is recommended.  Rationale/Recommendations:  to improve functional strength and activity tolerance.

## 2022-02-18 NOTE — PLAN OF CARE
Vitals: VSS on 1L O2   Pain: Denies  Neuro: A&Ox4  Cardiac: WDL  Respiratory: WDL ex dyspnea on exertion. Continuous pulse ox monitoring in place.   GI/: G-tube clamped. Dressing changed. Takes all meds through g-tube. Voiding adequately. BM x 1 Regular diet. Declined bolus feeds this shift.  IV/Drains: R PIVs SL  Activity: Up with SBA to the commode. Uses bedpan at night.  Skin: WDL

## 2022-02-18 NOTE — DISCHARGE SUMMARY
[facility]  Hospitalist Discharge Summary      Date of Admission:  1/11/2022  Date of Discharge:  2/18/2022 11:40 AM  Discharging Provider: Freddy Jane MD  Discharge Service: Hospitalist Service, GOLD TEAM 8    Discharge Diagnoses   Covid pneumonia   Clostridium difficile infection   Biliary stone       Follow-ups Needed After Discharge   Follow-up Appointments     Adult [facility] Follow-up and recommended labs and tests      Primary care within a month   Gastroenterology within a month   General surgery within a month   Psychiatry within a month     Appointments on [place] and/or [place] campus (with [facility] or [facility]   provider or service). Call [phone] if you haven't heard regarding   these appointments within 7 days of discharge.         {Additional follow-up instructions/to-do's for PCP    :liver function tests     Unresulted Labs Ordered in the Past 30 Days of this Admission     No orders found from 12/12/2021 to 1/12/2022.      These results will be followed up by Primary care     Discharge Disposition   Discharged to long-term care facility  Condition at discharge: Stable      Hospital Course          Sherly Yeung is a 56 year old female admitted on 1/11/2022 who is being transferred out of the ICU on 1/23/2022. She has a history of COVID 10/2021 requiring trachoestomy who presented on 1/11 with acute cholecystitis and cholangitis s/p biliary stent placement 1/12/2022.     Hospital course was complicated by respiratory failure on 1/17 and repeat COVID infection. She developed respiratory failure and required ICU transfer and intubation on 1/19.   She was extubated on 1/21 but had recurrent hypercapnia requiring bipap at night and while sleeping in ICU     In hospital stay was complicated by development of new leukocytosis and C. Diff colitis with diarrhea diagnosed 1/22 . Later developed abdominal pain, Lft rise had biliairy stone removal and stent placement  2/15 by GI, Subsequently patients abdominal pain resolved and liver functions continued to improve during the remainder of her hospitalization.        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 Anorexia  > PTA Venlflaxine, Abilify, topiramate , seroquil, duloxetine                     Consultations This Hospital Stay   GI PANCREATICOBILIARY ADULT IP CONSULT  SURGERY GENERAL ADULT IP CONSULT  PHYSICAL THERAPY ADULT IP CONSULT  OCCUPATIONAL THERAPY ADULT IP CONSULT  NUTRITION SERVICES ADULT IP CONSULT  PHARMACY IP CONSULT  CARE MANAGEMENT / SOCIAL WORK IP CONSULT  VASCULAR ACCESS CARE ADULT IP CONSULT  MEDICATION HISTORY IP PHARMACY CONSULT  PHARMACY TO DOSE VANCO  CARE MANAGEMENT / SOCIAL WORK IP CONSULT  VASCULAR ACCESS CARE ADULT IP CONSULT  VASCULAR ACCESS CARE ADULT IP CONSULT  VASCULAR ACCESS CARE ADULT IP CONSULT  VASCULAR ACCESS CARE ADULT IP CONSULT  VASCULAR ACCESS CARE ADULT IP CONSULT  PHYSICAL THERAPY ADULT IP CONSULT  OCCUPATIONAL THERAPY ADULT IP CONSULT  VASCULAR ACCESS CARE ADULT IP CONSULT  VASCULAR ACCESS CARE ADULT IP CONSULT  GI PANCREATICOBILIARY ADULT IP CONSULT  PHYSICAL THERAPY ADULT IP CONSULT  OCCUPATIONAL THERAPY ADULT IP CONSULT  PHYSICAL THERAPY ADULT IP CONSULT    Code Status   Prior    Time Spent on this Encounter   I, Freddy Jane MD, personally saw the patient today and spent greater than 30 minutes discharging this patient.       Freddy Jane MD  [facility] [address] [address]  Phone:  [phone]  ______________________________________________________________________    Physical Exam   Vital Signs: Temp: 96.9  F (36.1  C) Temp src: Axillary BP: 137/74 Pulse: 79   Resp: 17 SpO2: 99 % O2 Device: None (Room air) Oxygen Delivery: 1 LPM  Weight: 114 lbs 13.75 oz  Constitutional: awake, alert, cooperative, no apparent distress, and appears stated age  Eyes: Lids and lashes normal, pupils equal, round and reactive to light, extra ocular muscles intact, sclera clear, conjunctiva normal  ENT: Normocephalic, without obvious abnormality, atraumatic, sinuses nontender on palpation, external ears without lesions, oral pharynx with moist mucous membranes, tonsils without erythema or exudates, gums normal and good dentition.  Hematologic / Lymphatic: no cervical lymphadenopathy  Respiratory: No increased work of breathing, good air exchange, clear to auscultation bilaterally, no crackles or wheezing  Cardiovascular: Normal apical impulse, regular rate and rhythm, normal S1 and S2, no S3 or S4, and no murmur noted  GI: No scars, normal bowel sounds, soft, non-distended, non-tender, no masses palpated, no hepatosplenomegally  Genitounirinary:   Skin: no bruising or bleeding  Musculoskeletal: There is no redness, warmth, or swelling of the joints.  Full range of motion noted.  Motor strength is 5 out of 5 all extremities bilaterally.  Tone is normal.  Neurologic: Awake, alert, oriented to name, place and time.  Cranial nerves II-XII are grossly intact.  Motor is 5 out of 5 bilaterally.  Cerebellar finger to nose, heel to shin intact.  Sensory is intact.  Babinski down going, Romberg negative, and gait is normal.  Neuropsychiatric: General: normal, calm and normal eye contact       Primary Care Physician   Chadwick Mg    Discharge Orders      General Surg Adult Referral      Activity    Your activity upon discharge: activity as tolerated     Adult [email or web address] Follow-up and recommended labs and tests     Primary care within a month   Gastroenterology within a month   General surgery within a month   Psychiatry within a month     Appointments on [place] and/or [facility] (with [facility] or [facility] provider or service). Call [phone] if you haven't heard regarding these appointments within 7 days of discharge.     Reason for your hospital stay    Dear Sherly Yeung,    Your were hospitalized at [facility] with covid pna and treated with antivirals and oxygen.  Over your hospitalization your condition improved and today you are ready to be discharged.  You should continue to improve but if you develop fever, shortness of breath, nausea, vomiting or abdominal pain please seek medical attention.    We are suggesting the following medication changes:  NA    Please get the following tests done:  NA    Please set up an appointment with:  Primary care within a month   Gastroenterology within a month   General surgery within a month   Psychiatry within a month     It was a pleasure meeting with you today. Thank you for allowing me and my team the privilege of caring for you during your hospitalization. You are the reason we are here, and I truly hope we provided you with the excellent service you deserve. Please let us know if there is anything else we can do for you so that we can be sure you are leaving completely satisfied with your care experience.    Your hospital unit at the time of discharge is 5B so if you have any questions please call the hospital at [phone] and ask to talk to a nurse on 5B.     Sincerely,    Freddy Jane MD  Internal Medicine Hospitalist  [facility]     General info for SNF    Length of Stay Estimate: Long Term Care  Condition at Discharge: Improving  Level of care:skilled   Rehabilitation Potential: Good  Admission H&P remains valid and up-to-date: Yes  Recent Chemotherapy: N/A  Use Nursing Home Standing Orders: Yes     Mantoux instructions    Give  two-step Mantoux (PPD) Per Facility Policy NA     Patient care order    Ok to take meds by mouth otherwise by g tube     Occupational Therapy Adult Consult    Evaluate and treat as clinically indicated.    Reason:  DIAGNOSE AND TREAT     Physical Therapy Adult Consult    Evaluate and treat as clinically indicated.    Reason:  DIAGNOSE AND TREAT     Diet    Follow this diet upon discharge: Orders Placed This Encounter      Adult Formula Bolus Feeding: TID Osmolite 1.5; Route: Gastrostomy; 3; Can(s); Medication - Feeding Tube Flush Frequency: At least 15-30 mL water before and after medication administration and with tube clogging; Amount to Send (Nutrition use): 3 c...      Advance Diet as Tolerated: Regular Diet Adult       Significant Results and Procedures   Most Recent 3 CBC's:Recent Labs   Lab Test 02/18/22  0550 02/17/22  0605 02/16/22  0629   WBC 7.9 8.0 7.7   HGB 10.3* 9.6* 10.4*   MCV 94 96 96    421 439     Most Recent 3 BMP's:Recent Labs   Lab Test 02/18/22  0550 02/17/22  1804 02/17/22  0605 02/16/22  0629     --  144 143   POTASSIUM 3.7 3.8 3.2* 3.7   CHLORIDE 110*  --  108 111*   CO2 28  --  28 26   BUN 13  --  13 11   CR 0.52  --  0.52 0.52   ANIONGAP 5  --  8 6   TERESA 9.6  --  9.4 9.6   *  --  84 87     Most Recent 2 LFT's:Recent Labs   Lab Test 02/18/22  0550 02/17/22  0605   AST 40 48*   * 137*   ALKPHOS 268* 282*   BILITOTAL 0.1* 0.2   ,   Results for orders placed or performed during the hospital encounter of 01/11/22   CT Abdomen Pelvis w Contrast     Value    Radiologist flags Distended gallbladder and biliary ductal dilation (Urgent)    Narrative    EXAMINATION: CT ABDOMEN PELVIS W CONTRAST, 1/11/2022 7:03 PM    TECHNIQUE:  Helical CT images from the lung bases through the  symphysis pubis were obtained with IV contrast. Contrast dose:  iopamidol (ISOVUE-370) solution 70 mL    COMPARISON: CT abdomen 10/1/2020 and CT chest abdomen and pelvis  1/2/2013    HISTORY: Bowel  obstruction suspected    FINDINGS:    Abdomen and pelvis:   Subcentimeter cyst in the left hepatic lobe. No focal images hepatic  lesion. Largely distended gallbladder with mild surrounding  inflammatory changes in the common bile duct is dilated to 1.5 cm.  Mild intrahepatic biliary ductal dilation. The spleen, adrenal glands  and pancreas are unremarkable. No pancreatic ductal dilation.  Bilateral renal cysts. Symmetric nephrographic enhancement of the  kidneys. Focal volume loss in the mid to lower pole of the left  kidney, likely sequela of prior infectious or ischemic insult. Urinary  bladder is unremarkable. No hydronephrosis or hydroureter.    No pelvic mass. Uterus is unremarkable. Large rectal stool ball.  Colonic diverticulosis without adjacent inflammatory change. Normal  appendix. Duodenal diverticulum. The small and large bowel is normal  in caliber without evidence of obstruction. No intra-abdominal free  air or free fluid. Percutaneous gastrostomy tube.    Lung bases: Bibasilar, right greater than left interstitial fibrotic  changes and traction bronchiectasis in the bilateral lower lobes  lingula and right middle lobe. Interstitial thickening and hazy  pulmonary opacities. No pleural effusion. Pectus excavatum. Heart size  is normal    Bones and soft tissues: No acute or suspicious appearing osseous  lesion.      Impression    IMPRESSION:   1. Hydropic gallbladder with mild surrounding inflammatory changes.  Associated extra and intrahepatic biliary ductal dilation. No  calcified stones or other obstructing lesion is visualized. Consider  initial further evaluation with ultrasound to better assess the  gallbladder. If there is clinical concern for biliary obstruction,  ERCP or MRCP may be considered as well.  2. Large rectal stool ball. No findings to suggest bowel obstruction.  3. Colonic diverticulosis without adjacent inflammatory change.  4. Bibasilar reticular opacities and traction  bronchiectasis  suspicious for fibrosis related to prior infection or interstitial  lung disease.     [Urgent Result: Distended gallbladder and biliary ductal dilation  concerning for biliary obstruction.]    Finding was identified on 1/11/2022 7:04 PM.     Dr. Michaud was contacted by Dr. Hoffman at 1/11/2022 7:20 PM and  verbalized understanding of the urgent finding.      I have personally reviewed the examination and initial interpretation  and I agree with the findings.    CARLOS HORNE DO         SYSTEM ID:  [id number]   MR Abdomen MRCP w/o & w Contrast    Narrative    EXAM: MR ABDOMEN MRCP W/O and W CONTRAST  LOCATION: [facility]  DATE/TIME: 1/11/2022 11:00 PM    INDICATION: Abdominal pain, biliary obstruction suspected (Ped 0-18y)  COMPARISON: Ultrasound and CT from today.  TECHNIQUE: Routine MR liver/pancreas protocol including axial and coronal MRCP sequences. 2D and 3D reconstruction performed by MR technologist including MIP reconstruction and slab cholangiograms. If performed with contrast, additional dynamic T1 post   IV contrast images.  CONTRAST: 5 mL Gadavist.     FINDINGS:     MRCP: There is modest dilatation of the extrahepatic bile duct that measures approximately 9 mm throughout its course from harjit hepatis to the ampulla. No filling defects or evidence for choledocholithiasis identified. No obstructing mass. Ampullary   stenosis possible. There is minimal prominence of central intrahepatic bile ducts as well as the cystic duct.    Gallbladder is dilated but no definite stones are seen within the gallbladder. There is no gallbladder wall thickening but there is localized pericholecystic edema surrounding the gallbladder. Phrygian cap is also present.    LIVER: Liver parenchyma unremarkable.    PANCREAS: Normal.    ADDITIONAL FINDINGS: Duodenal diverticulum near the level of the pancreatic head and ampulla.  Pectus excavatum configuration of lower  sternum. Gastric tube present. Small left renal cyst.      Impression    IMPRESSION:  1.  There is localized pericholecystic edema which is concerning but no definite gallbladder wall thickening and no stones evident on the this exam. Does patient have clinical signs/symptoms of cholecystitis?  2.  Modest dilatation of extrahepatic bile duct which measures approximately 9 mm from harjit hepatis to the ampulla without stone or obstructing mass. Ampullary stenosis not excluded.   Abdomen US, limited (RUQ only)    Narrative    EXAMINATION: Limited Abdominal Ultrasound, 1/11/2022 8:53 PM     COMPARISON: Same-day CT    HISTORY: Ductal dilation, abdominal pain, atelectasis or nausea  vomiting    FINDINGS:   Fluid: No evidence of ascites or pleural effusions.    Liver: The liver demonstrates normal echotexture, measuring 13.6 cm in  craniocaudal dimension. There is no focal mass.     Gallbladder: Distended gallbladder containing layering echogenic,  nonshadowing sludge. Small amount of pericholecystic fluid is present  between the gallbladder and the liver. There is gallbladder wall  thickening which is largely isolated to the aspects of the gallbladder  wall adjacent to the gallbladder fossa. Sonographic Carson sign is  negative although patient has been medicated. Right hepatic duct  measures 4.1 cm and left hepatic duct measures 3.2 cm    Bile Ducts: Intra and extra hepatic ducts are dilated.  The common  bile duct measures 8.5 mm in diameter and contains low-level internal  echoes, which may be artifactual.    Pancreas: Visualized portions of the head and body of the pancreas are  unremarkable.     Kidney: The right kidney measures 9.7 cm long. There is no  hydronephrosis or hydroureter, no shadowing renal calculi, cystic  lesion or mass.       Impression    IMPRESSION:     1. Sludge in the distended gallbladder with mild gallbladder wall  thickening and pericholecystic fluid, equivocal for acute  cholecystitis given  lack of a sonographic Carson's sign (noting the  patient has received pain medications).   2. Mild intra and extrahepatic biliary dilation, as seen on same-day  CT, with questionable echogenic debris/sludge but no appreciable stone  within the visualized upper common bile duct. The lower common bile  duct is not well visualized and could be further evaluated with MRCP  or ERCP, as clinically indicated.  3. The constellation of findings described above can be seen with  biliary stasis or obstruction.    I have personally reviewed the examination and initial interpretation  and I agree with the findings.    CARLOS HORNE DO         SYSTEM ID:  [id number]   XR Chest Port 1 View    Narrative    Portable chest    INDICATION: Weakness    COMPARISON: 11/19/2019    FINDINGS: Heart size appears normal. Silhouetting the left  hemidiaphragm there is now present. Multiple cerclage wires again are  fractured in the right upper hemithorax medially vascular stent on the  right. This appears somewhat more laterally extended than on previous.  Surgical clips overlying the right upper hemithorax are unchanged.  Increased streaky opacifications throughout the left lung.      Impression    Impression: Increasing atelectasis/consolidation at the left lung  base. Increasing atelectasis/patchy densities throughout the left lung  comment recommend follow-up to clearing to exclude infection.  Fractured cerclage wires again noted overlying the right upper  hemithorax medially with increasing lateral stent material of  subclavian/axillary vascular stent.    NUBIA LOPEZ MD         SYSTEM ID:  [id number]   XR Surgery JUVENAL G/T 5 Min Fluoro w Stills    Narrative    This exam was marked as non-reportable because it will not be read by a   radiologist or a [facility] non-radiologist provider.         US Upper Extremity Venous Duplex Right     Value    Radiologist flags Occlusion of the low right internal jugular vein, (Urgent)    Narrative     EXAMINATION: DOPPLER VENOUS ULTRASOUND OF THE RIGHT UPPER EXTREMITY,  1/13/2022 5:59 PM     COMPARISON: Right upper extremity ultrasound 6/3/2021    HISTORY: Hx paget martinez syndrome with known right axillary vein  thrombosis; worsening swelling and AC on hold currently;     TECHNIQUE:  Gray-scale evaluation with compression, spectral flow and  color Doppler assessment of the deep venous system of the right upper  extremity.    FINDINGS:  Right: Partial compressibility of the low right internal jugular vein.  Completed occlusion of the stent within the innominate, subclavian and  axillary vein. There is normal compressibility of the brachial,  basilic and cephalic veins.      Impression    IMPRESSION:  Occlusion of the low right internal jugular vein, innominate,  subclavian, and axillary vein.    [Urgent Result: Occlusion of the low right internal jugular vein,  innominate vein stent, and axillary vein.]    Finding was identified on 1/13/2022 6:15 PM.     Angela Redd was contacted by Dr. Jean Turpin at 1/13/2022 6:25 PM  and verbalized understanding of the urgent finding.     I have personally reviewed the examination and initial interpretation  and I agree with the findings.    JUDI AVALOS MD         SYSTEM ID:  [id number]   CT Abdomen Pelvis w Contrast    Narrative    EXAMINATION: CT ABDOMEN PELVIS W CONTRAST, 1/15/2022 12:10 PM    INDICATION: Abdominal abscess/infection suspected    COMPARISON STUDY: CT 1/11/2022    TECHNIQUE: CT scan of the abdomen and pelvis was performed on  multidetector CT scanner using volumetric acquisition technique and  images were reconstructed in multiple planes with variable thickness  and reviewed on dedicated workstations.     CONTRAST: Isovue 370 injected IV without oral contrast    CT scan radiation dose is optimized to minimum requisite dose using  automated dose modulation techniques.    FINDINGS:    Lower thorax: Bibasilar interstitial fibrotic changes and  traction  bronchiectasis. Small left and trace right pleural effusions.    Liver: Unchanged subcentimeter cyst in left hepatic lobe.    Biliary System: Interval placement of biliary stents in the  gallbladder and common bile duct with decreased size of the  gallbladder and associated inflammatory changes. The lower ends of the  stents terminate in the second portion of the duodenum, possibly  slightly migrated since placement on 1/12/2022. No intra or  extrahepatic biliary dilation.    Pancreas: No mass or pancreatic ductal dilation.    Adrenal glands: No mass or nodules    Spleen: Normal.    Kidneys: No suspicious mass, obstructing calculus or hydronephrosis.   Bilateral renal cysts. Left renal cortical scarring.    Gastrointestinal tract: Percutaneous gastrostomy with balloon in the  stomach. Nondilated appendix with hyperdense material. Normal caliber  small bowel. Moderate colonic stool burden predominantly in the cecum,  ascending and transverse colon. Colonic diverticulosis without  evidence for acute diverticulitis. Periampullary duodenal  diverticulum.    Mesentery/peritoneum/retroperitoneum: No mass. No free fluid or air.    Lymph nodes: No significant lymphadenopathy.    Vasculature: Patent major abdominal vasculature.    Pelvis: Urinary bladder is normal.  Uterus and adnexa within normal  limits.    Osseous structures: No aggressive or acute osseous lesion.      Soft tissues: Within normal limits.      Impression    IMPRESSION:   1. No findings to suggest abdominal abscess or infection.  2. Biliary stents in the gallbladder and common bile duct with  decompression of the gallbladder, decreased associated inflammatory  changes, and resolved intra and extra hepatic biliary dilation.  3. Moderate colonic stool burden in the cecum, ascending and  transverse colon.  4. Bibasilar reticular opacities and traction bronchiectasis,  suggestive of fibrosis related to prior infection or interstitial  lung  disease.  5. Small left and trace right pleural effusions.    I have personally reviewed the examination and initial interpretation  and I agree with the findings.    CARLOS HORNE DO         SYSTEM ID:  [id number]   XR Chest Port 1 View    Narrative    EXAMINATION:  XR CHEST PORT 1 VIEW 1/16/2022 7:52 PM.    COMPARISON: 1/12/2022..    HISTORY:  dyspnea    FINDINGS: AP view of the chest. Vascular stents project over the right  upper chest with numerous clips. Multiple fractured cerclage wires  again in the right upper hemithorax. Increasing interstitial and  airspace opacities throughout both lungs. Small left-sided pleural  effusion. Heart size normal.      Impression    IMPRESSION: Increasing diffuse interstitial and airspace opacities  throughout both lungs, left greater than right. Likely small  left-sided pleural effusion. Posttreatment changes similar to prior.    I have personally reviewed the examination and initial interpretation  and I agree with the findings.    NUBIA LOPEZ MD         SYSTEM ID:  [id number]   CT Chest Pulmonary Embolism w Contrast    Narrative    Examination: CT CHEST PULMONARY EMBOLISM W CONTRAST, 1/16/2022 9:14 PM    Comparison: CT 1/22/2018    History: PE suspected, high prob.    Technique: Axial thin slice images from the lung apices to the  diaphragm were obtained following the injection of intravenous  contrast media in the pulmonary arterial phase.     Contrast dose: isovue 370    Findings:   Right subclavian stent, nonopacified with contrast although possibly  due to bolus timing. Cerclage wires within the sternoclavicular joint  on the right similar to prior. Pectus excavatum deformity. This is  best categorized as severe given the Halterr Index of 252.4 mm/21.4 mm  =11.8.    Mediastinum/hilum: The heart size is normal with unchanged leftward  displacement. The great vessels are normal in caliber. There is no  evidence of a filling defect in the pulmonary arteries to  suggest a  pulmonary embolism. Pulmonary artery normal size, 2.7 cm. No evidence  of axillary, mediastinal or hilar lymphadenopathy by size criteria. No  pericardial effusion is noted.    Diffuse interstitial changes of the lungs with diffuse areas of  groundglass opacities and peripheral predominant areas of  consolidation and reticulation. Mosaic appearance in the right upper  lobe. Cystic changes within the anterior left upper lobe. Areas of  septal thickening and groundglass (crazy paving). Trace bilateral  pleural effusions.. No evidence of pleural effusion is noted.    Upper Abdomen: Gastrostomy tube balloon within the lumen of the  stomach. Other solid organs of the upper abdomen are unremarkable.  Simple renal cyst. Elevated right hemidiaphragm. Biliary stent with  pigtail in the gallbladder. Other biliary stent in adequate position.    Bones/Soft tissues: No suspicious bony lesions.      Impression    IMPRESSION:     1. No evidence for pulmonary embolism.  2. Diffuse groundglass and interstitial opacities throughout the lungs  with peripheral predominant areas of consolidation. There is also some  background interstitial appearance of the lungs with cysts in the left  upper lobe. COVID-19 can have this appearance. ARDS is a  consideration. Other causes of atypical infection are not excluded.  3. Right subclavian stent patency is not well assessed due to bolus  timing.  4. Severe pectus excavatum.    In the event of a positive result for acute pulmonary embolism  resulting in right heart strain, consider calling the   [facility] patient placement ([phone]) for PERT (Pulmonary Embolism  Response Team) Activation?    PERT -- Pulmonary Embolism Response Team (Multidisciplinary team  including cardiology, interventional radiology, critical care,  hematology)    I have personally reviewed the examination and initial interpretation  and I agree with the findings.    NUBIA LOPEZ MD         SYSTEM ID:   [id number]   XR Chest Port 1 View    Narrative    EXAM: XR CHEST PORT 1 VIEW  1/19/2022 5:05 PM     HISTORY:  hypoxia       COMPARISON:  Chest CT 1/16/2022 and chest radiograph 1/16/2022    FINDINGS:   AP portable view of the chest. Midline trachea. Cardiomediastinal  silhouette is stable. Patient is rotated to the right. No pneumothorax  or pleural effusion. Right subclavian stent. Multiple surgical wires  and clips project over the right upper chest. Left greater than right  mixed interstitial and hazy pulmonary opacities, mildly improved on  the right. Visualized upper abdomen is unremarkable..       Impression    IMPRESSION:   Left greater than right mixed interstitial and hazy pulmonary  opacities, mildly improved on the right when compared with 1/16/2022  and the left lung is improved from 1/12/2022.     I have personally reviewed the examination and initial interpretation  and I agree with the findings.    MADY GIBBS MD         SYSTEM ID:  [id number]   XR Chest Port 1 View    Narrative     Examination:  XR CHEST PORT 1 VIEW     Date:  1/19/2022 9:40 PM      Clinical Information: Endotracheal tube positioning, COVID+     Additional Information: none    Comparison: Earlier film from 1659:00 PM.    Findings:     The ET tube tip measures approximately 4.5 cm above the shaista.    The bilateral interstitial changes of the lungs, left greater than  right appear stable. No pneumothorax is present. The right subclavian  vascular stent is in place and unchanged. There is moderate gastric  distention.      Impression    Impression:    1. ET tube tip measures approximately 4.5 cm above the shaista.  2. Stable appearance of the lungs.  3. Moderate gastric distention..    MADY GIBBS MD         SYSTEM ID:  [id number]   XR Chest Port 1 View    Narrative    EXAM: XR CHEST PORT 1 VIEW  1/19/2022 10:49 PM      HISTORY: ETT repositioned    COMPARISON: 1/19/2022    FINDINGS: Single view of the chest. ET tube tip in the mid  thoracic  trachea. Vascular stents overlie the right subclavian vein. Trachea is  midline. Cardiomediastinal silhouette is nonenlarged. Stable bilateral  interstitial opacities, left greater than right. No large pleural  effusion. No appreciable pneumothorax.      Impression    IMPRESSION:  1. ET tube tip in the mid thoracic trachea, about 4.5 cm above the  shaista.  2. Stable appearance of the lungs.    I have personally reviewed the examination and initial interpretation  and I agree with the findings.    JOSE LEE MD         SYSTEM ID:  [id number]   XR Chest Port 1 View    Narrative    EXAM: XR CHEST PORT 1 VIEW  1/22/2022 9:53 AM     HISTORY:  hypercarbia       COMPARISON:  1/19/2022    TECHNIQUE: Single frontal radiograph of the chest    FINDINGS:   Interval extubation. Right-sided central venous catheter tip projects  over the high SVC. Stable right subclavian vein stent.    Decreased lung volumes. Stable left greater than right ill-defined  opacities. No appreciable pleural effusion or pneumothorax.      Impression    IMPRESSION: Increased left lung opacities post extubation, may  represent atelectasis.    I have personally reviewed the examination and initial interpretation  and I agree with the findings.    ALEXANDRIA CARLOS MD         SYSTEM ID:  [id number]   XR Chest Port 1 View    Narrative    Exam: XR CHEST PORT 1 VIEW, 1/23/2022 11:02 AM    Indication: COVID pneumonia    Comparison: 1/22/2022 and 1/19/2022    Findings:   AP portable views of the chest with the patient slightly rotated to  the right. Stable appearance of right subclavian vein stent. Improving  left lung opacities. No pneumothorax or pleural effusion. Cardiac  silhouette and mediastinum are stable from prior. Partially visualized  abdominal drain. Nonobstructive bowel gas pattern. No acute osseous  abnormality.      Impression    Impression: Improving left lung pulmonary opacities, likely resolving  postextubation atelectasis.  Otherwise stable chest radiograph.    I have personally reviewed the examination and initial interpretation  and I agree with the findings.    ALEXANDRIA CARLOS MD         SYSTEM ID:  [id number]   XR Chest Port 1 View    Narrative    EXAM: XR CHEST PORT 1 VIEW  1/24/2022 9:16 AM     HISTORY:  assess for new infiltrate, resolving covid       COMPARISON:  Chest x-ray 1/23/2022 and chest CT 1/16/2022.    FINDINGS:   Supine AP portable film of the chest. Stable appearance of the right  subclavian vein stent, with surgical clips and adjacent fractured  sternotomy wires, similar appearing to previous. Increase in the left  lung and hazy/streaky opacities. Trachea midline. No pneumothorax.  Trace right pleural effusion. Possible small left pleural effusion.  Indistinct heart borders with retrocardiac/perihilar opacities. No  acute osseous or soft tissue abnormality.      Impression    IMPRESSION:  1. Increased left upper lobe and perihilar/retrocardiac opacities in  the setting of recent/ongoing COVID PNA, which may represent ongoing  infectious components with intermixed atelectasis and interstitial  edema.  2. Trace right pleural effusion and questionable small left pleural  effusion    I have personally reviewed the examination and initial interpretation  and I agree with the findings.    NUBIA LOPEZ MD         SYSTEM ID:  [id number]   CT Abdomen Pelvis w Contrast    Narrative    EXAMINATION: CT ABDOMEN PELVIS W CONTRAST, 1/24/2022 12:37 PM    TECHNIQUE:  Helical CT images from the lung bases through the  symphysis pubis were obtained with contrast.  Coronal reformatted  images were generated at a workstation for further assessment.    CONTRAST:  68 cc Isovue 370 IV.    COMPARISON: CT abdomen/pelvis 1/15/2022, 1/11/2022, chest CT 1/16/2022    HISTORY: Abdominal abscess/infection suspected    FINDINGS:    Lines/tubes: Percutaneous gastrostomy tube in place. Stable  positioning of biliary stents with tips in the  duodenum. The bladder  is decompressed around a Scott catheter. Rectal tube.    Abdomen and pelvis:   Liver: No suspicious liver lesions. Subcentimeter hypodensity of the  left hepatic lobe, too small to characterize by CT, but likely  represents a benign hepatic cyst. Portal veins appear patent.  Gallbladder/biliary tree: The gallbladder is decompressed around the  biliary stent. Decreased inflammatory changes compared to CT from  1/11/2022. Small amount of air within the common bile duct and biliary  ducts of the left hepatic lobe, likely related to procedure but new  since 1/15/2022. No intra or extrahepatic biliary duct dilation.  Spleen: Normal size.  Pancreas: Partial fatty atrophy.  Adrenal glands: No adrenal nodules.  Kidneys: No kidney masses. Bilateral renal cysts with the largest  measuring 1.2 cm in the left kidney. Unchanged focal scarring in the  mid to lower pole of the left kidney. No hydronephrosis or obstructing  renal stones.  Bladder / Pelvic organs: The urinary bladder is decompressed around a  Scott catheter.  Trace air in bladder presumably sequelae of  catheterization. Status post right salping-oophorectomy.  Bowel: Mild dilation of the duodenum is similar to prior without  evidence for obstruction. Colonic diverticulosis. No bowel wall  thickening. The appendix is unremarkable.  Lymph nodes: No retroperitoneal, mesenteric, or pelvic  lymphadenopathy.  Fluid: No free fluid within the abdomen.  Vessels: No infrarenal aortic aneurysm.     Lung bases: Bibasilar groundglass opacities and reticular fibrotic  opacities are not significantly changed compared to prior.    Bones and soft tissues: Pectus excavatum. Mild degenerative changes of  the spine.      Impression    IMPRESSION:   1.  Stable positioning of biliary stents with tips in the duodenum.  There is continued decompression and decreased inflammatory changes of  the gallbladder.   2.  Mild dilation of the duodenum likely represents focal  adynamic  ileus. No evidence for brett small bowel obstruction.   3.  Redemonstration of bilateral groundglass and reticular fibrotic  opacities, consistent with history of Covid-19 or potential underlying  interstitial lung disease.  Resolved pleural effusions since  1/15/2022.  4.  No intra-abdominal abscess as questioned.    I have personally reviewed the examination and initial interpretation  and I agree with the findings.    OSVALDO GÓMEZ MD         SYSTEM ID:  [id number]   XR Chest Port 1 View    Narrative    EXAM: XR CHEST PORT 1 VIEW  1/25/2022 8:41 AM     HISTORY:  progression of effusion and infiltrate with leukocytosis       COMPARISON:  Chest x-ray 1/24/2022    FINDINGS: AP radiograph of the chest. Stable position of right  subclavian venous stent with surgical clips and fractured sternotomy  wires overlying the superior chest.    Stable cardiomediastinal silhouette. No pneumothorax. Question trace  bilateral pleural effusions. Unchanged interstitial predominant  opacities throughout the left lung and right lung base. Partially  visualized upper abdomen is unremarkable. No acute osseous  abnormality.      Impression    IMPRESSION:  1. Unchanged trace bilateral pleural effusions.  2. Unchanged interstitial predominant opacities throughout the left  lung and right lung base consistent with the patient's history of  COVID-19 and/or pulmonary edema.    I have personally reviewed the examination and initial interpretation  and I agree with the findings.    CARINA ROCK MD         SYSTEM ID:  [id number]   US Abdomen Limited    Narrative    ULTRASOUND ABDOMEN COMPLETE 1/28/2022 10:08 AM    CLINICAL HISTORY: Persistent RUQ abdominal discomfort, increasing  leukocytosis. History of ERCP with gallbladder and common bile duct  stent placement 1/12/2022.    COMPARISONS: CT 1/24/2022    REFERRING PROVIDER: SUSSY CHARLTON    TECHNIQUE: Right upper quadrant abdominal viscera evaluated with  grayscale imaging. Main portal  vein evaluated with color Doppler  ultrasound.     FINDINGS:  Liver: 14.2 cm. No focal hepatic lesion. No biliary dilation.  US visualization score: A - No or minimal limitations    Right kidney: 8.7 cm. Normal. No mass, stone, or hydronephrosis.    Ascites: None demonstrated.    Gallbladder: Neither the gallbladder nor the gallbladder stent were  visualized. No sonographic Carson's sign elicited.    Common bile duct: 3.0 mm    Pancreas: Partially visualized. No abnormality demonstrated.    Main portal vein: antegrade      Impression    IMPRESSION: Neither the gallbladder nor the gallbladder stent were  visualized. No sonographic Carson's sign elicited. No biliary dilation  demonstrated.    JAMES LAWRENCE MD         SYSTEM ID:  [id number]   XR Chest Port 1 View    Narrative    EXAM: XR CHEST PORT 1 VIEW  1/28/2022 8:48 AM     HISTORY:  Monitor for fluid overload       COMPARISON:  Chest x-ray 1/25/2022    FINDINGS: AP radiograph of the chest. Stable positioning of right  subclavian venous stent with surgical clips and fractured sternotomy  wires overlying the superior right chest/clavicle    Stable enlargement of the cardiomediastinal silhouette. Unchanged  interstitial predominant opacities throughout the left lung and at the  right lung base. Trace bilateral pleural effusions. No pneumothorax.  Partially visualized biliary drain with pigtail in the right upper  quadrant. Visualized upper abdomen is unremarkable.      Impression    IMPRESSION:  1. Unchanged bilateral pleural effusions.  2. Stable interstitial pulmonary opacities throughout the left lung  and at the right lung base. Consistent with the patient's history of  COVID-19 and/or pulmonary edema.    I have personally reviewed the examination and initial interpretation  and I agree with the findings.    CARINA ROCK MD         SYSTEM ID:  [id number]   XR Foot Port Right 3 Views    Narrative    3 views right foot radiographs 2/2/2022 3:58 PM    History: pain with  ROM     Comparison: None    Findings:    Portable nonweight bearing AP, oblique, and lateral  views of the  right foot were obtained.     No acute osseous abnormality.  Bones appear significantly osteopenic.    Lisfranc articulation alignment is congruent on this non-weight  bearing study.    Mild degenerative changes of first metatarsophalangeal joint.     Soft tissue is unremarkable.      Impression    Impression:  1. No acute osseous abnormality.  2. No substantial degenerative change.  3. Bones appear significantly osteopenic.    TIFFANIE DURAN MD (Joe)         SYSTEM ID:  [id number]   XR Surgery JUVENAL Fluoro G/T 5 Min w Stills    Narrative    This exam was marked as non-reportable because it will not be read by a   radiologist or a [facility] non-radiologist provider.               Discharge Medications   Discharge Medication List as of 2/18/2022  1:36 PM      START taking these medications    Details   pantoprazole (PROTONIX) 2 mg/mL SUSP suspension Take 20 mLs (40 mg) by mouth 2 times daily, Transitional         CONTINUE these medications which have CHANGED    Details   apixaban ANTICOAGULANT (ELIQUIS) 5 MG tablet Take 2 tablets (10 mg) by mouth 2 times daily, R-0, Transitional      !! topiramate (TOPAMAX) 100 MG tablet Take 1 tablet (100 mg) by mouth daily Take 100 mg by mouth in the morning and take 200 mg by mouth in the evening., [id number] in th am      !! topiramate (TOPAMAX) 200 MG tablet Take 1 tablet (200 mg) by mouth every evening 200 mg in the AM and 100 mg in the PM, [id number] mg in the eveninig       !! - Potential duplicate medications found. Please discuss with provider.      CONTINUE these medications which have NOT CHANGED    Details   ARIPiprazole (ABILIFY) 5 MG tablet Take 1 tablet (5 mg) by mouth daily, Disp-60 tablet, R-1, E-Prescribe      aspirin (ASA) 81 MG chewable tablet Take 1 tablet (81 mg) by mouth daily, Disp-36 tablet, R-9, E-Prescribe      B Complex Vitamins (B  COMPLEX 1 PO) Take 1 tablet by mouth daily., Historical      butalbital-acetaminophen-caffeine (ESGIC) -40 MG tablet Take 1-2 tablets by mouth at onset of headache. May repeat 1-2 tablets after 4 hrs. Max 6 tabets in 24 hrs. LIMIT to 2 days a week., Historical      DULoxetine (CYMBALTA) 60 MG capsule Take 2 capsules (120 mg) by mouth every evening, Disp-60 capsule, R-1, E-Prescribe      LORazepam (ATIVAN) 1 MG tablet Take 1 mg by mouth every 6 hours as needed for agitation or anxiety, Historical      Magnesium Oxide -Mg Supplement 400 MG CAPS Take 400 mg by mouth, Historical      metoprolol succinate ER (TOPROL-XL) 25 MG 24 hr tablet Take 1 tablet (25 mg) by mouth daily. , Disp-90 tablet, R-3, E-Prescribe      Ondansetron (ZUPLENZ) 8 MG FILM Take 8 mg by mouth every 6 hours as needed., Historical      QUEtiapine (SEROQUEL) 50 MG tablet Take 1 tablet (50 mg) by mouth daily as needed (for severe anxiety), Disp-30 tablet, R-0, E-Prescribe      senna-docusate (SENOKOT-S/PERICOLACE) 8.6-50 MG tablet Take 1 tablet by mouth 2 times daily, Historical      SUMAtriptan (IMITREX STATDOSE) 6 MG/0.5ML pen injector kit 1 injection at onset of migraine. May repeat once after 2 hrs. Max 2 injections in 24 hrs. LIMIT TO 2 days a week.  (#10 for 30 days), Historical      tolterodine ER (DETROL LA) 4 MG 24 hr capsule Take 1 capsule (4 mg) by mouth daily, Disp-90 capsule, R-3, E-Prescribe      venlafaxine (EFFEXOR) 75 MG tablet Take 75 mg by mouth 2 times daily, Historical      alendronate (FOSAMAX) 70 MG tablet Take 1 tablet (70 mg) by mouth every 7 days Take with a full glass of water and do not eat or lay down for 30 minutes, Disp-12 tablet, R-3, E-Prescribe      Zinc 50 MG CAPS Take 1 tablet by mouth daily., Historical         STOP taking these medications       HYDROmorphone (DILAUDID) 3 MG Suppository Comments:   Reason for Stopping:         LANsoprazole (PREVACID) 30 MG DR capsule Comments:   Reason for Stopping:          methylphenidate (RITALIN) 20 MG tablet Comments:   Reason for Stopping:         methylphenidate (RITALIN) 20 MG tablet Comments:   Reason for Stopping:         oxyCODONE (OXY-IR) 5 MG capsule Comments:   Reason for Stopping:         oxyCODONE (OXYCONTIN) 10 MG 12 hr tablet Comments:   Reason for Stopping:         riboflavin (VITAMIN  B-2) 100 MG TABS tablet Comments:   Reason for Stopping:         spironolactone (ALDACTONE) 25 MG tablet Comments:   Reason for Stopping:         voriconazole (VFEND) 200 MG tablet Comments:   Reason for Stopping:             Allergies   Allergies   Allergen Reactions     Droperidol Anxiety and Palpitations     Phenergan Dm [Promethazine-Dm] Rash     Aimovig [Erenumab-Aooe]      Pt reports swelling and rash      Androgens      Pt is unsure.  She was told to avoid them     Bicitra [Citric Acid-Sodium Citrate] Nausea and Vomiting     SOLN     D.H.E. 45 [Dihydroergotamine Mesylate] Nausea     SOLN     Depakote [Divalproex Sodium] Other (See Comments)     Exacerbate depression     Metoclopramide Hcl Nausea and Vomiting     Verapamil Hcl Cr Other (See Comments)     CP24; hypotension     Dihydroergotamine Nausea and Rash     SOLN  PN: LW Reaction: Nausea, vomitting   (DHE)     Olanzapine Rash     Prochlorperazine Maleate Rash

## 2022-02-18 NOTE — PLAN OF CARE
Goal Outcome Evaluation:    Plan of Care Reviewed With: patient    Overall Patient Progress: improving    Outcome Evaluation: pt medically ready for transition to TCU    Pt has orders for discharge. Will be going to a TCU in [place], [place] on Friday. She has been on the phone with several family members tonight, , daughter, great nephew. Got up to commode with one assist and then preferred to use bedpan later in the shift. Ate salad from [employer] for supper. Declined need for tube feeding. Asked to have all medications down her feeding tube. Had PRN tylenol and melatonin at bedtime. 100% oxygen saturation on 1 liter per nasal cannula. Still has occasional cough.

## 2022-02-18 NOTE — PLAN OF CARE
Problem: Adult Inpatient Plan of Care  Goal: Plan of Care Review  Outcome: Adequate for Care Transition  Goal: Patient-Specific Goal (Individualized)  Outcome: Adequate for Care Transition  Goal: Absence of Hospital-Acquired Illness or Injury  Outcome: Adequate for Care Transition  Intervention: Identify and Manage Fall Risk  Recent Flowsheet Documentation  Taken 2/18/2022 0406 by Bennett Jordan RN  Safety Promotion/Fall Prevention:   activity supervised   assistive device/personal items within reach   clutter free environment maintained   lighting adjusted   nonskid shoes/slippers when out of bed   treat reversible contributory factors  Taken 2/18/2022 0116 by Bennett Jordan RN  Safety Promotion/Fall Prevention:   activity supervised   assistive device/personal items within reach   clutter free environment maintained   lighting adjusted   nonskid shoes/slippers when out of bed   treat reversible contributory factors  Intervention: Prevent Skin Injury  Recent Flowsheet Documentation  Taken 2/18/2022 0406 by Bnenett Jordan RN  Body Position: position changed independently  Taken 2/18/2022 0116 by Bennett Jordan RN  Body Position: position changed independently  Intervention: Prevent and Manage VTE (Venous Thromboembolism) Risk  Recent Flowsheet Documentation  Taken 2/18/2022 0406 by Bennett Jordan RN  Activity Management:   activity adjusted per tolerance   activity encouraged  Taken 2/18/2022 0116 by Bennett Jordan RN  Activity Management:   activity adjusted per tolerance   activity encouraged  Intervention: Prevent Infection  Recent Flowsheet Documentation  Taken 2/18/2022 0406 by Bennett Jordan RN  Infection Prevention:   equipment surfaces disinfected   hand hygiene promoted   personal protective equipment utilized   rest/sleep promoted   single patient room provided   visitors restricted/screened  Taken 2/18/2022 0116 by Benentt Jordan RN  Infection  Prevention:   equipment surfaces disinfected   hand hygiene promoted   personal protective equipment utilized   rest/sleep promoted   single patient room provided   visitors restricted/screened  Goal: Optimal Comfort and Wellbeing  Outcome: Adequate for Care Transition  Intervention: Provide Person-Centered Care  Recent Flowsheet Documentation  Taken 2/18/2022 0406 by Bennett Jordan RN  Trust Relationship/Rapport:   care explained   choices provided  Taken 2/18/2022 0116 by Bennett Jordan RN  Trust Relationship/Rapport:   care explained   choices provided  Goal: Readiness for Transition of Care  Outcome: Adequate for Care Transition     Problem: Discharge Planning  Goal: Discharge Planning (Adult, OB, Behavioral, Peds)  Outcome: Adequate for Care Transition     Problem: Fluid Imbalance (Pneumonia)  Goal: Fluid Balance  Outcome: Adequate for Care Transition     Problem: Infection (Pneumonia)  Goal: Resolution of Infection Signs and Symptoms  Outcome: Adequate for Care Transition  Intervention: Prevent Infection Progression  Recent Flowsheet Documentation  Taken 2/18/2022 0406 by Bennett Jordan RN  Isolation Precautions: enteric precautions maintained  Taken 2/18/2022 0116 by Bennett Jordan RN  Isolation Precautions: enteric precautions maintained     Problem: Respiratory Compromise (Pneumonia)  Goal: Effective Oxygenation and Ventilation  Outcome: Adequate for Care Transition  Intervention: Promote Airway Secretion Clearance  Recent Flowsheet Documentation  Taken 2/18/2022 0406 by Bennett Jordan RN  Cough And Deep Breathing: done with encouragement  Taken 2/18/2022 0116 by Bennett Jordan RN  Cough And Deep Breathing: done with encouragement  Intervention: Optimize Oxygenation and Ventilation  Recent Flowsheet Documentation  Taken 2/18/2022 0406 by Bennett Jordan RN  Head of Bed (HOB) Positioning: HOB at 30-45 degrees  Taken 2/18/2022 0116 by Bennett Jordan RN  Head of  Bed (HOB) Positioning: HOB at 30-45 degrees     Problem: Airway Clearance Ineffective  Goal: Effective Airway Clearance  Outcome: Adequate for Care Transition  Intervention: Promote Airway Secretion Clearance  Recent Flowsheet Documentation  Taken 2/18/2022 0406 by Bennett Jordan RN  Cough And Deep Breathing: done with encouragement  Activity Management:   activity adjusted per tolerance   activity encouraged  Taken 2/18/2022 0116 by Bennett Jordan RN  Cough And Deep Breathing: done with encouragement  Activity Management:   activity adjusted per tolerance   activity encouraged     Problem: OT General Care Plan  Goal: Toilet Transfer/Toileting (OT)  Description: Toilet Transfer/Toileting (OT)  Outcome: Adequate for Care Transition     Problem: Malnutrition  Goal: Improved Nutritional Intake  Outcome: Adequate for Care Transition   Goal Outcome Evaluation:    Plan of Care Reviewed With: patient     Overall Patient Progress: improving    Outcome Evaluation: pt medically ready for transition to TCU

## 2022-02-18 NOTE — TELEPHONE ENCOUNTER
REFERRAL INFORMATION:    Referring Provider:  N/A    Referring Clinic:  N/A    Reason for Visit/Diagnosis: Hospital follow up/ Gallbladder, Cholecystectomy       FUTURE VISIT INFORMATION:    Appointment Date: 3/8/2022    Appointment Time: 10 AM      NOTES RECORD STATUS  DETAILS   OFFICE NOTE from Referring Provider N/A    OFFICE NOTE from Other Specialists N/A    HOSPITAL DISCHARGE SUMMARY/ ED VISITS  Internal  1/11/2022 ([facility])    OPERATIVE REPORT Internal ERCP: 2/14/2022, 1/12/2022   ENDOSCOPY (EGD)  Internal 6/23/2021, 10/17/19   PERTINENT LABS In process    PATHOLOGY REPORTS (RELATED) N/A    IMAGING (CT, MRI, US, XR)  Internal US Abdomen: 1/28/2022, 1/11/2022  CT Abdomen Pelvis: 1/24/2022, 1/15/2022, 1/11/2022  MR Abdomen: 1/11/2022

## 2022-02-18 NOTE — PLAN OF CARE
Occupational Therapy Discharge Summary    Reason for therapy discharge:    Discharge to transitional care facility.    Progress towards therapy goal(s). See goals on Care Plan in [facility] electronic health record for goal details.  Goals partially met.  Barriers to achieving goals:   discharge from facility.    Therapy recommendation(s):    Continued therapy is recommended.  Rationale/Recommendations:  Pt would benefit from skilled OT to maximize ADL I and mobility prior to return to natural living arrangement.

## 2022-02-23 ENCOUNTER — TELEPHONE (OUTPATIENT)
Dept: PSYCHIATRY | Facility: CLINIC | Age: 57
End: 2022-02-23
Payer: MEDICARE

## 2022-02-23 LAB — VORICONAZOLE SERPL-MCNC: 2.3 UG/ML (ref 1–5.5)

## 2022-02-23 NOTE — TELEPHONE ENCOUNTER
[facility] Call Center    Phone Message    May a detailed message be left on voicemail: yes     Reason for Call: Other: Call from pt's NP:  Pt had question about Methylephenidate. Pt was taken off of the medication when she was in the hospital. She's not sure when it was stopped and wanted to check if she should start the pt back witht the medication or start her at a different dose. Can call the pt back if there is no consent           Action Taken: Other: [facility] psych pool    Travel Screening: Not Applicable

## 2022-02-24 NOTE — TELEPHONE ENCOUNTER
Jacob Pierce,    Thanks for passing along this message. I called Marcia and she gave me verbal permission to speak with [facility]leny[facility] [place], [place]. I have not seen Marcia since August and since then, she has had prolonged hospital stay for Covid pneumonia. I advised that I can't advise restarting methylphenidate without evaluating the patient. If the decision is made to restart the medication assuming patient's blood pressure and cardiac status can tolerate the medication, I recommended restarting at a very low dose and increasing gradually. I also gave the same information to Marcia - letting her know that her NP will make the final decision since she will be able to physically evaluate her.     Edda Schilling

## 2022-03-07 ENCOUNTER — PATIENT OUTREACH (OUTPATIENT)
Dept: SURGERY | Facility: CLINIC | Age: 57
End: 2022-03-07
Payer: MEDICARE

## 2022-03-07 ASSESSMENT — ENCOUNTER SYMPTOMS
LOSS OF CONSCIOUSNESS: 0
DYSURIA: 0
MEMORY LOSS: 0
NUMBNESS: 0
EXERCISE INTOLERANCE: 1
WHEEZING: 0
MUSCLE CRAMPS: 0
DYSPNEA ON EXERTION: 1
DISTURBANCES IN COORDINATION: 0
LIGHT-HEADEDNESS: 0
MYALGIAS: 0
DIARRHEA: 0
TINGLING: 0
SNORES LOUDLY: 0
SPEECH CHANGE: 0
RECTAL PAIN: 0
JAUNDICE: 0
BACK PAIN: 1
PALPITATIONS: 1
WEAKNESS: 1
HYPERTENSION: 1
HEMATURIA: 0
SPUTUM PRODUCTION: 0
NECK PAIN: 0
SYNCOPE: 0
HYPOTENSION: 0
SEIZURES: 0
STIFFNESS: 0
VOMITING: 0
SWOLLEN GLANDS: 0
TREMORS: 0
DIFFICULTY URINATING: 0
ARTHRALGIAS: 1
HEMOPTYSIS: 0
HEADACHES: 1
BLOATING: 0
COUGH DISTURBING SLEEP: 0
LEG PAIN: 0
CONSTIPATION: 1
BOWEL INCONTINENCE: 0
HEARTBURN: 1
JOINT SWELLING: 0
SLEEP DISTURBANCES DUE TO BREATHING: 0
BLOOD IN STOOL: 0
BRUISES/BLEEDS EASILY: 1
SHORTNESS OF BREATH: 1
COUGH: 0
MUSCLE WEAKNESS: 1
DIZZINESS: 0
NAUSEA: 1
POSTURAL DYSPNEA: 0
PARALYSIS: 0
FLANK PAIN: 0

## 2022-03-07 NOTE — PROGRESS NOTES
Patient Telephone Reminder Call    Date of call:  03/07/22  Phone numbers:  Home number on file [phone] (home)    Reached patient/confirmed appointment:  Yes  Appointment with:   Dr. Herminio Espinosa  Reason for visit: post-discharge follow-up

## 2022-03-08 ENCOUNTER — PRE VISIT (OUTPATIENT)
Dept: SURGERY | Facility: CLINIC | Age: 57
End: 2022-03-08

## 2022-03-08 ENCOUNTER — OFFICE VISIT (OUTPATIENT)
Dept: SURGERY | Facility: CLINIC | Age: 57
End: 2022-03-08
Payer: MEDICARE

## 2022-03-08 VITALS
HEART RATE: 103 BPM | WEIGHT: 111 LBS | DIASTOLIC BLOOD PRESSURE: 86 MMHG | SYSTOLIC BLOOD PRESSURE: 124 MMHG | OXYGEN SATURATION: 100 % | BODY MASS INDEX: 19.67 KG/M2 | HEIGHT: 63 IN

## 2022-03-08 DIAGNOSIS — K83.8 DILATED BILE DUCT: ICD-10-CM

## 2022-03-08 DIAGNOSIS — R74.01 TRANSAMINITIS: ICD-10-CM

## 2022-03-08 PROCEDURE — 99203 OFFICE O/P NEW LOW 30 MIN: CPT | Performed by: SURGERY

## 2022-03-08 RX ORDER — CAPSAICIN 0.75 MG/G
CREAM TOPICAL 3 TIMES DAILY PRN
COMMUNITY
End: 2023-04-12

## 2022-03-08 ASSESSMENT — PAIN SCALES - GENERAL: PAINLEVEL: NO PAIN (0)

## 2022-03-08 NOTE — PATIENT INSTRUCTIONS
You met with Dr. Herminio Espinosa.      Today's visit instructions:    A representative from our Gastroenterology Team will be reaching out to you to set up imaging to check for stent placement. If you do not hear from them within a couple of business days please contact us at the Nurse Advice line listed below. Return to the Surgery Clinic on an as needed basis.        If you have questions please contact Any RN or Janice RN during regular clinic hours, Monday through Friday 7:30 AM - 4:00 PM, or you can contact us via [employer] at anytime.       If you have urgent needs after-hours, weekends, or holidays please call the hospital at [phone] and ask to speak with our on-call General Surgery Team.    Appointment [phone]  Nurse Advice (Any or Janice): [phone]   Surgery Scheduler ([place]): [phone]  Fax: [phone]

## 2022-03-08 NOTE — NURSING NOTE
"Chief Complaint   Patient presents with     New Patient     Cholecystectomy        Vitals:    03/08/22 1000   BP: 124/86   BP Location: Left arm   Patient Position: Sitting   Cuff Size: Adult Regular   Pulse: 103   SpO2: 100%   Weight: 50.3 kg (111 lb)   Height: 1.6 m (5' 3\")       Body mass index is 19.66 kg/m .                          Hortensia Hagen, EMT    "

## 2022-03-08 NOTE — PROGRESS NOTES
"I saw Sherly Yeung in clinic along with her sister-in-law for follow-up of what appears to be acalculous cholecystitis.  She had COVID this past fall (prolonged illness, Trach/PEG performed) and was admitted in January with cholangitis and cholecystitis.  Biliary stents and transcystic duct stents were placed by GI which resolved thi issue.  She did develop Cdiff while an inpatient and repeat COVID infection.    On 2/14 repeat ERCP with stenting and what sounds like sludge extraction was done (this was the last procedure performed).    Since her discharge on 2/18 she has been doing ok.  She remains on oxygen (trach is removed) at about 3LPM.  She utilizes a wheelchair for mobility.  She does not currently use the feeding tube for anything, but the TCU she is at wants it to remain.    Prior to this fall she did not have issues with her gallbladder, no post prandial pain, no jaundice, no history of pancreatitis    PE:  /86 (BP Location: Left arm, Patient Position: Sitting, Cuff Size: Adult Regular)   Pulse 103   Ht 1.6 m (5' 3\")   Wt 50.3 kg (111 lb)   SpO2 100%   BMI 19.66 kg/m    A+Ox3, NAD  Frail, thin woman  No jaundice  Abd is soft, nondistended, feeding tube secured. Mildly tender in RUQ    I reviewed her imaging (including CT, MRCP, US).  I do not see evidence of gallstones. Her gallbladder was quite distended on admission prior to transcystic stent placement.    A/P:  Acalculous cholecystitis in context of prolonged critical illness.  I discussed with her that often patient's like her are managed with a cholecystotomy tube which is then removed.  Given the apparent lack of gallstones and her overall deconditioned state, it would likely be best to avoid surgery. I proposed that we attempt to remove the stents and see if she develops symptoms.  If she does, then I would schedule her for cholecystectomy.  I will reach out to the GI team to see what their thoughts are.  We had discussed her case at our " multidisciplinary conference and agreed to this as a general plan.    Will follow-up with GI  Card given     I spent 30 minutes on this encounter (chart review, coordinating care, exam, counseling and documentation)  Answers for HPI/ROS submitted by the patient on 3/7/2022  General Symptoms: No  Skin Symptoms: No  HENT Symptoms: No  EYE SYMPTOMS: No  HEART SYMPTOMS: Yes  LUNG SYMPTOMS: Yes  INTESTINAL SYMPTOMS: Yes  URINARY SYMPTOMS: Yes  GYNECOLOGIC SYMPTOMS: No  BREAST SYMPTOMS: No  SKELETAL SYMPTOMS: Yes  BLOOD SYMPTOMS: Yes  NERVOUS SYSTEM SYMPTOMS: Yes  MENTAL HEALTH SYMPTOMS: No  Chest pain or pressure: Yes  Fast or irregular heartbeat: Yes  Pain in legs with walking: No  Fingers or toes appear blue: No  High blood pressure: Yes  Low blood pressure: No  Fainting: No  Murmurs: No  Pacemaker: No  Varicose veins: No  Wake up at night with shortness of breath: No  Light-headedness: No  Exercise intolerance: Yes  Cough: No  Sputum or phlegm: No  Coughing up blood: No  Difficulty breating or shortness of breath: Yes  Snoring: No  Wheezing: No  Difficulty breathing on exertion: Yes  Nighttime Cough: No  Difficulty breathing when lying flat: No  Heart burn or indigestion: Yes  Nausea: Yes  Vomiting: No  Bloating: No  Constipation: Yes  Diarrhea: No  Blood in stool: No  Black stools: No  Rectal or Anal pain: No  Fecal incontinence: No  Yellowing of skin or eyes: No  Vomit with blood: No  Change in stools: No  Trouble holding urine or incontinence: Yes  Pain or burning: No  Trouble starting or stopping: No  Increased frequency of urination: No  Blood in urine: No  Decreased frequency of urination: No  Frequent nighttime urination: No  Flank pain: No  Difficulty emptying bladder: No  Back pain: Yes  Muscle aches: No  Neck pain: No  Swollen joints: No  Joint pain: Yes  Bone pain: No  Muscle cramps: No  Muscle weakness: Yes  Joint stiffness: No  Bone fracture: No  Edema or swelling: No  Anemia: Yes  Swollen glands:  No  Easy bleeding or bruising: Yes  Trouble with coordination: No  Dizziness or trouble with balance: No  Fainting or black-out spells: No  Memory loss: No  Headache: Yes  Seizures: No  Speech problems: No  Tingling: No  Tremor: No  Weakness: Yes  Difficulty walking: Yes  Paralysis: No  Numbness: No

## 2022-03-08 NOTE — LETTER
"3/8/2022       RE: Sherly Yeung  [address]  [facility] [address]     Dear Colleague,    Thank you for referring your patient, Sherly Yeung, to the [facility] [place] [facility]. Please see a copy of my visit note below.    I saw Sherly Yeung in clinic along with her sister-in-law for follow-up of what appears to be acalculous cholecystitis.  She had COVID this past fall (prolonged illness, Trach/PEG performed) and was admitted in January with cholangitis and cholecystitis.  Biliary stents and transcystic duct stents were placed by GI which resolved thi issue.  She did develop Cdiff while an inpatient and repeat COVID infection.    On 2/14 repeat ERCP with stenting and what sounds like sludge extraction was done (this was the last procedure performed).    Since her discharge on 2/18 she has been doing ok.  She remains on oxygen (trach is removed) at about 3LPM.  She utilizes a wheelchair for mobility.  She does not currently use the feeding tube for anything, but the TCU she is at wants it to remain.    Prior to this fall she did not have issues with her gallbladder, no post prandial pain, no jaundice, no history of pancreatitis    PE:  /86 (BP Location: Left arm, Patient Position: Sitting, Cuff Size: Adult Regular)   Pulse 103   Ht 1.6 m (5' 3\")   Wt 50.3 kg (111 lb)   SpO2 100%   BMI 19.66 kg/m    A+Ox3, NAD  Frail, thin woman  No jaundice  Abd is soft, nondistended, feeding tube secured. Mildly tender in RUQ    I reviewed her imaging (including CT, MRCP, US).  I do not see evidence of gallstones. Her gallbladder was quite distended on admission prior to transcystic stent placement.    A/P:  Acalculous cholecystitis in context of prolonged critical illness.  I discussed with her that often patient's like her are managed with a cholecystotomy tube which is then removed.  Given the apparent lack of gallstones and her " overall deconditioned state, it would likely be best to avoid surgery. I proposed that we attempt to remove the stents and see if she develops symptoms.  If she does, then I would schedule her for cholecystectomy.  I will reach out to the GI team to see what their thoughts are.  We had discussed her case at our multidisciplinary conference and agreed to this as a general plan.    Will follow-up with GI  Card given     I spent 30 minutes on this encounter (chart review, coordinating care, exam, counseling and documentation)  Answers for HPI/ROS submitted by the patient on 3/7/2022  General Symptoms: No  Skin Symptoms: No  HENT Symptoms: No  EYE SYMPTOMS: No  HEART SYMPTOMS: Yes  LUNG SYMPTOMS: Yes  INTESTINAL SYMPTOMS: Yes  URINARY SYMPTOMS: Yes  GYNECOLOGIC SYMPTOMS: No  BREAST SYMPTOMS: No  SKELETAL SYMPTOMS: Yes  BLOOD SYMPTOMS: Yes  NERVOUS SYSTEM SYMPTOMS: Yes  MENTAL HEALTH SYMPTOMS: No  Chest pain or pressure: Yes  Fast or irregular heartbeat: Yes  Pain in legs with walking: No  Fingers or toes appear blue: No  High blood pressure: Yes  Low blood pressure: No  Fainting: No  Murmurs: No  Pacemaker: No  Varicose veins: No  Wake up at night with shortness of breath: No  Light-headedness: No  Exercise intolerance: Yes  Cough: No  Sputum or phlegm: No  Coughing up blood: No  Difficulty breating or shortness of breath: Yes  Snoring: No  Wheezing: No  Difficulty breathing on exertion: Yes  Nighttime Cough: No  Difficulty breathing when lying flat: No  Heart burn or indigestion: Yes  Nausea: Yes  Vomiting: No  Bloating: No  Constipation: Yes  Diarrhea: No  Blood in stool: No  Black stools: No  Rectal or Anal pain: No  Fecal incontinence: No  Yellowing of skin or eyes: No  Vomit with blood: No  Change in stools: No  Trouble holding urine or incontinence: Yes  Pain or burning: No  Trouble starting or stopping: No  Increased frequency of urination: No  Blood in urine: No  Decreased frequency of urination: No  Frequent  nighttime urination: No  Flank pain: No  Difficulty emptying bladder: No  Back pain: Yes  Muscle aches: No  Neck pain: No  Swollen joints: No  Joint pain: Yes  Bone pain: No  Muscle cramps: No  Muscle weakness: Yes  Joint stiffness: No  Bone fracture: No  Edema or swelling: No  Anemia: Yes  Swollen glands: No  Easy bleeding or bruising: Yes  Trouble with coordination: No  Dizziness or trouble with balance: No  Fainting or black-out spells: No  Memory loss: No  Headache: Yes  Seizures: No  Speech problems: No  Tingling: No  Tremor: No  Weakness: Yes  Difficulty walking: Yes  Paralysis: No  Numbness: No        Herminio Espinosa MD

## 2022-03-10 ENCOUNTER — PATIENT OUTREACH (OUTPATIENT)
Dept: GASTROENTEROLOGY | Facility: CLINIC | Age: 57
End: 2022-03-10
Payer: MEDICARE

## 2022-03-10 ENCOUNTER — PREP FOR PROCEDURE (OUTPATIENT)
Dept: GASTROENTEROLOGY | Facility: CLINIC | Age: 57
End: 2022-03-10
Payer: MEDICARE

## 2022-03-10 DIAGNOSIS — Z46.89 ENCOUNTER FOR REMOVAL OF BILIARY STENT: Primary | ICD-10-CM

## 2022-03-10 NOTE — TELEPHONE ENCOUNTER
Per Dr. Wilson    I certainly could remove all the transcystic stents and have her follow up with you or general surgery in 4 weeks to see if she tolerates it     Please assist in scheduling:     Procedure/Imaging/Clinic: ERCP  Physician: Dr. Wilson  Timing: 3/28  Procedure length:provider average  Anesthesia:gen  Dx: biliary stent removal  Tier:2  Location: [facility]       Called to discuss with patient. Explained they can expect a call from  for date and time of procedure, will need a , someone to stay with them for 24 hours and should stay in town for 24 hours (within 45 min of Hospital) post procedure    Patient needs to get pre-op physical completed. If outside  health system will need physical faxed to number [phone]   If you do not get a preop physical, your procedure could be cancelled, patient voiced understanding*    Preop Plan: will see PCP on 3/16, [facility]      Med Review    Blood thinner -  Eliquis , will nold 2 days prior  ASA - no  Diabetic - no    COVID test discussed:3-4 days    Patient Education r/t procedure:done    Does patient have any history of gastric bypass/gastric surgery/altered panc/bili anatomy?no    A pre-op nurse will call 1-2 days prior to the procedure. I    NPO/Prep: not discussed    Other specific details/comments:none     Verbalized understanding of all instructions. All questions answered.

## 2022-03-11 ENCOUNTER — PATIENT OUTREACH (OUTPATIENT)
Dept: GASTROENTEROLOGY | Facility: CLINIC | Age: 57
End: 2022-03-11
Payer: MEDICARE

## 2022-03-11 DIAGNOSIS — Z11.59 ENCOUNTER FOR SCREENING FOR OTHER VIRAL DISEASES: Primary | ICD-10-CM

## 2022-03-11 NOTE — TELEPHONE ENCOUNTER
Per patient, she has GJ- per PCP has put in orders to remove tube, wondering if we can take out during scheduled ERCP.   Tube put in at [facility] in October. Per patient, she last used it for meds 6 weeks ago, has not used it since.     Will ask Dr. Wilson if ok to remove during scheduled ERCP.    ML

## 2022-03-13 DIAGNOSIS — Z53.9 DIAGNOSIS NOT YET DEFINED: Primary | ICD-10-CM

## 2022-03-13 PROCEDURE — G0180 MD CERTIFICATION HHA PATIENT: HCPCS | Performed by: INTERNAL MEDICINE

## 2022-03-14 ENCOUNTER — TELEPHONE (OUTPATIENT)
Dept: INTERNAL MEDICINE | Facility: CLINIC | Age: 57
End: 2022-03-14
Payer: MEDICARE

## 2022-03-14 ENCOUNTER — MEDICAL CORRESPONDENCE (OUTPATIENT)
Dept: HEALTH INFORMATION MANAGEMENT | Facility: CLINIC | Age: 57
End: 2022-03-14
Payer: MEDICARE

## 2022-03-14 NOTE — TELEPHONE ENCOUNTER
[facility] Call Center    Phone Message    May a detailed message be left on voicemail: yes     Reason for Call: Other: Continue with home care 2 x week for 2 weeks. 1 x week for 2 weeks. 1 x 2 weeks for one month.      #: [phone]    Action Taken: Message routed to:  [facility] P[facility]    Travel Screening: Not Applicable

## 2022-03-15 ENCOUNTER — TELEPHONE (OUTPATIENT)
Dept: INTERVENTIONAL RADIOLOGY/VASCULAR | Facility: CLINIC | Age: 57
End: 2022-03-15
Payer: MEDICARE

## 2022-03-15 NOTE — TELEPHONE ENCOUNTER
I returned Marcia's message that she had left to the call center. I passed along the message that [facility] would defer the removal of her PEG tube to [facility] where she had it placed. Marcia was agreeable to that information.

## 2022-03-15 NOTE — TELEPHONE ENCOUNTER
[facility] Call Center    Phone Message    May a detailed message be left on voicemail: no     Reason for Call: Other: Pt, Marcia Needs Peg removed from IV Radiology. Call Marcia at: [phone]     Action Taken: Message routed to:  [facility] IR Vascular    Travel Screening: Not Applicable                                                                         [FreeTextEntry1] : Attending Assessment:  now 6 yo boy with heterozygous Familial Hypercholesterolemia on 5 mg Crestor qod initiated by Dr. dang and now continuing without any side effects or noted problems with somewhat higher recent values for LDL -C in 180's perhaps related to COVID home sequester.  Normal CK and Hepatic profile.\par \par While LDL-C is not at goal for long term level, statin is begun at an early age and LDL is significantly lower than without statin.   Discussed with mother of child and recommended no increase in present regimen but review of Lifestyle factors.\par \par \par Attending Plan: continue 5 mg qod of Crestor:   (order another 4-5 months 30 /month)\par                          nutritionist counseling today;\par                          return with labs q 6 months;\par                          consider on increasing statin and tighter control between 8 and 10 years old;

## 2022-03-15 NOTE — TELEPHONE ENCOUNTER
Verbal orders given to [facility], per Dr. Mg, for Other: Continue with home care 2 x week for 2 weeks. 1 x week for 2 weeks. 1 x 2 weeks for one month. Marleny Camarillo LPN 3/15/2022 8:32 AM

## 2022-03-16 ENCOUNTER — OFFICE VISIT (OUTPATIENT)
Dept: INTERNAL MEDICINE | Facility: CLINIC | Age: 57
End: 2022-03-16
Payer: MEDICARE

## 2022-03-16 ENCOUNTER — LAB (OUTPATIENT)
Dept: LAB | Facility: CLINIC | Age: 57
End: 2022-03-16
Payer: MEDICARE

## 2022-03-16 VITALS
HEART RATE: 107 BPM | HEIGHT: 63 IN | BODY MASS INDEX: 20.75 KG/M2 | WEIGHT: 117.1 LBS | DIASTOLIC BLOOD PRESSURE: 91 MMHG | SYSTOLIC BLOOD PRESSURE: 137 MMHG | OXYGEN SATURATION: 100 %

## 2022-03-16 DIAGNOSIS — I87.1 SVC SYNDROME: ICD-10-CM

## 2022-03-16 DIAGNOSIS — K94.23 PEG TUBE MALFUNCTION (H): Primary | ICD-10-CM

## 2022-03-16 DIAGNOSIS — K94.23 PEG TUBE MALFUNCTION (H): ICD-10-CM

## 2022-03-16 LAB
ALBUMIN SERPL-MCNC: 3.2 G/DL (ref 3.4–5)
ALP SERPL-CCNC: 110 U/L (ref 40–150)
ALT SERPL W P-5'-P-CCNC: 19 U/L (ref 0–50)
ANION GAP SERPL CALCULATED.3IONS-SCNC: 7 MMOL/L (ref 3–14)
AST SERPL W P-5'-P-CCNC: 15 U/L (ref 0–45)
BASOPHILS # BLD AUTO: 0.1 10E3/UL (ref 0–0.2)
BASOPHILS NFR BLD AUTO: 1 %
BILIRUB SERPL-MCNC: 0.2 MG/DL (ref 0.2–1.3)
BUN SERPL-MCNC: 7 MG/DL (ref 7–30)
CALCIUM SERPL-MCNC: 9 MG/DL (ref 8.5–10.1)
CHLORIDE BLD-SCNC: 109 MMOL/L (ref 94–109)
CO2 SERPL-SCNC: 28 MMOL/L (ref 20–32)
CREAT SERPL-MCNC: 0.61 MG/DL (ref 0.52–1.04)
EOSINOPHIL # BLD AUTO: 0.3 10E3/UL (ref 0–0.7)
EOSINOPHIL NFR BLD AUTO: 3 %
ERYTHROCYTE [DISTWIDTH] IN BLOOD BY AUTOMATED COUNT: 15.7 % (ref 10–15)
GFR SERPL CREATININE-BSD FRML MDRD: >90 ML/MIN/1.73M2
GLUCOSE BLD-MCNC: 79 MG/DL (ref 70–99)
HCT VFR BLD AUTO: 33.9 % (ref 35–47)
HGB BLD-MCNC: 10 G/DL (ref 11.7–15.7)
IMM GRANULOCYTES # BLD: 0.1 10E3/UL
IMM GRANULOCYTES NFR BLD: 1 %
LYMPHOCYTES # BLD AUTO: 2.1 10E3/UL (ref 0.8–5.3)
LYMPHOCYTES NFR BLD AUTO: 19 %
MAGNESIUM SERPL-MCNC: 2.6 MG/DL (ref 1.6–2.3)
MCH RBC QN AUTO: 27.4 PG (ref 26.5–33)
MCHC RBC AUTO-ENTMCNC: 29.5 G/DL (ref 31.5–36.5)
MCV RBC AUTO: 93 FL (ref 78–100)
MONOCYTES # BLD AUTO: 0.9 10E3/UL (ref 0–1.3)
MONOCYTES NFR BLD AUTO: 9 %
NEUTROPHILS # BLD AUTO: 7.4 10E3/UL (ref 1.6–8.3)
NEUTROPHILS NFR BLD AUTO: 67 %
NRBC # BLD AUTO: 0 10E3/UL
NRBC BLD AUTO-RTO: 0 /100
PLATELET # BLD AUTO: 428 10E3/UL (ref 150–450)
POTASSIUM BLD-SCNC: 3.8 MMOL/L (ref 3.4–5.3)
PROT SERPL-MCNC: 7.4 G/DL (ref 6.8–8.8)
RBC # BLD AUTO: 3.65 10E6/UL (ref 3.8–5.2)
SODIUM SERPL-SCNC: 144 MMOL/L (ref 133–144)
WBC # BLD AUTO: 10.9 10E3/UL (ref 4–11)

## 2022-03-16 PROCEDURE — 99215 OFFICE O/P EST HI 40 MIN: CPT | Performed by: INTERNAL MEDICINE

## 2022-03-16 PROCEDURE — 80053 COMPREHEN METABOLIC PANEL: CPT | Performed by: PATHOLOGY

## 2022-03-16 PROCEDURE — 83735 ASSAY OF MAGNESIUM: CPT | Performed by: PATHOLOGY

## 2022-03-16 PROCEDURE — 85025 COMPLETE CBC W/AUTO DIFF WBC: CPT | Performed by: PATHOLOGY

## 2022-03-16 PROCEDURE — 36415 COLL VENOUS BLD VENIPUNCTURE: CPT | Performed by: PATHOLOGY

## 2022-03-16 PROCEDURE — 87070 CULTURE OTHR SPECIMN AEROBIC: CPT | Performed by: INTERNAL MEDICINE

## 2022-03-16 RX ORDER — ASPIRIN 81 MG/1
81 TABLET, CHEWABLE ORAL DAILY
Qty: 200 TABLET | Refills: 11 | Status: SHIPPED | OUTPATIENT
Start: 2022-03-16 | End: 2023-06-06

## 2022-03-16 RX ORDER — MUPIROCIN CALCIUM 20 MG/G
CREAM TOPICAL 3 TIMES DAILY
Qty: 15 G | Refills: 1 | Status: SHIPPED | OUTPATIENT
Start: 2022-03-16 | End: 2022-04-26

## 2022-03-16 RX ORDER — LANSOPRAZOLE 30 MG/1
30 CAPSULE, DELAYED RELEASE ORAL 2 TIMES DAILY
COMMUNITY
End: 2022-10-21

## 2022-03-16 NOTE — NURSING NOTE
Sherly Yeung is a 56 year old female patient that presents today in clinic for the following:    Chief Complaint   Patient presents with     Hospital F/U     The patient's allergies and medications were reviewed as noted. A set of vitals were recorded as noted without incident. The patient does not have any other questions for the provider.    Mich Estrada, EMT at 3:10 PM on 3/16/2022
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
## 2022-03-17 ENCOUNTER — TELEPHONE (OUTPATIENT)
Dept: INTERNAL MEDICINE | Facility: CLINIC | Age: 57
End: 2022-03-17
Payer: MEDICARE

## 2022-03-17 ENCOUNTER — VIRTUAL VISIT (OUTPATIENT)
Dept: PSYCHIATRY | Facility: CLINIC | Age: 57
End: 2022-03-17
Attending: PSYCHIATRY & NEUROLOGY
Payer: MEDICARE

## 2022-03-17 DIAGNOSIS — F33.40 MAJOR DEPRESSIVE DISORDER, RECURRENT, IN REMISSION (H): Primary | ICD-10-CM

## 2022-03-17 PROCEDURE — 99214 OFFICE O/P EST MOD 30 MIN: CPT | Mod: 95 | Performed by: STUDENT IN AN ORGANIZED HEALTH CARE EDUCATION/TRAINING PROGRAM

## 2022-03-17 NOTE — PATIENT INSTRUCTIONS
**For crisis resources, please see the information at the end of this document**   Patient Education    Thank you for coming to the [facility] [place] [facility].    Lab Testing:  If you had lab testing today and your results are reassuring or normal they will be mailed to you or sent through [employer] within 7 days. If the lab tests need quick action we will call you with the results. The phone number we will call with results is # [phone]. If this is not the best number please call our clinic and change the number.     Medication Refills:  If you need any refills please call your pharmacy and they will contact us. Our fax number for refills is [phone]. Please allow three business days for refill processing.   If you need to change to a different pharmacy, please contact the new pharmacy directly. The new pharmacy will help you get your medications transferred.     Contact Us:  Please call [phone] during business hours (8-5:00 M-F).  If you have medication related questions after clinic hours, or on the weekend, please call [phone].    Financial Assistance [phone]  Medical Records [phone]       MENTAL HEALTH CRISIS RESOURCES:  For a emergency help, please call 911 or go to the nearest Emergency Department.     Emergency Walk-In Options:   Em[facility] Unit @ [facility] ([place]): [phone] - Specialized mental health emergency area designed to be calming  [facility] ([place]): [phone]  [facility] ([place]): [phone]  [facility]): [phone]    County Crisis Information:   [place]: [phone]  [place]: [phone]  [place] ([place]) - Adult: [phone]     Child: [phone]  [place] - Adult: [phone]     Child: [phone]  [place]: [phone]  List of all [facility] resources:    https://[place].gov/dhs/people-we-serve/adults/health-care/mental-health/resources/crisis-contacts.jsp    National Crisis Information:   Crisis Text Line: Text  [place]  to 418226  National Suicide Prevention Lifeline: 4-038-806-TALK (1-525.421.6520)       For online chat options, visit https://suicidepreventionlifeline.org/chat/  Poison Control Center: 7-448-060-2128  Trans Lifeline: 9-475-013-8730 - Hotline for transgender people of all ages  [employer]: 5-951-129-2690 - Hotline for LGBT youth     For Non-Emergency Support:   Fast Tracker: Mental Health & Substance Use Disorder Resources -   https://www.[employer].org/

## 2022-03-17 NOTE — PROGRESS NOTES
Sherly Yeung is a 56 year old who has consented to receive services via billable video visit.      Pt will join video visit via: [employer]  If there are problems joining the visit, send backup video invite via: Text to phone: [phone]       Originating Location (patient location): Patient's home  Distant Location (provider location): [facility]    Will anyone else be joining the video visit? No    How would you prefer to obtain AVS?: Kirstin

## 2022-03-17 NOTE — TELEPHONE ENCOUNTER
IR order was faxed to number listed below.     Spoke to patient to relay that order was faxed to number listed below. Marleny Camarillo LPN 3/17/2022 9:07 AM
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
## 2022-03-17 NOTE — TELEPHONE ENCOUNTER
[facility] Call Center    Phone Message    May a detailed message be left on voicemail: yes     Reason for Call: Other: Patient calling to state orders need to be faxed to [facility] as soon as possible for the removal of her per patient G-Tube, per chart PEG tube. Fax number for the order is [phone]. Please call patient when order is faxed thank you.  PEG tube was dicussed in office visit on 03/16/2022 hospital follow up.     Action Taken: Message routed to:  [facility]    Travel Screening: Not Applicable

## 2022-03-19 LAB — BACTERIA SPEC CULT: NORMAL

## 2022-03-24 ENCOUNTER — LAB (OUTPATIENT)
Dept: LAB | Facility: CLINIC | Age: 57
End: 2022-03-24
Payer: MEDICARE

## 2022-03-24 DIAGNOSIS — Z11.59 ENCOUNTER FOR SCREENING FOR OTHER VIRAL DISEASES: ICD-10-CM

## 2022-03-24 LAB — SARS-COV-2 RNA RESP QL NAA+PROBE: NEGATIVE

## 2022-03-24 PROCEDURE — U0005 INFEC AGEN DETEC AMPLI PROBE: HCPCS

## 2022-03-24 PROCEDURE — U0003 INFECTIOUS AGENT DETECTION BY NUCLEIC ACID (DNA OR RNA); SEVERE ACUTE RESPIRATORY SYNDROME CORONAVIRUS 2 (SARS-COV-2) (CORONAVIRUS DISEASE [COVID-19]), AMPLIFIED PROBE TECHNIQUE, MAKING USE OF HIGH THROUGHPUT TECHNOLOGIES AS DESCRIBED BY CMS-2020-01-R: HCPCS

## 2022-03-25 ENCOUNTER — TELEPHONE (OUTPATIENT)
Dept: INTERNAL MEDICINE | Facility: CLINIC | Age: 57
End: 2022-03-25
Payer: MEDICARE

## 2022-03-25 ENCOUNTER — PATIENT OUTREACH (OUTPATIENT)
Dept: GASTROENTEROLOGY | Facility: CLINIC | Age: 57
End: 2022-03-25
Payer: MEDICARE

## 2022-03-25 NOTE — TELEPHONE ENCOUNTER
Called patient.  Confirm no changes to patient health from last visit with Dr. Mg. Confirm fax number below.  Notify patient Dr. Mg is in clinic later around 2 PM.  Will contact patient when this is done and faxed.      Raoul Curry CMA ([facility]) at 9:29 AM on 3/25/2022

## 2022-03-25 NOTE — TELEPHONE ENCOUNTER
03/16/22 office visit note faxed to number below.      Called patient.  Let patient know it was faxed.       Raoul Curry CMA ([facility]) at 2:10 PM on 3/25/2022

## 2022-03-25 NOTE — TELEPHONE ENCOUNTER
[facility] Call Center    Phone Message    May a detailed message be left on voicemail: yes     Reason for Call: Other: Pt called and stated that she has a ERCP on 3/28 and she needs a pre-op. Pt stated that she was just in to see Dr. Mg on 3/16 and is needing him to fill out a pre-op and fax it over to station 3C at the hospital for this procedure. Fax number is [phone]     Action Taken: Message routed to:  [facility]

## 2022-03-28 ENCOUNTER — ANESTHESIA (OUTPATIENT)
Dept: SURGERY | Facility: CLINIC | Age: 57
End: 2022-03-28
Payer: MEDICARE

## 2022-03-28 ENCOUNTER — ANESTHESIA EVENT (OUTPATIENT)
Dept: SURGERY | Facility: CLINIC | Age: 57
End: 2022-03-28
Payer: MEDICARE

## 2022-03-28 ENCOUNTER — APPOINTMENT (OUTPATIENT)
Dept: GENERAL RADIOLOGY | Facility: CLINIC | Age: 57
End: 2022-03-28
Attending: INTERNAL MEDICINE
Payer: MEDICARE

## 2022-03-28 ENCOUNTER — HOSPITAL ENCOUNTER (OUTPATIENT)
Facility: CLINIC | Age: 57
Discharge: HOME | End: 2022-03-28
Attending: INTERNAL MEDICINE | Admitting: INTERNAL MEDICINE
Payer: MEDICARE

## 2022-03-28 VITALS
DIASTOLIC BLOOD PRESSURE: 92 MMHG | RESPIRATION RATE: 18 BRPM | HEIGHT: 63 IN | WEIGHT: 115.52 LBS | HEART RATE: 99 BPM | BODY MASS INDEX: 20.47 KG/M2 | SYSTOLIC BLOOD PRESSURE: 145 MMHG | OXYGEN SATURATION: 100 % | TEMPERATURE: 97.7 F

## 2022-03-28 LAB
ALBUMIN SERPL-MCNC: 3 G/DL (ref 3.4–5)
ALP SERPL-CCNC: 113 U/L (ref 40–150)
ALT SERPL W P-5'-P-CCNC: 16 U/L (ref 0–50)
ANION GAP SERPL CALCULATED.3IONS-SCNC: 2 MMOL/L (ref 3–14)
AST SERPL W P-5'-P-CCNC: 12 U/L (ref 0–45)
BILIRUB SERPL-MCNC: 0.8 MG/DL (ref 0.2–1.3)
BUN SERPL-MCNC: 10 MG/DL (ref 7–30)
CALCIUM SERPL-MCNC: 9.3 MG/DL (ref 8.5–10.1)
CHLORIDE BLD-SCNC: 110 MMOL/L (ref 94–109)
CO2 SERPL-SCNC: 29 MMOL/L (ref 20–32)
CREAT SERPL-MCNC: 0.69 MG/DL (ref 0.52–1.04)
ERCP: NORMAL
ERYTHROCYTE [DISTWIDTH] IN BLOOD BY AUTOMATED COUNT: 15.9 % (ref 10–15)
GFR SERPL CREATININE-BSD FRML MDRD: >90 ML/MIN/1.73M2
GLUCOSE BLD-MCNC: 87 MG/DL (ref 70–99)
GLUCOSE BLDC GLUCOMTR-MCNC: 79 MG/DL (ref 70–99)
HCT VFR BLD AUTO: 33.7 % (ref 35–47)
HGB BLD-MCNC: 9.9 G/DL (ref 11.7–15.7)
INR PPP: 1.02 (ref 0.85–1.15)
MCH RBC QN AUTO: 27.7 PG (ref 26.5–33)
MCHC RBC AUTO-ENTMCNC: 29.4 G/DL (ref 31.5–36.5)
MCV RBC AUTO: 94 FL (ref 78–100)
PLATELET # BLD AUTO: 430 10E3/UL (ref 150–450)
POTASSIUM BLD-SCNC: 4.1 MMOL/L (ref 3.4–5.3)
PROT SERPL-MCNC: 6.8 G/DL (ref 6.8–8.8)
RBC # BLD AUTO: 3.58 10E6/UL (ref 3.8–5.2)
SODIUM SERPL-SCNC: 141 MMOL/L (ref 133–144)
WBC # BLD AUTO: 10.5 10E3/UL (ref 4–11)

## 2022-03-28 PROCEDURE — 710N000012 HC RECOVERY PHASE 2, PER MINUTE: Performed by: INTERNAL MEDICINE

## 2022-03-28 PROCEDURE — 360N000082 HC SURGERY LEVEL 2 W/ FLUORO, PER MIN: Performed by: INTERNAL MEDICINE

## 2022-03-28 PROCEDURE — 85027 COMPLETE CBC AUTOMATED: CPT | Performed by: INTERNAL MEDICINE

## 2022-03-28 PROCEDURE — 710N000010 HC RECOVERY PHASE 1, LEVEL 2, PER MIN: Performed by: INTERNAL MEDICINE

## 2022-03-28 PROCEDURE — C1769 GUIDE WIRE: HCPCS | Performed by: INTERNAL MEDICINE

## 2022-03-28 PROCEDURE — 370N000017 HC ANESTHESIA TECHNICAL FEE, PER MIN: Performed by: INTERNAL MEDICINE

## 2022-03-28 PROCEDURE — 250N000011 HC RX IP 250 OP 636: Performed by: NURSE ANESTHETIST, CERTIFIED REGISTERED

## 2022-03-28 PROCEDURE — 250N000025 HC SEVOFLURANE, PER MIN: Performed by: INTERNAL MEDICINE

## 2022-03-28 PROCEDURE — 82040 ASSAY OF SERUM ALBUMIN: CPT | Performed by: INTERNAL MEDICINE

## 2022-03-28 PROCEDURE — 255N000002 HC RX 255 OP 636: Performed by: INTERNAL MEDICINE

## 2022-03-28 PROCEDURE — 250N000009 HC RX 250: Performed by: NURSE ANESTHETIST, CERTIFIED REGISTERED

## 2022-03-28 PROCEDURE — C1726 CATH, BAL DIL, NON-VASCULAR: HCPCS | Performed by: INTERNAL MEDICINE

## 2022-03-28 PROCEDURE — 999N000141 HC STATISTIC PRE-PROCEDURE NURSING ASSESSMENT: Performed by: INTERNAL MEDICINE

## 2022-03-28 PROCEDURE — 999N000179 XR SURGERY CARM FLUORO LESS THAN 5 MIN W STILLS: Mod: TC

## 2022-03-28 PROCEDURE — 36415 COLL VENOUS BLD VENIPUNCTURE: CPT | Performed by: INTERNAL MEDICINE

## 2022-03-28 PROCEDURE — 85610 PROTHROMBIN TIME: CPT | Performed by: INTERNAL MEDICINE

## 2022-03-28 PROCEDURE — 250N000009 HC RX 250: Performed by: INTERNAL MEDICINE

## 2022-03-28 PROCEDURE — 82962 GLUCOSE BLOOD TEST: CPT

## 2022-03-28 PROCEDURE — 272N000001 HC OR GENERAL SUPPLY STERILE: Performed by: INTERNAL MEDICINE

## 2022-03-28 PROCEDURE — 258N000003 HC RX IP 258 OP 636: Performed by: NURSE ANESTHETIST, CERTIFIED REGISTERED

## 2022-03-28 PROCEDURE — 80053 COMPREHEN METABOLIC PANEL: CPT | Performed by: INTERNAL MEDICINE

## 2022-03-28 RX ORDER — MEPERIDINE HYDROCHLORIDE 25 MG/ML
12.5 INJECTION INTRAMUSCULAR; INTRAVENOUS; SUBCUTANEOUS
Status: DISCONTINUED | OUTPATIENT
Start: 2022-03-28 | End: 2022-03-28 | Stop reason: HOSPADM

## 2022-03-28 RX ORDER — NALOXONE HYDROCHLORIDE 0.4 MG/ML
0.4 INJECTION, SOLUTION INTRAMUSCULAR; INTRAVENOUS; SUBCUTANEOUS
Status: DISCONTINUED | OUTPATIENT
Start: 2022-03-28 | End: 2022-03-28 | Stop reason: HOSPADM

## 2022-03-28 RX ORDER — ONDANSETRON 2 MG/ML
4 INJECTION INTRAMUSCULAR; INTRAVENOUS EVERY 6 HOURS PRN
Status: DISCONTINUED | OUTPATIENT
Start: 2022-03-28 | End: 2022-03-28 | Stop reason: HOSPADM

## 2022-03-28 RX ORDER — FLUMAZENIL 0.1 MG/ML
0.2 INJECTION, SOLUTION INTRAVENOUS
Status: DISCONTINUED | OUTPATIENT
Start: 2022-03-28 | End: 2022-03-28 | Stop reason: HOSPADM

## 2022-03-28 RX ORDER — OXYCODONE HYDROCHLORIDE 5 MG/1
5 TABLET ORAL EVERY 4 HOURS PRN
Status: DISCONTINUED | OUTPATIENT
Start: 2022-03-28 | End: 2022-03-28 | Stop reason: HOSPADM

## 2022-03-28 RX ORDER — ONDANSETRON 4 MG/1
4 TABLET, ORALLY DISINTEGRATING ORAL EVERY 6 HOURS PRN
Status: DISCONTINUED | OUTPATIENT
Start: 2022-03-28 | End: 2022-03-28 | Stop reason: HOSPADM

## 2022-03-28 RX ORDER — HYDROMORPHONE HCL IN WATER/PF 6 MG/30 ML
0.2 PATIENT CONTROLLED ANALGESIA SYRINGE INTRAVENOUS EVERY 5 MIN PRN
Status: DISCONTINUED | OUTPATIENT
Start: 2022-03-28 | End: 2022-03-28 | Stop reason: HOSPADM

## 2022-03-28 RX ORDER — LABETALOL HYDROCHLORIDE 5 MG/ML
10 INJECTION, SOLUTION INTRAVENOUS
Status: DISCONTINUED | OUTPATIENT
Start: 2022-03-28 | End: 2022-03-28 | Stop reason: HOSPADM

## 2022-03-28 RX ORDER — ONDANSETRON 4 MG/1
4 TABLET, ORALLY DISINTEGRATING ORAL EVERY 30 MIN PRN
Status: DISCONTINUED | OUTPATIENT
Start: 2022-03-28 | End: 2022-03-28 | Stop reason: HOSPADM

## 2022-03-28 RX ORDER — NALOXONE HYDROCHLORIDE 0.4 MG/ML
0.2 INJECTION, SOLUTION INTRAMUSCULAR; INTRAVENOUS; SUBCUTANEOUS
Status: DISCONTINUED | OUTPATIENT
Start: 2022-03-28 | End: 2022-03-28 | Stop reason: HOSPADM

## 2022-03-28 RX ORDER — LEVOFLOXACIN 5 MG/ML
500 INJECTION, SOLUTION INTRAVENOUS ONCE
Status: COMPLETED | OUTPATIENT
Start: 2022-03-28 | End: 2022-03-28

## 2022-03-28 RX ORDER — FENTANYL CITRATE 50 UG/ML
INJECTION, SOLUTION INTRAMUSCULAR; INTRAVENOUS PRN
Status: DISCONTINUED | OUTPATIENT
Start: 2022-03-28 | End: 2022-03-28

## 2022-03-28 RX ORDER — SODIUM CHLORIDE, SODIUM LACTATE, POTASSIUM CHLORIDE, CALCIUM CHLORIDE 600; 310; 30; 20 MG/100ML; MG/100ML; MG/100ML; MG/100ML
INJECTION, SOLUTION INTRAVENOUS CONTINUOUS PRN
Status: DISCONTINUED | OUTPATIENT
Start: 2022-03-28 | End: 2022-03-28

## 2022-03-28 RX ORDER — FENTANYL CITRATE 50 UG/ML
25 INJECTION, SOLUTION INTRAMUSCULAR; INTRAVENOUS EVERY 5 MIN PRN
Status: DISCONTINUED | OUTPATIENT
Start: 2022-03-28 | End: 2022-03-28 | Stop reason: HOSPADM

## 2022-03-28 RX ORDER — PROPOFOL 10 MG/ML
INJECTION, EMULSION INTRAVENOUS PRN
Status: DISCONTINUED | OUTPATIENT
Start: 2022-03-28 | End: 2022-03-28

## 2022-03-28 RX ORDER — LIDOCAINE HYDROCHLORIDE 20 MG/ML
INJECTION, SOLUTION INFILTRATION; PERINEURAL PRN
Status: DISCONTINUED | OUTPATIENT
Start: 2022-03-28 | End: 2022-03-28

## 2022-03-28 RX ORDER — ONDANSETRON 2 MG/ML
INJECTION INTRAMUSCULAR; INTRAVENOUS PRN
Status: DISCONTINUED | OUTPATIENT
Start: 2022-03-28 | End: 2022-03-28

## 2022-03-28 RX ORDER — INDOMETHACIN 50 MG/1
100 SUPPOSITORY RECTAL
Status: DISCONTINUED | OUTPATIENT
Start: 2022-03-28 | End: 2022-03-28 | Stop reason: HOSPADM

## 2022-03-28 RX ORDER — ONDANSETRON 2 MG/ML
4 INJECTION INTRAMUSCULAR; INTRAVENOUS EVERY 30 MIN PRN
Status: DISCONTINUED | OUTPATIENT
Start: 2022-03-28 | End: 2022-03-28 | Stop reason: HOSPADM

## 2022-03-28 RX ORDER — LIDOCAINE 40 MG/G
CREAM TOPICAL
Status: DISCONTINUED | OUTPATIENT
Start: 2022-03-28 | End: 2022-03-28 | Stop reason: HOSPADM

## 2022-03-28 RX ORDER — IOPAMIDOL 510 MG/ML
INJECTION, SOLUTION INTRAVASCULAR PRN
Status: DISCONTINUED | OUTPATIENT
Start: 2022-03-28 | End: 2022-03-28 | Stop reason: HOSPADM

## 2022-03-28 RX ORDER — EPHEDRINE SULFATE 50 MG/ML
INJECTION, SOLUTION INTRAMUSCULAR; INTRAVENOUS; SUBCUTANEOUS PRN
Status: DISCONTINUED | OUTPATIENT
Start: 2022-03-28 | End: 2022-03-28

## 2022-03-28 RX ORDER — FENTANYL CITRATE 50 UG/ML
25 INJECTION, SOLUTION INTRAMUSCULAR; INTRAVENOUS
Status: DISCONTINUED | OUTPATIENT
Start: 2022-03-28 | End: 2022-03-28 | Stop reason: HOSPADM

## 2022-03-28 RX ORDER — DEXAMETHASONE SODIUM PHOSPHATE 4 MG/ML
INJECTION, SOLUTION INTRA-ARTICULAR; INTRALESIONAL; INTRAMUSCULAR; INTRAVENOUS; SOFT TISSUE PRN
Status: DISCONTINUED | OUTPATIENT
Start: 2022-03-28 | End: 2022-03-28

## 2022-03-28 RX ORDER — INDOMETHACIN 50 MG/1
SUPPOSITORY RECTAL PRN
Status: DISCONTINUED | OUTPATIENT
Start: 2022-03-28 | End: 2022-03-28 | Stop reason: HOSPADM

## 2022-03-28 RX ORDER — SPIRONOLACTONE 25 MG/1
25 TABLET ORAL DAILY
COMMUNITY
End: 2023-11-02

## 2022-03-28 RX ORDER — SODIUM CHLORIDE, SODIUM LACTATE, POTASSIUM CHLORIDE, CALCIUM CHLORIDE 600; 310; 30; 20 MG/100ML; MG/100ML; MG/100ML; MG/100ML
INJECTION, SOLUTION INTRAVENOUS CONTINUOUS
Status: DISCONTINUED | OUTPATIENT
Start: 2022-03-28 | End: 2022-03-28 | Stop reason: HOSPADM

## 2022-03-28 RX ADMIN — ROCURONIUM BROMIDE 50 MG: 50 INJECTION, SOLUTION INTRAVENOUS at 08:00

## 2022-03-28 RX ADMIN — FENTANYL CITRATE 100 MCG: 50 INJECTION, SOLUTION INTRAMUSCULAR; INTRAVENOUS at 07:58

## 2022-03-28 RX ADMIN — ONDANSETRON 8 MG: 2 INJECTION INTRAMUSCULAR; INTRAVENOUS at 07:55

## 2022-03-28 RX ADMIN — MIDAZOLAM 2 MG: 1 INJECTION INTRAMUSCULAR; INTRAVENOUS at 07:55

## 2022-03-28 RX ADMIN — GLUCAGON HYDROCHLORIDE 0.4 MG: KIT at 08:37

## 2022-03-28 RX ADMIN — PHENYLEPHRINE HYDROCHLORIDE 100 MCG: 10 INJECTION INTRAVENOUS at 08:23

## 2022-03-28 RX ADMIN — LIDOCAINE HYDROCHLORIDE 60 MG: 20 INJECTION, SOLUTION INFILTRATION; PERINEURAL at 07:55

## 2022-03-28 RX ADMIN — LEVOFLOXACIN 500 MG: 5 INJECTION, SOLUTION INTRAVENOUS at 08:17

## 2022-03-28 RX ADMIN — SODIUM CHLORIDE, POTASSIUM CHLORIDE, SODIUM LACTATE AND CALCIUM CHLORIDE: 600; 310; 30; 20 INJECTION, SOLUTION INTRAVENOUS at 08:00

## 2022-03-28 RX ADMIN — PHENYLEPHRINE HYDROCHLORIDE 200 MCG: 10 INJECTION INTRAVENOUS at 08:29

## 2022-03-28 RX ADMIN — Medication 5 MG: at 08:23

## 2022-03-28 RX ADMIN — PHENYLEPHRINE HYDROCHLORIDE 200 MCG: 10 INJECTION INTRAVENOUS at 08:36

## 2022-03-28 RX ADMIN — PHENYLEPHRINE HYDROCHLORIDE 200 MCG: 10 INJECTION INTRAVENOUS at 08:17

## 2022-03-28 RX ADMIN — Medication 5 MG: at 08:36

## 2022-03-28 RX ADMIN — Medication 5 MG: at 08:29

## 2022-03-28 RX ADMIN — PROPOFOL 40 MG: 10 INJECTION, EMULSION INTRAVENOUS at 08:04

## 2022-03-28 RX ADMIN — PROPOFOL 60 MG: 10 INJECTION, EMULSION INTRAVENOUS at 07:58

## 2022-03-28 RX ADMIN — SUGAMMADEX 200 MG: 100 INJECTION, SOLUTION INTRAVENOUS at 08:49

## 2022-03-28 RX ADMIN — PHENYLEPHRINE HYDROCHLORIDE 100 MCG: 10 INJECTION INTRAVENOUS at 08:02

## 2022-03-28 RX ADMIN — Medication 5 MG: at 08:02

## 2022-03-28 RX ADMIN — DEXAMETHASONE SODIUM PHOSPHATE 8 MG: 4 INJECTION, SOLUTION INTRA-ARTICULAR; INTRALESIONAL; INTRAMUSCULAR; INTRAVENOUS; SOFT TISSUE at 07:55

## 2022-03-28 NOTE — ANESTHESIA POSTPROCEDURE EVALUATION
Patient: Sherly Yeung    Procedure: Procedure(s):  ENDOSCOPIC RETROGRADE CHOLANGIOPANCREATOGRAPHY, with bile duct stent removal, balloon dilation and sweep of bile duct foe debris.       Anesthesia Type:  General    Note:  Disposition: Outpatient   Postop Pain Control: Uneventful            Sign Out: Well controlled pain   PONV: No   Neuro/Psych: Uneventful            Sign Out: Acceptable/Baseline neuro status   Airway/Respiratory: Uneventful            Sign Out: Acceptable/Baseline resp. status   CV/Hemodynamics: Uneventful            Sign Out: Acceptable CV status; No obvious hypovolemia; No obvious fluid overload   Other NRE: NONE   DID A NON-ROUTINE EVENT OCCUR? No           Last vitals:  Vitals Value Taken Time   /94 03/28/22 1030   Temp 36.6  C (97.88  F) 03/28/22 1034   Pulse 92 03/28/22 1034   Resp 14 03/28/22 1034   SpO2 100 % 03/28/22 1033   Vitals shown include unvalidated device data.    Electronically Signed By: Ronaldo Jack MD  March 28, 2022  10:36 AM

## 2022-03-28 NOTE — ANESTHESIA PROCEDURE NOTES
Airway         Procedure Start/Stop Times: 3/28/2022 8:06 AM  Staff -        Anesthesiologist:  Catrachito Izaguirre MD       CRNA: Alfredo Cochran APRN CRNA       Performed By: residentIndications and Patient Condition       Indications for airway management: sandy-procedural       Induction type:intravenous       Mask difficulty assessment: 1 - vent by mask    Final Airway Details       Final airway type: endotracheal airway       Successful airway: ETT - single  Endotracheal Airway Details        ETT size (mm): 7.0       Cuffed: yes       Successful intubation technique: video laryngoscopy       VL Blade Size: Glidescope 2       Grade View of Cords: 1       Adjucts: stylet       Position: Right       Measured from: lips       Secured at (cm): 21    Post intubation assessment        Placement verified by: capnometry, equal breath sounds and chest rise        Number of attempts at approach: 2       Secured with: cloth tape       Ease of procedure: easy       Dentition: Intact and Unchanged
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
## 2022-03-28 NOTE — ANESTHESIA CARE TRANSFER NOTE
Patient: Sherly Yeung    Procedure: Procedure(s):  ENDOSCOPIC RETROGRADE CHOLANGIOPANCREATOGRAPHY, with bile duct stent removal, balloon dilation and sweep of bile duct foe debris.       Diagnosis: Encounter for removal of biliary stent [Z46.89]  Diagnosis Additional Information: No value filed.    Anesthesia Type:   General     Note:    Oropharynx: oropharynx clear of all foreign objects and spontaneously breathing  Level of Consciousness: awake and drowsy  Oxygen Supplementation: nasal cannula  Level of Supplemental Oxygen (L/min / FiO2): 2  Independent Airway: airway patency satisfactory and stable  Dentition: dentition unchanged  Vital Signs Stable: post-procedure vital signs reviewed and stable  Report to RN Given: handoff report given  Patient transferred to: PACU    Handoff Report: Identifed the Patient, Identified the Reponsible Provider, Reviewed the pertinent medical history, Discussed the surgical course, Reviewed Intra-OP anesthesia mangement and issues during anesthesia, Set expectations for post-procedure period and Allowed opportunity for questions and acknowledgement of understanding      Vitals:  Vitals Value Taken Time   /83 03/28/22 0910   Temp     Pulse 91 03/28/22 0916   Resp     SpO2 100 % 03/28/22 0916   Vitals shown include unvalidated device data.    Electronically Signed By: ZAIRA Duffy CRNA  March 28, 2022  9:18 AM

## 2022-03-28 NOTE — DISCHARGE INSTRUCTIONS
- Observe patient in same day observation unit for ongoing care.    - Discussed that we will perform a stent free trial today as we discussed with General Surgery. Will recommend to monitor clinical course and follow up with general surgery in 2 weeks on an outpatient basis.   - If persistent right upper quadrant pain, cholecystetomy may be the definitive option. If she is deemed a non surgical then we can pursue other options including transcystic stent or EUS guided gallbladder drainage       [facility] // Same-Day Surgery // Adult Discharge Orders & Instructions     Take it easy when you get home.  Remember, same day surgery DOES NOT MEAN SAME DAY RECOVERY! Healing is a gradual process.   You will need some time to recover- you may be more tired than you realize at first.  Rest and relax for at least the first 24 hours at home.  You'll feel better and heal faster if you take good care of yourself.     ANESTHESIA RECOVERY -   For 24 hours after surgery    1. Get plenty of rest.  A responsible adult must stay with you for at least 24 hours after you leave the hospital.   2. Do not drive or use heavy equipment.  If you have weakness or tingling, don't drive or use heavy equipment until this feeling goes away.  3. Do not drink alcohol.  4. Avoid strenuous or risky activities.  Ask for help when climbing stairs.   5. You may feel lightheaded.  IF so, sit for a few minutes before standing.  Have someone help you get up.   6. If you have nausea (feel sick to your stomach): Drink only clear liquids such as apple juice, ginger ale, broth or 7-Up.  Rest may also help.  Be sure to drink enough fluids.  Move to a regular diet as you feel able.  7. You may have a slight fever. Call the doctor if your fever is over 100 F (37.7 C) (taken under the tongue) or lasts longer than 24 hours.  8. You may have a dry mouth, a sore throat, muscle aches or trouble sleeping.  These should go away  after 24 hours.  9. Do not make important or legal decisions.     Call your doctor for any of the followin. Chest pain, and/or shortness of breath  2. Abdominal  pain, bloating or cramping that has not improved or does not respond to pain reliving medications (Tylenol or narcotics if prescribed)   3. Difficulty swallowing or feeling as though food or liquids are stuck in your throat   4. Sore throat lasting more than 2 days or pain that has worsened over time   5. Black or tarry stools   6. Nausea and/or vomiting that is not resolving or has not responded to anti-nausea medications prescribed to you   7. It has been over 8 to 10 hours since surgery and you are still not able to urinate (pass water)   8. Headache for over 24 hours   9. Fever over 100.5 F (38 C) lasting more than 24 hours after the procedure   10. Signs of jaundice or blockage (fever, chills, abdominal pain, yellowing of the whites of your eyes, yellowing of your skin, and/or passing darker than normal urine)     To contact a doctor, call:    Dr. Guru Wilson @ [phone] (GI Clinic) or [phone] and ask for the resident on call for Gastroenterology (answered 24 hours a day)    Emergency Department: [facility]: [phone] (TTY for hearing impaired: [phone])

## 2022-03-29 NOTE — TELEPHONE ENCOUNTER
REFERRAL INFORMATION:    Referring Provider:  Dr. Hurtado    Referring Clinic:  [facility]    Reason for Visit/Diagnosis: Discuss Kay.        FUTURE VISIT INFORMATION:    Appointment Date: 4/6/2022    Appointment Time: 10 AM      NOTES RECORD STATUS  DETAILS   OFFICE NOTE from Referring Provider N/A    OFFICE NOTE from Other Specialists Internal 3/8/2022 Office visit with Dr. Herminio Espinosa ([facility])    HOSPITAL DISCHARGE SUMMARY/ ED VISITS  N/A    OPERATIVE REPORT Internal 3/28/2022, 2/14/2022, 1/12/2022   ENDOSCOPY (EGD)  Internal 6/23/2021, 10/17/19   PERTINENT LABS Internal/ Care Everywhere    PATHOLOGY REPORTS (RELATED) N/A    IMAGING (CT, MRI, US, XR)  Internal US Abdomen: 1/28/2022, 1/11/2022  CT Abdomen Pelvis: 1/15/2022, 1/11/2022

## 2022-03-30 ENCOUNTER — TELEPHONE (OUTPATIENT)
Dept: INTERNAL MEDICINE | Facility: CLINIC | Age: 57
End: 2022-03-30
Payer: MEDICARE

## 2022-03-30 NOTE — TELEPHONE ENCOUNTER
Verbal orders given to [facility], per Dr. Mg, for Order(s): Home Care Orders: Occupational Therapy (OT): once a week for five weeks post hospital stay for covid. Marleny Camarillo LPN 3/30/2022 12:50 PM

## 2022-03-30 NOTE — TELEPHONE ENCOUNTER
[facility] Call Center    Phone Message    May a detailed message be left on voicemail: yes     Reason for Call: Order(s): Home Care Orders: Occupational Therapy (OT): once a week for five weeks post hospital stay for covid.     Action Taken: Message routed to:  [facility]    Travel Screening: Not Applicable

## 2022-04-04 ENCOUNTER — TELEPHONE (OUTPATIENT)
Dept: INTERNAL MEDICINE | Facility: CLINIC | Age: 57
End: 2022-04-04
Payer: MEDICARE

## 2022-04-04 NOTE — TELEPHONE ENCOUNTER
[facility] Call Center    Phone Message    May a detailed message be left on voicemail: yes     Reason for Call: Other: Pt requesting call back to discuss getting an order for portable oxygen. Pt stated an order will be faxed to the clinic today. Pt stated she has been on oxygen since she got out of a TCU and the order was placed wrong and pt needs to get portable oxygen so she is not stuck at home      Action Taken: Message routed to:  [facility] edin

## 2022-04-04 NOTE — CONFIDENTIAL NOTE
Order received via fax today and it was printed and placed in Dr. Gardner in box.      Suzie Marks on 4/4/2022 at 1:22 PM

## 2022-04-05 NOTE — TELEPHONE ENCOUNTER
[facility] Call Center    Phone Message    May a detailed message be left on voicemail: yes     Reason for Call: Other: Patient is calling regarding needing a portable O2 tank. 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 Please sign and fax back right away.     Action Taken: Message routed to:  [facility]    Travel Screening: Not Applicable

## 2022-04-06 ENCOUNTER — MEDICAL CORRESPONDENCE (OUTPATIENT)
Dept: HEALTH INFORMATION MANAGEMENT | Facility: CLINIC | Age: 57
End: 2022-04-06

## 2022-04-06 ENCOUNTER — PRE VISIT (OUTPATIENT)
Dept: SURGERY | Facility: CLINIC | Age: 57
End: 2022-04-06

## 2022-04-06 ENCOUNTER — VIRTUAL VISIT (OUTPATIENT)
Dept: SURGERY | Facility: CLINIC | Age: 57
End: 2022-04-06
Payer: MEDICARE

## 2022-04-06 VITALS — HEIGHT: 63 IN | BODY MASS INDEX: 20.38 KG/M2 | WEIGHT: 115 LBS

## 2022-04-06 DIAGNOSIS — K80.50 BILIARY COLIC SYMPTOM: Primary | ICD-10-CM

## 2022-04-06 DIAGNOSIS — K83.8 DILATED BILE DUCT: ICD-10-CM

## 2022-04-06 PROCEDURE — 99213 OFFICE O/P EST LOW 20 MIN: CPT | Mod: 95 | Performed by: SURGERY

## 2022-04-06 RX ORDER — CEFAZOLIN SODIUM 2 G/50ML
2 SOLUTION INTRAVENOUS
Status: CANCELLED | OUTPATIENT
Start: 2022-04-06

## 2022-04-06 RX ORDER — CEFAZOLIN SODIUM 2 G/50ML
2 SOLUTION INTRAVENOUS SEE ADMIN INSTRUCTIONS
Status: CANCELLED | OUTPATIENT
Start: 2022-04-06

## 2022-04-06 RX ORDER — METRONIDAZOLE 500 MG/100ML
500 INJECTION, SOLUTION INTRAVENOUS
Status: CANCELLED | OUTPATIENT
Start: 2022-04-06

## 2022-04-06 ASSESSMENT — PAIN SCALES - GENERAL: PAINLEVEL: NO PAIN (0)

## 2022-04-06 NOTE — TELEPHONE ENCOUNTER
[facility] Call Center    Phone Message    May a detailed message be left on voicemail: yes     Reason for Call: Other: Patient is calling on the status of her O2 order Please call back when it has been faxed to [facility] [place].     Action Taken: Message routed to:  [facility]    Travel Screening: Not Applicable

## 2022-04-06 NOTE — PROGRESS NOTES
Marcia is a 56 year old who is being evaluated via a billable video visit.      How would you like to obtain your AVS? MyChart  If the video visit is dropped, the invitation should be resent by: Text to cell phone: [phone]  Will anyone else be joining your video visit? No      Video Start Time: 9:52 AM  Video-Visit Details    Type of service:  Video Visit    Video End Time:10:02 AM    Originating Location (pt. Location): Home    Distant Location (provider location):  [facility]     Platform used for Video Visit: Kourtney     I spoke with Sherly Yeung today in follow-up of her history of acalculous cholecystitis.  This was managed with an internal cystic duct stent.  She has a history of severe COVID last fall- had Trach/PEG performed).   She is frail with poor mobility, on nasal cannula O2.    Since removal of the stent she has had the start of RUQ pain, worse after eating.  The pain comes after eating, lasts for several hours and then slowly dissipates.  We had discussed this possibility prior to the stent removal- we had agreed to proceed with cholecystectomy should she develop symptoms consistent with biliary colic.    We discussed the alternatives to surgery (diet adjustment, symptom management) and the risks/benefits of surgery (including CBD injury, injection, subtotal cholecystectomy, placement of a drain, open procedure, wound complications, prolonged intubation).  She would like to schedule surgery.    -To OR for laparoscopic cholecystectomy  -PACS referral  -Higher risk of requiring post op admission given her frailty and O2 dependence

## 2022-04-06 NOTE — PATIENT INSTRUCTIONS
You met with Dr. Herminio Espinosa.      Today's visit instructions:    Reminder: Surgery Requirements  1. Your surgery will be at [address] [place] [address]  2. You will need to arrive 2 hours early.  3. You will need someone to drive you home (over 18 years old) and stay with you for 24 hours after the procedure.  4. You will need a preop physical with PAC (pre-anesthesia care) within 30 days of surgery- closer is always better.  5. Stop any blood thinners, vitamins, minerals, or herbal supplements 5 days before surgery. You will need to hold your Eliquis for 48hr prior to surgery.  6. Fasting- a nurse from [facility] will call you 1-2 days before surgery to confirm your procedure and tell you when to stop eating and drinking.   7. Wash with the soap (Antibacterial, Dial Complete Foam, Hibiclense, or soap given/mailed from the clinic) the night before surgery and morning of surgery. See instructions in the Surgery Packet.  8. You will need to undergo a COVID-19 test 2-4 days prior to your scheduled procedure.  Our surgery scheduler will help arrange testing.  9. If you would like a procedure estimate please call Cost of Care at [phone].       If you have questions please contact Any RN or Janice RN during regular clinic hours, Monday through Friday 7:30 AM - 4:00 PM, or you can contact us via [facility] at anytime.       If you have urgent needs after-hours, weekends, or holidays please call the hospital at [phone] and ask to speak with our on-call General Surgery Team.    Appointment [phone]  Nurse Advice (Any or Janice): [phone]   Surgery Scheduler ([place]): [phone]  Fax: [phone]    After your Laparoscopic Cholecystectomy          Incision care     You may take a shower the day after surgery. Carefully wash your incision with soap and water. Do not submerge yourself in water (bath, whirlpool, hot tub, pool, lake) for 14 days after surgery.     Remove the bandage the day after  surgery, but leave the medical tape (Steri-Strips) or glue in place. These will loosen and fall off on their own 1-2 weeks after surgery.       Always wash your hands before touching your incisions or removing bandages.     It is not unusual to form a collection of fluid or blood under your incision that may feel firm or squishy- it can take several weeks to months for your body to reabsorb it.  At times, it may even drain.  If that should happen keep the area clean with soap, water,  and cover with a clean gauze dressing. You can change this daily or as needed.       Other medicines     Wait to start aspirin or blood thinners until the day after surgery. You can continue your regular medicines at your normal time the day after surgery.      Your pain medicine may cause constipation (hard, dry stools). To help with this, take the stool softener your doctor gave you or an over-the-counter stool softener or laxative. You can stop taking this when you are no longer taking pain medicine and your bowel movements are back to normal.      For pain or discomfort     Take the narcotic pain medicine your doctor gave you as needed and as instructed on the bottle. If you prefer to use over-the-counter medication, use acetaminophen (Tylenol) or ibuprofen (Advil, Motrin) as instructed on the box. Do not take Tylenol if it is in your narcotic pain medication.     Use an ice pack on your abdomen (belly) for 20 minutes at a time as needed for the first 24 hours. Be sure to protect your skin by putting a cloth between the ice pack and your skin.     After 24 hours you can switch to heat for 20 minutes as needed. Be sure to protect your skin by putting a cloth between the heat pack and your skin.     You may experience right shoulder pain after surgery which will go away 1-4 days after your procedure.  This is related to the gas that was used to inflate your abdomen, it gets trapped between your liver and diaphragm.  Walk frequently  and apply a heating pad (protecting your skin from the heating pad with a barrier such as a towel).       Activities     No driving until you feel it s safe to do so. Don t drive while taking narcotic pain medicine.     Don t lift anything heavier than 20 pounds for 3 to 4 weeks after surgery.      Special equipment     None     Diet     You can eat your regular meals after surgery.      When to call the doctor   Call your doctor if you have:     A fever above 101 F (38.3 C) (taken under the tongue), or a fever or chills lasting more than a day.     Redness at the incision site.     Any fluid or blood draining from the incision, especially if it smells bad.      Severe pain that doesn t improve with pain medicine.        We will call you 2 to 4 days after surgery to review this handout, answer questions and help arrange after-surgery care. If you have questions or concerns, please call [phone] during regular office hours. If you need to call after business hours, call [phone] and ask to page the surgeon on-call.         Transversus Abdominis Plane (TAP) Pain Block      What is a TAP block?   A TAP block can help you manage your pain after surgery. TAP stands for transversus abdominis plane, which is a muscle layer in your abdomen (belly). The TAP block uses numbing medicine similar to Novocaine to block pain near the site of your surgery.       Why get a TAP block?     To better manage your pain after surgery. A tap block will help keep the pain from getting severe and out of control.     To block pain signals from the nerve, which helps decrease pain after surgery.     To help you sleep, easily breathe deeply, walk and visit with others.      How is it done?   You will lie still on a table. We will use an ultrasound machine to help us see the correct muscle layer of your abdomen. Then, we ll use a needle to inject the medicine. We may also give you some sleep medicine to lessen the pain of the injection.        The procedure takes between 5 and 15 minutes. It is usually done right before surgery, but will sometimes be after. It depends on your surgery and care needs.      What can I expect?     You may feel numbness, tingling or a heaviness in your abdomen.      You may have pain control up to 72 hours after surgery.     The TAP block may not lessen all of your surgery pain. But most patients feel 50 to 75 percent less pain than without the block.       Tell your nurse if you have:     Numbness or tingling in areas other than where the injection was     Blurry vision     Ringing in your ears     A metallic taste in your mouth

## 2022-04-06 NOTE — LETTER
4/6/2022       RE: Sherly Yeung  [address]  [facility] [address]     Dear Colleague,    Thank you for referring your patient, Sherly Yeung, to the [facility] [place] at [facility]. Please see a copy of my visit note below.      I spoke with Sherly Yeung today in follow-up of her history of acalculous cholecystitis.  This was managed with an internal cystic duct stent.  She has a history of severe COVID last fall- had Trach/PEG performed).   She is frail with poor mobility, on nasal cannula O2.    Since removal of the stent she has had the start of RUQ pain, worse after eating.  The pain comes after eating, lasts for several hours and then slowly dissipates.  We had discussed this possibility prior to the stent removal- we had agreed to proceed with cholecystectomy should she develop symptoms consistent with biliary colic.    We discussed the alternatives to surgery (diet adjustment, symptom management) and the risks/benefits of surgery (including CBD injury, injection, subtotal cholecystectomy, placement of a drain, open procedure, wound complications, prolonged intubation).  She would like to schedule surgery.    -To OR for laparoscopic cholecystectomy  -PACS referral  -Higher risk of requiring post op admission given her frailty and O2 dependence        Herminio Espinosa MD

## 2022-04-06 NOTE — NURSING NOTE
"Chief Complaint   Patient presents with     Consult     Consultation Gallbladder Removal here to disscuss surgery       Vitals:    04/06/22 0909   Weight: 52.2 kg (115 lb)   Height: 1.6 m (5' 3\")       Body mass index is 20.37 kg/m .                         "

## 2022-04-06 NOTE — CONFIDENTIAL NOTE
Left patient a message advising her the order for oxygen was faxed today.    Suzie Marks on 4/6/2022 at 1:47 PM

## 2022-04-08 ENCOUNTER — TELEPHONE (OUTPATIENT)
Dept: SURGERY | Facility: CLINIC | Age: 57
End: 2022-04-08
Payer: MEDICARE

## 2022-04-08 NOTE — TELEPHONE ENCOUNTER
This writer reached out to patient to update on scheduling surgery. This writer informed patient I am working on securing time at [facility], potentially for 5/6 and will contact her when this writer has the ok. This writer also provided my direct contact number. Patient expressed understanding.

## 2022-04-18 ENCOUNTER — TELEPHONE (OUTPATIENT)
Dept: SURGERY | Facility: CLINIC | Age: 57
End: 2022-04-18
Payer: MEDICARE

## 2022-04-18 ENCOUNTER — VIRTUAL VISIT (OUTPATIENT)
Dept: PSYCHIATRY | Facility: CLINIC | Age: 57
End: 2022-04-18
Attending: STUDENT IN AN ORGANIZED HEALTH CARE EDUCATION/TRAINING PROGRAM
Payer: MEDICARE

## 2022-04-18 DIAGNOSIS — F33.1 MODERATE EPISODE OF RECURRENT MAJOR DEPRESSIVE DISORDER (H): ICD-10-CM

## 2022-04-18 DIAGNOSIS — Z11.59 ENCOUNTER FOR SCREENING FOR OTHER VIRAL DISEASES: Primary | ICD-10-CM

## 2022-04-18 DIAGNOSIS — F33.42 RECURRENT MAJOR DEPRESSIVE DISORDER, IN FULL REMISSION (H): ICD-10-CM

## 2022-04-18 DIAGNOSIS — Z79.899 ENCOUNTER FOR LONG-TERM (CURRENT) USE OF MEDICATIONS: ICD-10-CM

## 2022-04-18 DIAGNOSIS — F33.40 MAJOR DEPRESSIVE DISORDER, RECURRENT, IN REMISSION (H): Primary | ICD-10-CM

## 2022-04-18 PROCEDURE — 99214 OFFICE O/P EST MOD 30 MIN: CPT | Mod: 95 | Performed by: STUDENT IN AN ORGANIZED HEALTH CARE EDUCATION/TRAINING PROGRAM

## 2022-04-18 RX ORDER — DULOXETIN HYDROCHLORIDE 60 MG/1
120 CAPSULE, DELAYED RELEASE ORAL EVERY EVENING
Qty: 60 CAPSULE | Refills: 1 | Status: SHIPPED | OUTPATIENT
Start: 2022-04-18 | End: 2022-05-16

## 2022-04-18 NOTE — PROGRESS NOTES
Sherly Yeung is a 56 year old who has consented to receive services via billable video visit.      Pt will join video visit via: logtrust  If there are problems joining the visit, send backup video invite via: Text to preferred phone: [phone]      Originating Location (patient location): Patient's home  Distant Location (provider location): [facility] [place] [facility]    Will anyone else be joining the video visit? No    How would you prefer to obtain AVS?: Kirstin

## 2022-04-18 NOTE — TELEPHONE ENCOUNTER
Patient is scheduled for surgery with Dr. Espinosa    Spoke with: Marcia    Date of Surgery: 5/6/22    Location: [facility]    Informed patient they will need an adult  yes    Pre op with Provider N/A    H&P: Scheduled with PAC 4/27/22    Pre-procedure COVID-19 Test: 5/3/22    Additional imaging/appointments: N/a    Surgery packet:  Sent via mail and "[employer]    Additional comments: n/a
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
## 2022-04-18 NOTE — PATIENT INSTRUCTIONS
**For crisis resources, please see the information at the end of this document**   Patient Education    Thank you for coming to the [facility] [place] [facility].    Lab Testing:  If you had lab testing today and your results are reassuring or normal they will be mailed to you or sent through [employer] within 7 days. If the lab tests need quick action we will call you with the results. The phone number we will call with results is # [phone]. If this is not the best number please call our clinic and change the number.     Medication Refills:  If you need any refills please call your pharmacy and they will contact us. Our fax number for refills is [phone]. Please allow three business days for refill processing.   If you need to change to a different pharmacy, please contact the new pharmacy directly. The new pharmacy will help you get your medications transferred.     Contact Us:  Please call [phone] during business hours (8-5:00 M-F).  If you have medication related questions after clinic hours, or on the weekend, please call [phone].    Financial Assistance [phone]  Medical Records [phone]       MENTAL HEALTH CRISIS RESOURCES:  For a emergency help, please call 911 or go to the nearest Emergency Department.     Emergency Walk-In Options:   EmPATH Unit @ [facility] ([place]): [phone] - Specialized mental health emergency area designed to be calming  [facility] ([place]): [phone]  [facility] ([place]): [phone]  [facility]): [phone]    County Crisis Information:   [place]: [phone]  [place]: [phone]  [place] ([place]) - Adult: [phone]     Child: [phone]  [place] - Adult: [phone]     Child: [phone]  [place]: [phone]  List of all [facility] resources:    https://[place].gov/dhs/people-we-serve/adults/health-care/mental-health/resources/crisis-contacts.jsp    National Crisis Information:   Crisis Text Line: Text  [place]  to 498716  National Suicide Prevention Lifeline: 2-379-438-TALK (1-450.934.9707)       For online chat options, visit https://suicidepreventionlifeline.org/chat/  Poison Control Center: 9-102-702-3302  Trans Lifeline: 4-235-188-3584 - Hotline for transgender people of all ages  The [employer] Project: 9-032-866-4354 - Hotline for LGBT youth     For Non-Emergency Support:   Fast Tracker: Mental Health & Substance Use Disorder Resources -   https://www.[employer].org/

## 2022-04-18 NOTE — TELEPHONE ENCOUNTER
FUTURE VISIT INFORMATION      SURGERY INFORMATION:    Date: 22    Location: uu or    Surgeon:  Herminio Espinosa MD    Anesthesia Type:  general    Procedure: CHOLECYSTECTOMY, LAPAROSCOPIC    Consult: virtual visit     RECORDS REQUESTED FROM:        Primary Care Provider: Chadwick Mg MD- [facility]    Pertinent Medical History: Hypertension    Most recent EKG+ Tracin22    Most recent ECHO: 10/5/21    Most recent PFT's: 8/30/10

## 2022-04-26 RX ORDER — METOPROLOL SUCCINATE 50 MG/1
50 TABLET, EXTENDED RELEASE ORAL DAILY
COMMUNITY
End: 2022-05-16

## 2022-04-26 RX ORDER — OXYCODONE HCL 10 MG/1
10 TABLET, FILM COATED, EXTENDED RELEASE ORAL DAILY PRN
COMMUNITY
End: 2023-03-20

## 2022-04-26 RX ORDER — ONDANSETRON 8 MG/1
8 TABLET, ORALLY DISINTEGRATING ORAL EVERY 8 HOURS PRN
COMMUNITY

## 2022-04-26 RX ORDER — OXYCODONE HYDROCHLORIDE 5 MG/1
5 TABLET ORAL EVERY 6 HOURS PRN
Status: ON HOLD | COMMUNITY
End: 2022-05-07

## 2022-04-26 NOTE — PHARMACY - PREOPERATIVE ASSESSMENT CENTER
Anticoagulation Note - Preoperative Assessment Center (PAC) Pharmacist     Patient was interviewed on April 26, 2022 as a part of PAC clinic appointment. The purpose of this note is to document the perioperative anticoagulation plan outlined by the providers caring for Sherly Yeung.     Current Regimen  Anticoagulation Regimen as of April 26, 2022: apixaban (ELIQUIS) 5 mg by mouth twice daily     Indication: DVT/PE (R subclavian Vein) 6/2021  Prescriber:  Dr. Villarreal  Current medications that may interact with this include: aspirin    Creatinine   Date Value Ref Range Status   03/28/2022 0.69 0.52 - 1.04 mg/dL Final   04/26/2021 0.91 0.52 - 1.04 mg/dL Final   Note: patient reports she has held for previous procedures without issue.     Perioperative plan  Sherly Yeung is scheduled for CHOLECYSTECTOMY, LAPAROSCOPIC on 5/6/22 @12 PM with Dr. Espinosa and the perioperative anticoagulation plan outlined by surgery team is hold eliquis x48 hr pre op (see [employer] message 4/18 for details).   Last dose pre op to be 5/4 AM.   Resumption of anticoagulation after procedure will be based on surgery team assessment of bleeding risks and complications.  This plan may require re-assessment and modification by her primary team in the perioperative setting depending on patients clinical situation.        Wyatt Ward RP  April 26, 2022  3:28 PM

## 2022-04-26 NOTE — PROGRESS NOTES
Preoperative Assessment Center Medication History Note    Medication history completed on April 26, 2022 by this writer. See Epic admission navigator for prior to admission medications. Operating room staff will still need to confirm medications and last dose information on day of surgery.     Medication history interview sources  Patient interview: Yes  Care Everywhere records: No  Surescripts pharmacy refill records: Yes  Other (if applicable):     Changes made to PTA medication list  Added: oxycodone, OxyContin.   Deleted: venlafaxine, mupirocin,   Changed: metoprolol, zofran, senna-doc,     Additional medication history information (including reliability of information, actions taken by pharmacist):    -- Note some of her medications dont match up with current surescripts refill information, but patient confirmed she is taking all of the ones marked as taking below.   -- No recent (within 30 days) course of antibiotics  -- No recent (within 30 days) course of systemic steroids  -- Patient reports being on blood thinning medications eliquis and aspirin.   -- Patient declines being on any other prescription or over-the-counter medications    Prior to Admission medications    Medication Sig Last Dose Taking? Auth Provider   alendronate (FOSAMAX) 70 MG tablet Take 1 tablet (70 mg) by mouth every 7 days Take with a full glass of water and do not eat or lay down for 30 minutes  Patient taking differently: Take 70 mg by mouth every 7 days Take with a full glass of water and do not eat or lay down for 30 minutes  Takes on Sundays Taking Yes Chadwick Mg MD   apixaban ANTICOAGULANT (ELIQUIS) 5 MG tablet Take 2 tablets (10 mg) by mouth 2 times daily  Patient taking differently: Take 5 mg by mouth 2 times daily Taking Yes Freddy Jane MD   ARIPiprazole (ABILIFY) 5 MG tablet Take 1 tablet (5 mg) by mouth daily  Patient taking differently: Take 5 mg by mouth At Bedtime Taking Yes Edda Sanchez DO    aspirin (ASA) 81 MG chewable tablet Take 1 tablet (81 mg) by mouth daily Taking Yes Chadwick Mg MD   B Complex Vitamins (B COMPLEX 1 PO) Take 1 tablet by mouth daily. Taking Yes Reported, Patient   butalbital-acetaminophen-caffeine (ESGIC) -40 MG tablet Take 1-2 tablets by mouth at onset of headache. May repeat 1-2 tablets after 4 hrs. Max 6 tabets in 24 hrs. LIMIT to 2 days a week. Taking Yes Reported, Patient   capsaicin (ZOSTRIX) 0.075 % cream Apply topically 3 times daily as needed Taking Yes Reported, Patient   DULoxetine (CYMBALTA) 60 MG capsule Take 2 capsules (120 mg) by mouth every evening Taking Yes Herminio Fish MD   LANsoprazole (PREVACID) 30 MG DR capsule Take 30 mg by mouth 2 times daily Taking Yes Reported, Patient   Magnesium Oxide -Mg Supplement 400 MG CAPS Take 400 mg by mouth daily Taking Yes Reported, Patient   metoprolol succinate ER (TOPROL-XL) 50 MG 24 hr tablet Take 50 mg by mouth daily Taking Yes Unknown, Entered By History   ondansetron (ZOFRAN-ODT) 8 MG ODT tab Take 8 mg by mouth every 8 hours as needed for nausea Taking Yes Unknown, Entered By History   oxyCODONE (OXYCONTIN) 10 MG 12 hr tablet Take 10 mg by mouth daily as needed for severe pain (uses very rarely if pain gets bad ~1x per month.) Taking Yes Unknown, Entered By History   oxyCODONE (ROXICODONE) 5 MG tablet Take 5 mg by mouth every 6 hours as needed for severe pain Taking Yes Unknown, Entered By History   senna-docusate (SENOKOT-S/PERICOLACE) 8.6-50 MG tablet Take 1 tablet by mouth 2 times daily as needed Taking Yes Reported, Patient   spironolactone (ALDACTONE) 25 MG tablet Take 25 mg by mouth daily Taking Yes Reported, Patient   SUMAtriptan (IMITREX STATDOSE) 6 MG/0.5ML pen injector kit 1 injection at onset of migraine. May repeat once after 2 hrs. Max 2 injections in 24 hrs. LIMIT TO 2 days a week.  (#10 for 30 days) Taking Yes Reported, Patient   tolterodine ER (DETROL LA) 4 MG 24 hr capsule  Take 1 capsule (4 mg) by mouth daily Taking Yes Chadwick Mg MD   topiramate (TOPAMAX) 100 MG tablet Take 1 tablet (100 mg) by mouth daily Take 100 mg by mouth in the morning and take 200 mg by mouth in the evening.  Patient taking differently: Take 100 mg by mouth in the morning and take 200 mg by mouth in the evening. Taking Yes Freddy Jane MD   Zinc 50 MG CAPS Take 1 tablet by mouth daily. Taking Yes Reported, Patient   topiramate (TOPAMAX) 200 MG tablet Take 1 tablet (200 mg) by mouth every evening 200 mg in the AM and 100 mg in the PM  Patient not taking: Reported on 4/26/2022 Not Taking  Freddy Jane MD          Medication history completed by: [facility]

## 2022-04-27 ENCOUNTER — ANESTHESIA EVENT (OUTPATIENT)
Dept: SURGERY | Facility: CLINIC | Age: 57
End: 2022-04-27
Payer: MEDICARE

## 2022-04-27 ENCOUNTER — VIRTUAL VISIT (OUTPATIENT)
Dept: SURGERY | Facility: CLINIC | Age: 57
End: 2022-04-27
Payer: MEDICARE

## 2022-04-27 ENCOUNTER — PRE VISIT (OUTPATIENT)
Dept: SURGERY | Facility: CLINIC | Age: 57
End: 2022-04-27
Payer: MEDICARE

## 2022-04-27 DIAGNOSIS — D50.8 IRON DEFICIENCY ANEMIA SECONDARY TO INADEQUATE DIETARY IRON INTAKE: ICD-10-CM

## 2022-04-27 DIAGNOSIS — K83.8 DILATED BILE DUCT: ICD-10-CM

## 2022-04-27 DIAGNOSIS — Z01.818 PREOP EXAMINATION: Primary | ICD-10-CM

## 2022-04-27 PROCEDURE — 99215 OFFICE O/P EST HI 40 MIN: CPT | Mod: 95 | Performed by: NURSE PRACTITIONER

## 2022-04-27 RX ORDER — SODIUM CHLORIDE, SODIUM LACTATE, POTASSIUM CHLORIDE, CALCIUM CHLORIDE 600; 310; 30; 20 MG/100ML; MG/100ML; MG/100ML; MG/100ML
INJECTION, SOLUTION INTRAVENOUS CONTINUOUS
Status: CANCELLED | OUTPATIENT
Start: 2022-04-27

## 2022-04-27 RX ORDER — IPRATROPIUM BROMIDE AND ALBUTEROL SULFATE 2.5; .5 MG/3ML; MG/3ML
3 SOLUTION RESPIRATORY (INHALATION) ONCE
Status: CANCELLED | OUTPATIENT
Start: 2022-04-27 | End: 2022-04-27

## 2022-04-27 RX ORDER — LIDOCAINE 40 MG/G
CREAM TOPICAL
Status: CANCELLED | OUTPATIENT
Start: 2022-04-27

## 2022-04-27 ASSESSMENT — LIFESTYLE VARIABLES: TOBACCO_USE: 0

## 2022-04-27 ASSESSMENT — PAIN SCALES - GENERAL: PAINLEVEL: MODERATE PAIN (4)

## 2022-04-27 NOTE — PROGRESS NOTES
Marcia is a 56 year old who is being evaluated via a billable video visit.      How would you like to obtain your AVS? MyChart  If the video visit is dropped, the invitation should be resent by: [phone]       HPI         Review of Systems         Objective    Vitals - Patient Reported  Pain Score: Moderate Pain (4) (Right Upper Quadrant of abdomen)        Physical Exam
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
## 2022-04-27 NOTE — PATIENT INSTRUCTIONS
Preparing for Your Surgery      Name:  Sherly Yeung   MRN:  [id number]   :  1965   Today's Date:  2022       Arriving for surgery:  Surgery date:  22  Arrival time:  10:00am    Restrictions due to COVID 19       Effective 22 [facility] is implementing the following visitor policy:     1 person may accompany the patient through the Pre-Op process.      That same person may wait in the Surgery Waiting room, provided there is enough room to social distance         Inpatients are allowed 2 visitors per day for the duration of their stay.        Visitors must wear a mask.      Visitors must not be ill.      Visiting hours are 8 am to 8 pm.    [employer] parking is available for anyone with mobility limitations or disabilities.  ([place]  24 hours/ 7 days a week; [facility]  7 am- 3:30 pm, Mon- Fri)    Please come to:     [facility] [address]  [address] [place] [address]    - ?-   Parking is available in the Patient Visitor Ramp on [facility].     -   When entering the hospital you will be asked COVID screening questions, you will then be directed to Registration.  Registration will direct you to the 3rd floor Surgery waiting room.     -   Please ask if you need an escort or a wheelchair to the Surgery Waiting Room.  Preop number- [phone] ?     What can I eat or drink?  -  You may eat and drink normally for up to 8 hours before your surgery. (Until 4:00am on 22)  -  You may have clear liquids until 2 hours before surgery. (Until 10:00am on 22)    Examples of clear liquids:  Water  Clear broth  Juices (apple, white grape, white cranberry  and cider) without pulp  Noncarbonated, powder based beverages  (lemonade and Amarjit-Aid)  Sodas (Sprite, 7-Up, ginger ale and seltzer)  Coffee or tea (without milk or cream)  Gatorade    -  No Alcohol for at least 24 hours before surgery     Which medicines can I  take?    Hold Aspirin for 7 days before surgery.   Hold Multivitamins for 7 days before surgery.    Hold Ibuprofen (Advil, Motrin) for 1 day before surgery--unless otherwise directed by surgeon.  Hold Naproxen (Aleve) for 4 days before surgery.    Hold Eliquis for 48 hours-last dose 5/4 in the morning.    Hold Sumatriptan (Imitrex) for 24 hours.    -  DO NOT take these medications the day of surgery:   B complex   Zinc    Capsaicin (zostrix) cream   Magnesium   Spironolactone (aldactone)    Senna-docusate    Butalbital-acetaminophen-caffeine    -  PLEASE TAKE these medications the day of surgery:   Lansoprazole (prevacid)   Metoprolol succinate   Ondansetron (zofran) as needed   Oxycodone as needed   Tolterodine (Detrol LA)   Topiramate (topamax)     How do I prepare myself?  - Please take 2 showers before surgery using Scrubcare or Hibiclens soap.    Use this soap only from the neck to your toes.     Leave the soap on your skin for one minute--then rinse thoroughly.      You may use your own shampoo and conditioner; no other hair products.   - Please remove all jewelry and body piercings.  - No lotions, deodorants or fragrance.  - No makeup or fingernail polish.   - Bring your ID and insurance card.    -If you have a Deep Brain Stimulator, Spinal Cord Stimulator or any neuro stimulator device---you must bring the remote control to the hospital     - All patients are required to have a Covid-19 test within 4 days of surgery/procedure.      -Patients will be contacted by the [facility] scheduling team within 1 week of surgery to make an appointment.      - Patients may call the Scheduling team at [phone] if they have not been scheduled within 4 days of  surgery.      ALL PATIENTS GOING HOME THE SAME DAY OF SURGERY ARE REQUIRED TO HAVE A RESPONSIBLE ADULT TO DRIVE AND BE IN ATTENDANCE WITH THEM FOR 24 HOURS FOLLOWING SURGERY.      Questions or Concerns:    - For any questions regarding the day of surgery  or your hospital stay, please contact the Pre Admission Nursing Office at [phone].       - If you have health changes between today and your surgery please call your surgeon.       For questions after surgery please call your surgeons office.
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
## 2022-05-03 ENCOUNTER — LAB (OUTPATIENT)
Dept: LAB | Facility: CLINIC | Age: 57
End: 2022-05-03
Payer: MEDICARE

## 2022-05-03 DIAGNOSIS — Z11.59 ENCOUNTER FOR SCREENING FOR OTHER VIRAL DISEASES: ICD-10-CM

## 2022-05-03 PROCEDURE — U0005 INFEC AGEN DETEC AMPLI PROBE: HCPCS

## 2022-05-03 PROCEDURE — U0003 INFECTIOUS AGENT DETECTION BY NUCLEIC ACID (DNA OR RNA); SEVERE ACUTE RESPIRATORY SYNDROME CORONAVIRUS 2 (SARS-COV-2) (CORONAVIRUS DISEASE [COVID-19]), AMPLIFIED PROBE TECHNIQUE, MAKING USE OF HIGH THROUGHPUT TECHNOLOGIES AS DESCRIBED BY CMS-2020-01-R: HCPCS

## 2022-05-04 LAB — SARS-COV-2 RNA RESP QL NAA+PROBE: NEGATIVE

## 2022-05-06 ENCOUNTER — HOSPITAL ENCOUNTER (OUTPATIENT)
Facility: CLINIC | Age: 57
Discharge: HOME | End: 2022-05-07
Attending: SURGERY | Admitting: SURGERY
Payer: MEDICARE

## 2022-05-06 ENCOUNTER — ANESTHESIA (OUTPATIENT)
Dept: SURGERY | Facility: CLINIC | Age: 57
End: 2022-05-06
Payer: MEDICARE

## 2022-05-06 DIAGNOSIS — Z90.49 S/P LAPAROSCOPIC CHOLECYSTECTOMY: Primary | ICD-10-CM

## 2022-05-06 DIAGNOSIS — K83.8 DILATED BILE DUCT: ICD-10-CM

## 2022-05-06 LAB
ABO/RH(D): NORMAL
ANION GAP SERPL CALCULATED.3IONS-SCNC: 6 MMOL/L (ref 3–14)
ANTIBODY SCREEN: NEGATIVE
BUN SERPL-MCNC: 12 MG/DL (ref 7–30)
CALCIUM SERPL-MCNC: 9 MG/DL (ref 8.5–10.1)
CHLORIDE BLD-SCNC: 109 MMOL/L (ref 94–109)
CO2 SERPL-SCNC: 27 MMOL/L (ref 20–32)
CREAT SERPL-MCNC: 0.7 MG/DL (ref 0.52–1.04)
GFR SERPL CREATININE-BSD FRML MDRD: >90 ML/MIN/1.73M2
GLUCOSE BLD-MCNC: 86 MG/DL (ref 70–99)
GLUCOSE BLDC GLUCOMTR-MCNC: 73 MG/DL (ref 70–99)
HGB BLD-MCNC: 9 G/DL (ref 11.7–15.7)
PLATELET # BLD AUTO: 407 10E3/UL (ref 150–450)
POTASSIUM BLD-SCNC: 4.5 MMOL/L (ref 3.4–5.3)
SODIUM SERPL-SCNC: 142 MMOL/L (ref 133–144)
SPECIMEN EXPIRATION DATE: NORMAL

## 2022-05-06 PROCEDURE — 250N000009 HC RX 250: Performed by: NURSE ANESTHETIST, CERTIFIED REGISTERED

## 2022-05-06 PROCEDURE — 250N000009 HC RX 250: Performed by: NURSE PRACTITIONER

## 2022-05-06 PROCEDURE — 258N000003 HC RX IP 258 OP 636: Performed by: NURSE ANESTHETIST, CERTIFIED REGISTERED

## 2022-05-06 PROCEDURE — 250N000009 HC RX 250

## 2022-05-06 PROCEDURE — 258N000003 HC RX IP 258 OP 636

## 2022-05-06 PROCEDURE — 82962 GLUCOSE BLOOD TEST: CPT

## 2022-05-06 PROCEDURE — 250N000011 HC RX IP 250 OP 636

## 2022-05-06 PROCEDURE — 80048 BASIC METABOLIC PNL TOTAL CA: CPT | Performed by: ANESTHESIOLOGY

## 2022-05-06 PROCEDURE — 86901 BLOOD TYPING SEROLOGIC RH(D): CPT | Performed by: ANESTHESIOLOGY

## 2022-05-06 PROCEDURE — 250N000011 HC RX IP 250 OP 636: Performed by: ANESTHESIOLOGY

## 2022-05-06 PROCEDURE — 999N000248 HC STATISTIC IV INSERT WITH US BY RN

## 2022-05-06 PROCEDURE — 86850 RBC ANTIBODY SCREEN: CPT | Performed by: ANESTHESIOLOGY

## 2022-05-06 PROCEDURE — 999N000141 HC STATISTIC PRE-PROCEDURE NURSING ASSESSMENT: Performed by: SURGERY

## 2022-05-06 PROCEDURE — 250N000011 HC RX IP 250 OP 636: Performed by: SURGERY

## 2022-05-06 PROCEDURE — 370N000017 HC ANESTHESIA TECHNICAL FEE, PER MIN: Performed by: SURGERY

## 2022-05-06 PROCEDURE — 250N000011 HC RX IP 250 OP 636: Performed by: NURSE ANESTHETIST, CERTIFIED REGISTERED

## 2022-05-06 PROCEDURE — 250N000009 HC RX 250: Performed by: SURGERY

## 2022-05-06 PROCEDURE — 250N000013 HC RX MED GY IP 250 OP 250 PS 637

## 2022-05-06 PROCEDURE — 272N000001 HC OR GENERAL SUPPLY STERILE: Performed by: SURGERY

## 2022-05-06 PROCEDURE — 710N000010 HC RECOVERY PHASE 1, LEVEL 2, PER MIN: Performed by: SURGERY

## 2022-05-06 PROCEDURE — 360N000076 HC SURGERY LEVEL 3, PER MIN: Performed by: SURGERY

## 2022-05-06 PROCEDURE — 85018 HEMOGLOBIN: CPT | Performed by: ANESTHESIOLOGY

## 2022-05-06 PROCEDURE — 88304 TISSUE EXAM BY PATHOLOGIST: CPT | Mod: TC | Performed by: SURGERY

## 2022-05-06 PROCEDURE — 85049 AUTOMATED PLATELET COUNT: CPT | Performed by: ANESTHESIOLOGY

## 2022-05-06 PROCEDURE — 47562 LAPAROSCOPIC CHOLECYSTECTOMY: CPT | Mod: GC | Performed by: SURGERY

## 2022-05-06 PROCEDURE — 250N000024 HC ISOFLURANE, PER MIN: Performed by: SURGERY

## 2022-05-06 RX ORDER — MEPERIDINE HYDROCHLORIDE 25 MG/ML
12.5 INJECTION INTRAMUSCULAR; INTRAVENOUS; SUBCUTANEOUS
Status: DISCONTINUED | OUTPATIENT
Start: 2022-05-06 | End: 2022-05-06 | Stop reason: HOSPADM

## 2022-05-06 RX ORDER — NALOXONE HYDROCHLORIDE 0.4 MG/ML
0.4 INJECTION, SOLUTION INTRAMUSCULAR; INTRAVENOUS; SUBCUTANEOUS
Status: DISCONTINUED | OUTPATIENT
Start: 2022-05-06 | End: 2022-05-07 | Stop reason: HOSPADM

## 2022-05-06 RX ORDER — NALOXONE HYDROCHLORIDE 0.4 MG/ML
0.2 INJECTION, SOLUTION INTRAMUSCULAR; INTRAVENOUS; SUBCUTANEOUS
Status: DISCONTINUED | OUTPATIENT
Start: 2022-05-06 | End: 2022-05-06 | Stop reason: HOSPADM

## 2022-05-06 RX ORDER — AMOXICILLIN 250 MG
1 CAPSULE ORAL 2 TIMES DAILY PRN
Status: DISCONTINUED | OUTPATIENT
Start: 2022-05-06 | End: 2022-05-07 | Stop reason: HOSPADM

## 2022-05-06 RX ORDER — METRONIDAZOLE 500 MG/100ML
500 INJECTION, SOLUTION INTRAVENOUS
Status: COMPLETED | OUTPATIENT
Start: 2022-05-06 | End: 2022-05-06

## 2022-05-06 RX ORDER — FENTANYL CITRATE 50 UG/ML
25-50 INJECTION, SOLUTION INTRAMUSCULAR; INTRAVENOUS
Status: DISCONTINUED | OUTPATIENT
Start: 2022-05-06 | End: 2022-05-06 | Stop reason: HOSPADM

## 2022-05-06 RX ORDER — POLYETHYLENE GLYCOL 3350 17 G/17G
17 POWDER, FOR SOLUTION ORAL DAILY
Status: DISCONTINUED | OUTPATIENT
Start: 2022-05-07 | End: 2022-05-07 | Stop reason: HOSPADM

## 2022-05-06 RX ORDER — HYDROMORPHONE HCL IN WATER/PF 6 MG/30 ML
0.2 PATIENT CONTROLLED ANALGESIA SYRINGE INTRAVENOUS
Status: DISCONTINUED | OUTPATIENT
Start: 2022-05-06 | End: 2022-05-07 | Stop reason: HOSPADM

## 2022-05-06 RX ORDER — HYDROMORPHONE HCL IN WATER/PF 6 MG/30 ML
0.4 PATIENT CONTROLLED ANALGESIA SYRINGE INTRAVENOUS
Status: DISCONTINUED | OUTPATIENT
Start: 2022-05-06 | End: 2022-05-07 | Stop reason: HOSPADM

## 2022-05-06 RX ORDER — LIDOCAINE HYDROCHLORIDE 20 MG/ML
INJECTION, SOLUTION INFILTRATION; PERINEURAL PRN
Status: DISCONTINUED | OUTPATIENT
Start: 2022-05-06 | End: 2022-05-06

## 2022-05-06 RX ORDER — OXYCODONE HYDROCHLORIDE 5 MG/1
5 TABLET ORAL EVERY 4 HOURS PRN
Status: DISCONTINUED | OUTPATIENT
Start: 2022-05-06 | End: 2022-05-06 | Stop reason: HOSPADM

## 2022-05-06 RX ORDER — SPIRONOLACTONE 25 MG/1
25 TABLET ORAL DAILY
Status: DISCONTINUED | OUTPATIENT
Start: 2022-05-07 | End: 2022-05-07 | Stop reason: HOSPADM

## 2022-05-06 RX ORDER — ALBUTEROL SULFATE 0.83 MG/ML
2.5 SOLUTION RESPIRATORY (INHALATION) EVERY 4 HOURS PRN
Status: DISCONTINUED | OUTPATIENT
Start: 2022-05-06 | End: 2022-05-06 | Stop reason: HOSPADM

## 2022-05-06 RX ORDER — TOLTERODINE 4 MG/1
4 CAPSULE, EXTENDED RELEASE ORAL EVERY EVENING
Status: DISCONTINUED | OUTPATIENT
Start: 2022-05-06 | End: 2022-05-07 | Stop reason: HOSPADM

## 2022-05-06 RX ORDER — NALOXONE HYDROCHLORIDE 0.4 MG/ML
0.4 INJECTION, SOLUTION INTRAMUSCULAR; INTRAVENOUS; SUBCUTANEOUS
Status: DISCONTINUED | OUTPATIENT
Start: 2022-05-06 | End: 2022-05-06 | Stop reason: HOSPADM

## 2022-05-06 RX ORDER — BISACODYL 10 MG
10 SUPPOSITORY, RECTAL RECTAL DAILY PRN
Status: DISCONTINUED | OUTPATIENT
Start: 2022-05-06 | End: 2022-05-07 | Stop reason: HOSPADM

## 2022-05-06 RX ORDER — FENTANYL CITRATE 50 UG/ML
25 INJECTION, SOLUTION INTRAMUSCULAR; INTRAVENOUS EVERY 5 MIN PRN
Status: DISCONTINUED | OUTPATIENT
Start: 2022-05-06 | End: 2022-05-06 | Stop reason: HOSPADM

## 2022-05-06 RX ORDER — LIDOCAINE 40 MG/G
CREAM TOPICAL
Status: DISCONTINUED | OUTPATIENT
Start: 2022-05-06 | End: 2022-05-06 | Stop reason: HOSPADM

## 2022-05-06 RX ORDER — NALOXONE HYDROCHLORIDE 0.4 MG/ML
0.2 INJECTION, SOLUTION INTRAMUSCULAR; INTRAVENOUS; SUBCUTANEOUS
Status: DISCONTINUED | OUTPATIENT
Start: 2022-05-06 | End: 2022-05-07 | Stop reason: HOSPADM

## 2022-05-06 RX ORDER — ACETAMINOPHEN 325 MG/1
975 TABLET ORAL ONCE
Status: DISCONTINUED | OUTPATIENT
Start: 2022-05-06 | End: 2022-05-06 | Stop reason: HOSPADM

## 2022-05-06 RX ORDER — HYDROMORPHONE HYDROCHLORIDE 1 MG/ML
0.2 INJECTION, SOLUTION INTRAMUSCULAR; INTRAVENOUS; SUBCUTANEOUS EVERY 5 MIN PRN
Status: DISCONTINUED | OUTPATIENT
Start: 2022-05-06 | End: 2022-05-06 | Stop reason: HOSPADM

## 2022-05-06 RX ORDER — ONDANSETRON 2 MG/ML
INJECTION INTRAMUSCULAR; INTRAVENOUS PRN
Status: DISCONTINUED | OUTPATIENT
Start: 2022-05-06 | End: 2022-05-06

## 2022-05-06 RX ORDER — FENTANYL CITRATE 50 UG/ML
25 INJECTION, SOLUTION INTRAMUSCULAR; INTRAVENOUS
Status: DISCONTINUED | OUTPATIENT
Start: 2022-05-06 | End: 2022-05-06 | Stop reason: HOSPADM

## 2022-05-06 RX ORDER — SODIUM CHLORIDE, SODIUM LACTATE, POTASSIUM CHLORIDE, CALCIUM CHLORIDE 600; 310; 30; 20 MG/100ML; MG/100ML; MG/100ML; MG/100ML
INJECTION, SOLUTION INTRAVENOUS CONTINUOUS
Status: DISCONTINUED | OUTPATIENT
Start: 2022-05-06 | End: 2022-05-06 | Stop reason: HOSPADM

## 2022-05-06 RX ORDER — PROPOFOL 10 MG/ML
INJECTION, EMULSION INTRAVENOUS PRN
Status: DISCONTINUED | OUTPATIENT
Start: 2022-05-06 | End: 2022-05-06

## 2022-05-06 RX ORDER — LABETALOL HYDROCHLORIDE 5 MG/ML
10 INJECTION, SOLUTION INTRAVENOUS
Status: DISCONTINUED | OUTPATIENT
Start: 2022-05-06 | End: 2022-05-06 | Stop reason: HOSPADM

## 2022-05-06 RX ORDER — FLUMAZENIL 0.1 MG/ML
0.2 INJECTION, SOLUTION INTRAVENOUS
Status: DISCONTINUED | OUTPATIENT
Start: 2022-05-06 | End: 2022-05-06 | Stop reason: HOSPADM

## 2022-05-06 RX ORDER — DULOXETIN HYDROCHLORIDE 30 MG/1
120 CAPSULE, DELAYED RELEASE ORAL EVERY EVENING
Status: DISCONTINUED | OUTPATIENT
Start: 2022-05-06 | End: 2022-05-07 | Stop reason: HOSPADM

## 2022-05-06 RX ORDER — ONDANSETRON 2 MG/ML
4 INJECTION INTRAMUSCULAR; INTRAVENOUS EVERY 6 HOURS PRN
Status: DISCONTINUED | OUTPATIENT
Start: 2022-05-06 | End: 2022-05-07 | Stop reason: HOSPADM

## 2022-05-06 RX ORDER — ENOXAPARIN SODIUM 100 MG/ML
40 INJECTION SUBCUTANEOUS EVERY 24 HOURS
Status: DISCONTINUED | OUTPATIENT
Start: 2022-05-07 | End: 2022-05-07 | Stop reason: HOSPADM

## 2022-05-06 RX ORDER — DEXAMETHASONE SODIUM PHOSPHATE 4 MG/ML
INJECTION, SOLUTION INTRA-ARTICULAR; INTRALESIONAL; INTRAMUSCULAR; INTRAVENOUS; SOFT TISSUE PRN
Status: DISCONTINUED | OUTPATIENT
Start: 2022-05-06 | End: 2022-05-06

## 2022-05-06 RX ORDER — ACETAMINOPHEN 325 MG/1
650 TABLET ORAL EVERY 4 HOURS PRN
Status: DISCONTINUED | OUTPATIENT
Start: 2022-05-09 | End: 2022-05-07 | Stop reason: HOSPADM

## 2022-05-06 RX ORDER — LANSOPRAZOLE 30 MG/1
30 CAPSULE, DELAYED RELEASE ORAL 2 TIMES DAILY
Status: DISCONTINUED | OUTPATIENT
Start: 2022-05-06 | End: 2022-05-07 | Stop reason: HOSPADM

## 2022-05-06 RX ORDER — ONDANSETRON 2 MG/ML
4 INJECTION INTRAMUSCULAR; INTRAVENOUS EVERY 30 MIN PRN
Status: DISCONTINUED | OUTPATIENT
Start: 2022-05-06 | End: 2022-05-06 | Stop reason: HOSPADM

## 2022-05-06 RX ORDER — OXYCODONE HYDROCHLORIDE 10 MG/1
10 TABLET ORAL EVERY 4 HOURS PRN
Status: DISCONTINUED | OUTPATIENT
Start: 2022-05-06 | End: 2022-05-07 | Stop reason: HOSPADM

## 2022-05-06 RX ORDER — ARIPIPRAZOLE 5 MG/1
5 TABLET ORAL AT BEDTIME
Status: DISCONTINUED | OUTPATIENT
Start: 2022-05-06 | End: 2022-05-07 | Stop reason: HOSPADM

## 2022-05-06 RX ORDER — SODIUM CHLORIDE, SODIUM LACTATE, POTASSIUM CHLORIDE, CALCIUM CHLORIDE 600; 310; 30; 20 MG/100ML; MG/100ML; MG/100ML; MG/100ML
INJECTION, SOLUTION INTRAVENOUS CONTINUOUS
Status: DISCONTINUED | OUTPATIENT
Start: 2022-05-06 | End: 2022-05-07

## 2022-05-06 RX ORDER — CEFAZOLIN SODIUM/WATER 2 G/20 ML
2 SYRINGE (ML) INTRAVENOUS
Status: COMPLETED | OUTPATIENT
Start: 2022-05-06 | End: 2022-05-06

## 2022-05-06 RX ORDER — BUPIVACAINE HYDROCHLORIDE 2.5 MG/ML
INJECTION, SOLUTION EPIDURAL; INFILTRATION; INTRACAUDAL
Status: COMPLETED | OUTPATIENT
Start: 2022-05-06 | End: 2022-05-06

## 2022-05-06 RX ORDER — ACETAMINOPHEN 325 MG/1
975 TABLET ORAL EVERY 8 HOURS
Status: DISCONTINUED | OUTPATIENT
Start: 2022-05-06 | End: 2022-05-07 | Stop reason: HOSPADM

## 2022-05-06 RX ORDER — TOPIRAMATE 100 MG/1
100 TABLET, FILM COATED ORAL DAILY
Status: DISCONTINUED | OUTPATIENT
Start: 2022-05-07 | End: 2022-05-07 | Stop reason: HOSPADM

## 2022-05-06 RX ORDER — SODIUM CHLORIDE, SODIUM LACTATE, POTASSIUM CHLORIDE, CALCIUM CHLORIDE 600; 310; 30; 20 MG/100ML; MG/100ML; MG/100ML; MG/100ML
INJECTION, SOLUTION INTRAVENOUS CONTINUOUS PRN
Status: DISCONTINUED | OUTPATIENT
Start: 2022-05-06 | End: 2022-05-06

## 2022-05-06 RX ORDER — DEXAMETHASONE SODIUM PHOSPHATE 10 MG/ML
INJECTION, SOLUTION INTRAMUSCULAR; INTRAVENOUS
Status: COMPLETED | OUTPATIENT
Start: 2022-05-06 | End: 2022-05-06

## 2022-05-06 RX ORDER — ONDANSETRON 4 MG/1
4 TABLET, ORALLY DISINTEGRATING ORAL EVERY 30 MIN PRN
Status: DISCONTINUED | OUTPATIENT
Start: 2022-05-06 | End: 2022-05-06 | Stop reason: HOSPADM

## 2022-05-06 RX ORDER — ONDANSETRON 4 MG/1
4 TABLET, ORALLY DISINTEGRATING ORAL EVERY 6 HOURS PRN
Status: DISCONTINUED | OUTPATIENT
Start: 2022-05-06 | End: 2022-05-07 | Stop reason: HOSPADM

## 2022-05-06 RX ORDER — LIDOCAINE 40 MG/G
CREAM TOPICAL
Status: DISCONTINUED | OUTPATIENT
Start: 2022-05-06 | End: 2022-05-07 | Stop reason: HOSPADM

## 2022-05-06 RX ORDER — INDOCYANINE GREEN AND WATER 25 MG
2.5 KIT INJECTION ONCE
Status: COMPLETED | OUTPATIENT
Start: 2022-05-06 | End: 2022-05-06

## 2022-05-06 RX ORDER — FENTANYL CITRATE 50 UG/ML
INJECTION, SOLUTION INTRAMUSCULAR; INTRAVENOUS PRN
Status: DISCONTINUED | OUTPATIENT
Start: 2022-05-06 | End: 2022-05-06

## 2022-05-06 RX ORDER — TOPIRAMATE 200 MG/1
200 TABLET, FILM COATED ORAL EVERY EVENING
Status: DISCONTINUED | OUTPATIENT
Start: 2022-05-06 | End: 2022-05-07 | Stop reason: HOSPADM

## 2022-05-06 RX ORDER — OXYCODONE HYDROCHLORIDE 5 MG/1
5 TABLET ORAL EVERY 4 HOURS PRN
Status: DISCONTINUED | OUTPATIENT
Start: 2022-05-06 | End: 2022-05-07 | Stop reason: HOSPADM

## 2022-05-06 RX ORDER — DEXMEDETOMIDINE HYDROCHLORIDE 4 UG/ML
INJECTION, SOLUTION INTRAVENOUS
Status: COMPLETED | OUTPATIENT
Start: 2022-05-06 | End: 2022-05-06

## 2022-05-06 RX ORDER — IPRATROPIUM BROMIDE AND ALBUTEROL SULFATE 2.5; .5 MG/3ML; MG/3ML
3 SOLUTION RESPIRATORY (INHALATION) ONCE
Status: COMPLETED | OUTPATIENT
Start: 2022-05-06 | End: 2022-05-06

## 2022-05-06 RX ORDER — METOPROLOL SUCCINATE 25 MG/1
50 TABLET, EXTENDED RELEASE ORAL DAILY
Status: DISCONTINUED | OUTPATIENT
Start: 2022-05-07 | End: 2022-05-07 | Stop reason: HOSPADM

## 2022-05-06 RX ORDER — CEFAZOLIN SODIUM/WATER 2 G/20 ML
2 SYRINGE (ML) INTRAVENOUS SEE ADMIN INSTRUCTIONS
Status: DISCONTINUED | OUTPATIENT
Start: 2022-05-06 | End: 2022-05-06 | Stop reason: HOSPADM

## 2022-05-06 RX ADMIN — FENTANYL CITRATE 50 MCG: 50 INJECTION, SOLUTION INTRAMUSCULAR; INTRAVENOUS at 13:31

## 2022-05-06 RX ADMIN — MIDAZOLAM 2 MG: 1 INJECTION INTRAMUSCULAR; INTRAVENOUS at 12:59

## 2022-05-06 RX ADMIN — Medication 2.5 MG: at 12:45

## 2022-05-06 RX ADMIN — LIDOCAINE HYDROCHLORIDE 60 MG: 20 INJECTION, SOLUTION INFILTRATION; PERINEURAL at 13:31

## 2022-05-06 RX ADMIN — ONDANSETRON 4 MG: 2 INJECTION INTRAMUSCULAR; INTRAVENOUS at 13:39

## 2022-05-06 RX ADMIN — HYDROMORPHONE HYDROCHLORIDE 0.2 MG: 0.2 INJECTION, SOLUTION INTRAMUSCULAR; INTRAVENOUS; SUBCUTANEOUS at 18:02

## 2022-05-06 RX ADMIN — METRONIDAZOLE 500 MG: 500 INJECTION, SOLUTION INTRAVENOUS at 13:24

## 2022-05-06 RX ADMIN — PHENYLEPHRINE HYDROCHLORIDE 200 MCG: 10 INJECTION INTRAVENOUS at 13:41

## 2022-05-06 RX ADMIN — FENTANYL CITRATE 50 MCG: 50 INJECTION, SOLUTION INTRAMUSCULAR; INTRAVENOUS at 14:09

## 2022-05-06 RX ADMIN — ROCURONIUM BROMIDE 50 MG: 50 INJECTION, SOLUTION INTRAVENOUS at 13:31

## 2022-05-06 RX ADMIN — DEXMEDETOMIDINE 40 MCG: 100 INJECTION, SOLUTION, CONCENTRATE INTRAVENOUS at 12:53

## 2022-05-06 RX ADMIN — SODIUM CHLORIDE, POTASSIUM CHLORIDE, SODIUM LACTATE AND CALCIUM CHLORIDE: 600; 310; 30; 20 INJECTION, SOLUTION INTRAVENOUS at 18:07

## 2022-05-06 RX ADMIN — Medication 2 G: at 13:40

## 2022-05-06 RX ADMIN — BUPIVACAINE HYDROCHLORIDE 60 ML: 2.5 INJECTION, SOLUTION EPIDURAL; INFILTRATION; INTRACAUDAL; PERINEURAL at 12:53

## 2022-05-06 RX ADMIN — ONDANSETRON 4 MG: 2 INJECTION INTRAMUSCULAR; INTRAVENOUS at 15:03

## 2022-05-06 RX ADMIN — SODIUM CHLORIDE, POTASSIUM CHLORIDE, SODIUM LACTATE AND CALCIUM CHLORIDE: 600; 310; 30; 20 INJECTION, SOLUTION INTRAVENOUS at 13:21

## 2022-05-06 RX ADMIN — ARIPIPRAZOLE 5 MG: 5 TABLET ORAL at 22:32

## 2022-05-06 RX ADMIN — DEXAMETHASONE SODIUM PHOSPHATE 2 MG: 10 INJECTION, SOLUTION INTRAMUSCULAR; INTRAVENOUS at 12:53

## 2022-05-06 RX ADMIN — TOPIRAMATE 200 MG: 200 TABLET ORAL at 21:29

## 2022-05-06 RX ADMIN — ONDANSETRON 4 MG: 2 INJECTION INTRAMUSCULAR; INTRAVENOUS at 21:08

## 2022-05-06 RX ADMIN — HYDROMORPHONE HYDROCHLORIDE 0.2 MG: 0.2 INJECTION, SOLUTION INTRAMUSCULAR; INTRAVENOUS; SUBCUTANEOUS at 21:08

## 2022-05-06 RX ADMIN — FENTANYL CITRATE 25 MCG: 50 INJECTION, SOLUTION INTRAMUSCULAR; INTRAVENOUS at 16:07

## 2022-05-06 RX ADMIN — SUGAMMADEX 200 MG: 100 INJECTION, SOLUTION INTRAVENOUS at 15:10

## 2022-05-06 RX ADMIN — DEXAMETHASONE SODIUM PHOSPHATE 8 MG: 4 INJECTION, SOLUTION INTRA-ARTICULAR; INTRALESIONAL; INTRAMUSCULAR; INTRAVENOUS; SOFT TISSUE at 13:39

## 2022-05-06 RX ADMIN — FENTANYL CITRATE 50 MCG: 50 INJECTION, SOLUTION INTRAMUSCULAR; INTRAVENOUS at 12:59

## 2022-05-06 RX ADMIN — PROPOFOL 100 MG: 10 INJECTION, EMULSION INTRAVENOUS at 13:31

## 2022-05-06 RX ADMIN — DULOXETINE 120 MG: 30 CAPSULE, DELAYED RELEASE ORAL at 21:28

## 2022-05-06 RX ADMIN — FENTANYL CITRATE 25 MCG: 50 INJECTION, SOLUTION INTRAMUSCULAR; INTRAVENOUS at 15:52

## 2022-05-06 RX ADMIN — IPRATROPIUM BROMIDE AND ALBUTEROL SULFATE 3 ML: .5; 3 SOLUTION RESPIRATORY (INHALATION) at 12:37

## 2022-05-06 RX ADMIN — SODIUM CHLORIDE, POTASSIUM CHLORIDE, SODIUM LACTATE AND CALCIUM CHLORIDE: 600; 310; 30; 20 INJECTION, SOLUTION INTRAVENOUS at 18:44

## 2022-05-06 ASSESSMENT — LIFESTYLE VARIABLES: TOBACCO_USE: 0

## 2022-05-06 NOTE — OP NOTE
[facility] Operative Note    Pre-operative diagnosis: Biliary colic   Post-operative diagnosis Same   Procedure: Procedure(s):  CHOLECYSTECTOMY, LAPAROSCOPIC   Surgeon: Herminio Espinosa MD   Assistants(s): Dr. Stuart   Estimated blood loss: 20ml    Specimens: Gallbladder    Findings: Chronic adhesions without active inflammation.  Spillage of bile without spillage of stones         Sherly Yeugn was brought to the operating room and placed supine on the operating table with the bed flexed.  They had received indocyanine in the preoperative area.  ABX were given.  They were sedated and intubated without issue, an OG tube was placed.  They had received a TAP block and so no local anesthetic was used along the incisions.  The abdomen was prepped and draped in sterile fashion and a time out was performed.    An infraumbilical incision was made sharply and a jensen port was placed in an open fashion.  The abdomen was insufflated to 12mmHG and the laparoscope inserted.  The stomach was tacked to the abdominal wall at site of prior PEG tube.  Three additional 5mm ports were placed- in the epigastric region, midclavicular and lateral right abdomen.      The gallbladder was grasped via the assistant port and retracted cephalad.  The gallbladder appeared uninflamed, but with chronic adhesions.  The infundibulum was grasped and retracted laterally.  Using firefly we were able to identify the cystic duct and common bile duct.  Using hook cautery the peritoneal lining around the infundibulum was dissected off of the gallbladder.  The cystic duct wa isolated and the gallbladder was dissected of the cystic plate.  The visible cystic artery was medialized towards the liver.  After identifying our critical view of safety with only the cystic duct entering the gallbladder we clipped the cystic duct and divided it sharply.  The distal cystic artery was clipped at the dome of the gallbladder.  The gallbladder was then taken off  of the liver with cautery and removed from the abdomen.  The clips were inspected and found to be intact without spillage of blood or bile.  Spillage of bile had occurred and this was suctioned out.  No stones were dropped.    The ports were then removed and the abdomen allowed to collapse.  The umbilical fascia was closed with interrupted 0 vicryl suture.  Skin was closed with 4.0 monocryl and steri strips applied.  Sherly Yeung was then woken from anesthesia, extubated and brought to recovery.    I performed the procedure.

## 2022-05-06 NOTE — ANESTHESIA PROCEDURE NOTES
Airway       Patient location during procedure: OR       Procedure Start/Stop Times: 5/6/2022 1:37 PM  Staff -        CRNA: Rosie Sepulveda APRN CRNA       Performed By: CRNA  Consent for Airway        Urgency: elective  Indications and Patient Condition       Indications for airway management: sandy-procedural       Induction type:intravenous       Mask difficulty assessment: 1 - vent by mask    Final Airway Details       Final airway type: endotracheal airway       Successful airway: ETT - single  Endotracheal Airway Details        ETT size (mm): 6.5       Cuffed: yes       Successful intubation technique: direct laryngoscopy       DL Blade Type: Thompson 2       Adjucts: stylet       Position: Right       Measured from: lips       Secured at (cm): 22       Bite block used: None    Post intubation assessment        Placement verified by: capnometry, equal breath sounds and chest rise        Number of attempts at approach: 1       Number of other approaches attempted: 0       Secured with: silk tape       Ease of procedure: easy       Dentition: Intact and Unchanged    Medication(s) Administered   Medication Administration Time: 5/6/2022 1:37 PM

## 2022-05-06 NOTE — ANESTHESIA PROCEDURE NOTES
TAP Procedure Note    Pre-Procedure   Staff -        Anesthesiologist:  Brandon Jean MD       Resident/Fellow: Александр Ross MD       Performed By: resident       Location: pre-op       Procedure Start/Stop Times: 5/6/2022 12:53 PM and 5/6/2022 1:03 PM       Pre-Anesthestic Checklist: patient identified, IV checked, site marked, risks and benefits discussed, informed consent, monitors and equipment checked, pre-op evaluation, at physician/surgeon's request and post-op pain management  Timeout:       Correct Patient: Yes        Correct Procedure: Yes        Correct Site: Yes        Correct Position: Yes        Correct Laterality: Yes        Site Marked: Yes  Procedure Documentation  Procedure: TAP       Diagnosis: POST OPERATIVE PAIN       Laterality: bilateral       Patient Position: supine       Patient Prep/Sterile Barriers: sterile gloves, mask       Skin prep: Chloraprep       Needle Type: short bevel       Needle Gauge: 21.        Needle Length (millimeters): 110        Ultrasound guided       1. Ultrasound was used to identify targeted nerve, plexus, vascular marker, or fascial plane and place a needle adjacent to it in real-time.       2. Ultrasound was used to visualize the spread of anesthetic in close proximity to the above referenced structure.       3. A permanent image is entered into the patient's record.    Assessment/Narrative         The placement was negative for: blood aspirated, painful injection and site bleeding       Paresthesias: No.       Bolus given via needle. no blood aspirated via catheter.        Secured via.        Insertion/Infusion Method: Single Shot       Complications: none       Injection made incrementally with aspirations every 5 mL.    Medication(s) Administered   Bupivacaine 0.25% PF (Infiltration) - Infiltration   60 mL - 5/6/2022 12:53:00 PM  Dexmedetomidine 4 mcg/mL (Perineural) - Perineural   40 mcg - 5/6/2022 12:53:00 PM  Dexamethasone 10 mg/mL PF  (Perineural) - Perineural   2 mg - 5/6/2022 12:53:00 PM  Medication Administration Time: 5/6/2022 12:53 PM

## 2022-05-06 NOTE — PHARMACY-ADMISSION MEDICATION HISTORY
Admission Medication History Completed by Pharmacy    See [facility] Admission Navigator for allergy information, preferred outpatient pharmacy, prior to admission medications and immunization status.     Medication History Sources:     Pre-op pharmacist med hx, pre-op RN assessment.      Prior to Admission medications    Medication Sig Last Dose Taking? Auth Provider   alendronate (FOSAMAX) 70 MG tablet Take 1 tablet (70 mg) by mouth every 7 days Take with a full glass of water and do not eat or lay down for 30 minutes  Patient taking differently: Take 70 mg by mouth every 7 days Take with a full glass of water and do not eat or lay down for 30 minutes  Takes on Sundays 4/30/2022 at Unknown time Yes Chadwick Mg MD   ARIPiprazole (ABILIFY) 5 MG tablet Take 1 tablet (5 mg) by mouth daily  Patient taking differently: Take 5 mg by mouth At Bedtime 5/5/2022 at Unknown time Yes Edda Sanchez,    B Complex Vitamins (B COMPLEX 1 PO) Take 1 tablet by mouth daily. 5/5/2022 Yes Reported, Patient   butalbital-acetaminophen-caffeine (ESGIC) -40 MG tablet Take 1-2 tablets by mouth at onset of headache. May repeat 1-2 tablets after 4 hrs. Max 6 tabets in 24 hrs. LIMIT to 2 days a week. More than a month at Unknown time Yes Reported, Patient   capsaicin (ZOSTRIX) 0.075 % cream Apply topically 3 times daily as needed 5/5/2022 at Unknown time Yes Reported, Patient   DULoxetine (CYMBALTA) 60 MG capsule Take 2 capsules (120 mg) by mouth every evening 5/5/2022 at 2100 Yes Herminio Fish MD   LANsoprazole (PREVACID) 30 MG DR capsule Take 30 mg by mouth 2 times daily 5/6/2022 at 0700 Yes Reported, Patient   Magnesium Oxide -Mg Supplement 400 MG CAPS Take 400 mg by mouth daily Past Month at Unknown time Yes Reported, Patient   metoprolol succinate ER (TOPROL XL) 50 MG 24 hr tablet Take 50 mg by mouth daily 5/6/2022 at 0700 Yes Unknown, Entered By History   ondansetron (ZOFRAN ODT) 8 MG ODT tab Take 8 mg by  mouth every 8 hours as needed for nausea Past Week at Unknown time Yes Unknown, Entered By History   oxyCODONE (OXYCONTIN) 10 MG 12 hr tablet Take 10 mg by mouth daily as needed for severe pain (uses very rarely if pain gets bad ~1x per month.) More than a month at Unknown time Yes Unknown, Entered By History   senna-docusate (SENOKOT-S/PERICOLACE) 8.6-50 MG tablet Take 1 tablet by mouth 2 times daily as needed Past Week at Unknown time Yes Reported, Patient   spironolactone (ALDACTONE) 25 MG tablet Take 25 mg by mouth daily 5/6/2022 at 0700 Yes Reported, Patient   SUMAtriptan (IMITREX STATDOSE) 6 MG/0.5ML pen injector kit 1 injection at onset of migraine. May repeat once after 2 hrs. Max 2 injections in 24 hrs. LIMIT TO 2 days a week.  (#10 for 30 days) Past Month at Unknown time Yes Reported, Patient   tolterodine ER (DETROL LA) 4 MG 24 hr capsule Take 1 capsule (4 mg) by mouth daily 5/5/2022 at 2100 Yes Chadwick Mg MD   topiramate (TOPAMAX) 100 MG tablet Take 1 tablet (100 mg) by mouth daily Take 100 mg by mouth in the morning and take 200 mg by mouth in the evening.  Patient taking differently: Take 100 mg by mouth in the morning and take 200 mg by mouth in the evening. 5/6/2022 at 0700 Yes Freddy Jane MD   topiramate (TOPAMAX) 200 MG tablet Take 1 tablet (200 mg) by mouth every evening 200 mg in the AM and 100 mg in the PM 5/5/2022 at 2100 Yes Freddy Jane MD   Zinc 50 MG CAPS Take 1 tablet by mouth daily. 5/5/2022 at Unknown time Yes Reported, Patient   apixaban ANTICOAGULANT (ELIQUIS) 5 MG tablet Take 2 tablets (10 mg) by mouth 2 times daily  Patient taking differently: Take 5 mg by mouth 2 times daily 5/4/2022  Freddy Jane MD   aspirin (ASA) 81 MG chewable tablet Take 1 tablet (81 mg) by mouth daily 4/29/2022  Chadwick Mg MD   oxyCODONE (ROXICODONE) 5 MG tablet Take 5 mg by mouth every 6 hours as needed for severe pain 5/4/2022  Unknown, Entered By History       Date  completed: 05/06/22    Medication history completed by: Erinn Franklin [facility]

## 2022-05-06 NOTE — OR NURSING
Bilateral tap block performed without complications.  VSS. Versed 2mg, Fentanyl 50mcg given. Pt tolerated well.  Will continue to monitor.

## 2022-05-06 NOTE — ANESTHESIA CARE TRANSFER NOTE
Patient: Sherly Yeung    Procedure: Procedure(s):  CHOLECYSTECTOMY, LAPAROSCOPIC       Diagnosis: Dilated bile duct [K83.8]  Diagnosis Additional Information: No value filed.    Anesthesia Type:   General     Note:    Oropharynx: oropharynx clear of all foreign objects and oral airway in place  Level of Consciousness: awake  Oxygen Supplementation: face mask  Level of Supplemental Oxygen (L/min / FiO2): 5L  Independent Airway: airway patency satisfactory and stable  Dentition: dentition unchanged  Vital Signs Stable: post-procedure vital signs reviewed and stable  Report to RN Given: handoff report given  Patient transferred to: PACU    Handoff Report: Identifed the Patient, Identified the Reponsible Provider, Reviewed the pertinent medical history, Discussed the surgical course, Reviewed Intra-OP anesthesia mangement and issues during anesthesia, Set expectations for post-procedure period and Allowed opportunity for questions and acknowledgement of understanding      Vitals:  Vitals Value Taken Time   /79 05/06/22 1523   Temp     Pulse 87 05/06/22 1527   Resp 21 05/06/22 1527   SpO2 100 % 05/06/22 1527   Vitals shown include unvalidated device data.    Electronically Signed By: ZAIRA Cherry CRNA  May 6, 2022  3:28 PM

## 2022-05-07 VITALS
WEIGHT: 123.9 LBS | BODY MASS INDEX: 21.95 KG/M2 | TEMPERATURE: 98.2 F | HEART RATE: 78 BPM | RESPIRATION RATE: 18 BRPM | DIASTOLIC BLOOD PRESSURE: 66 MMHG | HEIGHT: 63 IN | OXYGEN SATURATION: 100 % | SYSTOLIC BLOOD PRESSURE: 105 MMHG

## 2022-05-07 LAB
ANION GAP SERPL CALCULATED.3IONS-SCNC: 6 MMOL/L (ref 3–14)
BUN SERPL-MCNC: 14 MG/DL (ref 7–30)
CALCIUM SERPL-MCNC: 9.4 MG/DL (ref 8.5–10.1)
CHLORIDE BLD-SCNC: 108 MMOL/L (ref 94–109)
CO2 SERPL-SCNC: 26 MMOL/L (ref 20–32)
CREAT SERPL-MCNC: 0.68 MG/DL (ref 0.52–1.04)
GFR SERPL CREATININE-BSD FRML MDRD: >90 ML/MIN/1.73M2
GLUCOSE BLD-MCNC: 107 MG/DL (ref 70–99)
GLUCOSE BLDC GLUCOMTR-MCNC: 113 MG/DL (ref 70–99)
HGB BLD-MCNC: 9.1 G/DL (ref 11.7–15.7)
POTASSIUM BLD-SCNC: 4 MMOL/L (ref 3.4–5.3)
SODIUM SERPL-SCNC: 140 MMOL/L (ref 133–144)

## 2022-05-07 PROCEDURE — 36415 COLL VENOUS BLD VENIPUNCTURE: CPT

## 2022-05-07 PROCEDURE — 82962 GLUCOSE BLOOD TEST: CPT

## 2022-05-07 PROCEDURE — 250N000013 HC RX MED GY IP 250 OP 250 PS 637

## 2022-05-07 PROCEDURE — 85018 HEMOGLOBIN: CPT

## 2022-05-07 PROCEDURE — 80048 BASIC METABOLIC PNL TOTAL CA: CPT

## 2022-05-07 PROCEDURE — 96372 THER/PROPH/DIAG INJ SC/IM: CPT

## 2022-05-07 PROCEDURE — 250N000011 HC RX IP 250 OP 636

## 2022-05-07 RX ORDER — OXYCODONE HYDROCHLORIDE 5 MG/1
5 TABLET ORAL EVERY 6 HOURS PRN
Qty: 15 TABLET | Refills: 0 | Status: SHIPPED | OUTPATIENT
Start: 2022-05-07 | End: 2023-01-14

## 2022-05-07 RX ORDER — POLYETHYLENE GLYCOL 3350 17 G/17G
17 POWDER, FOR SOLUTION ORAL DAILY
Qty: 116 G | Refills: 0 | Status: SHIPPED | OUTPATIENT
Start: 2022-05-07 | End: 2022-09-02

## 2022-05-07 RX ADMIN — TOLTERODINE 4 MG: 4 CAPSULE, EXTENDED RELEASE ORAL at 00:29

## 2022-05-07 RX ADMIN — ACETAMINOPHEN 975 MG: 325 TABLET ORAL at 00:28

## 2022-05-07 RX ADMIN — SPIRONOLACTONE 25 MG: 25 TABLET, FILM COATED ORAL at 08:41

## 2022-05-07 RX ADMIN — ENOXAPARIN SODIUM 40 MG: 40 INJECTION SUBCUTANEOUS at 09:53

## 2022-05-07 RX ADMIN — OXYCODONE HYDROCHLORIDE 10 MG: 10 TABLET ORAL at 08:39

## 2022-05-07 RX ADMIN — LANSOPRAZOLE 30 MG: 30 CAPSULE, DELAYED RELEASE PELLETS ORAL at 08:39

## 2022-05-07 RX ADMIN — HYDROMORPHONE HYDROCHLORIDE 0.2 MG: 0.2 INJECTION, SOLUTION INTRAMUSCULAR; INTRAVENOUS; SUBCUTANEOUS at 06:28

## 2022-05-07 RX ADMIN — ACETAMINOPHEN 975 MG: 325 TABLET ORAL at 08:41

## 2022-05-07 RX ADMIN — OXYCODONE HYDROCHLORIDE 10 MG: 10 TABLET ORAL at 11:58

## 2022-05-07 RX ADMIN — TOPIRAMATE 100 MG: 100 TABLET, FILM COATED ORAL at 08:40

## 2022-05-07 RX ADMIN — METOPROLOL SUCCINATE 50 MG: 25 TABLET, EXTENDED RELEASE ORAL at 08:41

## 2022-05-07 NOTE — PROGRESS NOTES
SURGERY POST-OP CHECK NOTE  05/07/2022 12:09 AM    Patient: Sherly Yeung  MRN: [id number]    Subjective  No acute events postoperatively. Pain well controlled. Denies vomiting -- some mild nausea (improving), shortness of breath (beyond baseline).  No issues urinating.  Patient reports sleeping well.     Objective  Temp:  [97.4  F (36.3  C)-98.2  F (36.8  C)] 97.4  F (36.3  C)  Pulse:  [76-95] 79  Resp:  [10-27] 18  BP: ()/(54-77) 117/77  SpO2:  [98 %-100 %] 100 %    General: AAOx4, NAD, lying in bed, appears comfortable, asleep on entry -- easily woken up  CV: RRR, no murmurs  Pulm: CTAB, breathing comfortably on 2L NC  Abd: soft, flat, appropriately tender to palpation without rebound or guardingnon-distended, incisions c/d/i -- secured with skin-glue  Extremities: warm, well-perfused without edema  Neuro: facial movement grossly symmetric, moving all extremities spontaneously without apparent deficit    Labs:  Recent Labs   Lab 05/06/22  1250   HGB 9.0*          Recent Labs   Lab 05/06/22  1250      POTASSIUM 4.5   CHLORIDE 109   CO2 27   BUN 12   CR 0.70   GLC 86   TERESA 9.0       Assessment/Plan  Sherly Yeung is a 56 year old female with a h/o severe COVID pneumonia c/b acalculous cholecystitis  POD#0 s/p elective cholecystectomy.  Patient admitted due to underlying frailty (present since critical illness w/ COVID).  She is doing well post-procedure and is recovering as expected in the immediate post-op period.  No immediate concerns, no indications for intervention overnight.  Continue with plan as outlined previously, remainder of cares per primary team.      Ganesh Wallace MD, ARMIN  PGY1 General Surgery

## 2022-05-07 NOTE — PLAN OF CARE
"/69 (BP Location: Left arm)   Pulse 79   Temp 98.4  F (36.9  C) (Oral)   Resp 17   Ht 1.6 m (5' 2.99\")   Wt 56.2 kg (123 lb 14.4 oz)   LMP 08/22/2017 (Approximate)   SpO2 100%   BMI 21.95 kg/m      [id number]  AVSS on 2 L O2, capno, afebrile. Pt alert and oriented x 4, response slowly. Pt denied nausea and vomiting, c/o abdomennol  pain as 4-8/10, moderate relief with scheduled Tylenol and 1 x prn Dilaudid. Pt SBA to bathroom, adequate UOP, no BM overnight. Lactated Ringers continuously 100 ml/hr. Incisions open to air, no bleeding. AM lab draw still pending r/t failed needle insertion. Continue with current POC.     Problem: Plan of Care - These are the overarching goals to be used throughout the patient stay.    Goal: Absence of Hospital-Acquired Illness or Injury  Intervention: Identify and Manage Fall Risk  Recent Flowsheet Documentation  Taken 5/7/2022 0028 by Baldev Eddy, RN  Safety Promotion/Fall Prevention:    activity supervised    assistive device/personal items within reach    clutter free environment maintained    lighting adjusted    nonskid shoes/slippers when out of bed    mobility aid in reach     Problem: Pain Acute  Goal: Acceptable Pain Control and Functional Ability  Outcome: Ongoing, Progressing  Intervention: Prevent or Manage Pain  Recent Flowsheet Documentation  Taken 5/7/2022 0028 by Baldev Eddy, RN  Medication Review/Management: medications reviewed   Goal Outcome Evaluation:                      "

## 2022-05-07 NOTE — PROGRESS NOTES
"[id number]    /77   Pulse 79   Temp 97.4  F (36.3  C) (Axillary)   Resp 18   Ht 1.6 m (5' 2.99\")   Wt 56.2 kg (123 lb 14.4 oz)   LMP 08/22/2017 (Approximate)   SpO2 100%   BMI 21.95 kg/m      Transferred from PACU this evening after undergoing laparoscopic cholecystectomy.  Pt VSS, afebrile, home O2 2L NC, capno.  Incisions well approximated, open to air, no bleeding.  Pain controlled with dilaudid IV x 2.  Nausea controlled with zofran x 1.  Ambulated to bathroom with SBA.  Voided 500 mL.  No BM this shift.  CLD, tolerating.      "

## 2022-05-07 NOTE — DISCHARGE SUMMARY
[facility]  Discharge Summary  General Surgery     Sherly Yeung MRN# [id number]   YOB: 1965 Age: 56 year old     Date of Admission:  5/6/2022  Date of Discharge::  5/7/2022  Admitting Physician:  Herminio Espinosa MD  Discharge Physician:  Dr. Catrachito Alvarado   Primary Care Physician:        Chadwick Mg          Admission Diagnoses:   Dilated bile duct [K83.8]  S/P laparoscopic cholecystectomy [Z90.49]          Discharge Diagnosis:   Cholecystitis s/p cholecystectomy          Procedures:   Laparoscopic cholecystectomy.          Consultations:   None          Brief History of Illness:   Sherly Yeung is a 56 year old female with a h/o severe COVID pneumonia c/b acalculous cholecystitis  s/p elective cholecystectomy.           Hospital Course:   The patient underwent a laparoscopic cholecystectomy on 5/6/22, which she tolerated well without immediate complications. She was transferred to the PACU and then floor for routine postoperative care. The remainder of her postoperative course was unremarkable. She had return of bowel function on POD#1 and her diet was slowly advanced. On the day of discharge, she was tolerating a regular diet, her pain was well controlled with oral pain medications, she was voiding spontaneously, and ambulating independently. She is on 2L of O2 at home and was at baseline at discharge. Patient with follow up with Dr. Espinosa in general surgery clinic in 2 weeks.           Imaging Studies:     Results for orders placed or performed during the hospital encounter of 03/28/22   XR Surgery JUVENAL Fluoro L/T 5 Min w Stills    Narrative    This exam was marked as non-reportable because it will not be read by a   radiologist or a [place] non-radiologist provider.                    Medications Prior to Admission:     Medications Prior to Admission   Medication Sig Dispense Refill Last Dose     alendronate (FOSAMAX) 70 MG tablet Take 1 tablet (70 mg) by  mouth every 7 days Take with a full glass of water and do not eat or lay down for 30 minutes (Patient taking differently: Take 70 mg by mouth every 7 days Take with a full glass of water and do not eat or lay down for 30 minutes  Takes on Sundays) 12 tablet 3 4/30/2022 at Unknown time     ARIPiprazole (ABILIFY) 5 MG tablet Take 1 tablet (5 mg) by mouth daily (Patient taking differently: Take 5 mg by mouth At Bedtime) 60 tablet 1 5/5/2022 at Unknown time     B Complex Vitamins (B COMPLEX 1 PO) Take 1 tablet by mouth daily.   5/5/2022     butalbital-acetaminophen-caffeine (ESGIC) -40 MG tablet Take 1-2 tablets by mouth at onset of headache. May repeat 1-2 tablets after 4 hrs. Max 6 tabets in 24 hrs. LIMIT to 2 days a week.   More than a month at Unknown time     capsaicin (ZOSTRIX) 0.075 % cream Apply topically 3 times daily as needed   5/5/2022 at Unknown time     DULoxetine (CYMBALTA) 60 MG capsule Take 2 capsules (120 mg) by mouth every evening 60 capsule 1 5/5/2022 at 2100     LANsoprazole (PREVACID) 30 MG DR capsule Take 30 mg by mouth 2 times daily   5/6/2022 at 0700     Magnesium Oxide -Mg Supplement 400 MG CAPS Take 400 mg by mouth daily   Past Month at Unknown time     metoprolol succinate ER (TOPROL XL) 50 MG 24 hr tablet Take 50 mg by mouth daily   5/6/2022 at 0700     ondansetron (ZOFRAN ODT) 8 MG ODT tab Take 8 mg by mouth every 8 hours as needed for nausea   Past Week at Unknown time     oxyCODONE (OXYCONTIN) 10 MG 12 hr tablet Take 10 mg by mouth daily as needed for severe pain (uses very rarely if pain gets bad ~1x per month.)   More than a month at Unknown time     senna-docusate (SENOKOT-S/PERICOLACE) 8.6-50 MG tablet Take 1 tablet by mouth 2 times daily as needed   Past Week at Unknown time     spironolactone (ALDACTONE) 25 MG tablet Take 25 mg by mouth daily   5/6/2022 at 0700     SUMAtriptan (IMITREX STATDOSE) 6 MG/0.5ML pen injector kit 1 injection at onset of migraine. May repeat once  after 2 hrs. Max 2 injections in 24 hrs. LIMIT TO 2 days a week.  (#10 for 30 days)   Past Month at Unknown time     tolterodine ER (DETROL LA) 4 MG 24 hr capsule Take 1 capsule (4 mg) by mouth daily 90 capsule 3 5/5/2022 at 2100     topiramate (TOPAMAX) 100 MG tablet Take 1 tablet (100 mg) by mouth daily Take 100 mg by mouth in the morning and take 200 mg by mouth in the evening. (Patient taking differently: Take 100 mg by mouth in the morning and take 200 mg by mouth in the evening.)   5/6/2022 at 0700     topiramate (TOPAMAX) 200 MG tablet Take 1 tablet (200 mg) by mouth every evening 200 mg in the AM and 100 mg in the PM   5/5/2022 at 2100     Zinc 50 MG CAPS Take 1 tablet by mouth daily.   5/5/2022 at Unknown time     apixaban ANTICOAGULANT (ELIQUIS) 5 MG tablet Take 2 tablets (10 mg) by mouth 2 times daily (Patient taking differently: Take 5 mg by mouth 2 times daily)  0 5/4/2022     aspirin (ASA) 81 MG chewable tablet Take 1 tablet (81 mg) by mouth daily 200 tablet 11 4/29/2022     [DISCONTINUED] oxyCODONE (ROXICODONE) 5 MG tablet Take 5 mg by mouth every 6 hours as needed for severe pain   5/4/2022              Discharge Medications:     Current Discharge Medication List      START taking these medications    Details   polyethylene glycol (MIRALAX) 17 GM/Dose powder Take 17 g by mouth daily  Qty: 116 g, Refills: 0    Associated Diagnoses: S/P laparoscopic cholecystectomy         CONTINUE these medications which have CHANGED    Details   oxyCODONE (ROXICODONE) 5 MG tablet Take 1 tablet (5 mg) by mouth every 6 hours as needed for severe pain  Qty: 15 tablet, Refills: 0    Associated Diagnoses: S/P laparoscopic cholecystectomy         CONTINUE these medications which have NOT CHANGED    Details   alendronate (FOSAMAX) 70 MG tablet Take 1 tablet (70 mg) by mouth every 7 days Take with a full glass of water and do not eat or lay down for 30 minutes  Qty: 12 tablet, Refills: 3    Associated Diagnoses:  Osteoporosis, unspecified osteoporosis type, unspecified pathological fracture presence      ARIPiprazole (ABILIFY) 5 MG tablet Take 1 tablet (5 mg) by mouth daily  Qty: 60 tablet, Refills: 1    Associated Diagnoses: Moderate episode of recurrent major depressive disorder (H); Recurrent major depressive disorder, in full remission (H)      B Complex Vitamins (B COMPLEX 1 PO) Take 1 tablet by mouth daily.    Associated Diagnoses: Sternal pain; Disorder of bone and cartilage, unspecified; Fatigue; Anemia, unspecified      butalbital-acetaminophen-caffeine (ESGIC) -40 MG tablet Take 1-2 tablets by mouth at onset of headache. May repeat 1-2 tablets after 4 hrs. Max 6 tabets in 24 hrs. LIMIT to 2 days a week.      capsaicin (ZOSTRIX) 0.075 % cream Apply topically 3 times daily as needed      DULoxetine (CYMBALTA) 60 MG capsule Take 2 capsules (120 mg) by mouth every evening  Qty: 60 capsule, Refills: 1    Associated Diagnoses: Moderate episode of recurrent major depressive disorder (H); Recurrent major depressive disorder, in full remission (H)      LANsoprazole (PREVACID) 30 MG DR capsule Take 30 mg by mouth 2 times daily      Magnesium Oxide -Mg Supplement 400 MG CAPS Take 400 mg by mouth daily      metoprolol succinate ER (TOPROL XL) 50 MG 24 hr tablet Take 50 mg by mouth daily      ondansetron (ZOFRAN ODT) 8 MG ODT tab Take 8 mg by mouth every 8 hours as needed for nausea      oxyCODONE (OXYCONTIN) 10 MG 12 hr tablet Take 10 mg by mouth daily as needed for severe pain (uses very rarely if pain gets bad ~1x per month.)      senna-docusate (SENOKOT-S/PERICOLACE) 8.6-50 MG tablet Take 1 tablet by mouth 2 times daily as needed      spironolactone (ALDACTONE) 25 MG tablet Take 25 mg by mouth daily      SUMAtriptan (IMITREX STATDOSE) 6 MG/0.5ML pen injector kit 1 injection at onset of migraine. May repeat once after 2 hrs. Max 2 injections in 24 hrs. LIMIT TO 2 days a week.  (#10 for 30 days)      tolterodine ER  (DETROL LA) 4 MG 24 hr capsule Take 1 capsule (4 mg) by mouth daily  Qty: 90 capsule, Refills: 3    Associated Diagnoses: Urge incontinence      !! topiramate (TOPAMAX) 100 MG tablet Take 1 tablet (100 mg) by mouth daily Take 100 mg by mouth in the morning and take 200 mg by mouth in the evening.    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    Associated Diagnoses: Sternal pain; Disorder of bone and cartilage, unspecified; Fatigue; Anemia, unspecified      apixaban ANTICOAGULANT (ELIQUIS) 5 MG tablet Take 2 tablets (10 mg) by mouth 2 times daily  Refills: 0    Associated Diagnoses: Acute respiratory distress syndrome (ARDS) due to COVID-19 virus (H)      aspirin (ASA) 81 MG chewable tablet Take 1 tablet (81 mg) by mouth daily  Qty: 200 tablet, Refills: 11    Associated Diagnoses: SVC syndrome       !! - Potential duplicate medications found. Please discuss with provider.                  Medications Discontinued or Adjusted During This Hospitalization:   No change           Antibiotics Prescribed at Discharge:   None prescribed           Day of Discharge Physical Exam:   Temp:  [97.4  F (36.3  C)-98.4  F (36.9  C)] 98.2  F (36.8  C)  Pulse:  [76-95] 78  Resp:  [10-27] 18  BP: ()/(54-82) 105/66  FiO2 (%):  [16 %-20 %] 16 %  SpO2:  [98 %-100 %] 100 %    General: awake, alert, no acute distress, laying comfortably in bed   CV: warm, well perfused   Pulm: breathing comfortably on 2L NC (baseline)    Abdomen: soft, non-distended, appropriately tender, no rebound or guarding;  Incision: c/d/i secured with glue    Extremities: no edema, moving all extremities spontaneously and without apparent deficit            Final Pathology Result:   Will discuss at follow-up appointment            Discharge Instructions and Follow-Up:     Discharge Procedure Orders   Reason for your hospital stay   Order Comments: Cholecystitis     Activity   Order Comments: Your activity upon discharge: activity as tolerated     Order Specific Question Answer Comments   Is discharge order? Yes      Discharge Instructions   Order Comments: Discharge Instructions  Activity  - No strenuous exercise for 3-4 weeks  - No lifting, pushing, pulling more than 15-20 pounds for 3-4 weeks    Incisions  - You may shower and get incisions wet starting 24 hrs after surgery    Drain Care  - You do not have a drain.  Specific care/instructions:  Not Applicable    Medications  - Do not take any additional Tylenol (acetaminophen) while using a narcotic pain medication which includes acetaminophen  - Do not take more than 4,000mg of acetaminophen in any 24 hour period, as this can cause liver damage  - Take stool softeners such as Senna or Miralax while you are using narcotics, but stop if you develop diarrhea  - Wean yourself off of narcotic pain medications    Follow-Up:  - Call your primary care provider to touch base regarding your recent admission.    - Call or return sooner than your regularly scheduled visit if you develop any of the following: fever >101.5, uncontrolled pain, uncontrolled nausea or vomiting, as well as increased redness, swelling, or drainage from your wound.   -  A nurse from the General Surgery Clinic will contact you 24 hours, or next business day, after your discharge from the hospital.  If you have questions or to schedule a follow up appointment please call the General Surgery Clinic at [phone].  Call [phone] and ask  to speak with the Surgery resident on call if you are having troubles in the evenings, at night, or on the weekend.  -  If you are receiving home care please inform your home care nurse of our contact number.     Adult [facility] Follow-up and recommended labs and tests   Order Comments: Will follow-up in general surgery clinic in 2 weeks     Appointments on [place] and/or [facility] (with [facility] or [facility] provider or service). Call [phone] if you haven't heard regarding these appointments within 7 days of discharge.     Diet   Order Comments: Follow this diet upon discharge: Orders Placed This Encounter      Regular Diet Adult     Order Specific Question Answer Comments   Is discharge order? Yes               Home Health Care:     Not needed           Discharge Disposition:     Discharged to home      Condition at discharge: Stable    Patient was seen, examined, and discussed on day of discharge with the chief resident, who discussed with the staff surgeon.    Chuck Dave MD  Surgery Resident

## 2022-05-07 NOTE — PROGRESS NOTES
Patient discharged to home at 1:06 PM via wheel chair. Accompanied by spouse and staff. Discharge instructions reviewed with patient, opportunity offered to ask questions. Prescriptions - None ordered for discharge. All belongings sent with patient.

## 2022-05-09 ENCOUNTER — PATIENT OUTREACH (OUTPATIENT)
Dept: SURGERY | Facility: CLINIC | Age: 57
End: 2022-05-09
Payer: MEDICARE

## 2022-05-09 NOTE — PROGRESS NOTES
Sherly Yeung is a patient of Dr. Herminio Espinosa that underwent laparoscopic cholecystectomy approximately 3 days ago (5/6).  Attempted to contact patient via telephone for a status update and review post-op  teaching.  LM on VM to call office.  Await return call.      Of note:  Pathology:  Pending  Wound:  Steri-strips  Follow-up:  Routine  Restrictions:  - No lifting, pushing, pulling more than 15-20 pounds for 3-4 weeks  New medications:  Oxycodone, Miralax  Equipment/Supplies:  None

## 2022-05-10 NOTE — PROGRESS NOTES
RN Post-Op/Post-Discharge Care Coordination Note    Ms. Sherly Yeung is a 56 year old female who underwent laparoscopic cholecystectomy on 5/6 with Dr. Herminio Espinosa.  Spoke with Patient.    Support  Patient able to care for self independently     Health Status  Nausea/Vomiting: Patient denies nausea/vomiting.  Eating/drinking: Patient is able to eat and drink without any complaints.  Bowel habits: Patient reports no bowel movement since surgery. Is passing gas. Taking Miralax and Senna per package instructions.  Drains (KESHAWN): N/A  Fevers/chills: Patient states she had a fever yesterday of 102F, resolved as of today and denies s/sx of infection. Advised to practice coughing and deep breathing throughout the day while splinting her abdomen with a pillow. She will call the Gen Surg Clinic if she develops a fever again.  Incisions: Patient denies any signs and symptoms of infection. Wound closure: Skin Sealant  Pain: tolerable  New Medications: oxycodone, Miralax    Activity/Restrictions  No lifting in excess of 15-20 pounds for 3-4 weeks    Equipment  None    Pathology reviewed with patient:  No, not yet available    Forms/Letters  No    All of her questions were answered including reviewing restrictions and wound care.  She will call this office if she has any further questions and/or concerns.      Schedule PO appt via video per patient request for 5/25 at 1100.     A copy of this note was routed to the primary surgeon.      Whom and When to Call  Patient acknowledges understanding of how to manage any medication changes and when to seek medical care.     Patient advised that if after hour medical concerns arise to please call [phone] and choose option 4 to speak to the physician on call.

## 2022-05-12 ENCOUNTER — HOSPITAL ENCOUNTER (OUTPATIENT)
Dept: CARDIOLOGY | Facility: CLINIC | Age: 57
Discharge: HOME | End: 2022-05-12
Attending: INTERNAL MEDICINE | Admitting: INTERNAL MEDICINE
Payer: MEDICARE

## 2022-05-12 DIAGNOSIS — Z79.899 ENCOUNTER FOR LONG-TERM (CURRENT) USE OF MEDICATIONS: ICD-10-CM

## 2022-05-12 DIAGNOSIS — F33.40 MAJOR DEPRESSIVE DISORDER, RECURRENT, IN REMISSION (H): ICD-10-CM

## 2022-05-12 LAB
PATH REPORT.COMMENTS IMP SPEC: NORMAL
PATH REPORT.COMMENTS IMP SPEC: NORMAL
PATH REPORT.FINAL DX SPEC: NORMAL
PATH REPORT.GROSS SPEC: NORMAL
PATH REPORT.RELEVANT HX SPEC: NORMAL
PHOTO IMAGE: NORMAL

## 2022-05-12 PROCEDURE — 93005 ELECTROCARDIOGRAM TRACING: CPT

## 2022-05-12 PROCEDURE — 88304 TISSUE EXAM BY PATHOLOGIST: CPT | Mod: 26 | Performed by: PATHOLOGY

## 2022-05-13 DIAGNOSIS — N39.41 URGE INCONTINENCE: ICD-10-CM

## 2022-05-16 ENCOUNTER — VIRTUAL VISIT (OUTPATIENT)
Dept: PSYCHIATRY | Facility: CLINIC | Age: 57
End: 2022-05-16
Attending: PSYCHIATRY & NEUROLOGY
Payer: MEDICARE

## 2022-05-16 DIAGNOSIS — F33.42 RECURRENT MAJOR DEPRESSIVE DISORDER, IN FULL REMISSION (H): ICD-10-CM

## 2022-05-16 DIAGNOSIS — F33.1 MODERATE EPISODE OF RECURRENT MAJOR DEPRESSIVE DISORDER (H): ICD-10-CM

## 2022-05-16 DIAGNOSIS — N18.2 HYPERTENSIVE HEART AND CHRONIC KIDNEY DISEASE STAGE 2: ICD-10-CM

## 2022-05-16 DIAGNOSIS — I10 ESSENTIAL HYPERTENSION: Primary | ICD-10-CM

## 2022-05-16 DIAGNOSIS — I13.10 HYPERTENSIVE HEART AND CHRONIC KIDNEY DISEASE STAGE 2: ICD-10-CM

## 2022-05-16 PROCEDURE — 99214 OFFICE O/P EST MOD 30 MIN: CPT | Mod: GC | Performed by: STUDENT IN AN ORGANIZED HEALTH CARE EDUCATION/TRAINING PROGRAM

## 2022-05-16 RX ORDER — METOPROLOL SUCCINATE 50 MG/1
50 TABLET, EXTENDED RELEASE ORAL DAILY
Qty: 90 TABLET | Refills: 3 | Status: SHIPPED | OUTPATIENT
Start: 2022-05-16 | End: 2023-05-10

## 2022-05-16 RX ORDER — DULOXETIN HYDROCHLORIDE 60 MG/1
120 CAPSULE, DELAYED RELEASE ORAL EVERY EVENING
Qty: 60 CAPSULE | Refills: 1 | Status: SHIPPED | OUTPATIENT
Start: 2022-05-16 | End: 2022-06-21

## 2022-05-16 RX ORDER — ARIPIPRAZOLE 5 MG/1
2.5 TABLET ORAL DAILY
Qty: 15 TABLET | Refills: 1 | Status: SHIPPED | OUTPATIENT
Start: 2022-05-16 | End: 2022-06-21

## 2022-05-16 RX ORDER — ARIPIPRAZOLE 5 MG/1
2.5 TABLET ORAL DAILY
Qty: 30 TABLET | Refills: 0 | Status: SHIPPED | OUTPATIENT
Start: 2022-05-16 | End: 2022-05-16

## 2022-05-16 ASSESSMENT — PATIENT HEALTH QUESTIONNAIRE - PHQ9
SUM OF ALL RESPONSES TO PHQ QUESTIONS 1-9: 2
10. IF YOU CHECKED OFF ANY PROBLEMS, HOW DIFFICULT HAVE THESE PROBLEMS MADE IT FOR YOU TO DO YOUR WORK, TAKE CARE OF THINGS AT HOME, OR GET ALONG WITH OTHER PEOPLE: SOMEWHAT DIFFICULT
SUM OF ALL RESPONSES TO PHQ QUESTIONS 1-9: 2

## 2022-05-16 NOTE — TELEPHONE ENCOUNTER
metoprolol succinate ER (TOPROL XL) 50 MG 24 hr tablet  Last Written Prescription Date:  ?  Last Fill Quantity: ?,   # refills: ?  Last Office Visit :  3/16/2022  Future Office visit:  5/18/2022    Routing refill request to provider for review/approval because:  Medication is reported/historical      Olivia Khoury RN  Central Triage Red Flags/Med Refills

## 2022-05-16 NOTE — PROGRESS NOTES
Marcia is a 56 year old who is being evaluated via a billable telephone visit.      What phone number would you like to be contacted at? [phone]   How would you like to obtain your AVS? Kirstin Kennedyapolinar Yeung is a 56 year old who has consented to receive services via billable video visit.      Pt will join video visit via: [employer]  If there are problems joining the visit, send backup video invite via: Send to preferred e-mail: [email or web address]      Originating Location (patient location): Patient's home  Distant Location (provider location): [facility] [place] [facility]    Will anyone else be joining the video visit? No    How would you prefer to obtain AVS?: Kirstin

## 2022-05-16 NOTE — TELEPHONE ENCOUNTER
tolterodine ER 4 mg capsule,extended release 24 hr      Last Written Prescription Date:  12-7-2020  Last Fill Quantity: 90,   # refills: 3  Last Office Visit : 3-  Future Office visit:  5-    Routing refill request to provider for review/approval because:  Last ordered over 17 months ago.  Has appointment pin 2 days.      Kathleen M Doege RN

## 2022-05-16 NOTE — PATIENT INSTRUCTIONS
**For crisis resources, please see the information at the end of this document**   Patient Education    Thank you for coming to the [facility] [place] [facility].    Lab Testing:  If you had lab testing today and your results are reassuring or normal they will be mailed to you or sent through [employer] within 7 days. If the lab tests need quick action we will call you with the results. The phone number we will call with results is # [phone]. If this is not the best number please call our clinic and change the number.     Medication Refills:  If you need any refills please call your pharmacy and they will contact us. Our fax number for refills is [phone].   Three business days of notice are needed for general medication refill requests.   Five business days of notice are needed for controlled substance refill requests.   If you need to change to a different pharmacy, please contact the new pharmacy directly. The new pharmacy will help you get your medications transferred.     Contact Us:  Please call [phone] during business hours (8-5:00 M-F).  If you have medication related questions after clinic hours, or on the weekend, please call [phone].    Financial Assistance [phone]  Medical Records [phone]       MENTAL HEALTH CRISIS RESOURCES:  For a emergency help, please call 911 or go to the nearest Emergency Department.     Emergency Walk-In Options:   EmPATH Unit @ [facility] ([place]): [phone] - Specialized mental health emergency area designed to be calming  [facility] ([place]): [phone]  [facility] ([place]): [phone]  [facility]): [phone]    [facility] Crisis Information:   [place]: [phone]  [place]: [phone]  [place] ([place]) - Adult: [phone]     Child: [phone]  [place] - Adult: [phone]     Child: [phone]  [place]:  [phone]  List of all [facility] resources:   https://[place].gov/dhs/people-we-serve/adults/health-care/mental-health/resources/crisis-contacts.jsp    National Crisis Information:   Crisis Text Line: Text  [place]  to 224957  National Suicide Prevention Lifeline: 4-285-430-TALK (1-190.959.9553)       For online chat options, visit https://suicidepreventionlifeline.org/chat/  Poison Control Center: 6-925-253-2728  Trans Lifeline: 9-835-421-4297 - Hotline for transgender people of all ages  [employer]: 2-439-445-9219 - Hotline for LGBT youth     For Non-Emergency Support:   Fast Tracker: Mental Health & Substance Use Disorder Resources -   https://www.[employer]n.org/
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
## 2022-05-17 ASSESSMENT — PATIENT HEALTH QUESTIONNAIRE - PHQ9: SUM OF ALL RESPONSES TO PHQ QUESTIONS 1-9: 2

## 2022-05-18 ENCOUNTER — OFFICE VISIT (OUTPATIENT)
Dept: INTERNAL MEDICINE | Facility: CLINIC | Age: 57
End: 2022-05-18
Payer: MEDICARE

## 2022-05-18 ENCOUNTER — LAB (OUTPATIENT)
Dept: LAB | Facility: CLINIC | Age: 57
End: 2022-05-18
Payer: MEDICARE

## 2022-05-18 VITALS
DIASTOLIC BLOOD PRESSURE: 82 MMHG | HEART RATE: 104 BPM | HEIGHT: 63 IN | OXYGEN SATURATION: 100 % | BODY MASS INDEX: 21.39 KG/M2 | SYSTOLIC BLOOD PRESSURE: 115 MMHG | WEIGHT: 120.7 LBS

## 2022-05-18 DIAGNOSIS — D50.8 IRON DEFICIENCY ANEMIA SECONDARY TO INADEQUATE DIETARY IRON INTAKE: ICD-10-CM

## 2022-05-18 DIAGNOSIS — D64.9 ANEMIA, UNSPECIFIED TYPE: ICD-10-CM

## 2022-05-18 DIAGNOSIS — K83.8 DILATED BILE DUCT: ICD-10-CM

## 2022-05-18 DIAGNOSIS — U07.1 PNEUMONIA DUE TO 2019 NOVEL CORONAVIRUS: ICD-10-CM

## 2022-05-18 DIAGNOSIS — J12.82 PNEUMONIA DUE TO 2019 NOVEL CORONAVIRUS: ICD-10-CM

## 2022-05-18 DIAGNOSIS — Z79.899 ENCOUNTER FOR LONG-TERM (CURRENT) USE OF MEDICATIONS: ICD-10-CM

## 2022-05-18 DIAGNOSIS — R09.02 HYPOXIA: ICD-10-CM

## 2022-05-18 DIAGNOSIS — E61.1 IRON DEFICIENCY: ICD-10-CM

## 2022-05-18 DIAGNOSIS — R06.09 DOE (DYSPNEA ON EXERTION): Primary | ICD-10-CM

## 2022-05-18 LAB
ALBUMIN SERPL-MCNC: 3.2 G/DL (ref 3.4–5)
ALP SERPL-CCNC: 108 U/L (ref 40–150)
ALT SERPL W P-5'-P-CCNC: 14 U/L (ref 0–50)
ANION GAP SERPL CALCULATED.3IONS-SCNC: 5 MMOL/L (ref 3–14)
AST SERPL W P-5'-P-CCNC: 10 U/L (ref 0–45)
BASOPHILS # BLD AUTO: 0 10E3/UL (ref 0–0.2)
BASOPHILS NFR BLD AUTO: 0 %
BILIRUB SERPL-MCNC: 0.2 MG/DL (ref 0.2–1.3)
BUN SERPL-MCNC: 11 MG/DL (ref 7–30)
CALCIUM SERPL-MCNC: 9.5 MG/DL (ref 8.5–10.1)
CHLORIDE BLD-SCNC: 108 MMOL/L (ref 94–109)
CHOLEST SERPL-MCNC: 159 MG/DL
CO2 SERPL-SCNC: 28 MMOL/L (ref 20–32)
CREAT SERPL-MCNC: 0.66 MG/DL (ref 0.52–1.04)
EOSINOPHIL # BLD AUTO: 0.3 10E3/UL (ref 0–0.7)
EOSINOPHIL NFR BLD AUTO: 3 %
ERYTHROCYTE [DISTWIDTH] IN BLOOD BY AUTOMATED COUNT: 16.4 % (ref 10–15)
FASTING STATUS PATIENT QL REPORTED: NO
FERRITIN SERPL-MCNC: 14 NG/ML (ref 8–252)
FOLATE SERPL-MCNC: 19.3 NG/ML
GFR SERPL CREATININE-BSD FRML MDRD: >90 ML/MIN/1.73M2
GLUCOSE BLD-MCNC: 99 MG/DL (ref 70–99)
HBA1C MFR BLD: 5.9 % (ref 0–5.6)
HCT VFR BLD AUTO: 32.9 % (ref 35–47)
HDLC SERPL-MCNC: 45 MG/DL
HGB BLD-MCNC: 9.6 G/DL (ref 11.7–15.7)
IMM GRANULOCYTES # BLD: 0.1 10E3/UL
IMM GRANULOCYTES NFR BLD: 1 %
IRON SATN MFR SERPL: 9 % (ref 15–46)
IRON SERPL-MCNC: 33 UG/DL (ref 35–180)
LDLC SERPL CALC-MCNC: 70 MG/DL
LYMPHOCYTES # BLD AUTO: 1.5 10E3/UL (ref 0.8–5.3)
LYMPHOCYTES NFR BLD AUTO: 14 %
MCH RBC QN AUTO: 25.3 PG (ref 26.5–33)
MCHC RBC AUTO-ENTMCNC: 29.2 G/DL (ref 31.5–36.5)
MCV RBC AUTO: 87 FL (ref 78–100)
MONOCYTES # BLD AUTO: 0.7 10E3/UL (ref 0–1.3)
MONOCYTES NFR BLD AUTO: 6 %
NEUTROPHILS # BLD AUTO: 8.4 10E3/UL (ref 1.6–8.3)
NEUTROPHILS NFR BLD AUTO: 76 %
NONHDLC SERPL-MCNC: 114 MG/DL
NRBC # BLD AUTO: 0 10E3/UL
NRBC BLD AUTO-RTO: 0 /100
PLATELET # BLD AUTO: 797 10E3/UL (ref 150–450)
POTASSIUM BLD-SCNC: 4.3 MMOL/L (ref 3.4–5.3)
PROT SERPL-MCNC: 7.4 G/DL (ref 6.8–8.8)
RBC # BLD AUTO: 3.79 10E6/UL (ref 3.8–5.2)
RETICS # AUTO: 0.08 10E6/UL (ref 0.03–0.1)
RETICS/RBC NFR AUTO: 2 % (ref 0.5–2)
SODIUM SERPL-SCNC: 141 MMOL/L (ref 133–144)
TIBC SERPL-MCNC: 350 UG/DL (ref 240–430)
TRIGL SERPL-MCNC: 222 MG/DL
VIT B12 SERPL-MCNC: 450 PG/ML (ref 193–986)
WBC # BLD AUTO: 11 10E3/UL (ref 4–11)

## 2022-05-18 PROCEDURE — 99214 OFFICE O/P EST MOD 30 MIN: CPT | Performed by: INTERNAL MEDICINE

## 2022-05-18 PROCEDURE — 82728 ASSAY OF FERRITIN: CPT | Performed by: PSYCHIATRY & NEUROLOGY

## 2022-05-18 PROCEDURE — 82746 ASSAY OF FOLIC ACID SERUM: CPT | Performed by: INTERNAL MEDICINE

## 2022-05-18 PROCEDURE — 99000 SPECIMEN HANDLING OFFICE-LAB: CPT | Performed by: PATHOLOGY

## 2022-05-18 PROCEDURE — 85045 AUTOMATED RETICULOCYTE COUNT: CPT | Performed by: PATHOLOGY

## 2022-05-18 PROCEDURE — 80053 COMPREHEN METABOLIC PANEL: CPT | Performed by: PSYCHIATRY & NEUROLOGY

## 2022-05-18 PROCEDURE — 82607 VITAMIN B-12: CPT | Performed by: PSYCHIATRY & NEUROLOGY

## 2022-05-18 PROCEDURE — 83550 IRON BINDING TEST: CPT | Performed by: PSYCHIATRY & NEUROLOGY

## 2022-05-18 PROCEDURE — 36415 COLL VENOUS BLD VENIPUNCTURE: CPT | Performed by: PATHOLOGY

## 2022-05-18 PROCEDURE — 82040 ASSAY OF SERUM ALBUMIN: CPT | Performed by: PSYCHIATRY & NEUROLOGY

## 2022-05-18 PROCEDURE — 83036 HEMOGLOBIN GLYCOSYLATED A1C: CPT | Performed by: PATHOLOGY

## 2022-05-18 PROCEDURE — 85025 COMPLETE CBC W/AUTO DIFF WBC: CPT | Performed by: PATHOLOGY

## 2022-05-18 PROCEDURE — 80061 LIPID PANEL: CPT | Performed by: PSYCHIATRY & NEUROLOGY

## 2022-05-18 PROCEDURE — 84238 ASSAY NONENDOCRINE RECEPTOR: CPT | Mod: 90 | Performed by: PATHOLOGY
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
## 2022-05-18 NOTE — NURSING NOTE
Sherly Yeung is a 56 year old female patient that presents today in clinic for the following:    Chief Complaint   Patient presents with     RECHECK     Temp handicap pass, referral pulmonary rehab     The patient's allergies and medications were reviewed as noted. A set of vitals were recorded as noted without incident. The patient does not have any other questions for the provider.    Mich Estrada, EMT at 9:59 AM on 5/18/2022

## 2022-05-19 LAB
PATH REPORT.COMMENTS IMP SPEC: NORMAL
PATH REPORT.FINAL DX SPEC: NORMAL
PATH REPORT.MICROSCOPIC SPEC OTHER STN: NORMAL
PATH REPORT.MICROSCOPIC SPEC OTHER STN: NORMAL
PATH REPORT.RELEVANT HX SPEC: NORMAL
STFR SERPL-MCNC: 7 MG/L

## 2022-05-19 RX ORDER — TOLTERODINE 4 MG/1
4 CAPSULE, EXTENDED RELEASE ORAL DAILY
Qty: 90 CAPSULE | Refills: 3 | Status: SHIPPED | OUTPATIENT
Start: 2022-05-19 | End: 2023-04-24

## 2022-05-20 RX ORDER — FERROUS SULFATE 325(65) MG
325 TABLET, DELAYED RELEASE (ENTERIC COATED) ORAL DAILY
Qty: 100 TABLET | Refills: 3 | Status: SHIPPED | OUTPATIENT
Start: 2022-05-20 | End: 2023-01-30

## 2022-05-24 ENCOUNTER — PATIENT OUTREACH (OUTPATIENT)
Dept: SURGERY | Facility: CLINIC | Age: 57
End: 2022-05-24
Payer: MEDICARE

## 2022-05-24 NOTE — PROGRESS NOTES
Patient Telephone Reminder Call    Date of call:  05/24/22  Phone numbers:  Home number on file [phone] (home)    Reached patient/confirmed appointment:  Yes  Appointment with:   Dr. Herminio Espinosa  Reason for visit:  PO

## 2022-05-25 ENCOUNTER — VIRTUAL VISIT (OUTPATIENT)
Dept: SURGERY | Facility: CLINIC | Age: 57
End: 2022-05-25
Payer: MEDICARE

## 2022-05-25 VITALS — BODY MASS INDEX: 20.37 KG/M2 | WEIGHT: 115 LBS

## 2022-05-25 DIAGNOSIS — Z98.890 POST-OPERATIVE STATE: Primary | ICD-10-CM

## 2022-05-25 PROCEDURE — 99024 POSTOP FOLLOW-UP VISIT: CPT | Mod: 95 | Performed by: SURGERY

## 2022-05-25 ASSESSMENT — PAIN SCALES - GENERAL: PAINLEVEL: NO PAIN (0)

## 2022-05-25 NOTE — PATIENT INSTRUCTIONS
You met with Dr. Herminio Espinosa.      Today's visit instructions:    Return to the Surgery Clinic on an as needed basis.        If you have questions please contact Any RN or Janice RN during regular clinic hours, Monday through Friday 7:30 AM - 4:00 PM, or you can contact us via Ovelin at anytime.       If you have urgent needs after-hours, weekends, or holidays please call the hospital at [phone] and ask to speak with our on-call General Surgery Team.    Appointment [phone]  Nurse Advice (Any or Janice): [phone]   Surgery Scheduler ([place]): [phone]  Fax: [phone]

## 2022-05-25 NOTE — LETTER
5/25/2022       RE: Sherly Yeung  [address]  [place] [address]     Dear Colleague,    Thank you for referring your patient, Sherly Yeung, to the [facility] GENERAL SURGERY CLINIC [place] at [facility]. Please see a copy of my visit note below.      I spoke with Sherly Yeung today in clinic for planned follow-up after elective cholecystectomy.  This was performed on 5/6.  She was monitored overnight due to her medical complexity and home O2 requirement, but was discharged on POD 1 without issue.    Pathology: chronic cholecystitis    Today she reports: minimal pain at her incisions. RUQ pain resolved.  She is on slightly higher amounts of oxygen now than just before the surgery.  Her PCP has ordered pulmonary rehab for her.  No diarrhea, eating fine, no fever/chills/nausea/vomiting recently.  Bathing normally.    PE:  Alert and oriented, no distress  Nasal cannula in place, no resp distress  Speech fluent  No jaundice    A/P:  Doing well post operatively, no acute issues. All questions answered.    Follow-up as needed      Sincerely,    Herminio Espinosa MD

## 2022-05-25 NOTE — NURSING NOTE
Chief Complaint   Patient presents with     Follow Up     Post op       Vitals:    05/25/22 1018   Weight: 52.2 kg (115 lb)       Body mass index is 20.37 kg/m .    Amie Valdez CMA

## 2022-05-25 NOTE — PROGRESS NOTES
Marcia is a 56 year old who is being evaluated via a billable video visit.      How would you like to obtain your AVS? MyChart  If the video visit is dropped, the invitation should be resent by: Text to cell phone: [phone]  Will anyone else be joining your video visit? No    Video Start Time: 11:05 AM  Video-Visit Details    Type of service:  Video Visit    Video End Time:11:14 AM    Originating Location (pt. Location): Home    Distant Location (provider location):  [facility] GENERAL SURGERY [facility]     Platform used for Video Visit: Kourtney MCCOY spoke with Sherly Yeung today in clinic for planned follow-up after elective cholecystectomy.  This was performed on 5/6.  She was monitored overnight due to her medical complexity and home O2 requirement, but was discharged on POD 1 without issue.    Pathology: chronic cholecystitis    Today she reports: minimal pain at her incisions. RUQ pain resolved.  She is on slightly higher amounts of oxygen now than just before the surgery.  Her PCP has ordered pulmonary rehab for her.  No diarrhea, eating fine, no fever/chills/nausea/vomiting recently.  Bathing normally.    PE:  Alert and oriented, no distress  Nasal cannula in place, no resp distress  Speech fluent  No jaundice    A/P:  Doing well post operatively, no acute issues. All questions answered.    Follow-up as needed

## 2022-06-07 ENCOUNTER — TRANSFERRED RECORDS (OUTPATIENT)
Dept: HEALTH INFORMATION MANAGEMENT | Facility: CLINIC | Age: 57
End: 2022-06-07

## 2022-06-21 ENCOUNTER — VIRTUAL VISIT (OUTPATIENT)
Dept: PSYCHIATRY | Facility: CLINIC | Age: 57
End: 2022-06-21
Attending: PSYCHIATRY & NEUROLOGY
Payer: MEDICARE

## 2022-06-21 DIAGNOSIS — F33.1 MODERATE EPISODE OF RECURRENT MAJOR DEPRESSIVE DISORDER (H): ICD-10-CM

## 2022-06-21 DIAGNOSIS — F33.42 RECURRENT MAJOR DEPRESSIVE DISORDER, IN FULL REMISSION (H): ICD-10-CM

## 2022-06-21 PROCEDURE — 99214 OFFICE O/P EST MOD 30 MIN: CPT | Mod: GC | Performed by: STUDENT IN AN ORGANIZED HEALTH CARE EDUCATION/TRAINING PROGRAM

## 2022-06-21 RX ORDER — DULOXETIN HYDROCHLORIDE 60 MG/1
120 CAPSULE, DELAYED RELEASE ORAL EVERY EVENING
Qty: 60 CAPSULE | Refills: 2 | Status: SHIPPED | OUTPATIENT
Start: 2022-06-21 | End: 2022-08-09

## 2022-06-21 RX ORDER — ARIPIPRAZOLE 5 MG/1
2.5 TABLET ORAL DAILY
Qty: 15 TABLET | Refills: 2 | Status: SHIPPED | OUTPATIENT
Start: 2022-06-21 | End: 2022-08-09

## 2022-06-21 ASSESSMENT — PATIENT HEALTH QUESTIONNAIRE - PHQ9
SUM OF ALL RESPONSES TO PHQ QUESTIONS 1-9: 2
10. IF YOU CHECKED OFF ANY PROBLEMS, HOW DIFFICULT HAVE THESE PROBLEMS MADE IT FOR YOU TO DO YOUR WORK, TAKE CARE OF THINGS AT HOME, OR GET ALONG WITH OTHER PEOPLE: NOT DIFFICULT AT ALL
SUM OF ALL RESPONSES TO PHQ QUESTIONS 1-9: 2

## 2022-06-21 NOTE — PATIENT INSTRUCTIONS
**For crisis resources, please see the information at the end of this document**   Patient Education    Thank you for coming to the [facility] M[facility] [place] [facility]INIC.     Lab Testing:  If you had lab testing today and your results are reassuring or normal they will be mailed to you or sent through Original within 7 days. If the lab tests need quick action we will call you with the results. The phone number we will call with results is # [phone]. If this is not the best number please call our clinic and change the number.     Medication Refills:  If you need any refills please call your pharmacy and they will contact us. Our fax number for refills is [phone].   Three business days of notice are needed for general medication refill requests.   Five business days of notice are needed for controlled substance refill requests.   If you need to change to a different pharmacy, please contact the new pharmacy directly. The new pharmacy will help you get your medications transferred.     Contact Us:  Please call [phone] during business hours (8-5:00 M-F).   If you have medication related questions after clinic hours, or on the weekend, please call [phone].     Financial Assistance [phone]   Medical Records [phone]       MENTAL HEALTH CRISIS RESOURCES:  For a emergency help, please call 911 or go to the nearest Emergency Department.     Emergency Walk-In Options:   Em[facility] Unit @ [facility] ([place]): [phone] - Specialized mental health emergency area designed to be calming  [facility] ([place]): [phone]  [facility] ([place]): [phone]  [facility]): [phone]    [facility] Crisis Information:   [place]: [phone]  [place]: [phone]  [place] ([place]) - Adult: [phone]     Child: [phone]  [place] - Adult: [phone]     Child: [phone]  [place]:  [phone]  List of all [facility] resources:   https://[place].gov/dhs/people-we-serve/adults/health-care/mental-health/resources/crisis-contacts.jsp    National Crisis Information:   Crisis Text Line: Text  [place]  to 185868  National Suicide Prevention Lifeline: 6-916-678-TALK (1-161.813.4788)       For online chat options, visit https://suicidepreventionlifeline.org/chat/  Poison Control Center: 2-765-231-4845  Trans Lifeline: 0-225-939-3340 - Hotline for transgender people of all ages  The [employer]: 8-299-441-0218 - Hotline for LGBT youth     For Non-Emergency Support:   Fast Tracker: Mental Health & Substance Use Disorder Resources -   https://www.[employer].org/

## 2022-06-21 NOTE — PROGRESS NOTES
Sherly Yeung is a 56 year old who has consented to receive services via billable video visit.      Pt will join video visit via: PresenterNetWell  If there are problems joining the visit, send backup video invite via: Send to preferred e-mail: [email or web address]      Originating Location (patient location): Patient's home  Distant Location (provider location): [facility] HE[facility] & [facility] [place] [facility]    Will anyone else be joining the video visit? No    How would you prefer to obtain AVS?: opendorse  Answers for HPI/ROS submitted by the patient on 6/21/2022  If you checked off any problems, how difficult have these problems made it for you to do your work, take care of things at home, or get along with other people?: Not difficult at all  PHQ9 TOTAL SCORE: 2
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
## 2022-06-30 ENCOUNTER — HOSPITAL ENCOUNTER (OUTPATIENT)
Dept: CARDIAC REHAB | Facility: CLINIC | Age: 57
Discharge: HOME | End: 2022-06-30
Attending: INTERNAL MEDICINE
Payer: MEDICARE

## 2022-06-30 DIAGNOSIS — R09.02 HYPOXIA: ICD-10-CM

## 2022-06-30 DIAGNOSIS — R06.09 DOE (DYSPNEA ON EXERTION): ICD-10-CM

## 2022-06-30 DIAGNOSIS — U07.1 PNEUMONIA DUE TO 2019 NOVEL CORONAVIRUS: ICD-10-CM

## 2022-06-30 DIAGNOSIS — J12.82 PNEUMONIA DUE TO 2019 NOVEL CORONAVIRUS: ICD-10-CM

## 2022-06-30 PROCEDURE — 94626 PHY/QHP OP PULM RHB W/MNTR: CPT

## 2022-07-08 ENCOUNTER — HOSPITAL ENCOUNTER (OUTPATIENT)
Dept: CARDIAC REHAB | Facility: CLINIC | Age: 57
Discharge: HOME | End: 2022-07-08
Attending: INTERNAL MEDICINE
Payer: MEDICARE

## 2022-07-08 PROCEDURE — 94625 PHY/QHP OP PULM RHB W/O MNTR: CPT | Performed by: REHABILITATION PRACTITIONER

## 2022-07-18 ENCOUNTER — TELEPHONE (OUTPATIENT)
Dept: INTERNAL MEDICINE | Facility: CLINIC | Age: 57
End: 2022-07-18

## 2022-07-18 DIAGNOSIS — N39.0 URINARY TRACT INFECTION: Primary | ICD-10-CM

## 2022-07-18 RX ORDER — SULFAMETHOXAZOLE/TRIMETHOPRIM 800-160 MG
1 TABLET ORAL 2 TIMES DAILY
Qty: 6 TABLET | Refills: 0 | Status: SHIPPED | OUTPATIENT
Start: 2022-07-18 | End: 2022-08-29

## 2022-07-18 NOTE — TELEPHONE ENCOUNTER
Spoke to Marcia via telephone regarding recent call to clinic. She reports burning, frequency, and urgency, foul smelling urine. These symptoms began last night.   She denies allergies to medications.  Fits protocol to treat with bactrim, and she will leave a urine sample at a [place] lab to make sure bactrim is appropriate for culture.  Serenity Harrington RN

## 2022-07-18 NOTE — TELEPHONE ENCOUNTER
[facility] Call Center    Phone Message    May a detailed message be left on voicemail: yes     Reason for Call: Symptoms or Concerns     If patient has red-flag symptoms, warm transfer to triage line    Current symptom or concern: urinary urgency, frequency, odor, burning with urination      Are there any new or worsening symptoms? Yes: Pt requesting order for bactrum for a UTI      Action Taken: Message routed to:  [facility] ([facility] edin

## 2022-07-19 ENCOUNTER — MEDICAL CORRESPONDENCE (OUTPATIENT)
Dept: CARDIAC REHAB | Facility: CLINIC | Age: 57
End: 2022-07-19

## 2022-08-09 ENCOUNTER — VIRTUAL VISIT (OUTPATIENT)
Dept: PSYCHIATRY | Facility: CLINIC | Age: 57
End: 2022-08-09
Attending: PSYCHIATRY & NEUROLOGY
Payer: MEDICARE

## 2022-08-09 DIAGNOSIS — F41.9 ANXIETY: ICD-10-CM

## 2022-08-09 DIAGNOSIS — F33.1 MODERATE EPISODE OF RECURRENT MAJOR DEPRESSIVE DISORDER (H): Primary | ICD-10-CM

## 2022-08-09 PROCEDURE — 99214 OFFICE O/P EST MOD 30 MIN: CPT | Mod: GC | Performed by: STUDENT IN AN ORGANIZED HEALTH CARE EDUCATION/TRAINING PROGRAM

## 2022-08-09 RX ORDER — DULOXETIN HYDROCHLORIDE 60 MG/1
120 CAPSULE, DELAYED RELEASE ORAL EVERY EVENING
Qty: 60 CAPSULE | Refills: 2 | Status: SHIPPED | OUTPATIENT
Start: 2022-08-09 | End: 2022-11-28

## 2022-08-09 RX ORDER — ARIPIPRAZOLE 5 MG/1
5 TABLET ORAL DAILY
Qty: 30 TABLET | Refills: 2 | Status: SHIPPED | OUTPATIENT
Start: 2022-08-09 | End: 2022-12-15
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
## 2022-08-09 NOTE — PROGRESS NOTES
Sherly Yeung is a 56 year old who has consented to receive services via billable video visit.      Pt will join video visit via: [employer]  If there are problems joining the visit, send backup video invite via: Text to preferred phone: [phone]      Originating Location (patient location): Patient's home  Distant Location (provider location): [facility] [place] [facility]    Will anyone else be joining the video visit? No

## 2022-08-09 NOTE — PATIENT INSTRUCTIONS
It was nice meeting you today    Treatment Plan Today:     1) Medications-  - Continue duloxetine 120 mg daily  - Continue aripiprazole to 5 mg daily    2) Follow-up appt with Dr Guillen in 2 months    3) Crisis numbers are below and clinic after hours number is [phone]         **For crisis resources, please see the information at the end of this document**   Patient Education    Thank you for coming to the [facility] [place] [facility].     Lab Testing:  If you had lab testing today and your results are reassuring or normal they will be mailed to you or sent through [employer] within 7 days. If the lab tests need quick action we will call you with the results. The phone number we will call with results is # [phone]. If this is not the best number please call our clinic and change the number.     Medication Refills:  If you need any refills please call your pharmacy and they will contact us. Our fax number for refills is [phone].   Three business days of notice are needed for general medication refill requests.   Five business days of notice are needed for controlled substance refill requests.   If you need to change to a different pharmacy, please contact the new pharmacy directly. The new pharmacy will help you get your medications transferred.     Contact Us:  Please call [phone] during business hours (8-5:00 M-F).   If you have medication related questions after clinic hours, or on the weekend, please call [phone].     Financial Assistance [phone]   Medical Records [phone]       MENTAL HEALTH CRISIS RESOURCES:  For a emergency help, please call 911 or go to the nearest Emergency Department.     Emergency Walk-In Options:   EmPATH Unit @ [facility] ([place]): [phone] - Specialized mental health emergency area designed to be calming  [facility] ([place]): [phone]  [facility] ([place]):  [phone]  [facility] ([place]): [phone]    [facility] Crisis Information:   [place]: [phone]  [place]: [phone]  Radha LOYOLA) - Adult: [phone]     Child: [phone]  [place] - Adult: [phone]     Child: [phone]  [place]: [phone]  List of all [facility] resources:   https://[place].[email or web address]/dhs/people-we-serve/adults/health-care/mental-health/resources/crisis-contacts.jsp    National Crisis Information:   Crisis Text Line: Text  [place]  to 957056  Suicide & Crisis Lifeline: 988  National Suicide Prevention Lifeline: 1-995-751-TALK (1-625.493.2899)       For online chat options, visit https://suicidepreventionlifeline.org/chat/  Poison Control Center: 1-312.764.1976  Trans Lifeline: 2-232-005-2263 - Hotline for transgender people of all ages  The [employer]: 0-707-804-9484 - Hotline for LGBT youth     For Non-Emergency Support:   Fast Tracker: Mental Health & Substance Use Disorder Resources -   https://www.[facility].org/

## 2022-08-22 NOTE — ANESTHESIA POSTPROCEDURE EVALUATION
Patient: Sherly Yeung    Procedure: Procedure(s):  CHOLECYSTECTOMY, LAPAROSCOPIC       Anesthesia Type:  General    Note:  Disposition: Outpatient   Postop Pain Control: Uneventful            Sign Out: Well controlled pain   PONV: No   Neuro/Psych: Uneventful            Sign Out: Acceptable/Baseline neuro status   Airway/Respiratory: Uneventful            Sign Out: Acceptable/Baseline resp. status   CV/Hemodynamics: Uneventful            Sign Out: Acceptable CV status   Other NRE: NONE   DID A NON-ROUTINE EVENT OCCUR? No    Event details/Postop Comments:  Late note entry.           Last vitals:  Vitals Value Taken Time   /69 05/06/22 1700   Temp 36.5  C (97.7  F) 05/06/22 1700   Pulse 78 05/06/22 1700   Resp 18 05/06/22 1700   SpO2 98 % 05/06/22 1700       Electronically Signed By: Daron Ramirez MD  August 22, 2022  6:30 PM

## 2022-08-26 ENCOUNTER — TELEPHONE (OUTPATIENT)
Dept: INTERNAL MEDICINE | Facility: CLINIC | Age: 57
End: 2022-08-26

## 2022-08-26 DIAGNOSIS — N39.0 URINARY TRACT INFECTION: ICD-10-CM

## 2022-08-26 NOTE — TELEPHONE ENCOUNTER
[facility] Call Center    Phone Message    May a detailed message be left on voicemail: yes     Reason for Call: Medication Question or concern regarding medication   Prescription Clarification  Name of Medication: Medication for UTI  Prescribing Provider: Saurav   Pharmacy:     [facility] [id number] [facility]     What on the order needs clarification?  Patient would like medication for her UTI. Please call back.          Action Taken: Message routed to:  [facility]    Travel Screening: Not Applicable

## 2022-08-29 RX ORDER — SULFAMETHOXAZOLE/TRIMETHOPRIM 800-160 MG
1 TABLET ORAL 2 TIMES DAILY
Qty: 6 TABLET | Refills: 0 | Status: SHIPPED | OUTPATIENT
Start: 2022-08-29 | End: 2022-11-23

## 2022-08-29 NOTE — TELEPHONE ENCOUNTER
I spoke with Marcia today. She reported UTI symptoms which started on Friday. Symptoms of burning with urination, urinary frequency, urgency, and malodorous urine. She denies fevers, back pain, flank pain, or hematuria. She was treated for a UTI in July with Bactrim, tolerated this well and symptoms resolved after treatment. Will send in short course of Bactrim per standing orders protocol. Reviewed that if symptoms persist or worsen, an appointment for follow up is needed to which she voiced understanding.    Aleah Isabel) EUGENE Genao

## 2022-09-01 NOTE — TELEPHONE ENCOUNTER
Per call from patient, finished the medication but symptoms not going away. Patient requesting call back.

## 2022-09-02 ENCOUNTER — HOSPITAL ENCOUNTER (EMERGENCY)
Facility: CLINIC | Age: 57
Discharge: HOME | End: 2022-09-02
Attending: PHYSICIAN ASSISTANT | Admitting: PHYSICIAN ASSISTANT
Payer: MEDICARE

## 2022-09-02 ENCOUNTER — E-VISIT (OUTPATIENT)
Dept: URGENT CARE | Facility: CLINIC | Age: 57
End: 2022-09-02
Payer: MEDICARE

## 2022-09-02 VITALS
HEIGHT: 63 IN | HEART RATE: 94 BPM | BODY MASS INDEX: 22.15 KG/M2 | WEIGHT: 125 LBS | DIASTOLIC BLOOD PRESSURE: 85 MMHG | OXYGEN SATURATION: 93 % | SYSTOLIC BLOOD PRESSURE: 153 MMHG | TEMPERATURE: 97.6 F | RESPIRATION RATE: 16 BRPM

## 2022-09-02 DIAGNOSIS — R30.0 DYSURIA: Primary | ICD-10-CM

## 2022-09-02 DIAGNOSIS — N30.00 ACUTE CYSTITIS WITHOUT HEMATURIA: ICD-10-CM

## 2022-09-02 DIAGNOSIS — R82.90 ABNORMAL FINDING ON URINALYSIS: ICD-10-CM

## 2022-09-02 DIAGNOSIS — R30.0 DYSURIA: ICD-10-CM

## 2022-09-02 DIAGNOSIS — R30.0 DIFFICULT OR PAINFUL URINATION: ICD-10-CM

## 2022-09-02 LAB
ALBUMIN UR-MCNC: 10 MG/DL
APPEARANCE UR: ABNORMAL
BACTERIA #/AREA URNS HPF: ABNORMAL /HPF
BILIRUB UR QL STRIP: NEGATIVE
COLOR UR AUTO: YELLOW
GLUCOSE UR STRIP-MCNC: NEGATIVE MG/DL
HGB UR QL STRIP: ABNORMAL
HYALINE CASTS: 1 /LPF
KETONES UR STRIP-MCNC: 5 MG/DL
LEUKOCYTE ESTERASE UR QL STRIP: ABNORMAL
MUCOUS THREADS #/AREA URNS LPF: PRESENT /LPF
NITRATE UR QL: NEGATIVE
PH UR STRIP: 6 [PH] (ref 5–7)
RBC URINE: 17 /HPF
SP GR UR STRIP: 1.03 (ref 1–1.03)
SQUAMOUS EPITHELIAL: <1 /HPF
UROBILINOGEN UR STRIP-MCNC: NORMAL MG/DL
WBC URINE: >182 /HPF

## 2022-09-02 PROCEDURE — G0463 HOSPITAL OUTPT CLINIC VISIT: HCPCS | Performed by: PHYSICIAN ASSISTANT

## 2022-09-02 PROCEDURE — 99213 OFFICE O/P EST LOW 20 MIN: CPT | Performed by: PHYSICIAN ASSISTANT

## 2022-09-02 PROCEDURE — 87086 URINE CULTURE/COLONY COUNT: CPT | Performed by: PHYSICIAN ASSISTANT

## 2022-09-02 PROCEDURE — 99207 PR NO BILLABLE SERVICE THIS VISIT: CPT | Performed by: PHYSICIAN ASSISTANT

## 2022-09-02 PROCEDURE — 81001 URINALYSIS AUTO W/SCOPE: CPT | Performed by: PHYSICIAN ASSISTANT

## 2022-09-02 RX ORDER — CEFDINIR 300 MG/1
300 CAPSULE ORAL 2 TIMES DAILY
Qty: 14 CAPSULE | Refills: 0 | Status: SHIPPED | OUTPATIENT
Start: 2022-09-02 | End: 2022-09-09

## 2022-09-02 ASSESSMENT — ENCOUNTER SYMPTOMS
FEVER: 0
BACK PAIN: 0
FREQUENCY: 1
DYSURIA: 1
GASTROINTESTINAL NEGATIVE: 1

## 2022-09-02 NOTE — PATIENT INSTRUCTIONS
Dear Sherly Yeung,     After reviewing your responses, I would like you to come in for a urine test to make sure we treat you correctly. This urine test is to evaluate you for a possible urinary tract infection, and should be scheduled for today or tomorrow. Schedule a Lab Only appointment here.     Lab appointments are not available at most locations on the weekends. If no Lab Only appointment is available, you should be seen in any of our convenient Walk-in or Urgent Care Centers, which can be found on our website here.     You will receive instructions with your results in [employer] once they are available.     If your symptoms worsen, you develop pain in your back or stomach, develop fevers, or are not improving in 5 days, please contact your primary care provider for an appointment or visit a Walk-in or Urgent Care Center to be seen.     Thanks again for choosing us as your health care partner,     Fanny Rooney PA-C

## 2022-09-02 NOTE — TELEPHONE ENCOUNTER
Pt had 3 day course of bactrim for UTI per clinic protocol, but still having sxs of burning, frequency and urgency with leakage. It will be a long weekend and she wants to be treated.  Please advise.  Thank you.    Shashi-Mi
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
## 2022-09-03 NOTE — DISCHARGE INSTRUCTIONS
Use Medication as directed    Patient advised to call for any lab results (if obtained during visit) within 2-3 days.  Urine culture sent and currently pending.  Drink plenty of fluids.     Prevention and treatment of UTI's discussed.  Signs and symptoms of pyelonephritis mentioned.  Fevers, chills, confusion, back pain, worsening abdominal pain, nausea or vomiting.    Patient to return to clinic sooner if not improving as expected.   Go to ER if any worsening symptoms or signs/symptoms of phylonephritis occur.

## 2022-09-04 LAB — BACTERIA UR CULT: NORMAL

## 2022-09-06 ENCOUNTER — MYC MEDICAL ADVICE (OUTPATIENT)
Dept: INTERNAL MEDICINE | Facility: CLINIC | Age: 57
End: 2022-09-06

## 2022-09-06 DIAGNOSIS — D50.9 IRON DEFICIENCY ANEMIA, UNSPECIFIED IRON DEFICIENCY ANEMIA TYPE: Primary | ICD-10-CM

## 2022-09-08 ENCOUNTER — TRANSFERRED RECORDS (OUTPATIENT)
Dept: HEALTH INFORMATION MANAGEMENT | Facility: CLINIC | Age: 57
End: 2022-09-08

## 2022-09-12 ENCOUNTER — TELEPHONE (OUTPATIENT)
Dept: INTERNAL MEDICINE | Facility: CLINIC | Age: 57
End: 2022-09-12

## 2022-09-12 DIAGNOSIS — N39.0 RECURRENT UTI: Primary | ICD-10-CM

## 2022-09-12 DIAGNOSIS — R30.0 DYSURIA: ICD-10-CM

## 2022-09-12 NOTE — TELEPHONE ENCOUNTER
[facility] Call Center    Phone Message    May a detailed message be left on voicemail: yes     Reason for Call: Symptoms or Concerns     If patient has red-flag symptoms, warm transfer to triage line    Current symptom or concern: Patient finished up her course of antibiotics and already has another UTI. She finished up on 9/8/2022 and Saturday started symptoms of another UTI.  Patient has frequency, leaking, urgency and pain. The urine doesn't look cloudy yet. Patient wondering if she should be referred to an Urologist due to all the UTI's or more days of the cefdinir or another antibiotic?    Symptoms have been present for:  2  day(s)    Has patient previously been seen for this? Yes    By urgent care and Evisit    Date: 9/2/2022    Are there any new or worsening symptoms? No      Action Taken: Message routed to:  [facility]    Travel Screening: Not Applicable                                    At minimum she needs a UA/UC before any treatment can be given.  Urology referral placed    JL

## 2022-09-12 NOTE — TELEPHONE ENCOUNTER
RN called patient and let her know Dr. Mg placed a urology referral.  RN gave urology scheduling number.  RN also relayed that a U/A was ordered, and patient will go to [facility] to submit a urine sample.    Leslie Borrego RN on 9/12/2022 at 11:40 AM

## 2022-09-14 ENCOUNTER — LAB (OUTPATIENT)
Dept: LAB | Facility: CLINIC | Age: 57
End: 2022-09-14
Payer: MEDICARE

## 2022-09-14 DIAGNOSIS — R30.0 DYSURIA: ICD-10-CM

## 2022-09-14 DIAGNOSIS — D50.9 IRON DEFICIENCY ANEMIA, UNSPECIFIED IRON DEFICIENCY ANEMIA TYPE: ICD-10-CM

## 2022-09-14 LAB
ALBUMIN UR-MCNC: NEGATIVE MG/DL
AMORPH CRY #/AREA URNS HPF: ABNORMAL /HPF
APPEARANCE UR: ABNORMAL
BASOPHILS # BLD AUTO: 0 10E3/UL (ref 0–0.2)
BASOPHILS NFR BLD AUTO: 0 %
BILIRUB UR QL STRIP: NEGATIVE
COLOR UR AUTO: YELLOW
EOSINOPHIL # BLD AUTO: 0.2 10E3/UL (ref 0–0.7)
EOSINOPHIL NFR BLD AUTO: 2 %
ERYTHROCYTE [DISTWIDTH] IN BLOOD BY AUTOMATED COUNT: 13.9 % (ref 10–15)
GLUCOSE UR STRIP-MCNC: NEGATIVE MG/DL
HCT VFR BLD AUTO: 44 % (ref 35–47)
HGB BLD-MCNC: 13.5 G/DL (ref 11.7–15.7)
HGB UR QL STRIP: NEGATIVE
KETONES UR STRIP-MCNC: NEGATIVE MG/DL
LEUKOCYTE ESTERASE UR QL STRIP: ABNORMAL
LYMPHOCYTES # BLD AUTO: 1.6 10E3/UL (ref 0.8–5.3)
LYMPHOCYTES NFR BLD AUTO: 19 %
MCH RBC QN AUTO: 29.6 PG (ref 26.5–33)
MCHC RBC AUTO-ENTMCNC: 30.7 G/DL (ref 31.5–36.5)
MCV RBC AUTO: 97 FL (ref 78–100)
MONOCYTES # BLD AUTO: 0.6 10E3/UL (ref 0–1.3)
MONOCYTES NFR BLD AUTO: 7 %
MUCOUS THREADS #/AREA URNS LPF: PRESENT /LPF
NEUTROPHILS # BLD AUTO: 6.1 10E3/UL (ref 1.6–8.3)
NEUTROPHILS NFR BLD AUTO: 72 %
NITRATE UR QL: NEGATIVE
PH UR STRIP: 7 [PH] (ref 5–7)
PLATELET # BLD AUTO: 330 10E3/UL (ref 150–450)
RBC # BLD AUTO: 4.56 10E6/UL (ref 3.8–5.2)
RBC #/AREA URNS AUTO: ABNORMAL /HPF
SP GR UR STRIP: 1.01 (ref 1–1.03)
SQUAMOUS #/AREA URNS AUTO: ABNORMAL /LPF
UROBILINOGEN UR STRIP-ACNC: 0.2 E.U./DL
WBC # BLD AUTO: 8.5 10E3/UL (ref 4–11)
WBC #/AREA URNS AUTO: ABNORMAL /HPF

## 2022-09-14 PROCEDURE — 85025 COMPLETE CBC W/AUTO DIFF WBC: CPT

## 2022-09-14 PROCEDURE — 81001 URINALYSIS AUTO W/SCOPE: CPT

## 2022-09-14 PROCEDURE — 36415 COLL VENOUS BLD VENIPUNCTURE: CPT

## 2022-09-23 ENCOUNTER — NURSE TRIAGE (OUTPATIENT)
Dept: NURSING | Facility: CLINIC | Age: 57
End: 2022-09-23

## 2022-09-23 ENCOUNTER — LAB (OUTPATIENT)
Dept: LAB | Facility: CLINIC | Age: 57
End: 2022-09-23
Payer: MEDICARE

## 2022-09-23 DIAGNOSIS — R30.0 DYSURIA: ICD-10-CM

## 2022-09-23 DIAGNOSIS — N39.0 RECURRENT UTI: Primary | ICD-10-CM

## 2022-09-23 LAB
ALBUMIN UR-MCNC: 30 MG/DL
APPEARANCE UR: ABNORMAL
BACTERIA #/AREA URNS HPF: ABNORMAL /HPF
BILIRUB UR QL STRIP: NEGATIVE
COLOR UR AUTO: YELLOW
GLUCOSE UR STRIP-MCNC: NEGATIVE MG/DL
HGB UR QL STRIP: ABNORMAL
KETONES UR STRIP-MCNC: NEGATIVE MG/DL
LEUKOCYTE ESTERASE UR QL STRIP: ABNORMAL
NITRATE UR QL: POSITIVE
PH UR STRIP: 7 [PH] (ref 5–8)
RBC #/AREA URNS AUTO: ABNORMAL /HPF
SP GR UR STRIP: 1.02 (ref 1–1.03)
SQUAMOUS #/AREA URNS AUTO: ABNORMAL /LPF
UROBILINOGEN UR STRIP-ACNC: 1 E.U./DL
WBC #/AREA URNS AUTO: ABNORMAL /HPF

## 2022-09-23 PROCEDURE — 87086 URINE CULTURE/COLONY COUNT: CPT

## 2022-09-23 PROCEDURE — 87186 SC STD MICRODIL/AGAR DIL: CPT

## 2022-09-23 PROCEDURE — 81001 URINALYSIS AUTO W/SCOPE: CPT

## 2022-09-23 RX ORDER — SULFAMETHOXAZOLE/TRIMETHOPRIM 800-160 MG
1 TABLET ORAL 2 TIMES DAILY
Qty: 6 TABLET | Refills: 0 | Status: SHIPPED | OUTPATIENT
Start: 2022-09-23 | End: 2022-09-26

## 2022-09-23 NOTE — TELEPHONE ENCOUNTER
[facility] Call Center    Phone Message    May a detailed message be left on voicemail: yes     Reason for Call: Other: The patient was calling to get a UA results with a positive UTI from Today test at 10:47am, she was calling to get an antibiotic ordered, I did encourage a follow up visits, however patient declined thank you.       Test Results in Con   1.  Urine Microscopic Exam W/abnormal data   2. UA with Micro reflex to Culture W/abnormal data      Action Taken: Message routed to:  [facility]    Travel Screening: Not Applicable

## 2022-09-24 NOTE — TELEPHONE ENCOUNTER
"Pt states she had UA micro today and she see's on My Chart this showed UTI but she did not get a call to discuss tx. EHR shows e-script for sulfa was sent about 15 min ago to [facility]. Informed pt of script. Pt said \"that will never be enough\" She says 3 days of abx in past has not resolved UTI. Advised pt start med as ordered now. This will treat her through weekend and then call pcp on Mon 9/26 when clinic reopens to discuss further tx. Pt voiced understanding and agreement.       Reason for Disposition    [1] POSITIVE urine test (i.e., NI + or LE + or WBC > 10) AND [2] standing order to call in prescription for antibiotic     No standing order but Dr entered order for antibiotic.    Additional Information    Negative: [1] Diagnosed urine infection AND [2] female taking antibiotic    Negative: [1] Diagnosed urine infection AND [2] male taking antibiotic    Negative: Patient sounds very sick or weak to the triager    Protocols used: URINALYSIS RESULTS FOLLOW-UP CALL-A-AH      "

## 2022-09-25 LAB — BACTERIA UR CULT: ABNORMAL

## 2022-09-26 ENCOUNTER — VIRTUAL VISIT (OUTPATIENT)
Dept: PSYCHIATRY | Facility: CLINIC | Age: 57
End: 2022-09-26
Attending: PSYCHIATRY & NEUROLOGY
Payer: MEDICARE

## 2022-09-26 ENCOUNTER — MYC MEDICAL ADVICE (OUTPATIENT)
Dept: INTERNAL MEDICINE | Facility: CLINIC | Age: 57
End: 2022-09-26

## 2022-09-26 DIAGNOSIS — F33.1 MODERATE EPISODE OF RECURRENT MAJOR DEPRESSIVE DISORDER (H): Primary | ICD-10-CM

## 2022-09-26 PROCEDURE — 99214 OFFICE O/P EST MOD 30 MIN: CPT | Mod: GC | Performed by: STUDENT IN AN ORGANIZED HEALTH CARE EDUCATION/TRAINING PROGRAM

## 2022-09-26 ASSESSMENT — PATIENT HEALTH QUESTIONNAIRE - PHQ9
10. IF YOU CHECKED OFF ANY PROBLEMS, HOW DIFFICULT HAVE THESE PROBLEMS MADE IT FOR YOU TO DO YOUR WORK, TAKE CARE OF THINGS AT HOME, OR GET ALONG WITH OTHER PEOPLE: SOMEWHAT DIFFICULT
SUM OF ALL RESPONSES TO PHQ QUESTIONS 1-9: 3
SUM OF ALL RESPONSES TO PHQ QUESTIONS 1-9: 3

## 2022-09-26 NOTE — PATIENT INSTRUCTIONS
**For crisis resources, please see the information at the end of this document**   Patient Education    Thank you for coming to the [facility] [place] [facility].     Lab Testing:  If you had lab testing today and your results are reassuring or normal they will be mailed to you or sent through [employer] within 7 days. If the lab tests need quick action we will call you with the results. The phone number we will call with results is # [phone]. If this is not the best number please call our clinic and change the number.     Medication Refills:  If you need any refills please call your pharmacy and they will contact us. Our fax number for refills is [phone].   Three business days of notice are needed for general medication refill requests.   Five business days of notice are needed for controlled substance refill requests.   If you need to change to a different pharmacy, please contact the new pharmacy directly. The new pharmacy will help you get your medications transferred.     Contact Us:  Please call [phone] during business hours (8-5:00 M-F).   If you have medication related questions after clinic hours, or on the weekend, please call [phone].     Financial Assistance [phone]   Medical Records [phone]       MENTAL HEALTH CRISIS RESOURCES:  For a emergency help, please call 911 or go to the nearest Emergency Department.     Emergency Walk-In Options:   [facility] Unit @ [facility] ([place]): [phone] - Specialized mental health emergency area designed to be calming  [facility] ([place]): [phone]  [facility] ([place]): [phone]  [facility]): [phone]    [facility] Crisis Information:   [place]: [phone]  [place]: [phone]  [place] ([place]) - Adult: [phone]     Child: [phone]  [place] - Adult: [phone]     Child: [phone]  [place]:  [phone]  List of all [facility] resources:   https://[place].gov/dhs/people-we-serve/adults/health-care/mental-health/resources/crisis-contacts.jsp    National Crisis Information:   Crisis Text Line: Text  [place]  to 922492  Suicide & Crisis Lifeline: 988  National Suicide Prevention Lifeline: 5-274-714-TALK (1-162.160.8927)       For online chat options, visit https://suicidepreventionlifeline.org/chat/  Poison Control Center: 8-747-154-8111  Trans Lifeline: 2-302-220-2553 - Hotline for transgender people of all ages  [employer]: 0-199-600-7044 - Hotline for LGBT youth     For Non-Emergency Support:   Fast Tracker: Mental Health & Substance Use Disorder Resources -   https://www.[employer].org/
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
## 2022-09-26 NOTE — PROGRESS NOTES
Sherly Yeung is a 56 year old who has consented to receive services via billable video visit.      Pt will join video visit via: [employer]  If there are problems joining the visit, send backup video invite via: Text to preferred phone: [phone]      Originating Location (patient location): Patient's home  Distant Location (provider location): [facility]    Will anyone else be joining the video visit? No

## 2022-09-27 NOTE — TELEPHONE ENCOUNTER
[facility] Call Center    Phone Message    May a detailed message be left on voicemail: yes     Reason for Call: Other: The patient has been scheduled for the 9/28/22 at 10:30am per patient      Action Taken: Message routed to:  [facility]    Travel Screening: Not Applicable

## 2022-09-28 NOTE — TELEPHONE ENCOUNTER
Call patient to reschedule with any providers and times that will work for her. Patient schedule with Dr Benitez for tomorrow at 9:50 AM. Patient confirmed appointment.    Keila Angeles, Clinic , 09/28/22 at 12:10 PM

## 2022-09-28 NOTE — TELEPHONE ENCOUNTER
[facility] Call Center    Phone Message    May a detailed message be left on voicemail: yes     Reason for Call: Other: The patient said she can't make the appointment she originally accepted due to migraines, and was hoping to get another appointment date and time preferably tomorrow  9/29/22, please review and follow up asap thank you.     Action Taken: Message routed to:  [facility]    Travel Screening: Not Applicable

## 2022-09-30 ENCOUNTER — APPOINTMENT (OUTPATIENT)
Dept: CT IMAGING | Facility: CLINIC | Age: 57
End: 2022-09-30
Attending: EMERGENCY MEDICINE
Payer: MEDICARE

## 2022-09-30 ENCOUNTER — HOSPITAL ENCOUNTER (EMERGENCY)
Facility: CLINIC | Age: 57
Discharge: HOME | End: 2022-09-30
Attending: EMERGENCY MEDICINE | Admitting: EMERGENCY MEDICINE
Payer: MEDICARE

## 2022-09-30 VITALS
RESPIRATION RATE: 16 BRPM | HEIGHT: 63 IN | OXYGEN SATURATION: 99 % | DIASTOLIC BLOOD PRESSURE: 83 MMHG | HEART RATE: 76 BPM | TEMPERATURE: 98.4 F | SYSTOLIC BLOOD PRESSURE: 136 MMHG | WEIGHT: 125 LBS | BODY MASS INDEX: 22.15 KG/M2

## 2022-09-30 DIAGNOSIS — R10.30 LOWER ABDOMINAL PAIN: ICD-10-CM

## 2022-09-30 DIAGNOSIS — R31.29 MICROSCOPIC HEMATURIA: ICD-10-CM

## 2022-09-30 LAB
ALBUMIN SERPL BCG-MCNC: 4.4 G/DL (ref 3.5–5.2)
ALBUMIN UR-MCNC: NEGATIVE MG/DL
ALP SERPL-CCNC: 143 U/L (ref 35–104)
ALT SERPL W P-5'-P-CCNC: 12 U/L (ref 10–35)
ANION GAP SERPL CALCULATED.3IONS-SCNC: 11 MMOL/L (ref 7–15)
APPEARANCE UR: CLEAR
AST SERPL W P-5'-P-CCNC: 20 U/L (ref 10–35)
BASOPHILS # BLD AUTO: 0 10E3/UL (ref 0–0.2)
BASOPHILS NFR BLD AUTO: 0 %
BILIRUB SERPL-MCNC: 0.3 MG/DL
BILIRUB UR QL STRIP: NEGATIVE
BUN SERPL-MCNC: 11.9 MG/DL (ref 6–20)
CALCIUM SERPL-MCNC: 9.5 MG/DL (ref 8.6–10)
CHLORIDE SERPL-SCNC: 105 MMOL/L (ref 98–107)
COLOR UR AUTO: YELLOW
CREAT SERPL-MCNC: 0.76 MG/DL (ref 0.51–0.95)
DEPRECATED HCO3 PLAS-SCNC: 22 MMOL/L (ref 22–29)
EOSINOPHIL # BLD AUTO: 0.1 10E3/UL (ref 0–0.7)
EOSINOPHIL NFR BLD AUTO: 1 %
ERYTHROCYTE [DISTWIDTH] IN BLOOD BY AUTOMATED COUNT: 13.4 % (ref 10–15)
FLUAV RNA SPEC QL NAA+PROBE: NEGATIVE
FLUBV RNA RESP QL NAA+PROBE: NEGATIVE
GFR SERPL CREATININE-BSD FRML MDRD: >90 ML/MIN/1.73M2
GLUCOSE SERPL-MCNC: 88 MG/DL (ref 70–99)
GLUCOSE UR STRIP-MCNC: NEGATIVE MG/DL
HCT VFR BLD AUTO: 44.9 % (ref 35–47)
HGB BLD-MCNC: 14.3 G/DL (ref 11.7–15.7)
HGB UR QL STRIP: NEGATIVE
HOLD SPECIMEN: NORMAL
IMM GRANULOCYTES # BLD: 0 10E3/UL
IMM GRANULOCYTES NFR BLD: 0 %
KETONES UR STRIP-MCNC: NEGATIVE MG/DL
LEUKOCYTE ESTERASE UR QL STRIP: NEGATIVE
LIPASE SERPL-CCNC: 32 U/L (ref 13–60)
LYMPHOCYTES # BLD AUTO: 2.2 10E3/UL (ref 0.8–5.3)
LYMPHOCYTES NFR BLD AUTO: 25 %
MCH RBC QN AUTO: 30 PG (ref 26.5–33)
MCHC RBC AUTO-ENTMCNC: 31.8 G/DL (ref 31.5–36.5)
MCV RBC AUTO: 94 FL (ref 78–100)
MONOCYTES # BLD AUTO: 0.7 10E3/UL (ref 0–1.3)
MONOCYTES NFR BLD AUTO: 7 %
MUCOUS THREADS #/AREA URNS LPF: PRESENT /LPF
NEUTROPHILS # BLD AUTO: 6.1 10E3/UL (ref 1.6–8.3)
NEUTROPHILS NFR BLD AUTO: 67 %
NITRATE UR QL: NEGATIVE
NRBC # BLD AUTO: 0 10E3/UL
NRBC BLD AUTO-RTO: 0 /100
PH UR STRIP: 6 [PH] (ref 5–7)
PLATELET # BLD AUTO: 323 10E3/UL (ref 150–450)
POTASSIUM SERPL-SCNC: 4 MMOL/L (ref 3.4–5.3)
PROT SERPL-MCNC: 7.4 G/DL (ref 6.4–8.3)
RBC # BLD AUTO: 4.76 10E6/UL (ref 3.8–5.2)
RBC URINE: 27 /HPF
SARS-COV-2 RNA RESP QL NAA+PROBE: NEGATIVE
SODIUM SERPL-SCNC: 138 MMOL/L (ref 136–145)
SP GR UR STRIP: 1.02 (ref 1–1.03)
UROBILINOGEN UR STRIP-MCNC: NORMAL MG/DL
WBC # BLD AUTO: 9.1 10E3/UL (ref 4–11)
WBC URINE: 3 /HPF

## 2022-09-30 PROCEDURE — 80053 COMPREHEN METABOLIC PANEL: CPT | Performed by: EMERGENCY MEDICINE

## 2022-09-30 PROCEDURE — 250N000009 HC RX 250: Performed by: EMERGENCY MEDICINE

## 2022-09-30 PROCEDURE — 99284 EMERGENCY DEPT VISIT MOD MDM: CPT | Mod: CS | Performed by: EMERGENCY MEDICINE

## 2022-09-30 PROCEDURE — 96361 HYDRATE IV INFUSION ADD-ON: CPT | Performed by: EMERGENCY MEDICINE

## 2022-09-30 PROCEDURE — 87636 SARSCOV2 & INF A&B AMP PRB: CPT | Performed by: EMERGENCY MEDICINE

## 2022-09-30 PROCEDURE — 250N000011 HC RX IP 250 OP 636: Performed by: EMERGENCY MEDICINE

## 2022-09-30 PROCEDURE — 83690 ASSAY OF LIPASE: CPT | Performed by: EMERGENCY MEDICINE

## 2022-09-30 PROCEDURE — 96374 THER/PROPH/DIAG INJ IV PUSH: CPT | Mod: 59 | Performed by: EMERGENCY MEDICINE

## 2022-09-30 PROCEDURE — C9803 HOPD COVID-19 SPEC COLLECT: HCPCS | Performed by: EMERGENCY MEDICINE

## 2022-09-30 PROCEDURE — 258N000003 HC RX IP 258 OP 636: Performed by: EMERGENCY MEDICINE

## 2022-09-30 PROCEDURE — 81001 URINALYSIS AUTO W/SCOPE: CPT | Performed by: PHYSICIAN ASSISTANT

## 2022-09-30 PROCEDURE — 74177 CT ABD & PELVIS W/CONTRAST: CPT | Mod: MG

## 2022-09-30 PROCEDURE — 96375 TX/PRO/DX INJ NEW DRUG ADDON: CPT | Performed by: EMERGENCY MEDICINE

## 2022-09-30 PROCEDURE — 99285 EMERGENCY DEPT VISIT HI MDM: CPT | Mod: 25 | Performed by: EMERGENCY MEDICINE

## 2022-09-30 PROCEDURE — 81001 URINALYSIS AUTO W/SCOPE: CPT | Performed by: EMERGENCY MEDICINE

## 2022-09-30 PROCEDURE — 36415 COLL VENOUS BLD VENIPUNCTURE: CPT | Performed by: EMERGENCY MEDICINE

## 2022-09-30 PROCEDURE — 85014 HEMATOCRIT: CPT | Performed by: EMERGENCY MEDICINE

## 2022-09-30 RX ORDER — ONDANSETRON 2 MG/ML
4 INJECTION INTRAMUSCULAR; INTRAVENOUS ONCE
Status: COMPLETED | OUTPATIENT
Start: 2022-09-30 | End: 2022-09-30

## 2022-09-30 RX ORDER — SULFAMETHOXAZOLE/TRIMETHOPRIM 800-160 MG
1 TABLET ORAL 2 TIMES DAILY
Qty: 6 TABLET | Refills: 0 | Status: SHIPPED | OUTPATIENT
Start: 2022-09-30 | End: 2022-10-03

## 2022-09-30 RX ORDER — IOPAMIDOL 755 MG/ML
55 INJECTION, SOLUTION INTRAVASCULAR ONCE
Status: COMPLETED | OUTPATIENT
Start: 2022-09-30 | End: 2022-09-30

## 2022-09-30 RX ORDER — DEXAMETHASONE SODIUM PHOSPHATE 10 MG/ML
10 INJECTION, SOLUTION INTRAMUSCULAR; INTRAVENOUS ONCE
Status: COMPLETED | OUTPATIENT
Start: 2022-09-30 | End: 2022-09-30

## 2022-09-30 RX ADMIN — ONDANSETRON 4 MG: 2 INJECTION INTRAMUSCULAR; INTRAVENOUS at 19:46

## 2022-09-30 RX ADMIN — DEXAMETHASONE SODIUM PHOSPHATE 10 MG: 10 INJECTION, SOLUTION INTRAMUSCULAR; INTRAVENOUS at 19:48

## 2022-09-30 RX ADMIN — SODIUM CHLORIDE 1000 ML: 9 INJECTION, SOLUTION INTRAVENOUS at 19:51

## 2022-09-30 RX ADMIN — SODIUM CHLORIDE 55 ML: 9 INJECTION, SOLUTION INTRAVENOUS at 22:14

## 2022-09-30 RX ADMIN — IOPAMIDOL 55 ML: 755 INJECTION, SOLUTION INTRAVENOUS at 22:14

## 2022-09-30 ASSESSMENT — ACTIVITIES OF DAILY LIVING (ADL)
ADLS_ACUITY_SCORE: 37

## 2022-09-30 NOTE — ED NOTES
Pt states she is now experiencing a migraine, which she gets often. Pt is out of her current migraine medications

## 2022-09-30 NOTE — ED TRIAGE NOTES
"\" UTI for the whole month of Sept\" Took bactrim, omnicef, and repeated bactrim. Last dose on Monday. Still feels like UTI present. Fever all week. Now with abdominal pain and back pain. Low mid abd pain. Denies vomiting, has nausea.     Provider to triage, discussed options with patient, and patient self selected ED.      Triage Assessment     Row Name 09/30/22 0671       Triage Assessment (Adult)    Airway WDL WDL       Respiratory WDL    Respiratory WDL WDL       Skin Circulation/Temperature WDL    Skin Circulation/Temperature WDL WDL       Cardiac WDL    Cardiac WDL WDL       Peripheral/Neurovascular WDL    Peripheral Neurovascular WDL WDL       Cognitive/Neuro/Behavioral WDL    Cognitive/Neuro/Behavioral WDL WDL              "
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
## 2022-10-01 NOTE — DISCHARGE INSTRUCTIONS
There is no evidence for infection of the urinary tract    There is some mild thickening of the bladder wall with a few red blood cells in the urine    Recommend follow-up urology for further evaluation    Return for fever, vomiting, worsening pain or any other concern

## 2022-10-12 ENCOUNTER — ANCILLARY PROCEDURE (OUTPATIENT)
Dept: MAMMOGRAPHY | Facility: CLINIC | Age: 57
End: 2022-10-12
Attending: INTERNAL MEDICINE
Payer: MEDICARE

## 2022-10-12 DIAGNOSIS — Z12.31 VISIT FOR SCREENING MAMMOGRAM: ICD-10-CM

## 2022-10-12 PROCEDURE — 77067 SCR MAMMO BI INCL CAD: CPT | Mod: GC

## 2022-10-12 PROCEDURE — 77063 BREAST TOMOSYNTHESIS BI: CPT | Mod: GC

## 2022-10-13 ENCOUNTER — TRANSFERRED RECORDS (OUTPATIENT)
Dept: HEALTH INFORMATION MANAGEMENT | Facility: CLINIC | Age: 57
End: 2022-10-13

## 2022-10-17 ENCOUNTER — VIRTUAL VISIT (OUTPATIENT)
Dept: UROLOGY | Facility: CLINIC | Age: 57
End: 2022-10-17
Attending: INTERNAL MEDICINE
Payer: MEDICARE

## 2022-10-17 DIAGNOSIS — G43.809 OTHER MIGRAINE WITHOUT STATUS MIGRAINOSUS, NOT INTRACTABLE: Primary | ICD-10-CM

## 2022-10-17 DIAGNOSIS — N39.0 RECURRENT UTI: ICD-10-CM

## 2022-10-17 PROCEDURE — 99441 PR PHYSICIAN TELEPHONE EVALUATION 5-10 MIN: CPT | Performed by: UROLOGY

## 2022-10-17 NOTE — PROGRESS NOTES
Telephone visit    56-year-old female with a history of urinary frequency urgency and recurrent urinary tract infection.    Currently is taking tolterodine for management of her urinary frequency with measured results.    She had been doing better last year after seeing Dr. Youngblood but gradually the urinary frequency has gotten worse again as have a urinary tract infections.    Most recent infection was in September (E. coli).    Plan: Standing orders for urine cultures will be entered today and she will obtain a culture within the next several days.    I will call her back regarding the results of this culture and also arrange for her to come into clinic for further direct follow-up and review of her current status as to both frequency of urination and recurrent urinary tract infection.    She may be a candidate for a beta 3 agonist possibly in combination with the tolterodine if her insurance will cover it.    Total time 10 minutes

## 2022-10-21 NOTE — TELEPHONE ENCOUNTER
lansoprazole 30 mg capsule,delayed release      Historical entry  Last Office Visit : 5/18/22  Future Office visit:  None scheduled    Routing refill request to provider for review/approval because:  Medication is reported/historical

## 2022-10-22 RX ORDER — LANSOPRAZOLE 30 MG/1
30 CAPSULE, DELAYED RELEASE ORAL 2 TIMES DAILY
Qty: 180 CAPSULE | Refills: 1 | Status: SHIPPED | OUTPATIENT
Start: 2022-10-22 | End: 2023-07-21

## 2022-11-11 ENCOUNTER — LAB (OUTPATIENT)
Dept: LAB | Facility: CLINIC | Age: 57
End: 2022-11-11
Payer: MEDICARE

## 2022-11-11 DIAGNOSIS — N39.0 RECURRENT UTI: ICD-10-CM

## 2022-11-11 LAB
ALBUMIN UR-MCNC: NEGATIVE MG/DL
APPEARANCE UR: ABNORMAL
BACTERIA #/AREA URNS HPF: ABNORMAL /HPF
BILIRUB UR QL STRIP: NEGATIVE
COLOR UR AUTO: YELLOW
GLUCOSE UR STRIP-MCNC: NEGATIVE MG/DL
HGB UR QL STRIP: ABNORMAL
KETONES UR STRIP-MCNC: NEGATIVE MG/DL
LEUKOCYTE ESTERASE UR QL STRIP: ABNORMAL
NITRATE UR QL: POSITIVE
PH UR STRIP: 6 [PH] (ref 5–7)
RBC #/AREA URNS AUTO: ABNORMAL /HPF
SP GR UR STRIP: 1.02 (ref 1–1.03)
SQUAMOUS #/AREA URNS AUTO: ABNORMAL /LPF
UROBILINOGEN UR STRIP-ACNC: 0.2 E.U./DL
WBC #/AREA URNS AUTO: ABNORMAL /HPF

## 2022-11-11 PROCEDURE — 87186 SC STD MICRODIL/AGAR DIL: CPT

## 2022-11-11 PROCEDURE — 81001 URINALYSIS AUTO W/SCOPE: CPT

## 2022-11-11 PROCEDURE — 87086 URINE CULTURE/COLONY COUNT: CPT

## 2022-11-13 LAB — BACTERIA UR CULT: ABNORMAL

## 2022-11-14 ENCOUNTER — TELEPHONE (OUTPATIENT)
Dept: UROLOGY | Facility: CLINIC | Age: 57
End: 2022-11-14

## 2022-11-15 DIAGNOSIS — N30.00 ACUTE CYSTITIS WITHOUT HEMATURIA: ICD-10-CM

## 2022-11-15 DIAGNOSIS — N39.0 RECURRENT UTI: Primary | ICD-10-CM

## 2022-11-15 RX ORDER — NITROFURANTOIN 25; 75 MG/1; MG/1
100 CAPSULE ORAL 2 TIMES DAILY
Qty: 10 CAPSULE | Refills: 0 | Status: SHIPPED | OUTPATIENT
Start: 2022-11-15 | End: 2022-11-20

## 2022-11-21 ENCOUNTER — HEALTH MAINTENANCE LETTER (OUTPATIENT)
Age: 57
End: 2022-11-21

## 2022-11-22 ENCOUNTER — TRANSFERRED RECORDS (OUTPATIENT)
Dept: HEALTH INFORMATION MANAGEMENT | Facility: CLINIC | Age: 57
End: 2022-11-22

## 2022-11-23 ENCOUNTER — E-VISIT (OUTPATIENT)
Dept: FAMILY MEDICINE | Facility: CLINIC | Age: 57
End: 2022-11-23
Payer: MEDICARE

## 2022-11-23 ENCOUNTER — LAB (OUTPATIENT)
Dept: FAMILY MEDICINE | Facility: CLINIC | Age: 57
End: 2022-11-23
Attending: NURSE PRACTITIONER
Payer: MEDICARE

## 2022-11-23 DIAGNOSIS — R50.9 FEVER IN ADULT: Primary | ICD-10-CM

## 2022-11-23 DIAGNOSIS — R50.9 FEVER IN ADULT: ICD-10-CM

## 2022-11-23 DIAGNOSIS — Z20.822 SUSPECTED COVID-19 VIRUS INFECTION: ICD-10-CM

## 2022-11-23 LAB
FLUAV AG SPEC QL IA: NEGATIVE
FLUBV AG SPEC QL IA: NEGATIVE

## 2022-11-23 PROCEDURE — 87804 INFLUENZA ASSAY W/OPTIC: CPT

## 2022-11-23 PROCEDURE — 99207 PR NON-BILLABLE SERV PER CHARTING: CPT | Performed by: NURSE PRACTITIONER

## 2022-11-23 PROCEDURE — U0005 INFEC AGEN DETEC AMPLI PROBE: HCPCS

## 2022-11-23 PROCEDURE — U0003 INFECTIOUS AGENT DETECTION BY NUCLEIC ACID (DNA OR RNA); SEVERE ACUTE RESPIRATORY SYNDROME CORONAVIRUS 2 (SARS-COV-2) (CORONAVIRUS DISEASE [COVID-19]), AMPLIFIED PROBE TECHNIQUE, MAKING USE OF HIGH THROUGHPUT TECHNOLOGIES AS DESCRIBED BY CMS-2020-01-R: HCPCS

## 2022-11-23 NOTE — PATIENT INSTRUCTIONS
Marcia,    Your symptoms show that you may have coronavirus (COVID-19). This illness can cause fever, cough and trouble breathing. Many people get a mild case and get better on their own. Some people can get very sick.    Because you reported additional symptoms, I would like to also test you for influenza    What should I do?  I have placed orders for these tests.   To schedule: go to your [facility] home page and scroll down to the section that says  You have an appointment that needs to be scheduled  and click the large green button that says  Schedule Now  and follow the steps to find the next available openings.     If you are unable to complete these [employer] scheduling steps, please call [phone] to schedule your testing.     These guidelines are for isolating before returning to work, school or .     For employers, schools and day cares: This is an official notice for this person s medical guidelines for returning in-person.     For health care sites: The [facility] gives different isolation and quarantine guidelines for healthcare sites, please check with these sites before arriving.     How do I self-isolate?  You isolate when you have symptoms of COVID or a test shows you have COVID, even if you don t have symptoms.     If you DO have symptoms:  o Stay home and away from others  - For at least 5 days after your symptoms started, AND   - You are fever free for 24 hours (with no medicine that reduces fever), AND  - Your other symptoms are better.  o Wear a mask for 10 full days any time you are around others.    If you DON T have symptoms:  o Stay at home and away from others for at least 5 days after your positive test.  o Wear a mask for 10 full days any time you are around others.    How can I take care of myself?  Over the counter medications may help with your symptoms such as runny or stuffy nose, cough, chills, or fever. Talk to your care team about your options.     Some people are at high risk of  severe illness (for example, you have a weak immune system, you re 65 years or older, or you have certain medical problems). If your risk is high and your symptoms started in the last 5 to 7 days, we strongly recommend for you to get COVID treatment as soon as possible. Paxlovid, Molnupiravir and the monoclonal antibody treatments are proven safe and effective, make you feel better faster, and prevent hospitalization and death.       To schedule an appointment to discuss COVID treatment, request an appointment on basno (select  COVID-19 Treatment ) or call [employer] ([phone]), press 7.      Get lots of rest. Drink extra fluids (unless a doctor has told you not to)    Take Tylenol (acetaminophen) or ibuprofen for fever or pain. If you have liver or kidney problems, ask your family doctor if it's okay to take Tylenol or ibuprofen    Take over the counter medications for your symptoms, as directed by your doctor. You may also talk to your pharmacist.      If you have other health problems (like cancer, heart failure, an organ transplant or severe kidney disease): Call your specialty clinic if you don't feel better in the next 2 days.    Know when to call 911. Emergency warning signs include:  o Trouble breathing or shortness of breath  o Pain or pressure in the chest that doesn't go away  o Feeling confused like you haven't felt before, or not being able to wake up  o Bluish-colored lips or face    Where can I get more information?    [facility] - About COVID-19: [email or web address]     CDC - What to Do If You're Sick: https://www.cdc.gov/coronavirus/2019-ncov/if-you-are-sick/index.html     CDC - Quarantine & Isolation: https://www.cdc.gov/coronavirus/2019-ncov/your-health/quarantine-isolation.html     [facility] clinical trials (COVID-19 research studies): [email or web address]    Below are the COVID-19 hotlines at the [place] Department of Health  ([facility]). Interpreters are available.  o For health questions: Call 050-845-7558 or [phone] (7 a.m. to 7 p.m.)  o For questions about schools and childcare: Call 335-028-8726 or 1-946.775.4479 (7 a.m. to 7 p.m.)

## 2022-11-24 LAB — SARS-COV-2 RNA RESP QL NAA+PROBE: NEGATIVE

## 2022-11-28 ENCOUNTER — APPOINTMENT (OUTPATIENT)
Dept: GENERAL RADIOLOGY | Facility: CLINIC | Age: 57
End: 2022-11-28
Attending: FAMILY MEDICINE
Payer: MEDICARE

## 2022-11-28 ENCOUNTER — VIRTUAL VISIT (OUTPATIENT)
Dept: PSYCHIATRY | Facility: CLINIC | Age: 57
End: 2022-11-28
Attending: PSYCHIATRY & NEUROLOGY
Payer: MEDICARE

## 2022-11-28 ENCOUNTER — HOSPITAL ENCOUNTER (EMERGENCY)
Facility: CLINIC | Age: 57
Discharge: HOME | End: 2022-11-29
Attending: FAMILY MEDICINE | Admitting: FAMILY MEDICINE
Payer: MEDICARE

## 2022-11-28 ENCOUNTER — APPOINTMENT (OUTPATIENT)
Dept: CT IMAGING | Facility: CLINIC | Age: 57
End: 2022-11-28
Attending: FAMILY MEDICINE
Payer: MEDICARE

## 2022-11-28 DIAGNOSIS — S43.102A ACROMIOCLAVICULAR SEPARATION, LEFT, INITIAL ENCOUNTER: ICD-10-CM

## 2022-11-28 DIAGNOSIS — S09.90XA CLOSED HEAD INJURY, INITIAL ENCOUNTER: ICD-10-CM
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
## 2022-11-28 DIAGNOSIS — F33.1 MODERATE EPISODE OF RECURRENT MAJOR DEPRESSIVE DISORDER (H): ICD-10-CM

## 2022-11-28 DIAGNOSIS — S20.212A CONTUSION OF LEFT CHEST WALL, INITIAL ENCOUNTER: ICD-10-CM

## 2022-11-28 DIAGNOSIS — S50.02XA CONTUSION OF LEFT ELBOW, INITIAL ENCOUNTER: ICD-10-CM

## 2022-11-28 LAB
ALBUMIN SERPL BCG-MCNC: 4.1 G/DL (ref 3.5–5.2)
ALP SERPL-CCNC: 151 U/L (ref 35–104)
ALT SERPL W P-5'-P-CCNC: 11 U/L (ref 10–35)
ANION GAP SERPL CALCULATED.3IONS-SCNC: 9 MMOL/L (ref 7–15)
AST SERPL W P-5'-P-CCNC: 17 U/L (ref 10–35)
BASOPHILS # BLD AUTO: 0 10E3/UL (ref 0–0.2)
BASOPHILS NFR BLD AUTO: 0 %
BILIRUB SERPL-MCNC: <0.2 MG/DL
BUN SERPL-MCNC: 17.2 MG/DL (ref 6–20)
CALCIUM SERPL-MCNC: 9.2 MG/DL (ref 8.6–10)
CHLORIDE SERPL-SCNC: 105 MMOL/L (ref 98–107)
CREAT SERPL-MCNC: 0.75 MG/DL (ref 0.51–0.95)
DEPRECATED HCO3 PLAS-SCNC: 25 MMOL/L (ref 22–29)
EOSINOPHIL # BLD AUTO: 0.1 10E3/UL (ref 0–0.7)
EOSINOPHIL NFR BLD AUTO: 1 %
ERYTHROCYTE [DISTWIDTH] IN BLOOD BY AUTOMATED COUNT: 13.6 % (ref 10–15)
GFR SERPL CREATININE-BSD FRML MDRD: >90 ML/MIN/1.73M2
GLUCOSE SERPL-MCNC: 120 MG/DL (ref 70–99)
HCT VFR BLD AUTO: 40.3 % (ref 35–47)
HGB BLD-MCNC: 12.9 G/DL (ref 11.7–15.7)
IMM GRANULOCYTES # BLD: 0.1 10E3/UL
IMM GRANULOCYTES NFR BLD: 1 %
LYMPHOCYTES # BLD AUTO: 1.4 10E3/UL (ref 0.8–5.3)
LYMPHOCYTES NFR BLD AUTO: 10 %
MCH RBC QN AUTO: 30.6 PG (ref 26.5–33)
MCHC RBC AUTO-ENTMCNC: 32 G/DL (ref 31.5–36.5)
MCV RBC AUTO: 96 FL (ref 78–100)
MONOCYTES # BLD AUTO: 0.8 10E3/UL (ref 0–1.3)
MONOCYTES NFR BLD AUTO: 6 %
NEUTROPHILS # BLD AUTO: 11 10E3/UL (ref 1.6–8.3)
NEUTROPHILS NFR BLD AUTO: 82 %
NRBC # BLD AUTO: 0 10E3/UL
NRBC BLD AUTO-RTO: 0 /100
PLATELET # BLD AUTO: 305 10E3/UL (ref 150–450)
POTASSIUM SERPL-SCNC: 4.2 MMOL/L (ref 3.4–5.3)
PROT SERPL-MCNC: 6.9 G/DL (ref 6.4–8.3)
RBC # BLD AUTO: 4.21 10E6/UL (ref 3.8–5.2)
SODIUM SERPL-SCNC: 139 MMOL/L (ref 136–145)
WBC # BLD AUTO: 13.3 10E3/UL (ref 4–11)

## 2022-11-28 PROCEDURE — 71250 CT THORAX DX C-: CPT | Mod: MG

## 2022-11-28 PROCEDURE — 36415 COLL VENOUS BLD VENIPUNCTURE: CPT | Performed by: FAMILY MEDICINE

## 2022-11-28 PROCEDURE — 82040 ASSAY OF SERUM ALBUMIN: CPT | Performed by: FAMILY MEDICINE

## 2022-11-28 PROCEDURE — 85025 COMPLETE CBC W/AUTO DIFF WBC: CPT | Performed by: FAMILY MEDICINE

## 2022-11-28 PROCEDURE — 96374 THER/PROPH/DIAG INJ IV PUSH: CPT

## 2022-11-28 PROCEDURE — 93010 ELECTROCARDIOGRAM REPORT: CPT | Performed by: FAMILY MEDICINE

## 2022-11-28 PROCEDURE — 99214 OFFICE O/P EST MOD 30 MIN: CPT | Mod: GC | Performed by: STUDENT IN AN ORGANIZED HEALTH CARE EDUCATION/TRAINING PROGRAM

## 2022-11-28 PROCEDURE — 99285 EMERGENCY DEPT VISIT HI MDM: CPT | Mod: 25

## 2022-11-28 PROCEDURE — 93005 ELECTROCARDIOGRAM TRACING: CPT

## 2022-11-28 PROCEDURE — G1010 CDSM STANSON: HCPCS

## 2022-11-28 PROCEDURE — 73030 X-RAY EXAM OF SHOULDER: CPT | Mod: LT

## 2022-11-28 PROCEDURE — 73070 X-RAY EXAM OF ELBOW: CPT | Mod: LT

## 2022-11-28 PROCEDURE — 80053 COMPREHEN METABOLIC PANEL: CPT | Performed by: FAMILY MEDICINE

## 2022-11-28 PROCEDURE — 250N000011 HC RX IP 250 OP 636: Performed by: FAMILY MEDICINE

## 2022-11-28 PROCEDURE — 250N000013 HC RX MED GY IP 250 OP 250 PS 637: Performed by: FAMILY MEDICINE

## 2022-11-28 PROCEDURE — 99285 EMERGENCY DEPT VISIT HI MDM: CPT | Mod: 25 | Performed by: FAMILY MEDICINE

## 2022-11-28 RX ORDER — ONDANSETRON 4 MG/1
4 TABLET, ORALLY DISINTEGRATING ORAL ONCE
Status: COMPLETED | OUTPATIENT
Start: 2022-11-28 | End: 2022-11-28

## 2022-11-28 RX ORDER — DULOXETIN HYDROCHLORIDE 60 MG/1
120 CAPSULE, DELAYED RELEASE ORAL EVERY EVENING
Qty: 60 CAPSULE | Refills: 3 | Status: SHIPPED | OUTPATIENT
Start: 2022-11-28 | End: 2023-02-03

## 2022-11-28 RX ORDER — ONDANSETRON 2 MG/ML
4 INJECTION INTRAMUSCULAR; INTRAVENOUS ONCE
Status: COMPLETED | OUTPATIENT
Start: 2022-11-28 | End: 2022-11-28

## 2022-11-28 RX ORDER — ACETAMINOPHEN 325 MG/1
975 TABLET ORAL ONCE
Status: COMPLETED | OUTPATIENT
Start: 2022-11-28 | End: 2022-11-28

## 2022-11-28 RX ADMIN — ACETAMINOPHEN 975 MG: 325 TABLET, FILM COATED ORAL at 23:21

## 2022-11-28 RX ADMIN — ONDANSETRON 4 MG: 4 TABLET, ORALLY DISINTEGRATING ORAL at 21:57

## 2022-11-28 RX ADMIN — ONDANSETRON 4 MG: 2 INJECTION INTRAMUSCULAR; INTRAVENOUS at 23:05

## 2022-11-28 ASSESSMENT — PATIENT HEALTH QUESTIONNAIRE - PHQ9
SUM OF ALL RESPONSES TO PHQ QUESTIONS 1-9: 6
10. IF YOU CHECKED OFF ANY PROBLEMS, HOW DIFFICULT HAVE THESE PROBLEMS MADE IT FOR YOU TO DO YOUR WORK, TAKE CARE OF THINGS AT HOME, OR GET ALONG WITH OTHER PEOPLE: SOMEWHAT DIFFICULT
SUM OF ALL RESPONSES TO PHQ QUESTIONS 1-9: 6

## 2022-11-28 ASSESSMENT — ENCOUNTER SYMPTOMS
PALPITATIONS: 0
HEADACHES: 0
COUGH: 0
BLOOD IN STOOL: 0
CONSTIPATION: 0
DIAPHORESIS: 0
DYSURIA: 0
FEVER: 0
NAUSEA: 0
ABDOMINAL PAIN: 0
SORE THROAT: 0
DIARRHEA: 0
VOMITING: 0
WHEEZING: 0
SHORTNESS OF BREATH: 0
CHILLS: 0
SINUS PRESSURE: 0
FREQUENCY: 0
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
## 2022-11-28 NOTE — PATIENT INSTRUCTIONS
It was nice seeing you today    Treatment Plan Today:     1) Medications-   - stop aripiprazole, resume 5mg if having problems   - Continue duloxetine 120 mg daily    2) Follow-up appt with Dr Guillen in 4-6 weeks    3) Please talk to your neurologist regarding Ritalin dose     4) Crisis numbers are below and clinic after hours number is [phone]      **For crisis resources, please see the information at the end of this document**   Patient Education    Thank you for coming to the [facility] [place] [facility].     Lab Testing:  If you had lab testing today and your results are reassuring or normal they will be mailed to you or sent through [employer] within 7 days. If the lab tests need quick action we will call you with the results. The phone number we will call with results is # [phone]. If this is not the best number please call our clinic and change the number.     Medication Refills:  If you need any refills please call your pharmacy and they will contact us. Our fax number for refills is [phone].   Three business days of notice are needed for general medication refill requests.   Five business days of notice are needed for controlled substance refill requests.   If you need to change to a different pharmacy, please contact the new pharmacy directly. The new pharmacy will help you get your medications transferred.     Contact Us:  Please call [phone] during business hours (8-5:00 M-F).   If you have medication related questions after clinic hours, or on the weekend, please call [phone].     Financial Assistance [phone]   Medical Records [phone]       MENTAL HEALTH CRISIS RESOURCES:  For a emergency help, please call 911 or go to the nearest Emergency Department.     Emergency Walk-In Options:   [facility] Unit @ [facility] ([place]): [phone] - Specialized mental health emergency area designed to be calming  [facility]  ([place]): [phone]  [facility] ([place]): [phone]  [facility] ([place]): [phone]    [facility] Crisis Information:   [place]: [phone]  [place]: [phone]  [place] ([place]) - Adult: [phone]     Child: [phone]  [place] - Adult: [phone]     Child: [phone]  [place]: [phone]  List of all [facility] resources:   [email or web address]-[email or web address]/resources/crisis-contacts.jsp    National Crisis Information:   Crisis Text Line: Text  [place]  to 205722  Suicide & Crisis Lifeline: 988  National Suicide Prevention Lifeline: 0-344-089-TALK (1-125.211.7808)       For online chat options, visit https://suicidepreventionlifeline.org/chat/  Poison Control Center: 4-483-949-5406  Trans Lifeline: 2-439-293-8565 - Hotline for transgender people of all ages  [employer]: 4-378-822-0621 - Hotline for LGBT youth     For Non-Emergency Support:   Fast Tracker: Mental Health & Substance Use Disorder Resources -   https://www.[employer].org/

## 2022-11-29 VITALS
TEMPERATURE: 98.5 F | BODY MASS INDEX: 22.15 KG/M2 | DIASTOLIC BLOOD PRESSURE: 77 MMHG | SYSTOLIC BLOOD PRESSURE: 115 MMHG | WEIGHT: 125 LBS | HEIGHT: 63 IN | HEART RATE: 78 BPM | OXYGEN SATURATION: 99 % | RESPIRATION RATE: 18 BRPM

## 2022-11-29 PROCEDURE — 250N000013 HC RX MED GY IP 250 OP 250 PS 637: Performed by: FAMILY MEDICINE

## 2022-11-29 RX ORDER — OXYCODONE HYDROCHLORIDE 5 MG/1
10 TABLET ORAL EVERY 4 HOURS PRN
Status: DISCONTINUED | OUTPATIENT
Start: 2022-11-29 | End: 2022-11-29 | Stop reason: HOSPADM

## 2022-11-29 RX ORDER — OXYCODONE HYDROCHLORIDE 5 MG/1
5 TABLET ORAL EVERY 6 HOURS PRN
Qty: 6 TABLET | Refills: 0 | Status: SHIPPED | OUTPATIENT
Start: 2022-11-29 | End: 2023-04-10

## 2022-11-29 RX ADMIN — OXYCODONE HYDROCHLORIDE 10 MG: 5 TABLET ORAL at 00:27

## 2022-11-29 NOTE — ED TRIAGE NOTES
Pt presents after fall down 8 stairs. Pt reports slipping and sliding down the carpeted steps. Reports left shoulder, arm, rib pain. Has not taken anything for pain. Bilateral lung sounds noted on auscultation. Pt denies LOC. Reports taking eliquis.      Triage Assessment     Row Name 11/28/22 7672       Triage Assessment (Adult)    Airway WDL WDL       Respiratory WDL    Respiratory WDL WDL       Skin Circulation/Temperature WDL    Skin Circulation/Temperature WDL WDL       Cardiac WDL    Cardiac WDL WDL       Peripheral/Neurovascular WDL    Peripheral Neurovascular WDL WDL       Cognitive/Neuro/Behavioral WDL    Cognitive/Neuro/Behavioral WDL WDL
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
## 2022-11-29 NOTE — DISCHARGE INSTRUCTIONS
ICD-10-CM    1. Fall down stairs, initial encounter  W10.8XXA       2. Contusion of left chest wall, initial encounter  S20.212A     no signs of rib fracture.  maintain deep breathing -use incentive spirometer.  return for worsening, shortness of breath      3. Closed head injury, initial encounter  S09.90XA     no signs of intracranial bleeding.  receck in clinic. hold the elqouis until tomorrow night      4. Contusion of left elbow, initial encounter  S50.02XA     no serious findings on imaging      5. Acromioclavicular separation, left, initial encounter  S43.102A     follow-up ortho. maintain shoulder motion but avoid overuse and overhead work.  take tylenol for pain,

## 2022-11-30 NOTE — TELEPHONE ENCOUNTER
10/7 vrt with Oscar  __________________________________________  Plan: Standing orders for urine cultures will be entered today and she will obtain a culture within the next several days.     I will call her back regarding the results of this culture and also arrange for her to come into clinic for further direct follow-up and review of her current status as to both frequency of urination and recurrent urinary tract infection.     She may be a candidate for a beta 3 agonist possibly in combination with the tolterodine if her insurance will cover it.  
Please let patient know she has a UTI. I sent antibiotics. 
Reason for Call:  Other call back    Detailed comments: Patient calling to discuss UA results she saw on [employer]. Please advise.     Phone Number Patient can be reached at: Home number on file [phone] (home)    Best Time: any    Can we leave a detailed message on this number? YES    Call taken on 11/14/2022 at 2:28 PM by Dimitri Monroe      
Admission

## 2022-12-13 ENCOUNTER — ANCILLARY PROCEDURE (OUTPATIENT)
Dept: GENERAL RADIOLOGY | Facility: CLINIC | Age: 57
End: 2022-12-13
Attending: INTERNAL MEDICINE
Payer: MEDICARE

## 2022-12-13 ENCOUNTER — OFFICE VISIT (OUTPATIENT)
Dept: INTERNAL MEDICINE | Facility: CLINIC | Age: 57
End: 2022-12-13
Payer: MEDICARE

## 2022-12-13 VITALS
WEIGHT: 133.3 LBS | SYSTOLIC BLOOD PRESSURE: 113 MMHG | DIASTOLIC BLOOD PRESSURE: 74 MMHG | HEART RATE: 80 BPM | BODY MASS INDEX: 23.61 KG/M2 | OXYGEN SATURATION: 98 %
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
## 2022-12-13 DIAGNOSIS — S40.012S CONTUSION OF LEFT SHOULDER, SEQUELA: Primary | ICD-10-CM

## 2022-12-13 DIAGNOSIS — S20.212S CONTUSION OF LEFT CHEST WALL, SEQUELA: ICD-10-CM

## 2022-12-13 PROCEDURE — 71100 X-RAY EXAM RIBS UNI 2 VIEWS: CPT | Mod: LT | Performed by: RADIOLOGY

## 2022-12-13 PROCEDURE — 71046 X-RAY EXAM CHEST 2 VIEWS: CPT | Mod: GC | Performed by: RADIOLOGY

## 2022-12-13 PROCEDURE — 99214 OFFICE O/P EST MOD 30 MIN: CPT | Performed by: INTERNAL MEDICINE

## 2022-12-13 NOTE — NURSING NOTE
Sherly Yeung is a 57 year old female patient that presents today in clinic for the following:    Chief Complaint   Patient presents with     Pain     Left side rib and shoulder pain per pt      The patient's allergies and medications were reviewed as noted. A set of vitals were recorded as noted without incident: /74 (BP Location: Right arm, Patient Position: Sitting, Cuff Size: Adult Regular)   Pulse 80   Wt 60.5 kg (133 lb 4.8 oz)   LMP 08/22/2017 (Approximate)   SpO2 98%   BMI 23.61 kg/m  . The patient does not have any other questions for the provider.    Carol Damon, EMT at 12:42 PM on 12/13/2022
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
## 2022-12-15 ENCOUNTER — MYC MEDICAL ADVICE (OUTPATIENT)
Dept: PSYCHIATRY | Facility: CLINIC | Age: 57
End: 2022-12-15

## 2022-12-15 DIAGNOSIS — F33.1 MODERATE EPISODE OF RECURRENT MAJOR DEPRESSIVE DISORDER (H): ICD-10-CM

## 2022-12-15 RX ORDER — ARIPIPRAZOLE 5 MG/1
5 TABLET ORAL DAILY
Qty: 30 TABLET | Refills: 0 | Status: SHIPPED | OUTPATIENT
Start: 2022-12-15 | End: 2023-01-06

## 2022-12-15 NOTE — TELEPHONE ENCOUNTER
Writer called patient to follow up on [email or web address] message. Patient states that depression and anxiety have worsened since her last visit, but she denies any safety concerns of SI, SIB, HI and hallucinations. Patient states she would be willing to restart Abilify. Preferred pharmacy is [facility] in [place].     Per last visit note: - stop aripiprazole, resume 5mg if having problems

## 2022-12-15 NOTE — TELEPHONE ENCOUNTER
Writer called patient to inform that Abilify prescription had been sent in. Plan for writer to reach out to patient for check in next week and patient in agreement. No further questions or concerns at this time.

## 2022-12-21 NOTE — TELEPHONE ENCOUNTER
Writer called to follow up with patient. Patient reports she has been taking the Abilify since 12/16, but has not noticed much improvement in her symptoms. Patient continues to deny safety concerns of SI, SIB, HI and hallucinations.     Patient does report stressors of having to host two [place] gatherings for her 's family as well as her own. She also reports that her mother puts additional stress on her. Writer discussed utilizing TIPP skills with patient and she was receptive to this. Patient also reports that she likes to knit and pray as coping skills.    Writer will check in with patient after the holidays to see if symptoms have improved.

## 2022-12-27 ENCOUNTER — TRANSFERRED RECORDS (OUTPATIENT)
Dept: HEALTH INFORMATION MANAGEMENT | Facility: CLINIC | Age: 57
End: 2022-12-27

## 2022-12-28 ENCOUNTER — HOSPITAL ENCOUNTER (OUTPATIENT)
Dept: PHYSICAL THERAPY | Facility: CLINIC | Age: 57
Setting detail: THERAPIES SERIES
Discharge: HOME | End: 2022-12-28
Attending: INTERNAL MEDICINE
Payer: MEDICARE

## 2022-12-28 DIAGNOSIS — S40.012S CONTUSION OF LEFT SHOULDER, SEQUELA: ICD-10-CM

## 2022-12-28 PROCEDURE — 97161 PT EVAL LOW COMPLEX 20 MIN: CPT | Mod: GP | Performed by: PHYSICAL THERAPIST

## 2022-12-28 PROCEDURE — 97110 THERAPEUTIC EXERCISES: CPT | Mod: GP | Performed by: PHYSICAL THERAPIST

## 2022-12-28 NOTE — TELEPHONE ENCOUNTER
Juan M Guillen MD  You 46 minutes ago (2:22 PM)     GRACY  Thank you for talking to Marcia. My first thought was to see if the increased symptoms improve over the next week or so.     But:     1) I think it is important to see if there is any correlation with medications: The concern here is she is taking max dose Cymbalta and at risk for serotonin syndrome (fever, confusion, twitching, abnormal movements, GI symptoms, etc). Patient denies any of these symptoms.             1. Did symptoms worsen when Abilify was re-introduced or when restarting oxycodone? Patient states no, they did not.            2. How much Ritalin, oxycodone, and Imitrex is she using? These all add to serotonergic load.   Ritalin - 40 mg in AM, 40 mg at 1PM  Oxycodone: - dosage varies depending on whether she has a migraine. She reports there are days where she won't take any. If migraine, she may take two 5 mg tablets daily or more if migraine is not relieved.  Imitrex - This varies depending on migraine.  *Patient states there have not been any changes to the amount she has been taking recently.    2) Another idea is to inquire about Day Treatment/IOP -we can talk about this at the appt next week. Patient declines day treatment/IOP at this time.    3) I would not want to adjust meds (unless decreasing them) prior to next visit. Can place referral for IOP in the meantime if Marcia would like to get the ball rolling now.     -Juan M

## 2022-12-28 NOTE — TELEPHONE ENCOUNTER
Writer attempted to call patient to check in. LVM requesting a call back at the main clinic number.

## 2022-12-28 NOTE — TELEPHONE ENCOUNTER
Patient returned call. She states the holidays were very busy, but she was able to find a little enjoyment over the weekend. She does report a bit of an increase in anxiety and depression since the holidays, but is not sure why. Patient reports she is still taking her Abilify. She denies any safety concerns of SI, SIB, HI and hallucinations and knows to contact 911 or go to the nearest emergency department if acute safety concerns arise. Patient is okay waiting to speak with Dr. Guillen until next week and will contact the clinic if she has any questions or concerns before then.

## 2023-01-05 ASSESSMENT — PATIENT HEALTH QUESTIONNAIRE - PHQ9
SUM OF ALL RESPONSES TO PHQ QUESTIONS 1-9: 6
SUM OF ALL RESPONSES TO PHQ QUESTIONS 1-9: 6
10. IF YOU CHECKED OFF ANY PROBLEMS, HOW DIFFICULT HAVE THESE PROBLEMS MADE IT FOR YOU TO DO YOUR WORK, TAKE CARE OF THINGS AT HOME, OR GET ALONG WITH OTHER PEOPLE: SOMEWHAT DIFFICULT

## 2023-01-06 ENCOUNTER — VIRTUAL VISIT (OUTPATIENT)
Dept: PSYCHIATRY | Facility: CLINIC | Age: 58
End: 2023-01-06
Attending: PSYCHIATRY & NEUROLOGY
Payer: MEDICARE

## 2023-01-06 DIAGNOSIS — F33.1 MODERATE EPISODE OF RECURRENT MAJOR DEPRESSIVE DISORDER (H): Primary | ICD-10-CM

## 2023-01-06 DIAGNOSIS — F41.9 ANXIETY: ICD-10-CM

## 2023-01-06 PROCEDURE — 99214 OFFICE O/P EST MOD 30 MIN: CPT | Mod: GC | Performed by: STUDENT IN AN ORGANIZED HEALTH CARE EDUCATION/TRAINING PROGRAM

## 2023-01-06 RX ORDER — ARIPIPRAZOLE 5 MG/1
5 TABLET ORAL DAILY
Qty: 30 TABLET | Refills: 1 | Status: SHIPPED | OUTPATIENT
Start: 2023-01-06 | End: 2023-02-03
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
## 2023-01-06 NOTE — PROGRESS NOTES
Sherly Yeung is a 57 year old who is being evaluated via a billable video visit.      Pt will join video visit via: [employer]  If there are problems joining the visit, send backup video invite via: Text to preferred phone: [phone]    Reason for telehealth visit: Patient has requested telehealth visit    Originating location (patient location): Patient's home    Will anyone else be joining the visit? No

## 2023-01-06 NOTE — PATIENT INSTRUCTIONS
It was nice seeing you today    Treatment Plan Today:     1) Medications-   - Continue duloxetine 120 mg daily   - Continue Abilify 5 mg daily    2) Follow-up appt with Dr Guillen in 4-6 weeks    3) Therapy resources:  [facility]    4) Crisis numbers are below and clinic after hours number is [phone]       **For crisis resources, please see the information at the end of this document**   Patient Education    Thank you for coming to the [facility] [place] [facility].     Lab Testing:  If you had lab testing today and your results are reassuring or normal they will be mailed to you or sent through [employer] within 7 days. If the lab tests need quick action we will call you with the results. The phone number we will call with results is # [phone]. If this is not the best number please call our clinic and change the number.     Medication Refills:  If you need any refills please call your pharmacy and they will contact us. Our fax number for refills is [phone].   Three business days of notice are needed for general medication refill requests.   Five business days of notice are needed for controlled substance refill requests.   If you need to change to a different pharmacy, please contact the new pharmacy directly. The new pharmacy will help you get your medications transferred.     Contact Us:  Please call [phone] during business hours (8-5:00 M-F).   If you have medication related questions after clinic hours, or on the weekend, please call [phone].     Financial Assistance [phone]   Medical Records [phone]       MENTAL HEALTH CRISIS RESOURCES:  For a emergency help, please call 911 or go to the nearest Emergency Department.     Emergency Walk-In Options:   [facility] Unit @ [place] [facility] ([place]): [phone] - Specialized mental health emergency area designed to be calming  [facility] ([place]):  [phone]  [facility] ([place]): [phone]  [facility] ([place]): [phone]    [facility] Crisis Information:   [place]: [phone]  [place]: [phone]  [place] ([place]) - Adult: [phone]     Child: [phone]  [place] - Adult: [phone]     Child: [phone]  [place]: [phone]  List of all [facility] resources:   https://[place].gov/dhs/people-we-serve/adults/health-care/mental-health/resources/crisis-contacts.jsp    National Crisis Information:   Crisis Text Line: Text  [place]  to 268249  Suicide & Crisis Lifeline: 988  National Suicide Prevention Lifeline: 7-913-551-TALK (1-829.366.4425)       For online chat options, visit https://suicidepreventionlifeline.org/chat/  Poison Control Center: 1-670.130.5385  Trans Lifeline: 2-791-530-7340 - Hotline for transgender people of all ages  [employer]: 9-191-935-1077 - Hotline for LGBT youth     For Non-Emergency Support:   Fast Tracker: Mental Health & Substance Use Disorder Resources -   https://www.Seirathermn.org/

## 2023-01-14 PROBLEM — F33.9 MAJOR DEPRESSIVE DISORDER, RECURRENT EPISODE (H): Status: RESOLVED | Noted: 2019-07-19 | Resolved: 2020-10-20

## 2023-01-14 RX ORDER — ALMOTRIPTAN 12.5 MG/1
12.5 TABLET, FILM COATED ORAL
COMMUNITY
Start: 2022-10-13 | End: 2022-10-24

## 2023-01-14 RX ORDER — ATOGEPANT 10 MG/1
TABLET ORAL
COMMUNITY
Start: 2022-09-23 | End: 2022-10-13

## 2023-01-14 RX ORDER — METHYLPHENIDATE HYDROCHLORIDE 20 MG/1
40 TABLET ORAL 2 TIMES DAILY
COMMUNITY
Start: 2022-06-10 | End: 2023-04-13

## 2023-01-14 RX ORDER — ZOLMITRIPTAN 2.5 MG/1
TABLET, FILM COATED ORAL
COMMUNITY
Start: 2022-06-10 | End: 2022-07-10

## 2023-01-14 RX ORDER — HYDROMORPHONE HYDROCHLORIDE 3 MG/1
3 SUPPOSITORY RECTAL EVERY 4 HOURS PRN
Refills: 1 | COMMUNITY
Start: 2022-12-13 | End: 2022-12-19

## 2023-01-14 RX ORDER — PREGABALIN 25 MG/1
CAPSULE ORAL 2 TIMES DAILY
COMMUNITY
Start: 2022-10-13 | End: 2023-02-10

## 2023-01-14 RX ORDER — TOPIRAMATE 100 MG/1
200 TABLET, FILM COATED ORAL 2 TIMES DAILY
COMMUNITY
Start: 2022-04-04 | End: 2024-01-17

## 2023-01-19 ENCOUNTER — TRANSFERRED RECORDS (OUTPATIENT)
Dept: HEALTH INFORMATION MANAGEMENT | Facility: CLINIC | Age: 58
End: 2023-01-19

## 2023-01-26 ENCOUNTER — TELEPHONE (OUTPATIENT)
Dept: PSYCHIATRY | Facility: CLINIC | Age: 58
End: 2023-01-26
Payer: MEDICARE

## 2023-01-26 NOTE — TELEPHONE ENCOUNTER
Health Call Center    Phone Message    May a detailed message be left on voicemail: yes     Reason for Call: Other: Pt called to let Kirstin know that she recieved the ALONDRA and wanted to confirm what to do next with the form. The writer informed her she can email the completed ALONDRA back to [email or web address] or if she has access, to send it as an attachment through Search to Phone. Pt stated the form needed to be recieved as soon as possible.      Action Taken: Kirstin Serna and [facility] psych pool    Travel Screening: Not Applicable

## 2023-01-27 ENCOUNTER — MYC MEDICAL ADVICE (OUTPATIENT)
Dept: INTERNAL MEDICINE | Facility: CLINIC | Age: 58
End: 2023-01-27
Payer: MEDICARE

## 2023-01-27 NOTE — TELEPHONE ENCOUNTER
Spoke with pt. She has been having RUQ pain, it comes and goes. No fever. Normal bowl/bladder. Mild nausea. I offered the appt today, but she could not make it and I scheduled her on 1/30. Over the weekend, if worsens, she should go to ED.

## 2023-01-30 ENCOUNTER — OFFICE VISIT (OUTPATIENT)
Dept: UROLOGY | Facility: CLINIC | Age: 58
End: 2023-01-30
Payer: MEDICARE

## 2023-01-30 ENCOUNTER — LAB (OUTPATIENT)
Dept: LAB | Facility: CLINIC | Age: 58
End: 2023-01-30
Payer: MEDICARE

## 2023-01-30 ENCOUNTER — OFFICE VISIT (OUTPATIENT)
Dept: INTERNAL MEDICINE | Facility: CLINIC | Age: 58
End: 2023-01-30
Payer: MEDICARE

## 2023-01-30 VITALS
BODY MASS INDEX: 23.04 KG/M2 | DIASTOLIC BLOOD PRESSURE: 86 MMHG | OXYGEN SATURATION: 99 % | SYSTOLIC BLOOD PRESSURE: 123 MMHG | HEART RATE: 99 BPM | HEIGHT: 63 IN | WEIGHT: 130 LBS

## 2023-01-30 VITALS
HEART RATE: 104 BPM | SYSTOLIC BLOOD PRESSURE: 136 MMHG | DIASTOLIC BLOOD PRESSURE: 88 MMHG | WEIGHT: 134 LBS | BODY MASS INDEX: 23.74 KG/M2 | HEIGHT: 63 IN | OXYGEN SATURATION: 97 %

## 2023-01-30 DIAGNOSIS — N39.41 URGENCY INCONTINENCE: ICD-10-CM

## 2023-01-30 DIAGNOSIS — R10.11 RIGHT UPPER QUADRANT PAIN: Primary | ICD-10-CM

## 2023-01-30 DIAGNOSIS — N39.0 RECURRENT UTI: Primary | ICD-10-CM

## 2023-01-30 DIAGNOSIS — R10.11 RIGHT UPPER QUADRANT PAIN: ICD-10-CM

## 2023-01-30 LAB
ALBUMIN SERPL BCG-MCNC: 4.1 G/DL (ref 3.5–5.2)
ALBUMIN UR-MCNC: NEGATIVE MG/DL
ALP SERPL-CCNC: 135 U/L (ref 35–104)
ALT SERPL W P-5'-P-CCNC: 6 U/L (ref 10–35)
AMORPH CRY #/AREA URNS HPF: ABNORMAL /HPF
ANION GAP SERPL CALCULATED.3IONS-SCNC: 7 MMOL/L (ref 7–15)
APPEARANCE UR: ABNORMAL
AST SERPL W P-5'-P-CCNC: 15 U/L (ref 10–35)
BILIRUB SERPL-MCNC: <0.2 MG/DL
BILIRUB UR QL STRIP: NEGATIVE
BUN SERPL-MCNC: 13.2 MG/DL (ref 6–20)
CALCIUM SERPL-MCNC: 9.2 MG/DL (ref 8.6–10)
CHLORIDE SERPL-SCNC: 109 MMOL/L (ref 98–107)
COLOR UR AUTO: YELLOW
CREAT SERPL-MCNC: 0.8 MG/DL (ref 0.51–0.95)
DEPRECATED HCO3 PLAS-SCNC: 25 MMOL/L (ref 22–29)
ERYTHROCYTE [DISTWIDTH] IN BLOOD BY AUTOMATED COUNT: 12.8 % (ref 10–15)
GFR SERPL CREATININE-BSD FRML MDRD: 85 ML/MIN/1.73M2
GLUCOSE SERPL-MCNC: 99 MG/DL (ref 70–99)
GLUCOSE UR STRIP-MCNC: NEGATIVE MG/DL
HCT VFR BLD AUTO: 43.3 % (ref 35–47)
HGB BLD-MCNC: 13.9 G/DL (ref 11.7–15.7)
HGB UR QL STRIP: ABNORMAL
KETONES UR STRIP-MCNC: NEGATIVE MG/DL
LEUKOCYTE ESTERASE UR QL STRIP: NEGATIVE
LIPASE SERPL-CCNC: 33 U/L (ref 13–60)
MCH RBC QN AUTO: 30.8 PG (ref 26.5–33)
MCHC RBC AUTO-ENTMCNC: 32.1 G/DL (ref 31.5–36.5)
MCV RBC AUTO: 96 FL (ref 78–100)
NITRATE UR QL: NEGATIVE
PH UR STRIP: 7.5 [PH] (ref 5–7)
PLATELET # BLD AUTO: 327 10E3/UL (ref 150–450)
POTASSIUM SERPL-SCNC: 4.7 MMOL/L (ref 3.4–5.3)
PROT SERPL-MCNC: 6.9 G/DL (ref 6.4–8.3)
RBC # BLD AUTO: 4.52 10E6/UL (ref 3.8–5.2)
RBC #/AREA URNS AUTO: ABNORMAL /HPF
SODIUM SERPL-SCNC: 141 MMOL/L (ref 136–145)
SP GR UR STRIP: 1.01 (ref 1–1.03)
SQUAMOUS #/AREA URNS AUTO: ABNORMAL /LPF
UROBILINOGEN UR STRIP-ACNC: 0.2 E.U./DL
WBC # BLD AUTO: 10.4 10E3/UL (ref 4–11)
WBC #/AREA URNS AUTO: ABNORMAL /HPF

## 2023-01-30 PROCEDURE — 99203 OFFICE O/P NEW LOW 30 MIN: CPT | Mod: 25 | Performed by: STUDENT IN AN ORGANIZED HEALTH CARE EDUCATION/TRAINING PROGRAM

## 2023-01-30 PROCEDURE — 36415 COLL VENOUS BLD VENIPUNCTURE: CPT | Performed by: PATHOLOGY

## 2023-01-30 PROCEDURE — 51798 US URINE CAPACITY MEASURE: CPT | Performed by: STUDENT IN AN ORGANIZED HEALTH CARE EDUCATION/TRAINING PROGRAM

## 2023-01-30 PROCEDURE — 80053 COMPREHEN METABOLIC PANEL: CPT | Performed by: PATHOLOGY

## 2023-01-30 PROCEDURE — 83690 ASSAY OF LIPASE: CPT | Performed by: PATHOLOGY

## 2023-01-30 PROCEDURE — 87186 SC STD MICRODIL/AGAR DIL: CPT | Performed by: STUDENT IN AN ORGANIZED HEALTH CARE EDUCATION/TRAINING PROGRAM

## 2023-01-30 PROCEDURE — 85027 COMPLETE CBC AUTOMATED: CPT | Performed by: PATHOLOGY

## 2023-01-30 PROCEDURE — 81001 URINALYSIS AUTO W/SCOPE: CPT | Performed by: STUDENT IN AN ORGANIZED HEALTH CARE EDUCATION/TRAINING PROGRAM

## 2023-01-30 PROCEDURE — 87086 URINE CULTURE/COLONY COUNT: CPT | Performed by: STUDENT IN AN ORGANIZED HEALTH CARE EDUCATION/TRAINING PROGRAM

## 2023-01-30 PROCEDURE — 99214 OFFICE O/P EST MOD 30 MIN: CPT | Performed by: INTERNAL MEDICINE

## 2023-01-30 RX ORDER — MIRABEGRON 25 MG/1
25 TABLET, FILM COATED, EXTENDED RELEASE ORAL DAILY
Qty: 90 TABLET | Refills: 3 | Status: SHIPPED | OUTPATIENT
Start: 2023-01-30 | End: 2023-04-24

## 2023-01-30 NOTE — NURSING NOTE
Chief Complaint   Patient presents with     UTI     Follow up recurrent UTI:Per patient having a hard time with bladder leakage:taking cranberry capsules daily helped with frequency and burning:Sx still present urgency/burning/frequency       There were no vitals filed for this visit.  Wt Readings from Last 1 Encounters:   01/30/23 60.8 kg (134 lb)       Paz Jimenez MA    Active order to obtain bladder scan? Yes   Name of ordering provider:  Betty Olivares  Bladder scan preformed post void yes.  Bladder scan reveled 0ML  Provider notified?  Yes    Barbara Mullen
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
## 2023-01-30 NOTE — NURSING NOTE
"Sherly Yeung is a 57 year old female patient that presents today in clinic for the following:    Chief Complaint   Patient presents with     Abdominal Pain     Pt reports RUQ pain that comes and goes, going on for about 4-5 days     The patient's allergies and medications were reviewed as noted. A set of vitals were recorded as noted without incident: /88 (BP Location: Right arm, Patient Position: Sitting, Cuff Size: Adult Regular)   Pulse 104   Ht 1.6 m (5' 2.99\")   Wt 60.8 kg (134 lb)   LMP 08/22/2017 (Approximate)   SpO2 97%   BMI 23.74 kg/m  . The patient does not have any other questions for the provider.    Minesh Allison, Visit Facilitator 8:44 AM on 1/30/2023   "
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
## 2023-02-01 ENCOUNTER — HOSPITAL ENCOUNTER (OUTPATIENT)
Dept: ULTRASOUND IMAGING | Facility: HOSPITAL | Age: 58
Discharge: HOME | End: 2023-02-01
Attending: INTERNAL MEDICINE | Admitting: INTERNAL MEDICINE
Payer: MEDICARE

## 2023-02-01 DIAGNOSIS — R10.11 RIGHT UPPER QUADRANT PAIN: ICD-10-CM

## 2023-02-01 DIAGNOSIS — N39.0 RECURRENT UTI: Primary | ICD-10-CM

## 2023-02-01 LAB — BACTERIA UR CULT: ABNORMAL

## 2023-02-01 PROCEDURE — 76705 ECHO EXAM OF ABDOMEN: CPT

## 2023-02-01 RX ORDER — NITROFURANTOIN 25; 75 MG/1; MG/1
100 CAPSULE ORAL 2 TIMES DAILY
Qty: 10 CAPSULE | Refills: 0 | Status: SHIPPED | OUTPATIENT
Start: 2023-02-01 | End: 2023-02-06

## 2023-02-02 ENCOUNTER — MYC MEDICAL ADVICE (OUTPATIENT)
Dept: INTERNAL MEDICINE | Facility: CLINIC | Age: 58
End: 2023-02-02
Payer: MEDICARE

## 2023-02-03 ENCOUNTER — MYC MEDICAL ADVICE (OUTPATIENT)
Dept: PSYCHIATRY | Facility: CLINIC | Age: 58
End: 2023-02-03
Payer: MEDICARE

## 2023-02-03 ENCOUNTER — VIRTUAL VISIT (OUTPATIENT)
Dept: PSYCHIATRY | Facility: CLINIC | Age: 58
End: 2023-02-03
Attending: PSYCHIATRY & NEUROLOGY
Payer: MEDICARE

## 2023-02-03 DIAGNOSIS — F33.1 MODERATE EPISODE OF RECURRENT MAJOR DEPRESSIVE DISORDER (H): ICD-10-CM

## 2023-02-03 PROCEDURE — G0463 HOSPITAL OUTPT CLINIC VISIT: HCPCS | Mod: PN

## 2023-02-03 PROCEDURE — 99214 OFFICE O/P EST MOD 30 MIN: CPT | Mod: GC | Performed by: STUDENT IN AN ORGANIZED HEALTH CARE EDUCATION/TRAINING PROGRAM

## 2023-02-03 RX ORDER — ARIPIPRAZOLE 5 MG/1
5 TABLET ORAL DAILY
Qty: 30 TABLET | Refills: 2 | Status: SHIPPED | OUTPATIENT
Start: 2023-02-03 | End: 2023-04-08

## 2023-02-03 RX ORDER — DULOXETIN HYDROCHLORIDE 60 MG/1
120 CAPSULE, DELAYED RELEASE ORAL EVERY EVENING
Qty: 60 CAPSULE | Refills: 2 | Status: SHIPPED | OUTPATIENT
Start: 2023-02-03 | End: 2023-04-08

## 2023-02-03 NOTE — PATIENT INSTRUCTIONS
It was nice seeing you today    Treatment Plan Today:     1) Medications-  - Continue duloxetine 120 mg daily  - Continue Abilify 5mg daily    2) Follow-up appt with Dr Guillen on 3/17/23 at 10AM    3) We will be in contact if we have questions regarding your ALONDRA for [facility] or recommend at testing.    4) Crisis numbers are below and clinic after hours number is [phone]     **For crisis resources, please see the information at the end of this document**   Patient Education    Thank you for coming to the [facility] [place] [facility].     Lab Testing:  If you had lab testing today and your results are reassuring or normal they will be mailed to you or sent through [email or web address] within 7 days. If the lab tests need quick action we will call you with the results. The phone number we will call with results is # [phone]. If this is not the best number please call our clinic and change the number.     Medication Refills:  If you need any refills please call your pharmacy and they will contact us. Our fax number for refills is [phone].   Three business days of notice are needed for general medication refill requests.   Five business days of notice are needed for controlled substance refill requests.   If you need to change to a different pharmacy, please contact the new pharmacy directly. The new pharmacy will help you get your medications transferred.     Contact Us:  Please call [phone] during business hours (8-5:00 M-F).   If you have medication related questions after clinic hours, or on the weekend, please call [phone].     Financial Assistance [phone]   Medical Records [phone]       MENTAL HEALTH CRISIS RESOURCES:  For a emergency help, please call 911 or go to the nearest Emergency Department.     Emergency Walk-In Options:   EmPATH Unit @ [facility] ([place]): [phone] - Specialized mental health emergency area designed to be calming  [place]  [facility] ([place]): [phone]  [facility] ([place]): [phone]  [facility] ([place]): [phone]    [facility] Crisis Information:   [place]: [phone]  [place]: [phone]  [place] (DAG[place]) - Adult: [phone]     Child: [phone]  [place] - Adult: [phone]     Child: [phone]  [place]: [phone]  List of all [facility] resources:   https://[place].[facility]/dhs/people-we-serve/adults/health-care/mental-health/resources/crisis-contacts.jsp    National Crisis Information:   Crisis Text Line: Text  [place]  to 827315  Suicide & Crisis Lifeline: 988  National Suicide Prevention Lifeline: 1-621-953-TALK (1-435.634.9450)       For online chat options, visit https://suicidepreventionlifeline.org/chat/  Poison Control Center: 1-976.118.6110  Trans Lifeline: 1-422.340.1591 - Hotline for transgender people of all ages  The Suresh Project: 1-218.591.6051 - Hotline for LGBT youth     For Non-Emergency Support:   Fast Tracker: Mental Health & Substance Use Disorder Resources -   https://www.fasttrackermn.org/          **For crisis resources, please see the information at the end of this document**   Patient Education    Thank you for coming to the [facility] MENTAL HEALTH & ADDICTION [place] [facility].     Lab Testing:  If you had lab testing today and your results are reassuring or normal they will be mailed to you or sent through [employer] within 7 days. If the lab tests need quick action we will call you with the results. The phone number we will call with results is # [phone]. If this is not the best number please call our clinic and change the number.     Medication Refills:  If you need any refills please call your pharmacy and they will contact us. Our fax number for refills is [phone].   Three business days of notice are needed for general medication refill requests.   Five business days of notice are needed for controlled substance  refill requests.   If you need to change to a different pharmacy, please contact the new pharmacy directly. The new pharmacy will help you get your medications transferred.     Contact Us:  Please call [phone] during business hours (8-5:00 M-F).   If you have medication related questions after clinic hours, or on the weekend, please call [phone].     Financial Assistance [phone]   Medical Records [phone]       MENTAL HEALTH CRISIS RESOURCES:  For a emergency help, please call 911 or go to the nearest Emergency Department.     Emergency Walk-In Options:   EmPATH Unit @ [facility] ([place]): [phone] - Specialized mental health emergency area designed to be calming  [facility] ([place]): [phone]  [facility] ([place]): [phone]  [facility] ([place]): [phone]    County Crisis Information:   [place]: [phone]  [place]: [phone]  [place] (COPE) - Adult: [phone]     Child: [phone]  [place] - Adult: [phone]     Child: [phone]  [place]: [phone]  List of all [facility] resources:   https://[place].[facility]/dhs/people-we-serve/adults/health-care/mental-health/resources/crisis-contacts.jsp    National Crisis Information:   Crisis Text Line: Text  [place]  to 547967  Suicide & Crisis Lifeline: 988  National Suicide Prevention Lifeline: 6-080-117-TALK (1-515.930.8815)       For online chat options, visit https://suicidepreventionlifeline.org/chat/  Poison Control Center: 1-466.551.8256  Trans Lifeline: 1-933.385.4725 - Hotline for transgender people of all ages  The Suresh Project: 4-002-063-3007 - Hotline for LGBT youth     For Non-Emergency Support:   Fast Tracker: Mental Health & Substance Use Disorder Resources -   https://www.Lumetricsn.org/
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
## 2023-02-03 NOTE — PROGRESS NOTES
Sherly Yeung is a 57 year old who is being evaluated via a billable video visit.      Pt will join video visit via: [employer]  If there are problems joining the visit, send backup video invite via: Text to preferred phone: [phone]    Reason for telehealth visit: Patient has requested telehealth visit    Originating location (patient location): Patient's home    Will anyone else be joining the visit? No

## 2023-02-04 RX ORDER — HYDROMORPHONE HYDROCHLORIDE 3 MG/1
SUPPOSITORY RECTAL
COMMUNITY
Start: 2023-01-25 | End: 2023-01-27

## 2023-02-06 NOTE — TELEPHONE ENCOUNTER
Writer faxed 2 pages including signed ALONDRA to [facility], Attn Dr. Theodore / Medical Records. Hard copy placed on desk of Carolyn CARIAS pending receipt of medical records. Yudith Pack, EMT

## 2023-02-08 ENCOUNTER — DOCUMENTATION ONLY (OUTPATIENT)
Dept: INTERNAL MEDICINE | Facility: CLINIC | Age: 58
End: 2023-02-08
Payer: MEDICARE

## 2023-02-08 NOTE — PROGRESS NOTES
Type of Form Received: order    Form Received (Date) 2/8/23   Form Filled out Yes 2/27/23   Placed in provider folder Yes

## 2023-02-09 ASSESSMENT — PATIENT HEALTH QUESTIONNAIRE - PHQ9
10. IF YOU CHECKED OFF ANY PROBLEMS, HOW DIFFICULT HAVE THESE PROBLEMS MADE IT FOR YOU TO DO YOUR WORK, TAKE CARE OF THINGS AT HOME, OR GET ALONG WITH OTHER PEOPLE: SOMEWHAT DIFFICULT
SUM OF ALL RESPONSES TO PHQ QUESTIONS 1-9: 4
SUM OF ALL RESPONSES TO PHQ QUESTIONS 1-9: 4

## 2023-02-10 ENCOUNTER — DOCUMENTATION ONLY (OUTPATIENT)
Dept: PSYCHOLOGY | Facility: CLINIC | Age: 58
End: 2023-02-10
Payer: MEDICARE

## 2023-02-10 ENCOUNTER — VIRTUAL VISIT (OUTPATIENT)
Dept: PSYCHOLOGY | Facility: CLINIC | Age: 58
End: 2023-02-10
Payer: MEDICARE

## 2023-02-10 DIAGNOSIS — F33.0 MDD (MAJOR DEPRESSIVE DISORDER), RECURRENT EPISODE, MILD (H): Primary | ICD-10-CM

## 2023-02-10 PROCEDURE — 90837 PSYTX W PT 60 MINUTES: CPT | Mod: VID | Performed by: SOCIAL WORKER

## 2023-02-10 ASSESSMENT — COLUMBIA-SUICIDE SEVERITY RATING SCALE - C-SSRS
ATTEMPT SINCE LAST CONTACT: NO
1. SINCE LAST CONTACT, HAVE YOU WISHED YOU WERE DEAD OR WISHED YOU COULD GO TO SLEEP AND NOT WAKE UP?: NO
2. HAVE YOU ACTUALLY HAD ANY THOUGHTS OF KILLING YOURSELF?: NO
SUICIDE, SINCE LAST CONTACT: NO
6. HAVE YOU EVER DONE ANYTHING, STARTED TO DO ANYTHING, OR PREPARED TO DO ANYTHING TO END YOUR LIFE?: NO
TOTAL  NUMBER OF ABORTED OR SELF INTERRUPTED ATTEMPTS SINCE LAST CONTACT: NO
TOTAL  NUMBER OF INTERRUPTED ATTEMPTS SINCE LAST CONTACT: NO

## 2023-02-10 NOTE — CONFIDENTIAL NOTE
Patient did not mention dyspnea or oxygen when I met with her. I would advise she be seen again in clinic to evaluate her concerns comprehensively.  Thanks,  Za Mckeon MD  Internal Medicine

## 2023-02-10 NOTE — PROGRESS NOTES
"      [facility]   Mental Health & Addiction Services     Progress Note - Initial Visit    Patient  Name:  Sherly Yeung Date: 2/10/2023         Service Type: Individual     Visit Start Time: 9:03 Visit End Time: 9:58    Visit #: 1    Attendees: Client    Service Modality:  Video Visit:      Provider verified identity through the following two step process.  Patient provided:  Patient photo, Patient  and Patient address    Telemedicine Visit: The patient's condition can be safely assessed and treated via synchronous audio and visual telemedicine encounter.      Reason for Telemedicine Visit: Services only offered telehealth    Originating Site (Patient Location): Patient's home    Distant Site (Provider Location): [facility] [place]    Consent:  The patient/guardian has verbally consented to: the potential risks and benefits of telemedicine (video visit) versus in person care; bill my insurance or make self-payment for services provided; and responsibility for payment of non-covered services.     Patient would like the video invitation sent by:  My Chart    Mode of Communication:  Video Conference via [facility]    Distant Location (Provider):  On-site    As the provider I attest to compliance with applicable laws and regulations related to telemedicine.    DATA:   Interactive Complexity: No   Crisis: No     Presenting Concerns/  Current Stressors: \"Depression, anxiety: sad about son. It makes me cry, feel down, not doing anything, I get anxious because I have lot of things in my plate. Both parents ar in assisted living facility. My niece has a mental health going on probably bipolar disorder, she has SI. Niece's parents are asking to assist help her because I am an RN\"  She reports she grew up in [place], [place] in the [facility]. She was  raised by biological parents. She reports a very good childhood.  Though reports she had Anorexia nervosa " as a teenager. Mom was narcissistic and dad was alcoholic. Later in her teen like at age 17, sheI helped dad to stop drinking. He never had to drink again. She has no miranda a good relationship with mother but does get along well with father except he has an early dementia. Patient shared the struggle on how she and brother moved parents to [place] from [place]. Both parents now live in an assisted living facility but separate because mom(81) refused to live with dad(84) when they moved in even though they are still . Patient and brother are the power of  for father. Mom has not allowed them to manager her finances and make some tough decision on her behalf. Patient wishes to process her thoughts round these issues and learn how to cope. Her next visit is on March 20th.    ASSESSMENT:  Mental Status Assessment:  Appearance:   Appropriate   Eye Contact:   Good   Psychomotor Behavior: Normal   Attitude:   Cooperative   Orientation:   Person Place Time Situation  Speech   Rate / Production: Normal/ Responsive   Volume:  Normal   Mood:    Normal  Affect:    Appropriate   Thought Content:  Clear   Thought Form:  Coherent  Logical   Insight:    Good     Safety Issues and Plan for Safety and Risk Management:     Hand Suicide Severity Rating Scale (Short Version)  Hand Suicide Severity Rating (Short Version) 1/12/2022 3/28/2022 5/6/2022 9/2/2022 9/30/2022 11/28/2022 2/10/2023   Over the past 2 weeks have you felt down, depressed, or hopeless? no no no no no no -   Over the past 2 weeks have you had thoughts of killing yourself? no - no no no no -   Have you ever attempted to kill yourself? no - no no no no -   1. Wish to be Dead (Since Last Contact) - - - - - - 0   2. Non-Specific Active Suicidal Thoughts (Since Last Contact) - - - - - - 0   Actual Attempt (Since Last Contact) - - - - - - 0   Has subject engaged in non-suicidal self-injurious behavior? (Since Last Contact) - - - - - - 0   Interrupted Attempts  (Since Last Contact) - - - - - - 0   Aborted or Self-Interrupted Attempt (Since Last Contact) - - - - - - 0   Preparatory Acts or Behavior (Since Last Contact) - - - - - - 0   Suicide (Since Last Contact) - - - - - - 0   Calculated C-SSRS Risk Score (Since Last Contact) - - - - - - No Risk Indicated     Patient denies current fears or concerns for personal safety.  Patient denies current or recent suicidal ideation or behaviors.  Patient denies current or recent homicidal ideation or behaviors.  Patient denies current or recent self injurious behavior or ideation.  Patient denies other safety concerns.  Recommended that patient call 911 or go to the local ED should there be a change in any of these risk factors.  Patient reports there are no firearms in the house.     Diagnostic Criteria:  Major Depressive Disorder   - Depressed mood. Note: In children and adolescents, can be irritable mood.     - Diminished interest or pleasure in all, or almost all, activities.    - Fatigue or loss of energy.    - Diminished ability to think or concentrate, or indecisiveness.   C) The episode is not attributable to the physiological effects of a substance or to another medical condition  D) The occurence of major depressive episode is not better explained by other thought / psychotic disorders  E) There has never been a manic episode or hypomanic episode    DSM5 Diagnoses: (Sustained by DSM5 Criteria Listed Above)  Diagnoses: 296.31 (F33.0) Major Depressive Disorder, Recurrent Episode, Mild _ and With anxious distress  Psychosocial & Contextual Factors: History of anxiety and depression. Concerns about strained relationship with son.   WHODAS 2.0 (12 item):   WHODAS 2.0 Total Score 2/9/2023   Total Score 20   Total Score MyChart 20     Intervention:  AIDET was performed. working rapport has started. Assessment was intiated.  Future appts were scheduled.EncourageSome self care, positive self talk  Collateral Reports  Completed:  Routed note to PCP    PLAN: (Homework, other):  1. Provider will continue Diagnostic Assessment.  Patient was given the following to do until next session:  Some self care, positive self talk    2. Provider recommended the following referrals: no referral for today    3.  Suicide Risk and Safety Concerns were assessed for Sherly Yeung: no SI reported, nor noted    Erika Mello [facility]  February 10, 2023      Answers for HPI/ROS submitted by the patient on 2/9/2023  If you checked off any problems, how difficult have these problems made it for you to do your work, take care of things at home, or get along with other people?: Somewhat difficult  PHQ9 TOTAL SCORE: 4

## 2023-02-14 ENCOUNTER — ANCILLARY PROCEDURE (OUTPATIENT)
Dept: GENERAL RADIOLOGY | Facility: CLINIC | Age: 58
End: 2023-02-14
Attending: INTERNAL MEDICINE
Payer: MEDICARE

## 2023-02-14 ENCOUNTER — OFFICE VISIT (OUTPATIENT)
Dept: INTERNAL MEDICINE | Facility: CLINIC | Age: 58
End: 2023-02-14
Payer: MEDICARE

## 2023-02-14 ENCOUNTER — LAB (OUTPATIENT)
Dept: LAB | Facility: CLINIC | Age: 58
End: 2023-02-14
Payer: MEDICARE

## 2023-02-14 VITALS
HEART RATE: 82 BPM | OXYGEN SATURATION: 95 % | DIASTOLIC BLOOD PRESSURE: 78 MMHG | SYSTOLIC BLOOD PRESSURE: 123 MMHG | RESPIRATION RATE: 16 BRPM

## 2023-02-14 DIAGNOSIS — I25.10 ATHEROSCLEROTIC CARDIOVASCULAR DISEASE: ICD-10-CM

## 2023-02-14 DIAGNOSIS — R10.11 RUQ ABDOMINAL PAIN: ICD-10-CM

## 2023-02-14 DIAGNOSIS — R06.02 SHORTNESS OF BREATH: ICD-10-CM

## 2023-02-14 DIAGNOSIS — R10.11 RUQ ABDOMINAL PAIN: Primary | ICD-10-CM

## 2023-02-14 PROCEDURE — 99215 OFFICE O/P EST HI 40 MIN: CPT | Mod: GC

## 2023-02-14 PROCEDURE — 71046 X-RAY EXAM CHEST 2 VIEWS: CPT | Mod: GC | Performed by: RADIOLOGY

## 2023-02-14 NOTE — PATIENT INSTRUCTIONS
To schedule your echocardiogram (ECHO), please call [phone].     To schedule an lab appointment at the [facility], please call [phone].     To schedule your X-Ray with imaging, please call [phone]

## 2023-02-14 NOTE — NURSING NOTE
Sherly Yeung is a 57 year old female patient that presents today in clinic for the following:    Chief Complaint   Patient presents with     Follow Up     Shortness of breath, low oxygen per pt      Abdominal Pain     Upper abdominal pain      The patient's allergies and medications were reviewed as noted. A set of vitals were recorded as noted without incident: /78 (BP Location: Right arm, Patient Position: Sitting, Cuff Size: Adult Regular)   Pulse 82   Resp 16   LMP 08/22/2017 (Approximate)   SpO2 95% . The patient does not have any other questions for the provider.    Carol Damon, EMT at 7:43 AM on 2/14/2023
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
## 2023-02-23 ENCOUNTER — VIRTUAL VISIT (OUTPATIENT)
Dept: PSYCHOLOGY | Facility: CLINIC | Age: 58
End: 2023-02-23
Payer: MEDICARE

## 2023-02-23 ENCOUNTER — MEDICAL CORRESPONDENCE (OUTPATIENT)
Dept: HEALTH INFORMATION MANAGEMENT | Facility: CLINIC | Age: 58
End: 2023-02-23
Payer: MEDICARE

## 2023-02-23 DIAGNOSIS — F33.0 MDD (MAJOR DEPRESSIVE DISORDER), RECURRENT EPISODE, MILD (H): Primary | ICD-10-CM

## 2023-02-23 DIAGNOSIS — F43.21 GRIEF REACTION: ICD-10-CM

## 2023-02-23 DIAGNOSIS — F41.1 GAD (GENERALIZED ANXIETY DISORDER): ICD-10-CM

## 2023-02-23 PROCEDURE — 90791 PSYCH DIAGNOSTIC EVALUATION: CPT | Mod: VID | Performed by: SOCIAL WORKER

## 2023-02-23 ASSESSMENT — ANXIETY QUESTIONNAIRES
7. FEELING AFRAID AS IF SOMETHING AWFUL MIGHT HAPPEN: SEVERAL DAYS
GAD7 TOTAL SCORE: 7
5. BEING SO RESTLESS THAT IT IS HARD TO SIT STILL: SEVERAL DAYS
1. FEELING NERVOUS, ANXIOUS, OR ON EDGE: SEVERAL DAYS
IF YOU CHECKED OFF ANY PROBLEMS ON THIS QUESTIONNAIRE, HOW DIFFICULT HAVE THESE PROBLEMS MADE IT FOR YOU TO DO YOUR WORK, TAKE CARE OF THINGS AT HOME, OR GET ALONG WITH OTHER PEOPLE: SOMEWHAT DIFFICULT
3. WORRYING TOO MUCH ABOUT DIFFERENT THINGS: SEVERAL DAYS
2. NOT BEING ABLE TO STOP OR CONTROL WORRYING: SEVERAL DAYS
6. BECOMING EASILY ANNOYED OR IRRITABLE: SEVERAL DAYS
GAD7 TOTAL SCORE: 7

## 2023-02-23 ASSESSMENT — PATIENT HEALTH QUESTIONNAIRE - PHQ9: 5. POOR APPETITE OR OVEREATING: SEVERAL DAYS

## 2023-02-23 NOTE — PROGRESS NOTES
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 Depressive Disorder, Recurrent Episode, mild _ and With anxious distress.  4. Other Diagnoses that is relevant to services:   300.02 (F41.1) Generalized Anxiety Disorder.  5. Provisional Diagnosis:  296.32 (F33.1) Major Depressive Disorder, Recurrent Episode, Mild _ and With anxious distress; 'Grief reaction [F43.21] as evidenced by chart review, clinical interview, clinical inventories. Mental status.    6. Prognosis: Expect Improvement.  7. Likely consequences of symptoms if not treated: she would become worse.  8. Client strengths include:  caring, educated, goal-focused, has a previous history of therapy, intelligent, motivated, open to learning, supportive, wants to learn, willing to ask questions, willing to relate to others and work history .     Recommendations:   1. Plan for Safety and Risk Management:   Safety and Risk: Recommended that patient call 911 or go to the local ED should there be a change in any of these risk factors..          Report to child / adult protection services was NA.     2. Patient's identified mental health concerns with a cultural influence will be addressed by patient.     3. Initial Treatment will focus on:    Depressed Mood -  increase motivation and energy  Anxiety - challenge any automatic negative thoughts/distortions   Relational Problems related to: Parent / child conflict  Grief / Loss - process loss of parent/father.     4. Resources/Service Plan:    services are not indicated.   Modifications to assist communication are not indicated.   Additional disability accommodations are not indicated.      5. Collaboration:   Collaboration / coordination of treatment will be initiated with the following  support professionals: primary care physician.      6.  Referrals:   The following referral(s) will be initiated: no referral discussed for now. Next Scheduled Appointment: 3/20/2023     A Release of Information has been obtained for the following: No ALONDRA for [facility] care team.     Emergency Contact was  obtained.      Clinical Substantiation/medical necessity for the above recommendations:  Patient is requesting basic services to address current mental health concerns.     7. GIL:  No GIL and writer and patient Discussed the general effects of drugs and alcohol on health and well-being.    8. Records:   These were reviewed at time of assessment.   Information in this assessment was obtained from the medical record and  provided by patient who is a good historian. Patient will have open access to her mental health medical record.    9.   Interactive Complexity: No    Provider Name/ Credentials: SAVI Farmer; [facility] February 23, 2023

## 2023-02-27 NOTE — LETTER
8/2/2021         RE: Sherly Yeung  [address] [place] [address]        Dear Colleague,    Thank you for referring your patient, Sherly Yeung, to the [facility] [place]. Please see a copy of my visit note below.    [facility] Outpatient Medical Nutrition Therapy      Referring Physician: Bib  Reason for RD Visit: Gastroparesis    Nutrition Plan: Gastroparesis diet    Recommendations for MD/Provider to order: None at this time    Malnutrition Diagnosis: Patient does not meet the criteria necessary for diagnosing malnutrition    Nutrition Assessment:  Patient is here for intial visit with Registered Dietitian (RD). Patient is a 55 year old female found to have mildly delayed gastric emptying on gastric emptying study 7/9/2021. Presents today for gastroparesis diet education. Today recalls that symptoms have remained relatively unchanged since emptying study 7/9. Continues to struggle with upper right quadrant pain and reflux. Symptoms worsen as the day goes on and are worst in the evening.    Symptom Review  1. Nausea/vomiting? No  2. Heartburn? Yes: NA  3. Bloating? No  4. Belching? NA  5. Feeling full quickly? No  6. Decreased appetite? Yes: Maybe a little bit  7. Weight loss/gain? No  8. Constipation/Diarrhea? NA  9. Urgency? NA  10. Incomplete Bowel Emptying? NA  11. Abdominal pain/pain with or without eating? NA  12. Feeling tired? NA    Dietary beliefs and Practices  1.  Have you modified your diet since symptoms started?  No   2.  Are there specific foods you avoid? No  3.  Have you received prior advice on diet?  No   A. If so, source? NA  4.  Have you, or do you follow, a specific diet?  No   A. Which one(s)?  NA   B. Did/Do you find it beneficial?  NA    I. If able to articulate, what specifically is the benefit? NA  5.  Do you take any vitamin, mineral, or herbal supplements? Yes: Vitamin D; Magnesium  6.  Do you use any calorie/protein supplements?   Returned call. Explained to Pt that the XR was entered incase she was unable to obtain the CT. Pt scheduled for F/U. Pt verbalized understanding and was encouraged to call with questions or concerns.     No    Diet Recall:  (Typical Day)  Meal Name Time Food    Breakfast  Bowl of cereal (special K with berries OR raisin bran) or scrambled eggs or pancakes AND glass of OJ or water        Lunch  Cottage cheese and pretzels OR greek yogurt with blueberries        Dinner  Pasta and chicken OR pizza OR hamburger          Sometimes popcorn        Snacks  None   Beverages  Typically water or gatorade   Alcohol   None     Frequency of eating/taking out meals: 1/week  Food access/availability: Fine  Food preparation confidence/abilities: Fine    Anthropometrics:   Height: Data Unavailable  Weight: 123 pounds  BMI: There is no height or weight on file to calculate BMI.    Weight History:  Wt Readings from Last 10 Encounters:   06/23/21 56.7 kg (125 lb)   04/26/21 62.1 kg (137 lb)   12/02/20 58.4 kg (128 lb 12.8 oz)   10/14/19 57.7 kg (127 lb 4.8 oz)   09/09/19 56.2 kg (123 lb 14.4 oz)   08/09/19 51.7 kg (114 lb)   02/06/19 51.9 kg (114 lb 6.4 oz)   01/22/18 53.3 kg (117 lb 6.4 oz)   01/22/18 52.8 kg (116 lb 6.4 oz)   12/05/17 51.3 kg (113 lb)     Usual Weight: 115-120 pounds  Weight change in past 6 months: Stable    Labs: Reviewed  Pertinent Medications/vitamin and mineral supplements:   Current Outpatient Medications   Medication     alendronate (FOSAMAX) 70 MG tablet     ARIPiprazole (ABILIFY) 5 MG tablet     aspirin (ASA) 81 MG chewable tablet     B Complex Vitamins (B COMPLEX 1 PO)     butalbital-acetaminophen-caffeine (ESGIC) -40 MG tablet     Cholecalciferol (VITAMIN D) 1000 UNITS capsule     DULoxetine (CYMBALTA) 60 MG capsule     HYDROmorphone (DILAUDID) 3 MG Suppository     LANsoprazole (PREVACID) 30 MG DR capsule     Magnesium Oxide -Mg Supplement 400 MG CAPS     methylphenidate (RITALIN) 20 MG tablet     [START ON 8/2/2021] methylphenidate (RITALIN) 20 MG tablet     metoprolol succinate ER (TOPROL-XL) 25 MG 24 hr tablet     Ondansetron (ZUPLENZ) 8 MG FILM     oxyCODONE (OXY-IR) 5 MG capsule     oxyCODONE  (OXYCONTIN) 10 MG 12 hr tablet     QUEtiapine (SEROQUEL) 50 MG tablet     riboflavin (VITAMIN  B-2) 100 MG TABS tablet     spironolactone (ALDACTONE) 25 MG tablet     SUMAtriptan (IMITREX STATDOSE) 6 MG/0.5ML pen injector kit     SUMAtriptan Succinate 6 MG/0.5ML KIT     tolterodine ER (DETROL LA) 4 MG 24 hr capsule     topiramate (TOPAMAX) 100 MG tablet     Zinc 50 MG CAPS     zinc gluconate 50 MG tablet     No current facility-administered medications for this visit.       Food Allergies: None  Food Intolerances: None  Physical Activity: Tries to go for walks    MALNUTRITION:  % Weight Loss:  None noted  % Intake:  No decreased intake noted  Subcutaneous Fat Loss:  None observed  Muscle Loss:  None observed  Fluid Retention:  None noted    Nutrition Diagnosis:    Food and nutrition related knowledge deficit related to IBD as evidenced by need for diet education.    Nutrition Prescription: Gastroparesis diet    Nutrition Intervention:    Nutrition Education/Counseling:  The texture, volume, frequency, and make up of foods can affect how quickly or slowly they empty from your stomach. Liquids empty the stomach easier than solids. Studies have demonstrated increased symptoms, such as nausea, associated with meals in the following order: high-fat solid > low-fat solid > high-fat liquid > low-fat liquid.  If symptoms get worse during day, try solid food meals in morning, switching to more liquid meals later in the day.     Educational Materials Provided: None at this time  Patient verbalized understanding of education provided. See all recommendations under Goals.    Goals:  1. Eat small, frequent meals, and chew food thoroughly (especially meat). The larger the meal, the slower the stomach will empty. Smaller meals more often (6-8 or more if needed) may allow you to eat enough.    2. Switch to more liquid-type foods. Liquids empty the stomach more easily than solids do  --If symptoms get worse during day, try solid  food meals in morning, switching to more liquid meals later in the day, such as soups and pureed foods     3. Eat well-cooked fruits and vegetables rather than raw fruits and vegetables    4. Choose mostly low-fat foods, especially solids    5. Avoid carbonated drinks, alcohol and smoking    6. Sit upright for at least 1 hour after eating. If you can take a walk or get up and move around after eating, this will help the stomach to empty faster.     Nutrition Monitoring and Evaluation: Will monitor adherence to nutrition recommendations at future RD visits.     Further Medical Nutrition Therapy: As needed  Next Appointment (if applicable): PRN  Patient was encouraged to call/contact RD with any further questions.          Again, thank you for allowing me to participate in the care of your patient.        Sincerely,    Kobi Rodriges RD

## 2023-03-06 ENCOUNTER — TELEPHONE (OUTPATIENT)
Dept: CARDIOLOGY | Facility: CLINIC | Age: 58
End: 2023-03-06

## 2023-03-06 NOTE — TELEPHONE ENCOUNTER
Called pt to discuss. Discussed that if she can wear a mask and does not have a fever she can still have echo. Pt verbalized understanding and agreed with plan. Brenna Frost RN Cardiology March 6, 2023, 10:21 AM

## 2023-03-06 NOTE — TELEPHONE ENCOUNTER
[facility] Call Center    Phone Message    May a detailed message be left on voicemail: yes     Reason for Call: Other:     Pt is calling to inform that she has a sinus infection and is asking if today's echo can still be completed.  Please back asap to inform.    Action Taken: Other: cardio    Travel Screening: Not Applicable     Thank you!  [facility]

## 2023-03-17 ENCOUNTER — VIRTUAL VISIT (OUTPATIENT)
Dept: PSYCHIATRY | Facility: CLINIC | Age: 58
End: 2023-03-17
Attending: PSYCHIATRY & NEUROLOGY
Payer: MEDICARE

## 2023-03-17 ENCOUNTER — LAB (OUTPATIENT)
Dept: LAB | Facility: CLINIC | Age: 58
End: 2023-03-17
Payer: MEDICARE

## 2023-03-17 DIAGNOSIS — N39.0 RECURRENT UTI: ICD-10-CM

## 2023-03-17 DIAGNOSIS — F33.1 MODERATE EPISODE OF RECURRENT MAJOR DEPRESSIVE DISORDER (H): Primary | ICD-10-CM

## 2023-03-17 DIAGNOSIS — F43.21 GRIEF: ICD-10-CM

## 2023-03-17 LAB
ALBUMIN UR-MCNC: NEGATIVE MG/DL
AMORPH CRY #/AREA URNS HPF: ABNORMAL /HPF
APPEARANCE UR: ABNORMAL
BACTERIA #/AREA URNS HPF: ABNORMAL /HPF
BILIRUB UR QL STRIP: NEGATIVE
COLOR UR AUTO: YELLOW
GLUCOSE UR STRIP-MCNC: NEGATIVE MG/DL
HGB UR QL STRIP: NEGATIVE
KETONES UR STRIP-MCNC: NEGATIVE MG/DL
LEUKOCYTE ESTERASE UR QL STRIP: NEGATIVE
NITRATE UR QL: NEGATIVE
PH UR STRIP: 7.5 [PH] (ref 5–7)
RBC #/AREA URNS AUTO: ABNORMAL /HPF
SP GR UR STRIP: 1.02 (ref 1–1.03)
SQUAMOUS #/AREA URNS AUTO: ABNORMAL /LPF
UROBILINOGEN UR STRIP-ACNC: 1 E.U./DL
WBC #/AREA URNS AUTO: ABNORMAL /HPF

## 2023-03-17 PROCEDURE — G0463 HOSPITAL OUTPT CLINIC VISIT: HCPCS | Mod: PN,GT | Performed by: STUDENT IN AN ORGANIZED HEALTH CARE EDUCATION/TRAINING PROGRAM

## 2023-03-17 PROCEDURE — 81001 URINALYSIS AUTO W/SCOPE: CPT

## 2023-03-17 PROCEDURE — 99215 OFFICE O/P EST HI 40 MIN: CPT | Mod: VID | Performed by: STUDENT IN AN ORGANIZED HEALTH CARE EDUCATION/TRAINING PROGRAM

## 2023-03-17 ASSESSMENT — PATIENT HEALTH QUESTIONNAIRE - PHQ9
SUM OF ALL RESPONSES TO PHQ QUESTIONS 1-9: 4
10. IF YOU CHECKED OFF ANY PROBLEMS, HOW DIFFICULT HAVE THESE PROBLEMS MADE IT FOR YOU TO DO YOUR WORK, TAKE CARE OF THINGS AT HOME, OR GET ALONG WITH OTHER PEOPLE: SOMEWHAT DIFFICULT
SUM OF ALL RESPONSES TO PHQ QUESTIONS 1-9: 4

## 2023-03-17 NOTE — PROGRESS NOTES
Sherly Yeung is a 57 year old who is being evaluated via a billable video visit.      Pt will join video visit via: Planwise  If there are problems joining the visit, send backup video invite via: Send to preferred e-mail: [email or web address]    Originating location (patient location): Patient's home    Will anyone else be joining the visit? No      Answers for HPI/ROS submitted by the patient on 3/17/2023  If you checked off any problems, how difficult have these problems made it for you to do your work, take care of things at home, or get along with other people?: Somewhat difficult  PHQ9 TOTAL SCORE: 4

## 2023-03-17 NOTE — PATIENT INSTRUCTIONS
It was nice seeing you today    Treatment Plan Today:     1) Medications-  - Continue duloxetine 120 mg daily  - Continue Abilify 5mg daily    2) Follow-up appt with Dr Guillen in 3 weeks    3) We will call to check in next week on 3/23.    4) Crisis numbers are below and clinic after hours number is [phone]     **For crisis resources, please see the information at the end of this document**   Patient Education    Thank you for coming to the [facility] [place] [facility].     Lab Testing:  If you had lab testing today and your results are reassuring or normal they will be mailed to you or sent through [employer] within 7 days. If the lab tests need quick action we will call you with the results. The phone number we will call with results is # [phone]. If this is not the best number please call our clinic and change the number.     Medication Refills:  If you need any refills please call your pharmacy and they will contact us. Our fax number for refills is [phone].   Three business days of notice are needed for general medication refill requests.   Five business days of notice are needed for controlled substance refill requests.   If you need to change to a different pharmacy, please contact the new pharmacy directly. The new pharmacy will help you get your medications transferred.     Contact Us:  Please call [phone] during business hours (8-5:00 M-F).   If you have medication related questions after clinic hours, or on the weekend, please call [phone].     Financial Assistance [phone]   Medical Records [phone]       MENTAL HEALTH CRISIS RESOURCES:  For a emergency help, please call 911 or go to the nearest Emergency Department.     Emergency Walk-In Options:   EmPATH Unit @ [facility] ([place]): [phone] - Specialized mental health emergency area designed to be calming  [facility] ([place]): [phone]  [facility] ([place]): [phone]  [facility] ([place]): [phone]    [employer] Crisis Information:   [place]: [phone]  [place]: [phone]  [place] ([place]) - Adult: [phone]     Child: [phone]  [place] - Adult: [phone]     Child: [phone]  [place]: [phone]  List of all [facility] resources:   [email or web address]-[email or web address]-care/mental-[email or web address]/resources/crisis-contacts.jsp    National Crisis Information:   Crisis Text Line: Text  [place]  to 660861  Suicide & Crisis Lifeline: 988  National Suicide Prevention Lifeline: 8-057-627-TALK (1-130.624.2703)       For online chat options, visit https://suicidepreventionlifeline.org/chat/  Poison Control Center: 1-334.605.3848  Trans Lifeline: 1-436.579.6563 - Hotline for transgender people of all ages  [employer]: 8-666-106-4509 - Hotline for LGBT youth     For Non-Emergency Support:   Fast Tracker: Mental Health & Substance Use Disorder Resources -   https://[email or web address].[employer].[email or web address]/
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
## 2023-03-20 ENCOUNTER — HOSPITAL ENCOUNTER (EMERGENCY)
Facility: CLINIC | Age: 58
Discharge: HOME | End: 2023-03-20
Attending: EMERGENCY MEDICINE | Admitting: EMERGENCY MEDICINE
Payer: MEDICARE

## 2023-03-20 ENCOUNTER — APPOINTMENT (OUTPATIENT)
Dept: GENERAL RADIOLOGY | Facility: CLINIC | Age: 58
End: 2023-03-20
Attending: EMERGENCY MEDICINE
Payer: MEDICARE

## 2023-03-20 VITALS
RESPIRATION RATE: 20 BRPM | DIASTOLIC BLOOD PRESSURE: 77 MMHG | SYSTOLIC BLOOD PRESSURE: 106 MMHG | HEART RATE: 85 BPM | OXYGEN SATURATION: 97 %

## 2023-03-20 DIAGNOSIS — R07.9 ACUTE CHEST PAIN: ICD-10-CM

## 2023-03-20 DIAGNOSIS — Z79.01 CHRONIC ANTICOAGULATION: ICD-10-CM

## 2023-03-20 DIAGNOSIS — J20.9 ACUTE BRONCHITIS, UNSPECIFIED ORGANISM: ICD-10-CM

## 2023-03-20 DIAGNOSIS — I87.1 SVC SYNDROME: ICD-10-CM

## 2023-03-20 DIAGNOSIS — R04.2 HEMOPTYSIS: ICD-10-CM

## 2023-03-20 DIAGNOSIS — Z86.711 HISTORY OF PULMONARY EMBOLISM: ICD-10-CM

## 2023-03-20 DIAGNOSIS — I82.B11 SUBCLAVIAN VEIN THROMBOSIS, RIGHT (H): ICD-10-CM

## 2023-03-20 LAB
ALBUMIN SERPL BCG-MCNC: 4.3 G/DL (ref 3.5–5.2)
ALP SERPL-CCNC: 137 U/L (ref 35–104)
ALT SERPL W P-5'-P-CCNC: 12 U/L (ref 10–35)
ANION GAP SERPL CALCULATED.3IONS-SCNC: 11 MMOL/L (ref 7–15)
AST SERPL W P-5'-P-CCNC: 17 U/L (ref 10–35)
BASOPHILS # BLD AUTO: 0 10E3/UL (ref 0–0.2)
BASOPHILS NFR BLD AUTO: 0 %
BILIRUB SERPL-MCNC: 0.2 MG/DL
BUN SERPL-MCNC: 12.4 MG/DL (ref 6–20)
CALCIUM SERPL-MCNC: 9.8 MG/DL (ref 8.6–10)
CHLORIDE SERPL-SCNC: 105 MMOL/L (ref 98–107)
CREAT SERPL-MCNC: 0.84 MG/DL (ref 0.51–0.95)
D DIMER PPP FEU-MCNC: 0.33 UG/ML FEU (ref 0–0.5)
DEPRECATED HCO3 PLAS-SCNC: 23 MMOL/L (ref 22–29)
EOSINOPHIL # BLD AUTO: 0.2 10E3/UL (ref 0–0.7)
EOSINOPHIL NFR BLD AUTO: 2 %
ERYTHROCYTE [DISTWIDTH] IN BLOOD BY AUTOMATED COUNT: 13.5 % (ref 10–15)
GFR SERPL CREATININE-BSD FRML MDRD: 81 ML/MIN/1.73M2
GLUCOSE SERPL-MCNC: 94 MG/DL (ref 70–99)
HCT VFR BLD AUTO: 41.8 % (ref 35–47)
HGB BLD-MCNC: 13.7 G/DL (ref 11.7–15.7)
HOLD SPECIMEN: NORMAL
HOLD SPECIMEN: NORMAL
IMM GRANULOCYTES # BLD: 0 10E3/UL
IMM GRANULOCYTES NFR BLD: 0 %
LYMPHOCYTES # BLD AUTO: 1.7 10E3/UL (ref 0.8–5.3)
LYMPHOCYTES NFR BLD AUTO: 19 %
MCH RBC QN AUTO: 31.1 PG (ref 26.5–33)
MCHC RBC AUTO-ENTMCNC: 32.8 G/DL (ref 31.5–36.5)
MCV RBC AUTO: 95 FL (ref 78–100)
MONOCYTES # BLD AUTO: 0.7 10E3/UL (ref 0–1.3)
MONOCYTES NFR BLD AUTO: 7 %
NEUTROPHILS # BLD AUTO: 6.4 10E3/UL (ref 1.6–8.3)
NEUTROPHILS NFR BLD AUTO: 72 %
NRBC # BLD AUTO: 0 10E3/UL
NRBC BLD AUTO-RTO: 0 /100
PLATELET # BLD AUTO: 353 10E3/UL (ref 150–450)
POTASSIUM SERPL-SCNC: 4.3 MMOL/L (ref 3.4–5.3)
PROT SERPL-MCNC: 8 G/DL (ref 6.4–8.3)
RBC # BLD AUTO: 4.4 10E6/UL (ref 3.8–5.2)
SODIUM SERPL-SCNC: 139 MMOL/L (ref 136–145)
WBC # BLD AUTO: 9 10E3/UL (ref 4–11)

## 2023-03-20 PROCEDURE — 96375 TX/PRO/DX INJ NEW DRUG ADDON: CPT | Performed by: EMERGENCY MEDICINE

## 2023-03-20 PROCEDURE — 93005 ELECTROCARDIOGRAM TRACING: CPT | Performed by: EMERGENCY MEDICINE

## 2023-03-20 PROCEDURE — 36415 COLL VENOUS BLD VENIPUNCTURE: CPT | Performed by: EMERGENCY MEDICINE

## 2023-03-20 PROCEDURE — 71046 X-RAY EXAM CHEST 2 VIEWS: CPT

## 2023-03-20 PROCEDURE — 96365 THER/PROPH/DIAG IV INF INIT: CPT | Performed by: EMERGENCY MEDICINE

## 2023-03-20 PROCEDURE — 250N000011 HC RX IP 250 OP 636: Performed by: EMERGENCY MEDICINE

## 2023-03-20 PROCEDURE — 85379 FIBRIN DEGRADATION QUANT: CPT | Performed by: EMERGENCY MEDICINE

## 2023-03-20 PROCEDURE — 99285 EMERGENCY DEPT VISIT HI MDM: CPT | Mod: 25 | Performed by: EMERGENCY MEDICINE

## 2023-03-20 PROCEDURE — 250N000013 HC RX MED GY IP 250 OP 250 PS 637: Performed by: EMERGENCY MEDICINE

## 2023-03-20 PROCEDURE — 93010 ELECTROCARDIOGRAM REPORT: CPT | Performed by: EMERGENCY MEDICINE

## 2023-03-20 PROCEDURE — 85025 COMPLETE CBC W/AUTO DIFF WBC: CPT | Performed by: EMERGENCY MEDICINE

## 2023-03-20 PROCEDURE — 80053 COMPREHEN METABOLIC PANEL: CPT | Performed by: EMERGENCY MEDICINE

## 2023-03-20 RX ORDER — ONDANSETRON 2 MG/ML
4 INJECTION INTRAMUSCULAR; INTRAVENOUS ONCE
Status: COMPLETED | OUTPATIENT
Start: 2023-03-20 | End: 2023-03-20

## 2023-03-20 RX ORDER — DOXYCYCLINE 100 MG/1
100 CAPSULE ORAL 2 TIMES DAILY
Qty: 14 CAPSULE | Refills: 0 | Status: SHIPPED | OUTPATIENT
Start: 2023-03-20 | End: 2023-03-27

## 2023-03-20 RX ORDER — DOXYCYCLINE 100 MG/1
100 CAPSULE ORAL ONCE
Status: COMPLETED | OUTPATIENT
Start: 2023-03-20 | End: 2023-03-20

## 2023-03-20 RX ORDER — OXYCODONE HYDROCHLORIDE 5 MG/1
5 TABLET ORAL EVERY 6 HOURS PRN
Qty: 12 TABLET | Refills: 0 | Status: SHIPPED | OUTPATIENT
Start: 2023-03-20 | End: 2023-03-23

## 2023-03-20 RX ORDER — OXYCODONE HYDROCHLORIDE 5 MG/1
5 TABLET ORAL EVERY 4 HOURS PRN
Status: DISCONTINUED | OUTPATIENT
Start: 2023-03-20 | End: 2023-03-20 | Stop reason: HOSPADM

## 2023-03-20 RX ORDER — CEFTRIAXONE 2 G/1
2 INJECTION, POWDER, FOR SOLUTION INTRAMUSCULAR; INTRAVENOUS ONCE
Status: COMPLETED | OUTPATIENT
Start: 2023-03-20 | End: 2023-03-20

## 2023-03-20 RX ADMIN — CEFTRIAXONE 2 G: 2 INJECTION, POWDER, FOR SOLUTION INTRAMUSCULAR; INTRAVENOUS at 19:30

## 2023-03-20 RX ADMIN — ONDANSETRON 4 MG: 2 INJECTION INTRAMUSCULAR; INTRAVENOUS at 19:23

## 2023-03-20 RX ADMIN — DOXYCYCLINE HYCLATE 100 MG: 100 CAPSULE ORAL at 19:32

## 2023-03-20 RX ADMIN — OXYCODONE HYDROCHLORIDE 5 MG: 5 TABLET ORAL at 19:25

## 2023-03-20 ASSESSMENT — ACTIVITIES OF DAILY LIVING (ADL): ADLS_ACUITY_SCORE: 37
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
## 2023-03-22 ENCOUNTER — TELEPHONE (OUTPATIENT)
Dept: INTERNAL MEDICINE | Facility: CLINIC | Age: 58
End: 2023-03-22
Payer: MEDICARE

## 2023-03-23 ENCOUNTER — OFFICE VISIT (OUTPATIENT)
Dept: INTERNAL MEDICINE | Facility: CLINIC | Age: 58
End: 2023-03-23
Payer: MEDICARE

## 2023-03-23 ENCOUNTER — TELEPHONE (OUTPATIENT)
Dept: PSYCHIATRY | Facility: CLINIC | Age: 58
End: 2023-03-23

## 2023-03-23 VITALS
WEIGHT: 130 LBS | HEIGHT: 63 IN | BODY MASS INDEX: 23.04 KG/M2 | TEMPERATURE: 97.7 F | HEART RATE: 97 BPM | OXYGEN SATURATION: 97 % | SYSTOLIC BLOOD PRESSURE: 107 MMHG | DIASTOLIC BLOOD PRESSURE: 80 MMHG

## 2023-03-23 DIAGNOSIS — R07.81 PLEURITIC CHEST PAIN: ICD-10-CM

## 2023-03-23 DIAGNOSIS — Z79.01 CHRONIC ANTICOAGULATION: Primary | ICD-10-CM

## 2023-03-23 DIAGNOSIS — J06.9 VIRAL URI WITH COUGH: Primary | ICD-10-CM

## 2023-03-23 DIAGNOSIS — F33.1 MODERATE EPISODE OF RECURRENT MAJOR DEPRESSIVE DISORDER (H): Primary | ICD-10-CM

## 2023-03-23 PROCEDURE — 99213 OFFICE O/P EST LOW 20 MIN: CPT | Mod: GC

## 2023-03-23 RX ORDER — BENZONATATE 100 MG/1
100 CAPSULE ORAL 3 TIMES DAILY PRN
Qty: 30 CAPSULE | Refills: 0 | Status: SHIPPED | OUTPATIENT
Start: 2023-03-23 | End: 2023-04-12

## 2023-03-23 RX ORDER — BACLOFEN 10 MG/1
TABLET ORAL
COMMUNITY
Start: 2023-03-16 | End: 2023-09-13

## 2023-03-23 ASSESSMENT — ENCOUNTER SYMPTOMS
GASTROINTESTINAL NEGATIVE: 1
FEVER: 0
WEIGHT LOSS: 0
HEMOPTYSIS: 0
SHORTNESS OF BREATH: 0
MUSCULOSKELETAL NEGATIVE: 1
COUGH: 1
NEUROLOGICAL NEGATIVE: 1
PALPITATIONS: 0
WHEEZING: 0
SPUTUM PRODUCTION: 0
CHILLS: 0
PSYCHIATRIC NEGATIVE: 1
EYES NEGATIVE: 1

## 2023-03-23 NOTE — LETTER
[facility] INTERNAL MEDICINE [address] [address]  Phone: [phone]  Fax: [phone]    March 23, 2023        Sherly Yeung  [address]  [place] [address]          To whom it may concern:    RE: Sherly Yeung    Due to this patient's poor health at present, I would not recommend that she travel at this time.       Please contact me for questions or concerns.      Sincerely,        Jessica Jackson MD

## 2023-03-23 NOTE — PROGRESS NOTES
"I, Ronaldo Vega MD saw the patient with the resident, and agree with the resident's findings and plan of care as documented in the resident's note.  /80 (BP Location: Right arm, Patient Position: Sitting, Cuff Size: Adult Regular)   Pulse 97   Temp 97.7  F (36.5  C) (Oral)   Ht 1.6 m (5' 3\")   Wt 59 kg (130 lb)   LMP 08/22/2017 (Approximate)   SpO2 97%   BMI 23.03 kg/m    I personally reviewed vital signs and past record.  Key findings: ED trip with cough, night sweats, subjective weakness, pleuritic CP. Was empiric treated at ED.    "
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
## 2023-03-23 NOTE — NURSING NOTE
"Sherly Yeung is a 57 year old female patient that presents today in clinic for the following:    Chief Complaint   Patient presents with     Hospital F/U     ER visit follow up.  Pt was prescribed 2 antibiotics.  Cough, Chills, fatigue and weakness.       The patient's allergies and medications were reviewed as noted. A set of vitals were recorded as noted without incident: /80 (BP Location: Right arm, Patient Position: Sitting, Cuff Size: Adult Regular)   Pulse 97   Temp 97.7  F (36.5  C) (Oral)   Ht 1.6 m (5' 3\")   Wt 59 kg (130 lb)   LMP 08/22/2017 (Approximate)   SpO2 97%   BMI 23.03 kg/m  . The patient does not have any other questions for the provider.    French Castañeda, EMT at 9:57 AM on 3/23/2023.  Primary care clinic: [phone]  "

## 2023-03-23 NOTE — TELEPHONE ENCOUNTER
----- Message from Juan M Guillen MD sent at 3/17/2023 11:52 AM CDT -----  Hi Kirstin,    Can you check in with Marcia on 3/23/23 - next Thursday in the PM. Unfortunately, her father recently passed away unexpectedly and we are unsure if she is feeling normal grief vs onset of MDD. Can you just see if she is feeling like she is slipping into a depressive episode or can hold on with therapy (recently started)?    We scheduled a follow up in 3 weeks. No med changes today, waiting a week - considering increasing Abilify.    Thanks,  Juan M    Follow Up:  Writer called patient to check in. Patient reports she has been ill since the weekend with bronchitis and pleurisy. She reports she is on antibiotics and pain medication for this. She reports that she was supposed to leave on a trip tomorrow, but due to her illness, this trip had to be canceled.     Patient denies any improvement in her depressive symptoms. Unfortunately, she was supposed to start therapy on Monday, but the therapist was ill and had to cancel. Patient states that now the therapist is booked out until May. Patient states she would be interested in increasing her Abilify.     Patient denies any safety concerns of SI, SIB, or HI. Patient denies hallucinations. Patient reports she receives a lot of emotional support from her , sister-in-law and her brother. Patient reports she had been using coping skills, but she has been so sick the past few day that she has been unable to do anything.     Routed to provider for review.

## 2023-03-24 RX ORDER — ARIPIPRAZOLE 2 MG/1
2 TABLET ORAL DAILY
Qty: 30 TABLET | Refills: 0 | Status: SHIPPED | OUTPATIENT
Start: 2023-03-24 | End: 2023-04-08

## 2023-03-24 NOTE — TELEPHONE ENCOUNTER
As per our discussion at last appointment, since Marcia is experiencing worsening depressive symptoms (and was unable to meet with therapist) her Abilify dose has been increased to 7mg.     Prescription for 2mg tablets to take in conjunction with 5mg tablets has been sent to her local pharmacy, [facility].    Juan M Guillen MD

## 2023-03-24 NOTE — TELEPHONE ENCOUNTER
apixaban ANTICOAGULANT (ELIQUIS) 5 MG tablet      Last Written Prescription Date:  historical    Last Office Visit : 3-23-23  Future Office visit:  none    Routing refill request to provider for review/approval because:  Medication is reported/historical  Failed protocol: Weight

## 2023-03-27 ENCOUNTER — MEDICAL CORRESPONDENCE (OUTPATIENT)
Dept: HEALTH INFORMATION MANAGEMENT | Facility: CLINIC | Age: 58
End: 2023-03-27

## 2023-04-04 ENCOUNTER — TRANSFERRED RECORDS (OUTPATIENT)
Dept: HEALTH INFORMATION MANAGEMENT | Facility: CLINIC | Age: 58
End: 2023-04-04

## 2023-04-07 ENCOUNTER — VIRTUAL VISIT (OUTPATIENT)
Dept: PSYCHIATRY | Facility: CLINIC | Age: 58
End: 2023-04-07
Attending: PSYCHIATRY & NEUROLOGY
Payer: MEDICARE

## 2023-04-07 DIAGNOSIS — S06.9X5D TRAUMATIC BRAIN INJURY, WITH LOSS OF CONSCIOUSNESS GREATER THAN 24 HOURS WITH RETURN TO PRE-EXISTING CONSCIOUS LEVEL, SUBSEQUENT ENCOUNTER: ICD-10-CM

## 2023-04-07 DIAGNOSIS — F33.1 MODERATE EPISODE OF RECURRENT MAJOR DEPRESSIVE DISORDER (H): Primary | ICD-10-CM

## 2023-04-07 PROCEDURE — G0463 HOSPITAL OUTPT CLINIC VISIT: HCPCS | Mod: PN,GT | Performed by: STUDENT IN AN ORGANIZED HEALTH CARE EDUCATION/TRAINING PROGRAM

## 2023-04-07 PROCEDURE — 99214 OFFICE O/P EST MOD 30 MIN: CPT | Mod: GC | Performed by: STUDENT IN AN ORGANIZED HEALTH CARE EDUCATION/TRAINING PROGRAM

## 2023-04-07 NOTE — PATIENT INSTRUCTIONS
It was nice seeing you today    Treatment Plan Today:     1) Medications-   - Continue duloxetine 120 mg daily  - Continue Abilify 7mg daily    2) Follow-up appt with Dr Guillen on 5/5/23 at 5/5/23 at 9:20AM    3) I have asked our pharmacy PharmD team to speak with you in the next week. Some one will be contacting you to set up a time. You may also schedule the appointment by calling [phone] or toll-free at [phone].    4) Crisis numbers are below and clinic after hours number is [phone]     **For crisis resources, please see the information at the end of this document**   Patient Education    Thank you for coming to the [facility] [place] [facility].     Lab Testing:  If you had lab testing today and your results are reassuring or normal they will be mailed to you or sent through [employer] within 7 days. If the lab tests need quick action we will call you with the results. The phone number we will call with results is # [phone]. If this is not the best number please call our clinic and change the number.     Medication Refills:  If you need any refills please call your pharmacy and they will contact us. Our fax number for refills is [phone].   Three business days of notice are needed for general medication refill requests.   Five business days of notice are needed for controlled substance refill requests.   If you need to change to a different pharmacy, please contact the new pharmacy directly. The new pharmacy will help you get your medications transferred.     Contact Us:  Please call [phone] during business hours (8-5:00 M-F).   If you have medication related questions after clinic hours, or on the weekend, please call [phone].     Financial Assistance [phone]   Medical Records [phone]       MENTAL HEALTH CRISIS RESOURCES:  For a emergency help, please call 911 or go to the nearest Emergency Department.     Emergency Walk-In Options:    [facility] ([place]): [phone] - Specialized mental health emergency area designed to be calming  [facility] ([place]): [phone]  [facility] ([place]): [phone]  [facility] ([place]): [phone]    County Crisis Information:   [place]: [phone]  [place]: [phone]  [place] ([place]) - Adult: [phone]     [place]: [phone]  [place] - Adult: [phone]     Child: [phone]  [place]: [phone]  List of all [facility] resources:   https://[place].[facility]/dhs/people-we-serve/adults/health-care/mental-health/resources/crisis-contacts.jsp    National Crisis Information:   Crisis Text Line: Text  [place]  to 113294  Suicide & Crisis Lifeline: 988  National Suicide Prevention Lifeline: 6-767-623-BJGZ (1-264.166.4684)       For online chat options, visit https://suicidepreventionlifeline.org/chat/  Poison Control Center: 1-200.748.4988  Trans Lifeline: 1-926.827.8927 - Hotline for transgender people of all ages  [employer]: 6-719-120-0827 - Hotline for LGBT youth     For Non-Emergency Support:   Fast Tracker: Mental Health & Substance Use Disorder Resources -   https://www.[employer].org/
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
## 2023-04-07 NOTE — NURSING NOTE
Is the patient currently in the state of [place]? YES    Visit mode:VIDEO    If the visit is dropped, the patient can be reconnected by: VIDEO VISIT: Send to e-mail at: [email or web address]    Will anyone else be joining the visit? NO      How would you like to obtain your AVS? MyChart    Are changes needed to the allergy or medication list? NO    Reason for visit: Discuss depression, prescription for methylphenidate refill.

## 2023-04-08 ENCOUNTER — DOCUMENTATION ONLY (OUTPATIENT)
Dept: PSYCHIATRY | Facility: CLINIC | Age: 58
End: 2023-04-08
Payer: MEDICARE

## 2023-04-08 RX ORDER — ARIPIPRAZOLE 5 MG/1
5 TABLET ORAL DAILY
Qty: 30 TABLET | Refills: 2 | Status: SHIPPED | OUTPATIENT
Start: 2023-04-08 | End: 2023-04-08

## 2023-04-08 RX ORDER — ARIPIPRAZOLE 2 MG/1
2 TABLET ORAL DAILY
Qty: 30 TABLET | Refills: 0 | Status: SHIPPED | OUTPATIENT
Start: 2023-04-08 | End: 2023-05-30

## 2023-04-08 RX ORDER — DULOXETIN HYDROCHLORIDE 60 MG/1
120 CAPSULE, DELAYED RELEASE ORAL EVERY EVENING
Qty: 60 CAPSULE | Refills: 2 | Status: SHIPPED | OUTPATIENT
Start: 2023-04-08 | End: 2023-06-05

## 2023-04-08 RX ORDER — ARIPIPRAZOLE 5 MG/1
5 TABLET ORAL DAILY
Qty: 30 TABLET | Refills: 2 | Status: SHIPPED | OUTPATIENT
Start: 2023-04-08 | End: 2023-06-05

## 2023-04-08 NOTE — PROGRESS NOTES
[facility] Faculty Note    During the visit today we were able to clarify the reason for the ongoing use of a stimulant with this pt. The pt explained that the stimulant was not for ADHD or TBI-induced cognitive issues. Apparently, the med was started years ago by the psych provider as an augmentation for depression management. I explained that stimulants were used for that purpose in the past, but now we generally opt for other management strategies unless there is a specific reason to use a stimulant. She expressed concern about stopping the med with her current dose being 80mg. I stated that we would not stop the medication abruptly and that a taper will likely be established. This would be the case, unless there is a medical reason which dictates a more rapid discontinuation. A medical review of risk factors will be part of the follow-up plan. She does not need a refill today but will soon (will not be able to get the med from the last provider).    A review of risk factors wrt stimulant use would include:  -recent vital sign values  And  EKG  -review for seizure, CVA, hepatic and cardiac history  -DDIs  -substance use  -recent CMP    Also, would send epic message to PCP to check for any medical concern on his part.    This should all be completed soon, as she will need a refill in the near future.    MD Nolan

## 2023-04-10 ENCOUNTER — OFFICE VISIT (OUTPATIENT)
Dept: INTERNAL MEDICINE | Facility: CLINIC | Age: 58
End: 2023-04-10
Payer: MEDICARE

## 2023-04-10 ENCOUNTER — LAB (OUTPATIENT)
Dept: LAB | Facility: CLINIC | Age: 58
End: 2023-04-10
Payer: MEDICARE

## 2023-04-10 VITALS
OXYGEN SATURATION: 95 % | HEIGHT: 63 IN | DIASTOLIC BLOOD PRESSURE: 85 MMHG | HEART RATE: 105 BPM | SYSTOLIC BLOOD PRESSURE: 124 MMHG | BODY MASS INDEX: 23.03 KG/M2

## 2023-04-10 DIAGNOSIS — R74.8 ELEVATED ALKALINE PHOSPHATASE LEVEL: ICD-10-CM

## 2023-04-10 DIAGNOSIS — E83.41 HYPERMAGNESEMIA: ICD-10-CM

## 2023-04-10 DIAGNOSIS — R07.81 CHEST PAIN, PLEURITIC: Primary | ICD-10-CM

## 2023-04-10 DIAGNOSIS — R07.81 CHEST PAIN, PLEURITIC: ICD-10-CM

## 2023-04-10 LAB
ALBUMIN SERPL BCG-MCNC: 4 G/DL (ref 3.5–5.2)
ALP SERPL-CCNC: 116 U/L (ref 35–104)
ALT SERPL W P-5'-P-CCNC: 13 U/L (ref 10–35)
ANION GAP SERPL CALCULATED.3IONS-SCNC: 9 MMOL/L (ref 7–15)
AST SERPL W P-5'-P-CCNC: 15 U/L (ref 10–35)
BASOPHILS # BLD AUTO: 0 10E3/UL (ref 0–0.2)
BASOPHILS NFR BLD AUTO: 0 %
BILIRUB SERPL-MCNC: <0.2 MG/DL
BUN SERPL-MCNC: 14 MG/DL (ref 6–20)
CALCIUM SERPL-MCNC: 9.1 MG/DL (ref 8.6–10)
CHLORIDE SERPL-SCNC: 107 MMOL/L (ref 98–107)
CK SERPL-CCNC: 31 U/L (ref 26–192)
CREAT SERPL-MCNC: 0.85 MG/DL (ref 0.51–0.95)
CRP SERPL-MCNC: 3.99 MG/L
DEPRECATED HCO3 PLAS-SCNC: 25 MMOL/L (ref 22–29)
EOSINOPHIL # BLD AUTO: 0.2 10E3/UL (ref 0–0.7)
EOSINOPHIL NFR BLD AUTO: 2 %
ERYTHROCYTE [DISTWIDTH] IN BLOOD BY AUTOMATED COUNT: 13.6 % (ref 10–15)
ERYTHROCYTE [SEDIMENTATION RATE] IN BLOOD BY WESTERGREN METHOD: 17 MM/HR (ref 0–30)
GFR SERPL CREATININE-BSD FRML MDRD: 79 ML/MIN/1.73M2
GLUCOSE SERPL-MCNC: 105 MG/DL (ref 70–99)
HCT VFR BLD AUTO: 41.5 % (ref 35–47)
HGB BLD-MCNC: 13 G/DL (ref 11.7–15.7)
IMM GRANULOCYTES # BLD: 0.1 10E3/UL
IMM GRANULOCYTES NFR BLD: 1 %
LYMPHOCYTES # BLD AUTO: 2.1 10E3/UL (ref 0.8–5.3)
LYMPHOCYTES NFR BLD AUTO: 21 %
MAGNESIUM SERPL-MCNC: 2.4 MG/DL (ref 1.7–2.3)
MCH RBC QN AUTO: 30.6 PG (ref 26.5–33)
MCHC RBC AUTO-ENTMCNC: 31.3 G/DL (ref 31.5–36.5)
MCV RBC AUTO: 98 FL (ref 78–100)
MONOCYTES # BLD AUTO: 0.7 10E3/UL (ref 0–1.3)
MONOCYTES NFR BLD AUTO: 8 %
NEUTROPHILS # BLD AUTO: 6.6 10E3/UL (ref 1.6–8.3)
NEUTROPHILS NFR BLD AUTO: 68 %
NRBC # BLD AUTO: 0 10E3/UL
NRBC BLD AUTO-RTO: 0 /100
PLATELET # BLD AUTO: 285 10E3/UL (ref 150–450)
POTASSIUM SERPL-SCNC: 4.3 MMOL/L (ref 3.4–5.3)
PROT SERPL-MCNC: 7 G/DL (ref 6.4–8.3)
RBC # BLD AUTO: 4.25 10E6/UL (ref 3.8–5.2)
SODIUM SERPL-SCNC: 141 MMOL/L (ref 136–145)
WBC # BLD AUTO: 9.7 10E3/UL (ref 4–11)

## 2023-04-10 PROCEDURE — 86140 C-REACTIVE PROTEIN: CPT | Performed by: PATHOLOGY

## 2023-04-10 PROCEDURE — 82977 ASSAY OF GGT: CPT | Performed by: PATHOLOGY

## 2023-04-10 PROCEDURE — 86038 ANTINUCLEAR ANTIBODIES: CPT | Performed by: INTERNAL MEDICINE

## 2023-04-10 PROCEDURE — 99214 OFFICE O/P EST MOD 30 MIN: CPT | Performed by: INTERNAL MEDICINE

## 2023-04-10 PROCEDURE — 83735 ASSAY OF MAGNESIUM: CPT | Performed by: PATHOLOGY

## 2023-04-10 PROCEDURE — 82550 ASSAY OF CK (CPK): CPT | Performed by: PATHOLOGY

## 2023-04-10 PROCEDURE — 36415 COLL VENOUS BLD VENIPUNCTURE: CPT | Performed by: PATHOLOGY

## 2023-04-10 PROCEDURE — 80053 COMPREHEN METABOLIC PANEL: CPT | Performed by: PATHOLOGY

## 2023-04-10 PROCEDURE — 85025 COMPLETE CBC W/AUTO DIFF WBC: CPT | Performed by: PATHOLOGY

## 2023-04-10 PROCEDURE — 85652 RBC SED RATE AUTOMATED: CPT | Performed by: PATHOLOGY

## 2023-04-10 RX ORDER — PREDNISONE 20 MG/1
TABLET ORAL
Qty: 17 TABLET | Refills: 0 | Status: SHIPPED | OUTPATIENT
Start: 2023-04-10 | End: 2023-07-26
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
## 2023-04-10 NOTE — NURSING NOTE
"Sherly Yeung is a 57 year old female patient that presents today in clinic for the following:    Chief Complaint   Patient presents with     Follow Up     Pt here for follow up and would like to discuss pain from pleurisy.      The patient's allergies and medications were reviewed as noted. A set of vitals were recorded as noted without incident: /85 (BP Location: Right arm, Patient Position: Sitting, Cuff Size: Adult Regular)   Pulse 105   Ht 1.6 m (5' 2.99\")   LMP 08/22/2017 (Approximate)   SpO2 95%   BMI 23.03 kg/m  . The patient does not have any other questions for the provider.    Jaja Negro, EMT at 5:08 PM on 4/10/2023  "

## 2023-04-11 LAB
ANA SER QL IF: NEGATIVE
GGT SERPL-CCNC: 12 U/L (ref 5–36)
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
## 2023-04-12 ENCOUNTER — VIRTUAL VISIT (OUTPATIENT)
Dept: PHARMACY | Facility: CLINIC | Age: 58
End: 2023-04-12
Payer: COMMERCIAL

## 2023-04-12 DIAGNOSIS — M81.0 OSTEOPOROSIS: ICD-10-CM

## 2023-04-12 DIAGNOSIS — L70.9 ACNE: ICD-10-CM

## 2023-04-12 DIAGNOSIS — I82.B11 SUBCLAVIAN VEIN THROMBOSIS, RIGHT (H): ICD-10-CM

## 2023-04-12 DIAGNOSIS — K59.00 CONSTIPATION: ICD-10-CM

## 2023-04-12 DIAGNOSIS — K21.9 GASTROESOPHAGEAL REFLUX DISEASE: ICD-10-CM

## 2023-04-12 DIAGNOSIS — F33.9 MDD (MAJOR DEPRESSIVE DISORDER), RECURRENT EPISODE (H): Primary | ICD-10-CM

## 2023-04-12 DIAGNOSIS — Z78.9 TAKES DIETARY SUPPLEMENTS: ICD-10-CM

## 2023-04-12 DIAGNOSIS — R07.81 PLEURITIC CHEST PAIN: ICD-10-CM

## 2023-04-12 DIAGNOSIS — N39.41 URGENCY INCONTINENCE: ICD-10-CM

## 2023-04-12 DIAGNOSIS — G43.909 MIGRAINE: ICD-10-CM

## 2023-04-12 PROCEDURE — 99207 PR NO CHARGE LOS: CPT | Mod: VID | Performed by: PHARMACIST

## 2023-04-12 RX ORDER — OXYCODONE HYDROCHLORIDE 5 MG/1
5-10 TABLET ORAL EVERY 6 HOURS PRN
COMMUNITY

## 2023-04-12 RX ORDER — HYDROMORPHONE HYDROCHLORIDE 3 MG/1
3 SUPPOSITORY RECTAL EVERY 6 HOURS PRN
COMMUNITY
End: 2024-04-02

## 2023-04-12 NOTE — Clinical Note
Jacob Mcgowan,   Thank you for the referral. I met with patient and believe I got a clear history on the timeline of her Ritalin. I don't see any absolute contraindications to continuing Ritalin, other than she is above the max dose. From a cardiac standpoint, I don't think it would impact her SVC syndrome or thrombosis risk, certainly could cause tachycardia but her HR has been in the 80-90s on metoprolol. She will be out of Ritalin on Saturday and is open to reducing back down to 40mg in the morning and 20mg in the afternoon. I let her know I would send you a message and see if you would be willing to send in a new Rx for her.   Let me know if you have any questions!  Thank you,   Justine

## 2023-04-12 NOTE — PATIENT INSTRUCTIONS
"Recommendations from today's MTM visit:                                                      I will talk to Dr. Guillen about reducing your methylphenidate to 40mg in the morning and 20mg in the afternoon.     Follow-up: 5/5 Dr. Guillen, [facility] annually or sooner if requested    It was great speaking with you today.  I value your experience and would be very thankful for your time in providing feedback in our clinic survey. In the next few days, you may receive an email or text message from [employer] with a link to a survey related to your  clinical pharmacist.\"     To schedule another MTM appointment, please call the clinic directly or you may call the MTM scheduling line at [phone] or toll-free at [phone].     My Clinical Pharmacist's contact information:                                                      Please feel free to contact me with any questions or concerns you have.      Justine Cartwright, PharmD, BCPP  Medication Therapy Management Pharmacist  [facility]     "

## 2023-04-13 DIAGNOSIS — F33.9 RECURRENT MAJOR DEPRESSIVE DISORDER, REMISSION STATUS UNSPECIFIED (H): Primary | ICD-10-CM

## 2023-04-13 DIAGNOSIS — F33.40 MAJOR DEPRESSIVE DISORDER, RECURRENT, IN REMISSION (H): ICD-10-CM

## 2023-04-13 NOTE — TELEPHONE ENCOUNTER
Dr Guillen,    This prescription was not prescribed by you . Would you like to take over or have Hank send in refills ?    Desiree Hendrix on 4/13/2023 at 11:57 AM

## 2023-04-13 NOTE — TELEPHONE ENCOUNTER
[facility] Call Center    Phone Message    May a detailed message be left on voicemail: yes     Reason for Call: Medication Refill Request    Has the patient contacted the pharmacy for the refill? Yes   Name of medication being requested: ritalin  Provider who prescribed the medication: edelmira  Pharmacy:      [facility] [id number] [facility]      Date medication is needed:  Patient would like asap, she will run out this weekend    Action Taken: Message routed to:  Other: nursing pool    Travel Screening: Not Applicable

## 2023-04-14 ENCOUNTER — HOSPITAL ENCOUNTER (OUTPATIENT)
Dept: CT IMAGING | Facility: CLINIC | Age: 58
Discharge: HOME | End: 2023-04-14
Attending: INTERNAL MEDICINE | Admitting: INTERNAL MEDICINE
Payer: MEDICARE

## 2023-04-14 DIAGNOSIS — R07.81 CHEST PAIN, PLEURITIC: ICD-10-CM

## 2023-04-14 PROCEDURE — 250N000009 HC RX 250: Performed by: INTERNAL MEDICINE

## 2023-04-14 PROCEDURE — 250N000011 HC RX IP 250 OP 636: Performed by: INTERNAL MEDICINE

## 2023-04-14 PROCEDURE — G1010 CDSM STANSON: HCPCS

## 2023-04-14 RX ORDER — IOPAMIDOL 755 MG/ML
60 INJECTION, SOLUTION INTRAVASCULAR ONCE
Status: COMPLETED | OUTPATIENT
Start: 2023-04-14 | End: 2023-04-14

## 2023-04-14 RX ORDER — METHYLPHENIDATE HYDROCHLORIDE 20 MG/1
TABLET ORAL
Qty: 90 TABLET | Refills: 0 | Status: SHIPPED | OUTPATIENT
Start: 2023-04-14 | End: 2023-05-05

## 2023-04-14 RX ADMIN — SODIUM CHLORIDE 56 ML: 9 INJECTION, SOLUTION INTRAVENOUS at 14:44

## 2023-04-14 RX ADMIN — IOPAMIDOL 60 ML: 755 INJECTION, SOLUTION INTRAVENOUS at 14:43

## 2023-04-16 ENCOUNTER — HEALTH MAINTENANCE LETTER (OUTPATIENT)
Age: 58
End: 2023-04-16

## 2023-04-18 ENCOUNTER — ANCILLARY ORDERS (OUTPATIENT)
Dept: INTERNAL MEDICINE | Facility: CLINIC | Age: 58
End: 2023-04-18

## 2023-04-18 ENCOUNTER — HOSPITAL ENCOUNTER (OUTPATIENT)
Dept: NUCLEAR MEDICINE | Facility: CLINIC | Age: 58
Setting detail: NUCLEAR MEDICINE
Discharge: HOME | End: 2023-04-18
Attending: INTERNAL MEDICINE | Admitting: INTERNAL MEDICINE
Payer: MEDICARE

## 2023-04-18 DIAGNOSIS — R10.11 RUQ ABDOMINAL PAIN: ICD-10-CM

## 2023-04-18 PROCEDURE — 78226 HEPATOBILIARY SYSTEM IMAGING: CPT | Mod: 26 | Performed by: RADIOLOGY

## 2023-04-18 PROCEDURE — A9537 TC99M MEBROFENIN: HCPCS | Performed by: INTERNAL MEDICINE

## 2023-04-18 PROCEDURE — 343N000001 HC RX 343: Performed by: INTERNAL MEDICINE

## 2023-04-18 PROCEDURE — G1010 CDSM STANSON: HCPCS

## 2023-04-18 PROCEDURE — G1010 CDSM STANSON: HCPCS | Performed by: RADIOLOGY

## 2023-04-18 RX ORDER — KIT FOR THE PREPARATION OF TECHNETIUM TC 99M MEBROFENIN 45 MG/10ML
5 INJECTION, POWDER, LYOPHILIZED, FOR SOLUTION INTRAVENOUS ONCE
Status: COMPLETED | OUTPATIENT
Start: 2023-04-18 | End: 2023-04-18

## 2023-04-18 RX ADMIN — MEBROFENIN 5.5 MILLICURIE: 45 INJECTION, POWDER, LYOPHILIZED, FOR SOLUTION INTRAVENOUS at 09:26

## 2023-04-20 ASSESSMENT — PATIENT HEALTH QUESTIONNAIRE - PHQ9
SUM OF ALL RESPONSES TO PHQ QUESTIONS 1-9: 5
10. IF YOU CHECKED OFF ANY PROBLEMS, HOW DIFFICULT HAVE THESE PROBLEMS MADE IT FOR YOU TO DO YOUR WORK, TAKE CARE OF THINGS AT HOME, OR GET ALONG WITH OTHER PEOPLE: SOMEWHAT DIFFICULT
SUM OF ALL RESPONSES TO PHQ QUESTIONS 1-9: 5

## 2023-04-21 ENCOUNTER — VIRTUAL VISIT (OUTPATIENT)
Dept: PSYCHOLOGY | Facility: CLINIC | Age: 58
End: 2023-04-21
Payer: MEDICARE

## 2023-04-21 DIAGNOSIS — F33.0 MDD (MAJOR DEPRESSIVE DISORDER), RECURRENT EPISODE, MILD (H): Primary | ICD-10-CM

## 2023-04-21 DIAGNOSIS — F43.21 GRIEF REACTION: ICD-10-CM

## 2023-04-21 DIAGNOSIS — F41.1 GAD (GENERALIZED ANXIETY DISORDER): ICD-10-CM

## 2023-04-21 PROCEDURE — 90837 PSYTX W PT 60 MINUTES: CPT | Mod: VID | Performed by: SOCIAL WORKER

## 2023-04-21 NOTE — PROGRESS NOTES
[facility]                                     Progress Note    Patient Name: Sherly Yeung  Date:  4/21/2023         Service Type: Individual      Session Start Time: 8:02  Session End Time: 8:56     Session Length: 54    Session #: 1    Attendees: Client    Service Modality:  Video Visit:      Provider verified identity through the following two step process.  Patient provided:  Patient is known previously to provider    Telemedicine Visit: The patient's condition can be safely assessed and treated via synchronous audio and visual telemedicine encounter.      Reason for Telemedicine Visit: Services only offered telehealth    Originating Site (Patient Location): Patient's home    Distant Site (Provider Location): [facility]    Consent:  The patient/guardian has verbally consented to: the potential risks and benefits of telemedicine (video visit) versus in person care; bill my insurance or make self-payment for services provided; and responsibility for payment of non-covered services.     Patient would like the video invitation sent by:  My Chart    Mode of Communication:  Video Conference via Amwell    Distant Location (Provider):  Off-site    As the provider I attest to compliance with applicable laws and regulations related to telemedicine.    DATA  Interactive Complexity: Yes, visit entailed Interactive Complexity evidenced by:  Grieving, much going on with family.    Crisis: No      Progress Since Last Session (Related to Symptoms / Goals / Homework):   Symptoms: grieving, adjusting to new life without father    Homework: Partially completed- processing her loss      Episode of Care Goals: Minimal progress - ACTION (Actively working towards change); Intervened by reinforcing change plan / affirming steps taken- it takes some time to achieve acceptance.      Current / Ongoing Stressors and Concerns: Patient was not seen since late in  February due to schedule issues.  Patient shared she has been grieving the loss of her father who passed in February. Reports the  on 3/14th went well as he has wished. Though noted some strange behavior with mom. She is not sure if it is just her personality of if there is some issue with her thinking. Patient had shared some complicated life between parents. Mother had refused to live with father when they moved to [place] from [place] even though they were still .  Patient and brother work together and understand each other. Patient and some family members including her brother and family went on vacation to [place] on  after father's passing on  as they had planned. They realized it helped them to process and to support one another. Had an opportunity to see her daughter who lives in [place]. Patient reports some health issues upon their return to [place]. She had to go to urgent care. Has been doing much better.  She is now focusing on her grief while keeping up in check with her medical concerns. Patient's wish is to have a good relationship with her son. He was not present at his grand father's . She also wishes to continue processing her grief. Her next visit is next visit.     Treatment Objective(s) Addressed in This Session:   Decrease frequency and intensity of feeling down, depressed, hopeless  Identify negative self-talk and behaviors: challenge core beliefs, myths, and actions  increase understanding of steps in the grief process      Intervention:     Situation        Automatic Thoughts  Cognitive Distortions      Feelings        Behavior        Questioning Thoughts          MI Intervention: Co-Developed Goal: targeting grief and depressed mood, Supported Autonomy, Collaboration, Evocation, Permission to raise concern or advise and Open-ended questions     Change Talk Expressed by the Patient: Taking steps    Provider Response to Change Talk: E - Evoked more info from patient  about behavior change, A - Affirmed patient's thoughts, decisions, or attempts at behavior change, R - Reflected patient's change talk and S - Summarized patient's change talk statements     Grief processing: To process her loss and achieve acceptance.     Assessments completed prior to visit:2/23/2023    The following assessments were completed by patient for this visit:  PHQ2:       4/27/2022    10:07 AM 8/11/2020     9:37 AM 3/24/2020     3:46 PM 1/19/2012     2:24 PM   PHQ-2 ( 1999 [employer])   Q1: Little interest or pleasure in doing things 0 0 0 0   Q2: Feeling down, depressed or hopeless 0 0 0 1   PHQ-2 Score 0 0 0 1   PHQ-2 Total Score (12-17 Years)- Positive if 3 or more points; Administer PHQ-A if positive  0 0      PHQ9:       6/21/2022     9:18 AM 9/26/2022     7:51 AM 11/28/2022     8:59 AM 1/5/2023    10:05 AM 2/9/2023    11:11 AM 3/17/2023     4:37 AM 4/20/2023     9:01 AM   PHQ-9 SCORE   PHQ-9 Total Score MyChart 2 (Minimal depression) 3 (Minimal depression) 6 (Mild depression) 6 (Mild depression) 4 (Minimal depression) 4 (Minimal depression) 5 (Mild depression)   PHQ-9 Total Score 2 3 6 6 4 4 5     GAD2:       8/2/2022    12:15 PM 9/24/2022     8:52 AM 11/28/2022     8:59 AM 1/5/2023    10:06 AM 2/9/2023    11:47 AM 3/17/2023     4:38 AM 4/20/2023     9:02 AM   ROCKY-2   Feeling nervous, anxious, or on edge 1 1 1 1 1 1    1 0    0   Not being able to stop or control worrying 0 1 0 0 0 1    1 1    1   ROCKY-2 Total Score 1 2 1 1 1 2    2 1    1     GAD7:       2/23/2023     5:18 PM   ROCKY-7 SCORE   Total Score 7     CAGE-AID:       2/9/2023    11:52 AM   CAGE-AID Total Score   Total Score 0   Total Score MyChart 0 (A total score of 2 or greater is considered clinically significant)     PROMIS 10-Global Health (all questions and answers displayed):       5/13/2022     7:54 AM 9/24/2022     8:54 AM 1/5/2023    10:08 AM 3/31/2023    11:04 AM   PROMIS 10   In general, would you say your health is: Good Good Good  Good   In general, would you say your quality of life is: Good Very good Good Good   In general, how would you rate your physical health? Good Good Good Good   In general, how would you rate your mental health, including your mood and your ability to think? Good Very good Good Good   In general, how would you rate your satisfaction with your social activities and relationships? Very good Very good Good Good   In general, please rate how well you carry out your usual social activities and roles Good Very good Fair Fair   To what extent are you able to carry out your everyday physical activities such as walking, climbing stairs, carrying groceries, or moving a chair? A little Moderately Moderately A little   In the past 7 days, how often have you been bothered by emotional problems such as feeling anxious, depressed, or irritable? Rarely Sometimes Sometimes Often   In the past 7 days, how would you rate your fatigue on average? Moderate Moderate Moderate Moderate   In the past 7 days, how would you rate your pain on average, where 0 means no pain, and 10 means worst imaginable pain? 5 4 3 5   In general, would you say your health is: 3 3 3 3   In general, would you say your quality of life is: 3 4 3 3   In general, how would you rate your physical health? 3 3 3 3   In general, how would you rate your mental health, including your mood and your ability to think? 3 4 3 3   In general, how would you rate your satisfaction with your social activities and relationships? 4 4 3 3   In general, please rate how well you carry out your usual social activities and roles. (This includes activities at home, at work and in your community, and responsibilities as a parent, child, spouse, employee, friend, etc.) 3 4 2 2   To what extent are you able to carry out your everyday physical activities such as walking, climbing stairs, carrying groceries, or moving a chair? 2 3 3 2   In the past 7 days, how often have you been bothered by  emotional problems such as feeling anxious, depressed, or irritable? 2 3 3 4   In the past 7 days, how would you rate your fatigue on average? 3 3 3 3   In the past 7 days, how would you rate your pain on average, where 0 means no pain, and 10 means worst imaginable pain? 5 4 3 5   Global Mental Health Score 14 15 12 11   Global Physical Health Score 11 12 13 11   PROMIS TOTAL - SUBSCORES 25 27 25 22     PROMIS 10-Global Health (only subscores and total score):       5/13/2022     7:54 AM 9/24/2022     8:54 AM 1/5/2023    10:08 AM 3/31/2023    11:04 AM   PROMIS-10 Scores Only   Global Mental Health Score 14 15 12 11   Global Physical Health Score 11 12 13 11   PROMIS TOTAL - SUBSCORES 25 27 25 22     [place] Suicide Severity Rating Scale (Short Version)      3/28/2022     6:22 AM 5/6/2022    11:30 AM 9/2/2022     7:31 PM 9/30/2022     5:03 PM 11/28/2022     9:49 PM 2/10/2023     9:55 AM 3/20/2023     3:28 PM   [place] Suicide Severity Rating (Short Version)   Over the past 2 weeks have you felt down, depressed, or hopeless? no no no no no  no   Over the past 2 weeks have you had thoughts of killing yourself?  no no no no  no   Have you ever attempted to kill yourself?  no no no no  no   1. Wish to be Dead (Since Last Contact)      N    2. Non-Specific Active Suicidal Thoughts (Since Last Contact)      N    Actual Attempt (Since Last Contact)      N    Has subject engaged in non-suicidal self-injurious behavior? (Since Last Contact)      N    Interrupted Attempts (Since Last Contact)      N    Aborted or Self-Interrupted Attempt (Since Last Contact)      N    Preparatory Acts or Behavior (Since Last Contact)      N    Suicide (Since Last Contact)      N    Calculated C-SSRS Risk Score (Since Last Contact)      No Risk Indicated          ASSESSMENT: Current Emotional / Mental Status (status of significant symptoms):   Risk status (Self / Other harm or suicidal ideation)   Patient denies current fears or concerns for  personal safety.   Patient denies current or recent suicidal ideation or behaviors.   Patient denies current or recent homicidal ideation or behaviors.   Patient denies current or recent self injurious behavior or ideation.   Patient denies other safety concerns.   Patient reports there has been no change in risk factors since their last session.     Patient reports there has been no change in protective factors since their last session.     Recommended that patient call 911 or go to the local ED should there be a change in any of these risk factors.  Call [phone] if experiencing SI     Appearance:   Appropriate    Eye Contact:   Good    Psychomotor Behavior: Normal    Attitude:   Cooperative    Orientation:   All   Speech    Rate / Production: Normal     Volume:  Normal    Mood:    Normal   Affect:    Appropriate    Thought Content:  Clear    Thought Form:  Coherent  Logical    Insight:    Good      Medication Review:   No changes to current psychiatric medication(s)     Medication Compliance:   Yes     Changes in Health Issues:   None reported     Chemical Use Review:   Substance Use: Chemical use reviewed, no active concerns identified      Tobacco Use: No current tobacco use.      Diagnosis:  1. MDD (major depressive disorder), recurrent episode, mild (H)    2. Grief reaction    3. ROCKY (generalized anxiety disorder)      Collateral Reports Completed:   Routed note to PCP    PLAN: (Patient Tasks / Therapist Tasks / Other)  Patient will keep up with self care.   Patient will reflect on today's discussion about family.  Patient will reach out to friends that understand her when she feels sad  Patient will read her TP and practice the coping skills noted in marthahart.   Patient's next visit is in a week    GERMAN Farmer                                                ___________________________________________________________________  Individual Treatment Plan    Patient's Name: Sherly Yeung  Date Of  "Birth: 1965    Date of Creation: 2/23/2023    Date Treatment Plan Last Reviewed/Revised: 4/21/2023    DSM5 Diagnoses: 296.32 (F33.1) Major Depressive Disorder, Recurrent Episode, Moderate _ and With anxious distress or 300.02 (F41.1) Generalized Anxiety Disorder  Grief reaction [F43.21]    Psychosocial / Contextual Factors: grief: father recently passed away. Family dynamic- relationship with son, mom. Feeling overwhelmed.    PROMIS (reviewed every 90 days): 25    Referral / Collaboration:    Referral to another professional/service is not indicated at this time..    Anticipated number of session for this episode of care: 9-12 sessions  Anticipation frequency of session: Biweekly  Anticipated Duration of each session: 53 or more minutes  Treatment plan will be reviewed in 90 days or when goals have been changed.     MeasurableTreatment Goal(s) related to diagnosis / functional impairment(s)    Goal 1: Patient will Accept the loss of beloved one and return to stable level of functioning as evidenced by a higher level of functioning as a result of acceptance.   Redevelop a supportive social system and improve interpersonal relationships and Express unresolved emotions regarding loss which brings anxiety and depression mood.      I will know I've met my goal when I have more energy and  I am able to celebrate good life with my father Vs the sadness of losing him.Redevelop a supportive social system and improve interpersonal relationships\"    Objective #A (Patient Action)    Patient will increase understanding of steps in the grief process.  Status: New - Date: 4/21/2023     Intervention(s)  Therapist will teach emotional recognition/identification. : defining happiness in your own term without father.    Objective #B  Patient will identify at least 3 fears / thoughts that contribute to feeling anxious.  Status: New - Date: 4/21/2023     Intervention(s)  Therapist will teach thought challenging with " CBT.    Objective #C  Patient will Identify negative self-talk and behaviors: challenge core beliefs, myths, and actions.  Status: New - Date: 4/21/2023     Intervention(s)  Therapist will provide space and time to process thoughts and feelings around current stressors. provide support and coping skills to help move on in life.      Patient has reviewed and agreed to the above plan.      GERMAN Farmer  April 21, 2023  Answers for HPI/ROS submitted by the patient on 4/20/2023  If you checked off any problems, how difficult have these problems made it for you to do your work, take care of things at home, or get along with other people?: Somewhat difficult  PHQ9 TOTAL SCORE: 5

## 2023-04-24 ENCOUNTER — VIRTUAL VISIT (OUTPATIENT)
Dept: UROLOGY | Facility: CLINIC | Age: 58
End: 2023-04-24
Payer: MEDICARE

## 2023-04-24 DIAGNOSIS — N39.41 URGE INCONTINENCE: ICD-10-CM

## 2023-04-24 DIAGNOSIS — N39.41 URGENCY INCONTINENCE: ICD-10-CM

## 2023-04-24 PROCEDURE — 99213 OFFICE O/P EST LOW 20 MIN: CPT | Mod: VID | Performed by: STUDENT IN AN ORGANIZED HEALTH CARE EDUCATION/TRAINING PROGRAM

## 2023-04-24 RX ORDER — MIRABEGRON 50 MG/1
50 TABLET, EXTENDED RELEASE ORAL DAILY
Qty: 90 TABLET | Refills: 3 | Status: SHIPPED | OUTPATIENT
Start: 2023-04-24 | End: 2024-07-15

## 2023-04-24 RX ORDER — TOLTERODINE 4 MG/1
4 CAPSULE, EXTENDED RELEASE ORAL DAILY
Qty: 90 CAPSULE | Refills: 3 | Status: SHIPPED | OUTPATIENT
Start: 2023-04-24 | End: 2023-05-26 | Stop reason: ALTCHOICE

## 2023-04-24 NOTE — PROGRESS NOTES
Sherly Yeung is a 57 year old year old who is being evaluated via a billable video visit.      How would you like to obtain your AVS? MyChart  If the video visit is dropped, the invitation should be resent by: Text to cell phone: [phone]  Will anyone else be joining your video visit? No    Video-Visit Details    Type of service:  Video Visit   Video Start Time: 12:09 PM  Video End Time:12:15 PM    Originating Location (pt. Location): Home    Distant Location (provider location):  On-site  Platform used for Video Visit: JenaWell
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
## 2023-04-25 ENCOUNTER — HOSPITAL ENCOUNTER (OUTPATIENT)
Dept: RESPIRATORY THERAPY | Facility: CLINIC | Age: 58
Discharge: HOME | End: 2023-04-25
Attending: INTERNAL MEDICINE | Admitting: INTERNAL MEDICINE
Payer: MEDICARE

## 2023-04-25 DIAGNOSIS — J06.9 VIRAL URI WITH COUGH: ICD-10-CM

## 2023-04-25 PROCEDURE — 94729 DIFFUSING CAPACITY: CPT

## 2023-04-25 PROCEDURE — 94375 RESPIRATORY FLOW VOLUME LOOP: CPT

## 2023-04-25 PROCEDURE — 94726 PLETHYSMOGRAPHY LUNG VOLUMES: CPT

## 2023-04-26 ENCOUNTER — VIRTUAL VISIT (OUTPATIENT)
Dept: PSYCHOLOGY | Facility: CLINIC | Age: 58
End: 2023-04-26
Payer: MEDICARE

## 2023-04-26 DIAGNOSIS — F43.21 GRIEF REACTION: Primary | ICD-10-CM

## 2023-04-26 LAB
DLCOCOR-%PRED-PRE: 53 %
DLCOCOR-PRE: 10.61 ML/MIN/MMHG
DLCOUNC-%PRED-PRE: 52 %
DLCOUNC-PRE: 10.48 ML/MIN/MMHG
DLCOUNC-PRED: 19.78 ML/MIN/MMHG
ERV-%PRED-PRE: 24 %
ERV-PRE: 0.22 L
ERV-PRED: 0.92 L
EXPTIME-PRE: 5.06 SEC
FEF2575-%PRED-PRE: 110 %
FEF2575-PRE: 2.47 L/SEC
FEF2575-PRED: 2.23 L/SEC
FEFMAX-%PRED-PRE: 69 %
FEFMAX-PRE: 4.37 L/SEC
FEFMAX-PRED: 6.3 L/SEC
FEV1-%PRED-PRE: 58 %
FEV1-PRE: 1.37 L
FEV1FEV6-PRE: 93 %
FEV1FEV6-PRED: 81 %
FEV1FVC-PRE: 93 %
FEV1FVC-PRED: 81 %
FEV1SVC-PRE: 91 %
FEV1SVC-PRED: 73 %
FIFMAX-PRE: 3.04 L/SEC
FRCPLETH-%PRED-PRE: 57 %
FRCPLETH-PRE: 1.52 L
FRCPLETH-PRED: 2.64 L
FVC-%PRED-PRE: 51 %
FVC-PRE: 1.48 L
FVC-PRED: 2.89 L
IC-%PRED-PRE: 54 %
IC-PRE: 1.24 L
IC-PRED: 2.29 L
RVPLETH-%PRED-PRE: 68 %
RVPLETH-PRE: 1.24 L
RVPLETH-PRED: 1.81 L
TLCPLETH-%PRED-PRE: 57 %
TLCPLETH-PRE: 2.75 L
TLCPLETH-PRED: 4.77 L
VA-%PRED-PRE: 48 %
VA-PRE: 2.25 L
VC-%PRED-PRE: 47 %
VC-PRE: 1.51 L
VC-PRED: 3.21 L

## 2023-04-26 PROCEDURE — 90834 PSYTX W PT 45 MINUTES: CPT | Mod: VID | Performed by: SOCIAL WORKER

## 2023-04-26 ASSESSMENT — ANXIETY QUESTIONNAIRES
7. FEELING AFRAID AS IF SOMETHING AWFUL MIGHT HAPPEN: NOT AT ALL
1. FEELING NERVOUS, ANXIOUS, OR ON EDGE: SEVERAL DAYS
2. NOT BEING ABLE TO STOP OR CONTROL WORRYING: NOT AT ALL
IF YOU CHECKED OFF ANY PROBLEMS ON THIS QUESTIONNAIRE, HOW DIFFICULT HAVE THESE PROBLEMS MADE IT FOR YOU TO DO YOUR WORK, TAKE CARE OF THINGS AT HOME, OR GET ALONG WITH OTHER PEOPLE: NOT DIFFICULT AT ALL
6. BECOMING EASILY ANNOYED OR IRRITABLE: NOT AT ALL
GAD7 TOTAL SCORE: 2
3. WORRYING TOO MUCH ABOUT DIFFERENT THINGS: SEVERAL DAYS
5. BEING SO RESTLESS THAT IT IS HARD TO SIT STILL: NOT AT ALL
GAD7 TOTAL SCORE: 2

## 2023-04-26 ASSESSMENT — PATIENT HEALTH QUESTIONNAIRE - PHQ9
5. POOR APPETITE OR OVEREATING: NOT AT ALL
SUM OF ALL RESPONSES TO PHQ QUESTIONS 1-9: 3
10. IF YOU CHECKED OFF ANY PROBLEMS, HOW DIFFICULT HAVE THESE PROBLEMS MADE IT FOR YOU TO DO YOUR WORK, TAKE CARE OF THINGS AT HOME, OR GET ALONG WITH OTHER PEOPLE: SOMEWHAT DIFFICULT
SUM OF ALL RESPONSES TO PHQ QUESTIONS 1-9: 3

## 2023-04-26 NOTE — PROGRESS NOTES
[facility]                                     Progress Note    Patient Name: Sherly Yeung  Date:  2023         Service Type: Individual      Session Start Time: 11:04  Session End Time: 11:56    Session Length: 53    Session #: 2    Attendees: Client    Service Modality:  Video Visit:      Provider verified identity through the following two step process.  Patient provided:  Patient is known previously to provider    Telemedicine Visit: The patient's condition can be safely assessed and treated via synchronous audio and visual telemedicine encounter.      Reason for Telemedicine Visit: Services only offered telehealth    Originating Site (Patient Location): Patient's home    Distant Site (Provider Location): Provider Remote Setting- Home Office    Consent:  The patient/guardian has verbally consented to: the potential risks and benefits of telemedicine (video visit) versus in person care; bill my insurance or make self-payment for services provided; and responsibility for payment of non-covered services.     Patient would like the video invitation sent by:  My Chart    Mode of Communication:  Video Conference via Amwell    Distant Location (Provider):  Off-site    As the provider I attest to compliance with applicable laws and regulations related to telemedicine.    DATA  Interactive Complexity: Yes, visit entailed Interactive Complexity evidenced by:  Grieving, much going on with family.    Crisis: No      Progress Since Last Session (Related to Symptoms / Goals / Homework):   Symptoms: grieving, adjusting to new life without father    Homework: Partially completed- processing her loss      Episode of Care Goals: Minimal progress - ACTION (Actively working towards change); Intervened by reinforcing change plan / affirming steps taken- it takes some time to achieve acceptance.      Current / Ongoing Stressors and Concerns: Patient presented with an update around her cat that  while  she was in the hospital. The cat had kidney failure.  She shared her sadness on how she found out while in the hospital. Patient also shared being overwhelmed by multiple medical appts at different locations. She notes she feels exhausted about this. She also stated she understands why she had to have those appts. Discussed the importance of acknowledging her progress and her small accomplishment to find some kind of balance daily. Patient is on SSDI due to many medical needs. She will be focusing on collecting some memories around her cat. Will continue to do the same for her father as well. He next visit on 5/5/2023     Treatment Objective(s) Addressed in This Session:   Decrease frequency and intensity of feeling down, depressed, hopeless  Identify negative self-talk and behaviors: challenge core beliefs, myths, and actions  increase understanding of steps in the grief process      Intervention:     Situation        Automatic Thoughts  Cognitive Distortions      Feelings        Behavior        Questioning Thoughts          MI Intervention: Co-Developed Goal: targeting grief and depressed mood, Supported Autonomy, Collaboration, Evocation, Permission to raise concern or advise and Open-ended questions     Change Talk Expressed by the Patient: Taking steps    Provider Response to Change Talk: E - Evoked more info from patient about behavior change, A - Affirmed patient's thoughts, decisions, or attempts at behavior change, R - Reflected patient's change talk and S - Summarized patient's change talk statements     Grief processing: To process her loss and achieve acceptance.     Assessments completed prior to visit:2/23/2023    The following assessments were completed by patient for this visit:  PHQ2:       4/27/2022    10:07 AM 8/11/2020     9:37 AM 3/24/2020     3:46 PM 1/19/2012     2:24 PM   PHQ-2 ( 1999 [employer])   Q1: Little interest or pleasure in doing things 0 0 0 0   Q2: Feeling down, depressed or hopeless 0  0 0 1   PHQ-2 Score 0 0 0 1   PHQ-2 Total Score (12-17 Years)- Positive if 3 or more points; Administer PHQ-A if positive  0 0      PHQ9:       9/26/2022     7:51 AM 11/28/2022     8:59 AM 1/5/2023    10:05 AM 2/9/2023    11:11 AM 3/17/2023     4:37 AM 4/20/2023     9:01 AM 4/26/2023     9:07 AM   PHQ-9 SCORE   PHQ-9 Total Score MyChart 3 (Minimal depression) 6 (Mild depression) 6 (Mild depression) 4 (Minimal depression) 4 (Minimal depression) 5 (Mild depression) 3 (Minimal depression)   PHQ-9 Total Score 3 6 6 4 4 5 3     GAD2:       8/2/2022    12:15 PM 9/24/2022     8:52 AM 11/28/2022     8:59 AM 1/5/2023    10:06 AM 2/9/2023    11:47 AM 3/17/2023     4:38 AM 4/20/2023     9:02 AM   ROCKY-2   Feeling nervous, anxious, or on edge 1 1 1 1 1 1    1 0    0   Not being able to stop or control worrying 0 1 0 0 0 1    1 1    1   ROCKY-2 Total Score 1 2 1 1 1 2    2 1    1     GAD7:       2/23/2023     5:18 PM 4/26/2023    11:04 AM   ROCKY-7 SCORE   Total Score 7 2     CAGE-AID:       2/9/2023    11:52 AM   CAGE-AID Total Score   Total Score 0   Total Score MyChart 0 (A total score of 2 or greater is considered clinically significant)     PROMIS 10-Global Health (all questions and answers displayed):       5/13/2022     7:54 AM 9/24/2022     8:54 AM 1/5/2023    10:08 AM 3/31/2023    11:04 AM   PROMIS 10   In general, would you say your health is: Good Good Good Good   In general, would you say your quality of life is: Good Very good Good Good   In general, how would you rate your physical health? Good Good Good Good   In general, how would you rate your mental health, including your mood and your ability to think? Good Very good Good Good   In general, how would you rate your satisfaction with your social activities and relationships? Very good Very good Good Good   In general, please rate how well you carry out your usual social activities and roles Good Very good Fair Fair   To what extent are you able to carry out your  everyday physical activities such as walking, climbing stairs, carrying groceries, or moving a chair? A little Moderately Moderately A little   In the past 7 days, how often have you been bothered by emotional problems such as feeling anxious, depressed, or irritable? Rarely Sometimes Sometimes Often   In the past 7 days, how would you rate your fatigue on average? Moderate Moderate Moderate Moderate   In the past 7 days, how would you rate your pain on average, where 0 means no pain, and 10 means worst imaginable pain? 5 4 3 5   In general, would you say your health is: 3 3 3 3   In general, would you say your quality of life is: 3 4 3 3   In general, how would you rate your physical health? 3 3 3 3   In general, how would you rate your mental health, including your mood and your ability to think? 3 4 3 3   In general, how would you rate your satisfaction with your social activities and relationships? 4 4 3 3   In general, please rate how well you carry out your usual social activities and roles. (This includes activities at home, at work and in your community, and responsibilities as a parent, child, spouse, employee, friend, etc.) 3 4 2 2   To what extent are you able to carry out your everyday physical activities such as walking, climbing stairs, carrying groceries, or moving a chair? 2 3 3 2   In the past 7 days, how often have you been bothered by emotional problems such as feeling anxious, depressed, or irritable? 2 3 3 4   In the past 7 days, how would you rate your fatigue on average? 3 3 3 3   In the past 7 days, how would you rate your pain on average, where 0 means no pain, and 10 means worst imaginable pain? 5 4 3 5   Global Mental Health Score 14 15 12 11   Global Physical Health Score 11 12 13 11   PROMIS TOTAL - SUBSCORES 25 27 25 22     PROMIS 10-Global Health (only subscores and total score):       5/13/2022     7:54 AM 9/24/2022     8:54 AM 1/5/2023    10:08 AM 3/31/2023    11:04 AM   PROMIS-10  Scores Only   Global Mental Health Score 14 15 12 11   Global Physical Health Score 11 12 13 11   PROMIS TOTAL - SUBSCORES 25 27 25 22     Kershaw Suicide Severity Rating Scale (Short Version)      3/28/2022     6:22 AM 5/6/2022    11:30 AM 9/2/2022     7:31 PM 9/30/2022     5:03 PM 11/28/2022     9:49 PM 2/10/2023     9:55 AM 3/20/2023     3:28 PM   Kershaw Suicide Severity Rating (Short Version)   Over the past 2 weeks have you felt down, depressed, or hopeless? no no no no no  no   Over the past 2 weeks have you had thoughts of killing yourself?  no no no no  no   Have you ever attempted to kill yourself?  no no no no  no   1. Wish to be Dead (Since Last Contact)      N    2. Non-Specific Active Suicidal Thoughts (Since Last Contact)      N    Actual Attempt (Since Last Contact)      N    Has subject engaged in non-suicidal self-injurious behavior? (Since Last Contact)      N    Interrupted Attempts (Since Last Contact)      N    Aborted or Self-Interrupted Attempt (Since Last Contact)      N    Preparatory Acts or Behavior (Since Last Contact)      N    Suicide (Since Last Contact)      N    Calculated C-SSRS Risk Score (Since Last Contact)      No Risk Indicated          ASSESSMENT: Current Emotional / Mental Status (status of significant symptoms):   Risk status (Self / Other harm or suicidal ideation)   Patient denies current fears or concerns for personal safety.   Patient denies current or recent suicidal ideation or behaviors.   Patient denies current or recent homicidal ideation or behaviors.   Patient denies current or recent self injurious behavior or ideation.   Patient denies other safety concerns.   Patient reports there has been no change in risk factors since their last session.     Patient reports there has been no change in protective factors since their last session.     Recommended that patient call 911 or go to the local ED should there be a change in any of these risk factors.  Call [phone] if  experiencing SI     Appearance:   Appropriate    Eye Contact:   Good    Psychomotor Behavior: Normal    Attitude:   Cooperative    Orientation:   All   Speech    Rate / Production: Normal     Volume:  Normal    Mood:    Normal   Affect:    Appropriate    Thought Content:  Clear    Thought Form:  Coherent  Logical    Insight:    Good      Medication Review:   No changes to current psychiatric medication(s)     Medication Compliance:   Yes     Changes in Health Issues:   None reported     Chemical Use Review:   Substance Use: Chemical use reviewed, no active concerns identified      Tobacco Use: No current tobacco use.      Diagnosis:  1. Grief reaction      Collateral Reports Completed:   Routed note to PCP    PLAN: (Patient Tasks / Therapist Tasks / Other)  Patient will keep up with self care.   Patient will reflect on today's discussion about family.  Patient will reach out to friends that understand her when she feels sad  Patient will continue to practice the coping skills noted in felipet.   Patient's next visit is in a week    GERMAN Farmer                                                ___________________________________________________________________  Individual Treatment Plan    Patient's Name: Sherly Yeung  YOB: 1965    Date of Creation: 2/23/2023    Date Treatment Plan Last Reviewed/Revised: 4/21/2023    DSM5 Diagnoses: 296.32 (F33.1) Major Depressive Disorder, Recurrent Episode, Moderate _ and With anxious distress or 300.02 (F41.1) Generalized Anxiety Disorder  Grief reaction [F43.21]    Psychosocial / Contextual Factors: grief: father recently passed away. Family dynamic- relationship with son, mom. Feeling overwhelmed.    PROMIS (reviewed every 90 days): 25    Referral / Collaboration:    Referral to another professional/service is not indicated at this time..    Anticipated number of session for this episode of care: 9-12 sessions  Anticipation frequency of session:  "Biweekly  Anticipated Duration of each session: 53 or more minutes  Treatment plan will be reviewed in 90 days or when goals have been changed.     MeasurableTreatment Goal(s) related to diagnosis / functional impairment(s)    Goal 1: Patient will Accept the loss of beloved one and return to stable level of functioning as evidenced by a higher level of functioning as a result of acceptance.   Redevelop a supportive social system and improve interpersonal relationships and Express unresolved emotions regarding loss which brings anxiety and depression mood.      I will know I've met my goal when I have more energy and  I am able to celebrate good life with my father Vs the sadness of losing him.Redevelop a supportive social system and improve interpersonal relationships\"    Objective #A (Patient Action)    Patient will increase understanding of steps in the grief process.  Status: New - Date: 4/21/2023     Intervention(s)  Therapist will teach emotional recognition/identification. : defining happiness in your own term without father.    Objective #B  Patient will identify at least 3 fears / thoughts that contribute to feeling anxious.  Status: New - Date: 4/21/2023     Intervention(s)  Therapist will teach thought challenging with CBT.    Objective #C  Patient will Identify negative self-talk and behaviors: challenge core beliefs, myths, and actions.  Status: New - Date: 4/21/2023     Intervention(s)  Therapist will provide space and time to process thoughts and feelings around current stressors. provide support and coping skills to help move on in life.      Patient has reviewed and agreed to the above plan.      GERMAN Farmer  April 21, 2023  Answers for HPI/ROS submitted by the patient on 4/20/2023  If you checked off any problems, how difficult have these problems made it for you to do your work, take care of things at home, or get along with other people?: Somewhat difficult  PHQ9 TOTAL SCORE: " 5    Answers for HPI/ROS submitted by the patient on 4/26/2023  If you checked off any problems, how difficult have these problems made it for you to do your work, take care of things at home, or get along with other people?: Somewhat difficult  PHQ9 TOTAL SCORE: 3

## 2023-04-27 ENCOUNTER — OFFICE VISIT (OUTPATIENT)
Dept: PULMONOLOGY | Facility: CLINIC | Age: 58
End: 2023-04-27
Attending: INTERNAL MEDICINE
Payer: MEDICARE

## 2023-04-27 VITALS
DIASTOLIC BLOOD PRESSURE: 81 MMHG | OXYGEN SATURATION: 96 % | WEIGHT: 128 LBS | TEMPERATURE: 98.8 F | HEART RATE: 91 BPM | HEIGHT: 63 IN | SYSTOLIC BLOOD PRESSURE: 121 MMHG | BODY MASS INDEX: 22.68 KG/M2

## 2023-04-27 DIAGNOSIS — U09.9 LONG COVID: ICD-10-CM

## 2023-04-27 DIAGNOSIS — R06.02 SHORTNESS OF BREATH: ICD-10-CM

## 2023-04-27 DIAGNOSIS — J98.4 RESTRICTIVE LUNG DISEASE: ICD-10-CM

## 2023-04-27 DIAGNOSIS — K21.9 GASTROESOPHAGEAL REFLUX DISEASE WITHOUT ESOPHAGITIS: Primary | ICD-10-CM

## 2023-04-27 PROCEDURE — 99205 OFFICE O/P NEW HI 60 MIN: CPT | Performed by: INTERNAL MEDICINE

## 2023-04-27 RX ORDER — FAMOTIDINE 20 MG/1
20 TABLET, FILM COATED ORAL DAILY
Qty: 60 TABLET | Refills: 5 | Status: SHIPPED | OUTPATIENT
Start: 2023-04-27 | End: 2024-04-02
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
## 2023-04-27 NOTE — PATIENT INSTRUCTIONS
Marcia,     It was nice meeting you!  We reviewed your symptoms, your pulmonary function testing, your chest CT imaging, and management plan.    Recommendations as follows:   -Chest CT PE study to look for blood clot  -Overnight oximetry study  -6-minute walk test  -Start famotidine for ongoing GERD  -I have placed a referral to pulmonary rehab for you   - Please call the pulmonary clinic with any questions. The pulmonary clinic number is: [phone].     Stay up to date with vaccinations including your yearly flu vaccine. Wash your hands regularly. Consider wearing a mask in crowded places to reduce the risk of respiratory illnesses. I recommend that you receive the COVID-19 vaccine and stay up to date with boosters.     Have a nice spring and stay well! Please let me know if you have questions or concerns.     Brenna Gonsales MD  Pulmonary, Allergy, Critical Care, and Sleep Medicine   [facility]     Resources for post-covid fatigue, shortness of breath:     Resources from WHO (World Health Organization):  https://apps.who.int/iris/bitstream/handle/17655/552656/UFV-RCQV-6196-660-19673-57291-eng.pdf    Breathing exercises:    [email or web address]-breathing-exercises

## 2023-04-27 NOTE — NURSING NOTE
"Initial /81 (BP Location: Right arm, Patient Position: Sitting, Cuff Size: Adult Regular)   Pulse 91   Temp 98.8  F (37.1  C) (Tympanic)   Ht 1.6 m (5' 2.99\")   Wt 58.1 kg (128 lb)   LMP 08/22/2017 (Approximate)   SpO2 96%   BMI 22.68 kg/m   Estimated body mass index is 22.68 kg/m  as calculated from the following:    Height as of this encounter: 1.6 m (5' 2.99\").    Weight as of this encounter: 58.1 kg (128 lb). .    Stefanie Alejo MA    "

## 2023-04-28 ENCOUNTER — TELEPHONE (OUTPATIENT)
Dept: PULMONOLOGY | Facility: CLINIC | Age: 58
End: 2023-04-28
Payer: MEDICARE

## 2023-04-28 NOTE — TELEPHONE ENCOUNTER
Prior Authorization Retail Medication Request    Medication/Dose: famotidine (PEPCID) 20 MG tablet  ICD code (if different than what is on RX):    Previously Tried and Failed:    Rationale:      Insurance Name:  [facility] [employer]  Insurance ID:  [id number]       Pharmacy Information (if different than what is on RX)  Name:  [facility]   Phone:  [phone]

## 2023-05-01 ENCOUNTER — HOSPITAL ENCOUNTER (OUTPATIENT)
Dept: CT IMAGING | Facility: CLINIC | Age: 58
Discharge: HOME | End: 2023-05-01
Attending: INTERNAL MEDICINE | Admitting: INTERNAL MEDICINE
Payer: MEDICARE

## 2023-05-01 DIAGNOSIS — R06.02 SHORTNESS OF BREATH: ICD-10-CM

## 2023-05-01 DIAGNOSIS — J98.4 RESTRICTIVE LUNG DISEASE: ICD-10-CM

## 2023-05-01 PROCEDURE — 250N000011 HC RX IP 250 OP 636: Performed by: RADIOLOGY

## 2023-05-01 PROCEDURE — 250N000009 HC RX 250: Performed by: RADIOLOGY

## 2023-05-01 PROCEDURE — G1010 CDSM STANSON: HCPCS

## 2023-05-01 RX ORDER — IOPAMIDOL 755 MG/ML
59 INJECTION, SOLUTION INTRAVASCULAR ONCE
Status: COMPLETED | OUTPATIENT
Start: 2023-05-01 | End: 2023-05-01

## 2023-05-01 RX ADMIN — SODIUM CHLORIDE 93 ML: 9 INJECTION, SOLUTION INTRAVENOUS at 16:24

## 2023-05-01 RX ADMIN — IOPAMIDOL 59 ML: 755 INJECTION, SOLUTION INTRAVENOUS at 16:24

## 2023-05-03 NOTE — TELEPHONE ENCOUNTER
PRIOR AUTHORIZATION DENIED    Medication: famotidine (PEPCID) 20 MG tablet    Denial Date: 5/3/2023    Denial Rational: Drug is excluded from plan. See list below for covered alternatives.               Appeal Information:

## 2023-05-04 ASSESSMENT — PATIENT HEALTH QUESTIONNAIRE - PHQ9
SUM OF ALL RESPONSES TO PHQ QUESTIONS 1-9: 4
SUM OF ALL RESPONSES TO PHQ QUESTIONS 1-9: 4
10. IF YOU CHECKED OFF ANY PROBLEMS, HOW DIFFICULT HAVE THESE PROBLEMS MADE IT FOR YOU TO DO YOUR WORK, TAKE CARE OF THINGS AT HOME, OR GET ALONG WITH OTHER PEOPLE: SOMEWHAT DIFFICULT
10. IF YOU CHECKED OFF ANY PROBLEMS, HOW DIFFICULT HAVE THESE PROBLEMS MADE IT FOR YOU TO DO YOUR WORK, TAKE CARE OF THINGS AT HOME, OR GET ALONG WITH OTHER PEOPLE: SOMEWHAT DIFFICULT
SUM OF ALL RESPONSES TO PHQ QUESTIONS 1-9: 4
SUM OF ALL RESPONSES TO PHQ QUESTIONS 1-9: 4

## 2023-05-05 ENCOUNTER — VIRTUAL VISIT (OUTPATIENT)
Dept: PSYCHIATRY | Facility: CLINIC | Age: 58
End: 2023-05-05
Attending: PSYCHIATRY & NEUROLOGY
Payer: MEDICARE

## 2023-05-05 ENCOUNTER — VIRTUAL VISIT (OUTPATIENT)
Dept: PSYCHOLOGY | Facility: CLINIC | Age: 58
End: 2023-05-05
Payer: MEDICARE

## 2023-05-05 DIAGNOSIS — F43.21 GRIEF REACTION: Primary | ICD-10-CM

## 2023-05-05 DIAGNOSIS — F33.9 RECURRENT MAJOR DEPRESSIVE DISORDER, REMISSION STATUS UNSPECIFIED (H): Primary | ICD-10-CM

## 2023-05-05 DIAGNOSIS — F33.0 MDD (MAJOR DEPRESSIVE DISORDER), RECURRENT EPISODE, MILD (H): ICD-10-CM

## 2023-05-05 DIAGNOSIS — Z79.899 ENCOUNTER FOR LONG-TERM (CURRENT) USE OF MEDICATIONS: ICD-10-CM

## 2023-05-05 DIAGNOSIS — F43.21 GRIEF: ICD-10-CM

## 2023-05-05 PROCEDURE — 90837 PSYTX W PT 60 MINUTES: CPT | Mod: VID | Performed by: SOCIAL WORKER

## 2023-05-05 PROCEDURE — G0463 HOSPITAL OUTPT CLINIC VISIT: HCPCS | Mod: PN,GT | Performed by: STUDENT IN AN ORGANIZED HEALTH CARE EDUCATION/TRAINING PROGRAM

## 2023-05-05 PROCEDURE — 99214 OFFICE O/P EST MOD 30 MIN: CPT | Mod: GC | Performed by: STUDENT IN AN ORGANIZED HEALTH CARE EDUCATION/TRAINING PROGRAM

## 2023-05-05 NOTE — NURSING NOTE
Is the patient currently in the state of [place]? YES    Visit mode:VIDEO    If the visit is dropped, the patient can be reconnected by: VIDEO VISIT: Text to cell phone: [phone]    Will anyone else be joining the visit? NO      How would you like to obtain your AVS? MyChart    Are changes needed to the allergy or medication list? YES: Pt states she is now taking Qulipta 60 MG Tabs once daily     Reason for visit: Video Visit
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
## 2023-05-05 NOTE — PROGRESS NOTES
[facility]                                     Progress Note    Patient Name: Sherly Yeung  Date:  5/05/2023         Service Type: Individual      Session Start Time: 15:03 Session End Time: 15:56    Session Length: 53    Session #: 3    Attendees: Client    Service Modality:  Video Visit:      Provider verified identity through the following two step process.  Patient provided:  Patient is known previously to provider    Telemedicine Visit: The patient's condition can be safely assessed and treated via synchronous audio and visual telemedicine encounter.      Reason for Telemedicine Visit: Services only offered telehealth    Originating Site (Patient Location): Patient's home    Distant Site (Provider Location): [facility] [place]    Consent:  The patient/guardian has verbally consented to: the potential risks and benefits of telemedicine (video visit) versus in person care; bill my insurance or make self-payment for services provided; and responsibility for payment of non-covered services.     Patient would like the video invitation sent by:  My Chart    Mode of Communication:  Video Conference via Amwell    Distant Location (Provider):  On-site    As the provider I attest to compliance with applicable laws and regulations related to telemedicine.    DATA  Interactive Complexity: Yes, visit entailed Interactive Complexity evidenced by:  Grieving, much going on with her health    Crisis: No      Progress Since Last Session (Related to Symptoms / Goals / Homework):   Symptoms: grieving, adjusting to new life without father    Homework: Partially completed- processing her loss      Episode of Care Goals: Minimal progress - ACTION (Actively working towards change); Intervened by reinforcing change plan / affirming steps taken- it takes some time to achieve acceptance.      Current / Ongoing Stressors and Concerns:  Patient has been experiencing major  pain in her body. She is noticing difficulties accessing her medications. Has been experiencing much frustration and confusion. Has also been grieving and finds it difficult getting going with different projects in the house. Since spent 6 months in the hospital, she never got to know what is in her kitchen cabinets. Continues to process her loss and need sometimes to share and feel supported. Patient understands grieving is not an overnight. It will take sometime to access the level of acceptance. Her next visit is in 2 weeks.      Treatment Objective(s) Addressed in This Session:   Decrease frequency and intensity of feeling down, depressed, hopeless  Identify negative self-talk and behaviors: challenge core beliefs, myths, and actions  increase understanding of steps in the grief process      Intervention:     Situation        Automatic Thoughts  Cognitive Distortions      Feelings        Behavior        Questioning Thoughts          MI Intervention: Co-Developed Goal: targeting grief and depressed mood, Supported Autonomy, Collaboration, Evocation, Permission to raise concern or advise and Open-ended questions     Change Talk Expressed by the Patient: Taking steps    Provider Response to Change Talk: E - Evoked more info from patient about behavior change, A - Affirmed patient's thoughts, decisions, or attempts at behavior change, R - Reflected patient's change talk and S - Summarized patient's change talk statements     Grief processing: To process her loss and achieve acceptance.     Assessments completed prior to visit:2/23/2023    The following assessments were completed by patient for this visit:  PHQ2:       4/27/2022    10:07 AM 8/11/2020     9:37 AM 3/24/2020     3:46 PM 1/19/2012     2:24 PM   PHQ-2 ( 1999 [employer])   Q1: Little interest or pleasure in doing things 0 0 0 0   Q2: Feeling down, depressed or hopeless 0 0 0 1   PHQ-2 Score 0 0 0 1   PHQ-2 Total Score (12-17 Years)- Positive if 3 or more  points; Administer PHQ-A if positive  0 0      PHQ9:       11/28/2022     8:59 AM 1/5/2023    10:05 AM 2/9/2023    11:11 AM 3/17/2023     4:37 AM 4/20/2023     9:01 AM 4/26/2023     9:07 AM 5/4/2023     9:43 AM   PHQ-9 SCORE   PHQ-9 Total Score MyChart 6 (Mild depression) 6 (Mild depression) 4 (Minimal depression) 4 (Minimal depression) 5 (Mild depression) 3 (Minimal depression) 4 (Minimal depression)   PHQ-9 Total Score 6 6 4 4 5 3 4    4     GAD2:       9/24/2022     8:52 AM 11/28/2022     8:59 AM 1/5/2023    10:06 AM 2/9/2023    11:47 AM 3/17/2023     4:38 AM 4/20/2023     9:02 AM 4/30/2023     2:33 PM   ROCKY-2   Feeling nervous, anxious, or on edge 1 1 1 1 1    1 0    0 1    1   Not being able to stop or control worrying 1 0 0 0 1    1 1    1 0    0   ROCKY-2 Total Score 2 1 1 1 2    2 1    1 1    1     GAD7:       2/23/2023     5:18 PM 4/26/2023    11:04 AM   ROCKY-7 SCORE   Total Score 7 2     CAGE-AID:       2/9/2023    11:52 AM   CAGE-AID Total Score   Total Score 0   Total Score MyChart 0 (A total score of 2 or greater is considered clinically significant)     PROMIS 10-Global Health (all questions and answers displayed):       5/13/2022     7:54 AM 9/24/2022     8:54 AM 1/5/2023    10:08 AM 3/31/2023    11:04 AM 4/30/2023     2:36 PM   PROMIS 10   In general, would you say your health is: Good Good Good Good Good   In general, would you say your quality of life is: Good Very good Good Good Good   In general, how would you rate your physical health? Good Good Good Good Good   In general, how would you rate your mental health, including your mood and your ability to think? Good Very good Good Good Good   In general, how would you rate your satisfaction with your social activities and relationships? Very good Very good Good Good Very good   In general, please rate how well you carry out your usual social activities and roles Good Very good Fair Fair Good   To what extent are you able to carry out your everyday  physical activities such as walking, climbing stairs, carrying groceries, or moving a chair? A little Moderately Moderately A little A little   In the past 7 days, how often have you been bothered by emotional problems such as feeling anxious, depressed, or irritable? Rarely Sometimes Sometimes Often Often   In the past 7 days, how would you rate your fatigue on average? Moderate Moderate Moderate Moderate Moderate   In the past 7 days, how would you rate your pain on average, where 0 means no pain, and 10 means worst imaginable pain? 5 4 3 5 6   In general, would you say your health is: 3 3 3 3 3   In general, would you say your quality of life is: 3 4 3 3 3   In general, how would you rate your physical health? 3 3 3 3 3   In general, how would you rate your mental health, including your mood and your ability to think? 3 4 3 3 3   In general, how would you rate your satisfaction with your social activities and relationships? 4 4 3 3 4   In general, please rate how well you carry out your usual social activities and roles. (This includes activities at home, at work and in your community, and responsibilities as a parent, child, spouse, employee, friend, etc.) 3 4 2 2 3   To what extent are you able to carry out your everyday physical activities such as walking, climbing stairs, carrying groceries, or moving a chair? 2 3 3 2 2   In the past 7 days, how often have you been bothered by emotional problems such as feeling anxious, depressed, or irritable? 2 3 3 4 4   In the past 7 days, how would you rate your fatigue on average? 3 3 3 3 3   In the past 7 days, how would you rate your pain on average, where 0 means no pain, and 10 means worst imaginable pain? 5 4 3 5 6   Global Mental Health Score 14 15 12 11 12   Global Physical Health Score 11 12 13 11 11   PROMIS TOTAL - SUBSCORES 25 27 25 22 23     PROMIS 10-Global Health (only subscores and total score):       5/13/2022     7:54 AM 9/24/2022     8:54 AM 1/5/2023     10:08 AM 3/31/2023    11:04 AM 4/30/2023     2:36 PM   PROMIS-10 Scores Only   Global Mental Health Score 14 15 12 11 12   Global Physical Health Score 11 12 13 11 11   PROMIS TOTAL - SUBSCORES 25 27 25 22 23     [place] Suicide Severity Rating Scale (Short Version)      3/28/2022     6:22 AM 5/6/2022    11:30 AM 9/2/2022     7:31 PM 9/30/2022     5:03 PM 11/28/2022     9:49 PM 2/10/2023     9:55 AM 3/20/2023     3:28 PM   [place] Suicide Severity Rating (Short Version)   Over the past 2 weeks have you felt down, depressed, or hopeless? no no no no no  no   Over the past 2 weeks have you had thoughts of killing yourself?  no no no no  no   Have you ever attempted to kill yourself?  no no no no  no   1. Wish to be Dead (Since Last Contact)      N    2. Non-Specific Active Suicidal Thoughts (Since Last Contact)      N    Actual Attempt (Since Last Contact)      N    Has subject engaged in non-suicidal self-injurious behavior? (Since Last Contact)      N    Interrupted Attempts (Since Last Contact)      N    Aborted or Self-Interrupted Attempt (Since Last Contact)      N    Preparatory Acts or Behavior (Since Last Contact)      N    Suicide (Since Last Contact)      N    Calculated C-SSRS Risk Score (Since Last Contact)      No Risk Indicated          ASSESSMENT: Current Emotional / Mental Status (status of significant symptoms):   Risk status (Self / Other harm or suicidal ideation)   Patient denies current fears or concerns for personal safety.   Patient denies current or recent suicidal ideation or behaviors.   Patient denies current or recent homicidal ideation or behaviors.   Patient denies current or recent self injurious behavior or ideation.   Patient denies other safety concerns.   Patient reports there has been no change in risk factors since their last session.     Patient reports there has been no change in protective factors since their last session.     Recommended that patient call 911 or go to the  local ED should there be a change in any of these risk factors.  Call [phone] if experiencing SI     Appearance:   Appropriate    Eye Contact:   Good    Psychomotor Behavior: Normal    Attitude:   Cooperative    Orientation:   All   Speech    Rate / Production: Normal     Volume:  Normal    Mood:    Normal   Affect:    Appropriate    Thought Content:  Clear    Thought Form:  Coherent  Logical    Insight:    Good      Medication Review:   No changes to current psychiatric medication(s)     Medication Compliance:   Yes     Changes in Health Issues:   None reported     Chemical Use Review:   Substance Use: Chemical use reviewed, no active concerns identified      Tobacco Use: No current tobacco use.      Diagnosis:  1. Grief reaction    2. MDD (major depressive disorder), recurrent episode, mild (H)      Collateral Reports Completed:   Routed note to PCP    PLAN: (Patient Tasks / Therapist Tasks / Other)  Patient will keep up with self care.   Patient plans to work on gardening and organize her kitchen cabinets by the next visit  Patient will reach out to friends that understand her when she feels sad  Patient will continue to practice the coping skills noted in felipet.   Patient's next visit is in 2 weeks    GERMAN Farmer               ___________________________________________________________________  Individual Treatment Plan    Patient's Name: Sherly Yeung  YOB: 1965    Date of Creation: 2/23/2023    Date Treatment Plan Last Reviewed/Revised: 4/21/2023    DSM5 Diagnoses: 296.32 (F33.1) Major Depressive Disorder, Recurrent Episode, Moderate _ and With anxious distress or 300.02 (F41.1) Generalized Anxiety Disorder  Grief reaction [F43.21]    Psychosocial / Contextual Factors: grief: father recently passed away. Family dynamic- relationship with son, mom. Feeling overwhelmed.    PROMIS (reviewed every 90 days): 25    Referral / Collaboration:    Referral to another professional/service is  "not indicated at this time..    Anticipated number of session for this episode of care: 9-12 sessions  Anticipation frequency of session: Biweekly  Anticipated Duration of each session: 53 or more minutes  Treatment plan will be reviewed in 90 days or when goals have been changed.     MeasurableTreatment Goal(s) related to diagnosis / functional impairment(s)    Goal 1: Patient will Accept the loss of beloved one and return to stable level of functioning as evidenced by a higher level of functioning as a result of acceptance.   Redevelop a supportive social system and improve interpersonal relationships and Express unresolved emotions regarding loss which brings anxiety and depression mood.      I will know I've met my goal when I have more energy and  I am able to celebrate good life with my father Vs the sadness of losing him.Redevelop a supportive social system and improve interpersonal relationships\"    Objective #A (Patient Action)    Patient will increase understanding of steps in the grief process.  Status: New - Date: 4/21/2023     Intervention(s)  Therapist will teach emotional recognition/identification. : defining happiness in your own term without father.    Objective #B  Patient will identify at least 3 fears / thoughts that contribute to feeling anxious.  Status: New - Date: 4/21/2023     Intervention(s)  Therapist will teach thought challenging with CBT.    Objective #C  Patient will Identify negative self-talk and behaviors: challenge core beliefs, myths, and actions.  Status: New - Date: 4/21/2023     Intervention(s)  Therapist will provide space and time to process thoughts and feelings around current stressors. provide support and coping skills to help move on in life.      Patient has reviewed and agreed to the above plan.      GERMAN Farmer  April 21, 2023  Answers for HPI/ROS submitted by the patient on 4/20/2023  If you checked off any problems, how difficult have these problems " made it for you to do your work, take care of things at home, or get along with other people?: Somewhat difficult  PHQ9 TOTAL SCORE: 5    Answers for HPI/ROS submitted by the patient on 4/26/2023  If you checked off any problems, how difficult have these problems made it for you to do your work, take care of things at home, or get along with other people?: Somewhat difficult  PHQ9 TOTAL SCORE: 3    Answers for HPI/ROS submitted by the patient on 5/4/2023  If you checked off any problems, how difficult have these problems made it for you to do your work, take care of things at home, or get along with other people?: Somewhat difficult  PHQ9 TOTAL SCORE: 4

## 2023-05-05 NOTE — PATIENT INSTRUCTIONS
It was nice seeing you today    Treatment Plan Today:     1) Medications-   -Decrease Ritalin to 30mg in the morning and 20mg in the afternoon   - Continue duloxetine 120 mg daily  - Continue Abilify 7mg daily    2) Follow-up appt with Dr Guillen in 6 weeks    3) Crisis numbers are below and clinic after hours number is [phone]         **For crisis resources, please see the information at the end of this document**   Patient Education    Thank you for coming to the [facility] [place] [facility].     Lab Testing:  If you had lab testing today and your results are reassuring or normal they will be mailed to you or sent through August within 7 days. If the lab tests need quick action we will call you with the results. The phone number we will call with results is # [phone]. If this is not the best number please call our clinic and change the number.     Medication Refills:  If you need any refills please call your pharmacy and they will contact us. Our fax number for refills is [phone].   Three business days of notice are needed for general medication refill requests.   Five business days of notice are needed for controlled substance refill requests.   If you need to change to a different pharmacy, please contact the new pharmacy directly. The new pharmacy will help you get your medications transferred.     Contact Us:  Please call [phone] during business hours (8-5:00 M-F).   If you have medication related questions after clinic hours, or on the weekend, please call [phone].     Financial Assistance [phone]   Medical Records [phone]       MENTAL HEALTH CRISIS RESOURCES:  For a emergency help, please call 911 or go to the nearest Emergency Department.     Emergency Walk-In Options:   Em[facility] Unit @ [facility] ([place]): [phone] - Specialized mental health emergency area designed to be calming  [facility] ([place]):  [phone]  [facility] ([place]): [phone]  [facility] ([place]): [phone]    [facility] [facility] Crisis Information:   [place]: [phone]  [place]: [phone]  [place] ([place]) - Adult: [phone]     Child: [phone]  [place] - Adult: [phone]     Child: [phone]  [place]: [phone]  List of all [facility] resources:   https://[place].gov/dhs/people-we-serve/adults/health-care/mental-health/resources/crisis-contacts.jsp    National Crisis Information:   Crisis Text Line: Text  [place]  to 544877  Suicide & Crisis Lifeline: 988  National Suicide Prevention Lifeline: 2-040-778-TALK (1-545.833.5634)       For online chat options, visit https://suicidepreventionlifeline.org/chat/  Poison Control Center: 1-750.226.6412  Trans Lifeline: 0-719-190-4493 - Hotline for transgender people of all ages  [employer]: 9-113-976-7712 - Hotline for LGBT youth     For Non-Emergency Support:   Fast Tracker: Mental Health & Substance Use Disorder Resources -   https://www.[place].org/

## 2023-05-07 DIAGNOSIS — I10 ESSENTIAL HYPERTENSION: ICD-10-CM

## 2023-05-07 DIAGNOSIS — I13.10 HYPERTENSIVE HEART AND CHRONIC KIDNEY DISEASE STAGE 2: ICD-10-CM

## 2023-05-07 DIAGNOSIS — N18.2 HYPERTENSIVE HEART AND CHRONIC KIDNEY DISEASE STAGE 2: ICD-10-CM

## 2023-05-08 RX ORDER — METHYLPHENIDATE HYDROCHLORIDE 20 MG/1
20 TABLET ORAL 2 TIMES DAILY
Qty: 60 TABLET | Refills: 0 | Status: SHIPPED | OUTPATIENT
Start: 2023-05-15 | End: 2023-06-05

## 2023-05-08 RX ORDER — METHYLPHENIDATE HYDROCHLORIDE 10 MG/1
10 TABLET ORAL DAILY
Qty: 30 TABLET | Refills: 0 | Status: SHIPPED | OUTPATIENT
Start: 2023-05-15 | End: 2023-06-05 | Stop reason: DRUGHIGH

## 2023-05-10 RX ORDER — METOPROLOL SUCCINATE 50 MG/1
50 TABLET, EXTENDED RELEASE ORAL DAILY
Qty: 90 TABLET | Refills: 3 | Status: SHIPPED | OUTPATIENT
Start: 2023-05-10 | End: 2024-04-25

## 2023-05-10 NOTE — TELEPHONE ENCOUNTER
4/10/2023  [facility]  Internal Medicine

## 2023-05-11 ENCOUNTER — HOSPITAL ENCOUNTER (OUTPATIENT)
Dept: RESPIRATORY THERAPY | Facility: CLINIC | Age: 58
Discharge: HOME | End: 2023-05-11
Attending: INTERNAL MEDICINE | Admitting: INTERNAL MEDICINE
Payer: MEDICARE

## 2023-05-11 PROCEDURE — 94618 PULMONARY STRESS TESTING: CPT

## 2023-05-11 NOTE — PROGRESS NOTES
6MWT completed.    Room air SpO2 at rest = 97%, HR 98, /86. RPD 0.5  Room air SpO2 with walk = 81%, , /84. RPD 4 RPE 4    2L SpO2 at rest = 100%, HR 97  2L SpO2 with walk = 96%,     Patient walked 1266 ft/ 386.13 in 6 mins. No rests. O2 dropped to 81%, with questionable cold hands. Patient reported increased soa, appears soa.  Patient rested on 2 L, then walked 1100 ft/ 335.5 m in 6 mins. No rests. She states she feels much better with the O2.

## 2023-05-19 ENCOUNTER — TELEPHONE (OUTPATIENT)
Dept: PSYCHIATRY | Facility: CLINIC | Age: 58
End: 2023-05-19
Payer: MEDICARE

## 2023-05-19 NOTE — TELEPHONE ENCOUNTER
[facility] Call [facility]    Phone Message    May a detailed message be left on voicemail: yes     Reason for Call: Medication Question or concern regarding medication   Prescription Clarification  Name of Medication: methylphenidate  Prescribing Provider: edelmira   Pharmacy:    [facility] - [facility] [id number] [facility]      What on the order needs clarification? Pt instructions for medication are take a 20 and a 10 mg pill per day, but only 20 mg was called in. Pt needs the rx for 10 mg sent in as well.          Action Taken: Message routed to:  Other: nursing pool    Travel Screening: Not Applicable

## 2023-05-19 NOTE — TELEPHONE ENCOUNTER
Upon chart review, it appears that patient's 10 mg methylphenidate was ordered, but appears to be still in the prior authorization process.    Writer called patient to update. Patient states she was able to get her 20 mg tablets and states she has been splitting them to take 1.5 tablets for a total of 30 mg in morning for the past 2 days.

## 2023-05-22 NOTE — TELEPHONE ENCOUNTER
Writer called patient to update that the prior authorization for methylphenidate (RITALIN) 10 MG tablet had been approved. Patient will contact the clinic if there are any issues getting this prescription filled.

## 2023-05-25 ENCOUNTER — VIRTUAL VISIT (OUTPATIENT)
Dept: PSYCHOLOGY | Facility: CLINIC | Age: 58
End: 2023-05-25
Payer: MEDICARE

## 2023-05-25 ENCOUNTER — DOCUMENTATION ONLY (OUTPATIENT)
Dept: INTERNAL MEDICINE | Facility: CLINIC | Age: 58
End: 2023-05-25
Payer: MEDICARE

## 2023-05-25 DIAGNOSIS — F43.21 GRIEF REACTION: Primary | ICD-10-CM

## 2023-05-25 PROCEDURE — 90834 PSYTX W PT 45 MINUTES: CPT | Mod: VID | Performed by: SOCIAL WORKER

## 2023-05-25 NOTE — PROGRESS NOTES
[facility]                                     Progress Note    Patient Name: Sherly Yeung  Date:  5/25/2023         Service Type: Individual      Session Start Time: 10:02 Session End Time: 10:50    Session Length: 48    Session #: 4    Attendees: Client    Service Modality:  Video Visit:      Provider verified identity through the following two step process.  Patient provided:  Patient is known previously to provider    Telemedicine Visit: The patient's condition can be safely assessed and treated via synchronous audio and visual telemedicine encounter.      Reason for Telemedicine Visit: Services only offered telehealth    Originating Site (Patient Location): Patient's home    Distant Site (Provider Location): Provider Remote Setting- Home Office    Consent:  The patient/guardian has verbally consented to: the potential risks and benefits of telemedicine (video visit) versus in person care; bill my insurance or make self-payment for services provided; and responsibility for payment of non-covered services.     Patient would like the video invitation sent by:  My Chart    Mode of Communication:  Video Conference via Amwell    Distant Location (Provider):  Off-site    As the provider I attest to compliance with applicable laws and regulations related to telemedicine.    DATA  Interactive Complexity: No     Crisis: No      Progress Since Last Session (Related to Symptoms / Goals / Homework):   Symptoms: grieving, adjusting to new life without father    Homework: Partially completed- processing her loss      Episode of Care Goals: Minimal progress - ACTION (Actively working towards change); Intervened by reinforcing change plan / affirming steps taken- it takes some time to achieve acceptance.      Current / Ongoing Stressors and Concerns:  Patient has been processing her grief. She notes it is still hard. Thinks about calling the nursing home and then realizing father is no longer there. Has  "been gardening which helps to remember father as he loved gardening. Shared the pain has been exacerbating until she was able to get back her pain medications. Today,she notes feeling much better. Discussed some options to increase self compassion. Will use the website\" self compassion \" by Radha Biswas. Her next visit is in a week.     Treatment Objective(s) Addressed in This Session:   Decrease frequency and intensity of feeling down, depressed, hopeless  Identify negative self-talk and behaviors: challenge core beliefs, myths, and actions  increase understanding of steps in the grief process      Intervention:     Situation        Automatic Thoughts  Cognitive Distortions      Feelings        Behavior        Questioning Thoughts          MI Intervention: Co-Developed Goal: targeting grief and depressed mood, Supported Autonomy, Collaboration, Evocation, Permission to raise concern or advise and Open-ended questions     Change Talk Expressed by the Patient: Taking steps    Provider Response to Change Talk: E - Evoked more info from patient about behavior change, A - Affirmed patient's thoughts, decisions, or attempts at behavior change, R - Reflected patient's change talk and S - Summarized patient's change talk statements     Grief processing: To process her loss and achieve acceptance.     Assessments completed prior to visit:2/23/2023    The following assessments were completed by patient for this visit:  PHQ2:       4/27/2022    10:07 AM 8/11/2020     9:37 AM 3/24/2020     3:46 PM 1/19/2012     2:24 PM   PHQ-2 ( 1999 [employer])   Q1: Little interest or pleasure in doing things 0 0 0 0   Q2: Feeling down, depressed or hopeless 0 0 0 1   PHQ-2 Score 0 0 0 1   PHQ-2 Total Score (12-17 Years)- Positive if 3 or more points; Administer PHQ-A if positive  0 0      PHQ9:       11/28/2022     8:59 AM 1/5/2023    10:05 AM 2/9/2023    11:11 AM 3/17/2023     4:37 AM 4/20/2023     9:01 AM 4/26/2023     9:07 AM 5/4/2023     " 9:43 AM   PHQ-9 SCORE   PHQ-9 Total Score MyChart 6 (Mild depression) 6 (Mild depression) 4 (Minimal depression) 4 (Minimal depression) 5 (Mild depression) 3 (Minimal depression) 4 (Minimal depression)   PHQ-9 Total Score 6 6 4 4 5 3 4    4     GAD2:       9/24/2022     8:52 AM 11/28/2022     8:59 AM 1/5/2023    10:06 AM 2/9/2023    11:47 AM 3/17/2023     4:38 AM 4/20/2023     9:02 AM 4/30/2023     2:33 PM   ROCKY-2   Feeling nervous, anxious, or on edge 1 1 1 1 1    1 0    0 1    1   Not being able to stop or control worrying 1 0 0 0 1    1 1    1 0    0   ROCKY-2 Total Score 2 1 1 1 2    2 1    1 1    1     GAD7:       2/23/2023     5:18 PM 4/26/2023    11:04 AM   ROCKY-7 SCORE   Total Score 7 2     CAGE-AID:       2/9/2023    11:52 AM   CAGE-AID Total Score   Total Score 0   Total Score MyChart 0 (A total score of 2 or greater is considered clinically significant)     PROMIS 10-Global Health (all questions and answers displayed):       5/13/2022     7:54 AM 9/24/2022     8:54 AM 1/5/2023    10:08 AM 3/31/2023    11:04 AM 4/30/2023     2:36 PM   PROMIS 10   In general, would you say your health is: Good Good Good Good Good   In general, would you say your quality of life is: Good Very good Good Good Good   In general, how would you rate your physical health? Good Good Good Good Good   In general, how would you rate your mental health, including your mood and your ability to think? Good Very good Good Good Good   In general, how would you rate your satisfaction with your social activities and relationships? Very good Very good Good Good Very good   In general, please rate how well you carry out your usual social activities and roles Good Very good Fair Fair Good   To what extent are you able to carry out your everyday physical activities such as walking, climbing stairs, carrying groceries, or moving a chair? A little Moderately Moderately A little A little   In the past 7 days, how often have you been bothered by  emotional problems such as feeling anxious, depressed, or irritable? Rarely Sometimes Sometimes Often Often   In the past 7 days, how would you rate your fatigue on average? Moderate Moderate Moderate Moderate Moderate   In the past 7 days, how would you rate your pain on average, where 0 means no pain, and 10 means worst imaginable pain? 5 4 3 5 6   In general, would you say your health is: 3 3 3 3 3   In general, would you say your quality of life is: 3 4 3 3 3   In general, how would you rate your physical health? 3 3 3 3 3   In general, how would you rate your mental health, including your mood and your ability to think? 3 4 3 3 3   In general, how would you rate your satisfaction with your social activities and relationships? 4 4 3 3 4   In general, please rate how well you carry out your usual social activities and roles. (This includes activities at home, at work and in your community, and responsibilities as a parent, child, spouse, employee, friend, etc.) 3 4 2 2 3   To what extent are you able to carry out your everyday physical activities such as walking, climbing stairs, carrying groceries, or moving a chair? 2 3 3 2 2   In the past 7 days, how often have you been bothered by emotional problems such as feeling anxious, depressed, or irritable? 2 3 3 4 4   In the past 7 days, how would you rate your fatigue on average? 3 3 3 3 3   In the past 7 days, how would you rate your pain on average, where 0 means no pain, and 10 means worst imaginable pain? 5 4 3 5 6   Global Mental Health Score 14 15 12 11 12   Global Physical Health Score 11 12 13 11 11   PROMIS TOTAL - SUBSCORES 25 27 25 22 23     PROMIS 10-Global Health (only subscores and total score):       5/13/2022     7:54 AM 9/24/2022     8:54 AM 1/5/2023    10:08 AM 3/31/2023    11:04 AM 4/30/2023     2:36 PM   PROMIS-10 Scores Only   Global Mental Health Score 14 15 12 11 12   Global Physical Health Score 11 12 13 11 11   PROMIS TOTAL - SUBSCORES 25 27  25 22 23     [place] Suicide Severity Rating Scale (Short Version)      3/28/2022     6:22 AM 5/6/2022    11:30 AM 9/2/2022     7:31 PM 9/30/2022     5:03 PM 11/28/2022     9:49 PM 2/10/2023     9:55 AM 3/20/2023     3:28 PM   [place] Suicide Severity Rating (Short Version)   Over the past 2 weeks have you felt down, depressed, or hopeless? no no no no no  no   Over the past 2 weeks have you had thoughts of killing yourself?  no no no no  no   Have you ever attempted to kill yourself?  no no no no  no   1. Wish to be Dead (Since Last Contact)      N    2. Non-Specific Active Suicidal Thoughts (Since Last Contact)      N    Actual Attempt (Since Last Contact)      N    Has subject engaged in non-suicidal self-injurious behavior? (Since Last Contact)      N    Interrupted Attempts (Since Last Contact)      N    Aborted or Self-Interrupted Attempt (Since Last Contact)      N    Preparatory Acts or Behavior (Since Last Contact)      N    Suicide (Since Last Contact)      N    Calculated C-SSRS Risk Score (Since Last Contact)      No Risk Indicated          ASSESSMENT: Current Emotional / Mental Status (status of significant symptoms):   Risk status (Self / Other harm or suicidal ideation)   Patient denies current fears or concerns for personal safety.   Patient denies current or recent suicidal ideation or behaviors.   Patient denies current or recent homicidal ideation or behaviors.   Patient denies current or recent self injurious behavior or ideation.   Patient denies other safety concerns.   Patient reports there has been no change in risk factors since their last session.     Patient reports there has been no change in protective factors since their last session.     Recommended that patient call 911 or go to the local ED should there be a change in any of these risk factors.  Call [phone] if experiencing SI     Appearance:   Appropriate    Eye Contact:   Good    Psychomotor Behavior: Normal  "   Attitude:   Cooperative    Orientation:   All   Speech    Rate / Production: Normal     Volume:  Normal    Mood:    Normal   Affect:    Appropriate    Thought Content:  Clear    Thought Form:  Coherent  Logical    Insight:    Good      Medication Review:   No changes to current psychiatric medication(s)     Medication Compliance:   Yes     Changes in Health Issues:   None reported     Chemical Use Review:   Substance Use: Chemical use reviewed, no active concerns identified      Tobacco Use: No current tobacco use.      Diagnosis:  1. Grief reaction      Collateral Reports Completed:   Routed note to PCP    PLAN: (Patient Tasks / Therapist Tasks / Other)  Patient will keep up with self care.   Patient plans to continue working on gardening   Patient will reach out to friends that understand her when she feels sad  Patient will continue to practice the guided meditations from the \" self- compassion\"  By Radha Biswas 's web site.    Patient's next visit is on 7/10/2023    GERMAN Farmer               ___________________________________________________________________  Individual Treatment Plan    Patient's Name: Sherly Yeung  YOB: 1965    Date of Creation: 2/23/2023    Date Treatment Plan Last Reviewed/Revised: 4/21/2023    DSM5 Diagnoses: 296.32 (F33.1) Major Depressive Disorder, Recurrent Episode, Moderate _ and With anxious distress or 300.02 (F41.1) Generalized Anxiety Disorder  Grief reaction [F43.21]    Psychosocial / Contextual Factors: grief: father recently passed away. Family dynamic- relationship with son, mom. Feeling overwhelmed.    PROMIS (reviewed every 90 days): 25    Referral / Collaboration:    Referral to another professional/service is not indicated at this time..    Anticipated number of session for this episode of care: 9-12 sessions  Anticipation frequency of session: Biweekly  Anticipated Duration of each session: 53 or more minutes  Treatment plan will be reviewed " "in 90 days or when goals have been changed.     MeasurableTreatment Goal(s) related to diagnosis / functional impairment(s)    Goal 1: Patient will Accept the loss of beloved one and return to stable level of functioning as evidenced by a higher level of functioning as a result of acceptance.   Redevelop a supportive social system and improve interpersonal relationships and Express unresolved emotions regarding loss which brings anxiety and depression mood.      I will know I've met my goal when I have more energy and  I am able to celebrate good life with my father Vs the sadness of losing him.Redevelop a supportive social system and improve interpersonal relationships\"    Objective #A (Patient Action)    Patient will increase understanding of steps in the grief process.  Status: New - Date: 4/21/2023     Intervention(s)  Therapist will teach emotional recognition/identification. : defining happiness in your own term without father.    Objective #B  Patient will identify at least 3 fears / thoughts that contribute to feeling anxious.  Status: New - Date: 4/21/2023     Intervention(s)  Therapist will teach thought challenging with CBT.    Objective #C  Patient will Identify negative self-talk and behaviors: challenge core beliefs, myths, and actions.  Status: New - Date: 4/21/2023     Intervention(s)  Therapist will provide space and time to process thoughts and feelings around current stressors. provide support and coping skills to help move on in life.      Patient has reviewed and agreed to the above plan.      GERMAN Farmer  April 21, 2023  Answers for HPI/ROS submitted by the patient on 4/20/2023  If you checked off any problems, how difficult have these problems made it for you to do your work, take care of things at home, or get along with other people?: Somewhat difficult  PHQ9 TOTAL SCORE: 5    Answers for HPI/ROS submitted by the patient on 4/26/2023  If you checked off any problems, how " difficult have these problems made it for you to do your work, take care of things at home, or get along with other people?: Somewhat difficult  PHQ9 TOTAL SCORE: 3    Answers for HPI/ROS submitted by the patient on 5/4/2023  If you checked off any problems, how difficult have these problems made it for you to do your work, take care of things at home, or get along with other people?: Somewhat difficult  PHQ9 TOTAL SCORE: 4

## 2023-05-26 ENCOUNTER — TELEPHONE (OUTPATIENT)
Dept: UROLOGY | Facility: CLINIC | Age: 58
End: 2023-05-26
Payer: MEDICARE

## 2023-05-26 ENCOUNTER — MEDICAL CORRESPONDENCE (OUTPATIENT)
Dept: HEALTH INFORMATION MANAGEMENT | Facility: CLINIC | Age: 58
End: 2023-05-26
Payer: MEDICARE

## 2023-05-26 ENCOUNTER — MYC MEDICAL ADVICE (OUTPATIENT)
Dept: SURGERY | Facility: CLINIC | Age: 58
End: 2023-05-26
Payer: MEDICARE

## 2023-05-26 DIAGNOSIS — F33.1 MODERATE EPISODE OF RECURRENT MAJOR DEPRESSIVE DISORDER (H): ICD-10-CM

## 2023-05-26 DIAGNOSIS — Z01.818 PREOP EXAMINATION: Primary | ICD-10-CM

## 2023-05-26 DIAGNOSIS — N39.41 URGENCY INCONTINENCE: ICD-10-CM

## 2023-05-26 RX ORDER — SOLIFENACIN SUCCINATE 5 MG/1
5 TABLET, FILM COATED ORAL DAILY
Qty: 90 TABLET | Refills: 1 | Status: SHIPPED | OUTPATIENT
Start: 2023-05-26 | End: 2023-11-29

## 2023-05-26 NOTE — TELEPHONE ENCOUNTER
Patient responded via [employer]. See that note for details.  Eve Black RN on 5/26/2023 at 3:40 PM

## 2023-05-26 NOTE — TELEPHONE ENCOUNTER
Last Seen: 5-5-2023   RTC: 6 weeks   Cancel: none   No-Show: none   Next Appt: 6-5-2023    Incoming Refill From [facility]  via faxed     Medication Requested: ARIPiprazole (ABILIFY) 2 MG tablet  Directions: Sig - Route: Take 1 tablet (2 mg) by mouth daily In addition to 5mg tablet for a total daily dose of 7mg by mouth - Oral  Qty: 30  Last Refill: 4-    Medication Refill pended  Per Refill Protocol       Desiree Hendrix on 5/26/2023 at 12:39 PM

## 2023-05-26 NOTE — TELEPHONE ENCOUNTER
Betty Olivares PA-C  You 16 minutes ago (12:15 PM)     EM  There's no higher dose of Myrbetriq. We could try switching her from tolterodine to solifenacin, but that might not make a difference. If not, she would need to consider seeing urogynecology for consideration of procedures.        Tried calling patient to discuss. Left message for her to call back. Informed her that I would also send a Tiqets message with Betty's message.    Eve Black RN on 5/26/2023 at 12:46 PM

## 2023-05-26 NOTE — TELEPHONE ENCOUNTER
Last virtual visit on 4/24 with Betty; per OV note:    urinary frequency and urgency. She takes tolterodine 4 mg and Myrbetriq 25 mg daily. Initially, the addition of Myrbetriq was very helpful. In the last five weeks, she reports more frequency and urge incontinence. She did start a course of prednisone about five weeks ago and took her last dose today. She would like to try an increased dose of Myrbetriq.         Plan:  1. Continue tolterodine 4 mg daily.   2. Increase Myrbetriq to 25 mg daily.   3. Follow up if symptoms are not improving.     Per note below, patient having increased urgency and incontinence with 50 mg daily dose of Myrbetriq.    Please advise for possible med change? F/u appointment needed?    Eve Blakc RN on 5/26/2023 at 11:49 AM

## 2023-05-26 NOTE — TELEPHONE ENCOUNTER
Pharmacy updated. Patient would like to try Solifenacin per note below. Please advise for Rx.    Eve Black RN on 5/26/2023 at 2:05 PM

## 2023-05-26 NOTE — TELEPHONE ENCOUNTER
Health Call Center    Phone Message    May a detailed message be left on voicemail: yes     Reason for Call: Patient is calling to let Betty Olivares know that she is still having a lot of trouble with urgency and leaking, even with the increase of Myrbetriq to 50MG. Wondering if she should continue with this medication or try something else. Please reach out. Thank you    Action Taken: Message routed to:  [facility] URO    Travel Screening: Not Applicable

## 2023-05-26 NOTE — TELEPHONE ENCOUNTER
Patient called back.    Writer read note to patient below.    Patient states she would like to try solifenacin and to please send to [facility] in [place].    Patient had no further questions.    Dimitri Monroe  [facility]

## 2023-05-30 RX ORDER — ARIPIPRAZOLE 2 MG/1
2 TABLET ORAL DAILY
Qty: 30 TABLET | Refills: 0 | Status: SHIPPED | OUTPATIENT
Start: 2023-05-30 | End: 2023-06-05

## 2023-06-03 DIAGNOSIS — I87.1 SVC SYNDROME: ICD-10-CM

## 2023-06-05 ENCOUNTER — VIRTUAL VISIT (OUTPATIENT)
Dept: PSYCHIATRY | Facility: CLINIC | Age: 58
End: 2023-06-05
Attending: PSYCHIATRY & NEUROLOGY
Payer: MEDICARE

## 2023-06-05 DIAGNOSIS — F41.9 ANXIETY DISORDER, UNSPECIFIED TYPE: ICD-10-CM

## 2023-06-05 DIAGNOSIS — F33.1 MODERATE EPISODE OF RECURRENT MAJOR DEPRESSIVE DISORDER (H): Primary | ICD-10-CM

## 2023-06-05 DIAGNOSIS — F43.21 GRIEF: ICD-10-CM

## 2023-06-05 DIAGNOSIS — S06.9X5D TRAUMATIC BRAIN INJURY, WITH LOSS OF CONSCIOUSNESS GREATER THAN 24 HOURS WITH RETURN TO PRE-EXISTING CONSCIOUS LEVEL, SUBSEQUENT ENCOUNTER: ICD-10-CM

## 2023-06-05 DIAGNOSIS — F33.9 RECURRENT MAJOR DEPRESSIVE DISORDER, REMISSION STATUS UNSPECIFIED (H): ICD-10-CM

## 2023-06-05 PROCEDURE — 99214 OFFICE O/P EST MOD 30 MIN: CPT | Mod: GC | Performed by: STUDENT IN AN ORGANIZED HEALTH CARE EDUCATION/TRAINING PROGRAM

## 2023-06-05 RX ORDER — ARIPIPRAZOLE 5 MG/1
5 TABLET ORAL DAILY
Qty: 30 TABLET | Refills: 2 | Status: SHIPPED | OUTPATIENT
Start: 2023-06-05 | End: 2023-07-25

## 2023-06-05 RX ORDER — METHYLPHENIDATE HYDROCHLORIDE 20 MG/1
20 TABLET ORAL 2 TIMES DAILY
Qty: 60 TABLET | Refills: 0 | Status: SHIPPED | OUTPATIENT
Start: 2023-06-15 | End: 2023-07-10

## 2023-06-05 RX ORDER — DULOXETIN HYDROCHLORIDE 60 MG/1
120 CAPSULE, DELAYED RELEASE ORAL EVERY EVENING
Qty: 60 CAPSULE | Refills: 2 | Status: SHIPPED | OUTPATIENT
Start: 2023-06-05 | End: 2023-07-25

## 2023-06-05 RX ORDER — ARIPIPRAZOLE 2 MG/1
2 TABLET ORAL DAILY
Qty: 30 TABLET | Refills: 2 | Status: SHIPPED | OUTPATIENT
Start: 2023-06-05 | End: 2023-07-25

## 2023-06-05 ASSESSMENT — PATIENT HEALTH QUESTIONNAIRE - PHQ9
SUM OF ALL RESPONSES TO PHQ QUESTIONS 1-9: 7
10. IF YOU CHECKED OFF ANY PROBLEMS, HOW DIFFICULT HAVE THESE PROBLEMS MADE IT FOR YOU TO DO YOUR WORK, TAKE CARE OF THINGS AT HOME, OR GET ALONG WITH OTHER PEOPLE: SOMEWHAT DIFFICULT
SUM OF ALL RESPONSES TO PHQ QUESTIONS 1-9: 7

## 2023-06-05 NOTE — PATIENT INSTRUCTIONS
It was nice seeing you today    Treatment Plan Today:     1) Medications-   - Decrease Ritalin to 20mg in the morning and 20mg in the afternoon.  - Continue duloxetine 120 mg daily  - Continue Abilify 7mg daily    2) Follow-up appt with new resident in 1-2 months    3) I have referred you to day treatment/intensive outpatient programming. If you don't hear from a representative within 2 business days, please call [phone].    4) Labs have been ordered and can be completed at any [place] lab location.     5) Crisis numbers are below and clinic after hours number is [phone].         **For crisis resources, please see the information at the end of this document**   Patient Education    Thank you for coming to the [facility] [place] [facility].     Lab Testing:  If you had lab testing today and your results are reassuring or normal they will be mailed to you or sent through [employer] within 7 days. If the lab tests need quick action we will call you with the results. The phone number we will call with results is # [phone]. If this is not the best number please call our clinic and change the number.     Medication Refills:  If you need any refills please call your pharmacy and they will contact us. Our fax number for refills is [phone].   Three business days of notice are needed for general medication refill requests.   Five business days of notice are needed for controlled substance refill requests.   If you need to change to a different pharmacy, please contact the new pharmacy directly. The new pharmacy will help you get your medications transferred.     Contact Us:  Please call [phone] during business hours (8-5:00 M-F).   If you have medication related questions after clinic hours, or on the weekend, please call [phone].     Financial Assistance [phone]   Medical Records [phone]       [facility] CRISIS RESOURCES:  For a emergency help,  please call 911 or go to the nearest Emergency Department.     Emergency Walk-In Options:   EmPATH Unit @ [facility] ([place]): [phone] - Specialized mental health emergency area designed to be calming  [facility] ([place]): [phone]  [facility] ([place]): [phone]  [facility] ([place]): [phone]    [facility] Crisis Information:   [place]: [phone]  [place]: [phone]  [place] (COPE) - Adult: [phone]     Child: [phone]  [place] - Adult: [phone]     Child: [phone]  [place]: [phone]  List of all [facility] resources:   https://[place].[facility]/dhs/people-we-serve/adults/health-care/mental-health/resources/crisis-contacts.jsp    National Crisis Information:   Crisis Text Line: Text  [place]  to 332070  Suicide & Crisis Lifeline: 988  National Suicide Prevention Lifeline: 8-001-244-TALK (1-577.556.7233)       For online chat options, visit https://suicidepreventionlifeline.org/chat/  Poison Control Center: 1-104.499.3730  Trans Lifeline: 1-844.753.1434 - Hotline for transgender people of all ages  The Sures[employer] Project: 2-788-533-6052 - Hotline for LGBT youth     For Non-Emergency Support:   Fast Tracker: Mental Health & Substance Use Disorder Resources -   https://www.fasttrackermn.org/          **For crisis resources, please see the information at the end of this document**   Patient Education    Thank you for coming to the [facility] [place] [facility].     Lab Testing:  If you had lab testing today and your results are reassuring or normal they will be mailed to you or sent through [employer] within 7 days. If the lab tests need quick action we will call you with the results. The phone number we will call with results is # [phone]. If this is not the best number please call our clinic and change the number.     Medication Refills:  If you need any refills please call your pharmacy and  they will contact us. Our fax number for refills is [phone].   Three business days of notice are needed for general medication refill requests.   Five business days of notice are needed for controlled substance refill requests.   If you need to change to a different pharmacy, please contact the new pharmacy directly. The new pharmacy will help you get your medications transferred.     Contact Us:  Please call [phone] during business hours (8-5:00 M-F).   If you have medication related questions after clinic hours, or on the weekend, please call [phone].     Financial Assistance [phone]   Medical Records [phone]       MENTAL HEALTH CRISIS RESOURCES:  For a emergency help, please call 911 or go to the nearest Emergency Department.     Emergency Walk-In Options:   EmPATH Unit @ [facility] ([place]): [phone] - Specialized mental health emergency area designed to be calming  [facility] ([place]): [phone]  [facility] ([place]): [phone]  [facility]): [phone]    [facility] Crisis Information:   [place]: [phone]  [place]: [phone]  [place] ([place]AG[place]) - Adult: [phone]     Child: [phone]  [place] - Adult: [phone]     Child: [phone]  [place]: [phone]  List of all [facility] resources:   https://[place].gov/dhs/people-we-serve/adults/health-care/mental-health/resources/crisis-contacts.jsp    National Crisis Information:   Crisis Text Line: Text  [place]  to 120878  Suicide & Crisis Lifeline: 988  National Suicide Prevention Lifeline: 7-552-303-TALK (1-701.271.7696)       For online chat options, visit https://suicidepreventionlifeline.org/chat/  Poison Control Center: 1-774.458.8575  Trans Lifeline: 1-303.730.4856 - Hotline for transgender people of all ages  The S[employer] Project: 1-245.779.4188 - Hotline for LGBT youth     For Non-Emergency Support:   Fast Tracker: Mental Health & Substance Use  Disorder Resources -   https://www.fasttrackermn.org/

## 2023-06-05 NOTE — NURSING NOTE
Is the patient currently in the state of [place]? YES    Visit mode:VIDEO    If the visit is dropped, the patient can be reconnected by: VIDEO VISIT: Text to cell phone: [phone]    Will anyone else be joining the visit? NO      How would you like to obtain your AVS? MyChart    Are changes needed to the allergy or medication list? NO    Reason for visit: RECHECK
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
## 2023-06-06 ENCOUNTER — HOSPITAL ENCOUNTER (OUTPATIENT)
Dept: CARDIOLOGY | Facility: CLINIC | Age: 58
Discharge: HOME | End: 2023-06-06
Payer: MEDICARE

## 2023-06-06 DIAGNOSIS — I25.10 ATHEROSCLEROTIC CARDIOVASCULAR DISEASE: ICD-10-CM

## 2023-06-06 DIAGNOSIS — R06.02 SHORTNESS OF BREATH: ICD-10-CM

## 2023-06-06 LAB — LVEF ECHO: NORMAL

## 2023-06-06 PROCEDURE — 93306 TTE W/DOPPLER COMPLETE: CPT | Mod: 26 | Performed by: INTERNAL MEDICINE

## 2023-06-06 PROCEDURE — 255N000002 HC RX 255 OP 636: Performed by: INTERNAL MEDICINE

## 2023-06-06 RX ADMIN — HUMAN ALBUMIN MICROSPHERES AND PERFLUTREN 2 ML: 10; .22 INJECTION, SOLUTION INTRAVENOUS at 15:57

## 2023-06-07 RX ORDER — ASPIRIN 81 MG
TABLET,CHEWABLE ORAL
Qty: 200 TABLET | Refills: 2 | Status: SHIPPED | OUTPATIENT
Start: 2023-06-07 | End: 2024-07-23

## 2023-06-07 NOTE — TELEPHONE ENCOUNTER
aspirin 81 mg chewable tablet      Last Written Prescription Date:  3-16-22  Last Fill Quantity: 200,   # refills: 11  Last Office Visit : 4-10-23  Future Office visit:  NONE  labs: 4-10-23 PLT, HG,AST,ALT,CR ( WNL)    Routing refill request to provider for review/approval because:  DIAGNOSIS not on protocol:SVC syndrome [I87.1]   High Drug warning

## 2023-06-08 ENCOUNTER — HOSPITAL ENCOUNTER (OUTPATIENT)
Dept: CARDIAC REHAB | Facility: CLINIC | Age: 58
Discharge: HOME | End: 2023-06-08
Attending: INTERNAL MEDICINE
Payer: MEDICARE

## 2023-06-08 PROCEDURE — 94626 PHY/QHP OP PULM RHB W/MNTR: CPT | Performed by: REHABILITATION PRACTITIONER

## 2023-06-12 ENCOUNTER — DOCUMENTATION ONLY (OUTPATIENT)
Dept: INTERNAL MEDICINE | Facility: CLINIC | Age: 58
End: 2023-06-12
Payer: MEDICARE

## 2023-06-12 NOTE — PROGRESS NOTES
Type of Form Received:     Form Received (Date) 6/12/23   Form Filled out No   Placed in provider folder Yes

## 2023-06-13 ENCOUNTER — HOSPITAL ENCOUNTER (OUTPATIENT)
Dept: CARDIAC REHAB | Facility: CLINIC | Age: 58
Discharge: HOME | End: 2023-06-13
Attending: INTERNAL MEDICINE
Payer: MEDICARE

## 2023-06-13 PROCEDURE — 94625 PHY/QHP OP PULM RHB W/O MNTR: CPT

## 2023-06-15 ENCOUNTER — HOSPITAL ENCOUNTER (OUTPATIENT)
Dept: CARDIAC REHAB | Facility: CLINIC | Age: 58
Discharge: HOME | End: 2023-06-15
Attending: INTERNAL MEDICINE
Payer: MEDICARE

## 2023-06-15 DIAGNOSIS — J98.4 RESTRICTIVE LUNG DISEASE: ICD-10-CM

## 2023-06-15 DIAGNOSIS — U09.9 LONG COVID: ICD-10-CM

## 2023-06-15 PROCEDURE — 94625 PHY/QHP OP PULM RHB W/O MNTR: CPT

## 2023-06-21 ENCOUNTER — DOCUMENTATION ONLY (OUTPATIENT)
Dept: INTERNAL MEDICINE | Facility: CLINIC | Age: 58
End: 2023-06-21
Payer: MEDICARE

## 2023-06-22 ENCOUNTER — HOSPITAL ENCOUNTER (OUTPATIENT)
Dept: CARDIAC REHAB | Facility: CLINIC | Age: 58
Discharge: HOME | End: 2023-06-22
Attending: INTERNAL MEDICINE
Payer: MEDICARE

## 2023-06-22 PROCEDURE — 94625 PHY/QHP OP PULM RHB W/O MNTR: CPT

## 2023-06-23 ENCOUNTER — MEDICAL CORRESPONDENCE (OUTPATIENT)
Dept: HEALTH INFORMATION MANAGEMENT | Facility: CLINIC | Age: 58
End: 2023-06-23
Payer: MEDICARE

## 2023-06-27 ENCOUNTER — HOSPITAL ENCOUNTER (OUTPATIENT)
Dept: CARDIAC REHAB | Facility: CLINIC | Age: 58
Discharge: HOME | End: 2023-06-27
Attending: INTERNAL MEDICINE
Payer: MEDICARE

## 2023-06-27 PROCEDURE — 94625 PHY/QHP OP PULM RHB W/O MNTR: CPT

## 2023-06-28 ENCOUNTER — VIRTUAL VISIT (OUTPATIENT)
Dept: BEHAVIORAL HEALTH | Facility: CLINIC | Age: 58
End: 2023-06-28
Payer: MEDICARE

## 2023-06-28 DIAGNOSIS — F43.21 GRIEF: ICD-10-CM

## 2023-06-28 DIAGNOSIS — F33.1 MODERATE EPISODE OF RECURRENT MAJOR DEPRESSIVE DISORDER (H): ICD-10-CM

## 2023-06-28 DIAGNOSIS — F41.9 ANXIETY DISORDER, UNSPECIFIED TYPE: ICD-10-CM

## 2023-06-28 PROCEDURE — 90834 PSYTX W PT 45 MINUTES: CPT | Mod: VID | Performed by: SOCIAL WORKER

## 2023-06-28 ASSESSMENT — COLUMBIA-SUICIDE SEVERITY RATING SCALE - C-SSRS
ATTEMPT LIFETIME: NO
TOTAL  NUMBER OF INTERRUPTED ATTEMPTS LIFETIME: NO
TOTAL  NUMBER OF ABORTED OR SELF INTERRUPTED ATTEMPTS LIFETIME: NO
4. HAVE YOU HAD THESE THOUGHTS AND HAD SOME INTENTION OF ACTING ON THEM?: NO
3. HAVE YOU BEEN THINKING ABOUT HOW YOU MIGHT KILL YOURSELF?: NO
6. HAVE YOU EVER DONE ANYTHING, STARTED TO DO ANYTHING, OR PREPARED TO DO ANYTHING TO END YOUR LIFE?: NO
1. HAVE YOU WISHED YOU WERE DEAD OR WISHED YOU COULD GO TO SLEEP AND NOT WAKE UP?: YES
1. IN THE PAST MONTH, HAVE YOU WISHED YOU WERE DEAD OR WISHED YOU COULD GO TO SLEEP AND NOT WAKE UP?: NO
2. HAVE YOU ACTUALLY HAD ANY THOUGHTS OF KILLING YOURSELF?: YES
2. HAVE YOU ACTUALLY HAD ANY THOUGHTS OF KILLING YOURSELF?: NO
5. HAVE YOU STARTED TO WORK OUT OR WORKED OUT THE DETAILS OF HOW TO KILL YOURSELF? DO YOU INTEND TO CARRY OUT THIS PLAN?: NO

## 2023-06-28 ASSESSMENT — PATIENT HEALTH QUESTIONNAIRE - PHQ9
10. IF YOU CHECKED OFF ANY PROBLEMS, HOW DIFFICULT HAVE THESE PROBLEMS MADE IT FOR YOU TO DO YOUR WORK, TAKE CARE OF THINGS AT HOME, OR GET ALONG WITH OTHER PEOPLE: SOMEWHAT DIFFICULT
SUM OF ALL RESPONSES TO PHQ QUESTIONS 1-9: 5
SUM OF ALL RESPONSES TO PHQ QUESTIONS 1-9: 5
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
## 2023-07-10 DIAGNOSIS — F33.9 RECURRENT MAJOR DEPRESSIVE DISORDER, REMISSION STATUS UNSPECIFIED (H): ICD-10-CM

## 2023-07-10 RX ORDER — METHYLPHENIDATE HYDROCHLORIDE 20 MG/1
20 TABLET ORAL 2 TIMES DAILY
Qty: 60 TABLET | Refills: 0 | Status: SHIPPED | OUTPATIENT
Start: 2023-07-22 | End: 2023-07-25

## 2023-07-10 NOTE — TELEPHONE ENCOUNTER
Rx Refill note     I received rx refill requests for methylphenidate 10mg, #60, sig 1 tab po bid, due for refill to start on 7/22/2023 - as I am a psychiatric prescriber of the day for the clinic today.     Reviewed portions of Sherly Yeung's medical record, including most recent psychiatric progress note by Shannon Guillen and Terry of 6/5/2023. Reviewed notes in this encounter and appreciated [place]  data. Follow-up with Jevon Tineo MD, is scheduled for 7/25/2023.     Will order methylphenidate 10mg, #60, sig 1 tab po bid, R-0, start date 7/22/2023.     Payam Stewart MD

## 2023-07-10 NOTE — TELEPHONE ENCOUNTER
Health Call Center    Phone Message    May a detailed message be left on voicemail: yes     Reason for Call: Medication Refill Request    Has the patient contacted the pharmacy for the refill? Yes   Name of medication being requested: Ritalin  Provider who prescribed the medication: Dr. Guillen  Pharmacy:  [facility] - [facility] [id number] [address]  Date medication is needed: Patient has enough medication until 7/15/23.     Patient has an appointment to see their new provider Dr. Tineo on 7/25/23 but will need this refill before that appointment.     Action Taken: Other: Nurses.     Travel Screening: Not Applicable

## 2023-07-10 NOTE — TELEPHONE ENCOUNTER
Last seen: 6/5/23 (Wilmer)  RTC: 6 weeks  Cancel: none  No-show: none  Next appt: 7/25/23 (Noman)     Medication requested: methylphenidate (RITALIN) 20 MG tablet  Directions: Take 1 tablet (20 mg) by mouth 2 times daily   Qty: 60  Last refilled: 6/23/23 (20 mg- #60), 5/22/23 (10 mg- #30), and 5/18/23 (20 mg - #60)     Medication refill approved per refill protocol, but is not due until 7/21    Rx pended with this start date and routed to the POD    Ameena Cabrera RN on 7/10/2023 at 9:28 AM

## 2023-07-13 ENCOUNTER — MEDICAL CORRESPONDENCE (OUTPATIENT)
Dept: CARDIAC REHAB | Facility: CLINIC | Age: 58
End: 2023-07-13

## 2023-07-17 ENCOUNTER — VIRTUAL VISIT (OUTPATIENT)
Dept: PSYCHOLOGY | Facility: CLINIC | Age: 58
End: 2023-07-17
Payer: MEDICARE

## 2023-07-17 DIAGNOSIS — F33.0 MDD (MAJOR DEPRESSIVE DISORDER), RECURRENT EPISODE, MILD (H): Primary | ICD-10-CM

## 2023-07-17 PROCEDURE — 90837 PSYTX W PT 60 MINUTES: CPT | Mod: VID | Performed by: SOCIAL WORKER

## 2023-07-17 ASSESSMENT — PATIENT HEALTH QUESTIONNAIRE - PHQ9
10. IF YOU CHECKED OFF ANY PROBLEMS, HOW DIFFICULT HAVE THESE PROBLEMS MADE IT FOR YOU TO DO YOUR WORK, TAKE CARE OF THINGS AT HOME, OR GET ALONG WITH OTHER PEOPLE: SOMEWHAT DIFFICULT
SUM OF ALL RESPONSES TO PHQ QUESTIONS 1-9: 5
5. POOR APPETITE OR OVEREATING: NOT AT ALL
SUM OF ALL RESPONSES TO PHQ QUESTIONS 1-9: 5

## 2023-07-17 ASSESSMENT — ANXIETY QUESTIONNAIRES
GAD7 TOTAL SCORE: 2
6. BECOMING EASILY ANNOYED OR IRRITABLE: NOT AT ALL
GAD7 TOTAL SCORE: 2
IF YOU CHECKED OFF ANY PROBLEMS ON THIS QUESTIONNAIRE, HOW DIFFICULT HAVE THESE PROBLEMS MADE IT FOR YOU TO DO YOUR WORK, TAKE CARE OF THINGS AT HOME, OR GET ALONG WITH OTHER PEOPLE: NOT DIFFICULT AT ALL
1. FEELING NERVOUS, ANXIOUS, OR ON EDGE: SEVERAL DAYS
3. WORRYING TOO MUCH ABOUT DIFFERENT THINGS: SEVERAL DAYS
7. FEELING AFRAID AS IF SOMETHING AWFUL MIGHT HAPPEN: NOT AT ALL
5. BEING SO RESTLESS THAT IT IS HARD TO SIT STILL: NOT AT ALL
2. NOT BEING ABLE TO STOP OR CONTROL WORRYING: NOT AT ALL

## 2023-07-17 ASSESSMENT — COLUMBIA-SUICIDE SEVERITY RATING SCALE - C-SSRS
TOTAL  NUMBER OF INTERRUPTED ATTEMPTS SINCE LAST CONTACT: NO
1. SINCE LAST CONTACT, HAVE YOU WISHED YOU WERE DEAD OR WISHED YOU COULD GO TO SLEEP AND NOT WAKE UP?: NO
6. HAVE YOU EVER DONE ANYTHING, STARTED TO DO ANYTHING, OR PREPARED TO DO ANYTHING TO END YOUR LIFE?: NO
SUICIDE, SINCE LAST CONTACT: NO
ATTEMPT SINCE LAST CONTACT: NO
2. HAVE YOU ACTUALLY HAD ANY THOUGHTS OF KILLING YOURSELF?: NO
TOTAL  NUMBER OF ABORTED OR SELF INTERRUPTED ATTEMPTS SINCE LAST CONTACT: NO

## 2023-07-17 NOTE — PROGRESS NOTES
"[facility]                                     Progress Note    Patient Name: Sherly Yeung  Date:  7/17/2023         Service Type: Individual      Session Start Time: 9:04 Session End Time: 9:58    Session Length: 54    Session #: 5    Attendees: Client    Service Modality:  Video Visit:      Provider verified identity through the following two step process.  Patient provided:  Patient is known previously to provider    Telemedicine Visit: The patient's condition can be safely assessed and treated via synchronous audio and visual telemedicine encounter.      Reason for Telemedicine Visit: Services only offered telehealth    Originating Site (Patient Location): Patient's home    Distant Site (Provider Location): Provider Remote Setting- Home Office    Consent:  The patient/guardian has verbally consented to: the potential risks and benefits of telemedicine (video visit) versus in person care; bill my insurance or make self-payment for services provided; and responsibility for payment of non-covered services.     Patient would like the video invitation sent by:  My Chart    Mode of Communication:  Video Conference via [employer]    Distant Location (Provider):  Off-site    As the provider I attest to compliance with applicable laws and regulations related to telemedicine.    DATA  Interactive Complexity: Yes, visit entailed Interactive Complexity evidenced by:  No motivation which brings much emotions that she needs more time to process because it add to chronic migraines.     Crisis: No      Progress Since Last Session (Related to Symptoms / Goals / Homework):   Symptoms: Worsening \"migraines are tough on me\"    Homework: Partially completed      Episode of Care Goals: Minimal progress - ACTION (Actively working towards change); Intervened by reinforcing change plan / affirming steps taken- it takes some time to achieve acceptance.      Current / Ongoing Stressors and Concerns: Patient was last seen " in May. Missed her appts due to migraines. Today, she finds it is very important that she processes her thoughts around her migraines and lack of motivation. Has been finding she only has motivation when  is around. Will move some of her daily routines to late time to do them while  is home. Will try GLAD and the package sent via [employer]. Will continue to use self compassion website. Her next visit is in 2 weeks.      Treatment Objective(s) Addressed in This Session:   Decrease frequency and intensity of feeling down, depressed, hopeless  Identify negative self-talk and behaviors: challenge core beliefs, myths, and actions  increase understanding of steps in the grief process      Intervention:     Situation        Automatic Thoughts  Cognitive Distortions      Feelings        Behavior        Questioning Thoughts          MI Intervention: Co-Developed Goal: targeting grief and depressed mood, Supported Autonomy, Collaboration, Evocation, Permission to raise concern or advise and Open-ended questions     Change Talk Expressed by the Patient: Taking steps    Provider Response to Change Talk: E - Evoked more info from patient about behavior change, A - Affirmed patient's thoughts, decisions, or attempts at behavior change, R - Reflected patient's change talk and S - Summarized patient's change talk statements     Grief processing: To process her loss and achieve acceptance.     Assessments completed prior to visit:2/23/2023    The following assessments were completed by patient for this visit:  PHQ2:       4/27/2022    10:07 AM 8/11/2020     9:37 AM 3/24/2020     3:46 PM 1/19/2012     2:24 PM   PHQ-2 ( 1999 [employer])   Q1: Little interest or pleasure in doing things 0 0 0 0   Q2: Feeling down, depressed or hopeless 0 0 0 1   PHQ-2 Score 0 0 0 1   PHQ-2 Total Score (12-17 Years)- Positive if 3 or more points; Administer PHQ-A if positive  0 0      PHQ9:       3/17/2023     4:37 AM 4/20/2023     9:01 AM  4/26/2023     9:07 AM 5/4/2023     9:43 AM 6/5/2023     7:25 AM 6/28/2023     6:02 AM 7/17/2023     6:35 AM   PHQ-9 SCORE   PHQ-9 Total Score MyChart 4 (Minimal depression) 5 (Mild depression) 3 (Minimal depression) 4 (Minimal depression) 7 (Mild depression) 5 (Mild depression) 5 (Mild depression)   PHQ-9 Total Score 4 5 3 4    4 7 5 5     GAD2:       3/17/2023     4:38 AM 4/20/2023     9:02 AM 4/30/2023     2:33 PM 5/29/2023    10:12 AM 6/22/2023     5:18 AM 7/7/2023    10:17 AM 7/17/2023     6:35 AM   ROCKY-2   Feeling nervous, anxious, or on edge 1    1 0    0 1    1 1 1 1 1   Not being able to stop or control worrying 1    1 1    1 0    0 0 0 0 0   ROCKY-2 Total Score 2    2 1    1 1    1 1 1 1 1     GAD7:       2/23/2023     5:18 PM 4/26/2023    11:04 AM 7/17/2023     9:05 AM   ROCKY-7 SCORE   Total Score 7 2 2     CAGE-AID:       2/9/2023    11:52 AM 7/17/2023     9:10 AM   CAGE-AID Total Score   Total Score 0 0   Total Score MyChart 0 (A total score of 2 or greater is considered clinically significant)      PROMIS 10-Global Health (all questions and answers displayed):       5/13/2022     7:54 AM 9/24/2022     8:54 AM 1/5/2023    10:08 AM 3/31/2023    11:04 AM 4/30/2023     2:36 PM 6/22/2023     5:20 AM 7/17/2023     9:10 AM   PROMIS 10   In general, would you say your health is: Good Good Good Good Good Good    In general, would you say your quality of life is: Good Very good Good Good Good Good    In general, how would you rate your physical health? Good Good Good Good Good Good    In general, how would you rate your mental health, including your mood and your ability to think? Good Very good Good Good Good Good    In general, how would you rate your satisfaction with your social activities and relationships? Very good Very good Good Good Very good Very good    In general, please rate how well you carry out your usual social activities and roles Good Very good Fair Fair Good Good    To what extent are you able to  carry out your everyday physical activities such as walking, climbing stairs, carrying groceries, or moving a chair? A little Moderately Moderately A little A little Moderately    In the past 7 days, how often have you been bothered by emotional problems such as feeling anxious, depressed, or irritable? Rarely Sometimes Sometimes Often Often Often    In the past 7 days, how would you rate your fatigue on average? Moderate Moderate Moderate Moderate Moderate Moderate    In the past 7 days, how would you rate your pain on average, where 0 means no pain, and 10 means worst imaginable pain? 5 4 3 5 6 5    In general, would you say your health is: 3 3 3 3 3 3 3   In general, would you say your quality of life is: 3 4 3 3 3 3 4   In general, how would you rate your physical health? 3 3 3 3 3 3 3   In general, how would you rate your mental health, including your mood and your ability to think? 3 4 3 3 3 3 3   In general, how would you rate your satisfaction with your social activities and relationships? 4 4 3 3 4 4 5   In general, please rate how well you carry out your usual social activities and roles. (This includes activities at home, at work and in your community, and responsibilities as a parent, child, spouse, employee, friend, etc.) 3 4 2 2 3 3 3   To what extent are you able to carry out your everyday physical activities such as walking, climbing stairs, carrying groceries, or moving a chair? 2 3 3 2 2 3 3   In the past 7 days, how often have you been bothered by emotional problems such as feeling anxious, depressed, or irritable? 2 3 3 4 4 4 3   In the past 7 days, how would you rate your fatigue on average? 3 3 3 3 3 3 3   In the past 7 days, how would you rate your pain on average, where 0 means no pain, and 10 means worst imaginable pain? 5 4 3 5 6 5 5   Global Mental Health Score 14 15 12 11 12 12 15   Global Physical Health Score 11 12 13 11 11 12 12   PROMIS TOTAL - SUBSCORES 25 27 25 22 23 24 27      PROMIS 10-Global Health (only subscores and total score):       5/13/2022     7:54 AM 9/24/2022     8:54 AM 1/5/2023    10:08 AM 3/31/2023    11:04 AM 4/30/2023     2:36 PM 6/22/2023     5:20 AM 7/17/2023     9:10 AM   PROMIS-10 Scores Only   Global Mental Health Score 14 15 12 11 12 12 15   Global Physical Health Score 11 12 13 11 11 12 12   PROMIS TOTAL - SUBSCORES 25 27 25 22 23 24 27     [place] Suicide Severity Rating Scale (Short Version)      5/6/2022    11:30 AM 9/2/2022     7:31 PM 9/30/2022     5:03 PM 11/28/2022     9:49 PM 2/10/2023     9:55 AM 3/20/2023     3:28 PM 7/17/2023     9:09 AM   [place] Suicide Severity Rating (Short Version)   Over the past 2 weeks have you felt down, depressed, or hopeless? no no no no  no    Over the past 2 weeks have you had thoughts of killing yourself? no no no no  no    Have you ever attempted to kill yourself? no no no no  no    1. Wish to be Dead (Since Last Contact)     N  N   2. Non-Specific Active Suicidal Thoughts (Since Last Contact)     N  N   Actual Attempt (Since Last Contact)     N  N   Has subject engaged in non-suicidal self-injurious behavior? (Since Last Contact)     N  N   Interrupted Attempts (Since Last Contact)     N  N   Aborted or Self-Interrupted Attempt (Since Last Contact)     N  N   Preparatory Acts or Behavior (Since Last Contact)     N  N   Suicide (Since Last Contact)     N  N   Calculated C-SSRS Risk Score (Since Last Contact)     No Risk Indicated  No Risk Indicated         ASSESSMENT: Current Emotional / Mental Status (status of significant symptoms):   Risk status (Self / Other harm or suicidal ideation)   Patient denies current fears or concerns for personal safety.   Patient denies current or recent suicidal ideation or behaviors.   Patient denies current or recent homicidal ideation or behaviors.   Patient denies current or recent self injurious behavior or ideation.   Patient denies other safety concerns.   Patient reports there  "has been no change in risk factors since their last session.     Patient reports there has been no change in protective factors since their last session.     Recommended that patient call 911 or go to the local ED should there be a change in any of these risk factors.  Call [phone] if experiencing SI     Appearance:   Appropriate    Eye Contact:   Good    Psychomotor Behavior: Normal    Attitude:   Cooperative    Orientation:   All   Speech    Rate / Production: Normal     Volume:  Normal    Mood:    Normal   Affect:    Appropriate    Thought Content:  Clear    Thought Form:  Coherent  Logical    Insight:    Good      Medication Review:   No changes to current psychiatric medication(s)     Medication Compliance:   Yes     Changes in Health Issues:   None reported     Chemical Use Review:   Substance Use: Chemical use reviewed, no active concerns identified      Tobacco Use: No current tobacco use.      Diagnosis:  1. MDD (major depressive disorder), recurrent episode, mild (H)      Collateral Reports Completed:   Routed note to PCP    PLAN: (Patient Tasks / Therapist Tasks / Other): keep these goals  Patient will keep up with self care.   Patient plans to continue working on gardening   Patient will reach out to friends that understand her when she feels sad  Patient will continue to practice the guided meditations from the \" self- compassion\"  By Radha Biswas 's web site.    Patient's next visit is on 7/31/2023    GERMAN Farmer               ___________________________________________________________________  Individual Treatment Plan    Patient's Name: Sherly Yeung  YOB: 1965    Date of Creation: 2/23/2023    Date Treatment Plan Last Reviewed/Revised: 4/21/2023    DSM5 Diagnoses: 296.32 (F33.1) Major Depressive Disorder, Recurrent Episode, Moderate _ and With anxious distress or 300.02 (F41.1) Generalized Anxiety Disorder  Grief reaction [F43.21]    Psychosocial / Contextual Factors: grief: " "father recently passed away. Family dynamic- relationship with son, mom. Feeling overwhelmed.    PROMIS (reviewed every 90 days): 25    Referral / Collaboration:    Referral to another professional/service is not indicated at this time..    Anticipated number of session for this episode of care: 9-12 sessions  Anticipation frequency of session: Biweekly  Anticipated Duration of each session: 53 or more minutes  Treatment plan will be reviewed in 90 days or when goals have been changed.     MeasurableTreatment Goal(s) related to diagnosis / functional impairment(s)    Goal 1: Patient will Accept the loss of beloved one and return to stable level of functioning as evidenced by a higher level of functioning as a result of acceptance.   Redevelop a supportive social system and improve interpersonal relationships and Express unresolved emotions regarding loss which brings anxiety and depression mood.      I will know I've met my goal when I have more energy and  I am able to celebrate good life with my father Vs the sadness of losing him.Redevelop a supportive social system and improve interpersonal relationships\"    Objective #A (Patient Action)    Patient will increase understanding of steps in the grief process.  Status: New - Date: 4/21/2023     Intervention(s)  Therapist will teach emotional recognition/identification. : defining happiness in your own term without father.    Objective #B  Patient will identify at least 3 fears / thoughts that contribute to feeling anxious.  Status: New - Date: 4/21/2023     Intervention(s)  Therapist will teach thought challenging with CBT.    Objective #C  Patient will Identify negative self-talk and behaviors: challenge core beliefs, myths, and actions.  Status: New - Date: 4/21/2023     Intervention(s)  Therapist will provide space and time to process thoughts and feelings around current stressors. provide support and coping skills to help move on in life.      Patient has " reviewed and agreed to the above plan.      Erika Mello, [facility]  April 21, 2023    Answers for HPI/ROS submitted by the patient on 4/20/2023  If you checked off any problems, how difficult have these problems made it for you to do your work, take care of things at home, or get along with other people?: Somewhat difficult  PHQ9 TOTAL SCORE: 5    Answers for HPI/ROS submitted by the patient on 4/26/2023  If you checked off any problems, how difficult have these problems made it for you to do your work, take care of things at home, or get along with other people?: Somewhat difficult  PHQ9 TOTAL SCORE: 3    Answers for HPI/ROS submitted by the patient on 5/4/2023  If you checked off any problems, how difficult have these problems made it for you to do your work, take care of things at home, or get along with other people?: Somewhat difficult  PHQ9 TOTAL SCORE: 4  Answers for HPI/ROS submitted by the patient on 7/17/2023  If you checked off any problems, how difficult have these problems made it for you to do your work, take care of things at home, or get along with other people?: Somewhat difficult  PHQ9 TOTAL SCORE: 5

## 2023-07-18 DIAGNOSIS — G43.809 OTHER MIGRAINE WITHOUT STATUS MIGRAINOSUS, NOT INTRACTABLE: ICD-10-CM

## 2023-07-21 RX ORDER — LANSOPRAZOLE 30 MG/1
30 CAPSULE, DELAYED RELEASE ORAL 2 TIMES DAILY
Qty: 180 CAPSULE | Refills: 2 | Status: SHIPPED | OUTPATIENT
Start: 2023-07-21

## 2023-07-25 ENCOUNTER — TELEPHONE (OUTPATIENT)
Dept: INTERNAL MEDICINE | Facility: CLINIC | Age: 58
End: 2023-07-25
Payer: MEDICARE

## 2023-07-25 ENCOUNTER — VIRTUAL VISIT (OUTPATIENT)
Dept: PSYCHIATRY | Facility: CLINIC | Age: 58
End: 2023-07-25
Attending: PSYCHOLOGIST
Payer: MEDICARE

## 2023-07-25 DIAGNOSIS — F33.1 MODERATE EPISODE OF RECURRENT MAJOR DEPRESSIVE DISORDER (H): ICD-10-CM

## 2023-07-25 DIAGNOSIS — N39.0 URINARY TRACT INFECTION WITHOUT HEMATURIA, SITE UNSPECIFIED: Primary | ICD-10-CM

## 2023-07-25 DIAGNOSIS — F33.9 RECURRENT MAJOR DEPRESSIVE DISORDER, REMISSION STATUS UNSPECIFIED (H): ICD-10-CM

## 2023-07-25 PROCEDURE — 99214 OFFICE O/P EST MOD 30 MIN: CPT | Mod: VID | Performed by: PSYCHIATRY & NEUROLOGY

## 2023-07-25 RX ORDER — DULOXETIN HYDROCHLORIDE 60 MG/1
120 CAPSULE, DELAYED RELEASE ORAL EVERY EVENING
Qty: 60 CAPSULE | Refills: 2 | Status: SHIPPED | OUTPATIENT
Start: 2023-07-25 | End: 2023-09-12

## 2023-07-25 RX ORDER — ARIPIPRAZOLE 5 MG/1
5 TABLET ORAL DAILY
Qty: 30 TABLET | Refills: 2 | Status: SHIPPED | OUTPATIENT
Start: 2023-07-25 | End: 2023-09-12

## 2023-07-25 RX ORDER — ARIPIPRAZOLE 2 MG/1
2 TABLET ORAL DAILY
Qty: 30 TABLET | Refills: 2 | Status: SHIPPED | OUTPATIENT
Start: 2023-07-25 | End: 2023-10-31

## 2023-07-25 RX ORDER — METHYLPHENIDATE HYDROCHLORIDE 20 MG/1
TABLET ORAL
Qty: 45 TABLET | Refills: 0 | Status: SHIPPED | OUTPATIENT
Start: 2023-09-23 | End: 2023-11-03

## 2023-07-25 RX ORDER — METHYLPHENIDATE HYDROCHLORIDE 20 MG/1
TABLET ORAL
Qty: 45 TABLET | Refills: 0 | Status: SHIPPED | OUTPATIENT
Start: 2023-08-22 | End: 2023-09-12
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
## 2023-07-25 NOTE — TELEPHONE ENCOUNTER
[facility]    Phone Message    May a detailed message be left on voicemail: yes     Reason for Call: Other: Patient stated that she has  a UTI, Burning, urine cloudy, urgency, requesting medication to be ordered. Please call her to discuss further. Thank you     Action Taken: Message routed to:  [facility] pcc    Travel Screening: Not Applicable

## 2023-07-25 NOTE — PATIENT INSTRUCTIONS
It was nice to meet you today. Here is what we discussed:    -Continue current prescriptions, including Ritalin 40 mg twice daily    -Next fill of Ritalin will be 20 mg AM and 10 mg noon    -Go to your local [facility] for blood draw    -Continue seeing current therapist    - Follow-up in 2 months    Jevon Tineo MD  [facility]     **For crisis resources, please see the information at the end of this document**   Patient Education    Thank you for coming to the [facility] [place] [facility].     Lab Testing:  If you had lab testing today and your results are reassuring or normal they will be mailed to you or sent through Context app within 7 days. If the lab tests need quick action we will call you with the results. The phone number we will call with results is # [phone]. If this is not the best number please call our clinic and change the number.     Medication Refills:  If you need any refills please call your pharmacy and they will contact us. Our fax number for refills is [phone].   Three business days of notice are needed for general medication refill requests.   Five business days of notice are needed for controlled substance refill requests.   If you need to change to a different pharmacy, please contact the new pharmacy directly. The new pharmacy will help you get your medications transferred.     Contact Us:  Please call [phone] during business hours (8-5:00 M-F).   If you have medication related questions after clinic hours, or on the weekend, please call [phone].     Financial Assistance [phone]   Medical Records [phone]       MENTAL HEALTH CRISIS RESOURCES:  For a emergency help, please call 911 or go to the nearest Emergency Department.     Emergency Walk-In Options:   EmPATH Unit @ [facility] ([place]): [phone] - Specialized mental health emergency area designed to be calming  [facility] ([place]): [phone]  [facility] ([place]): [phone]  [facility] ([place]): [phone]    [facility] Crisis Information:   [place]: [phone]  [place]: [phone]  [place] ([place]) - Adult: [phone]     Child: [phone]  [place] - Adult: [phone]     Child: [phone]  [place]: [phone]  List of all [facility] resources:   https://[email or web address]/dhs/people-we-serve/adults/health-care/mental-health/resources/crisis-contacts.jsp    National Crisis Information:   Crisis Text Line: Text  [place]  to 585383  Suicide & Crisis Lifeline: 988  National Suicide Prevention Lifeline: 7-852-836-TALK (1-846.660.4857)       For online chat options, visit https://suicidepreventionlifeline.org/chat/  Poison Control Center: 1-376.233.4552  Trans Lifeline: 7-301-399-9507 - Hotline for transgender people of all ages  The [employer] Project: 9-933-579-6049 - Hotline for LGBT youth     For Non-Emergency Support:   Fast Tracker: Mental Health & Substance Use Disorder Resources -   https://www.[employer].org/

## 2023-07-27 NOTE — TELEPHONE ENCOUNTER
Patient calling again to follow up on previous message for medication. Please call her at [phone]

## 2023-07-28 RX ORDER — SULFAMETHOXAZOLE/TRIMETHOPRIM 800-160 MG
1 TABLET ORAL 2 TIMES DAILY
Qty: 6 TABLET | Refills: 0 | Status: SHIPPED | OUTPATIENT
Start: 2023-07-28 | End: 2023-07-31

## 2023-07-28 NOTE — TELEPHONE ENCOUNTER
I called Marcia and reached voicemail. Message left that a prescription was sent to [facility].     [place] ([facility]) EUGENE Genao

## 2023-07-31 ENCOUNTER — VIRTUAL VISIT (OUTPATIENT)
Dept: PSYCHOLOGY | Facility: CLINIC | Age: 58
End: 2023-07-31
Payer: MEDICARE

## 2023-07-31 DIAGNOSIS — F33.0 MDD (MAJOR DEPRESSIVE DISORDER), RECURRENT EPISODE, MILD (H): Primary | ICD-10-CM

## 2023-07-31 DIAGNOSIS — F43.21 GRIEF REACTION: ICD-10-CM

## 2023-07-31 PROCEDURE — 90837 PSYTX W PT 60 MINUTES: CPT | Mod: VID | Performed by: SOCIAL WORKER

## 2023-07-31 NOTE — PROGRESS NOTES
"[facility]                                     Progress Note    Patient Name: Sherly Yeung  Date:  7/31/2023         Service Type: Individual      Session Start Time: 9:04 Session End Time: 9:57    Session Length: 53    Session #: 6    Attendees: Client    Service Modality:  Video Visit:      Provider verified identity through the following two step process.  Patient provided:  Patient is known previously to provider    Telemedicine Visit: The patient's condition can be safely assessed and treated via synchronous audio and visual telemedicine encounter.      Reason for Telemedicine Visit: Services only offered telehealth    Originating Site (Patient Location): Patient's home    Distant Site (Provider Location): Provider Remote Setting- Home Office    Consent:  The patient/guardian has verbally consented to: the potential risks and benefits of telemedicine (video visit) versus in person care; bill my insurance or make self-payment for services provided; and responsibility for payment of non-covered services.     Patient would like the video invitation sent by:  My Chart    Mode of Communication:  Video Conference via Amwell    Distant Location (Provider):  Off-site    As the provider I attest to compliance with applicable laws and regulations related to telemedicine.    DATA  Interactive Complexity: Yes, visit entailed Interactive Complexity evidenced by:  Heated family issues adding to her grief and her ongoing health issues. Needs more time to process them.    Crisis: No      Progress Since Last Session (Related to Symptoms / Goals / Homework):   Symptoms: Worsening \"migraines are tough on me\"    Homework: Partially completed      Episode of Care Goals: Minimal progress - ACTION (Actively working towards change); Intervened by reinforcing change plan / affirming steps taken- it takes some time to achieve acceptance.      Current / Ongoing Stressors and Concerns: Patient has been experiencing " serious issues with her mom. These are related to how her father wanted to allocate the money from the housing that was sold prior to coming to [place] from [place].  Patient and brother were chosen to be given the money. Mom who is now experiencing dementia has found out and did not take it well. Patient has already been trying to make peace with mom due to long lasting misunderstanding. This is not going better after finding out about the money.  Patient also continue to grief her father passing over the Spring time. She is adding to her own health.  When she gets a some relief, she has been helping her step son with the 2 kids ( 3 year old and 5 months old) while parents are working . She takes them to the Day care for them. Patient will remember that her thoughts do not own her but she owns them.  She will have ability to change them.  Will do her journaling and try GLAD to do so. Will also keep up with the fruits of spirits.  Her next visit is in 3 weeks.      Treatment Objective(s) Addressed in This Session:   Decrease frequency and intensity of feeling down, depressed, hopeless  Identify negative self-talk and behaviors: challenge core beliefs, myths, and actions  increase understanding of steps in the grief process      Intervention:     Situation      Automatic Thoughts  Cognitive Distortions    Feelings      Behavior      Questioning Thoughts        [place] Intervention: Supported Autonomy, Collaboration, Evocation, Permission to raise concern or advise, and Open-ended questions     Change Talk Expressed by the Patient: Taking steps    Provider Response to Change Talk: E - Evoked more info from patient about behavior change, A - Affirmed patient's thoughts, decisions, or attempts at behavior change, R - Reflected patient's change talk, and S - Summarized patient's change talk statements     Grief processing: To process her loss and achieve acceptance.     Assessments completed prior to visit:2/23/2023    The following  assessments were completed by patient for this visit:  PHQ2:       4/27/2022    10:07 AM 8/11/2020     9:37 AM 3/24/2020     3:46 PM 1/19/2012     2:24 PM   PHQ-2 ( 1999 [employer])   Q1: Little interest or pleasure in doing things 0 0 0 0   Q2: Feeling down, depressed or hopeless 0 0 0 1   PHQ-2 Score 0 0 0 1   PHQ-2 Total Score (12-17 Years)- Positive if 3 or more points; Administer PHQ-A if positive  0 0      PHQ9:       4/26/2023     9:07 AM 5/4/2023     9:43 AM 6/5/2023     7:25 AM 6/28/2023     6:02 AM 7/17/2023     6:35 AM 7/25/2023     6:27 AM 7/30/2023     5:26 PM   PHQ-9 SCORE   PHQ-9 Total Score MyChart 3 (Minimal depression) 4 (Minimal depression) 7 (Mild depression) 5 (Mild depression) 5 (Mild depression) 5 (Mild depression) 5 (Mild depression)   PHQ-9 Total Score 3 4    4 7 5 5 5 5     GAD2:       4/20/2023     9:02 AM 4/30/2023     2:33 PM 5/29/2023    10:12 AM 6/22/2023     5:18 AM 7/7/2023    10:17 AM 7/17/2023     6:35 AM 7/25/2023     6:27 AM   ROCKY-2   Feeling nervous, anxious, or on edge 0    0 1    1 1 1 1 1 1    1   Not being able to stop or control worrying 1    1 0    0 0 0 0 0 0    0   ROCKY-2 Total Score 1    1 1    1 1 1 1 1 1    1     GAD7:       2/23/2023     5:18 PM 4/26/2023    11:04 AM 7/17/2023     9:05 AM   ROCKY-7 SCORE   Total Score 7 2 2     CAGE-AID:       2/9/2023    11:52 AM 7/17/2023     9:10 AM   CAGE-AID Total Score   Total Score 0 0   Total Score MyChart 0 (A total score of 2 or greater is considered clinically significant)      PROMIS 10-Global Health (all questions and answers displayed):       9/24/2022     8:54 AM 1/5/2023    10:08 AM 3/31/2023    11:04 AM 4/30/2023     2:36 PM 6/22/2023     5:20 AM 7/17/2023     9:10 AM 7/30/2023     5:28 PM   PROMIS 10   In general, would you say your health is: Good Good Good Good Good  Good   In general, would you say your quality of life is: Very good Good Good Good Good  Very good   In general, how would you rate your physical health?  Good Good Good Good Good  Good   In general, how would you rate your mental health, including your mood and your ability to think? Very good Good Good Good Good  Good   In general, how would you rate your satisfaction with your social activities and relationships? Very good Good Good Very good Very good  Very good   In general, please rate how well you carry out your usual social activities and roles Very good Fair Fair Good Good  Good   To what extent are you able to carry out your everyday physical activities such as walking, climbing stairs, carrying groceries, or moving a chair? Moderately Moderately A little A little Moderately  A little   In the past 7 days, how often have you been bothered by emotional problems such as feeling anxious, depressed, or irritable? Sometimes Sometimes Often Often Often  Sometimes   In the past 7 days, how would you rate your fatigue on average? Moderate Moderate Moderate Moderate Moderate  Moderate   In the past 7 days, how would you rate your pain on average, where 0 means no pain, and 10 means worst imaginable pain? 4 3 5 6 5  5   In general, would you say your health is: 3 3 3 3 3 3 3   In general, would you say your quality of life is: 4 3 3 3 3 4 4   In general, how would you rate your physical health? 3 3 3 3 3 3 3   In general, how would you rate your mental health, including your mood and your ability to think? 4 3 3 3 3 3 3   In general, how would you rate your satisfaction with your social activities and relationships? 4 3 3 4 4 5 4   In general, please rate how well you carry out your usual social activities and roles. (This includes activities at home, at work and in your community, and responsibilities as a parent, child, spouse, employee, friend, etc.) 4 2 2 3 3 3 3   To what extent are you able to carry out your everyday physical activities such as walking, climbing stairs, carrying groceries, or moving a chair? 3 3 2 2 3 3 2   In the past 7 days, how often have you  been bothered by emotional problems such as feeling anxious, depressed, or irritable? 3 3 4 4 4 3 3   In the past 7 days, how would you rate your fatigue on average? 3 3 3 3 3 3 3   In the past 7 days, how would you rate your pain on average, where 0 means no pain, and 10 means worst imaginable pain? 4 3 5 6 5 5 5   Global Mental Health Score 15 12 11 12 12 15 14   Global Physical Health Score 12 13 11 11 12 12 11   PROMIS TOTAL - SUBSCORES 27 25 22 23 24 27 25     PROMIS 10-Global Health (only subscores and total score):       9/24/2022     8:54 AM 1/5/2023    10:08 AM 3/31/2023    11:04 AM 4/30/2023     2:36 PM 6/22/2023     5:20 AM 7/17/2023     9:10 AM 7/30/2023     5:28 PM   PROMIS-10 Scores Only   Global Mental Health Score 15 12 11 12 12 15 14   Global Physical Health Score 12 13 11 11 12 12 11   PROMIS TOTAL - SUBSCORES 27 25 22 23 24 27 25     [place] Suicide Severity Rating Scale (Short Version)      5/6/2022    11:30 AM 9/2/2022     7:31 PM 9/30/2022     5:03 PM 11/28/2022     9:49 PM 2/10/2023     9:55 AM 3/20/2023     3:28 PM 7/17/2023     9:09 AM   [place] Suicide Severity Rating (Short Version)   Over the past 2 weeks have you felt down, depressed, or hopeless? no no no no  no    Over the past 2 weeks have you had thoughts of killing yourself? no no no no  no    Have you ever attempted to kill yourself? no no no no  no    1. Wish to be Dead (Since Last Contact)     N  N   2. Non-Specific Active Suicidal Thoughts (Since Last Contact)     N  N   Actual Attempt (Since Last Contact)     N  N   Has subject engaged in non-suicidal self-injurious behavior? (Since Last Contact)     N  N   Interrupted Attempts (Since Last Contact)     N  N   Aborted or Self-Interrupted Attempt (Since Last Contact)     N  N   Preparatory Acts or Behavior (Since Last Contact)     N  N   Suicide (Since Last Contact)     N  N   Calculated C-SSRS Risk Score (Since Last Contact)     No Risk Indicated  No Risk Indicated  "        ASSESSMENT: Current Emotional / Mental Status (status of significant symptoms):   Risk status (Self / Other harm or suicidal ideation)   Patient denies current fears or concerns for personal safety.   Patient denies current or recent suicidal ideation or behaviors.   Patient denies current or recent homicidal ideation or behaviors.   Patient denies current or recent self injurious behavior or ideation.   Patient denies other safety concerns.   Patient reports there has been no change in risk factors since their last session.     Patient reports there has been no change in protective factors since their last session.     Recommended that patient call 911 or go to the local ED should there be a change in any of these risk factors.  Call [phone] if experiencing SI     Appearance:   Appropriate    Eye Contact:   Good    Psychomotor Behavior: Normal    Attitude:   Cooperative    Orientation:   All   Speech    Rate / Production: Normal     Volume:  Normal    Mood:    Normal   Affect:    Appropriate    Thought Content:  Clear    Thought Form:  Coherent  Logical    Insight:    Good      Medication Review:   No changes to current psychiatric medication(s)     Medication Compliance:   Yes     Changes in Health Issues:   None reported     Chemical Use Review:   Substance Use: Chemical use reviewed, no active concerns identified      Tobacco Use: No current tobacco use.      Diagnosis:  1. MDD (major depressive disorder), recurrent episode, mild (H)    2. Grief reaction      Collateral Reports Completed:   Routed note to PCP    PLAN: (Patient Tasks / Therapist Tasks / Other): keep these goals  Patient will keep up with self care.   Patient plans to continue working on gardening   Patient will reach out to friends that understand her when she feels sad  Patient will continue to practice the guided meditations from the \" self- compassion\"  By Radha Biswas 's web site.    Patient will use GLAD to reflect on her day  Patient's " "next visit is on 8/21/2023    DEBI FarmerSW               ___________________________________________________________________  Individual Treatment Plan    Patient's Name: Sherly Yeung  YOB: 1965    Date of Creation: 2/23/2023    Date Treatment Plan Last Reviewed/Revised: 7/31/2023    DSM5 Diagnoses: 296.32 (F33.1) Major Depressive Disorder, Recurrent Episode, Moderate _ and With anxious distress or 300.02 (F41.1) Generalized Anxiety Disorder  Grief reaction [F43.21]    Psychosocial / Contextual Factors: grief: father recently passed away. Family dynamic- relationship with son, mom. Feeling overwhelmed.    PROMIS (reviewed every 90 days): 25    Referral / Collaboration:    Referral to another professional/service is not indicated at this time..    Anticipated number of session for this episode of care: 9-12 sessions  Anticipation frequency of session: Biweekly  Anticipated Duration of each session: 53 or more minutes  Treatment plan will be reviewed in 90 days or when goals have been changed.     MeasurableTreatment Goal(s) related to diagnosis / functional impairment(s)    Goal 1: Patient will Accept the loss of beloved one and return to stable level of functioning as evidenced by a higher level of functioning as a result of acceptance.   Redevelop a supportive social system and improve interpersonal relationships and Express unresolved emotions regarding loss which brings anxiety and depression mood.      I will know I've met my goal when I have more energy and  I am able to celebrate good life with my father Vs the sadness of losing him.Redevelop a supportive social system and improve interpersonal relationships\"    Objective #A (Patient Action)    Patient will increase understanding of steps in the grief process.  Status: Continued - Date(s): 7/31/2023    Intervention(s)  Therapist will teach emotional recognition/identification. : defining happiness in your own term without father " .    Objective #B  Patient will identify at least 3 fears / thoughts that contribute to feeling anxious.  Status: Continued - Date(s): 7/31/2023    Intervention(s)  Therapist will teach thought challenging with CBT .    Objective #C  Patient will Identify negative self-talk and behaviors: challenge core beliefs, myths, and actions.  Status: Continued - Date(s): 7/31/2023    Intervention(s)  Therapist will provide space and time to process thoughts and feelings around current stressors. provide support and coping skills to help move on in life .      Patient has reviewed and agreed to the above plan.      Erika Mello [facility]  July 31 2023    Answers for HPI/ROS submitted by the patient on 4/20/2023  If you checked off any problems, how difficult have these problems made it for you to do your work, take care of things at home, or get along with other people?: Somewhat difficult  PHQ9 TOTAL SCORE: 5    Answers for HPI/ROS submitted by the patient on 4/26/2023  If you checked off any problems, how difficult have these problems made it for you to do your work, take care of things at home, or get along with other people?: Somewhat difficult  PHQ9 TOTAL SCORE: 3    Answers for HPI/ROS submitted by the patient on 5/4/2023  If you checked off any problems, how difficult have these problems made it for you to do your work, take care of things at home, or get along with other people?: Somewhat difficult  PHQ9 TOTAL SCORE: 4  Answers for HPI/ROS submitted by the patient on 7/17/2023  If you checked off any problems, how difficult have these problems made it for you to do your work, take care of things at home, or get along with other people?: Somewhat difficult  PHQ9 TOTAL SCORE: 5    Answers submitted by the patient for this visit:  Patient Health Questionnaire (Submitted on 7/30/2023)  If you checked off any problems, how difficult have these problems made it for you to do your work, take care of things at home, or get  along with other people?: Somewhat difficult  PHQ9 TOTAL SCORE: 5

## 2023-08-03 ENCOUNTER — MEDICAL CORRESPONDENCE (OUTPATIENT)
Dept: CARDIAC REHAB | Facility: CLINIC | Age: 58
End: 2023-08-03

## 2023-08-08 ENCOUNTER — E-VISIT (OUTPATIENT)
Dept: FAMILY MEDICINE | Facility: CLINIC | Age: 58
End: 2023-08-08
Payer: MEDICARE

## 2023-08-08 DIAGNOSIS — J01.90 ACUTE BACTERIAL SINUSITIS: Primary | ICD-10-CM

## 2023-08-08 DIAGNOSIS — B96.89 ACUTE BACTERIAL SINUSITIS: Primary | ICD-10-CM

## 2023-08-08 PROCEDURE — 99421 OL DIG E/M SVC 5-10 MIN: CPT | Performed by: PHYSICIAN ASSISTANT

## 2023-08-08 NOTE — PATIENT INSTRUCTIONS
You should be seen in clinic if you are having increased shortness of breath. Please go to nearest urgent care.    Thanks again for choosing?us?as your health care partner,?   ?  Jacinta Olmstead PA-C?

## 2023-08-21 ENCOUNTER — MYC MEDICAL ADVICE (OUTPATIENT)
Dept: UROLOGY | Facility: CLINIC | Age: 58
End: 2023-08-21
Payer: MEDICARE

## 2023-08-21 ENCOUNTER — VIRTUAL VISIT (OUTPATIENT)
Dept: PSYCHOLOGY | Facility: CLINIC | Age: 58
End: 2023-08-21
Payer: MEDICARE

## 2023-08-21 DIAGNOSIS — F33.0 MDD (MAJOR DEPRESSIVE DISORDER), RECURRENT EPISODE, MILD (H): Primary | ICD-10-CM

## 2023-08-21 PROCEDURE — 90834 PSYTX W PT 45 MINUTES: CPT | Mod: 95 | Performed by: SOCIAL WORKER

## 2023-08-21 NOTE — PROGRESS NOTES
[facility]                                     Progress Note    Patient Name: Sherly Yeung  Date:  8/21/2023         Service Type: Individual      Session Start Time: 12:05 Session End Time: 12:48    Session Length: 45    Session #: 7    Attendees: Client    Service Modality:  Video Visit:      Provider verified identity through the following two step process.  Patient provided:  Patient is known previously to provider    Telemedicine Visit: The patient's condition can be safely assessed and treated via synchronous audio and visual telemedicine encounter.      Reason for Telemedicine Visit: Services only offered telehealth    Originating Site (Patient Location): Patient's home    Distant Site (Provider Location): Provider Remote Setting- Home Office    Consent:  The patient/guardian has verbally consented to: the potential risks and benefits of telemedicine (video visit) versus in person care; bill my insurance or make self-payment for services provided; and responsibility for payment of non-covered services.     Patient would like the video invitation sent by:  My Chart    Mode of Communication:  Video Conference via Amwell    Distant Location (Provider):  Off-site    As the provider I attest to compliance with applicable laws and regulations related to telemedicine.    DATA  Interactive Complexity: No    Crisis: No      Progress Since Last Session (Related to Symptoms / Goals / Homework):   Symptoms: Improving managed anxiety, managed grief    Homework: Achieved / completed to satisfaction      Episode of Care Goals: Satisfactory progress - ACTION (Actively working towards change); Intervened by reinforcing change plan / affirming steps taken- it takes some time to achieve acceptance.      Current / Ongoing Stressors and Concerns: Patient reports feeling better today. Has been helping  to do some remodeling around their house. Daughter is coming from [place] where she lives with her fiance.  This will be an opportunity to ask her about the wedding planning. Patient believes it might be difficult for her as parents have . She is not sure how to bring them back together at her wedding which she feels is the reason why she has not set up a date yet. Reports she is in contact with mom this time and things seems going well. She will continue to work is in controlling her thoughts versus them controlling her. Her next visit is on 9/18 .      Treatment Objective(s) Addressed in This Session:   Decrease frequency and intensity of feeling down, depressed, hopeless  Identify negative self-talk and behaviors: challenge core beliefs, myths, and actions  increase understanding of steps in the grief process      Intervention:     Situation      Automatic Thoughts  Cognitive Distortions    Feelings      Behavior      Questioning Thoughts        MI Intervention: Supported Autonomy, Collaboration, Evocation, Permission to raise concern or advise, and Open-ended questions     Change Talk Expressed by the Patient: Taking steps    Provider Response to Change Talk: E - Evoked more info from patient about behavior change, A - Affirmed patient's thoughts, decisions, or attempts at behavior change, R - Reflected patient's change talk, and S - Summarized patient's change talk statements     Grief processing: To process her loss and achieve acceptance.     Assessments completed prior to visit:2/23/2023    The following assessments were completed by patient for this visit:  PHQ2:       4/27/2022    10:07 AM 8/11/2020     9:37 AM 3/24/2020     3:46 PM 1/19/2012     2:24 PM   PHQ-2 ( 1999 Pfizer)   Q1: Little interest or pleasure in doing things 0 0 0 0   Q2: Feeling down, depressed or hopeless 0 0 0 1   PHQ-2 Score 0 0 0 1   PHQ-2 Total Score (12-17 Years)- Positive if 3 or more points; Administer PHQ-A if positive  0 0      PHQ9:       5/4/2023     9:43 AM 6/5/2023     7:25 AM 6/28/2023     6:02 AM 7/17/2023     6:35 AM  7/25/2023     6:27 AM 7/30/2023     5:26 PM 8/20/2023     2:37 PM   PHQ-9 SCORE   PHQ-9 Total Score MyChart 4 (Minimal depression) 7 (Mild depression) 5 (Mild depression) 5 (Mild depression) 5 (Mild depression) 5 (Mild depression) 5 (Mild depression)   PHQ-9 Total Score 4    4 7 5 5 5 5 5     GAD2:       4/30/2023     2:33 PM 5/29/2023    10:12 AM 6/22/2023     5:18 AM 7/7/2023    10:17 AM 7/17/2023     6:35 AM 7/25/2023     6:27 AM 8/20/2023     2:37 PM   ROCKY-2   Feeling nervous, anxious, or on edge 1    1 1 1 1 1 1    1 1   Not being able to stop or control worrying 0    0 0 0 0 0 0    0 0   ROCKY-2 Total Score 1    1 1 1 1 1 1    1 1     GAD7:       2/23/2023     5:18 PM 4/26/2023    11:04 AM 7/17/2023     9:05 AM   ROCKY-7 SCORE   Total Score 7 2 2     CAGE-AID:       2/9/2023    11:52 AM 7/17/2023     9:10 AM   CAGE-AID Total Score   Total Score 0 0   Total Score MyChart 0 (A total score of 2 or greater is considered clinically significant)      PROMIS 10-Global Health (all questions and answers displayed):       9/24/2022     8:54 AM 1/5/2023    10:08 AM 3/31/2023    11:04 AM 4/30/2023     2:36 PM 6/22/2023     5:20 AM 7/17/2023     9:10 AM 7/30/2023     5:28 PM   PROMIS 10   In general, would you say your health is: Good Good Good Good Good  Good   In general, would you say your quality of life is: Very good Good Good Good Good  Very good   In general, how would you rate your physical health? Good Good Good Good Good  Good   In general, how would you rate your mental health, including your mood and your ability to think? Very good Good Good Good Good  Good   In general, how would you rate your satisfaction with your social activities and relationships? Very good Good Good Very good Very good  Very good   In general, please rate how well you carry out your usual social activities and roles Very good Fair Fair Good Good  Good   To what extent are you able to carry out your everyday physical activities such as  walking, climbing stairs, carrying groceries, or moving a chair? Moderately Moderately A little A little Moderately  A little   In the past 7 days, how often have you been bothered by emotional problems such as feeling anxious, depressed, or irritable? Sometimes Sometimes Often Often Often  Sometimes   In the past 7 days, how would you rate your fatigue on average? Moderate Moderate Moderate Moderate Moderate  Moderate   In the past 7 days, how would you rate your pain on average, where 0 means no pain, and 10 means worst imaginable pain? 4 3 5 6 5  5   In general, would you say your health is: 3 3 3 3 3 3 3   In general, would you say your quality of life is: 4 3 3 3 3 4 4   In general, how would you rate your physical health? 3 3 3 3 3 3 3   In general, how would you rate your mental health, including your mood and your ability to think? 4 3 3 3 3 3 3   In general, how would you rate your satisfaction with your social activities and relationships? 4 3 3 4 4 5 4   In general, please rate how well you carry out your usual social activities and roles. (This includes activities at home, at work and in your community, and responsibilities as a parent, child, spouse, employee, friend, etc.) 4 2 2 3 3 3 3   To what extent are you able to carry out your everyday physical activities such as walking, climbing stairs, carrying groceries, or moving a chair? 3 3 2 2 3 3 2   In the past 7 days, how often have you been bothered by emotional problems such as feeling anxious, depressed, or irritable? 3 3 4 4 4 3 3   In the past 7 days, how would you rate your fatigue on average? 3 3 3 3 3 3 3   In the past 7 days, how would you rate your pain on average, where 0 means no pain, and 10 means worst imaginable pain? 4 3 5 6 5 5 5   Global Mental Health Score 15 12 11 12 12 15 14   Global Physical Health Score 12 13 11 11 12 12 11   PROMIS TOTAL - SUBSCORES 27 25 22 23 24 27 25     PROMIS 10-Global Health (only subscores and total  score):       9/24/2022     8:54 AM 1/5/2023    10:08 AM 3/31/2023    11:04 AM 4/30/2023     2:36 PM 6/22/2023     5:20 AM 7/17/2023     9:10 AM 7/30/2023     5:28 PM   PROMIS-10 Scores Only   Global Mental Health Score 15 12 11 12 12 15 14   Global Physical Health Score 12 13 11 11 12 12 11   PROMIS TOTAL - SUBSCORES 27 25 22 23 24 27 25     [place] Suicide Severity Rating Scale (Short Version)      5/6/2022    11:30 AM 9/2/2022     7:31 PM 9/30/2022     5:03 PM 11/28/2022     9:49 PM 2/10/2023     9:55 AM 3/20/2023     3:28 PM 7/17/2023     9:09 AM   [place] Suicide Severity Rating (Short Version)   Over the past 2 weeks have you felt down, depressed, or hopeless? no no no no  no    Over the past 2 weeks have you had thoughts of killing yourself? no no no no  no    Have you ever attempted to kill yourself? no no no no  no    1. Wish to be Dead (Since Last Contact)     N  N   2. Non-Specific Active Suicidal Thoughts (Since Last Contact)     N  N   Actual Attempt (Since Last Contact)     N  N   Has subject engaged in non-suicidal self-injurious behavior? (Since Last Contact)     N  N   Interrupted Attempts (Since Last Contact)     N  N   Aborted or Self-Interrupted Attempt (Since Last Contact)     N  N   Preparatory Acts or Behavior (Since Last Contact)     N  N   Suicide (Since Last Contact)     N  N   Calculated C-SSRS Risk Score (Since Last Contact)     No Risk Indicated  No Risk Indicated         ASSESSMENT: Current Emotional / Mental Status (status of significant symptoms):   Risk status (Self / Other harm or suicidal ideation)   Patient denies current fears or concerns for personal safety.   Patient denies current or recent suicidal ideation or behaviors.   Patient denies current or recent homicidal ideation or behaviors.   Patient denies current or recent self injurious behavior or ideation.   Patient denies other safety concerns.   Patient reports there has been no change in risk factors since their last  "session.     Patient reports there has been no change in protective factors since their last session.     Recommended that patient call 911 or go to the local ED should there be a change in any of these risk factors.  Call [phone] if experiencing SI     Appearance:   Appropriate    Eye Contact:   Good    Psychomotor Behavior: Normal    Attitude:   Cooperative    Orientation:   All   Speech    Rate / Production: Normal     Volume:  Normal    Mood:    Normal   Affect:    Appropriate    Thought Content:  Clear    Thought Form:  Coherent  Logical    Insight:    Good      Medication Review:   No changes to current psychiatric medication(s)     Medication Compliance:   Yes     Changes in Health Issues:   None reported     Chemical Use Review:   Substance Use: Chemical use reviewed, no active concerns identified      Tobacco Use: No current tobacco use.      Diagnosis:  1. MDD (major depressive disorder), recurrent episode, mild (H)      Collateral Reports Completed:   Routed note to PCP    PLAN: (Patient Tasks / Therapist Tasks / Other):   Patient will reach out to friends that understand her when she feels sad  Patient will continue to practice the guided meditations from the \" self- compassion\"  By Radha Biswas 's web site.    Patient will use GLAD to reflect on her day  Patient will communicate with daughter about her wedding plans  Patient's next visit is on 9/18/2023    GERMAN Farmer               ___________________________________________________________________  Individual Treatment Plan    Patient's Name: Sherly Yeung  YOB: 1965    Date of Creation: 2/23/2023    Date Treatment Plan Last Reviewed/Revised: 7/31/2023    DSM5 Diagnoses: 296.32 (F33.1) Major Depressive Disorder, Recurrent Episode, Moderate _ and With anxious distress or 300.02 (F41.1) Generalized Anxiety Disorder  Grief reaction [F43.21]    Psychosocial / Contextual Factors: grief: father recently passed away. Family dynamic- " "relationship with son, mom. Feeling overwhelmed.    PROMIS (reviewed every 90 days): 25    Referral / Collaboration:    Referral to another professional/service is not indicated at this time..    Anticipated number of session for this episode of care: 9-12 sessions  Anticipation frequency of session: Biweekly  Anticipated Duration of each session: 53 or more minutes  Treatment plan will be reviewed in 90 days or when goals have been changed.     MeasurableTreatment Goal(s) related to diagnosis / functional impairment(s)    Goal 1: Patient will Accept the loss of beloved one and return to stable level of functioning as evidenced by a higher level of functioning as a result of acceptance.   Redevelop a supportive social system and improve interpersonal relationships and Express unresolved emotions regarding loss which brings anxiety and depression mood.      I will know I've met my goal when I have more energy and  I am able to celebrate good life with my father Vs the sadness of losing him.Redevelop a supportive social system and improve interpersonal relationships\"    Objective #A (Patient Action)    Patient will increase understanding of steps in the grief process.  Status: Continued - Date(s): 7/31/2023    Intervention(s)  Therapist will teach emotional recognition/identification. : defining happiness in your own term without father .    Objective #B  Patient will identify at least 3 fears / thoughts that contribute to feeling anxious.  Status: Continued - Date(s): 7/31/2023    Intervention(s)  Therapist will teach thought challenging with CBT .    Objective #C  Patient will Identify negative self-talk and behaviors: challenge core beliefs, myths, and actions.  Status: Continued - Date(s): 7/31/2023    Intervention(s)  Therapist will provide space and time to process thoughts and feelings around current stressors. provide support and coping skills to help move on in life .      Patient has reviewed and agreed to " the above plan.      Erika Mello, [facility]  July 31 2023    Answers for HPI/ROS submitted by the patient on 4/20/2023  If you checked off any problems, how difficult have these problems made it for you to do your work, take care of things at home, or get along with other people?: Somewhat difficult  PHQ9 TOTAL SCORE: 5    Answers for HPI/ROS submitted by the patient on 4/26/2023  If you checked off any problems, how difficult have these problems made it for you to do your work, take care of things at home, or get along with other people?: Somewhat difficult  PHQ9 TOTAL SCORE: 3    Answers for HPI/ROS submitted by the patient on 5/4/2023  If you checked off any problems, how difficult have these problems made it for you to do your work, take care of things at home, or get along with other people?: Somewhat difficult  PHQ9 TOTAL SCORE: 4  Answers for HPI/ROS submitted by the patient on 7/17/2023  If you checked off any problems, how difficult have these problems made it for you to do your work, take care of things at home, or get along with other people?: Somewhat difficult  PHQ9 TOTAL SCORE: 5    Answers submitted by the patient for this visit:  Patient Health Questionnaire (Submitted on 7/30/2023)  If you checked off any problems, how difficult have these problems made it for you to do your work, take care of things at home, or get along with other people?: Somewhat difficult  PHQ9 TOTAL SCORE: 5  Answers submitted by the patient for this visit:  Patient Health Questionnaire (Submitted on 8/20/2023)  If you checked off any problems, how difficult have these problems made it for you to do your work, take care of things at home, or get along with other people?: Somewhat difficult  PHQ9 TOTAL SCORE: 5

## 2023-08-22 ENCOUNTER — LAB (OUTPATIENT)
Dept: LAB | Facility: CLINIC | Age: 58
End: 2023-08-22
Payer: MEDICARE

## 2023-08-22 DIAGNOSIS — R30.0 DYSURIA: ICD-10-CM

## 2023-08-22 DIAGNOSIS — N30.00 ACUTE CYSTITIS WITHOUT HEMATURIA: Primary | ICD-10-CM

## 2023-08-22 DIAGNOSIS — R30.0 DYSURIA: Primary | ICD-10-CM

## 2023-08-22 LAB
ALBUMIN UR-MCNC: 30 MG/DL
APPEARANCE UR: CLEAR
BACTERIA #/AREA URNS HPF: ABNORMAL /HPF
BILIRUB UR QL STRIP: NEGATIVE
COLOR UR AUTO: YELLOW
GLUCOSE UR STRIP-MCNC: NEGATIVE MG/DL
HGB UR QL STRIP: ABNORMAL
KETONES UR STRIP-MCNC: NEGATIVE MG/DL
LEUKOCYTE ESTERASE UR QL STRIP: ABNORMAL
NITRATE UR QL: NEGATIVE
PH UR STRIP: 7 [PH] (ref 5–7)
RBC #/AREA URNS AUTO: ABNORMAL /HPF
SP GR UR STRIP: 1.02 (ref 1–1.03)
SQUAMOUS #/AREA URNS AUTO: ABNORMAL /LPF
UROBILINOGEN UR STRIP-ACNC: 0.2 E.U./DL
WBC #/AREA URNS AUTO: ABNORMAL /HPF

## 2023-08-22 PROCEDURE — 87088 URINE BACTERIA CULTURE: CPT

## 2023-08-22 PROCEDURE — 87086 URINE CULTURE/COLONY COUNT: CPT

## 2023-08-22 PROCEDURE — 81001 URINALYSIS AUTO W/SCOPE: CPT

## 2023-08-22 PROCEDURE — 87186 SC STD MICRODIL/AGAR DIL: CPT

## 2023-08-22 RX ORDER — NITROFURANTOIN 25; 75 MG/1; MG/1
100 CAPSULE ORAL 2 TIMES DAILY
Qty: 10 CAPSULE | Refills: 0 | Status: SHIPPED | OUTPATIENT
Start: 2023-08-22 | End: 2023-08-27

## 2023-08-24 LAB — BACTERIA UR CULT: ABNORMAL

## 2023-09-12 ENCOUNTER — VIRTUAL VISIT (OUTPATIENT)
Dept: PSYCHIATRY | Facility: CLINIC | Age: 58
End: 2023-09-12
Attending: PSYCHIATRY & NEUROLOGY
Payer: MEDICARE

## 2023-09-12 ENCOUNTER — TELEPHONE (OUTPATIENT)
Dept: INTERNAL MEDICINE | Facility: CLINIC | Age: 58
End: 2023-09-12
Payer: MEDICARE

## 2023-09-12 DIAGNOSIS — F33.9 RECURRENT MAJOR DEPRESSIVE DISORDER, REMISSION STATUS UNSPECIFIED (H): Primary | ICD-10-CM

## 2023-09-12 DIAGNOSIS — F33.1 MODERATE EPISODE OF RECURRENT MAJOR DEPRESSIVE DISORDER (H): ICD-10-CM

## 2023-09-12 DIAGNOSIS — R40.0 HAS DAYTIME DROWSINESS: ICD-10-CM

## 2023-09-12 PROCEDURE — 99214 OFFICE O/P EST MOD 30 MIN: CPT | Mod: VID | Performed by: PSYCHIATRY & NEUROLOGY

## 2023-09-12 PROCEDURE — 90833 PSYTX W PT W E/M 30 MIN: CPT | Mod: VID | Performed by: PSYCHIATRY & NEUROLOGY

## 2023-09-12 RX ORDER — DULOXETIN HYDROCHLORIDE 60 MG/1
120 CAPSULE, DELAYED RELEASE ORAL EVERY EVENING
Qty: 60 CAPSULE | Refills: 2 | Status: SHIPPED | OUTPATIENT
Start: 2023-09-12 | End: 2023-11-03

## 2023-09-12 RX ORDER — BUPRENORPHINE 2 MG/1
0.5 TABLET SUBLINGUAL 3 TIMES DAILY
COMMUNITY
Start: 2023-07-24 | End: 2023-10-31

## 2023-09-12 RX ORDER — METHYLPHENIDATE HYDROCHLORIDE 20 MG/1
TABLET ORAL
Qty: 45 TABLET | Refills: 0 | Status: SHIPPED | OUTPATIENT
Start: 2023-10-20 | End: 2023-11-03

## 2023-09-12 RX ORDER — ARIPIPRAZOLE 5 MG/1
5 TABLET ORAL DAILY
Qty: 30 TABLET | Refills: 2 | Status: SHIPPED | OUTPATIENT
Start: 2023-09-12 | End: 2023-11-03

## 2023-09-12 ASSESSMENT — PAIN SCALES - GENERAL: PAINLEVEL: NO PAIN (0)

## 2023-09-12 NOTE — TELEPHONE ENCOUNTER
[facility]    Phone Message    May a detailed message be left on voicemail: yes     Reason for Call: Order(s): Other:   Reason for requested: Patient would candy a COVID test, has sore throat, fever, chills, cough, no appetite, chest burning, headache, shortness of breath    Date needed: 09/12/2023  Provider name: Saurav    Action Taken: Message routed to:  [facility]    Travel Screening: Not Applicable

## 2023-09-12 NOTE — TELEPHONE ENCOUNTER
Because Federal COVID-19 Public Health Emergency ended on May 11, 2023, patient will need to have an appointment with a provider for a Covid test.        Raoul Curry CMA ([facility]) at 4:20 PM on 9/12/2023

## 2023-09-12 NOTE — NURSING NOTE
Is the patient currently in the state of [place]? YES    Visit mode:VIDEO    If the visit is dropped, the patient can be reconnected by: VIDEO VISIT: Text to cell phone:   Telephone Information:   Mobile [phone]       Will anyone else be joining the visit? NO  (If patient encounters technical issues they should call [phone]    How would you like to obtain your AVS? MyChart    Are changes needed to the allergy or medication list? Yes see medication flagged for removal.    Reason for visit: RECHECK    PHQ not complete per department protocol.    Paola ORDOÑEZ

## 2023-09-12 NOTE — PATIENT INSTRUCTIONS
**For crisis resources, please see the information at the end of this document**   Patient Education    Thank you for coming to the [facility] [place] [facility].     Lab Testing:  If you had lab testing today and your results are reassuring or normal they will be mailed to you or sent through [employer] within 7 days. If the lab tests need quick action we will call you with the results. The phone number we will call with results is # [phone]. If this is not the best number please call our clinic and change the number.     Medication Refills:  If you need any refills please call your pharmacy and they will contact us. Our fax number for refills is [phone].   Three business days of notice are needed for general medication refill requests.   Five business days of notice are needed for controlled substance refill requests.   If you need to change to a different pharmacy, please contact the new pharmacy directly. The new pharmacy will help you get your medications transferred.     Contact Us:  Please call [phone] during business hours (8-5:00 M-F).   If you have medication related questions after clinic hours, or on the weekend, please call [phone].     Financial Assistance [phone]   Medical Records [phone]       MENTAL HEALTH CRISIS RESOURCES:  For a emergency help, please call 911 or go to the nearest Emergency Department.     Emergency Walk-In Options:   [facility] Unit @ [facility] ([place]): [phone] - Specialized mental health emergency area designed to be calming  [facility] ([place]): [phone]  [facility] ([place]): [phone]  [facility]): [phone]    [facility] Crisis Information:   [place]: [phone]  [place]: [phone]  [place] ([place]) - Adult: [phone]     Child: [phone]  [place] - Adult: [phone]     Child: [phone]  [place]:  [phone]  List of all [facility] resources:   https://[place].gov/dhs/people-we-serve/adults/health-care/mental-health/resources/crisis-contacts.jsp    National Crisis Information:   Crisis Text Line: Text  [place]  to 149681  Suicide & Crisis Lifeline: 988  National Suicide Prevention Lifeline: 9-662-636-TALK (1-548.801.8935)       For online chat options, visit https://suicidepreventionlifeline.org/chat/  Poison Control Center: 5-392-517-1901  Trans Lifeline: 7-321-335-8345 - Hotline for transgender people of all ages  The [employer]: 9-378-635-5014 - Hotline for LGBT youth     For Non-Emergency Support:   Fast Tracker: Mental Health & Substance Use Disorder Resources -   https://www.[employer] network disksn.org/

## 2023-09-13 ENCOUNTER — TELEPHONE (OUTPATIENT)
Dept: CARE COORDINATION | Facility: CLINIC | Age: 58
End: 2023-09-13

## 2023-09-13 ENCOUNTER — APPOINTMENT (OUTPATIENT)
Dept: GENERAL RADIOLOGY | Facility: CLINIC | Age: 58
End: 2023-09-13
Attending: FAMILY MEDICINE
Payer: MEDICARE

## 2023-09-13 ENCOUNTER — HOSPITAL ENCOUNTER (EMERGENCY)
Facility: CLINIC | Age: 58
Discharge: HOME | End: 2023-09-13
Attending: FAMILY MEDICINE | Admitting: FAMILY MEDICINE
Payer: MEDICARE

## 2023-09-13 VITALS
BODY MASS INDEX: 23.92 KG/M2 | SYSTOLIC BLOOD PRESSURE: 145 MMHG | OXYGEN SATURATION: 97 % | HEART RATE: 106 BPM | DIASTOLIC BLOOD PRESSURE: 91 MMHG | RESPIRATION RATE: 22 BRPM | TEMPERATURE: 97.7 F | HEIGHT: 63 IN | WEIGHT: 135 LBS

## 2023-09-13 DIAGNOSIS — J20.9 ACUTE BRONCHITIS, UNSPECIFIED ORGANISM: ICD-10-CM

## 2023-09-13 LAB
ANION GAP SERPL CALCULATED.3IONS-SCNC: 10 MMOL/L (ref 7–15)
BASOPHILS # BLD AUTO: 0 10E3/UL (ref 0–0.2)
BASOPHILS NFR BLD AUTO: 0 %
BUN SERPL-MCNC: 11.6 MG/DL (ref 6–20)
CALCIUM SERPL-MCNC: 9.5 MG/DL (ref 8.6–10)
CHLORIDE SERPL-SCNC: 107 MMOL/L (ref 98–107)
CREAT SERPL-MCNC: 0.81 MG/DL (ref 0.51–0.95)
DEPRECATED HCO3 PLAS-SCNC: 25 MMOL/L (ref 22–29)
EGFRCR SERPLBLD CKD-EPI 2021: 84 ML/MIN/1.73M2
EOSINOPHIL # BLD AUTO: 0.3 10E3/UL (ref 0–0.7)
EOSINOPHIL NFR BLD AUTO: 3 %
ERYTHROCYTE [DISTWIDTH] IN BLOOD BY AUTOMATED COUNT: 13.2 % (ref 10–15)
FLUAV RNA SPEC QL NAA+PROBE: NEGATIVE
FLUBV RNA RESP QL NAA+PROBE: NEGATIVE
GLUCOSE SERPL-MCNC: 100 MG/DL (ref 70–99)
HCT VFR BLD AUTO: 42.6 % (ref 35–47)
HGB BLD-MCNC: 13.9 G/DL (ref 11.7–15.7)
IMM GRANULOCYTES # BLD: 0.1 10E3/UL
IMM GRANULOCYTES NFR BLD: 1 %
LYMPHOCYTES # BLD AUTO: 1.2 10E3/UL (ref 0.8–5.3)
LYMPHOCYTES NFR BLD AUTO: 11 %
MCH RBC QN AUTO: 31.2 PG (ref 26.5–33)
MCHC RBC AUTO-ENTMCNC: 32.6 G/DL (ref 31.5–36.5)
MCV RBC AUTO: 96 FL (ref 78–100)
MONOCYTES # BLD AUTO: 0.8 10E3/UL (ref 0–1.3)
MONOCYTES NFR BLD AUTO: 7 %
NEUTROPHILS # BLD AUTO: 8.4 10E3/UL (ref 1.6–8.3)
NEUTROPHILS NFR BLD AUTO: 78 %
NRBC # BLD AUTO: 0 10E3/UL
NRBC BLD AUTO-RTO: 0 /100
PLATELET # BLD AUTO: 308 10E3/UL (ref 150–450)
POTASSIUM SERPL-SCNC: 3.9 MMOL/L (ref 3.4–5.3)
RBC # BLD AUTO: 4.46 10E6/UL (ref 3.8–5.2)
RSV RNA SPEC NAA+PROBE: NEGATIVE
SARS-COV-2 RNA RESP QL NAA+PROBE: NEGATIVE
SODIUM SERPL-SCNC: 142 MMOL/L (ref 136–145)
TROPONIN T SERPL HS-MCNC: <6 NG/L
WBC # BLD AUTO: 10.6 10E3/UL (ref 4–11)

## 2023-09-13 PROCEDURE — 93010 ELECTROCARDIOGRAM REPORT: CPT | Performed by: FAMILY MEDICINE

## 2023-09-13 PROCEDURE — 82310 ASSAY OF CALCIUM: CPT | Performed by: FAMILY MEDICINE

## 2023-09-13 PROCEDURE — 36415 COLL VENOUS BLD VENIPUNCTURE: CPT | Performed by: FAMILY MEDICINE

## 2023-09-13 PROCEDURE — 93005 ELECTROCARDIOGRAM TRACING: CPT | Performed by: FAMILY MEDICINE

## 2023-09-13 PROCEDURE — 99285 EMERGENCY DEPT VISIT HI MDM: CPT | Mod: 25 | Performed by: FAMILY MEDICINE

## 2023-09-13 PROCEDURE — 85025 COMPLETE CBC W/AUTO DIFF WBC: CPT | Performed by: FAMILY MEDICINE

## 2023-09-13 PROCEDURE — 87637 SARSCOV2&INF A&B&RSV AMP PRB: CPT | Performed by: FAMILY MEDICINE

## 2023-09-13 PROCEDURE — 71046 X-RAY EXAM CHEST 2 VIEWS: CPT

## 2023-09-13 PROCEDURE — 99284 EMERGENCY DEPT VISIT MOD MDM: CPT | Mod: 25 | Performed by: FAMILY MEDICINE

## 2023-09-13 PROCEDURE — 84484 ASSAY OF TROPONIN QUANT: CPT | Performed by: FAMILY MEDICINE

## 2023-09-13 RX ORDER — PREDNISONE 20 MG/1
TABLET ORAL
Qty: 10 TABLET | Refills: 0 | Status: SHIPPED | OUTPATIENT
Start: 2023-09-13 | End: 2023-09-18

## 2023-09-13 ASSESSMENT — ACTIVITIES OF DAILY LIVING (ADL)
ADLS_ACUITY_SCORE: 37
ADLS_ACUITY_SCORE: 37

## 2023-09-13 NOTE — TELEPHONE ENCOUNTER
----- Message from Donna Mohr sent at 9/12/2023  4:48 PM CDT -----  Regarding: Pt of Dr. Saurav Strange,    Pt is wondering how long remdesivir can be taken after COVID symptoms start. Pt has video visit with Dr. Cr 9/14 to discuss covid testing.    Thank you!    Donna  [facility] Coordinator

## 2023-09-13 NOTE — DISCHARGE INSTRUCTIONS
ICD-10-CM    1. Acute bronchitis, unspecified organism  J20.9     no serious findings.  suspect viral cause. take prednisone 40 mg daily for 5 days. if worsening.  stay hydrated.  follow-up primary provider by monday if not sooner
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
## 2023-09-13 NOTE — ED TRIAGE NOTES
Pt presents with concerns for Covid infection. Pt endorses SOB, cough, and CP made worse with deep breathing or coughing. Pt states she has had Covid 3 times and ended up on ventilator.      Triage Assessment       Row Name 09/13/23 0601       Triage Assessment (Adult)    Airway WDL WDL       Respiratory WDL    Respiratory WDL X;rhythm/pattern    Rhythm/Pattern, Respiratory shortness of breath       Skin Circulation/Temperature WDL    Skin Circulation/Temperature WDL WDL       Cardiac WDL    Cardiac WDL X;chest pain       Chest Pain Assessment    Chest Pain Location midsternal       Peripheral/Neurovascular WDL    Peripheral Neurovascular WDL WDL    Capillary Refill, General less than/equal to 3 secs       Cognitive/Neuro/Behavioral WDL    Cognitive/Neuro/Behavioral WDL WDL       [place] Coma Scale    Best Eye Response 4-->(E4) spontaneous    Best Motor Response 6-->(M6) obeys commands    Best Verbal Response 5-->(V5) oriented    [place] Coma Scale Score 15

## 2023-09-14 DIAGNOSIS — J20.9 ACUTE BRONCHITIS, UNSPECIFIED ORGANISM: Primary | ICD-10-CM

## 2023-09-14 NOTE — TELEPHONE ENCOUNTER
[facility] Call [facility]    Phone Message    May a detailed message be left on voicemail: yes     Reason for Call: Other: Patient calling to let Dr Mg know that she lost her bottle of Prednisone and needs a new prescription. Please call patient when the prescription has been sent over to the pharmacy. Thank you.     Action Taken: Message routed to:  [facility]    Travel Screening: Not Applicable

## 2023-09-15 ENCOUNTER — NURSE TRIAGE (OUTPATIENT)
Dept: NURSING | Facility: CLINIC | Age: 58
End: 2023-09-15
Payer: MEDICARE

## 2023-09-15 NOTE — TELEPHONE ENCOUNTER
"COV vent.  Chest tightness.     Reason for Disposition   MILD difficulty breathing (e.g., minimal/no SOB at rest, SOB with walking, pulse < 100) of new-onset or worse than normal    Additional Information   Negative: SEVERE difficulty breathing (e.g., struggling for each breath, speaks in single words, pulse > 120)   Negative: Breathing stopped and hasn't returned   Negative: Choking on something   Negative: Bluish (or gray) lips or face   Negative: Difficult to awaken or acting confused (e.g., disoriented, slurred speech)   Negative: Passed out (i.e., fainted, collapsed and was not responding)   Negative: Wheezing started suddenly after medicine, an allergic food, or bee sting   Negative: Stridor (harsh sound while breathing in)   Negative: Slow, shallow and weak breathing   Negative: Sounds like a life-threatening emergency to the triager   Negative: MODERATE difficulty breathing (e.g., speaks in phrases, SOB even at rest, pulse 100-120) of new-onset or worse than normal   Negative: Oxygen level (e.g., pulse oximetry) 90% or lower   Negative: Wheezing can be heard across the room   Negative: Drooling or spitting out saliva (because can't swallow)   Negative: Any history of prior \"blood clot\" in leg or lungs   Negative: Illness requiring prolonged bedrest in past month (e.g., immobilization, long hospital stay)   Negative: Hip or leg fracture (broken bone) in past month (or had cast on leg or ankle in past month)   Negative: Major surgery in the past month   Negative: Long-distance travel in past month (e.g., car, bus, train, plane; with trip lasting 6 or more hours)   Negative: Cancer treatment in past six months (or has cancer now)   Negative: Extra heartbeats, irregular heart beating, or heart is beating very fast (i.e., 'palpitations')   Negative: Fever > 103 F (39.4 C)   Negative: Fever > 101 F (38.3 C) and over 60 years of age   Negative: Fever > 100.0 F (37.8 C) and bedridden (e.g., nursing home patient, " stroke, chronic illness, recovering from surgery)   Negative: Fever > 100.0 F (37.8 C) and diabetes mellitus or weak immune system (e.g., HIV positive, cancer chemo, splenectomy, organ transplant, chronic steroids)   Negative: Periods where breathing stops and then resumes normally and bedridden (e.g., nursing home patient, CVA)   Negative: Pregnant or postpartum (from 0 to 6 weeks after delivery)   Negative: Patient sounds very sick or weak to the triager    Protocols used: Breathing Difficulty-A-[place]  Nurse Triage SBAR    Is this a 2nd Level Triage? NO     97%.  Pt had a fever.  Had a fever two days ago.  Highest reading was 10.5.  98.9 temp. Now.  Pt is worse.  More then when she was in the ER.  Chest is tighter and she is having more congestion.  SOB with walking     Pt was seen in the ER.     Shortness of Breath    Cough    Chest Pain       CP worsened with deep breathing      HPI  Sherly Yeung is a 57 year old female who presents with a very complicated past history of recurrent SVC syndrome and thoracic outlet status post repair numerous clots and subclavian and on chronic anticoagulation with Eliquis that she is consistent with.  Has had a history of COVID with multiple hospitalizations and 2 prior intubations for prolonged hospital stays related to this.  She is followed by pulmonology.  Has reduced lung function is on oxygen 2 L at nighttime.  This history is taken from the patient directly.  She presents here with onset yesterday of cough congestion shortness of breath pleuritic chest pain centrally without unilateral chest pain and without exertional symptoms other than dyspnea on exertion increased from her baseline.  Possible orthopnea but no leg edema.  No associated nausea vomiting or sweats.     Protocol Recommended Disposition:   No disposition on file.    Recommendation: ER   \    Simona Jordan RN on 9/15/2023 at 3:52 PM

## 2023-09-15 NOTE — TELEPHONE ENCOUNTER
Patient states that she thinks she threw out the prednisone 20 mg tablets that she picked up on 9-13.  She would like a new order to be sent as an override for lost medication.

## 2023-09-18 ENCOUNTER — VIRTUAL VISIT (OUTPATIENT)
Dept: PSYCHOLOGY | Facility: CLINIC | Age: 58
End: 2023-09-18
Payer: MEDICARE

## 2023-09-18 DIAGNOSIS — F41.1 GAD (GENERALIZED ANXIETY DISORDER): Primary | ICD-10-CM

## 2023-09-18 DIAGNOSIS — F43.21 GRIEF REACTION: ICD-10-CM

## 2023-09-18 PROCEDURE — 90837 PSYTX W PT 60 MINUTES: CPT | Mod: VID | Performed by: SOCIAL WORKER

## 2023-09-18 RX ORDER — PREDNISONE 20 MG/1
TABLET ORAL
Qty: 10 TABLET | Refills: 0 | Status: SHIPPED | OUTPATIENT
Start: 2023-09-18 | End: 2023-09-28

## 2023-09-18 RX ORDER — PREDNISONE 20 MG/1
TABLET ORAL
Qty: 10 TABLET | Refills: 0 | OUTPATIENT
Start: 2023-09-18

## 2023-09-18 ASSESSMENT — PATIENT HEALTH QUESTIONNAIRE - PHQ9
SUM OF ALL RESPONSES TO PHQ QUESTIONS 1-9: 4
SUM OF ALL RESPONSES TO PHQ QUESTIONS 1-9: 4
10. IF YOU CHECKED OFF ANY PROBLEMS, HOW DIFFICULT HAVE THESE PROBLEMS MADE IT FOR YOU TO DO YOUR WORK, TAKE CARE OF THINGS AT HOME, OR GET ALONG WITH OTHER PEOPLE: SOMEWHAT DIFFICULT
5. POOR APPETITE OR OVEREATING: SEVERAL DAYS

## 2023-09-18 ASSESSMENT — ANXIETY QUESTIONNAIRES
3. WORRYING TOO MUCH ABOUT DIFFERENT THINGS: SEVERAL DAYS
7. FEELING AFRAID AS IF SOMETHING AWFUL MIGHT HAPPEN: NOT AT ALL
2. NOT BEING ABLE TO STOP OR CONTROL WORRYING: SEVERAL DAYS
6. BECOMING EASILY ANNOYED OR IRRITABLE: NOT AT ALL
GAD7 TOTAL SCORE: 5
1. FEELING NERVOUS, ANXIOUS, OR ON EDGE: SEVERAL DAYS
GAD7 TOTAL SCORE: 5
IF YOU CHECKED OFF ANY PROBLEMS ON THIS QUESTIONNAIRE, HOW DIFFICULT HAVE THESE PROBLEMS MADE IT FOR YOU TO DO YOUR WORK, TAKE CARE OF THINGS AT HOME, OR GET ALONG WITH OTHER PEOPLE: SOMEWHAT DIFFICULT
5. BEING SO RESTLESS THAT IT IS HARD TO SIT STILL: SEVERAL DAYS

## 2023-09-18 NOTE — PROGRESS NOTES
[facility]                                     Progress Note    Patient Name: Sherly Yeung  Date: 9/18/2023         Service Type: Individual      Session Start Time: 12:02 Session End Time: 12:56    Session Length: 54    Session #: 7    Attendees: Client    Service Modality:  Video Visit:      Provider verified identity through the following two step process.  Patient provided:  Patient is known previously to provider    Telemedicine Visit: The patient's condition can be safely assessed and treated via synchronous audio and visual telemedicine encounter.      Reason for Telemedicine Visit: Services only offered telehealth    Originating Site (Patient Location): Patient's home    Distant Site (Provider Location): Provider Remote Setting- Home Office    Consent:  The patient/guardian has verbally consented to: the potential risks and benefits of telemedicine (video visit) versus in person care; bill my insurance or make self-payment for services provided; and responsibility for payment of non-covered services.     Patient would like the video invitation sent by:  My Chart    Mode of Communication:  Video Conference via Amwell    Distant Location (Provider):  Off-site    As the provider I attest to compliance with applicable laws and regulations related to telemedicine.    DATA  Interactive Complexity: Yes, visit entailed Interactive Complexity evidenced by: another loss on the family, needed more time to process her losses    Crisis: No      Progress Since Last Session (Related to Symptoms / Goals / Homework):   Symptoms: Improving managed anxiety, managed grief    Homework: Achieved / completed to satisfaction      Episode of Care Goals: Satisfactory progress - ACTION (Actively working towards change); Intervened by reinforcing change plan / affirming steps taken- it takes some time to achieve acceptance.      Current / Ongoing Stressors and Concerns: Patient has been experiencing deep loss. On  her father's 6th month anniversary last month, her 's mom passed away.  This has been tough on both and the rest of the family. 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 Will work on collecting some pictures from friends and family and make videos that show their lives.  Patient found this will help her to heal soon and to keep memories in the family. No other concerns were Id today. Her next visit is in 2 weeks.      Treatment Objective(s) Addressed in This Session:   Decrease frequency and intensity of feeling down, depressed, hopeless  Identify negative self-talk and behaviors: challenge core beliefs, myths, and actions  increase understanding of steps in the grief process      Intervention:     Situation      Automatic Thoughts  Cognitive Distortions    Feelings      Behavior      Questioning Thoughts        MI Intervention: Supported Autonomy, Collaboration, Evocation, Permission to raise concern or advise, and Open-ended questions     Change Talk Expressed by the Patient: Taking steps    Provider Response to Change Talk: E - Evoked more info from patient about behavior change, A - Affirmed patient's thoughts, decisions, or attempts at behavior change, R - Reflected patient's change talk, and S - Summarized patient's change talk statements     Grief processing: To process her loss and achieve acceptance.     Assessments completed prior to visit:2/23/2023    The following assessments were completed by patient for this visit:  PHQ2:       4/27/2022    10:07 AM 8/11/2020     9:37 AM 3/24/2020     3:46 PM 1/19/2012     2:24 PM   PHQ-2 ( 1999 [employer])   Q1: Little interest or pleasure in doing things 0 0 0 0   Q2: Feeling down, depressed or hopeless 0 0 0 1   PHQ-2 Score 0 0 0 1   PHQ-2 Total Score (12-17 Years)- Positive if 3 or more points; Administer PHQ-A if positive  0 0      PHQ9:       6/5/2023     7:25 AM 6/28/2023     6:02 AM 7/17/2023     6:35  AM 7/25/2023     6:27 AM 7/30/2023     5:26 PM 8/20/2023     2:37 PM 9/18/2023     7:28 AM   PHQ-9 SCORE   PHQ-9 Total Score MyChart 7 (Mild depression) 5 (Mild depression) 5 (Mild depression) 5 (Mild depression) 5 (Mild depression) 5 (Mild depression) 4 (Minimal depression)   PHQ-9 Total Score 7 5 5 5 5 5 4     GAD2:       5/29/2023    10:12 AM 6/22/2023     5:18 AM 7/7/2023    10:17 AM 7/17/2023     6:35 AM 7/25/2023     6:27 AM 8/20/2023     2:37 PM 9/18/2023     7:29 AM   ROCKY-2   Feeling nervous, anxious, or on edge 1 1 1 1 1    1 1 1   Not being able to stop or control worrying 0 0 0 0 0    0 0 0   ROCKY-2 Total Score 1 1 1 1 1    1 1 1     GAD7:       2/23/2023     5:18 PM 4/26/2023    11:04 AM 7/17/2023     9:05 AM 9/18/2023    12:06 PM   ROCKY-7 SCORE   Total Score 7 2 2 5     CAGE-AID:       2/9/2023    11:52 AM 7/17/2023     9:10 AM   CAGE-AID Total Score   Total Score 0 0   Total Score MyChart 0 (A total score of 2 or greater is considered clinically significant)      PROMIS 10-Global Health (all questions and answers displayed):       9/24/2022     8:54 AM 1/5/2023    10:08 AM 3/31/2023    11:04 AM 4/30/2023     2:36 PM 6/22/2023     5:20 AM 7/17/2023     9:10 AM 7/30/2023     5:28 PM   PROMIS 10   In general, would you say your health is: Good Good Good Good Good  Good   In general, would you say your quality of life is: Very good Good Good Good Good  Very good   In general, how would you rate your physical health? Good Good Good Good Good  Good   In general, how would you rate your mental health, including your mood and your ability to think? Very good Good Good Good Good  Good   In general, how would you rate your satisfaction with your social activities and relationships? Very good Good Good Very good Very good  Very good   In general, please rate how well you carry out your usual social activities and roles Very good Fair Fair Good Good  Good   To what extent are you able to carry out your everyday  physical activities such as walking, climbing stairs, carrying groceries, or moving a chair? Moderately Moderately A little A little Moderately  A little   In the past 7 days, how often have you been bothered by emotional problems such as feeling anxious, depressed, or irritable? Sometimes Sometimes Often Often Often  Sometimes   In the past 7 days, how would you rate your fatigue on average? Moderate Moderate Moderate Moderate Moderate  Moderate   In the past 7 days, how would you rate your pain on average, where 0 means no pain, and 10 means worst imaginable pain? 4 3 5 6 5  5   In general, would you say your health is: 3 3 3 3 3 3 3   In general, would you say your quality of life is: 4 3 3 3 3 4 4   In general, how would you rate your physical health? 3 3 3 3 3 3 3   In general, how would you rate your mental health, including your mood and your ability to think? 4 3 3 3 3 3 3   In general, how would you rate your satisfaction with your social activities and relationships? 4 3 3 4 4 5 4   In general, please rate how well you carry out your usual social activities and roles. (This includes activities at home, at work and in your community, and responsibilities as a parent, child, spouse, employee, friend, etc.) 4 2 2 3 3 3 3   To what extent are you able to carry out your everyday physical activities such as walking, climbing stairs, carrying groceries, or moving a chair? 3 3 2 2 3 3 2   In the past 7 days, how often have you been bothered by emotional problems such as feeling anxious, depressed, or irritable? 3 3 4 4 4 3 3   In the past 7 days, how would you rate your fatigue on average? 3 3 3 3 3 3 3   In the past 7 days, how would you rate your pain on average, where 0 means no pain, and 10 means worst imaginable pain? 4 3 5 6 5 5 5   Global Mental Health Score 15 12 11 12 12 15 14   Global Physical Health Score 12 13 11 11 12 12 11   PROMIS TOTAL - SUBSCORES 27 25 22 23 24 27 25     PROMIS 10-Global Health  (only subscores and total score):       9/24/2022     8:54 AM 1/5/2023    10:08 AM 3/31/2023    11:04 AM 4/30/2023     2:36 PM 6/22/2023     5:20 AM 7/17/2023     9:10 AM 7/30/2023     5:28 PM   PROMIS-10 Scores Only   Global Mental Health Score 15 12 11 12 12 15 14   Global Physical Health Score 12 13 11 11 12 12 11   PROMIS TOTAL - SUBSCORES 27 25 22 23 24 27 25     [place] Suicide Severity Rating Scale (Short Version)      9/2/2022     7:31 PM 9/30/2022     5:03 PM 11/28/2022     9:49 PM 2/10/2023     9:55 AM 3/20/2023     3:28 PM 7/17/2023     9:09 AM 9/13/2023     6:03 AM   [place] Suicide Severity Rating (Short Version)   Over the past 2 weeks have you felt down, depressed, or hopeless? no no no  no  yes   Over the past 2 weeks have you had thoughts of killing yourself? no no no  no  no   Have you ever attempted to kill yourself? no no no  no  no   1. Wish to be Dead (Since Last Contact)    N  N    2. Non-Specific Active Suicidal Thoughts (Since Last Contact)    N  N    Actual Attempt (Since Last Contact)    N  N    Has subject engaged in non-suicidal self-injurious behavior? (Since Last Contact)    N  N    Interrupted Attempts (Since Last Contact)    N  N    Aborted or Self-Interrupted Attempt (Since Last Contact)    N  N    Preparatory Acts or Behavior (Since Last Contact)    N  N    Suicide (Since Last Contact)    N  N    Calculated C-SSRS Risk Score (Since Last Contact)    No Risk Indicated  No Risk Indicated          ASSESSMENT: Current Emotional / Mental Status (status of significant symptoms):   Risk status (Self / Other harm or suicidal ideation)   Patient denies current fears or concerns for personal safety.   Patient denies current or recent suicidal ideation or behaviors.   Patient denies current or recent homicidal ideation or behaviors.   Patient denies current or recent self injurious behavior or ideation.   Patient denies other safety concerns.   Patient reports there has been no change in  "risk factors since their last session.     Patient reports there has been no change in protective factors since their last session.     Recommended that patient call 911 or go to the local ED should there be a change in any of these risk factors.  Call [phone] if experiencing SI     Appearance:   Appropriate    Eye Contact:   Good    Psychomotor Behavior: Normal    Attitude:   Cooperative    Orientation:   All   Speech    Rate / Production: Normal     Volume:  Normal    Mood:    Normal   Affect:    Appropriate    Thought Content:  Clear    Thought Form:  Coherent  Logical    Insight:    Good      Medication Review:   No changes to current psychiatric medication(s)     Medication Compliance:   Yes     Changes in Health Issues:   None reported     Chemical Use Review:   Substance Use: Chemical use reviewed, no active concerns identified      Tobacco Use: No current tobacco use.      Diagnosis:  1. ROCKY (generalized anxiety disorder)    2. Grief reaction      Collateral Reports Completed:   Routed note to PCP    PLAN: (Patient Tasks / Therapist Tasks / Other):   Patient will reach out to friends that understand her when she feels sad  Patient will continue to practice the guided meditations from the \" self- compassion\"  By Radha Biswas 's web site.    Patient will use GLAD to reflect on her day  Patient will communicate with daughter about her wedding plans  Patient will start the memory project for her 's mom and her own father  Patient's next visit is on 10/02/2023    GERMAN Farmer             _____________________________________________________    Individual Treatment Plan    Patient's Name: Sherly Yeung  YOB: 1965    Date of Creation: 2/23/2023    Date Treatment Plan Last Reviewed/Revised: 7/31/2023    DSM5 Diagnoses: 296.32 (F33.1) Major Depressive Disorder, Recurrent Episode, Moderate With anxious distress or 300.02 (F41.1) Generalized Anxiety Disorder  Grief reaction " "[F43.21]    Psychosocial / Contextual Factors: grief: father recently passed away. Family dynamic- relationship with son, mom. Feeling overwhelmed.    PROMIS (reviewed every 90 days): 25    Referral / Collaboration:    Referral to another professional/service is not indicated at this time..    Anticipated number of session for this episode of care: 9-12 sessions  Anticipation frequency of session: Biweekly  Anticipated Duration of each session: 53 or more minutes  Treatment plan will be reviewed in 90 days or when goals have been changed.     MeasurableTreatment Goal(s) related to diagnosis / functional impairment(s)    Goal 1: Patient will Accept the loss of beloved one and return to stable level of functioning as evidenced by a higher level of functioning as a result of acceptance.   Redevelop a supportive social system and improve interpersonal relationships and Express unresolved emotions regarding loss which brings anxiety and depression mood.      I will know I've met my goal when I have more energy and  I am able to celebrate good life with my father Vs the sadness of losing him.Redevelop a supportive social system and improve interpersonal relationships\"    Objective #A (Patient Action)    Patient will increase understanding of steps in the grief process.  Status: Continued - Date(s): 7/31/2023    Intervention(s)  Therapist will teach emotional recognition/identification. : defining happiness in your own term without father .    Objective #B  Patient will identify at least 3 fears / thoughts that contribute to feeling anxious.  Status: Continued - Date(s): 7/31/2023    Intervention(s)  Therapist will teach thought challenging with CBT .    Objective #C  Patient will Identify negative self-talk and behaviors: challenge core beliefs, myths, and actions.  Status: Continued - Date(s): 7/31/2023    Intervention(s)  Therapist will provide space and time to process thoughts and feelings around current stressors. " provide support and coping skills to help move on in life .      Patient has reviewed and agreed to the above plan.    Erika Mello [facility]  July 31 2023    Answers for HPI/ROS submitted by the patient on 4/20/2023  If you checked off any problems, how difficult have these problems made it for you to do your work, take care of things at home, or get along with other people?: Somewhat difficult  PHQ9 TOTAL SCORE: 5    Answers for HPI/ROS submitted by the patient on 4/26/2023  If you checked off any problems, how difficult have these problems made it for you to do your work, take care of things at home, or get along with other people?: Somewhat difficult  PHQ9 TOTAL SCORE: 3    Answers for HPI/ROS submitted by the patient on 5/4/2023  If you checked off any problems, how difficult have these problems made it for you to do your work, take care of things at home, or get along with other people?: Somewhat difficult  PHQ9 TOTAL SCORE: 4  Answers for HPI/ROS submitted by the patient on 7/17/2023  If you checked off any problems, how difficult have these problems made it for you to do your work, take care of things at home, or get along with other people?: Somewhat difficult  PHQ9 TOTAL SCORE: 5    Answers submitted by the patient for this visit:  Patient Health Questionnaire (Submitted on 7/30/2023)  If you checked off any problems, how difficult have these problems made it for you to do your work, take care of things at home, or get along with other people?: Somewhat difficult  PHQ9 TOTAL SCORE: 5  Answers submitted by the patient for this visit:  Patient Health Questionnaire (Submitted on 8/20/2023)  If you checked off any problems, how difficult have these problems made it for you to do your work, take care of things at home, or get along with other people?: Somewhat difficult  PHQ9 TOTAL SCORE: 5  Answers submitted by the patient for this visit:  Patient Health Questionnaire (Submitted on 9/18/2023)  If you  checked off any problems, how difficult have these problems made it for you to do your work, take care of things at home, or get along with other people?: Somewhat difficult  PHQ9 TOTAL SCORE: 4

## 2023-09-26 ENCOUNTER — ANCILLARY PROCEDURE (OUTPATIENT)
Dept: GENERAL RADIOLOGY | Facility: CLINIC | Age: 58
End: 2023-09-26
Attending: INTERNAL MEDICINE
Payer: MEDICARE

## 2023-09-26 ENCOUNTER — OFFICE VISIT (OUTPATIENT)
Dept: INTERNAL MEDICINE | Facility: CLINIC | Age: 58
End: 2023-09-26
Payer: MEDICARE

## 2023-09-26 VITALS
BODY MASS INDEX: 23.91 KG/M2 | OXYGEN SATURATION: 99 % | HEIGHT: 63 IN | TEMPERATURE: 98.5 F | HEART RATE: 86 BPM | SYSTOLIC BLOOD PRESSURE: 128 MMHG | DIASTOLIC BLOOD PRESSURE: 86 MMHG

## 2023-09-26 DIAGNOSIS — R06.9 BREATHING SOUNDS, ABNORMAL: ICD-10-CM

## 2023-09-26 DIAGNOSIS — B96.89 ACUTE BACTERIAL SINUSITIS: Primary | ICD-10-CM

## 2023-09-26 DIAGNOSIS — J01.90 ACUTE BACTERIAL SINUSITIS: Primary | ICD-10-CM

## 2023-09-26 PROCEDURE — 71046 X-RAY EXAM CHEST 2 VIEWS: CPT | Performed by: RADIOLOGY

## 2023-09-26 PROCEDURE — 99214 OFFICE O/P EST MOD 30 MIN: CPT | Mod: GC
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
## 2023-09-26 NOTE — NURSING NOTE
Sherly Yeung is a 57 year old female patient that presents today in clinic for the following:    Chief Complaint   Patient presents with    Hospital F/U     Still have a cough, phlegm, chest tightness, shortness of breath, stuffy nose     Disability form      The patient's allergies and medications were reviewed as noted. A set of vitals were recorded as noted without incident. The patient does not have any other questions for the provider.    Gloria Hagen, EMT at 2:30 PM on 9/26/2023

## 2023-10-02 ENCOUNTER — VIRTUAL VISIT (OUTPATIENT)
Dept: PSYCHOLOGY | Facility: CLINIC | Age: 58
End: 2023-10-02
Payer: MEDICARE

## 2023-10-02 DIAGNOSIS — F43.21 GRIEF REACTION: ICD-10-CM

## 2023-10-02 DIAGNOSIS — F33.0 MDD (MAJOR DEPRESSIVE DISORDER), RECURRENT EPISODE, MILD (H): Primary | ICD-10-CM

## 2023-10-02 PROCEDURE — 90837 PSYTX W PT 60 MINUTES: CPT | Mod: VID | Performed by: SOCIAL WORKER

## 2023-10-02 NOTE — PROGRESS NOTES
[facility]                                     Progress Note    Patient Name: Sherly Yeung  Date: 10/02/2023         Service Type: Individual      Session Start Time: 12:02 Session End Time: 12:56    Session Length: 54    Session #: 8    Attendees: Client    Service Modality:  Video Visit:      Provider verified identity through the following two step process.  Patient provided:  Patient is known previously to provider    Telemedicine Visit: The patient's condition can be safely assessed and treated via synchronous audio and visual telemedicine encounter.      Reason for Telemedicine Visit: Services only offered telehealth    Originating Site (Patient Location): Patient's home    Distant Site (Provider Location): Provider Remote Setting- Home Office    Consent:  The patient/guardian has verbally consented to: the potential risks and benefits of telemedicine (video visit) versus in person care; bill my insurance or make self-payment for services provided; and responsibility for payment of non-covered services.     Patient would like the video invitation sent by:  My Chart    Mode of Communication:  Video Conference via Amwell    Distant Location (Provider):  Off-site    As the provider I attest to compliance with applicable laws and regulations related to telemedicine.    DATA  Interactive Complexity: Yes, visit entailed Interactive Complexity evidenced by: another loss in the family making 2 in 3 weeks and 3 since Summer, needed more time to process her losses    Crisis: No      Progress Since Last Session (Related to Symptoms / Goals / Homework):   Symptoms: Improving managed anxiety, managed grief    Homework: Achieved / completed to satisfaction      Episode of Care Goals: Satisfactory progress - ACTION (Actively working towards change); Intervened by reinforcing change plan / affirming steps taken- it takes some time to achieve acceptance.      Current / Ongoing Stressors and Concerns:  Another family member passed away few days ago. This one is her 's uncle. This also was unexpected giving much more sadness to the family. Patient shared she notes it will takes some time to heal. Shared also some though time with her migraines. Wonders how not to get hard on self when she missed to do things around the house due to migraines and depression. Will be practicing thoughts challenging  the thoughts she can change as she own them from those negative ones that tend to lead her. Will keep up with her project to collect memories for her father. Her next visit is in 2 weeks.     Decrease frequency and intensity of feeling down, depressed, hopeless  Identify negative self-talk and behaviors: challenge core beliefs, myths, and actions  increase understanding of steps in the grief process      Intervention:    Grief processing: Actively listening to the patient's report about losing loved ones in a very short time.  Offered empathy and Provided room to process and for support.     CBT : Thoughts challenging with the 3 Cs    MI Intervention: Supported Autonomy, Collaboration, Evocation, Permission to raise concern or advise, and Open-ended questions     Change Talk Expressed by the Patient: Taking steps    Provider Response to Change Talk: E - Evoked more info from patient about behavior change, A - Affirmed patient's thoughts, decisions, or attempts at behavior change, R - Reflected patient's change talk, and S - Summarized patient's change talk statements     Grief processing: To process her loss and achieve acceptance.     Assessments completed prior to visit:2/23/2023    The following assessments were completed by patient for this visit:  PHQ2:       4/27/2022    10:07 AM 8/11/2020     9:37 AM 3/24/2020     3:46 PM 1/19/2012     2:24 PM   PHQ-2 ( 1999 [employer])   Q1: Little interest or pleasure in doing things 0 0 0 0   Q2: Feeling down, depressed or hopeless 0 0 0 1   PHQ-2 Score 0 0 0 1    PHQ-2 Total Score (12-17 Years)- Positive if 3 or more points; Administer PHQ-A if positive  0 0      PHQ9:       6/28/2023     6:02 AM 7/17/2023     6:35 AM 7/25/2023     6:27 AM 7/30/2023     5:26 PM 8/20/2023     2:37 PM 9/18/2023     7:28 AM 10/2/2023     9:54 AM   PHQ-9 SCORE   PHQ-9 Total Score MyChart 5 (Mild depression) 5 (Mild depression) 5 (Mild depression) 5 (Mild depression) 5 (Mild depression) 4 (Minimal depression) 5 (Mild depression)   PHQ-9 Total Score 5 5 5 5 5 4 5     GAD2:       6/22/2023     5:18 AM 7/7/2023    10:17 AM 7/17/2023     6:35 AM 7/25/2023     6:27 AM 8/20/2023     2:37 PM 9/18/2023     7:29 AM 9/28/2023     9:22 AM   ROCKY-2   Feeling nervous, anxious, or on edge 1 1 1 1    1 1 1 1   Not being able to stop or control worrying 0 0 0 0    0 0 0 0   ROCKY-2 Total Score 1 1 1 1    1 1 1 1     GAD7:       2/23/2023     5:18 PM 4/26/2023    11:04 AM 7/17/2023     9:05 AM 9/18/2023    12:06 PM   ROCKY-7 SCORE   Total Score 7 2 2 5     CAGE-AID:       2/9/2023    11:52 AM 7/17/2023     9:10 AM   CAGE-AID Total Score   Total Score 0 0   Total Score MyChart 0 (A total score of 2 or greater is considered clinically significant)      PROMIS 10-Global Health (all questions and answers displayed):       9/24/2022     8:54 AM 1/5/2023    10:08 AM 3/31/2023    11:04 AM 4/30/2023     2:36 PM 6/22/2023     5:20 AM 7/17/2023     9:10 AM 7/30/2023     5:28 PM   PROMIS 10   In general, would you say your health is: Good Good Good Good Good  Good   In general, would you say your quality of life is: Very good Good Good Good Good  Very good   In general, how would you rate your physical health? Good Good Good Good Good  Good   In general, how would you rate your mental health, including your mood and your ability to think? Very good Good Good Good Good  Good   In general, how would you rate your satisfaction with your social activities and relationships? Very good Good Good Very good Very good  Very good   In  general, please rate how well you carry out your usual social activities and roles Very good Fair Fair Good Good  Good   To what extent are you able to carry out your everyday physical activities such as walking, climbing stairs, carrying groceries, or moving a chair? Moderately Moderately A little A little Moderately  A little   In the past 7 days, how often have you been bothered by emotional problems such as feeling anxious, depressed, or irritable? Sometimes Sometimes Often Often Often  Sometimes   In the past 7 days, how would you rate your fatigue on average? Moderate Moderate Moderate Moderate Moderate  Moderate   In the past 7 days, how would you rate your pain on average, where 0 means no pain, and 10 means worst imaginable pain? 4 3 5 6 5  5   In general, would you say your health is: 3 3 3 3 3 3 3   In general, would you say your quality of life is: 4 3 3 3 3 4 4   In general, how would you rate your physical health? 3 3 3 3 3 3 3   In general, how would you rate your mental health, including your mood and your ability to think? 4 3 3 3 3 3 3   In general, how would you rate your satisfaction with your social activities and relationships? 4 3 3 4 4 5 4   In general, please rate how well you carry out your usual social activities and roles. (This includes activities at home, at work and in your community, and responsibilities as a parent, child, spouse, employee, friend, etc.) 4 2 2 3 3 3 3   To what extent are you able to carry out your everyday physical activities such as walking, climbing stairs, carrying groceries, or moving a chair? 3 3 2 2 3 3 2   In the past 7 days, how often have you been bothered by emotional problems such as feeling anxious, depressed, or irritable? 3 3 4 4 4 3 3   In the past 7 days, how would you rate your fatigue on average? 3 3 3 3 3 3 3   In the past 7 days, how would you rate your pain on average, where 0 means no pain, and 10 means worst imaginable pain? 4 3 5 6 5 5 5    Global Mental Health Score 15 12 11 12 12 15 14   Global Physical Health Score 12 13 11 11 12 12 11   PROMIS TOTAL - SUBSCORES 27 25 22 23 24 27 25     PROMIS 10-Global Health (only subscores and total score):       9/24/2022     8:54 AM 1/5/2023    10:08 AM 3/31/2023    11:04 AM 4/30/2023     2:36 PM 6/22/2023     5:20 AM 7/17/2023     9:10 AM 7/30/2023     5:28 PM   PROMIS-10 Scores Only   Global Mental Health Score 15 12 11 12 12 15 14   Global Physical Health Score 12 13 11 11 12 12 11   PROMIS TOTAL - SUBSCORES 27 25 22 23 24 27 25     [place] Suicide Severity Rating Scale (Short Version)      9/2/2022     7:31 PM 9/30/2022     5:03 PM 11/28/2022     9:49 PM 2/10/2023     9:55 AM 3/20/2023     3:28 PM 7/17/2023     9:09 AM 9/13/2023     6:03 AM   [place] Suicide Severity Rating (Short Version)   Over the past 2 weeks have you felt down, depressed, or hopeless? no no no  no  yes   Over the past 2 weeks have you had thoughts of killing yourself? no no no  no  no   Have you ever attempted to kill yourself? no no no  no  no   1. Wish to be Dead (Since Last Contact)    N  N    2. Non-Specific Active Suicidal Thoughts (Since Last Contact)    N  N    Actual Attempt (Since Last Contact)    N  N    Has subject engaged in non-suicidal self-injurious behavior? (Since Last Contact)    N  N    Interrupted Attempts (Since Last Contact)    N  N    Aborted or Self-Interrupted Attempt (Since Last Contact)    N  N    Preparatory Acts or Behavior (Since Last Contact)    N  N    Suicide (Since Last Contact)    N  N    Calculated C-SSRS Risk Score (Since Last Contact)    No Risk Indicated  No Risk Indicated          ASSESSMENT: Current Emotional / Mental Status (status of significant symptoms):   Risk status (Self / Other harm or suicidal ideation)   Patient denies current fears or concerns for personal safety.   Patient denies current or recent suicidal ideation or behaviors.   Patient denies current or recent homicidal  "ideation or behaviors.   Patient denies current or recent self injurious behavior or ideation.   Patient denies other safety concerns.   Patient reports there has been no change in risk factors since their last session.     Patient reports there has been no change in protective factors since their last session.     Recommended that patient call 911 or go to the local ED should there be a change in any of these risk factors.  Call [phone] if experiencing SI     Appearance:   Appropriate    Eye Contact:   Good    Psychomotor Behavior: Normal    Attitude:   Cooperative    Orientation:   All   Speech    Rate / Production: Normal     Volume:  Normal    Mood:    Sad    Affect:    Appropriate    Thought Content:  Clear    Thought Form:  Coherent  Logical    Insight:    Good      Medication Review:   No changes to current psychiatric medication(s)     Medication Compliance:   Yes     Changes in Health Issues:   None reported     Chemical Use Review:   Substance Use: Chemical use reviewed, no active concerns identified      Tobacco Use: No current tobacco use.      Diagnosis:  1. MDD (major depressive disorder), recurrent episode, mild (H24)    2. Grief reaction      Collateral Reports Completed:   Routed note to PCP    PLAN: (Patient Tasks / Therapist Tasks / Other):   Patient will reach out to friends that understand her when she feels sad  Patient will continue to practice the guided meditations from the \" self- compassion\"  By Radha Biswas 's web site.    Patient will use GLAD to reflect on her day  Patient will try mindfulness exercises to feel grounded.   Patient will continue with  the memory project for her father  Patient's next visit is on 10/16/2023    GERMAN Farmer             _____________________________________________________    Individual Treatment Plan    Patient's Name: Sherly Yeung  YOB: 1965    Date of Creation: 2/23/2023    Date Treatment Plan Last Reviewed/Revised: " "7/31/2023    DSM5 Diagnoses: 296.32 (F33.1) Major Depressive Disorder, Recurrent Episode, Moderate With anxious distress or 300.02 (F41.1) Generalized Anxiety Disorder  Grief reaction [F43.21]    Psychosocial / Contextual Factors: grief: father recently passed away. Family dynamic- relationship with son, mom. Feeling overwhelmed.    PROMIS (reviewed every 90 days): 25    Referral / Collaboration:    Referral to another professional/service is not indicated at this time..    Anticipated number of session for this episode of care: 9-12 sessions  Anticipation frequency of session: Biweekly  Anticipated Duration of each session: 53 or more minutes  Treatment plan will be reviewed in 90 days or when goals have been changed.     MeasurableTreatment Goal(s) related to diagnosis / functional impairment(s)    Goal 1: Patient will Accept the loss of beloved one and return to stable level of functioning as evidenced by a higher level of functioning as a result of acceptance.   Redevelop a supportive social system and improve interpersonal relationships and Express unresolved emotions regarding loss which brings anxiety and depression mood.      I will know I've met my goal when I have more energy and  I am able to celebrate good life with my father Vs the sadness of losing him.Redevelop a supportive social system and improve interpersonal relationships\"    Objective #A (Patient Action)    Patient will increase understanding of steps in the grief process.  Status: Continued - Date(s): 7/31/2023    Intervention(s)  Therapist will teach emotional recognition/identification. : defining happiness in your own term without father .    Objective #B  Patient will identify at least 3 fears / thoughts that contribute to feeling anxious.  Status: Continued - Date(s): 7/31/2023    Intervention(s)  Therapist will teach thought challenging with CBT .    Objective #C  Patient will Identify negative self-talk and behaviors: challenge core " beliefs, myths, and actions.  Status: Continued - Date(s): 7/31/2023    Intervention(s)  Therapist will provide space and time to process thoughts and feelings around current stressors. provide support and coping skills to help move on in life .      Patient has reviewed and agreed to the above plan.    Ashlyyasmin GERMAN Mello  July 31 2023    Answers for HPI/ROS submitted by the patient on 4/20/2023  If you checked off any problems, how difficult have these problems made it for you to do your work, take care of things at home, or get along with other people?: Somewhat difficult  PHQ9 TOTAL SCORE: 5    Answers for HPI/ROS submitted by the patient on 4/26/2023  If you checked off any problems, how difficult have these problems made it for you to do your work, take care of things at home, or get along with other people?: Somewhat difficult  PHQ9 TOTAL SCORE: 3    Answers for HPI/ROS submitted by the patient on 5/4/2023  If you checked off any problems, how difficult have these problems made it for you to do your work, take care of things at home, or get along with other people?: Somewhat difficult  PHQ9 TOTAL SCORE: 4  Answers for HPI/ROS submitted by the patient on 7/17/2023  If you checked off any problems, how difficult have these problems made it for you to do your work, take care of things at home, or get along with other people?: Somewhat difficult  PHQ9 TOTAL SCORE: 5    Answers submitted by the patient for this visit:  Patient Health Questionnaire (Submitted on 7/30/2023)  If you checked off any problems, how difficult have these problems made it for you to do your work, take care of things at home, or get along with other people?: Somewhat difficult  PHQ9 TOTAL SCORE: 5  Answers submitted by the patient for this visit:  Patient Health Questionnaire (Submitted on 8/20/2023)  If you checked off any problems, how difficult have these problems made it for you to do your work, take care of things at home, or get  along with other people?: Somewhat difficult  PHQ9 TOTAL SCORE: 5  Answers submitted by the patient for this visit:  Patient Health Questionnaire (Submitted on 9/18/2023)  If you checked off any problems, how difficult have these problems made it for you to do your work, take care of things at home, or get along with other people?: Somewhat difficult  PHQ9 TOTAL SCORE: 4  Answers submitted by the patient for this visit:  Patient Health Questionnaire (Submitted on 10/2/2023)  If you checked off any problems, how difficult have these problems made it for you to do your work, take care of things at home, or get along with other people?: Somewhat difficult  PHQ9 TOTAL SCORE: 5

## 2023-10-02 NOTE — PROCEDURES
[facility]                                     Progress Note    Patient Name: Sherly Yeung  Date: 10/02/2023         Service Type: Individual      Session Start Time: 12:02 Session End Time: 12:55    Session Length: 53    Session #: 8    Attendees: Client    Service Modality:  Video Visit:      Provider verified identity through the following two step process.  Patient provided:  Patient is known previously to provider    Telemedicine Visit: The patient's condition can be safely assessed and treated via synchronous audio and visual telemedicine encounter.      Reason for Telemedicine Visit: Services only offered telehealth    Originating Site (Patient Location): Patient's home    Distant Site (Provider Location): Provider Remote Setting- Home Office    Consent:  The patient/guardian has verbally consented to: the potential risks and benefits of telemedicine (video visit) versus in person care; bill my insurance or make self-payment for services provided; and responsibility for payment of non-covered services.     Patient would like the video invitation sent by:  My Chart    Mode of Communication:  Video Conference via Amwell    Distant Location (Provider):  Off-site    As the provider I attest to compliance with applicable laws and regulations related to telemedicine.    DATA  Interactive Complexity: Yes, visit entailed Interactive Complexity evidenced by: another loss on the family, needed more time to process her losses    Crisis: No      Progress Since Last Session (Related to Symptoms / Goals / Homework):   Symptoms: Improving managed anxiety, managed grief    Homework: Achieved / completed to satisfaction      Episode of Care Goals: Satisfactory progress - ACTION (Actively working towards change); Intervened by reinforcing change plan / affirming steps taken- it takes some time to achieve acceptance.      Current / Ongoing Stressors and Concerns: Patient has been experiencing deep loss. On  her father's 6th month anniversary last month, her 's mom passed away.  This has been tough on both and the rest of the family. 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 Will work on collecting some pictures from friends and family and make videos that show their lives.  Patient found this will help her to heal soon and to keep memories in the family. No other concerns were Id today. Her next visit is in 2 weeks.      Treatment Objective(s) Addressed in This Session:   Decrease frequency and intensity of feeling down, depressed, hopeless  Identify negative self-talk and behaviors: challenge core beliefs, myths, and actions  increase understanding of steps in the grief process      Intervention:     Situation      Automatic Thoughts  Cognitive Distortions    Feelings      Behavior      Questioning Thoughts        MI Intervention: Supported Autonomy, Collaboration, Evocation, Permission to raise concern or advise, and Open-ended questions     Change Talk Expressed by the Patient: Taking steps    Provider Response to Change Talk: E - Evoked more info from patient about behavior change, A - Affirmed patient's thoughts, decisions, or attempts at behavior change, R - Reflected patient's change talk, and S - Summarized patient's change talk statements     Grief processing: To process her loss and achieve acceptance.     Assessments completed prior to visit:2/23/2023    The following assessments were completed by patient for this visit:  PHQ2:       4/27/2022    10:07 AM 8/11/2020     9:37 AM 3/24/2020     3:46 PM 1/19/2012     2:24 PM   PHQ-2 ( 1999 [employer])   Q1: Little interest or pleasure in doing things 0 0 0 0   Q2: Feeling down, depressed or hopeless 0 0 0 1   PHQ-2 Score 0 0 0 1   PHQ-2 Total Score (12-17 Years)- Positive if 3 or more points; Administer PHQ-A if positive  0 0      PHQ9:       6/28/2023     6:02 AM 7/17/2023     6:35 AM 7/25/2023     6:27  AM 7/30/2023     5:26 PM 8/20/2023     2:37 PM 9/18/2023     7:28 AM 10/2/2023     9:54 AM   PHQ-9 SCORE   PHQ-9 Total Score MyChart 5 (Mild depression) 5 (Mild depression) 5 (Mild depression) 5 (Mild depression) 5 (Mild depression) 4 (Minimal depression) 5 (Mild depression)   PHQ-9 Total Score 5 5 5 5 5 4 5     GAD2:       6/22/2023     5:18 AM 7/7/2023    10:17 AM 7/17/2023     6:35 AM 7/25/2023     6:27 AM 8/20/2023     2:37 PM 9/18/2023     7:29 AM 9/28/2023     9:22 AM   ROCKY-2   Feeling nervous, anxious, or on edge 1 1 1 1    1 1 1 1   Not being able to stop or control worrying 0 0 0 0    0 0 0 0   ROCKY-2 Total Score 1 1 1 1    1 1 1 1     GAD7:       2/23/2023     5:18 PM 4/26/2023    11:04 AM 7/17/2023     9:05 AM 9/18/2023    12:06 PM   ROCKY-7 SCORE   Total Score 7 2 2 5     CAGE-AID:       2/9/2023    11:52 AM 7/17/2023     9:10 AM   CAGE-AID Total Score   Total Score 0 0   Total Score MyChart 0 (A total score of 2 or greater is considered clinically significant)      PROMIS 10-Global Health (all questions and answers displayed):       9/24/2022     8:54 AM 1/5/2023    10:08 AM 3/31/2023    11:04 AM 4/30/2023     2:36 PM 6/22/2023     5:20 AM 7/17/2023     9:10 AM 7/30/2023     5:28 PM   PROMIS 10   In general, would you say your health is: Good Good Good Good Good  Good   In general, would you say your quality of life is: Very good Good Good Good Good  Very good   In general, how would you rate your physical health? Good Good Good Good Good  Good   In general, how would you rate your mental health, including your mood and your ability to think? Very good Good Good Good Good  Good   In general, how would you rate your satisfaction with your social activities and relationships? Very good Good Good Very good Very good  Very good   In general, please rate how well you carry out your usual social activities and roles Very good Fair Fair Good Good  Good   To what extent are you able to carry out your everyday  physical activities such as walking, climbing stairs, carrying groceries, or moving a chair? Moderately Moderately A little A little Moderately  A little   In the past 7 days, how often have you been bothered by emotional problems such as feeling anxious, depressed, or irritable? Sometimes Sometimes Often Often Often  Sometimes   In the past 7 days, how would you rate your fatigue on average? Moderate Moderate Moderate Moderate Moderate  Moderate   In the past 7 days, how would you rate your pain on average, where 0 means no pain, and 10 means worst imaginable pain? 4 3 5 6 5  5   In general, would you say your health is: 3 3 3 3 3 3 3   In general, would you say your quality of life is: 4 3 3 3 3 4 4   In general, how would you rate your physical health? 3 3 3 3 3 3 3   In general, how would you rate your mental health, including your mood and your ability to think? 4 3 3 3 3 3 3   In general, how would you rate your satisfaction with your social activities and relationships? 4 3 3 4 4 5 4   In general, please rate how well you carry out your usual social activities and roles. (This includes activities at home, at work and in your community, and responsibilities as a parent, child, spouse, employee, friend, etc.) 4 2 2 3 3 3 3   To what extent are you able to carry out your everyday physical activities such as walking, climbing stairs, carrying groceries, or moving a chair? 3 3 2 2 3 3 2   In the past 7 days, how often have you been bothered by emotional problems such as feeling anxious, depressed, or irritable? 3 3 4 4 4 3 3   In the past 7 days, how would you rate your fatigue on average? 3 3 3 3 3 3 3   In the past 7 days, how would you rate your pain on average, where 0 means no pain, and 10 means worst imaginable pain? 4 3 5 6 5 5 5   Global Mental Health Score 15 12 11 12 12 15 14   Global Physical Health Score 12 13 11 11 12 12 11   PROMIS TOTAL - SUBSCORES 27 25 22 23 24 27 25     PROMIS 10-Global Health  (only subscores and total score):       9/24/2022     8:54 AM 1/5/2023    10:08 AM 3/31/2023    11:04 AM 4/30/2023     2:36 PM 6/22/2023     5:20 AM 7/17/2023     9:10 AM 7/30/2023     5:28 PM   PROMIS-10 Scores Only   Global Mental Health Score 15 12 11 12 12 15 14   Global Physical Health Score 12 13 11 11 12 12 11   PROMIS TOTAL - SUBSCORES 27 25 22 23 24 27 25     [place] Suicide Severity Rating Scale (Short Version)      9/2/2022     7:31 PM 9/30/2022     5:03 PM 11/28/2022     9:49 PM 2/10/2023     9:55 AM 3/20/2023     3:28 PM 7/17/2023     9:09 AM 9/13/2023     6:03 AM   [place] Suicide Severity Rating (Short Version)   Over the past 2 weeks have you felt down, depressed, or hopeless? no no no  no  yes   Over the past 2 weeks have you had thoughts of killing yourself? no no no  no  no   Have you ever attempted to kill yourself? no no no  no  no   1. Wish to be Dead (Since Last Contact)    N  N    2. Non-Specific Active Suicidal Thoughts (Since Last Contact)    N  N    Actual Attempt (Since Last Contact)    N  N    Has subject engaged in non-suicidal self-injurious behavior? (Since Last Contact)    N  N    Interrupted Attempts (Since Last Contact)    N  N    Aborted or Self-Interrupted Attempt (Since Last Contact)    N  N    Preparatory Acts or Behavior (Since Last Contact)    N  N    Suicide (Since Last Contact)    N  N    Calculated C-SSRS Risk Score (Since Last Contact)    No Risk Indicated  No Risk Indicated          ASSESSMENT: Current Emotional / Mental Status (status of significant symptoms):   Risk status (Self / Other harm or suicidal ideation)   Patient denies current fears or concerns for personal safety.   Patient denies current or recent suicidal ideation or behaviors.   Patient denies current or recent homicidal ideation or behaviors.   Patient denies current or recent self injurious behavior or ideation.   Patient denies other safety concerns.   Patient reports there has been no change in  risk factors since their last session.     Patient reports there has been no change in protective factors since their last session.     Recommended that patient call 911 or go to the local ED should there be a change in any of these risk factors.  Call [phone] if experiencing SI     Appearance:   Appropriate    Eye Contact:   Good    Psychomotor Behavior: Normal    Attitude:   Cooperative    Orientation:   All   Speech    Rate / Production: Normal     Volume:  Normal    Mood:    Normal   Affect:    Appropriate    Thought Content:  Clear    Thought Form:  Coherent  Logical    Insight:    Good      Medication Review:   No changes to current psychiatric medication(s)     Medication Compliance:   Yes     Changes in Health Issues:   None reported     Chemical Use Review:   Substance Use: Chemical use reviewed, no active concerns identified      Tobacco Use: No current tobacco use.      Diagnosis:  1. MDD (major depressive disorder), recurrent episode, mild (H24)    2. Grief reaction      Collateral Reports Completed:   Routed note to PCP    PLAN: (Patient Tasks / Therapist Tasks / Other):   Patient will continue to work on her memory collection for her father each week  Patient will participate in her 's uncle's celebration of life  Patient's next visit is on 10/16/2023    GERMAN Farmer             _____________________________________________________    Individual Treatment Plan    Patient's Name: Sherly Yeung  YOB: 1965    Date of Creation: 2/23/2023    Date Treatment Plan Last Reviewed/Revised: 7/31/2023    DSM5 Diagnoses: 296.32 (F33.1) Major Depressive Disorder, Recurrent Episode, Moderate With anxious distress or 300.02 (F41.1) Generalized Anxiety Disorder  Grief reaction [F43.21]    Psychosocial / Contextual Factors: grief: father recently passed away. Family dynamic- relationship with son, mom. Feeling overwhelmed.    PROMIS (reviewed every 90 days): 25    Referral /  "Collaboration:    Referral to another professional/service is not indicated at this time..    Anticipated number of session for this episode of care: 9-12 sessions  Anticipation frequency of session: Biweekly  Anticipated Duration of each session: 53 or more minutes  Treatment plan will be reviewed in 90 days or when goals have been changed.     MeasurableTreatment Goal(s) related to diagnosis / functional impairment(s)    Goal 1: Patient will Accept the loss of beloved one and return to stable level of functioning as evidenced by a higher level of functioning as a result of acceptance.   Redevelop a supportive social system and improve interpersonal relationships and Express unresolved emotions regarding loss which brings anxiety and depression mood.      I will know I've met my goal when I have more energy and  I am able to celebrate good life with my father Vs the sadness of losing him.Redevelop a supportive social system and improve interpersonal relationships\"    Objective #A (Patient Action)    Patient will increase understanding of steps in the grief process.  Status: Continued - Date(s): 7/31/2023    Intervention(s)  Therapist will teach emotional recognition/identification. : defining happiness in your own term without father .    Objective #B  Patient will identify at least 3 fears / thoughts that contribute to feeling anxious.  Status: Continued - Date(s): 7/31/2023    Intervention(s)  Therapist will teach thought challenging with CBT .    Objective #C  Patient will Identify negative self-talk and behaviors: challenge core beliefs, myths, and actions.  Status: Continued - Date(s): 7/31/2023    Intervention(s)  Therapist will provide space and time to process thoughts and feelings around current stressors. provide support and coping skills to help move on in life .      Patient has reviewed and agreed to the above plan.    GERMAN Farmer  July 31 2023    Answers for HPI/ROS submitted by the " patient on 4/20/2023  If you checked off any problems, how difficult have these problems made it for you to do your work, take care of things at home, or get along with other people?: Somewhat difficult  PHQ9 TOTAL SCORE: 5    Answers for HPI/ROS submitted by the patient on 4/26/2023  If you checked off any problems, how difficult have these problems made it for you to do your work, take care of things at home, or get along with other people?: Somewhat difficult  PHQ9 TOTAL SCORE: 3    Answers for HPI/ROS submitted by the patient on 5/4/2023  If you checked off any problems, how difficult have these problems made it for you to do your work, take care of things at home, or get along with other people?: Somewhat difficult  PHQ9 TOTAL SCORE: 4  Answers for HPI/ROS submitted by the patient on 7/17/2023  If you checked off any problems, how difficult have these problems made it for you to do your work, take care of things at home, or get along with other people?: Somewhat difficult  PHQ9 TOTAL SCORE: 5    Answers submitted by the patient for this visit:  Patient Health Questionnaire (Submitted on 7/30/2023)  If you checked off any problems, how difficult have these problems made it for you to do your work, take care of things at home, or get along with other people?: Somewhat difficult  PHQ9 TOTAL SCORE: 5  Answers submitted by the patient for this visit:  Patient Health Questionnaire (Submitted on 8/20/2023)  If you checked off any problems, how difficult have these problems made it for you to do your work, take care of things at home, or get along with other people?: Somewhat difficult  PHQ9 TOTAL SCORE: 5  Answers submitted by the patient for this visit:  Patient Health Questionnaire (Submitted on 9/18/2023)  If you checked off any problems, how difficult have these problems made it for you to do your work, take care of things at home, or get along with other people?: Somewhat difficult  PHQ9 TOTAL SCORE: 4

## 2023-10-09 ENCOUNTER — TELEPHONE (OUTPATIENT)
Dept: PULMONOLOGY | Facility: CLINIC | Age: 58
End: 2023-10-09
Payer: MEDICARE

## 2023-10-09 NOTE — TELEPHONE ENCOUNTER
Called and spoke to Jessy ( [phone]) and she was sending a message to have them call us back to see if they have the results from oximetry results.      Beverley Novoa   Clinic Station    [facility]  [phone]

## 2023-10-09 NOTE — TELEPHONE ENCOUNTER
Patient sent message to find out results from overnight oximetry .   Per patient : Completed 9/19/23    Can you please call  overnight lab ([phone]) and see if they have this and request it be faxed to clinic in [place]?     Thank you,   Margareth RANDHAWA RN  [facility]

## 2023-10-09 NOTE — TELEPHONE ENCOUNTER
Fax coming to [facility] with overnight ox. results.     Margareth RANDHAWA RN  [facility]

## 2023-10-10 ASSESSMENT — ANXIETY QUESTIONNAIRES
7. FEELING AFRAID AS IF SOMETHING AWFUL MIGHT HAPPEN: NOT AT ALL
3. WORRYING TOO MUCH ABOUT DIFFERENT THINGS: SEVERAL DAYS
1. FEELING NERVOUS, ANXIOUS, OR ON EDGE: MORE THAN HALF THE DAYS
2. NOT BEING ABLE TO STOP OR CONTROL WORRYING: SEVERAL DAYS
4. TROUBLE RELAXING: SEVERAL DAYS
GAD7 TOTAL SCORE: 5
IF YOU CHECKED OFF ANY PROBLEMS ON THIS QUESTIONNAIRE, HOW DIFFICULT HAVE THESE PROBLEMS MADE IT FOR YOU TO DO YOUR WORK, TAKE CARE OF THINGS AT HOME, OR GET ALONG WITH OTHER PEOPLE: SOMEWHAT DIFFICULT
6. BECOMING EASILY ANNOYED OR IRRITABLE: NOT AT ALL
5. BEING SO RESTLESS THAT IT IS HARD TO SIT STILL: NOT AT ALL
GAD7 TOTAL SCORE: 5

## 2023-10-11 ENCOUNTER — VIRTUAL VISIT (OUTPATIENT)
Dept: PSYCHOLOGY | Facility: CLINIC | Age: 58
End: 2023-10-11
Payer: MEDICARE

## 2023-10-11 DIAGNOSIS — F33.0 MDD (MAJOR DEPRESSIVE DISORDER), RECURRENT EPISODE, MILD (H): Primary | ICD-10-CM

## 2023-10-11 PROCEDURE — 90837 PSYTX W PT 60 MINUTES: CPT | Mod: VID | Performed by: SOCIAL WORKER

## 2023-10-11 NOTE — PROGRESS NOTES
[facility]                                     Progress Note    Patient Name: Sherly Yeung  Date: 10/11/2023         Service Type: Individual      Session Start Time: 13:00 Session End Time: 13:53    Session Length: 53    Session #: 10    Attendees: Client    Service Modality:  Video Visit:      Provider verified identity through the following two step process.  Patient provided:  Patient is known previously to provider    Telemedicine Visit: The patient's condition can be safely assessed and treated via synchronous audio and visual telemedicine encounter.      Reason for Telemedicine Visit: Services only offered telehealth    Originating Site (Patient Location): Patient's home    Distant Site (Provider Location): Provider Remote Setting- Home Office    Consent:  The patient/guardian has verbally consented to: the potential risks and benefits of telemedicine (video visit) versus in person care; bill my insurance or make self-payment for services provided; and responsibility for payment of non-covered services.     Patient would like the video invitation sent by:  My Chart    Mode of Communication:  Video Conference via [facility]well    Distant Location (Provider):  Off-site    As the provider I attest to compliance with applicable laws and regulations related to telemedicine.    DATA  Interactive Complexity: Yes, visit entailed Interactive Complexity evidenced by: Patient requested to be seen sooner. Has family issues that needed to be processed.     Crisis: No      Progress Since Last Session (Related to Symptoms / Goals / Homework):   Symptoms: Worsening depression    Homework: Partially completed      Episode of Care Goals: Satisfactory progress - ACTION (Actively working towards change); Intervened by reinforcing change plan / affirming steps taken- it takes some time to achieve acceptance.      Current / Ongoing Stressors and Concerns: Patient wanted to be seen sooner. Some family issues related  to her 31 year old son and what happened over the weekend have been affecting her mental health. 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 Wants to continue her scheduled appt which is on Monday. Will follow up with the recommendation discussed today.     Decrease frequency and intensity of feeling down, depressed, hopeless  Identify negative self-talk and behaviors: challenge core beliefs, myths, and actions  increase understanding of steps in the grief process      Intervention:    Grief processing: Actively listening to the patient's report about losing loved ones in a very short time.  Offered empathy and Provided room to process and for support.     CBT : Thoughts challenging with the 3 Cs    MI Intervention: Supported Autonomy, Collaboration, Evocation, Permission to raise concern or advise, and Open-ended questions     Change Talk Expressed by the Patient: Taking steps    Provider Response to Change Talk: E - Evoked more info from patient about behavior change, A - Affirmed patient's thoughts, decisions, or attempts at behavior change, R - Reflected patient's change talk, and S - Summarized patient's change talk statements     Grief processing: To process her loss and achieve acceptance.     Assessments completed prior to visit:2/23/2023    The following assessments were completed by patient for this visit:  PHQ2:       4/27/2022    10:07 AM 8/11/2020     9:37 AM 3/24/2020     3:46 PM 1/19/2012     2:24 PM   PHQ-2 ( 1999 [employer])   Q1: Little interest or pleasure in doing things 0 0 0 0   Q2: Feeling down, depressed or hopeless 0 0 0 1   PHQ-2 Score 0 0 0 1   PHQ-2 Total Score (12-17 Years)- Positive if 3 or more points; Administer PHQ-A if positive  0 0      PHQ9:       6/28/2023     6:02 AM 7/17/2023     6:35 AM 7/25/2023     6:27 AM 7/30/2023     5:26 PM 8/20/2023     2:37 PM 9/18/2023     7:28 AM 10/2/2023     9:54 AM   PHQ-9 SCORE   PHQ-9 Total Score MyChart 5 (Mild depression) 5  (Mild depression) 5 (Mild depression) 5 (Mild depression) 5 (Mild depression) 4 (Minimal depression) 5 (Mild depression)   PHQ-9 Total Score 5 5 5 5 5 4 5     GAD2:       7/7/2023    10:17 AM 7/17/2023     6:35 AM 7/25/2023     6:27 AM 8/20/2023     2:37 PM 9/18/2023     7:29 AM 9/28/2023     9:22 AM 10/10/2023     1:58 PM   ROCKY-2   Feeling nervous, anxious, or on edge 1 1 1    1 1 1 1 2   Not being able to stop or control worrying 0 0 0    0 0 0 0 1   ROCKY-2 Total Score 1 1 1    1 1 1 1 3     GAD7:       2/23/2023     5:18 PM 4/26/2023    11:04 AM 7/17/2023     9:05 AM 9/18/2023    12:06 PM 10/10/2023     1:58 PM   ROCKY-7 SCORE   Total Score     5 (mild anxiety)   Total Score 7 2 2 5 5     CAGE-AID:       2/9/2023    11:52 AM 7/17/2023     9:10 AM   CAGE-AID Total Score   Total Score 0 0   Total Score MyChart 0 (A total score of 2 or greater is considered clinically significant)      PROMIS 10-Global Health (all questions and answers displayed):       9/24/2022     8:54 AM 1/5/2023    10:08 AM 3/31/2023    11:04 AM 4/30/2023     2:36 PM 6/22/2023     5:20 AM 7/17/2023     9:10 AM 7/30/2023     5:28 PM   PROMIS 10   In general, would you say your health is: Good Good Good Good Good  Good   In general, would you say your quality of life is: Very good Good Good Good Good  Very good   In general, how would you rate your physical health? Good Good Good Good Good  Good   In general, how would you rate your mental health, including your mood and your ability to think? Very good Good Good Good Good  Good   In general, how would you rate your satisfaction with your social activities and relationships? Very good Good Good Very good Very good  Very good   In general, please rate how well you carry out your usual social activities and roles Very good Fair Fair Good Good  Good   To what extent are you able to carry out your everyday physical activities such as walking, climbing stairs, carrying groceries, or moving a chair?  Moderately Moderately A little A little Moderately  A little   In the past 7 days, how often have you been bothered by emotional problems such as feeling anxious, depressed, or irritable? Sometimes Sometimes Often Often Often  Sometimes   In the past 7 days, how would you rate your fatigue on average? Moderate Moderate Moderate Moderate Moderate  Moderate   In the past 7 days, how would you rate your pain on average, where 0 means no pain, and 10 means worst imaginable pain? 4 3 5 6 5  5   In general, would you say your health is: 3 3 3 3 3 3 3   In general, would you say your quality of life is: 4 3 3 3 3 4 4   In general, how would you rate your physical health? 3 3 3 3 3 3 3   In general, how would you rate your mental health, including your mood and your ability to think? 4 3 3 3 3 3 3   In general, how would you rate your satisfaction with your social activities and relationships? 4 3 3 4 4 5 4   In general, please rate how well you carry out your usual social activities and roles. (This includes activities at home, at work and in your community, and responsibilities as a parent, child, spouse, employee, friend, etc.) 4 2 2 3 3 3 3   To what extent are you able to carry out your everyday physical activities such as walking, climbing stairs, carrying groceries, or moving a chair? 3 3 2 2 3 3 2   In the past 7 days, how often have you been bothered by emotional problems such as feeling anxious, depressed, or irritable? 3 3 4 4 4 3 3   In the past 7 days, how would you rate your fatigue on average? 3 3 3 3 3 3 3   In the past 7 days, how would you rate your pain on average, where 0 means no pain, and 10 means worst imaginable pain? 4 3 5 6 5 5 5   Global Mental Health Score 15 12 11 12 12 15 14   Global Physical Health Score 12 13 11 11 12 12 11   PROMIS TOTAL - SUBSCORES 27 25 22 23 24 27 25     PROMIS 10-Global Health (only subscores and total score):       9/24/2022     8:54 AM 1/5/2023    10:08 AM 3/31/2023     11:04 AM 4/30/2023     2:36 PM 6/22/2023     5:20 AM 7/17/2023     9:10 AM 7/30/2023     5:28 PM   PROMIS-10 Scores Only   Global Mental Health Score 15 12 11 12 12 15 14   Global Physical Health Score 12 13 11 11 12 12 11   PROMIS TOTAL - SUBSCORES 27 25 22 23 24 27 25     [place] Suicide Severity Rating Scale (Short Version)      9/2/2022     7:31 PM 9/30/2022     5:03 PM 11/28/2022     9:49 PM 2/10/2023     9:55 AM 3/20/2023     3:28 PM 7/17/2023     9:09 AM 9/13/2023     6:03 AM   [place] Suicide Severity Rating (Short Version)   Over the past 2 weeks have you felt down, depressed, or hopeless? no no no  no  yes   Over the past 2 weeks have you had thoughts of killing yourself? no no no  no  no   Have you ever attempted to kill yourself? no no no  no  no   1. Wish to be Dead (Since Last Contact)    N  N    2. Non-Specific Active Suicidal Thoughts (Since Last Contact)    N  N    Actual Attempt (Since Last Contact)    N  N    Has subject engaged in non-suicidal self-injurious behavior? (Since Last Contact)    N  N    Interrupted Attempts (Since Last Contact)    N  N    Aborted or Self-Interrupted Attempt (Since Last Contact)    N  N    Preparatory Acts or Behavior (Since Last Contact)    N  N    Suicide (Since Last Contact)    N  N    Calculated C-SSRS Risk Score (Since Last Contact)    No Risk Indicated  No Risk Indicated          ASSESSMENT: Current Emotional / Mental Status (status of significant symptoms):   Risk status (Self / Other harm or suicidal ideation)   Patient denies current fears or concerns for personal safety.   Patient denies current or recent suicidal ideation or behaviors.   Patient denies current or recent homicidal ideation or behaviors.   Patient denies current or recent self injurious behavior or ideation.   Patient denies other safety concerns.   Patient reports there has been no change in risk factors since their last session.     Patient reports there has been no change in protective  factors since their last session.     Recommended that patient call 911 or go to the local ED should there be a change in any of these risk factors.  Call [phone] if experiencing SI     Appearance:   Appropriate    Eye Contact:   Good    Psychomotor Behavior: Normal    Attitude:   Cooperative    Orientation:   All   Speech    Rate / Production: Normal     Volume:  Normal    Mood:    Depressed  Sad    Affect:    Appropriate    Thought Content:  Clear    Thought Form:  Coherent  Logical    Insight:    Good      Medication Review:   No changes to current psychiatric medication(s)     Medication Compliance:   Yes     Changes in Health Issues:   None reported     Chemical Use Review:   Substance Use: Chemical use reviewed, no active concerns identified      Tobacco Use: No current tobacco use.      Diagnosis:  1. MDD (major depressive disorder), recurrent episode, mild (H24)      Collateral Reports Completed:   Not Applicable    PLAN: (Patient Tasks / Therapist Tasks / Other):   Patient will keep up with self care to feel better: call a friend to talk to when needed, take some walks to alleviate depressed mood  Patient's next visit is on 10/16/2023    GERMAN Farmer             _____________________________________________________    Individual Treatment Plan    Patient's Name: Sherly Yeung  YOB: 1965    Date of Creation: 2/23/2023    Date Treatment Plan Last Reviewed/Revised: 7/31/2023    DSM5 Diagnoses: 296.32 (F33.1) Major Depressive Disorder, Recurrent Episode, Moderate With anxious distress or 300.02 (F41.1) Generalized Anxiety Disorder  Grief reaction [F43.21]    Psychosocial / Contextual Factors: grief: father recently passed away. Family dynamic- relationship with son, mom. Feeling overwhelmed.    PROMIS (reviewed every 90 days): 25    Referral / Collaboration:    Referral to another professional/service is not indicated at this time..    Anticipated number of session for this episode of  "care: 9-12 sessions  Anticipation frequency of session: Biweekly  Anticipated Duration of each session: 53 or more minutes  Treatment plan will be reviewed in 90 days or when goals have been changed.     MeasurableTreatment Goal(s) related to diagnosis / functional impairment(s)    Goal 1: Patient will Accept the loss of beloved one and return to stable level of functioning as evidenced by a higher level of functioning as a result of acceptance.   Redevelop a supportive social system and improve interpersonal relationships and Express unresolved emotions regarding loss which brings anxiety and depression mood.      I will know I've met my goal when I have more energy and  I am able to celebrate good life with my father Vs the sadness of losing him.Redevelop a supportive social system and improve interpersonal relationships\"    Objective #A (Patient Action)    Patient will increase understanding of steps in the grief process.  Status: Continued - Date(s): 7/31/2023    Intervention(s)  Therapist will teach emotional recognition/identification. : defining happiness in your own term without father .    Objective #B  Patient will identify at least 3 fears / thoughts that contribute to feeling anxious.  Status: Continued - Date(s): 7/31/2023    Intervention(s)  Therapist will teach thought challenging with CBT .    Objective #C  Patient will Identify negative self-talk and behaviors: challenge core beliefs, myths, and actions.  Status: Continued - Date(s): 7/31/2023    Intervention(s)  Therapist will provide space and time to process thoughts and feelings around current stressors. provide support and coping skills to help move on in life .      Patient has reviewed and agreed to the above plan.    GERMAN Farmer  July 31 2023    Answers for HPI/ROS submitted by the patient on 4/20/2023  If you checked off any problems, how difficult have these problems made it for you to do your work, take care of things at " home, or get along with other people?: Somewhat difficult  PHQ9 TOTAL SCORE: 5    Answers for HPI/ROS submitted by the patient on 4/26/2023  If you checked off any problems, how difficult have these problems made it for you to do your work, take care of things at home, or get along with other people?: Somewhat difficult  PHQ9 TOTAL SCORE: 3    Answers for HPI/ROS submitted by the patient on 5/4/2023  If you checked off any problems, how difficult have these problems made it for you to do your work, take care of things at home, or get along with other people?: Somewhat difficult  PHQ9 TOTAL SCORE: 4  Answers for HPI/ROS submitted by the patient on 7/17/2023  If you checked off any problems, how difficult have these problems made it for you to do your work, take care of things at home, or get along with other people?: Somewhat difficult  PHQ9 TOTAL SCORE: 5    Answers submitted by the patient for this visit:  Patient Health Questionnaire (Submitted on 7/30/2023)  If you checked off any problems, how difficult have these problems made it for you to do your work, take care of things at home, or get along with other people?: Somewhat difficult  PHQ9 TOTAL SCORE: 5  Answers submitted by the patient for this visit:  Patient Health Questionnaire (Submitted on 8/20/2023)  If you checked off any problems, how difficult have these problems made it for you to do your work, take care of things at home, or get along with other people?: Somewhat difficult  PHQ9 TOTAL SCORE: 5  Answers submitted by the patient for this visit:  Patient Health Questionnaire (Submitted on 9/18/2023)  If you checked off any problems, how difficult have these problems made it for you to do your work, take care of things at home, or get along with other people?: Somewhat difficult  PHQ9 TOTAL SCORE: 4  Answers submitted by the patient for this visit:  Patient Health Questionnaire (Submitted on 10/2/2023)  If you checked off any problems, how difficult  have these problems made it for you to do your work, take care of things at home, or get along with other people?: Somewhat difficult  PHQ9 TOTAL SCORE: 5

## 2023-10-16 ENCOUNTER — VIRTUAL VISIT (OUTPATIENT)
Dept: PSYCHOLOGY | Facility: CLINIC | Age: 58
End: 2023-10-16
Payer: MEDICARE

## 2023-10-16 DIAGNOSIS — F43.21 GRIEF REACTION: ICD-10-CM

## 2023-10-16 DIAGNOSIS — F33.0 MDD (MAJOR DEPRESSIVE DISORDER), RECURRENT EPISODE, MILD (H): Primary | ICD-10-CM

## 2023-10-16 PROCEDURE — 90837 PSYTX W PT 60 MINUTES: CPT | Mod: VID | Performed by: SOCIAL WORKER

## 2023-10-16 NOTE — PROGRESS NOTES
[facility]                                     Progress Note    Patient Name: Sherly Yueng  Date: 10/16/2023         Service Type: Individual      Session Start Time: 12:03 Session End Time: 12:56    Session Length: 53    Session #: 10    Attendees: Client    Service Modality:  Video Visit:      Provider verified identity through the following two step process.  Patient provided:  Patient is known previously to provider    Telemedicine Visit: The patient's condition can be safely assessed and treated via synchronous audio and visual telemedicine encounter.      Reason for Telemedicine Visit: Services only offered telehealth    Originating Site (Patient Location): Patient's home    Distant Site (Provider Location): Provider Remote Setting- Home Office    Consent:  The patient/guardian has verbally consented to: the potential risks and benefits of telemedicine (video visit) versus in person care; bill my insurance or make self-payment for services provided; and responsibility for payment of non-covered services.     Patient would like the video invitation sent by:  My Chart    Mode of Communication:  Video Conference via Amwell    Distant Location (Provider):  Off-site    As the provider I attest to compliance with applicable laws and regulations related to telemedicine.    DATA  Interactive Complexity: Yes, visit entailed Interactive Complexity evidenced by: Continues to experience losses. This time a best friend lost her dad. She needed time to process her thoughts and feelings.    Crisis: No      Progress Since Last Session (Related to Symptoms / Goals / Homework):   Symptoms:  Grieving, sad    Homework: Partially completed      Episode of Care Goals: Satisfactory progress - ACTION (Actively working towards change); Intervened by reinforcing change plan / affirming steps taken- it takes some time to achieve acceptance.      Current / Ongoing Stressors and Concerns: Patient continues to  experience sadness and grief. Today, she brought in another news about her best's friend father dying of a heart attack.  This is the 4th closest person that she loses in the last 6 months: lost father, then 's mom, then 's mom, then his uncle. Patient finds it difficult to know who to grieve and how to move forward. Her plan is to take it slow and to continue to practice some self care. Her next visit is in 2 weeks.    Decrease frequency and intensity of feeling down, depressed, hopeless  Identify negative self-talk and behaviors: challenge core beliefs, myths, and actions  increase understanding of steps in the grief process      Intervention:    Grief processing: Actively listening to the patient's report about losing loved ones in a very short time.  Offered empathy and Provided room to process and for support.     CBT : Thoughts challenging with the 3 Cs    MI Intervention: Supported Autonomy, Collaboration, Evocation, Permission to raise concern or advise, and Open-ended questions     Change Talk Expressed by the Patient: Taking steps    Provider Response to Change Talk: E - Evoked more info from patient about behavior change, A - Affirmed patient's thoughts, decisions, or attempts at behavior change, R - Reflected patient's change talk, and S - Summarized patient's change talk statements     Grief processing: To process her loss and achieve acceptance.     Assessments completed prior to visit:2/23/2023    The following assessments were completed by patient for this visit:  PHQ2:       4/27/2022    10:07 AM 8/11/2020     9:37 AM 3/24/2020     3:46 PM 1/19/2012     2:24 PM   PHQ-2 ( 1999 [employer])   Q1: Little interest or pleasure in doing things 0 0 0 0   Q2: Feeling down, depressed or hopeless 0 0 0 1   PHQ-2 Score 0 0 0 1   PHQ-2 Total Score (12-17 Years)- Positive if 3 or more points; Administer PHQ-A if positive  0 0      PHQ9:       7/17/2023     6:35 AM 7/25/2023     6:27 AM 7/30/2023      5:26 PM 8/20/2023     2:37 PM 9/18/2023     7:28 AM 10/2/2023     9:54 AM 10/15/2023     4:00 PM   PHQ-9 SCORE   PHQ-9 Total Score MyChart 5 (Mild depression) 5 (Mild depression) 5 (Mild depression) 5 (Mild depression) 4 (Minimal depression) 5 (Mild depression) 6 (Mild depression)   PHQ-9 Total Score 5 5 5 5 4 5 6     GAD2:       7/7/2023    10:17 AM 7/17/2023     6:35 AM 7/25/2023     6:27 AM 8/20/2023     2:37 PM 9/18/2023     7:29 AM 9/28/2023     9:22 AM 10/10/2023     1:58 PM   ROCKY-2   Feeling nervous, anxious, or on edge 1 1 1    1 1 1 1 2   Not being able to stop or control worrying 0 0 0    0 0 0 0 1   ROCKY-2 Total Score 1 1 1    1 1 1 1 3     GAD7:       2/23/2023     5:18 PM 4/26/2023    11:04 AM 7/17/2023     9:05 AM 9/18/2023    12:06 PM 10/10/2023     1:58 PM   ROCKY-7 SCORE   Total Score     5 (mild anxiety)   Total Score 7 2 2 5 5     CAGE-AID:       2/9/2023    11:52 AM 7/17/2023     9:10 AM   CAGE-AID Total Score   Total Score 0 0   Total Score MyChart 0 (A total score of 2 or greater is considered clinically significant)      PROMIS 10-Global Health (all questions and answers displayed):       9/24/2022     8:54 AM 1/5/2023    10:08 AM 3/31/2023    11:04 AM 4/30/2023     2:36 PM 6/22/2023     5:20 AM 7/17/2023     9:10 AM 7/30/2023     5:28 PM   PROMIS 10   In general, would you say your health is: Good Good Good Good Good  Good   In general, would you say your quality of life is: Very good Good Good Good Good  Very good   In general, how would you rate your physical health? Good Good Good Good Good  Good   In general, how would you rate your mental health, including your mood and your ability to think? Very good Good Good Good Good  Good   In general, how would you rate your satisfaction with your social activities and relationships? Very good Good Good Very good Very good  Very good   In general, please rate how well you carry out your usual social activities and roles Very good Fair Fair Good Good   Good   To what extent are you able to carry out your everyday physical activities such as walking, climbing stairs, carrying groceries, or moving a chair? Moderately Moderately A little A little Moderately  A little   In the past 7 days, how often have you been bothered by emotional problems such as feeling anxious, depressed, or irritable? Sometimes Sometimes Often Often Often  Sometimes   In the past 7 days, how would you rate your fatigue on average? Moderate Moderate Moderate Moderate Moderate  Moderate   In the past 7 days, how would you rate your pain on average, where 0 means no pain, and 10 means worst imaginable pain? 4 3 5 6 5  5   In general, would you say your health is: 3 3 3 3 3 3 3   In general, would you say your quality of life is: 4 3 3 3 3 4 4   In general, how would you rate your physical health? 3 3 3 3 3 3 3   In general, how would you rate your mental health, including your mood and your ability to think? 4 3 3 3 3 3 3   In general, how would you rate your satisfaction with your social activities and relationships? 4 3 3 4 4 5 4   In general, please rate how well you carry out your usual social activities and roles. (This includes activities at home, at work and in your community, and responsibilities as a parent, child, spouse, employee, friend, etc.) 4 2 2 3 3 3 3   To what extent are you able to carry out your everyday physical activities such as walking, climbing stairs, carrying groceries, or moving a chair? 3 3 2 2 3 3 2   In the past 7 days, how often have you been bothered by emotional problems such as feeling anxious, depressed, or irritable? 3 3 4 4 4 3 3   In the past 7 days, how would you rate your fatigue on average? 3 3 3 3 3 3 3   In the past 7 days, how would you rate your pain on average, where 0 means no pain, and 10 means worst imaginable pain? 4 3 5 6 5 5 5   Global Mental Health Score 15 12 11 12 12 15 14   Global Physical Health Score 12 13 11 11 12 12 11   PROMIS TOTAL -  SUBSCORES 27 25 22 23 24 27 25     PROMIS 10-Global Health (only subscores and total score):       9/24/2022     8:54 AM 1/5/2023    10:08 AM 3/31/2023    11:04 AM 4/30/2023     2:36 PM 6/22/2023     5:20 AM 7/17/2023     9:10 AM 7/30/2023     5:28 PM   PROMIS-10 Scores Only   Global Mental Health Score 15 12 11 12 12 15 14   Global Physical Health Score 12 13 11 11 12 12 11   PROMIS TOTAL - SUBSCORES 27 25 22 23 24 27 25     [place] Suicide Severity Rating Scale (Short Version)      9/2/2022     7:31 PM 9/30/2022     5:03 PM 11/28/2022     9:49 PM 2/10/2023     9:55 AM 3/20/2023     3:28 PM 7/17/2023     9:09 AM 9/13/2023     6:03 AM   [place] Suicide Severity Rating (Short Version)   Over the past 2 weeks have you felt down, depressed, or hopeless? no no no  no  yes   Over the past 2 weeks have you had thoughts of killing yourself? no no no  no  no   Have you ever attempted to kill yourself? no no no  no  no   1. Wish to be Dead (Since Last Contact)    N  N    2. Non-Specific Active Suicidal Thoughts (Since Last Contact)    N  N    Actual Attempt (Since Last Contact)    N  N    Has subject engaged in non-suicidal self-injurious behavior? (Since Last Contact)    N  N    Interrupted Attempts (Since Last Contact)    N  N    Aborted or Self-Interrupted Attempt (Since Last Contact)    N  N    Preparatory Acts or Behavior (Since Last Contact)    N  N    Suicide (Since Last Contact)    N  N    Calculated C-SSRS Risk Score (Since Last Contact)    No Risk Indicated  No Risk Indicated          ASSESSMENT: Current Emotional / Mental Status (status of significant symptoms):   Risk status (Self / Other harm or suicidal ideation)   Patient denies current fears or concerns for personal safety.   Patient denies current or recent suicidal ideation or behaviors.   Patient denies current or recent homicidal ideation or behaviors.   Patient denies current or recent self injurious behavior or ideation.   Patient denies other safety  concerns.   Patient reports there has been no change in risk factors since their last session.     Patient reports there has been no change in protective factors since their last session.     Recommended that patient call 911 or go to the local ED should there be a change in any of these risk factors.  Call [phone] if experiencing SI     Appearance:   Appropriate    Eye Contact:   Good    Psychomotor Behavior: Normal    Attitude:   Cooperative    Orientation:   Person Place Time Situation   Speech    Rate / Production: Normal     Volume:  Normal    Mood:    Anxious  Depressed  Sad    Affect:    Appropriate    Thought Content:  Clear    Thought Form:  Coherent  Logical    Insight:    Good      Medication Review:   No changes to current psychiatric medication(s)     Medication Compliance:   Yes     Changes in Health Issues:   None reported     Chemical Use Review:   Substance Use: Chemical use reviewed, no active concerns identified      Tobacco Use: No current tobacco use.      Diagnosis:  1. MDD (major depressive disorder), recurrent episode, mild (H24)    2. Grief reaction      Collateral Reports Completed:   Not Applicable    PLAN: (Patient Tasks / Therapist Tasks / Other):   Patient will keep up with self care to feel better: call a friend to talk to when needed, take some walks to alleviate depressed mood  Patient's next visit is on 10/16/2023    GERMAN Farmer             _____________________________________________________    Individual Treatment Plan    Patient's Name: Sherly Yeung  YOB: 1965    Date of Creation: 2/23/2023    Date Treatment Plan Last Reviewed/Revised: 7/31/2023    DSM5 Diagnoses: 296.32 (F33.1) Major Depressive Disorder, Recurrent Episode, Moderate With anxious distress or 300.02 (F41.1) Generalized Anxiety Disorder  Grief reaction [F43.21]    Psychosocial / Contextual Factors: grief: father recently passed away. Family dynamic- relationship with son, mom. Feeling  "overwhelmed.    PROMIS (reviewed every 90 days): 25    Referral / Collaboration:    Referral to another professional/service is not indicated at this time..    Anticipated number of session for this episode of care: 9-12 sessions  Anticipation frequency of session: Biweekly  Anticipated Duration of each session: 53 or more minutes  Treatment plan will be reviewed in 90 days or when goals have been changed.     MeasurableTreatment Goal(s) related to diagnosis / functional impairment(s)    Goal 1: Patient will Accept the loss of beloved one and return to stable level of functioning as evidenced by a higher level of functioning as a result of acceptance.   Redevelop a supportive social system and improve interpersonal relationships and Express unresolved emotions regarding loss which brings anxiety and depression mood.      I will know I've met my goal when I have more energy and  I am able to celebrate good life with my father Vs the sadness of losing him.Redevelop a supportive social system and improve interpersonal relationships\"    Objective #A (Patient Action)    Patient will increase understanding of steps in the grief process.  Status: Continued - Date(s): 10/16/2023    Intervention(s)  Therapist will teach emotional recognition/identification. : defining happiness in your own term without father .    Objective #B  Patient will identify at least 3 fears / thoughts that contribute to feeling anxious.  Status: Continued - Date(s): 10/16/2023    Intervention(s)  Therapist will teach thought challenging with CBT .    Objective #C  Patient will Identify negative self-talk and behaviors: challenge core beliefs, myths, and actions.  Status: Continued - Date(s): 10/16/2023    Intervention(s)  Therapist will provide space and time to process thoughts and feelings around current stressors. provide support and coping skills to help move on in life .    Patient has reviewed and agreed to the above plan.    [facility] " [facility]  10/16/2023    Answers for HPI/ROS submitted by the patient on 4/20/2023  If you checked off any problems, how difficult have these problems made it for you to do your work, take care of things at home, or get along with other people?: Somewhat difficult  PHQ9 TOTAL SCORE: 5    Answers for HPI/ROS submitted by the patient on 4/26/2023  If you checked off any problems, how difficult have these problems made it for you to do your work, take care of things at home, or get along with other people?: Somewhat difficult  PHQ9 TOTAL SCORE: 3    Answers for HPI/ROS submitted by the patient on 5/4/2023  If you checked off any problems, how difficult have these problems made it for you to do your work, take care of things at home, or get along with other people?: Somewhat difficult  PHQ9 TOTAL SCORE: 4  Answers for HPI/ROS submitted by the patient on 7/17/2023  If you checked off any problems, how difficult have these problems made it for you to do your work, take care of things at home, or get along with other people?: Somewhat difficult  PHQ9 TOTAL SCORE: 5    Answers submitted by the patient for this visit:  Patient Health Questionnaire (Submitted on 7/30/2023)  If you checked off any problems, how difficult have these problems made it for you to do your work, take care of things at home, or get along with other people?: Somewhat difficult  PHQ9 TOTAL SCORE: 5  Answers submitted by the patient for this visit:  Patient Health Questionnaire (Submitted on 8/20/2023)  If you checked off any problems, how difficult have these problems made it for you to do your work, take care of things at home, or get along with other people?: Somewhat difficult  PHQ9 TOTAL SCORE: 5  Answers submitted by the patient for this visit:  Patient Health Questionnaire (Submitted on 9/18/2023)  If you checked off any problems, how difficult have these problems made it for you to do your work, take care of things at home, or get along  with other people?: Somewhat difficult  PHQ9 TOTAL SCORE: 4  Answers submitted by the patient for this visit:  Patient Health Questionnaire (Submitted on 10/2/2023)  If you checked off any problems, how difficult have these problems made it for you to do your work, take care of things at home, or get along with other people?: Somewhat difficult  PHQ9 TOTAL SCORE: 5    Answers submitted by the patient for this visit:  Patient Health Questionnaire (Submitted on 10/15/2023)  If you checked off any problems, how difficult have these problems made it for you to do your work, take care of things at home, or get along with other people?: Somewhat difficult  PHQ9 TOTAL SCORE: 6  ROCKY-7 (Submitted on 10/10/2023)  ROCKY 7 TOTAL SCORE: 5

## 2023-10-25 ENCOUNTER — VIRTUAL VISIT (OUTPATIENT)
Dept: PSYCHOLOGY | Facility: CLINIC | Age: 58
End: 2023-10-25
Payer: MEDICARE

## 2023-10-25 DIAGNOSIS — F43.21 GRIEF REACTION: ICD-10-CM

## 2023-10-25 DIAGNOSIS — F33.0 MDD (MAJOR DEPRESSIVE DISORDER), RECURRENT EPISODE, MILD (H): Primary | ICD-10-CM

## 2023-10-25 PROCEDURE — 90834 PSYTX W PT 45 MINUTES: CPT | Mod: VID | Performed by: SOCIAL WORKER

## 2023-10-25 NOTE — PROGRESS NOTES
[facility]                                     Progress Note    Patient Name: Sherly Yeung  Date: 10/25/2023         Service Type: Individual      Session Start Time: 9:12 Session End Time: 10:00    Session Length: 48    Session #: 11    Attendees: Client    Service Modality:  Video Visit:      Provider verified identity through the following two step process.  Patient provided:  Patient is known previously to provider    Telemedicine Visit: The patient's condition can be safely assessed and treated via synchronous audio and visual telemedicine encounter.      Reason for Telemedicine Visit: Services only offered telehealth    Originating Site (Patient Location): Patient's home    Distant Site (Provider Location): Provider Remote Setting- Home Office    Consent:  The patient/guardian has verbally consented to: the potential risks and benefits of telemedicine (video visit) versus in person care; bill my insurance or make self-payment for services provided; and responsibility for payment of non-covered services.     Patient would like the video invitation sent by:  My Chart    Mode of Communication:  Video Conference via Amwell    Distant Location (Provider):  Off-site    As the provider I attest to compliance with applicable laws and regulations related to telemedicine.    DATA  Interactive Complexity: No     Crisis: No      Progress Since Last Session (Related to Symptoms / Goals / Homework):   Symptoms:  Grieving, sad    Homework: Partially completed      Episode of Care Goals: Satisfactory progress - ACTION (Actively working towards change); Intervened by reinforcing change plan / affirming steps taken- it takes some time to achieve acceptance after many losses.      Current / Ongoing Stressors and Concerns: Patient has been experiencing grief after losing as many  as 4 important people in her life over the last 6 months. Continues to process her grief and some family issues. Spent some time  shared her story and also discussing options to feel better in the process. Continues to do well with son. Has noted there might have been some misunderstanding about what  has happened when  son met patient and step father several weeks ago. Patient is preparing getting family together on Thanksgiving. Notes this motivates her. Her next visit is in on 11/16/2023    Decrease frequency and intensity of feeling down, depressed, hopeless  Identify negative self-talk and behaviors: challenge core beliefs, myths, and actions  increase understanding of steps in the grief process      Intervention:    Grief processing: Actively listening to the patient's report about losing loved ones in a very short time.  Offered empathy and Provided room to process and for support.     CBT : Thoughts challenging with the 3 Cs    MI Intervention: Supported Autonomy, Collaboration, Evocation, Permission to raise concern or advise, and Open-ended questions     Change Talk Expressed by the Patient: Taking steps    Provider Response to Change Talk: E - Evoked more info from patient about behavior change, A - Affirmed patient's thoughts, decisions, or attempts at behavior change, R - Reflected patient's change talk, and S - Summarized patient's change talk statements     Grief processing: To process her loss and achieve acceptance.     Assessments completed prior to visit:2/23/2023    The following assessments were completed by patient for this visit:  PHQ2:       4/27/2022    10:07 AM 8/11/2020     9:37 AM 3/24/2020     3:46 PM 1/19/2012     2:24 PM   PHQ-2 ( [facility] [employer])   Q1: Little interest or pleasure in doing things 0 0 0 0   Q2: Feeling down, depressed or hopeless 0 0 0 1   PHQ-2 Score 0 0 0 1   PHQ-2 Total Score (12-17 Years)- Positive if 3 or more points; Administer PHQ-A if positive  0 0      PHQ9:       7/25/2023     6:27 AM 7/30/2023     5:26 PM 8/20/2023     2:37 PM 9/18/2023     7:28 AM 10/2/2023     9:54 AM 10/15/2023     4:00  PM 10/23/2023     9:35 AM   PHQ-9 SCORE   PHQ-9 Total Score MyChart 5 (Mild depression) 5 (Mild depression) 5 (Mild depression) 4 (Minimal depression) 5 (Mild depression) 6 (Mild depression) 7 (Mild depression)   PHQ-9 Total Score 5 5 5 4 5 6 7     GAD2:       7/17/2023     6:35 AM 7/25/2023     6:27 AM 8/20/2023     2:37 PM 9/18/2023     7:29 AM 9/28/2023     9:22 AM 10/10/2023     1:58 PM 10/19/2023     2:16 PM   ROCKY-2   Feeling nervous, anxious, or on edge 1 1    1 1 1 1 2 1   Not being able to stop or control worrying 0 0    0 0 0 0 1 0   ROCKY-2 Total Score 1 1    1 1 1 1 3 1     GAD7:       2/23/2023     5:18 PM 4/26/2023    11:04 AM 7/17/2023     9:05 AM 9/18/2023    12:06 PM 10/10/2023     1:58 PM   ROCKY-7 SCORE   Total Score     5 (mild anxiety)   Total Score 7 2 2 5 5     CAGE-AID:       2/9/2023    11:52 AM 7/17/2023     9:10 AM   CAGE-AID Total Score   Total Score 0 0   Total Score MyChart 0 (A total score of 2 or greater is considered clinically significant)      PROMIS 10-Global Health (all questions and answers displayed):       9/24/2022     8:54 AM 1/5/2023    10:08 AM 3/31/2023    11:04 AM 4/30/2023     2:36 PM 6/22/2023     5:20 AM 7/17/2023     9:10 AM 7/30/2023     5:28 PM   PROMIS 10   In general, would you say your health is: Good Good Good Good Good  Good   In general, would you say your quality of life is: Very good Good Good Good Good  Very good   In general, how would you rate your physical health? Good Good Good Good Good  Good   In general, how would you rate your mental health, including your mood and your ability to think? Very good Good Good Good Good  Good   In general, how would you rate your satisfaction with your social activities and relationships? Very good Good Good Very good Very good  Very good   In general, please rate how well you carry out your usual social activities and roles Very good Fair Fair Good Good  Good   To what extent are you able to carry out your everyday  physical activities such as walking, climbing stairs, carrying groceries, or moving a chair? Moderately Moderately A little A little Moderately  A little   In the past 7 days, how often have you been bothered by emotional problems such as feeling anxious, depressed, or irritable? Sometimes Sometimes Often Often Often  Sometimes   In the past 7 days, how would you rate your fatigue on average? Moderate Moderate Moderate Moderate Moderate  Moderate   In the past 7 days, how would you rate your pain on average, where 0 means no pain, and 10 means worst imaginable pain? 4 3 5 6 5  5   In general, would you say your health is: 3 3 3 3 3 3 3   In general, would you say your quality of life is: 4 3 3 3 3 4 4   In general, how would you rate your physical health? 3 3 3 3 3 3 3   In general, how would you rate your mental health, including your mood and your ability to think? 4 3 3 3 3 3 3   In general, how would you rate your satisfaction with your social activities and relationships? 4 3 3 4 4 5 4   In general, please rate how well you carry out your usual social activities and roles. (This includes activities at home, at work and in your community, and responsibilities as a parent, child, spouse, employee, friend, etc.) 4 2 2 3 3 3 3   To what extent are you able to carry out your everyday physical activities such as walking, climbing stairs, carrying groceries, or moving a chair? 3 3 2 2 3 3 2   In the past 7 days, how often have you been bothered by emotional problems such as feeling anxious, depressed, or irritable? 3 3 4 4 4 3 3   In the past 7 days, how would you rate your fatigue on average? 3 3 3 3 3 3 3   In the past 7 days, how would you rate your pain on average, where 0 means no pain, and 10 means worst imaginable pain? 4 3 5 6 5 5 5   Global Mental Health Score 15 12 11 12 12 15 14   Global Physical Health Score 12 13 11 11 12 12 11   PROMIS TOTAL - SUBSCORES 27 25 22 23 24 27 25     PROMIS 10-Global Health  (only subscores and total score):       9/24/2022     8:54 AM 1/5/2023    10:08 AM 3/31/2023    11:04 AM 4/30/2023     2:36 PM 6/22/2023     5:20 AM 7/17/2023     9:10 AM 7/30/2023     5:28 PM   PROMIS-10 Scores Only   Global Mental Health Score 15 12 11 12 12 15 14   Global Physical Health Score 12 13 11 11 12 12 11   PROMIS TOTAL - SUBSCORES 27 25 22 23 24 27 25     [place] Suicide Severity Rating Scale (Short Version)      9/2/2022     7:31 PM 9/30/2022     5:03 PM 11/28/2022     9:49 PM 2/10/2023     9:55 AM 3/20/2023     3:28 PM 7/17/2023     9:09 AM 9/13/2023     6:03 AM   [place] Suicide Severity Rating (Short Version)   Over the past 2 weeks have you felt down, depressed, or hopeless? no no no  no  yes   Over the past 2 weeks have you had thoughts of killing yourself? no no no  no  no   Have you ever attempted to kill yourself? no no no  no  no   1. Wish to be Dead (Since Last Contact)    N  N    2. Non-Specific Active Suicidal Thoughts (Since Last Contact)    N  N    Actual Attempt (Since Last Contact)    N  N    Has subject engaged in non-suicidal self-injurious behavior? (Since Last Contact)    N  N    Interrupted Attempts (Since Last Contact)    N  N    Aborted or Self-Interrupted Attempt (Since Last Contact)    N  N    Preparatory Acts or Behavior (Since Last Contact)    N  N    Suicide (Since Last Contact)    N  N    Calculated C-SSRS Risk Score (Since Last Contact)    No Risk Indicated  No Risk Indicated          ASSESSMENT: Current Emotional / Mental Status (status of significant symptoms):   Risk status (Self / Other harm or suicidal ideation)   Patient denies current fears or concerns for personal safety.   Patient denies current or recent suicidal ideation or behaviors.   Patient denies current or recent homicidal ideation or behaviors.   Patient denies current or recent self injurious behavior or ideation.   Patient denies other safety concerns.   Patient reports there has been no change in  risk factors since their last session.     Patient reports there has been no change in protective factors since their last session.     Recommended that patient call 911 or go to the local ED should there be a change in any of these risk factors.  Call [phone] if experiencing SI     Appearance:   Appropriate    Eye Contact:   Good    Psychomotor Behavior: Normal    Attitude:   Cooperative    Orientation:   Person Place Time Situation   Speech    Rate / Production: Normal     Volume:  Normal    Mood:    Anxious  Depressed  Sad    Affect:    Appropriate    Thought Content:  Clear    Thought Form:  Coherent  Logical    Insight:    Good      Medication Review:   No changes to current psychiatric medication(s)     Medication Compliance:   Yes     Changes in Health Issues:   None reported     Chemical Use Review:   Substance Use: Chemical use reviewed, no active concerns identified      Tobacco Use: No current tobacco use.      Diagnosis:  No diagnosis found.    Collateral Reports Completed:   Not Applicable    PLAN: (Patient Tasks / Therapist Tasks / Other):   Patient will keep up with self care to feel better: call a friend to talk to when needed, take some walks to alleviate depressed mood  Patient's next visit is on 11/16/2023    GERMAN Farmer             _____________________________________________________    Individual Treatment Plan    Patient's Name: Sherly Yeung  YOB: 1965    Date of Creation: 2/23/2023    Date Treatment Plan Last Reviewed/Revised: 7/31/2023    DSM5 Diagnoses: 296.32 (F33.1) Major Depressive Disorder, Recurrent Episode, Moderate With anxious distress or 300.02 (F41.1) Generalized Anxiety Disorder  Grief reaction [F43.21]    Psychosocial / Contextual Factors: grief: father recently passed away. Family dynamic- relationship with son, mom. Feeling overwhelmed.    PROMIS (reviewed every 90 days): 25    Referral / Collaboration:    Referral to another professional/service is  "not indicated at this time..    Anticipated number of session for this episode of care: 9-12 sessions  Anticipation frequency of session: Biweekly  Anticipated Duration of each session: 53 or more minutes  Treatment plan will be reviewed in 90 days or when goals have been changed.     MeasurableTreatment Goal(s) related to diagnosis / functional impairment(s)    Goal 1: Patient will Accept the loss of beloved one and return to stable level of functioning as evidenced by a higher level of functioning as a result of acceptance.   Redevelop a supportive social system and improve interpersonal relationships and Express unresolved emotions regarding loss which brings anxiety and depression mood.      I will know I've met my goal when I have more energy and  I am able to celebrate good life with my father Vs the sadness of losing him.Redevelop a supportive social system and improve interpersonal relationships\"    Objective #A (Patient Action)    Patient will increase understanding of steps in the grief process.  Status: Continued - Date(s): 10/16/2023    Intervention(s)  Therapist will teach emotional recognition/identification. : defining happiness in your own term without father .    Objective #B  Patient will identify at least 3 fears / thoughts that contribute to feeling anxious.  Status: Continued - Date(s): 10/16/2023    Intervention(s)  Therapist will teach thought challenging with CBT .    Objective #C  Patient will Identify negative self-talk and behaviors: challenge core beliefs, myths, and actions.  Status: Continued - Date(s): 10/16/2023    Intervention(s)  Therapist will provide space and time to process thoughts and feelings around current stressors. provide support and coping skills to help move on in life .    Patient has reviewed and agreed to the above plan.    GERMAN Farmer  10/16/2023    Answers for HPI/ROS submitted by the patient on 4/20/2023  If you checked off any problems, how " difficult have these problems made it for you to do your work, take care of things at home, or get along with other people?: Somewhat difficult  PHQ9 TOTAL SCORE: 5    Answers for HPI/ROS submitted by the patient on 4/26/2023  If you checked off any problems, how difficult have these problems made it for you to do your work, take care of things at home, or get along with other people?: Somewhat difficult  PHQ9 TOTAL SCORE: 3    Answers for HPI/ROS submitted by the patient on 5/4/2023  If you checked off any problems, how difficult have these problems made it for you to do your work, take care of things at home, or get along with other people?: Somewhat difficult  PHQ9 TOTAL SCORE: 4  Answers for HPI/ROS submitted by the patient on 7/17/2023  If you checked off any problems, how difficult have these problems made it for you to do your work, take care of things at home, or get along with other people?: Somewhat difficult  PHQ9 TOTAL SCORE: 5    Answers submitted by the patient for this visit:  Patient Health Questionnaire (Submitted on 7/30/2023)  If you checked off any problems, how difficult have these problems made it for you to do your work, take care of things at home, or get along with other people?: Somewhat difficult  PHQ9 TOTAL SCORE: 5  Answers submitted by the patient for this visit:  Patient Health Questionnaire (Submitted on 8/20/2023)  If you checked off any problems, how difficult have these problems made it for you to do your work, take care of things at home, or get along with other people?: Somewhat difficult  PHQ9 TOTAL SCORE: 5  Answers submitted by the patient for this visit:  Patient Health Questionnaire (Submitted on 9/18/2023)  If you checked off any problems, how difficult have these problems made it for you to do your work, take care of things at home, or get along with other people?: Somewhat difficult  PHQ9 TOTAL SCORE: 4  Answers submitted by the patient for this visit:  Patient Health  Questionnaire (Submitted on 10/2/2023)  If you checked off any problems, how difficult have these problems made it for you to do your work, take care of things at home, or get along with other people?: Somewhat difficult  PHQ9 TOTAL SCORE: 5    Answers submitted by the patient for this visit:  Patient Health Questionnaire (Submitted on 10/15/2023)  If you checked off any problems, how difficult have these problems made it for you to do your work, take care of things at home, or get along with other people?: Somewhat difficult  PHQ9 TOTAL SCORE: 6  ROCKY-7 (Submitted on 10/10/2023)  ROCKY 7 TOTAL SCORE: 5

## 2023-10-30 ENCOUNTER — OFFICE VISIT (OUTPATIENT)
Dept: INTERNAL MEDICINE | Facility: CLINIC | Age: 58
End: 2023-10-30
Payer: MEDICARE

## 2023-10-30 VITALS
SYSTOLIC BLOOD PRESSURE: 130 MMHG | BODY MASS INDEX: 25.09 KG/M2 | HEART RATE: 84 BPM | HEIGHT: 63 IN | WEIGHT: 141.6 LBS | DIASTOLIC BLOOD PRESSURE: 85 MMHG | OXYGEN SATURATION: 99 %

## 2023-10-30 DIAGNOSIS — L70.9 ACNE: Primary | ICD-10-CM

## 2023-10-30 DIAGNOSIS — R06.02 SHORTNESS OF BREATH: Primary | ICD-10-CM

## 2023-10-30 PROCEDURE — 90686 IIV4 VACC NO PRSV 0.5 ML IM: CPT | Performed by: INTERNAL MEDICINE

## 2023-10-30 PROCEDURE — 99214 OFFICE O/P EST MOD 30 MIN: CPT | Mod: 25 | Performed by: INTERNAL MEDICINE

## 2023-10-30 PROCEDURE — G0008 ADMIN INFLUENZA VIRUS VAC: HCPCS | Performed by: INTERNAL MEDICINE

## 2023-10-30 RX ORDER — BUDESONIDE AND FORMOTEROL FUMARATE DIHYDRATE 80; 4.5 UG/1; UG/1
2 AEROSOL RESPIRATORY (INHALATION) 4 TIMES DAILY PRN
Qty: 10.2 G | Refills: 1 | Status: SHIPPED | OUTPATIENT
Start: 2023-10-30
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
## 2023-11-01 NOTE — TELEPHONE ENCOUNTER
spironolactone (ALDACTONE) 25 MG tablet     --   Sig - Route: Take 25 mg by mouth daily - Oral   Class: Historical     Last Office Visit : 10/30/2023   Future Office visit:  0    Routing refill request to provider for review/approval because:  Medication is reported/historical

## 2023-11-02 ENCOUNTER — ANCILLARY PROCEDURE (OUTPATIENT)
Dept: MAMMOGRAPHY | Facility: CLINIC | Age: 58
End: 2023-11-02
Attending: INTERNAL MEDICINE
Payer: MEDICARE

## 2023-11-02 ENCOUNTER — OFFICE VISIT (OUTPATIENT)
Dept: PSYCHIATRY | Facility: CLINIC | Age: 58
End: 2023-11-02
Attending: PSYCHIATRY & NEUROLOGY
Payer: MEDICARE

## 2023-11-02 VITALS
WEIGHT: 142 LBS | SYSTOLIC BLOOD PRESSURE: 126 MMHG | HEART RATE: 94 BPM | BODY MASS INDEX: 25.16 KG/M2 | DIASTOLIC BLOOD PRESSURE: 88 MMHG

## 2023-11-02 DIAGNOSIS — F33.9 RECURRENT MAJOR DEPRESSIVE DISORDER, REMISSION STATUS UNSPECIFIED (H): ICD-10-CM

## 2023-11-02 DIAGNOSIS — R40.0 HAS DAYTIME DROWSINESS: ICD-10-CM

## 2023-11-02 DIAGNOSIS — Z79.899 ENCOUNTER FOR LONG-TERM (CURRENT) USE OF MEDICATIONS: ICD-10-CM

## 2023-11-02 DIAGNOSIS — F33.41 RECURRENT MAJOR DEPRESSIVE DISORDER, IN PARTIAL REMISSION (H): Primary | ICD-10-CM

## 2023-11-02 PROCEDURE — G0463 HOSPITAL OUTPT CLINIC VISIT: HCPCS | Performed by: PSYCHIATRY & NEUROLOGY

## 2023-11-02 PROCEDURE — 77063 BREAST TOMOSYNTHESIS BI: CPT | Mod: GC | Performed by: RADIOLOGY

## 2023-11-02 PROCEDURE — 99214 OFFICE O/P EST MOD 30 MIN: CPT | Mod: GC | Performed by: PSYCHIATRY & NEUROLOGY

## 2023-11-02 PROCEDURE — 77067 SCR MAMMO BI INCL CAD: CPT | Mod: GC | Performed by: RADIOLOGY

## 2023-11-02 PROCEDURE — 90833 PSYTX W PT W E/M 30 MIN: CPT | Mod: GC | Performed by: PSYCHIATRY & NEUROLOGY

## 2023-11-02 RX ORDER — SPIRONOLACTONE 25 MG/1
25 TABLET ORAL DAILY
Qty: 90 TABLET | Refills: 3 | Status: SHIPPED | OUTPATIENT
Start: 2023-11-02

## 2023-11-02 ASSESSMENT — ABNORMAL INVOLUNTARY MOVEMENT SCALE (AIMS)
AIMS_PATIENT_AWARENESS: NO AWARENESS
TONGUE: 0
CURRENT_PROBLEMS_TEETH_DENTURES: NO
PATIENT_WEARS_DENTURES: NO

## 2023-11-02 ASSESSMENT — PAIN SCALES - GENERAL: PAINLEVEL: MODERATE PAIN (4)
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
## 2023-11-02 NOTE — TELEPHONE ENCOUNTER
Spironolactone 25 mg tablet, take 1 tablet daily last prescribed by Cari Dakin, MD (dermatology). Last dispensed on 9/26/23 for #30 tablets.     Appears dermatology clinic refused refill, stating patient needs an appointment.     [place] ([facility]) EUGENE Genao

## 2023-11-02 NOTE — NURSING NOTE
Chief Complaint   Patient presents with    Recheck Medication     Recurrent major depressive disorder, remission status unspecified     - Krish Benitez, Visit Facilitator

## 2023-11-02 NOTE — PATIENT INSTRUCTIONS
It was nice to meet you today. Here is what we discussed:    -Decrease duloxetine to 90 mg at night    -Start bupropion  mg in the morning    -Continue Ritalin and Abilify     Jevon Tineo MD  [facility]     **For crisis resources, please see the information at the end of this document**   Patient Education    Thank you for coming to the [facility] [place] [facility].     Lab Testing:  If you had lab testing today and your results are reassuring or normal they will be mailed to you or sent through [employer] within 7 days. If the lab tests need quick action we will call you with the results. The phone number we will call with results is # [phone]. If this is not the best number please call our clinic and change the number.     Medication Refills:  If you need any refills please call your pharmacy and they will contact us. Our fax number for refills is [phone].   Three business days of notice are needed for general medication refill requests.   Five business days of notice are needed for controlled substance refill requests.   If you need to change to a different pharmacy, please contact the new pharmacy directly. The new pharmacy will help you get your medications transferred.     Contact Us:  Please call [phone] during business hours (8-5:00 M-F).   If you have medication related questions after clinic hours, or on the weekend, please call [phone].     Financial Assistance [phone]   Medical Records [phone]       MENTAL HEALTH CRISIS RESOURCES:  For a emergency help, please call 911 or go to the nearest Emergency Department.     Emergency Walk-In Options:   EmPATH Unit @ [facility] ([place]): [phone] - Specialized mental health emergency area designed to be calming  [facility] ([place]): [phone]  [facility] ([place]): [phone]  [place]  [facility]): [phone]    [facility] Crisis Information:   [place]: [phone]  [place]: [phone]  [place] LOYOLA) - Adult: [phone]     Child: [phone]  [place] - Adult: [phone]     Child: [phone]  [place]: [phone]  List of all [facility] resources:   [email or web address]/[email or web address]-contacts.jsp    National Crisis Information:   Crisis Text Line: Text  [place]  to 618862  Suicide & Crisis Lifeline: 988  National Suicide Prevention Lifeline: 6-057-272-MTPP (1-440.623.6673)       For online chat options, visit https://suicidepreventionlifeline.org/chat/  Poison Control Center: 1-167.847.8660  Trans Lifeline: 1-285.179.6249 - Hotline for transgender people of all ages  The [employer]: 7-820-476-8339 - Hotline for LGBT youth     For Non-Emergency Support:   Fast Tracker: Mental Health & Substance Use Disorder Resources -   https://www.[place].org/

## 2023-11-03 RX ORDER — BUPROPION HYDROCHLORIDE 100 MG/1
100 TABLET, EXTENDED RELEASE ORAL EVERY MORNING
Qty: 30 TABLET | Refills: 1 | Status: SHIPPED | OUTPATIENT
Start: 2023-11-03 | End: 2023-12-04

## 2023-11-03 RX ORDER — METHYLPHENIDATE HYDROCHLORIDE 20 MG/1
TABLET ORAL
Qty: 45 TABLET | Refills: 0 | Status: SHIPPED | OUTPATIENT
Start: 2023-11-22 | End: 2023-12-04

## 2023-11-03 RX ORDER — METHYLPHENIDATE HYDROCHLORIDE 20 MG/1
TABLET ORAL
Qty: 45 TABLET | Refills: 0 | Status: SHIPPED | OUTPATIENT
Start: 2023-12-22 | End: 2024-01-12

## 2023-11-03 RX ORDER — ARIPIPRAZOLE 5 MG/1
5 TABLET ORAL DAILY
Qty: 30 TABLET | Refills: 2 | Status: SHIPPED | OUTPATIENT
Start: 2023-11-03 | End: 2023-12-04

## 2023-11-03 RX ORDER — DULOXETIN HYDROCHLORIDE 30 MG/1
90 CAPSULE, DELAYED RELEASE ORAL EVERY EVENING
Qty: 90 CAPSULE | Refills: 2 | Status: SHIPPED | OUTPATIENT
Start: 2023-11-03 | End: 2023-12-04

## 2023-11-07 ENCOUNTER — E-VISIT (OUTPATIENT)
Dept: INTERNAL MEDICINE | Facility: CLINIC | Age: 58
End: 2023-11-07
Payer: MEDICARE

## 2023-11-07 DIAGNOSIS — J01.01 ACUTE RECURRENT MAXILLARY SINUSITIS: Primary | ICD-10-CM

## 2023-11-07 PROCEDURE — 99421 OL DIG E/M SVC 5-10 MIN: CPT | Performed by: INTERNAL MEDICINE

## 2023-11-07 NOTE — PATIENT INSTRUCTIONS
Thank you for choosing us for your care. I have placed an order for a prescription so that you can start treatment for sinus infection. Continue to use other medications to help your sinuses drain, such as nasal steroid sprays (flonase), decongestants, antihistamines, mucinex. Stay hydrated.  You may use tylenol for pain or fevers. Your pharmacist will able to address any questions you may have about the medication.     If you're not feeling better within 5-7 days, please schedule an appointment.  You can schedule an appointment right here in [facility], or call [phone]  If the visit is for the same symptoms as your eVisit, we'll refund the cost of your eVisit if seen within seven days.

## 2023-11-16 ENCOUNTER — VIRTUAL VISIT (OUTPATIENT)
Dept: PSYCHOLOGY | Facility: CLINIC | Age: 58
End: 2023-11-16
Payer: MEDICARE

## 2023-11-16 DIAGNOSIS — F41.1 GAD (GENERALIZED ANXIETY DISORDER): Primary | ICD-10-CM

## 2023-11-16 PROCEDURE — 90837 PSYTX W PT 60 MINUTES: CPT | Mod: VID | Performed by: SOCIAL WORKER

## 2023-11-16 NOTE — PROGRESS NOTES
[facility]                                     Progress Note    Patient Name: Sherly Yeung  Date: 11/16/2023         Service Type: Individual      Session Start Time: 12:03 Session End Time: 12:56    Session Length: 53    Session #: 12    Attendees: Client    Service Modality:  Video Visit:      Provider verified identity through the following two step process.  Patient provided:  Patient is known previously to provider    Telemedicine Visit: The patient's condition can be safely assessed and treated via synchronous audio and visual telemedicine encounter.      Reason for Telemedicine Visit: Services only offered telehealth    Originating Site (Patient Location): Patient's home    Distant Site (Provider Location): Provider Remote Setting- Home Office    Consent:  The patient/guardian has verbally consented to: the potential risks and benefits of telemedicine (video visit) versus in person care; bill my insurance or make self-payment for services provided; and responsibility for payment of non-covered services.     Patient would like the video invitation sent by:  My Chart    Mode of Communication:  Video Conference via Amwell    Distant Location (Provider):  Off-site    As the provider I attest to compliance with applicable laws and regulations related to telemedicine.    DATA  Interactive Complexity: Yes, visit entailed Interactive Complexity evidenced by: Psychiatric crisis in the family. Patient is put in the middle. Wants more time to process her feelings about this and how to keep her own mental health stable    Crisis: No      Progress Since Last Session (Related to Symptoms / Goals / Homework):   Symptoms:  Grieving, sad    Homework: Partially completed      Episode of Care Goals: Satisfactory progress - ACTION (Actively working towards change); Intervened by reinforcing change plan / affirming steps taken- it takes some time to achieve acceptance after many losses.      Current /  Ongoing Stressors and Concerns: Patient and writer have processed patient's recent experience in the family that involved her in the problem solving.  14 year old niece has been having a hard time at home and school. The school ended up involving the police. Though the child was not admitted in the hospital which surprised patient. Patient is now trying to figure out how to help the family since she has a good rapport with her niece. Patient notes this has increased her anxiety which she needs to able to to control soon so she is able to leblanc her thanksgiving. Patient and writer discussed how to remain calm and be in control of her anxiety during the family gatherings. No other concerns today. He next visit is in 2 weeks.     Decrease frequency and intensity of feeling down, depressed, hopeless  Identify negative self-talk and behaviors: challenge core beliefs, myths, and actions  increase understanding of steps in the grief process      Intervention:    Grief processing: Actively listening to the patient's report about losing loved ones in a very short time.  Offered empathy and Provided room to process and for support.     CBT : Thoughts challenging with the 3 Cs    MI Intervention: Supported Autonomy, Collaboration, Evocation, Permission to raise concern or advise, and Open-ended questions     Change Talk Expressed by the Patient: Taking steps    Provider Response to Change Talk: E - Evoked more info from patient about behavior change, A - Affirmed patient's thoughts, decisions, or attempts at behavior change, R - Reflected patient's change talk, and S - Summarized patient's change talk statements     Grief processing: To process her loss and achieve acceptance.     Assessments completed prior to visit:2/23/2023    The following assessments were completed by patient for this visit:  PHQ2:       4/27/2022    10:07 AM 8/11/2020     9:37 AM 3/24/2020     3:46 PM 1/19/2012     2:24 PM   PHQ-2 ( 1999 [employer])   Q1:  Little interest or pleasure in doing things 0 0 0 0   Q2: Feeling down, depressed or hopeless 0 0 0 1   PHQ-2 Score 0 0 0 1   PHQ-2 Total Score (12-17 Years)- Positive if 3 or more points; Administer PHQ-A if positive  0 0      PHQ9:       7/30/2023     5:26 PM 8/20/2023     2:37 PM 9/18/2023     7:28 AM 10/2/2023     9:54 AM 10/15/2023     4:00 PM 10/23/2023     9:35 AM 11/16/2023     6:17 AM   PHQ-9 SCORE   PHQ-9 Total Score MyChart 5 (Mild depression) 5 (Mild depression) 4 (Minimal depression) 5 (Mild depression) 6 (Mild depression) 7 (Mild depression) 4 (Minimal depression)   PHQ-9 Total Score 5 5 4 5 6 7 4     GAD2:       7/25/2023     6:27 AM 8/20/2023     2:37 PM 9/18/2023     7:29 AM 9/28/2023     9:22 AM 10/10/2023     1:58 PM 10/19/2023     2:16 PM 11/16/2023     6:17 AM   ROCKY-2   Feeling nervous, anxious, or on edge 1    1 1 1 1 2 1 1   Not being able to stop or control worrying 0    0 0 0 0 1 0 1   ROCKY-2 Total Score 1    1 1 1 1 3 1 2     GAD7:       2/23/2023     5:18 PM 4/26/2023    11:04 AM 7/17/2023     9:05 AM 9/18/2023    12:06 PM 10/10/2023     1:58 PM   ROCKY-7 SCORE   Total Score     5 (mild anxiety)   Total Score 7 2 2 5 5     CAGE-AID:       2/9/2023    11:52 AM 7/17/2023     9:10 AM   CAGE-AID Total Score   Total Score 0 0   Total Score MyChart 0 (A total score of 2 or greater is considered clinically significant)      PROMIS 10-Global Health (all questions and answers displayed):       3/31/2023    11:04 AM 4/30/2023     2:36 PM 6/22/2023     5:20 AM 7/17/2023     9:10 AM 7/30/2023     5:28 PM 10/29/2023     9:12 AM 11/16/2023     6:20 AM   PROMIS 10   In general, would you say your health is: Good Good Good  Good Good Good   In general, would you say your quality of life is: Good Good Good  Very good Good Very good   In general, how would you rate your physical health? Good Good Good  Good Good Good   In general, how would you rate your mental health, including your mood and your ability to  think? Good Good Good  Good Good Very good   In general, how would you rate your satisfaction with your social activities and relationships? Good Very good Very good  Very good Very good Good   In general, please rate how well you carry out your usual social activities and roles Fair Good Good  Good Good Good   To what extent are you able to carry out your everyday physical activities such as walking, climbing stairs, carrying groceries, or moving a chair? A little A little Moderately  A little Moderately Moderately   In the past 7 days, how often have you been bothered by emotional problems such as feeling anxious, depressed, or irritable? Often Often Often  Sometimes Sometimes Often   In the past 7 days, how would you rate your fatigue on average? Moderate Moderate Moderate  Moderate Moderate Moderate   In the past 7 days, how would you rate your pain on average, where 0 means no pain, and 10 means worst imaginable pain? 5 6 5  5 5 5   In general, would you say your health is: 3 3 3 3 3 3 3   In general, would you say your quality of life is: 3 3 3 4 4 3 4   In general, how would you rate your physical health? 3 3 3 3 3 3 3   In general, how would you rate your mental health, including your mood and your ability to think? 3 3 3 3 3 3 4   In general, how would you rate your satisfaction with your social activities and relationships? 3 4 4 5 4 4 3   In general, please rate how well you carry out your usual social activities and roles. (This includes activities at home, at work and in your community, and responsibilities as a parent, child, spouse, employee, friend, etc.) 2 3 3 3 3 3 3   To what extent are you able to carry out your everyday physical activities such as walking, climbing stairs, carrying groceries, or moving a chair? 2 2 3 3 2 3 3   In the past 7 days, how often have you been bothered by emotional problems such as feeling anxious, depressed, or irritable? 4 4 4 3 3 3 4   In the past 7 days, how would  you rate your fatigue on average? 3 3 3 3 3 3 3   In the past 7 days, how would you rate your pain on average, where 0 means no pain, and 10 means worst imaginable pain? 5 6 5 5 5 5 5   Global Mental Health Score 11 12 12 15 14 13 13   Global Physical Health Score 11 11 12 12 11 12 12   PROMIS TOTAL - SUBSCORES 22 23 24 27 25 25 25     PROMIS 10-Global Health (only subscores and total score):       3/31/2023    11:04 AM 4/30/2023     2:36 PM 6/22/2023     5:20 AM 7/17/2023     9:10 AM 7/30/2023     5:28 PM 10/29/2023     9:12 AM 11/16/2023     6:20 AM   PROMIS-10 Scores Only   Global Mental Health Score 11 12 12 15 14 13 13   Global Physical Health Score 11 11 12 12 11 12 12   PROMIS TOTAL - SUBSCORES 22 23 24 27 25 25 25     [place] Suicide Severity Rating Scale (Short Version)      9/2/2022     7:31 PM 9/30/2022     5:03 PM 11/28/2022     9:49 PM 2/10/2023     9:55 AM 3/20/2023     3:28 PM 7/17/2023     9:09 AM 9/13/2023     6:03 AM   [place] Suicide Severity Rating (Short Version)   Over the past 2 weeks have you felt down, depressed, or hopeless? no no no  no  yes   Over the past 2 weeks have you had thoughts of killing yourself? no no no  no  no   Have you ever attempted to kill yourself? no no no  no  no   1. Wish to be Dead (Since Last Contact)    N  N    2. Non-Specific Active Suicidal Thoughts (Since Last Contact)    N  N    Actual Attempt (Since Last Contact)    N  N    Has subject engaged in non-suicidal self-injurious behavior? (Since Last Contact)    N  N    Interrupted Attempts (Since Last Contact)    N  N    Aborted or Self-Interrupted Attempt (Since Last Contact)    N  N    Preparatory Acts or Behavior (Since Last Contact)    N  N    Suicide (Since Last Contact)    N  N    Calculated C-SSRS Risk Score (Since Last Contact)    No Risk Indicated  No Risk Indicated          ASSESSMENT: Current Emotional / Mental Status (status of significant symptoms):   Risk status (Self / Other harm or suicidal  ideation)   Patient denies current fears or concerns for personal safety.   Patient denies current or recent suicidal ideation or behaviors.   Patient denies current or recent homicidal ideation or behaviors.   Patient denies current or recent self injurious behavior or ideation.   Patient denies other safety concerns.   Patient reports there has been no change in risk factors since their last session.     Patient reports there has been no change in protective factors since their last session.     Recommended that patient call 911 or go to the local ED should there be a change in any of these risk factors.  Call [phone] if experiencing SI     Appearance:   Appropriate    Eye Contact:   Good    Psychomotor Behavior: Normal    Attitude:   Cooperative    Orientation:   Person Place Time Situation   Speech    Rate / Production: Normal     Volume:  Normal    Mood:    Anxious  Sad    Affect:    Appropriate    Thought Content:  Clear    Thought Form:  Coherent  Logical    Insight:    Good      Medication Review:   No changes to current psychiatric medication(s)     Medication Compliance:   Yes     Changes in Health Issues:   None reported     Chemical Use Review:   Substance Use: Chemical use reviewed, no active concerns identified      Tobacco Use: No current tobacco use.      Diagnosis:  1. ROCKY (generalized anxiety disorder)        Collateral Reports Completed:   Not Applicable    PLAN: (Patient Tasks / Therapist Tasks / Other):   Patient will keep up with self care to feel better and will not allow self to be the one resolving  the family problems.  Patient's next visit is on 11/29/2023    GERMAN Farmer             _____________________________________________________    Individual Treatment Plan    Patient's Name: Sherly Yeung  YOB: 1965    Date of Creation: 2/23/2023    Date Treatment Plan Last Reviewed/Revised: 10/16/2023    DSM5 Diagnoses: 296.32 (F33.1) Major Depressive Disorder, Recurrent  "Episode, Moderate With anxious distress or 300.02 (F41.1) Generalized Anxiety Disorder  Grief reaction [F43.21]    Psychosocial / Contextual Factors: grief: father recently passed away. Family dynamic- relationship with son, mom. Feeling overwhelmed.    PROMIS (reviewed every 90 days): 25    Referral / Collaboration:    Referral to another professional/service is not indicated at this time..    Anticipated number of session for this episode of care: 9-12 sessions  Anticipation frequency of session: Biweekly  Anticipated Duration of each session: 53 or more minutes  Treatment plan will be reviewed in 90 days or when goals have been changed.     MeasurableTreatment Goal(s) related to diagnosis / functional impairment(s)    Goal 1: Patient will Accept the loss of beloved one and return to stable level of functioning as evidenced by a higher level of functioning as a result of acceptance.   Redevelop a supportive social system and improve interpersonal relationships and Express unresolved emotions regarding loss which brings anxiety and depression mood.      I will know I've met my goal when I have more energy and  I am able to celebrate good life with my father Vs the sadness of losing him.Redevelop a supportive social system and improve interpersonal relationships\"    Objective #A (Patient Action)    Patient will increase understanding of steps in the grief process.  Status: Continued - Date(s): 10/16/2023    Intervention(s)  Therapist will teach emotional recognition/identification. : defining happiness in your own term without father .    Objective #B  Patient will identify at least 3 fears / thoughts that contribute to feeling anxious.  Status: Continued - Date(s): 10/16/2023    Intervention(s)  Therapist will teach thought challenging with CBT .    Objective #C  Patient will Identify negative self-talk and behaviors: challenge core beliefs, myths, and actions.  Status: Continued - Date(s): " 10/16/2023    Intervention(s)  Therapist will provide space and time to process thoughts and feelings around current stressors. provide support and coping skills to help move on in life .    Patient has reviewed and agreed to the above plan.    GERMAN Farmer  10/16/2023    Answers for HPI/ROS submitted by the patient on 4/20/2023  If you checked off any problems, how difficult have these problems made it for you to do your work, take care of things at home, or get along with other people?: Somewhat difficult  PHQ9 TOTAL SCORE: 5    Answers for HPI/ROS submitted by the patient on 4/26/2023  If you checked off any problems, how difficult have these problems made it for you to do your work, take care of things at home, or get along with other people?: Somewhat difficult  PHQ9 TOTAL SCORE: 3    Answers for HPI/ROS submitted by the patient on 5/4/2023  If you checked off any problems, how difficult have these problems made it for you to do your work, take care of things at home, or get along with other people?: Somewhat difficult  PHQ9 TOTAL SCORE: 4  Answers for HPI/ROS submitted by the patient on 7/17/2023  If you checked off any problems, how difficult have these problems made it for you to do your work, take care of things at home, or get along with other people?: Somewhat difficult  PHQ9 TOTAL SCORE: 5    Answers submitted by the patient for this visit:  Patient Health Questionnaire (Submitted on 7/30/2023)  If you checked off any problems, how difficult have these problems made it for you to do your work, take care of things at home, or get along with other people?: Somewhat difficult  PHQ9 TOTAL SCORE: 5  Answers submitted by the patient for this visit:  Patient Health Questionnaire (Submitted on 8/20/2023)  If you checked off any problems, how difficult have these problems made it for you to do your work, take care of things at home, or get along with other people?: Somewhat difficult  PHQ9 TOTAL  SCORE: 5  Answers submitted by the patient for this visit:  Patient Health Questionnaire (Submitted on 9/18/2023)  If you checked off any problems, how difficult have these problems made it for you to do your work, take care of things at home, or get along with other people?: Somewhat difficult  PHQ9 TOTAL SCORE: 4  Answers submitted by the patient for this visit:  Patient Health Questionnaire (Submitted on 10/2/2023)  If you checked off any problems, how difficult have these problems made it for you to do your work, take care of things at home, or get along with other people?: Somewhat difficult  PHQ9 TOTAL SCORE: 5    Answers submitted by the patient for this visit:  Patient Health Questionnaire (Submitted on 10/15/2023)  If you checked off any problems, how difficult have these problems made it for you to do your work, take care of things at home, or get along with other people?: Somewhat difficult  PHQ9 TOTAL SCORE: 6  ROCKY-7 (Submitted on 10/10/2023)  ROCKY 7 TOTAL SCORE: 5    Answers submitted by the patient for this visit:  Patient Health Questionnaire (Submitted on 11/16/2023)  If you checked off any problems, how difficult have these problems made it for you to do your work, take care of things at home, or get along with other people?: Somewhat difficult  PHQ9 TOTAL SCORE: 4

## 2023-11-28 ENCOUNTER — VIRTUAL VISIT (OUTPATIENT)
Dept: INTERNAL MEDICINE | Facility: CLINIC | Age: 58
End: 2023-11-28
Payer: MEDICARE

## 2023-11-28 ENCOUNTER — LAB (OUTPATIENT)
Dept: LAB | Facility: CLINIC | Age: 58
End: 2023-11-28
Payer: MEDICARE

## 2023-11-28 DIAGNOSIS — N39.0 RECURRENT UTI: ICD-10-CM

## 2023-11-28 DIAGNOSIS — N39.0 RECURRENT UTI: Primary | ICD-10-CM

## 2023-11-28 LAB
ALBUMIN UR-MCNC: NEGATIVE MG/DL
APPEARANCE UR: ABNORMAL
BACTERIA #/AREA URNS HPF: ABNORMAL /HPF
BILIRUB UR QL STRIP: NEGATIVE
COLOR UR AUTO: YELLOW
GLUCOSE UR STRIP-MCNC: NEGATIVE MG/DL
HGB UR QL STRIP: ABNORMAL
KETONES UR STRIP-MCNC: NEGATIVE MG/DL
LEUKOCYTE ESTERASE UR QL STRIP: ABNORMAL
NITRATE UR QL: NEGATIVE
PH UR STRIP: 7 [PH] (ref 5–7)
RBC #/AREA URNS AUTO: ABNORMAL /HPF
SP GR UR STRIP: 1.02 (ref 1–1.03)
SQUAMOUS #/AREA URNS AUTO: ABNORMAL /LPF
UROBILINOGEN UR STRIP-ACNC: 0.2 E.U./DL
WBC #/AREA URNS AUTO: ABNORMAL /HPF
WBC CLUMPS #/AREA URNS HPF: PRESENT /HPF

## 2023-11-28 PROCEDURE — 99213 OFFICE O/P EST LOW 20 MIN: CPT | Mod: GE

## 2023-11-28 PROCEDURE — 81001 URINALYSIS AUTO W/SCOPE: CPT

## 2023-11-28 RX ORDER — LEVOFLOXACIN 750 MG/1
750 TABLET, FILM COATED ORAL DAILY
Qty: 5 TABLET | Refills: 0 | Status: SHIPPED | OUTPATIENT
Start: 2023-11-28 | End: 2024-01-17

## 2023-11-28 NOTE — PROGRESS NOTES
[facility]       SUBJECTIVE:  Sherly Yeung is a 58 year old female with a PMHx of recurrent UTIs, h/o PE who comes for a virtual visit for UTI symptoms    Recently given a course of Augmentin for sinusitis on 11/7/23. History of numerous UTIs - 3 in the last year, most recently 8/2023. Grown E.Coli in the past, most recent UTI with Enterobacter cloacae chana to FQ, intermediate susceptibility to nitrofurantoin. Her most recent UTI was treated with nitrofurantoin, susceptibility was intermediate.     Has seen urology outpatient earlier this year for recurrent UTIs. Has had CT urogram in past which showed constipation. Cystocopy was unremarkable (2020?). At the time referred to pelvic floor PT for pelvic floor dysfunction. On mirabegron and solifenacin    Most recent abd imaging 9/2022 with no signs of renal or bladder obstruction    On our discussion she states she has burning with urination, urine is dark deirdre, smells bad, urgency and frequency, has been going on for 2-3 days. Symptoms are only with urination. This feels similar to prior UTIs, maybe a little worse. She says she always receives abx for her UTIs. Never holds in her urine. No back pain or history of kidney stones. No blood in urine. No fevers. Currently sexually active, she washes and uses bathroom after sex. No use of catheter. No vaginal symptoms. She still does pelvic floor exercises which she finds helpful. No constipation.         ROS:   As above, otherwise neg    OBJECTIVE:    LMP 08/22/2017 (Approximate)    Wt Readings from Last 1 Encounters:   10/30/23 64.2 kg (141 lb 9.6 oz)       Physical Exam  Limited due to virtual visit  GEN: NAD       ASSESSMENT/PLAN:    Sherly Yeung is a 58 year old female with a PMHx of recurrent UTIs, h/o PE who comes for a virtual visit for UTI symptoms    #Recurrent UTIs  With history of multiple UTIs, history as above in subjective. In past grew E.Coli even when no pyuria or LE in UA.  Most recent UTI with Enterobacter cloacae with some drug resistances. Prior workup with urology for urological causes of recurrent UTIs has been unremarkable with cystogram and cystoscopy in 2020. She is on mirabegron and solifenacin for overactive bladder which may be causing some urinary retention after voiding which could cause recurrent UTIs. Otherwise no history of kidney stones, not immunocompromised, she urinates and washes after sex. Topical estrogen discussed in past but deferred due to patient's history of numerous clots  - Levofloxacin 750mg daily x5d  - UA/Ucx  - Can consider topical estrogen or methenamine 1g PO BID in future  - Advised patient to discuss w/ her urologist regarding mirabegron and solifenacin  - Encouraged to drink plenty of fluids, not hold in urine, post-coital measures      Pt was seen and plan of care discussed with Dr Mckeon.     Brayan Hines, PGY2  Nov 28, 2023       Addendum: Urine culture ordered at time of visit but not collected. Re-ordered urine culture however unable to add on to previous urine sample. Called patient who agreed to come in for repeat urine sample (understanding urine culture will likely be affected as patient had already taken abx by that point. Was expressing some improvement in symptoms at that time after 3-4 days of antibiotics      While the patient was in clinic, I reviewed the pertinent medical history and results.  I discussed the current findings on physical examination, as well as the patient s diagnosis and treatment plan with the resident and agree with the information as documented with the following exceptions: none.  Za Mckeon MD  Internal Medicine

## 2023-11-29 ENCOUNTER — VIRTUAL VISIT (OUTPATIENT)
Dept: PSYCHOLOGY | Facility: CLINIC | Age: 58
End: 2023-11-29
Payer: MEDICARE

## 2023-11-29 DIAGNOSIS — F33.0 MDD (MAJOR DEPRESSIVE DISORDER), RECURRENT EPISODE, MILD (H): Primary | ICD-10-CM

## 2023-11-29 DIAGNOSIS — N39.41 URGENCY INCONTINENCE: ICD-10-CM

## 2023-11-29 PROCEDURE — 90837 PSYTX W PT 60 MINUTES: CPT | Mod: VID | Performed by: SOCIAL WORKER

## 2023-11-29 RX ORDER — SOLIFENACIN SUCCINATE 5 MG/1
5 TABLET, FILM COATED ORAL DAILY
Qty: 90 TABLET | Refills: 1 | Status: SHIPPED | OUTPATIENT
Start: 2023-11-29 | End: 2024-04-02 | Stop reason: SINTOL

## 2023-11-29 NOTE — PROGRESS NOTES
[facility]                                     Progress Note    Patient Name: Sherly Yeung  Date: 11/29/2023         Service Type: Individual      Session Start Time: 13:03 Session End Time: 13:56    Session Length: 53    Session #: 12    Attendees: Client    Service Modality:  Video Visit:      Provider verified identity through the following two step process.  Patient provided:  Patient is known previously to provider    Telemedicine Visit: The patient's condition can be safely assessed and treated via synchronous audio and visual telemedicine encounter.      Reason for Telemedicine Visit: Services only offered telehealth    Originating Site (Patient Location): Patient's home    Distant Site (Provider Location): Provider Remote Setting- Home Office    Consent:  The patient/guardian has verbally consented to: the potential risks and benefits of telemedicine (video visit) versus in person care; bill my insurance or make self-payment for services provided; and responsibility for payment of non-covered services.     Patient would like the video invitation sent by:  My Chart    Mode of Communication:  Video Conference via Amwell    Distant Location (Provider):  Off-site    As the provider I attest to compliance with applicable laws and regulations related to telemedicine.    DATA  Interactive Complexity: Yes, visit entailed Interactive Complexity evidenced by: Psychiatric crisis in the family. Patient is put in the middle. Wants more time to process her feelings about this and how to keep her own mental health stable    Crisis: No      Progress Since Last Session (Related to Symptoms / Goals / Homework):   Symptoms:  Grieving, sad    Homework: Partially completed      Episode of Care Goals: Satisfactory progress - ACTION (Actively working towards change); Intervened by reinforcing change plan / affirming steps taken- it takes some time to achieve acceptance after many losses.      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 She is planning to host [place] as well. The experience with her mother and plans to keep up with her gains around self awareness and strengths. Patient shared understanding about trust the family has in her and how she continue to defy the odds in her life. Her next visit is on 12/13.    Decrease frequency and intensity of feeling down, depressed, hopeless  Identify negative self-talk and behaviors: challenge core beliefs, myths, and actions  increase understanding of steps in the grief process      Intervention:    Grief processing: Actively listening to the patient's report about losing loved ones in a very short time.  Offered empathy and Provided room to process and for support.     CBT : Thoughts challenging with the 3 Cs    MI Intervention: Supported Autonomy, Collaboration, Evocation, Permission to raise concern or advise, and Open-ended questions     Change Talk Expressed by the Patient: Taking steps    Provider Response to Change Talk: E - Evoked more info from patient about behavior change, A - Affirmed patient's thoughts, decisions, or attempts at behavior change, R - Reflected patient's change talk, and S - Summarized patient's change talk statements     Grief processing: To process her loss and achieve acceptance.     Assessments completed prior to visit:2/23/2023    The following assessments were completed by patient for this visit:  PHQ2:       4/27/2022    10:07 AM 8/11/2020     9:37 AM 3/24/2020     3:46 PM 1/19/2012     2:24 PM   PHQ-2 ( 1999 [employer])   Q1: Little interest or pleasure in doing things 0 0 0 0   Q2: Feeling down, depressed or hopeless 0 0 0 1   PHQ-2 Score 0 0 0 1   PHQ-2 Total Score (12-17 Years)- Positive if 3 or more points; Administer PHQ-A if positive  0 0      PHQ9:       8/20/2023     2:37 PM 9/18/2023     7:28 AM 10/2/2023     9:54 AM 10/15/2023     4:00 PM 10/23/2023     9:35  AM 11/16/2023     6:17 AM 11/28/2023    11:28 AM   PHQ-9 SCORE   PHQ-9 Total Score MyChart 5 (Mild depression) 4 (Minimal depression) 5 (Mild depression) 6 (Mild depression) 7 (Mild depression) 4 (Minimal depression) 5 (Mild depression)   PHQ-9 Total Score 5 4 5 6 7 4 5     GAD2:       8/20/2023     2:37 PM 9/18/2023     7:29 AM 9/28/2023     9:22 AM 10/10/2023     1:58 PM 10/19/2023     2:16 PM 11/16/2023     6:17 AM 11/28/2023    11:28 AM   ROCKY-2   Feeling nervous, anxious, or on edge 1 1 1 2 1 1 2    2   Not being able to stop or control worrying 0 0 0 1 0 1 0    0   ROCKY-2 Total Score 1 1 1 3 1 2 2    2     GAD7:       2/23/2023     5:18 PM 4/26/2023    11:04 AM 7/17/2023     9:05 AM 9/18/2023    12:06 PM 10/10/2023     1:58 PM   ROCKY-7 SCORE   Total Score     5 (mild anxiety)   Total Score 7 2 2 5 5     CAGE-AID:       2/9/2023    11:52 AM 7/17/2023     9:10 AM   CAGE-AID Total Score   Total Score 0 0   Total Score MyChart 0 (A total score of 2 or greater is considered clinically significant)      PROMIS 10-Global Health (all questions and answers displayed):       4/30/2023     2:36 PM 6/22/2023     5:20 AM 7/17/2023     9:10 AM 7/30/2023     5:28 PM 10/29/2023     9:12 AM 11/16/2023     6:20 AM 11/28/2023    11:30 AM   PROMIS 10   In general, would you say your health is: Good Good  Good Good Good Good   In general, would you say your quality of life is: Good Good  Very good Good Very good Very good   In general, how would you rate your physical health? Good Good  Good Good Good Good   In general, how would you rate your mental health, including your mood and your ability to think? Good Good  Good Good Very good Good   In general, how would you rate your satisfaction with your social activities and relationships? Very good Very good  Very good Very good Good Good   In general, please rate how well you carry out your usual social activities and roles Good Good  Good Good Good Good   To what extent are you able  to carry out your everyday physical activities such as walking, climbing stairs, carrying groceries, or moving a chair? A little Moderately  A little Moderately Moderately Moderately   In the past 7 days, how often have you been bothered by emotional problems such as feeling anxious, depressed, or irritable? Often Often  Sometimes Sometimes Often Often   In the past 7 days, how would you rate your fatigue on average? Moderate Moderate  Moderate Moderate Moderate Moderate   In the past 7 days, how would you rate your pain on average, where 0 means no pain, and 10 means worst imaginable pain? 6 5  5 5 5 5   In general, would you say your health is: 3 3 3 3 3 3 3   In general, would you say your quality of life is: 3 3 4 4 3 4 4   In general, how would you rate your physical health? 3 3 3 3 3 3 3   In general, how would you rate your mental health, including your mood and your ability to think? 3 3 3 3 3 4 3   In general, how would you rate your satisfaction with your social activities and relationships? 4 4 5 4 4 3 3   In general, please rate how well you carry out your usual social activities and roles. (This includes activities at home, at work and in your community, and responsibilities as a parent, child, spouse, employee, friend, etc.) 3 3 3 3 3 3 3   To what extent are you able to carry out your everyday physical activities such as walking, climbing stairs, carrying groceries, or moving a chair? 2 3 3 2 3 3 3   In the past 7 days, how often have you been bothered by emotional problems such as feeling anxious, depressed, or irritable? 4 4 3 3 3 4 4   In the past 7 days, how would you rate your fatigue on average? 3 3 3 3 3 3 3   In the past 7 days, how would you rate your pain on average, where 0 means no pain, and 10 means worst imaginable pain? 6 5 5 5 5 5 5   Global Mental Health Score 12 12 15 14 13 13 12   Global Physical Health Score 11 12 12 11 12 12 12   PROMIS TOTAL - SUBSCORES 23 24 27 25 25 25 24      PROMIS 10-Global Health (only subscores and total score):       4/30/2023     2:36 PM 6/22/2023     5:20 AM 7/17/2023     9:10 AM 7/30/2023     5:28 PM 10/29/2023     9:12 AM 11/16/2023     6:20 AM 11/28/2023    11:30 AM   PROMIS-10 Scores Only   Global Mental Health Score 12 12 15 14 13 13 12   Global Physical Health Score 11 12 12 11 12 12 12   PROMIS TOTAL - SUBSCORES 23 24 27 25 25 25 24     Unity Suicide Severity Rating Scale (Short Version)      9/2/2022     7:31 PM 9/30/2022     5:03 PM 11/28/2022     9:49 PM 2/10/2023     9:55 AM 3/20/2023     3:28 PM 7/17/2023     9:09 AM 9/13/2023     6:03 AM   Unity Suicide Severity Rating (Short Version)   Over the past 2 weeks have you felt down, depressed, or hopeless? no no no  no  yes   Over the past 2 weeks have you had thoughts of killing yourself? no no no  no  no   Have you ever attempted to kill yourself? no no no  no  no   1. Wish to be Dead (Since Last Contact)    N  N    2. Non-Specific Active Suicidal Thoughts (Since Last Contact)    N  N    Actual Attempt (Since Last Contact)    N  N    Has subject engaged in non-suicidal self-injurious behavior? (Since Last Contact)    N  N    Interrupted Attempts (Since Last Contact)    N  N    Aborted or Self-Interrupted Attempt (Since Last Contact)    N  N    Preparatory Acts or Behavior (Since Last Contact)    N  N    Suicide (Since Last Contact)    N  N    Calculated C-SSRS Risk Score (Since Last Contact)    No Risk Indicated  No Risk Indicated          ASSESSMENT: Current Emotional / Mental Status (status of significant symptoms):   Risk status (Self / Other harm or suicidal ideation)   Patient denies current fears or concerns for personal safety.   Patient denies current or recent suicidal ideation or behaviors.   Patient denies current or recent homicidal ideation or behaviors.   Patient denies current or recent self injurious behavior or ideation.   Patient denies other safety concerns.   Patient reports  there has been no change in risk factors since their last session.     Patient reports there has been no change in protective factors since their last session.     Recommended that patient call 911 or go to the local ED should there be a change in any of these risk factors.  Call [phone] if experiencing SI     Appearance:   Appropriate    Eye Contact:   Good    Psychomotor Behavior: Normal    Attitude:   Cooperative    Orientation:   Person Place Time Situation   Speech    Rate / Production: Normal     Volume:  Normal    Mood:    Anxious  Sad    Affect:    Appropriate    Thought Content:  Clear    Thought Form:  Coherent  Logical    Insight:    Good      Medication Review:   No changes to current psychiatric medication(s)     Medication Compliance:   Yes     Changes in Health Issues:   None reported     Chemical Use Review:   Substance Use: Chemical use reviewed, no active concerns identified      Tobacco Use: No current tobacco use.      Diagnosis:  1. MDD (major depressive disorder), recurrent episode, mild (H24)          Collateral Reports Completed:   Not Applicable    PLAN: (Patient Tasks / Therapist Tasks / Other):   Patient will keep up with self care to feel better and will not allow self to be the one resolving  the family problems.  Patient's next visit is on 12/13/2023    GERMAN Farmer             _____________________________________________________    Individual Treatment Plan    Patient's Name: Sherly Yeung  YOB: 1965    Date of Creation: 2/23/2023    Date Treatment Plan Last Reviewed/Revised: 10/16/2023    DSM5 Diagnoses: 296.32 (F33.1) Major Depressive Disorder, Recurrent Episode, Moderate With anxious distress or 300.02 (F41.1) Generalized Anxiety Disorder  Grief reaction [F43.21]    Psychosocial / Contextual Factors: grief: father recently passed away. Family dynamic- relationship with son, mom. Feeling overwhelmed.    PROMIS (reviewed every 90 days): 25    Referral /  "Collaboration:    Referral to another professional/service is not indicated at this time..    Anticipated number of session for this episode of care: 9-12 sessions  Anticipation frequency of session: Biweekly  Anticipated Duration of each session: 53 or more minutes  Treatment plan will be reviewed in 90 days or when goals have been changed.     MeasurableTreatment Goal(s) related to diagnosis / functional impairment(s)    Goal 1: Patient will Accept the loss of beloved one and return to stable level of functioning as evidenced by a higher level of functioning as a result of acceptance.   Redevelop a supportive social system and improve interpersonal relationships and Express unresolved emotions regarding loss which brings anxiety and depression mood.      I will know I've met my goal when I have more energy and  I am able to celebrate good life with my father Vs the sadness of losing him.Redevelop a supportive social system and improve interpersonal relationships\"    Objective #A (Patient Action)    Patient will increase understanding of steps in the grief process.  Status: Continued - Date(s): 10/16/2023    Intervention(s)  Therapist will teach emotional recognition/identification. : defining happiness in your own term without father .    Objective #B  Patient will identify at least 3 fears / thoughts that contribute to feeling anxious.  Status: Continued - Date(s): 10/16/2023    Intervention(s)  Therapist will teach thought challenging with CBT .    Objective #C  Patient will Identify negative self-talk and behaviors: challenge core beliefs, myths, and actions.  Status: Continued - Date(s): 10/16/2023    Intervention(s)  Therapist will provide space and time to process thoughts and feelings around current stressors. provide support and coping skills to help move on in life .    Patient has reviewed and agreed to the above plan.    GERMAN Farmer  10/16/2023    Answers for HPI/ROS submitted by the " patient on 4/20/2023  If you checked off any problems, how difficult have these problems made it for you to do your work, take care of things at home, or get along with other people?: Somewhat difficult  PHQ9 TOTAL SCORE: 5    Answers for HPI/ROS submitted by the patient on 4/26/2023  If you checked off any problems, how difficult have these problems made it for you to do your work, take care of things at home, or get along with other people?: Somewhat difficult  PHQ9 TOTAL SCORE: 3    Answers for HPI/ROS submitted by the patient on 5/4/2023  If you checked off any problems, how difficult have these problems made it for you to do your work, take care of things at home, or get along with other people?: Somewhat difficult  PHQ9 TOTAL SCORE: 4  Answers for HPI/ROS submitted by the patient on 7/17/2023  If you checked off any problems, how difficult have these problems made it for you to do your work, take care of things at home, or get along with other people?: Somewhat difficult  PHQ9 TOTAL SCORE: 5    Answers submitted by the patient for this visit:  Patient Health Questionnaire (Submitted on 7/30/2023)  If you checked off any problems, how difficult have these problems made it for you to do your work, take care of things at home, or get along with other people?: Somewhat difficult  PHQ9 TOTAL SCORE: 5  Answers submitted by the patient for this visit:  Patient Health Questionnaire (Submitted on 8/20/2023)  If you checked off any problems, how difficult have these problems made it for you to do your work, take care of things at home, or get along with other people?: Somewhat difficult  PHQ9 TOTAL SCORE: 5  Answers submitted by the patient for this visit:  Patient Health Questionnaire (Submitted on 9/18/2023)  If you checked off any problems, how difficult have these problems made it for you to do your work, take care of things at home, or get along with other people?: Somewhat difficult  PHQ9 TOTAL SCORE: 4  Answers  submitted by the patient for this visit:  Patient Health Questionnaire (Submitted on 10/2/2023)  If you checked off any problems, how difficult have these problems made it for you to do your work, take care of things at home, or get along with other people?: Somewhat difficult  PHQ9 TOTAL SCORE: 5    Answers submitted by the patient for this visit:  Patient Health Questionnaire (Submitted on 10/15/2023)  If you checked off any problems, how difficult have these problems made it for you to do your work, take care of things at home, or get along with other people?: Somewhat difficult  PHQ9 TOTAL SCORE: 6  ROCKY-7 (Submitted on 10/10/2023)  ROCKY 7 TOTAL SCORE: 5    Answers submitted by the patient for this visit:  Patient Health Questionnaire (Submitted on 11/16/2023)  If you checked off any problems, how difficult have these problems made it for you to do your work, take care of things at home, or get along with other people?: Somewhat difficult  PHQ9 TOTAL SCORE: 4    Answers submitted by the patient for this visit:  Patient Health Questionnaire (Submitted on 11/28/2023)  If you checked off any problems, how difficult have these problems made it for you to do your work, take care of things at home, or get along with other people?: Somewhat difficult  PHQ9 TOTAL SCORE: 5

## 2023-11-29 NOTE — TELEPHONE ENCOUNTER
PATIENT had been changed to vesicare 5/2023  F/U annually (4/2024)    Refilled    Margareth RANDHAWA RN  [facility]

## 2023-11-29 NOTE — TELEPHONE ENCOUNTER
Requested Prescriptions   Pending Prescriptions Disp Refills    solifenacin (VESICARE) 5 MG tablet 90 tablet 1     Sig: Take 1 tablet (5 mg) by mouth daily To replace tolterodine       There is no refill protocol information for this order          Last office visit: 1/30/2023 ; last virtual visit: 4/24/2023 with prescribing provider:  Betty Olivares     Future Office Visit:

## 2023-12-01 ENCOUNTER — DOCUMENTATION ONLY (OUTPATIENT)
Dept: INTERNAL MEDICINE | Facility: CLINIC | Age: 58
End: 2023-12-01
Payer: MEDICARE

## 2023-12-01 NOTE — PROGRESS NOTES
We cannot add on the urine culture to the specimen from 11/28, stability for a culture is only 24 hours and we don't keep the specimens longer than that.  I have put the order in for a recollect and the patient will have to come back in to collect a new sample.   Please have your team reach out to her to get this done.      Thanks,  KHADIJAH LunaT

## 2023-12-04 ENCOUNTER — VIRTUAL VISIT (OUTPATIENT)
Dept: PSYCHIATRY | Facility: CLINIC | Age: 58
End: 2023-12-04
Attending: PSYCHIATRY & NEUROLOGY
Payer: MEDICARE

## 2023-12-04 DIAGNOSIS — R40.0 HAS DAYTIME DROWSINESS: Primary | ICD-10-CM

## 2023-12-04 DIAGNOSIS — F33.41 RECURRENT MAJOR DEPRESSIVE DISORDER, IN PARTIAL REMISSION (H): ICD-10-CM

## 2023-12-04 DIAGNOSIS — F41.9 ANXIETY DISORDER, UNSPECIFIED TYPE: ICD-10-CM

## 2023-12-04 PROCEDURE — 90833 PSYTX W PT W E/M 30 MIN: CPT | Mod: VID | Performed by: PSYCHIATRY & NEUROLOGY

## 2023-12-04 PROCEDURE — 99214 OFFICE O/P EST MOD 30 MIN: CPT | Mod: VID | Performed by: PSYCHIATRY & NEUROLOGY

## 2023-12-04 RX ORDER — ARIPIPRAZOLE 5 MG/1
5 TABLET ORAL DAILY
Qty: 30 TABLET | Refills: 2 | Status: SHIPPED | OUTPATIENT
Start: 2023-12-04 | End: 2023-12-27

## 2023-12-04 RX ORDER — METHYLPHENIDATE HYDROCHLORIDE 20 MG/1
TABLET ORAL
Qty: 45 TABLET | Refills: 0 | Status: SHIPPED | OUTPATIENT
Start: 2024-01-22 | End: 2024-01-12

## 2023-12-04 RX ORDER — DULOXETIN HYDROCHLORIDE 30 MG/1
60 CAPSULE, DELAYED RELEASE ORAL EVERY EVENING
Qty: 60 CAPSULE | Refills: 2 | Status: SHIPPED | OUTPATIENT
Start: 2023-12-04 | End: 2023-12-27

## 2023-12-04 RX ORDER — BUPROPION HYDROCHLORIDE 200 MG/1
200 TABLET, EXTENDED RELEASE ORAL EVERY MORNING
Qty: 30 TABLET | Refills: 0 | Status: SHIPPED | OUTPATIENT
Start: 2023-12-04 | End: 2023-12-27

## 2023-12-04 ASSESSMENT — PAIN SCALES - GENERAL: PAINLEVEL: SEVERE PAIN (6)

## 2023-12-04 NOTE — NURSING NOTE
Is the patient currently in the state of [place]? YES    Visit mode:VIDEO    If the visit is dropped, the patient can be reconnected by: VIDEO VISIT: Text to cell phone:   Telephone Information:   Mobile [phone]       Will anyone else be joining the visit? NO  (If patient encounters technical issues they should call [phone]    How would you like to obtain your AVS? MyChart    Are changes needed to the allergy or medication list? No    Reason for visit: CONNER ORDOÑEZ

## 2023-12-04 NOTE — PATIENT INSTRUCTIONS
It was nice to meet you today. Here is what we discussed:    -Increase bupropion SR to 200 mg daily    -Decrease duloxetine to 60 mg every night    -No change to Trip Fraga MD  [facility]     **For crisis resources, please see the information at the end of this document**   Patient Education    Thank you for coming to the [facility].     Lab Testing:  If you had lab testing today and your results are reassuring or normal they will be mailed to you or sent through [employer] within 7 days. If the lab tests need quick action we will call you with the results. The phone number we will call with results is # [phone]. If this is not the best number please call our clinic and change the number.     Medication Refills:  If you need any refills please call your pharmacy and they will contact us. Our fax number for refills is [phone].   Three business days of notice are needed for general medication refill requests.   Five business days of notice are needed for controlled substance refill requests.   If you need to change to a different pharmacy, please contact the new pharmacy directly. The new pharmacy will help you get your medications transferred.     Contact Us:  Please call [phone] during business hours (8-5:00 M-F).   If you have medication related questions after clinic hours, or on the weekend, please call [phone].     Financial Assistance [phone]   Medical Records [phone]       MENTAL HEALTH CRISIS RESOURCES:  For a emergency help, please call 911 or go to the nearest Emergency Department.     Emergency Walk-In Options:   [facility] Unit @ [place] ([place]): [phone] - Specialized mental health emergency area designed to be calming  [facility] ([place]): [phone]  [facility] ([place]): [phone]  [place]  [facility]): [phone]    [facility] Crisis Information:   [place]: [phone]  [place]: [phone]  [place] LOYOLA) - Adult: [phone]     Child: [phone]  [place] - Adult: [phone]     Child: [phone]  [place]: [phone]  List of all [facility] resources:   https://[email or web address]/dhs/people-[email or web address]/adults/health-care/mental-health/resources/crisis-contacts.jsp    National Crisis Information:   Crisis Text Line: Text  [place]  to 847372  Suicide & Crisis Lifeline: 988  National Suicide Prevention Lifeline: 3-701-731-YZER (1-775.546.2893)       For online chat options, visit https://suicidepreventionlifeline.org/chat/  Poison Control Center: 1-986.198.9972  Trans Lifeline: 1-202.357.7415 - Hotline for transgender people of all ages  [employer]: 2-871-588-9612 - Hotline for LGBT youth     For Non-Emergency Support:   Fast Tracker: Mental Health & Substance Use Disorder Resources -   https://www.[employer].org/
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
## 2023-12-05 ENCOUNTER — NURSE TRIAGE (OUTPATIENT)
Dept: NURSING | Facility: CLINIC | Age: 58
End: 2023-12-05
Payer: MEDICARE

## 2023-12-06 ENCOUNTER — APPOINTMENT (OUTPATIENT)
Dept: GENERAL RADIOLOGY | Facility: CLINIC | Age: 58
End: 2023-12-06
Attending: EMERGENCY MEDICINE
Payer: MEDICARE

## 2023-12-06 ENCOUNTER — HOSPITAL ENCOUNTER (EMERGENCY)
Facility: CLINIC | Age: 58
Discharge: HOME | End: 2023-12-06
Attending: EMERGENCY MEDICINE | Admitting: EMERGENCY MEDICINE
Payer: MEDICARE

## 2023-12-06 VITALS
OXYGEN SATURATION: 97 % | RESPIRATION RATE: 18 BRPM | HEIGHT: 63 IN | BODY MASS INDEX: 24.8 KG/M2 | WEIGHT: 140 LBS | DIASTOLIC BLOOD PRESSURE: 80 MMHG | TEMPERATURE: 98.2 F | HEART RATE: 81 BPM | SYSTOLIC BLOOD PRESSURE: 114 MMHG

## 2023-12-06 DIAGNOSIS — R07.9 ACUTE CHEST PAIN: ICD-10-CM

## 2023-12-06 LAB
ANION GAP SERPL CALCULATED.3IONS-SCNC: 12 MMOL/L (ref 7–15)
BASOPHILS # BLD AUTO: 0 10E3/UL (ref 0–0.2)
BASOPHILS NFR BLD AUTO: 0 %
BUN SERPL-MCNC: 14.2 MG/DL (ref 6–20)
CALCIUM SERPL-MCNC: 9.5 MG/DL (ref 8.6–10)
CHLORIDE SERPL-SCNC: 107 MMOL/L (ref 98–107)
CREAT SERPL-MCNC: 0.82 MG/DL (ref 0.51–0.95)
D DIMER PPP FEU-MCNC: 0.45 UG/ML FEU (ref 0–0.5)
DEPRECATED HCO3 PLAS-SCNC: 23 MMOL/L (ref 22–29)
EGFRCR SERPLBLD CKD-EPI 2021: 82 ML/MIN/1.73M2
EOSINOPHIL # BLD AUTO: 0.2 10E3/UL (ref 0–0.7)
EOSINOPHIL NFR BLD AUTO: 2 %
ERYTHROCYTE [DISTWIDTH] IN BLOOD BY AUTOMATED COUNT: 13.2 % (ref 10–15)
GLUCOSE SERPL-MCNC: 107 MG/DL (ref 70–99)
HCT VFR BLD AUTO: 43.4 % (ref 35–47)
HGB BLD-MCNC: 14.3 G/DL (ref 11.7–15.7)
HOLD SPECIMEN: NORMAL
IMM GRANULOCYTES # BLD: 0 10E3/UL
IMM GRANULOCYTES NFR BLD: 0 %
LYMPHOCYTES # BLD AUTO: 1.8 10E3/UL (ref 0.8–5.3)
LYMPHOCYTES NFR BLD AUTO: 20 %
MCH RBC QN AUTO: 31.4 PG (ref 26.5–33)
MCHC RBC AUTO-ENTMCNC: 32.9 G/DL (ref 31.5–36.5)
MCV RBC AUTO: 95 FL (ref 78–100)
MONOCYTES # BLD AUTO: 0.7 10E3/UL (ref 0–1.3)
MONOCYTES NFR BLD AUTO: 8 %
NEUTROPHILS # BLD AUTO: 6.3 10E3/UL (ref 1.6–8.3)
NEUTROPHILS NFR BLD AUTO: 70 %
NRBC # BLD AUTO: 0 10E3/UL
NRBC BLD AUTO-RTO: 0 /100
PLATELET # BLD AUTO: 368 10E3/UL (ref 150–450)
POTASSIUM SERPL-SCNC: 3.9 MMOL/L (ref 3.4–5.3)
RBC # BLD AUTO: 4.56 10E6/UL (ref 3.8–5.2)
SODIUM SERPL-SCNC: 142 MMOL/L (ref 135–145)
TROPONIN T SERPL HS-MCNC: 6 NG/L
TROPONIN T SERPL HS-MCNC: <6 NG/L
WBC # BLD AUTO: 9 10E3/UL (ref 4–11)

## 2023-12-06 PROCEDURE — 93005 ELECTROCARDIOGRAM TRACING: CPT | Performed by: EMERGENCY MEDICINE

## 2023-12-06 PROCEDURE — 71046 X-RAY EXAM CHEST 2 VIEWS: CPT

## 2023-12-06 PROCEDURE — 36415 COLL VENOUS BLD VENIPUNCTURE: CPT | Performed by: EMERGENCY MEDICINE

## 2023-12-06 PROCEDURE — 80048 BASIC METABOLIC PNL TOTAL CA: CPT | Performed by: EMERGENCY MEDICINE

## 2023-12-06 PROCEDURE — 84484 ASSAY OF TROPONIN QUANT: CPT | Performed by: EMERGENCY MEDICINE

## 2023-12-06 PROCEDURE — 99284 EMERGENCY DEPT VISIT MOD MDM: CPT | Mod: 25 | Performed by: EMERGENCY MEDICINE

## 2023-12-06 PROCEDURE — 99285 EMERGENCY DEPT VISIT HI MDM: CPT | Mod: 25 | Performed by: EMERGENCY MEDICINE

## 2023-12-06 PROCEDURE — 85379 FIBRIN DEGRADATION QUANT: CPT | Performed by: EMERGENCY MEDICINE

## 2023-12-06 PROCEDURE — 93010 ELECTROCARDIOGRAM REPORT: CPT | Performed by: EMERGENCY MEDICINE

## 2023-12-06 PROCEDURE — 85025 COMPLETE CBC W/AUTO DIFF WBC: CPT | Performed by: EMERGENCY MEDICINE

## 2023-12-06 PROCEDURE — 96374 THER/PROPH/DIAG INJ IV PUSH: CPT | Performed by: EMERGENCY MEDICINE

## 2023-12-06 PROCEDURE — 96376 TX/PRO/DX INJ SAME DRUG ADON: CPT | Performed by: EMERGENCY MEDICINE

## 2023-12-06 PROCEDURE — 250N000011 HC RX IP 250 OP 636: Mod: JZ | Performed by: EMERGENCY MEDICINE

## 2023-12-06 PROCEDURE — 250N000013 HC RX MED GY IP 250 OP 250 PS 637: Performed by: EMERGENCY MEDICINE

## 2023-12-06 RX ORDER — FAMOTIDINE 20 MG/1
40 TABLET, FILM COATED ORAL ONCE
Status: COMPLETED | OUTPATIENT
Start: 2023-12-06 | End: 2023-12-06

## 2023-12-06 RX ORDER — OXYCODONE HYDROCHLORIDE 5 MG/1
10 TABLET ORAL ONCE
Status: COMPLETED | OUTPATIENT
Start: 2023-12-06 | End: 2023-12-06

## 2023-12-06 RX ORDER — ONDANSETRON 2 MG/ML
4 INJECTION INTRAMUSCULAR; INTRAVENOUS ONCE
Status: COMPLETED | OUTPATIENT
Start: 2023-12-06 | End: 2023-12-06

## 2023-12-06 RX ORDER — MAGNESIUM HYDROXIDE/ALUMINUM HYDROXICE/SIMETHICONE 120; 1200; 1200 MG/30ML; MG/30ML; MG/30ML
30 SUSPENSION ORAL ONCE
Status: COMPLETED | OUTPATIENT
Start: 2023-12-06 | End: 2023-12-06

## 2023-12-06 RX ADMIN — ONDANSETRON 4 MG: 2 INJECTION INTRAMUSCULAR; INTRAVENOUS at 02:27

## 2023-12-06 RX ADMIN — FAMOTIDINE 40 MG: 20 TABLET, FILM COATED ORAL at 02:52

## 2023-12-06 RX ADMIN — ONDANSETRON 4 MG: 2 INJECTION INTRAMUSCULAR; INTRAVENOUS at 03:23

## 2023-12-06 RX ADMIN — OXYCODONE HYDROCHLORIDE 10 MG: 5 TABLET ORAL at 03:59

## 2023-12-06 ASSESSMENT — ACTIVITIES OF DAILY LIVING (ADL): ADLS_ACUITY_SCORE: 37

## 2023-12-06 NOTE — TELEPHONE ENCOUNTER
"Nurse Triage SBAR    Is this a 2nd Level Triage? NO    Situation: Chest Pain    Background: :Onset at 2230. Patient has hx PE.    Assessment: Patient reports 20 minutes of severe central chest pain with left shoulder pain. She reports pain decreased to 4/10. She denies increase in difficulty breathing, severe weakness, or confusion.    Protocol Recommended Disposition:   According to the protocol, patient should go to ED now.  Care advice given.     CALL EMS IF: * Severe difficulty breathing occurs * Passes out or becomes too weak to stand * You become worse     Patient verbalizes understanding and agrees with plan of care. Plans to go to [place] ED.    Laya Schneider RN  12/05/23 11:35 PM  [facility] Nurse Advisor    Reason for Disposition   Pain also in shoulder(s) or arm(s) or jaw  (Exception: Pain is clearly made worse by movement.)    Additional Information   Negative: SEVERE difficulty breathing (e.g., struggling for each breath, speaks in single words)   Negative: Difficult to awaken or acting confused (e.g., disoriented, slurred speech)   Negative: Shock suspected (e.g., cold/pale/clammy skin, too weak to stand, low BP, rapid pulse)   Negative: Passed out (i.e., lost consciousness, collapsed and was not responding)   Negative: Chest pain lasting longer than 5 minutes and ANY of the following:    history of heart disease  (i.e., heart attack, bypass surgery, angina, angioplasty, CHF; not just a heart murmur)    described as crushing, pressure-like, or heavy    age > 50    age > 30 AND at least one cardiac risk factor (i.e., hypertension, diabetes, obesity, smoker or strong family history of heart disease)    not relieved with nitroglycerin   Negative: Heart beating < 50 beats per minute OR > 140 beats per minute   Negative: Followed a chest injury   Negative: SEVERE chest pain   Negative: [1] Chest pain (or \"angina\") comes and goes AND [2] is happening more often (increasing in frequency) or " getting worse (increasing in severity)  (Exception: Chest pains that last only a few seconds.)    Protocols used: Chest Pain-A-AH

## 2023-12-06 NOTE — DISCHARGE INSTRUCTIONS
I am not sure what caused your symptoms but so far your tests are reassuring.  Return to the emergency department for worsening pain, difficulty breathing, repeated vomiting, or other concerns.  Continue your home medications.  Follow-up in clinic for recheck in the next 1 to 2 weeks.
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
## 2023-12-06 NOTE — ED TRIAGE NOTES
Reports mid sternal chest pain that started around 10:30pm tonight. Radiates to the right shoulder blade and is worse upon inspiration.      Triage Assessment (Adult)       Row Name 12/06/23 0121          Triage Assessment    Airway WDL WDL        Respiratory WDL    Respiratory WDL WDL        Skin Circulation/Temperature WDL    Skin Circulation/Temperature WDL WDL        Cardiac WDL    Cardiac WDL X;chest pain        Peripheral/Neurovascular WDL    Peripheral Neurovascular WDL WDL        Cognitive/Neuro/Behavioral WDL    Cognitive/Neuro/Behavioral WDL WDL

## 2023-12-07 ENCOUNTER — TRANSFERRED RECORDS (OUTPATIENT)
Dept: MULTI SPECIALTY CLINIC | Facility: CLINIC | Age: 58
End: 2023-12-07

## 2023-12-07 LAB — PAP SMEAR - HIM PATIENT REPORTED: NORMAL

## 2023-12-13 ENCOUNTER — VIRTUAL VISIT (OUTPATIENT)
Dept: PSYCHOLOGY | Facility: CLINIC | Age: 58
End: 2023-12-13
Payer: MEDICARE

## 2023-12-13 DIAGNOSIS — F41.1 GAD (GENERALIZED ANXIETY DISORDER): Primary | ICD-10-CM

## 2023-12-13 PROCEDURE — 90837 PSYTX W PT 60 MINUTES: CPT | Mod: VID | Performed by: SOCIAL WORKER

## 2023-12-13 ASSESSMENT — ANXIETY QUESTIONNAIRES
7. FEELING AFRAID AS IF SOMETHING AWFUL MIGHT HAPPEN: NOT AT ALL
GAD7 TOTAL SCORE: 3
1. FEELING NERVOUS, ANXIOUS, OR ON EDGE: SEVERAL DAYS
2. NOT BEING ABLE TO STOP OR CONTROL WORRYING: SEVERAL DAYS
3. WORRYING TOO MUCH ABOUT DIFFERENT THINGS: SEVERAL DAYS
5. BEING SO RESTLESS THAT IT IS HARD TO SIT STILL: NOT AT ALL
GAD7 TOTAL SCORE: 3
IF YOU CHECKED OFF ANY PROBLEMS ON THIS QUESTIONNAIRE, HOW DIFFICULT HAVE THESE PROBLEMS MADE IT FOR YOU TO DO YOUR WORK, TAKE CARE OF THINGS AT HOME, OR GET ALONG WITH OTHER PEOPLE: SOMEWHAT DIFFICULT
6. BECOMING EASILY ANNOYED OR IRRITABLE: NOT AT ALL

## 2023-12-13 ASSESSMENT — PATIENT HEALTH QUESTIONNAIRE - PHQ9
SUM OF ALL RESPONSES TO PHQ QUESTIONS 1-9: 5
5. POOR APPETITE OR OVEREATING: NOT AT ALL
10. IF YOU CHECKED OFF ANY PROBLEMS, HOW DIFFICULT HAVE THESE PROBLEMS MADE IT FOR YOU TO DO YOUR WORK, TAKE CARE OF THINGS AT HOME, OR GET ALONG WITH OTHER PEOPLE: SOMEWHAT DIFFICULT
SUM OF ALL RESPONSES TO PHQ QUESTIONS 1-9: 5

## 2023-12-13 NOTE — PROGRESS NOTES
[facility]                                     Progress Note    Patient Name: Sherly Yeung  Date: 12/13/2023         Service Type: Individual      Session Start Time: 13:03 Session End Time: 13:56    Session Length: 53    Session #: 13    Attendees: Client    Service Modality:  Video Visit:      Provider verified identity through the following two step process.  Patient provided:  Patient is known previously to provider    Telemedicine Visit: The patient's condition can be safely assessed and treated via synchronous audio and visual telemedicine encounter.      Reason for Telemedicine Visit: Services only offered telehealth    Originating Site (Patient Location): Patient's home    Distant Site (Provider Location): Provider Remote Setting- Home Office    Consent:  The patient/guardian has verbally consented to: the potential risks and benefits of telemedicine (video visit) versus in person care; bill my insurance or make self-payment for services provided; and responsibility for payment of non-covered services.     Patient would like the video invitation sent by:  My Chart    Mode of Communication:  Video Conference via Amwell    Distant Location (Provider):  Off-site    As the provider I attest to compliance with applicable laws and regulations related to telemedicine.    DATA  Interactive Complexity: Yes, visit entailed Interactive Complexity evidenced by:  needed more time to talk about her marriage and how she responds to  constructive criticism.    Crisis: No      Progress Since Last Session (Related to Symptoms / Goals / Homework):   Symptoms:  sad, depressed    Homework: Partially completed      Episode of Care Goals: Satisfactory progress - ACTION (Actively working towards change); Intervened by reinforcing change plan / affirming steps taken-  working on understanding her mental health and how it affect her daily.      Current / Ongoing Stressors and Concerns: Patient and writer  "processed her reported concern which was\" I don't know how to take constructive criticism from  because it is making me feel sad which also makes him sad \" she finds this can really affect her marriage unless she knows how to handle her feelings. Patient and writer concluded that patient would need to understand her own mental health and how it prevents her from doing her daily activities which is usually the source of this communication.  Patient will breakdown her daily activities to be able to be productive. Plans to complete her home cleaning by Friday. Patient reported no other concern today.     Increase interest, engagement, and pleasure in doing things  Identify negative self-talk and behaviors: challenge core beliefs, myths, and actions      Intervention:    Grief processing: Actively listening to the patient's report about losing loved ones in a very short time.  Offered empathy and Provided room to process and for support.     CBT : Thoughts challenging with the 3 Cs    MI Intervention: Supported Autonomy, Collaboration, Evocation, Permission to raise concern or advise, and Open-ended questions     Change Talk Expressed by the Patient: Taking steps    Provider Response to Change Talk: E - Evoked more info from patient about behavior change, A - Affirmed patient's thoughts, decisions, or attempts at behavior change, R - Reflected patient's change talk, and S - Summarized patient's change talk statements     Grief processing: To process her loss and achieve acceptance.     Assessments completed prior to visit:2/23/2023    The following assessments were completed by patient for this visit:  PHQ2:       4/27/2022    10:07 AM 8/11/2020     9:37 AM 3/24/2020     3:46 PM 1/19/2012     2:24 PM   PHQ-2 ( 1999 [employer])   Q1: Little interest or pleasure in doing things 0 0 0 0   Q2: Feeling down, depressed or hopeless 0 0 0 1   PHQ-2 Score 0 0 0 1   PHQ-2 Total Score (12-17 Years)- Positive if 3 or more " points; Administer PHQ-A if positive  0 0      PHQ9:       10/2/2023     9:54 AM 10/15/2023     4:00 PM 10/23/2023     9:35 AM 11/16/2023     6:17 AM 11/28/2023    11:28 AM 12/4/2023     7:33 AM 12/13/2023    11:27 AM   PHQ-9 SCORE   PHQ-9 Total Score MyChart 5 (Mild depression) 6 (Mild depression) 7 (Mild depression) 4 (Minimal depression) 5 (Mild depression) 5 (Mild depression) 5 (Mild depression)   PHQ-9 Total Score 5 6 7 4 5 5 5     GAD2:       9/18/2023     7:29 AM 9/28/2023     9:22 AM 10/10/2023     1:58 PM 10/19/2023     2:16 PM 11/16/2023     6:17 AM 11/28/2023    11:28 AM 12/13/2023    11:27 AM   ROCKY-2   Feeling nervous, anxious, or on edge 1 1 2 1 1 2    2 1   Not being able to stop or control worrying 0 0 1 0 1 0    0 1   ROCKY-2 Total Score 1 1 3 1 2 2    2 2     GAD7:       2/23/2023     5:18 PM 4/26/2023    11:04 AM 7/17/2023     9:05 AM 9/18/2023    12:06 PM 10/10/2023     1:58 PM 12/13/2023     1:54 PM   ROCKY-7 SCORE   Total Score     5 (mild anxiety)    Total Score 7 2 2 5 5 3     CAGE-AID:       2/9/2023    11:52 AM 7/17/2023     9:10 AM   CAGE-AID Total Score   Total Score 0 0   Total Score MyChart 0 (A total score of 2 or greater is considered clinically significant)      PROMIS 10-Global Health (all questions and answers displayed):       6/22/2023     5:20 AM 7/17/2023     9:10 AM 7/30/2023     5:28 PM 10/29/2023     9:12 AM 11/16/2023     6:20 AM 11/28/2023    11:30 AM 12/13/2023    11:29 AM   PROMIS 10   In general, would you say your health is: Good  Good Good Good Good Good   In general, would you say your quality of life is: Good  Very good Good Very good Very good Good   In general, how would you rate your physical health? Good  Good Good Good Good Good   In general, how would you rate your mental health, including your mood and your ability to think? Good  Good Good Very good Good Good   In general, how would you rate your satisfaction with your social activities and relationships? Very  good  Very good Very good Good Good Good   In general, please rate how well you carry out your usual social activities and roles Good  Good Good Good Good Fair   To what extent are you able to carry out your everyday physical activities such as walking, climbing stairs, carrying groceries, or moving a chair? Moderately  A little Moderately Moderately Moderately A little   In the past 7 days, how often have you been bothered by emotional problems such as feeling anxious, depressed, or irritable? Often  Sometimes Sometimes Often Often Often   In the past 7 days, how would you rate your fatigue on average? Moderate  Moderate Moderate Moderate Moderate Moderate   In the past 7 days, how would you rate your pain on average, where 0 means no pain, and 10 means worst imaginable pain? 5  5 5 5 5 6   In general, would you say your health is: 3 3 3 3 3 3 3   In general, would you say your quality of life is: 3 4 4 3 4 4 3   In general, how would you rate your physical health? 3 3 3 3 3 3 3   In general, how would you rate your mental health, including your mood and your ability to think? 3 3 3 3 4 3 3   In general, how would you rate your satisfaction with your social activities and relationships? 4 5 4 4 3 3 3   In general, please rate how well you carry out your usual social activities and roles. (This includes activities at home, at work and in your community, and responsibilities as a parent, child, spouse, employee, friend, etc.) 3 3 3 3 3 3 2   To what extent are you able to carry out your everyday physical activities such as walking, climbing stairs, carrying groceries, or moving a chair? 3 3 2 3 3 3 2   In the past 7 days, how often have you been bothered by emotional problems such as feeling anxious, depressed, or irritable? 4 3 3 3 4 4 4   In the past 7 days, how would you rate your fatigue on average? 3 3 3 3 3 3 3   In the past 7 days, how would you rate your pain on average, where 0 means no pain, and 10 means  worst imaginable pain? 5 5 5 5 5 5 6   Global Mental Health Score 12 15 14 13 13 12 11   Global Physical Health Score 12 12 11 12 12 12 11   PROMIS TOTAL - SUBSCORES 24 27 25 25 25 24 22     PROMIS 10-Global Health (only subscores and total score):       6/22/2023     5:20 AM 7/17/2023     9:10 AM 7/30/2023     5:28 PM 10/29/2023     9:12 AM 11/16/2023     6:20 AM 11/28/2023    11:30 AM 12/13/2023    11:29 AM   PROMIS-10 Scores Only   Global Mental Health Score 12 15 14 13 13 12 11   Global Physical Health Score 12 12 11 12 12 12 11   PROMIS TOTAL - SUBSCORES 24 27 25 25 25 24 22     [place] Suicide Severity Rating Scale (Short Version)      9/30/2022     5:03 PM 11/28/2022     9:49 PM 2/10/2023     9:55 AM 3/20/2023     3:28 PM 7/17/2023     9:09 AM 9/13/2023     6:03 AM 12/6/2023     1:21 AM   [place] Suicide Severity Rating (Short Version)   Over the past 2 weeks have you felt down, depressed, or hopeless? no no  no  yes no   Over the past 2 weeks have you had thoughts of killing yourself? no no  no  no no   Have you ever attempted to kill yourself? no no  no  no no   1. Wish to be Dead (Since Last Contact)   N  N     2. Non-Specific Active Suicidal Thoughts (Since Last Contact)   N  N     Actual Attempt (Since Last Contact)   N  N     Has subject engaged in non-suicidal self-injurious behavior? (Since Last Contact)   N  N     Interrupted Attempts (Since Last Contact)   N  N     Aborted or Self-Interrupted Attempt (Since Last Contact)   N  N     Preparatory Acts or Behavior (Since Last Contact)   N  N     Suicide (Since Last Contact)   N  N     Calculated C-SSRS Risk Score (Since Last Contact)   No Risk Indicated  No Risk Indicated           ASSESSMENT: Current Emotional / Mental Status (status of significant symptoms):   Risk status (Self / Other harm or suicidal ideation)   Patient denies current fears or concerns for personal safety.   Patient denies current or recent suicidal ideation or  behaviors.   Patient denies current or recent homicidal ideation or behaviors.   Patient denies current or recent self injurious behavior or ideation.   Patient denies other safety concerns.   Patient reports there has been no change in risk factors since their last session.     Patient reports there has been no change in protective factors since their last session.     Recommended that patient call 911 or go to the local ED should there be a change in any of these risk factors.  Call [phone] if experiencing SI     Appearance:   Appropriate    Eye Contact:   Good    Psychomotor Behavior: Normal    Attitude:   Cooperative    Orientation:   Person Place Time Situation   Speech    Rate / Production: Normal     Volume:  Normal    Mood:    Anxious  Sad    Affect:    Appropriate    Thought Content:  Clear    Thought Form:  Coherent  Logical    Insight:    Good      Medication Review:   No changes to current psychiatric medication(s)     Medication Compliance:   Yes     Changes in Health Issues:   None reported     Chemical Use Review:   Substance Use: Chemical use reviewed, no active concerns identified      Tobacco Use: No current tobacco use.      Diagnosis:  1. ROCKY (generalized anxiety disorder)      Collateral Reports Completed:   Not Applicable    PLAN: (Patient Tasks / Therapist Tasks / Other):   Patient will reflect on today's discussion about self defeating behaviors  Patient will breakdown her daily tasks to clean and organize her home  Patient's next visit is on 12/27/2023    GERMAN Farmer           ______________________________________________    Individual Treatment Plan    Patient's Name: Sherly Yeung  YOB: 1965    Date of Creation: 2/23/2023    Date Treatment Plan Last Reviewed/Revised: 10/16/2023    DSM5 Diagnoses: 296.32 (F33.1) Major Depressive Disorder, Recurrent Episode, Moderate With anxious distress or 300.02 (F41.1) Generalized Anxiety Disorder  Grief reaction  "[F43.21]    Psychosocial / Contextual Factors: grief: father recently passed away. Family dynamic- relationship with son, mom. Feeling overwhelmed.    PROMIS (reviewed every 90 days): 25    Referral / Collaboration:    Referral to another professional/service is not indicated at this time..    Anticipated number of session for this episode of care: 9-12 sessions  Anticipation frequency of session: Biweekly  Anticipated Duration of each session: 53 or more minutes  Treatment plan will be reviewed in 90 days or when goals have been changed.     MeasurableTreatment Goal(s) related to diagnosis / functional impairment(s)    Goal 1: Patient will Accept the loss of beloved one and return to stable level of functioning as evidenced by a higher level of functioning as a result of acceptance.   Redevelop a supportive social system and improve interpersonal relationships and Express unresolved emotions regarding loss which brings anxiety and depression mood.      I will know I've met my goal when I have more energy and  I am able to celebrate good life with my father Vs the sadness of losing him.Redevelop a supportive social system and improve interpersonal relationships\"    Objective #A (Patient Action)    Patient will increase understanding of steps in the grief process.  Status: Continued - Date(s): 10/16/2023    Intervention(s)  Therapist will teach emotional recognition/identification. : defining happiness in your own term without father .    Objective #B  Patient will identify at least 3 fears / thoughts that contribute to feeling anxious.  Status: Continued - Date(s): 10/16/2023    Intervention(s)  Therapist will teach thought challenging with CBT .    Objective #C  Patient will Identify negative self-talk and behaviors: challenge core beliefs, myths, and actions.  Status: Continued - Date(s): 10/16/2023    Intervention(s)  Therapist will provide space and time to process thoughts and feelings around current stressors. " provide support and coping skills to help move on in life .    Patient has reviewed and agreed to the above plan.    Erika Martinezkatherinemasood [facility]  10/16/2023    Answers for HPI/ROS submitted by the patient on 4/20/2023  If you checked off any problems, how difficult have these problems made it for you to do your work, take care of things at home, or get along with other people?: Somewhat difficult  PHQ9 TOTAL SCORE: 5    Answers for HPI/ROS submitted by the patient on 4/26/2023  If you checked off any problems, how difficult have these problems made it for you to do your work, take care of things at home, or get along with other people?: Somewhat difficult  PHQ9 TOTAL SCORE: 3    Answers for HPI/ROS submitted by the patient on 5/4/2023  If you checked off any problems, how difficult have these problems made it for you to do your work, take care of things at home, or get along with other people?: Somewhat difficult  PHQ9 TOTAL SCORE: 4  Answers for HPI/ROS submitted by the patient on 7/17/2023  If you checked off any problems, how difficult have these problems made it for you to do your work, take care of things at home, or get along with other people?: Somewhat difficult  PHQ9 TOTAL SCORE: 5    Answers submitted by the patient for this visit:  Patient Health Questionnaire (Submitted on 7/30/2023)  If you checked off any problems, how difficult have these problems made it for you to do your work, take care of things at home, or get along with other people?: Somewhat difficult  PHQ9 TOTAL SCORE: 5  Answers submitted by the patient for this visit:  Patient Health Questionnaire (Submitted on 8/20/2023)  If you checked off any problems, how difficult have these problems made it for you to do your work, take care of things at home, or get along with other people?: Somewhat difficult  PHQ9 TOTAL SCORE: 5  Answers submitted by the patient for this visit:  Patient Health Questionnaire (Submitted on 9/18/2023)  If you checked  off any problems, how difficult have these problems made it for you to do your work, take care of things at home, or get along with other people?: Somewhat difficult  PHQ9 TOTAL SCORE: 4  Answers submitted by the patient for this visit:  Patient Health Questionnaire (Submitted on 10/2/2023)  If you checked off any problems, how difficult have these problems made it for you to do your work, take care of things at home, or get along with other people?: Somewhat difficult  PHQ9 TOTAL SCORE: 5    Answers submitted by the patient for this visit:  Patient Health Questionnaire (Submitted on 10/15/2023)  If you checked off any problems, how difficult have these problems made it for you to do your work, take care of things at home, or get along with other people?: Somewhat difficult  PHQ9 TOTAL SCORE: 6  ROCKY-7 (Submitted on 10/10/2023)  ROCKY 7 TOTAL SCORE: 5    Answers submitted by the patient for this visit:  Patient Health Questionnaire (Submitted on 11/16/2023)  If you checked off any problems, how difficult have these problems made it for you to do your work, take care of things at home, or get along with other people?: Somewhat difficult  PHQ9 TOTAL SCORE: 4    Answers submitted by the patient for this visit:  Patient Health Questionnaire (Submitted on 11/28/2023)  If you checked off any problems, how difficult have these problems made it for you to do your work, take care of things at home, or get along with other people?: Somewhat difficult  PHQ9 TOTAL SCORE: 5    Answers submitted by the patient for this visit:  Patient Health Questionnaire (Submitted on 12/13/2023)  If you checked off any problems, how difficult have these problems made it for you to do your work, take care of things at home, or get along with other people?: Somewhat difficult  PHQ9 TOTAL SCORE: 5

## 2023-12-18 ENCOUNTER — TELEPHONE (OUTPATIENT)
Dept: PSYCHIATRY | Facility: CLINIC | Age: 58
End: 2023-12-18
Payer: MEDICARE

## 2023-12-18 NOTE — TELEPHONE ENCOUNTER
"Writer spoke with Marcia. She reports having increased stress related to travel and \"lots of things going on.\" She is requesting to have Ativan prn prescribed for anxiety, which she reports taking three years ago.   She denies any other changes that may be contributing to anxiety.  Marcia is taking scheduled medications and denies any side effects. Denies any safety concerns, including SI/SIB.   Reports she was prescribed quetiapine prn in the past, which was too sedating, and she feels that Ativan was more beneficial.  Writer explained that Dr Tineo would be updated of her request, and they would likely need to discuss this in a visit.   Will update Dr Tineo.  "

## 2023-12-18 NOTE — TELEPHONE ENCOUNTER
[facility] Call Center    Phone Message    May a detailed message be left on voicemail: yes     Reason for Call: Other: Patient called wanting to leave a message for their provider regarding requesting to start a new prescription for Ativan. The patient's current anxiety medication is too sedating, and the patient reports having used Ativan in the past with no issues. They were okay with getting a call-back.      Action Taken: Message routed to:  Other: [facility] psychiatry nurse Hines    Travel Screening: Not Applicable

## 2023-12-18 NOTE — TELEPHONE ENCOUNTER
Jevon Tineo MD  You8 minutes ago (2:36 PM)     JA  Great,       That would definitely need to be discussed at an appointment.    Jevon Tineo MD   Sent [employer] message with update and available visits to pt.

## 2023-12-20 DIAGNOSIS — F33.41 RECURRENT MAJOR DEPRESSIVE DISORDER, IN PARTIAL REMISSION (H): ICD-10-CM

## 2023-12-21 ENCOUNTER — TRANSFERRED RECORDS (OUTPATIENT)
Dept: HEALTH INFORMATION MANAGEMENT | Facility: CLINIC | Age: 58
End: 2023-12-21
Payer: MEDICARE

## 2023-12-21 RX ORDER — BUPROPION HYDROCHLORIDE 100 MG/1
100 TABLET, EXTENDED RELEASE ORAL EVERY MORNING
Qty: 30 TABLET | Refills: 1 | OUTPATIENT
Start: 2023-12-21

## 2023-12-27 ENCOUNTER — VIRTUAL VISIT (OUTPATIENT)
Dept: PSYCHIATRY | Facility: CLINIC | Age: 58
End: 2023-12-27
Attending: PSYCHIATRY & NEUROLOGY
Payer: MEDICARE

## 2023-12-27 ENCOUNTER — VIRTUAL VISIT (OUTPATIENT)
Dept: PSYCHOLOGY | Facility: CLINIC | Age: 58
End: 2023-12-27
Payer: MEDICARE

## 2023-12-27 DIAGNOSIS — F33.41 RECURRENT MAJOR DEPRESSIVE DISORDER, IN PARTIAL REMISSION (H): ICD-10-CM

## 2023-12-27 DIAGNOSIS — F41.9 ANXIETY DISORDER, UNSPECIFIED TYPE: Primary | ICD-10-CM

## 2023-12-27 DIAGNOSIS — F41.1 GAD (GENERALIZED ANXIETY DISORDER): Primary | ICD-10-CM

## 2023-12-27 PROCEDURE — 90833 PSYTX W PT W E/M 30 MIN: CPT | Mod: VID | Performed by: PSYCHIATRY & NEUROLOGY

## 2023-12-27 PROCEDURE — 90837 PSYTX W PT 60 MINUTES: CPT | Mod: VID | Performed by: SOCIAL WORKER

## 2023-12-27 PROCEDURE — 99214 OFFICE O/P EST MOD 30 MIN: CPT | Mod: VID | Performed by: PSYCHIATRY & NEUROLOGY

## 2023-12-27 RX ORDER — DULOXETIN HYDROCHLORIDE 30 MG/1
60 CAPSULE, DELAYED RELEASE ORAL EVERY EVENING
Qty: 60 CAPSULE | Refills: 2 | Status: SHIPPED | OUTPATIENT
Start: 2023-12-27 | End: 2024-01-12

## 2023-12-27 RX ORDER — GABAPENTIN 300 MG/1
300 CAPSULE ORAL 2 TIMES DAILY PRN
Qty: 60 CAPSULE | Refills: 1 | Status: SHIPPED | OUTPATIENT
Start: 2023-12-27 | End: 2024-01-12

## 2023-12-27 RX ORDER — BUPROPION HYDROCHLORIDE 200 MG/1
200 TABLET, EXTENDED RELEASE ORAL EVERY MORNING
Qty: 30 TABLET | Refills: 0 | Status: SHIPPED | OUTPATIENT
Start: 2023-12-27 | End: 2024-01-12

## 2023-12-27 NOTE — NURSING NOTE
Is the patient currently in the state of [place]? YES    Visit mode:VIDEO    If the visit is dropped, the patient can be reconnected by: VIDEO VISIT: Text to cell phone:   Telephone Information:   Mobile [phone]       Will anyone else be joining the visit? NO  (If patient encounters technical issues they should call [phone]    How would you like to obtain your AVS? MyChart    Are changes needed to the allergy or medication list? No    Reason for visit: No chief complaint on file.    Zofia ACOSTAF

## 2023-12-27 NOTE — PATIENT INSTRUCTIONS
It was nice to meet you today. Here is what we discussed:    -Stop taking Abilify    -Start using gabapentin 300 mg up to twice per day as needed for anxiety    -Continue good portion control for weight loss    Jevon Tineo MD  [facility]
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
## 2023-12-27 NOTE — PROGRESS NOTES
[facility]                                     Progress Note    Patient Name: Sherly Yeung  Date: 12/27/223         Service Type: Individual      Session Start Time: 13:05 Session End Time: 13:58    Session Length: 53    Session #: 14    Attendees: Client    Service Modality:  Video Visit:      Provider verified identity through the following two step process.  Patient provided:  Patient is known previously to provider    Telemedicine Visit: The patient's condition can be safely assessed and treated via synchronous audio and visual telemedicine encounter.      Reason for Telemedicine Visit: Services only offered telehealth    Originating Site (Patient Location): Patient's home    Distant Site (Provider Location): Provider Remote Setting- Home Office    Consent:  The patient/guardian has verbally consented to: the potential risks and benefits of telemedicine (video visit) versus in person care; bill my insurance or make self-payment for services provided; and responsibility for payment of non-covered services.     Patient would like the video invitation sent by:  My Chart    Mode of Communication:  Video Conference via Amwell    Distant Location (Provider):  Off-site    As the provider I attest to compliance with applicable laws and regulations related to telemedicine.    DATA  Interactive Complexity: Yes, visit entailed Interactive Complexity evidenced by:  needed more time to talk about her marriage and how she responds to  constructive criticism.    Crisis: No      Progress Since Last Session (Related to Symptoms / Goals / Homework):   Symptoms:  sad, depressed    Homework: Partially completed      Episode of Care Goals: Satisfactory progress - ACTION (Actively working towards change); Intervened by reinforcing change plan / affirming steps taken-  working on understanding her mental health and how it affect her daily.      Current / Ongoing Stressors and Concerns: Patient shared some  stress due to the changes that took place around both [place] and [place]. The power went off in her area and she and  ended up celebrating at her brother's.Then did was able to celebrate at her 's father after some miscommunication about the timing to meet. Patient notes she was able to overcome the stress but shared the greatest recognition of her own effort keep calm and remember to focus on one thing at time. Plans to host another group of family members. Some less stress since her mom won't be there to criticize her about not cleaning like her. Patient notes she otherwise feels good.continues to practice her skills and gets involved in her teenager niece with some emotional issues. Her next visit is in 2 weeks.         Treatment Objective(s) Addressed in This Session:        Use thought-stopping strategy daily to reduce intrusive thoughts     Intervention:    CBT : Thoughts challenging with the 3 Cs. Reinforced     MI Intervention: Supported Autonomy, Collaboration, Evocation, Permission to raise concern or advise, and Open-ended questions     Change Talk Expressed by the Patient: Taking steps    Provider Response to Change Talk: E - Evoked more info from patient about behavior change, A - Affirmed patient's thoughts, decisions, or attempts at behavior change, R - Reflected patient's change talk, and S - Summarized patient's change talk statements     Grief processing: To process her loss and achieve acceptance.     Assessments completed prior to visit:2/23/2023    The following assessments were completed by patient for this visit:  PHQ2:       12/27/2023     8:39 AM 4/27/2022    10:07 AM 8/11/2020     9:37 AM 3/24/2020     3:46 PM 1/19/2012     2:24 PM   PHQ-2 ( 1999 [employer])   Q1: Little interest or pleasure in doing things 0 0 0 0 0   Q2: Feeling down, depressed or hopeless 1 0 0 0 1   PHQ-2 Score 1 0 0 0 1   PHQ-2 Total Score (12-17 Years)- Positive if 3 or more points; Administer PHQ-A if  positive   0 0      PHQ9:       10/15/2023     4:00 PM 10/23/2023     9:35 AM 11/16/2023     6:17 AM 11/28/2023    11:28 AM 12/4/2023     7:33 AM 12/13/2023    11:27 AM 12/26/2023    11:42 AM   PHQ-9 SCORE   PHQ-9 Total Score MyChart 6 (Mild depression) 7 (Mild depression) 4 (Minimal depression) 5 (Mild depression) 5 (Mild depression) 5 (Mild depression) 5 (Mild depression)   PHQ-9 Total Score 6 7 4 5 5 5 5     GAD2:       9/28/2023     9:22 AM 10/10/2023     1:58 PM 10/19/2023     2:16 PM 11/16/2023     6:17 AM 11/28/2023    11:28 AM 12/13/2023    11:27 AM 12/26/2023    11:43 AM   ROCKY-2   Feeling nervous, anxious, or on edge 1 2 1 1 2 1 1   Not being able to stop or control worrying 0 1 0 1 0 1 1   ROCKY-2 Total Score 1 3 1 2 2    2 2 2     GAD7:       2/23/2023     5:18 PM 4/26/2023    11:04 AM 7/17/2023     9:05 AM 9/18/2023    12:06 PM 10/10/2023     1:58 PM 12/13/2023     1:54 PM   ROCKY-7 SCORE   Total Score     5 (mild anxiety)    Total Score 7 2 2 5 5 3     CAGE-AID:       2/9/2023    11:52 AM 7/17/2023     9:10 AM   CAGE-AID Total Score   Total Score 0 0   Total Score MyChart 0 (A total score of 2 or greater is considered clinically significant)      PROMIS 10-Global Health (all questions and answers displayed):       7/17/2023     9:10 AM 7/30/2023     5:28 PM 10/29/2023     9:12 AM 11/16/2023     6:20 AM 11/28/2023    11:30 AM 12/13/2023    11:29 AM 12/26/2023    11:45 AM   PROMIS 10   In general, would you say your health is:  Good Good Good Good Good Good   In general, would you say your quality of life is:  Very good Good Very good Very good Good Good   In general, how would you rate your physical health?  Good Good Good Good Good Good   In general, how would you rate your mental health, including your mood and your ability to think?  Good Good Very good Good Good Good   In general, how would you rate your satisfaction with your social activities and relationships?  Very good Very good Good Good Good Very  good   In general, please rate how well you carry out your usual social activities and roles  Good Good Good Good Fair Good   To what extent are you able to carry out your everyday physical activities such as walking, climbing stairs, carrying groceries, or moving a chair?  A little Moderately Moderately Moderately A little Moderately   In the past 7 days, how often have you been bothered by emotional problems such as feeling anxious, depressed, or irritable?  Sometimes Sometimes Often Often Often Often   In the past 7 days, how would you rate your fatigue on average?  Moderate Moderate Moderate Moderate Moderate Moderate   In the past 7 days, how would you rate your pain on average, where 0 means no pain, and 10 means worst imaginable pain?  5 5 5 5 6 6   In general, would you say your health is: 3 3 3 3 3 3 3   In general, would you say your quality of life is: 4 4 3 4 4 3 3   In general, how would you rate your physical health? 3 3 3 3 3 3 3   In general, how would you rate your mental health, including your mood and your ability to think? 3 3 3 4 3 3 3   In general, how would you rate your satisfaction with your social activities and relationships? 5 4 4 3 3 3 4   In general, please rate how well you carry out your usual social activities and roles. (This includes activities at home, at work and in your community, and responsibilities as a parent, child, spouse, employee, friend, etc.) 3 3 3 3 3 2 3   To what extent are you able to carry out your everyday physical activities such as walking, climbing stairs, carrying groceries, or moving a chair? 3 2 3 3 3 2 3   In the past 7 days, how often have you been bothered by emotional problems such as feeling anxious, depressed, or irritable? 3 3 3 4 4 4 4   In the past 7 days, how would you rate your fatigue on average? 3 3 3 3 3 3 3   In the past 7 days, how would you rate your pain on average, where 0 means no pain, and 10 means worst imaginable pain? 5 5 5 5 5 6 6    Global Mental Health Score 15 14 13 13 12 11 12   Global Physical Health Score 12 11 12 12 12 11 12   PROMIS TOTAL - SUBSCORES 27 25 25 25 24 22 24     PROMIS 10-Global Health (only subscores and total score):       7/17/2023     9:10 AM 7/30/2023     5:28 PM 10/29/2023     9:12 AM 11/16/2023     6:20 AM 11/28/2023    11:30 AM 12/13/2023    11:29 AM 12/26/2023    11:45 AM   PROMIS-10 Scores Only   Global Mental Health Score 15 14 13 13 12 11 12   Global Physical Health Score 12 11 12 12 12 11 12   PROMIS TOTAL - SUBSCORES 27 25 25 25 24 22 24     [place] Suicide Severity Rating Scale (Short Version)      9/30/2022     5:03 PM 11/28/2022     9:49 PM 2/10/2023     9:55 AM 3/20/2023     3:28 PM 7/17/2023     9:09 AM 9/13/2023     6:03 AM 12/6/2023     1:21 AM   [place] Suicide Severity Rating (Short Version)   Over the past 2 weeks have you felt down, depressed, or hopeless? no no  no  yes no   Over the past 2 weeks have you had thoughts of killing yourself? no no  no  no no   Have you ever attempted to kill yourself? no no  no  no no   1. Wish to be Dead (Since Last Contact)   N  N     2. Non-Specific Active Suicidal Thoughts (Since Last Contact)   N  N     Actual Attempt (Since Last Contact)   N  N     Has subject engaged in non-suicidal self-injurious behavior? (Since Last Contact)   N  N     Interrupted Attempts (Since Last Contact)   N  N     Aborted or Self-Interrupted Attempt (Since Last Contact)   N  N     Preparatory Acts or Behavior (Since Last Contact)   N  N     Suicide (Since Last Contact)   N  N     Calculated C-SSRS Risk Score (Since Last Contact)   No Risk Indicated  No Risk Indicated           ASSESSMENT: Current Emotional / Mental Status (status of significant symptoms):   Risk status (Self / Other harm or suicidal ideation)   Patient denies current fears or concerns for personal safety.   Patient denies current or recent suicidal ideation or behaviors.   Patient denies current or recent homicidal  ideation or behaviors.   Patient denies current or recent self injurious behavior or ideation.   Patient denies other safety concerns.   Patient reports there has been no change in risk factors since their last session.     Patient reports there has been no change in protective factors since their last session.     Recommended that patient call 911 or go to the local ED should there be a change in any of these risk factors.  Call [phone] if experiencing SI     Appearance:   Appropriate    Eye Contact:   Good    Psychomotor Behavior: Normal    Attitude:   Cooperative    Orientation:   Person Place Time Situation   Speech    Rate / Production: Normal     Volume:  Normal    Mood:    Anxious  Sad    Affect:    Appropriate    Thought Content:  Clear    Thought Form:  Coherent  Logical    Insight:    Good      Medication Review:   No changes to current psychiatric medication(s)     Medication Compliance:   Yes     Changes in Health Issues:   None reported     Chemical Use Review:   Substance Use: Chemical use reviewed, no active concerns identified      Tobacco Use: No current tobacco use.      Diagnosis:  1. ROCKY (generalized anxiety disorder)      Collateral Reports Completed:   Not Applicable    PLAN: (Patient Tasks / Therapist Tasks / Other):   Patient will review will reflect on today's session about family and her own self care  Patient's next visit is on 1/10/2024    GERMAN Farmer           ______________________________________________    Individual Treatment Plan    Patient's Name: Sherly Yeung  YOB: 1965    Date of Creation: 2/23/2023    Date Treatment Plan Last Reviewed/Revised: 10/16/2023    DSM5 Diagnoses: 296.32 (F33.1) Major Depressive Disorder, Recurrent Episode, Moderate With anxious distress or 300.02 (F41.1) Generalized Anxiety Disorder  Grief reaction [F43.21]    Psychosocial / Contextual Factors: grief: father recently passed away. Family dynamic- relationship with son, mom.  "Feeling overwhelmed.    PROMIS (reviewed every 90 days): 25    Referral / Collaboration:    Referral to another professional/service is not indicated at this time..    Anticipated number of session for this episode of care: 9-12 sessions  Anticipation frequency of session: Biweekly  Anticipated Duration of each session: 53 or more minutes  Treatment plan will be reviewed in 90 days or when goals have been changed.     MeasurableTreatment Goal(s) related to diagnosis / functional impairment(s)    Goal 1: Patient will Accept the loss of beloved one and return to stable level of functioning as evidenced by a higher level of functioning as a result of acceptance.   Redevelop a supportive social system and improve interpersonal relationships and Express unresolved emotions regarding loss which brings anxiety and depression mood.      I will know I've met my goal when I have more energy and  I am able to celebrate good life with my father Vs the sadness of losing him.Redevelop a supportive social system and improve interpersonal relationships\"    Objective #A (Patient Action)    Patient will increase understanding of steps in the grief process.  Status: Continued - Date(s): 10/16/2023    Intervention(s)  Therapist will teach emotional recognition/identification. : defining happiness in your own term without father .    Objective #B  Patient will identify at least 3 fears / thoughts that contribute to feeling anxious.  Status: Continued - Date(s): 10/16/2023    Intervention(s)  Therapist will teach thought challenging with CBT .    Objective #C  Patient will Identify negative self-talk and behaviors: challenge core beliefs, myths, and actions.  Status: Continued - Date(s): 10/16/2023    Intervention(s)  Therapist will provide space and time to process thoughts and feelings around current stressors. provide support and coping skills to help move on in life .    Patient has reviewed and agreed to the above plan.    [facility] " [facility]  10/16/2023    Answers for HPI/ROS submitted by the patient on 4/20/2023  If you checked off any problems, how difficult have these problems made it for you to do your work, take care of things at home, or get along with other people?: Somewhat difficult  PHQ9 TOTAL SCORE: 5    Answers for HPI/ROS submitted by the patient on 4/26/2023  If you checked off any problems, how difficult have these problems made it for you to do your work, take care of things at home, or get along with other people?: Somewhat difficult  PHQ9 TOTAL SCORE: 3    Answers for HPI/ROS submitted by the patient on 5/4/2023  If you checked off any problems, how difficult have these problems made it for you to do your work, take care of things at home, or get along with other people?: Somewhat difficult  PHQ9 TOTAL SCORE: 4  Answers for HPI/ROS submitted by the patient on 7/17/2023  If you checked off any problems, how difficult have these problems made it for you to do your work, take care of things at home, or get along with other people?: Somewhat difficult  PHQ9 TOTAL SCORE: 5    Answers submitted by the patient for this visit:  Patient Health Questionnaire (Submitted on 7/30/2023)  If you checked off any problems, how difficult have these problems made it for you to do your work, take care of things at home, or get along with other people?: Somewhat difficult  PHQ9 TOTAL SCORE: 5  Answers submitted by the patient for this visit:  Patient Health Questionnaire (Submitted on 8/20/2023)  If you checked off any problems, how difficult have these problems made it for you to do your work, take care of things at home, or get along with other people?: Somewhat difficult  PHQ9 TOTAL SCORE: 5  Answers submitted by the patient for this visit:  Patient Health Questionnaire (Submitted on 9/18/2023)  If you checked off any problems, how difficult have these problems made it for you to do your work, take care of things at home, or get along  with other people?: Somewhat difficult  PHQ9 TOTAL SCORE: 4  Answers submitted by the patient for this visit:  Patient Health Questionnaire (Submitted on 10/2/2023)  If you checked off any problems, how difficult have these problems made it for you to do your work, take care of things at home, or get along with other people?: Somewhat difficult  PHQ9 TOTAL SCORE: 5    Answers submitted by the patient for this visit:  Patient Health Questionnaire (Submitted on 10/15/2023)  If you checked off any problems, how difficult have these problems made it for you to do your work, take care of things at home, or get along with other people?: Somewhat difficult  PHQ9 TOTAL SCORE: 6  ROCKY-7 (Submitted on 10/10/2023)  ROCKY 7 TOTAL SCORE: 5    Answers submitted by the patient for this visit:  Patient Health Questionnaire (Submitted on 11/16/2023)  If you checked off any problems, how difficult have these problems made it for you to do your work, take care of things at home, or get along with other people?: Somewhat difficult  PHQ9 TOTAL SCORE: 4    Answers submitted by the patient for this visit:  Patient Health Questionnaire (Submitted on 11/28/2023)  If you checked off any problems, how difficult have these problems made it for you to do your work, take care of things at home, or get along with other people?: Somewhat difficult  PHQ9 TOTAL SCORE: 5    Answers submitted by the patient for this visit:  Patient Health Questionnaire (Submitted on 12/13/2023)  If you checked off any problems, how difficult have these problems made it for you to do your work, take care of things at home, or get along with other people?: Somewhat difficult  PHQ9 TOTAL SCORE: 5    Answers submitted by the patient for this visit:  Patient Health Questionnaire (Submitted on 12/26/2023)  If you checked off any problems, how difficult have these problems made it for you to do your work, take care of things at home, or get along with other people?: Somewhat  difficult  PHQ9 TOTAL SCORE: 5

## 2024-01-03 ENCOUNTER — MYC MEDICAL ADVICE (OUTPATIENT)
Dept: UROLOGY | Facility: CLINIC | Age: 59
End: 2024-01-03
Payer: MEDICARE

## 2024-01-04 ENCOUNTER — LAB (OUTPATIENT)
Dept: LAB | Facility: CLINIC | Age: 59
End: 2024-01-04
Payer: MEDICARE

## 2024-01-04 DIAGNOSIS — R30.0 DYSURIA: Primary | ICD-10-CM

## 2024-01-04 DIAGNOSIS — N30.00 ACUTE CYSTITIS WITHOUT HEMATURIA: Primary | ICD-10-CM

## 2024-01-04 DIAGNOSIS — R30.0 DYSURIA: ICD-10-CM

## 2024-01-04 LAB
ALBUMIN UR-MCNC: 30 MG/DL
APPEARANCE UR: CLEAR
BILIRUB UR QL STRIP: NEGATIVE
COLOR UR AUTO: YELLOW
GLUCOSE UR STRIP-MCNC: NEGATIVE MG/DL
HGB UR QL STRIP: ABNORMAL
KETONES UR STRIP-MCNC: NEGATIVE MG/DL
LEUKOCYTE ESTERASE UR QL STRIP: ABNORMAL
NITRATE UR QL: NEGATIVE
PH UR STRIP: 7 [PH] (ref 5–7)
RBC #/AREA URNS AUTO: ABNORMAL /HPF
SP GR UR STRIP: 1.02 (ref 1–1.03)
SQUAMOUS #/AREA URNS AUTO: ABNORMAL /LPF
UROBILINOGEN UR STRIP-ACNC: 0.2 E.U./DL
WBC #/AREA URNS AUTO: ABNORMAL /HPF

## 2024-01-04 PROCEDURE — 87086 URINE CULTURE/COLONY COUNT: CPT

## 2024-01-04 PROCEDURE — 81001 URINALYSIS AUTO W/SCOPE: CPT

## 2024-01-04 PROCEDURE — 87186 SC STD MICRODIL/AGAR DIL: CPT

## 2024-01-04 RX ORDER — LEVOFLOXACIN 500 MG/1
500 TABLET, FILM COATED ORAL DAILY
Qty: 7 TABLET | Refills: 0 | Status: SHIPPED | OUTPATIENT
Start: 2024-01-04 | End: 2024-01-11

## 2024-01-06 LAB — BACTERIA UR CULT: ABNORMAL

## 2024-01-07 ASSESSMENT — ANXIETY QUESTIONNAIRES
GAD7 TOTAL SCORE: 5
3. WORRYING TOO MUCH ABOUT DIFFERENT THINGS: SEVERAL DAYS
8. IF YOU CHECKED OFF ANY PROBLEMS, HOW DIFFICULT HAVE THESE MADE IT FOR YOU TO DO YOUR WORK, TAKE CARE OF THINGS AT HOME, OR GET ALONG WITH OTHER PEOPLE?: SOMEWHAT DIFFICULT
2. NOT BEING ABLE TO STOP OR CONTROL WORRYING: SEVERAL DAYS
GAD7 TOTAL SCORE: 5
IF YOU CHECKED OFF ANY PROBLEMS ON THIS QUESTIONNAIRE, HOW DIFFICULT HAVE THESE PROBLEMS MADE IT FOR YOU TO DO YOUR WORK, TAKE CARE OF THINGS AT HOME, OR GET ALONG WITH OTHER PEOPLE: SOMEWHAT DIFFICULT
GAD7 TOTAL SCORE: 5
5. BEING SO RESTLESS THAT IT IS HARD TO SIT STILL: NOT AT ALL
4. TROUBLE RELAXING: SEVERAL DAYS
7. FEELING AFRAID AS IF SOMETHING AWFUL MIGHT HAPPEN: NOT AT ALL
1. FEELING NERVOUS, ANXIOUS, OR ON EDGE: MORE THAN HALF THE DAYS
6. BECOMING EASILY ANNOYED OR IRRITABLE: NOT AT ALL
7. FEELING AFRAID AS IF SOMETHING AWFUL MIGHT HAPPEN: NOT AT ALL

## 2024-01-10 ENCOUNTER — MYC MEDICAL ADVICE (OUTPATIENT)
Dept: PSYCHIATRY | Facility: CLINIC | Age: 59
End: 2024-01-10
Payer: MEDICARE

## 2024-01-10 ENCOUNTER — TELEPHONE (OUTPATIENT)
Dept: PSYCHIATRY | Facility: CLINIC | Age: 59
End: 2024-01-10
Payer: MEDICARE

## 2024-01-10 ENCOUNTER — VIRTUAL VISIT (OUTPATIENT)
Dept: PSYCHOLOGY | Facility: CLINIC | Age: 59
End: 2024-01-10
Payer: MEDICARE

## 2024-01-10 DIAGNOSIS — F41.1 GAD (GENERALIZED ANXIETY DISORDER): Primary | ICD-10-CM

## 2024-01-10 PROCEDURE — 90837 PSYTX W PT 60 MINUTES: CPT | Mod: 95 | Performed by: SOCIAL WORKER

## 2024-01-10 NOTE — TELEPHONE ENCOUNTER
[facility]    Phone Message    May a detailed message be left on voicemail: yes     Reason for Call: Other: Medication not working / Anxiety worsening     Caller stated she tried taking Gabapentin 300mg for anxiety, about 7 times and it does not work. Caller stated her anxiety is getting worst and she is leaving for vacation on February 3rd.    Action Taken: Message routed to:  Other: [facility] Nurse pool    Travel Screening: Not Applicable

## 2024-01-10 NOTE — PROGRESS NOTES
"[facility]                                     Progress Note    Patient Name: Sherly Yeung  Date: 1/10/2024         Service Type: Individual      Session Start Time: 8:04 Session End Time: 8:57    Session Length:53    Session #: 15    Attendees: Client    Service Modality:  Video Visit:      Provider verified identity through the following two step process.  Patient provided:  Patient is known previously to provider    Telemedicine Visit: The patient's condition can be safely assessed and treated via synchronous audio and visual telemedicine encounter.      Reason for Telemedicine Visit: Services only offered telehealth    Originating Site (Patient Location): Patient's home    Distant Site (Provider Location): Provider Remote Setting- Home Office    Consent:  The patient/guardian has verbally consented to: the potential risks and benefits of telemedicine (video visit) versus in person care; bill my insurance or make self-payment for services provided; and responsibility for payment of non-covered services.     Patient would like the video invitation sent by:  My Chart    Mode of Communication:  Video Conference via Amwell    Distant Location (Provider):  Off-site    As the provider I attest to compliance with applicable laws and regulations related to telemedicine.    DATA  Interactive Complexity: Yes, visit entailed Interactive Complexity evidenced by:  upcoming long trip scare me, I need to talk about it. What should I do calm down?\"    Crisis: No      Progress Since Last Session (Related to Symptoms / Goals / Homework):   Symptoms:  Anxiety, depressed    Homework: Partially completed      Episode of Care Goals: Satisfactory progress - ACTION (Actively working towards change); Intervened by reinforcing change plan / affirming steps taken-  some set back due to the upcoming trip.     Current / Ongoing Stressors and Concerns: Patient had 2 things in mind today. Sharing how she felt because she " "has not been able to function due to migraines. See her home not clean/ organized because of this. Has an upcoming trip to [place] with . Will fly and will also use a cruise. Long trip since age 18 years old.finds anxiety and  to the roof as the time to fly approach which is in February.  Patient   And writer processed these thoughts and feelings. Discussed option to feel better including mindfulness. Using the facts discussed. Challenging the \" what ifs\" to move to  \"even even ifs\". Patient noted no other concern today. Her next visit is in 2 weeks.        Treatment Objective(s) Addressed in This Session:        Use thought-stopping strategy daily to reduce intrusive thoughts and hold her hostage     Intervention:    CBT : Challenging cognitive traps related to the upcoming travel    MI Intervention: Supported Autonomy, Collaboration, Evocation, Permission to raise concern or advise, and Open-ended questions     Change Talk Expressed by the Patient: Taking steps    Provider Response to Change Talk: E - Evoked more info from patient about behavior change, A - Affirmed patient's thoughts, decisions, or attempts at behavior change, R - Reflected patient's change talk, and S - Summarized patient's change talk statements     Grief processing: To process her loss and achieve acceptance.     Assessments completed prior to visit:2/23/2023    The following assessments were completed by patient for this visit:  PHQ2:       12/27/2023     8:39 AM 4/27/2022    10:07 AM 8/11/2020     9:37 AM 3/24/2020     3:46 PM 1/19/2012     2:24 PM   PHQ-2 ( 1999 [employer])   Q1: Little interest or pleasure in doing things 0 0 0 0 0   Q2: Feeling down, depressed or hopeless 1 0 0 0 1   PHQ-2 Score 1 0 0 0 1   PHQ-2 Total Score (12-17 Years)- Positive if 3 or more points; Administer PHQ-A if positive   0 0      PHQ9:       10/23/2023     9:35 AM 11/16/2023     6:17 AM 11/28/2023    11:28 AM 12/4/2023     7:33 AM 12/13/2023    11:27 AM " 12/26/2023    11:42 AM 1/10/2024     8:02 AM   PHQ-9 SCORE   PHQ-9 Total Score MyChart 7 (Mild depression) 4 (Minimal depression) 5 (Mild depression) 5 (Mild depression) 5 (Mild depression) 5 (Mild depression) 5 (Mild depression)   PHQ-9 Total Score 7 4 5 5 5 5 5     GAD2:       10/10/2023     1:58 PM 10/19/2023     2:16 PM 11/16/2023     6:17 AM 11/28/2023    11:28 AM 12/13/2023    11:27 AM 12/26/2023    11:43 AM 1/7/2024    10:08 AM   ROCKY-2   Feeling nervous, anxious, or on edge 2 1 1 2 1 1 2   Not being able to stop or control worrying 1 0 1 0 1 1 1   ROCKY-2 Total Score 3 1 2 2    2 2 2 3     GAD7:       2/23/2023     5:18 PM 4/26/2023    11:04 AM 7/17/2023     9:05 AM 9/18/2023    12:06 PM 10/10/2023     1:58 PM 12/13/2023     1:54 PM 1/7/2024    10:08 AM   ROCKY-7 SCORE   Total Score     5 (mild anxiety)  5 (mild anxiety)   Total Score 7 2 2 5 5 3 5     CAGE-AID:       2/9/2023    11:52 AM 7/17/2023     9:10 AM   CAGE-AID Total Score   Total Score 0 0   Total Score MyChart 0 (A total score of 2 or greater is considered clinically significant)      PROMIS 10-Global Health (all questions and answers displayed):       7/30/2023     5:28 PM 10/29/2023     9:12 AM 11/16/2023     6:20 AM 11/28/2023    11:30 AM 12/13/2023    11:29 AM 12/26/2023    11:45 AM 1/7/2024    10:11 AM   PROMIS 10   In general, would you say your health is: Good Good Good Good Good Good Good   In general, would you say your quality of life is: Very good Good Very good Very good Good Good Good   In general, how would you rate your physical health? Good Good Good Good Good Good Good   In general, how would you rate your mental health, including your mood and your ability to think? Good Good Very good Good Good Good Fair   In general, how would you rate your satisfaction with your social activities and relationships? Very good Very good Good Good Good Very good Good   In general, please rate how well you carry out your usual social activities and  roles Good Good Good Good Fair Good Good   To what extent are you able to carry out your everyday physical activities such as walking, climbing stairs, carrying groceries, or moving a chair? A little Moderately Moderately Moderately A little Moderately Mostly   In the past 7 days, how often have you been bothered by emotional problems such as feeling anxious, depressed, or irritable? Sometimes Sometimes Often Often Often Often Often   In the past 7 days, how would you rate your fatigue on average? Moderate Moderate Moderate Moderate Moderate Moderate Moderate   In the past 7 days, how would you rate your pain on average, where 0 means no pain, and 10 means worst imaginable pain? 5 5 5 5 6 6 5   In general, would you say your health is: 3 3 3 3 3 3 3   In general, would you say your quality of life is: 4 3 4 4 3 3 3   In general, how would you rate your physical health? 3 3 3 3 3 3 3   In general, how would you rate your mental health, including your mood and your ability to think? 3 3 4 3 3 3 2   In general, how would you rate your satisfaction with your social activities and relationships? 4 4 3 3 3 4 3   In general, please rate how well you carry out your usual social activities and roles. (This includes activities at home, at work and in your community, and responsibilities as a parent, child, spouse, employee, friend, etc.) 3 3 3 3 2 3 3   To what extent are you able to carry out your everyday physical activities such as walking, climbing stairs, carrying groceries, or moving a chair? 2 3 3 3 2 3 4   In the past 7 days, how often have you been bothered by emotional problems such as feeling anxious, depressed, or irritable? 3 3 4 4 4 4 4   In the past 7 days, how would you rate your fatigue on average? 3 3 3 3 3 3 3   In the past 7 days, how would you rate your pain on average, where 0 means no pain, and 10 means worst imaginable pain? 5 5 5 5 6 6 5   Global Mental Health Score 14 13 13 12 11 12 10   Global  Physical Health Score 11 12 12 12 11 12 13   PROMIS TOTAL - SUBSCORES 25 25 25 24 22 24 23     PROMIS 10-Global Health (only subscores and total score):       7/30/2023     5:28 PM 10/29/2023     9:12 AM 11/16/2023     6:20 AM 11/28/2023    11:30 AM 12/13/2023    11:29 AM 12/26/2023    11:45 AM 1/7/2024    10:11 AM   PROMIS-10 Scores Only   Global Mental Health Score 14 13 13 12 11 12 10   Global Physical Health Score 11 12 12 12 11 12 13   PROMIS TOTAL - SUBSCORES 25 25 25 24 22 24 23     [place] Suicide Severity Rating Scale (Short Version)      9/30/2022     5:03 PM 11/28/2022     9:49 PM 2/10/2023     9:55 AM 3/20/2023     3:28 PM 7/17/2023     9:09 AM 9/13/2023     6:03 AM 12/6/2023     1:21 AM   [place] Suicide Severity Rating (Short Version)   Over the past 2 weeks have you felt down, depressed, or hopeless? no no  no  yes no   Over the past 2 weeks have you had thoughts of killing yourself? no no  no  no no   Have you ever attempted to kill yourself? no no  no  no no   1. Wish to be Dead (Since Last Contact)   N  N     2. Non-Specific Active Suicidal Thoughts (Since Last Contact)   N  N     Actual Attempt (Since Last Contact)   N  N     Has subject engaged in non-suicidal self-injurious behavior? (Since Last Contact)   N  N     Interrupted Attempts (Since Last Contact)   N  N     Aborted or Self-Interrupted Attempt (Since Last Contact)   N  N     Preparatory Acts or Behavior (Since Last Contact)   N  N     Suicide (Since Last Contact)   N  N     Calculated C-SSRS Risk Score (Since Last Contact)   No Risk Indicated  No Risk Indicated           ASSESSMENT: Current Emotional / Mental Status (status of significant symptoms):   Risk status (Self / Other harm or suicidal ideation)   Patient denies current fears or concerns for personal safety.   Patient denies current or recent suicidal ideation or behaviors.   Patient denies current or recent homicidal ideation or behaviors.   Patient denies current or recent  self injurious behavior or ideation.   Patient denies other safety concerns.   Patient reports there has been no change in risk factors since their last session.     Patient reports there has been no change in protective factors since their last session.     Recommended that patient call 911 or go to the local ED should there be a change in any of these risk factors.  Call [phone] if experiencing SI     Appearance:   Appropriate    Eye Contact:   Good    Psychomotor Behavior: Normal    Attitude:   Cooperative    Orientation:   Person Place Time Situation   Speech    Rate / Production: Normal     Volume:  Normal    Mood:    Anxious  Depressed    Affect:    Appropriate    Thought Content:  Clear    Thought Form:  Coherent  Logical    Insight:    Good      Medication Review:   No changes to current psychiatric medication(s)     Medication Compliance:   Yes     Changes in Health Issues:   None reported     Chemical Use Review:   Substance Use: Chemical use reviewed, no active concerns identified      Tobacco Use: No current tobacco use.      Diagnosis:  1. ROCKY (generalized anxiety disorder)      Collateral Reports Completed:   Not Applicable    PLAN: (Patient Tasks / Therapist Tasks / Other):   Patient will review will reflect on today's session about family and her own self care  Patient's next visit is on 1/10/2024    GERMAN Farmer           ______________________________________________    Individual Treatment Plan    Patient's Name: Sherly Yeung  YOB: 1965    Date of Creation: 2/23/2023    Date Treatment Plan Last Reviewed/Revised: 10/16/2023    DSM5 Diagnoses: 296.32 (F33.1) Major Depressive Disorder, Recurrent Episode, Moderate With anxious distress or 300.02 (F41.1) Generalized Anxiety Disorder  Grief reaction [F43.21]    Psychosocial / Contextual Factors: grief: father recently passed away. Family dynamic- relationship with son, mom. Feeling overwhelmed.    PROMIS (reviewed every 90  "days): 25    Referral / Collaboration:    Referral to another professional/service is not indicated at this time..    Anticipated number of session for this episode of care: 9-12 sessions  Anticipation frequency of session: Biweekly  Anticipated Duration of each session: 53 or more minutes  Treatment plan will be reviewed in 90 days or when goals have been changed.     MeasurableTreatment Goal(s) related to diagnosis / functional impairment(s)    Goal 1: Patient will Accept the loss of beloved one and return to stable level of functioning as evidenced by a higher level of functioning as a result of acceptance.   Redevelop a supportive social system and improve interpersonal relationships and Express unresolved emotions regarding loss which brings anxiety and depression mood.      I will know I've met my goal when I have more energy and  I am able to celebrate good life with my father Vs the sadness of losing him.Redevelop a supportive social system and improve interpersonal relationships\"    Objective #A (Patient Action)    Patient will increase understanding of steps in the grief process.  Status: Continued - Date(s): 10/16/2023    Intervention(s)  Therapist will teach emotional recognition/identification. : defining happiness in your own term without father .    Objective #B  Patient will identify at least 3 fears / thoughts that contribute to feeling anxious.  Status: Continued - Date(s): 10/16/2023    Intervention(s)  Therapist will teach thought challenging with CBT .    Objective #C  Patient will Identify negative self-talk and behaviors: challenge core beliefs, myths, and actions.  Status: Continued - Date(s): 10/16/2023    Intervention(s)  Therapist will provide space and time to process thoughts and feelings around current stressors. provide support and coping skills to help move on in life .    Patient has reviewed and agreed to the above plan.    GERMAN Farmer  10/16/2023    Answers for " HPI/ROS submitted by the patient on 4/20/2023  If you checked off any problems, how difficult have these problems made it for you to do your work, take care of things at home, or get along with other people?: Somewhat difficult  PHQ9 TOTAL SCORE: 5    Answers for HPI/ROS submitted by the patient on 4/26/2023  If you checked off any problems, how difficult have these problems made it for you to do your work, take care of things at home, or get along with other people?: Somewhat difficult  PHQ9 TOTAL SCORE: 3    Answers for HPI/ROS submitted by the patient on 5/4/2023  If you checked off any problems, how difficult have these problems made it for you to do your work, take care of things at home, or get along with other people?: Somewhat difficult  PHQ9 TOTAL SCORE: 4  Answers for HPI/ROS submitted by the patient on 7/17/2023  If you checked off any problems, how difficult have these problems made it for you to do your work, take care of things at home, or get along with other people?: Somewhat difficult  PHQ9 TOTAL SCORE: 5    Answers submitted by the patient for this visit:  Patient Health Questionnaire (Submitted on 7/30/2023)  If you checked off any problems, how difficult have these problems made it for you to do your work, take care of things at home, or get along with other people?: Somewhat difficult  PHQ9 TOTAL SCORE: 5  Answers submitted by the patient for this visit:  Patient Health Questionnaire (Submitted on 8/20/2023)  If you checked off any problems, how difficult have these problems made it for you to do your work, take care of things at home, or get along with other people?: Somewhat difficult  PHQ9 TOTAL SCORE: 5  Answers submitted by the patient for this visit:  Patient Health Questionnaire (Submitted on 9/18/2023)  If you checked off any problems, how difficult have these problems made it for you to do your work, take care of things at home, or get along with other people?: Somewhat difficult  PHQ9  TOTAL SCORE: 4  Answers submitted by the patient for this visit:  Patient Health Questionnaire (Submitted on 10/2/2023)  If you checked off any problems, how difficult have these problems made it for you to do your work, take care of things at home, or get along with other people?: Somewhat difficult  PHQ9 TOTAL SCORE: 5    Answers submitted by the patient for this visit:  Patient Health Questionnaire (Submitted on 10/15/2023)  If you checked off any problems, how difficult have these problems made it for you to do your work, take care of things at home, or get along with other people?: Somewhat difficult  PHQ9 TOTAL SCORE: 6  ROCKY-7 (Submitted on 10/10/2023)  ROCKY 7 TOTAL SCORE: 5    Answers submitted by the patient for this visit:  Patient Health Questionnaire (Submitted on 11/16/2023)  If you checked off any problems, how difficult have these problems made it for you to do your work, take care of things at home, or get along with other people?: Somewhat difficult  PHQ9 TOTAL SCORE: 4    Answers submitted by the patient for this visit:  Patient Health Questionnaire (Submitted on 11/28/2023)  If you checked off any problems, how difficult have these problems made it for you to do your work, take care of things at home, or get along with other people?: Somewhat difficult  PHQ9 TOTAL SCORE: 5    Answers submitted by the patient for this visit:  Patient Health Questionnaire (Submitted on 12/13/2023)  If you checked off any problems, how difficult have these problems made it for you to do your work, take care of things at home, or get along with other people?: Somewhat difficult  PHQ9 TOTAL SCORE: 5    Answers submitted by the patient for this visit:  Patient Health Questionnaire (Submitted on 12/26/2023)  If you checked off any problems, how difficult have these problems made it for you to do your work, take care of things at home, or get along with other people?: Somewhat difficult  PHQ9 TOTAL SCORE: 5    Answers  submitted by the patient for this visit:  Patient Health Questionnaire (Submitted on 1/10/2024)  If you checked off any problems, how difficult have these problems made it for you to do your work, take care of things at home, or get along with other people?: Somewhat difficult  PHQ9 TOTAL SCORE: 5  ROCKY-7 (Submitted on 1/7/2024)  ROCKY 7 TOTAL SCORE: 5

## 2024-01-11 ASSESSMENT — SLEEP AND FATIGUE QUESTIONNAIRES
HOW LIKELY ARE YOU TO NOD OFF OR FALL ASLEEP WHILE WATCHING TV: MODERATE CHANCE OF DOZING
HOW LIKELY ARE YOU TO NOD OFF OR FALL ASLEEP WHILE LYING DOWN TO REST IN THE AFTERNOON WHEN CIRCUMSTANCES PERMIT: HIGH CHANCE OF DOZING
HOW LIKELY ARE YOU TO NOD OFF OR FALL ASLEEP IN A CAR, WHILE STOPPED FOR A FEW MINUTES IN TRAFFIC: WOULD NEVER DOZE
HOW LIKELY ARE YOU TO NOD OFF OR FALL ASLEEP WHILE SITTING AND TALKING TO SOMEONE: SLIGHT CHANCE OF DOZING
HOW LIKELY ARE YOU TO NOD OFF OR FALL ASLEEP WHILE SITTING INACTIVE IN A PUBLIC PLACE: MODERATE CHANCE OF DOZING
HOW LIKELY ARE YOU TO NOD OFF OR FALL ASLEEP WHILE SITTING QUIETLY AFTER LUNCH WITHOUT ALCOHOL: HIGH CHANCE OF DOZING
HOW LIKELY ARE YOU TO NOD OFF OR FALL ASLEEP WHILE SITTING AND READING: HIGH CHANCE OF DOZING
HOW LIKELY ARE YOU TO NOD OFF OR FALL ASLEEP WHEN YOU ARE A PASSENGER IN A CAR FOR AN HOUR WITHOUT A BREAK: HIGH CHANCE OF DOZING

## 2024-01-12 ENCOUNTER — MYC MEDICAL ADVICE (OUTPATIENT)
Dept: PSYCHIATRY | Facility: CLINIC | Age: 59
End: 2024-01-12
Payer: MEDICARE

## 2024-01-12 ENCOUNTER — VIRTUAL VISIT (OUTPATIENT)
Dept: PSYCHIATRY | Facility: CLINIC | Age: 59
End: 2024-01-12
Attending: PSYCHIATRY & NEUROLOGY
Payer: MEDICARE

## 2024-01-12 DIAGNOSIS — F41.9 ANXIETY DISORDER, UNSPECIFIED TYPE: ICD-10-CM

## 2024-01-12 DIAGNOSIS — S06.9X5S TRAUMATIC BRAIN INJURY, WITH LOSS OF CONSCIOUSNESS GREATER THAN 24 HOURS WITH RETURN TO PRE-EXISTING CONSCIOUS LEVEL, SEQUELA (H): Chronic | ICD-10-CM

## 2024-01-12 DIAGNOSIS — F33.1 MAJOR DEPRESSIVE DISORDER, RECURRENT, MODERATE (H): Primary | ICD-10-CM

## 2024-01-12 DIAGNOSIS — G47.19 EXCESSIVE DAYTIME SLEEPINESS: ICD-10-CM

## 2024-01-12 PROCEDURE — 99214 OFFICE O/P EST MOD 30 MIN: CPT | Mod: 95 | Performed by: PSYCHIATRY & NEUROLOGY

## 2024-01-12 PROCEDURE — 90833 PSYTX W PT W E/M 30 MIN: CPT | Mod: 95 | Performed by: PSYCHIATRY & NEUROLOGY

## 2024-01-12 RX ORDER — DULOXETIN HYDROCHLORIDE 30 MG/1
CAPSULE, DELAYED RELEASE ORAL
Qty: 49 CAPSULE | Refills: 0 | Status: SHIPPED | OUTPATIENT
Start: 2024-01-12 | End: 2024-02-18 | Stop reason: DRUGHIGH

## 2024-01-12 RX ORDER — BUPROPION HYDROCHLORIDE 100 MG/1
100 TABLET, EXTENDED RELEASE ORAL EVERY MORNING
Qty: 7 TABLET | Refills: 0 | Status: SHIPPED | OUTPATIENT
Start: 2024-01-12 | End: 2024-02-18 | Stop reason: ALTCHOICE

## 2024-01-12 RX ORDER — DULOXETIN HYDROCHLORIDE 60 MG/1
120 CAPSULE, DELAYED RELEASE ORAL EVERY EVENING
Qty: 60 CAPSULE | Refills: 1 | Status: SHIPPED | OUTPATIENT
Start: 2024-01-25 | End: 2024-03-14

## 2024-01-12 ASSESSMENT — PAIN SCALES - GENERAL: PAINLEVEL: MODERATE PAIN (5)

## 2024-01-12 NOTE — NURSING NOTE
Is the patient currently in the state of [place]? YES    Visit mode:VIDEO    If the visit is dropped, the patient can be reconnected by: VIDEO VISIT: Text to cell phone:   Telephone Information:   Mobile [phone]       Will anyone else be joining the visit? NO  (If patient encounters technical issues they should call [phone])    How would you like to obtain your AVS? MyChart    Are changes needed to the allergy or medication list? Pt stated no changes to allergies and Pt stated no med changes    Reason for visit: CONNER ORDOÑEZ

## 2024-01-12 NOTE — PATIENT INSTRUCTIONS
Continue transition back from bupropion to duloxetine    PRN alprazolam for the trip to [place]     **For crisis resources, please see the information at the end of this document**   Patient Education    Thank you for coming to the [facility] [place] [facility].     Lab Testing:  If you had lab testing today and your results are reassuring or normal they will be mailed to you or sent through [employer] within 7 days. If the lab tests need quick action we will call you with the results. The phone number we will call with results is # [phone]. If this is not the best number please call our clinic and change the number.     Medication Refills:  If you need any refills please call your pharmacy and they will contact us. Our fax number for refills is [phone].   Three business days of notice are needed for general medication refill requests.   Five business days of notice are needed for controlled substance refill requests.   If you need to change to a different pharmacy, please contact the new pharmacy directly. The new pharmacy will help you get your medications transferred.     Contact Us:  Please call [phone] during business hours (8-5:00 M-F).   If you have medication related questions after clinic hours, or on the weekend, please call [phone].     Financial Assistance [phone]   Medical Records [phone]       MENTAL HEALTH CRISIS RESOURCES:  For a emergency help, please call 911 or go to the nearest Emergency Department.     Emergency Walk-In Options:   [facility] Unit @ [facility] [facility] ([place]): [phone] - Specialized mental health emergency area designed to be calming  [facility] [place] ([place]): [phone]  [facility] ([place]): [phone]  [facility]): [phone]    [facility] Crisis Information:   [place]: [phone]  [place]: [phone]  [place] ([place]) - Adult: [phone]      Child: [phone]  [place] - Adult: [phone]     Child: [phone]  [place]: [phone]  List of all [facility] resources:   https://[place].gov/dhs/people-we-serve/adults/health-care/mental-health/resources/crisis-contacts.jsp    National Crisis Information:   Crisis Text Line: Text  [place]  to 973124  Suicide & Crisis Lifeline: 988  National Suicide Prevention Lifeline: 7-681-491-TALK (1-993.531.2983)       For online chat options, visit https://suicidepreventionlifeline.org/chat/  Poison Control Center: 3-337-831-6930  Trans Lifeline: 1-713.427.7468 - Hotline for transgender people of all ages  [employer]: 8-248-122-1787 - Hotline for LGBT youth     For Non-Emergency Support:   Fast Tracker: Mental Health & Substance Use Disorder Resources -   https://www.Human Demandn.org/
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
## 2024-01-15 RX ORDER — LORAZEPAM 0.5 MG/1
.5-1 TABLET ORAL DAILY PRN
Qty: 10 TABLET | Refills: 0 | Status: SHIPPED | OUTPATIENT
Start: 2024-01-25 | End: 2024-02-23

## 2024-01-15 RX ORDER — METHYLPHENIDATE HYDROCHLORIDE 20 MG/1
TABLET ORAL
Qty: 45 TABLET | Refills: 0 | Status: SHIPPED | OUTPATIENT
Start: 2024-01-22 | End: 2024-02-19

## 2024-01-17 ENCOUNTER — OFFICE VISIT (OUTPATIENT)
Dept: SLEEP MEDICINE | Facility: CLINIC | Age: 59
End: 2024-01-17
Payer: MEDICARE

## 2024-01-17 VITALS
WEIGHT: 144 LBS | SYSTOLIC BLOOD PRESSURE: 123 MMHG | HEIGHT: 63 IN | DIASTOLIC BLOOD PRESSURE: 83 MMHG | BODY MASS INDEX: 25.52 KG/M2 | HEART RATE: 85 BPM | OXYGEN SATURATION: 94 %

## 2024-01-17 DIAGNOSIS — G47.34 NOCTURNAL OXYGEN DESATURATION: Chronic | ICD-10-CM

## 2024-01-17 DIAGNOSIS — R53.81 MALAISE AND FATIGUE: ICD-10-CM

## 2024-01-17 DIAGNOSIS — G47.30 SLEEP APNEA, UNSPECIFIED TYPE: ICD-10-CM

## 2024-01-17 DIAGNOSIS — R06.00 DYSPNEA AND RESPIRATORY ABNORMALITY: Primary | ICD-10-CM

## 2024-01-17 DIAGNOSIS — Z72.820 LACK OF ADEQUATE SLEEP: ICD-10-CM

## 2024-01-17 DIAGNOSIS — J96.11 CHRONIC RESPIRATORY FAILURE WITH HYPOXIA (H): ICD-10-CM

## 2024-01-17 DIAGNOSIS — R53.83 MALAISE AND FATIGUE: ICD-10-CM

## 2024-01-17 DIAGNOSIS — R06.89 DYSPNEA AND RESPIRATORY ABNORMALITY: Primary | ICD-10-CM

## 2024-01-17 DIAGNOSIS — R40.0 HAS DAYTIME DROWSINESS: ICD-10-CM

## 2024-01-17 PROBLEM — R31.29 MICROSCOPIC HEMATURIA: Status: RESOLVED | Noted: 2020-10-06 | Resolved: 2024-01-17

## 2024-01-17 PROBLEM — Z90.49 S/P LAPAROSCOPIC CHOLECYSTECTOMY: Status: RESOLVED | Noted: 2022-05-06 | Resolved: 2024-01-17

## 2024-01-17 PROBLEM — Q67.6 PECTUS EXCAVATUM: Chronic | Status: ACTIVE | Noted: 2019-07-19

## 2024-01-17 PROBLEM — R74.01 TRANSAMINITIS: Status: RESOLVED | Noted: 2022-01-12 | Resolved: 2024-01-17

## 2024-01-17 PROCEDURE — 99205 OFFICE O/P NEW HI 60 MIN: CPT | Performed by: PHYSICIAN ASSISTANT

## 2024-01-17 NOTE — NURSING NOTE
"Chief Complaint   Patient presents with    Sleep Problem     Patient states she is cutting her Ritilan down to half a pill per psychiatrist. However, patient is having daytime sleepiness due to cutback.        Initial LMP 08/22/2017 (Approximate)  Estimated body mass index is 24.8 kg/m  as calculated from the following:    Height as of 12/6/23: 1.6 m (5' 3\").    Weight as of 12/6/23: 63.5 kg (140 lb).    Medication Reconciliation: complete    Neck circumference: 13 inches / 35 centimeters.    Jb Bradshaw CMA on 1/17/2024 at 3:33 PM   "

## 2024-01-17 NOTE — PATIENT INSTRUCTIONS
"          MY TREATMENT INFORMATION FOR SLEEP APNEA-  Sherly SATYA Yeung    DOCTOR : Toña Woodall PA    Am I having a sleep study at a sleep center?  --->Due to normal delays, you will be contacted within 2-4 weeks to schedule    Am I having a home sleep study?  --->Watch the video for the device you are using:    -/drop off device-   https://www."[employer]ube.com/watch?v=yGGFBdELGhk    -Disposable device sent out require phone/computer application-   https://www.Spiralcat.com/watch?v=BCce_vbiwxE      Frequently asked questions:  1. What is Obstructive Sleep Apnea (SHASHI)? SHASHI is the most common type of sleep apnea. Apnea means, \"without breath.\"  Apnea is most often caused by narrowing or collapse of the upper airway as muscles relax during sleep.   Almost everyone has occasional apneas. Most people with sleep apnea have had brief interruptions at night frequently for many years.  The severity of sleep apnea is related to how frequent and severe the events are.   2. What are the consequences of SHASHI? Symptoms include: feeling sleepy during the day, snoring loudly, gasping or stopping of breathing, trouble sleeping, and occasionally morning headaches or heartburn at night.  Sleepiness can be serious and even increase the risk of falling asleep while driving. Other health consequences may include development of high blood pressure and other cardiovascular disease in persons who are susceptible. Untreated SHASHI  can contribute to heart disease, stroke and diabetes.   3. What are the treatment options? In most situations, sleep apnea is a lifelong disease that must be managed with daily therapy. Medications are not effective for sleep apnea and surgery is generally not considered until other therapies have been tried. Your treatment is your choice . Continuous Positive Airway (CPAP) works right away and is the therapy that is effective in nearly everyone. An oral device to hold your jaw forward is usually the next most reliable " option. Other options include postioning devices (to keep you off your back), weight loss, and surgery including a tongue pacing device. There is more detail about some of these options below.  4. Are my sleep studies covered by insurance? Although we will request verification of coverage, we advise you also check in advance of the study to ensure there is coverage.    Important tips for those choosing CPAP and similar devices  For new devices, sign up for device DANIEL to monitor your device for your followup visits  We encourage you to utilize the [employer] daniel or website (myAir web (resmed.com) ) to monitor your therapy progress and share the data with your healthcare team when you discuss your sleep apnea.                                                    Know your equipment:  CPAP is continuous positive airway pressure that prevents obstructive sleep apnea by keeping the throat from collapsing while you are sleeping. In most cases, the device is  smart  and can slowly self-adjusts if your throat collapses and keeps a record every day of how well you are treated-this information is available to you and your care team.  BPAP is bilevel positive airway pressure that keeps your throat open and also assists each breath with a pressure boost to maintain adequate breathing.  Special kinds of BPAP are used in patients who have inadequate breathing from lung or heart disease. In most cases, the device is  smart  and can slowly self-adjusts to assist breathing. Like CPAP, the device keeps a record of how well you are treated.  Your mask is your connection to the device. You get to choose what feels most comfortable and the staff will help to make sure if fits. Here: are some examples of the different masks that are available: Magnetic mask aids may assist with use but there are safety issues that should be addressed when considering with magnets* ( see end of discussion).       Key points to remember on your journey  with sleep apnea:  Sleep study.  PAP devices often need to be adjusted during a sleep study to show that they are effective and adjusted right.  Good tips to remember: Try wearing just the mask during a quiet time during the day so your body adapts to wearing it. A humidifier is recommended for comfort in most cases to prevent drying of your nose and throat. Allergy medication from your provider may help you if you are having nasal congestion.  Getting settled-in. It takes more than one night for most of us to get used to wearing a mask. Try wearing just the mask during a quiet time during the day so your body adapts to wearing it. A humidifier is recommended for comfort in most cases. Our team will work with you carefully on the first day and will be in contact within 4 days and again at 2 and 4 weeks for advice and remote device adjustments. Your therapy is evaluated by the device each day.   Use it every night. The more you are able to sleep naturally for 7-8 hours, the more likely you will have good sleep and to prevent health risks or symptoms from sleep apnea. Even if you use it 4 hours it helps. Occasionally all of us are unable to use a medical therapy, in sleep apnea, it is not dangerous to miss one night.   Communicate. Call our skilled team on the number provided on the first day if your visit for problems that make it difficult to wear the device. Over 2 out of 3 patients can learn to wear the device long-term with help from our team. Remember to call our team or your sleep providers if you are unable to wear the device as we may have other solutions for those who cannot adapt to mask CPAP therapy. It is recommended that you sleep your sleep provider within the first 3 months and yearly after that if you are not having problems.   Use it for your health. We encourage use of CPAP masks during daytime quiet periods to allow your face and brain to adapt to the sensation of CPAP so that it will be a more  natural sensation to awaken to at night or during naps. This can be very useful during the first few weeks or months of adapting to CPAP though it does not help medically to wear CPAP during wakefulness and  should not be used as a strategy just to meet guidelines.  Take care of your equipment. Make sure you clean your mask and tubing using directions every day and that your filter and mask are replaced as recommended or if they are not working.     *Masks with magnets:  Updated Contraindications  Masks with magnetic components are contraindicated for use by patients where they, or anyone in close physical contact while using the mask, have the following:   Active medical implants that interact with magnets (i.e., pacemakers, implantable cardioverter defibrillators (ICD), neurostimulators, cerebrospinal fluid (CSF) shunts, insulin/infusion pumps)   Metallic implants/objects containing ferromagnetic material (i.e., aneurysm clips/flow disruption devices, embolic coils, stents, valves, electrodes, implants to restore hearing or balance with implanted magnets, ocular implants, metallic splinters in the eye)  Updated Warning  Keep the mask magnets at a safe distance of at least 6 inches (150 mm) away from implants or medical devices that may be adversely affected by magnetic interference. This warning applies to you or anyone in close physical contact with your mask. The magnets are in the frame and lower headgear clips, with a magnetic field strength of up to 400mT. When worn, they connect to secure the mask but may inadvertently detach while asleep.  Implants/medical devices, including those listed within contraindications, may be adversely affected if they change function under external magnetic fields or contain ferromagnetic materials that attract/repel to magnetic fields (some metallic implants, e.g., contact lenses with metal, dental implants, metallic cranial plates, screws, sebastian hole covers, and bone substitute  devices). Consult your physician and  of your implant / other medical device for information on the potential adverse effects of magnetic fields.    BESIDES CPAP, WHAT OTHER THERAPIES ARE THERE?    Positioning Device  Positioning devices are generally used when sleep apnea is mild and only occurs on your back.This example shows a pillow that straps around the waist. It may be appropriate for those whose sleep study shows milder sleep apnea that occurs primarily when lying flat on one's back. Preliminary studies have shown benefit but effectiveness at home may need to be verified by a home sleep test. These devices are generally not covered by medical insurance.  Examples of devices that maintain sleeping on the back to prevent snoring and mild sleep apnea.    Belt type body positioner  http://Launchr/    Electronic reminder  http://nightshifttherapy.com/            Oral Appliance  What is oral appliance therapy?  An oral appliance device fits on your teeth at night like a retainer used after having braces. The device is made by a specialized dentist and requires several visits over 1-2 months before a manufactured device is made to fit your teeth and is adjusted to prevent your sleep apnea. Once an oral device is working properly, snoring should be improved. A home sleep test may be recommended at that time if to determine whether the sleep apnea is adequately treated.       Some things to remember:  -Oral devices are often, but not always, covered by your medical insurance. Be sure to check with your insurance provider.   -If you are referred for oral therapy, you will be given a list of specialized dentists to consider or you may choose to visit the Web site of the American Academy of Dental Sleep Medicine  -Oral devices are less likely to work if you have severe sleep apnea or are extremely overweight.     More detailed information  An oral appliance is a small acrylic device that fits over the  upper and lower teeth  (similar to a retainer or a mouth guard). This device slightly moves jaw forward, which moves the base of the tongue forward, opens the airway, improves breathing for effective treat snoring and obstructive sleep apnea in perhaps 7 out of 10 people .  The best working devices are custom-made by a dental device  after a mold is made of the teeth 1, 2, 3.  When is an oral appliance indicated?  Oral appliance therapy is recommended as a first-line treatment for patients with primary snoring, mild sleep apnea, and for patients with moderate sleep apnea who prefer appliance therapy to use of CPAP4, 5. Severity of sleep apnea is determined by sleep testing and is based on the number of respiratory events per hour of sleep.   How successful is oral appliance therapy?  The success rate of oral appliance therapy in patients with mild sleep apnea is 75-80% while in patients with moderate sleep apnea it is 50-70%. The chance of success in patients with severe sleep apnea is 40-50%. The research also shows that oral appliances have a beneficial effect on the cardiovascular health of SHASHI patients at the same magnitude as CPAP therapy7.  Oral appliances should be a second-line treatment in cases of severe sleep apnea, but if not completely successful then a combination therapy utilizing CPAP plus oral appliance therapy may be effective. Oral appliances tend to be effective in a broad range of patients although studies show that the patients who have the highest success are females, younger patients, those with milder disease, and less severe obesity. 3, 6.   Finding a dentist that practices dental sleep medicine  Specific training is available through the American Academy of Dental Sleep Medicine for dentists interested in working in the field of sleep. To find a dentist who is educated in the field of sleep and the use of oral appliances, near you, visit the Web site of the American Academy of  Dental Sleep Medicine.    References  1. Adriane et al. Objectively measured vs self-reported compliance during oral appliance therapy for sleep-disordered breathing. Chest 2013; 144(5): 8813-6045.  2. Gaurav et al. Objective measurement of compliance during oral appliance therapy for sleep-disordered breathing. Thorax 2013; 68(1): 91-96.  3. Oneal et al. Mandibular advancement devices in 620 men and women with SHASHI and snoring: tolerability and predictors of treatment success. Chest 2004; 125: 5496-6697.  4. Pia et al. Oral appliances for snoring and SHASHI: a review. Sleep 2006; 29: 244-262.  5. Ayaan et al. Oral appliance treatment for SHASHI: an update. J Clin Sleep Med 2014; 10(2): 215-227.  6. Martha et al. Predictors of OSAH treatment outcome. J Dent Res 2007; 86: 2030-7597.      Weight Loss:    Weight loss is a long-term strategy that may improve sleep apnea in some patients.    Weight management is a personal decision and the decision should be based on your interest and the potential benefits.  If you are interested in exploring weight loss strategies, the following discussion covers the impact on weight loss on sleep apnea and the approaches that may be successful.    Being overweight does not necessarily mean you will have health consequences.  Those who have BMI over 35 or over 27 with existing medical conditions carries greater risk.   Weight loss decreases severity of sleep apnea in most people with obesity. For those with mild obesity who have developed snoring with weight gain, even 15-30 pound weight loss can improve and occasionally eliminate sleep apnea.  Structured and life-long dietary and health habits are necessary to lose weight and keep healthier weight levels.     Though there may be significant health benefits from weight loss, long-term weight loss is very difficult to achieve- studies show success with dietary management in less than 10% of people. In addition,  substantial weight loss may require years of dietary control and may be difficult if patients have severe obesity. In these cases, surgical management may be considered.  Finally, older individuals who have tolerated obesity without health complications may be less likely to benefit from weight loss strategies.      [unfilled]    Surgery:    Surgery for obstructive sleep apnea is considered generally only when other therapies fail to work. Surgery may be discussed with you if you are having a difficult time tolerating CPAP and or when there is an abnormal structure that requires surgical correction.  Nose and throat surgeries often enlarge the airway to prevent collapse.  Most of these surgeries create pain for 1-2 weeks and up to half of the most common surgeries are not effective throughout life.  You should carefully discuss the benefits and drawbacks to surgery with your sleep provider and surgeon to determine if it is the best solution for you.   More information  Surgery for SHASHI is directed at areas that are responsible for narrowing or complete obstruction of the airway during sleep.  There are a wide range of procedures available to enlarge and/or stabilize the airway to prevent blockage of breathing in the three major areas where it can occur: the palate, tongue, and nasal regions.  Successful surgical treatment depends on the accurate identification of the factors responsible for obstructive sleep apnea in each person.  A personalized approach is required because there is no single treatment that works well for everyone.  Because of anatomic variation, consultation with an examination by a sleep surgeon is a critical first step in determining what surgical options are best for each patient.  In some cases, examination during sedation may be recommended in order to guide the selection of procedures.  Patients will be counseled about risks and benefits as well as the typical recovery course after surgery.  Surgery is typically not a cure for a person s SHASHI.  However, surgery will often significantly improve one s SHASHI severity (termed  success rate ).  Even in the absence of a cure, surgery will decrease the cardiovascular risk associated with OSA7; improve overall quality of life8 (sleepiness, functionality, sleep quality, etc).      Palate Procedures:  Patients with SHASHI often have narrowing of their airway in the region of their tonsils and uvula.  The goals of palate procedures are to widen the airway in this region as well as to help the tissues resist collapse.  Modern palate procedure techniques focus on tissue conservation and soft tissue rearrangement, rather than tissue removal.  Often the uvula is preserved in this procedure. Residual sleep apnea is common in patient after pharyngoplasty with an average reduction in sleep apnea events of 33%2.      Tongue Procedures:  ExamWhile patients are awake, the muscles that surround the throat are active and keep this region open for breathing. These muscles relax during sleep, allowing the tongue and other structures to collapse and block breathing.  There are several different tongue procedures available.  Selection of a tongue base procedure depends on characteristics seen on physical exam.  Generally, procedures are aimed at removing bulky tissues in this area or preventing the back of the tongue from falling back during sleep.  Success rates for tongue surgery range from 50-62%3.    Hypoglossal Nerve Stimulation:  Hypoglossal nerve stimulation has recently received approval from the [place] Food and Drug Administration for the treatment of obstructive sleep apnea.  This is based on research showing that the system was safe and effective in treating sleep apnea6.  Results showed that the median AHI score decreased 68%, from 29.3 to 9.0. This therapy uses an implant system that senses breathing patterns and delivers mild stimulation to airway muscles, which  keeps the airway open during sleep.  The system consists of three fully implanted components: a small generator (similar in size to a pacemaker), a breathing sensor, and a stimulation lead.  Using a small handheld remote, a patient turns the therapy on before bed and off upon awakening.    Candidates for this device must be greater than 18 years of age, have moderate to severe SHASHI (AHI between 15-65), BMI less than 35, have tried CPAP/oral appliance for at least 8 weeks without success, and have appropriate upper airway anatomy (determined by a sleep endoscopy performed by Dr. Crispin Leigh).    Hypoglossal Nerve Stimulation Pathway:    The sleep surgeon s office will work with the patient through the insurance prior-authorization process (including communications and appeals).    Nasal Procedures:  Nasal obstruction can interfere with nasal breathing during the day and night.  Studies have shown that relief of nasal obstruction can improve the ability of some patients to tolerate positive airway pressure therapy for obstructive sleep apnea1.  Treatment options include medications such as nasal saline, topical corticosteroid and antihistamine sprays, and oral medications such as antihistamines or decongestants. Non-surgical treatments can include external nasal dilators for selected patients. If these are not successful by themselves, surgery can improve the nasal airway either alone or in combination with these other options.      Combination Procedures:  Combination of surgical procedures and other treatments may be recommended, particularly if patients have more than one area of narrowing or persistent positional disease.  The success rate of combination surgery ranges from 66-80%2,3.    References  Sang CAN. The Role of the Nose in Snoring and Obstructive Sleep Apnoea: An Update.  Eur Arch Otorhinolaryngol. 2011; 268: 1365-73.   Barb SM; Moise JA; Lamonte HALLMAN; Pallanch JF; Ezequiel ANN; Cindy JACKSON; Jim TINEO.  Surgical modifications of the upper airway for obstructive sleep apnea in adults: a systematic review and meta-analysis. SLEEP 2010;33(10):6902-1394. Naveen GIBBS. Hypopharyngeal surgery in obstructive sleep apnea: an evidence-based medicine review.  Arch Otolaryngol Head Neck Surg. 2006 Feb;132(2):206-13.  Carlos YH1, Gurjit Y, Romulo ANTHONY. The efficacy of anatomically based multilevel surgery for obstructive sleep apnea. Otolaryngol Head Neck Surg. 2003 Oct;129(4):327-35.  Kezirian E, Goldberg A. Hypopharyngeal Surgery in Obstructive Sleep Apnea: An Evidence-Based Medicine Review. Arch Otolaryngol Head Neck Surg. 2006 Feb;132(2):206-13.  Tashia LOVELL et al. Upper-Airway Stimulation for Obstructive Sleep Apnea.  N Engl J Med. 2014 Jan 9;370(2):139-49.  Erin Y et al. Increased Incidence of Cardiovascular Disease in Middle-aged Men with Obstructive Sleep Apnea. Am J Respir Crit Care Med; 2002 166: 159-165  Hurd EM et al. Studying Life Effects and Effectiveness of Palatopharyngoplasty (SLEEP) study: Subjective Outcomes of Isolated Uvulopalatopharyngoplasty. Otolaryngol Head Neck Surg. 2011; 144: 623-631.        WHAT IF I ONLY HAVE SNORING?    Mandibular advancement devices, lateral sleep positioning, long-term weight loss and treatment of nasal allergies have been shown to improve snoring.  Exercising tongue muscles with a game (https://Grivy.Flamsred/us/daniel/soundly-reduce-snoring/cz3114758011) or stimulating the tongue during the day with a device (https://doi.org/10.3390/nia50423972) have improved snoring in some individuals.    Remember to Drive Safe... Drive Alive     Sleep health profoundly affects your health, mood, and your safety.  Thirty three percent of the population (one in three of us) is not getting enough sleep and many have a sleep disorder. Not getting enough sleep or having an untreated / undertreated sleep condition may make us sleepy without even knowing it. In fact, our driving could be dramatically  impaired due to our sleep health. As your provider, here are some things I would like you to know about driving:     Here are some warning signs for impairment and dangerous drowsy driving:              -Having been awake more than 16 hours               -Looking tired               -Eyelid drooping              -Head nodding (it could be too late at this point)              -Driving for more than 30 minutes     Some things you could do to make the driving safer if you are experiencing some drowsiness:              -Stop driving and rest              -Call for transportation              -Make sure your sleep disorder is adequately treated     Some things that have been shown NOT to work when experiencing drowsiness while driving:              -Turning on the radio              -Opening windows              -Eating any  distracting  /  entertaining  foods (e.g., sunflower seeds, candy, or any other)              -Talking on the phone      Your decision may not only impact your life, but also the life of others. Please, remember to drive safe for yourself and all of us.           Your Body mass index is 25.51 kg/m .  Weight management is a personal decision.  If you are interested in exploring weight loss strategies, the following discussion covers the approaches that may be successful. Body mass index (BMI) is one way to tell whether you are at a healthy weight, overweight, or obese. It measures your weight in relation to your height.  A BMI of 18.5 to 24.9 is in the healthy range. A person with a BMI of 25 to 29.9 is considered overweight, and someone with a BMI of 30 or greater is considered obese. More than two-thirds of American adults are considered overweight or obese.  Being overweight or obese increases the risk for further weight gain. Excess weight may lead to heart disease and diabetes.  Creating and following plans for healthy eating and physical activity may help you improve your health.  Weight control  is part of healthy lifestyle and includes exercise, emotional health, and healthy eating habits. Careful eating habits lifelong are the mainstay of weight control. Though there are significant health benefits from weight loss, long-term weight loss with diet alone may be very difficult to achieve- studies show long-term success with dietary management in less than 10% of people. Attaining a healthy weight may be especially difficult to achieve in those with severe obesity. In some cases, medications, devices and surgical management might be considered.  What can you do?  If you are overweight or obese and are interested in methods for weight loss, you should discuss this with your provider.   Consider reducing daily calorie intake by 500 calories.   Keep a food journal.   Avoiding skipping meals, consider cutting portions instead.    Diet combined with exercise helps maintain muscle while optimizing fat loss. Strength training is particularly important for building and maintaining muscle mass. Exercise helps reduce stress, increase energy, and improves fitness. Increasing exercise without diet control, however, may not burn enough calories to loose weight.     Start walking three days a week 10-20 minutes at a time  Work towards walking thirty minutes five days a week   Eventually, increase the speed of your walking for 1-2 minutes at time    In addition, we recommend that you review healthy lifestyles and methods for weight loss available through the [employer] patient information sites:  http://win.niddk.nih.gov/publications/index.htm    And look into health and wellness programs that may be available through your health insurance provider, employer, local community center, or [employer].
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
## 2024-01-17 NOTE — NURSING NOTE
Writer scheduled patient for sleep study & 3 month follow up with provider. Handed PSG info to patient & ABG to patient.

## 2024-01-18 ENCOUNTER — TELEPHONE (OUTPATIENT)
Dept: PSYCHIATRY | Facility: CLINIC | Age: 59
End: 2024-01-18
Payer: MEDICARE

## 2024-01-18 ENCOUNTER — TELEPHONE (OUTPATIENT)
Dept: SLEEP MEDICINE | Facility: CLINIC | Age: 59
End: 2024-01-18
Payer: MEDICARE

## 2024-01-18 PROBLEM — L70.8 OTHER ACNE: Status: RESOLVED | Noted: 2019-07-19 | Resolved: 2024-01-18

## 2024-01-18 PROBLEM — G47.34 NOCTURNAL OXYGEN DESATURATION: Chronic | Status: ACTIVE | Noted: 2024-01-18

## 2024-01-18 RX ORDER — OXYCODONE HYDROCHLORIDE 10 MG/1
10 TABLET, FILM COATED, EXTENDED RELEASE ORAL EVERY 6 HOURS PRN
COMMUNITY
Start: 2024-01-06

## 2024-01-18 NOTE — CONFIDENTIAL NOTE
Writer reached out to patient to check in.  LVM indicating that she should seek immediate attention if she is not feeling safe or continues to experience shortness of breath.

## 2024-01-18 NOTE — CONFIDENTIAL NOTE
Patient indicates that her anxiety has somewhat lessened.  She state that it is near the anniversary of her father's death, which triggers her anxiety.    Per discussion with Dr. Arboleda:    Advised patient to cut Ritalin doses in half - patient states that will not be helpful and will only make her more anxious.    Patient agrees to urgent add on appt tomorrow with Dr. Montenegro at 3:10, virtual.    In the meantime, patient agrees to go to the ED if she has uncontrolled anxiety, difficulty breathing, chest pain    Patient's med list updated to reflect long and short acting pain medication.

## 2024-01-18 NOTE — CONFIDENTIAL NOTE
Writer received a call from Marcia.  She reports high anxiety and panic and is tearful.  She states she doesn't feel like she can breathe.  Writer attempted to  patient through some calming techniques - patient states she has tried these in the past and they do not work.  Patient denies any safety concerns.      Patient was prescribed a small amount of ativan last week by Dr. Tineo for an upcoming trip.  The prescription is dated 1/25/24 for  prior to leaving.  Patient asks if one tablet of ativan can be prescribed to her now for the anxiety she is experiencing today.  Writer encouraged patient to seek medical attention if she is feeling as though she cannot catch her breath.  Patient refuses stating that the hospital won't do anything for her anxiety.  By the end of the call, patient is no longer tearful.  She continues to request to be prescribed something.

## 2024-01-18 NOTE — TELEPHONE ENCOUNTER
General Call    Contacts         Type Contact Phone/Fax    01/18/2024 02:15 PM CST Phone (Incoming) Marcia Yeung (Self) [phone] (M)          Reason for Call:  PT has order placed on 1/18 for a sleep study with [facility], please contact pt to schedule.     What are your questions or concerns:  NA    Date of last appointment with provider: NA    Could we send this information to you in [email or web address] or would you prefer to receive a phone call?:   Patient would prefer a phone call   Okay to leave a detailed message?: Yes at Cell number on file:    Telephone Information:   Mobile [phone]

## 2024-01-19 ENCOUNTER — VIRTUAL VISIT (OUTPATIENT)
Dept: PSYCHIATRY | Facility: CLINIC | Age: 59
End: 2024-01-19
Attending: STUDENT IN AN ORGANIZED HEALTH CARE EDUCATION/TRAINING PROGRAM
Payer: MEDICARE

## 2024-01-19 DIAGNOSIS — G47.19 EXCESSIVE DAYTIME SLEEPINESS: ICD-10-CM

## 2024-01-19 DIAGNOSIS — F41.9 ANXIETY DISORDER, UNSPECIFIED TYPE: ICD-10-CM

## 2024-01-19 DIAGNOSIS — S06.9X5S TRAUMATIC BRAIN INJURY, WITH LOSS OF CONSCIOUSNESS GREATER THAN 24 HOURS WITH RETURN TO PRE-EXISTING CONSCIOUS LEVEL, SEQUELA (H): ICD-10-CM

## 2024-01-19 DIAGNOSIS — F33.2 MAJOR DEPRESSIVE DISORDER, RECURRENT, SEVERE WITHOUT PSYCHOTIC FEATURES (H): Primary | ICD-10-CM

## 2024-01-19 PROCEDURE — 99214 OFFICE O/P EST MOD 30 MIN: CPT | Mod: 95

## 2024-01-19 NOTE — PROGRESS NOTES
Virtual Visit Details    Type of service:  Video Visit   Video Start Time:  3:10  Video End Time: 3:40    Originating Location (pt. Location): Home    Distant Location (provider location):  On-site  Platform used for Video Visit: Kourtney

## 2024-01-19 NOTE — NURSING NOTE
Is the patient currently in the state of [place]? YES    Visit mode:VIDEO    If the visit is dropped, the patient can be reconnected by: VIDEO VISIT: Send to e-mail at: [email or web address]    Will anyone else be joining the visit? NO  (If patient encounters technical issues they should call [phone]    How would you like to obtain your AVS? MyChart    Are changes needed to the allergy or medication list? Pt stated no changes to allergies and Pt stated no med changes    Reason for visit: [facility]
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
## 2024-01-22 ASSESSMENT — ANXIETY QUESTIONNAIRES
IF YOU CHECKED OFF ANY PROBLEMS ON THIS QUESTIONNAIRE, HOW DIFFICULT HAVE THESE PROBLEMS MADE IT FOR YOU TO DO YOUR WORK, TAKE CARE OF THINGS AT HOME, OR GET ALONG WITH OTHER PEOPLE: VERY DIFFICULT
1. FEELING NERVOUS, ANXIOUS, OR ON EDGE: MORE THAN HALF THE DAYS
GAD7 TOTAL SCORE: 6
5. BEING SO RESTLESS THAT IT IS HARD TO SIT STILL: NOT AT ALL
7. FEELING AFRAID AS IF SOMETHING AWFUL MIGHT HAPPEN: SEVERAL DAYS
GAD7 TOTAL SCORE: 6
8. IF YOU CHECKED OFF ANY PROBLEMS, HOW DIFFICULT HAVE THESE MADE IT FOR YOU TO DO YOUR WORK, TAKE CARE OF THINGS AT HOME, OR GET ALONG WITH OTHER PEOPLE?: VERY DIFFICULT
3. WORRYING TOO MUCH ABOUT DIFFERENT THINGS: SEVERAL DAYS
6. BECOMING EASILY ANNOYED OR IRRITABLE: NOT AT ALL
4. TROUBLE RELAXING: SEVERAL DAYS
2. NOT BEING ABLE TO STOP OR CONTROL WORRYING: SEVERAL DAYS
7. FEELING AFRAID AS IF SOMETHING AWFUL MIGHT HAPPEN: SEVERAL DAYS
GAD7 TOTAL SCORE: 6

## 2024-01-25 ENCOUNTER — VIRTUAL VISIT (OUTPATIENT)
Dept: PSYCHOLOGY | Facility: CLINIC | Age: 59
End: 2024-01-25
Payer: MEDICARE

## 2024-01-25 ENCOUNTER — TELEPHONE (OUTPATIENT)
Dept: INTERNAL MEDICINE | Facility: CLINIC | Age: 59
End: 2024-01-25

## 2024-01-25 ENCOUNTER — VIRTUAL VISIT (OUTPATIENT)
Dept: PULMONOLOGY | Facility: CLINIC | Age: 59
End: 2024-01-25
Payer: MEDICARE

## 2024-01-25 VITALS — BODY MASS INDEX: 24.8 KG/M2 | HEIGHT: 63 IN | WEIGHT: 140 LBS

## 2024-01-25 DIAGNOSIS — F33.0 MDD (MAJOR DEPRESSIVE DISORDER), RECURRENT EPISODE, MILD (H): Primary | ICD-10-CM

## 2024-01-25 DIAGNOSIS — Z86.16 HISTORY OF SEVERE ACUTE RESPIRATORY SYNDROME CORONAVIRUS 2 (SARS-COV-2) DISEASE: ICD-10-CM

## 2024-01-25 DIAGNOSIS — J96.11 CHRONIC HYPOXIC RESPIRATORY FAILURE (H): Primary | ICD-10-CM

## 2024-01-25 PROCEDURE — 90834 PSYTX W PT 45 MINUTES: CPT | Mod: 95 | Performed by: SOCIAL WORKER

## 2024-01-25 PROCEDURE — 99214 OFFICE O/P EST MOD 30 MIN: CPT | Mod: 95 | Performed by: INTERNAL MEDICINE

## 2024-01-25 ASSESSMENT — PATIENT HEALTH QUESTIONNAIRE - PHQ9
SUM OF ALL RESPONSES TO PHQ QUESTIONS 1-9: 8
10. IF YOU CHECKED OFF ANY PROBLEMS, HOW DIFFICULT HAVE THESE PROBLEMS MADE IT FOR YOU TO DO YOUR WORK, TAKE CARE OF THINGS AT HOME, OR GET ALONG WITH OTHER PEOPLE: VERY DIFFICULT
SUM OF ALL RESPONSES TO PHQ QUESTIONS 1-9: 8
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
## 2024-01-25 NOTE — PROGRESS NOTES
[facility]                                     Progress Note    Patient Name: Sherly Yeung  Date: 1/25/2024         Service Type: Individual      Session Start Time: 10:02 Session End Time: 10:44    Session Length:42    Session #: 16    Attendees: Client    Service Modality:  Video Visit:      Provider verified identity through the following two step process.  Patient provided:  Patient is known previously to provider    Telemedicine Visit: The patient's condition can be safely assessed and treated via synchronous audio and visual telemedicine encounter.      Reason for Telemedicine Visit: Services only offered telehealth    Originating Site (Patient Location): Patient's home    Distant Site (Provider Location): Provider Remote Setting- Home Office    Consent:  The patient/guardian has verbally consented to: the potential risks and benefits of telemedicine (video visit) versus in person care; bill my insurance or make self-payment for services provided; and responsibility for payment of non-covered services.     Patient would like the video invitation sent by:  My Chart    Mode of Communication:  Video Conference via Amwell    Distant Location (Provider):  Off-site    As the provider I attest to compliance with applicable laws and regulations related to telemedicine.    DATA  Interactive Complexity: Yes, visit entailed Interactive Complexity evidenced by:  upcoming long trip that she is not prepared for due to illness, migraines.    Crisis: No      Progress Since Last Session (Related to Symptoms / Goals / Homework):   Symptoms:  Anxiety, depressed    Homework: Partially completed      Episode of Care Goals: Satisfactory progress - ACTION (Actively working towards change); Intervened by reinforcing change plan / affirming steps taken-  some set back due to the upcoming trip and migraines.     Current / Ongoing Stressors and Concerns: Patient shared she has been sick with bad migraines. She has  not left the bed or rarely went out of the bed since last Sunday. Patient had been preparing for a trip to [place] in a vasquez. Its self was hard for her and it was bringing some anxiety. With hr current migraines, she feels down and more anxious. Processed these feelings and was encouraged and supported as needed. Her next visit is after her trip in February.        Treatment Objective(s) Addressed in This Session:        Use thought-stopping strategy daily to reduce intrusive thoughts and hold her hostage     Intervention:    CBT : Challenging cognitive traps related to the upcoming travel    MI Intervention: Supported Autonomy, Collaboration, Evocation, Permission to raise concern or advise, and Open-ended questions     Change Talk Expressed by the Patient: Taking steps    Provider Response to Change Talk: E - Evoked more info from patient about behavior change, A - Affirmed patient's thoughts, decisions, or attempts at behavior change, R - Reflected patient's change talk, and S - Summarized patient's change talk statements     Grief processing: To process her loss and achieve acceptance.     Assessments completed prior to visit:2/23/2023    The following assessments were completed by patient for this visit:  PHQ2:       1/12/2024     2:03 PM 12/27/2023     8:39 AM 4/27/2022    10:07 AM 8/11/2020     9:37 AM 3/24/2020     3:46 PM 1/19/2012     2:24 PM   PHQ-2 ( 1999 [employer])   Q1: Little interest or pleasure in doing things 0 0 0 0 0 0   Q2: Feeling down, depressed or hopeless 1 1 0 0 0 1   PHQ-2 Score 1 1 0 0 0 1   PHQ-2 Total Score (12-17 Years)- Positive if 3 or more points; Administer PHQ-A if positive    0 0      PHQ9:       11/16/2023     6:17 AM 11/28/2023    11:28 AM 12/4/2023     7:33 AM 12/13/2023    11:27 AM 12/26/2023    11:42 AM 1/10/2024     8:02 AM 1/25/2024     9:59 AM   PHQ-9 SCORE   PHQ-9 Total Score Kirstin 4 (Minimal depression) 5 (Mild depression) 5 (Mild depression) 5 (Mild depression) 5 (Mild  depression) 5 (Mild depression) 8 (Mild depression)   PHQ-9 Total Score 4 5 5 5 5 5 8     GAD2:       11/16/2023     6:17 AM 11/28/2023    11:28 AM 12/13/2023    11:27 AM 12/26/2023    11:43 AM 1/7/2024    10:08 AM 1/16/2024    12:29 PM 1/22/2024    10:13 AM   ROCKY-2   Feeling nervous, anxious, or on edge 1 2 1 1 2 2 2   Not being able to stop or control worrying 1 0 1 1 1 1 1   ROCKY-2 Total Score 2 2    2 2 2 3 3 3     GAD7:       4/26/2023    11:04 AM 7/17/2023     9:05 AM 9/18/2023    12:06 PM 10/10/2023     1:58 PM 12/13/2023     1:54 PM 1/7/2024    10:08 AM 1/22/2024    10:13 AM   ROCKY-7 SCORE   Total Score    5 (mild anxiety)  5 (mild anxiety) 6 (mild anxiety)   Total Score 2 2 5 5 3 5 6     CAGE-AID:       2/9/2023    11:52 AM 7/17/2023     9:10 AM   CAGE-AID Total Score   Total Score 0 0   Total Score MyChart 0 (A total score of 2 or greater is considered clinically significant)      PROMIS 10-Global Health (all questions and answers displayed):       7/30/2023     5:28 PM 10/29/2023     9:12 AM 11/16/2023     6:20 AM 11/28/2023    11:30 AM 12/13/2023    11:29 AM 12/26/2023    11:45 AM 1/7/2024    10:11 AM   PROMIS 10   In general, would you say your health is: Good Good Good Good Good Good Good   In general, would you say your quality of life is: Very good Good Very good Very good Good Good Good   In general, how would you rate your physical health? Good Good Good Good Good Good Good   In general, how would you rate your mental health, including your mood and your ability to think? Good Good Very good Good Good Good Fair   In general, how would you rate your satisfaction with your social activities and relationships? Very good Very good Good Good Good Very good Good   In general, please rate how well you carry out your usual social activities and roles Good Good Good Good Fair Good Good   To what extent are you able to carry out your everyday physical activities such as walking, climbing stairs, carrying  groceries, or moving a chair? A little Moderately Moderately Moderately A little Moderately Mostly   In the past 7 days, how often have you been bothered by emotional problems such as feeling anxious, depressed, or irritable? Sometimes Sometimes Often Often Often Often Often   In the past 7 days, how would you rate your fatigue on average? Moderate Moderate Moderate Moderate Moderate Moderate Moderate   In the past 7 days, how would you rate your pain on average, where 0 means no pain, and 10 means worst imaginable pain? 5 5 5 5 6 6 5   In general, would you say your health is: 3 3 3 3 3 3 3   In general, would you say your quality of life is: 4 3 4 4 3 3 3   In general, how would you rate your physical health? 3 3 3 3 3 3 3   In general, how would you rate your mental health, including your mood and your ability to think? 3 3 4 3 3 3 2   In general, how would you rate your satisfaction with your social activities and relationships? 4 4 3 3 3 4 3   In general, please rate how well you carry out your usual social activities and roles. (This includes activities at home, at work and in your community, and responsibilities as a parent, child, spouse, employee, friend, etc.) 3 3 3 3 2 3 3   To what extent are you able to carry out your everyday physical activities such as walking, climbing stairs, carrying groceries, or moving a chair? 2 3 3 3 2 3 4   In the past 7 days, how often have you been bothered by emotional problems such as feeling anxious, depressed, or irritable? 3 3 4 4 4 4 4   In the past 7 days, how would you rate your fatigue on average? 3 3 3 3 3 3 3   In the past 7 days, how would you rate your pain on average, where 0 means no pain, and 10 means worst imaginable pain? 5 5 5 5 6 6 5   Global Mental Health Score 14 13 13 12 11 12 10   Global Physical Health Score 11 12 12 12 11 12 13   PROMIS TOTAL - SUBSCORES 25 25 25 24 22 24 23     PROMIS 10-Global Health (only subscores and total score):        7/30/2023     5:28 PM 10/29/2023     9:12 AM 11/16/2023     6:20 AM 11/28/2023    11:30 AM 12/13/2023    11:29 AM 12/26/2023    11:45 AM 1/7/2024    10:11 AM   PROMIS-10 Scores Only   Global Mental Health Score 14 13 13 12 11 12 10   Global Physical Health Score 11 12 12 12 11 12 13   PROMIS TOTAL - SUBSCORES 25 25 25 24 22 24 23     [place] Suicide Severity Rating Scale (Short Version)      9/30/2022     5:03 PM 11/28/2022     9:49 PM 2/10/2023     9:55 AM 3/20/2023     3:28 PM 7/17/2023     9:09 AM 9/13/2023     6:03 AM 12/6/2023     1:21 AM   [place] Suicide Severity Rating (Short Version)   Over the past 2 weeks have you felt down, depressed, or hopeless? no no  no  yes no   Over the past 2 weeks have you had thoughts of killing yourself? no no  no  no no   Have you ever attempted to kill yourself? no no  no  no no   1. Wish to be Dead (Since Last Contact)   N  N     2. Non-Specific Active Suicidal Thoughts (Since Last Contact)   N  N     Actual Attempt (Since Last Contact)   N  N     Has subject engaged in non-suicidal self-injurious behavior? (Since Last Contact)   N  N     Interrupted Attempts (Since Last Contact)   N  N     Aborted or Self-Interrupted Attempt (Since Last Contact)   N  N     Preparatory Acts or Behavior (Since Last Contact)   N  N     Suicide (Since Last Contact)   N  N     Calculated C-SSRS Risk Score (Since Last Contact)   No Risk Indicated  No Risk Indicated           ASSESSMENT: Current Emotional / Mental Status (status of significant symptoms):   Risk status (Self / Other harm or suicidal ideation)   Patient denies current fears or concerns for personal safety.   Patient denies current or recent suicidal ideation or behaviors.   Patient denies current or recent homicidal ideation or behaviors.   Patient denies current or recent self injurious behavior or ideation.   Patient denies other safety concerns.   Patient reports there has been no change in risk factors since their last  session.     Patient reports there has been no change in protective factors since their last session.     Recommended that patient call 911 or go to the local ED should there be a change in any of these risk factors.  Call [phone] if experiencing SI     Appearance:   Appropriate    Eye Contact:   Good    Psychomotor Behavior: Normal    Attitude:   Cooperative    Orientation:   Person Place Time Situation   Speech    Rate / Production: Slow  Normal     Volume:  Soft    Mood:    Anxious  Depressed    Affect:    Appropriate    Thought Content:  Clear    Thought Form:  Coherent  Logical    Insight:    Good      Medication Review:   No changes to current psychiatric medication(s)     Medication Compliance:   Yes     Changes in Health Issues:   None reported     Chemical Use Review:   Substance Use: Chemical use reviewed, no active concerns identified      Tobacco Use: No current tobacco use.      Diagnosis:  1. MDD (major depressive disorder), recurrent episode, mild (H24)      Collateral Reports Completed:   Not Applicable    PLAN: (Patient Tasks / Therapist Tasks / Other):   Patient will keep up with positive self talk, challenging any cognitive traps as she is recovering from migraines.  Patient's next visit is on 2/22/2024 after her trip in [place].     GERMAN Farmer           ______________________________________________    Individual Treatment Plan    Patient's Name: Sherly Yeung  YOB: 1965    Date of Creation: 2/23/2023    Date Treatment Plan Last Reviewed/Revised: 1/25/2024    DSM5 Diagnoses: 296.32 (F33.1) Major Depressive Disorder, Recurrent Episode, Moderate With anxious distress or 300.02 (F41.1) Generalized Anxiety Disorder  Grief reaction [F43.21]    Psychosocial / Contextual Factors: grief: father recently passed away. Family dynamic- relationship with son, mom. Feeling overwhelmed.    PROMIS (reviewed every 90 days): 25    Referral / Collaboration:    Referral to another  "professional/service is not indicated at this time..    Anticipated number of session for this episode of care: 9-12 sessions  Anticipation frequency of session: Biweekly  Anticipated Duration of each session: 53 or more minutes  Treatment plan will be reviewed in 90 days or when goals have been changed.     MeasurableTreatment Goal(s) related to diagnosis / functional impairment(s)    Goal 1: Patient will Accept the loss of beloved one and return to stable level of functioning as evidenced by a higher level of functioning as a result of acceptance.   Redevelop a supportive social system and improve interpersonal relationships and Express unresolved emotions regarding loss which brings anxiety and depression mood.      I will know I've met my goal when I have more energy and  I am able to celebrate good life with my father Vs the sadness of losing him.Redevelop a supportive social system and improve interpersonal relationships\"    Objective #A (Patient Action)    Patient will increase understanding of steps in the grief process.  Status: Continued - Date(s): 1/25/2024    Intervention(s)  Therapist will teach emotional recognition/identification. : defining happiness in your own term without father .    Objective #B  Patient will identify at least 3 fears / thoughts that contribute to feeling anxious.  Status: Continued - Date(s): 1/25/2024     Intervention(s)  Therapist will teach thought challenging with CBT .    Objective #C  Patient will Identify negative self-talk and behaviors: challenge core beliefs, myths, and actions.  Status: Continued - Date(s): 1/25/2024    Intervention(s)  Therapist will provide space and time to process thoughts and feelings around current stressors. provide support and coping skills to help move on in life .    Patient has reviewed and agreed to the above plan.    GERMAN Farmer  1/25/2024    Answers for HPI/ROS submitted by the patient on 4/20/2023  If you checked off any " problems, how difficult have these problems made it for you to do your work, take care of things at home, or get along with other people?: Somewhat difficult  PHQ9 TOTAL SCORE: 5    Answers for HPI/ROS submitted by the patient on 4/26/2023  If you checked off any problems, how difficult have these problems made it for you to do your work, take care of things at home, or get along with other people?: Somewhat difficult  PHQ9 TOTAL SCORE: 3    Answers for HPI/ROS submitted by the patient on 5/4/2023  If you checked off any problems, how difficult have these problems made it for you to do your work, take care of things at home, or get along with other people?: Somewhat difficult  PHQ9 TOTAL SCORE: 4  Answers for HPI/ROS submitted by the patient on 7/17/2023  If you checked off any problems, how difficult have these problems made it for you to do your work, take care of things at home, or get along with other people?: Somewhat difficult  PHQ9 TOTAL SCORE: 5    Answers submitted by the patient for this visit:  Patient Health Questionnaire (Submitted on 7/30/2023)  If you checked off any problems, how difficult have these problems made it for you to do your work, take care of things at home, or get along with other people?: Somewhat difficult  PHQ9 TOTAL SCORE: 5  Answers submitted by the patient for this visit:  Patient Health Questionnaire (Submitted on 8/20/2023)  If you checked off any problems, how difficult have these problems made it for you to do your work, take care of things at home, or get along with other people?: Somewhat difficult  PHQ9 TOTAL SCORE: 5  Answers submitted by the patient for this visit:  Patient Health Questionnaire (Submitted on 9/18/2023)  If you checked off any problems, how difficult have these problems made it for you to do your work, take care of things at home, or get along with other people?: Somewhat difficult  PHQ9 TOTAL SCORE: 4  Answers submitted by the patient for this  visit:  Patient Health Questionnaire (Submitted on 10/2/2023)  If you checked off any problems, how difficult have these problems made it for you to do your work, take care of things at home, or get along with other people?: Somewhat difficult  PHQ9 TOTAL SCORE: 5    Answers submitted by the patient for this visit:  Patient Health Questionnaire (Submitted on 10/15/2023)  If you checked off any problems, how difficult have these problems made it for you to do your work, take care of things at home, or get along with other people?: Somewhat difficult  PHQ9 TOTAL SCORE: 6  ROCKY-7 (Submitted on 10/10/2023)  ROCKY 7 TOTAL SCORE: 5    Answers submitted by the patient for this visit:  Patient Health Questionnaire (Submitted on 11/16/2023)  If you checked off any problems, how difficult have these problems made it for you to do your work, take care of things at home, or get along with other people?: Somewhat difficult  PHQ9 TOTAL SCORE: 4    Answers submitted by the patient for this visit:  Patient Health Questionnaire (Submitted on 11/28/2023)  If you checked off any problems, how difficult have these problems made it for you to do your work, take care of things at home, or get along with other people?: Somewhat difficult  PHQ9 TOTAL SCORE: 5    Answers submitted by the patient for this visit:  Patient Health Questionnaire (Submitted on 12/13/2023)  If you checked off any problems, how difficult have these problems made it for you to do your work, take care of things at home, or get along with other people?: Somewhat difficult  PHQ9 TOTAL SCORE: 5    Answers submitted by the patient for this visit:  Patient Health Questionnaire (Submitted on 12/26/2023)  If you checked off any problems, how difficult have these problems made it for you to do your work, take care of things at home, or get along with other people?: Somewhat difficult  PHQ9 TOTAL SCORE: 5    Answers submitted by the patient for this visit:  Patient Health  Questionnaire (Submitted on 1/10/2024)  If you checked off any problems, how difficult have these problems made it for you to do your work, take care of things at home, or get along with other people?: Somewhat difficult  PHQ9 TOTAL SCORE: 5  ROCKY-7 (Submitted on 1/7/2024)  ROCKY 7 TOTAL SCORE: 5    Answers submitted by the patient for this visit:  Patient Health Questionnaire (Submitted on 1/25/2024)  If you checked off any problems, how difficult have these problems made it for you to do your work, take care of things at home, or get along with other people?: Very difficult  PHQ9 TOTAL SCORE: 8  ROCKY-7 (Submitted on 1/22/2024)  ROCKY 7 TOTAL SCORE: 6

## 2024-01-25 NOTE — NURSING NOTE
" Initial Ht 1.6 m (5' 3\")   Wt 63.5 kg (140 lb)   LMP 08/22/2017 (Approximate)   BMI 24.80 kg/m   Estimated body mass index is 24.8 kg/m  as calculated from the following:    Height as of this encounter: 1.6 m (5' 3\").    Weight as of this encounter: 63.5 kg (140 lb). .  Stefanie Alejo MA    "

## 2024-01-25 NOTE — TELEPHONE ENCOUNTER
[facility]    Phone Message    May a detailed message be left on voicemail: yes     Reason for Call: Other: Per pt would like to ask what can be done since she is having tongue soreness on the left side. Per pt now has pain in her ears too. The tongue soreness is for about a month now and the ear pain is recently only. Per pt is wondering if she can be seen before she leave to vacation in one week? Please call pt back to discuss. Thank you.     Action Taken: Message routed to:  [facility]    Travel Screening: Not Applicable

## 2024-01-26 ENCOUNTER — VIRTUAL VISIT (OUTPATIENT)
Dept: PSYCHIATRY | Facility: CLINIC | Age: 59
End: 2024-01-26
Attending: STUDENT IN AN ORGANIZED HEALTH CARE EDUCATION/TRAINING PROGRAM
Payer: MEDICARE

## 2024-01-26 DIAGNOSIS — F33.1 MODERATE EPISODE OF RECURRENT MAJOR DEPRESSIVE DISORDER (H): Primary | ICD-10-CM

## 2024-01-26 PROCEDURE — 99214 OFFICE O/P EST MOD 30 MIN: CPT | Mod: 95

## 2024-01-26 ASSESSMENT — PAIN SCALES - GENERAL: PAINLEVEL: NO PAIN (0)

## 2024-01-26 NOTE — NURSING NOTE
Is the patient currently in the state of [place]? YES    Visit mode:VIDEO    If the visit is dropped, the patient can be reconnected by: VIDEO VISIT: Text to cell phone:   Telephone Information:   Mobile [phone]       Will anyone else be joining the visit? NO  (If patient encounters technical issues they should call [phone]    How would you like to obtain your AVS? MyChart    Are changes needed to the allergy or medication list? No  Patient denies any changes since echeck-in regarding medication and allergies and states all information entered during echeck-in remains accurate.    Reason for visit: RECHREBECA ORDOÑEZ

## 2024-01-26 NOTE — PROGRESS NOTES
Virtual Visit Details    Type of service:  Video Visit   Video Start Time:  3:10  Video End Time:3:52 PM    Originating Location (pt. Location): Home    Distant Location (provider location):  On-site  Platform used for Video Visit: Kourtney

## 2024-01-26 NOTE — Clinical Note
Carlos Alberto Hare, I was catching up on notes and signed one for Brian with this patient. I noticed on this visit with you, his visit diagnoses are significantly inaccurate. The visit diagnoses is depression in remission, and this patient is actively having severe depression symptoms. I did change it for this one.   I'm working on my own issues with notes and documentation right now, and wanted to offer my feedback too. Please remember to check the visit diagnoses to make sure they match what the residents write in their notes!

## 2024-01-26 NOTE — PATIENT INSTRUCTIONS
**For crisis resources, please see the information at the end of this document**   Patient Education    Thank you for coming to the [facility] [place] [facility].     Lab Testing:  If you had lab testing today and your results are reassuring or normal they will be mailed to you or sent through Fangtek within 7 days. If the lab tests need quick action we will call you with the results. The phone number we will call with results is # [phone]. If this is not the best number please call our clinic and change the number.     Medication Refills:  If you need any refills please call your pharmacy and they will contact us. Our fax number for refills is [phone].   Three business days of notice are needed for general medication refill requests.   Five business days of notice are needed for controlled substance refill requests.   If you need to change to a different pharmacy, please contact the new pharmacy directly. The new pharmacy will help you get your medications transferred.     Contact Us:  Please call [phone] during business hours (8-5:00 M-F).   If you have medication related questions after clinic hours, or on the weekend, please call [phone].     Financial Assistance [phone]   Medical Records [phone]       MENTAL HEALTH CRISIS RESOURCES:  For a emergency help, please call 911 or go to the nearest Emergency Department.     Emergency Walk-In Options:   Em[facility] Unit @ [facility] ([place]): [phone] - Specialized mental health emergency area designed to be calming  [facility] ([place]): [phone]  [facility] ([place]): [phone]  [facility]): [phone]    [facility] Crisis Information:   [place]: [phone]  [place]: [phone]  [place] ([place]) - Adult: [phone]     Child: [phone]  [place] - Adult: [phone]     Child: [phone]  [place]:  [phone]  List of all [facility] resources:   https://[place].gov/dhs/people-we-serve/adults/health-care/mental-health/resources/crisis-contacts.jsp    National Crisis Information:   Crisis Text Line: Text  [place]  to 003907  Suicide & Crisis Lifeline: 988  National Suicide Prevention Lifeline: 0-001-382-TALK (1-458.819.6913)       For online chat options, visit https://suicidepreventionlifeline.org/chat/  Poison Control Center: 6-082-547-3210  Trans Lifeline: 2-958-981-3276 - Hotline for transgender people of all ages  The [employer]: 7-654-449-9946 - Hotline for LGBT youth     For Non-Emergency Support:   Fast Tracker: Mental Health & Substance Use Disorder Resources -   https://www.[employer].org/
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
## 2024-01-29 ENCOUNTER — VIRTUAL VISIT (OUTPATIENT)
Dept: PSYCHOLOGY | Facility: CLINIC | Age: 59
End: 2024-01-29
Payer: MEDICARE

## 2024-01-29 DIAGNOSIS — F41.1 GAD (GENERALIZED ANXIETY DISORDER): Primary | ICD-10-CM

## 2024-01-29 PROCEDURE — 90834 PSYTX W PT 45 MINUTES: CPT | Mod: 95 | Performed by: SOCIAL WORKER

## 2024-01-29 NOTE — PROGRESS NOTES
[facility]                                     Progress Note    Patient Name: Sherly Yeung  Date: 1/25/2024         Service Type: Individual      Session Start Time: 12:04 Session End Time: 12:46    Session Length: 42    Session #: 17    Attendees: Client    Service Modality:  Video Visit:      Provider verified identity through the following two step process.  Patient provided:  Patient is known previously to provider    Telemedicine Visit: The patient's condition can be safely assessed and treated via synchronous audio and visual telemedicine encounter.      Reason for Telemedicine Visit: Services only offered telehealth    Originating Site (Patient Location): Patient's home    Distant Site (Provider Location): Provider Remote Setting- Home Office    Consent:  The patient/guardian has verbally consented to: the potential risks and benefits of telemedicine (video visit) versus in person care; bill my insurance or make self-payment for services provided; and responsibility for payment of non-covered services.     Patient would like the video invitation sent by:  My Chart    Mode of Communication:  Video Conference via Amwell    Distant Location (Provider):  Off-site    As the provider I attest to compliance with applicable laws and regulations related to telemedicine.    DATA  Interactive Complexity: No     Crisis: No      Progress Since Last Session (Related to Symptoms / Goals / Homework):   Symptoms:  Anxiety    Homework: Partially completed      Episode of Care Goals: Satisfactory progress - ACTION (Actively working towards change); Intervened by reinforcing change plan / affirming steps taken-  some set back due to the upcoming trip and migraines.     Current / Ongoing Stressors and Concerns: Patient reports doing much better since the last visit. Was able to get steroids for her migraines and shred she is doing much better. Has resumed her preparation for the Trip to [place] with .  Shared she was a little anxious but  is helping with packing and other things she needs to do like getting all her medications ready. Plans to do some readings and also journaling.  No new concern today and she has indicated she will keep up with her [place]udson and current skills. Her next visit is on 2/22 after her trip.         Treatment Objective(s) Addressed in This Session:         Intervention:    CBT : Challenging cognitive traps related to the upcoming travel    MI Intervention: Supported Autonomy, Collaboration, Evocation, Permission to raise concern or advise, and Open-ended questions     Change Talk Expressed by the Patient: Taking steps    Provider Response to Change Talk: E - Evoked more info from patient about behavior change, A - Affirmed patient's thoughts, decisions, or attempts at behavior change, R - Reflected patient's change talk, and S - Summarized patient's change talk statements     Grief processing: To process her loss and achieve acceptance.     Assessments completed prior to visit:2/23/2023    The following assessments were completed by patient for this visit:  PHQ2:       1/12/2024     2:03 PM 12/27/2023     8:39 AM 4/27/2022    10:07 AM 8/11/2020     9:37 AM 3/24/2020     3:46 PM 1/19/2012     2:24 PM   PHQ-2 ( 1999 [employer])   Q1: Little interest or pleasure in doing things 0 0 0 0 0 0   Q2: Feeling down, depressed or hopeless 1 1 0 0 0 1   PHQ-2 Score 1 1 0 0 0 1   PHQ-2 Total Score (12-17 Years)- Positive if 3 or more points; Administer PHQ-A if positive    0 0      PHQ9:       11/16/2023     6:17 AM 11/28/2023    11:28 AM 12/4/2023     7:33 AM 12/13/2023    11:27 AM 12/26/2023    11:42 AM 1/10/2024     8:02 AM 1/25/2024     9:59 AM   PHQ-9 SCORE   PHQ-9 Total Score MyChart 4 (Minimal depression) 5 (Mild depression) 5 (Mild depression) 5 (Mild depression) 5 (Mild depression) 5 (Mild depression) 8 (Mild depression)   PHQ-9 Total Score 4 5 5 5 5 5 8     GAD2:       11/16/2023     6:17  AM 11/28/2023    11:28 AM 12/13/2023    11:27 AM 12/26/2023    11:43 AM 1/7/2024    10:08 AM 1/16/2024    12:29 PM 1/22/2024    10:13 AM   ROCKY-2   Feeling nervous, anxious, or on edge 1 2 1 1 2 2 2   Not being able to stop or control worrying 1 0 1 1 1 1 1   ORCKY-2 Total Score 2 2    2 2 2 3 3 3     GAD7:       4/26/2023    11:04 AM 7/17/2023     9:05 AM 9/18/2023    12:06 PM 10/10/2023     1:58 PM 12/13/2023     1:54 PM 1/7/2024    10:08 AM 1/22/2024    10:13 AM   ROCKY-7 SCORE   Total Score    5 (mild anxiety)  5 (mild anxiety) 6 (mild anxiety)   Total Score 2 2 5 5 3 5 6     CAGE-AID:       2/9/2023    11:52 AM 7/17/2023     9:10 AM   CAGE-AID Total Score   Total Score 0 0   Total Score MyChart 0 (A total score of 2 or greater is considered clinically significant)      PROMIS 10-Global Health (all questions and answers displayed):       7/30/2023     5:28 PM 10/29/2023     9:12 AM 11/16/2023     6:20 AM 11/28/2023    11:30 AM 12/13/2023    11:29 AM 12/26/2023    11:45 AM 1/7/2024    10:11 AM   PROMIS 10   In general, would you say your health is: Good Good Good Good Good Good Good   In general, would you say your quality of life is: Very good Good Very good Very good Good Good Good   In general, how would you rate your physical health? Good Good Good Good Good Good Good   In general, how would you rate your mental health, including your mood and your ability to think? Good Good Very good Good Good Good Fair   In general, how would you rate your satisfaction with your social activities and relationships? Very good Very good Good Good Good Very good Good   In general, please rate how well you carry out your usual social activities and roles Good Good Good Good Fair Good Good   To what extent are you able to carry out your everyday physical activities such as walking, climbing stairs, carrying groceries, or moving a chair? A little Moderately Moderately Moderately A little Moderately Mostly   In the past 7 days, how  often have you been bothered by emotional problems such as feeling anxious, depressed, or irritable? Sometimes Sometimes Often Often Often Often Often   In the past 7 days, how would you rate your fatigue on average? Moderate Moderate Moderate Moderate Moderate Moderate Moderate   In the past 7 days, how would you rate your pain on average, where 0 means no pain, and 10 means worst imaginable pain? 5 5 5 5 6 6 5   In general, would you say your health is: 3 3 3 3 3 3 3   In general, would you say your quality of life is: 4 3 4 4 3 3 3   In general, how would you rate your physical health? 3 3 3 3 3 3 3   In general, how would you rate your mental health, including your mood and your ability to think? 3 3 4 3 3 3 2   In general, how would you rate your satisfaction with your social activities and relationships? 4 4 3 3 3 4 3   In general, please rate how well you carry out your usual social activities and roles. (This includes activities at home, at work and in your community, and responsibilities as a parent, child, spouse, employee, friend, etc.) 3 3 3 3 2 3 3   To what extent are you able to carry out your everyday physical activities such as walking, climbing stairs, carrying groceries, or moving a chair? 2 3 3 3 2 3 4   In the past 7 days, how often have you been bothered by emotional problems such as feeling anxious, depressed, or irritable? 3 3 4 4 4 4 4   In the past 7 days, how would you rate your fatigue on average? 3 3 3 3 3 3 3   In the past 7 days, how would you rate your pain on average, where 0 means no pain, and 10 means worst imaginable pain? 5 5 5 5 6 6 5   Global Mental Health Score 14 13 13 12 11 12 10   Global Physical Health Score 11 12 12 12 11 12 13   PROMIS TOTAL - SUBSCORES 25 25 25 24 22 24 23     PROMIS 10-Global Health (only subscores and total score):       7/30/2023     5:28 PM 10/29/2023     9:12 AM 11/16/2023     6:20 AM 11/28/2023    11:30 AM 12/13/2023    11:29 AM 12/26/2023     11:45 AM 1/7/2024    10:11 AM   PROMIS-10 Scores Only   Global Mental Health Score 14 13 13 12 11 12 10   Global Physical Health Score 11 12 12 12 11 12 13   PROMIS TOTAL - SUBSCORES 25 25 25 24 22 24 23     [place] Suicide Severity Rating Scale (Short Version)      9/30/2022     5:03 PM 11/28/2022     9:49 PM 2/10/2023     9:55 AM 3/20/2023     3:28 PM 7/17/2023     9:09 AM 9/13/2023     6:03 AM 12/6/2023     1:21 AM   [place] Suicide Severity Rating (Short Version)   Over the past 2 weeks have you felt down, depressed, or hopeless? no no  no  yes no   Over the past 2 weeks have you had thoughts of killing yourself? no no  no  no no   Have you ever attempted to kill yourself? no no  no  no no   1. Wish to be Dead (Since Last Contact)   N  N     2. Non-Specific Active Suicidal Thoughts (Since Last Contact)   N  N     Actual Attempt (Since Last Contact)   N  N     Has subject engaged in non-suicidal self-injurious behavior? (Since Last Contact)   N  N     Interrupted Attempts (Since Last Contact)   N  N     Aborted or Self-Interrupted Attempt (Since Last Contact)   N  N     Preparatory Acts or Behavior (Since Last Contact)   N  N     Suicide (Since Last Contact)   N  N     Calculated C-SSRS Risk Score (Since Last Contact)   No Risk Indicated  No Risk Indicated           ASSESSMENT: Current Emotional / Mental Status (status of significant symptoms):   Risk status (Self / Other harm or suicidal ideation)   Patient denies current fears or concerns for personal safety.   Patient denies current or recent suicidal ideation or behaviors.   Patient denies current or recent homicidal ideation or behaviors.   Patient denies current or recent self injurious behavior or ideation.   Patient denies other safety concerns.   Patient reports there has been no change in risk factors since their last session.     Patient reports there has been no change in protective factors since their last session.     Recommended that patient call  911 or go to the local ED should there be a change in any of these risk factors.  Call [phone] if experiencing SI     Appearance:   Appropriate    Eye Contact:   Good    Psychomotor Behavior: Normal    Attitude:   Cooperative    Orientation:   Person Place Time Situation   Speech    Rate / Production: Normal     Volume:  Normal    Mood:    Anxious    Affect:    Appropriate    Thought Content:  Clear    Thought Form:  Coherent  Logical    Insight:    Good      Medication Review:   No changes to current psychiatric medication(s)     Medication Compliance:   Yes     Changes in Health Issues:   None reported     Chemical Use Review:   Substance Use: Chemical use reviewed, no active concerns identified      Tobacco Use: No current tobacco use.      Diagnosis:  1. ROCKY (generalized anxiety disorder)        Collateral Reports Completed:   Not Applicable    PLAN: (Patient Tasks / Therapist Tasks / Other):   Patient will pack her journals and books to read during her trip.   Patient's next visit is on 2/22/2024 after her trip in [place].     GERMAN Farmer           ______________________________________________    Individual Treatment Plan    Patient's Name: Sherly Yeung  YOB: 1965    Date of Creation: 2/23/2023    Date Treatment Plan Last Reviewed/Revised: 1/25/2024    DSM5 Diagnoses: 296.32 (F33.1) Major Depressive Disorder, Recurrent Episode, Moderate With anxious distress or 300.02 (F41.1) Generalized Anxiety Disorder  Grief reaction [F43.21]    Psychosocial / Contextual Factors: grief: father recently passed away. Family dynamic- relationship with son, mom. Feeling overwhelmed.    PROMIS (reviewed every 90 days): 25    Referral / Collaboration:    Referral to another professional/service is not indicated at this time..    Anticipated number of session for this episode of care: 9-12 sessions  Anticipation frequency of session: Biweekly  Anticipated Duration of each session: 53 or more  "minutes  Treatment plan will be reviewed in 90 days or when goals have been changed.     MeasurableTreatment Goal(s) related to diagnosis / functional impairment(s)    Goal 1: Patient will Accept the loss of beloved one and return to stable level of functioning as evidenced by a higher level of functioning as a result of acceptance.   Redevelop a supportive social system and improve interpersonal relationships and Express unresolved emotions regarding loss which brings anxiety and depression mood.      I will know I've met my goal when I have more energy and  I am able to celebrate good life with my father Vs the sadness of losing him.Redevelop a supportive social system and improve interpersonal relationships\"    Objective #A (Patient Action)    Patient will increase understanding of steps in the grief process.  Status: Continued - Date(s): 1/25/2024    Intervention(s)  Therapist will teach emotional recognition/identification. : defining happiness in your own term without father .    Objective #B  Patient will identify at least 3 fears / thoughts that contribute to feeling anxious.  Status: Continued - Date(s): 1/25/2024     Intervention(s)  Therapist will teach thought challenging with CBT .    Objective #C  Patient will Identify negative self-talk and behaviors: challenge core beliefs, myths, and actions.  Status: Continued - Date(s): 1/25/2024    Intervention(s)  Therapist will provide space and time to process thoughts and feelings around current stressors. provide support and coping skills to help move on in life .    Patient has reviewed and agreed to the above plan.    GERMAN Farmer  1/25/2024    Answers for HPI/ROS submitted by the patient on 4/20/2023  If you checked off any problems, how difficult have these problems made it for you to do your work, take care of things at home, or get along with other people?: Somewhat difficult  PHQ9 TOTAL SCORE: 5    Answers for HPI/ROS submitted by the " patient on 4/26/2023  If you checked off any problems, how difficult have these problems made it for you to do your work, take care of things at home, or get along with other people?: Somewhat difficult  PHQ9 TOTAL SCORE: 3    Answers for HPI/ROS submitted by the patient on 5/4/2023  If you checked off any problems, how difficult have these problems made it for you to do your work, take care of things at home, or get along with other people?: Somewhat difficult  PHQ9 TOTAL SCORE: 4  Answers for HPI/ROS submitted by the patient on 7/17/2023  If you checked off any problems, how difficult have these problems made it for you to do your work, take care of things at home, or get along with other people?: Somewhat difficult  PHQ9 TOTAL SCORE: 5    Answers submitted by the patient for this visit:  Patient Health Questionnaire (Submitted on 7/30/2023)  If you checked off any problems, how difficult have these problems made it for you to do your work, take care of things at home, or get along with other people?: Somewhat difficult  PHQ9 TOTAL SCORE: 5  Answers submitted by the patient for this visit:  Patient Health Questionnaire (Submitted on 8/20/2023)  If you checked off any problems, how difficult have these problems made it for you to do your work, take care of things at home, or get along with other people?: Somewhat difficult  PHQ9 TOTAL SCORE: 5  Answers submitted by the patient for this visit:  Patient Health Questionnaire (Submitted on 9/18/2023)  If you checked off any problems, how difficult have these problems made it for you to do your work, take care of things at home, or get along with other people?: Somewhat difficult  PHQ9 TOTAL SCORE: 4  Answers submitted by the patient for this visit:  Patient Health Questionnaire (Submitted on 10/2/2023)  If you checked off any problems, how difficult have these problems made it for you to do your work, take care of things at home, or get along with other people?:  Somewhat difficult  PHQ9 TOTAL SCORE: 5    Answers submitted by the patient for this visit:  Patient Health Questionnaire (Submitted on 10/15/2023)  If you checked off any problems, how difficult have these problems made it for you to do your work, take care of things at home, or get along with other people?: Somewhat difficult  PHQ9 TOTAL SCORE: 6  ROCKY-7 (Submitted on 10/10/2023)  ROCKY 7 TOTAL SCORE: 5    Answers submitted by the patient for this visit:  Patient Health Questionnaire (Submitted on 11/16/2023)  If you checked off any problems, how difficult have these problems made it for you to do your work, take care of things at home, or get along with other people?: Somewhat difficult  PHQ9 TOTAL SCORE: 4    Answers submitted by the patient for this visit:  Patient Health Questionnaire (Submitted on 11/28/2023)  If you checked off any problems, how difficult have these problems made it for you to do your work, take care of things at home, or get along with other people?: Somewhat difficult  PHQ9 TOTAL SCORE: 5    Answers submitted by the patient for this visit:  Patient Health Questionnaire (Submitted on 12/13/2023)  If you checked off any problems, how difficult have these problems made it for you to do your work, take care of things at home, or get along with other people?: Somewhat difficult  PHQ9 TOTAL SCORE: 5    Answers submitted by the patient for this visit:  Patient Health Questionnaire (Submitted on 12/26/2023)  If you checked off any problems, how difficult have these problems made it for you to do your work, take care of things at home, or get along with other people?: Somewhat difficult  PHQ9 TOTAL SCORE: 5    Answers submitted by the patient for this visit:  Patient Health Questionnaire (Submitted on 1/10/2024)  If you checked off any problems, how difficult have these problems made it for you to do your work, take care of things at home, or get along with other people?: Somewhat difficult  PHQ9  TOTAL SCORE: 5  ROCKY-7 (Submitted on 1/7/2024)  ROCKY 7 TOTAL SCORE: 5    Answers submitted by the patient for this visit:  Patient Health Questionnaire (Submitted on 1/25/2024)  If you checked off any problems, how difficult have these problems made it for you to do your work, take care of things at home, or get along with other people?: Very difficult  PHQ9 TOTAL SCORE: 8  ROCKY-7 (Submitted on 1/22/2024)  ROCKY 7 TOTAL SCORE: 6

## 2024-01-29 NOTE — PATIENT INSTRUCTIONS
Marcia,     It was nice seeing you!  I am glad to hear you are doing well.     Recommendations as follows:   - continue symbicort as needed  - use the aerobika at least twice daily for 5 to 10 breaths each session when your mucous is thick or you are sick   - Increase Pepcid and take along with your Prevacid for better acid reflux control  - Please call the pulmonary clinic with any questions. The pulmonary clinic number is: [phone].    Stay up to date with vaccinations including your yearly flu vaccine. Wash your hands regularly. Consider wearing a mask in crowded places to reduce the risk of respiratory illnesses. I recommend that you receive the COVID-19 vaccine and stay up to date with boosters.     Have a nice winter and stay well! Please let me know if you have questions or concerns.     Brenna Gonsales MD  Pulmonary, Allergy, Critical Care, and Sleep Medicine   [facility]

## 2024-01-31 ENCOUNTER — THERAPY VISIT (OUTPATIENT)
Dept: PHYSICAL THERAPY | Facility: CLINIC | Age: 59
End: 2024-01-31
Attending: PHYSICIAN ASSISTANT
Payer: MEDICARE

## 2024-01-31 DIAGNOSIS — G89.29 CHRONIC BILATERAL LOW BACK PAIN WITHOUT SCIATICA: Primary | ICD-10-CM

## 2024-01-31 DIAGNOSIS — M54.50 CHRONIC BILATERAL LOW BACK PAIN WITHOUT SCIATICA: Primary | ICD-10-CM

## 2024-01-31 PROCEDURE — 97110 THERAPEUTIC EXERCISES: CPT | Mod: GP | Performed by: PHYSICAL THERAPIST

## 2024-01-31 PROCEDURE — 97140 MANUAL THERAPY 1/> REGIONS: CPT | Mod: GP | Performed by: PHYSICAL THERAPIST

## 2024-01-31 PROCEDURE — 97161 PT EVAL LOW COMPLEX 20 MIN: CPT | Mod: GP | Performed by: PHYSICAL THERAPIST

## 2024-01-31 NOTE — PROGRESS NOTES
PHYSICAL THERAPY EVALUATION  Type of Visit: Evaluation    See electronic medical record for Abuse and Falls Screening details.    Subjective Pain increases       Presenting condition or subjective complaint: I have low back pain from endometriosis and now arthritis in my Right SI joint and lower facets in my spine.  My spine is also too straight from holding it tight with my muscles to help ease the pain for so many years.  Date of onset: 12/21/23    Relevant medical history: Anemia; Arthritis; Bladder or bowel problems; Depression; DVT (blood clot); History of fractures; Incontinence; Menopause; Mental Illness; Migraines or headaches; Osteoporosis; Pain at night or rest; Severe headaches; Significant weakness; gall bladder removed; s/p PEG removed; trach removed; h/o endometriosis; hospitalized with COVID ?Nov 2021-March 2022 then TCU  Dates & types of surgery: R ovary removal due to endometriosis 1995, R knee arthroscopy 1985, [place] holes and Larm repair from degloving injury,  repair for thoracic outlet syndrome in 2010&2012, laparoscopic gallbladder removal 5/2022, multiple IR proc. for blood clots [id number]    Prior diagnostic imaging/testing results: X-ray     Prior therapy history for the same diagnosis, illness or injury: No  Osteopathic Manipulation Therapy to treat migraines     Prior Level of Function  Independent; decreased endurance and pulmonary function     Living Environment  Social support: With a significant other or spouse   Type of home: House; 1 level   Stairs to enter the home: No       Ramp: No   Stairs inside the home: No       Help at home: None      Employment: No    Hobbies/Interests: Walking, bicycling, gardening, knitting, crocheting, scrapbooking    Patient goals for therapy: Have sex with no pain, be able to stand longer without so much pain and walk longer as well.       Objective   LUMBAR SPINE EVALUATION  PAIN: increased with standing <30 min; walking < 1 mile; Pain decreased only  with oxycontin typically 1 dose 2-3x/wk  INTEGUMENTARY (edema, incisions): scar from PEG; trach, gall bladder removal  POSTURE: sit:  COM shifted forward of ischial tubes; flat L spine; decreased kyphosis;   GAIT: WFL   ROM: forward bend to nearly floor  PELVIC/SI SCREEN: -forward bend; - march  STRENGTH: 4/5 throughout; decreased endurance self reported due to long illness  FLEXIBILITY: L HS tighter than R  PALPATION: TTP R upper quadrant      Assessment & Plan   CLINICAL IMPRESSIONS  Medical Diagnosis: LBP    Treatment Diagnosis: LBP   Impression/Assessment: Patient is a 58 year old female with LBP complaints.  The following significant findings have been identified: Pain, Decreased ROM/flexibility, Decreased strength, Decreased activity tolerance, and Impaired posture. These impairments interfere with their ability to perform work tasks, household chores, and community mobility as compared to previous level of function.     Clinical Decision Making (Complexity):  Clinical Presentation: Stable/Uncomplicated  Clinical Presentation Rationale: based on medical and personal factors listed in PT evaluation  Clinical Decision Making (Complexity): Moderate complexity    PLAN OF CARE  Treatment Interventions:  Interventions: Manual Therapy, Therapeutic Activity, Therapeutic Exercise, Self-Care/Home Management    Long Term Goals     PT Goal 1  Goal Description: Independent HEP (cardio, strength, stretching) incorporated into a regular fitness routine  Rationale: to maximize safety and independence with performance of ADLs and functional tasks  Target Date: 04/30/24  PT Goal 2  Goal Description: walk 20' no increase in pain for cardio compontent of HEP  Rationale: to maximize safety and independence with performance of ADLs and functional tasks  Target Date: 04/10/24  PT Goal 3  Goal Description: stand for 30-45 ' to prepare meal without increase in LBP  Rationale: to maximize safety and independence with performance of  ADLs and functional tasks  Target Date: 04/20/24  PT Goal 4  Goal Description: intercourse with no back pain  Rationale: to maximize safety and independence with performance of ADLs and functional tasks  Target Date: 04/30/24      Frequency of Treatment: 1x/wk  Duration of Treatment: 90 days    Education Assessment:   Learner/Method: Patient;Listening;Demonstration;Pictures/Video  Education Comments: PTRx    Risks and benefits of evaluation/treatment have been explained.   Patient/Family/caregiver agrees with Plan of Care.     Evaluation Time:     PT Eval, Low Complexity Minutes (99763): 20       Signing Clinician: Deann Calzada, PT DPT      [facility]                                                                                   OUTPATIENT PHYSICAL THERAPY      PLAN OF TREATMENT FOR OUTPATIENT REHABILITATION   Patient's Last Name, First Name, AMANDABRENDON  YeungSherly YOB: 1965   Provider's Name   [facility]   Medical Record No.  [id number]     Onset Date: 12/21/23  Start of Care Date: 01/31/24     Medical Diagnosis:  LBP      PT Treatment Diagnosis:  LBP Plan of Treatment  Frequency/Duration: 1x/wk/ 90 days    Certification date from 01/31/24 to 04/30/24         See note for plan of treatment details and functional goals     Deann Calzada, PT DPT                         I CERTIFY THE NEED FOR THESE SERVICES FURNISHED UNDER        THIS PLAN OF TREATMENT AND WHILE UNDER MY CARE .             Physician Signature               Date    X_____________________________________________________                  Referring Provider:  Ulises Zavala PA-C    Initial Assessment  See Epic Evaluation- Start of Care Date: 01/31/24

## 2024-02-01 DIAGNOSIS — N30.00 ACUTE CYSTITIS WITHOUT HEMATURIA: Primary | ICD-10-CM

## 2024-02-01 RX ORDER — LEVOFLOXACIN 500 MG/1
500 TABLET, FILM COATED ORAL DAILY
Qty: 5 TABLET | Refills: 0 | Status: SHIPPED | OUTPATIENT
Start: 2024-02-01 | End: 2024-02-06

## 2024-02-15 ENCOUNTER — NURSE TRIAGE (OUTPATIENT)
Dept: NURSING | Facility: CLINIC | Age: 59
End: 2024-02-15

## 2024-02-15 NOTE — TELEPHONE ENCOUNTER
Nurse Triage SBAR    Is this a 2nd Level Triage? YES, LICENSED PRACTITIONER REVIEW IS REQUIRED    Situation:   Patient reporting ear ache with dizziness after getting back from cruise.   Air travel Saturday night- returned from [place], no ear pain on flight  -Ear pain started yesterday  -Pain 4/10, but dizziness worsening and sometimes room is spinning. The dizziness began today. Does have hx migraines also  -Denies acute neuro changes.          Background: 2/10/24:  Air travel from [facility], 2/14 ear pain 4/10, 2/15 dizziness, mild room spinning   -history recurrent bronchitis post COVID stable   -Subclavian vein stenosis/syndrome on apixaban    -Major depressive disorder followed by psychiatry     -History C diff  resolved   -History migraines followed now by pain clinic and osteopathic manipulation    -Hyperlipidemia    -GERD     Assessment:  Acute ear pain after air travel 2/10/24 ( began after air travel) may be due to fluid ear  Denies: URI, fever, otorrhea, facial rash or droop, facial pain, extremity weakness, photophobia, neck pain, hearing loss    Protocol Recommended Disposition: directed to UC tonight for eval.  Timeline of sx: 2/10/24: Air travel from [facility], 2/14 ear pain began 4/10, 2/15 dizziness, mild room spinning no acute neuro changes, fever, negative   meningeal sx, no otorrhea or hearing loss.  See today> Urgent Care to eval for OME    Recommendation: Sent to PCP for review,  tonight recommended for ear exam and imbalance eval., may be due to fluid ear / barotrauma. No hearing loss  Routed to provider team         Reason for Disposition   All other earaches  (Exceptions: Earache lasting < 1 hour, and earache from air travel.)   Spinning or tilting sensation (vertigo) present now   Lightheadedness (dizziness) present now, after 2 hours of rest and fluids    Additional Information   Negative: Sounds like a life-threatening emergency to the triager   Negative: Moving the earlobe or touching  "the ear clearly increases the pain   Negative: Foreign body stuck in the ear (e.g., bug, piece of cotton)   Negative: Pink or red swelling behind the ear   Negative: Stiff neck (can't touch chin to chest)   Negative: Patient sounds very sick or weak to the triager   Negative: White, yellow, or green discharge   Negative: Fever > 103 F (39.4 C)   Negative: Severe earache pain   Negative: Pointed object was inserted into the ear canal (e.g., a pencil, stick, or wire)   Negative: Chest pain   Negative: Vomiting is main symptom   Negative: New neurologic deficit that is present now: * Weakness of the face, arm, or leg on one side of the body * Numbness of the face, arm, or leg on one side of the body * Loss of speech or garbled speech   Negative: Patient states that they are having an anxiety or panic attack   Negative: Dizziness from low blood sugar (i.e., < 60 mg/dl or 3.5 mmol/l)   Negative: SEVERE dizziness (e.g., unable to stand, requires support to walk, feels like passing out now)   Negative: SEVERE headache or neck pain   Negative: Neurologic deficit that was brief (now gone), ANY of the following:* Weakness of the face, arm, or leg on one side of the body* Numbness of the face, arm, or leg on one side of the body* Loss of speech or garbled speech   Negative: Loss of vision or double vision  (Exception: Similar to previous migraines.)    Answer Assessment - Initial Assessment Questions  1. LOCATION: \"Which ear is involved?\"       LEFT  2. ONSET: \"When did the ear start hurting\"   2/14/24  3. SEVERITY: \"How bad is the pain?\"  (Scale 1-10; mild, moderate or severe)        - MODERATE (4-7): interferes with normal activities or awakens from sleep      4. URI SYMPTOMS: \"Do you have a runny nose or cough?\"   No     5. FEVER: \"Do you have a fever?\" If Yes, ask: \"What is your temperature, how was it measured, and when did it start?\"  No  6. CAUSE: \"Have you been swimming recently?\", \"How often do you use Q-TIPS?\", \"Have " "you had any recent air travel or scuba diving?\"  Air travel on 2/10/24> [place]  7. OTHER SYMPTOMS: \"Do you have any other symptoms?\" (e.g., headache, stiff neck, dizziness, vomiting, runny nose, decreased hearing)   Dizziness    Protocols used: Earache-A-OH, Dizziness-A-OH    "

## 2024-02-15 NOTE — TELEPHONE ENCOUNTER
Patient reporting ear ache with dizziness after getting back from cruise.  Pain 4/10, but dizziness worsening and sometimes room is spinning.  Denies acute neuro changes.    Writer warm transferred patient to her [facility] clinic for triage and recommendation per protocol.    Joanna Garibay RN  [facility]      Additional Information   Negative: Pink or red swelling behind the ear   Negative: Stiff neck (can't touch chin to chest)   Negative: Patient sounds very sick or weak to the triager   Negative: Severe earache pain   Negative: Fever > 103 F (39.4 C)   Negative: Pointed object was inserted into the ear canal (e.g., a pencil, stick, or wire)   Negative: SEVERE difficulty breathing (e.g., struggling for each breath, speaks in single words)   Negative: Shock suspected (e.g., cold/pale/clammy skin, too weak to stand, low BP, rapid pulse)   Negative: Difficult to awaken or acting confused (e.g., disoriented, slurred speech)   Negative: Fainted, and still feels dizzy afterwards   Negative: Overdose (accidental or intentional) of medications   Negative: New neurologic deficit that is present now: * Weakness of the face, arm, or leg on one side of the body * Numbness of the face, arm, or leg on one side of the body * Loss of speech or garbled speech   Negative: Heart beating < 50 beats per minute OR > 140 beats per minute   Negative: Sounds like a life-threatening emergency to the triager   Negative: SEVERE dizziness (e.g., unable to stand, requires support to walk, feels like passing out now)   Negative: SEVERE headache or neck pain   Negative: Spinning or tilting sensation (vertigo) present now and one or more stroke risk factors (i.e., hypertension, diabetes mellitus, prior stroke/TIA, heart attack, age over 60) (Exception: Prior physician evaluation for this AND no different/worse than usual.)   Negative: Neurologic deficit that was brief (now gone), ANY of the following:* Weakness of the face, arm, or  leg on one side of the body* Numbness of the face, arm, or leg on one side of the body* Loss of speech or garbled speech   Negative: Loss of vision or double vision  (Exception: Similar to previous migraines.)   Negative: Extra heartbeats, irregular heart beating, or heart is beating very fast (i.e., 'palpitations')   Negative: Difficulty breathing   Negative: Drinking very little and dehydration suspected (e.g., no urine > 12 hours, very dry mouth, very lightheaded)   Negative: Follows bleeding (e.g., stomach, rectum, vagina)  (Exception: Became dizzy from sight of small amount blood.)   Negative: Patient sounds very sick or weak to the triager    Protocols used: Earache-A-OH, Dizziness-A-OH

## 2024-02-16 ENCOUNTER — APPOINTMENT (OUTPATIENT)
Dept: CT IMAGING | Facility: CLINIC | Age: 59
End: 2024-02-16
Attending: EMERGENCY MEDICINE
Payer: MEDICARE

## 2024-02-16 ENCOUNTER — HOSPITAL ENCOUNTER (EMERGENCY)
Facility: CLINIC | Age: 59
Discharge: HOME | End: 2024-02-16
Attending: EMERGENCY MEDICINE | Admitting: EMERGENCY MEDICINE
Payer: MEDICARE

## 2024-02-16 VITALS
DIASTOLIC BLOOD PRESSURE: 85 MMHG | TEMPERATURE: 98 F | HEIGHT: 63 IN | SYSTOLIC BLOOD PRESSURE: 139 MMHG | OXYGEN SATURATION: 96 % | HEART RATE: 78 BPM | BODY MASS INDEX: 24.8 KG/M2 | RESPIRATION RATE: 24 BRPM | WEIGHT: 140 LBS

## 2024-02-16 DIAGNOSIS — H81.90 ACUTE VESTIBULAR SYNDROME: ICD-10-CM

## 2024-02-16 LAB
ANION GAP SERPL CALCULATED.3IONS-SCNC: 9 MMOL/L (ref 7–15)
BASOPHILS # BLD AUTO: 0 10E3/UL (ref 0–0.2)
BASOPHILS NFR BLD AUTO: 0 %
BUN SERPL-MCNC: 11 MG/DL (ref 6–20)
CALCIUM SERPL-MCNC: 9.2 MG/DL (ref 8.6–10)
CHLORIDE SERPL-SCNC: 103 MMOL/L (ref 98–107)
CREAT SERPL-MCNC: 0.87 MG/DL (ref 0.51–0.95)
DEPRECATED HCO3 PLAS-SCNC: 28 MMOL/L (ref 22–29)
EGFRCR SERPLBLD CKD-EPI 2021: 77 ML/MIN/1.73M2
EOSINOPHIL # BLD AUTO: 0.1 10E3/UL (ref 0–0.7)
EOSINOPHIL NFR BLD AUTO: 1 %
ERYTHROCYTE [DISTWIDTH] IN BLOOD BY AUTOMATED COUNT: 13.1 % (ref 10–15)
GLUCOSE SERPL-MCNC: 96 MG/DL (ref 70–99)
HCT VFR BLD AUTO: 42.9 % (ref 35–47)
HGB BLD-MCNC: 14 G/DL (ref 11.7–15.7)
HOLD SPECIMEN: NORMAL
IMM GRANULOCYTES # BLD: 0 10E3/UL
IMM GRANULOCYTES NFR BLD: 0 %
LYMPHOCYTES # BLD AUTO: 1 10E3/UL (ref 0.8–5.3)
LYMPHOCYTES NFR BLD AUTO: 12 %
MCH RBC QN AUTO: 30.6 PG (ref 26.5–33)
MCHC RBC AUTO-ENTMCNC: 32.6 G/DL (ref 31.5–36.5)
MCV RBC AUTO: 94 FL (ref 78–100)
MONOCYTES # BLD AUTO: 0.5 10E3/UL (ref 0–1.3)
MONOCYTES NFR BLD AUTO: 6 %
NEUTROPHILS # BLD AUTO: 6.3 10E3/UL (ref 1.6–8.3)
NEUTROPHILS NFR BLD AUTO: 81 %
NRBC # BLD AUTO: 0 10E3/UL
NRBC BLD AUTO-RTO: 0 /100
PLATELET # BLD AUTO: 330 10E3/UL (ref 150–450)
POTASSIUM SERPL-SCNC: 4.7 MMOL/L (ref 3.4–5.3)
RBC # BLD AUTO: 4.58 10E6/UL (ref 3.8–5.2)
SODIUM SERPL-SCNC: 140 MMOL/L (ref 135–145)
WBC # BLD AUTO: 7.8 10E3/UL (ref 4–11)

## 2024-02-16 PROCEDURE — 99284 EMERGENCY DEPT VISIT MOD MDM: CPT | Performed by: EMERGENCY MEDICINE

## 2024-02-16 PROCEDURE — 99285 EMERGENCY DEPT VISIT HI MDM: CPT | Mod: 25 | Performed by: EMERGENCY MEDICINE

## 2024-02-16 PROCEDURE — 70450 CT HEAD/BRAIN W/O DYE: CPT | Mod: MA,XU

## 2024-02-16 PROCEDURE — 96361 HYDRATE IV INFUSION ADD-ON: CPT | Performed by: EMERGENCY MEDICINE

## 2024-02-16 PROCEDURE — 85025 COMPLETE CBC W/AUTO DIFF WBC: CPT | Performed by: EMERGENCY MEDICINE

## 2024-02-16 PROCEDURE — 250N000011 HC RX IP 250 OP 636: Performed by: EMERGENCY MEDICINE

## 2024-02-16 PROCEDURE — 80048 BASIC METABOLIC PNL TOTAL CA: CPT | Performed by: EMERGENCY MEDICINE

## 2024-02-16 PROCEDURE — 36415 COLL VENOUS BLD VENIPUNCTURE: CPT | Performed by: EMERGENCY MEDICINE

## 2024-02-16 PROCEDURE — 96376 TX/PRO/DX INJ SAME DRUG ADON: CPT | Performed by: EMERGENCY MEDICINE

## 2024-02-16 PROCEDURE — 250N000009 HC RX 250: Performed by: EMERGENCY MEDICINE

## 2024-02-16 PROCEDURE — 96374 THER/PROPH/DIAG INJ IV PUSH: CPT | Mod: 59 | Performed by: EMERGENCY MEDICINE

## 2024-02-16 PROCEDURE — 70496 CT ANGIOGRAPHY HEAD: CPT | Mod: MA

## 2024-02-16 PROCEDURE — 258N000003 HC RX IP 258 OP 636: Performed by: EMERGENCY MEDICINE

## 2024-02-16 RX ORDER — ONDANSETRON 2 MG/ML
4 INJECTION INTRAMUSCULAR; INTRAVENOUS
Status: COMPLETED | OUTPATIENT
Start: 2024-02-16 | End: 2024-02-16

## 2024-02-16 RX ORDER — IOPAMIDOL 755 MG/ML
67 INJECTION, SOLUTION INTRAVASCULAR ONCE
Status: COMPLETED | OUTPATIENT
Start: 2024-02-16 | End: 2024-02-16

## 2024-02-16 RX ORDER — ONDANSETRON 4 MG/1
8 TABLET, ORALLY DISINTEGRATING ORAL ONCE
Status: COMPLETED | OUTPATIENT
Start: 2024-02-16 | End: 2024-02-16

## 2024-02-16 RX ADMIN — SODIUM CHLORIDE 100 ML: 9 INJECTION, SOLUTION INTRAVENOUS at 15:21

## 2024-02-16 RX ADMIN — IOPAMIDOL 67 ML: 755 INJECTION, SOLUTION INTRAVENOUS at 15:21

## 2024-02-16 RX ADMIN — ONDANSETRON 4 MG: 2 INJECTION INTRAMUSCULAR; INTRAVENOUS at 15:51

## 2024-02-16 RX ADMIN — SODIUM CHLORIDE, POTASSIUM CHLORIDE, SODIUM LACTATE AND CALCIUM CHLORIDE 1000 ML: 600; 310; 30; 20 INJECTION, SOLUTION INTRAVENOUS at 14:35

## 2024-02-16 RX ADMIN — ONDANSETRON 4 MG: 2 INJECTION INTRAMUSCULAR; INTRAVENOUS at 14:35

## 2024-02-16 ASSESSMENT — ACTIVITIES OF DAILY LIVING (ADL)
ADLS_ACUITY_SCORE: 38
ADLS_ACUITY_SCORE: 40

## 2024-02-16 NOTE — TELEPHONE ENCOUNTER
Left a message for patient asking how her ear pain and dizziness has been.  Patient was advised to go to Urgent Care last night, UC visit is not showing in epic if patient did go?  Advised patient if her symptoms are worsening she needs to be seen urgently.     Maribel Arambula RN on 2/16/2024 at 9:19 AM

## 2024-02-16 NOTE — ED NOTES
"Pt c/o \"feeling like I am still on the ship and I'm dizzy.\"  Pt off the ship last Saturday.  Since Wednesday also c/o dizziness, \"like the room is spinning,\" left ear pain, and generalized weakness.  No fevers.  "
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
## 2024-02-16 NOTE — DISCHARGE INSTRUCTIONS
You may take meclizine to help with your symptoms of dizziness.  If you still are nauseated afterwards you may take your Zofran.  Drink plenty of fluids to stay hydrated.  Follow-up with your primary care provider this next week.  As we discussed, if you have worsening of your symptoms or develop any new symptoms, you need to return to the emergency department immediately for further evaluation and treatment.  I hope you feel better soon.
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
## 2024-02-18 DIAGNOSIS — Z79.01 CHRONIC ANTICOAGULATION: ICD-10-CM

## 2024-02-19 DIAGNOSIS — G47.19 EXCESSIVE DAYTIME SLEEPINESS: ICD-10-CM

## 2024-02-19 RX ORDER — METHYLPHENIDATE HYDROCHLORIDE 20 MG/1
TABLET ORAL
Qty: 45 TABLET | Refills: 0 | Status: SHIPPED | OUTPATIENT
Start: 2024-02-19 | End: 2024-02-23

## 2024-02-19 ASSESSMENT — ANXIETY QUESTIONNAIRES
GAD7 TOTAL SCORE: 8
3. WORRYING TOO MUCH ABOUT DIFFERENT THINGS: SEVERAL DAYS
4. TROUBLE RELAXING: SEVERAL DAYS
1. FEELING NERVOUS, ANXIOUS, OR ON EDGE: NEARLY EVERY DAY
7. FEELING AFRAID AS IF SOMETHING AWFUL MIGHT HAPPEN: SEVERAL DAYS
IF YOU CHECKED OFF ANY PROBLEMS ON THIS QUESTIONNAIRE, HOW DIFFICULT HAVE THESE PROBLEMS MADE IT FOR YOU TO DO YOUR WORK, TAKE CARE OF THINGS AT HOME, OR GET ALONG WITH OTHER PEOPLE: SOMEWHAT DIFFICULT
6. BECOMING EASILY ANNOYED OR IRRITABLE: SEVERAL DAYS
5. BEING SO RESTLESS THAT IT IS HARD TO SIT STILL: NOT AT ALL
GAD7 TOTAL SCORE: 8
GAD7 TOTAL SCORE: 8
8. IF YOU CHECKED OFF ANY PROBLEMS, HOW DIFFICULT HAVE THESE MADE IT FOR YOU TO DO YOUR WORK, TAKE CARE OF THINGS AT HOME, OR GET ALONG WITH OTHER PEOPLE?: SOMEWHAT DIFFICULT
7. FEELING AFRAID AS IF SOMETHING AWFUL MIGHT HAPPEN: SEVERAL DAYS
2. NOT BEING ABLE TO STOP OR CONTROL WORRYING: SEVERAL DAYS

## 2024-02-19 NOTE — TELEPHONE ENCOUNTER
Last Seen: 1-26-24  RTC: 4 weeks   Cancel: yes , inpatient   No-Show: none   Next Appt: none     Incoming Refill From patient  via encounter     Medication Requested: methylphenidate (RITALIN) 20 MG tablet   Directions: Sig - Route: Take 1 tablet (20 mg) by mouth every morning AND 0.5 tablets (10 mg) daily (with lunch). - Oral   Qty: 45  Last Refill:      Medication Refill pended  Per Refill Protocol

## 2024-02-19 NOTE — TELEPHONE ENCOUNTER
Per [place] PDMP, this was last filled on 1/19/24, 12/23/23, and 11/24/23.    Rx pended and routed to Dr. Wang for approval    Ameena Cabrera RN on 2/19/2024 at 1:47 PM

## 2024-02-19 NOTE — TELEPHONE ENCOUNTER
[facility] Call Center    Phone Message    May a detailed message be left on voicemail: yes     Reason for Call: Medication Refill Request    Has the patient contacted the pharmacy for the refill? Yes   Name of medication being requested: methylphenidate 20mg  Provider who prescribed the medication:    Pharmacy:  pharm[facility] [place]  Date medication is needed: Pt has one day left.They were suppose to see  last on 2/16 but she was in the ER at that time. She's hoping to find an appt with before 2/23. Pt would like a call back.       Action Taken: Other: [place]    Travel Screening: Not Applicable

## 2024-02-21 ASSESSMENT — PATIENT HEALTH QUESTIONNAIRE - PHQ9
10. IF YOU CHECKED OFF ANY PROBLEMS, HOW DIFFICULT HAVE THESE PROBLEMS MADE IT FOR YOU TO DO YOUR WORK, TAKE CARE OF THINGS AT HOME, OR GET ALONG WITH OTHER PEOPLE: VERY DIFFICULT
SUM OF ALL RESPONSES TO PHQ QUESTIONS 1-9: 16
SUM OF ALL RESPONSES TO PHQ QUESTIONS 1-9: 16

## 2024-02-22 ENCOUNTER — VIRTUAL VISIT (OUTPATIENT)
Dept: PSYCHOLOGY | Facility: CLINIC | Age: 59
End: 2024-02-22
Payer: MEDICARE

## 2024-02-22 DIAGNOSIS — F41.1 GAD (GENERALIZED ANXIETY DISORDER): Primary | ICD-10-CM

## 2024-02-22 PROCEDURE — 90837 PSYTX W PT 60 MINUTES: CPT | Mod: 95 | Performed by: SOCIAL WORKER

## 2024-02-22 NOTE — PROGRESS NOTES
[facility]                                     Progress Note    Patient Name: Sherly Yeung  Date: 2/22/2024         Service Type: Individual      Session Start Time: 10:02 Session End Time: 10:56    Session Length: 54    Session #: 18    Attendees: Client    Service Modality:  Video Visit:      Provider verified identity through the following two step process.  Patient provided:  Patient is known previously to provider    Telemedicine Visit: The patient's condition can be safely assessed and treated via synchronous audio and visual telemedicine encounter.      Reason for Telemedicine Visit: Services only offered telehealth    Originating Site (Patient Location): Patient's home    Distant Site (Provider Location): Provider Remote Setting- Home Office    Consent:  The patient/guardian has verbally consented to: the potential risks and benefits of telemedicine (video visit) versus in person care; bill my insurance or make self-payment for services provided; and responsibility for payment of non-covered services.     Patient would like the video invitation sent by:  My Chart    Mode of Communication:  Video Conference via Amwell    Distant Location (Provider):  Off-site    As the provider I attest to compliance with applicable laws and regulations related to telemedicine.    DATA  Interactive Complexity: Yes, visit entailed Interactive Complexity evidenced by: Much going on following the trip to [place].     Crisis: No      Progress Since Last Session (Related to Symptoms / Goals / Homework):   Symptoms:  Anxiety    Homework: Partially completed      Episode of Care Goals: Satisfactory progress - ACTION (Actively working towards change); Intervened by reinforcing change plan / affirming steps taken-  got sick in trip     Current / Ongoing Stressors and Concerns: Patient and writer processed her feelings and thoughts about the  trip to [place] in [place] that did not go as expected. Changed were mainly  dictated by the weather. She also got sick once home due to the long trip. Patient also is traveling to [place]  with .  She hopes things will get better this time. No other concern today. Her next visit is in 3 weeks.         Treatment Objective(s) Addressed in This Session:         Intervention:    CBT : Challenging cognitive traps related to the upcoming travel    MI Intervention: Supported Autonomy, Collaboration, Evocation, Permission to raise concern or advise, and Open-ended questions     Change Talk Expressed by the Patient: Taking steps    Provider Response to Change Talk: E - Evoked more info from patient about behavior change, A - Affirmed patient's thoughts, decisions, or attempts at behavior change, R - Reflected patient's change talk, and S - Summarized patient's change talk statements     Grief processing: To process her loss and achieve acceptance.     Assessments completed prior to visit:2/23/2023    The following assessments were completed by patient for this visit:  PHQ2:       1/12/2024     2:03 PM 12/27/2023     8:39 AM 4/27/2022    10:07 AM 8/11/2020     9:37 AM 3/24/2020     3:46 PM 1/19/2012     2:24 PM   PHQ-2 ( 1999 [employer])   Q1: Little interest or pleasure in doing things 0 0 0 0 0 0   Q2: Feeling down, depressed or hopeless 1 1 0 0 0 1   PHQ-2 Score 1 1 0 0 0 1   PHQ-2 Total Score (12-17 Years)- Positive if 3 or more points; Administer PHQ-A if positive    0 0      PHQ9:       11/28/2023    11:28 AM 12/4/2023     7:33 AM 12/13/2023    11:27 AM 12/26/2023    11:42 AM 1/10/2024     8:02 AM 1/25/2024     9:59 AM 2/21/2024     1:54 PM   PHQ-9 SCORE   PHQ-9 Total Score MyChart 5 (Mild depression) 5 (Mild depression) 5 (Mild depression) 5 (Mild depression) 5 (Mild depression) 8 (Mild depression) 16 (Moderately severe depression)   PHQ-9 Total Score 5 5 5 5 5 8 16     GAD2:       11/28/2023    11:28 AM 12/13/2023    11:27 AM 12/26/2023    11:43 AM 1/7/2024    10:08 AM 1/16/2024    12:29 PM  1/22/2024    10:13 AM 2/19/2024     1:36 PM   ROCKY-2   Feeling nervous, anxious, or on edge 2 1 1 2 2 2 3   Not being able to stop or control worrying 0 1 1 1 1 1 1   ROCKY-2 Total Score 2    2 2 2 3 3 3 4     GAD7:       7/17/2023     9:05 AM 9/18/2023    12:06 PM 10/10/2023     1:58 PM 12/13/2023     1:54 PM 1/7/2024    10:08 AM 1/22/2024    10:13 AM 2/19/2024     1:36 PM   ROCKY-7 SCORE   Total Score   5 (mild anxiety)  5 (mild anxiety) 6 (mild anxiety) 8 (mild anxiety)   Total Score 2 5 5 3 5 6 8     CAGE-AID:       2/9/2023    11:52 AM 7/17/2023     9:10 AM   CAGE-AID Total Score   Total Score 0 0   Total Score MyChart 0 (A total score of 2 or greater is considered clinically significant)      PROMIS 10-Global Health (all questions and answers displayed):       10/29/2023     9:12 AM 11/16/2023     6:20 AM 11/28/2023    11:30 AM 12/13/2023    11:29 AM 12/26/2023    11:45 AM 1/7/2024    10:11 AM 2/19/2024     1:38 PM   PROMIS 10   In general, would you say your health is: Good Good Good Good Good Good Good   In general, would you say your quality of life is: Good Very good Very good Good Good Good Good   In general, how would you rate your physical health? Good Good Good Good Good Good Good   In general, how would you rate your mental health, including your mood and your ability to think? Good Very good Good Good Good Fair Fair   In general, how would you rate your satisfaction with your social activities and relationships? Very good Good Good Good Very good Good Fair   In general, please rate how well you carry out your usual social activities and roles Good Good Good Fair Good Good Fair   To what extent are you able to carry out your everyday physical activities such as walking, climbing stairs, carrying groceries, or moving a chair? Moderately Moderately Moderately A little Moderately Mostly Moderately   In the past 7 days, how often have you been bothered by emotional problems such as feeling anxious, depressed,  or irritable? Sometimes Often Often Often Often Often Always   In the past 7 days, how would you rate your fatigue on average? Moderate Moderate Moderate Moderate Moderate Moderate Moderate   In the past 7 days, how would you rate your pain on average, where 0 means no pain, and 10 means worst imaginable pain? 5 5 5 6 6 5 5   In general, would you say your health is: 3 3 3 3 3 3 3   In general, would you say your quality of life is: 3 4 4 3 3 3 3   In general, how would you rate your physical health? 3 3 3 3 3 3 3   In general, how would you rate your mental health, including your mood and your ability to think? 3 4 3 3 3 2 2   In general, how would you rate your satisfaction with your social activities and relationships? 4 3 3 3 4 3 2   In general, please rate how well you carry out your usual social activities and roles. (This includes activities at home, at work and in your community, and responsibilities as a parent, child, spouse, employee, friend, etc.) 3 3 3 2 3 3 2   To what extent are you able to carry out your everyday physical activities such as walking, climbing stairs, carrying groceries, or moving a chair? 3 3 3 2 3 4 3   In the past 7 days, how often have you been bothered by emotional problems such as feeling anxious, depressed, or irritable? 3 4 4 4 4 4 5   In the past 7 days, how would you rate your fatigue on average? 3 3 3 3 3 3 3   In the past 7 days, how would you rate your pain on average, where 0 means no pain, and 10 means worst imaginable pain? 5 5 5 6 6 5 5   Global Mental Health Score 13 13 12 11 12 10 8   Global Physical Health Score 12 12 12 11 12 13 12   PROMIS TOTAL - SUBSCORES 25 25 24 22 24 23 20     PROMIS 10-Global Health (only subscores and total score):       10/29/2023     9:12 AM 11/16/2023     6:20 AM 11/28/2023    11:30 AM 12/13/2023    11:29 AM 12/26/2023    11:45 AM 1/7/2024    10:11 AM 2/19/2024     1:38 PM   PROMIS-10 Scores Only   Global Mental Health Score 13 13 12 11  12 10 8   Global Physical Health Score 12 12 12 11 12 13 12   PROMIS TOTAL - SUBSCORES 25 25 24 22 24 23 20     [place] Suicide Severity Rating Scale (Short Version)      11/28/2022     9:49 PM 2/10/2023     9:55 AM 3/20/2023     3:28 PM 7/17/2023     9:09 AM 9/13/2023     6:03 AM 12/6/2023     1:21 AM 2/16/2024    12:38 PM   [place] Suicide Severity Rating (Short Version)   Over the past 2 weeks have you felt down, depressed, or hopeless? no  no  yes no no   Over the past 2 weeks have you had thoughts of killing yourself? no  no  no no no   Have you ever attempted to kill yourself? no  no  no no no   1. Wish to be Dead (Since Last Contact)  N  N      2. Non-Specific Active Suicidal Thoughts (Since Last Contact)  N  N      Actual Attempt (Since Last Contact)  N  N      Has subject engaged in non-suicidal self-injurious behavior? (Since Last Contact)  N  N      Interrupted Attempts (Since Last Contact)  N  N      Aborted or Self-Interrupted Attempt (Since Last Contact)  N  N      Preparatory Acts or Behavior (Since Last Contact)  N  N      Suicide (Since Last Contact)  N  N      Calculated C-SSRS Risk Score (Since Last Contact)  No Risk Indicated  No Risk Indicated            ASSESSMENT: Current Emotional / Mental Status (status of significant symptoms):   Risk status (Self / Other harm or suicidal ideation)   Patient denies current fears or concerns for personal safety.   Patient denies current or recent suicidal ideation or behaviors.   Patient denies current or recent homicidal ideation or behaviors.   Patient denies current or recent self injurious behavior or ideation.   Patient denies other safety concerns.   Patient reports there has been no change in risk factors since their last session.     Patient reports there has been no change in protective factors since their last session.     Recommended that patient call 911 or go to the local ED should there be a change in any of these risk factors.  Call [phone] if  experiencing SI     Appearance:   Appropriate    Eye Contact:   Good    Psychomotor Behavior: Normal    Attitude:   Cooperative    Orientation:   Person Place Time Situation   Speech    Rate / Production: Normal     Volume:  Normal    Mood:    Anxious    Affect:    Appropriate    Thought Content:  Clear    Thought Form:  Coherent  Logical    Insight:    Good      Medication Review:   No changes to current psychiatric medication(s)     Medication Compliance:   Yes     Changes in Health Issues:   None reported     Chemical Use Review:   Substance Use: Chemical use reviewed, no active concerns identified      Tobacco Use: No current tobacco use.      Diagnosis:  1. ROCKY (generalized anxiety disorder)      Collateral Reports Completed:   Not Applicable    PLAN: (Patient Tasks / Therapist Tasks / Other):   Patient will use her grounding techniques using the 5 senses  Patient's next visit is in 3 weeks.     Erika Mello, LICSW           ______________________________________________    Individual Treatment Plan    Patient's Name: Sherly Yeung  YOB: 1965    Date of Creation: 2/23/2023    Date Treatment Plan Last Reviewed/Revised: 1/25/2024    DSM5 Diagnoses: 296.32 (F33.1) Major Depressive Disorder, Recurrent Episode, Moderate With anxious distress or 300.02 (F41.1) Generalized Anxiety Disorder  Grief reaction [F43.21]    Psychosocial / Contextual Factors: grief: father recently passed away. Family dynamic- relationship with son, mom. Feeling overwhelmed.    PROMIS (reviewed every 90 days): 25    Referral / Collaboration:    Referral to another professional/service is not indicated at this time..    Anticipated number of session for this episode of care: 9-12 sessions  Anticipation frequency of session: Biweekly  Anticipated Duration of each session: 53 or more minutes  Treatment plan will be reviewed in 90 days or when goals have been changed.     MeasurableTreatment Goal(s) related to diagnosis /  "functional impairment(s)    Goal 1: Patient will Accept the loss of beloved one and return to stable level of functioning as evidenced by a higher level of functioning as a result of acceptance.   Redevelop a supportive social system and improve interpersonal relationships and Express unresolved emotions regarding loss which brings anxiety and depression mood.      I will know I've met my goal when I have more energy and  I am able to celebrate good life with my father Vs the sadness of losing him.Redevelop a supportive social system and improve interpersonal relationships\"    Objective #A (Patient Action)    Patient will increase understanding of steps in the grief process.  Status: Continued - Date(s): 1/25/2024    Intervention(s)  Therapist will teach emotional recognition/identification. : defining happiness in your own term without father .    Objective #B  Patient will identify at least 3 fears / thoughts that contribute to feeling anxious.  Status: Continued - Date(s): 1/25/2024     Intervention(s)  Therapist will teach thought challenging with CBT .    Objective #C  Patient will Identify negative self-talk and behaviors: challenge core beliefs, myths, and actions.  Status: Continued - Date(s): 1/25/2024    Intervention(s)  Therapist will provide space and time to process thoughts and feelings around current stressors. provide support and coping skills to help move on in life .    Patient has reviewed and agreed to the above plan.    GERMAN Farmer  1/25/2024    Answers for HPI/ROS submitted by the patient on 4/20/2023  If you checked off any problems, how difficult have these problems made it for you to do your work, take care of things at home, or get along with other people?: Somewhat difficult  PHQ9 TOTAL SCORE: 5    Answers for HPI/ROS submitted by the patient on 4/26/2023  If you checked off any problems, how difficult have these problems made it for you to do your work, take care of things " at home, or get along with other people?: Somewhat difficult  PHQ9 TOTAL SCORE: 3    Answers for HPI/ROS submitted by the patient on 5/4/2023  If you checked off any problems, how difficult have these problems made it for you to do your work, take care of things at home, or get along with other people?: Somewhat difficult  PHQ9 TOTAL SCORE: 4  Answers for HPI/ROS submitted by the patient on 7/17/2023  If you checked off any problems, how difficult have these problems made it for you to do your work, take care of things at home, or get along with other people?: Somewhat difficult  PHQ9 TOTAL SCORE: 5    Answers submitted by the patient for this visit:  Patient Health Questionnaire (Submitted on 7/30/2023)  If you checked off any problems, how difficult have these problems made it for you to do your work, take care of things at home, or get along with other people?: Somewhat difficult  PHQ9 TOTAL SCORE: 5  Answers submitted by the patient for this visit:  Patient Health Questionnaire (Submitted on 8/20/2023)  If you checked off any problems, how difficult have these problems made it for you to do your work, take care of things at home, or get along with other people?: Somewhat difficult  PHQ9 TOTAL SCORE: 5  Answers submitted by the patient for this visit:  Patient Health Questionnaire (Submitted on 9/18/2023)  If you checked off any problems, how difficult have these problems made it for you to do your work, take care of things at home, or get along with other people?: Somewhat difficult  PHQ9 TOTAL SCORE: 4  Answers submitted by the patient for this visit:  Patient Health Questionnaire (Submitted on 10/2/2023)  If you checked off any problems, how difficult have these problems made it for you to do your work, take care of things at home, or get along with other people?: Somewhat difficult  PHQ9 TOTAL SCORE: 5    Answers submitted by the patient for this visit:  Patient Health Questionnaire (Submitted on  10/15/2023)  If you checked off any problems, how difficult have these problems made it for you to do your work, take care of things at home, or get along with other people?: Somewhat difficult  PHQ9 TOTAL SCORE: 6  ROCKY-7 (Submitted on 10/10/2023)  ROCKY 7 TOTAL SCORE: 5    Answers submitted by the patient for this visit:  Patient Health Questionnaire (Submitted on 11/16/2023)  If you checked off any problems, how difficult have these problems made it for you to do your work, take care of things at home, or get along with other people?: Somewhat difficult  PHQ9 TOTAL SCORE: 4    Answers submitted by the patient for this visit:  Patient Health Questionnaire (Submitted on 11/28/2023)  If you checked off any problems, how difficult have these problems made it for you to do your work, take care of things at home, or get along with other people?: Somewhat difficult  PHQ9 TOTAL SCORE: 5    Answers submitted by the patient for this visit:  Patient Health Questionnaire (Submitted on 12/13/2023)  If you checked off any problems, how difficult have these problems made it for you to do your work, take care of things at home, or get along with other people?: Somewhat difficult  PHQ9 TOTAL SCORE: 5    Answers submitted by the patient for this visit:  Patient Health Questionnaire (Submitted on 12/26/2023)  If you checked off any problems, how difficult have these problems made it for you to do your work, take care of things at home, or get along with other people?: Somewhat difficult  PHQ9 TOTAL SCORE: 5    Answers submitted by the patient for this visit:  Patient Health Questionnaire (Submitted on 1/10/2024)  If you checked off any problems, how difficult have these problems made it for you to do your work, take care of things at home, or get along with other people?: Somewhat difficult  PHQ9 TOTAL SCORE: 5  ROCKY-7 (Submitted on 1/7/2024)  ROCKY 7 TOTAL SCORE: 5    Answers submitted by the patient for this visit:  Patient Health  Questionnaire (Submitted on 1/25/2024)  If you checked off any problems, how difficult have these problems made it for you to do your work, take care of things at home, or get along with other people?: Very difficult  PHQ9 TOTAL SCORE: 8  ROCKY-7 (Submitted on 1/22/2024)  ROCKY 7 TOTAL SCORE: 6    Answers submitted by the patient for this visit:  Patient Health Questionnaire (Submitted on 2/21/2024)  If you checked off any problems, how difficult have these problems made it for you to do your work, take care of things at home, or get along with other people?: Very difficult  PHQ9 TOTAL SCORE: 16  ROCKY-7 (Submitted on 2/19/2024)  ROCKY 7 TOTAL SCORE: 8

## 2024-02-22 NOTE — TELEPHONE ENCOUNTER
ELIQUIS 5MG TABS       Last Written Prescription Date:  3-24-23  Last Fill Quantity: 180,   # refills: 1  Last Office Visit : 11-28-23  Future Office visit:  none    2-16-24  GFR Estimate  >60 mL/min/1.73m2 77     Routing refill request to provider for review/approval because:  Failed protocol:    Weight is greater than 60 kg for the past year    Medication indicated for associated diagnosis   Gap in RF

## 2024-02-23 ENCOUNTER — VIRTUAL VISIT (OUTPATIENT)
Dept: PSYCHIATRY | Facility: CLINIC | Age: 59
End: 2024-02-23
Attending: STUDENT IN AN ORGANIZED HEALTH CARE EDUCATION/TRAINING PROGRAM
Payer: MEDICARE

## 2024-02-23 VITALS — HEIGHT: 63 IN | BODY MASS INDEX: 23.92 KG/M2 | WEIGHT: 135 LBS

## 2024-02-23 DIAGNOSIS — G47.19 EXCESSIVE DAYTIME SLEEPINESS: ICD-10-CM

## 2024-02-23 DIAGNOSIS — F41.9 ANXIETY DISORDER, UNSPECIFIED TYPE: Primary | ICD-10-CM

## 2024-02-23 DIAGNOSIS — F33.1 MODERATE EPISODE OF RECURRENT MAJOR DEPRESSIVE DISORDER (H): ICD-10-CM

## 2024-02-23 PROCEDURE — 99214 OFFICE O/P EST MOD 30 MIN: CPT | Mod: 95

## 2024-02-23 RX ORDER — METHYLPHENIDATE HYDROCHLORIDE 20 MG/1
TABLET ORAL
Qty: 45 TABLET | Refills: 0 | Status: SHIPPED | OUTPATIENT
Start: 2024-02-23 | End: 2024-03-15

## 2024-02-23 RX ORDER — LORAZEPAM 0.5 MG/1
.5-1 TABLET ORAL DAILY PRN
Qty: 10 TABLET | Refills: 0 | Status: SHIPPED | OUTPATIENT
Start: 2024-02-23 | End: 2024-03-28

## 2024-02-23 RX ORDER — APIXABAN 5 MG/1
5 TABLET, FILM COATED ORAL 2 TIMES DAILY
Qty: 180 TABLET | Refills: 0 | Status: SHIPPED | OUTPATIENT
Start: 2024-02-23

## 2024-02-23 RX ORDER — LORAZEPAM 0.5 MG/1
0.5 TABLET ORAL
Status: DISCONTINUED | OUTPATIENT
Start: 2024-02-23 | End: 2024-02-23

## 2024-02-23 ASSESSMENT — PAIN SCALES - GENERAL: PAINLEVEL: NO PAIN (0)

## 2024-02-23 ASSESSMENT — PATIENT HEALTH QUESTIONNAIRE - PHQ9: SUM OF ALL RESPONSES TO PHQ QUESTIONS 1-9: 18

## 2024-02-23 NOTE — PATIENT INSTRUCTIONS
**For crisis resources, please see the information at the end of this document**   Patient Education    Thank you for coming to the [facility].     Lab Testing:  If you had lab testing today and your results are reassuring or normal they will be mailed to you or sent through Ze-gen within 7 days. If the lab tests need quick action we will call you with the results. The phone number we will call with results is # [phone]. If this is not the best number please call our clinic and change the number.     Medication Refills:  If you need any refills please call your pharmacy and they will contact us. Our fax number for refills is [phone].   Three business days of notice are needed for general medication refill requests.   Five business days of notice are needed for controlled substance refill requests.   If you need to change to a different pharmacy, please contact the new pharmacy directly. The new pharmacy will help you get your medications transferred.     Contact Us:  Please call [phone] during business hours (8-5:00 M-F).   If you have medication related questions after clinic hours, or on the weekend, please call [phone].     Financial Assistance [phone]   Medical Records [phone]       MENTAL HEALTH CRISIS RESOURCES:  For a emergency help, please call 911 or go to the nearest Emergency Department.     Emergency Walk-In Options:   [facility] Unit @ [facility] ([place]): [phone] - Specialized mental health emergency area designed to be calming  [facility] ([place]): [phone]  [facility] ([place]): [phone]  [facility]): [phone]    [facility] Crisis Information:   [place]: [phone]  [place]: [phone]  [place] ([place]) - Adult: [phone]     Child: [phone]  [place] - Adult: [phone]     Child: [phone]  [place]:  [phone]  List of all [facility] resources:   https://[place].gov/dhs/people-we-serve/adults/health-care/mental-health/resources/crisis-contacts.jsp    National Crisis Information:   Crisis Text Line: Text  [place]  to 633471  Suicide & Crisis Lifeline: 988  National Suicide Prevention Lifeline: 7-370-011-TALK (1-839.820.9017)       For online chat options, visit https://suicidepreventionlifeline.org/chat/  Poison Control Center: 4-621-198-7481  Trans Lifeline: 8-727-808-4271 - Hotline for transgender people of all ages  The [employer]: 3-793-411-5359 - Hotline for LGBT youth     For Non-Emergency Support:   Fast Tracker: Mental Health & Substance Use Disorder Resources -   https://www.[employer].org/

## 2024-02-23 NOTE — NURSING NOTE
Is the patient currently in the state of [place]? YES    Visit mode:VIDEO    If the visit is dropped, the patient can be reconnected by: VIDEO VISIT: Text to cell phone:   Telephone Information:   Mobile [phone]       Will anyone else be joining the visit? NO  (If patient encounters technical issues they should call [phone])    How would you like to obtain your AVS? MyChart    Are changes needed to the allergy or medication list? No    Reason for visit: Video Visit (Recheck)    Gina ORDOÑEZ

## 2024-02-23 NOTE — PROGRESS NOTES
Virtual Visit Details    Type of service:  Video Visit   Video Start Time:  3:10  Video End Time: 3:45    Originating Location (pt. Location): Home    Distant Location (provider location):  On-site  Platform used for Video Visit: Kourtney
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
## 2024-03-08 ENCOUNTER — MYC MEDICAL ADVICE (OUTPATIENT)
Dept: UROLOGY | Facility: CLINIC | Age: 59
End: 2024-03-08
Payer: MEDICARE

## 2024-03-08 DIAGNOSIS — N30.00 ACUTE CYSTITIS WITHOUT HEMATURIA: Primary | ICD-10-CM

## 2024-03-12 ENCOUNTER — LAB (OUTPATIENT)
Dept: LAB | Facility: CLINIC | Age: 59
End: 2024-03-12
Payer: MEDICARE

## 2024-03-12 DIAGNOSIS — N30.00 ACUTE CYSTITIS WITHOUT HEMATURIA: ICD-10-CM

## 2024-03-12 LAB
ALBUMIN UR-MCNC: 30 MG/DL
APPEARANCE UR: ABNORMAL
BACTERIA #/AREA URNS HPF: ABNORMAL /HPF
BILIRUB UR QL STRIP: NEGATIVE
COLOR UR AUTO: ABNORMAL
GLUCOSE UR STRIP-MCNC: NEGATIVE MG/DL
HGB UR QL STRIP: ABNORMAL
KETONES UR STRIP-MCNC: ABNORMAL MG/DL
LEUKOCYTE ESTERASE UR QL STRIP: ABNORMAL
NITRATE UR QL: POSITIVE
PH UR STRIP: 7.5 [PH] (ref 5–7)
RBC #/AREA URNS AUTO: ABNORMAL /HPF
SP GR UR STRIP: 1.02 (ref 1–1.03)
UROBILINOGEN UR STRIP-ACNC: 0.2 E.U./DL
WBC #/AREA URNS AUTO: ABNORMAL /HPF

## 2024-03-12 PROCEDURE — 81001 URINALYSIS AUTO W/SCOPE: CPT

## 2024-03-12 PROCEDURE — 87086 URINE CULTURE/COLONY COUNT: CPT

## 2024-03-12 RX ORDER — SULFAMETHOXAZOLE/TRIMETHOPRIM 800-160 MG
1 TABLET ORAL 2 TIMES DAILY
Qty: 10 TABLET | Refills: 0 | Status: SHIPPED | OUTPATIENT
Start: 2024-03-12 | End: 2024-03-17

## 2024-03-13 LAB — BACTERIA UR CULT: NORMAL

## 2024-03-14 DIAGNOSIS — F33.1 MAJOR DEPRESSIVE DISORDER, RECURRENT, MODERATE (H): ICD-10-CM

## 2024-03-14 RX ORDER — DULOXETIN HYDROCHLORIDE 60 MG/1
120 CAPSULE, DELAYED RELEASE ORAL EVERY EVENING
Qty: 60 CAPSULE | Refills: 0 | Status: SHIPPED | OUTPATIENT
Start: 2024-03-14 | End: 2024-04-18

## 2024-03-14 NOTE — TELEPHONE ENCOUNTER
Date of Last Office Visit: 2/23/2024   RTC: 4 wks  No shows: 0  Cancellations: 0  Date of Next Office Visit: 3/15/2024   ------------------------------  DULoxetine (CYMBALTA) 60 MG capsule 60 capsule 1 1/25/2024 -     Sig - Route: Take 2 capsules (120 mg) by mouth every evening - Oral  Sent to pharmacy as: DULoxetine HCl 60 MG Oral Capsule Delayed Release Particles (Cymbalta)  Class: E-Prescribe  ------------------------------     Last Visit Treatment Plan     1) Meds-  - Continue Ritalin 20mg in the morning and 10 mg in the afternoon  - Continue duloxetine 120 mg at night   - PRN lorazepam 10 tablets 0.5 mg for panic attacks during the travel and we warned her about not to use it with pain medications or alcohol     Other pertinent medications not managed by psychiatry:  - oxycodone (1-2 per week for migraine)  - OxyContin (1-2 per week endometriosis)  - hydromorphone (1-2 per month migraine)     2) Psychotherapy- currently in treatment     3) Next due-  Labs- lipids/AP labs due now, ordered and discussed with Marcia to get today  EKG-9/13/23. Qtc 421  Rating scales -   AIMS done 11/2/23- score of 0  MOCA 11/2/23 score of 30/30     4) Referrals- Therapy- sleep medicine     5) Dispo- return to clinic in 4 weeks    Refill decision: Medication refilled 30 days per protocol.

## 2024-03-15 ENCOUNTER — VIRTUAL VISIT (OUTPATIENT)
Dept: PSYCHIATRY | Facility: CLINIC | Age: 59
End: 2024-03-15
Attending: PSYCHIATRY & NEUROLOGY
Payer: MEDICARE

## 2024-03-15 DIAGNOSIS — F33.1 MAJOR DEPRESSIVE DISORDER, RECURRENT, MODERATE (H): Primary | ICD-10-CM

## 2024-03-15 DIAGNOSIS — G47.19 EXCESSIVE DAYTIME SLEEPINESS: ICD-10-CM

## 2024-03-15 DIAGNOSIS — N30.00 ACUTE CYSTITIS WITHOUT HEMATURIA: Primary | ICD-10-CM

## 2024-03-15 PROCEDURE — 99214 OFFICE O/P EST MOD 30 MIN: CPT | Mod: 95

## 2024-03-15 RX ORDER — METHYLPHENIDATE HYDROCHLORIDE 20 MG/1
TABLET ORAL
Qty: 45 TABLET | Refills: 0 | Status: SHIPPED | OUTPATIENT
Start: 2024-03-15 | End: 2024-03-29
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
## 2024-03-15 NOTE — NURSING NOTE
Is the patient currently in the state of [place]? YES    Visit mode:VIDEO    If the visit is dropped, the patient can be reconnected by: VIDEO VISIT: Text to cell phone:   Telephone Information:   Mobile [phone]       Will anyone else be joining the visit? NO  (If patient encounters technical issues they should call [phone]    How would you like to obtain your AVS? MyChart    Are changes needed to the allergy or medication list? Pt stated no changes to allergies and Pt stated no med changes    Reason for visit: CONNER ORDOÑEZ

## 2024-03-15 NOTE — PROGRESS NOTES
Virtual Visit Details    Type of service:  Video Visit   Video Start Time:  4:20  Video End Time: 4:50    Originating Location (pt. Location): Home    Distant Location (provider location):  On-site  Platform used for Video Visit: Kourtney

## 2024-03-21 ENCOUNTER — TELEPHONE (OUTPATIENT)
Dept: PSYCHIATRY | Facility: CLINIC | Age: 59
End: 2024-03-21

## 2024-03-21 ENCOUNTER — LAB (OUTPATIENT)
Dept: LAB | Facility: CLINIC | Age: 59
End: 2024-03-21
Payer: MEDICARE

## 2024-03-21 ENCOUNTER — OFFICE VISIT (OUTPATIENT)
Dept: INTERNAL MEDICINE | Facility: CLINIC | Age: 59
End: 2024-03-21
Payer: MEDICARE

## 2024-03-21 VITALS
TEMPERATURE: 98.2 F | DIASTOLIC BLOOD PRESSURE: 88 MMHG | HEART RATE: 93 BPM | OXYGEN SATURATION: 97 % | BODY MASS INDEX: 24.52 KG/M2 | WEIGHT: 138.4 LBS | SYSTOLIC BLOOD PRESSURE: 130 MMHG | HEIGHT: 63 IN

## 2024-03-21 DIAGNOSIS — K13.79 MOUTH PAIN: ICD-10-CM

## 2024-03-21 DIAGNOSIS — N30.00 ACUTE CYSTITIS WITHOUT HEMATURIA: ICD-10-CM

## 2024-03-21 DIAGNOSIS — K11.7 XEROSTOMIA: Primary | ICD-10-CM

## 2024-03-21 DIAGNOSIS — R61 HYPERHIDROSIS: ICD-10-CM

## 2024-03-21 DIAGNOSIS — A08.4 VIRAL GASTROENTERITIS: ICD-10-CM

## 2024-03-21 LAB
ALBUMIN UR-MCNC: NEGATIVE MG/DL
APPEARANCE UR: CLEAR
BILIRUB UR QL STRIP: NEGATIVE
COLOR UR AUTO: YELLOW
GLUCOSE UR STRIP-MCNC: NEGATIVE MG/DL
HGB UR QL STRIP: ABNORMAL
HYALINE CASTS: 5 /LPF
KETONES UR STRIP-MCNC: ABNORMAL MG/DL
LEUKOCYTE ESTERASE UR QL STRIP: ABNORMAL
MUCOUS THREADS #/AREA URNS LPF: PRESENT /LPF
NITRATE UR QL: NEGATIVE
PH UR STRIP: 6.5 [PH] (ref 5–7)
RBC URINE: 28 /HPF
SP GR UR STRIP: 1.02 (ref 1–1.03)
SQUAMOUS EPITHELIAL: 7 /HPF
TRANSITIONAL EPI: <1 /HPF
TSH SERPL DL<=0.005 MIU/L-ACNC: 1.31 UIU/ML (ref 0.3–4.2)
UROBILINOGEN UR STRIP-MCNC: NORMAL MG/DL
WBC URINE: 25 /HPF

## 2024-03-21 PROCEDURE — 99213 OFFICE O/P EST LOW 20 MIN: CPT | Mod: GC

## 2024-03-21 PROCEDURE — 84443 ASSAY THYROID STIM HORMONE: CPT | Performed by: PATHOLOGY

## 2024-03-21 PROCEDURE — 36415 COLL VENOUS BLD VENIPUNCTURE: CPT | Performed by: PATHOLOGY

## 2024-03-21 PROCEDURE — 87086 URINE CULTURE/COLONY COUNT: CPT | Performed by: STUDENT IN AN ORGANIZED HEALTH CARE EDUCATION/TRAINING PROGRAM

## 2024-03-21 PROCEDURE — 81001 URINALYSIS AUTO W/SCOPE: CPT | Performed by: PATHOLOGY

## 2024-03-21 PROCEDURE — 99000 SPECIMEN HANDLING OFFICE-LAB: CPT | Performed by: PATHOLOGY

## 2024-03-21 ASSESSMENT — ENCOUNTER SYMPTOMS: FATIGUE: 1

## 2024-03-21 NOTE — PATIENT INSTRUCTIONS
Hi Marcia,    You came to clinic today for mouth pain as well as feeling sick for the last week. For the mouth pain, you have several medications that can cause dry mouth which may be contributing to this feeling of your mouth feeling scalded. I recommend continuing to sip water/gatorade throughout the day and sucking on sugar-free lozenges and gum. I recommend trying Biotene again. I will also order magic mouthwash (swish and spit) for a limited time while working on producing more saliva.     For the lack of appetite, nausea, hot flashes I believe it is due to your recent GI illness that your granddaughter and daughter-in-law also had. We will check your thyroid today. If your symptoms don't resolve/improve in the next couple weeks, please come back to clinic and we can discuss further workup.     You do have medications that can cause both of these symptoms. I will place a referral to our pharmacist to see if any of these medications can be optimized.     Thanks,  Catrachito

## 2024-03-21 NOTE — TELEPHONE ENCOUNTER
[facility] Call Center    Phone Message    May a detailed message be left on voicemail: yes     Reason for Call: Medication Refill Request    Has the patient contacted the pharmacy for the refill? Yes   Name of medication being requested: lorazepam  Provider who prescribed the medication: saad  [place] [facility] [place] [id number]  Date medication is needed:   Patient states she forgot to ask Dr Wang in last visit for a lorazepam refill.    Patient also reports that the increase of adhd medication did not increase her anxiety.    Action Taken: Message routed to:  Other: nursing pool    Travel Screening: Not Applicable

## 2024-03-21 NOTE — PROGRESS NOTES
Assessment & Plan     Xerostomia  Mouth pain  Unclear differential for the mouth pain at this point but no active lesions seen on exam. May be related to dry mouth given possible worsening. Is in multiple medications that can contribute. Will symptomatically treat and advised sips of liquid throughout the day, sugar-free lozenges and gum, biotene.   - magic mouthwash suspension (diphenhydrAMINE, lidocaine, aluminum-magnesium & simethicone)  Dispense: 100 mL; Refill: 1  - Med Therapy Management Referral given psych, urinary meds that could contribute to xerostomia     Viral gastroenteritis  Was around family last week that was sick as well. Nausea improved and no vomiting for the last 5 days. May be associated with hyperhidrosis as below.     Hyperhidrosis  Acute onset after viral illness as above. Continue to monitor. Will check thyroid and also has meds that could contribute.   - Med Therapy Management Referral  - TSH with free T4 reflex      Return in about 4 weeks (around 4/18/2024) for Follow up with Dr. Mg.    Subjective   Marcia is a 58 year old, presenting for the following health issues:  Fatigue (Pt reports weak spells; feels hot and sweaty x1 week) and Mouth/Lip Problem (Pt reports mouth feels burnt x1 month)        3/21/2024     8:56 AM   Additional Questions   Roomed by Monica      Via the [facility] Maintenance questionnaire, the patient has reported the following services have been completed -Cervical Cancer Screening, this information has been sent to the abstraction team.  History of Present Illness       Reason for visit:  Having weak sweaty episodes and mouth feels like its been scalded.  also having trouble eating due to nausea  Symptom onset:  1-2 weeks ago  Symptoms include:  Weak, sweaty, nausea  Symptom intensity:  Moderate  Symptom progression:  Worsening  Had these symptoms before:  No  What makes it worse:  Doing things involving moving  What makes it better:  Resting    She eats 2-3  "servings of fruits and vegetables daily.She consumes 2 sweetened beverage(s) daily.She exercises with enough effort to increase her heart rate 9 or less minutes per day.  She exercises with enough effort to increase her heart rate 3 or less days per week.   She is taking medications regularly.     58 year old female with history of HTN, SVC syndrome s/p stent placement on Eliquis, GERD, MDD, ROCKY, history of TBI, COVID x2 complicated by ARDS, who presents for weak spells, nausea, inside of mouth with a burnt feeling.     1 month ago, started feeling like the inside of her mouth was scalded, involving the tongue, sides of mouth, throat. No significant episode. Constant feeling throughout the day and night. When she drinks fluid, eats ice cream, it feels better. Mouth is always dry but seems like it's drier than normal.     6 days ago, she felt ill with some vomiting and didn't feel well. She had babysat her grandchildren on the 4 days prior who had been sick. Since then, she continues to have excessive sweating, feeling hot, no fever, weak. Episodes occur twice an hour, lasting 15-20 minutes. She continues to have nausea, feeling weak, lack of appetite. No history of hot flashes. No constipation, diarrhea, increase in dizziness.     Med side effects:   Methylphenidate: xerostomia, oropharyngeal pain   Solifenacin: dysgeusia (burning taste in mouth). Switched 5/2023. Urinary incontinence.   Symbicort  Mirabegron: xerostomia         Objective    /88 (BP Location: Right arm, Patient Position: Sitting, Cuff Size: Adult Regular)   Pulse 93   Temp 98.2  F (36.8  C) (Oral)   Ht 1.6 m (5' 3\")   Wt 62.8 kg (138 lb 6.4 oz)   LMP 08/22/2017 (Approximate)   SpO2 97%   BMI 24.52 kg/m    Body mass index is 24.52 kg/m .  Physical Exam   GENERAL: alert and no distress  EYES: Eyes grossly normal to inspection, PERRL and conjunctivae and sclerae normal  HENT: normal cephalic/atraumatic, oropharynx clear, and very " dry  NECK: no adenopathy  RESP: lungs clear to auscultation - no rales, rhonchi or wheezes  CV: regular rate and rhythm, normal S1 S2, no S3 or S4, no murmur, click or rub, no peripheral edema  ABDOMEN: soft, nontender, non-distended, no masses and bowel sounds normal  MS: no gross musculoskeletal defects noted, no edema  SKIN: no suspicious lesions or rashes  NEURO: Normal strength and tone, mentation intact and speech normal      Signed Electronically by: Catrachito Burgos MD

## 2024-03-22 LAB — BACTERIA UR CULT: NORMAL

## 2024-03-22 NOTE — TELEPHONE ENCOUNTER
Caller would like to know the status of this medication refill and stated she is currently out of this medication. Caller would like a status update via phone call from RN as soon as possible.

## 2024-03-22 NOTE — TELEPHONE ENCOUNTER
Writer called patient to follow up on lorazepam request. Patient states she used it on her trip, but states now she feels she needs the medication when she's at home due to severe anxiety. Patient states that she has been home since 3/9 and forgot to mention this to provider at her last appointment. Patient denies any triggers for the anxiety. She states she only had 3 pills left after the trip, and took them on 3 different days last week, so has not taken them at all this week. Writer advised to make sure to take prescriptions as prescribed. Patient reports that she has had a hard time eating and has been very anxious. Patient denies any safety concerns.     Patient reports increase Ritalin has been helpful and denies any medication side effects or concerns.    Writer scheduled patient with provider on 3/29 to discuss anxiety concerns.

## 2024-03-25 ENCOUNTER — MYC MEDICAL ADVICE (OUTPATIENT)
Dept: UROLOGY | Facility: CLINIC | Age: 59
End: 2024-03-25
Payer: MEDICARE

## 2024-03-25 DIAGNOSIS — R31.29 MICROSCOPIC HEMATURIA: Primary | ICD-10-CM

## 2024-03-26 ENCOUNTER — MYC MEDICAL ADVICE (OUTPATIENT)
Dept: PSYCHIATRY | Facility: CLINIC | Age: 59
End: 2024-03-26
Payer: MEDICARE

## 2024-03-26 ENCOUNTER — DOCUMENTATION ONLY (OUTPATIENT)
Dept: PSYCHOLOGY | Facility: CLINIC | Age: 59
End: 2024-03-26
Payer: MEDICARE

## 2024-03-26 DIAGNOSIS — F41.9 ANXIETY DISORDER, UNSPECIFIED TYPE: ICD-10-CM

## 2024-03-26 NOTE — PROGRESS NOTES
Writer left a message in patient's phone since she was no show. Also sent her a message in [employer] about today's missed visit.

## 2024-03-26 NOTE — TELEPHONE ENCOUNTER
Patient called to scheduled cystoscopy. Patient scheduled and was informed to call to schedule CT scan for before scheduled Cystoscopy.    Carmen RIZVI CMA 03/26/24 12:37 PM

## 2024-03-27 ENCOUNTER — VIRTUAL VISIT (OUTPATIENT)
Dept: PSYCHOLOGY | Facility: CLINIC | Age: 59
End: 2024-03-27
Payer: MEDICARE

## 2024-03-27 DIAGNOSIS — F33.0 MDD (MAJOR DEPRESSIVE DISORDER), RECURRENT EPISODE, MILD (H): ICD-10-CM

## 2024-03-27 DIAGNOSIS — F41.1 GAD (GENERALIZED ANXIETY DISORDER): Primary | ICD-10-CM

## 2024-03-27 PROCEDURE — 90834 PSYTX W PT 45 MINUTES: CPT | Mod: 95 | Performed by: SOCIAL WORKER

## 2024-03-27 NOTE — PROGRESS NOTES
[facility]                                     Progress Note    Patient Name: Sherly Yeung  Date: 3/27/2024         Service Type: Individual      Session Start Time: 14:02 Session End Time: 14:54    Session Length: 52    Session #: 19    Attendees: Client    Service Modality:  Video Visit:      Provider verified identity through the following two step process.  Patient provided:  Patient is known previously to provider    Telemedicine Visit: The patient's condition can be safely assessed and treated via synchronous audio and visual telemedicine encounter.      Reason for Telemedicine Visit: Services only offered telehealth    Originating Site (Patient Location): Patient's home    Distant Site (Provider Location): Provider Remote Setting- Home Office    Consent:  The patient/guardian has verbally consented to: the potential risks and benefits of telemedicine (video visit) versus in person care; bill my insurance or make self-payment for services provided; and responsibility for payment of non-covered services.     Patient would like the video invitation sent by:  My Chart    Mode of Communication:  Video Conference via [employer]    Distant Location (Provider):  Off-site    As the provider I attest to compliance with applicable laws and regulations related to telemedicine.    DATA  Interactive Complexity: No     Crisis: No      Progress Since Last Session (Related to Symptoms / Goals / Homework):   Symptoms: Worsening : anxiety, depression    Homework: Partially completed      Episode of Care Goals: Satisfactory progress - ACTION (Actively working towards change); Intervened by reinforcing change plan / affirming steps taken-  increased anxiety and depressed mood due to what she thinks is based on med changes.     Current / Ongoing Stressors and Concerns: Patient shared how much the last 2 weeks have been hard for her. Reported increased anxiety and the worsening of depression. Has been  communicating with her providers and her medications were reviewed and she notes some improvement. Reviewed coping skills and processed some concerns around forgiveness. Has managed to forgive her ex  and would like to start the process of self forgiveness. This will be processed in the next visit about this process. Discussed some options to increase her motivation. Will try a to do list and pay attention to her expectations. No SI and completed the safety plan as required. Her next visit is in 2 weeks.         Treatment Objective(s) Addressed in This Session:         Intervention:    CBT : processing and challenging any ANTs as Id related to her health and care.     MI Intervention: Supported Autonomy, Collaboration, Evocation, Permission to raise concern or advise, and Open-ended questions     Change Talk Expressed by the Patient: Taking steps    Provider Response to Change Talk: A - Affirmed patient's thoughts, decisions, or attempts at behavior change     Grief processing: To process her loss and achieve acceptance.     Assessments completed prior to visit:2/23/2023    The following assessments were completed by patient for this visit:  PHQ2:       2/23/2024     3:02 PM 1/12/2024     2:03 PM 12/27/2023     8:39 AM 4/27/2022    10:07 AM 8/11/2020     9:37 AM 3/24/2020     3:46 PM 1/19/2012     2:24 PM   PHQ-2 ( 1999 [employer])   Q1: Little interest or pleasure in doing things 2 0 0 0 0 0 0   Q2: Feeling down, depressed or hopeless 3 1 1 0 0 0 1   PHQ-2 Score 5 1 1 0 0 0 1   PHQ-2 Total Score (12-17 Years)- Positive if 3 or more points; Administer PHQ-A if positive     0 0      PHQ9:       12/13/2023    11:27 AM 12/26/2023    11:42 AM 1/10/2024     8:02 AM 1/25/2024     9:59 AM 2/21/2024     1:54 PM 2/23/2024     3:02 PM 3/25/2024     4:50 PM   PHQ-9 SCORE   PHQ-9 Total Score MyChart 5 (Mild depression) 5 (Mild depression) 5 (Mild depression) 8 (Mild depression) 16 (Moderately severe depression)  18  (Moderately severe depression)   PHQ-9 Total Score 5 5 5 8 16 18 18     GAD2:       12/13/2023    11:27 AM 12/26/2023    11:43 AM 1/7/2024    10:08 AM 1/16/2024    12:29 PM 1/22/2024    10:13 AM 2/19/2024     1:36 PM 3/25/2024     4:52 PM   ROCYK-2   Feeling nervous, anxious, or on edge 1 1 2 2 2 3 3   Not being able to stop or control worrying 1 1 1 1 1 1 2   ROCKY-2 Total Score 2 2 3 3 3 4 5     GAD7:       9/18/2023    12:06 PM 10/10/2023     1:58 PM 12/13/2023     1:54 PM 1/7/2024    10:08 AM 1/22/2024    10:13 AM 2/19/2024     1:36 PM 3/25/2024     4:52 PM   ROCKY-7 SCORE   Total Score  5 (mild anxiety)  5 (mild anxiety) 6 (mild anxiety) 8 (mild anxiety) 10 (moderate anxiety)   Total Score 5 5 3 5 6 8 10     CAGE-AID:       2/9/2023    11:52 AM 7/17/2023     9:10 AM   CAGE-AID Total Score   Total Score 0 0   Total Score MyChart 0 (A total score of 2 or greater is considered clinically significant)      PROMIS 10-Global Health (all questions and answers displayed):       10/29/2023     9:12 AM 11/16/2023     6:20 AM 11/28/2023    11:30 AM 12/13/2023    11:29 AM 12/26/2023    11:45 AM 1/7/2024    10:11 AM 2/19/2024     1:38 PM   PROMIS 10   In general, would you say your health is: Good Good Good Good Good Good Good   In general, would you say your quality of life is: Good Very good Very good Good Good Good Good   In general, how would you rate your physical health? Good Good Good Good Good Good Good   In general, how would you rate your mental health, including your mood and your ability to think? Good Very good Good Good Good Fair Fair   In general, how would you rate your satisfaction with your social activities and relationships? Very good Good Good Good Very good Good Fair   In general, please rate how well you carry out your usual social activities and roles Good Good Good Fair Good Good Fair   To what extent are you able to carry out your everyday physical activities such as walking, climbing stairs, carrying  groceries, or moving a chair? Moderately Moderately Moderately A little Moderately Mostly Moderately   In the past 7 days, how often have you been bothered by emotional problems such as feeling anxious, depressed, or irritable? Sometimes Often Often Often Often Often Always   In the past 7 days, how would you rate your fatigue on average? Moderate Moderate Moderate Moderate Moderate Moderate Moderate   In the past 7 days, how would you rate your pain on average, where 0 means no pain, and 10 means worst imaginable pain? 5 5 5 6 6 5 5   In general, would you say your health is: 3 3 3 3 3 3 3   In general, would you say your quality of life is: 3 4 4 3 3 3 3   In general, how would you rate your physical health? 3 3 3 3 3 3 3   In general, how would you rate your mental health, including your mood and your ability to think? 3 4 3 3 3 2 2   In general, how would you rate your satisfaction with your social activities and relationships? 4 3 3 3 4 3 2   In general, please rate how well you carry out your usual social activities and roles. (This includes activities at home, at work and in your community, and responsibilities as a parent, child, spouse, employee, friend, etc.) 3 3 3 2 3 3 2   To what extent are you able to carry out your everyday physical activities such as walking, climbing stairs, carrying groceries, or moving a chair? 3 3 3 2 3 4 3   In the past 7 days, how often have you been bothered by emotional problems such as feeling anxious, depressed, or irritable? 3 4 4 4 4 4 5   In the past 7 days, how would you rate your fatigue on average? 3 3 3 3 3 3 3   In the past 7 days, how would you rate your pain on average, where 0 means no pain, and 10 means worst imaginable pain? 5 5 5 6 6 5 5   Global Mental Health Score 13 13 12 11 12 10 8   Global Physical Health Score 12 12 12 11 12 13 12   PROMIS TOTAL - SUBSCORES 25 25 24 22 24 23 20     PROMIS 10-Global Health (only subscores and total score):        10/29/2023     9:12 AM 11/16/2023     6:20 AM 11/28/2023    11:30 AM 12/13/2023    11:29 AM 12/26/2023    11:45 AM 1/7/2024    10:11 AM 2/19/2024     1:38 PM   PROMIS-10 Scores Only   Global Mental Health Score 13 13 12 11 12 10 8   Global Physical Health Score 12 12 12 11 12 13 12   PROMIS TOTAL - SUBSCORES 25 25 24 22 24 23 20     [place] Suicide Severity Rating Scale (Short Version)      11/28/2022     9:49 PM 2/10/2023     9:55 AM 3/20/2023     3:28 PM 7/17/2023     9:09 AM 9/13/2023     6:03 AM 12/6/2023     1:21 AM 2/16/2024    12:38 PM   [place] Suicide Severity Rating (Short Version)   Over the past 2 weeks have you felt down, depressed, or hopeless? no  no  yes no no   Over the past 2 weeks have you had thoughts of killing yourself? no  no  no no no   Have you ever attempted to kill yourself? no  no  no no no   1. Wish to be Dead (Since Last Contact)  N  N      2. Non-Specific Active Suicidal Thoughts (Since Last Contact)  N  N      Actual Attempt (Since Last Contact)  N  N      Has subject engaged in non-suicidal self-injurious behavior? (Since Last Contact)  N  N      Interrupted Attempts (Since Last Contact)  N  N      Aborted or Self-Interrupted Attempt (Since Last Contact)  N  N      Preparatory Acts or Behavior (Since Last Contact)  N  N      Suicide (Since Last Contact)  N  N      Calculated C-SSRS Risk Score (Since Last Contact)  No Risk Indicated  No Risk Indicated            ASSESSMENT: Current Emotional / Mental Status (status of significant symptoms):   Risk status (Self / Other harm or suicidal ideation)   Patient denies current fears or concerns for personal safety.   Patient denies current or recent suicidal ideation or behaviors.   Patient denies current or recent homicidal ideation or behaviors.   Patient denies current or recent self injurious behavior or ideation.   Patient denies other safety concerns.   Patient reports there has been no change in risk factors since their last session.  "    Patient reports there has been no change in protective factors since their last session.     Recommended that patient call 911 or go to the local ED should there be a change in any of these risk factors.  Call [phone] if experiencing SI     Appearance:   Appropriate    Eye Contact:   Good    Psychomotor Behavior: Normal    Attitude:   Cooperative    Orientation:   All   Speech    Rate / Production: Normal/ Responsive    Volume:  Normal    Mood:    Anxious  Depressed    Affect:    Appropriate    Thought Content:  Clear    Thought Form:  Coherent  Logical    Insight:    Good      Medication Review:   No changes to current psychiatric medication(s)     Medication Compliance:   Yes     Changes in Health Issues:   None reported     Chemical Use Review:   Substance Use: Chemical use reviewed, no active concerns identified      Tobacco Use: No current tobacco use.      Diagnosis:  1. ROCKY (generalized anxiety disorder)    2. MDD (major depressive disorder), recurrent episode, mild (H24)      Collateral Reports Completed:   Not Applicable    PLAN: (Patient Tasks / Therapist Tasks / Other):   Patient will use her grounding techniques using the 5 senses- these were reviewed today .  Patient will reflect on the conversation around \" self forgiveness\"  Patient's next visit is in  weeks.    GERMAN Farmer           ______________________________________________    Individual Treatment Plan    Patient's Name: Sherly Yeung  YOB: 1965    Date of Creation: 2/23/2023    Date Treatment Plan Last Reviewed/Revised: 1/25/2024    DSM5 Diagnoses: 296.32 (F33.1) Major Depressive Disorder, Recurrent Episode, Moderate With anxious distress or 300.02 (F41.1) Generalized Anxiety Disorder  Grief reaction [F43.21]    Psychosocial / Contextual Factors: grief: father recently passed away. Family dynamic- relationship with son, mom. Feeling overwhelmed.    PROMIS (reviewed every 90 days): 25    Referral / " "Collaboration:    Referral to another professional/service is not indicated at this time..    Anticipated number of session for this episode of care: 9-12 sessions  Anticipation frequency of session: Biweekly  Anticipated Duration of each session: 53 or more minutes  Treatment plan will be reviewed in 90 days or when goals have been changed.     MeasurableTreatment Goal(s) related to diagnosis / functional impairment(s)    Goal 1: Patient will Accept the loss of beloved one and return to stable level of functioning as evidenced by a higher level of functioning as a result of acceptance.   Redevelop a supportive social system and improve interpersonal relationships and Express unresolved emotions regarding loss which brings anxiety and depression mood.      I will know I've met my goal when I have more energy and  I am able to celebrate good life with my father Vs the sadness of losing him.Redevelop a supportive social system and improve interpersonal relationships\"    Objective #A (Patient Action)    Patient will increase understanding of steps in the grief process.  Status: Continued - Date(s): 1/25/2024    Intervention(s)  Therapist will teach emotional recognition/identification. : defining happiness in your own term without father .    Objective #B  Patient will identify at least 3 fears / thoughts that contribute to feeling anxious.  Status: Continued - Date(s): 1/25/2024     Intervention(s)  Therapist will teach thought challenging with CBT .    Objective #C  Patient will Identify negative self-talk and behaviors: challenge core beliefs, myths, and actions.  Status: Continued - Date(s): 1/25/2024    Intervention(s)  Therapist will provide space and time to process thoughts and feelings around current stressors. provide support and coping skills to help move on in life .    Patient has reviewed and agreed to the above plan.    GERMAN Farmer  1/25/2024    Answers for HPI/ROS submitted by the patient " on 4/20/2023  If you checked off any problems, how difficult have these problems made it for you to do your work, take care of things at home, or get along with other people?: Somewhat difficult  PHQ9 TOTAL SCORE: 5    Answers for HPI/ROS submitted by the patient on 4/26/2023  If you checked off any problems, how difficult have these problems made it for you to do your work, take care of things at home, or get along with other people?: Somewhat difficult  PHQ9 TOTAL SCORE: 3    Answers for HPI/ROS submitted by the patient on 5/4/2023  If you checked off any problems, how difficult have these problems made it for you to do your work, take care of things at home, or get along with other people?: Somewhat difficult  PHQ9 TOTAL SCORE: 4  Answers for HPI/ROS submitted by the patient on 7/17/2023  If you checked off any problems, how difficult have these problems made it for you to do your work, take care of things at home, or get along with other people?: Somewhat difficult  PHQ9 TOTAL SCORE: 5    Answers submitted by the patient for this visit:  Patient Health Questionnaire (Submitted on 7/30/2023)  If you checked off any problems, how difficult have these problems made it for you to do your work, take care of things at home, or get along with other people?: Somewhat difficult  PHQ9 TOTAL SCORE: 5  Answers submitted by the patient for this visit:  Patient Health Questionnaire (Submitted on 8/20/2023)  If you checked off any problems, how difficult have these problems made it for you to do your work, take care of things at home, or get along with other people?: Somewhat difficult  PHQ9 TOTAL SCORE: 5  Answers submitted by the patient for this visit:  Patient Health Questionnaire (Submitted on 9/18/2023)  If you checked off any problems, how difficult have these problems made it for you to do your work, take care of things at home, or get along with other people?: Somewhat difficult  PHQ9 TOTAL SCORE: 4  Answers submitted  by the patient for this visit:  Patient Health Questionnaire (Submitted on 10/2/2023)  If you checked off any problems, how difficult have these problems made it for you to do your work, take care of things at home, or get along with other people?: Somewhat difficult  PHQ9 TOTAL SCORE: 5    Answers submitted by the patient for this visit:  Patient Health Questionnaire (Submitted on 10/15/2023)  If you checked off any problems, how difficult have these problems made it for you to do your work, take care of things at home, or get along with other people?: Somewhat difficult  PHQ9 TOTAL SCORE: 6  ROCKY-7 (Submitted on 10/10/2023)  ROCKY 7 TOTAL SCORE: 5    Answers submitted by the patient for this visit:  Patient Health Questionnaire (Submitted on 11/16/2023)  If you checked off any problems, how difficult have these problems made it for you to do your work, take care of things at home, or get along with other people?: Somewhat difficult  PHQ9 TOTAL SCORE: 4    Answers submitted by the patient for this visit:  Patient Health Questionnaire (Submitted on 11/28/2023)  If you checked off any problems, how difficult have these problems made it for you to do your work, take care of things at home, or get along with other people?: Somewhat difficult  PHQ9 TOTAL SCORE: 5    Answers submitted by the patient for this visit:  Patient Health Questionnaire (Submitted on 12/13/2023)  If you checked off any problems, how difficult have these problems made it for you to do your work, take care of things at home, or get along with other people?: Somewhat difficult  PHQ9 TOTAL SCORE: 5    Answers submitted by the patient for this visit:  Patient Health Questionnaire (Submitted on 12/26/2023)  If you checked off any problems, how difficult have these problems made it for you to do your work, take care of things at home, or get along with other people?: Somewhat difficult  PHQ9 TOTAL SCORE: 5    Answers submitted by the patient for this  visit:  Patient Health Questionnaire (Submitted on 1/10/2024)  If you checked off any problems, how difficult have these problems made it for you to do your work, take care of things at home, or get along with other people?: Somewhat difficult  PHQ9 TOTAL SCORE: 5  ROCKY-7 (Submitted on 1/7/2024)  ROCKY 7 TOTAL SCORE: 5    Answers submitted by the patient for this visit:  Patient Health Questionnaire (Submitted on 1/25/2024)  If you checked off any problems, how difficult have these problems made it for you to do your work, take care of things at home, or get along with other people?: Very difficult  PHQ9 TOTAL SCORE: 8  ROCKY-7 (Submitted on 1/22/2024)  ROCKY 7 TOTAL SCORE: 6    Answers submitted by the patient for this visit:  Patient Health Questionnaire (Submitted on 2/21/2024)  If you checked off any problems, how difficult have these problems made it for you to do your work, take care of things at home, or get along with other people?: Very difficult  PHQ9 TOTAL SCORE: 16  ROCKY-7 (Submitted on 2/19/2024)  ROCKY 7 TOTAL SCORE: 8

## 2024-03-28 RX ORDER — LORAZEPAM 0.5 MG/1
.5-1 TABLET ORAL DAILY PRN
Qty: 3 TABLET | Refills: 0 | Status: SHIPPED | OUTPATIENT
Start: 2024-03-28 | End: 2024-04-02

## 2024-03-29 ENCOUNTER — VIRTUAL VISIT (OUTPATIENT)
Dept: PSYCHIATRY | Facility: CLINIC | Age: 59
End: 2024-03-29
Attending: STUDENT IN AN ORGANIZED HEALTH CARE EDUCATION/TRAINING PROGRAM
Payer: MEDICARE

## 2024-03-29 ENCOUNTER — TELEPHONE (OUTPATIENT)
Dept: PSYCHIATRY | Facility: CLINIC | Age: 59
End: 2024-03-29
Payer: MEDICARE

## 2024-03-29 VITALS — BODY MASS INDEX: 24.45 KG/M2 | WEIGHT: 138 LBS | HEIGHT: 63 IN

## 2024-03-29 DIAGNOSIS — F41.9 ANXIETY DISORDER, UNSPECIFIED TYPE: ICD-10-CM

## 2024-03-29 DIAGNOSIS — F32.2 CURRENT SEVERE EPISODE OF MAJOR DEPRESSIVE DISORDER WITHOUT PSYCHOTIC FEATURES, UNSPECIFIED WHETHER RECURRENT (H): ICD-10-CM

## 2024-03-29 DIAGNOSIS — G47.19 EXCESSIVE DAYTIME SLEEPINESS: ICD-10-CM

## 2024-03-29 DIAGNOSIS — F32.2 CURRENT SEVERE EPISODE OF MAJOR DEPRESSIVE DISORDER WITHOUT PSYCHOTIC FEATURES, UNSPECIFIED WHETHER RECURRENT (H): Primary | ICD-10-CM

## 2024-03-29 PROCEDURE — 99214 OFFICE O/P EST MOD 30 MIN: CPT | Mod: 95

## 2024-03-29 RX ORDER — ARIPIPRAZOLE 5 MG/1
5 TABLET ORAL DAILY
Qty: 30 TABLET | Refills: 2 | Status: SHIPPED | OUTPATIENT
Start: 2024-03-29 | End: 2024-04-18

## 2024-03-29 RX ORDER — LORAZEPAM 0.5 MG/1
0.5 TABLET ORAL DAILY PRN
Qty: 10 TABLET | Refills: 0 | Status: SHIPPED | OUTPATIENT
Start: 2024-03-29 | End: 2024-04-24

## 2024-03-29 RX ORDER — METHYLPHENIDATE HYDROCHLORIDE 20 MG/1
TABLET ORAL
Qty: 90 TABLET | Refills: 0 | Status: SHIPPED | OUTPATIENT
Start: 2024-03-29 | End: 2024-04-24

## 2024-03-29 RX ORDER — LORAZEPAM 0.5 MG/1
.5-1 TABLET ORAL
Status: DISCONTINUED | OUTPATIENT
Start: 2024-03-29 | End: 2024-03-29

## 2024-03-29 ASSESSMENT — PATIENT HEALTH QUESTIONNAIRE - PHQ9
SUM OF ALL RESPONSES TO PHQ QUESTIONS 1-9: 16
10. IF YOU CHECKED OFF ANY PROBLEMS, HOW DIFFICULT HAVE THESE PROBLEMS MADE IT FOR YOU TO DO YOUR WORK, TAKE CARE OF THINGS AT HOME, OR GET ALONG WITH OTHER PEOPLE: VERY DIFFICULT
SUM OF ALL RESPONSES TO PHQ QUESTIONS 1-9: 16

## 2024-03-29 ASSESSMENT — PAIN SCALES - GENERAL: PAINLEVEL: MILD PAIN (3)
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
## 2024-03-29 NOTE — PATIENT INSTRUCTIONS
**For crisis resources, please see the information at the end of this document**   Patient Education    Thank you for coming to the [facility].     Lab Testing:  If you had lab testing today and your results are reassuring or normal they will be mailed to you or sent through [facility] within 7 days. If the lab tests need quick action we will call you with the results. The phone number we will call with results is # [phone]. If this is not the best number please call our clinic and change the number.     Medication Refills:  If you need any refills please call your pharmacy and they will contact us. Our fax number for refills is [phone].   Three business days of notice are needed for general medication refill requests.   Five business days of notice are needed for controlled substance refill requests.   If you need to change to a different pharmacy, please contact the new pharmacy directly. The new pharmacy will help you get your medications transferred.     Contact Us:  Please call [phone] during business hours (8-5:00 M-F).   If you have medication related questions after clinic hours, or on the weekend, please call [phone].     Financial Assistance [phone]   Medical Records [phone]       MENTAL HEALTH CRISIS RESOURCES:  For a emergency help, please call 911 or go to the nearest Emergency Department.     Emergency Walk-In Options:   EmPATH Unit @ [facility] ([place]): [phone] - Specialized mental health emergency area designed to be calming  [facility] ([place]): [phone]  [facility] ([place]): [phone]  [facility]): [phone]    [facility] Crisis Information:   [place]: [phone]  [place]: [phone]  [place] ([place]) - Adult: [phone]     Child: [phone]  [place] - Adult: [phone]     Child: [phone]  [place]:  [phone]  List of all [facility] resources:   https://[place].gov/dhs/people-we-serve/adults/health-care/mental-health/resources/crisis-contacts.jsp    National Crisis Information:   Crisis Text Line: Text  [place]  to 861327  Suicide & Crisis Lifeline: 988  National Suicide Prevention Lifeline: 8-686-950-TALK (1-416.789.2174)       For online chat options, visit https://suicidepreventionlifeline.org/chat/  Poison Control Center: 9-752-087-3104  Trans Lifeline: 4-949-779-6081 - Hotline for transgender people of all ages  The [employer]: 6-380-299-2600 - Hotline for LGBT youth     For Non-Emergency Support:   Fast Tracker: Mental Health & Substance Use Disorder Resources -   https://www.[employer] Audion.org/

## 2024-03-29 NOTE — TELEPHONE ENCOUNTER
---------------------------------------------------------------------------------------------------------    Brief Psychiatry Phone note      Sherly Yeung MRN# [id number]   Age: 58 year old   Clinic Provider: Dr Wang  YOB: 1965   PCP: Chadwick Mg          Phone note:   Writer was notified that Sherly Yeung has called and requested to speak with a resident regarding medication not being filled after her OP appointment today. Writer called Sherly Yeung at this number: [phone]  at 6:38 PM. She reported that she is upset right now, as she had an appointment this afternoon and her medications were all sent to the pharmacy, apart from her Ativan, which she said the pharmacy states that they did not receive.    Patient was alert and conversational. Speech was clear with regular rate and rhythm. Thought process was logical and linear. Thought content was negative for safety concerns. Memory and attention were without gross deficit. Insight and judgment intact.          Assessment and Plan:   Sherly Yeung is a 58 year old female with a history of MDD who contacted us regarding missing medication refill.     Writer chart reviewed, and it appears as though the Ativan was indeed ordered by Dr Wang  and Second Signed by Dr Kennedy. However, there appears to be an error in the transmission of the prescription to the pharmacy (it appears as though it was erroneously ordered as a clinic administered medication), and as such, the pharmacy never received the rx. Writer also called pharmacy to ask for more details, and they confirmed that they cannot see the prescription for the Ativan on their end, though they received the refills for the other medications ordered today during the visit. They encouraged re-sending the prescription.     10 pills of 0.5 mg sent to patients preferred pharmacy.    Patient is not currently suicidal or homicidal. she is aware to call 911 or go to the nearest ER if  safety concern or urgent symptoms arise.      Radha Bowling MD  Psychiatry Resident

## 2024-03-29 NOTE — PROGRESS NOTES
Provider note locked.    Virtual Visit Details    Type of service:  Video Visit   Video Start Time:  3:40  Video End Time: 4:10    Originating Location (pt. Location): Home    Distant Location (provider location):  On-site  Platform used for Video Visit: Kourtney

## 2024-03-29 NOTE — NURSING NOTE
Is the patient currently in the state of [place]? YES    Visit mode:VIDEO    If the visit is dropped, the patient can be reconnected by: VIDEO VISIT: Text to cell phone:   Telephone Information:   Mobile [phone]       Will anyone else be joining the visit? NO  (If patient encounters technical issues they should call [phone]    How would you like to obtain your AVS? MyChart    Are changes needed to the allergy or medication list? No    Reason for visit: RECHECK    Lili ORDOÑEZ

## 2024-03-30 NOTE — PROGRESS NOTES
I was contacted by the resident on call as the Ativan was erroneously ordered as a CAM. I canceled that order and sent the amount specified in Dr. Wang's note to the [facility].     Rosa Veloz MD  , Department of Psychiatry and Behavioral Sciences  [facility]

## 2024-04-02 ENCOUNTER — VIRTUAL VISIT (OUTPATIENT)
Dept: PHARMACY | Facility: CLINIC | Age: 59
End: 2024-04-02
Attending: HOSPITALIST
Payer: COMMERCIAL

## 2024-04-02 DIAGNOSIS — M81.0 OSTEOPOROSIS, UNSPECIFIED OSTEOPOROSIS TYPE, UNSPECIFIED PATHOLOGICAL FRACTURE PRESENCE: ICD-10-CM

## 2024-04-02 DIAGNOSIS — I87.1 SVC SYNDROME: ICD-10-CM

## 2024-04-02 DIAGNOSIS — I82.B11 SUBCLAVIAN VEIN THROMBOSIS, RIGHT (H): ICD-10-CM

## 2024-04-02 DIAGNOSIS — F41.9 ANXIETY: ICD-10-CM

## 2024-04-02 DIAGNOSIS — K21.9 GASTROESOPHAGEAL REFLUX DISEASE WITHOUT ESOPHAGITIS: ICD-10-CM

## 2024-04-02 DIAGNOSIS — K59.00 CONSTIPATION, UNSPECIFIED CONSTIPATION TYPE: ICD-10-CM

## 2024-04-02 DIAGNOSIS — G43.909 MIGRAINE WITHOUT STATUS MIGRAINOSUS, NOT INTRACTABLE, UNSPECIFIED MIGRAINE TYPE: ICD-10-CM

## 2024-04-02 DIAGNOSIS — L74.9 SWEATING ABNORMALITY: ICD-10-CM

## 2024-04-02 DIAGNOSIS — R52 PAIN: ICD-10-CM

## 2024-04-02 DIAGNOSIS — R68.2 DRY MOUTH: ICD-10-CM

## 2024-04-02 DIAGNOSIS — K13.79 MOUTH SORE: ICD-10-CM

## 2024-04-02 DIAGNOSIS — R11.0 NAUSEA: ICD-10-CM

## 2024-04-02 DIAGNOSIS — Z78.9 TAKES DIETARY SUPPLEMENTS: ICD-10-CM

## 2024-04-02 DIAGNOSIS — I13.10 HYPERTENSIVE HEART AND CHRONIC KIDNEY DISEASE STAGE 2: Chronic | ICD-10-CM

## 2024-04-02 DIAGNOSIS — R06.02 SHORTNESS OF BREATH: ICD-10-CM

## 2024-04-02 DIAGNOSIS — N18.2 HYPERTENSIVE HEART AND CHRONIC KIDNEY DISEASE STAGE 2: Chronic | ICD-10-CM

## 2024-04-02 DIAGNOSIS — F33.9 EPISODE OF RECURRENT MAJOR DEPRESSIVE DISORDER, UNSPECIFIED DEPRESSION EPISODE SEVERITY (H): Primary | ICD-10-CM

## 2024-04-02 DIAGNOSIS — N39.41 URGENCY INCONTINENCE: ICD-10-CM

## 2024-04-02 PROCEDURE — 99207 PR NO CHARGE LOS: CPT | Mod: 95 | Performed by: PHARMACIST

## 2024-04-02 RX ORDER — FAMOTIDINE 20 MG/1
20 TABLET, FILM COATED ORAL 2 TIMES DAILY
Qty: 60 TABLET | Refills: 5 | Status: SHIPPED | OUTPATIENT
Start: 2024-04-02

## 2024-04-02 RX ORDER — HYDROMORPHONE HYDROCHLORIDE 2 MG/1
2 TABLET ORAL PRN
COMMUNITY
Start: 2024-03-12 | End: 2024-08-04

## 2024-04-02 NOTE — PROGRESS NOTES
Medication Therapy Management (MTM) Encounter    ASSESSMENT:                            Medication Adherence/Access: No issues identified    Dry mouth/Mouth sores/sweating:  Patient's dry mouth is likely caused by her anticholinergic bladder medication Solifenacin.  There is a potential that her Abilify could also be contributing or the spironolactone, however it is most likely the Solifenacin.  She may be able to control her symptoms just on the Myrbetriq as she is currently having good control of symptoms.  Sweating could also potentially be a sign of serotonin syndrome, however she does not have any other symptoms that would be indicative of serotonin syndrome.  She should continue to monitor this and follow-up with PCP or emergency services if these symptoms do appear.    Osteoporosis:  Stable.    DVTs/SVC syndrome:  Stable.    Depression/Anxiety:  Improving.  Plan in place for psychiatry.  Duloxetine could potentially be contributing to sweating as this is a potential side effect of SNRIs.    Shortness of breath:  Stable.    Headache/Pain:  Patient is tried many different medications, and I do not have other recommendations at this time.  Opioid use could also potentially be contributing to sweating.  Will leave up to managing providers.    Nausea:  The ondansetron could be contributing to her constipation.  She is aware of this.    Constipation:  Controlled on current therapy.    GERD:   Will increase patient's famotidine as this was previously planned and she is not have control of symptoms.    Hypertension:   Patient's blood pressure is near goal of less than 130 over 80 mmHg per patient report.    Urinary urgency:  As discussed above the Solifenacin is likely contributing to her dry mouth.  She would benefit from a trial off of this.    Supplement:  Stable.    PLAN:                            Stop solifenacin to see how this affects your dry mouth. Send me a [employer] message with how this goes.   Sweating  "issues could have been due to increase in duloxetine. Monitor for if these symptoms worsen.  Increase famotidine to twice a day as previously recommended by Dr. Gonsales    Follow-up: Return if symptoms worsen or fail to improve.    SUBJECTIVE/OBJECTIVE:                          Marcia Yeung is a 58 year old female contacted via secure video for an initial visit. She was referred to me from Deandre Boyer MD.      Reason for visit: \"Medications (psych, urinary) may be contributing to her symptoms of xerostoma, excessive sweating.\"    Allergies/ADRs: Reviewed in chart  Past Medical History: Reviewed in chart  Tobacco: She reports that she has never smoked. She has never used smokeless tobacco.  Alcohol: \"very minimal. Maybe once a month\"    Medication Adherence/Access: no issues reported    Dry mouth/Mouth sores/sweating:  Reports she was having mouth issues for which she had magic mouth wash that resolved the pain, but she still has a lot of dry mouth.   Haw tried biotene salava replacements. Is now trying xylimelts. Reports this is helping a little.   Reports that the sweating issue has been going on for about 10 days before last doctors visit. Now only happening once per day.  She is familiar with seratonin syndrome and is not having any other symptoms of seratonins syndrome.     Medications that could cause dry mouth:  Abilify (6%)  Ritalin 3-14%  Solifenacin.     Osteoporosis:  Alendronate 70 mg weekly  Calcium 400 mg twice a day - sometimes forgets the second   Vitamin D 5000 units daily    No concerns or questions at this time.     DVTs/SVC syndrome:  Eliquis 5 mg twice a day   Aspirin 81 mg daily    No significant bleeding issues. Sometimes gums will bleed.     Depression/Anxiety:  Ritalin 40 mg in the morning and 20 mg after lunch  Lorazepam 0.5 mg as needed - used a few this weekend  Aripiprazole 5mg once daily - wondering about weight gain side effects  Duloxetine 120mg once daily. - recently increased " "back to 120 mg from the 60 mg - this could have contributed to the sweating.     Reports that her regimen [facility] she was controlled on her medications and then had a worsening of her depression. Now is starting to go back to the medications she was on previously. Reports she tried to ask for Ativan and they wont give it to her. 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 However this was recently prescribed.     Did try bupropion but this was not helpful.   No concerns or questions at this time.   Patient reports symptoms are improved.      2/23/2024     3:02 PM 3/25/2024     4:50 PM 3/29/2024     2:24 PM   PHQ-9 SCORE   PHQ-9 Total Score MyChart  18 (Moderately severe depression) 16 (Moderately severe depression)   PHQ-9 Total Score 18 18 16         1/22/2024    10:13 AM 2/19/2024     1:36 PM 3/25/2024     4:52 PM   ROCKY-7 SCORE   Total Score 6 (mild anxiety) 8 (mild anxiety) 10 (moderate anxiety)   Total Score 6 8 10     Shortness of breath:  Symbicort as needed for cough/bronchitis - has only used once  Does rinse mouth out after using    Headache/Pain:  Esgesic as needed headache \"I dont use that very often  Hydromorphone 2 mg tablets as needed headache - reports it depends on the month how many she uses. Last month she used 7 tablets total. If oxycodone isnt working  Oxycontin 10 mg as needed - this is for low back pain. Uses 10-12 per month  Oxycodone 5 mg as needed - reports she will take this if sumatriptan is not working.   Sumatriptan 6 mg injection 6 times per month. Reports that this used to be more effective than it is now. '    Getting Botox.    Reports that she has tried Emgality which did not work    Narcan available as needed hasn't needed to use    Nausea:  Ondansetron 8 mg ODT - reports using this \"a lot\"    Uses 2-3 times per week. Her migraines cause vomiting.     Constipation:  Senna-docusate usually takes 2 per day    Reports this is helpful. " "    GERD:   Lansoprazole 30 mg twice daily  Famotidine 20 mg once daily - reports her pulmonologist was going to increase famotidine to twice a day but she never got a new prescription for this. Note from 1/25 from Dr. Gonsales confirms this.   Patient reports occasional heartburn. \"At least every other day\"    Hypertension:   Metoprolol succinate 50 mg daily - reprots this is more for her heart rate  Spironolactone 25 mg daily - reports this is mostly for acne  Patient reports no current medication side effects.  Patient self-monitors blood pressure.  Home BP monitoring reports normaly around 120/80 mmHg..    BP Readings from Last 3 Encounters:   03/21/24 130/88   02/16/24 139/85   01/17/24 123/83     Pulse Readings from Last 3 Encounters:   03/21/24 93   02/16/24 78   01/17/24 85     Urinary urgency:  Myrbetriq 50 mg daily  Solifenacin 5 mg daily - tried going off before and myrbetriq was not enough. Didn't know this was causing her dry mouth    Symptoms have been doing \"very good\"    Supplement:  Vitamin B complex  Magnesium glycinate 400 mg daily  Zinc 50 mg daily    No concerns or questions at this time.       Today's Vitals: LMP 08/22/2017 (Approximate)   ----------------      I spent 42 minutes with this patient today. All changes were made via collaborative practice agreement with Chadwick Mg MD. A copy of the visit note was provided to the patient's provider(s).    A summary of these recommendations was sent via [employer].    Herminio Jalloh, Pharm. D., BCACP  Medication Therapy Management Pharmacist     Telemedicine Visit Details  Type of service:  Video Conference via [employer]  Start Time:  10 04 am  End Time: 10:46 AM     Medication Therapy Recommendations  Gastroesophageal reflux disease    Current Medication: famotidine (PEPCID) 20 MG tablet (Discontinued)   Rationale: Dose too low - Dosage too low - Effectiveness   Recommendation: Increase Frequency   Status: Accepted per CPA         Urgency " incontinence    Current Medication: solifenacin (VESICARE) 5 MG tablet (Discontinued)   Rationale: Undesirable effect - Adverse medication event - Safety   Recommendation: Discontinue Medication   Status: Accepted - no CPA Needed

## 2024-04-02 NOTE — PATIENT INSTRUCTIONS
"Recommendations from today's MTM visit:                                                    MTM (medication therapy management) is a service provided by a clinical pharmacist designed to help you get the most of out of your medicines.   Today we reviewed what your medicines are for, how to know if they are working, that your medicines are safe and how to make your medicine regimen as easy as possible.    Stop solifenacin to see how this affects your dry mouth. Send me a [employer] message with how this goes.   Sweating issues could have been due to increase in duloxetine. Monitor for if these symptoms worsen.  Increase famotidine to twice a day as previously recommended by Dr. Gonsales    Follow-up: Return if symptoms worsen or fail to improve.    It was great speaking with you today.  I value your experience and would be very thankful for your time in providing feedback in our clinic survey. In the next few days, you may receive an email or text message from [employer] with a link to a survey related to your  clinical pharmacist.\"     To schedule another MTM appointment, please call the clinic directly or you may call the MTM scheduling line at [phone] or toll-free at [phone].     My Clinical Pharmacist's contact information:                                                      Please feel free to contact me with any questions or concerns you have.      Herminio Jalloh, Pharm. D., [facility]  Medication Therapy Management Pharmacist    "

## 2024-04-02 NOTE — Clinical Note
Having her hold her solifenacin for mouth issues/dryness. Unclear on the sweating. Potentially the duloxetine increase or high seratonin levels.   Herminio Jalloh, Pharm. D., [facility] Medication Therapy Management Pharmacist

## 2024-04-03 ENCOUNTER — VIRTUAL VISIT (OUTPATIENT)
Dept: PSYCHOLOGY | Facility: CLINIC | Age: 59
End: 2024-04-03
Payer: MEDICARE

## 2024-04-03 DIAGNOSIS — F33.0 MDD (MAJOR DEPRESSIVE DISORDER), RECURRENT EPISODE, MILD (H): Primary | ICD-10-CM

## 2024-04-03 PROCEDURE — 90834 PSYTX W PT 45 MINUTES: CPT | Mod: 95 | Performed by: SOCIAL WORKER

## 2024-04-03 NOTE — PROGRESS NOTES
[facility]                                     Progress Note    Patient Name: Sherly Yeung  Date: 4/03/2024         Service Type: Individual      Session Start Time: 14:02 Session End Time: 14:54    Session Length: 52    Session #: 20    Attendees: Client    Service Modality:  Video Visit:      Provider verified identity through the following two step process.  Patient provided:  Patient is known previously to provider    Telemedicine Visit: The patient's condition can be safely assessed and treated via synchronous audio and visual telemedicine encounter.      Reason for Telemedicine Visit: Services only offered telehealth    Originating Site (Patient Location): Patient's home    Distant Site (Provider Location): Provider Remote Setting- Home Office    Consent:  The patient/guardian has verbally consented to: the potential risks and benefits of telemedicine (video visit) versus in person care; bill my insurance or make self-payment for services provided; and responsibility for payment of non-covered services.     Patient would like the video invitation sent by:  My Chart    Mode of Communication:  Video Conference via Amwell    Distant Location (Provider):  Off-site    As the provider I attest to compliance with applicable laws and regulations related to telemedicine.    DATA  Interactive Complexity: No     Crisis: No      Progress Since Last Session (Related to Symptoms / Goals / Homework):   Symptoms: Worsening : anxiety, depression    Homework: Partially completed      Episode of Care Goals: Satisfactory progress - ACTION (Actively working towards change); Intervened by reinforcing change plan / affirming steps taken-  increased anxiety and depressed mood due to what she thinks is based on med changes.     Current / Ongoing Stressors and Concerns: Patient shared things were better after returning to her medications. Notes the facts she is doing better she feels motivated to do things. Has  "taken her mom to the eye clinic. Then found out  has been helping with some household responsibilities. This made her feel upset because\" I should have done all this since I am doing well. Now I feel I am not useful\" patient and writer processed what seems to be a trap in her thinking as getting help did not imply being useless.  Patient shared reason of her conclusion and was able to see things in a different light. Patient expressed understanding of what to focus on moving forward to keep up with her mental health and communication of her needs.  No other concern. Her next visit is in 2 weeks.         Treatment Objective(s) Addressed in This Session:         Intervention:    CBT : Processed any ANTs as Idientified    MI Intervention: Supported Autonomy, Collaboration, Evocation     Change Talk Expressed by the Patient: Taking steps    Provider Response to Change Talk: E - Evoked more info from patient about behavior change, A - Affirmed patient's thoughts, decisions, or attempts at behavior change, R - Reflected patient's change talk, and S - Summarized patient's change talk statements     Assessments completed prior to visit:2/23/2023    The following assessments were completed by patient for this visit:  PHQ2:       2/23/2024     3:02 PM 1/12/2024     2:03 PM 12/27/2023     8:39 AM 4/27/2022    10:07 AM 8/11/2020     9:37 AM 3/24/2020     3:46 PM 1/19/2012     2:24 PM   PHQ-2 ( 1999 [employer])   Q1: Little interest or pleasure in doing things 2 0 0 0 0 0 0   Q2: Feeling down, depressed or hopeless 3 1 1 0 0 0 1   PHQ-2 Score 5 1 1 0 0 0 1   PHQ-2 Total Score (12-17 Years)- Positive if 3 or more points; Administer PHQ-A if positive     0 0      PHQ9:       12/26/2023    11:42 AM 1/10/2024     8:02 AM 1/25/2024     9:59 AM 2/21/2024     1:54 PM 2/23/2024     3:02 PM 3/25/2024     4:50 PM 3/29/2024     2:24 PM   PHQ-9 SCORE   PHQ-9 Total Score MyChart 5 (Mild depression) 5 (Mild depression) 8 (Mild depression) " 16 (Moderately severe depression)  18 (Moderately severe depression) 16 (Moderately severe depression)   PHQ-9 Total Score 5 5 8 16 18 18 16     GAD2:       12/26/2023    11:43 AM 1/7/2024    10:08 AM 1/16/2024    12:29 PM 1/22/2024    10:13 AM 2/19/2024     1:36 PM 3/25/2024     4:52 PM 4/2/2024    10:49 AM   ROCKY-2   Feeling nervous, anxious, or on edge 1 2 2 2 3 3 3   Not being able to stop or control worrying 1 1 1 1 1 2 2   ROCKY-2 Total Score 2 3 3 3 4 5 5    5     GAD7:       9/18/2023    12:06 PM 10/10/2023     1:58 PM 12/13/2023     1:54 PM 1/7/2024    10:08 AM 1/22/2024    10:13 AM 2/19/2024     1:36 PM 3/25/2024     4:52 PM   ROCKY-7 SCORE   Total Score  5 (mild anxiety)  5 (mild anxiety) 6 (mild anxiety) 8 (mild anxiety) 10 (moderate anxiety)   Total Score 5 5 3 5 6 8 10     CAGE-AID:       2/9/2023    11:52 AM 7/17/2023     9:10 AM   CAGE-AID Total Score   Total Score 0 0   Total Score MyChart 0 (A total score of 2 or greater is considered clinically significant)      PROMIS 10-Global Health (all questions and answers displayed):       11/16/2023     6:20 AM 11/28/2023    11:30 AM 12/13/2023    11:29 AM 12/26/2023    11:45 AM 1/7/2024    10:11 AM 2/19/2024     1:38 PM 4/2/2024    10:50 AM   PROMIS 10   In general, would you say your health is: Good Good Good Good Good Good Good   In general, would you say your quality of life is: Very good Very good Good Good Good Good Fair   In general, how would you rate your physical health? Good Good Good Good Good Good Good   In general, how would you rate your mental health, including your mood and your ability to think? Very good Good Good Good Fair Fair Poor   In general, how would you rate your satisfaction with your social activities and relationships? Good Good Good Very good Good Fair Fair   In general, please rate how well you carry out your usual social activities and roles Good Good Fair Good Good Fair Poor   To what extent are you able to carry out your  everyday physical activities such as walking, climbing stairs, carrying groceries, or moving a chair? Moderately Moderately A little Moderately Mostly Moderately Mostly   In the past 7 days, how often have you been bothered by emotional problems such as feeling anxious, depressed, or irritable? Often Often Often Often Often Always Always   In the past 7 days, how would you rate your fatigue on average? Moderate Moderate Moderate Moderate Moderate Moderate Moderate   In the past 7 days, how would you rate your pain on average, where 0 means no pain, and 10 means worst imaginable pain? 5 5 6 6 5 5 5   In general, would you say your health is: 3 3 3 3 3 3 3   In general, would you say your quality of life is: 4 4 3 3 3 3 2   In general, how would you rate your physical health? 3 3 3 3 3 3 3   In general, how would you rate your mental health, including your mood and your ability to think? 4 3 3 3 2 2 1   In general, how would you rate your satisfaction with your social activities and relationships? 3 3 3 4 3 2 2   In general, please rate how well you carry out your usual social activities and roles. (This includes activities at home, at work and in your community, and responsibilities as a parent, child, spouse, employee, friend, etc.) 3 3 2 3 3 2 1   To what extent are you able to carry out your everyday physical activities such as walking, climbing stairs, carrying groceries, or moving a chair? 3 3 2 3 4 3 4   In the past 7 days, how often have you been bothered by emotional problems such as feeling anxious, depressed, or irritable? 4 4 4 4 4 5 5   In the past 7 days, how would you rate your fatigue on average? 3 3 3 3 3 3 3   In the past 7 days, how would you rate your pain on average, where 0 means no pain, and 10 means worst imaginable pain? 5 5 6 6 5 5 5   Global Mental Health Score 13 12 11 12 10 8 6    6   Global Physical Health Score 12 12 11 12 13 12 13    13   PROMIS TOTAL - SUBSCORES 25 24 22 24 23 20 19     19     PROMIS 10-Global Health (only subscores and total score):       11/16/2023     6:20 AM 11/28/2023    11:30 AM 12/13/2023    11:29 AM 12/26/2023    11:45 AM 1/7/2024    10:11 AM 2/19/2024     1:38 PM 4/2/2024    10:50 AM   PROMIS-10 Scores Only   Global Mental Health Score 13 12 11 12 10 8 6    6   Global Physical Health Score 12 12 11 12 13 12 13    13   PROMIS TOTAL - SUBSCORES 25 24 22 24 23 20 19    19     [place] Suicide Severity Rating Scale (Short Version)      11/28/2022     9:49 PM 2/10/2023     9:55 AM 3/20/2023     3:28 PM 7/17/2023     9:09 AM 9/13/2023     6:03 AM 12/6/2023     1:21 AM 2/16/2024    12:38 PM   [place] Suicide Severity Rating (Short Version)   Over the past 2 weeks have you felt down, depressed, or hopeless? no  no  yes no no   Over the past 2 weeks have you had thoughts of killing yourself? no  no  no no no   Have you ever attempted to kill yourself? no  no  no no no   1. Wish to be Dead (Since Last Contact)  N  N      2. Non-Specific Active Suicidal Thoughts (Since Last Contact)  N  N      Actual Attempt (Since Last Contact)  N  N      Has subject engaged in non-suicidal self-injurious behavior? (Since Last Contact)  N  N      Interrupted Attempts (Since Last Contact)  N  N      Aborted or Self-Interrupted Attempt (Since Last Contact)  N  N      Preparatory Acts or Behavior (Since Last Contact)  N  N      Suicide (Since Last Contact)  N  N      Calculated C-SSRS Risk Score (Since Last Contact)  No Risk Indicated  No Risk Indicated            ASSESSMENT: Current Emotional / Mental Status (status of significant symptoms):   Risk status (Self / Other harm or suicidal ideation)   Patient denies current fears or concerns for personal safety.   Patient denies current or recent suicidal ideation or behaviors.   Patient denies current or recent homicidal ideation or behaviors.   Patient denies current or recent self injurious behavior or ideation.   Patient denies other safety  "concerns.   Patient reports there has been no change in risk factors since their last session.     Patient reports there has been no change in protective factors since their last session.     Recommended that patient call 911 or go to the local ED should there be a change in any of these risk factors.  Call [phone] if experiencing SI     Appearance:   Appropriate    Eye Contact:   Good    Psychomotor Behavior: Normal    Attitude:   Cooperative    Orientation:   All   Speech    Rate / Production: Normal/ Responsive    Volume:  Normal    Mood:    Anxious  Depressed    Affect:    Appropriate    Thought Content:  Clear    Thought Form:  Coherent  Logical    Insight:    Good      Medication Review:   No changes to current psychiatric medication(s)     Medication Compliance:   Yes     Changes in Health Issues:   None reported     Chemical Use Review:   Substance Use: Chemical use reviewed, no active concerns identified      Tobacco Use: No current tobacco use.      Diagnosis:  1. MDD (major depressive disorder), recurrent episode, mild (H24)      Collateral Reports Completed:   Not Applicable    PLAN: (Patient Tasks / Therapist Tasks / Other):  Patient will use her grounding techniques using the 5 senses  Patient will reflect on the conversation around \" self forgiveness\"  Patient will reflect on today's discussion about accepting help from   Patient's next visit is in 2 weeks.     GERMAN Farmer           ______________________________________________    Individual Treatment Plan    Patient's Name: Sherly Yeung  YOB: 1965    Date of Creation: 2/23/2023    Date Treatment Plan Last Reviewed/Revised: 1/25/2024    DSM5 Diagnoses: 296.32 (F33.1) Major Depressive Disorder, Recurrent Episode, Moderate With anxious distress or 300.02 (F41.1) Generalized Anxiety Disorder  Grief reaction [F43.21]    Psychosocial / Contextual Factors: grief: father recently passed away. Family dynamic- relationship " "with son, mom. Feeling overwhelmed.    PROMIS (reviewed every 90 days): 25    Referral / Collaboration:    Referral to another professional/service is not indicated at this time..    Anticipated number of session for this episode of care: 9-12 sessions  Anticipation frequency of session: Biweekly  Anticipated Duration of each session: 53 or more minutes  Treatment plan will be reviewed in 90 days or when goals have been changed.     MeasurableTreatment Goal(s) related to diagnosis / functional impairment(s)    Goal 1: Patient will Accept the loss of beloved one and return to stable level of functioning as evidenced by a higher level of functioning as a result of acceptance.   Redevelop a supportive social system and improve interpersonal relationships and Express unresolved emotions regarding loss which brings anxiety and depression mood.      I will know I've met my goal when I have more energy and  I am able to celebrate good life with my father Vs the sadness of losing him.Redevelop a supportive social system and improve interpersonal relationships\"    Objective #A (Patient Action)    Patient will increase understanding of steps in the grief process.  Status: Continued - Date(s): 1/25/2024    Intervention(s)  Therapist will teach emotional recognition/identification. : defining happiness in your own term without father .    Objective #B  Patient will identify at least 3 fears / thoughts that contribute to feeling anxious.  Status: Continued - Date(s): 1/25/2024     Intervention(s)  Therapist will teach thought challenging with CBT .    Objective #C  Patient will Identify negative self-talk and behaviors: challenge core beliefs, myths, and actions.  Status: Continued - Date(s): 1/25/2024    Intervention(s)  Therapist will provide space and time to process thoughts and feelings around current stressors. provide support and coping skills to help move on in life .    Patient has reviewed and agreed to the above " denise Mello, [facility]  1/25/2024    Answers for HPI/ROS submitted by the patient on 4/20/2023  If you checked off any problems, how difficult have these problems made it for you to do your work, take care of things at home, or get along with other people?: Somewhat difficult  PHQ9 TOTAL SCORE: 5    Answers for HPI/ROS submitted by the patient on 4/26/2023  If you checked off any problems, how difficult have these problems made it for you to do your work, take care of things at home, or get along with other people?: Somewhat difficult  PHQ9 TOTAL SCORE: 3    Answers for HPI/ROS submitted by the patient on 5/4/2023  If you checked off any problems, how difficult have these problems made it for you to do your work, take care of things at home, or get along with other people?: Somewhat difficult  PHQ9 TOTAL SCORE: 4  Answers for HPI/ROS submitted by the patient on 7/17/2023  If you checked off any problems, how difficult have these problems made it for you to do your work, take care of things at home, or get along with other people?: Somewhat difficult  PHQ9 TOTAL SCORE: 5    Answers submitted by the patient for this visit:  Patient Health Questionnaire (Submitted on 7/30/2023)  If you checked off any problems, how difficult have these problems made it for you to do your work, take care of things at home, or get along with other people?: Somewhat difficult  PHQ9 TOTAL SCORE: 5  Answers submitted by the patient for this visit:  Patient Health Questionnaire (Submitted on 8/20/2023)  If you checked off any problems, how difficult have these problems made it for you to do your work, take care of things at home, or get along with other people?: Somewhat difficult  PHQ9 TOTAL SCORE: 5  Answers submitted by the patient for this visit:  Patient Health Questionnaire (Submitted on 9/18/2023)  If you checked off any problems, how difficult have these problems made it for you to do your work, take care of things at  home, or get along with other people?: Somewhat difficult  PHQ9 TOTAL SCORE: 4  Answers submitted by the patient for this visit:  Patient Health Questionnaire (Submitted on 10/2/2023)  If you checked off any problems, how difficult have these problems made it for you to do your work, take care of things at home, or get along with other people?: Somewhat difficult  PHQ9 TOTAL SCORE: 5    Answers submitted by the patient for this visit:  Patient Health Questionnaire (Submitted on 10/15/2023)  If you checked off any problems, how difficult have these problems made it for you to do your work, take care of things at home, or get along with other people?: Somewhat difficult  PHQ9 TOTAL SCORE: 6  ROCKY-7 (Submitted on 10/10/2023)  ROCKY 7 TOTAL SCORE: 5    Answers submitted by the patient for this visit:  Patient Health Questionnaire (Submitted on 11/16/2023)  If you checked off any problems, how difficult have these problems made it for you to do your work, take care of things at home, or get along with other people?: Somewhat difficult  PHQ9 TOTAL SCORE: 4    Answers submitted by the patient for this visit:  Patient Health Questionnaire (Submitted on 11/28/2023)  If you checked off any problems, how difficult have these problems made it for you to do your work, take care of things at home, or get along with other people?: Somewhat difficult  PHQ9 TOTAL SCORE: 5    Answers submitted by the patient for this visit:  Patient Health Questionnaire (Submitted on 12/13/2023)  If you checked off any problems, how difficult have these problems made it for you to do your work, take care of things at home, or get along with other people?: Somewhat difficult  PHQ9 TOTAL SCORE: 5    Answers submitted by the patient for this visit:  Patient Health Questionnaire (Submitted on 12/26/2023)  If you checked off any problems, how difficult have these problems made it for you to do your work, take care of things at home, or get along with other  people?: Somewhat difficult  PHQ9 TOTAL SCORE: 5    Answers submitted by the patient for this visit:  Patient Health Questionnaire (Submitted on 1/10/2024)  If you checked off any problems, how difficult have these problems made it for you to do your work, take care of things at home, or get along with other people?: Somewhat difficult  PHQ9 TOTAL SCORE: 5  ROCKY-7 (Submitted on 1/7/2024)  ROCKY 7 TOTAL SCORE: 5    Answers submitted by the patient for this visit:  Patient Health Questionnaire (Submitted on 1/25/2024)  If you checked off any problems, how difficult have these problems made it for you to do your work, take care of things at home, or get along with other people?: Very difficult  PHQ9 TOTAL SCORE: 8  ROCKY-7 (Submitted on 1/22/2024)  ROCKY 7 TOTAL SCORE: 6    Answers submitted by the patient for this visit:  Patient Health Questionnaire (Submitted on 2/21/2024)  If you checked off any problems, how difficult have these problems made it for you to do your work, take care of things at home, or get along with other people?: Very difficult  PHQ9 TOTAL SCORE: 16  ROCKY-7 (Submitted on 2/19/2024)  ROCKY 7 TOTAL SCORE: 8

## 2024-04-09 RX ORDER — LORAZEPAM 0.5 MG/1
0.5 TABLET ORAL DAILY PRN
Qty: 10 TABLET | Refills: 0 | Status: SHIPPED | OUTPATIENT
Start: 2024-04-09

## 2024-04-12 ENCOUNTER — E-VISIT (OUTPATIENT)
Dept: INTERNAL MEDICINE | Facility: CLINIC | Age: 59
End: 2024-04-12
Payer: MEDICARE

## 2024-04-12 DIAGNOSIS — J06.9 UPPER RESPIRATORY TRACT INFECTION, UNSPECIFIED TYPE: Primary | ICD-10-CM

## 2024-04-12 PROCEDURE — 99207 PR NON-BILLABLE SERV PER CHARTING: CPT | Performed by: INTERNAL MEDICINE

## 2024-04-12 NOTE — PATIENT INSTRUCTIONS
Thank you for choosing us for your care. Based on the information provided, I believe you need to be seen in person.  Please go  Urgent Care or schedule a Clinic  as soon as possible.     You will not be charged for this eVisit.

## 2024-04-13 ENCOUNTER — HOSPITAL ENCOUNTER (EMERGENCY)
Facility: CLINIC | Age: 59
Discharge: HOME | End: 2024-04-13
Attending: EMERGENCY MEDICINE | Admitting: EMERGENCY MEDICINE
Payer: MEDICARE

## 2024-04-13 VITALS
WEIGHT: 135 LBS | RESPIRATION RATE: 18 BRPM | SYSTOLIC BLOOD PRESSURE: 127 MMHG | HEART RATE: 104 BPM | TEMPERATURE: 99.4 F | OXYGEN SATURATION: 96 % | DIASTOLIC BLOOD PRESSURE: 78 MMHG | BODY MASS INDEX: 23.91 KG/M2

## 2024-04-13 DIAGNOSIS — J20.9 ACUTE BRONCHITIS, UNSPECIFIED ORGANISM: ICD-10-CM

## 2024-04-13 DIAGNOSIS — J96.21 ACUTE ON CHRONIC RESPIRATORY FAILURE WITH HYPOXEMIA (H): ICD-10-CM

## 2024-04-13 LAB
FLUAV RNA SPEC QL NAA+PROBE: NEGATIVE
FLUBV RNA RESP QL NAA+PROBE: NEGATIVE
RSV RNA SPEC NAA+PROBE: NEGATIVE
SARS-COV-2 RNA RESP QL NAA+PROBE: NEGATIVE

## 2024-04-13 PROCEDURE — 99284 EMERGENCY DEPT VISIT MOD MDM: CPT | Performed by: EMERGENCY MEDICINE

## 2024-04-13 PROCEDURE — 87637 SARSCOV2&INF A&B&RSV AMP PRB: CPT | Performed by: EMERGENCY MEDICINE

## 2024-04-13 PROCEDURE — 99283 EMERGENCY DEPT VISIT LOW MDM: CPT | Performed by: EMERGENCY MEDICINE

## 2024-04-13 RX ORDER — LEVOFLOXACIN 500 MG/1
500 TABLET, FILM COATED ORAL DAILY
Qty: 5 TABLET | Refills: 0 | Status: SHIPPED | OUTPATIENT
Start: 2024-04-13 | End: 2024-04-18

## 2024-04-13 ASSESSMENT — COLUMBIA-SUICIDE SEVERITY RATING SCALE - C-SSRS
1. IN THE PAST MONTH, HAVE YOU WISHED YOU WERE DEAD OR WISHED YOU COULD GO TO SLEEP AND NOT WAKE UP?: NO
2. HAVE YOU ACTUALLY HAD ANY THOUGHTS OF KILLING YOURSELF IN THE PAST MONTH?: NO
6. HAVE YOU EVER DONE ANYTHING, STARTED TO DO ANYTHING, OR PREPARED TO DO ANYTHING TO END YOUR LIFE?: NO

## 2024-04-13 ASSESSMENT — ACTIVITIES OF DAILY LIVING (ADL)
ADLS_ACUITY_SCORE: 40
ADLS_ACUITY_SCORE: 40

## 2024-04-13 NOTE — ED TRIAGE NOTES
C/O cough since yesterday, clear white phlegm, thick green nasal drainage, chest hurts when coughs     Triage Assessment (Adult)       Row Name 04/13/24 0945          Triage Assessment    Airway WDL WDL        Respiratory WDL    Respiratory WDL cough;rhythm/pattern     Rhythm/Pattern, Respiratory shortness of breath        Peripheral/Neurovascular WDL    Peripheral Neurovascular WDL WDL        Cognitive/Neuro/Behavioral WDL    Cognitive/Neuro/Behavioral WDL WDL
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
## 2024-04-13 NOTE — ED NOTES
Pt states her cough started yesterday where she has been coughing frequently and getting yellow-green mucus out through her nose. Pt says the cough has caused her to have some chest discomfort as well. Pt tested for covid at home and it was negative and states to have had covid x2 previously where she was placed on a ventilator.

## 2024-04-18 ENCOUNTER — VIRTUAL VISIT (OUTPATIENT)
Dept: PSYCHIATRY | Facility: CLINIC | Age: 59
End: 2024-04-18
Attending: STUDENT IN AN ORGANIZED HEALTH CARE EDUCATION/TRAINING PROGRAM
Payer: MEDICARE

## 2024-04-18 DIAGNOSIS — N18.2 HYPERTENSIVE HEART AND CHRONIC KIDNEY DISEASE STAGE 2: ICD-10-CM

## 2024-04-18 DIAGNOSIS — F41.9 ANXIETY DISORDER, UNSPECIFIED TYPE: ICD-10-CM

## 2024-04-18 DIAGNOSIS — F32.2 CURRENT SEVERE EPISODE OF MAJOR DEPRESSIVE DISORDER WITHOUT PSYCHOTIC FEATURES, UNSPECIFIED WHETHER RECURRENT (H): ICD-10-CM

## 2024-04-18 DIAGNOSIS — I10 ESSENTIAL HYPERTENSION: ICD-10-CM

## 2024-04-18 DIAGNOSIS — I13.10 HYPERTENSIVE HEART AND CHRONIC KIDNEY DISEASE STAGE 2: ICD-10-CM

## 2024-04-18 DIAGNOSIS — F51.05 INSOMNIA DUE TO OTHER MENTAL DISORDER: Primary | ICD-10-CM

## 2024-04-18 DIAGNOSIS — F99 INSOMNIA DUE TO OTHER MENTAL DISORDER: Primary | ICD-10-CM

## 2024-04-18 DIAGNOSIS — G47.19 EXCESSIVE DAYTIME SLEEPINESS: ICD-10-CM

## 2024-04-18 DIAGNOSIS — F33.1 MAJOR DEPRESSIVE DISORDER, RECURRENT, MODERATE (H): ICD-10-CM

## 2024-04-18 PROCEDURE — 99214 OFFICE O/P EST MOD 30 MIN: CPT | Mod: 95

## 2024-04-18 RX ORDER — ARIPIPRAZOLE 5 MG/1
5 TABLET ORAL DAILY
Qty: 30 TABLET | Refills: 2 | Status: SHIPPED | OUTPATIENT
Start: 2024-04-18 | End: 2024-07-12

## 2024-04-18 RX ORDER — METHYLPHENIDATE HYDROCHLORIDE 20 MG/1
TABLET ORAL
Qty: 90 TABLET | Refills: 0 | Status: CANCELLED | OUTPATIENT
Start: 2024-04-18

## 2024-04-18 RX ORDER — LORAZEPAM 0.5 MG/1
0.5 TABLET ORAL DAILY PRN
Qty: 10 TABLET | Refills: 0 | Status: CANCELLED | OUTPATIENT
Start: 2024-04-18

## 2024-04-18 RX ORDER — DULOXETIN HYDROCHLORIDE 60 MG/1
120 CAPSULE, DELAYED RELEASE ORAL EVERY EVENING
Qty: 60 CAPSULE | Refills: 0 | Status: SHIPPED | OUTPATIENT
Start: 2024-04-18 | End: 2024-05-16

## 2024-04-18 ASSESSMENT — PAIN SCALES - GENERAL: PAINLEVEL: MODERATE PAIN (5)

## 2024-04-18 NOTE — NURSING NOTE
Is the patient currently in the state of [place]? YES    Visit mode:VIDEO    If the visit is dropped, the patient can be reconnected by: VIDEO VISIT: Text to cell phone:   Telephone Information:   Mobile [phone]       Will anyone else be joining the visit? NO  (If patient encounters technical issues they should call [phone]    How would you like to obtain your AVS? MyChart    Are changes needed to the allergy or medication list? No    Are refills needed on medications prescribed by this physician? NO    Reason for visit: RECHECK    Cate ORDOÑEZ
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
## 2024-04-18 NOTE — PATIENT INSTRUCTIONS
**For crisis resources, please see the information at the end of this document**   Patient Education    Thank you for coming to the [facility] [place] [facility].     Lab Testing:  If you had lab testing today and your results are reassuring or normal they will be mailed to you or sent through [facility] within 7 days. If the lab tests need quick action we will call you with the results. The phone number we will call with results is # [phone]. If this is not the best number please call our clinic and change the number.     Medication Refills:  If you need any refills please call your pharmacy and they will contact us. Our fax number for refills is [phone].   Three business days of notice are needed for general medication refill requests.   Five business days of notice are needed for controlled substance refill requests.   If you need to change to a different pharmacy, please contact the new pharmacy directly. The new pharmacy will help you get your medications transferred.     Contact Us:  Please call [phone] during business hours (8-5:00 M-F).   If you have medication related questions after clinic hours, or on the weekend, please call [phone].     Financial Assistance [phone]   Medical Records [phone]       MENTAL HEALTH CRISIS RESOURCES:  For a emergency help, please call 911 or go to the nearest Emergency Department.     Emergency Walk-In Options:   EmPATH Unit @ [facility] ([place]): [phone] - Specialized mental health emergency area designed to be calming  [facility] ([place]): [phone]  [facility] ([place]): [phone]  [facility]): [phone]    [facility] Crisis Information:   [place]: [phone]  [place]: [phone]  [place] ([place]) - Adult: [phone]     Child: [phone]  [place] - Adult: [phone]     Child: [phone]  [place]:  [phone]  List of all [facility] resources:   https://[place].gov/dhs/people-we-serve/adults/health-care/mental-health/resources/crisis-contacts.jsp    National Crisis Information:   Crisis Text Line: Text  [place]  to 193174  Suicide & Crisis Lifeline: 988  National Suicide Prevention Lifeline: 8-698-433-TALK (1-719.833.8419)       For online chat options, visit https://suicidepreventionlifeline.org/chat/  Poison Control Center: 6-463-449-6537  Trans Lifeline: 9-968-530-3506 - Hotline for transgender people of all ages  The [employer]: 3-825-976-6022 - Hotline for LGBT youth     For Non-Emergency Support:   Fast Tracker: Mental Health & Substance Use Disorder Resources -   https://www.[employer].org/

## 2024-04-18 NOTE — PROGRESS NOTES
Virtual Visit Details    Type of service:  Video Visit   Video Start Time:  11:00  Video End Time: 11:30    Originating Location (pt. Location): Home    Distant Location (provider location):  On-site  Platform used for Video Visit: Kourtney

## 2024-04-22 ENCOUNTER — TELEPHONE (OUTPATIENT)
Dept: PSYCHIATRY | Facility: CLINIC | Age: 59
End: 2024-04-22
Payer: MEDICARE

## 2024-04-22 DIAGNOSIS — G47.19 EXCESSIVE DAYTIME SLEEPINESS: ICD-10-CM

## 2024-04-22 DIAGNOSIS — F41.9 ANXIETY DISORDER, UNSPECIFIED TYPE: ICD-10-CM

## 2024-04-22 ASSESSMENT — PATIENT HEALTH QUESTIONNAIRE - PHQ9
SUM OF ALL RESPONSES TO PHQ QUESTIONS 1-9: 2
SUM OF ALL RESPONSES TO PHQ QUESTIONS 1-9: 2
10. IF YOU CHECKED OFF ANY PROBLEMS, HOW DIFFICULT HAVE THESE PROBLEMS MADE IT FOR YOU TO DO YOUR WORK, TAKE CARE OF THINGS AT HOME, OR GET ALONG WITH OTHER PEOPLE: SOMEWHAT DIFFICULT

## 2024-04-22 NOTE — TELEPHONE ENCOUNTER
[facility] Call Center    Phone Message    May a detailed message be left on voicemail: yes     Reason for Call: Medication Refill Request    Has the patient contacted the pharmacy for the refill? Yes   Name of medication being requested: Ativan 0.5 mg, Ritalin 20 mg  Provider who prescribed the medication: Dr. Wang  Pharmacy: [facility] [id number] [address]   Date medication is needed: ASAP she is out    Action Taken: Other: nurse pool    Travel Screening: Not Applicable

## 2024-04-23 ENCOUNTER — OFFICE VISIT (OUTPATIENT)
Dept: INTERNAL MEDICINE | Facility: CLINIC | Age: 59
End: 2024-04-23
Payer: MEDICARE

## 2024-04-23 ENCOUNTER — LAB (OUTPATIENT)
Dept: LAB | Facility: CLINIC | Age: 59
End: 2024-04-23
Payer: MEDICARE

## 2024-04-23 ENCOUNTER — VIRTUAL VISIT (OUTPATIENT)
Dept: PSYCHOLOGY | Facility: CLINIC | Age: 59
End: 2024-04-23
Payer: MEDICARE

## 2024-04-23 ENCOUNTER — ANCILLARY PROCEDURE (OUTPATIENT)
Dept: GENERAL RADIOLOGY | Facility: CLINIC | Age: 59
End: 2024-04-23
Attending: INTERNAL MEDICINE
Payer: MEDICARE

## 2024-04-23 VITALS
DIASTOLIC BLOOD PRESSURE: 87 MMHG | OXYGEN SATURATION: 95 % | BODY MASS INDEX: 24.91 KG/M2 | HEART RATE: 90 BPM | SYSTOLIC BLOOD PRESSURE: 137 MMHG | WEIGHT: 140.6 LBS

## 2024-04-23 DIAGNOSIS — R06.00 DYSPNEA, UNSPECIFIED TYPE: ICD-10-CM

## 2024-04-23 DIAGNOSIS — R05.1 ACUTE COUGH: ICD-10-CM

## 2024-04-23 DIAGNOSIS — F33.0 MDD (MAJOR DEPRESSIVE DISORDER), RECURRENT EPISODE, MILD (H): Primary | ICD-10-CM

## 2024-04-23 DIAGNOSIS — R05.1 ACUTE COUGH: Primary | ICD-10-CM

## 2024-04-23 DIAGNOSIS — Z23 NEED FOR PROPHYLACTIC VACCINATION AGAINST HEPATITIS B VIRUS: ICD-10-CM

## 2024-04-23 LAB
ANION GAP SERPL CALCULATED.3IONS-SCNC: 9 MMOL/L (ref 7–15)
BASOPHILS # BLD AUTO: 0 10E3/UL (ref 0–0.2)
BASOPHILS NFR BLD AUTO: 0 %
BUN SERPL-MCNC: 18.3 MG/DL (ref 6–20)
CALCIUM SERPL-MCNC: 10 MG/DL (ref 8.6–10)
CHLORIDE SERPL-SCNC: 101 MMOL/L (ref 98–107)
CREAT SERPL-MCNC: 0.87 MG/DL (ref 0.51–0.95)
CRP SERPL-MCNC: <3 MG/L
DEPRECATED HCO3 PLAS-SCNC: 29 MMOL/L (ref 22–29)
EGFRCR SERPLBLD CKD-EPI 2021: 77 ML/MIN/1.73M2
EOSINOPHIL # BLD AUTO: 0 10E3/UL (ref 0–0.7)
EOSINOPHIL NFR BLD AUTO: 0 %
ERYTHROCYTE [DISTWIDTH] IN BLOOD BY AUTOMATED COUNT: 13.8 % (ref 10–15)
ERYTHROCYTE [SEDIMENTATION RATE] IN BLOOD BY WESTERGREN METHOD: 26 MM/HR (ref 0–30)
GLUCOSE SERPL-MCNC: 98 MG/DL (ref 70–99)
HCT VFR BLD AUTO: 43.6 % (ref 35–47)
HGB BLD-MCNC: 14.5 G/DL (ref 11.7–15.7)
IMM GRANULOCYTES # BLD: 0.1 10E3/UL
IMM GRANULOCYTES NFR BLD: 1 %
LYMPHOCYTES # BLD AUTO: 1 10E3/UL (ref 0.8–5.3)
LYMPHOCYTES NFR BLD AUTO: 7 %
MCH RBC QN AUTO: 31 PG (ref 26.5–33)
MCHC RBC AUTO-ENTMCNC: 33.3 G/DL (ref 31.5–36.5)
MCV RBC AUTO: 93 FL (ref 78–100)
MONOCYTES # BLD AUTO: 0.4 10E3/UL (ref 0–1.3)
MONOCYTES NFR BLD AUTO: 3 %
NEUTROPHILS # BLD AUTO: 13 10E3/UL (ref 1.6–8.3)
NEUTROPHILS NFR BLD AUTO: 89 %
NRBC # BLD AUTO: 0 10E3/UL
NRBC BLD AUTO-RTO: 0 /100
NT-PROBNP SERPL-MCNC: 123 PG/ML (ref 0–900)
PLATELET # BLD AUTO: 421 10E3/UL (ref 150–450)
POTASSIUM SERPL-SCNC: 5.4 MMOL/L (ref 3.4–5.3)
RBC # BLD AUTO: 4.67 10E6/UL (ref 3.8–5.2)
SODIUM SERPL-SCNC: 139 MMOL/L (ref 135–145)
WBC # BLD AUTO: 14.6 10E3/UL (ref 4–11)

## 2024-04-23 PROCEDURE — 90834 PSYTX W PT 45 MINUTES: CPT | Mod: 95 | Performed by: SOCIAL WORKER

## 2024-04-23 PROCEDURE — 99214 OFFICE O/P EST MOD 30 MIN: CPT | Performed by: INTERNAL MEDICINE

## 2024-04-23 PROCEDURE — 83880 ASSAY OF NATRIURETIC PEPTIDE: CPT | Performed by: PATHOLOGY

## 2024-04-23 PROCEDURE — 85025 COMPLETE CBC W/AUTO DIFF WBC: CPT | Performed by: PATHOLOGY

## 2024-04-23 PROCEDURE — 85652 RBC SED RATE AUTOMATED: CPT | Performed by: PATHOLOGY

## 2024-04-23 PROCEDURE — 71046 X-RAY EXAM CHEST 2 VIEWS: CPT | Performed by: RADIOLOGY

## 2024-04-23 PROCEDURE — 86140 C-REACTIVE PROTEIN: CPT | Performed by: PATHOLOGY

## 2024-04-23 PROCEDURE — 36415 COLL VENOUS BLD VENIPUNCTURE: CPT | Performed by: PATHOLOGY

## 2024-04-23 PROCEDURE — 80048 BASIC METABOLIC PNL TOTAL CA: CPT | Performed by: PATHOLOGY

## 2024-04-23 RX ORDER — MONTELUKAST SODIUM 10 MG/1
10 TABLET ORAL AT BEDTIME
Qty: 30 TABLET | Refills: 3 | Status: SHIPPED | OUTPATIENT
Start: 2024-04-23 | End: 2024-08-16

## 2024-04-23 NOTE — PROGRESS NOTES
[facility]                                     Progress Note    Patient Name: Sherly Yeung  Date: 4/23/2024         Service Type: Individual      Session Start Time: 8:02 Session End Time: 8:54    Session Length: 52    Session #: 21    Attendees: Client    Service Modality:  Video Visit:      Provider verified identity through the following two step process.  Patient provided:  Patient is known previously to provider    Telemedicine Visit: The patient's condition can be safely assessed and treated via synchronous audio and visual telemedicine encounter.      Reason for Telemedicine Visit: Services only offered telehealth    Originating Site (Patient Location): Patient's home    Distant Site (Provider Location): Provider Remote Setting- Home Office    Consent:  The patient/guardian has verbally consented to: the potential risks and benefits of telemedicine (video visit) versus in person care; bill my insurance or make self-payment for services provided; and responsibility for payment of non-covered services.     Patient would like the video invitation sent by:  My Chart    Mode of Communication:  Video Conference via Amwell    Distant Location (Provider):  Off-site    As the provider I attest to compliance with applicable laws and regulations related to telemedicine.    DATA  Interactive Complexity: No     Crisis: No      Progress Since Last Session (Related to Symptoms / Goals / Homework):   Symptoms: Improving less depressed mood, less anxious    Homework: Partially completed      Episode of Care Goals: Satisfactory progress - ACTION (Actively working towards change); Intervened by reinforcing change plan / affirming steps taken       Current / Ongoing Stressors and Concerns: Patient has been doing pretty good. Reports feeling less depressed and less anxious.  She shared her medications are working very well.  She has dicussed with her psychiatrist about not changing current medications.   Reports she is active and doing some household activities. Has been having a good communication with . She also has been working on helping her niece with some issues related to her mental health issues. Patient plans to resume her over due projects around the house. No safety concerns. Her next visit is on 5/07/2024. Patient's next visit is in 2 weeks.         Treatment Objective(s) Addressed in This Session:         Intervention:    CBT : Challenging any ANTs as noted.     MI Intervention: Expressed Empathy/Understanding, Supported Autonomy, Collaboration, Evocation, Permission to raise concern or advise, and Open-ended questions     Change Talk Expressed by the Patient: Taking steps    Provider Response to Change Talk: E - Evoked more info from patient about behavior change, A - Affirmed patient's thoughts, decisions, or attempts at behavior change, R - Reflected patient's change talk, and S - Summarized patient's change talk statements     Assessments completed prior to visit:2/23/2023    The following assessments were completed by patient for this visit:  PHQ2:       4/18/2024    10:22 AM 2/23/2024     3:02 PM 1/12/2024     2:03 PM 12/27/2023     8:39 AM 4/27/2022    10:07 AM 8/11/2020     9:37 AM 3/24/2020     3:46 PM   PHQ-2 ( 1999 [employer])   Q1: Little interest or pleasure in doing things 0 2 0 0 0 0 0   Q2: Feeling down, depressed or hopeless 0 3 1 1 0 0 0   PHQ-2 Score 0 5 1 1 0 0 0   PHQ-2 Total Score (12-17 Years)- Positive if 3 or more points; Administer PHQ-A if positive      0 0     PHQ9:       1/10/2024     8:02 AM 1/25/2024     9:59 AM 2/21/2024     1:54 PM 2/23/2024     3:02 PM 3/25/2024     4:50 PM 3/29/2024     2:24 PM 4/22/2024    11:03 AM   PHQ-9 SCORE   PHQ-9 Total Score MyChart 5 (Mild depression) 8 (Mild depression) 16 (Moderately severe depression)  18 (Moderately severe depression) 16 (Moderately severe depression) 2 (Minimal depression)   PHQ-9 Total Score 5 8 16 18 18 16 2      GAD2:       1/7/2024    10:08 AM 1/16/2024    12:29 PM 1/22/2024    10:13 AM 2/19/2024     1:36 PM 3/25/2024     4:52 PM 4/2/2024    10:49 AM 4/20/2024    10:35 AM   ROCKY-2   Feeling nervous, anxious, or on edge 2 2 2 3 3 3 0   Not being able to stop or control worrying 1 1 1 1 2 2 0   ROCKY-2 Total Score 3 3 3 4 5 5    5 0     GAD7:       9/18/2023    12:06 PM 10/10/2023     1:58 PM 12/13/2023     1:54 PM 1/7/2024    10:08 AM 1/22/2024    10:13 AM 2/19/2024     1:36 PM 3/25/2024     4:52 PM   ROCKY-7 SCORE   Total Score  5 (mild anxiety)  5 (mild anxiety) 6 (mild anxiety) 8 (mild anxiety) 10 (moderate anxiety)   Total Score 5 5 3 5 6 8 10     CAGE-AID:       2/9/2023    11:52 AM 7/17/2023     9:10 AM   CAGE-AID Total Score   Total Score 0 0   Total Score MyChart 0 (A total score of 2 or greater is considered clinically significant)      PROMIS 10-Global Health (all questions and answers displayed):       11/28/2023    11:30 AM 12/13/2023    11:29 AM 12/26/2023    11:45 AM 1/7/2024    10:11 AM 2/19/2024     1:38 PM 4/2/2024    10:50 AM 4/20/2024    10:37 AM   PROMIS 10   In general, would you say your health is: Good Good Good Good Good Good Good   In general, would you say your quality of life is: Very good Good Good Good Good Fair Very good   In general, how would you rate your physical health? Good Good Good Good Good Good Fair   In general, how would you rate your mental health, including your mood and your ability to think? Good Good Good Fair Fair Poor Very good   In general, how would you rate your satisfaction with your social activities and relationships? Good Good Very good Good Fair Fair Very good   In general, please rate how well you carry out your usual social activities and roles Good Fair Good Good Fair Poor Good   To what extent are you able to carry out your everyday physical activities such as walking, climbing stairs, carrying groceries, or moving a chair? Moderately A little Moderately Mostly  Moderately Mostly Moderately   In the past 7 days, how often have you been bothered by emotional problems such as feeling anxious, depressed, or irritable? Often Often Often Often Always Always Rarely   In the past 7 days, how would you rate your fatigue on average? Moderate Moderate Moderate Moderate Moderate Moderate Moderate   In the past 7 days, how would you rate your pain on average, where 0 means no pain, and 10 means worst imaginable pain? 5 6 6 5 5 5 5   In general, would you say your health is: 3 3 3 3 3 3 3   In general, would you say your quality of life is: 4 3 3 3 3 2 4   In general, how would you rate your physical health? 3 3 3 3 3 3 2   In general, how would you rate your mental health, including your mood and your ability to think? 3 3 3 2 2 1 4   In general, how would you rate your satisfaction with your social activities and relationships? 3 3 4 3 2 2 4   In general, please rate how well you carry out your usual social activities and roles. (This includes activities at home, at work and in your community, and responsibilities as a parent, child, spouse, employee, friend, etc.) 3 2 3 3 2 1 3   To what extent are you able to carry out your everyday physical activities such as walking, climbing stairs, carrying groceries, or moving a chair? 3 2 3 4 3 4 3   In the past 7 days, how often have you been bothered by emotional problems such as feeling anxious, depressed, or irritable? 4 4 4 4 5 5 2   In the past 7 days, how would you rate your fatigue on average? 3 3 3 3 3 3 3   In the past 7 days, how would you rate your pain on average, where 0 means no pain, and 10 means worst imaginable pain? 5 6 6 5 5 5 5   Global Mental Health Score 12 11 12 10 8 6    6 16   Global Physical Health Score 12 11 12 13 12 13    13 11   PROMIS TOTAL - SUBSCORES 24 22 24 23 20 19    19 27     PROMIS 10-Global Health (only subscores and total score):       11/28/2023    11:30 AM 12/13/2023    11:29 AM 12/26/2023    11:45  AM 1/7/2024    10:11 AM 2/19/2024     1:38 PM 4/2/2024    10:50 AM 4/20/2024    10:37 AM   PROMIS-10 Scores Only   Global Mental Health Score 12 11 12 10 8 6    6 16   Global Physical Health Score 12 11 12 13 12 13    13 11   PROMIS TOTAL - SUBSCORES 24 22 24 23 20 19    19 27     [place] Suicide Severity Rating Scale (Short Version)      2/10/2023     9:55 AM 3/20/2023     3:28 PM 7/17/2023     9:09 AM 9/13/2023     6:03 AM 12/6/2023     1:21 AM 2/16/2024    12:38 PM 4/13/2024     9:13 AM   [place] Suicide Severity Rating (Short Version)   Over the past 2 weeks have you felt down, depressed, or hopeless?  no  yes no no    Over the past 2 weeks have you had thoughts of killing yourself?  no  no no no    Have you ever attempted to kill yourself?  no  no no no    Q1 Wished to be Dead (Past Month)       0-->no   Q2 Suicidal Thoughts (Past Month)       0-->no   Q6 Suicide Behavior (Lifetime)       0-->no   Level of Risk per Screen       no risks indicated   1. Wish to be Dead (Since Last Contact) N  N       2. Non-Specific Active Suicidal Thoughts (Since Last Contact) N  N       Actual Attempt (Since Last Contact) N  N       Has subject engaged in non-suicidal self-injurious behavior? (Since Last Contact) N  N       Interrupted Attempts (Since Last Contact) N  N       Aborted or Self-Interrupted Attempt (Since Last Contact) N  N       Preparatory Acts or Behavior (Since Last Contact) N  N       Suicide (Since Last Contact) N  N       Calculated C-SSRS Risk Score (Since Last Contact) No Risk Indicated  No Risk Indicated             ASSESSMENT: Current Emotional / Mental Status (status of significant symptoms):   Risk status (Self / Other harm or suicidal ideation)   Patient denies current fears or concerns for personal safety.   Patient denies current or recent suicidal ideation or behaviors.   Patient denies current or recent homicidal ideation or behaviors.   Patient denies current or recent self injurious behavior  or ideation.   Patient denies other safety concerns.   Patient reports there has been no change in risk factors since their last session.     Patient reports there has been no change in protective factors since their last session.     Recommended that patient call 911 or go to the local ED should there be a change in any of these risk factors.   Would call [phone] if any SI.     Appearance:   Appropriate    Eye Contact:   Good    Psychomotor Behavior: Normal    Attitude:   Cooperative    Orientation:   Person Place Time Situation   Speech    Rate / Production: Normal/ Responsive    Volume:  Normal    Mood:    Depressed    Affect:    Appropriate    Thought Content:  Clear    Thought Form:  Coherent  Logical    Insight:    Good      Medication Review:   No changes to current psychiatric medication(s)     Medication Compliance:   Yes     Changes in Health Issues:   None reported     Chemical Use Review:   Substance Use: Chemical use reviewed, no active concerns identified      Tobacco Use: No current tobacco use.      Diagnosis:  1. MDD (major depressive disorder), recurrent episode, mild (H24)      Collateral Reports Completed:   Not Applicable    PLAN: (Patient Tasks / Therapist Tasks / Other):  Patient will organize her closet and her kitchen this week.   Patient's next visit is in 2 weeks.   GERMAN Farmer           ______________________________________________    Individual Treatment Plan    Patient's Name: Sherly Yeung  YOB: 1965    Date of Creation: 2/23/2023    Date Treatment Plan Last Reviewed/Revised: 4/23/2024    DSM5 Diagnoses: 296.32 (F33.1) Major Depressive Disorder, Recurrent Episode, Moderate With anxious distress or 300.02 (F41.1) Generalized Anxiety Disorder  Grief reaction [F43.21]    Psychosocial / Contextual Factors: grief: father recently passed away. Family dynamic- relationship with son, mom. Feeling overwhelmed.    PROMIS (reviewed every 90 days): 25    Referral /  "Collaboration:    Referral to another professional/service is not indicated at this time..    Anticipated number of session for this episode of care: 9-12 sessions  Anticipation frequency of session: Biweekly  Anticipated Duration of each session: 38-52 minutes  Treatment plan will be reviewed in 90 days or when goals have been changed.     MeasurableTreatment Goal(s) related to diagnosis / functional impairment(s)    Goal 1: Patient will Accept the loss of beloved one and return to stable level of functioning as evidenced by a higher level of functioning as a result of acceptance.   Redevelop a supportive social system and improve interpersonal relationships and Express unresolved emotions regarding loss which brings anxiety and depression mood.      I will know I've met my goal when I have more energy and  I am able to celebrate good life with my father Vs the sadness of losing him.Redevelop a supportive social system and improve interpersonal relationships\"    Objective #A (Patient Action)    Patient will Identify negative self-talk and behaviors: challenge core beliefs, myths, and actions.  Status: Continued - Date(s): 4/23/2024    Intervention(s)  Therapist will teach emotional recognition/identification.      Objective #B  Patient will identify at least 3 fears / thoughts that contribute to feeling anxious.  Status: Continued - Date(s): 4/23/2024     Intervention(s)  Therapist will teach thought challenging with CBT .    Objective #C  Patient will Identify negative self-talk and behaviors: challenge core beliefs, myths, and actions.  Status: Continued - Date(s): 4/23/2024    Intervention(s)  Therapist will provide space and time to process thoughts and feelings around current stressors. provide support and coping skills to help move on in life .    Patient has reviewed and agreed to the above plan.    GERMAN Farmer  4/23/2024    Answers for HPI/ROS submitted by the patient on 4/20/2023  If you " checked off any problems, how difficult have these problems made it for you to do your work, take care of things at home, or get along with other people?: Somewhat difficult  PHQ9 TOTAL SCORE: 5    Answers for HPI/ROS submitted by the patient on 4/26/2023  If you checked off any problems, how difficult have these problems made it for you to do your work, take care of things at home, or get along with other people?: Somewhat difficult  PHQ9 TOTAL SCORE: 3    Answers for HPI/ROS submitted by the patient on 5/4/2023  If you checked off any problems, how difficult have these problems made it for you to do your work, take care of things at home, or get along with other people?: Somewhat difficult  PHQ9 TOTAL SCORE: 4  Answers for HPI/ROS submitted by the patient on 7/17/2023  If you checked off any problems, how difficult have these problems made it for you to do your work, take care of things at home, or get along with other people?: Somewhat difficult  PHQ9 TOTAL SCORE: 5    Answers submitted by the patient for this visit:  Patient Health Questionnaire (Submitted on 7/30/2023)  If you checked off any problems, how difficult have these problems made it for you to do your work, take care of things at home, or get along with other people?: Somewhat difficult  PHQ9 TOTAL SCORE: 5  Answers submitted by the patient for this visit:  Patient Health Questionnaire (Submitted on 8/20/2023)  If you checked off any problems, how difficult have these problems made it for you to do your work, take care of things at home, or get along with other people?: Somewhat difficult  PHQ9 TOTAL SCORE: 5  Answers submitted by the patient for this visit:  Patient Health Questionnaire (Submitted on 9/18/2023)  If you checked off any problems, how difficult have these problems made it for you to do your work, take care of things at home, or get along with other people?: Somewhat difficult  PHQ9 TOTAL SCORE: 4  Answers submitted by the patient for  this visit:  Patient Health Questionnaire (Submitted on 10/2/2023)  If you checked off any problems, how difficult have these problems made it for you to do your work, take care of things at home, or get along with other people?: Somewhat difficult  PHQ9 TOTAL SCORE: 5    Answers submitted by the patient for this visit:  Patient Health Questionnaire (Submitted on 10/15/2023)  If you checked off any problems, how difficult have these problems made it for you to do your work, take care of things at home, or get along with other people?: Somewhat difficult  PHQ9 TOTAL SCORE: 6  ROCKY-7 (Submitted on 10/10/2023)  ROCKY 7 TOTAL SCORE: 5    Answers submitted by the patient for this visit:  Patient Health Questionnaire (Submitted on 11/16/2023)  If you checked off any problems, how difficult have these problems made it for you to do your work, take care of things at home, or get along with other people?: Somewhat difficult  PHQ9 TOTAL SCORE: 4    Answers submitted by the patient for this visit:  Patient Health Questionnaire (Submitted on 11/28/2023)  If you checked off any problems, how difficult have these problems made it for you to do your work, take care of things at home, or get along with other people?: Somewhat difficult  PHQ9 TOTAL SCORE: 5    Answers submitted by the patient for this visit:  Patient Health Questionnaire (Submitted on 12/13/2023)  If you checked off any problems, how difficult have these problems made it for you to do your work, take care of things at home, or get along with other people?: Somewhat difficult  PHQ9 TOTAL SCORE: 5    Answers submitted by the patient for this visit:  Patient Health Questionnaire (Submitted on 12/26/2023)  If you checked off any problems, how difficult have these problems made it for you to do your work, take care of things at home, or get along with other people?: Somewhat difficult  PHQ9 TOTAL SCORE: 5    Answers submitted by the patient for this visit:  Patient Health  Questionnaire (Submitted on 1/10/2024)  If you checked off any problems, how difficult have these problems made it for you to do your work, take care of things at home, or get along with other people?: Somewhat difficult  PHQ9 TOTAL SCORE: 5  ROCKY-7 (Submitted on 1/7/2024)  ROCKY 7 TOTAL SCORE: 5    Answers submitted by the patient for this visit:  Patient Health Questionnaire (Submitted on 1/25/2024)  If you checked off any problems, how difficult have these problems made it for you to do your work, take care of things at home, or get along with other people?: Very difficult  PHQ9 TOTAL SCORE: 8  ROCKY-7 (Submitted on 1/22/2024)  ROCKY 7 TOTAL SCORE: 6    Answers submitted by the patient for this visit:  Patient Health Questionnaire (Submitted on 2/21/2024)  If you checked off any problems, how difficult have these problems made it for you to do your work, take care of things at home, or get along with other people?: Very difficult  PHQ9 TOTAL SCORE: 16  ROCKY-7 (Submitted on 2/19/2024)  ROCKY 7 TOTAL SCORE: 8    Answers submitted by the patient for this visit:  Patient Health Questionnaire (Submitted on 4/22/2024)  If you checked off any problems, how difficult have these problems made it for you to do your work, take care of things at home, or get along with other people?: Somewhat difficult  PHQ9 TOTAL SCORE: 2
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
## 2024-04-23 NOTE — PROGRESS NOTES
Marcia is a 58 year old that presents in clinic today for the following:     Chief Complaint   Patient presents with    Hospital F/U     Bronchitis           4/23/2024    10:51 AM   Additional Questions   Roomed by KTR     Screenings from encounters over the past 10 days    No data recorded       French Castañeda EMT at 10:55 AM on 4/23/2024    Answers submitted by the patient for this visit:  General Questionnaire (Submitted on 4/20/2024)  Chief Complaint: Chronic problems general questions HPI Form  What is the reason for your visit today? : Follow up for ER visit for bronchitis/pleurisy  How many servings of fruits and vegetables do you eat daily?: 2-3  On average, how many sweetened beverages do you drink each day (Examples: soda, juice, sweet tea, etc.  Do NOT count diet or artificially sweetened beverages)?: 2  How many minutes a day do you exercise enough to make your heart beat faster?: 20 to 29  How many days a week do you exercise enough to make your heart beat faster?: 4  How many days per week do you miss taking your medication?: 0

## 2024-04-24 ENCOUNTER — HOSPITAL ENCOUNTER (OUTPATIENT)
Dept: CT IMAGING | Facility: CLINIC | Age: 59
Discharge: HOME | End: 2024-04-24
Attending: STUDENT IN AN ORGANIZED HEALTH CARE EDUCATION/TRAINING PROGRAM | Admitting: STUDENT IN AN ORGANIZED HEALTH CARE EDUCATION/TRAINING PROGRAM
Payer: MEDICARE

## 2024-04-24 DIAGNOSIS — R31.29 MICROSCOPIC HEMATURIA: ICD-10-CM

## 2024-04-24 PROCEDURE — 74178 CT ABD&PLV WO CNTR FLWD CNTR: CPT | Mod: MG

## 2024-04-24 PROCEDURE — 250N000009 HC RX 250: Performed by: STUDENT IN AN ORGANIZED HEALTH CARE EDUCATION/TRAINING PROGRAM

## 2024-04-24 PROCEDURE — 250N000011 HC RX IP 250 OP 636: Performed by: STUDENT IN AN ORGANIZED HEALTH CARE EDUCATION/TRAINING PROGRAM

## 2024-04-24 RX ORDER — LORAZEPAM 0.5 MG/1
0.5 TABLET ORAL DAILY PRN
Qty: 10 TABLET | Refills: 0 | Status: SHIPPED | OUTPATIENT
Start: 2024-04-24 | End: 2024-08-01

## 2024-04-24 RX ORDER — METHYLPHENIDATE HYDROCHLORIDE 20 MG/1
TABLET ORAL
Qty: 90 TABLET | Refills: 0 | Status: SHIPPED | OUTPATIENT
Start: 2024-04-24 | End: 2024-06-03

## 2024-04-24 RX ORDER — IOPAMIDOL 755 MG/ML
69 INJECTION, SOLUTION INTRAVASCULAR ONCE
Status: COMPLETED | OUTPATIENT
Start: 2024-04-24 | End: 2024-04-24

## 2024-04-24 RX ADMIN — IOPAMIDOL 69 ML: 755 INJECTION, SOLUTION INTRAVENOUS at 09:49

## 2024-04-24 RX ADMIN — SODIUM CHLORIDE 100 ML: 9 INJECTION, SOLUTION INTRAVENOUS at 09:49

## 2024-04-25 ENCOUNTER — TELEPHONE (OUTPATIENT)
Dept: INTERNAL MEDICINE | Facility: CLINIC | Age: 59
End: 2024-04-25

## 2024-04-25 DIAGNOSIS — I13.10 HYPERTENSIVE HEART AND CHRONIC KIDNEY DISEASE STAGE 2: ICD-10-CM

## 2024-04-25 DIAGNOSIS — N18.2 HYPERTENSIVE HEART AND CHRONIC KIDNEY DISEASE STAGE 2: ICD-10-CM

## 2024-04-25 DIAGNOSIS — I10 ESSENTIAL HYPERTENSION: ICD-10-CM

## 2024-04-25 RX ORDER — METOPROLOL SUCCINATE 50 MG/1
50 TABLET, EXTENDED RELEASE ORAL DAILY
Qty: 90 TABLET | Refills: 3 | Status: SHIPPED | OUTPATIENT
Start: 2024-04-25 | End: 2024-08-04

## 2024-04-25 RX ORDER — METOPROLOL SUCCINATE 50 MG/1
50 TABLET, EXTENDED RELEASE ORAL DAILY
Qty: 90 TABLET | Refills: 3 | Status: SHIPPED | OUTPATIENT
Start: 2024-04-25

## 2024-04-25 NOTE — TELEPHONE ENCOUNTER
metoprolol succinate ER (TOPROL XL) 50 MG 24 hr tablet 90 tablet 3 5/10/2023 -- No   Sig - Route: Take 1 tablet (50 mg) by mouth daily - Oral     ----------------------  Last Office Visit : 4/23/2024  [facility]     Future Office visit:  0  ----------------------    Refill decision: #90 + 3 refills      BP Readings from Last 3 Encounters:   04/23/24 137/87   04/13/24 127/78   03/21/24 130/88       Pass/Fail Protocol Criteria:  Beta-Blockers Protocol [id number]/25/2024 02:23 PM   Protocol Details Blood pressure under 140/90 in past 12 months    Patient is age 6 or older    Medication is active on med list    Medication indicated for associated diagnosis    Recent (12 mo) or future (90 days) visit within the authorizing provider's specialty

## 2024-04-25 NOTE — TELEPHONE ENCOUNTER
Patient retuning missed call from Maribel KNIGHT please call her back at [phone], writer let patient know about below RN message. Thank you

## 2024-04-25 NOTE — TELEPHONE ENCOUNTER
Left a message for patient asking for more details regarding patients back pain.  Asking if this pain is new or if she did anything to injure her back.  Patient just saw Dr. Mg on 4/23/24 but there is no mention of back pain at this visit.  Advised patient to go to urgent care if her pain continues.     Maribel Arambula RN on 4/25/2024 at 4:02 PM

## 2024-04-25 NOTE — TELEPHONE ENCOUNTER
[facility] Call Center    Phone Message    May a detailed message be left on voicemail: yes     Reason for Call: Other: Patient is having a terrible upper right back pain and is wondering what she can take please advise she stated she is limited on medications.     Action Taken: Message routed to:  [facility]    Travel Screening: Not Applicable

## 2024-04-26 ENCOUNTER — TELEPHONE (OUTPATIENT)
Dept: SLEEP MEDICINE | Facility: CLINIC | Age: 59
End: 2024-04-26
Payer: MEDICARE

## 2024-04-26 DIAGNOSIS — G47.34 NOCTURNAL OXYGEN DESATURATION: Primary | Chronic | ICD-10-CM

## 2024-04-26 NOTE — TELEPHONE ENCOUNTER
I called the pt. Upper pain is coming from her sever cough.  She already sent the Eligible message regarding this matter. I will respond to her Eligible message.

## 2024-04-26 NOTE — TELEPHONE ENCOUNTER
I called again to see if pt could come and  the acti watch again and I got a busy tone.  I called a couple of time making sure I had the right number but yes the call did not go thru.     ROCKY Kaba, Clinical Specialist - Sujatha Baum

## 2024-05-06 ASSESSMENT — PATIENT HEALTH QUESTIONNAIRE - PHQ9
SUM OF ALL RESPONSES TO PHQ QUESTIONS 1-9: 1
10. IF YOU CHECKED OFF ANY PROBLEMS, HOW DIFFICULT HAVE THESE PROBLEMS MADE IT FOR YOU TO DO YOUR WORK, TAKE CARE OF THINGS AT HOME, OR GET ALONG WITH OTHER PEOPLE: SOMEWHAT DIFFICULT
SUM OF ALL RESPONSES TO PHQ QUESTIONS 1-9: 1

## 2024-05-07 ENCOUNTER — VIRTUAL VISIT (OUTPATIENT)
Dept: PSYCHOLOGY | Facility: CLINIC | Age: 59
End: 2024-05-07
Payer: MEDICARE

## 2024-05-07 DIAGNOSIS — F33.0 MDD (MAJOR DEPRESSIVE DISORDER), RECURRENT EPISODE, MILD (H): Primary | ICD-10-CM

## 2024-05-07 PROCEDURE — 90834 PSYTX W PT 45 MINUTES: CPT | Mod: 95 | Performed by: SOCIAL WORKER

## 2024-05-07 NOTE — PROGRESS NOTES
[facility]                                     Progress Note    Patient Name: Sherly Yeung  Date: 5/07/2024         Service Type: Individual      Session Start Time: 8:03        Session End Time: 8:55    Session Length: 52    Session #: 22    Attendees: Client    Service Modality:  Video Visit:      Provider verified identity through the following two step process.  Patient provided:  Patient is known previously to provider    Telemedicine Visit: The patient's condition can be safely assessed and treated via synchronous audio and visual telemedicine encounter.      Reason for Telemedicine Visit: Services only offered telehealth    Originating Site (Patient Location): Patient's home    Distant Site (Provider Location): Provider Remote Setting- Home Office    Consent:  The patient/guardian has verbally consented to: the potential risks and benefits of telemedicine (video visit) versus in person care; bill my insurance or make self-payment for services provided; and responsibility for payment of non-covered services.     Patient would like the video invitation sent by:  My Chart    Mode of Communication:  Video Conference via Amwell    Distant Location (Provider):  Off-site    As the provider I attest to compliance with applicable laws and regulations related to telemedicine.    DATA  Interactive Complexity: No     Crisis: No      Progress Since Last Session (Related to Symptoms / Goals / Homework):   Symptoms: Improving less depressed mood, less anxious    Homework: Partially completed      Episode of Care Goals: Satisfactory progress - ACTION (Actively working towards change); Intervened by reinforcing change plan / affirming steps taken       Current / Ongoing Stressors and Concerns: Patient reports having been struggling with family needs. Has a niece in crisis. They called the crisis line. They did not admit in the hospital. Her mom fears for her own safety and the teen. Patient's brother is  flying with the teen to [place] for her care. All this, patient has not been able to work much on her goal which was to work around her closet. Patient notes her medications have been working well for her. She shared how she now has motivation and energy she has not had for many months.  This helps with family needs and herself. Shared she is going to resume her plan for the [employer] organization for the Spring season. No other concerns reported today. Her next visit is in 2 weeks.         Treatment Objective(s) Addressed in This Session:         Intervention:    CBT : reviewed 3 Cs skill and reinforced it    MI Intervention: Expressed Empathy/Understanding, Supported Autonomy, Collaboration, Evocation, Permission to raise concern or advise, and Open-ended questions     Change Talk Expressed by the Patient: Taking steps    Provider Response to Change Talk: E - Evoked more info from patient about behavior change, A - Affirmed patient's thoughts, decisions, or attempts at behavior change, R - Reflected patient's change talk, and S - Summarized patient's change talk statements     Assessments completed prior to visit:2/23/2023    The following assessments were completed by patient for this visit:  PHQ2:       4/18/2024    10:22 AM 2/23/2024     3:02 PM 1/12/2024     2:03 PM 12/27/2023     8:39 AM 4/27/2022    10:07 AM 8/11/2020     9:37 AM 3/24/2020     3:46 PM   PHQ-2 ( 1999 [employer])   Q1: Little interest or pleasure in doing things 0 2 0 0 0 0 0   Q2: Feeling down, depressed or hopeless 0 3 1 1 0 0 0   PHQ-2 Score 0 5 1 1 0 0 0   PHQ-2 Total Score (12-17 Years)- Positive if 3 or more points; Administer PHQ-A if positive      0 0     PHQ9:       1/25/2024     9:59 AM 2/21/2024     1:54 PM 2/23/2024     3:02 PM 3/25/2024     4:50 PM 3/29/2024     2:24 PM 4/22/2024    11:03 AM 5/6/2024     9:15 AM   PHQ-9 SCORE   PHQ-9 Total Score MyChart 8 (Mild depression) 16 (Moderately severe depression)  18 (Moderately severe depression)  16 (Moderately severe depression) 2 (Minimal depression) 1 (Minimal depression)   PHQ-9 Total Score 8 16 18 18 16 2 1     GAD2:       1/16/2024    12:29 PM 1/22/2024    10:13 AM 2/19/2024     1:36 PM 3/25/2024     4:52 PM 4/2/2024    10:49 AM 4/20/2024    10:35 AM 4/30/2024     9:40 AM   ROCKY-2   Feeling nervous, anxious, or on edge 2 2 3 3 3 0 0   Not being able to stop or control worrying 1 1 1 2 2 0 0   ROCKY-2 Total Score 3 3 4 5 5    5 0 0     GAD7:       9/18/2023    12:06 PM 10/10/2023     1:58 PM 12/13/2023     1:54 PM 1/7/2024    10:08 AM 1/22/2024    10:13 AM 2/19/2024     1:36 PM 3/25/2024     4:52 PM   ROCKY-7 SCORE   Total Score  5 (mild anxiety)  5 (mild anxiety) 6 (mild anxiety) 8 (mild anxiety) 10 (moderate anxiety)   Total Score 5 5 3 5 6 8 10     CAGE-AID:       2/9/2023    11:52 AM 7/17/2023     9:10 AM   CAGE-AID Total Score   Total Score 0 0   Total Score MyChart 0 (A total score of 2 or greater is considered clinically significant)      PROMIS 10-Global Health (all questions and answers displayed):       12/13/2023    11:29 AM 12/26/2023    11:45 AM 1/7/2024    10:11 AM 2/19/2024     1:38 PM 4/2/2024    10:50 AM 4/20/2024    10:37 AM 4/30/2024     9:42 AM   PROMIS 10   In general, would you say your health is: Good Good Good Good Good Good Good   In general, would you say your quality of life is: Good Good Good Good Fair Very good Very good   In general, how would you rate your physical health? Good Good Good Good Good Fair Good   In general, how would you rate your mental health, including your mood and your ability to think? Good Good Fair Fair Poor Very good Very good   In general, how would you rate your satisfaction with your social activities and relationships? Good Very good Good Fair Fair Very good Very good   In general, please rate how well you carry out your usual social activities and roles Fair Good Good Fair Poor Good Very good   To what extent are you able to carry out your everyday  physical activities such as walking, climbing stairs, carrying groceries, or moving a chair? A little Moderately Mostly Moderately Mostly Moderately Mostly   In the past 7 days, how often have you been bothered by emotional problems such as feeling anxious, depressed, or irritable? Often Often Often Always Always Rarely Never   In the past 7 days, how would you rate your fatigue on average? Moderate Moderate Moderate Moderate Moderate Moderate Mild   In the past 7 days, how would you rate your pain on average, where 0 means no pain, and 10 means worst imaginable pain? 6 6 5 5 5 5 5   In general, would you say your health is: 3 3 3 3 3 3 3   In general, would you say your quality of life is: 3 3 3 3 2 4 4   In general, how would you rate your physical health? 3 3 3 3 3 2 3   In general, how would you rate your mental health, including your mood and your ability to think? 3 3 2 2 1 4 4   In general, how would you rate your satisfaction with your social activities and relationships? 3 4 3 2 2 4 4   In general, please rate how well you carry out your usual social activities and roles. (This includes activities at home, at work and in your community, and responsibilities as a parent, child, spouse, employee, friend, etc.) 2 3 3 2 1 3 4   To what extent are you able to carry out your everyday physical activities such as walking, climbing stairs, carrying groceries, or moving a chair? 2 3 4 3 4 3 4   In the past 7 days, how often have you been bothered by emotional problems such as feeling anxious, depressed, or irritable? 4 4 4 5 5 2 1   In the past 7 days, how would you rate your fatigue on average? 3 3 3 3 3 3 2   In the past 7 days, how would you rate your pain on average, where 0 means no pain, and 10 means worst imaginable pain? 6 6 5 5 5 5 5   Global Mental Health Score 11 12 10 8 6    6 16 17   Global Physical Health Score 11 12 13 12 13    13 11 14   PROMIS TOTAL - SUBSCORES 22 24 23 20 19    19 27 31     PROMIS  10-Global Health (only subscores and total score):       12/13/2023    11:29 AM 12/26/2023    11:45 AM 1/7/2024    10:11 AM 2/19/2024     1:38 PM 4/2/2024    10:50 AM 4/20/2024    10:37 AM 4/30/2024     9:42 AM   PROMIS-10 Scores Only   Global Mental Health Score 11 12 10 8 6    6 16 17   Global Physical Health Score 11 12 13 12 13    13 11 14   PROMIS TOTAL - SUBSCORES 22 24 23 20 19    19 27 31     [place] Suicide Severity Rating Scale (Short Version)      2/10/2023     9:55 AM 3/20/2023     3:28 PM 7/17/2023     9:09 AM 9/13/2023     6:03 AM 12/6/2023     1:21 AM 2/16/2024    12:38 PM 4/13/2024     9:13 AM   [place] Suicide Severity Rating (Short Version)   Over the past 2 weeks have you felt down, depressed, or hopeless?  no  yes no no    Over the past 2 weeks have you had thoughts of killing yourself?  no  no no no    Have you ever attempted to kill yourself?  no  no no no    Q1 Wished to be Dead (Past Month)       0-->no   Q2 Suicidal Thoughts (Past Month)       0-->no   Q6 Suicide Behavior (Lifetime)       0-->no   Level of Risk per Screen       no risks indicated   1. Wish to be Dead (Since Last Contact) N  N       2. Non-Specific Active Suicidal Thoughts (Since Last Contact) N  N       Actual Attempt (Since Last Contact) N  N       Has subject engaged in non-suicidal self-injurious behavior? (Since Last Contact) N  N       Interrupted Attempts (Since Last Contact) N  N       Aborted or Self-Interrupted Attempt (Since Last Contact) N  N       Preparatory Acts or Behavior (Since Last Contact) N  N       Suicide (Since Last Contact) N  N       Calculated C-SSRS Risk Score (Since Last Contact) No Risk Indicated  No Risk Indicated             ASSESSMENT: Current Emotional / Mental Status (status of significant symptoms):   Risk status (Self / Other harm or suicidal ideation)   Patient denies current fears or concerns for personal safety.   Patient denies current or recent suicidal ideation or  behaviors.   Patient denies current or recent homicidal ideation or behaviors.   Patient denies current or recent self injurious behavior or ideation.   Patient denies other safety concerns.   Patient reports there has been no change in risk factors since their last session.     Patient reports there has been no change in protective factors since their last session.     Recommended that patient call 911 or go to the local ED should there be a change in any of these risk factors.   Would call [phone] if any SI.     Appearance:   Appropriate    Eye Contact:   Good    Psychomotor Behavior: Normal    Attitude:   Cooperative    Orientation:   Person Place Time Situation   Speech    Rate / Production: Normal/ Responsive    Volume:  Normal    Mood:    Depressed    Affect:    Appropriate    Thought Content:  Clear    Thought Form:  Coherent  Logical    Insight:    Good      Medication Review:   No changes to current psychiatric medication(s)     Medication Compliance:   Yes     Changes in Health Issues:   None reported     Chemical Use Review:   Substance Use: Chemical use reviewed, no active concerns identified      Tobacco Use: No current tobacco use.      Diagnosis:  1. MDD (major depressive disorder), recurrent episode, mild (H24)      Collateral Reports Completed:   Not Applicable    PLAN: (Patient Tasks / Therapist Tasks / Other):  Patient will continue to work on her kitchen and the closet  Patient will continue to practice her grounding techniques as needed  Patient's next visit is in 2 weeks.   GERMAN Farmer           ______________________________________________    Individual Treatment Plan    Patient's Name: Sherly Yeung  YOB: 1965    Date of Creation: 2/23/2023    Date Treatment Plan Last Reviewed/Revised: 4/23/2024    DSM5 Diagnoses: 296.32 (F33.1) Major Depressive Disorder, Recurrent Episode, Moderate With anxious distress or 300.02 (F41.1) Generalized Anxiety Disorder  Grief reaction  "[F43.21]    Psychosocial / Contextual Factors: grief: father recently passed away. Family dynamic- relationship with son, mom. Feeling overwhelmed.    PROMIS (reviewed every 90 days): 25    Referral / Collaboration:    Referral to another professional/service is not indicated at this time..    Anticipated number of session for this episode of care: 9-12 sessions  Anticipation frequency of session: Biweekly  Anticipated Duration of each session: 38-52 minutes  Treatment plan will be reviewed in 90 days or when goals have been changed.     MeasurableTreatment Goal(s) related to diagnosis / functional impairment(s)    Goal 1: Patient will Accept the loss of beloved one and return to stable level of functioning as evidenced by a higher level of functioning as a result of acceptance.   Redevelop a supportive social system and improve interpersonal relationships and Express unresolved emotions regarding loss which brings anxiety and depression mood.      I will know I've met my goal when I have more energy and  I am able to celebrate good life with my father Vs the sadness of losing him.Redevelop a supportive social system and improve interpersonal relationships\"    Objective #A (Patient Action)    Patient will Identify negative self-talk and behaviors: challenge core beliefs, myths, and actions.  Status: Continued - Date(s): 4/23/2024    Intervention(s)  Therapist will teach emotional recognition/identification.      Objective #B  Patient will identify at least 3 fears / thoughts that contribute to feeling anxious.  Status: Continued - Date(s): 4/23/2024     Intervention(s)  Therapist will teach thought challenging with CBT .    Objective #C  Patient will Identify negative self-talk and behaviors: challenge core beliefs, myths, and actions.  Status: Continued - Date(s): 4/23/2024    Intervention(s)  Therapist will provide space and time to process thoughts and feelings around current stressors. provide support and coping " skills to help move on in life .    Patient has reviewed and agreed to the above plan.    Ashlyyasmin Funmilayo [facility]  4/23/2024    Answers for HPI/ROS submitted by the patient on 4/20/2023  If you checked off any problems, how difficult have these problems made it for you to do your work, take care of things at home, or get along with other people?: Somewhat difficult  PHQ9 TOTAL SCORE: 5    Answers for HPI/ROS submitted by the patient on 4/26/2023  If you checked off any problems, how difficult have these problems made it for you to do your work, take care of things at home, or get along with other people?: Somewhat difficult  PHQ9 TOTAL SCORE: 3    Answers for HPI/ROS submitted by the patient on 5/4/2023  If you checked off any problems, how difficult have these problems made it for you to do your work, take care of things at home, or get along with other people?: Somewhat difficult  PHQ9 TOTAL SCORE: 4  Answers for HPI/ROS submitted by the patient on 7/17/2023  If you checked off any problems, how difficult have these problems made it for you to do your work, take care of things at home, or get along with other people?: Somewhat difficult  PHQ9 TOTAL SCORE: 5    Answers submitted by the patient for this visit:  Patient Health Questionnaire (Submitted on 7/30/2023)  If you checked off any problems, how difficult have these problems made it for you to do your work, take care of things at home, or get along with other people?: Somewhat difficult  PHQ9 TOTAL SCORE: 5  Answers submitted by the patient for this visit:  Patient Health Questionnaire (Submitted on 8/20/2023)  If you checked off any problems, how difficult have these problems made it for you to do your work, take care of things at home, or get along with other people?: Somewhat difficult  PHQ9 TOTAL SCORE: 5  Answers submitted by the patient for this visit:  Patient Health Questionnaire (Submitted on 9/18/2023)  If you checked off any problems, how  difficult have these problems made it for you to do your work, take care of things at home, or get along with other people?: Somewhat difficult  PHQ9 TOTAL SCORE: 4  Answers submitted by the patient for this visit:  Patient Health Questionnaire (Submitted on 10/2/2023)  If you checked off any problems, how difficult have these problems made it for you to do your work, take care of things at home, or get along with other people?: Somewhat difficult  PHQ9 TOTAL SCORE: 5    Answers submitted by the patient for this visit:  Patient Health Questionnaire (Submitted on 10/15/2023)  If you checked off any problems, how difficult have these problems made it for you to do your work, take care of things at home, or get along with other people?: Somewhat difficult  PHQ9 TOTAL SCORE: 6  ROCKY-7 (Submitted on 10/10/2023)  ROCKY 7 TOTAL SCORE: 5    Answers submitted by the patient for this visit:  Patient Health Questionnaire (Submitted on 11/16/2023)  If you checked off any problems, how difficult have these problems made it for you to do your work, take care of things at home, or get along with other people?: Somewhat difficult  PHQ9 TOTAL SCORE: 4    Answers submitted by the patient for this visit:  Patient Health Questionnaire (Submitted on 11/28/2023)  If you checked off any problems, how difficult have these problems made it for you to do your work, take care of things at home, or get along with other people?: Somewhat difficult  PHQ9 TOTAL SCORE: 5    Answers submitted by the patient for this visit:  Patient Health Questionnaire (Submitted on 12/13/2023)  If you checked off any problems, how difficult have these problems made it for you to do your work, take care of things at home, or get along with other people?: Somewhat difficult  PHQ9 TOTAL SCORE: 5    Answers submitted by the patient for this visit:  Patient Health Questionnaire (Submitted on 12/26/2023)  If you checked off any problems, how difficult have these problems  made it for you to do your work, take care of things at home, or get along with other people?: Somewhat difficult  PHQ9 TOTAL SCORE: 5    Answers submitted by the patient for this visit:  Patient Health Questionnaire (Submitted on 1/10/2024)  If you checked off any problems, how difficult have these problems made it for you to do your work, take care of things at home, or get along with other people?: Somewhat difficult  PHQ9 TOTAL SCORE: 5  ROCKY-7 (Submitted on 1/7/2024)  ROCKY 7 TOTAL SCORE: 5    Answers submitted by the patient for this visit:  Patient Health Questionnaire (Submitted on 1/25/2024)  If you checked off any problems, how difficult have these problems made it for you to do your work, take care of things at home, or get along with other people?: Very difficult  PHQ9 TOTAL SCORE: 8  ROCKY-7 (Submitted on 1/22/2024)  ROCKY 7 TOTAL SCORE: 6    Answers submitted by the patient for this visit:  Patient Health Questionnaire (Submitted on 2/21/2024)  If you checked off any problems, how difficult have these problems made it for you to do your work, take care of things at home, or get along with other people?: Very difficult  PHQ9 TOTAL SCORE: 16  ROCKY-7 (Submitted on 2/19/2024)  ROCKY 7 TOTAL SCORE: 8    Answers submitted by the patient for this visit:  Patient Health Questionnaire (Submitted on 4/22/2024)  If you checked off any problems, how difficult have these problems made it for you to do your work, take care of things at home, or get along with other people?: Somewhat difficult  PHQ9 TOTAL SCORE: 2    Answers submitted by the patient for this visit:  Patient Health Questionnaire (Submitted on 5/6/2024)  If you checked off any problems, how difficult have these problems made it for you to do your work, take care of things at home, or get along with other people?: Somewhat difficult  PHQ9 TOTAL SCORE: 1

## 2024-05-16 DIAGNOSIS — F33.1 MAJOR DEPRESSIVE DISORDER, RECURRENT, MODERATE (H): ICD-10-CM

## 2024-05-16 RX ORDER — DULOXETIN HYDROCHLORIDE 60 MG/1
120 CAPSULE, DELAYED RELEASE ORAL EVERY EVENING
Qty: 60 CAPSULE | Refills: 0 | Status: SHIPPED | OUTPATIENT
Start: 2024-05-16 | End: 2024-06-18

## 2024-05-21 ENCOUNTER — VIRTUAL VISIT (OUTPATIENT)
Dept: PSYCHOLOGY | Facility: CLINIC | Age: 59
End: 2024-05-21
Payer: MEDICARE

## 2024-05-21 ENCOUNTER — TELEPHONE (OUTPATIENT)
Dept: UROLOGY | Facility: CLINIC | Age: 59
End: 2024-05-21
Payer: MEDICARE

## 2024-05-21 DIAGNOSIS — F41.1 GAD (GENERALIZED ANXIETY DISORDER): Primary | ICD-10-CM

## 2024-05-21 PROCEDURE — 90834 PSYTX W PT 45 MINUTES: CPT | Mod: 95 | Performed by: SOCIAL WORKER

## 2024-05-21 ASSESSMENT — PATIENT HEALTH QUESTIONNAIRE - PHQ9
SUM OF ALL RESPONSES TO PHQ QUESTIONS 1-9: 1
10. IF YOU CHECKED OFF ANY PROBLEMS, HOW DIFFICULT HAVE THESE PROBLEMS MADE IT FOR YOU TO DO YOUR WORK, TAKE CARE OF THINGS AT HOME, OR GET ALONG WITH OTHER PEOPLE: NOT DIFFICULT AT ALL
SUM OF ALL RESPONSES TO PHQ QUESTIONS 1-9: 1

## 2024-05-21 NOTE — TELEPHONE ENCOUNTER
Patient is now scheduled for a Cysto on 6/6/24.    Closing note.  Eve Black RN on 5/21/2024 at 3:31 PM
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
## 2024-05-21 NOTE — TELEPHONE ENCOUNTER
[facility]    Phone Message    May a detailed message be left on voicemail: yes     Reason for Call: Other: pt wants to reschedule her cystoscopy with [facility] at [place], please call pt     Action Taken: Other: urology    Travel Screening: Not Applicable

## 2024-05-21 NOTE — TELEPHONE ENCOUNTER
Per 3/25/24 note by Betty Olivares:    Betty Olivares PA-C   to Marcia Yeung   EM      3/25/24 10:07 AM  Marcia,      Your urine tests show some continued abnormalities of your urinalysis without bacteria on culture. I would recommend further evaluation to see if we can find a cause for the abnormalities and potentially your symptoms as well. This would entail a CT scan of your abdomen and pelvis and an in office procedure called a cystoscopy to look inside the bladder and urethra with a camera.         Patient had CT Urogram done on 4/24/24 but patient canceled her Cysto for the same date with Dr. Karimi (no reason given).    Patient would like to reschedule the Cysto.     Eve Black RN on 5/21/2024 at 2:39 PM

## 2024-05-23 ENCOUNTER — MYC MEDICAL ADVICE (OUTPATIENT)
Dept: PULMONOLOGY | Facility: CLINIC | Age: 59
End: 2024-05-23
Payer: MEDICARE

## 2024-05-23 ENCOUNTER — HOSPITAL ENCOUNTER (OUTPATIENT)
Dept: GENERAL RADIOLOGY | Facility: CLINIC | Age: 59
Discharge: HOME | End: 2024-05-23
Attending: INTERNAL MEDICINE | Admitting: INTERNAL MEDICINE
Payer: MEDICARE

## 2024-05-23 DIAGNOSIS — R06.02 SHORTNESS OF BREATH: ICD-10-CM

## 2024-05-23 DIAGNOSIS — Z86.16 HISTORY OF SEVERE ACUTE RESPIRATORY SYNDROME CORONAVIRUS 2 (SARS-COV-2) DISEASE: Primary | ICD-10-CM

## 2024-05-23 DIAGNOSIS — R09.02 HYPOXIA: Primary | ICD-10-CM

## 2024-05-23 DIAGNOSIS — J98.4 RESTRICTIVE LUNG DISEASE: ICD-10-CM

## 2024-05-23 PROCEDURE — 71046 X-RAY EXAM CHEST 2 VIEWS: CPT

## 2024-05-24 ENCOUNTER — APPOINTMENT (OUTPATIENT)
Dept: GENERAL RADIOLOGY | Facility: CLINIC | Age: 59
End: 2024-05-24
Attending: FAMILY MEDICINE
Payer: MEDICARE

## 2024-05-24 ENCOUNTER — TELEPHONE (OUTPATIENT)
Dept: PULMONOLOGY | Facility: CLINIC | Age: 59
End: 2024-05-24

## 2024-05-24 ENCOUNTER — HOSPITAL ENCOUNTER (EMERGENCY)
Facility: CLINIC | Age: 59
Discharge: HOME | End: 2024-05-24
Attending: FAMILY MEDICINE | Admitting: FAMILY MEDICINE
Payer: MEDICARE

## 2024-05-24 ENCOUNTER — APPOINTMENT (OUTPATIENT)
Dept: CT IMAGING | Facility: CLINIC | Age: 59
End: 2024-05-24
Attending: FAMILY MEDICINE
Payer: MEDICARE

## 2024-05-24 VITALS
TEMPERATURE: 98.7 F | HEIGHT: 63 IN | BODY MASS INDEX: 24.91 KG/M2 | RESPIRATION RATE: 21 BRPM | HEART RATE: 78 BPM | SYSTOLIC BLOOD PRESSURE: 135 MMHG | OXYGEN SATURATION: 97 % | DIASTOLIC BLOOD PRESSURE: 83 MMHG

## 2024-05-24 DIAGNOSIS — J84.10 PULMONARY FIBROSIS (H): ICD-10-CM

## 2024-05-24 DIAGNOSIS — R06.09 EXERTIONAL DYSPNEA: ICD-10-CM

## 2024-05-24 DIAGNOSIS — R07.9 CHEST PAIN, UNSPECIFIED TYPE: ICD-10-CM

## 2024-05-24 LAB
ALBUMIN SERPL BCG-MCNC: 4.1 G/DL (ref 3.5–5.2)
ALP SERPL-CCNC: 98 U/L (ref 40–150)
ALT SERPL W P-5'-P-CCNC: 14 U/L (ref 0–50)
ANION GAP SERPL CALCULATED.3IONS-SCNC: 11 MMOL/L (ref 7–15)
AST SERPL W P-5'-P-CCNC: 19 U/L (ref 0–45)
BASOPHILS # BLD AUTO: 0 10E3/UL (ref 0–0.2)
BASOPHILS NFR BLD AUTO: 0 %
BILIRUB SERPL-MCNC: <0.2 MG/DL
BUN SERPL-MCNC: 15.1 MG/DL (ref 6–20)
CALCIUM SERPL-MCNC: 9.7 MG/DL (ref 8.6–10)
CHLORIDE SERPL-SCNC: 104 MMOL/L (ref 98–107)
CREAT SERPL-MCNC: 0.94 MG/DL (ref 0.51–0.95)
DEPRECATED HCO3 PLAS-SCNC: 28 MMOL/L (ref 22–29)
EGFRCR SERPLBLD CKD-EPI 2021: 70 ML/MIN/1.73M2
EOSINOPHIL # BLD AUTO: 0.1 10E3/UL (ref 0–0.7)
EOSINOPHIL NFR BLD AUTO: 1 %
ERYTHROCYTE [DISTWIDTH] IN BLOOD BY AUTOMATED COUNT: 13.4 % (ref 10–15)
FLUAV RNA SPEC QL NAA+PROBE: NEGATIVE
FLUBV RNA RESP QL NAA+PROBE: NEGATIVE
GLUCOSE SERPL-MCNC: 99 MG/DL (ref 70–99)
HCT VFR BLD AUTO: 40.7 % (ref 35–47)
HGB BLD-MCNC: 13.4 G/DL (ref 11.7–15.7)
HOLD SPECIMEN: NORMAL
IMM GRANULOCYTES # BLD: 0 10E3/UL
IMM GRANULOCYTES NFR BLD: 0 %
LYMPHOCYTES # BLD AUTO: 2.1 10E3/UL (ref 0.8–5.3)
LYMPHOCYTES NFR BLD AUTO: 22 %
MCH RBC QN AUTO: 31.6 PG (ref 26.5–33)
MCHC RBC AUTO-ENTMCNC: 32.9 G/DL (ref 31.5–36.5)
MCV RBC AUTO: 96 FL (ref 78–100)
MONOCYTES # BLD AUTO: 0.8 10E3/UL (ref 0–1.3)
MONOCYTES NFR BLD AUTO: 9 %
NEUTROPHILS # BLD AUTO: 6.3 10E3/UL (ref 1.6–8.3)
NEUTROPHILS NFR BLD AUTO: 67 %
NRBC # BLD AUTO: 0 10E3/UL
NRBC BLD AUTO-RTO: 0 /100
NT-PROBNP SERPL-MCNC: 236 PG/ML (ref 0–900)
PLATELET # BLD AUTO: 360 10E3/UL (ref 150–450)
POTASSIUM SERPL-SCNC: 4.5 MMOL/L (ref 3.4–5.3)
PROT SERPL-MCNC: 6.9 G/DL (ref 6.4–8.3)
RBC # BLD AUTO: 4.24 10E6/UL (ref 3.8–5.2)
RSV RNA SPEC NAA+PROBE: NEGATIVE
SARS-COV-2 RNA RESP QL NAA+PROBE: NEGATIVE
SODIUM SERPL-SCNC: 143 MMOL/L (ref 135–145)
TROPONIN T SERPL HS-MCNC: <6 NG/L
WBC # BLD AUTO: 9.3 10E3/UL (ref 4–11)

## 2024-05-24 PROCEDURE — 84484 ASSAY OF TROPONIN QUANT: CPT | Performed by: FAMILY MEDICINE

## 2024-05-24 PROCEDURE — 99285 EMERGENCY DEPT VISIT HI MDM: CPT | Mod: 25 | Performed by: FAMILY MEDICINE

## 2024-05-24 PROCEDURE — 36415 COLL VENOUS BLD VENIPUNCTURE: CPT | Performed by: FAMILY MEDICINE

## 2024-05-24 PROCEDURE — 71250 CT THORAX DX C-: CPT | Mod: MG

## 2024-05-24 PROCEDURE — 99284 EMERGENCY DEPT VISIT MOD MDM: CPT | Performed by: FAMILY MEDICINE

## 2024-05-24 PROCEDURE — 80053 COMPREHEN METABOLIC PANEL: CPT | Performed by: FAMILY MEDICINE

## 2024-05-24 PROCEDURE — 93010 ELECTROCARDIOGRAM REPORT: CPT | Performed by: FAMILY MEDICINE

## 2024-05-24 PROCEDURE — 93005 ELECTROCARDIOGRAM TRACING: CPT | Performed by: FAMILY MEDICINE

## 2024-05-24 PROCEDURE — 87637 SARSCOV2&INF A&B&RSV AMP PRB: CPT | Performed by: FAMILY MEDICINE

## 2024-05-24 PROCEDURE — 85004 AUTOMATED DIFF WBC COUNT: CPT | Performed by: FAMILY MEDICINE

## 2024-05-24 PROCEDURE — 83880 ASSAY OF NATRIURETIC PEPTIDE: CPT | Performed by: FAMILY MEDICINE

## 2024-05-24 PROCEDURE — 71045 X-RAY EXAM CHEST 1 VIEW: CPT

## 2024-05-24 ASSESSMENT — ACTIVITIES OF DAILY LIVING (ADL)
ADLS_ACUITY_SCORE: 40

## 2024-05-24 NOTE — TELEPHONE ENCOUNTER
This was a MyChart Dr Gonsales sent to patient on 5/23/24.  Patient replied in related MyChart.  That MyChart was sent back to Dr Gonsales for review.  This MyChart closed as this encounter will be resolved in a different MyChart.    Bala KNIGHT [facility]

## 2024-05-24 NOTE — TELEPHONE ENCOUNTER
Patient called again, she states she is no longer going camping because she is unwell but she is hoping for a response ASAP to discuss her symptoms. Please call her back ASAP.

## 2024-05-24 NOTE — ED TRIAGE NOTES
Increased shortness of breath since yesterday.  Patient only has 50% lung function since being on ventilator with COVID in 2022.  Wears oxygen when sleeping due to oxygen saturations dropping.  Reports dull ache between shoulder blades.  Has history of PE and is on eliquis. No heart history.  Oxygen saturation in triage % on RA     Triage Assessment (Adult)       Row Name 05/24/24 8731          Triage Assessment    Airway WDL WDL        Respiratory WDL    Respiratory WDL WDL        Skin Circulation/Temperature WDL    Skin Circulation/Temperature WDL WDL        Cardiac WDL    Cardiac WDL WDL        Peripheral/Neurovascular WDL    Peripheral Neurovascular WDL WDL        Cognitive/Neuro/Behavioral WDL    Cognitive/Neuro/Behavioral WDL WDL

## 2024-05-24 NOTE — TELEPHONE ENCOUNTER
[facility]    Phone Message    May a detailed message be left on voicemail: yes     Reason for Call: Other: .     Patient states she had sent a few [employer] messages this morning. Patient is wanting Dr. Gonsales to take a look at them and response via [employer] ASAP. Please advise      Action Taken: Message routed to:  [facility] Lung    Travel Screening: Not Applicable

## 2024-05-24 NOTE — TELEPHONE ENCOUNTER
Patient had her chest x-ray on 5/23/24:    IMPRESSION: Lung volumes are low some stranding opacities at the right lung base more likely representing atelectasis. No developing consolidation. Vascular stenting surgical clips and wires on the right are unchanged. Cardiac and mediastinal silhouettes   within normal limits. Osseous structures intact.    Please see mychart message. Patient requesting a call today regarding results.    Eve Black RN on 5/24/2024 at 2:43 PM

## 2024-05-25 NOTE — DISCHARGE INSTRUCTIONS
Your test results today are all normal.  We have not find a cause for your pain but this is all reassuring and I do not have concerns about a serious problem with your heart or lungs.  You can use acetaminophen if needed for the pain.  Do not take anti-inflammatories like ibuprofen while you are on Eliquis.  Return to be seen if the pain becomes severe or you develop new symptoms such as fevers or other worrisome symptoms.

## 2024-05-25 NOTE — ED PROVIDER NOTES
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 (H)   Exertional dyspnea       5/24/2024   [facility] EMERGENCY DEPT       Clemente Penaloza MD  05/25/24 [id number]

## 2024-05-28 NOTE — TELEPHONE ENCOUNTER
"Patient present to the ED on 5/24/24. Per ED provider's note:    \"Physical examination was normal and her O2 sats were 97 to 100% on room air including lying down. She exhibited no increased work of breathing. Her cardiopulmonary examination was normal. Laboratory testing to look for causes of this including congestive heart failure, PE, pneumonia, pneumothorax and others was undertaken and her NT proBNP was normal making CHF unlikely, she is fully anticoagulated on Eliquis and so PE is also unlikely. CT scan of the chest without contrast showed no pneumothorax and no pneumonia or other source for her dyspnea. Her EKG was normal. Her troponin was normal. I have no concern for ACS or angina. Her CBC was normal with no signs of anemia causing dyspnea. Her comprehensive metabolic profile was normal with no evidence of electrolyte disturbance including hyponatremia. Given the reassuring test results and her clinical stability I reviewed the results with her and recommended no further workup at this time and observation of the symptoms...I think she can safely try home management to see how the symptoms go. If her symptoms persist she should follow-up with her pulmonologist. If she has new concerning\"    Patient's recent chest x-ray was reviewed with the patient by the ED provider on 5/24.   Will route to Dr. Gonsales as an FYI and to advise as needed.    Eve Black RN on 5/28/2024 at 11:29 AM        "

## 2024-06-03 DIAGNOSIS — G47.19 EXCESSIVE DAYTIME SLEEPINESS: ICD-10-CM

## 2024-06-03 NOTE — TELEPHONE ENCOUNTER
[facility] Call Center    Phone Message    May a detailed message be left on voicemail: yes     Reason for Call: Medication Refill Request    Has the patient contacted the pharmacy for the refill? Yes   Name of medication being requested: Ritalin  Provider who prescribed the medication: Dr. Wang  Pharmacy: [facility] [id number] [address]   Date medication is needed: Patient will run out of medication on Wednesday      Action Taken: Message routed to:  Other: [facility] PSYCHIATRY NURSE    Travel Screening: Not Applicable     Date of Service:

## 2024-06-03 NOTE — TELEPHONE ENCOUNTER
Last seen: 4/18/24  RTC: 6 weeks  Cancel: none  No-show: none  Next appt: 6/7/24     Incoming refill from Patient via phone    Medication requested:   Pending Prescriptions:                       Disp   Refills    methylphenidate (RITALIN) 20 MG tablet    90 tab*0            Sig: Take 2 tablets (40 mg) by mouth every morning AND           1 tablet (20 mg) daily. Take 40 mg in the morning           and 20 mg after lunch afterwards.    Last refill per   5/7/24, 4/6/24, and 3/19/24 per [place] PDMP    From chart note:   - continue Ritalin 60 mg/day, 40 mg in the morning and 20 mg around noontime     Medication unable to be refilled by RN due to criteria not met as indicated.                 []Eligibility - not seen in the last year              []Supervision - no future appointment              []Compliance - no shows, cancellations or lapse in therapy              []Verification - order discrepancy              [x]Controlled medication              []Medication not included in policy              []90-day supply request              []Other:      Ameena Cabrera RN on 6/3/2024 at 1:10 PM

## 2024-06-04 RX ORDER — METHYLPHENIDATE HYDROCHLORIDE 20 MG/1
TABLET ORAL
Qty: 90 TABLET | Refills: 0 | Status: SHIPPED | OUTPATIENT
Start: 2024-06-04 | End: 2024-06-07

## 2024-06-07 ENCOUNTER — VIRTUAL VISIT (OUTPATIENT)
Dept: PSYCHIATRY | Facility: CLINIC | Age: 59
End: 2024-06-07
Attending: STUDENT IN AN ORGANIZED HEALTH CARE EDUCATION/TRAINING PROGRAM
Payer: MEDICARE

## 2024-06-07 VITALS — BODY MASS INDEX: 24.8 KG/M2 | HEIGHT: 63 IN | WEIGHT: 140 LBS

## 2024-06-07 DIAGNOSIS — G47.19 EXCESSIVE DAYTIME SLEEPINESS: ICD-10-CM

## 2024-06-07 DIAGNOSIS — F33.1 MODERATE EPISODE OF RECURRENT MAJOR DEPRESSIVE DISORDER (H): Primary | ICD-10-CM

## 2024-06-07 DIAGNOSIS — F41.1 GENERALIZED ANXIETY DISORDER: ICD-10-CM

## 2024-06-07 PROCEDURE — G2211 COMPLEX E/M VISIT ADD ON: HCPCS | Mod: 95

## 2024-06-07 PROCEDURE — 99214 OFFICE O/P EST MOD 30 MIN: CPT | Mod: 95

## 2024-06-07 RX ORDER — METHYLPHENIDATE HYDROCHLORIDE 20 MG/1
TABLET ORAL
Qty: 90 TABLET | Refills: 0 | Status: SHIPPED | OUTPATIENT
Start: 2024-07-03 | End: 2024-08-01 | Stop reason: DRUGHIGH

## 2024-06-07 ASSESSMENT — PAIN SCALES - GENERAL: PAINLEVEL: NO PAIN (0)
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
## 2024-06-07 NOTE — PROGRESS NOTES
Virtual Visit Details    Type of service:  Video Visit   Video Start Time:  3:40  Video End Time: 4:10    Originating Location (pt. Location): Home    Distant Location (provider location):  On-site  Platform used for Video Visit: Kourtney

## 2024-06-07 NOTE — PATIENT INSTRUCTIONS
**For crisis resources, please see the information at the end of this document**   Patient Education    Thank you for coming to the [facility] [place] [facility].     Lab Testing:  If you had lab testing today and your results are reassuring or normal they will be mailed to you or sent through [employer] within 7 days. If the lab tests need quick action we will call you with the results. The phone number we will call with results is # [phone]. If this is not the best number please call our clinic and change the number.     Medication Refills:  If you need any refills please call your pharmacy and they will contact us. Our fax number for refills is [phone].   Three business days of notice are needed for general medication refill requests.   Five business days of notice are needed for controlled substance refill requests.   If you need to change to a different pharmacy, please contact the new pharmacy directly. The new pharmacy will help you get your medications transferred.     Contact Us:  Please call [phone] during business hours (8-5:00 M-F).   If you have medication related questions after clinic hours, or on the weekend, please call [phone].     Financial Assistance [phone]   Medical Records [phone]       MENTAL HEALTH CRISIS RESOURCES:  For a emergency help, please call 911 or go to the nearest Emergency Department.     Emergency Walk-In Options:   EmPATH Unit @ [facility] ([place]): [phone] - Specialized mental health emergency area designed to be calming  [facility] ([place]): [phone]  [facility] ([place]): [phone]  [facility]): [phone]    [facility] Crisis Information:   [place]: [phone]  [place]: [phone]  [place] ([place]) - Adult: [phone]     Child: [phone]  [place] - Adult: [phone]     Child: [phone]  [place]:  [phone]  List of all [facility] resources:   https://[place].gov/dhs/people-we-serve/adults/health-care/mental-health/resources/crisis-contacts.jsp    National Crisis Information:   Crisis Text Line: Text  [place]  to 781303  Suicide & Crisis Lifeline: 988  National Suicide Prevention Lifeline: 1-699-908-TALK (1-997.477.3441)       For online chat options, visit https://suicidepreventionlifeline.org/chat/  Poison Control Center: 7-125-269-8221  Trans Lifeline: 8-488-548-6847 - Hotline for transgender people of all ages  The [employer]: 6-238-285-5970 - Hotline for LGBT youth     For Non-Emergency Support:   Fast Tracker: Mental Health & Substance Use Disorder Resources -   https://www.[employer].org/

## 2024-06-07 NOTE — NURSING NOTE
Is the patient currently in the state of [place]? YES    Visit mode:VIDEO    If the visit is dropped, the patient can be reconnected by: VIDEO VISIT: Text to cell phone:   Telephone Information:   Mobile [phone]       Will anyone else be joining the visit? NO  (If patient encounters technical issues they should call [phone])    How would you like to obtain your AVS? MyChart    Are changes needed to the allergy or medication list? Pt stated no changes to allergies and Pt stated no med changes    Are refills needed on medications prescribed by this physician? NO    Reason for visit: RECHECK    Lili ORDOÑEZ

## 2024-06-12 ENCOUNTER — VIRTUAL VISIT (OUTPATIENT)
Dept: PSYCHOLOGY | Facility: CLINIC | Age: 59
End: 2024-06-12
Payer: MEDICARE

## 2024-06-12 DIAGNOSIS — F33.0 MDD (MAJOR DEPRESSIVE DISORDER), RECURRENT EPISODE, MILD (H): Primary | ICD-10-CM

## 2024-06-12 PROCEDURE — 90834 PSYTX W PT 45 MINUTES: CPT | Mod: 95 | Performed by: SOCIAL WORKER

## 2024-06-12 ASSESSMENT — ANXIETY QUESTIONNAIRES
3. WORRYING TOO MUCH ABOUT DIFFERENT THINGS: SEVERAL DAYS
6. BECOMING EASILY ANNOYED OR IRRITABLE: NOT AT ALL
GAD7 TOTAL SCORE: 3
GAD7 TOTAL SCORE: 3
IF YOU CHECKED OFF ANY PROBLEMS ON THIS QUESTIONNAIRE, HOW DIFFICULT HAVE THESE PROBLEMS MADE IT FOR YOU TO DO YOUR WORK, TAKE CARE OF THINGS AT HOME, OR GET ALONG WITH OTHER PEOPLE: NOT DIFFICULT AT ALL
5. BEING SO RESTLESS THAT IT IS HARD TO SIT STILL: NOT AT ALL
7. FEELING AFRAID AS IF SOMETHING AWFUL MIGHT HAPPEN: NOT AT ALL
1. FEELING NERVOUS, ANXIOUS, OR ON EDGE: SEVERAL DAYS
2. NOT BEING ABLE TO STOP OR CONTROL WORRYING: SEVERAL DAYS

## 2024-06-12 ASSESSMENT — PATIENT HEALTH QUESTIONNAIRE - PHQ9: 5. POOR APPETITE OR OVEREATING: NOT AT ALL

## 2024-06-12 NOTE — PROGRESS NOTES
[facility]                                     Progress Note    Patient Name: Sherly Yeung  Date: 6/12/2024         Service Type: Individual      Session Start Time: 14:03      Session End Time:14:55    Session Length: 52    Session #: 24    Attendees: Client    Service Modality:  Video Visit:      Provider verified identity through the following two step process.  Patient provided:  Patient is known previously to provider    Telemedicine Visit: The patient's condition can be safely assessed and treated via synchronous audio and visual telemedicine encounter.      Reason for Telemedicine Visit: Services only offered telehealth    Originating Site (Patient Location): Patient's home    Distant Site (Provider Location): Provider Remote Setting- Home Office    Consent:  The patient/guardian has verbally consented to: the potential risks and benefits of telemedicine (video visit) versus in person care; bill my insurance or make self-payment for services provided; and responsibility for payment of non-covered services.     Patient would like the video invitation sent by:  My Chart    Mode of Communication:  Video Conference via Amwell    Distant Location (Provider):  Off-site    As the provider I attest to compliance with applicable laws and regulations related to telemedicine.    DATA  Interactive Complexity: No     Crisis: No      Progress Since Last Session (Related to Symptoms / Goals / Homework):   Symptoms:  anxiety    Homework: Partially completed      Episode of Care Goals: Satisfactory progress - ACTION (Actively working towards change); Intervened by reinforcing change plan / affirming steps taken- able to deal with personal and family needs.      Current / Ongoing Stressors and Concerns:  Patient has been challenged by family issues. Her niece who was sent to [place] for her MH treatment was returned back after 3 weeks only which patient believes was not enough. Patient found herself in the  middle of a family conflict with the teen.  She is not happy the parents are not working the same way to make sure the teen get the care she needs.  Patient also continues to work on her own needs. She is doing fairly well. Has visited ED due to some difficulties with her lungs. She once again used an oxygen for some days. Sharing she is now doing better. Has been slow doing some household activities due to this change in health.  No safety concern. Her next visit is in a month.        Treatment Objective(s) Addressed in This Session:         Intervention:  Person centered: Actively listened to the patient's concerns about health needs and dynamic in the family.  Offered empathy.     Grounding techniques were reinforced.    MI Intervention: Expressed Empathy/Understanding, Supported Autonomy, Collaboration, Evocation, Permission to raise concern or advise, and Open-ended questions     Change Talk Expressed by the Patient: Taking steps    Provider Response to Change Talk: E - Evoked more info from patient about behavior change, A - Affirmed patient's thoughts, decisions, or attempts at behavior change, R - Reflected patient's change talk, and S - Summarized patient's change talk statements     Assessments completed prior to visit:2/23/2023    The following assessments were completed by patient for this visit:  PHQ2:       4/18/2024    10:22 AM 2/23/2024     3:02 PM 1/12/2024     2:03 PM 12/27/2023     8:39 AM 4/27/2022    10:07 AM 8/11/2020     9:37 AM 3/24/2020     3:46 PM   PHQ-2 ( 1999 [employer])   Q1: Little interest or pleasure in doing things 0 2 0 0 0 0 0   Q2: Feeling down, depressed or hopeless 0 3 1 1 0 0 0   PHQ-2 Score 0 5 1 1 0 0 0   PHQ-2 Total Score (12-17 Years)- Positive if 3 or more points; Administer PHQ-A if positive      0 0     PHQ9:       3/25/2024     4:50 PM 3/29/2024     2:24 PM 4/22/2024    11:03 AM 5/6/2024     9:15 AM 5/21/2024     7:59 AM 6/6/2024     9:41 AM 6/11/2024     2:35 PM   PHQ-9  SCORE   PHQ-9 Total Score MyChart 18 (Moderately severe depression) 16 (Moderately severe depression) 2 (Minimal depression) 1 (Minimal depression) 1 (Minimal depression) 1 (Minimal depression) 1 (Minimal depression)   PHQ-9 Total Score 18 16 2 1 1 1 1     GAD2:       1/22/2024    10:13 AM 2/19/2024     1:36 PM 3/25/2024     4:52 PM 4/2/2024    10:49 AM 4/20/2024    10:35 AM 4/30/2024     9:40 AM 5/14/2024     9:32 AM   ROCKY-2   Feeling nervous, anxious, or on edge 2 3 3 3 0 0 0   Not being able to stop or control worrying 1 1 2 2 0 0 0   ROCKY-2 Total Score 3 4 5 5    5 0 0 0     GAD7:       10/10/2023     1:58 PM 12/13/2023     1:54 PM 1/7/2024    10:08 AM 1/22/2024    10:13 AM 2/19/2024     1:36 PM 3/25/2024     4:52 PM 6/12/2024     9:56 PM   ROCKY-7 SCORE   Total Score 5 (mild anxiety)  5 (mild anxiety) 6 (mild anxiety) 8 (mild anxiety) 10 (moderate anxiety)    Total Score 5 3 5 6 8 10 3     CAGE-AID:       2/9/2023    11:52 AM 7/17/2023     9:10 AM   CAGE-AID Total Score   Total Score 0 0   Total Score MyChart 0 (A total score of 2 or greater is considered clinically significant)      PROMIS 10-Global Health (all questions and answers displayed):       12/26/2023    11:45 AM 1/7/2024    10:11 AM 2/19/2024     1:38 PM 4/2/2024    10:50 AM 4/20/2024    10:37 AM 4/30/2024     9:42 AM 5/14/2024     9:34 AM   PROMIS 10   In general, would you say your health is: Good Good Good Good Good Good Very good   In general, would you say your quality of life is: Good Good Good Fair Very good Very good Very good   In general, how would you rate your physical health? Good Good Good Good Fair Good Good   In general, how would you rate your mental health, including your mood and your ability to think? Good Fair Fair Poor Very good Very good Very good   In general, how would you rate your satisfaction with your social activities and relationships? Very good Good Fair Fair Very good Very good Very good   In general, please rate how  well you carry out your usual social activities and roles Good Good Fair Poor Good Very good Good   To what extent are you able to carry out your everyday physical activities such as walking, climbing stairs, carrying groceries, or moving a chair? Moderately Mostly Moderately Mostly Moderately Mostly Moderately   In the past 7 days, how often have you been bothered by emotional problems such as feeling anxious, depressed, or irritable? Often Often Always Always Rarely Never Never   In the past 7 days, how would you rate your fatigue on average? Moderate Moderate Moderate Moderate Moderate Mild Moderate   In the past 7 days, how would you rate your pain on average, where 0 means no pain, and 10 means worst imaginable pain? 6 5 5 5 5 5 5   In general, would you say your health is: 3 3 3 3 3 3 4   In general, would you say your quality of life is: 3 3 3 2 4 4 4   In general, how would you rate your physical health? 3 3 3 3 2 3 3   In general, how would you rate your mental health, including your mood and your ability to think? 3 2 2 1 4 4 4   In general, how would you rate your satisfaction with your social activities and relationships? 4 3 2 2 4 4 4   In general, please rate how well you carry out your usual social activities and roles. (This includes activities at home, at work and in your community, and responsibilities as a parent, child, spouse, employee, friend, etc.) 3 3 2 1 3 4 3   To what extent are you able to carry out your everyday physical activities such as walking, climbing stairs, carrying groceries, or moving a chair? 3 4 3 4 3 4 3   In the past 7 days, how often have you been bothered by emotional problems such as feeling anxious, depressed, or irritable? 4 4 5 5 2 1 1   In the past 7 days, how would you rate your fatigue on average? 3 3 3 3 3 2 3   In the past 7 days, how would you rate your pain on average, where 0 means no pain, and 10 means worst imaginable pain? 6 5 5 5 5 5 5   Global Mental  Health Score 12 10 8 6    6 16 17 17   Global Physical Health Score 12 13 12 13    13 11 14 12   PROMIS TOTAL - SUBSCORES 24 23 20 19    19 27 31 29     PROMIS 10-Global Health (only subscores and total score):       12/26/2023    11:45 AM 1/7/2024    10:11 AM 2/19/2024     1:38 PM 4/2/2024    10:50 AM 4/20/2024    10:37 AM 4/30/2024     9:42 AM 5/14/2024     9:34 AM   PROMIS-10 Scores Only   Global Mental Health Score 12 10 8 6    6 16 17 17   Global Physical Health Score 12 13 12 13    13 11 14 12   PROMIS TOTAL - SUBSCORES 24 23 20 19    19 27 31 29     [place] Suicide Severity Rating Scale (Short Version)      3/20/2023     3:28 PM 7/17/2023     9:09 AM 9/13/2023     6:03 AM 12/6/2023     1:21 AM 2/16/2024    12:38 PM 4/13/2024     9:13 AM 5/24/2024     6:44 PM   [place] Suicide Severity Rating (Short Version)   Over the past 2 weeks have you felt down, depressed, or hopeless? no  yes no no     Over the past 2 weeks have you had thoughts of killing yourself? no  no no no     Have you ever attempted to kill yourself? no  no no no     Q1 Wished to be Dead (Past Month)      0-->no 0-->no   Q2 Suicidal Thoughts (Past Month)      0-->no 0-->no   Q6 Suicide Behavior (Lifetime)      0-->no 0-->no   Level of Risk per Screen      no risks indicated no risks indicated   1. Wish to be Dead (Since Last Contact)  N        2. Non-Specific Active Suicidal Thoughts (Since Last Contact)  N        Actual Attempt (Since Last Contact)  N        Has subject engaged in non-suicidal self-injurious behavior? (Since Last Contact)  N        Interrupted Attempts (Since Last Contact)  N        Aborted or Self-Interrupted Attempt (Since Last Contact)  N        Preparatory Acts or Behavior (Since Last Contact)  N        Suicide (Since Last Contact)  N        Calculated C-SSRS Risk Score (Since Last Contact)  No Risk Indicated              ASSESSMENT: Current Emotional / Mental Status (status of significant symptoms):   Risk status (Self /  Other harm or suicidal ideation)   Patient denies current fears or concerns for personal safety.   Patient denies current or recent suicidal ideation or behaviors.   Patient denies current or recent homicidal ideation or behaviors.   Patient denies current or recent self injurious behavior or ideation.   Patient denies other safety concerns.   Patient reports there has been no change in risk factors since their last session.     Patient reports there has been no change in protective factors since their last session.     Recommended that patient call 911 or go to the local ED should there be a change in any of these risk factors.   Would call [phone] if any SI.     Appearance:   Appropriate    Eye Contact:   Good    Psychomotor Behavior: Normal    Attitude:   Cooperative    Orientation:   Person Place Time Situation   Speech    Rate / Production: Normal/ Responsive    Volume:  Normal    Mood:    Anxious    Affect:    Appropriate    Thought Content:  Clear    Thought Form:  Coherent  Logical    Insight:    Good      Medication Review:   No changes to current psychiatric medication(s)     Medication Compliance:   Yes     Changes in Health Issues:   None reported     Chemical Use Review:   Substance Use: Chemical use reviewed, no active concerns identified      Tobacco Use: No current tobacco use.      Diagnosis:  1. MDD (major depressive disorder), recurrent episode, mild (H24)      Collateral Reports Completed:   Not Applicable    PLAN: (Patient Tasks / Therapist Tasks / Other):  Patient will focus on her needs first and be available for her niece  Patient will continue to do her household activities in sections to avoid feeling overwhelmed.   GERMAN Farmer           ______________________________________________    Individual Treatment Plan    Patient's Name: Sherly Yeung  YOB: 1965    Date of Creation: 2/23/2023    Date Treatment Plan Last Reviewed/Revised: 4/23/2024    DSM5 Diagnoses: 296.32  "(F33.1) Major Depressive Disorder, Recurrent Episode, Moderate With anxious distress or 300.02 (F41.1) Generalized Anxiety Disorder  Grief reaction [F43.21]    Psychosocial / Contextual Factors: grief: father recently passed away. Family dynamic- relationship with son, mom. Feeling overwhelmed.    PROMIS (reviewed every 90 days): 25    Referral / Collaboration:    Referral to another professional/service is not indicated at this time..    Anticipated number of session for this episode of care: 9-12 sessions  Anticipation frequency of session: Biweekly  Anticipated Duration of each session: 38-52 minutes  Treatment plan will be reviewed in 90 days or when goals have been changed.     MeasurableTreatment Goal(s) related to diagnosis / functional impairment(s)    Goal 1: Patient will Accept the loss of beloved one and return to stable level of functioning as evidenced by a higher level of functioning as a result of acceptance.   Redevelop a supportive social system and improve interpersonal relationships and Express unresolved emotions regarding loss which brings anxiety and depression mood.      I will know I've met my goal when I have more energy and  I am able to celebrate good life with my father Vs the sadness of losing him.Redevelop a supportive social system and improve interpersonal relationships\"    Objective #A (Patient Action)    Patient will Identify negative self-talk and behaviors: challenge core beliefs, myths, and actions.  Status: Continued - Date(s): 4/23/2024    Intervention(s)  Therapist will teach emotional recognition/identification. Support in her in the process    Objective #B  Patient will identify at least 3 fears / thoughts that contribute to feeling anxious.  Status: Continued - Date(s): 4/23/2024     Intervention(s)  Therapist will teach thought challenging with CBT .    Objective #C  Patient will Identify negative self-talk and behaviors: challenge core beliefs, myths, and " actions.  Status: Continued - Date(s): 4/23/2024    Intervention(s)  Therapist will provide space and time to process thoughts and feelings around current stressors. provide support and coping skills to help move on in life .    Patient has reviewed and agreed to the above plan.    GERMAN Farmer  4/23/2024    Answers for HPI/ROS submitted by the patient on 4/20/2023  If you checked off any problems, how difficult have these problems made it for you to do your work, take care of things at home, or get along with other people?: Somewhat difficult  PHQ9 TOTAL SCORE: 5    Answers for HPI/ROS submitted by the patient on 4/26/2023  If you checked off any problems, how difficult have these problems made it for you to do your work, take care of things at home, or get along with other people?: Somewhat difficult  PHQ9 TOTAL SCORE: 3    Answers for HPI/ROS submitted by the patient on 5/4/2023  If you checked off any problems, how difficult have these problems made it for you to do your work, take care of things at home, or get along with other people?: Somewhat difficult  PHQ9 TOTAL SCORE: 4  Answers for HPI/ROS submitted by the patient on 7/17/2023  If you checked off any problems, how difficult have these problems made it for you to do your work, take care of things at home, or get along with other people?: Somewhat difficult  PHQ9 TOTAL SCORE: 5    Answers submitted by the patient for this visit:  Patient Health Questionnaire (Submitted on 7/30/2023)  If you checked off any problems, how difficult have these problems made it for you to do your work, take care of things at home, or get along with other people?: Somewhat difficult  PHQ9 TOTAL SCORE: 5  Answers submitted by the patient for this visit:  Patient Health Questionnaire (Submitted on 8/20/2023)  If you checked off any problems, how difficult have these problems made it for you to do your work, take care of things at home, or get along with other  people?: Somewhat difficult  PHQ9 TOTAL SCORE: 5  Answers submitted by the patient for this visit:  Patient Health Questionnaire (Submitted on 9/18/2023)  If you checked off any problems, how difficult have these problems made it for you to do your work, take care of things at home, or get along with other people?: Somewhat difficult  PHQ9 TOTAL SCORE: 4  Answers submitted by the patient for this visit:  Patient Health Questionnaire (Submitted on 10/2/2023)  If you checked off any problems, how difficult have these problems made it for you to do your work, take care of things at home, or get along with other people?: Somewhat difficult  PHQ9 TOTAL SCORE: 5    Answers submitted by the patient for this visit:  Patient Health Questionnaire (Submitted on 10/15/2023)  If you checked off any problems, how difficult have these problems made it for you to do your work, take care of things at home, or get along with other people?: Somewhat difficult  PHQ9 TOTAL SCORE: 6  ROCKY-7 (Submitted on 10/10/2023)  ROCKY 7 TOTAL SCORE: 5    Answers submitted by the patient for this visit:  Patient Health Questionnaire (Submitted on 11/16/2023)  If you checked off any problems, how difficult have these problems made it for you to do your work, take care of things at home, or get along with other people?: Somewhat difficult  PHQ9 TOTAL SCORE: 4    Answers submitted by the patient for this visit:  Patient Health Questionnaire (Submitted on 11/28/2023)  If you checked off any problems, how difficult have these problems made it for you to do your work, take care of things at home, or get along with other people?: Somewhat difficult  PHQ9 TOTAL SCORE: 5    Answers submitted by the patient for this visit:  Patient Health Questionnaire (Submitted on 12/13/2023)  If you checked off any problems, how difficult have these problems made it for you to do your work, take care of things at home, or get along with other people?: Somewhat difficult  PHQ9  TOTAL SCORE: 5    Answers submitted by the patient for this visit:  Patient Health Questionnaire (Submitted on 12/26/2023)  If you checked off any problems, how difficult have these problems made it for you to do your work, take care of things at home, or get along with other people?: Somewhat difficult  PHQ9 TOTAL SCORE: 5    Answers submitted by the patient for this visit:  Patient Health Questionnaire (Submitted on 1/10/2024)  If you checked off any problems, how difficult have these problems made it for you to do your work, take care of things at home, or get along with other people?: Somewhat difficult  PHQ9 TOTAL SCORE: 5  ROCKY-7 (Submitted on 1/7/2024)  ROCKY 7 TOTAL SCORE: 5    Answers submitted by the patient for this visit:  Patient Health Questionnaire (Submitted on 1/25/2024)  If you checked off any problems, how difficult have these problems made it for you to do your work, take care of things at home, or get along with other people?: Very difficult  PHQ9 TOTAL SCORE: 8  ROCKY-7 (Submitted on 1/22/2024)  ROCKY 7 TOTAL SCORE: 6    Answers submitted by the patient for this visit:  Patient Health Questionnaire (Submitted on 2/21/2024)  If you checked off any problems, how difficult have these problems made it for you to do your work, take care of things at home, or get along with other people?: Very difficult  PHQ9 TOTAL SCORE: 16  ROCKY-7 (Submitted on 2/19/2024)  ROCKY 7 TOTAL SCORE: 8    Answers submitted by the patient for this visit:  Patient Health Questionnaire (Submitted on 4/22/2024)  If you checked off any problems, how difficult have these problems made it for you to do your work, take care of things at home, or get along with other people?: Somewhat difficult  PHQ9 TOTAL SCORE: 2    Answers submitted by the patient for this visit:  Patient Health Questionnaire (Submitted on 5/6/2024)  If you checked off any problems, how difficult have these problems made it for you to do your work, take care of things at  home, or get along with other people?: Somewhat difficult  PHQ9 TOTAL SCORE: 1    Answers submitted by the patient for this visit:  Patient Health Questionnaire (Submitted on 5/21/2024)  If you checked off any problems, how difficult have these problems made it for you to do your work, take care of things at home, or get along with other people?: Not difficult at all  PHQ9 TOTAL SCORE: 1    Answers submitted by the patient for this visit:  Patient Health Questionnaire (Submitted on 6/11/2024)  If you checked off any problems, how difficult have these problems made it for you to do your work, take care of things at home, or get along with other people?: Somewhat difficult  PHQ9 TOTAL SCORE: 1

## 2024-06-15 DIAGNOSIS — F33.1 MAJOR DEPRESSIVE DISORDER, RECURRENT, MODERATE (H): ICD-10-CM

## 2024-06-18 RX ORDER — DULOXETIN HYDROCHLORIDE 60 MG/1
120 CAPSULE, DELAYED RELEASE ORAL EVERY EVENING
Qty: 60 CAPSULE | Refills: 0 | Status: SHIPPED | OUTPATIENT
Start: 2024-06-18 | End: 2024-07-12

## 2024-06-18 NOTE — TELEPHONE ENCOUNTER
"Date of Last Office Visit: 6/7/24 - Kathleen  RTC: 8 weeks with the new resident   No shows: 0  Cancellations: 0  Date of Next Office Visit: 8/1/24 - Marlon   ------------------------------    Incoming refill from Pharmacy via interface  Medication requested:    DULoxetine (CYMBALTA) 60 MG capsule 60 capsule 0 5/16/2024 -- No   Sig - Route: Take 2 capsules (120 mg) by mouth every evening     ------------------------------  From last visit note:   \"continue duloxetine 120 mg at night \"       Refill decision: Medication refilled 30d per protocol.                       "

## 2024-06-19 NOTE — PROGRESS NOTES
cervical spine  01/31/24 0500   Appointment Info   Signing clinician's name / credentials Deann Pitt [facility] PT DPT   Total/Authorized Visits 365   Visits Used 1  Medicare soc 1/31/24   Medical Diagnosis LBP   PT Tx Diagnosis LBP   Quick Adds Certification   Progress Note/Certification   Start of Care Date 01/31/24   Onset of illness/injury or Date of Surgery 12/21/23   Therapy Frequency 1x/wk   Predicted Duration 90 days   Certification date from 01/31/24   Certification date to 04/30/24   GOALS   PT Goals 2;3;4   PT Goal 1   Goal Description Independent HEP (cardio, strength, stretching) incorporated into a regular fitness routine   Rationale to maximize safety and independence with performance of ADLs and functional tasks   Target Date 04/30/24   PT Goal 2   Goal Description walk 20' no increase in pain for cardio compontent of HEP   Rationale to maximize safety and independence with performance of ADLs and functional tasks   Target Date 04/10/24   PT Goal 3   Goal Description stand for 30-45 ' to prepare meal without increase in LBP   Rationale to maximize safety and independence with performance of ADLs and functional tasks   Target Date 04/20/24   PT Goal 4   Goal Description intercourse with no back pain   Rationale to maximize safety and independence with performance of ADLs and functional tasks   Target Date 04/30/24   Subjective Report   Subjective Report at about 50% pulmonary function   Treatment Interventions (PT)   Interventions Therapeutic Procedure/Exercise;Manual Therapy   Therapeutic Procedure/Exercise   Therapeutic Procedures: strength, endurance, ROM, flexibility minutes (78778) 30   Ther Proc 1 - Details instructed in DKC and prayer/child pose; supine piriformis stretch; HS pop angle wiht DF floss; all held 30 sec 2x each leg   Skilled Intervention exercise instruction tempo,dosage, with manual, verbal, tactile cues for proper technique to decrease pain and improve functional mobility    Patient Response/Progress prefers prayer over DKC   Manual Therapy   Manual Therapy: Mobilization, MFR, MLD, friction massage minutes (67943) 10   Manual Therapy 1 - Details MFR abdomin restricted motion anterior kinetic chain   Skilled Intervention MFR R abdomin   Patient Response/Progress increased forward bend with no abdominal pain   Eval/Assessments   PT Eval, Low Complexity Minutes (66744) 15   Education   Learner/Method Patient;Listening;Demonstration;Pictures/Video   Education Comments PTRx   Plan   Home program above stretches   Updates to plan of care 1x/wk   Plan for next session strengthening glut; belly; cat cow, down dog; MFR abdomine; trac   Total Session Time   Timed Code Treatment Minutes 40   Total Treatment Time (sum of timed and untimed services) 55         DISCHARGE  Reason for Discharge: Patient has failed to schedule further appointments.    Equipment Issued: none    Discharge Plan: Patient to continue home program.    Referring Provider:  José Miguel Zavala

## 2024-06-23 ENCOUNTER — HEALTH MAINTENANCE LETTER (OUTPATIENT)
Age: 59
End: 2024-06-23

## 2024-07-04 DIAGNOSIS — I87.1 SVC SYNDROME: ICD-10-CM

## 2024-07-09 RX ORDER — ASPIRIN 81 MG/1
81 TABLET, CHEWABLE ORAL DAILY
Qty: 370 TABLET | Refills: 0 | OUTPATIENT
Start: 2024-07-09

## 2024-07-11 ENCOUNTER — TELEPHONE (OUTPATIENT)
Dept: INTERNAL MEDICINE | Facility: CLINIC | Age: 59
End: 2024-07-11
Payer: MEDICARE

## 2024-07-11 NOTE — TELEPHONE ENCOUNTER
Spoke to patient, she is having burning/pain when urinating, frequency and odor to urine.  Patient sates the Bactrim does not get rid of her infection anymore.  Patient was advised to go to Urgent care to have UA/UC done.  Patient acknowledged understanding.     Maribel Arambula RN on 7/11/2024 at 1:39 PM

## 2024-07-11 NOTE — TELEPHONE ENCOUNTER
[facility] Call Center    Phone Message    May a detailed message be left on voicemail: yes     Reason for Call: Other: Per pt due to her symptoms of urinary infection she would like to request the same medication that was RX to her before. Pt doesn't remember what medication but she knows it was an antibiotics. Please call pt back . Thank you!     Action Taken: Message routed to:  [facility]    Travel Screening: Not Applicable     Date of Service:

## 2024-07-12 DIAGNOSIS — F33.1 MAJOR DEPRESSIVE DISORDER, RECURRENT, MODERATE (H): ICD-10-CM

## 2024-07-12 DIAGNOSIS — F32.2 CURRENT SEVERE EPISODE OF MAJOR DEPRESSIVE DISORDER WITHOUT PSYCHOTIC FEATURES, UNSPECIFIED WHETHER RECURRENT (H): ICD-10-CM

## 2024-07-12 RX ORDER — DULOXETIN HYDROCHLORIDE 60 MG/1
120 CAPSULE, DELAYED RELEASE ORAL EVERY EVENING
Qty: 60 CAPSULE | Refills: 0 | Status: SHIPPED | OUTPATIENT
Start: 2024-07-12 | End: 2024-08-01

## 2024-07-12 RX ORDER — ARIPIPRAZOLE 5 MG/1
5 TABLET ORAL DAILY
Qty: 30 TABLET | Refills: 0 | Status: SHIPPED | OUTPATIENT
Start: 2024-07-12 | End: 2024-08-01

## 2024-07-12 NOTE — TELEPHONE ENCOUNTER
"Date of Last Office Visit: 6/7/2024  [facility]  RTC: 8 wks  No shows: 0  Cancellations: 0  Date of Next Office Visit:    8/1/2024 1:50 PM (60 min)  Carol   Arrive by:  1:35 PM   ADULT PSYCHIATRY RETURN    [facility]PSY ([facility])   Tatum Mason MD     ------------------------------    Incoming refill from Pharmacy via interface  Medication requested:    ARIPiprazole (ABILIFY) 5 MG tablet 30 tablet 2 4/18/2024 -- No   Sig - Route: Take 1 tablet (5 mg) by mouth daily - Oral     DULoxetine (CYMBALTA) 60 MG capsule 60 capsule 0 6/18/2024 -- No   Sig - Route: Take 2 capsules (120 mg) by mouth every evening     ------------------------------  From last visit note:   \"- continue duloxetine 120 mg at night   - continue Abilify 5 mg/day now night time\"       Refill decision: Medication refilled 30d per protocol.     "

## 2024-07-23 DIAGNOSIS — I87.1 SVC SYNDROME: ICD-10-CM

## 2024-07-24 RX ORDER — ASPIRIN 81 MG/1
81 TABLET, CHEWABLE ORAL DAILY
Qty: 200 TABLET | Refills: 2 | Status: SHIPPED | OUTPATIENT
Start: 2024-07-24

## 2024-07-29 ENCOUNTER — E-VISIT (OUTPATIENT)
Dept: URGENT CARE | Facility: CLINIC | Age: 59
End: 2024-07-29
Payer: MEDICARE

## 2024-07-29 DIAGNOSIS — J01.90 ACUTE SINUSITIS WITH SYMPTOMS > 10 DAYS: Primary | ICD-10-CM

## 2024-07-29 PROCEDURE — 99421 OL DIG E/M SVC 5-10 MIN: CPT | Performed by: FAMILY MEDICINE

## 2024-07-29 RX ORDER — IPRATROPIUM BROMIDE 42 UG/1
2 SPRAY, METERED NASAL 4 TIMES DAILY
Qty: 15 ML | Refills: 0 | Status: SHIPPED | OUTPATIENT
Start: 2024-07-29

## 2024-07-29 NOTE — PATIENT INSTRUCTIONS
You may want to try a nasal lavage (also known as nasal irrigation). You can find over-the-counter products, such as Neti-Pot, at retail locations or make your own at home. Instructions for homemade nasal lavage and more information on the process are available online at http://www.aafp.org/afp/2009/1115/p1121.html.    Dear Sherly Yeung    After reviewing your responses, I've been able to diagnose you with Acute sinusitis with symptoms > 10 days.      Based on your responses and diagnosis, I have prescribed   Orders Placed This Encounter   Medications     ipratropium (ATROVENT) 0.06 % nasal spray     Sig: Spray 2 sprays into both nostrils 4 times daily     Dispense:  15 mL     Refill:  0      to treat your symptoms. I have sent this to your pharmacy.?     It is also important to stay well hydrated, get lots of rest and take over-the-counter decongestants,?tylenol?or ibuprofen if you?are able to?take those medications per your primary care provider to help relieve discomfort.?     It is important that you take?all of?your prescribed medication even if your symptoms are improving after a few doses.? Taking?all of?your medicine helps prevent the symptoms from returning.?     If your symptoms worsen, you develop severe headache, vomiting, high fever (>102), or are not improving in 7 days, please contact your primary care provider for an appointment or visit any of our convenient Walk-in Care or Urgent Care Centers to be seen which can be found on our website?here.?     Thanks again for choosing?us?as your health care partner,?   ?  ZAIRA MCCULLOUGH CNP?   Thank you for choosing us for your care. I have placed an order for a prescription so that you can start treatment. View your full visit summary for details by clicking on the link below. Your pharmacist will able to address any questions you may have about the medication.     If you're not feeling better within 5-7 days, please schedule an appointment.  You can  schedule an appointment right here in [facility], or call [phone]  If the visit is for the same symptoms as your eVisit, we'll refund the cost of your eVisit if seen within seven days.

## 2024-08-01 ENCOUNTER — VIRTUAL VISIT (OUTPATIENT)
Dept: PSYCHIATRY | Facility: CLINIC | Age: 59
End: 2024-08-01
Attending: PSYCHIATRY & NEUROLOGY
Payer: MEDICARE

## 2024-08-01 ENCOUNTER — MYC MEDICAL ADVICE (OUTPATIENT)
Dept: PSYCHIATRY | Facility: CLINIC | Age: 59
End: 2024-08-01
Payer: MEDICARE

## 2024-08-01 DIAGNOSIS — Z79.899 ENCOUNTER FOR LONG-TERM (CURRENT) USE OF HIGH-RISK MEDICATION: ICD-10-CM

## 2024-08-01 DIAGNOSIS — F33.41 MAJOR DEPRESSIVE DISORDER, RECURRENT EPISODE, IN PARTIAL REMISSION (H): Primary | ICD-10-CM

## 2024-08-01 PROCEDURE — 90792 PSYCH DIAG EVAL W/MED SRVCS: CPT | Mod: 95

## 2024-08-01 RX ORDER — METHYLPHENIDATE HYDROCHLORIDE 20 MG/1
20 CAPSULE, EXTENDED RELEASE ORAL DAILY
Qty: 23 CAPSULE | Refills: 0 | Status: SHIPPED | OUTPATIENT
Start: 2024-08-08 | End: 2024-08-28

## 2024-08-01 RX ORDER — METHYLPHENIDATE HYDROCHLORIDE 20 MG/1
60 CAPSULE, EXTENDED RELEASE ORAL DAILY
Qty: 90 CAPSULE | Refills: 0 | Status: SHIPPED | OUTPATIENT
Start: 2024-08-31 | End: 2024-08-01

## 2024-08-01 RX ORDER — METHYLPHENIDATE HYDROCHLORIDE 20 MG/1
CAPSULE, EXTENDED RELEASE ORAL
Qty: 83 CAPSULE | Refills: 0 | Status: SHIPPED | OUTPATIENT
Start: 2024-08-01 | End: 2024-08-01

## 2024-08-01 RX ORDER — METHYLPHENIDATE HYDROCHLORIDE 40 MG/1
40 CAPSULE, EXTENDED RELEASE ORAL DAILY
Qty: 30 CAPSULE | Refills: 0 | Status: SHIPPED | OUTPATIENT
Start: 2024-08-01 | End: 2024-08-28

## 2024-08-01 RX ORDER — DULOXETIN HYDROCHLORIDE 60 MG/1
120 CAPSULE, DELAYED RELEASE ORAL EVERY EVENING
Qty: 60 CAPSULE | Refills: 0 | Status: SHIPPED | OUTPATIENT
Start: 2024-08-01 | End: 2024-08-27

## 2024-08-01 RX ORDER — ARIPIPRAZOLE 5 MG/1
5 TABLET ORAL DAILY
Qty: 30 TABLET | Refills: 0 | Status: SHIPPED | OUTPATIENT
Start: 2024-08-01 | End: 2024-08-27

## 2024-08-01 ASSESSMENT — PAIN SCALES - GENERAL: PAINLEVEL: MILD PAIN (3)

## 2024-08-01 NOTE — NURSING NOTE
Current patient location: [address]  [facility] [address]    Is the patient currently in the state of [place]? YES    Visit mode:VIDEO    If the visit is dropped, the patient can be reconnected by: VIDEO VISIT: Text to cell phone:   Telephone Information:   Mobile [phone]       Will anyone else be joining the visit? NO  (If patient encounters technical issues they should call [phone]    How would you like to obtain your AVS? MyChart    Are changes needed to the allergy or medication list? No    Are refills needed on medications prescribed by this physician? Will need refill on    methylphenidate (RITALIN) 20 MG tablet     Rooming Documentation:  Assigned questionnaire(s) completed      Reason for visit: RECHECK    Delphine ACOSTAF

## 2024-08-01 NOTE — PROGRESS NOTES
Virtual Visit Details    Type of service:  Video Visit   Video Start Time:  1:50pm  Video End Time: 2:50pm    Originating Location (pt. Location): Home    Distant Location (provider location):  On-site  Platform used for Video Visit: Kourtney

## 2024-08-01 NOTE — PATIENT INSTRUCTIONS
**For crisis resources, please see the information at the end of this document**   Patient Education    Thank you for coming to the [facility] [place] [facility].     Lab Testing:  If you had lab testing today and your results are reassuring or normal they will be mailed to you or sent through [employer] within 7 days. If the lab tests need quick action we will call you with the results. The phone number we will call with results is # [phone]. If this is not the best number please call our clinic and change the number.     Medication Refills:  If you need any refills please call your pharmacy and they will contact us. Our fax number for refills is [phone].   Three business days of notice are needed for general medication refill requests.   Five business days of notice are needed for controlled substance refill requests.   If you need to change to a different pharmacy, please contact the new pharmacy directly. The new pharmacy will help you get your medications transferred.     Contact Us:  Please call [phone] during business hours (8-5:00 M-F).   If you have medication related questions after clinic hours, or on the weekend, please call [phone].     Financial Assistance [phone]   Medical Records [phone]       MENTAL HEALTH CRISIS RESOURCES:  For a emergency help, please call 911 or go to the nearest Emergency Department.     Emergency Walk-In Options:   EmPATH Unit @ [facility] ([place]): [phone] - Specialized mental health emergency area designed to be calming  [facility] ([place]): [phone]  [facility] ([place]): [phone]  [facility]): [phone]    [facility] Crisis Information:   [place]: [phone]  [place]: [phone]  [place] ([place]) - Adult: [phone]     Child: [phone]  [place] - Adult: [phone]     Child: [phone]  [place]:  [phone]  List of all [facility] resources:   https://[place].gov/dhs/people-we-serve/adults/health-care/mental-health/resources/crisis-contacts.jsp    National Crisis Information:   Crisis Text Line: Text  [place]  to 673465  Suicide & Crisis Lifeline: 988  National Suicide Prevention Lifeline: 1-057-099-TALK (1-568.526.2492)       For online chat options, visit https://suicidepreventionlifeline.org/chat/  Poison Control Center: 3-057-345-7710  Trans Lifeline: 5-368-712-1204 - Hotline for transgender people of all ages  [employer]: 8-674-388-4634 - Hotline for LGBT youth     For Non-Emergency Support:   Fast Tracker: Mental Health & Substance Use Disorder Resources -   https://www.[employer].org/
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
## 2024-08-01 NOTE — TELEPHONE ENCOUNTER
Writer spoke with [facility], who confirmed they received updated order for Ritalin LA 40 mg, with additional order to add 20 mg starting 8/8/24. They are wanting to clarify orders. Will reach out to Provider and update patient and pharmacy.

## 2024-08-02 NOTE — TELEPHONE ENCOUNTER
Payam Stewart MD   to Laya Erickson RN Murphy, Megan, RN Me Thitiseranee, Lalita, MD       8/1/24  6:11 PM  Hello everyone,  The methylphenidate ER (Ritalin LA) orders should be:  1. Methylphenidate ER 40mg, #30, sig 1 cap po qam (TDD 40mg for first week; starting day 8 TDD 60mg).  2. Methylphenidate ER 20mg, #23, sig 1 cap po qam starting on 8/8/2024 (TDD starting 8/8/2024 60mg).    Thank you very much for your efforts.    Payam Stewart MD    Follow up:     Reached out to [facility] and spoke with pharmacist, Juan M. Relayed above plan for patient to take Methylphenidate ER 40 mg X 7 days and on 8/8 increase to 60 mg daily. He verbalized understanding. Will notify patient via [email or web address].

## 2024-08-06 ENCOUNTER — OFFICE VISIT (OUTPATIENT)
Dept: UROLOGY | Facility: CLINIC | Age: 59
End: 2024-08-06
Payer: MEDICARE

## 2024-08-06 VITALS
WEIGHT: 145 LBS | HEIGHT: 63 IN | OXYGEN SATURATION: 94 % | HEART RATE: 79 BPM | BODY MASS INDEX: 25.69 KG/M2 | DIASTOLIC BLOOD PRESSURE: 82 MMHG | SYSTOLIC BLOOD PRESSURE: 118 MMHG

## 2024-08-06 DIAGNOSIS — R39.15 URINARY URGENCY: ICD-10-CM

## 2024-08-06 DIAGNOSIS — N39.0 RECURRENT UTI: Primary | ICD-10-CM

## 2024-08-06 PROCEDURE — 51798 US URINE CAPACITY MEASURE: CPT | Performed by: STUDENT IN AN ORGANIZED HEALTH CARE EDUCATION/TRAINING PROGRAM

## 2024-08-06 PROCEDURE — 52000 CYSTOURETHROSCOPY: CPT | Performed by: STUDENT IN AN ORGANIZED HEALTH CARE EDUCATION/TRAINING PROGRAM

## 2024-08-06 RX ADMIN — Medication 500 MG: at 11:26

## 2024-08-06 NOTE — PROGRESS NOTES
Reason for cystoscopy: microscopic hematuria    Brief History:  Sherly Yeung is a very pleasant AGE: 58 year old year old person  CKD stage 2, subclavian vein thrombosis, prolonged QT interval, GERD, HTN, pulmonary embolism, and constipation.     She has a long history of recurrent urinary tract infection.  She has been seeing one of my partners Dr. Youngblood initially.  She had a cystoscopy in 2020 that was unremarkable.  She was referred to pelvic floor physical therapy.    In 2022 she saw Dr. Moore.  Standing orders for urine cultures were placed    In 2023 she is not [facility].  She reported having nocturia x 1.  She is reports wearing 3-4 pads per day.  She takes a daily cranberry supplement.  She was on tolterodine 4 mg daily and Myrbetriq 25 mg daily was added    In April 2024 she was seen for follow-up.  She noticed some improvement with Myrbetriq and so was increased to 50 mg    In August 2023 she had symptoms of a UTI and was given nitrofurantoin    In March 2024 since she was continuing to have microhematuria without signs of infection a CT urogram and cystoscopy was set up.  She is not here for the cystoscopy    The following distinct labs were reviewed   Most Recent 3 CBC's:  Recent Labs   Lab Test 05/24/24  1909 04/23/24  1214 02/16/24  1425   WBC 9.3 14.6* 7.8   HGB 13.4 14.5 14.0   MCV 96 93 94    421 330     Most Recent 3 BMP's:  Recent Labs   Lab Test 05/24/24  1909 04/23/24  1214 02/16/24  1425    139 140   POTASSIUM 4.5 5.4* 4.7   CHLORIDE 104 101 103   CO2 28 29 28   BUN 15.1 18.3 11.0   CR 0.94 0.87 0.87   ANIONGAP 11 9 9   TERESA 9.7 10.0 9.2   GLC 99 98 96       Imaging  IMPRESSION:  1. Multiple small nonobstructing stones in the mid to upper left  kidney.  2. No ureteral calculi or hydronephrosis.  3. No evidence of enhancing mass through the kidneys or bladder.      CYSTOSCOPY  After informed consent was obtained, the patient was brought to the procedure room where she was placed  in the lithotomy position with all pressure points well padded.  She was prepped and draped in a sterile fashion. A flexible cystoscope was introduced through a well-lubricated urethra.  The bladder was examined carefully and systematically. The scope was retroflexed. There were no tumors or stones present in the bladder. The bladder demonstrated no trabeculation. The scope was removed slowly to examine the urethra which was normal.     PLAN:  #1 microscopic hematuria  Normal cystoscopy and CT urogram  Perhaps from nonobstructing stones in the kidney    #2  Recurrent urinary tract infections  We discussed adding vaginal estrogen cream as some studies have shown it helps reduce the risk of infections  She wants to give this a try  I will prescribe Premarin cream

## 2024-08-06 NOTE — NURSING NOTE
"Chief Complaint   Patient presents with    Cystoscopy     Cysto per fermín       Vitals:    08/06/24 1034   Weight: 65.8 kg (145 lb)   Height: 1.6 m (5' 3\")     Wt Readings from Last 1 Encounters:   08/06/24 65.8 kg (145 lb)     Paz Jimenez MA      "

## 2024-08-06 NOTE — NURSING NOTE
Patient voided, post void residual ranged from 61mls to 81 mls. Keflex 500 mg orally was given per Dr Martinez.    zahra narayan LPN

## 2024-08-07 ENCOUNTER — TELEPHONE (OUTPATIENT)
Dept: UROLOGY | Facility: CLINIC | Age: 59
End: 2024-08-07
Payer: MEDICARE

## 2024-08-07 ENCOUNTER — TELEPHONE (OUTPATIENT)
Dept: SURGERY | Facility: CLINIC | Age: 59
End: 2024-08-07
Payer: MEDICARE

## 2024-08-07 DIAGNOSIS — N39.0 RECURRENT UTI: Primary | ICD-10-CM

## 2024-08-07 RX ORDER — ESTRADIOL 0.1 MG/G
2 CREAM VAGINAL
Qty: 42.5 G | Refills: 3 | Status: SHIPPED | OUTPATIENT
Start: 2024-08-08

## 2024-08-07 NOTE — TELEPHONE ENCOUNTER
[facility] Call Center    Phone Message    May a detailed message be left on voicemail: no     Reason for Call: Other: Patients pharmacy called stating they need to clarify the directions for estradiol (ESTRACE) 0.1 MG/GM vaginal cream. Please call pharmacy to discuss.     Action Taken: Other: [facility]    Travel Screening: Not Applicable

## 2024-08-07 NOTE — TELEPHONE ENCOUNTER
Writer called back to [facility]  Spoke with pharmacist there  They wanted clarification 2mg daily vs pea sized amount daily  Writer provided clarification to go with peas sized amount daily and follow through with the rest of the sig as written.  Pharmacist said this would work.    Bala KNIGHT [facility]

## 2024-08-11 DIAGNOSIS — R05.1 ACUTE COUGH: ICD-10-CM

## 2024-08-14 NOTE — TELEPHONE ENCOUNTER
montelukast (SINGULAIR) 10 MG tablet       Last Written Prescription Date:  4/23/24  Last Fill Quantity: 30,   # refills: 3  Last Office Visit : 4/23/24  Future Office visit:  None    Routing refill request to provider for review/approval because:  Leukotriene Inhibitors Protocol [id number]/11/2024 05:04 AM   Protocol Details Medication indicated for associated diagnosis   Elma PADGETT RN  [facility]/Red Flag Triage & Med Refill Team

## 2024-08-16 RX ORDER — MONTELUKAST SODIUM 10 MG/1
1 TABLET ORAL AT BEDTIME
Qty: 30 TABLET | Refills: 3 | Status: SHIPPED | OUTPATIENT
Start: 2024-08-16

## 2024-08-26 DIAGNOSIS — F32.2 CURRENT SEVERE EPISODE OF MAJOR DEPRESSIVE DISORDER WITHOUT PSYCHOTIC FEATURES, UNSPECIFIED WHETHER RECURRENT (H): ICD-10-CM

## 2024-08-26 DIAGNOSIS — F33.1 MAJOR DEPRESSIVE DISORDER, RECURRENT, MODERATE (H): ICD-10-CM

## 2024-08-26 ASSESSMENT — PATIENT HEALTH QUESTIONNAIRE - PHQ9
SUM OF ALL RESPONSES TO PHQ QUESTIONS 1-9: 3
SUM OF ALL RESPONSES TO PHQ QUESTIONS 1-9: 3
10. IF YOU CHECKED OFF ANY PROBLEMS, HOW DIFFICULT HAVE THESE PROBLEMS MADE IT FOR YOU TO DO YOUR WORK, TAKE CARE OF THINGS AT HOME, OR GET ALONG WITH OTHER PEOPLE: SOMEWHAT DIFFICULT

## 2024-08-27 ENCOUNTER — VIRTUAL VISIT (OUTPATIENT)
Dept: PSYCHOLOGY | Facility: CLINIC | Age: 59
End: 2024-08-27
Payer: MEDICARE

## 2024-08-27 DIAGNOSIS — F33.0 MDD (MAJOR DEPRESSIVE DISORDER), RECURRENT EPISODE, MILD (H): Primary | ICD-10-CM

## 2024-08-27 PROCEDURE — 90834 PSYTX W PT 45 MINUTES: CPT | Mod: 95 | Performed by: SOCIAL WORKER

## 2024-08-27 RX ORDER — DULOXETIN HYDROCHLORIDE 60 MG/1
120 CAPSULE, DELAYED RELEASE ORAL EVERY EVENING
Qty: 60 CAPSULE | Refills: 0 | Status: SHIPPED | OUTPATIENT
Start: 2024-08-27 | End: 2024-09-25

## 2024-08-27 RX ORDER — ARIPIPRAZOLE 5 MG/1
5 TABLET ORAL DAILY
Qty: 30 TABLET | Refills: 0 | Status: SHIPPED | OUTPATIENT
Start: 2024-08-27 | End: 2024-09-25

## 2024-08-27 ASSESSMENT — ANXIETY QUESTIONNAIRES
5. BEING SO RESTLESS THAT IT IS HARD TO SIT STILL: NOT AT ALL
IF YOU CHECKED OFF ANY PROBLEMS ON THIS QUESTIONNAIRE, HOW DIFFICULT HAVE THESE PROBLEMS MADE IT FOR YOU TO DO YOUR WORK, TAKE CARE OF THINGS AT HOME, OR GET ALONG WITH OTHER PEOPLE: NOT DIFFICULT AT ALL
GAD7 TOTAL SCORE: 2
3. WORRYING TOO MUCH ABOUT DIFFERENT THINGS: NOT AT ALL
6. BECOMING EASILY ANNOYED OR IRRITABLE: NOT AT ALL
GAD7 TOTAL SCORE: 2
1. FEELING NERVOUS, ANXIOUS, OR ON EDGE: SEVERAL DAYS
7. FEELING AFRAID AS IF SOMETHING AWFUL MIGHT HAPPEN: NOT AT ALL
2. NOT BEING ABLE TO STOP OR CONTROL WORRYING: SEVERAL DAYS

## 2024-08-27 ASSESSMENT — PATIENT HEALTH QUESTIONNAIRE - PHQ9: 5. POOR APPETITE OR OVEREATING: NOT AT ALL
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
## 2024-08-27 NOTE — TELEPHONE ENCOUNTER
"Date of Last Office Visit: 8/1/2024  [facility]      RTC: 6wks  No shows: 0  Cancellations: 0  Date of Next Office Visit:    11/7/2024 3:50 PM (30 min)  Carol   Arrive by:  3:35 PM   ADULT PSYCHIATRY RETURN    URPSY ([facility])   Tatum Mason MD     ------------------------------    Medication requested:    ARIPiprazole (ABILIFY) 5 MG tablet 30 tablet 0 8/1/2024 -- No   Sig - Route: Take 1 tablet (5 mg) by mouth daily - Oral     DULoxetine (CYMBALTA) 60 MG capsule 60 capsule 0 8/1/2024 -- No   Sig - Route: Take 2 capsules (120 mg) by mouth every evening - Oral     ------------------------------  From last visit note:   \"- Change Ritalin IR  to LA 40mg in the morning for one week then additional 20mg daily for total of 60 after one week   - Continue Duloxetine 120 mg at bedtime (drowsiness if taken during daytime)   - Continue Abilify 5 mg/day at bedtime   - PRN Lorazepam 10 tablets 0.5 mg for panic attacks only for 2 weeks, not taken between visits (has not needed it for months      Other pertinent medications not managed by psychiatry:   - Oxycodone (1-2 per week for migraine)  - OxyContin (1-2 per week endometriosis)  - hydromorphone (1-2 per month migraine)  - Sumatriptan \"       Refill decision: Medication refilled per protocol.                       "

## 2024-08-28 DIAGNOSIS — F33.41 MAJOR DEPRESSIVE DISORDER, RECURRENT EPISODE, IN PARTIAL REMISSION (H): ICD-10-CM

## 2024-08-28 RX ORDER — METHYLPHENIDATE HYDROCHLORIDE 40 MG/1
40 CAPSULE, EXTENDED RELEASE ORAL DAILY
Qty: 30 CAPSULE | Refills: 0 | Status: SHIPPED | OUTPATIENT
Start: 2024-08-28 | End: 2024-09-19

## 2024-08-28 RX ORDER — METHYLPHENIDATE HYDROCHLORIDE 20 MG/1
20 CAPSULE, EXTENDED RELEASE ORAL DAILY
Qty: 23 CAPSULE | Refills: 0 | Status: SHIPPED | OUTPATIENT
Start: 2024-08-28 | End: 2024-09-19

## 2024-08-28 NOTE — TELEPHONE ENCOUNTER
[facility]    Phone Message    May a detailed message be left on voicemail: yes     Reason for Call: Medication Refill Request    Has the patient contacted the pharmacy for the refill? Yes   Name of medication being requested: Ritalin 20mg & Ritalin 40mg  Provider who prescribed the medication: Dr. Mason  Pharmacy:    [facility] [place] [address] [address]   Date medication is needed: Caller stated she has a few days left of the medication.    Action Taken: Message routed to:  Other: [facility] Nurse [place]    Travel Screening: Not Applicable     Date of Service:

## 2024-08-28 NOTE — TELEPHONE ENCOUNTER
Last seen: 8/1/24  RTC: 6 weeks  Cancel: none  No-show: none  Next appt: 11/7/24     Incoming refill from Patient via phone    Medication requested:   Pending Prescriptions:                       Disp   Refills    methylphenidate (RITALIN LA) 20 MG 24 hr *23 cap*0            Sig: Take 1 capsule (20 mg) by mouth daily.    methylphenidate (RITALIN LA) 40 MG 24 hr *30 cap*0            Sig: Take 1 capsule (40 mg) by mouth daily.    Last refill per       -Per the outside med tab, the medications were also filled for the month of August, which matches her current request.    From chart note:   - Change Ritalin IR  to LA 40mg in the morning for one week then additional 20mg daily for total of 60 after one week     Medication unable to be refilled by RN due to criteria not met as indicated.                 []Eligibility - not seen in the last year              []Supervision - no future appointment              []Compliance - no shows, cancellations or lapse in therapy              []Verification - order discrepancy              [x]Controlled medication              []Medication not included in policy              []90-day supply request              []Other:      Ameena Cabrera RN on 8/28/2024 at 10:52 AM

## 2024-08-28 NOTE — TELEPHONE ENCOUNTER
Alie BLANCAS,     I have reviewed the pended controlled substance medication refill request and it is appropriate based on last fill/ review. Next appointment with me is 11/7/24       Thank you,    Tatum

## 2024-09-09 ENCOUNTER — HOSPITAL ENCOUNTER (OUTPATIENT)
Dept: CT IMAGING | Facility: CLINIC | Age: 59
Discharge: HOME | End: 2024-09-09
Attending: INTERNAL MEDICINE | Admitting: INTERNAL MEDICINE
Payer: MEDICARE

## 2024-09-09 DIAGNOSIS — J32.9 SINUSITIS, UNSPECIFIED CHRONICITY, UNSPECIFIED LOCATION: ICD-10-CM

## 2024-09-09 PROCEDURE — 70486 CT MAXILLOFACIAL W/O DYE: CPT | Mod: MG

## 2024-09-17 ENCOUNTER — VIRTUAL VISIT (OUTPATIENT)
Dept: PSYCHOLOGY | Facility: CLINIC | Age: 59
End: 2024-09-17
Payer: MEDICARE

## 2024-09-17 DIAGNOSIS — F33.0 MDD (MAJOR DEPRESSIVE DISORDER), RECURRENT EPISODE, MILD (H): Primary | ICD-10-CM

## 2024-09-17 PROCEDURE — 90834 PSYTX W PT 45 MINUTES: CPT | Mod: 95 | Performed by: SOCIAL WORKER

## 2024-09-17 NOTE — PROGRESS NOTES
[facility]                                     Progress Note    Patient Name: Sherly Yeung  Date: 9/17/2024         Service Type: Individual      Session Start Time: 8:04     Session End Time:8:56    Session Length: 52    Session #: 26    Attendees: Client    Service Modality:  Video Visit:      Provider verified identity through the following two step process.  Patient provided:  Patient is known previously to provider    Telemedicine Visit: The patient's condition can be safely assessed and treated via synchronous audio and visual telemedicine encounter.      Reason for Telemedicine Visit: Services only offered telehealth    Originating Site (Patient Location): Patient's home    Distant Site (Provider Location): Provider Remote Setting- Home Office    Consent:  The patient/guardian has verbally consented to: the potential risks and benefits of telemedicine (video visit) versus in person care; bill my insurance or make self-payment for services provided; and responsibility for payment of non-covered services.     Patient would like the video invitation sent by:  My Chart    Mode of Communication:  Video Conference via [facility]well    Distant Location (Provider):  Off-site    As the provider I attest to compliance with applicable laws and regulations related to telemedicine.    DATA  Interactive Complexity: No     Crisis: No      Progress Since Last Session (Related to Symptoms / Goals / Homework):   Symptoms: Worsening Depression    Homework: Partially completed      Episode of Care Goals: Satisfactory progress - ACTION (Actively working towards change); Intervened by reinforcing change plan / affirming steps taken     Current / Ongoing Stressors and Concerns:  Patient has been doing better given the last several months. She has been active. Helping her mom. Notes depressed mood is under control. Despite mom who has been sick lately, she notes family is doing better including niece. She is back in  school.  Patient did not go to [place] to see Son. They both are in touch and will plan for a different time. Patient has a plan to write about her experience with Covid in a form of testimony.  Has been working on organizing her home. Discussed options to not do so without having to be overwhelmed. Her next visit is in 2 weeks.         Treatment Objective(s) Addressed in This Session:         Intervention:  ACT: focusing on strengths and progress as noted.   Safety plan: Reinforced     MI Intervention: Expressed Empathy/Understanding, Supported Autonomy, Collaboration, Evocation, Permission to raise concern or advise, and Open-ended questions     Change Talk Expressed by the Patient: Taking steps    Provider Response to Change Talk: E - Evoked more info from patient about behavior change, A - Affirmed patient's thoughts, decisions, or attempts at behavior change, R - Reflected patient's change talk, and S - Summarized patient's change talk statements     Assessments completed prior to visit:2/23/2023    The following assessments were completed by patient for this visit:  PHQ2:       4/18/2024    10:22 AM 2/23/2024     3:02 PM 1/12/2024     2:03 PM 12/27/2023     8:39 AM 4/27/2022    10:07 AM 8/11/2020     9:37 AM 3/24/2020     3:46 PM   PHQ-2 ( 1999 [employer])   Q1: Little interest or pleasure in doing things 0 2 0 0 0 0 0   Q2: Feeling down, depressed or hopeless 0 3 1 1 0 0 0   PHQ-2 Score 0 5 1 1 0 0 0   PHQ-2 Total Score (12-17 Years)- Positive if 3 or more points; Administer PHQ-A if positive      0 0     PHQ9:       5/6/2024     9:15 AM 5/21/2024     7:59 AM 6/6/2024     9:41 AM 6/11/2024     2:35 PM 8/1/2024     9:12 AM 8/26/2024    10:50 AM 9/16/2024     8:17 AM   PHQ-9 SCORE   PHQ-9 Total Score MyChart 1 (Minimal depression) 1 (Minimal depression) 1 (Minimal depression) 1 (Minimal depression) 2 (Minimal depression) 3 (Minimal depression) 4 (Minimal depression)   PHQ-9 Total Score 1 1 1 1 2 3 4     GAD2:        3/25/2024     4:52 PM 4/2/2024    10:49 AM 4/20/2024    10:35 AM 4/30/2024     9:40 AM 5/14/2024     9:32 AM 8/22/2024     9:18 AM 9/16/2024     8:17 AM   ROCKY-2   Feeling nervous, anxious, or on edge 3 3 0 0 0 1 1   Not being able to stop or control worrying 2 2 0 0 0 0 0   ROCKY-2 Total Score 5 5    5 0 0 0 1 1     GAD7:       12/13/2023     1:54 PM 1/7/2024    10:08 AM 1/22/2024    10:13 AM 2/19/2024     1:36 PM 3/25/2024     4:52 PM 6/12/2024     9:56 PM 8/27/2024     5:53 PM   ROCKY-7 SCORE   Total Score  5 (mild anxiety) 6 (mild anxiety) 8 (mild anxiety) 10 (moderate anxiety)     Total Score 3 5 6 8 10 3 2     CAGE-AID:       2/9/2023    11:52 AM 7/17/2023     9:10 AM   CAGE-AID Total Score   Total Score 0 0   Total Score MyChart 0 (A total score of 2 or greater is considered clinically significant)      PROMIS 10-Global Health (all questions and answers displayed):       2/19/2024     1:38 PM 4/2/2024    10:50 AM 4/20/2024    10:37 AM 4/30/2024     9:42 AM 5/14/2024     9:34 AM 8/22/2024     9:19 AM 9/16/2024     8:19 AM   PROMIS 10   In general, would you say your health is: Good Good Good Good Very good Good Good   In general, would you say your quality of life is: Good Fair Very good Very good Very good Very good Very good   In general, how would you rate your physical health? Good Good Fair Good Good Good Good   In general, how would you rate your mental health, including your mood and your ability to think? Fair Poor Very good Very good Very good Very good Very good   In general, how would you rate your satisfaction with your social activities and relationships? Fair Fair Very good Very good Very good Very good Very good   In general, please rate how well you carry out your usual social activities and roles Fair Poor Good Very good Good Fair Good   To what extent are you able to carry out your everyday physical activities such as walking, climbing stairs, carrying groceries, or moving a chair? Moderately  Mostly Moderately Mostly Moderately Moderately Moderately   In the past 7 days, how often have you been bothered by emotional problems such as feeling anxious, depressed, or irritable? Always Always Rarely Never Never Rarely Rarely   In the past 7 days, how would you rate your fatigue on average? Moderate Moderate Moderate Mild Moderate Moderate Moderate   In the past 7 days, how would you rate your pain on average, where 0 means no pain, and 10 means worst imaginable pain? 5 5 5 5 5 5 5   In general, would you say your health is: 3 3 3 3 4 3 3   In general, would you say your quality of life is: 3 2 4 4 4 4 4   In general, how would you rate your physical health? 3 3 2 3 3 3 3   In general, how would you rate your mental health, including your mood and your ability to think? 2 1 4 4 4 4 4   In general, how would you rate your satisfaction with your social activities and relationships? 2 2 4 4 4 4 4   In general, please rate how well you carry out your usual social activities and roles. (This includes activities at home, at work and in your community, and responsibilities as a parent, child, spouse, employee, friend, etc.) 2 1 3 4 3 2 3   To what extent are you able to carry out your everyday physical activities such as walking, climbing stairs, carrying groceries, or moving a chair? 3 4 3 4 3 3 3   In the past 7 days, how often have you been bothered by emotional problems such as feeling anxious, depressed, or irritable? 5 5 2 1 1 2 2   In the past 7 days, how would you rate your fatigue on average? 3 3 3 2 3 3 3   In the past 7 days, how would you rate your pain on average, where 0 means no pain, and 10 means worst imaginable pain? 5 5 5 5 5 5 5   Global Mental Health Score 8 6    6 16 17 17 16 16   Global Physical Health Score 12 13    13 11 14 12 12 12   PROMIS TOTAL - SUBSCORES 20 19    19 27 31 29 28 28     PROMIS 10-Global Health (only subscores and total score):       2/19/2024     1:38 PM 4/2/2024    10:50  AM 4/20/2024    10:37 AM 4/30/2024     9:42 AM 5/14/2024     9:34 AM 8/22/2024     9:19 AM 9/16/2024     8:19 AM   PROMIS-10 Scores Only   Global Mental Health Score 8 6    6 16 17 17 16 16   Global Physical Health Score 12 13    13 11 14 12 12 12   PROMIS TOTAL - SUBSCORES 20 19    19 27 31 29 28 28     [place] Suicide Severity Rating Scale (Short Version)      3/20/2023     3:28 PM 7/17/2023     9:09 AM 9/13/2023     6:03 AM 12/6/2023     1:21 AM 2/16/2024    12:38 PM 4/13/2024     9:13 AM 5/24/2024     6:44 PM   [place] Suicide Severity Rating (Short Version)   Over the past 2 weeks have you felt down, depressed, or hopeless? no  yes no no     Over the past 2 weeks have you had thoughts of killing yourself? no  no no no     Have you ever attempted to kill yourself? no  no no no     Q1 Wished to be Dead (Past Month)      0-->no 0-->no   Q2 Suicidal Thoughts (Past Month)      0-->no 0-->no   Q6 Suicide Behavior (Lifetime)      0-->no 0-->no   Level of Risk per Screen      no risks indicated no risks indicated   1. Wish to be Dead (Since Last Contact)  N        2. Non-Specific Active Suicidal Thoughts (Since Last Contact)  N        Actual Attempt (Since Last Contact)  N        Has subject engaged in non-suicidal self-injurious behavior? (Since Last Contact)  N        Interrupted Attempts (Since Last Contact)  N        Aborted or Self-Interrupted Attempt (Since Last Contact)  N        Preparatory Acts or Behavior (Since Last Contact)  N        Suicide (Since Last Contact)  N        Calculated C-SSRS Risk Score (Since Last Contact)  No Risk Indicated              ASSESSMENT: Current Emotional / Mental Status (status of significant symptoms):   Risk status (Self / Other harm or suicidal ideation)   Patient denies current fears or concerns for personal safety.   Patient denies current or recent suicidal ideation or behaviors.   Patient denies current or recent homicidal ideation or behaviors.   Patient denies current  or recent self injurious behavior or ideation.   Patient denies other safety concerns.   Patient reports there has been no change in risk factors since their last session.     Patient reports there has been no change in protective factors since their last session.     Recommended that patient call 911 or go to the local ED should there be a change in any of these risk factors.   Would call [phone] if any SI.     Appearance:   Appropriate    Eye Contact:   Good    Psychomotor Behavior: Normal    Attitude:   Cooperative    Orientation:   Person Place Time Situation   Speech    Rate / Production: Normal/ Responsive    Volume:  Normal    Mood:    Depressed    Affect:    Appropriate    Thought Content:  Clear    Thought Form:  Coherent  Logical    Insight:    Good      Medication Review:   No changes to current psychiatric medication(s)     Medication Compliance:   Yes     Changes in Health Issues:   None reported     Chemical Use Review:   Substance Use: Chemical use reviewed, no active concerns identified      Tobacco Use: No current tobacco use.      Diagnosis:  1. MDD (major depressive disorder), recurrent episode, mild (H24)      Collateral Reports Completed:   Not Applicable    PLAN: (Patient Tasks / Therapist Tasks / Other):  Patient will implement her plan to organize her home today  Patient will also will reflect on today discussion about keeping up contact with son  Patient will keep up with self care    GERMAN Farmer           ______________________________________________    Individual Treatment Plan    Patient's Name: Sherly Yeung  YOB: 1965    Date of Creation: 2/23/2023    Date Treatment Plan Last Reviewed/Revised: 8/27/2024    DSM5 Diagnoses: 296.32 (F33.1) Major Depressive Disorder, Recurrent Episode, Moderate With anxious distress or 300.02 (F41.1) Generalized Anxiety Disorder  Grief reaction [F43.21]    Psychosocial / Contextual Factors: grief: father recently passed away.  "Family dynamic- relationship with son, mom. Feeling overwhelmed.    PROMIS (reviewed every 90 days): 25    Referral / Collaboration:    Referral to another professional/service is not indicated at this time..    Anticipated number of session for this episode of care: 9-12 sessions  Anticipation frequency of session: Biweekly  Anticipated Duration of each session: 38-52 minutes  Treatment plan will be reviewed in 90 days or when goals have been changed.     MeasurableTreatment Goal(s) related to diagnosis / functional impairment(s)    Goal 1: Patient will Accept the loss of beloved one and return to stable level of functioning as evidenced by a higher level of functioning as a result of acceptance.   Redevelop a supportive social system and improve interpersonal relationships and Express unresolved emotions regarding loss which brings anxiety and depression mood.      I will know I've met my goal when I have more energy and  I am able to celebrate good life with my father Vs the sadness of losing him.Redevelop a supportive social system and improve interpersonal relationships\"    Objective #A (Patient Action)    Patient will Identify negative self-talk and behaviors: challenge core beliefs, myths, and actions.  Status: Continued - Date(s): 8/27/2024    Intervention(s)  Therapist will teach emotional recognition/identification. Support in her in the process    Objective #B  Patient will identify at least 3 fears / thoughts that contribute to feeling anxious.  Status: Continued - Date(s): 8/27/2024    Intervention(s)  Therapist will teach thought challenging with CBT .    Objective #C  Patient will Identify negative self-talk and behaviors: challenge core beliefs, myths, and actions.  Status: Continued - Date(s): 8/27/2024    Intervention(s)  Therapist will provide space and time to process thoughts and feelings around current stressors. provide support and coping skills to help move on in life .    Patient has " reviewed and agreed to the above plan.    Erika Mello, [facility]  8/27/2024    Answers for HPI/ROS submitted by the patient on 4/20/2023  If you checked off any problems, how difficult have these problems made it for you to do your work, take care of things at home, or get along with other people?: Somewhat difficult  PHQ9 TOTAL SCORE: 5    Answers for HPI/ROS submitted by the patient on 4/26/2023  If you checked off any problems, how difficult have these problems made it for you to do your work, take care of things at home, or get along with other people?: Somewhat difficult  PHQ9 TOTAL SCORE: 3    Answers for HPI/ROS submitted by the patient on 5/4/2023  If you checked off any problems, how difficult have these problems made it for you to do your work, take care of things at home, or get along with other people?: Somewhat difficult  PHQ9 TOTAL SCORE: 4  Answers for HPI/ROS submitted by the patient on 7/17/2023  If you checked off any problems, how difficult have these problems made it for you to do your work, take care of things at home, or get along with other people?: Somewhat difficult  PHQ9 TOTAL SCORE: 5    Answers submitted by the patient for this visit:  Patient Health Questionnaire (Submitted on 7/30/2023)  If you checked off any problems, how difficult have these problems made it for you to do your work, take care of things at home, or get along with other people?: Somewhat difficult  PHQ9 TOTAL SCORE: 5  Answers submitted by the patient for this visit:  Patient Health Questionnaire (Submitted on 8/20/2023)  If you checked off any problems, how difficult have these problems made it for you to do your work, take care of things at home, or get along with other people?: Somewhat difficult  PHQ9 TOTAL SCORE: 5  Answers submitted by the patient for this visit:  Patient Health Questionnaire (Submitted on 9/18/2023)  If you checked off any problems, how difficult have these problems made it for you to do your  work, take care of things at home, or get along with other people?: Somewhat difficult  PHQ9 TOTAL SCORE: 4  Answers submitted by the patient for this visit:  Patient Health Questionnaire (Submitted on 10/2/2023)  If you checked off any problems, how difficult have these problems made it for you to do your work, take care of things at home, or get along with other people?: Somewhat difficult  PHQ9 TOTAL SCORE: 5    Answers submitted by the patient for this visit:  Patient Health Questionnaire (Submitted on 10/15/2023)  If you checked off any problems, how difficult have these problems made it for you to do your work, take care of things at home, or get along with other people?: Somewhat difficult  PHQ9 TOTAL SCORE: 6  ROCKY-7 (Submitted on 10/10/2023)  ROCKY 7 TOTAL SCORE: 5    Answers submitted by the patient for this visit:  Patient Health Questionnaire (Submitted on 11/16/2023)  If you checked off any problems, how difficult have these problems made it for you to do your work, take care of things at home, or get along with other people?: Somewhat difficult  PHQ9 TOTAL SCORE: 4    Answers submitted by the patient for this visit:  Patient Health Questionnaire (Submitted on 11/28/2023)  If you checked off any problems, how difficult have these problems made it for you to do your work, take care of things at home, or get along with other people?: Somewhat difficult  PHQ9 TOTAL SCORE: 5    Answers submitted by the patient for this visit:  Patient Health Questionnaire (Submitted on 12/13/2023)  If you checked off any problems, how difficult have these problems made it for you to do your work, take care of things at home, or get along with other people?: Somewhat difficult  PHQ9 TOTAL SCORE: 5    Answers submitted by the patient for this visit:  Patient Health Questionnaire (Submitted on 12/26/2023)  If you checked off any problems, how difficult have these problems made it for you to do your work, take care of things at  home, or get along with other people?: Somewhat difficult  PHQ9 TOTAL SCORE: 5    Answers submitted by the patient for this visit:  Patient Health Questionnaire (Submitted on 1/10/2024)  If you checked off any problems, how difficult have these problems made it for you to do your work, take care of things at home, or get along with other people?: Somewhat difficult  PHQ9 TOTAL SCORE: 5  ROCKY-7 (Submitted on 1/7/2024)  ROCKY 7 TOTAL SCORE: 5    Answers submitted by the patient for this visit:  Patient Health Questionnaire (Submitted on 1/25/2024)  If you checked off any problems, how difficult have these problems made it for you to do your work, take care of things at home, or get along with other people?: Very difficult  PHQ9 TOTAL SCORE: 8  ROCKY-7 (Submitted on 1/22/2024)  ROCKY 7 TOTAL SCORE: 6    Answers submitted by the patient for this visit:  Patient Health Questionnaire (Submitted on 2/21/2024)  If you checked off any problems, how difficult have these problems made it for you to do your work, take care of things at home, or get along with other people?: Very difficult  PHQ9 TOTAL SCORE: 16  ROCKY-7 (Submitted on 2/19/2024)  ROCKY 7 TOTAL SCORE: 8    Answers submitted by the patient for this visit:  Patient Health Questionnaire (Submitted on 4/22/2024)  If you checked off any problems, how difficult have these problems made it for you to do your work, take care of things at home, or get along with other people?: Somewhat difficult  PHQ9 TOTAL SCORE: 2    Answers submitted by the patient for this visit:  Patient Health Questionnaire (Submitted on 5/6/2024)  If you checked off any problems, how difficult have these problems made it for you to do your work, take care of things at home, or get along with other people?: Somewhat difficult  PHQ9 TOTAL SCORE: 1    Answers submitted by the patient for this visit:  Patient Health Questionnaire (Submitted on 5/21/2024)  If you checked off any problems, how difficult have these  problems made it for you to do your work, take care of things at home, or get along with other people?: Not difficult at all  PHQ9 TOTAL SCORE: 1    Answers submitted by the patient for this visit:  Patient Health Questionnaire (Submitted on 6/11/2024)  If you checked off any problems, how difficult have these problems made it for you to do your work, take care of things at home, or get along with other people?: Somewhat difficult  PHQ9 TOTAL SCORE: 1    Answers submitted by the patient for this visit:  Patient Health Questionnaire (Submitted on 8/26/2024)  If you checked off any problems, how difficult have these problems made it for you to do your work, take care of things at home, or get along with other people?: Somewhat difficult  PHQ9 TOTAL SCORE: 3    Answers submitted by the patient for this visit:  Patient Health Questionnaire (Submitted on 9/16/2024)  If you checked off any problems, how difficult have these problems made it for you to do your work, take care of things at home, or get along with other people?: Somewhat difficult  PHQ9 TOTAL SCORE: 4

## 2024-09-19 DIAGNOSIS — F33.41 MAJOR DEPRESSIVE DISORDER, RECURRENT EPISODE, IN PARTIAL REMISSION (H): ICD-10-CM

## 2024-09-19 RX ORDER — METHYLPHENIDATE HYDROCHLORIDE 20 MG/1
20 CAPSULE, EXTENDED RELEASE ORAL DAILY
Qty: 30 CAPSULE | Refills: 0 | Status: SHIPPED | OUTPATIENT
Start: 2024-09-23

## 2024-09-19 RX ORDER — METHYLPHENIDATE HYDROCHLORIDE 60 MG/1
60 CAPSULE, EXTENDED RELEASE ORAL DAILY
Qty: 30 CAPSULE | Refills: 0 | Status: CANCELLED | OUTPATIENT
Start: 2024-09-23

## 2024-09-19 RX ORDER — METHYLPHENIDATE HYDROCHLORIDE 40 MG/1
40 CAPSULE, EXTENDED RELEASE ORAL EVERY MORNING
Qty: 30 CAPSULE | Refills: 0 | Status: SHIPPED | OUTPATIENT
Start: 2024-09-23

## 2024-09-19 RX ORDER — METHYLPHENIDATE HYDROCHLORIDE 60 MG/1
60 CAPSULE, EXTENDED RELEASE ORAL DAILY
Qty: 30 CAPSULE | Refills: 0 | Status: SHIPPED | OUTPATIENT
Start: 2024-10-26 | End: 2024-09-19

## 2024-09-19 RX ORDER — METHYLPHENIDATE HYDROCHLORIDE 60 MG/1
60 CAPSULE, EXTENDED RELEASE ORAL DAILY
Qty: 30 CAPSULE | Refills: 0 | Status: SHIPPED | OUTPATIENT
Start: 2024-09-26 | End: 2024-09-19

## 2024-09-19 NOTE — TELEPHONE ENCOUNTER
Alie BLANCAS,     Could you please approve this Ritalin refill. Pharmacy doesn't have 60mg available and Laya has modified the order to 40mg and 20mg tablets.     Thank you.    Tatum

## 2024-09-19 NOTE — TELEPHONE ENCOUNTER
Thank you Laya!     POD- I have reviewed the pended Ritalin LA 60mg refill request and it is appropriate based on last fill/ review. Next appointment with me is 11/7/2024.      Thank you,    Tatum

## 2024-09-19 NOTE — TELEPHONE ENCOUNTER
[facility]    Phone Message    May a detailed message be left on voicemail: yes     Reason for Call: Medication Refill Request    Has the patient contacted the pharmacy for the refill? Yes   Name of medication being requested: methylphindate 20mg  Provider who prescribed the medication:   Pharmacy:  Pharmacy in [place]  Date medication is needed: Pt only received 23 tablets last month, so she needs a refill now.       Action Taken: Other: [facility]    Travel Screening: Not Applicable     Date of Service:

## 2024-09-19 NOTE — TELEPHONE ENCOUNTER
[facility]    Phone Message    May a detailed message be left on voicemail: yes     Reason for Call: Medication Refill Request    Has the patient contacted the pharmacy for the refill? Yes   Name of medication being requested: Ritalin 20mg and 40mg  Provider who prescribed the medication: Dr. Maosn  Pharmacy:   [facility] [place] [address]     Date medication is needed: Patient will run out on 9/23    Patient says pharmacy is out of 60mg and called multiple pharmacies who are also out of stock. Patient would be able to get 20mg and 40gm and needs refill sent to pharmacy    Action Taken: Message routed to:  Other: P PSYCHIATRY NURSE-P    Travel Screening: Not Applicable     Date of Service:

## 2024-09-19 NOTE — TELEPHONE ENCOUNTER
Last seen: 8/1/24  RTC: 6 weeks  Cancel: none  No-show: none  Next appt: 11/7/24               Last refill per           From chart note:   - Change Ritalin IR  to LA 40mg in the morning for one week then additional 20mg daily for total of 60 after one week      Medication unable to be refilled by RN due to criteria not met as indicated.                 []Eligibility - not seen in the last year              []Supervision - no future appointment              []Compliance - no shows, cancellations or lapse in therapy              []Verification - order discrepancy              [x]Controlled medication              []Medication not included in policy              []90-day supply request              []Other:

## 2024-09-24 DIAGNOSIS — F33.1 MAJOR DEPRESSIVE DISORDER, RECURRENT, MODERATE (H): ICD-10-CM

## 2024-09-24 DIAGNOSIS — F32.2 CURRENT SEVERE EPISODE OF MAJOR DEPRESSIVE DISORDER WITHOUT PSYCHOTIC FEATURES, UNSPECIFIED WHETHER RECURRENT (H): ICD-10-CM

## 2024-09-25 RX ORDER — DULOXETIN HYDROCHLORIDE 60 MG/1
120 CAPSULE, DELAYED RELEASE ORAL EVERY EVENING
Qty: 60 CAPSULE | Refills: 0 | Status: SHIPPED | OUTPATIENT
Start: 2024-09-25

## 2024-09-25 RX ORDER — ARIPIPRAZOLE 5 MG/1
5 TABLET ORAL DAILY
Qty: 30 TABLET | Refills: 0 | Status: SHIPPED | OUTPATIENT
Start: 2024-09-25

## 2024-09-25 NOTE — TELEPHONE ENCOUNTER
"Date of Last Office Visit: 8/1/2024  [facility]      RTC: 6wks  No shows: 0  Cancellations: 0  Date of Next Office Visit:    11/7/2024 3:50 PM (30 min)  Carol   Arrive by:  3:35 PM   ADULT PSYCHIATRY RETURN    URPSY (P [facility])   Tatum Mason MD     ------------------------------    Medication requested:    DULoxetine (CYMBALTA) 60 MG capsule 60 capsule 0 8/27/2024 -- No   Sig - Route: Take 2 capsules (120 mg) by mouth every evening. - Ora      Disp Refills Start End TIM   ARIPiprazole (ABILIFY) 5 MG tablet 30 tablet 0 8/27/2024 -- No   Sig - Route: Take 1 tablet (5 mg) by mouth daily. - Oral     ------------------------------  From last visit note:   \"- Continue Duloxetine 120 mg at bedtime (drowsiness if taken during daytime)   - Continue Abilify 5 mg/day at bedtime \"       Refill decision: Medication refilled per protocol.  "

## 2024-09-26 ENCOUNTER — DOCUMENTATION ONLY (OUTPATIENT)
Dept: INTERNAL MEDICINE | Facility: CLINIC | Age: 59
End: 2024-09-26
Payer: MEDICARE

## 2024-09-26 NOTE — PROGRESS NOTES
Type of Form Received: [employer] Clearance    Form Received (Date) 9/26/24   Form Filled out Yes, faxed 10/1/24   Placed in provider folder Yes

## 2024-09-30 ENCOUNTER — TELEPHONE (OUTPATIENT)
Dept: PULMONOLOGY | Facility: CLINIC | Age: 59
End: 2024-09-30
Payer: MEDICARE

## 2024-09-30 DIAGNOSIS — J96.11 CHRONIC HYPOXIC RESPIRATORY FAILURE (H): ICD-10-CM

## 2024-09-30 DIAGNOSIS — U09.9 LONG COVID: ICD-10-CM

## 2024-09-30 DIAGNOSIS — Z86.16 HISTORY OF SEVERE ACUTE RESPIRATORY SYNDROME CORONAVIRUS 2 (SARS-COV-2) DISEASE: ICD-10-CM

## 2024-09-30 DIAGNOSIS — R09.02 HYPOXIA: Primary | ICD-10-CM

## 2024-09-30 DIAGNOSIS — J98.4 RESTRICTIVE LUNG DISEASE: ICD-10-CM

## 2024-09-30 DIAGNOSIS — R06.02 SHORTNESS OF BREATH: ICD-10-CM

## 2024-09-30 NOTE — TELEPHONE ENCOUNTER
Outgoing call: Spoke with patient regarding her changes in oxygen needs. When up and walking more than a few feet she becomes SOB and her oxygen saturation is at around 87%. She does rebound back to 90% but only after she settles down for a short period of time.    Pt may need a walking test or another workup.    No new sickness or other changes in her home life.     Will route to Dr. Gonsales to advise the patient.    Carolyn Haro RN on 9/30/2024 at 1:41 PM

## 2024-09-30 NOTE — TELEPHONE ENCOUNTER
[facility]    Phone Message    May a detailed message be left on voicemail: yes     Reason for Call: Symptoms or Concerns     Current symptom or concern: SOB with activities and when pt checks oxygen at time of activities it is at 87.     Symptoms have been present for:  2 week(s)    Are there any new or worsening symptoms? Yes: Pt states it feels like it is getting a little worse every day.     Please call pt back at ph: [phone].     Action Taken: Message routed to:  Other: [place] [facility]     Travel Screening: Not Applicable     Date of Service: 9/30

## 2024-10-01 NOTE — TELEPHONE ENCOUNTER
Insightly message sent to the patient with Dr. Gonsales's orders. Orders placed for chest xray 2 view and 6 minute walk test.     Carolyn Haro RN on 10/1/2024 at 8:31 AM

## 2024-10-02 ENCOUNTER — ANCILLARY PROCEDURE (OUTPATIENT)
Dept: GENERAL RADIOLOGY | Facility: CLINIC | Age: 59
End: 2024-10-02
Attending: INTERNAL MEDICINE
Payer: MEDICARE

## 2024-10-02 DIAGNOSIS — R06.02 SHORTNESS OF BREATH: ICD-10-CM

## 2024-10-02 DIAGNOSIS — R09.02 HYPOXIA: ICD-10-CM

## 2024-10-02 DIAGNOSIS — U09.9 LONG COVID: ICD-10-CM

## 2024-10-02 DIAGNOSIS — J98.4 RESTRICTIVE LUNG DISEASE: ICD-10-CM

## 2024-10-02 DIAGNOSIS — Z86.16 HISTORY OF SEVERE ACUTE RESPIRATORY SYNDROME CORONAVIRUS 2 (SARS-COV-2) DISEASE: ICD-10-CM

## 2024-10-02 DIAGNOSIS — J96.11 CHRONIC HYPOXIC RESPIRATORY FAILURE (H): ICD-10-CM

## 2024-10-02 PROCEDURE — 71046 X-RAY EXAM CHEST 2 VIEWS: CPT | Mod: TC | Performed by: RADIOLOGY

## 2024-10-03 DIAGNOSIS — Z79.01 CHRONIC ANTICOAGULATION: Primary | ICD-10-CM

## 2024-10-09 NOTE — TELEPHONE ENCOUNTER
apixaban ANTICOAGULANT (ELIQUIS ANTICOAGULANT) 5 MG tablet 180 tablet 1 10/9/2024   Resent at 10/09/2024 0907 E-Prescribing Status: Receipt confirmed by pharmacy (10/9/2024  9:45 AM CDT)    [facility] [place], [place] - [address]   Spoke to the pharmacy staff who have now received and linked prescription. No further action needed.

## 2024-10-10 DIAGNOSIS — G43.809 OTHER MIGRAINE WITHOUT STATUS MIGRAINOSUS, NOT INTRACTABLE: Primary | ICD-10-CM

## 2024-10-14 ENCOUNTER — LAB (OUTPATIENT)
Dept: FAMILY MEDICINE | Facility: CLINIC | Age: 59
End: 2024-10-14
Attending: INTERNAL MEDICINE
Payer: MEDICARE

## 2024-10-14 ENCOUNTER — TELEPHONE (OUTPATIENT)
Dept: INTERNAL MEDICINE | Facility: CLINIC | Age: 59
End: 2024-10-14

## 2024-10-14 ENCOUNTER — VIRTUAL VISIT (OUTPATIENT)
Dept: INTERNAL MEDICINE | Facility: CLINIC | Age: 59
End: 2024-10-14
Payer: MEDICARE

## 2024-10-14 DIAGNOSIS — B97.89 VIRAL RESPIRATORY INFECTION: ICD-10-CM

## 2024-10-14 DIAGNOSIS — J98.8 VIRAL RESPIRATORY INFECTION: ICD-10-CM

## 2024-10-14 DIAGNOSIS — R05.1 ACUTE COUGH: Primary | ICD-10-CM

## 2024-10-14 DIAGNOSIS — R50.9 FEVER, UNSPECIFIED FEVER CAUSE: ICD-10-CM

## 2024-10-14 DIAGNOSIS — Z99.81 ON HOME O2: ICD-10-CM

## 2024-10-14 LAB
FLUAV AG SPEC QL IA: NEGATIVE
FLUBV AG SPEC QL IA: NEGATIVE

## 2024-10-14 PROCEDURE — 87635 SARS-COV-2 COVID-19 AMP PRB: CPT | Performed by: INTERNAL MEDICINE

## 2024-10-14 PROCEDURE — 99000 SPECIMEN HANDLING OFFICE-LAB: CPT | Performed by: PATHOLOGY

## 2024-10-14 PROCEDURE — 99214 OFFICE O/P EST MOD 30 MIN: CPT | Mod: 95 | Performed by: INTERNAL MEDICINE

## 2024-10-14 NOTE — PATIENT INSTRUCTIONS
Thank you for visiting the Primary Care Center today at the [facility]! The following is some information about our clinic:     Primary Care Center Frequently-Asked Questions    (1) How do I schedule appointments at the [facility]?     Primary Care--to schedule or make changes to an existing appointment, please call our primary care line at [phone].    Labs--to schedule a lab appointment at the [facility] you can use FUJIAN HAIYUAN or call [phone]. If you have a [place] location that is closer to home, you can reach out to that location for scheduling options.     Imaging--if you need to schedule a CT, X-ray, MRI, ultrasound, or other imaging study you can call [phone] to schedule at the [facility] or any other [facility] imaging location.     Referrals--if a referral to another specialty was ordered you can expect a phone call from their scheduling team. If you have not heard from them in a week, please call us or send us a FUJIAN HAIYUAN message to check the status or get a scheduling number. Please note that this only applies to internal [facility] referrals. If the referral is external you would need to contact their office for scheduling.     (2) I have a question about my visit, who do I contact?     You can call us at the primary care line at [phone] to ask questions about your visit. You can also send a secure message through FUJIAN HAIYUAN, which is reviewed by clinic staff. Please note that F[employer] messages have a twenty-four to forty-eight business hour turnaround time and should not be used for urgent concerns.    (3) How will I get the results of my tests?    If you are signed up for [employer] all tests will be released to you within twenty-four hours of resulting. Please allow three to five days for your doctor to review your results and place a note interpreting the results. If you do not have Back[employer] you will receive your  results through mail seven to ten business days following the return of the tests. Please note that if there should be any urgent or concerning results that your doctor or their registered nurse will reach out to you the same day as the tests come back. If you have follow up questions about your results or would like to discuss the results in detail please schedule a follow up with your provider either in person or virtually.     (4) How do I get refills of my prescriptions?     You should always first contact your pharmacy for refills of your medications. If submitting a refill request on [employer], please be sure to submit the request only once--repeat requests can cause delays in refill. If you are requesting a NEW medication or a medication related to new symptoms you will need to schedule an appointment with a provider prior to approval. Please note: Routine medication refills have up to one to three business day turnaround whereas controlled substances refills have up to five to seven business day turnaround.    (5) I have new symptoms, what do I do?     If you are having an immediate medical emergency, you should dial 911 for assistance.   For anything urgent that needs to be seen within a few hours to one day you should visit a local urgent care for assistance.  For non-urgent symptoms that need to be seen within a few days to a week you can schedule with an available provider in primary care by going to FilmMe or calling [phone].   If you are not sure how serious your symptoms are or you would like to receive medical advice you can always call [phone] to speak with a triage nurse.

## 2024-10-14 NOTE — PROGRESS NOTES
"Marcia is a 58 year old who is being evaluated via a billable video visit.    How would you like to obtain your AVS? MyChart  If the video visit is dropped, the invitation should be resent by: Text to cell phone: [phone]  Will anyone else be joining your video visit? No      Are refills needed on medications prescribed by this physician? 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 Sats normally run 92-95% and are at 90%. They dip with activity. Using home O2 all day today. Looks good on visual exam (resp effort, color, talking in full sentences, minimal cough). LOW THRESHOLD TO GO TO ER FOR TRIAGE. She would prefer to be treated outpatient if possible so will arrange for testing and consider paxlovid if she is a candidate. Long discussion on need for ED evaluation if she worsens, sats drop, shortness of breath progresses, etc. She will go to her [facility] now to get the testing.     - Symptomatic COVID-19 Virus (Coronavirus) by PCR Nasopharyngeal  - Influenza A/B antigen      BMI  Estimated body mass index is 25.69 kg/m  as calculated from the following:    Height as of 8/6/24: 1.6 m (5' 3\").    Weight as of 8/6/24: 65.8 kg (145 lb).   No follow-ups on file.    Subjective   Marcia is a 58 year old, presenting for the following health issues:  RECHECK and Covid Concern  See above- HIGH risk patient with viral respiratory symptoms and drop in O2 sats. No access to a lateral flow COVID assay. Need testing asap vs ED evaluation. She looks and sounds good on my visual exam.     Video Start Time:  12:50    History of Present Illness      She is taking medications regularly.     No chest pain, rashes, delirium, other new symptoms on complete ROS. Cough and sore throat present.      Objective           Vitals:  No vitals were " obtained today due to virtual visit.    Physical Exam   GENERAL: alert and no distress  EYES: Eyes grossly normal to inspection.  No discharge or erythema, or obvious scleral/conjunctival abnormalities.  RESP: Breathing comfortably with O2 cannula in place. Talking in full sentences. No coughing during our visit.  SKIN: Visible skin clear. No significant rash, abnormal pigmentation or lesions.  NEURO: Cranial nerves grossly intact.  Mentation and speech appropriate for age.  PSYCH: Appropriate affect, tone, and pace of words          Video-Visit Details    Type of service:  Video Visit   Video End Time:1:24 PM  Originating Location (pt. Location): Home    Distant Location (provider location):  On-site  Platform used for Video Visit: Kourtney  Signed Electronically by: Jesus Xie MD

## 2024-10-14 NOTE — TELEPHONE ENCOUNTER
Patient confirmed scheduled appointment:  Date: 10/14/2024  Time: 100pm  Visit type: Video  Provider: Dr. Xie  Location: Virtual  Testing/imaging: pt requests Covid PCR  Additional notes: -

## 2024-10-14 NOTE — TELEPHONE ENCOUNTER
Health Call Center    Phone Message    May a detailed message be left on voicemail: yes     Reason for Call: Symptoms or Concerns     If patient has red-flag symptoms, warm transfer to triage line    Current symptom or concern: COVID Symptoms     Symptoms have been present for:  a few days now     Has patient previously been seen for this? No      Are there any new or worsening symptoms? Yes: symptoms are getting worse , Fever went up to 102     Per pt would like to request a COVID test to be order so she can get it done.     Action Taken: Message routed to:  Other: [facility]    Travel Screening: Not Applicable     Date of Service:

## 2024-10-15 ENCOUNTER — MYC MEDICAL ADVICE (OUTPATIENT)
Dept: INTERNAL MEDICINE | Facility: CLINIC | Age: 59
End: 2024-10-15
Payer: MEDICARE

## 2024-10-15 ENCOUNTER — TELEPHONE (OUTPATIENT)
Dept: INTERNAL MEDICINE | Facility: CLINIC | Age: 59
End: 2024-10-15
Payer: MEDICARE

## 2024-10-15 LAB — SARS-COV-2 RNA RESP QL NAA+PROBE: NEGATIVE

## 2024-10-15 NOTE — TELEPHONE ENCOUNTER
Covid result is negative. Please see the [employer] message from today that pt is not getting any better.    Soon-Mi  -----------------------------------------------------------------------------------        ----- Message from Jessu Xie sent at 10/14/2024  2:14 PM CDT -----  Regarding: F/U  Carlos Alberto Soon-Mi,  I saw her virtually and want to make sure the f/up doesn't slip through the cracks. She was supposed to get COVID and Flu testing at [facility] and is high risk of a bad outcome (was on vent twice with past COVID). Can you check in with her to make sure she's not getting worse? I'd have a very low threshold to send her to the ED.   Thank you!  Marin

## 2024-10-16 RX ORDER — LANSOPRAZOLE 30 MG/1
30 CAPSULE, DELAYED RELEASE ORAL 2 TIMES DAILY
Qty: 180 CAPSULE | Refills: 3 | Status: SHIPPED | OUTPATIENT
Start: 2024-10-16 | End: 2024-11-07

## 2024-10-17 NOTE — RESULT ENCOUNTER NOTE
Hi Ms. Yeung,  The Flu and COVID tests were both negative. If you are getting worse (ie, more oxygen needed or overall worsening symptoms) you'll need to be seen in clinic to determine the best course of support and treatment. You've gotten really sick with COVID before so glad it isn't that. I hope things are getting better!  Marin Xie MD

## 2024-10-18 ENCOUNTER — TELEPHONE (OUTPATIENT)
Dept: INTERNAL MEDICINE | Facility: CLINIC | Age: 59
End: 2024-10-18
Payer: MEDICARE

## 2024-10-18 DIAGNOSIS — R06.02 SHORTNESS OF BREATH: ICD-10-CM

## 2024-10-18 DIAGNOSIS — J44.9 CHRONIC OBSTRUCTIVE PULMONARY DISEASE, UNSPECIFIED COPD TYPE (H): Primary | ICD-10-CM

## 2024-10-18 RX ORDER — BUDESONIDE AND FORMOTEROL FUMARATE DIHYDRATE 80; 4.5 UG/1; UG/1
2 AEROSOL RESPIRATORY (INHALATION) 4 TIMES DAILY PRN
Qty: 10.2 G | Refills: 1 | Status: CANCELLED | OUTPATIENT
Start: 2024-10-18

## 2024-10-18 NOTE — TELEPHONE ENCOUNTER
Prior Authorization Retail Medication Request    Medication/Dose:   Diagnosis and ICD code (if different than what is on RX):  serene  New/renewal/insurance change PA/secondary ins. PA:  Previously Tried and Failed:  serene  Rationale:  na    Insurance   Primary: medicare blue rx part d  Insurance ID:  [id number]      Secondary (if applicable):serene  Insurance ID:  serene    Pharmacy Information (if different than what is on RX)  Name:  [facility]  Phone:  [phone]  Fax:[phone]    Clinic Information  Preferred routing pool for dept communication: serene

## 2024-10-19 ENCOUNTER — HOSPITAL ENCOUNTER (EMERGENCY)
Facility: CLINIC | Age: 59
Discharge: HOME | End: 2024-10-20
Attending: EMERGENCY MEDICINE | Admitting: EMERGENCY MEDICINE
Payer: MEDICARE

## 2024-10-19 ENCOUNTER — APPOINTMENT (OUTPATIENT)
Dept: GENERAL RADIOLOGY | Facility: CLINIC | Age: 59
End: 2024-10-19
Attending: EMERGENCY MEDICINE
Payer: MEDICARE

## 2024-10-19 ENCOUNTER — NURSE TRIAGE (OUTPATIENT)
Dept: NURSING | Facility: CLINIC | Age: 59
End: 2024-10-19
Payer: MEDICARE

## 2024-10-19 VITALS
OXYGEN SATURATION: 94 % | SYSTOLIC BLOOD PRESSURE: 136 MMHG | TEMPERATURE: 98.8 F | RESPIRATION RATE: 14 BRPM | BODY MASS INDEX: 25.69 KG/M2 | WEIGHT: 145 LBS | DIASTOLIC BLOOD PRESSURE: 81 MMHG | HEART RATE: 77 BPM

## 2024-10-19 DIAGNOSIS — J18.9 PNEUMONIA OF BOTH LOWER LOBES DUE TO INFECTIOUS ORGANISM: ICD-10-CM

## 2024-10-19 LAB
ALBUMIN SERPL BCG-MCNC: 4 G/DL (ref 3.5–5.2)
ALP SERPL-CCNC: 122 U/L (ref 40–150)
ALT SERPL W P-5'-P-CCNC: 13 U/L (ref 0–50)
ANION GAP SERPL CALCULATED.3IONS-SCNC: 10 MMOL/L (ref 7–15)
AST SERPL W P-5'-P-CCNC: 25 U/L (ref 0–45)
BASOPHILS # BLD AUTO: 0 10E3/UL (ref 0–0.2)
BASOPHILS NFR BLD AUTO: 0 %
BILIRUB SERPL-MCNC: 0.2 MG/DL
BUN SERPL-MCNC: 13 MG/DL (ref 6–20)
CALCIUM SERPL-MCNC: 9.2 MG/DL (ref 8.8–10.4)
CHLORIDE SERPL-SCNC: 101 MMOL/L (ref 98–107)
CREAT SERPL-MCNC: 0.93 MG/DL (ref 0.51–0.95)
EGFRCR SERPLBLD CKD-EPI 2021: 71 ML/MIN/1.73M2
EOSINOPHIL # BLD AUTO: 0.2 10E3/UL (ref 0–0.7)
EOSINOPHIL NFR BLD AUTO: 2 %
ERYTHROCYTE [DISTWIDTH] IN BLOOD BY AUTOMATED COUNT: 13.6 % (ref 10–15)
FLUAV RNA SPEC QL NAA+PROBE: NEGATIVE
FLUBV RNA RESP QL NAA+PROBE: NEGATIVE
GLUCOSE SERPL-MCNC: 102 MG/DL (ref 70–99)
HCO3 SERPL-SCNC: 26 MMOL/L (ref 22–29)
HCT VFR BLD AUTO: 39 % (ref 35–47)
HGB BLD-MCNC: 13 G/DL (ref 11.7–15.7)
HOLD SPECIMEN: NORMAL
IMM GRANULOCYTES # BLD: 0 10E3/UL
IMM GRANULOCYTES NFR BLD: 0 %
LYMPHOCYTES # BLD AUTO: 1.3 10E3/UL (ref 0.8–5.3)
LYMPHOCYTES NFR BLD AUTO: 13 %
MCH RBC QN AUTO: 31.3 PG (ref 26.5–33)
MCHC RBC AUTO-ENTMCNC: 33.3 G/DL (ref 31.5–36.5)
MCV RBC AUTO: 94 FL (ref 78–100)
MONOCYTES # BLD AUTO: 1 10E3/UL (ref 0–1.3)
MONOCYTES NFR BLD AUTO: 10 %
NEUTROPHILS # BLD AUTO: 7.4 10E3/UL (ref 1.6–8.3)
NEUTROPHILS NFR BLD AUTO: 75 %
NRBC # BLD AUTO: 0 10E3/UL
NRBC BLD AUTO-RTO: 0 /100
NT-PROBNP SERPL-MCNC: 200 PG/ML (ref 0–900)
PLATELET # BLD AUTO: 304 10E3/UL (ref 150–450)
POTASSIUM SERPL-SCNC: 4.6 MMOL/L (ref 3.4–5.3)
PROT SERPL-MCNC: 7.2 G/DL (ref 6.4–8.3)
RBC # BLD AUTO: 4.16 10E6/UL (ref 3.8–5.2)
RSV RNA SPEC NAA+PROBE: NEGATIVE
SARS-COV-2 RNA RESP QL NAA+PROBE: NEGATIVE
SODIUM SERPL-SCNC: 137 MMOL/L (ref 135–145)
WBC # BLD AUTO: 10 10E3/UL (ref 4–11)

## 2024-10-19 PROCEDURE — 36415 COLL VENOUS BLD VENIPUNCTURE: CPT | Performed by: EMERGENCY MEDICINE

## 2024-10-19 PROCEDURE — 999N000157 HC STATISTIC RCP TIME EA 10 MIN

## 2024-10-19 PROCEDURE — 84075 ASSAY ALKALINE PHOSPHATASE: CPT | Performed by: EMERGENCY MEDICINE

## 2024-10-19 PROCEDURE — 71046 X-RAY EXAM CHEST 2 VIEWS: CPT

## 2024-10-19 PROCEDURE — 99284 EMERGENCY DEPT VISIT MOD MDM: CPT | Mod: 25 | Performed by: EMERGENCY MEDICINE

## 2024-10-19 PROCEDURE — 85025 COMPLETE CBC W/AUTO DIFF WBC: CPT | Performed by: EMERGENCY MEDICINE

## 2024-10-19 PROCEDURE — 99284 EMERGENCY DEPT VISIT MOD MDM: CPT | Performed by: EMERGENCY MEDICINE

## 2024-10-19 PROCEDURE — 87637 SARSCOV2&INF A&B&RSV AMP PRB: CPT | Performed by: EMERGENCY MEDICINE

## 2024-10-19 PROCEDURE — 250N000009 HC RX 250: Performed by: EMERGENCY MEDICINE

## 2024-10-19 PROCEDURE — 83880 ASSAY OF NATRIURETIC PEPTIDE: CPT | Performed by: EMERGENCY MEDICINE

## 2024-10-19 PROCEDURE — 94640 AIRWAY INHALATION TREATMENT: CPT

## 2024-10-19 RX ORDER — IPRATROPIUM BROMIDE AND ALBUTEROL SULFATE 2.5; .5 MG/3ML; MG/3ML
3 SOLUTION RESPIRATORY (INHALATION) ONCE
Status: COMPLETED | OUTPATIENT
Start: 2024-10-19 | End: 2024-10-19

## 2024-10-19 RX ADMIN — IPRATROPIUM BROMIDE AND ALBUTEROL SULFATE 3 ML: 2.5; .5 SOLUTION RESPIRATORY (INHALATION) at 23:03

## 2024-10-19 ASSESSMENT — ACTIVITIES OF DAILY LIVING (ADL)
ADLS_ACUITY_SCORE: 40

## 2024-10-19 ASSESSMENT — ENCOUNTER SYMPTOMS
RHINORRHEA: 1
LIGHT-HEADEDNESS: 0
HEADACHES: 0
MYALGIAS: 0
CHILLS: 1
COUGH: 1
ABDOMINAL PAIN: 0
SORE THROAT: 1
SHORTNESS OF BREATH: 1
SINUS PRESSURE: 0
TROUBLE SWALLOWING: 0
FATIGUE: 1
NAUSEA: 0
FEVER: 1
APPETITE CHANGE: 0

## 2024-10-19 ASSESSMENT — COLUMBIA-SUICIDE SEVERITY RATING SCALE - C-SSRS
2. HAVE YOU ACTUALLY HAD ANY THOUGHTS OF KILLING YOURSELF IN THE PAST MONTH?: NO
6. HAVE YOU EVER DONE ANYTHING, STARTED TO DO ANYTHING, OR PREPARED TO DO ANYTHING TO END YOUR LIFE?: NO
1. IN THE PAST MONTH, HAVE YOU WISHED YOU WERE DEAD OR WISHED YOU COULD GO TO SLEEP AND NOT WAKE UP?: NO

## 2024-10-20 PROCEDURE — 250N000012 HC RX MED GY IP 250 OP 636 PS 637: Performed by: EMERGENCY MEDICINE

## 2024-10-20 PROCEDURE — 250N000013 HC RX MED GY IP 250 OP 250 PS 637: Performed by: EMERGENCY MEDICINE

## 2024-10-20 RX ORDER — DOXYCYCLINE 100 MG/1
100 CAPSULE ORAL ONCE
Status: COMPLETED | OUTPATIENT
Start: 2024-10-20 | End: 2024-10-20

## 2024-10-20 RX ORDER — PREDNISONE 20 MG/1
40 TABLET ORAL ONCE
Status: COMPLETED | OUTPATIENT
Start: 2024-10-20 | End: 2024-10-20

## 2024-10-20 RX ORDER — PREDNISONE 10 MG/1
TABLET ORAL
Qty: 15 TABLET | Refills: 0 | Status: SHIPPED | OUTPATIENT
Start: 2024-10-20 | End: 2024-11-01

## 2024-10-20 RX ORDER — DOXYCYCLINE 100 MG/1
100 CAPSULE ORAL 2 TIMES DAILY
Qty: 10 CAPSULE | Refills: 0 | Status: SHIPPED | OUTPATIENT
Start: 2024-10-20 | End: 2024-10-25

## 2024-10-20 RX ADMIN — PREDNISONE 40 MG: 20 TABLET ORAL at 00:21

## 2024-10-20 RX ADMIN — DOXYCYCLINE HYCLATE 100 MG: 100 CAPSULE ORAL at 00:21

## 2024-10-20 NOTE — ED TRIAGE NOTES
Triage Assessment (Adult)       Row Name 10/19/24 2024          Triage Assessment    Airway WDL WDL        Respiratory WDL    Respiratory WDL X        Skin Circulation/Temperature WDL    Skin Circulation/Temperature WDL WDL        Cardiac WDL    Cardiac WDL X;all     Cardiac Rhythm ST        Peripheral/Neurovascular WDL    Peripheral Neurovascular WDL WDL        Cognitive/Neuro/Behavioral WDL    Cognitive/Neuro/Behavioral WDL WDL

## 2024-10-20 NOTE — TELEPHONE ENCOUNTER
Nurse Triage SBAR    Is this a 2nd Level Triage? YES, LICENSED PRACTITIONER REVIEW IS REQUIRED    Situation: Breathing changes     Background:  Pt reports being ill for the last wk. She states she was tested for COVID and Influenza 5 days ago and they came back negative. She states she was hospitalized in 2021 and 2022 after getting covid stating she ended up on a vent and has since required oxygen at night due to lung damage.     Assessment:  Pt states the last few days she has been requiring 3L oxygen during the day to keep her sats up, which is not normal for her. C/o chills and reports a temp of 101.6. Reports with the oxygen off she is SOB with eating & extremely SOB with activity.     Protocol Recommended Disposition:   Go to ED Now (Or PCP Triage)    Recommendation: 2LT, paged Dr. Angeles at 1915.    MD Advised:  -ED now    Writer called pt back at 1917 and advised the above recommendations. Pt plans to go in to be seen. Protocol and care advice reviewed. Advised to call back with any new or worsening signs, symptoms, concerns, or questions. They verbalized understanding and agreed to follow advice given.    Paged to provider    Does the patient meet one of the following criteria for ADS visit consideration? 16+ years old, with an [facility] PCP     TIP  Providers, please consider if this condition is appropriate for management at one of our Acute and Diagnostic Services sites.     If patient is a good candidate, please use dotphrase <dot>triageresponse and select Refer to ADS to document.        Reason for Disposition   [1] Pulmonary embolus risk factors (e.g., using birth control with estrogen, recent leg fracture or surgery, central line, prolonged bedrest or immobility) AND [2] new onset of tachypnea or shortness of breath    Additional Information   Negative: [1] Choked on something AND [2] difficulty breathing now   Negative: [1] Breathing stopped AND [2] hasn't returned   Negative: Wheezing or stridor starts  suddenly after allergic food, new medicine or bee sting   Negative: Slow, shallow, weak breathing   Negative: Struggling (gasping) for each breath (severe respiratory distress) (Triage tip: Listen to the child's breathing.)   Negative: Unable to speak, cry or suck because of difficulty breathing (Triage tip: Listen to the child's breathing.)   Negative: Making grunting or moaning noises with each breath (Triage tip: Listen to the child's breathing.)   Negative: Bluish (or gray) color of lips or face now   Negative: Sounds like a life-threatening emergency to the triager   Negative: Can't think clearly or not alert   Negative: [1] Breathing stopped for over 20 seconds AND [2] now it's normal   Negative: Ribs are pulling in with each breath (retractions) when not coughing   Negative: [1] Lips or face have turned bluish BUT [2] only during coughing fits   Negative: [1] Drooling or spitting out saliva AND [2] can't swallow fluids    Protocols used: Breathing Difficulty (Respiratory Distress)-P-AH

## 2024-10-20 NOTE — ED TRIAGE NOTES
Arrives from home with acute on chronic SOB, has needed supplemental O2 at night since having covid, normally is at 2L but has been wearing throughout the day & sats were running at 89% at rest & low 80s with exertion so she increased to 3LNC. Productive cough, states negative influenza & covid tests. Temp of 103 earlier today, had tylenol last at 1830, afebrile on arrival.     Triage Assessment (Adult)       Row Name 10/19/24 2024          Triage Assessment    Airway WDL WDL        Respiratory WDL    Respiratory WDL X        Skin Circulation/Temperature WDL    Skin Circulation/Temperature WDL WDL        Cardiac WDL    Cardiac WDL X;all     Cardiac Rhythm ST        Peripheral/Neurovascular WDL    Peripheral Neurovascular WDL WDL        Cognitive/Neuro/Behavioral WDL    Cognitive/Neuro/Behavioral WDL WDL
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
## 2024-10-21 ENCOUNTER — MYC MEDICAL ADVICE (OUTPATIENT)
Dept: INTERNAL MEDICINE | Facility: CLINIC | Age: 59
End: 2024-10-21
Payer: MEDICARE

## 2024-10-21 DIAGNOSIS — R05.1 ACUTE COUGH: Primary | ICD-10-CM

## 2024-10-21 DIAGNOSIS — F32.2 CURRENT SEVERE EPISODE OF MAJOR DEPRESSIVE DISORDER WITHOUT PSYCHOTIC FEATURES, UNSPECIFIED WHETHER RECURRENT (H): ICD-10-CM

## 2024-10-21 DIAGNOSIS — F33.1 MAJOR DEPRESSIVE DISORDER, RECURRENT, MODERATE (H): ICD-10-CM

## 2024-10-21 RX ORDER — MONTELUKAST SODIUM 10 MG/1
10 TABLET ORAL AT BEDTIME
Qty: 30 TABLET | Refills: 1 | Status: SHIPPED | OUTPATIENT
Start: 2024-10-21

## 2024-10-21 ASSESSMENT — PATIENT HEALTH QUESTIONNAIRE - PHQ9
SUM OF ALL RESPONSES TO PHQ QUESTIONS 1-9: 4
SUM OF ALL RESPONSES TO PHQ QUESTIONS 1-9: 4
10. IF YOU CHECKED OFF ANY PROBLEMS, HOW DIFFICULT HAVE THESE PROBLEMS MADE IT FOR YOU TO DO YOUR WORK, TAKE CARE OF THINGS AT HOME, OR GET ALONG WITH OTHER PEOPLE: VERY DIFFICULT

## 2024-10-22 ENCOUNTER — VIRTUAL VISIT (OUTPATIENT)
Dept: PSYCHOLOGY | Facility: CLINIC | Age: 59
End: 2024-10-22
Payer: MEDICARE

## 2024-10-22 DIAGNOSIS — F33.0 MDD (MAJOR DEPRESSIVE DISORDER), RECURRENT EPISODE, MILD (H): Primary | ICD-10-CM

## 2024-10-22 PROCEDURE — 90834 PSYTX W PT 45 MINUTES: CPT | Mod: 95 | Performed by: SOCIAL WORKER

## 2024-10-22 NOTE — TELEPHONE ENCOUNTER
Retail Pharmacy Prior Authorization Team   Phone: [phone]    PA Initiation    Medication: BUDESONIDE-FORMOTEROL FUMARATE 80-4.5 MCG/ACT IN AERO  Insurance Company: [employer] - Phone [phone] Fax [phone]  Pharmacy Filling the Rx: [facility] [place], [place] - [address]  Filling Pharmacy Phone: [phone]  Filling Pharmacy Fax:    Start Date: 10/22/2024

## 2024-10-23 RX ORDER — ARIPIPRAZOLE 5 MG/1
5 TABLET ORAL DAILY
Qty: 30 TABLET | Refills: 0 | Status: SHIPPED | OUTPATIENT
Start: 2024-10-23 | End: 2024-11-07

## 2024-10-23 RX ORDER — DULOXETIN HYDROCHLORIDE 60 MG/1
120 CAPSULE, DELAYED RELEASE ORAL EVERY EVENING
Qty: 60 CAPSULE | Refills: 0 | Status: SHIPPED | OUTPATIENT
Start: 2024-10-23 | End: 2024-11-07

## 2024-10-23 NOTE — TELEPHONE ENCOUNTER
PRIOR AUTHORIZATION DENIED    Medication: BUDESONIDE-FORMOTEROL FUMARATE 80-4.5 MCG/ACT IN AERO  Insurance Company: [employer] - Phone [phone] Fax [phone]  Denial Date: 10/22/2024  Denial Rational:           Appeal Information:   If provider would like to appeal please review the plan's reasons for denial listed above. Please utilize that information to complete letter and provide specific, detailed clinical information/rationale of your patient's health status to address their denial reasons.      Patient Notified: No

## 2024-10-23 NOTE — PROGRESS NOTES
"[facility]                                     Progress Note    Patient Name: Sherly Yeung  Date: 10/22/2024         Service Type: Individual      Session Start Time: 9:03    Session End Time:9:55    Session Length: 52    Session #: 27    Attendees: Client    Service Modality:  Video Visit:      Provider verified identity through the following two step process.  Patient provided:  Patient is known previously to provider    Telemedicine Visit: The patient's condition can be safely assessed and treated via synchronous audio and visual telemedicine encounter.      Reason for Telemedicine Visit: Services only offered telehealth    Originating Site (Patient Location): Patient's home    Distant Site (Provider Location): Provider Remote Setting- Home Office    Consent:  The patient/guardian has verbally consented to: the potential risks and benefits of telemedicine (video visit) versus in person care; bill my insurance or make self-payment for services provided; and responsibility for payment of non-covered services.     Patient would like the video invitation sent by:  My Chart    Mode of Communication:  Video Conference via Amwell    Distant Location (Provider):  Off-site    As the provider I attest to compliance with applicable laws and regulations related to telemedicine.    DATA  Interactive Complexity: No     Crisis: No      Progress Since Last Session (Related to Symptoms / Goals / Homework):   Symptoms: Worsening Depressed    Homework: Partially completed      Episode of Care Goals: Satisfactory progress - ACTION (Actively working towards change); Intervened by reinforcing change plan / affirming steps taken     Current / Ongoing Stressors and Concerns:  Patient has been sick with a cold and cough; feeling weak as well.. Had to go to ED due to what she described as pneumonia. She has been on medications. Shared how \" I can't do much now. It bothers me. I am tired of procrastination\" Patient " "remembers discussing this \" procrastination\" issue in past months. She will needs to feel better first and start organizing and cleaning her house in sessions. Might need  to assist. Though shared she has a hard time to have someone else do things for her due to her \" perfectionism\" . Processed all her concerns and  she responded well. No other concern. Her next visit is in a month.         Treatment Objective(s) Addressed in This Session:         Intervention:  ACT: focusing on strengths and progress as noted.   Safety plan: Reinforced     MI Intervention: Expressed Empathy/Understanding, Supported Autonomy, Collaboration, Evocation, Permission to raise concern or advise, and Open-ended questions     Change Talk Expressed by the Patient: Taking steps    Provider Response to Change Talk: E - Evoked more info from patient about behavior change, A - Affirmed patient's thoughts, decisions, or attempts at behavior change, R - Reflected patient's change talk, and S - Summarized patient's change talk statements     Assessments completed prior to visit:2/23/2023    The following assessments were completed by patient for this visit:  PHQ2:       10/14/2024    12:47 PM 4/18/2024    10:22 AM 2/23/2024     3:02 PM 1/12/2024     2:03 PM 12/27/2023     8:39 AM 4/27/2022    10:07 AM 8/11/2020     9:37 AM   PHQ-2 ( 1999 [employer])   Q1: Little interest or pleasure in doing things 0 0 2 0 0 0 0   Q2: Feeling down, depressed or hopeless 0 0 3 1 1 0 0   PHQ-2 Score 0 0 5 1 1 0 0   PHQ-2 Total Score (12-17 Years)- Positive if 3 or more points; Administer PHQ-A if positive       0     PHQ9:       5/21/2024     7:59 AM 6/6/2024     9:41 AM 6/11/2024     2:35 PM 8/1/2024     9:12 AM 8/26/2024    10:50 AM 9/16/2024     8:17 AM 10/21/2024     3:39 PM   PHQ-9 SCORE   PHQ-9 Total Score MyChart 1 (Minimal depression) 1 (Minimal depression) 1 (Minimal depression) 2 (Minimal depression) 3 (Minimal depression) 4 (Minimal depression) 4 " (Minimal depression)   PHQ-9 Total Score 1 1 1 2 3 4 4     GAD2:       3/25/2024     4:52 PM 4/2/2024    10:49 AM 4/20/2024    10:35 AM 4/30/2024     9:40 AM 5/14/2024     9:32 AM 8/22/2024     9:18 AM 9/16/2024     8:17 AM   ROCKY-2   Feeling nervous, anxious, or on edge 3 3 0 0 0 1 1   Not being able to stop or control worrying 2 2 0 0 0 0 0   ROCKY-2 Total Score 5 5    5 0 0 0 1 1     GAD7:       12/13/2023     1:54 PM 1/7/2024    10:08 AM 1/22/2024    10:13 AM 2/19/2024     1:36 PM 3/25/2024     4:52 PM 6/12/2024     9:56 PM 8/27/2024     5:53 PM   ROCKY-7 SCORE   Total Score  5 (mild anxiety) 6 (mild anxiety) 8 (mild anxiety) 10 (moderate anxiety)     Total Score 3 5 6 8 10 3 2     CAGE-AID:       2/9/2023    11:52 AM 7/17/2023     9:10 AM   CAGE-AID Total Score   Total Score 0 0   Total Score MyChart 0 (A total score of 2 or greater is considered clinically significant)      PROMIS 10-Global Health (all questions and answers displayed):       2/19/2024     1:38 PM 4/2/2024    10:50 AM 4/20/2024    10:37 AM 4/30/2024     9:42 AM 5/14/2024     9:34 AM 8/22/2024     9:19 AM 9/16/2024     8:19 AM   PROMIS 10   In general, would you say your health is: Good Good Good Good Very good Good Good   In general, would you say your quality of life is: Good Fair Very good Very good Very good Very good Very good   In general, how would you rate your physical health? Good Good Fair Good Good Good Good   In general, how would you rate your mental health, including your mood and your ability to think? Fair Poor Very good Very good Very good Very good Very good   In general, how would you rate your satisfaction with your social activities and relationships? Fair Fair Very good Very good Very good Very good Very good   In general, please rate how well you carry out your usual social activities and roles Fair Poor Good Very good Good Fair Good   To what extent are you able to carry out your everyday physical activities such as walking,  climbing stairs, carrying groceries, or moving a chair? Moderately Mostly Moderately Mostly Moderately Moderately Moderately   In the past 7 days, how often have you been bothered by emotional problems such as feeling anxious, depressed, or irritable? Always Always Rarely Never Never Rarely Rarely   In the past 7 days, how would you rate your fatigue on average? Moderate Moderate Moderate Mild Moderate Moderate Moderate   In the past 7 days, how would you rate your pain on average, where 0 means no pain, and 10 means worst imaginable pain? 5 5 5 5 5 5 5   In general, would you say your health is: 3 3 3 3 4 3 3   In general, would you say your quality of life is: 3 2 4 4 4 4 4   In general, how would you rate your physical health? 3 3 2 3 3 3 3   In general, how would you rate your mental health, including your mood and your ability to think? 2 1 4 4 4 4 4   In general, how would you rate your satisfaction with your social activities and relationships? 2 2 4 4 4 4 4   In general, please rate how well you carry out your usual social activities and roles. (This includes activities at home, at work and in your community, and responsibilities as a parent, child, spouse, employee, friend, etc.) 2 1 3 4 3 2 3   To what extent are you able to carry out your everyday physical activities such as walking, climbing stairs, carrying groceries, or moving a chair? 3 4 3 4 3 3 3   In the past 7 days, how often have you been bothered by emotional problems such as feeling anxious, depressed, or irritable? 5 5 2 1 1 2 2   In the past 7 days, how would you rate your fatigue on average? 3 3 3 2 3 3 3   In the past 7 days, how would you rate your pain on average, where 0 means no pain, and 10 means worst imaginable pain? 5 5 5 5 5 5 5   Global Mental Health Score 8 6    6 16 17 17 16 16   Global Physical Health Score 12 13    13 11 14 12 12 12   PROMIS TOTAL - SUBSCORES 20 19    19 27 31 29 28 28     PROMIS 10-Global Health (only  subscores and total score):       2/19/2024     1:38 PM 4/2/2024    10:50 AM 4/20/2024    10:37 AM 4/30/2024     9:42 AM 5/14/2024     9:34 AM 8/22/2024     9:19 AM 9/16/2024     8:19 AM   PROMIS-10 Scores Only   Global Mental Health Score 8 6    6 16 17 17 16 16   Global Physical Health Score 12 13    13 11 14 12 12 12   PROMIS TOTAL - SUBSCORES 20 19    19 27 31 29 28 28     [place] Suicide Severity Rating Scale (Short Version)      7/17/2023     9:09 AM 9/13/2023     6:03 AM 12/6/2023     1:21 AM 2/16/2024    12:38 PM 4/13/2024     9:13 AM 5/24/2024     6:44 PM 10/19/2024     8:27 PM   [place] Suicide Severity Rating (Short Version)   Over the past 2 weeks have you felt down, depressed, or hopeless?  yes no no      Over the past 2 weeks have you had thoughts of killing yourself?  no no no      Have you ever attempted to kill yourself?  no no no      Q1 Wished to be Dead (Past Month)     0-->no 0-->no 0-->no   Q2 Suicidal Thoughts (Past Month)     0-->no 0-->no 0-->no   Q6 Suicide Behavior (Lifetime)     0-->no 0-->no 0-->no   Level of Risk per Screen     no risks indicated no risks indicated no risks indicated   1. Wish to be Dead (Since Last Contact) N         2. Non-Specific Active Suicidal Thoughts (Since Last Contact) N         Actual Attempt (Since Last Contact) N         Has subject engaged in non-suicidal self-injurious behavior? (Since Last Contact) N         Interrupted Attempts (Since Last Contact) N         Aborted or Self-Interrupted Attempt (Since Last Contact) N         Preparatory Acts or Behavior (Since Last Contact) N         Suicide (Since Last Contact) N         Calculated C-SSRS Risk Score (Since Last Contact) No Risk Indicated               ASSESSMENT: Current Emotional / Mental Status (status of significant symptoms):   Risk status (Self / Other harm or suicidal ideation)   Patient denies current fears or concerns for personal safety.   Patient denies current or recent suicidal ideation or  behaviors.   Patient denies current or recent homicidal ideation or behaviors.   Patient denies current or recent self injurious behavior or ideation.   Patient denies other safety concerns.   Patient reports there has been no change in risk factors since their last session.     Patient reports there has been no change in protective factors since their last session.     Recommended that patient call 911 or go to the local ED should there be a change in any of these risk factors   Would call [phone] if any SI.     Appearance:   Appropriate    Eye Contact:   Good    Psychomotor Behavior: Normal    Attitude:   Cooperative    Orientation:   Person Place Time Situation   Speech    Rate / Production: Normal/ Responsive    Volume:  Normal    Mood:    Depressed    Affect:    Appropriate    Thought Content:  Clear    Thought Form:  Coherent  Logical    Insight:    Good      Medication Review:   No changes to current psychiatric medication(s)     Medication Compliance:   Yes     Changes in Health Issues:   None reported     Chemical Use Review:   Substance Use: Chemical use reviewed, no active concerns identified      Tobacco Use: No current tobacco use.      Diagnosis:  1. MDD (major depressive disorder), recurrent episode, mild (H)      Collateral Reports Completed:   Not Applicable    PLAN: (Patient Tasks / Therapist Tasks / Other):  Patient will focus on her recovery and start her house organization once she feels better.   Patient will be working on her house organization starting with the chosen corner and take breaks in between.   Patient's next visit is in a month    GERMAN Farmer           ______________________________________________________________________________    Individual Treatment Plan    Patient's Name: Sherly Yeung  YOB: 1965    Date of Creation: 2/23/2023    Date Treatment Plan Last Reviewed/Revised: 8/27/2024    DSM5 Diagnoses: 296.32 (F33.1) Major Depressive Disorder, Recurrent  "Episode, Moderate With anxious distress or 300.02 (F41.1) Generalized Anxiety Disorder  Grief reaction [F43.21]    Psychosocial / Contextual Factors: grief: father recently passed away. Family dynamic- relationship with son, mom. Feeling overwhelmed.    PROMIS (reviewed every 90 days): 25    Referral / Collaboration:    Referral to another professional/service is not indicated at this time..    Anticipated number of session for this episode of care: 9-12 sessions  Anticipation frequency of session: Biweekly  Anticipated Duration of each session: 38-52 minutes  Treatment plan will be reviewed in 90 days or when goals have been changed.     MeasurableTreatment Goal(s) related to diagnosis / functional impairment(s)    Goal 1: Patient will Accept the loss of beloved one and return to stable level of functioning as evidenced by a higher level of functioning as a result of acceptance.   Redevelop a supportive social system and improve interpersonal relationships and Express unresolved emotions regarding loss which brings anxiety and depression mood.      I will know I've met my goal when I have more energy and  I am able to celebrate good life with my father Vs the sadness of losing him.Redevelop a supportive social system and improve interpersonal relationships\"    Objective #A (Patient Action)    Patient will Identify negative self-talk and behaviors: challenge core beliefs, myths, and actions.  Status: Continued - Date(s): 8/27/2024    Intervention(s)  Therapist will teach emotional recognition/identification. Support in her in the process    Objective #B  Patient will identify at least 3 fears / thoughts that contribute to feeling anxious.  Status: Continued - Date(s): 8/27/2024    Intervention(s)  Therapist will teach thought challenging with CBT .    Objective #C  Patient will Identify negative self-talk and behaviors: challenge core beliefs, myths, and actions.  Status: Continued - Date(s): " 8/27/2024    Intervention(s)  Therapist will provide space and time to process thoughts and feelings around current stressors. provide support and coping skills to help move on in life .    Patient has reviewed and agreed to the above plan.    GERMAN Farmer  8/27/2024    Answers for HPI/ROS submitted by the patient on 4/20/2023  If you checked off any problems, how difficult have these problems made it for you to do your work, take care of things at home, or get along with other people?: Somewhat difficult  PHQ9 TOTAL SCORE: 5    Answers for HPI/ROS submitted by the patient on 4/26/2023  If you checked off any problems, how difficult have these problems made it for you to do your work, take care of things at home, or get along with other people?: Somewhat difficult  PHQ9 TOTAL SCORE: 3    Answers for HPI/ROS submitted by the patient on 5/4/2023  If you checked off any problems, how difficult have these problems made it for you to do your work, take care of things at home, or get along with other people?: Somewhat difficult  PHQ9 TOTAL SCORE: 4  Answers for HPI/ROS submitted by the patient on 7/17/2023  If you checked off any problems, how difficult have these problems made it for you to do your work, take care of things at home, or get along with other people?: Somewhat difficult  PHQ9 TOTAL SCORE: 5    Answers submitted by the patient for this visit:  Patient Health Questionnaire (Submitted on 7/30/2023)  If you checked off any problems, how difficult have these problems made it for you to do your work, take care of things at home, or get along with other people?: Somewhat difficult  PHQ9 TOTAL SCORE: 5  Answers submitted by the patient for this visit:  Patient Health Questionnaire (Submitted on 8/20/2023)  If you checked off any problems, how difficult have these problems made it for you to do your work, take care of things at home, or get along with other people?: Somewhat difficult  PHQ9 TOTAL SCORE:  5  Answers submitted by the patient for this visit:  Patient Health Questionnaire (Submitted on 9/18/2023)  If you checked off any problems, how difficult have these problems made it for you to do your work, take care of things at home, or get along with other people?: Somewhat difficult  PHQ9 TOTAL SCORE: 4  Answers submitted by the patient for this visit:  Patient Health Questionnaire (Submitted on 10/2/2023)  If you checked off any problems, how difficult have these problems made it for you to do your work, take care of things at home, or get along with other people?: Somewhat difficult  PHQ9 TOTAL SCORE: 5    Answers submitted by the patient for this visit:  Patient Health Questionnaire (Submitted on 10/15/2023)  If you checked off any problems, how difficult have these problems made it for you to do your work, take care of things at home, or get along with other people?: Somewhat difficult  PHQ9 TOTAL SCORE: 6  ROCKY-7 (Submitted on 10/10/2023)  ROCKY 7 TOTAL SCORE: 5    Answers submitted by the patient for this visit:  Patient Health Questionnaire (Submitted on 11/16/2023)  If you checked off any problems, how difficult have these problems made it for you to do your work, take care of things at home, or get along with other people?: Somewhat difficult  PHQ9 TOTAL SCORE: 4    Answers submitted by the patient for this visit:  Patient Health Questionnaire (Submitted on 11/28/2023)  If you checked off any problems, how difficult have these problems made it for you to do your work, take care of things at home, or get along with other people?: Somewhat difficult  PHQ9 TOTAL SCORE: 5    Answers submitted by the patient for this visit:  Patient Health Questionnaire (Submitted on 12/13/2023)  If you checked off any problems, how difficult have these problems made it for you to do your work, take care of things at home, or get along with other people?: Somewhat difficult  PHQ9 TOTAL SCORE: 5    Answers submitted by the  patient for this visit:  Patient Health Questionnaire (Submitted on 12/26/2023)  If you checked off any problems, how difficult have these problems made it for you to do your work, take care of things at home, or get along with other people?: Somewhat difficult  PHQ9 TOTAL SCORE: 5    Answers submitted by the patient for this visit:  Patient Health Questionnaire (Submitted on 1/10/2024)  If you checked off any problems, how difficult have these problems made it for you to do your work, take care of things at home, or get along with other people?: Somewhat difficult  PHQ9 TOTAL SCORE: 5  ROCKY-7 (Submitted on 1/7/2024)  ROCKY 7 TOTAL SCORE: 5    Answers submitted by the patient for this visit:  Patient Health Questionnaire (Submitted on 1/25/2024)  If you checked off any problems, how difficult have these problems made it for you to do your work, take care of things at home, or get along with other people?: Very difficult  PHQ9 TOTAL SCORE: 8  ROCKY-7 (Submitted on 1/22/2024)  ROCKY 7 TOTAL SCORE: 6    Answers submitted by the patient for this visit:  Patient Health Questionnaire (Submitted on 2/21/2024)  If you checked off any problems, how difficult have these problems made it for you to do your work, take care of things at home, or get along with other people?: Very difficult  PHQ9 TOTAL SCORE: 16  ROCKY-7 (Submitted on 2/19/2024)  ROCKY 7 TOTAL SCORE: 8    Answers submitted by the patient for this visit:  Patient Health Questionnaire (Submitted on 4/22/2024)  If you checked off any problems, how difficult have these problems made it for you to do your work, take care of things at home, or get along with other people?: Somewhat difficult  PHQ9 TOTAL SCORE: 2    Answers submitted by the patient for this visit:  Patient Health Questionnaire (Submitted on 5/6/2024)  If you checked off any problems, how difficult have these problems made it for you to do your work, take care of things at home, or get along with other people?:  Somewhat difficult  PHQ9 TOTAL SCORE: 1    Answers submitted by the patient for this visit:  Patient Health Questionnaire (Submitted on 5/21/2024)  If you checked off any problems, how difficult have these problems made it for you to do your work, take care of things at home, or get along with other people?: Not difficult at all  PHQ9 TOTAL SCORE: 1    Answers submitted by the patient for this visit:  Patient Health Questionnaire (Submitted on 6/11/2024)  If you checked off any problems, how difficult have these problems made it for you to do your work, take care of things at home, or get along with other people?: Somewhat difficult  PHQ9 TOTAL SCORE: 1    Answers submitted by the patient for this visit:  Patient Health Questionnaire (Submitted on 8/26/2024)  If you checked off any problems, how difficult have these problems made it for you to do your work, take care of things at home, or get along with other people?: Somewhat difficult  PHQ9 TOTAL SCORE: 3    Answers submitted by the patient for this visit:  Patient Health Questionnaire (Submitted on 9/16/2024)  If you checked off any problems, how difficult have these problems made it for you to do your work, take care of things at home, or get along with other people?: Somewhat difficult  PHQ9 TOTAL SCORE: 4    Answers submitted by the patient for this visit:  Patient Health Questionnaire (Submitted on 10/21/2024)  If you checked off any problems, how difficult have these problems made it for you to do your work, take care of things at home, or get along with other people?: Very difficult  PHQ9 TOTAL SCORE: 4

## 2024-10-23 NOTE — TELEPHONE ENCOUNTER
"Date of Last Office Visit: 8/1/2024  [facility]      RTC: 6wks  No shows: 0  Cancellations: 0  Date of Next Office Visit:    11/7/2024 3:50 PM (30 min)  Carol   Arrive by:  3:35 PM   ADULT PSYCHIATRY RETURN    URPSY ([facility])   Tatum Mason MD     ------------------------------    Medication requested:    DULoxetine (CYMBALTA) 60 MG capsule 60 capsule 0 9/25/2024 -- No   Sig - Route: Take 2 capsules (120 mg) by mouth every evening. - Oral     ARIPiprazole (ABILIFY) 5 MG tablet 30 tablet 0 9/25/2024 -- No   Sig - Route: Take 1 tablet (5 mg) by mouth daily. - Oral     ------------------------------  From last visit note:   \"- Continue Duloxetine 120 mg at bedtime (drowsiness if taken during daytime)   - Continue Abilify 5 mg/day at bedtime \"     Refill Decision:    [x]Medication refilled per  Medication Refill in Ambulatory Care  policy.         [] Supervision: no future appointment scheduled. Scheduling has been notified to contact the pt for appointment.    []Medication unable to be refilled by RN due to criteria not met as indicated below:          [] Eligibility - Pt not seen within past 12 months, no future appointment       [] Supervision: no future appointment scheduled. Scheduling has been notified to contact the pt for appointment.       [] Compliance - lapse in therapy/gap in refills; No Shows; Cancellations       [] Verification - order discrepancy, clarification needed, modification needed       [] Beatriz refills given. Pt did not follow-up, pended to care team for decision       [] Controlled medication       [] Medication not included in refill protocol policy       [] Medication is Reported/Historical       [] Medication not active on Pt's med list       [] > 30-day supply request       [] Advanced refill request: > 7 days before refill date       [] Review is needed: new med; med adjusted <= 30 days; Safety Alert; requires lab monitoring    " "   [] Last visit treatment plan \"Open/Pended/Unsigned\" - routing for review.       [] OTHER:                        "

## 2024-10-23 NOTE — TELEPHONE ENCOUNTER
No alternatives are listed, the patient will have to call her insurance to see what medications are covered.

## 2024-10-24 DIAGNOSIS — F33.41 MAJOR DEPRESSIVE DISORDER, RECURRENT EPISODE, IN PARTIAL REMISSION (H): ICD-10-CM

## 2024-10-24 RX ORDER — METHYLPHENIDATE HYDROCHLORIDE 20 MG/1
20 CAPSULE, EXTENDED RELEASE ORAL DAILY
Qty: 30 CAPSULE | Refills: 0 | Status: SHIPPED | OUTPATIENT
Start: 2024-10-24 | End: 2024-11-12

## 2024-10-24 RX ORDER — METHYLPHENIDATE HYDROCHLORIDE 40 MG/1
40 CAPSULE, EXTENDED RELEASE ORAL EVERY MORNING
Qty: 30 CAPSULE | Refills: 0 | Status: SHIPPED | OUTPATIENT
Start: 2024-10-24 | End: 2024-11-12

## 2024-10-24 NOTE — TELEPHONE ENCOUNTER
Alie POD,     I have reviewed the pended Ritalin LA 20mg daily #30 and 40mg daily #30 for total of 60mg daily refill request and it is appropriate based on last fill/ review. Next appointment with me is 11/7/2024       Thank you,    Tatum

## 2024-10-24 NOTE — TELEPHONE ENCOUNTER
[facility] Call Center    Phone Message    May a detailed message be left on voicemail: yes     Reason for Call: Medication Refill Request    Has the patient contacted the pharmacy for the refill? Yes   Name of medication being requested: Ritalin 20mg & 40mg  Provider who prescribed the medication: Dr. Mason  Pharmacy:   [facility] [address] [address]     Date medication is needed: Pt says will be out of 20mg on Saturday and 40mg next week      Action Taken: Message routed to:  Other: P PSYCHIATRY NURSE-UMP    Travel Screening: Not Applicable     Date of Service:

## 2024-10-24 NOTE — TELEPHONE ENCOUNTER
Last seen: 8/1  RTC: 6 weeks  Cancel: None  No-show: None  Next appt: 11/7       Medication requested:   Pending Prescriptions:                       Disp   Refills    methylphenidate (RITALIN LA) 40 MG 24 hr *30 cap*0            Sig: Take 1 capsule (40 mg) by mouth every morning.           Take with 1 (20 mg) capsule for a total of 60 mg           daily    methylphenidate (RITALIN LA) 20 MG 24 hr *30 cap*0            Sig: Take 1 capsule (20 mg) by mouth daily. Take with           1 (40 mg ) capsule for total of 60 mg daily.    Per [place]      From chart note:   1) Medications:   - Change Ritalin IR  to LA 40mg in the morning for one week then additional 20mg daily for total of 60 after one week   - Continue Duloxetine 120 mg at bedtime (drowsiness if taken during daytime)   - Continue Abilify 5 mg/day at bedtime   - PRN Lorazepam 10 tablets 0.5 mg for panic attacks only for 2 weeks, not taken between visits (has not needed it for months      Other pertinent medications not managed by psychiatry:   - Oxycodone (1-2 per week for migraine)  - OxyContin (1-2 per week endometriosis)  - hydromorphone (1-2 per month migraine)  - Sumatriptan       Refills sent to provider for approval.

## 2024-11-01 ENCOUNTER — TELEPHONE (OUTPATIENT)
Dept: FAMILY MEDICINE | Facility: CLINIC | Age: 59
End: 2024-11-01

## 2024-11-01 ENCOUNTER — OFFICE VISIT (OUTPATIENT)
Dept: FAMILY MEDICINE | Facility: CLINIC | Age: 59
End: 2024-11-01
Payer: COMMERCIAL

## 2024-11-01 VITALS
DIASTOLIC BLOOD PRESSURE: 84 MMHG | SYSTOLIC BLOOD PRESSURE: 122 MMHG | TEMPERATURE: 99.2 F | RESPIRATION RATE: 24 BRPM | OXYGEN SATURATION: 94 % | HEART RATE: 95 BPM

## 2024-11-01 DIAGNOSIS — J40 BRONCHITIS: Primary | ICD-10-CM

## 2024-11-01 DIAGNOSIS — J96.11 CHRONIC RESPIRATORY FAILURE WITH HYPOXIA (H): ICD-10-CM

## 2024-11-01 DIAGNOSIS — J40 BRONCHITIS: ICD-10-CM

## 2024-11-01 PROBLEM — M81.0 OSTEOPOROSIS: Status: ACTIVE | Noted: 2023-05-12

## 2024-11-01 PROBLEM — Z85.828 HISTORY OF BASAL CELL CARCINOMA: Status: RESOLVED | Noted: 2018-06-05 | Resolved: 2024-11-01

## 2024-11-01 PROBLEM — J96.10 CHRONIC RESPIRATORY FAILURE (H): Status: ACTIVE | Noted: 2021-12-23

## 2024-11-01 PROBLEM — U07.1 ACUTE RESPIRATORY DISTRESS SYNDROME (ARDS) DUE TO COVID-19 VIRUS (H): Status: RESOLVED | Noted: 2022-01-19 | Resolved: 2024-11-01

## 2024-11-01 PROBLEM — R94.31 PROLONGED QT INTERVAL: Status: RESOLVED | Noted: 2019-07-20 | Resolved: 2024-11-01

## 2024-11-01 PROBLEM — J80 ACUTE RESPIRATORY DISTRESS SYNDROME (ARDS) DUE TO COVID-19 VIRUS (H): Status: RESOLVED | Noted: 2022-01-19 | Resolved: 2024-11-01

## 2024-11-01 PROCEDURE — 99213 OFFICE O/P EST LOW 20 MIN: CPT | Performed by: PHYSICIAN ASSISTANT

## 2024-11-01 RX ORDER — ALBUTEROL SULFATE 90 UG/1
2 INHALANT RESPIRATORY (INHALATION) EVERY 6 HOURS PRN
Qty: 18 G | Refills: 0 | Status: SHIPPED | OUTPATIENT
Start: 2024-11-01

## 2024-11-01 RX ORDER — IPRATROPIUM BROMIDE AND ALBUTEROL SULFATE 2.5; .5 MG/3ML; MG/3ML
1 SOLUTION RESPIRATORY (INHALATION) EVERY 6 HOURS PRN
Qty: 60 ML | Refills: 0 | Status: CANCELLED | OUTPATIENT
Start: 2024-11-01

## 2024-11-01 RX ORDER — IPRATROPIUM BROMIDE AND ALBUTEROL SULFATE 2.5; .5 MG/3ML; MG/3ML
1 SOLUTION RESPIRATORY (INHALATION) EVERY 6 HOURS PRN
Qty: 60 ML | Refills: 0 | Status: SHIPPED | OUTPATIENT
Start: 2024-11-01 | End: 2024-11-07

## 2024-11-01 RX ORDER — PREDNISONE 20 MG/1
TABLET ORAL
Qty: 20 TABLET | Refills: 0 | Status: SHIPPED | OUTPATIENT
Start: 2024-11-01

## 2024-11-01 NOTE — TELEPHONE ENCOUNTER
[facility] [phone]  DRUG REQUIRES PRIOR AUTHORIZATION    Prior Authorization Retail Medication Request    Medication/Dose: Ipratropium-albuterol  Diagnosis and ICD code (if different than what is on RX):  na  New/renewal/insurance change PA/secondary ins. PA:  Previously Tried and Failed:  na  Rationale:  na    Insurance   Primary: medicareblue rx part d  Insurance ID:  [id number]      Secondary (if applicable):na  Insurance ID:  serene    Pharmacy Information (if different than what is on RX)  Name:  [facility]  Phone:  [phone]  Fax:[phone]    Clinic Information  Preferred routing pool for dept communication: serene

## 2024-11-01 NOTE — PROGRESS NOTES
"Assessment & Plan   Bronchitis  Chronic respiratory failure with hypoxia (H)  [facility] notes reviewed. Symptoms returned shortly after completion of Prednisone. Mostly cough and shortness of breath. Vitals are wnl. Exam without crackles and good air movement, but there is mild wheezing heard throughout. Will restart Prednisone, but taper this time. Rx for Albuterol inhaler and duonebs/nebulizer. Warning signs and symptoms discussed on when to return to clinic or go to the ER. Pt verbalized understanding.    - Nebulizer and Supplies Order  - predniSONE (DELTASONE) 20 MG tablet; Take 3 tabs by mouth daily x 3 days, then 2 tabs daily x 3 days, then 1 tab daily x 3 days, then 1/2 tab daily x 3 days.  - albuterol (PROAIR HFA/PROVENTIL HFA/VENTOLIN HFA) 108 (90 Base) MCG/ACT inhaler; Inhale 2 puffs into the lungs every 6 hours as needed.  - ipratropium - albuterol 0.5 mg/2.5 mg/3 mL (DUONEB) 0.5-2.5 (3) MG/3ML neb solution; Take 1 vial (3 mLs) by nebulization every 6 hours as needed for shortness of breath, wheezing or cough.    BMI  Estimated body mass index is 25.69 kg/m  as calculated from the following:    Height as of 8/6/24: 1.6 m (5' 3\").    Weight as of 10/19/24: 65.8 kg (145 lb).     Subjective   Marcia is a 59 year old, presenting for the following health issues:  URI        11/1/2024    11:09 AM   Additional Questions   Roomed by Fabby PORTILLO CMA   Accompanied by Self       History of Present Illness       Reason for visit:  Treated for pneumonia on 10/19, continue to cough and now have chest pain with breathing    She eats 2-3 servings of fruits and vegetables daily.She consumes 1 sweetened beverage(s) daily.She exercises with enough effort to increase her heart rate 30 to 60 minutes per day.  She exercises with enough effort to increase her heart rate 4 days per week.   She is taking medications regularly.     Review of Systems  See HPI       Objective    /84   Pulse 95   Temp 99.2  F (37.3  C) (Tympanic)   Resp " 24   LMP 08/22/2017 (Approximate)   SpO2 94%   There is no height or weight on file to calculate BMI.  Physical Exam   Constitutional: healthy, alert, and no distress  Head: Normocephalic. Atraumatic  Eyes: No conjunctival injection, sclera anicteric  Neck: supple, no thyromegaly, nodules or asymmetry of the thyroid. No cervical LAD.  Cardiovascular: RRR. No murmurs, clicks, gallops, or rubs. No peripheral edema.   Respiratory: No resp distress. Mild wheezing in all lung fields, no crackles or rhonchi. Good air movements throughout.   Musculoskeletal: extremities normal- no gross deformities noted, and normal muscle tone  Skin: no suspicious lesions or rashes  Neurologic: Gait normal. CN 2-12 grossly intact  Psychiatric: mentation appears normal and affect normal/bright           Signed Electronically by: Juan Giordano PA-C

## 2024-11-04 NOTE — TELEPHONE ENCOUNTER
Prior Authorization Approval - covered for brontis part D    Medication: IPRATROPIUM-ALBUTEROL 0.5-2.5 (3) MG/3ML IN SOLN  Authorization Effective Date: 11/4/2024  Authorization Expiration Date: 11/4/2025  Approved Dose/Quantity: 90/7  Reference #: [id number]   Insurance Company: [employer] - Phone [phone] Fax [phone]  Expected CoPay: $    CoPay Card Available:      Financial Assistance Needed: NA  Which Pharmacy is filling the prescription: [facility] [place], [place] - [address]  Pharmacy Notified: Yes  Patient Notified: No, pharmacy will contact

## 2024-11-05 ENCOUNTER — HOSPITAL ENCOUNTER (OUTPATIENT)
Dept: RESPIRATORY THERAPY | Facility: CLINIC | Age: 59
Discharge: HOME | End: 2024-11-05
Payer: MEDICARE

## 2024-11-05 LAB
6 MIN WALK (FT): 1400 FT
6 MIN WALK (M): 427 M

## 2024-11-05 PROCEDURE — 94618 PULMONARY STRESS TESTING: CPT
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
## 2024-11-05 NOTE — PROGRESS NOTES
Patient has been assessed for Home Oxygen needs.     Pulse oximetry (SpO2) and Oxygen flow readings:    SpO2 =  93% on room air at rest while awake.    SpO2 =  86% on room air during activity/with exercise.    *SpO2 improved to  90% on 3 liters/minute during activity/with exercise.    Patient was able to walk 400 feet on room air prior to desaturation. Patient then placed on 2 LPM nasal cannula and was able to walk an additional 500 feet prior to desaturation. Finally patient was able to walk 1400 feet on 3 LPM over 6 minutes. See results below.  Media Information    Document Information    Procedure: Oximetry - HIM Scan   6MWT   11/05/2024 12:00 AM   Attached To:   6 minute walk test [[id number]]   Telephone on 9/30/24 with Simona Gonsales MD   Source Information    Mir Burns, RT  [facility]   Document History

## 2024-11-06 ENCOUNTER — VIRTUAL VISIT (OUTPATIENT)
Dept: UROLOGY | Facility: CLINIC | Age: 59
End: 2024-11-06
Attending: STUDENT IN AN ORGANIZED HEALTH CARE EDUCATION/TRAINING PROGRAM
Payer: COMMERCIAL

## 2024-11-06 DIAGNOSIS — N39.0 RECURRENT UTI: ICD-10-CM

## 2024-11-06 DIAGNOSIS — N39.41 URGE INCONTINENCE: ICD-10-CM

## 2024-11-06 DIAGNOSIS — N39.41 URGENCY INCONTINENCE: ICD-10-CM

## 2024-11-06 RX ORDER — ESTRADIOL 0.1 MG/G
2 CREAM VAGINAL
Qty: 42.5 G | Refills: 3 | Status: SHIPPED | OUTPATIENT
Start: 2024-11-07

## 2024-11-06 RX ORDER — MIRABEGRON 50 MG/1
50 TABLET, FILM COATED, EXTENDED RELEASE ORAL DAILY
Qty: 90 TABLET | Refills: 3 | Status: SHIPPED | OUTPATIENT
Start: 2024-11-06

## 2024-11-06 RX ORDER — SOLIFENACIN SUCCINATE 5 MG/1
5 TABLET, FILM COATED ORAL DAILY
Qty: 90 TABLET | Refills: 3 | Status: SHIPPED | OUTPATIENT
Start: 2024-11-06

## 2024-11-06 NOTE — PROGRESS NOTES
Sherly Yeung is a 59 year old year old who is being evaluated via a billable video visit.      How would you like to obtain your AVS? MyChart  If the video visit is dropped, the invitation should be resent by: Text to cell phone: [phone]  Will anyone else be joining your video visit? No    Video-Visit Details    Type of service:  Video Visit     Originating Location (pt. Location): Home    Distant Location (provider location):  Off-site  Platform used for Video Visit: JenaWell

## 2024-11-07 ENCOUNTER — VIRTUAL VISIT (OUTPATIENT)
Dept: PSYCHIATRY | Facility: CLINIC | Age: 59
End: 2024-11-07
Attending: PSYCHIATRY & NEUROLOGY
Payer: MEDICARE

## 2024-11-07 ENCOUNTER — OFFICE VISIT (OUTPATIENT)
Dept: PULMONOLOGY | Facility: CLINIC | Age: 59
End: 2024-11-07
Payer: MEDICARE

## 2024-11-07 ENCOUNTER — TELEPHONE (OUTPATIENT)
Dept: PULMONOLOGY | Facility: CLINIC | Age: 59
End: 2024-11-07

## 2024-11-07 VITALS — WEIGHT: 145 LBS | HEIGHT: 63 IN | BODY MASS INDEX: 25.69 KG/M2

## 2024-11-07 VITALS
OXYGEN SATURATION: 94 % | DIASTOLIC BLOOD PRESSURE: 88 MMHG | SYSTOLIC BLOOD PRESSURE: 138 MMHG | HEIGHT: 63 IN | HEART RATE: 89 BPM | TEMPERATURE: 98.3 F | WEIGHT: 145 LBS | BODY MASS INDEX: 25.69 KG/M2

## 2024-11-07 DIAGNOSIS — F32.2 CURRENT SEVERE EPISODE OF MAJOR DEPRESSIVE DISORDER WITHOUT PSYCHOTIC FEATURES, UNSPECIFIED WHETHER RECURRENT (H): ICD-10-CM

## 2024-11-07 DIAGNOSIS — R06.09 DYSPNEA ON EXERTION: ICD-10-CM

## 2024-11-07 DIAGNOSIS — F41.1 GENERALIZED ANXIETY DISORDER: ICD-10-CM

## 2024-11-07 DIAGNOSIS — F33.40 MAJOR DEPRESSIVE DISORDER, RECURRENT, IN REMISSION (H): Primary | ICD-10-CM

## 2024-11-07 DIAGNOSIS — G47.10 HYPERSOMNOLENCE: ICD-10-CM

## 2024-11-07 DIAGNOSIS — J40 BRONCHITIS: ICD-10-CM

## 2024-11-07 DIAGNOSIS — F33.1 MAJOR DEPRESSIVE DISORDER, RECURRENT, MODERATE (H): Primary | ICD-10-CM

## 2024-11-07 DIAGNOSIS — F41.9 ANXIETY: ICD-10-CM

## 2024-11-07 DIAGNOSIS — J47.9 BRONCHIECTASIS WITHOUT COMPLICATION (H): ICD-10-CM

## 2024-11-07 DIAGNOSIS — J96.11 CHRONIC RESPIRATORY FAILURE WITH HYPOXIA (H): ICD-10-CM

## 2024-11-07 DIAGNOSIS — K21.9 GASTROESOPHAGEAL REFLUX DISEASE WITHOUT ESOPHAGITIS: Primary | ICD-10-CM

## 2024-11-07 PROCEDURE — 99214 OFFICE O/P EST MOD 30 MIN: CPT | Mod: 95

## 2024-11-07 PROCEDURE — 99215 OFFICE O/P EST HI 40 MIN: CPT | Performed by: INTERNAL MEDICINE

## 2024-11-07 PROCEDURE — G2211 COMPLEX E/M VISIT ADD ON: HCPCS | Mod: 95

## 2024-11-07 RX ORDER — ALBUTEROL SULFATE 0.83 MG/ML
2.5 SOLUTION RESPIRATORY (INHALATION) EVERY 6 HOURS PRN
Qty: 90 ML | Refills: 11 | Status: SHIPPED | OUTPATIENT
Start: 2024-11-07

## 2024-11-07 RX ORDER — DULOXETIN HYDROCHLORIDE 60 MG/1
120 CAPSULE, DELAYED RELEASE ORAL EVERY EVENING
Qty: 60 CAPSULE | Refills: 2 | Status: SHIPPED | OUTPATIENT
Start: 2024-11-07

## 2024-11-07 RX ORDER — METHYLPHENIDATE HYDROCHLORIDE 20 MG/1
CAPSULE, EXTENDED RELEASE ORAL
Qty: 30 CAPSULE | Refills: 0 | Status: SHIPPED | OUTPATIENT
Start: 2024-11-22

## 2024-11-07 RX ORDER — SULFAMETHOXAZOLE AND TRIMETHOPRIM 800; 160 MG/1; MG/1
1 TABLET ORAL EVERY OTHER DAY
Qty: 3 TABLET | Refills: 0 | Status: SHIPPED | OUTPATIENT
Start: 2024-11-07

## 2024-11-07 RX ORDER — METHYLPHENIDATE HYDROCHLORIDE 20 MG/1
CAPSULE, EXTENDED RELEASE ORAL
Qty: 30 CAPSULE | Refills: 0 | Status: SHIPPED | OUTPATIENT
Start: 2024-12-23

## 2024-11-07 RX ORDER — METHYLPHENIDATE HYDROCHLORIDE 40 MG/1
CAPSULE, EXTENDED RELEASE ORAL
Qty: 30 CAPSULE | Refills: 0 | Status: SHIPPED | OUTPATIENT
Start: 2024-12-27

## 2024-11-07 RX ORDER — METHYLPHENIDATE HYDROCHLORIDE 40 MG/1
CAPSULE, EXTENDED RELEASE ORAL
Qty: 30 CAPSULE | Refills: 0 | Status: SHIPPED | OUTPATIENT
Start: 2024-11-29

## 2024-11-07 RX ORDER — ARIPIPRAZOLE 5 MG/1
5 TABLET ORAL DAILY
Qty: 30 TABLET | Refills: 2 | Status: SHIPPED | OUTPATIENT
Start: 2024-11-07

## 2024-11-07 RX ORDER — PANTOPRAZOLE SODIUM 40 MG/1
40 TABLET, DELAYED RELEASE ORAL 2 TIMES DAILY
Qty: 60 TABLET | Refills: 0 | Status: SHIPPED | OUTPATIENT
Start: 2024-11-07

## 2024-11-07 ASSESSMENT — PATIENT HEALTH QUESTIONNAIRE - PHQ9
10. IF YOU CHECKED OFF ANY PROBLEMS, HOW DIFFICULT HAVE THESE PROBLEMS MADE IT FOR YOU TO DO YOUR WORK, TAKE CARE OF THINGS AT HOME, OR GET ALONG WITH OTHER PEOPLE: SOMEWHAT DIFFICULT
SUM OF ALL RESPONSES TO PHQ QUESTIONS 1-9: 2
SUM OF ALL RESPONSES TO PHQ QUESTIONS 1-9: 2

## 2024-11-07 ASSESSMENT — PAIN SCALES - GENERAL: PAINLEVEL_OUTOF10: MILD PAIN (3)

## 2024-11-07 NOTE — PATIENT INSTRUCTIONS
**For crisis resources, please see the information at the end of this document**   Patient Education    Thank you for coming to the [facility] [place] [facility].     Lab Testing:  If you had lab testing today and your results are reassuring or normal they will be mailed to you or sent through [employer] within 7 days. If the lab tests need quick action we will call you with the results. The phone number we will call with results is # [phone]. If this is not the best number please call our clinic and change the number.     Medication Refills:  If you need any refills please call your pharmacy and they will contact us. Our fax number for refills is [phone].   Three business days of notice are needed for general medication refill requests.   Five business days of notice are needed for controlled substance refill requests.   If you need to change to a different pharmacy, please contact the new pharmacy directly. The new pharmacy will help you get your medications transferred.     Contact Us:  Please call [phone] during business hours (8-5:00 M-F).   If you have medication related questions after clinic hours, or on the weekend, please call [phone].     Financial Assistance [phone]   Medical Records [phone]       MENTAL HEALTH CRISIS RESOURCES:  For a emergency help, please call 911 or go to the nearest Emergency Department.     Emergency Walk-In Options:   Em[facility] Unit @ [facility] ([place]): [phone] - Specialized mental health emergency area designed to be calming  [facility] ([place]): [phone]  [facility] ([place]): [phone]  [facility]): [phone]    [facility] Crisis Information:   [place]: [phone]  [place]: [phone]  [place] ([place]) - Adult: [phone]     Child: [phone]  [place] - Adult: [phone]     Child: [phone]  [place]:  [phone]  List of all [facility] resources:   https://[place].gov/dhs/people-we-serve/adults/health-care/mental-health/resources/crisis-contacts.jsp    National Crisis Information:   Crisis Text Line: Text  [place]  to 434680  Suicide & Crisis Lifeline: 988  National Suicide Prevention Lifeline: 1-185-945-TALK (1-509.113.7985)       For online chat options, visit https://suicidepreventionlifeline.org/chat/  Poison Control Center: 6-188-550-9529  Trans Lifeline: 2-958-501-5963 - Hotline for transgender people of all ages  [employer]: 8-773-822-2744 - Hotline for LGBT youth     For Non-Emergency Support:   Fast Tracker: Mental Health & Substance Use Disorder Resources -   https://www.[employer].org/

## 2024-11-07 NOTE — TELEPHONE ENCOUNTER
Prior auth request for   Albuterol sulfate 2.5 mg -3ml    Prior Authorization Retail Medication Request    Medication/Dose:   Diagnosis and ICD code (if different than what is on RX):  serene  New/renewal/insurance change PA/secondary ins. PA:  Previously Tried and Failed:  na  Rationale:  na    Insurance   Primary: medicareblue rx  Insurance ID:  [id number]    Secondary (if applicable):serene  Insurance ID:  serene    Pharmacy Information (if different than what is on RX)  Name:  [facility] [place]  Phone:  [phone]  Fax:[phone]    Clinic Information  Preferred routing pool for dept communication: serene

## 2024-11-07 NOTE — PROGRESS NOTES
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     MEDIASTINUM/AXILLAE: No lymphadenopathy. No thoracic aortic aneurysm. Stable appearance of right subclavian and innominate venous stent.     CORONARY ARTERY CALCIFICATION: None.     UPPER ABDOMEN: Prior cholecystectomy. Punctate nonobstructing stone upper pole left kidney.     MUSCULOSKELETAL: Pectus excavatum configuration of low sternum unchanged. Surgical wires about the right sternoclavicular joint unchanged.                                                                      IMPRESSION:   1.  No etiology for patient's new symptoms. Significant scarring present within both lungs, overall lung volume and aeration has improved.    Chest x-ray-10/19/2024-personally reviewed and I agree the radiologist read of:MPRESSION: Heart is unchanged in size. Vascular stents and surgical changes of the right subclavian and brachiocephalic vessels are again noted, unchanged. There is mild central pulmonary venous congestion, improved from prior exam with scarring and   atelectasis seen in the right mid and lower lung zone.

## 2024-11-07 NOTE — NURSING NOTE
Current patient location: [id number]  [facility] [address]    Is the patient currently in the state of [place]? YES    Visit mode:VIDEO    If the visit is dropped, the patient can be reconnected by: VIDEO VISIT: Text to cell phone:   Telephone Information:   Mobile [phone]       Will anyone else be joining the visit? NO  (If patient encounters technical issues they should call [phone]    Are changes needed to the allergy or medication list? No    Are refills needed on medications prescribed by this physician? NO    Rooming Documentation:  Questionnaire(s) completed    Reason for visit: RECHREBECA ACOSTAF

## 2024-11-07 NOTE — NURSING NOTE
"Initial /88 (BP Location: Right arm, Patient Position: Sitting, Cuff Size: Adult Regular)   Pulse 89   Temp 98.3  F (36.8  C) (Tympanic)   Ht 1.6 m (5' 3\")   Wt 65.8 kg (145 lb)   LMP 08/22/2017 (Approximate)   BMI 25.69 kg/m   Estimated body mass index is 25.69 kg/m  as calculated from the following:    Height as of this encounter: 1.6 m (5' 3\").    Weight as of this encounter: 65.8 kg (145 lb). .  Stefanie Alejo MA    "
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
## 2024-11-07 NOTE — PATIENT INSTRUCTIONS
"Marcia,    It was nice seeing you!     Recommendations as follows:   - start bactrim to protect you for pneumocystic jirioveci while you're on higher dose prednisone  - continue therapies  - albuterol was ordered in place of duonebs  - keep using your aerobika  - repeat echo, chest CT, pulmonary function tests  - please  a nebulizer machine   - Please call the pulmonary clinic with any questions. The pulmonary clinic number is: [phone], press 2 for \"specialty clinic\" and follow the prompt.     Stay up to date with vaccinations including your yearly flu vaccine. Wash your hands regularly. Consider wearing a mask in crowded places to reduce the risk of respiratory illnesses. I recommend that you receive the COVID-19 vaccine and stay up to date with boosters.     Have a nice fall/winter and stay well! Please let me know if you have questions or concerns.     Brenna Gonsales MD  Pulmonary, Allergy, Critical Care, and Sleep Medicine   [facility]        "

## 2024-11-11 ENCOUNTER — DOCUMENTATION ONLY (OUTPATIENT)
Dept: INTERNAL MEDICINE | Facility: CLINIC | Age: 59
End: 2024-11-11

## 2024-11-11 ENCOUNTER — MEDICAL CORRESPONDENCE (OUTPATIENT)
Dept: HEALTH INFORMATION MANAGEMENT | Facility: CLINIC | Age: 59
End: 2024-11-11

## 2024-11-11 ENCOUNTER — MYC MEDICAL ADVICE (OUTPATIENT)
Dept: PULMONOLOGY | Facility: CLINIC | Age: 59
End: 2024-11-11

## 2024-11-11 DIAGNOSIS — A37.90 WHOOPING COUGH: Primary | ICD-10-CM

## 2024-11-11 NOTE — PROGRESS NOTES
Type of Form Received: [facility] [place] [id number]    Form Received (Date) 11/7/24   Form Filled out Yes, faxed 11/12/24   Placed in provider folder Yes

## 2024-11-11 NOTE — TELEPHONE ENCOUNTER
Central Prior Authorization Team   Phone: [phone]    PA Initiation    Medication: albuterol (PROVENTIL) (2.5 MG/3ML) 0.083% neb solution  Insurance Company: [employer] - Phone [phone] Fax [phone]  Pharmacy Filling the Rx: [place], [place] - [address]  Filling Pharmacy Phone: [phone]  Filling Pharmacy Fax:    Start Date: 11/11/2024

## 2024-11-12 RX ORDER — AZITHROMYCIN 250 MG/1
TABLET, FILM COATED ORAL
Qty: 6 TABLET | Refills: 0 | Status: SHIPPED | OUTPATIENT
Start: 2024-11-12 | End: 2024-11-17

## 2024-11-12 NOTE — TELEPHONE ENCOUNTER
"Per Family Practice 11/1 office note=     Would be completing another 12 day Prednisone taper round as of today.    \"Bronchitis  Chronic respiratory failure with hypoxia (H)  [facility] notes reviewed. Symptoms returned shortly after completion of Prednisone. Mostly cough and shortness of breath. Vitals are wnl. Exam without crackles and good air movement, but there is mild wheezing heard throughout. Will restart Prednisone, but taper this time. Rx for Albuterol inhaler and duonebs/nebulizer. Warning signs and symptoms discussed on when to return to clinic or go to the ER. Pt verbalized understanding.      - Nebulizer and Supplies Order  - predniSONE (DELTASONE) 20 MG tablet; Take 3 tabs by mouth daily x 3 days, then 2 tabs daily x 3 days, then 1 tab daily x 3 days, then 1/2 tab daily x 3 days.  - albuterol (PROAIR HFA/PROVENTIL HFA/VENTOLIN HFA) 108 (90 Base) MCG/ACT inhaler; Inhale 2 puffs into the lungs every 6 hours as needed.  - ipratropium - albuterol 0.5 mg/2.5 mg/3 mL (DUONEB) 0.5-2.5 (3) MG/3ML neb solution; Take 1 vial (3 mLs) by nebulization every 6 hours as needed for shortness of breath, wheezing or cough.\"  ____________________________    Then followed up on 11/7 with Ilda.  Recommendations as follows:  - Continue supplemental O2 with sleep/exertion  - continue symbicort vs. Advair bid, albuterol prn; neb machine ordered   - aerobika  - Start Protonix in place of Prevacid; continue famotidine twice daily. Referral to GI also placed.   - Consider pulmonary rehab  - Repeat pulmonary function testing, chest CT and echo  - try to increase regular activity with a goal of at least 3 sessions/week of 30 minutes of cardiovascular exercise; start slow and work up to this goal  -Stay up-to-date with yearly influenza vaccines, pneumonia series (prevnar 13 and pneumovax), and Covid shots     RTC in about 5 months.    3 pills Bactrim was ordered.    Any Illness PRN meds/instructions?    Mary MCKEON   Specialty Clinic " RN

## 2024-11-12 NOTE — TELEPHONE ENCOUNTER
[facility] Call Center    Phone Message    May a detailed message be left on voicemail: yes     Reason for Call: The patient was calling to let the care team know that she might have Whooping cough, she noticed it within the last couple of days with a 100 fever, please review an follow up with patient on next steps than k you.    Action Taken: Message routed to:  Other: SNOW    Travel Screening: Not Applicable     Date of Service:

## 2024-11-12 NOTE — TELEPHONE ENCOUNTER
I think it was approved just not through the part D. Can someone confirm or explain this to me before I send this to the provider? Tx's

## 2024-11-12 NOTE — TELEPHONE ENCOUNTER
PRIOR AUTHORIZATION DENIED    Medication: albuterol (PROVENTIL) (2.5 MG/3ML) 0.083% neb solution-PA DENIED     Denial Date: 11/11/2024    Denial Rational:           Appeal Information:

## 2024-11-12 NOTE — TELEPHONE ENCOUNTER
Writer communicated with Dr Gonsales via in person communication and got the ok to write the patient a Z Pack as long as the patient is not allergic.  500mg tab 1st day  250mg  tab day 2-5    Writer called the patient:  Patient states she is not allergic to Azithromycin and will be happy to take the medication as prescribed.  Patient prefers [facility] in [place]  Medication sent as verbal order from Dr Gonsales.  Encounter closed.    Bala KNIGHT [facility]

## 2024-11-14 ENCOUNTER — TELEPHONE (OUTPATIENT)
Dept: PULMONOLOGY | Facility: CLINIC | Age: 59
End: 2024-11-14
Payer: COMMERCIAL

## 2024-11-14 NOTE — TELEPHONE ENCOUNTER
[facility] Call Center    Phone Message    May a detailed message be left on voicemail: yes     Reason for Call: Other: Whooping cough   Patient states she spoke with the [place] dept of health and they advised that her  needs to be treated for whooping cough as well. States his name is Herminio Booth and she is requesting the provider send an rx for him. Please call pt back to discuss further.     Pt also requested this be marked urgent.    Action Taken: Other: Pulm    Travel Screening: Not Applicable     Date of Service: 11/14/24

## 2024-11-14 NOTE — TELEPHONE ENCOUNTER
Patient also sent a [employer] message dated 11/14/24.     I reviewed patient's 's chart. His PCP recommended an E-Visit. I informed Marcia that this is the best option since patient doesn't have to come to clinic with an E-visit.    Eve Black RN on 11/14/2024 at 3:40 PM

## 2024-11-15 NOTE — TELEPHONE ENCOUNTER
Outgoing call:  She needs to call her pharmacy and have them bill it to her Medicare Part B, and if she has a supplemental plan, she should make sure they are aware of that.  If her pharmacy gives her any issues, I'd just resend the prescription to [facility], we have a dedicated Medicare team for billing to Part B so it should go pretty smoothly.     Pt reports understanding and will reach out again if the pharmacy gives issues.     Carolyn Haro RN on 11/15/2024 at 9:29 AM

## 2024-11-17 ASSESSMENT — PATIENT HEALTH QUESTIONNAIRE - PHQ9
SUM OF ALL RESPONSES TO PHQ QUESTIONS 1-9: 4
SUM OF ALL RESPONSES TO PHQ QUESTIONS 1-9: 4
10. IF YOU CHECKED OFF ANY PROBLEMS, HOW DIFFICULT HAVE THESE PROBLEMS MADE IT FOR YOU TO DO YOUR WORK, TAKE CARE OF THINGS AT HOME, OR GET ALONG WITH OTHER PEOPLE: SOMEWHAT DIFFICULT

## 2024-11-18 ENCOUNTER — VIRTUAL VISIT (OUTPATIENT)
Dept: PSYCHOLOGY | Facility: CLINIC | Age: 59
End: 2024-11-18
Payer: MEDICARE

## 2024-11-18 DIAGNOSIS — F33.0 MDD (MAJOR DEPRESSIVE DISORDER), RECURRENT EPISODE, MILD (H): Primary | ICD-10-CM

## 2024-11-18 PROCEDURE — 90834 PSYTX W PT 45 MINUTES: CPT | Mod: 95 | Performed by: SOCIAL WORKER

## 2024-11-18 ASSESSMENT — ANXIETY QUESTIONNAIRES
7. FEELING AFRAID AS IF SOMETHING AWFUL MIGHT HAPPEN: NOT AT ALL
1. FEELING NERVOUS, ANXIOUS, OR ON EDGE: SEVERAL DAYS
GAD7 TOTAL SCORE: 2
IF YOU CHECKED OFF ANY PROBLEMS ON THIS QUESTIONNAIRE, HOW DIFFICULT HAVE THESE PROBLEMS MADE IT FOR YOU TO DO YOUR WORK, TAKE CARE OF THINGS AT HOME, OR GET ALONG WITH OTHER PEOPLE: NOT DIFFICULT AT ALL
2. NOT BEING ABLE TO STOP OR CONTROL WORRYING: SEVERAL DAYS
6. BECOMING EASILY ANNOYED OR IRRITABLE: NOT AT ALL
5. BEING SO RESTLESS THAT IT IS HARD TO SIT STILL: NOT AT ALL
GAD7 TOTAL SCORE: 2
3. WORRYING TOO MUCH ABOUT DIFFERENT THINGS: NOT AT ALL

## 2024-11-18 ASSESSMENT — PATIENT HEALTH QUESTIONNAIRE - PHQ9: 5. POOR APPETITE OR OVEREATING: NOT AT ALL

## 2024-11-18 NOTE — PROGRESS NOTES
[facility]                                     Progress Note  /  Patient Name: Sherly Yeung  Date: 11/18/2024         Service Type: Individual      Session Start Time: 8:03    Session End Time:8:55    Session Length: 52    Session #: 28    Attendees: Client    Service Modality:  Video Visit:      Provider verified identity through the following two step process.  Patient provided:  Patient is known previously to provider    Telemedicine Visit: The patient's condition can be safely assessed and treated via synchronous audio and visual telemedicine encounter.      Reason for Telemedicine Visit: Services only offered telehealth    Originating Site (Patient Location): Patient's home    Distant Site (Provider Location): Provider Remote Setting- Home Office    Consent:  The patient/guardian has verbally consented to: the potential risks and benefits of telemedicine (video visit) versus in person care; bill my insurance or make self-payment for services provided; and responsibility for payment of non-covered services.     Patient would like the video invitation sent by:  My Chart    Mode of Communication:  Video Conference via Amwell    Distant Location (Provider):  Off-site    As the provider I attest to compliance with applicable laws and regulations related to telemedicine.    DATA  Interactive Complexity: No     Crisis: No      Progress Since Last Session (Related to Symptoms / Goals / Homework):   Symptoms: Worsening Depressed    Homework: Partially completed      Episode of Care Goals: Satisfactory progress - ACTION (Actively working towards change); Intervened by reinforcing change plan / affirming steps taken     Current / Ongoing Stressors and Concerns:  Patient has been recovering from both pneumonia and bronchitis. Plus family has been experiencing some issues with mom and her daughter in law. With this, family has decided to not have Thanksgiving with their mom. So each one will go to  their in law. Mom will be in her assisted living facility.  Patient got some help hiring someone to clean and organize the home. She will need her help monthly.  Though niece is doing better. Patient is glad. Patient might need to be the  of the family. There are things that can be done to allow her 83 year old mom be herself but for the rest of the family to find peace and harmony.  No other concern today. Her next visit is in a month.         Treatment Objective(s) Addressed in This Session:         Intervention:  ACT: Focusing on Strengths and progress as noted  Safety plan: Reinforced    MI Intervention: Expressed Empathy/Understanding, Supported Autonomy, Collaboration, Evocation, Permission to raise concern or advise, and Open-ended questions     Change Talk Expressed by the Patient: Taking steps    Provider Response to Change Talk: E - Evoked more info from patient about behavior change, A - Affirmed patient's thoughts, decisions, or attempts at behavior change, R - Reflected patient's change talk, and S - Summarized patient's change talk statements     Assessments completed prior to visit:2/23/2023    The following assessments were completed by patient for this visit:  PHQ2:       10/14/2024    12:47 PM 4/18/2024    10:22 AM 2/23/2024     3:02 PM 1/12/2024     2:03 PM 12/27/2023     8:39 AM 4/27/2022    10:07 AM 8/11/2020     9:37 AM   PHQ-2 ( 1999 [employer])   Q1: Little interest or pleasure in doing things 0 0 2 0 0 0 0   Q2: Feeling down, depressed or hopeless 0 0 3 1 1 0 0   PHQ-2 Score 0 0 5 1 1 0 0   PHQ-2 Total Score (12-17 Years)- Positive if 3 or more points; Administer PHQ-A if positive       0     PHQ9:       6/11/2024     2:35 PM 8/1/2024     9:12 AM 8/26/2024    10:50 AM 9/16/2024     8:17 AM 10/21/2024     3:39 PM 11/7/2024     3:43 PM 11/17/2024    10:25 AM   PHQ-9 SCORE   PHQ-9 Total Score MyChart 1 (Minimal depression) 2 (Minimal depression) 3 (Minimal depression) 4 (Minimal depression)  4 (Minimal depression) 2 (Minimal depression) 4 (Minimal depression)   PHQ-9 Total Score 1 2 3 4 4 2  4        Patient-reported     GAD2:       4/2/2024    10:49 AM 4/20/2024    10:35 AM 4/30/2024     9:40 AM 5/14/2024     9:32 AM 8/22/2024     9:18 AM 9/16/2024     8:17 AM 11/4/2024    10:30 AM   ROCKY-2   Feeling nervous, anxious, or on edge 3  0  0  0  1  1  0    Not being able to stop or control worrying 2  0  0  0  0  0  0    ROCKY-2 Total Score 5    5 0 0 0 1 1 0        Patient-reported    Multiple values from one day are sorted in reverse-chronological order     GAD7:       1/7/2024    10:08 AM 1/22/2024    10:13 AM 2/19/2024     1:36 PM 3/25/2024     4:52 PM 6/12/2024     9:56 PM 8/27/2024     5:53 PM 11/18/2024     8:50 AM   ROCKY-7 SCORE   Total Score 5 (mild anxiety) 6 (mild anxiety) 8 (mild anxiety) 10 (moderate anxiety)      Total Score 5 6 8 10 3 2 2     CAGE-AID:       2/9/2023    11:52 AM 7/17/2023     9:10 AM   CAGE-AID Total Score   Total Score 0 0   Total Score MyChart 0 (A total score of 2 or greater is considered clinically significant)      PROMIS 10-Global Health (all questions and answers displayed):       4/2/2024    10:50 AM 4/20/2024    10:37 AM 4/30/2024     9:42 AM 5/14/2024     9:34 AM 8/22/2024     9:19 AM 9/16/2024     8:19 AM 11/4/2024    10:32 AM   PROMIS 10   In general, would you say your health is: Good Good Good Very good Good Good Good   In general, would you say your quality of life is: Fair Very good Very good Very good Very good Very good Very good   In general, how would you rate your physical health? Good Fair Good Good Good Good Good   In general, how would you rate your mental health, including your mood and your ability to think? Poor Very good Very good Very good Very good Very good Very good   In general, how would you rate your satisfaction with your social activities and relationships? Fair Very good Very good Very good Very good Very good Very good   In general, please  rate how well you carry out your usual social activities and roles Poor Good Very good Good Fair Good Good   To what extent are you able to carry out your everyday physical activities such as walking, climbing stairs, carrying groceries, or moving a chair? Mostly Moderately Mostly Moderately Moderately Moderately Moderately   In the past 7 days, how often have you been bothered by emotional problems such as feeling anxious, depressed, or irritable? Always Rarely Never Never Rarely Rarely Rarely   In the past 7 days, how would you rate your fatigue on average? Moderate Moderate Mild Moderate Moderate Moderate Moderate   In the past 7 days, how would you rate your pain on average, where 0 means no pain, and 10 means worst imaginable pain? 5 5 5 5 5 5 4   In general, would you say your health is: 3  3  3  4  3  3  3   In general, would you say your quality of life is: 2  4  4  4  4  4  4   In general, how would you rate your physical health? 3  2  3  3  3  3  3   In general, how would you rate your mental health, including your mood and your ability to think? 1  4  4  4  4  4  4   In general, how would you rate your satisfaction with your social activities and relationships? 2  4  4  4  4  4  4   In general, please rate how well you carry out your usual social activities and roles. (This includes activities at home, at work and in your community, and responsibilities as a parent, child, spouse, employee, friend, etc.) 1  3  4  3  2  3  3   To what extent are you able to carry out your everyday physical activities such as walking, climbing stairs, carrying groceries, or moving a chair? 4  3  4  3  3  3  3   In the past 7 days, how often have you been bothered by emotional problems such as feeling anxious, depressed, or irritable? 5  2  1  1  2  2  2   In the past 7 days, how would you rate your fatigue on average? 3  3  2  3  3  3  3   In the past 7 days, how would you rate your pain on average, where 0 means no pain,  and 10 means worst imaginable pain? 5  5  5  5  5  5  4   Global Mental Health Score 6    6 16 17 17 16 16 16   Global Physical Health Score 13    13 11 14 12 12 12 12   PROMIS TOTAL - SUBSCORES 19    19 27 31 29 28 28 28       Patient-reported    Multiple values from one day are sorted in reverse-chronological order     PROMIS 10-Global Health (only subscores and total score):       4/2/2024    10:50 AM 4/20/2024    10:37 AM 4/30/2024     9:42 AM 5/14/2024     9:34 AM 8/22/2024     9:19 AM 9/16/2024     8:19 AM 11/4/2024    10:32 AM   PROMIS-10 Scores Only   Global Mental Health Score 6    6 16 17 17 16 16 16   Global Physical Health Score 13    13 11 14 12 12 12 12   PROMIS TOTAL - SUBSCORES 19    19 27 31 29 28 28 28     [place] Suicide Severity Rating Scale (Short Version)      7/17/2023     9:09 AM 9/13/2023     6:03 AM 12/6/2023     1:21 AM 2/16/2024    12:38 PM 4/13/2024     9:13 AM 5/24/2024     6:44 PM 10/19/2024     8:27 PM   [place] Suicide Severity Rating (Short Version)   Over the past 2 weeks have you felt down, depressed, or hopeless?  yes no no      Over the past 2 weeks have you had thoughts of killing yourself?  no no no      Have you ever attempted to kill yourself?  no no no      Q1 Wished to be Dead (Past Month)     0-->no 0-->no 0-->no   Q2 Suicidal Thoughts (Past Month)     0-->no 0-->no 0-->no   Q6 Suicide Behavior (Lifetime)     0-->no 0-->no 0-->no   Level of Risk per Screen     no risks indicated no risks indicated no risks indicated   1. Wish to be Dead (Since Last Contact) N         2. Non-Specific Active Suicidal Thoughts (Since Last Contact) N         Actual Attempt (Since Last Contact) N         Has subject engaged in non-suicidal self-injurious behavior? (Since Last Contact) N         Interrupted Attempts (Since Last Contact) N         Aborted or Self-Interrupted Attempt (Since Last Contact) N         Preparatory Acts or Behavior (Since Last Contact) N         Suicide (Since Last  Contact) N         Calculated C-SSRS Risk Score (Since Last Contact) No Risk Indicated               ASSESSMENT: Current Emotional / Mental Status (status of significant symptoms):   Risk status (Self / Other harm or suicidal ideation)   Patient denies current fears or concerns for personal safety.   Patient denies current or recent suicidal ideation or behaviors.   Patient denies current or recent homicidal ideation or behaviors.   Patient denies current or recent self injurious behavior or ideation.   Patient denies other safety concerns.   Patient reports there has been no change in risk factors since their last session.     Patient reports there has been no change in protective factors since their last session.     Recommended that patient call 911 or go to the local ED should there be a change in any of these risk factors   Would call [phone] if any SI.     Appearance:   Appropriate    Eye Contact:   Good    Psychomotor Behavior: Normal    Attitude:   Cooperative    Orientation:   Person Place Time Situation   Speech    Rate / Production: Normal/ Responsive    Volume:  Normal    Mood:    Depressed    Affect:    Appropriate    Thought Content:  Clear    Thought Form:  Coherent  Logical    Insight:    Good      Medication Review:   No changes to current psychiatric medication(s)     Medication Compliance:   Yes     Changes in Health Issues:   None reported     Chemical Use Review:   Substance Use: Chemical use reviewed, no active concerns identified      Tobacco Use: No current tobacco use.      Diagnosis:  1. MDD (major depressive disorder), recurrent episode, mild (H)      Collateral Reports Completed:   Not Applicable    PLAN: (Patient Tasks / Therapist Tasks / Other):  Patient will reflect on today's discussion related to family needs for a better decision  Patient will return in  a month    GERMAN Farmer          "  ______________________________________________________________________________    Individual Treatment Plan    Patient's Name: Sherly Yeung  YOB: 1965    Date of Creation: 2/23/2023    Date Treatment Plan Last Reviewed/Revised: 8/27/2024    DSM5 Diagnoses: 296.32 (F33.1) Major Depressive Disorder, Recurrent Episode, Moderate With anxious distress or 300.02 (F41.1) Generalized Anxiety Disorder  Grief reaction [F43.21]    Psychosocial / Contextual Factors: grief: father recently passed away. Family dynamic- relationship with son, mom. Feeling overwhelmed.    PROMIS (reviewed every 90 days): 25    Referral / Collaboration:    Referral to another professional/service is not indicated at this time..    Anticipated number of session for this episode of care: 9-12 sessions  Anticipation frequency of session: Biweekly  Anticipated Duration of each session: 38-52 minutes  Treatment plan will be reviewed in 90 days or when goals have been changed.     MeasurableTreatment Goal(s) related to diagnosis / functional impairment(s)    Goal 1: Patient will Accept the loss of beloved one and return to stable level of functioning as evidenced by a higher level of functioning as a result of acceptance.   Redevelop a supportive social system and improve interpersonal relationships and Express unresolved emotions regarding loss which brings anxiety and depression mood.      I will know I've met my goal when I have more energy and  I am able to celebrate good life with my father Vs the sadness of losing him.Redevelop a supportive social system and improve interpersonal relationships\"    Objective #A (Patient Action)    Patient will Identify negative self-talk and behaviors: challenge core beliefs, myths, and actions.  Status: Continued - Date(s): 8/27/2024    Intervention(s)  Therapist will teach emotional recognition/identification. Support in her in the process    Objective #B  Patient will identify at least 3 fears / " thoughts that contribute to feeling anxious.  Status: Continued - Date(s): 8/27/2024    Intervention(s)  Therapist will teach thought challenging with CBT .    Objective #C  Patient will Identify negative self-talk and behaviors: challenge core beliefs, myths, and actions.  Status: Continued - Date(s): 8/27/2024    Intervention(s)  Therapist will provide space and time to process thoughts and feelings around current stressors. provide support and coping skills to help move on in life .    Patient has reviewed and agreed to the above plan.    DEBI Farmer  8/27/2024    Answers for HPI/ROS submitted by the patient on 4/20/2023  If you checked off any problems, how difficult have these problems made it for you to do your work, take care of things at home, or get along with other people?: Somewhat difficult  PHQ9 TOTAL SCORE: 5    Answers for HPI/ROS submitted by the patient on 4/26/2023  If you checked off any problems, how difficult have these problems made it for you to do your work, take care of things at home, or get along with other people?: Somewhat difficult  PHQ9 TOTAL SCORE: 3    Answers for HPI/ROS submitted by the patient on 5/4/2023  If you checked off any problems, how difficult have these problems made it for you to do your work, take care of things at home, or get along with other people?: Somewhat difficult  PHQ9 TOTAL SCORE: 4  Answers for HPI/ROS submitted by the patient on 7/17/2023  If you checked off any problems, how difficult have these problems made it for you to do your work, take care of things at home, or get along with other people?: Somewhat difficult  PHQ9 TOTAL SCORE: 5    Answers submitted by the patient for this visit:  Patient Health Questionnaire (Submitted on 7/30/2023)  If you checked off any problems, how difficult have these problems made it for you to do your work, take care of things at home, or get along with other people?: Somewhat difficult  PHQ9 TOTAL SCORE:  5  Answers submitted by the patient for this visit:  Patient Health Questionnaire (Submitted on 8/20/2023)  If you checked off any problems, how difficult have these problems made it for you to do your work, take care of things at home, or get along with other people?: Somewhat difficult  PHQ9 TOTAL SCORE: 5  Answers submitted by the patient for this visit:  Patient Health Questionnaire (Submitted on 9/18/2023)  If you checked off any problems, how difficult have these problems made it for you to do your work, take care of things at home, or get along with other people?: Somewhat difficult  PHQ9 TOTAL SCORE: 4  Answers submitted by the patient for this visit:  Patient Health Questionnaire (Submitted on 10/2/2023)  If you checked off any problems, how difficult have these problems made it for you to do your work, take care of things at home, or get along with other people?: Somewhat difficult  PHQ9 TOTAL SCORE: 5    Answers submitted by the patient for this visit:  Patient Health Questionnaire (Submitted on 10/15/2023)  If you checked off any problems, how difficult have these problems made it for you to do your work, take care of things at home, or get along with other people?: Somewhat difficult  PHQ9 TOTAL SCORE: 6  ROCKY-7 (Submitted on 10/10/2023)  ROCKY 7 TOTAL SCORE: 5    Answers submitted by the patient for this visit:  Patient Health Questionnaire (Submitted on 11/16/2023)  If you checked off any problems, how difficult have these problems made it for you to do your work, take care of things at home, or get along with other people?: Somewhat difficult  PHQ9 TOTAL SCORE: 4    Answers submitted by the patient for this visit:  Patient Health Questionnaire (Submitted on 11/28/2023)  If you checked off any problems, how difficult have these problems made it for you to do your work, take care of things at home, or get along with other people?: Somewhat difficult  PHQ9 TOTAL SCORE: 5    Answers submitted by the patient  for this visit:  Patient Health Questionnaire (Submitted on 12/13/2023)  If you checked off any problems, how difficult have these problems made it for you to do your work, take care of things at home, or get along with other people?: Somewhat difficult  PHQ9 TOTAL SCORE: 5    Answers submitted by the patient for this visit:  Patient Health Questionnaire (Submitted on 12/26/2023)  If you checked off any problems, how difficult have these problems made it for you to do your work, take care of things at home, or get along with other people?: Somewhat difficult  PHQ9 TOTAL SCORE: 5    Answers submitted by the patient for this visit:  Patient Health Questionnaire (Submitted on 1/10/2024)  If you checked off any problems, how difficult have these problems made it for you to do your work, take care of things at home, or get along with other people?: Somewhat difficult  PHQ9 TOTAL SCORE: 5  ROCKY-7 (Submitted on 1/7/2024)  ROCKY 7 TOTAL SCORE: 5    Answers submitted by the patient for this visit:  Patient Health Questionnaire (Submitted on 1/25/2024)  If you checked off any problems, how difficult have these problems made it for you to do your work, take care of things at home, or get along with other people?: Very difficult  PHQ9 TOTAL SCORE: 8  ROCKY-7 (Submitted on 1/22/2024)  ROCKY 7 TOTAL SCORE: 6    Answers submitted by the patient for this visit:  Patient Health Questionnaire (Submitted on 2/21/2024)  If you checked off any problems, how difficult have these problems made it for you to do your work, take care of things at home, or get along with other people?: Very difficult  PHQ9 TOTAL SCORE: 16  ROCKY-7 (Submitted on 2/19/2024)  ROCKY 7 TOTAL SCORE: 8    Answers submitted by the patient for this visit:  Patient Health Questionnaire (Submitted on 4/22/2024)  If you checked off any problems, how difficult have these problems made it for you to do your work, take care of things at home, or get along with other people?: Somewhat  difficult  PHQ9 TOTAL SCORE: 2    Answers submitted by the patient for this visit:  Patient Health Questionnaire (Submitted on 5/6/2024)  If you checked off any problems, how difficult have these problems made it for you to do your work, take care of things at home, or get along with other people?: Somewhat difficult  PHQ9 TOTAL SCORE: 1    Answers submitted by the patient for this visit:  Patient Health Questionnaire (Submitted on 5/21/2024)  If you checked off any problems, how difficult have these problems made it for you to do your work, take care of things at home, or get along with other people?: Not difficult at all  PHQ9 TOTAL SCORE: 1    Answers submitted by the patient for this visit:  Patient Health Questionnaire (Submitted on 6/11/2024)  If you checked off any problems, how difficult have these problems made it for you to do your work, take care of things at home, or get along with other people?: Somewhat difficult  PHQ9 TOTAL SCORE: 1    Answers submitted by the patient for this visit:  Patient Health Questionnaire (Submitted on 8/26/2024)  If you checked off any problems, how difficult have these problems made it for you to do your work, take care of things at home, or get along with other people?: Somewhat difficult  PHQ9 TOTAL SCORE: 3    Answers submitted by the patient for this visit:  Patient Health Questionnaire (Submitted on 9/16/2024)  If you checked off any problems, how difficult have these problems made it for you to do your work, take care of things at home, or get along with other people?: Somewhat difficult  PHQ9 TOTAL SCORE: 4    Answers submitted by the patient for this visit:  Patient Health Questionnaire (Submitted on 10/21/2024)  If you checked off any problems, how difficult have these problems made it for you to do your work, take care of things at home, or get along with other people?: Very difficult  PHQ9 TOTAL SCORE: 4    Answers submitted by the patient for this visit:  Patient  Health Questionnaire (Submitted on 11/17/2024)  If you checked off any problems, how difficult have these problems made it for you to do your work, take care of things at home, or get along with other people?: Somewhat difficult  PHQ9 TOTAL SCORE: 4

## 2024-11-19 DIAGNOSIS — J96.11 CHRONIC RESPIRATORY FAILURE WITH HYPOXIA (H): ICD-10-CM

## 2024-11-19 DIAGNOSIS — J40 BRONCHITIS: ICD-10-CM

## 2024-11-19 RX ORDER — ALBUTEROL SULFATE 90 UG/1
2 INHALANT RESPIRATORY (INHALATION) EVERY 6 HOURS PRN
Qty: 8.5 G | Refills: 0 | Status: SHIPPED | OUTPATIENT
Start: 2024-11-19

## 2024-11-29 DIAGNOSIS — R06.02 SHORTNESS OF BREATH: ICD-10-CM

## 2024-11-29 DIAGNOSIS — K21.9 GASTROESOPHAGEAL REFLUX DISEASE WITHOUT ESOPHAGITIS: ICD-10-CM

## 2024-11-30 RX ORDER — FAMOTIDINE 20 MG/1
20 TABLET, FILM COATED ORAL 2 TIMES DAILY
Qty: 60 TABLET | Refills: 5 | Status: SHIPPED | OUTPATIENT
Start: 2024-11-30

## 2024-11-30 RX ORDER — BUDESONIDE AND FORMOTEROL FUMARATE DIHYDRATE 80; 4.5 UG/1; UG/1
2 AEROSOL RESPIRATORY (INHALATION) 4 TIMES DAILY PRN
Qty: 10.2 G | Refills: 1 | Status: SHIPPED | OUTPATIENT
Start: 2024-11-30

## 2024-12-01 ENCOUNTER — E-VISIT (OUTPATIENT)
Dept: INTERNAL MEDICINE | Facility: CLINIC | Age: 59
End: 2024-12-01
Payer: MEDICARE

## 2024-12-01 DIAGNOSIS — R06.02 SHORTNESS OF BREATH: Primary | ICD-10-CM

## 2024-12-01 PROCEDURE — 99207 PR NON-BILLABLE SERV PER CHARTING: CPT | Performed by: INTERNAL MEDICINE

## 2024-12-01 NOTE — PATIENT INSTRUCTIONS
Dear Sherly Yeung,    We are sorry you are not feeling well. Based on the responses you provided, it is recommended that you be seen in-person in urgent care so we can better evaluate your symptoms. If you are not getting better with at home therapy and are short of breath, especially after completing a course of antibiotics in the recent past, you need to be seen.    Please click here to find the nearest urgent care location to you.     You will not be charged for this Visit. Thank you for trusting us with your care.    Floyd Tong MD

## 2024-12-04 RX ORDER — FAMOTIDINE 20 MG/1
20 TABLET, FILM COATED ORAL 2 TIMES DAILY
Qty: 60 TABLET | Refills: 5 | OUTPATIENT
Start: 2024-12-04

## 2024-12-04 NOTE — TELEPHONE ENCOUNTER
famotidine (PEPCID) 20 MG tablet     The original prescription was reordered on 11/30/2024 by [facility] MD   duplicate

## 2024-12-09 ENCOUNTER — HOSPITAL ENCOUNTER (OUTPATIENT)
Dept: CARDIOLOGY | Facility: CLINIC | Age: 59
Discharge: HOME | End: 2024-12-09
Attending: INTERNAL MEDICINE
Payer: MEDICARE

## 2024-12-09 ENCOUNTER — HOSPITAL ENCOUNTER (OUTPATIENT)
Dept: CT IMAGING | Facility: CLINIC | Age: 59
Discharge: HOME | End: 2024-12-09
Attending: INTERNAL MEDICINE
Payer: MEDICARE

## 2024-12-09 ENCOUNTER — VIRTUAL VISIT (OUTPATIENT)
Dept: PSYCHOLOGY | Facility: CLINIC | Age: 59
End: 2024-12-09
Payer: MEDICARE

## 2024-12-09 DIAGNOSIS — J47.9 BRONCHIECTASIS WITHOUT COMPLICATION (H): ICD-10-CM

## 2024-12-09 DIAGNOSIS — J96.11 CHRONIC RESPIRATORY FAILURE WITH HYPOXIA (H): ICD-10-CM

## 2024-12-09 DIAGNOSIS — F33.0 MDD (MAJOR DEPRESSIVE DISORDER), RECURRENT EPISODE, MILD (H): Primary | ICD-10-CM

## 2024-12-09 DIAGNOSIS — R06.09 DYSPNEA ON EXERTION: ICD-10-CM

## 2024-12-09 DIAGNOSIS — K21.9 GASTROESOPHAGEAL REFLUX DISEASE WITHOUT ESOPHAGITIS: ICD-10-CM

## 2024-12-09 LAB — LVEF ECHO: NORMAL

## 2024-12-09 PROCEDURE — 90837 PSYTX W PT 60 MINUTES: CPT | Mod: 95 | Performed by: SOCIAL WORKER

## 2024-12-09 PROCEDURE — 71250 CT THORAX DX C-: CPT | Mod: MG

## 2024-12-09 PROCEDURE — G1010 CDSM STANSON: HCPCS

## 2024-12-09 PROCEDURE — 93306 TTE W/DOPPLER COMPLETE: CPT

## 2024-12-09 RX ORDER — PANTOPRAZOLE SODIUM 40 MG/1
40 TABLET, DELAYED RELEASE ORAL 2 TIMES DAILY
Qty: 60 TABLET | Refills: 0 | Status: SHIPPED | OUTPATIENT
Start: 2024-12-09

## 2024-12-09 NOTE — TELEPHONE ENCOUNTER
Refill request for pantoprazole 40mg EC, t tab BID  Last filled 11/7/24 by Dr Ilda Hancock [facility] refill protocol  Filled per protocol    Bala KNIGHT [facility]

## 2024-12-09 NOTE — TELEPHONE ENCOUNTER
Requested Prescriptions   Pending Prescriptions Disp Refills    pantoprazole (PROTONIX) 40 MG EC tablet 60 tablet 0     Sig: Take 1 tablet (40 mg) by mouth 2 times daily.       There is no refill protocol information for this order          Last office visit: 11/7/2024 ; last virtual visit: 1/25/2024 with prescribing provider:  Simona Gonsales    Future Office Visit:      Beverley Novoa   [facility] [place]   [facility]  [phone]

## 2024-12-10 NOTE — PROGRESS NOTES
[facility]                                     Progress Note  /  Patient Name: Sherly Yeung  Date: 12/09/2024         Service Type: Individual      Session Start Time: 8:03 Session End Time: 8:57    Session Length: 53    Session #: 29    Attendees: Client    Service Modality:  Video Visit:      Provider verified identity through the following two step process.  Patient provided:  Patient is known previously to provider    Telemedicine Visit: The patient's condition can be safely assessed and treated via synchronous audio and visual telemedicine encounter.      Reason for Telemedicine Visit: Services only offered telehealth    Originating Site (Patient Location): Patient's home    Distant Site (Provider Location): Provider Remote Setting- Home Office    Consent:  The patient/guardian has verbally consented to: the potential risks and benefits of telemedicine (video visit) versus in person care; bill my insurance or make self-payment for services provided; and responsibility for payment of non-covered services.     Patient would like the video invitation sent by:  My Chart    Mode of Communication:  Video Conference via Amwell    Distant Location (Provider):  Off-site    As the provider I attest to compliance with applicable laws and regulations related to telemedicine.    DATA  Extended Session (53+ minutes):   - Patient's presenting concerns require more intensive intervention than could be completed within the usual service- family issues requiring interventions and making patient's mental health worse. More time to process these issues was needed.     Interactive Complexity: No  Crisis: No       Progress Since Last Session (Related to Symptoms / Goals / Homework Symptoms: Worsening : Depressed mood    Homework: Partially completed      Episode of Care Goals: Satisfactory progress - ACTION (Actively working towards change); Intervened by reinforcing change plan / affirming steps  taken     Current / Ongoing Stressors and Concerns:  Patient's reported issues in the previous visit got worse. Sister in law was to forgive patient's mother. Said so but acted differently like she never forgave her.  Patient and family are now confused to what they can do to keep they family together.  Patient expressed sadness and disappointment in her sister in law as she thoughts her mom was understood and they all need to be stronger.  Patient wanted to learn about forgiveness and what it takes to do so.Patient was introduced to the book of Forgiving, the cycle of forgiving VS the  cycle of revenge.  Patient will read the book of forgiving and see how she can help family to understand the benefits of forgiving. She also has been in deep pain. She is not sure what is causing it and she is working with providers to find out the cause.  No safety concern. Her next visit is in 2 weeks.         Treatment Objective(s) Addressed in This Session:       Intervention:  ACT: continued to focus on strengths, Vs shortcomings  Family dynamic:Forgiving; introduction to the book of forgiving.     Safety plan: Reinforced    MI Intervention: Expressed Empathy/Understanding, Supported Autonomy, Collaboration, Evocation, Permission to raise concern or advise, and Open-ended questions     Change Talk Expressed by the Patient: Taking steps    Provider Response to Change Talk: E - Evoked more info from patient about behavior change, A - Affirmed patient's thoughts, decisions, or attempts at behavior change, R - Reflected patient's change talk, and S - Summarized patient's change talk statements     Assessments completed prior to visit:2/23/2023    The following assessments were completed by patient for this visit:  PHQ2:       10/14/2024    12:47 PM 4/18/2024    10:22 AM 2/23/2024     3:02 PM 1/12/2024     2:03 PM 12/27/2023     8:39 AM 4/27/2022    10:07 AM 8/11/2020     9:37 AM   PHQ-2 ( 1999 [employer])   Q1: Little interest or  pleasure in doing things 0 0 2 0 0 0 0   Q2: Feeling down, depressed or hopeless 0 0 3 1 1 0 0   PHQ-2 Score 0 0 5 1 1 0 0   PHQ-2 Total Score (12-17 Years)- Positive if 3 or more points; Administer PHQ-A if positive       0     PHQ9:       8/1/2024     9:12 AM 8/26/2024    10:50 AM 9/16/2024     8:17 AM 10/21/2024     3:39 PM 11/7/2024     3:43 PM 11/17/2024    10:25 AM 12/9/2024     6:15 AM   PHQ-9 SCORE   PHQ-9 Total Score MyChart 2 (Minimal depression) 3 (Minimal depression) 4 (Minimal depression) 4 (Minimal depression) 2 (Minimal depression) 4 (Minimal depression) 4 (Minimal depression)   PHQ-9 Total Score 2 3 4 4 2  4  4        Patient-reported     GAD2:       4/2/2024    10:49 AM 4/20/2024    10:35 AM 4/30/2024     9:40 AM 5/14/2024     9:32 AM 8/22/2024     9:18 AM 9/16/2024     8:17 AM 11/4/2024    10:30 AM   ROCKY-2   Feeling nervous, anxious, or on edge 3  0  0  0  1  1  0    Not being able to stop or control worrying 2  0  0  0  0  0  0    ROCKY-2 Total Score 5    5 0 0 0 1 1 0        Patient-reported    Multiple values from one day are sorted in reverse-chronological order     GAD7:       1/7/2024    10:08 AM 1/22/2024    10:13 AM 2/19/2024     1:36 PM 3/25/2024     4:52 PM 6/12/2024     9:56 PM 8/27/2024     5:53 PM 11/18/2024     8:50 AM   ROCKY-7 SCORE   Total Score 5 (mild anxiety) 6 (mild anxiety) 8 (mild anxiety) 10 (moderate anxiety)      Total Score 5 6 8 10 3 2 2     CAGE-AID:       2/9/2023    11:52 AM 7/17/2023     9:10 AM   CAGE-AID Total Score   Total Score 0 0   Total Score MyChart 0 (A total score of 2 or greater is considered clinically significant)      PROMIS 10-Global Health (all questions and answers displayed):       4/2/2024    10:50 AM 4/20/2024    10:37 AM 4/30/2024     9:42 AM 5/14/2024     9:34 AM 8/22/2024     9:19 AM 9/16/2024     8:19 AM 11/4/2024    10:32 AM   PROMIS 10   In general, would you say your health is: Good Good Good Very good Good Good Good   In general, would you say  your quality of life is: Fair Very good Very good Very good Very good Very good Very good   In general, how would you rate your physical health? Good Fair Good Good Good Good Good   In general, how would you rate your mental health, including your mood and your ability to think? Poor Very good Very good Very good Very good Very good Very good   In general, how would you rate your satisfaction with your social activities and relationships? Fair Very good Very good Very good Very good Very good Very good   In general, please rate how well you carry out your usual social activities and roles Poor Good Very good Good Fair Good Good   To what extent are you able to carry out your everyday physical activities such as walking, climbing stairs, carrying groceries, or moving a chair? Mostly Moderately Mostly Moderately Moderately Moderately Moderately   In the past 7 days, how often have you been bothered by emotional problems such as feeling anxious, depressed, or irritable? Always Rarely Never Never Rarely Rarely Rarely   In the past 7 days, how would you rate your fatigue on average? Moderate Moderate Mild Moderate Moderate Moderate Moderate   In the past 7 days, how would you rate your pain on average, where 0 means no pain, and 10 means worst imaginable pain? 5 5 5 5 5 5 4   In general, would you say your health is: 3  3  3  4  3  3  3   In general, would you say your quality of life is: 2  4  4  4  4  4  4   In general, how would you rate your physical health? 3  2  3  3  3  3  3   In general, how would you rate your mental health, including your mood and your ability to think? 1  4  4  4  4  4  4   In general, how would you rate your satisfaction with your social activities and relationships? 2  4  4  4  4  4  4   In general, please rate how well you carry out your usual social activities and roles. (This includes activities at home, at work and in your community, and responsibilities as a parent, child, spouse,  employee, friend, etc.) 1  3  4  3  2  3  3   To what extent are you able to carry out your everyday physical activities such as walking, climbing stairs, carrying groceries, or moving a chair? 4  3  4  3  3  3  3   In the past 7 days, how often have you been bothered by emotional problems such as feeling anxious, depressed, or irritable? 5  2  1  1  2  2  2   In the past 7 days, how would you rate your fatigue on average? 3  3  2  3  3  3  3   In the past 7 days, how would you rate your pain on average, where 0 means no pain, and 10 means worst imaginable pain? 5  5  5  5  5  5  4   Global Mental Health Score 6    6 16 17 17 16 16 16   Global Physical Health Score 13    13 11 14 12 12 12 12   PROMIS TOTAL - SUBSCORES 19    19 27 31 29 28 28 28       Patient-reported    Multiple values from one day are sorted in reverse-chronological order     PROMIS 10-Global Health (only subscores and total score):       4/2/2024    10:50 AM 4/20/2024    10:37 AM 4/30/2024     9:42 AM 5/14/2024     9:34 AM 8/22/2024     9:19 AM 9/16/2024     8:19 AM 11/4/2024    10:32 AM   PROMIS-10 Scores Only   Global Mental Health Score 6    6 16 17 17 16 16 16   Global Physical Health Score 13    13 11 14 12 12 12 12   PROMIS TOTAL - SUBSCORES 19    19 27 31 29 28 28 28     Weed Suicide Severity Rating Scale (Short Version)      7/17/2023     9:09 AM 9/13/2023     6:03 AM 12/6/2023     1:21 AM 2/16/2024    12:38 PM 4/13/2024     9:13 AM 5/24/2024     6:44 PM 10/19/2024     8:27 PM   Weed Suicide Severity Rating (Short Version)   Over the past 2 weeks have you felt down, depressed, or hopeless?  yes no no      Over the past 2 weeks have you had thoughts of killing yourself?  no no no      Have you ever attempted to kill yourself?  no no no      Q1 Wished to be Dead (Past Month)     0-->no 0-->no 0-->no   Q2 Suicidal Thoughts (Past Month)     0-->no 0-->no 0-->no   Q6 Suicide Behavior (Lifetime)     0-->no 0-->no 0-->no   Level of Risk  per Screen     no risks indicated no risks indicated no risks indicated   1. Wish to be Dead (Since Last Contact) N         2. Non-Specific Active Suicidal Thoughts (Since Last Contact) N         Actual Attempt (Since Last Contact) N         Has subject engaged in non-suicidal self-injurious behavior? (Since Last Contact) N         Interrupted Attempts (Since Last Contact) N         Aborted or Self-Interrupted Attempt (Since Last Contact) N         Preparatory Acts or Behavior (Since Last Contact) N         Suicide (Since Last Contact) N         Calculated C-SSRS Risk Score (Since Last Contact) No Risk Indicated               ASSESSMENT: Current Emotional / Mental Status (status of significant symptoms):   Risk status (Self / Other harm or suicidal ideation)   Patient denies current fears or concerns for personal safety.   Patient denies current or recent suicidal ideation or behaviors.   Patient denies current or recent homicidal ideation or behaviors.   Patient denies current or recent self injurious behavior or ideation.   Patient denies other safety concerns.   Patient reports there has been no change in risk factors since their last session.     Patient reports there has been no change in protective factors since their last session.     Recommended that patient call 911 or go to the local ED should there be a change in any of these risk factors   Would call [phone] if any SI.     Appearance:   Appropriate    Eye Contact:   Good    Psychomotor Behavior: Normal    Attitude:   Cooperative    Orientation:   Person Place Time Situation   Speech    Rate / Production: Normal/ Responsive    Volume:  Normal    Mood:    Depressed    Affect:    Appropriate    Thought Content:  Clear    Thought Form:  Coherent  Logical    Insight:    Good      Medication Review:   No changes to current psychiatric medication(s)     Medication Compliance:   Yes     Changes in Health Issues:   None reported     Chemical Use Review:   Substance  Use: Chemical use reviewed, no active concerns identified      Tobacco Use: No current tobacco use.      Diagnosis:  1. MDD (major depressive disorder), recurrent episode, mild (H)        Collateral Reports Completed:   Not Applicable    PLAN: (Patient Tasks / Therapist Tasks / Other):  Patient will acquire and read the book of Forgiving.  Patient's next visit is in 1/14/2024    Erika Mello, LICSW           ______________________________________________________________________________    Individual Treatment Plan    Patient's Name: Sherly Yeung  YOB: 1965    Date of Creation: 2/23/2023    Date Treatment Plan Last Reviewed/Revised: 8/27/2024    DSM5 Diagnoses: 296.32 (F33.1) Major Depressive Disorder, Recurrent Episode, Moderate With anxious distress or 300.02 (F41.1) Generalized Anxiety Disorder  Grief reaction [F43.21]    Psychosocial / Contextual Factors: grief: father recently passed away. Family dynamic- relationship with son, mom. Feeling overwhelmed.    PROMIS (reviewed every 90 days): 25    Referral / Collaboration:    Referral to another professional/service is not indicated at this time..    Anticipated number of session for this episode of care: 9-12 sessions  Anticipation frequency of session: Biweekly  Anticipated Duration of each session: 38-52 minutes  Treatment plan will be reviewed in 90 days or when goals have been changed.     MeasurableTreatment Goal(s) related to diagnosis / functional impairment(s)    Goal 1: Patient will Accept the loss of beloved one and return to stable level of functioning as evidenced by a higher level of functioning as a result of acceptance.   Redevelop a supportive social system and improve interpersonal relationships and Express unresolved emotions regarding loss which brings anxiety and depression mood.      I will know I've met my goal when I have more energy and  I am able to celebrate good life with my father Vs the sadness of losing  "him.Redevelop a supportive social system and improve interpersonal relationships\"    Objective #A (Patient Action)    Patient will Identify negative self-talk and behaviors: challenge core beliefs, myths, and actions.  Status: Continued - Date(s): 8/27/2024    Intervention(s)  Therapist will teach emotional recognition/identification. Support in her in the process    Objective #B  Patient will identify at least 3 fears / thoughts that contribute to feeling anxious.  Status: Continued - Date(s): 8/27/2024    Intervention(s)  Therapist will teach thought challenging with CBT .    Objective #C  Patient will Identify negative self-talk and behaviors: challenge core beliefs, myths, and actions.  Status: Continued - Date(s): 8/27/2024    Intervention(s)  Therapist will provide space and time to process thoughts and feelings around current stressors. provide support and coping skills to help move on in life .    Patient has reviewed and agreed to the above plan.    GERMAN Farmer  8/27/2024    Answers for HPI/ROS submitted by the patient on 4/20/2023  If you checked off any problems, how difficult have these problems made it for you to do your work, take care of things at home, or get along with other people?: Somewhat difficult  PHQ9 TOTAL SCORE: 5    Answers for HPI/ROS submitted by the patient on 4/26/2023  If you checked off any problems, how difficult have these problems made it for you to do your work, take care of things at home, or get along with other people?: Somewhat difficult  PHQ9 TOTAL SCORE: 3    Answers for HPI/ROS submitted by the patient on 5/4/2023  If you checked off any problems, how difficult have these problems made it for you to do your work, take care of things at home, or get along with other people?: Somewhat difficult  PHQ9 TOTAL SCORE: 4  Answers for HPI/ROS submitted by the patient on 7/17/2023  If you checked off any problems, how difficult have these problems made it for you to do " your work, take care of things at home, or get along with other people?: Somewhat difficult  PHQ9 TOTAL SCORE: 5    Answers submitted by the patient for this visit:  Patient Health Questionnaire (Submitted on 7/30/2023)  If you checked off any problems, how difficult have these problems made it for you to do your work, take care of things at home, or get along with other people?: Somewhat difficult  PHQ9 TOTAL SCORE: 5  Answers submitted by the patient for this visit:  Patient Health Questionnaire (Submitted on 8/20/2023)  If you checked off any problems, how difficult have these problems made it for you to do your work, take care of things at home, or get along with other people?: Somewhat difficult  PHQ9 TOTAL SCORE: 5  Answers submitted by the patient for this visit:  Patient Health Questionnaire (Submitted on 9/18/2023)  If you checked off any problems, how difficult have these problems made it for you to do your work, take care of things at home, or get along with other people?: Somewhat difficult  PHQ9 TOTAL SCORE: 4  Answers submitted by the patient for this visit:  Patient Health Questionnaire (Submitted on 10/2/2023)  If you checked off any problems, how difficult have these problems made it for you to do your work, take care of things at home, or get along with other people?: Somewhat difficult  PHQ9 TOTAL SCORE: 5    Answers submitted by the patient for this visit:  Patient Health Questionnaire (Submitted on 10/15/2023)  If you checked off any problems, how difficult have these problems made it for you to do your work, take care of things at home, or get along with other people?: Somewhat difficult  PHQ9 TOTAL SCORE: 6  ROCKY-7 (Submitted on 10/10/2023)  ROCKY 7 TOTAL SCORE: 5    Answers submitted by the patient for this visit:  Patient Health Questionnaire (Submitted on 11/16/2023)  If you checked off any problems, how difficult have these problems made it for you to do your work, take care of things at  home, or get along with other people?: Somewhat difficult  PHQ9 TOTAL SCORE: 4    Answers submitted by the patient for this visit:  Patient Health Questionnaire (Submitted on 11/28/2023)  If you checked off any problems, how difficult have these problems made it for you to do your work, take care of things at home, or get along with other people?: Somewhat difficult  PHQ9 TOTAL SCORE: 5    Answers submitted by the patient for this visit:  Patient Health Questionnaire (Submitted on 12/13/2023)  If you checked off any problems, how difficult have these problems made it for you to do your work, take care of things at home, or get along with other people?: Somewhat difficult  PHQ9 TOTAL SCORE: 5    Answers submitted by the patient for this visit:  Patient Health Questionnaire (Submitted on 12/26/2023)  If you checked off any problems, how difficult have these problems made it for you to do your work, take care of things at home, or get along with other people?: Somewhat difficult  PHQ9 TOTAL SCORE: 5    Answers submitted by the patient for this visit:  Patient Health Questionnaire (Submitted on 1/10/2024)  If you checked off any problems, how difficult have these problems made it for you to do your work, take care of things at home, or get along with other people?: Somewhat difficult  PHQ9 TOTAL SCORE: 5  ROCKY-7 (Submitted on 1/7/2024)  RCOKY 7 TOTAL SCORE: 5    Answers submitted by the patient for this visit:  Patient Health Questionnaire (Submitted on 1/25/2024)  If you checked off any problems, how difficult have these problems made it for you to do your work, take care of things at home, or get along with other people?: Very difficult  PHQ9 TOTAL SCORE: 8  ROCKY-7 (Submitted on 1/22/2024)  ROCKY 7 TOTAL SCORE: 6    Answers submitted by the patient for this visit:  Patient Health Questionnaire (Submitted on 2/21/2024)  If you checked off any problems, how difficult have these problems made it for you to do your work, take  care of things at home, or get along with other people?: Very difficult  PHQ9 TOTAL SCORE: 16  ROCKY-7 (Submitted on 2/19/2024)  ROCKY 7 TOTAL SCORE: 8    Answers submitted by the patient for this visit:  Patient Health Questionnaire (Submitted on 4/22/2024)  If you checked off any problems, how difficult have these problems made it for you to do your work, take care of things at home, or get along with other people?: Somewhat difficult  PHQ9 TOTAL SCORE: 2    Answers submitted by the patient for this visit:  Patient Health Questionnaire (Submitted on 5/6/2024)  If you checked off any problems, how difficult have these problems made it for you to do your work, take care of things at home, or get along with other people?: Somewhat difficult  PHQ9 TOTAL SCORE: 1    Answers submitted by the patient for this visit:  Patient Health Questionnaire (Submitted on 5/21/2024)  If you checked off any problems, how difficult have these problems made it for you to do your work, take care of things at home, or get along with other people?: Not difficult at all  PHQ9 TOTAL SCORE: 1    Answers submitted by the patient for this visit:  Patient Health Questionnaire (Submitted on 6/11/2024)  If you checked off any problems, how difficult have these problems made it for you to do your work, take care of things at home, or get along with other people?: Somewhat difficult  PHQ9 TOTAL SCORE: 1    Answers submitted by the patient for this visit:  Patient Health Questionnaire (Submitted on 8/26/2024)  If you checked off any problems, how difficult have these problems made it for you to do your work, take care of things at home, or get along with other people?: Somewhat difficult  PHQ9 TOTAL SCORE: 3    Answers submitted by the patient for this visit:  Patient Health Questionnaire (Submitted on 9/16/2024)  If you checked off any problems, how difficult have these problems made it for you to do your work, take care of things at home, or get along  with other people?: Somewhat difficult  PHQ9 TOTAL SCORE: 4    Answers submitted by the patient for this visit:  Patient Health Questionnaire (Submitted on 10/21/2024)  If you checked off any problems, how difficult have these problems made it for you to do your work, take care of things at home, or get along with other people?: Very difficult  PHQ9 TOTAL SCORE: 4    Answers submitted by the patient for this visit:  Patient Health Questionnaire (Submitted on 11/17/2024)  If you checked off any problems, how difficult have these problems made it for you to do your work, take care of things at home, or get along with other people?: Somewhat difficult  PHQ9 TOTAL SCORE: 4    Answers submitted by the patient for this visit:  Patient Health Questionnaire (Submitted on 12/9/2024)  If you checked off any problems, how difficult have these problems made it for you to do your work, take care of things at home, or get along with other people?: Somewhat difficult  PHQ9 TOTAL SCORE: 4

## 2024-12-18 ENCOUNTER — TELEPHONE (OUTPATIENT)
Dept: CARDIOLOGY | Facility: CLINIC | Age: 59
End: 2024-12-18
Payer: MEDICARE

## 2024-12-18 ENCOUNTER — MYC MEDICAL ADVICE (OUTPATIENT)
Dept: PULMONOLOGY | Facility: CLINIC | Age: 59
End: 2024-12-18
Payer: MEDICARE

## 2024-12-18 DIAGNOSIS — R09.02 HYPOXIA: ICD-10-CM

## 2024-12-18 DIAGNOSIS — J47.9 BRONCHIECTASIS WITHOUT COMPLICATION (H): Primary | ICD-10-CM

## 2024-12-18 DIAGNOSIS — R06.09 DYSPNEA ON EXERTION: ICD-10-CM

## 2024-12-18 DIAGNOSIS — R06.09 DYSPNEA ON EXERTION: Primary | ICD-10-CM

## 2024-12-18 NOTE — TELEPHONE ENCOUNTER
We do not have a PH provider here in [place] nor in [place]. Pt would need to go to the [place]. Call routed back to [facility]. Brenna Frost RN Cardiology December 18, 2024, 1:33 PM

## 2024-12-18 NOTE — TELEPHONE ENCOUNTER
[facility] Call Center    Phone Message    May a detailed message be left on voicemail: yes    Reason for Call: Patient called to schedule a NEW PH appt. Please call back to further coordinate.    Thank you!  [facility]

## 2024-12-19 ENCOUNTER — TELEPHONE (OUTPATIENT)
Dept: CARDIOLOGY | Facility: CLINIC | Age: 59
End: 2024-12-19
Payer: MEDICARE

## 2024-12-19 NOTE — TELEPHONE ENCOUNTER
LVM re: PH referral for provider visit, Labs, PFT and VQ scan.    Gave Sara office number to schedule.    [place] CARLISLE

## 2025-01-03 ENCOUNTER — HOSPITAL ENCOUNTER (OUTPATIENT)
Dept: NUCLEAR MEDICINE | Facility: CLINIC | Age: 60
Discharge: HOME | End: 2025-01-03
Attending: INTERNAL MEDICINE
Payer: MEDICARE

## 2025-01-03 ENCOUNTER — HOSPITAL ENCOUNTER (OUTPATIENT)
Dept: GENERAL RADIOLOGY | Facility: CLINIC | Age: 60
Discharge: HOME | End: 2025-01-03
Attending: INTERNAL MEDICINE
Payer: MEDICARE

## 2025-01-03 DIAGNOSIS — R06.09 DOE (DYSPNEA ON EXERTION): ICD-10-CM

## 2025-01-03 DIAGNOSIS — I27.20 PULMONARY HTN (H): ICD-10-CM

## 2025-01-03 PROCEDURE — 272N000035 NM LUNG SCAN VENTILATION AND PERFUSION

## 2025-01-03 PROCEDURE — A9567 TECHNETIUM TC-99M AEROSOL: HCPCS | Performed by: INTERNAL MEDICINE

## 2025-01-03 PROCEDURE — 71046 X-RAY EXAM CHEST 2 VIEWS: CPT

## 2025-01-03 PROCEDURE — A9540 TC99M MAA: HCPCS | Performed by: INTERNAL MEDICINE

## 2025-01-03 PROCEDURE — 78582 LUNG VENTILAT&PERFUS IMAGING: CPT

## 2025-01-03 PROCEDURE — 343N000001 HC RX 343 MED OP 636: Performed by: INTERNAL MEDICINE

## 2025-01-03 RX ADMIN — KIT FOR THE PREPARATION OF TECHNETIUM TC 99M ALBUMIN AGGREGATED 4.7 MILLICURIE: 2.5 INJECTION, POWDER, FOR SOLUTION INTRAVENOUS at 10:10

## 2025-01-03 RX ADMIN — KIT FOR THE PREPARATION OF TECHNETIUM TC 99M PENTETATE 41.3 MILLICURIE: 20 INJECTION, POWDER, LYOPHILIZED, FOR SOLUTION INTRAVENOUS; RESPIRATORY (INHALATION) at 09:45

## 2025-01-06 DIAGNOSIS — J47.9 BRONCHIECTASIS WITHOUT COMPLICATION (H): ICD-10-CM

## 2025-01-06 DIAGNOSIS — K21.9 GASTROESOPHAGEAL REFLUX DISEASE WITHOUT ESOPHAGITIS: ICD-10-CM

## 2025-01-06 RX ORDER — PANTOPRAZOLE SODIUM 40 MG/1
40 TABLET, DELAYED RELEASE ORAL 2 TIMES DAILY
Qty: 60 TABLET | Refills: 0 | Status: SHIPPED | OUTPATIENT
Start: 2025-01-06

## 2025-01-06 NOTE — TELEPHONE ENCOUNTER
Requested Prescriptions   Pending Prescriptions Disp Refills    pantoprazole (PROTONIX) 40 MG EC tablet 60 tablet 0     Sig: Take 1 tablet (40 mg) by mouth 2 times daily.       There is no refill protocol information for this order        Last office visit: 11/7/2024 ; last virtual visit: 1/25/2024 with prescribing provider:  Simona Gonsales     Future Office Visit:        Beverley Novoa   [facility] [place]   [facility]  [phone]

## 2025-01-09 ENCOUNTER — E-VISIT (OUTPATIENT)
Dept: URGENT CARE | Facility: CLINIC | Age: 60
End: 2025-01-09
Payer: COMMERCIAL

## 2025-01-09 DIAGNOSIS — J01.90 ACUTE NON-RECURRENT SINUSITIS, UNSPECIFIED LOCATION: Primary | ICD-10-CM

## 2025-01-10 NOTE — PATIENT INSTRUCTIONS
Dear Sherly Yeung    After reviewing your responses, I've been able to diagnose you with Acute non-recurrent sinusitis, unspecified location.      Based on your responses and diagnosis, I have prescribed   Orders Placed This Encounter   Medications     amoxicillin-clavulanate (AUGMENTIN) 875-125 MG tablet     Sig: Take 1 tablet by mouth 2 times daily for 7 days.     Dispense:  14 tablet     Refill:  0      to treat your symptoms. I have sent this to your pharmacy.?     It is also important to stay well hydrated, get lots of rest and take over-the-counter decongestants,?tylenol?or ibuprofen if you?are able to?take those medications per your primary care provider to help relieve discomfort.?     It is important that you take?all of?your prescribed medication even if your symptoms are improving after a few doses.? Taking?all of?your medicine helps prevent the symptoms from returning.?     If your symptoms worsen, you develop severe headache, vomiting, high fever (>102), or are not improving in 7 days, please contact your primary care provider for an appointment or visit any of our convenient Walk-in Care or Urgent Care Centers to be seen which can be found on our website?here.?     Thanks again for choosing?us?as your health care partner,?   ?  Fanny Rooney PA-C?

## 2025-01-13 ENCOUNTER — VIRTUAL VISIT (OUTPATIENT)
Dept: GASTROENTEROLOGY | Facility: CLINIC | Age: 60
End: 2025-01-13
Attending: INTERNAL MEDICINE
Payer: MEDICARE

## 2025-01-13 VITALS — BODY MASS INDEX: 25.69 KG/M2 | WEIGHT: 145 LBS | HEIGHT: 63 IN

## 2025-01-13 DIAGNOSIS — K21.9 GASTROESOPHAGEAL REFLUX DISEASE WITHOUT ESOPHAGITIS: ICD-10-CM

## 2025-01-13 DIAGNOSIS — R10.13 EPIGASTRIC PAIN: Primary | ICD-10-CM

## 2025-01-13 PROCEDURE — 98002 SYNCH AUDIO-VIDEO NEW MOD 45: CPT | Performed by: PHYSICIAN ASSISTANT

## 2025-01-13 ASSESSMENT — PAIN SCALES - GENERAL: PAINLEVEL_OUTOF10: NO PAIN (0)

## 2025-01-13 NOTE — NURSING NOTE
Current patient location: [id number] [place] [address]    Is the patient currently in the state of [place]? YES    Visit mode: VIDEO    If the visit is dropped, the patient can be reconnected by:VIDEO VISIT: Text to cell phone:   Telephone Information:   Mobile [phone]       Will anyone else be joining the visit? NO  (If patient encounters technical issues they should call [phone]    Are changes needed to the allergy or medication list? No    Are refills needed on medications prescribed by this physician? NO    Rooming Documentation:  Questionnaire(s) completed    Reason for visit: Consult    Geraldine ORDOÑEZ

## 2025-01-13 NOTE — PROGRESS NOTES
GASTROENTEROLOGY NEW PATIENT VIDEO VISIT    CC/REFERRING MD:    Chadwick Mg    REASON FOR CONSULTATION:   Simona Gonsales for   Chief Complaint   Patient presents with    Consult       HISTORY OF PRESENT ILLNESS:    Sherly Yeung is a 59 year old female with a past medical history significant for hypertension, SVC syndrome who is being evaluated via a billable video visit for concerns of GERD symptoms.  She was referred to us from her pulmonologist.  She has had 2 episodes of very severe COVID-pneumonia, both requiring mechanical ventilation.  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  She has been treated with bronchodilators with some improvement.  More recently seeing some worsening dyspnea on exertion this past fall and there was suspicion that uncontrolled GERD might be contributing.  She had described having frequent episodes of acidic reflux occurring both after meals and at night, also with bending forwards.  This was occurring despite daily use of Prevacid and Pepcid.  This was changed to Protonix with ongoing use of Pepcid and patient notes that she is getting less symptoms, now maybe 2 to 3 days/week.  Not having any regular episodes of dysphagia.  No nausea, vomiting, early satiety.  She does have some recurrent upper abdominal pain, which is reminiscent of what was previously described as gallbladder pain.    She did undergo cholecystectomy a couple of years ago after a series of ERCPs for bile duct stones requiring stenting.    Interestingly, she had similar issues back in 2021, underwent EGD with Dr. Mccartney of our group, which was notable for normal-appearing esophagus but there was retained food in the stomach.  Gastroparesis was suspected and she does recall meeting with a dietitian around that time.    Of note, she is on chronic opioid therapies for  chronic pain.  In terms of her meals, she typically has some oatmeal or cereal in the morning, crackers and cheese for lunch, and usually some potatoes and chicken for dinner, though this varies.  Not doing much snacking or late-night eating.    No pertinent family medical history of digestive diseases.  No tobacco, alcohol, or drug use.      I have reviewed and updated the patient's Past Medical History, Social History, Family History and Medication List.    Exam:    General appearance:  Healthy appearing adult, in no acute distress  Eyes:  Sclera anicteric, Pupils round and reactive to light  Ears, nose, mouth and throat:  No obvious external lesions of ears and nose.  Hearing intact  Neck:  Symmetric, No obvious external lesions  Respiratory:  Normal respiration, no use of accessory muscles   MSK:  No visual upper extremity, neck or facial muscle atrophy  ABD:  No visual abdominal distention, no audible borborygmi  Skin:  No rashes or jaundice   Psychiatric:  Oriented to person, place and time, Appropriate mood and affect.   Neurologic:  Peripheral muscle function and dexterity appear to be intact      PERTINENT STUDIES have been reviewed.    ASSESSMENT/PLAN:    Sherly Yeung is a 59 year old female who presents for evaluation of ongoing reflux despite high-dose antacid therapies.  Has been an issue for a few years now.  Previous EGD revealed normal esophagus but stomach had retained food, subsequent gastric emptying scan revealed mild delayed emptying.  Currently, she is not having any classic gastroparesis symptoms (no early satiety, nausea, vomiting), though we do recognize that gastroparesis may cause increased risk of reflux.  She is doing somewhat better with her current regimen of Protonix and Pepcid.  She will continue on this and we will reassess the esophagus for reflux esophagitis, EOE, hiatal hernia etc with repeat EGD off therapy.  She will need to fast for 24 hours for this test.    1.  Gastroesophageal reflux disease without esophagitis  - Adult GI  Referral - Consult Only  - Adult GI  Referral - Procedure Only; Future    2. Epigastric pain (Primary)  - Adult GI  Referral - Procedure Only; Future        Video-Visit Details    Video Visit Time: 18 minutes    Type of service:  Video Visit    Originating Location (pt. Location): Home    Distant Location (provider location):  Off-site    Platform used for Video Visit: Kourtney Serra PA-C    RTC 3 months    Thank you for this consultation.  It was a pleasure to participate in the care of this patient; please contact us with any further questions.  A total of 31 minutes was spent with reviewing the chart, discussing with the patient, documentation and coordination of care on 1/13/2025.    This note was created with voice recognition software, and while reviewed for accuracy, typos may remain.     Rick Serra PA-C  Division of Gastroenterology, Hepatology and Nutrition  [facility]  [phone]

## 2025-01-14 ENCOUNTER — VIRTUAL VISIT (OUTPATIENT)
Dept: PSYCHOLOGY | Facility: CLINIC | Age: 60
End: 2025-01-14
Payer: MEDICARE

## 2025-01-14 DIAGNOSIS — F33.0 MDD (MAJOR DEPRESSIVE DISORDER), RECURRENT EPISODE, MILD: Primary | ICD-10-CM

## 2025-01-14 PROCEDURE — 90834 PSYTX W PT 45 MINUTES: CPT | Mod: 95 | Performed by: SOCIAL WORKER

## 2025-01-14 NOTE — PROGRESS NOTES
[facility]                                     Progress Note  /  Patient Name: Sherly Yeung  Date: 1/14/2025         Service Type: Individual      Session Start Time: 8:01 Session End Time: 8:53    Session Length: 52    Session #: 30    Attendees: Client    Service Modality:  Video Visit:      Provider verified identity through the following two step process.  Patient provided:  Patient is known previously to provider    Telemedicine Visit: The patient's condition can be safely assessed and treated via synchronous audio and visual telemedicine encounter.      Reason for Telemedicine Visit: Services only offered telehealth    Originating Site (Patient Location): Patient's home    Distant Site (Provider Location): Provider Remote Setting- Home Office    Consent:  The patient/guardian has verbally consented to: the potential risks and benefits of telemedicine (video visit) versus in person care; bill my insurance or make self-payment for services provided; and responsibility for payment of non-covered services.     Patient would like the video invitation sent by:  My Chart    Mode of Communication:  Video Conference via Amwell    Distant Location (Provider):  Off-site    As the provider I attest to compliance with applicable laws and regulations related to telemedicine.    DATA  Interactive Complexity: No  Crisis: No       Progress Since Last Session (Related to Symptoms / Goals / Homework   Symptoms: Worsening : Depressed mood    Homework: Partially completed      Episode of Care Goals: Satisfactory progress - ACTION (Actively working towards change); Intervened by reinforcing change plan / affirming steps taken     Current / Ongoing Stressors and Concerns:  Patient has been doing well preparing the visit of her daughter and her fiance from [facility] for several days. She found this one exciting. She also has been experiencing some issues with family. Conflict between mom and patient's brother's  wife. Thre are some needs for forgiveness for what was said to each other. Patient's 's dad is ill and dying of cancer. Patient has a hard time to bring all the family members together for the support needed.   Her  has 4 siblings but some are not participating in helping.  Patient is he one coordinating and traveling an hour one way every week to help.  She is sharing how this is pulling her to another side. She had been doing well with her medical and emotional issues so far. Processed all this and agreed to return to her self care and allow herself sees her effort not what is not being done.  No safety concern. He next visit is in a month.         Treatment Objective(s) Addressed in This Session:       Intervention:  ACT: Continue to encourage positive outcome and accepting things she can not change.   Family dynamic: Reviewed the use of forgiveness  Safety plan: Reinforced    MI Intervention: Expressed Empathy/Understanding, Supported Autonomy, Collaboration, Evocation, Permission to raise concern or advise, and Open-ended questions     Change Talk Expressed by the Patient: Taking steps    Provider Response to Change Talk: E - Evoked more info from patient about behavior change, A - Affirmed patient's thoughts, decisions, or attempts at behavior change, R - Reflected patient's change talk, and S - Summarized patient's change talk statements     Assessments completed prior to visit:2/23/2023    The following assessments were completed by patient for this visit:  PHQ2:       1/13/2025     8:46 AM 10/14/2024    12:47 PM 4/18/2024    10:22 AM 2/23/2024     3:02 PM 1/12/2024     2:03 PM 12/27/2023     8:39 AM 4/27/2022    10:07 AM   PHQ-2 ( 1999 [employer])   Q1: Little interest or pleasure in doing things 0 0 0 2 0 0 0   Q2: Feeling down, depressed or hopeless 0 0 0 3 1 1 0   PHQ-2 Score 0 0 0 5 1 1 0     PHQ9:       8/26/2024    10:50 AM 9/16/2024     8:17 AM 10/21/2024     3:39 PM 11/7/2024     3:43 PM  11/17/2024    10:25 AM 12/9/2024     6:15 AM 1/14/2025     8:01 AM   PHQ-9 SCORE   PHQ-9 Total Score MyChart 3 (Minimal depression) 4 (Minimal depression) 4 (Minimal depression) 2 (Minimal depression) 4 (Minimal depression) 4 (Minimal depression) 2 (Minimal depression)   PHQ-9 Total Score 3 4 4 2  4  4  2        Patient-reported     GAD2:       4/2/2024    10:49 AM 4/20/2024    10:35 AM 4/30/2024     9:40 AM 5/14/2024     9:32 AM 8/22/2024     9:18 AM 9/16/2024     8:17 AM 11/4/2024    10:30 AM   ROCKY-2   Feeling nervous, anxious, or on edge 3 0 0 0 1 1 0   Not being able to stop or control worrying 2 0 0 0 0 0 0   ROCKY-2 Total Score 5    5 0 0 0 1 1 0        Patient-reported     GAD7:       1/7/2024    10:08 AM 1/22/2024    10:13 AM 2/19/2024     1:36 PM 3/25/2024     4:52 PM 6/12/2024     9:56 PM 8/27/2024     5:53 PM 11/18/2024     8:50 AM   ROCKY-7 SCORE   Total Score 5 (mild anxiety) 6 (mild anxiety) 8 (mild anxiety) 10 (moderate anxiety)      Total Score 5 6 8 10 3 2 2     CAGE-AID:       2/9/2023    11:52 AM 7/17/2023     9:10 AM   CAGE-AID Total Score   Total Score 0 0   Total Score MyChart 0 (A total score of 2 or greater is considered clinically significant)      PROMIS 10-Global Health (all questions and answers displayed):       4/2/2024    10:50 AM 4/20/2024    10:37 AM 4/30/2024     9:42 AM 5/14/2024     9:34 AM 8/22/2024     9:19 AM 9/16/2024     8:19 AM 11/4/2024    10:32 AM   PROMIS 10   In general, would you say your health is: Good Good Good Very good Good Good Good   In general, would you say your quality of life is: Fair Very good Very good Very good Very good Very good Very good   In general, how would you rate your physical health? Good Fair Good Good Good Good Good   In general, how would you rate your mental health, including your mood and your ability to think? Poor Very good Very good Very good Very good Very good Very good   In general, how would you rate your satisfaction with your social  activities and relationships? Fair Very good Very good Very good Very good Very good Very good   In general, please rate how well you carry out your usual social activities and roles Poor Good Very good Good Fair Good Good   To what extent are you able to carry out your everyday physical activities such as walking, climbing stairs, carrying groceries, or moving a chair? Mostly Moderately Mostly Moderately Moderately Moderately Moderately   In the past 7 days, how often have you been bothered by emotional problems such as feeling anxious, depressed, or irritable? Always Rarely Never Never Rarely Rarely Rarely   In the past 7 days, how would you rate your fatigue on average? Moderate Moderate Mild Moderate Moderate Moderate Moderate   In the past 7 days, how would you rate your pain on average, where 0 means no pain, and 10 means worst imaginable pain? 5 5 5 5 5 5 4   In general, would you say your health is: 3 3 3 4 3 3 3   In general, would you say your quality of life is: 2 4 4 4 4 4 4   In general, how would you rate your physical health? 3 2 3 3 3 3 3   In general, how would you rate your mental health, including your mood and your ability to think? 1 4 4 4 4 4 4   In general, how would you rate your satisfaction with your social activities and relationships? 2 4 4 4 4 4 4   In general, please rate how well you carry out your usual social activities and roles. (This includes activities at home, at work and in your community, and responsibilities as a parent, child, spouse, employee, friend, etc.) 1 3 4 3 2 3 3   To what extent are you able to carry out your everyday physical activities such as walking, climbing stairs, carrying groceries, or moving a chair? 4 3 4 3 3 3 3   In the past 7 days, how often have you been bothered by emotional problems such as feeling anxious, depressed, or irritable? 5 2 1 1 2 2 2   In the past 7 days, how would you rate your fatigue on average? 3 3 2 3 3 3 3   In the past 7 days, how  would you rate your pain on average, where 0 means no pain, and 10 means worst imaginable pain? 5 5 5 5 5 5 4   Global Mental Health Score 6    6 16 17 17 16 16 16   Global Physical Health Score 13    13 11 14 12 12 12 12   PROMIS TOTAL - SUBSCORES 19    19 27 31 29 28 28 28     PROMIS 10-Global Health (only subscores and total score):       4/2/2024    10:50 AM 4/20/2024    10:37 AM 4/30/2024     9:42 AM 5/14/2024     9:34 AM 8/22/2024     9:19 AM 9/16/2024     8:19 AM 11/4/2024    10:32 AM   PROMIS-10 Scores Only   Global Mental Health Score 6    6 16 17 17 16 16 16   Global Physical Health Score 13    13 11 14 12 12 12 12   PROMIS TOTAL - SUBSCORES 19    19 27 31 29 28 28 28     [place] Suicide Severity Rating Scale (Short Version)      7/17/2023     9:09 AM 9/13/2023     6:03 AM 12/6/2023     1:21 AM 2/16/2024    12:38 PM 4/13/2024     9:13 AM 5/24/2024     6:44 PM 10/19/2024     8:27 PM   [place] Suicide Severity Rating (Short Version)   Over the past 2 weeks have you felt down, depressed, or hopeless?  yes no no      Over the past 2 weeks have you had thoughts of killing yourself?  no no no      Have you ever attempted to kill yourself?  no no no      Q1 Wished to be Dead (Past Month)     0-->no 0-->no 0-->no   Q2 Suicidal Thoughts (Past Month)     0-->no 0-->no 0-->no   Q6 Suicide Behavior (Lifetime)     0-->no 0-->no 0-->no   Level of Risk per Screen     no risks indicated no risks indicated no risks indicated   1. Wish to be Dead (Since Last Contact) N         2. Non-Specific Active Suicidal Thoughts (Since Last Contact) N         Actual Attempt (Since Last Contact) N         Has subject engaged in non-suicidal self-injurious behavior? (Since Last Contact) N         Interrupted Attempts (Since Last Contact) N         Aborted or Self-Interrupted Attempt (Since Last Contact) N         Preparatory Acts or Behavior (Since Last Contact) N         Suicide (Since Last Contact) N         Calculated C-SSRS Risk  Score (Since Last Contact) No Risk Indicated               ASSESSMENT: Current Emotional / Mental Status (status of significant symptoms):   Risk status (Self / Other harm or suicidal ideation)   Patient denies current fears or concerns for personal safety.   Patient denies current or recent suicidal ideation or behaviors.   Patient denies current or recent homicidal ideation or behaviors.   Patient denies current or recent self injurious behavior or ideation.   Patient denies other safety concerns.   Patient reports there has been no change in risk factors since their last session.     Patient reports there has been no change in protective factors since their last session.     Recommended that patient call 911 or go to the local ED should there be a change in any of these risk factors   Would call [phone] if any SI.     Appearance:   Appropriate    Eye Contact:   Good    Psychomotor Behavior: Normal    Attitude:   Cooperative    Orientation:   Person Place Time Situation   Speech    Rate / Production: Normal/ Responsive    Volume:  Normal    Mood:    Depressed    Affect:    Appropriate    Thought Content:  Clear    Thought Form:  Coherent  Logical    Insight:    Good      Medication Review:   No changes to current psychiatric medication(s)     Medication Compliance:   Yes     Changes in Health Issues:   None reported     Chemical Use Review:   Substance Use: Chemical use reviewed, no active concerns identified      Tobacco Use: No current tobacco use.      Diagnosis:  1. MDD (major depressive disorder), recurrent episode, mild      Collateral Reports Completed:   Not Applicable    PLAN: (Patient Tasks / Therapist Tasks / Other):  Patient will spend some time with her daughter visiting from [place]  Patient will reflect on today's discussion related to family issues.   Patient's next visit is in a month    GERMAN Farmer          "  ______________________________________________________________________________    Individual Treatment Plan    Patient's Name: Sherly Yeung  YOB: 1965    Date of Creation: 2/23/2023    Date Treatment Plan Last Reviewed/Revised: 1/14/2025    DSM5 Diagnoses: 296.32 (F33.1) Major Depressive Disorder, Recurrent Episode, Moderate With anxious distress or 300.02 (F41.1) Generalized Anxiety Disorder  Grief reaction [F43.21]    Psychosocial / Contextual Factors: grief: father recently passed away. Family dynamic- relationship with son, mom. Feeling overwhelmed.    PROMIS (reviewed every 90 days): 25    Referral / Collaboration:    Referral to another professional/service is not indicated at this time..    Anticipated number of session for this episode of care: 9-12 sessions  Anticipation frequency of session: Biweekly  Anticipated Duration of each session: 38-52 minutes  Treatment plan will be reviewed in 90 days or when goals have been changed.     MeasurableTreatment Goal(s) related to diagnosis / functional impairment(s)    Goal 1: Patient will Accept the loss of beloved one and return to stable level of functioning as evidenced by a higher level of functioning as a result of acceptance.   Redevelop a supportive social system and improve interpersonal relationships and Express unresolved emotions regarding loss which brings anxiety and depression mood.      I will know I've met my goal when I have more energy and  I am able to celebrate good life with my father Vs the sadness of losing him.Redevelop a supportive social system and improve interpersonal relationships\"    Objective #A (Patient Action)    Patient will Identify negative self-talk and behaviors: challenge core beliefs, myths, and actions.  Status: Continued - Date(s): 1/14/2025    Intervention(s)  Therapist will teach emotional recognition/identification. Support in her in the process    Objective #B  Patient will identify at least 3 fears / " thoughts that contribute to feeling anxious.  Status: Continued - Date(s): 1/14/2025    Intervention(s)  Therapist will teach thought challenging with CBT .    Objective #C  Patient will Identify negative self-talk and behaviors: challenge core beliefs, myths, and actions.  Status: Continued - Date(s): 1/14/2025    Intervention(s)  Therapist will provide space and time to process thoughts and feelings around current stressors. provide support and coping skills to help move on in life .    Patient has reviewed and agreed to the above plan.    GERMAN Farmer  1/14/2025    Answers for HPI/ROS submitted by the patient on 4/20/2023  If you checked off any problems, how difficult have these problems made it for you to do your work, take care of things at home, or get along with other people?: Somewhat difficult  PHQ9 TOTAL SCORE: 5    Answers for HPI/ROS submitted by the patient on 4/26/2023  If you checked off any problems, how difficult have these problems made it for you to do your work, take care of things at home, or get along with other people?: Somewhat difficult  PHQ9 TOTAL SCORE: 3    Answers for HPI/ROS submitted by the patient on 5/4/2023  If you checked off any problems, how difficult have these problems made it for you to do your work, take care of things at home, or get along with other people?: Somewhat difficult  PHQ9 TOTAL SCORE: 4  Answers for HPI/ROS submitted by the patient on 7/17/2023  If you checked off any problems, how difficult have these problems made it for you to do your work, take care of things at home, or get along with other people?: Somewhat difficult  PHQ9 TOTAL SCORE: 5    Answers submitted by the patient for this visit:  Patient Health Questionnaire (Submitted on 7/30/2023)  If you checked off any problems, how difficult have these problems made it for you to do your work, take care of things at home, or get along with other people?: Somewhat difficult  PHQ9 TOTAL SCORE:  5  Answers submitted by the patient for this visit:  Patient Health Questionnaire (Submitted on 8/20/2023)  If you checked off any problems, how difficult have these problems made it for you to do your work, take care of things at home, or get along with other people?: Somewhat difficult  PHQ9 TOTAL SCORE: 5  Answers submitted by the patient for this visit:  Patient Health Questionnaire (Submitted on 9/18/2023)  If you checked off any problems, how difficult have these problems made it for you to do your work, take care of things at home, or get along with other people?: Somewhat difficult  PHQ9 TOTAL SCORE: 4  Answers submitted by the patient for this visit:  Patient Health Questionnaire (Submitted on 10/2/2023)  If you checked off any problems, how difficult have these problems made it for you to do your work, take care of things at home, or get along with other people?: Somewhat difficult  PHQ9 TOTAL SCORE: 5    Answers submitted by the patient for this visit:  Patient Health Questionnaire (Submitted on 10/15/2023)  If you checked off any problems, how difficult have these problems made it for you to do your work, take care of things at home, or get along with other people?: Somewhat difficult  PHQ9 TOTAL SCORE: 6  ROCKY-7 (Submitted on 10/10/2023)  ROCKY 7 TOTAL SCORE: 5    Answers submitted by the patient for this visit:  Patient Health Questionnaire (Submitted on 11/16/2023)  If you checked off any problems, how difficult have these problems made it for you to do your work, take care of things at home, or get along with other people?: Somewhat difficult  PHQ9 TOTAL SCORE: 4    Answers submitted by the patient for this visit:  Patient Health Questionnaire (Submitted on 11/28/2023)  If you checked off any problems, how difficult have these problems made it for you to do your work, take care of things at home, or get along with other people?: Somewhat difficult  PHQ9 TOTAL SCORE: 5    Answers submitted by the patient  for this visit:  Patient Health Questionnaire (Submitted on 12/13/2023)  If you checked off any problems, how difficult have these problems made it for you to do your work, take care of things at home, or get along with other people?: Somewhat difficult  PHQ9 TOTAL SCORE: 5    Answers submitted by the patient for this visit:  Patient Health Questionnaire (Submitted on 12/26/2023)  If you checked off any problems, how difficult have these problems made it for you to do your work, take care of things at home, or get along with other people?: Somewhat difficult  PHQ9 TOTAL SCORE: 5    Answers submitted by the patient for this visit:  Patient Health Questionnaire (Submitted on 1/10/2024)  If you checked off any problems, how difficult have these problems made it for you to do your work, take care of things at home, or get along with other people?: Somewhat difficult  PHQ9 TOTAL SCORE: 5  ROCKY-7 (Submitted on 1/7/2024)  ROCKY 7 TOTAL SCORE: 5    Answers submitted by the patient for this visit:  Patient Health Questionnaire (Submitted on 1/25/2024)  If you checked off any problems, how difficult have these problems made it for you to do your work, take care of things at home, or get along with other people?: Very difficult  PHQ9 TOTAL SCORE: 8  ROCKY-7 (Submitted on 1/22/2024)  ROCKY 7 TOTAL SCORE: 6    Answers submitted by the patient for this visit:  Patient Health Questionnaire (Submitted on 2/21/2024)  If you checked off any problems, how difficult have these problems made it for you to do your work, take care of things at home, or get along with other people?: Very difficult  PHQ9 TOTAL SCORE: 16  ROCKY-7 (Submitted on 2/19/2024)  ROCKY 7 TOTAL SCORE: 8    Answers submitted by the patient for this visit:  Patient Health Questionnaire (Submitted on 4/22/2024)  If you checked off any problems, how difficult have these problems made it for you to do your work, take care of things at home, or get along with other people?: Somewhat  difficult  PHQ9 TOTAL SCORE: 2    Answers submitted by the patient for this visit:  Patient Health Questionnaire (Submitted on 5/6/2024)  If you checked off any problems, how difficult have these problems made it for you to do your work, take care of things at home, or get along with other people?: Somewhat difficult  PHQ9 TOTAL SCORE: 1    Answers submitted by the patient for this visit:  Patient Health Questionnaire (Submitted on 5/21/2024)  If you checked off any problems, how difficult have these problems made it for you to do your work, take care of things at home, or get along with other people?: Not difficult at all  PHQ9 TOTAL SCORE: 1    Answers submitted by the patient for this visit:  Patient Health Questionnaire (Submitted on 6/11/2024)  If you checked off any problems, how difficult have these problems made it for you to do your work, take care of things at home, or get along with other people?: Somewhat difficult  PHQ9 TOTAL SCORE: 1    Answers submitted by the patient for this visit:  Patient Health Questionnaire (Submitted on 8/26/2024)  If you checked off any problems, how difficult have these problems made it for you to do your work, take care of things at home, or get along with other people?: Somewhat difficult  PHQ9 TOTAL SCORE: 3    Answers submitted by the patient for this visit:  Patient Health Questionnaire (Submitted on 9/16/2024)  If you checked off any problems, how difficult have these problems made it for you to do your work, take care of things at home, or get along with other people?: Somewhat difficult  PHQ9 TOTAL SCORE: 4    Answers submitted by the patient for this visit:  Patient Health Questionnaire (Submitted on 10/21/2024)  If you checked off any problems, how difficult have these problems made it for you to do your work, take care of things at home, or get along with other people?: Very difficult  PHQ9 TOTAL SCORE: 4    Answers submitted by the patient for this visit:  Patient  Health Questionnaire (Submitted on 11/17/2024)  If you checked off any problems, how difficult have these problems made it for you to do your work, take care of things at home, or get along with other people?: Somewhat difficult  PHQ9 TOTAL SCORE: 4    Answers submitted by the patient for this visit:  Patient Health Questionnaire (Submitted on 12/9/2024)  If you checked off any problems, how difficult have these problems made it for you to do your work, take care of things at home, or get along with other people?: Somewhat difficult  PHQ9 TOTAL SCORE: 4    Answers submitted by the patient for this visit:  Patient Health Questionnaire (Submitted on 1/14/2025)  If you checked off any problems, how difficult have these problems made it for you to do your work, take care of things at home, or get along with other people?: Somewhat difficult  PHQ9 TOTAL SCORE: 2

## 2025-01-20 ENCOUNTER — ANCILLARY PROCEDURE (OUTPATIENT)
Dept: MAMMOGRAPHY | Facility: CLINIC | Age: 60
End: 2025-01-20
Attending: INTERNAL MEDICINE
Payer: COMMERCIAL

## 2025-01-20 DIAGNOSIS — Z12.31 VISIT FOR SCREENING MAMMOGRAM: ICD-10-CM

## 2025-01-20 PROCEDURE — 77067 SCR MAMMO BI INCL CAD: CPT | Performed by: RADIOLOGY

## 2025-01-20 PROCEDURE — 77063 BREAST TOMOSYNTHESIS BI: CPT | Performed by: RADIOLOGY

## 2025-01-23 ENCOUNTER — VIRTUAL VISIT (OUTPATIENT)
Dept: PSYCHIATRY | Facility: CLINIC | Age: 60
End: 2025-01-23
Attending: PSYCHIATRY & NEUROLOGY
Payer: MEDICARE

## 2025-01-23 DIAGNOSIS — F41.0 PANIC ATTACK: ICD-10-CM

## 2025-01-23 DIAGNOSIS — F33.41 RECURRENT MAJOR DEPRESSIVE DISORDER, IN PARTIAL REMISSION: Primary | ICD-10-CM

## 2025-01-23 RX ORDER — METHYLPHENIDATE HYDROCHLORIDE 40 MG/1
CAPSULE, EXTENDED RELEASE ORAL
Qty: 30 CAPSULE | Refills: 0 | Status: SHIPPED | OUTPATIENT
Start: 2025-02-27

## 2025-01-23 RX ORDER — LORAZEPAM 0.5 MG/1
0.5 TABLET ORAL DAILY PRN
Qty: 10 TABLET | Refills: 0 | Status: SHIPPED | OUTPATIENT
Start: 2025-01-23

## 2025-01-23 RX ORDER — METHYLPHENIDATE HYDROCHLORIDE 40 MG/1
CAPSULE, EXTENDED RELEASE ORAL
Qty: 30 CAPSULE | Refills: 0 | Status: SHIPPED | OUTPATIENT
Start: 2025-01-27

## 2025-01-23 RX ORDER — METHYLPHENIDATE HYDROCHLORIDE 20 MG/1
CAPSULE, EXTENDED RELEASE ORAL
Qty: 30 CAPSULE | Refills: 0 | Status: SHIPPED | OUTPATIENT
Start: 2025-01-27

## 2025-01-23 RX ORDER — METHYLPHENIDATE HYDROCHLORIDE 20 MG/1
CAPSULE, EXTENDED RELEASE ORAL
Qty: 30 CAPSULE | Refills: 0 | Status: SHIPPED | OUTPATIENT
Start: 2025-02-27

## 2025-01-23 ASSESSMENT — PAIN SCALES - GENERAL: PAINLEVEL_OUTOF10: NO PAIN (0)

## 2025-01-23 NOTE — PROGRESS NOTES
Virtual Visit Details     Type of service:  Video Visit   Video Start Time:  3:10PM  Video End Time: 3:40PM     Originating Location (pt. Location): Home     Distant Location (provider location):  On-site  Platform used for Video Visit: Kourtney

## 2025-01-23 NOTE — PATIENT INSTRUCTIONS
**For crisis resources, please see the information at the end of this document**   Patient Education    Thank you for coming to the [facility].     Today's change:   -ativan 0.5mg daily PRN #10 tablets for panic attacks     Lab Testing:  If you had lab testing today and your results are reassuring or normal they will be mailed to you or sent through PENRITH within 7 days. If the lab tests need quick action we will call you with the results. The phone number we will call with results is # [phone]. If this is not the best number please call our clinic and change the number.     Medication Refills:  If you need any refills please call your pharmacy and they will contact us. Our fax number for refills is [phone].   Three business days of notice are needed for general medication refill requests.   Five business days of notice are needed for controlled substance refill requests.   If you need to change to a different pharmacy, please contact the new pharmacy directly. The new pharmacy will help you get your medications transferred.     Contact Us:  Please call [phone] during business hours (8-5:00 M-F).   If you have medication related questions after clinic hours, or on the weekend, please call [phone].     Financial Assistance [phone]   Medical Records [phone]       MENTAL HEALTH CRISIS RESOURCES:  For a emergency help, please call 911 or go to the nearest Emergency Department.     Emergency Walk-In Options:   EmPATH Unit @ [facility] ([place]): [phone] - Specialized mental health emergency area designed to be calming  [facility] ([place]): [phone]  [facility] ([place]): [phone]  [facility]): [phone]    [facility] Crisis Information:   Gregory: [phone]  [place]: [phone]  Radha (DAGO) - Adult: [phone]     Child:  [phone]  [place] - Adult: [phone]     Child: [phone]  [place]: [phone]  List of all [facility] resources:   https://[place].gov/dhs/people-we-serve/adults/health-care/mental-health/resources/crisis-contacts.jsp    National Crisis Information:   Crisis Text Line: Text  [place]  to 868672  Suicide & Crisis Lifeline: 988  National Suicide Prevention Lifeline: 5-838-938-TALK (1-578.581.9397)       For online chat options, visit https://suicidepreventionlifeline.org/chat/  Poison Control Center: 6-038-333-9589  Trans Lifeline: 1-570.164.1513 - Hotline for transgender people of all ages  The [employer]: 3-280-968-4999 - Hotline for LGBT youth     For Non-Emergency Support:   Fast Tracker: Mental Health & Substance Use Disorder Resources -   https://www.[place].[email or web address]/
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
## 2025-01-23 NOTE — NURSING NOTE
Is the patient currently in the state of [place]? YES    Current patient location: [facility] [address]  [facility] [address]    Visit mode:Video    If the visit is dropped, the patient can be reconnected by: VIDEO VISIT: Text to cell phone:   Telephone Information:   Mobile [phone]       Will anyone else be joining the visit? No  (If patient encounters technical issues they should call [phone])    Are changes needed to the allergy or medication list? Yes Medications flagged for removal; and Pt stated no changes to allergies    Are refills needed on medications prescribed by this physician? Yes, discuss with provider:  -methylphenidate (RITALIN LA) 40 MG 24 hr capsule   -methylphenidate (RITALIN LA) 20 MG 24 hr capsule   -lorazepam ativan 0.5 mg    Rooming Documentation: Questionnaire(s) completed.    Reason for visit: RECHREBECA Alejo, DAVEF

## 2025-01-24 ENCOUNTER — MYC MEDICAL ADVICE (OUTPATIENT)
Dept: PULMONOLOGY | Facility: CLINIC | Age: 60
End: 2025-01-24
Payer: MEDICARE

## 2025-01-24 DIAGNOSIS — J47.9 BRONCHIECTASIS WITHOUT COMPLICATION (H): ICD-10-CM

## 2025-01-24 DIAGNOSIS — K21.9 GASTROESOPHAGEAL REFLUX DISEASE WITHOUT ESOPHAGITIS: ICD-10-CM

## 2025-01-24 SDOH — HEALTH STABILITY: PHYSICAL HEALTH: ON AVERAGE, HOW MANY DAYS PER WEEK DO YOU ENGAGE IN MODERATE TO STRENUOUS EXERCISE (LIKE A BRISK WALK)?: 2 DAYS

## 2025-01-24 SDOH — HEALTH STABILITY: PHYSICAL HEALTH: ON AVERAGE, HOW MANY MINUTES DO YOU ENGAGE IN EXERCISE AT THIS LEVEL?: 10 MIN

## 2025-01-24 ASSESSMENT — SOCIAL DETERMINANTS OF HEALTH (SDOH): HOW OFTEN DO YOU GET TOGETHER WITH FRIENDS OR RELATIVES?: TWICE A WEEK

## 2025-01-24 NOTE — TELEPHONE ENCOUNTER
Refill Request: Protonix    Will route to provider to review and advise.      Last Office/video Visit: 11/17/2024  Next office Visit Due: 4/2024  Next Scheduled: 3/27/2025     Recommendations as follows:  - Continue supplemental O2 with sleep/exertion  - continue symbicort vs. Advair bid, albuterol prn; neb machine ordered   - aerobika  - Start Protonix in place of Prevacid; continue famotidine twice daily. Referral to GI also placed.   - Consider pulmonary rehab  - Repeat pulmonary function testing, chest CT and echo  - try to increase regular activity with a goal of at least 3 sessions/week of 30 minutes of cardiovascular exercise; start slow and work up to this goal  -Stay up-to-date with yearly influenza vaccines, pneumonia series (prevnar 13 and pneumovax), and Covid shots     RTC in about 5 months.       1/13/2025: Saw Gastro  Currently, she is not having any classic gastroparesis symptoms (no early satiety, nausea, vomiting), though we do recognize that gastroparesis may cause increased risk of reflux.  She is doing somewhat better with her current regimen of Protonix and Pepcid.  She will continue on this and we will reassess the esophagus for reflux esophagitis, EOE, hiatal hernia etc with repeat EGD off therapy.

## 2025-01-27 ENCOUNTER — HOSPITAL ENCOUNTER (OUTPATIENT)
Dept: RESPIRATORY THERAPY | Facility: CLINIC | Age: 60
Discharge: HOME | End: 2025-01-27
Admitting: INTERNAL MEDICINE
Payer: MEDICARE

## 2025-01-27 DIAGNOSIS — K21.9 GASTROESOPHAGEAL REFLUX DISEASE WITHOUT ESOPHAGITIS: ICD-10-CM

## 2025-01-27 DIAGNOSIS — R06.09 DOE (DYSPNEA ON EXERTION): ICD-10-CM

## 2025-01-27 DIAGNOSIS — J47.9 BRONCHIECTASIS WITHOUT COMPLICATION (H): ICD-10-CM

## 2025-01-27 DIAGNOSIS — J96.11 CHRONIC RESPIRATORY FAILURE WITH HYPOXIA (H): ICD-10-CM

## 2025-01-27 DIAGNOSIS — I27.20 PULMONARY HTN (H): ICD-10-CM

## 2025-01-27 LAB
DLCOUNC-%PRED-PRE: 60 %
DLCOUNC-PRE: 11.65 ML/MIN/MMHG
DLCOUNC-PRED: 19.32 ML/MIN/MMHG
ERV-%PRED-PRE: 8 %
ERV-PRE: 0.09 L
ERV-PRED: 1.06 L
EXPTIME-PRE: 3.41 SEC
FEF2575-%PRED-POST: 128 %
FEF2575-%PRED-PRE: 98 %
FEF2575-POST: 2.74 L/SEC
FEF2575-PRE: 2.1 L/SEC
FEF2575-PRED: 2.14 L/SEC
FEFMAX-%PRED-PRE: 78 %
FEFMAX-PRE: 4.87 L/SEC
FEFMAX-PRED: 6.18 L/SEC
FEV1-%PRED-PRE: 57 %
FEV1-PRE: 1.31 L
FEV1FEV6-PRE: 90 %
FEV1FEV6-PRED: 81 %
FEV1FVC-PRE: 90 %
FEV1FVC-PRED: 80 %
FEV1SVC-PRE: 98 %
FEV1SVC-PRED: 72 %
FIFMAX-PRE: 3.06 L/SEC
FRCPLETH-%PRED-PRE: 45 %
FRCPLETH-PRE: 1.25 L
FRCPLETH-PRED: 2.76 L
FVC-%PRED-PRE: 51 %
FVC-PRE: 1.46 L
FVC-PRED: 2.83 L
IC-%PRED-PRE: 59 %
IC-PRE: 1.25 L
IC-PRED: 2.12 L
RVPLETH-%PRED-PRE: 63 %
RVPLETH-PRE: 1.17 L
RVPLETH-PRED: 1.84 L
TLCPLETH-%PRED-PRE: 52 %
TLCPLETH-PRE: 2.5 L
TLCPLETH-PRED: 4.77 L
VA-%PRED-PRE: 51 %
VA-PRE: 2.33 L
VC-%PRED-PRE: 42 %
VC-PRE: 1.34 L
VC-PRED: 3.15 L

## 2025-01-27 PROCEDURE — 94060 EVALUATION OF WHEEZING: CPT

## 2025-01-27 PROCEDURE — 94726 PLETHYSMOGRAPHY LUNG VOLUMES: CPT

## 2025-01-27 PROCEDURE — 94729 DIFFUSING CAPACITY: CPT

## 2025-01-27 RX ORDER — PANTOPRAZOLE SODIUM 40 MG/1
40 TABLET, DELAYED RELEASE ORAL 2 TIMES DAILY
Qty: 60 TABLET | Refills: 0 | Status: SHIPPED | OUTPATIENT
Start: 2025-01-27

## 2025-01-27 RX ORDER — PANTOPRAZOLE SODIUM 40 MG/1
40 TABLET, DELAYED RELEASE ORAL 2 TIMES DAILY
Qty: 60 TABLET | Refills: 0 | Status: SHIPPED | OUTPATIENT
Start: 2025-01-27 | End: 2025-01-27

## 2025-01-27 NOTE — TELEPHONE ENCOUNTER
Requested Prescriptions   Pending Prescriptions Disp Refills    pantoprazole (PROTONIX) 40 MG EC tablet 60 tablet 0     Sig: Take 1 tablet (40 mg) by mouth 2 times daily.       There is no refill protocol information for this order            Last office visit: 11/7/2024 ; last virtual visit: 1/25/2024 with prescribing provider:  Simona Gonsales   Future Office Visit:      Thank you,  Libra Damon  [facility]  [address] [place] [address]  Priority line: [phone] (please do not share number with patient)   Employed by [facility]

## 2025-01-28 ENCOUNTER — OFFICE VISIT (OUTPATIENT)
Dept: INTERNAL MEDICINE | Facility: CLINIC | Age: 60
End: 2025-01-28
Payer: MEDICARE

## 2025-01-28 ENCOUNTER — LAB (OUTPATIENT)
Dept: LAB | Facility: CLINIC | Age: 60
End: 2025-01-28
Payer: MEDICARE

## 2025-01-28 VITALS
TEMPERATURE: 98 F | SYSTOLIC BLOOD PRESSURE: 122 MMHG | RESPIRATION RATE: 18 BRPM | HEIGHT: 63 IN | BODY MASS INDEX: 26.33 KG/M2 | OXYGEN SATURATION: 93 % | WEIGHT: 148.6 LBS | HEART RATE: 94 BPM | DIASTOLIC BLOOD PRESSURE: 86 MMHG

## 2025-01-28 DIAGNOSIS — Z23 NEED FOR PROPHYLACTIC VACCINATION AGAINST HEPATITIS B VIRUS: ICD-10-CM

## 2025-01-28 DIAGNOSIS — N18.2 HYPERTENSIVE HEART AND CHRONIC KIDNEY DISEASE STAGE 2: Primary | ICD-10-CM

## 2025-01-28 DIAGNOSIS — I27.20 PULMONARY HTN (H): ICD-10-CM

## 2025-01-28 DIAGNOSIS — K21.9 GASTROESOPHAGEAL REFLUX DISEASE WITHOUT ESOPHAGITIS: ICD-10-CM

## 2025-01-28 DIAGNOSIS — R79.9 ABNORMAL FINDING OF BLOOD CHEMISTRY, UNSPECIFIED: ICD-10-CM

## 2025-01-28 DIAGNOSIS — R79.1 ABNORMAL COAGULATION PROFILE: ICD-10-CM

## 2025-01-28 DIAGNOSIS — I13.10 HYPERTENSIVE HEART AND CHRONIC KIDNEY DISEASE STAGE 2: Primary | ICD-10-CM

## 2025-01-28 DIAGNOSIS — Z11.59 ENCOUNTER FOR SCREENING FOR OTHER VIRAL DISEASES: ICD-10-CM

## 2025-01-28 DIAGNOSIS — F33.41 MAJOR DEPRESSIVE DISORDER, RECURRENT EPISODE, IN PARTIAL REMISSION: ICD-10-CM

## 2025-01-28 DIAGNOSIS — Z79.899 ENCOUNTER FOR LONG-TERM (CURRENT) USE OF HIGH-RISK MEDICATION: ICD-10-CM

## 2025-01-28 DIAGNOSIS — Z11.4 ENCOUNTER FOR SCREENING FOR HUMAN IMMUNODEFICIENCY VIRUS (HIV): ICD-10-CM

## 2025-01-28 DIAGNOSIS — R06.09 DOE (DYSPNEA ON EXERTION): ICD-10-CM

## 2025-01-28 LAB
ALBUMIN SERPL BCG-MCNC: 4.2 G/DL (ref 3.5–5.2)
ALP SERPL-CCNC: 126 U/L (ref 40–150)
ALT SERPL W P-5'-P-CCNC: 14 U/L (ref 0–50)
ANION GAP SERPL CALCULATED.3IONS-SCNC: 8 MMOL/L (ref 7–15)
AST SERPL W P-5'-P-CCNC: 22 U/L (ref 0–45)
BILIRUB DIRECT SERPL-MCNC: <0.2 MG/DL (ref 0–0.3)
BILIRUB SERPL-MCNC: 0.3 MG/DL
BUN SERPL-MCNC: 12.8 MG/DL (ref 8–23)
CALCIUM SERPL-MCNC: 9.4 MG/DL (ref 8.8–10.4)
CHLORIDE SERPL-SCNC: 106 MMOL/L (ref 98–107)
CHOLEST SERPL-MCNC: 183 MG/DL
CREAT SERPL-MCNC: 0.89 MG/DL (ref 0.51–0.95)
CRP SERPL-MCNC: 5.64 MG/L
EGFRCR SERPLBLD CKD-EPI 2021: 74 ML/MIN/1.73M2
ERYTHROCYTE [DISTWIDTH] IN BLOOD BY AUTOMATED COUNT: 13.2 % (ref 10–15)
EST. AVERAGE GLUCOSE BLD GHB EST-MCNC: 126 MG/DL
FASTING STATUS PATIENT QL REPORTED: NO
FASTING STATUS PATIENT QL REPORTED: NO
GLUCOSE SERPL-MCNC: 108 MG/DL (ref 70–99)
HBA1C MFR BLD: 6 %
HBV CORE AB SERPL QL IA: NONREACTIVE
HBV SURFACE AB SERPL IA-ACNC: <3.5 M[IU]/ML
HBV SURFACE AB SERPL IA-ACNC: NONREACTIVE M[IU]/ML
HBV SURFACE AG SERPL QL IA: NONREACTIVE
HCO3 SERPL-SCNC: 26 MMOL/L (ref 22–29)
HCT VFR BLD AUTO: 43.2 % (ref 35–47)
HCV AB SERPL QL IA: NONREACTIVE
HDLC SERPL-MCNC: 59 MG/DL
HGB BLD-MCNC: 13.9 G/DL (ref 11.7–15.7)
INR PPP: 0.99 (ref 0.85–1.15)
IRON BINDING CAPACITY (ROCHE): 316 UG/DL (ref 240–430)
IRON SATN MFR SERPL: 24 % (ref 15–46)
IRON SERPL-MCNC: 77 UG/DL (ref 37–145)
LDLC SERPL CALC-MCNC: 93 MG/DL
MCH RBC QN AUTO: 30.2 PG (ref 26.5–33)
MCHC RBC AUTO-ENTMCNC: 32.2 G/DL (ref 31.5–36.5)
MCV RBC AUTO: 94 FL (ref 78–100)
NONHDLC SERPL-MCNC: 124 MG/DL
NT-PROBNP SERPL-MCNC: 114 PG/ML (ref 0–900)
PLATELET # BLD AUTO: 364 10E3/UL (ref 150–450)
POTASSIUM SERPL-SCNC: 4.4 MMOL/L (ref 3.4–5.3)
PROT SERPL-MCNC: 7 G/DL (ref 6.4–8.3)
RBC # BLD AUTO: 4.6 10E6/UL (ref 3.8–5.2)
RHEUMATOID FACT SERPL-ACNC: <10 IU/ML
SODIUM SERPL-SCNC: 140 MMOL/L (ref 135–145)
TRIGL SERPL-MCNC: 154 MG/DL
TSH SERPL DL<=0.005 MIU/L-ACNC: 2.08 UIU/ML (ref 0.3–4.2)
WBC # BLD AUTO: 7.3 10E3/UL (ref 4–11)

## 2025-01-28 PROCEDURE — 85613 RUSSELL VIPER VENOM DILUTED: CPT | Performed by: INTERNAL MEDICINE

## 2025-01-28 PROCEDURE — 87389 HIV-1 AG W/HIV-1&-2 AB AG IA: CPT | Performed by: INTERNAL MEDICINE

## 2025-01-28 PROCEDURE — 83880 ASSAY OF NATRIURETIC PEPTIDE: CPT | Performed by: PATHOLOGY

## 2025-01-28 PROCEDURE — 85610 PROTHROMBIN TIME: CPT | Performed by: PATHOLOGY

## 2025-01-28 PROCEDURE — 87340 HEPATITIS B SURFACE AG IA: CPT | Performed by: INTERNAL MEDICINE

## 2025-01-28 PROCEDURE — 83529 ASAY OF INTERLEUKIN-6 (IL-6): CPT | Performed by: PATHOLOGY

## 2025-01-28 PROCEDURE — 86803 HEPATITIS C AB TEST: CPT | Performed by: INTERNAL MEDICINE

## 2025-01-28 PROCEDURE — 86706 HEP B SURFACE ANTIBODY: CPT | Performed by: INTERNAL MEDICINE

## 2025-01-28 PROCEDURE — 84238 ASSAY NONENDOCRINE RECEPTOR: CPT | Mod: 90 | Performed by: PATHOLOGY

## 2025-01-28 PROCEDURE — 82248 BILIRUBIN DIRECT: CPT | Performed by: PATHOLOGY

## 2025-01-28 PROCEDURE — 86038 ANTINUCLEAR ANTIBODIES: CPT | Performed by: INTERNAL MEDICINE

## 2025-01-28 PROCEDURE — 86704 HEP B CORE ANTIBODY TOTAL: CPT | Performed by: INTERNAL MEDICINE

## 2025-01-28 PROCEDURE — 36415 COLL VENOUS BLD VENIPUNCTURE: CPT | Performed by: PATHOLOGY

## 2025-01-28 PROCEDURE — 80061 LIPID PANEL: CPT | Performed by: PATHOLOGY

## 2025-01-28 PROCEDURE — 86140 C-REACTIVE PROTEIN: CPT | Performed by: PATHOLOGY

## 2025-01-28 PROCEDURE — 99000 SPECIMEN HANDLING OFFICE-LAB: CPT | Performed by: PATHOLOGY

## 2025-01-28 PROCEDURE — 80053 COMPREHEN METABOLIC PANEL: CPT | Performed by: PATHOLOGY

## 2025-01-28 PROCEDURE — 84443 ASSAY THYROID STIM HORMONE: CPT | Performed by: PATHOLOGY

## 2025-01-28 PROCEDURE — 83540 ASSAY OF IRON: CPT | Performed by: PATHOLOGY

## 2025-01-28 PROCEDURE — 83550 IRON BINDING TEST: CPT | Performed by: PATHOLOGY

## 2025-01-28 PROCEDURE — 85027 COMPLETE CBC AUTOMATED: CPT | Performed by: PATHOLOGY

## 2025-01-28 PROCEDURE — 83036 HEMOGLOBIN GLYCOSYLATED A1C: CPT

## 2025-01-28 PROCEDURE — 86431 RHEUMATOID FACTOR QUANT: CPT | Performed by: INTERNAL MEDICINE
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
## 2025-01-28 NOTE — PROGRESS NOTES
1/24/2025   General Health   How would you rate your overall physical health? (!) FAIR   Feel stress (tense, anxious, or unable to sleep) To some extent   (!) STRESS CONCERN      1/24/2025   Nutrition   Diet: Regular (no restrictions)         1/24/2025   Exercise   Days per week of moderate/strenous exercise 2 days   Average minutes spent exercising at this level 10 min   (!) EXERCISE CONCERN      1/24/2025   Social Factors   Frequency of gathering with friends or relatives Twice a week   Worry food won't last until get money to buy more No   Food not last or not have enough money for food? No   Do you have housing? (Housing is defined as stable permanent housing and does not include staying ouside in a car, in a tent, in an abandoned building, in an overnight shelter, or couch-surfing.) Yes   Are you worried about losing your housing? No   Lack of transportation? No   Unable to get utilities (heat,electricity)? No         1/28/2025   Fall Risk   Gait Speed Test (Document in seconds) 2.45   Gait Speed Test Interpretation Less than or equal to 5.00 seconds - PASS          1/24/2025   Activities of Daily Living- Home Safety   Needs help with the following daily activites Housework   Safety concerns in the home Throw rugs in the hallway         1/24/2025   Dental   Dentist two times every year? Yes         1/24/2025   Hearing Screening   Hearing concerns? (!) IT'S HARD TO FOLLOW A CONVERSATION IN A NOISY RESTAURANT OR CROWDED ROOM.         1/24/2025   Driving Risk Screening   Patient/family members have concerns about driving No         1/24/2025   General Alertness/Fatigue Screening   Have you been more tired than usual lately? (!) YES         1/24/2025   Urinary Incontinence Screening   Bothered by leaking urine in past 6 months Yes         1/24/2025   TB Screening   Were you born outside of the [place]? No         1/24/2025   Substance Use   Alcohol more than 3/day or more than 7/wk No   Do you have a current  opioid prescription? (!) YES   How severe/bad is pain from 1 to 10? 8/10   Do you use any other substances recreationally? No     Current providers sharing in care for this patient include:  Patient Care Team:  Chadwick Mg MD as PCP - [facility], Floyd JACKSON MD as MD (Internal Medicine)  Clemente Garsia MD as MD (Family Medicine - Sports Medicine)  Justine Kelly PA-C as Physician Assistant (Physician Assistant)  Anne Youngblood MD as MD (Urology)  Chadwick Mg MD as Assigned PCP  Saurav Montes MD as MD (Gastroenterology)  Slade Mccartney MD as MD (Gastroenterology)  Herminio Espinosa MD as MD (Critical Care)  Mayur Yu MD as Referring Physician (Surgery)  PALMER Moore MD as MD (Urology)  Chadwick Mg MD as Referring Physician (Internal Medicine)  Betty Olivares PA-C as Physician Assistant (Urology)  Erika Mello LICSW as  ( - Clinical)  Simona Gonsales MD as MD (Pulmonary & Critical Care Medicine)  Simona Gonsales MD as Assigned Pulmonology Provider  Jevon Tineo MD as Resident (Student in organized health care education/training program)  Juwan Redd Jr., MD as Resident (Student in organized health care education/training program)  Brian Wang MD as Resident (Psychiatry)  Erika Mello LICSW as Assigned Behavioral Health Provider  Herminio Jalloh RPH as Assigned MTM Pharmacist  Lauren Cormier RPH as Pharmacist (Pharmacist)  Lauren Cormier RPH as MTM Pharmacist  Simona Gonsales MD as MD (Pulmonary & Critical Care Medicine)  Rick Serra PA-C as Physician Assistant (Gastroenterology)  Betty Olivares PA-C as Assigned Surgical Provider  Rick Serra PA-C as Assigned Gastroenterology Provider        1/28/2025   Mini Cog   Clock Draw Score 2 Normal   3 Item Recall 3 objects recalled   Mini Cog Total Score 5

## 2025-01-29 LAB
ANA SER QL IF: NEGATIVE
HIV 1+2 AB+HIV1 P24 AG SERPL QL IA: NONREACTIVE
LA PPP-IMP: NORMAL

## 2025-01-29 NOTE — TELEPHONE ENCOUNTER
FUTURE VISIT INFORMATION      SURGERY INFORMATION:  Date: 3/20/25  [facility]  Surgeon:  Ryan Jara MD   Anesthesia Type:  MAC  Procedure: Esophagoscopy, gastroscopy, duodenoscopy (EGD), combined     RECORDS REQUESTED FROM:       Primary Care Provider: Chadwick Mg MD - [facility]    Pertinent Medical History: chronic respiratory failure, hypertension, SVC syndrome     Most recent EKG+ Tracin24    Most recent ECHO: 24    Most recent PFT's: 25

## 2025-01-30 LAB — STFR SERPL-MCNC: 3.5 MG/L

## 2025-01-31 LAB — IL6 SERPL-MCNC: 3.31 PG/ML

## 2025-03-10 ENCOUNTER — PRE VISIT (OUTPATIENT)
Dept: SURGERY | Facility: CLINIC | Age: 60
End: 2025-03-10

## 2025-03-10 ENCOUNTER — VIRTUAL VISIT (OUTPATIENT)
Dept: PSYCHIATRY | Facility: CLINIC | Age: 60
End: 2025-03-10
Attending: PSYCHIATRY & NEUROLOGY
Payer: MEDICARE

## 2025-03-10 ENCOUNTER — ANESTHESIA EVENT (OUTPATIENT)
Dept: GASTROENTEROLOGY | Facility: CLINIC | Age: 60
End: 2025-03-10
Payer: MEDICARE

## 2025-03-10 ENCOUNTER — VIRTUAL VISIT (OUTPATIENT)
Dept: SURGERY | Facility: CLINIC | Age: 60
End: 2025-03-10
Payer: COMMERCIAL

## 2025-03-10 VITALS — WEIGHT: 148 LBS | HEIGHT: 63 IN | BODY MASS INDEX: 26.22 KG/M2

## 2025-03-10 VITALS — BODY MASS INDEX: 25.69 KG/M2 | WEIGHT: 145 LBS | HEIGHT: 63 IN

## 2025-03-10 DIAGNOSIS — Z01.818 PRE-OP EVALUATION: Primary | ICD-10-CM

## 2025-03-10 DIAGNOSIS — F33.41 RECURRENT MAJOR DEPRESSIVE DISORDER, IN PARTIAL REMISSION: Primary | ICD-10-CM

## 2025-03-10 DIAGNOSIS — F41.1 GENERALIZED ANXIETY DISORDER: ICD-10-CM

## 2025-03-10 DIAGNOSIS — F40.01 PANIC DISORDER WITH AGORAPHOBIA: ICD-10-CM

## 2025-03-10 PROCEDURE — 98006 SYNCH AUDIO-VIDEO EST MOD 30: CPT | Performed by: PHYSICIAN ASSISTANT

## 2025-03-10 RX ORDER — BACLOFEN 10 MG/1
10 TABLET ORAL PRN
COMMUNITY
Start: 2024-12-26

## 2025-03-10 RX ORDER — ARIPIPRAZOLE 5 MG/1
5 TABLET ORAL DAILY
Qty: 30 TABLET | Refills: 3 | Status: SHIPPED | OUTPATIENT
Start: 2025-03-10

## 2025-03-10 RX ORDER — DULOXETIN HYDROCHLORIDE 60 MG/1
120 CAPSULE, DELAYED RELEASE ORAL EVERY EVENING
Qty: 60 CAPSULE | Refills: 3 | Status: SHIPPED | OUTPATIENT
Start: 2025-03-10

## 2025-03-10 ASSESSMENT — LIFESTYLE VARIABLES: TOBACCO_USE: 0

## 2025-03-10 ASSESSMENT — ENCOUNTER SYMPTOMS: SEIZURES: 0

## 2025-03-10 ASSESSMENT — PAIN SCALES - GENERAL: PAINLEVEL_OUTOF10: MODERATE PAIN (4)

## 2025-03-10 ASSESSMENT — COPD QUESTIONNAIRES: COPD: 1

## 2025-03-10 NOTE — PATIENT INSTRUCTIONS
**For crisis resources, please see the information at the end of this document**   Patient Education    Thank you for coming to the [facility] [place] [facility].     Lab Testing:  If you had lab testing today and your results are reassuring or normal they will be mailed to you or sent through [employer] within 7 days. If the lab tests need quick action we will call you with the results. The phone number we will call with results is # [phone]. If this is not the best number please call our clinic and change the number.     Medication Refills:  If you need any refills please call your pharmacy and they will contact us. Our fax number for refills is [phone].   Three business days of notice are needed for general medication refill requests.   Five business days of notice are needed for controlled substance refill requests.   If you need to change to a different pharmacy, please contact the new pharmacy directly. The new pharmacy will help you get your medications transferred.     Contact Us:  Please call [phone] during business hours (8-5:00 M-F).   If you have medication related questions after clinic hours, or on the weekend, please call [phone].     Financial Assistance [phone]   Medical Records [phone]       MENTAL HEALTH CRISIS RESOURCES:  For a emergency help, please call 911 or go to the nearest Emergency Department.     Emergency Walk-In Options:   Em[facility] Unit @ [facility] ([place]): [phone] - Specialized mental health emergency area designed to be calming  [facility]ly [facility] ([place]): [phone]  [facility] ([place]): [phone]  [facility]): [phone]    [facility] Crisis Information:   [place]: [phone]  [place]: [phone]  [place] ([place] - Adult: [phone]     Child: [phone]  [place] - Adult: [phone]     Child: [phone]  [place]:  [phone]  List of all [facility] resources:   https://[place].gov/dhs/people-we-serve/adults/health-care/mental-health/resources/crisis-contacts.jsp    National Crisis Information:   Crisis Text Line: Text  [place]  to 162041  Suicide & Crisis Lifeline: 988  National Suicide Prevention Lifeline: 0-910-162-TALK (1-502.467.3292)       For online chat options, visit https://suicidepreventionlifeline.org/chat/  Poison Control Center: 3-107-171-7236  Trans Lifeline: 8-094-902-3094 - Hotline for transgender people of all ages  The [employer]: 2-129-980-7904 - Hotline for LGBT youth     For Non-Emergency Support:   Fast Tracker: Mental Health & Substance Use Disorder Resources -   https://www.[facility].org/

## 2025-03-10 NOTE — PROGRESS NOTES
Marcia is a 59 year old who is being evaluated via a billable video visit.    How would you like to obtain your AVS? MyChart  If the video visit is dropped, the invitation should be resent by: Text to cell phone: [phone]        Objective    Vitals - Patient Reported  Pain Score: Moderate Pain (4)  Pain Loc: Abdomen

## 2025-03-10 NOTE — PATIENT INSTRUCTIONS
"Name:  Sherly Yeung   MRN:  [id number]   :  1965   Today's Date:  3/10/2025     GI Lab procedures:    A representative from the GI Lab will contact you regarding arrival date and time.      You were seen today in the PAC Clinic.   (Pre-operative Anesthesia Assessment Center)  [facility] [address]  [address]   phone [phone]    You had a pre-operative assessment done.    Anesthesia recommendations for medications:    Hold Aspirin for 2 days before procedure.  Hold Multivitamins for 7 days before procedure.   Hold Herbal medications and Supplements for 7 days before procedure.  Hold Ibuprofen for 2 days before procedure.   Hold Naproxen for 2 days before procedure.   Hold Sumatriptan (Imitrex) for 24 hours before procedure.    No alcohol or cannabis products for 24 hours before your procedure      Special instructions for anticoagulation medications: Hold Apixaban (Eliquis) for 2 days before procedure. Last dose to be 3/17/25 pm.        Please hold the following medications the day of procedure:    Methylphenidate (Ritalin)  Senokot  Spironolactone (Aldactone)      Please take these medications the day of procedure: Bring any inhalers used \"as needed\" with you.    Albuterol inhaler if needed  Albuterol nebulizer if needed  Aripiprazole (Abilify)  Symbicort inhaler if needed  Butalbitol/Acetaminophen/Caffeine (ESGIC) if needed for migraine  Duloxetine (Cymbalta)  Atrovent nasal spray if needed  Dulera inhaler if needed  Ondansetron (Zofran) if needed  Oxycodone (Oxycontin)  Oxycodone (Roxicodone) if needed        For questions or appointments, call:  [facility]: [phone], option 2.  Monday through Friday, 8 a.m. to 4:30 p.m.  (If it is after hours, please reach out to the clinic or provider that scheduled your appointment)      "
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
## 2025-03-10 NOTE — NURSING NOTE
Current patient location: [address]  [facility] [address]    Is the patient currently in the state of [place]? YES    Visit mode: VIDEO    If the visit is dropped, the patient can be reconnected by:VIDEO VISIT: Text to cell phone:   Telephone Information:   Mobile [phone]       Will anyone else be joining the visit? NO  (If patient encounters technical issues they should call [phone]    Are changes needed to the allergy or medication list? No    Are refills needed on medications prescribed by this physician? YES    Rooming Documentation:  Questionnaire(s) completed    Reason for visit: RECHECK    Lili ACOSTAF

## 2025-03-11 ENCOUNTER — PRE VISIT (OUTPATIENT)
Dept: CARDIOLOGY | Facility: CLINIC | Age: 60
End: 2025-03-11
Payer: MEDICARE

## 2025-03-11 ENCOUNTER — CARE COORDINATION (OUTPATIENT)
Dept: CARDIOLOGY | Facility: CLINIC | Age: 60
End: 2025-03-11
Payer: COMMERCIAL

## 2025-03-11 RX ORDER — METHYLPHENIDATE HYDROCHLORIDE 20 MG/1
CAPSULE, EXTENDED RELEASE ORAL
Qty: 30 CAPSULE | Refills: 0 | Status: SHIPPED | OUTPATIENT
Start: 2025-03-27

## 2025-03-11 RX ORDER — METHYLPHENIDATE HYDROCHLORIDE 40 MG/1
CAPSULE, EXTENDED RELEASE ORAL
Qty: 30 CAPSULE | Refills: 0 | Status: SHIPPED | OUTPATIENT
Start: 2025-04-27

## 2025-03-11 RX ORDER — METHYLPHENIDATE HYDROCHLORIDE 20 MG/1
CAPSULE, EXTENDED RELEASE ORAL
Qty: 30 CAPSULE | Refills: 0 | Status: SHIPPED | OUTPATIENT
Start: 2025-04-27

## 2025-03-11 RX ORDER — METHYLPHENIDATE HYDROCHLORIDE 20 MG/1
CAPSULE, EXTENDED RELEASE ORAL
Qty: 30 CAPSULE | Refills: 0 | Status: SHIPPED | OUTPATIENT
Start: 2025-05-27

## 2025-03-11 RX ORDER — METHYLPHENIDATE HYDROCHLORIDE 40 MG/1
CAPSULE, EXTENDED RELEASE ORAL
Qty: 30 CAPSULE | Refills: 0 | Status: SHIPPED | OUTPATIENT
Start: 2025-05-27

## 2025-03-11 RX ORDER — METHYLPHENIDATE HYDROCHLORIDE 40 MG/1
CAPSULE, EXTENDED RELEASE ORAL
Qty: 30 CAPSULE | Refills: 0 | Status: SHIPPED | OUTPATIENT
Start: 2025-03-27

## 2025-03-11 NOTE — PROGRESS NOTES
Outreach completed to referring provider Simona Gonsales.     Updated FYI patient has no-showed to new PH and testing appointments. No further outreach to reschedule will be made at this time    Rubi Lopez RN on 3/11/2025 at 10:20 AM

## 2025-03-12 ENCOUNTER — TELEPHONE (OUTPATIENT)
Dept: CARDIOLOGY | Facility: CLINIC | Age: 60
End: 2025-03-12
Payer: COMMERCIAL

## 2025-03-12 NOTE — TELEPHONE ENCOUNTER
Marcia called 2x and left a voicemail to reschedule her missed appointment on Tuesday with Dr Epperson due to illness. I called her back and left her a VM/mychart.     Sara Meehan  Clinic    Pulmonary Hypertension   [facility]  (p) [phone]

## 2025-03-13 PROBLEM — Z93.1 GASTROSTOMY PRESENT (H): Status: ACTIVE | Noted: 2021-12-23

## 2025-03-13 PROBLEM — F90.9 ATTENTION DEFICIT HYPERACTIVITY DISORDER: Status: ACTIVE | Noted: 2021-12-23

## 2025-03-13 PROBLEM — Z93.0 TRACHEOSTOMY PRESENT (H): Status: ACTIVE | Noted: 2021-12-23

## 2025-03-13 PROBLEM — R26.9 ABNORMAL GAIT: Status: ACTIVE | Noted: 2021-12-23

## 2025-03-13 PROBLEM — R13.10 DYSPHAGIA: Status: ACTIVE | Noted: 2021-12-23

## 2025-03-13 PROBLEM — M62.81 MUSCLE WEAKNESS: Status: ACTIVE | Noted: 2021-12-23

## 2025-03-13 PROBLEM — I26.99 PULMONARY EMBOLISM (H): Status: ACTIVE | Noted: 2021-12-23

## 2025-03-20 ENCOUNTER — HOSPITAL ENCOUNTER (OUTPATIENT)
Facility: CLINIC | Age: 60
Discharge: HOME | End: 2025-03-20
Attending: STUDENT IN AN ORGANIZED HEALTH CARE EDUCATION/TRAINING PROGRAM | Admitting: STUDENT IN AN ORGANIZED HEALTH CARE EDUCATION/TRAINING PROGRAM
Payer: MEDICARE

## 2025-03-20 ENCOUNTER — ANESTHESIA (OUTPATIENT)
Dept: GASTROENTEROLOGY | Facility: CLINIC | Age: 60
End: 2025-03-20
Payer: MEDICARE

## 2025-03-20 VITALS
DIASTOLIC BLOOD PRESSURE: 83 MMHG | HEART RATE: 83 BPM | SYSTOLIC BLOOD PRESSURE: 125 MMHG | TEMPERATURE: 98.2 F | RESPIRATION RATE: 16 BRPM | OXYGEN SATURATION: 98 %

## 2025-03-20 LAB — UPPER GI ENDOSCOPY: NORMAL

## 2025-03-20 PROCEDURE — 250N000009 HC RX 250: Performed by: NURSE ANESTHETIST, CERTIFIED REGISTERED

## 2025-03-20 PROCEDURE — 88305 TISSUE EXAM BY PATHOLOGIST: CPT | Mod: 26 | Performed by: PATHOLOGY

## 2025-03-20 PROCEDURE — 43239 EGD BIOPSY SINGLE/MULTIPLE: CPT | Performed by: STUDENT IN AN ORGANIZED HEALTH CARE EDUCATION/TRAINING PROGRAM

## 2025-03-20 PROCEDURE — 88342 IMHCHEM/IMCYTCHM 1ST ANTB: CPT | Mod: 26 | Performed by: PATHOLOGY

## 2025-03-20 PROCEDURE — 88342 IMHCHEM/IMCYTCHM 1ST ANTB: CPT | Mod: TC | Performed by: STUDENT IN AN ORGANIZED HEALTH CARE EDUCATION/TRAINING PROGRAM

## 2025-03-20 PROCEDURE — 88305 TISSUE EXAM BY PATHOLOGIST: CPT | Mod: TC | Performed by: STUDENT IN AN ORGANIZED HEALTH CARE EDUCATION/TRAINING PROGRAM

## 2025-03-20 PROCEDURE — 370N000017 HC ANESTHESIA TECHNICAL FEE, PER MIN: Performed by: STUDENT IN AN ORGANIZED HEALTH CARE EDUCATION/TRAINING PROGRAM

## 2025-03-20 PROCEDURE — 250N000011 HC RX IP 250 OP 636: Performed by: NURSE ANESTHETIST, CERTIFIED REGISTERED

## 2025-03-20 PROCEDURE — 258N000003 HC RX IP 258 OP 636: Performed by: NURSE ANESTHETIST, CERTIFIED REGISTERED

## 2025-03-20 RX ORDER — NALOXONE HYDROCHLORIDE 0.4 MG/ML
0.1 INJECTION, SOLUTION INTRAMUSCULAR; INTRAVENOUS; SUBCUTANEOUS
Status: DISCONTINUED | OUTPATIENT
Start: 2025-03-20 | End: 2025-03-25 | Stop reason: HOSPADM

## 2025-03-20 RX ORDER — FLUMAZENIL 0.1 MG/ML
0.2 INJECTION, SOLUTION INTRAVENOUS
Status: DISCONTINUED | OUTPATIENT
Start: 2025-03-20 | End: 2025-03-21 | Stop reason: HOSPADM

## 2025-03-20 RX ORDER — ONDANSETRON 2 MG/ML
4 INJECTION INTRAMUSCULAR; INTRAVENOUS EVERY 6 HOURS PRN
Status: DISCONTINUED | OUTPATIENT
Start: 2025-03-20 | End: 2025-03-25 | Stop reason: HOSPADM

## 2025-03-20 RX ORDER — ONDANSETRON 4 MG/1
4 TABLET, ORALLY DISINTEGRATING ORAL EVERY 6 HOURS PRN
Status: DISCONTINUED | OUTPATIENT
Start: 2025-03-20 | End: 2025-03-25 | Stop reason: HOSPADM

## 2025-03-20 RX ORDER — LIDOCAINE 40 MG/G
CREAM TOPICAL
Status: DISCONTINUED | OUTPATIENT
Start: 2025-03-20 | End: 2025-03-25 | Stop reason: HOSPADM

## 2025-03-20 RX ORDER — ONDANSETRON 2 MG/ML
4 INJECTION INTRAMUSCULAR; INTRAVENOUS EVERY 30 MIN PRN
Status: DISCONTINUED | OUTPATIENT
Start: 2025-03-20 | End: 2025-03-25 | Stop reason: HOSPADM

## 2025-03-20 RX ORDER — PROPOFOL 10 MG/ML
INJECTION, EMULSION INTRAVENOUS PRN
Status: DISCONTINUED | OUTPATIENT
Start: 2025-03-20 | End: 2025-03-20

## 2025-03-20 RX ORDER — SODIUM CHLORIDE, SODIUM LACTATE, POTASSIUM CHLORIDE, CALCIUM CHLORIDE 600; 310; 30; 20 MG/100ML; MG/100ML; MG/100ML; MG/100ML
INJECTION, SOLUTION INTRAVENOUS CONTINUOUS
Status: DISCONTINUED | OUTPATIENT
Start: 2025-03-20 | End: 2025-03-25 | Stop reason: HOSPADM

## 2025-03-20 RX ORDER — NALOXONE HYDROCHLORIDE 0.4 MG/ML
0.4 INJECTION, SOLUTION INTRAMUSCULAR; INTRAVENOUS; SUBCUTANEOUS
Status: DISCONTINUED | OUTPATIENT
Start: 2025-03-20 | End: 2025-03-25 | Stop reason: HOSPADM

## 2025-03-20 RX ORDER — SODIUM CHLORIDE, SODIUM LACTATE, POTASSIUM CHLORIDE, CALCIUM CHLORIDE 600; 310; 30; 20 MG/100ML; MG/100ML; MG/100ML; MG/100ML
INJECTION, SOLUTION INTRAVENOUS CONTINUOUS PRN
Status: DISCONTINUED | OUTPATIENT
Start: 2025-03-20 | End: 2025-03-20

## 2025-03-20 RX ORDER — NALOXONE HYDROCHLORIDE 0.4 MG/ML
0.2 INJECTION, SOLUTION INTRAMUSCULAR; INTRAVENOUS; SUBCUTANEOUS
Status: DISCONTINUED | OUTPATIENT
Start: 2025-03-20 | End: 2025-03-25 | Stop reason: HOSPADM

## 2025-03-20 RX ORDER — ONDANSETRON 4 MG/1
4 TABLET, ORALLY DISINTEGRATING ORAL EVERY 30 MIN PRN
Status: DISCONTINUED | OUTPATIENT
Start: 2025-03-20 | End: 2025-03-25 | Stop reason: HOSPADM

## 2025-03-20 RX ORDER — DEXAMETHASONE SODIUM PHOSPHATE 4 MG/ML
4 INJECTION, SOLUTION INTRA-ARTICULAR; INTRALESIONAL; INTRAMUSCULAR; INTRAVENOUS; SOFT TISSUE
Status: DISCONTINUED | OUTPATIENT
Start: 2025-03-20 | End: 2025-03-25 | Stop reason: HOSPADM

## 2025-03-20 RX ORDER — ONDANSETRON 2 MG/ML
4 INJECTION INTRAMUSCULAR; INTRAVENOUS
Status: DISCONTINUED | OUTPATIENT
Start: 2025-03-20 | End: 2025-03-25 | Stop reason: HOSPADM

## 2025-03-20 RX ORDER — LIDOCAINE HYDROCHLORIDE 20 MG/ML
INJECTION, SOLUTION INFILTRATION; PERINEURAL PRN
Status: DISCONTINUED | OUTPATIENT
Start: 2025-03-20 | End: 2025-03-20

## 2025-03-20 RX ADMIN — SODIUM CHLORIDE, SODIUM LACTATE, POTASSIUM CHLORIDE, AND CALCIUM CHLORIDE: .6; .31; .03; .02 INJECTION, SOLUTION INTRAVENOUS at 12:34

## 2025-03-20 RX ADMIN — PROPOFOL 150 MCG/KG/MIN: 10 INJECTION, EMULSION INTRAVENOUS at 12:38

## 2025-03-20 RX ADMIN — TOPICAL ANESTHETIC 1 EACH: 200 SPRAY DENTAL; PERIODONTAL at 12:34

## 2025-03-20 RX ADMIN — LIDOCAINE HYDROCHLORIDE 40 MG: 20 INJECTION, SOLUTION INFILTRATION; PERINEURAL at 12:39

## 2025-03-20 RX ADMIN — PROPOFOL 50 MG: 10 INJECTION, EMULSION INTRAVENOUS at 12:39

## 2025-03-20 ASSESSMENT — ACTIVITIES OF DAILY LIVING (ADL)
ADLS_ACUITY_SCORE: 56
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
## 2025-03-20 NOTE — ANESTHESIA CARE TRANSFER NOTE
Patient: Sherly Yeung    Procedure: Procedure(s):  ESOPHAGOGASTRODUODENOSCOPY, WITH BIOPSY       Diagnosis: Gastroesophageal reflux disease without esophagitis [K21.9]  Epigastric pain [R10.13]  Diagnosis Additional Information: No value filed.    Anesthesia Type:   MAC     Note:    Oropharynx: oropharynx clear of all foreign objects  Level of Consciousness: drowsy  Oxygen Supplementation: face mask  Level of Supplemental Oxygen (L/min / FiO2): 8  Independent Airway: airway patency satisfactory and stable  Dentition: dentition unchanged  Vital Signs Stable: post-procedure vital signs reviewed and stable  Report to RN Given: handoff report given  Patient transferred to: Phase II    Handoff Report: Identifed the Patient, Identified the Reponsible Provider, Reviewed the pertinent medical history, Discussed the surgical course, Reviewed Intra-OP anesthesia mangement and issues during anesthesia, Set expectations for post-procedure period and Allowed opportunity for questions and acknowledgement of understanding      Vitals:  Vitals Value Taken Time   BP     Temp     Pulse     Resp     SpO2 100 % 03/20/25 1253   Vitals shown include unfiled device data.    Electronically Signed By: ZAIRA Navarro CRNA  March 20, 2025  12:53 PM

## 2025-03-20 NOTE — ANESTHESIA POSTPROCEDURE EVALUATION
Patient: Sherly Yeung    Procedure: Procedure(s):  ESOPHAGOGASTRODUODENOSCOPY, WITH BIOPSY       Anesthesia Type:  MAC    Note:  Disposition: Outpatient   Postop Pain Control: Uneventful            Sign Out: Well controlled pain   PONV: No   Neuro/Psych: Uneventful            Sign Out: Acceptable/Baseline neuro status   Airway/Respiratory: Uneventful            Sign Out: Acceptable/Baseline resp. status   CV/Hemodynamics: Uneventful            Sign Out: Acceptable CV status; No obvious hypovolemia; No obvious fluid overload   Other NRE: NONE   DID A NON-ROUTINE EVENT OCCUR? No           Last vitals:  Vitals Value Taken Time   /77 03/20/25 1253   Temp     Pulse     Resp     SpO2 100 % 03/20/25 1256   Vitals shown include unfiled device data.    Electronically Signed By: Debora Patten MD  March 20, 2025  12:59 PM

## 2025-03-20 NOTE — OR NURSING
Procedure: EGD with biopsies  Sedation:monitored anesthesia care  Specimens: x 1 jar, sent to lab.   O2: per CRNA  Tolerated procedure: well  Pt to recovery area in stable condition accompanied by RN.   Other:  none    Renee Roth RN
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
## 2025-03-21 ASSESSMENT — ACTIVITIES OF DAILY LIVING (ADL)
ADLS_ACUITY_SCORE: 56

## 2025-03-22 ASSESSMENT — ACTIVITIES OF DAILY LIVING (ADL)
ADLS_ACUITY_SCORE: 56

## 2025-03-23 ASSESSMENT — ACTIVITIES OF DAILY LIVING (ADL)
ADLS_ACUITY_SCORE: 56

## 2025-03-24 LAB
PATH REPORT.ADDENDUM SPEC: NORMAL
PATH REPORT.COMMENTS IMP SPEC: NORMAL
PATH REPORT.COMMENTS IMP SPEC: NORMAL
PATH REPORT.FINAL DX SPEC: NORMAL
PATH REPORT.GROSS SPEC: NORMAL
PATH REPORT.MICROSCOPIC SPEC OTHER STN: NORMAL
PATH REPORT.RELEVANT HX SPEC: NORMAL
PHOTO IMAGE: NORMAL

## 2025-03-24 ASSESSMENT — ACTIVITIES OF DAILY LIVING (ADL)
ADLS_ACUITY_SCORE: 56

## 2025-03-25 ENCOUNTER — E-VISIT (OUTPATIENT)
Dept: URGENT CARE | Facility: CLINIC | Age: 60
End: 2025-03-25
Payer: COMMERCIAL

## 2025-03-25 DIAGNOSIS — B96.89 ACUTE BACTERIAL SINUSITIS: Primary | ICD-10-CM

## 2025-03-25 DIAGNOSIS — J01.90 ACUTE BACTERIAL SINUSITIS: Primary | ICD-10-CM

## 2025-03-25 PROCEDURE — 99207 PR NON-BILLABLE SERV PER CHARTING: CPT | Performed by: EMERGENCY MEDICINE

## 2025-03-25 RX ORDER — AZITHROMYCIN 250 MG/1
TABLET, FILM COATED ORAL
Qty: 6 TABLET | Refills: 0 | Status: SHIPPED | OUTPATIENT
Start: 2025-03-25 | End: 2025-03-30

## 2025-03-25 NOTE — PATIENT INSTRUCTIONS
Acute Sinusitis: Care Instructions  Overview     Acute sinusitis is an inflammation of the mucous membranes inside the nose and sinuses. Sinuses are the hollow spaces in your skull around the eyes and nose. Acute sinusitis often follows a cold. Acute sinusitis causes thick, discolored mucus that drains from the nose or down the back of the throat. It also can cause pain and pressure in your head and face along with a stuffy or blocked nose.  In most cases, sinusitis gets better on its own in 1 to 2 weeks. But some mild symptoms may last for several weeks. Sometimes antibiotics are needed if there is a bacterial infection.  Follow-up care is a key part of your treatment and safety. Be sure to make and go to all appointments, and call your doctor if you are having problems. It's also a good idea to know your test results and keep a list of the medicines you take.  How can you care for yourself at home?  Use saline (saltwater) nasal washes. This can help keep your nasal passages open and wash out mucus and allergens.  You can buy saline nose washes at a grocery store or drugstore. Follow the instructions on the package.  You can make your own at home. Add 1 teaspoon of non-iodized salt and 1 teaspoon of baking soda to 2 cups of distilled or boiled and cooled water. Fill a squeeze bottle or a nasal cleansing pot (such as a neti pot) with the nasal wash. Then put the tip into your nostril, and lean over the sink. With your mouth open, gently squirt the liquid. Repeat on the other side.  Try a decongestant nasal spray like oxymetazoline (Afrin). Do not use it for more than 3 days in a row. Using it for more than 3 days can make your congestion worse.  If needed, take an over-the-counter pain medicine, such as acetaminophen (Tylenol), ibuprofen (Advil, Motrin), or naproxen (Aleve). Read and follow all instructions on the label.  If the doctor prescribed antibiotics, take them as directed. Do not stop taking them just  "because you feel better. You need to take the full course of antibiotics.  Be careful when taking over-the-counter cold or flu medicines and Tylenol at the same time. Many of these medicines have acetaminophen, which is Tylenol. Read the labels to make sure that you are not taking more than the recommended dose. Too much acetaminophen (Tylenol) can be harmful.  Try a steroid nasal spray. It may help with your symptoms.  Breathe warm, moist air. You can use a steamy shower, a hot bath, or a sink filled with hot water. Avoid cold, dry air. Using a humidifier in your home may help. Follow the directions for cleaning the machine.  When should you call for help?   Call your doctor now or seek immediate medical care if:    You have new or worse swelling, redness, or pain in your face or around one or both of your eyes.     You have double vision or a change in your vision.     You have a high fever.     You have a severe headache and a stiff neck.     You have mental changes, such as feeling confused or much less alert.   Watch closely for changes in your health, and be sure to contact your doctor if:    You are not getting better as expected.   Where can you learn more?  Go to https://www.[employer].net/patiented  Enter I933 in the search box to learn more about \"Acute Sinusitis: Care Instructions.\"  Current as of: October 27, 2024  Content Version: 14.4    2007-3503 [employer].   Care instructions adapted under license by your healthcare professional. If you have questions about a medical condition or this instruction, always ask your healthcare professional. [employer] disclaims any warranty or liability for your use of this information.    Dear Sherly Yeung      Based on your responses and diagnosis, I have prescribed Z-Abhi to treat your symptoms. I have sent this to your pharmacy.?     It is also important to stay well hydrated, get lots of rest and take over-the-counter " decongestants,?tylenol?or ibuprofen if you?are able to?take those medications per your primary care provider to help relieve discomfort.?     It is important that you take?all of?your prescribed medication even if your symptoms are improving after a few doses.? Taking?all of?your medicine helps prevent the symptoms from returning.?     If your symptoms worsen, you develop severe headache, vomiting, high fever (>102), or are not improving in 7 days, please contact your primary care provider for an appointment or visit any of our convenient Walk-in Care or Urgent Care Centers to be seen which can be found on our website?here.?     Thanks again for choosing?us?as your health care partner,?   ?  Geovanny Headley MD?   Thank you for choosing us for your care. I have placed an order for a prescription so that you can start treatment:  No orders of the defined types were placed in this encounter.       View your full visit summary for details by clicking on the link below. Your pharmacist will able to address any questions you may have about the medication.     If you're not feeling better within 5-7 days, please schedule an appointment.  You can schedule an appointment right here in [facility], or call [phone]  If the visit is for the same symptoms as your eVisit, we'll refund the cost of your eVisit if seen within seven days.

## 2025-04-07 DIAGNOSIS — R05.1 ACUTE COUGH: ICD-10-CM

## 2025-04-09 RX ORDER — MONTELUKAST SODIUM 10 MG/1
1 TABLET ORAL AT BEDTIME
Qty: 30 TABLET | Refills: 2 | Status: SHIPPED | OUTPATIENT
Start: 2025-04-09

## 2025-04-09 NOTE — TELEPHONE ENCOUNTER
[facility] Call Center    Phone Message    May a detailed message be left on voicemail: yes     Reason for Call: Other: The patient was calling to check on the status, patient was told the Turnaround Time Frame for Refills and expressed understanding thank you.     Action Taken: Message routed to:  [facility] pcc    Travel Screening: Not Applicable     Date of Service:

## 2025-04-09 NOTE — TELEPHONE ENCOUNTER
Last Written Prescription:  montelukast (SINGULAIR) 10 MG tablet 30 tablet 1 1/10/2025 -- No   Sig - Route: TAKE ONE TABLET BY MOUTH EVERY NIGHT AT BEDTIME - Oral   Sent to pharmacy as: Montelukast Sodium 10 MG Oral Tablet (SINGULAIR)   Class: E-Prescribe   Order: [id number]   E-Prescribing Status: Receipt confirmed by pharmacy (1/10/2025  5:26 AM CST)     Diagnosis listed for this medication is acute cough. Please evaluate acute vs chronic for this medication and reorder if appropriate.       Refill decision: Medication unable to be refilled by RN due to: Verification - order discrepancy, clarification needed, Sig modification needed    Request from pharmacy:  Requested Prescriptions   Pending Prescriptions Disp Refills    montelukast (SINGULAIR) 10 MG tablet [Pharmacy Med Name: MONTELUKAST SODIUM 10MG TABS] 30 tablet 1     Sig: TAKE ONE TABLET BY MOUTH EVERY NIGHT AT BEDTIME       Leukotriene Inhibitors Protocol Failed - 4/9/2025  2:27 PM        Failed - Medication indicated for associated diagnosis     Medication is associated with one or more of the following diagnoses:   Allergies   Asthma   Atopic Dermatitis   Nasal Congestion   Nasal discharge   Rhinitis   Urticaria, chronic   Cystic Fibrosis  Bronchiectasis            Passed - Patient is age 12 or older     If patient is under 16, ok to refill using age based dosing.           Passed - Medication is active on med list and the sig matches. RN to manually verify dose and sig if red X/fail.     If the protocol passes (green check), you do not need to verify med dose and sig.    A prescription matches if they are the same clinical intention.    For Example: once daily and every morning are the same.    The protocol can not identify upper and lower case letters as matching and will fail.     For Example: Take 1 tablet (50 mg) by mouth daily     TAKE 1 TABLET (50 MG) BY MOUTH DAILY    For all fails (red x), verify dose and sig.    If the refill does match what is  on file, the RN can still proceed to approve the refill request.       If they do not match, route to the appropriate provider.             Passed - Recent (12 mo) or future (90 days) visit within the authorizing provider's specialty     The patient must have completed an in-person or virtual visit within the past 12 months or has a future visit scheduled within the next 90 days with the authorizing provider s specialty.  Urgent care and e-visits do not qualify as an office visit for this protocol.

## 2025-04-10 DIAGNOSIS — F33.41 RECURRENT MAJOR DEPRESSIVE DISORDER, IN PARTIAL REMISSION: ICD-10-CM

## 2025-04-10 DIAGNOSIS — F40.10 SOCIAL ANXIETY DISORDER: ICD-10-CM

## 2025-04-10 DIAGNOSIS — F41.0 PANIC ATTACK: Primary | ICD-10-CM

## 2025-04-10 RX ORDER — LORAZEPAM 0.5 MG/1
0.5 TABLET ORAL DAILY PRN
Qty: 5 TABLET | Refills: 0 | Status: SHIPPED | OUTPATIENT
Start: 2025-04-10

## 2025-04-10 NOTE — TELEPHONE ENCOUNTER
Thank you Alie Nieto, could you please review the Ativan 0.5mg PRN #10 tablets requested by the patient? She mentioned new stressors below. I will see her again on 6/9.     Thank you.     Tatum

## 2025-04-10 NOTE — TELEPHONE ENCOUNTER
[facility] Call Center    Phone Message    May a detailed message be left on voicemail: yes       Reason for Call: t called requesting another prescription of ativan. Last time she got it was in January.   Pharmacy: [facility]. Requesting to  today.    Action Taken: Other: [facility] psych pool    Travel Screening: Not Applicable     Date of Service:

## 2025-04-10 NOTE — TELEPHONE ENCOUNTER
Last seen: 3/10/25  RTC: 3 months  Cancel: none  No-show: none  Next appt: 6/9/25      Medication requested:   Pending Prescriptions:                       Disp   Refills    LORazepam (ATIVAN) 0.5 MG tablet          10 tab*0            Sig: Take 1 tablet (0.5 mg) by mouth daily as needed           (panic attacks).      Last prescribed in January 2025: Lorazepam 0.5 mg  Take 1 tablet (0.5 mg) by mouth daily as needed for anxiety (panic attacks)., Disp-10 tablet, R-0, E-Prescribe     Last refill per       From chart note:   Not listed       Medication unable to be refilled by RN due to criteria not met as indicated.                 []Eligibility - not seen in the last year              []Supervision - no future appointment              []Compliance - no shows, cancellations or lapse in therapy              []Verification - order discrepancy              [x]Controlled medication              [x]Medication not included in policy              []90-day supply request              []Other:

## 2025-04-11 ENCOUNTER — HOSPITAL ENCOUNTER (OUTPATIENT)
Facility: CLINIC | Age: 60
Discharge: HOME | End: 2025-04-11
Attending: INTERNAL MEDICINE | Admitting: INTERNAL MEDICINE
Payer: MEDICARE

## 2025-04-11 ENCOUNTER — APPOINTMENT (OUTPATIENT)
Dept: MEDSURG UNIT | Facility: CLINIC | Age: 60
End: 2025-04-11
Attending: INTERNAL MEDICINE
Payer: MEDICARE

## 2025-04-11 ENCOUNTER — APPOINTMENT (OUTPATIENT)
Dept: LAB | Facility: CLINIC | Age: 60
End: 2025-04-11
Attending: INTERNAL MEDICINE
Payer: MEDICARE

## 2025-04-11 VITALS
SYSTOLIC BLOOD PRESSURE: 128 MMHG | OXYGEN SATURATION: 97 % | BODY MASS INDEX: 26.29 KG/M2 | DIASTOLIC BLOOD PRESSURE: 81 MMHG | TEMPERATURE: 97.9 F | RESPIRATION RATE: 16 BRPM | HEIGHT: 63 IN | WEIGHT: 148.4 LBS

## 2025-04-11 DIAGNOSIS — R06.02 SHORTNESS OF BREATH: ICD-10-CM

## 2025-04-11 DIAGNOSIS — I27.20 PULMONARY HYPERTENSION (H): ICD-10-CM

## 2025-04-11 LAB
ANION GAP SERPL CALCULATED.3IONS-SCNC: 10 MMOL/L (ref 7–15)
BASOPHILS # BLD AUTO: 0 10E3/UL (ref 0–0.2)
BASOPHILS NFR BLD AUTO: 1 %
BUN SERPL-MCNC: 12.5 MG/DL (ref 8–23)
CALCIUM SERPL-MCNC: 9.4 MG/DL (ref 8.8–10.4)
CHLORIDE SERPL-SCNC: 105 MMOL/L (ref 98–107)
CREAT SERPL-MCNC: 0.96 MG/DL (ref 0.51–0.95)
EGFRCR SERPLBLD CKD-EPI 2021: 68 ML/MIN/1.73M2
EOSINOPHIL # BLD AUTO: 0.3 10E3/UL (ref 0–0.7)
EOSINOPHIL NFR BLD AUTO: 3 %
ERYTHROCYTE [DISTWIDTH] IN BLOOD BY AUTOMATED COUNT: 13.3 % (ref 10–15)
GLUCOSE SERPL-MCNC: 101 MG/DL (ref 70–99)
HCO3 SERPL-SCNC: 26 MMOL/L (ref 22–29)
HCT VFR BLD AUTO: 42.8 % (ref 35–47)
HGB BLD-MCNC: 13.6 G/DL (ref 11.7–15.7)
HGB BLD-MCNC: 13.7 G/DL (ref 11.7–15.7)
IMM GRANULOCYTES # BLD: 0.1 10E3/UL
IMM GRANULOCYTES NFR BLD: 1 %
LYMPHOCYTES # BLD AUTO: 1.4 10E3/UL (ref 0.8–5.3)
LYMPHOCYTES NFR BLD AUTO: 16 %
MCH RBC QN AUTO: 30.2 PG (ref 26.5–33)
MCHC RBC AUTO-ENTMCNC: 32 G/DL (ref 31.5–36.5)
MCV RBC AUTO: 95 FL (ref 78–100)
MONOCYTES # BLD AUTO: 0.6 10E3/UL (ref 0–1.3)
MONOCYTES NFR BLD AUTO: 7 %
NEUTROPHILS # BLD AUTO: 6.2 10E3/UL (ref 1.6–8.3)
NEUTROPHILS NFR BLD AUTO: 73 %
NRBC # BLD AUTO: 0 10E3/UL
NRBC BLD AUTO-RTO: 0 /100
NT-PROBNP SERPL-MCNC: 101 PG/ML (ref 0–900)
OXYHGB MFR BLDV: 63 % (ref 70–75)
PLATELET # BLD AUTO: 342 10E3/UL (ref 150–450)
POTASSIUM SERPL-SCNC: 5.5 MMOL/L (ref 3.4–5.3)
RBC # BLD AUTO: 4.53 10E6/UL (ref 3.8–5.2)
SODIUM SERPL-SCNC: 141 MMOL/L (ref 135–145)
WBC # BLD AUTO: 8.6 10E3/UL (ref 4–11)

## 2025-04-11 PROCEDURE — 85025 COMPLETE CBC W/AUTO DIFF WBC: CPT | Performed by: STUDENT IN AN ORGANIZED HEALTH CARE EDUCATION/TRAINING PROGRAM

## 2025-04-11 PROCEDURE — 999N000142 HC STATISTIC PROCEDURE PREP ONLY

## 2025-04-11 PROCEDURE — 85018 HEMOGLOBIN: CPT

## 2025-04-11 PROCEDURE — 250N000009 HC RX 250: Performed by: INTERNAL MEDICINE

## 2025-04-11 PROCEDURE — 83880 ASSAY OF NATRIURETIC PEPTIDE: CPT | Performed by: STUDENT IN AN ORGANIZED HEALTH CARE EDUCATION/TRAINING PROGRAM

## 2025-04-11 PROCEDURE — 36415 COLL VENOUS BLD VENIPUNCTURE: CPT | Performed by: STUDENT IN AN ORGANIZED HEALTH CARE EDUCATION/TRAINING PROGRAM

## 2025-04-11 PROCEDURE — C1751 CATH, INF, PER/CENT/MIDLINE: HCPCS | Performed by: INTERNAL MEDICINE

## 2025-04-11 PROCEDURE — 272N000001 HC OR GENERAL SUPPLY STERILE: Performed by: INTERNAL MEDICINE

## 2025-04-11 PROCEDURE — 82810 BLOOD GASES O2 SAT ONLY: CPT

## 2025-04-11 PROCEDURE — 93451 RIGHT HEART CATH: CPT | Performed by: INTERNAL MEDICINE

## 2025-04-11 PROCEDURE — 250N000011 HC RX IP 250 OP 636: Performed by: INTERNAL MEDICINE

## 2025-04-11 PROCEDURE — 93451 RIGHT HEART CATH: CPT | Mod: 26 | Performed by: INTERNAL MEDICINE

## 2025-04-11 PROCEDURE — 250N000009 HC RX 250: Performed by: STUDENT IN AN ORGANIZED HEALTH CARE EDUCATION/TRAINING PROGRAM

## 2025-04-11 PROCEDURE — C1894 INTRO/SHEATH, NON-LASER: HCPCS | Performed by: INTERNAL MEDICINE

## 2025-04-11 PROCEDURE — 80048 BASIC METABOLIC PNL TOTAL CA: CPT | Performed by: STUDENT IN AN ORGANIZED HEALTH CARE EDUCATION/TRAINING PROGRAM

## 2025-04-11 PROCEDURE — 999N000132 HC STATISTIC PP CARE STAGE 1

## 2025-04-11 RX ORDER — LIDOCAINE 40 MG/G
CREAM TOPICAL
Status: COMPLETED | OUTPATIENT
Start: 2025-04-11 | End: 2025-04-11

## 2025-04-11 RX ORDER — ONDANSETRON 2 MG/ML
INJECTION INTRAMUSCULAR; INTRAVENOUS
Status: DISCONTINUED | OUTPATIENT
Start: 2025-04-11 | End: 2025-04-11 | Stop reason: HOSPADM

## 2025-04-11 RX ADMIN — LIDOCAINE: 40 CREAM TOPICAL at 07:35

## 2025-04-11 ASSESSMENT — ACTIVITIES OF DAILY LIVING (ADL)
ADLS_ACUITY_SCORE: 56

## 2025-04-11 NOTE — PROGRESS NOTES
Pt returned from CCL s/p RHC w/vasodilator study. Pt alert and oriented. VSS. Sats >92% on RA. Pt denies any pain. Left neck site intact. No bleeding or hematoma. Transparent dressing in place. Denies any respiratory issues. Discharge instructions reviewed with pt. Pt verbalized understanding, copy given to patient. Ambulated to lobby. Pt's spouse Herminio transported patient home.

## 2025-04-11 NOTE — DISCHARGE INSTRUCTIONS
[facility]                        Interventional Cardiology  Discharge Instructions   Post Right Heart Cath       AFTER YOU GO HOME:  DO drink plenty of fluids  DO resume your regular diet and medications unless otherwise instructed by your Primary Physician  Do Not scrub the procedure site for 3 days  No lotion or powder to the puncture site for 3 days    ACTIVITY:  Take it easy for the rest of the day. Do not do strenuous exercise and do not lift, pull, or push anything greater than 10 pounds for the next 3 days. This lets the catheter site heal.     CARE OF THE CATHETER SITE:  For 24 hours, keep the bandage over the spot where the catheter was inserted.   Put ice or a cold pack on the area for 10 to 20 minutes at a time to help with soreness or swelling.   You may shower 24 hours after the procedure. Pat the incision dry.  Don't take a bath or soak the site for 48 hours    WHEN SHOULD YOU CALL FOR HELP:  Monitor neck site for bleeding, swelling, or voice changes. If you notice bleeding or swelling immediately apply pressure to the site for 15 minutes, and call number below to speak with Cardiology Fellow.  If you experience any changes in your breathing you should call your doctor immediately or come to the closest Emergency Department.  Do not drive yourself.  If you have a fast-growing, painful lump at the catheter site.  If you have symptoms of infection, such as:  Increased pain, swelling, warmth, or redness.  Red streaks leading from the area.  Pus draining from the area.  A fever.    ADDITIONAL INSTRUCTIONS: Medications: You are to resume all home medications including anticoagulation therapy unless otherwise advised by your primary cardiologist or nurse coordinator.    Follow Up: Per your primary cardiology team    If you have any questions or concerns regarding your procedure site please call [phone] at any time & press option 4 to speak to the .  Ask for the Cardiology  Fellow on call.  They are available 24 hours a day.  You may also contact the Cardiology Clinic after hours number at [phone].                                                       Telephone Numbers [phone] Monday-Friday 8:00 am to 4:30 pm    [phone]   After 4:30 pm Monday-Friday, Weekends & Holidays  Ask for Interventional Cardiologist on call. Someone is on call 24 hours/day   [facility] toll free number [phone] Monday-Friday 8:00 am to 4:30 pm   [facility] Emergency Dept [phone]

## 2025-04-11 NOTE — PROGRESS NOTES
Prep completed for RHC; groin prep also completed d/t pt's history of right I.J. occlusion. Pulses marked. Consent signed, labs resulted.
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
## 2025-04-11 NOTE — Clinical Note
Potential access sites were evaluated for patency using ultrasound.   The left jugular vein was selected. Access was obtained under with Sonosite and Fluoroscopic guidance using a micropuncture 21 gauge needle with direct visualization of needle entry.

## 2025-04-16 DIAGNOSIS — R60.9 EDEMA: Primary | ICD-10-CM

## 2025-04-17 ENCOUNTER — VIRTUAL VISIT (OUTPATIENT)
Dept: CARDIOLOGY | Facility: CLINIC | Age: 60
End: 2025-04-17
Attending: STUDENT IN AN ORGANIZED HEALTH CARE EDUCATION/TRAINING PROGRAM
Payer: MEDICARE

## 2025-04-17 VITALS — BODY MASS INDEX: 25.69 KG/M2 | WEIGHT: 145 LBS | HEIGHT: 63 IN

## 2025-04-17 DIAGNOSIS — I27.20 PULMONARY HYPERTENSION (H): ICD-10-CM

## 2025-04-17 DIAGNOSIS — R06.02 SHORTNESS OF BREATH: ICD-10-CM

## 2025-04-17 ASSESSMENT — PAIN SCALES - GENERAL: PAINLEVEL_OUTOF10: NO PAIN (0)
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
## 2025-04-17 NOTE — NURSING NOTE
Current patient location: [facility] [address]  [facility] [address]    Is the patient currently in the state of [place]? YES    Visit mode: VIDEO    If the visit is dropped, the patient can be reconnected by:VIDEO VISIT: Text to cell phone:   Telephone Information:   Mobile [phone]    and VIDEO VISIT: Send to e-mail at: [email or web address]    Will anyone else be joining the visit? NO  (If patient encounters technical issues they should call [phone])    Are changes needed to the allergy or medication list? No    Patient denies any changes since echeck-in completion and states all information entered during echeck-in remains accurate.    Are refills needed on medications prescribed by this physician? NO    Rooming Documentation:  Questionnaire(s) completed    No other vitals to report today    Reason for visit: CONNER Morrow MA VVF

## 2025-04-17 NOTE — NURSING NOTE
"Reviewed Med list  Completed AVS  Follow-up orders placed  Marked chart \"Ready for Checkout\"  Carolyn Tomas RN on 4/17/2025 at 9:38 AM    "
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
## 2025-04-17 NOTE — PATIENT INSTRUCTIONS
You were seen today in the Pulmonary Hypertension Clinic at the [facility].     Cardiology Provider you saw during your visit:    NANCY Ledezma    Medication Changes:  None    Follow-up:   No need to continue to follow with us  Please have BMP drawn locally asap      Please call us immediately if you have any syncope (fainting or passing out), chest pain, edema (swelling or weight gain), or decline in your functional status (general decline in how you are feeling).    If you have emergent concerns after hours or on the weekend, please call our on-call Cardiologist at [phone], option [phone]. For emergencies call [phone].     Thank you for allowing us to be a part of your care here at the [facility]    If you have questions or concerns please contact us at:    Rubi Lopez RN (P: [phone])    Nurse Coordinator       Pulmonary Hypertension     [facility]         ELVIRA Tim   (Prior Auths & Pt Assistance)   ()  Clinic   Clinic   Pulmonary Hypertension   Pulmonary Hypertension  [facility]  (P)[phone]    (P) [phone]  (F) [phone]

## 2025-04-28 ENCOUNTER — TELEPHONE (OUTPATIENT)
Dept: PSYCHIATRY | Facility: CLINIC | Age: 60
End: 2025-04-28
Payer: MEDICARE

## 2025-04-28 NOTE — TELEPHONE ENCOUNTER
Patient has refills of both 20 mg and 40 mg strengths of methylphenidate available at pharmacy. Patient updated via [employer].

## 2025-04-28 NOTE — TELEPHONE ENCOUNTER
[facility] Call Center    Phone Message    May a detailed message be left on voicemail: yes     Reason for Call: Medication Refill Request    Has the patient contacted the pharmacy for the refill? Yes   Name of medication being requested: Methylphenidate 20mg & Methylphenidate 40mg  Provider who prescribed the medication: Dr. Mason  Pharmacy:    [facility] [address]  Date medication is needed: Caller stated they have 2 days left of the medication remaining.    Action Taken: Message routed to:  Other: [facility] Nurse pool    Travel Screening: Not Applicable     Date of Service:

## 2025-05-05 ENCOUNTER — VIRTUAL VISIT (OUTPATIENT)
Dept: PSYCHIATRY | Facility: CLINIC | Age: 60
End: 2025-05-05
Attending: PSYCHIATRY & NEUROLOGY
Payer: MEDICARE

## 2025-05-05 DIAGNOSIS — F41.0 PANIC ATTACK: ICD-10-CM

## 2025-05-05 DIAGNOSIS — F33.41 RECURRENT MAJOR DEPRESSIVE DISORDER, IN PARTIAL REMISSION: Primary | ICD-10-CM

## 2025-05-05 PROCEDURE — 90833 PSYTX W PT W E/M 30 MIN: CPT | Mod: 4UV

## 2025-05-05 PROCEDURE — 1125F AMNT PAIN NOTED PAIN PRSNT: CPT | Mod: 95

## 2025-05-05 PROCEDURE — G2211 COMPLEX E/M VISIT ADD ON: HCPCS | Mod: 95

## 2025-05-05 PROCEDURE — 90785 PSYTX COMPLEX INTERACTIVE: CPT | Mod: 4UV

## 2025-05-05 PROCEDURE — 98006 SYNCH AUDIO-VIDEO EST MOD 30: CPT | Mod: GC

## 2025-05-05 RX ORDER — CLONIDINE HYDROCHLORIDE 0.1 MG/1
0.1 TABLET ORAL DAILY
Qty: 30 TABLET | Refills: 1 | Status: SHIPPED | OUTPATIENT
Start: 2025-05-05

## 2025-05-05 RX ORDER — LORAZEPAM 0.5 MG/1
0.5 TABLET ORAL DAILY PRN
Qty: 5 TABLET | Refills: 0 | Status: SHIPPED | OUTPATIENT
Start: 2025-05-05

## 2025-05-05 RX ORDER — CLONIDINE HYDROCHLORIDE 0.1 MG/1
0.1 TABLET ORAL DAILY
Qty: 30 TABLET | Refills: 2 | Status: CANCELLED | OUTPATIENT
Start: 2025-05-05

## 2025-05-05 NOTE — PATIENT INSTRUCTIONS
**For crisis resources, please see the information at the end of this document**   Patient Education    Thank you for coming to the [facility] [place] [facility].     Today's change:     -Start clonidine 0.1mg daily for panic symptoms   -Ativan 0.5mg daily PRN #5 tablets provided for severe panic attacks    Lab Testing:  If you had lab testing today and your results are reassuring or normal they will be mailed to you or sent through [employer] within 7 days. If the lab tests need quick action we will call you with the results. The phone number we will call with results is # [phone]. If this is not the best number please call our clinic and change the number.     Medication Refills:  If you need any refills please call your pharmacy and they will contact us. Our fax number for refills is [phone].   Three business days of notice are needed for general medication refill requests.   Five business days of notice are needed for controlled substance refill requests.   If you need to change to a different pharmacy, please contact the new pharmacy directly. The new pharmacy will help you get your medications transferred.     Contact Us:  Please call [phone] during business hours (8-5:00 M-F).   If you have medication related questions after clinic hours, or on the weekend, please call [phone].     Financial Assistance [phone]   Medical Records [phone]       MENTAL HEALTH CRISIS RESOURCES:  For a emergency help, please call 911 or go to the nearest Emergency Department.     Emergency Walk-In Options:   Em[facility] Unit @ [place] [facility] ([place]): [phone] - Specialized mental health emergency area designed to be calming  [facility] ([place]): [phone]  [facility] ([place]): [phone]  [facility]): [phone]    [facility] Crisis Information:   [place]: [phone]  [place]:  [phone]  [place] (COPE) - Adult: [phone]     Child: [phone]  [place] - Adult: [phone]     Child: [phone]  [place]: [phone]  List of all [facility] resources:   https://[place].gov/dhs/people-we-serve/adults/health-care/mental-health/resources/crisis-contacts.jsp    National Crisis Information:   Crisis Text Line: Text  [place]  to 093293  Suicide & Crisis Lifeline: 988  National Suicide Prevention Lifeline: 5-395-134-TALK (1-167.980.7151)       For online chat options, visit https://suicidepreventionlifeline.org/chat/  Poison Control Center: 1-380.502.1544  Trans Lifeline: 1-976.258.6839 - Hotline for transgender people of all ages  The [employer]: 7-109-911-1550 - Hotline for LGBT youth     For Non-Emergency Support:   Fast Tracker: Mental Health & Substance Use Disorder Resources -   https://www.besomebody.ckEcoGroomern.org/
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
## 2025-05-05 NOTE — NURSING NOTE
Current patient location: [address]  [facility] [address]    Is the patient currently in the state of [place]? YES    Visit mode: VIDEO    If the visit is dropped, the patient can be reconnected by:VIDEO VISIT: Text to cell phone:   Telephone Information:   Mobile [phone]       Will anyone else be joining the visit? NO  (If patient encounters technical issues they should call [phone])    Are changes needed to the allergy or medication list? No    Are refills needed on medications prescribed by this physician? NO    Rooming Documentation:  Questionnaire(s) completed    Reason for visit: RECHECK    Carla ACOSTAF

## 2025-05-05 NOTE — PROGRESS NOTES
Virtual Visit Details        Type of service:  Video Visit   Video Start Time: 2:40PM  Video End Time:3:10PM     Originating Location (pt. Location): Home     Distant Location (provider location):  On-site  Platform used for Video Visit: [employer]

## 2025-05-12 NOTE — PROGRESS NOTES
Medication Therapy Management (MTM) Encounter    ASSESSMENT:                            Medication Adherence/Access: No issues identified.    Hypertension   Stable. Continue current medication.     COPD   Patient may benefit from scheduled use of Dulera for better control of her symptoms, which would hopefully reduce shortness of breath and resulting anxiety. As she has never used it on a schedule, will reach out to PCP to confirm.     GERD    Stable. Continue current medication.    Bone Health   Osteoporosis:   Discussed when to take calcium supplement and reviewed foods high in calcium in order to get adequate supply, especially if she is forgetting her supplement. Continue current medication.    Dry mouth:  Ok to try medication and will follow up with dentist.    DVTs/SVC syndrome:  Stable. Continue current medication.     Depression/Anxiety:  Clonidine has not yet been helpful, but may be causing some lightheadedness. Will give a little more time to determine if this improves, otherwise consider discontinuing, especially if not helpful.   Reviewed limiting lorazepam use overall. As it sounds as though anxiety comes from SOB, hopeful that more consistent inhaler use will improve symptoms.   Reviewed TIPP skills, namely temperature and paired muscle relaxation, as paced breathing has not previously been helpful and she is unable to increase exertion. She will try these methods.    Headache/Pain:  Medications seem to working well. Discussed minimizing use of opioids, as possible, especially if also taking lorazepam. Encouraged continue effort at  oxycodone doses.     Nausea:  Stable. Continue current medication.    Constipation:  Stable. Continue current medication.    Urinary urgency:  Stable. Continue current medication.    Acne    Stable. Continue current medication.    Supplement:  Stable. Continue current medication.    PLAN:                            -Consider using Dulera on a schedule, 2 puffs  twice daily-- I will confirm with PCP  -Try using cool water or cold washcloth on your face/neck when you feel highly anxious. You can also try marianne and relaxing muscle groups, as we discussed.     Follow-up: I will call you on 6/2/25 at 8am    SUBJECTIVE/OBJECTIVE:                          Marcia Yeung is a 59 year old female seen for an initial visit. She was referred to me from psychiatry.      Reason for visit: med review; check on clonidine     Allergies/ADRs: Reviewed in chart  Past Medical History: Reviewed in chart  Tobacco: She reports that she has never smoked. She has never used smokeless tobacco.  Alcohol: occasional    Medication Adherence/Access: no issues reported.    Hypertension   Metoprolol ER 50mg daily  Patient reports no current medication side effects  Patient self monitors blood pressure.  Home BP monitoring 110-120/70s, though lower since starting clonidine in the last week.       COPD   -Dulera 100-5 - 2 puffs twice daily (only using prn)  -albuterol inhaler and neb prn  -montelukast 10mg daily     Thought that inhalers were only for when she has bronchitis/increased phlegm, so uses very infrequently. Gets short of breath at least once a day that causes her to feel panicked, then worsens SOB.  Montelukast was started about a year ago when she had bronchitis and has found it helpful for post nasal drip.  Patient rinses their mouth after using steroid inhaler.    Patient reports no current medication side effects.     GERD    Pantoprazole 40 mg twice daily   -famotidine 20mg BID  Patient feels that current regimen is effective. Still has minimal symptoms every now and then. Recent EGD was unremarkable.       Bone Health   Osteoporosis:   calcium 600mg BID- forgets often due to  from other meds  Vitamin D 5000 units daily  alendronate (Fosamax) 70mg weekly (has been on current therapy 3 years)  Patient is experiencing side effects.  DEXA History: 1/2020  Patient is getting 3-4  "serving(s)/day of calcium in their diet.  Risk factors: chronic PPI use       Dry mouth:  -pilocarpine 5mg QID prn  Recently prescribed by dentist, but hasn't yet started the med yet. She has tried lots of OTC products, none of which were helpful.      DVTs/SVC syndrome:  Eliquis 5 mg twice a day   Aspirin 81 mg daily     She does bruise easily and notices extra bleeding in her gums when brushing her teeth. Clotting may take a little longer than usual, but not too significant.      Depression/Anxiety:  -Ritalin LA 40mg + 20mg daily    -duloxetine 120 mg at bedtime   -Abilify 5 mg/day at bedtime   -Lorazepam 0.5mg daily PRN #5 tablets (using about once per week)  -clonidine 0.1mg daily (started 6 days ago)    Patient last met with psychiatry on 5/5, at which time clonidine was started in effort to improve anxiety and reduce need for lorazepam. She started the med about 6 days ago.  She does check BP at home and was 90/54 yesterday. Usually runs 110-120/70s. She has noticed feeling a little bit lightheaded, which seems to come and go all day. Mostly notices it when she is changing positions, new since starting the med. She has not noticed any anxiety improvement.   Denied much baseline anxiety, but has \"little panic attack\" during the day when she starts to feel more short of breath from some exertion.  She usually puts on her oxygen at that time, which is helpful, but takes longer to improve when she is away from home.  Unable to do any non-pharm methods, such as deep breathing or walking around, as these both worsen her shortness of breath.     Headache/Pain:  Oxycontin ER 15mg Q12h prn--recently using one dose once daily  Oxycodone 5 mg as needed - reports she will take this if sumatriptan is not working.     Esgesic as needed headache --uses more for mild migraines (once a month)  Sumatriptan 6 mg injection 4 times per month--seems to be less helpful over time  Baclofen 10mg as needed  -Botox injections every 12 " weeks     Getting neck adjustments every 2 weeks, which has been helpful.   Avoids taking the opioids together. Current regimen is managing symptoms.    Nausea:  Ondansetron 8 mg ODT Q8h prn    Usually related to migraines. Takes about 10 doses per month, which is quite helpful.      Constipation:  Senna-docusate prn  Helpful when needed    Urinary urgency:  Myrbetriq 50 mg daily  Solifenacin 5 mg daily  Work well for the most part. Has tried monotherapy, but symptoms were not well managed. Dry mouth did not improve when off of these in the past.     Acne    -spironolactone 25mg daily  Works well. No issues.       Supplement:  Vitamin B complex  Magnesium glycinate 400 mg daily  Zinc 50 mg daily  No concerns.     ----------------    I spent 45 minutes with this patient today.     A summary of these recommendations was sent via clinic portal.    Jennifer Carey PharmD  Medication Therapy Management Pharmacist  [facility]    Telemedicine Visit Details  The patient's medications can be safely assessed via a telemedicine encounter.  Type of service:  Video Conference via [employer]  Originating Location (pt. Location): Home    Distant Location (provider location):  Off-site  Start Time: 8:00am  End Time: 8:45am     Medication Therapy Recommendations  Chronic obstructive pulmonary disease, unspecified COPD type (H)   1 Current Medication: mometasone-formoterol (DULERA) 100-5 MCG/ACT inhaler   Current Medication Sig: Inhale 2 puffs into the lungs 2 times daily.   Rationale: Does not understand instructions - Adherence - Adherence   Recommendation: Provide Education - consider using scheduled   Status: Contact Provider - Awaiting Response   Identified Date: 5/13/2025

## 2025-05-13 ENCOUNTER — VIRTUAL VISIT (OUTPATIENT)
Dept: PHARMACY | Facility: CLINIC | Age: 60
End: 2025-05-13
Payer: COMMERCIAL

## 2025-05-13 DIAGNOSIS — K59.00 CONSTIPATION, UNSPECIFIED CONSTIPATION TYPE: ICD-10-CM

## 2025-05-13 DIAGNOSIS — F41.1 GAD (GENERALIZED ANXIETY DISORDER): ICD-10-CM

## 2025-05-13 DIAGNOSIS — I13.10 HYPERTENSIVE HEART AND CHRONIC KIDNEY DISEASE STAGE 2: Primary | ICD-10-CM

## 2025-05-13 DIAGNOSIS — I87.1 SVC SYNDROME: ICD-10-CM

## 2025-05-13 DIAGNOSIS — N18.2 HYPERTENSIVE HEART AND CHRONIC KIDNEY DISEASE STAGE 2: Primary | ICD-10-CM

## 2025-05-13 DIAGNOSIS — K21.9 GASTROESOPHAGEAL REFLUX DISEASE WITHOUT ESOPHAGITIS: ICD-10-CM

## 2025-05-13 DIAGNOSIS — R11.0 NAUSEA: ICD-10-CM

## 2025-05-13 DIAGNOSIS — Z78.9 TAKES DIETARY SUPPLEMENTS: ICD-10-CM

## 2025-05-13 DIAGNOSIS — N39.41 URGENCY INCONTINENCE: ICD-10-CM

## 2025-05-13 DIAGNOSIS — M81.0 OSTEOPOROSIS, UNSPECIFIED OSTEOPOROSIS TYPE, UNSPECIFIED PATHOLOGICAL FRACTURE PRESENCE: ICD-10-CM

## 2025-05-13 DIAGNOSIS — G43.909 MIGRAINE WITHOUT STATUS MIGRAINOSUS, NOT INTRACTABLE, UNSPECIFIED MIGRAINE TYPE: ICD-10-CM

## 2025-05-13 DIAGNOSIS — F33.9 EPISODE OF RECURRENT MAJOR DEPRESSIVE DISORDER, UNSPECIFIED DEPRESSION EPISODE SEVERITY: ICD-10-CM

## 2025-05-13 DIAGNOSIS — R52 PAIN: ICD-10-CM

## 2025-05-13 DIAGNOSIS — J44.9 CHRONIC OBSTRUCTIVE PULMONARY DISEASE, UNSPECIFIED COPD TYPE (H): ICD-10-CM

## 2025-05-13 DIAGNOSIS — L70.9 ACNE, UNSPECIFIED ACNE TYPE: ICD-10-CM

## 2025-05-13 PROCEDURE — 99207 PR NO CHARGE LOS: CPT | Mod: 95 | Performed by: PHARMACIST

## 2025-05-13 RX ORDER — PILOCARPINE HYDROCHLORIDE 5 MG/1
5 TABLET, FILM COATED ORAL 4 TIMES DAILY
COMMUNITY
Start: 2025-05-12

## 2025-05-13 NOTE — Clinical Note
Can you please call this patient later this week (Th/Fri)? She has been feeling a bit lightheaded since starting clonidine and want to make sure it's not getting worse, hopefully getting better. Can you please also reinforce TIPP skills with her, particularly using cold water and paired muscle relaxation, and see whether she's tried these methods. See my note for details, if helpful. Thanks!

## 2025-05-13 NOTE — PATIENT INSTRUCTIONS
"Recommendations from today's MTM visit:                                                    MTM (medication therapy management) is a service provided by a clinical pharmacist designed to help you get the most of out of your medicines.   Today we reviewed what your medicines are for, how to know if they are working, that your medicines are safe and how to make your medicine regimen as easy as possible.      -Consider using Dulera on a schedule, 2 puffs twice daily-- I will confirm with PCP  -Try using cool water or cold washcloth on your face/neck when you feel highly anxious. You can also try marianne and relaxing muscle groups, as we discussed.     Follow-up: I will call you on 6/2/25 at 8am    It was great speaking with you today.  I value your experience and would be very thankful for your time in providing feedback in our clinic survey. In the next few days, you may receive an email or text message from [employer] with a link to a survey related to your  clinical pharmacist.\"     To schedule another MTM appointment, please call the clinic directly or you may call the MTM scheduling line at [phone] or toll-free at [phone].     My Clinical Pharmacist's contact information:                                                      Please feel free to contact me with any questions or concerns you have.      Jennifer Carey, PharmD  Medication Therapy Management Pharmacist  [facility]    "

## 2025-05-13 NOTE — Clinical Note
Hello, I met with this patient to review medications per psychiatry referral. She reported regular SOB, which then triggers anxiety/panic. Stated that she's only ever used Dulera and albuterol prn, when she's had respiratory illness.  Even though Dulera is prescribed as 2 puffs BID, I wanted to check in with you since she reported never having used it that way. I'm hopeful it would reduce the SOB and anxiety. Please see note for details and let me know your thoughts. Jennifer Carey, PharmD Medication Therapy Management Pharmacist [facility]

## 2025-05-13 NOTE — Clinical Note
Hello, I met with this patient today. She's not been using her inhalers at all and I'm really hoping that consistent use with those will help improve her shortness of breath and therefore anxiety. I will talk with her in a few weeks. Jennifer

## 2025-05-15 ENCOUNTER — TELEPHONE (OUTPATIENT)
Dept: PSYCHIATRY | Facility: CLINIC | Age: 60
End: 2025-05-15
Payer: MEDICARE

## 2025-05-15 NOTE — TELEPHONE ENCOUNTER
Writer reached out to patient per provider request.  She continues to endorse lightheadedness when moving from sitting to standing positions.  She said this happens a couple times a day.  She denied any falling.  She reports that the TIPP skills given to her from provider are somewhat helpful, but that lightheadedness is still bothering her.  Provider notified.

## 2025-05-20 DIAGNOSIS — I13.10 HYPERTENSIVE HEART AND CHRONIC KIDNEY DISEASE STAGE 2: ICD-10-CM

## 2025-05-20 DIAGNOSIS — N18.2 HYPERTENSIVE HEART AND CHRONIC KIDNEY DISEASE STAGE 2: ICD-10-CM

## 2025-05-20 DIAGNOSIS — I10 ESSENTIAL HYPERTENSION: ICD-10-CM

## 2025-05-22 RX ORDER — METOPROLOL SUCCINATE 50 MG/1
50 TABLET, EXTENDED RELEASE ORAL DAILY
Qty: 90 TABLET | Refills: 2 | Status: SHIPPED | OUTPATIENT
Start: 2025-05-22

## 2025-05-23 NOTE — TELEPHONE ENCOUNTER
Last Written Prescription:  4/25/24   90 : 3  ----------------------  Last Visit Date: 1/28/25  Future Visit Date: NONE    Refill decision: Medication refilled per  Medication Refill in Ambulatory Care  policy.     Request from pharmacy:  Requested Prescriptions   Pending Prescriptions Disp Refills    metoprolol succinate ER (TOPROL XL) 50 MG 24 hr tablet [Pharmacy Med Name: METOPROLOL SUCCINATE ER 50MG TB24] 90 tablet 3     Sig: TAKE 1 TABLET BY MOUTH ONCE DAILY       Beta-Blockers Protocol Passed - 5/22/2025 11:34 PM        Passed - Most recent blood pressure under 140/90 in past 12 months     BP Readings from Last 3 Encounters:   04/11/25 128/81   03/20/25 125/83   01/28/25 122/86       Systolic (Patient Reported): -- (Not today) (10/14/2024 12:46 PM)              Passed - Patient is age 6 or older        Passed - Medication is active on med list and the sig matches. RN to manually verify dose and sig if red X/fail.             Passed - Medication indicated for associated diagnosis     Medication is associated with one or more of the following diagnoses:     Hypertension (HTN)   Atrial fibrillation/flutter   Angina   ASCVD   Migraine   Heart Failure   Tremor   Anxiety   Ocular hypertension   Glaucoma   PTSD   Obstructive hypertrophic cardiomyopathy   History of myocarditis   Palpitations   POTS (postural orthostatic tachycardia syndrome)   SVT (supraventricular tachycardia)   Hyperthyroidism   Tachycardia   Acute non-st segment elevation myocardial infarction   Subsequent non-st segment elevation myocardial infarction   Ischemic myocardial dysfunction          Passed - Recent (12 month) or future (90 days) visit with authorizing provider's specialty (provided they have been seen in the past 15 months)     The patient must have completed an in-person or virtual visit within the past 12 months or has a future visit scheduled within the next 90 days with the authorizing provider s specialty.  Urgent care and  e-visits do not qualify as an office visit for this protocol.

## 2025-06-02 ENCOUNTER — VIRTUAL VISIT (OUTPATIENT)
Dept: PHARMACY | Facility: CLINIC | Age: 60
End: 2025-06-02
Payer: COMMERCIAL

## 2025-06-02 DIAGNOSIS — F41.0 PANIC ATTACK: ICD-10-CM

## 2025-06-02 DIAGNOSIS — R68.2 DRY MOUTH: ICD-10-CM

## 2025-06-02 DIAGNOSIS — J44.9 CHRONIC OBSTRUCTIVE PULMONARY DISEASE, UNSPECIFIED COPD TYPE (H): ICD-10-CM

## 2025-06-02 DIAGNOSIS — F41.1 GAD (GENERALIZED ANXIETY DISORDER): Primary | ICD-10-CM

## 2025-06-02 DIAGNOSIS — F33.9 EPISODE OF RECURRENT MAJOR DEPRESSIVE DISORDER, UNSPECIFIED DEPRESSION EPISODE SEVERITY: ICD-10-CM

## 2025-06-02 PROCEDURE — 99207 PR NO CHARGE LOS: CPT | Mod: 95 | Performed by: PHARMACIST

## 2025-06-02 NOTE — PATIENT INSTRUCTIONS
"Recommendations from today's MTM visit:                                                         -I will talk with provider about increasing clonidine    Follow-up: Video visit on 7/21/25 at 8:30am    It was great speaking with you today.  I value your experience and would be very thankful for your time in providing feedback in our clinic survey. In the next few days, you may receive an email or text message from [email or web address] with a link to a survey related to your  clinical pharmacist.\"     To schedule another MTM appointment, please call the clinic directly or you may call the MTM scheduling line at [phone] or toll-free at [phone].     My Clinical Pharmacist's contact information:                                                      Please feel free to contact me with any questions or concerns you have.      Jennifer Carey, PharmD  Medication Therapy Management Pharmacist  [facility]    "

## 2025-06-02 NOTE — Clinical Note
I had follow up with this patient today. She reports quite mild improvement in anxiety/panic and dizziness has resolved. What do you think about increasing clonidine from 0.1mg to 0.2mg? You see her in 3 weeks. Thanks! Jennifer

## 2025-06-02 NOTE — PROGRESS NOTES
Medication Therapy Management (MTM) Encounter    ASSESSMENT:                            Medication Adherence/Access: No issues identified.    COPD   Slightly improved. Continue regular use of Dulera.    Depression/Anxiety:  Side effects from clonidine have resolved and may have provided slight improvement. Could consider increasing the dose. Reviewed TIPP skills and recommended continued use of cool washcloth in addition to paired muscle relaxation.  Psychiatry follow up in 3 weeks.     Dry Mouth    Improved. Continue current medication.        PLAN:                            -I will talk with provider about increasing clonidine    Follow-up: Video visit on 7/21/25 at 8:30am    SUBJECTIVE/OBJECTIVE:                          Marcia Yeung is a 59 year old female seen for a follow-up visit.       Reason for visit: med review.    Allergies/ADRs: Reviewed in chart  Past Medical History: Reviewed in chart  Tobacco: She reports that she has never smoked. She has never used smokeless tobacco.  Alcohol: occasional    Medication Adherence/Access: no issues reported.    COPD   -Dulera 100-5 - 2 puffs twice daily   -albuterol inhaler and neb prn  -montelukast 10mg daily     At last visit, discussed using Dulera on schedule, rather than prn to help with shortness of breath.  She was able to make this change. Doesn't think it has reduced panic feelings, but shortness of breath with exertion is improved. Severity of shortness of breath when feeling panicked may be slightly improved.  She has been rinsing her mouth.  Has not been needing albuterol. No concerns.      Depression/Anxiety:  -Ritalin LA 40mg + 20mg daily    -duloxetine 120 mg at bedtime   -Abilify 5 mg/day at bedtime   -Lorazepam 0.5mg daily PRN #5 tablets (using about once per week)  -clonidine 0.1mg daily    At last visit, she had recently started clonidine to help with anxiety and had been experiencing regular dizziness.  Today, patient reported that the  lightheadedness/dizziness has resolved.  She stated that she hasn't noticed any improvement in anxiety, still having bursts of feeling panicked 1-2 times per day. These do not seem to be different in nature and usually last 10-15 minutes.    She has tried using a cool washcloth on her face during these episodes, which does provide some benefit, episodes don't last as long.  No other mood changes or concerns today.     Dry mouth:  -pilocarpine 5mg QID prn    She did start taking medication since last visit, at least three times per day, which has been incredibly helpful. No concerns or side effects to report.    ----------------      I spent 15 minutes with this patient today.     A summary of these recommendations was sent via clinic portal.    Jennifer Carey PharmD  Medication Therapy Management Pharmacist  [facility]    Telemedicine Visit Details  The patient's medications can be safely assessed via a telemedicine encounter.  Type of service:  Telephone visit  Originating Location (pt. Location): Home    Distant Location (provider location):  On-site  Start Time: 8:00am  End Time: 8:15am     Medication Therapy Recommendations  ROCKY (generalized anxiety disorder)   1 Current Medication: cloNIDine (CATAPRES) 0.1 MG tablet (Discontinued)   Current Medication Sig: Take 1 tablet (0.1 mg) by mouth daily.   Rationale: Dose too low - Dosage too low - Effectiveness   Recommendation: Increase Dose - consider increasing   Status: Accepted per Provider   Identified Date: 6/2/2025 Completed Date: 6/3/2025

## 2025-06-03 RX ORDER — CLONIDINE HYDROCHLORIDE 0.2 MG/1
0.2 TABLET ORAL DAILY
Qty: 30 TABLET | Refills: 2 | Status: SHIPPED | OUTPATIENT
Start: 2025-06-03

## 2025-06-03 NOTE — PROGRESS NOTES
Per Dr. Mason, ok to increase clonidine from 0.1mg to 0.2mg daily.  New Rx sent and patient notified.    Jennifer Carey, PharmD  Medication Therapy Management Pharmacist  [facility] [place] [facility]

## 2025-06-09 ENCOUNTER — VIRTUAL VISIT (OUTPATIENT)
Dept: PSYCHOLOGY | Facility: CLINIC | Age: 60
End: 2025-06-09
Payer: MEDICARE

## 2025-06-09 DIAGNOSIS — F41.1 GAD (GENERALIZED ANXIETY DISORDER): Primary | ICD-10-CM

## 2025-06-09 PROCEDURE — 90837 PSYTX W PT 60 MINUTES: CPT | Mod: 95 | Performed by: SOCIAL WORKER

## 2025-06-09 NOTE — PROGRESS NOTES
[facility]                                     Progress Note  /  Patient Name: Sherly Yeung  Date: 6/09/2025         Service Type: Individual      Session Start Time:8:03 Session End Time: 8:58    Session Length: 55    Session #: 32    Attendees: Client    Service Modality:  Video Visit:      Provider verified identity through the following two step process.  Patient provided:  Patient is known previously to provider    Telemedicine Visit: The patient's condition can be safely assessed and treated via synchronous audio and visual telemedicine encounter.      Reason for Telemedicine Visit: Services only offered telehealth    Originating Site (Patient Location): Patient's home    Distant Site (Provider Location): Provider Remote Setting- Home Office    Consent:  The patient/guardian has verbally consented to: the potential risks and benefits of telemedicine (video visit) versus in person care; bill my insurance or make self-payment for services provided; and responsibility for payment of non-covered services.     Patient would like the video invitation sent by:  My Chart    Mode of Communication:  Video Conference via Amwell    Distant Location (Provider):  Off-site    As the provider I attest to compliance with applicable laws and regulations related to telemedicine.    DATA  Extended Session (53+ minutes):   - Patient's presenting concerns require more intensive intervention than could be completed within the usual service- Marital issues, wanting more time to address them, express self and receive support.   Interactive Complexity: No  Crisis: No      Progress Since Last Session (Related to Symptoms / Goals / Homework   Symptoms: Worsening Anxiety, unhappy    Homework: Partially completed      Episode of Care Goals: Satisfactory progress - ACTION (Actively working towards change); Intervened by reinforcing change plan / affirming steps taken     Current / Ongoing Stressors and Concerns:  Patient shared she wanted to process her frustration and anger about 's recent action.  did some work in the kitchen without her input. This, she notes it is an ongoing struggle.  She shared it is partially her own issues with perfectionism. She also notes he is using it not to help her but to make things worse for her.  She also shared issue with difficulties with breath due to anxiety. Has been using her skills but finds it hard still.  Discussed about body temperature check and how to bring it back to the normal.  Patient agreed to try this option and address it to her providers should she continues to have the issues.         Treatment Objective(s) Addressed in This Session:       Intervention:  CBT: Reviewed the 3 Cs and encouraged her to use them and practice the tips to bring the body and the normal temperature. Rechecked the professionalism concerns.     MI Intervention: Expressed Empathy/Understanding, Supported Autonomy, Collaboration, Evocation, Permission to raise concern or advise, and Open-ended questions     Change Talk Expressed by the Patient: Taking steps    Provider Response to Change Talk: E - Evoked more info from patient about behavior change, A - Affirmed patient's thoughts, decisions, or attempts at behavior change, R - Reflected patient's change talk, and S - Summarized patient's change talk statements     Assessments completed prior to visit:2/23/2023    The following assessments were completed by patient for this visit:  PHQ2:       4/17/2025     8:20 AM 3/13/2025     1:02 PM 3/10/2025     7:26 AM 1/23/2025     3:02 PM 1/13/2025     8:46 AM 10/14/2024    12:47 PM 4/18/2024    10:22 AM   PHQ-2 ( 1999 [employer])   Q1: Little interest or pleasure in doing things 0 0 0 0 0 0 0   Q2: Feeling down, depressed or hopeless 0 1 0 0 0 0 0   PHQ-2 Score 0 1 0 0 0 0 0     PHQ9:       11/7/2024     3:43 PM 11/17/2024    10:25 AM 12/9/2024     6:15 AM 1/14/2025     8:01 AM 3/10/2025     9:08  AM 5/1/2025     1:54 PM 6/9/2025     7:56 AM   PHQ-9 SCORE   PHQ-9 Total Score MyChart 2 (Minimal depression) 4 (Minimal depression) 4 (Minimal depression) 2 (Minimal depression) 4 (Minimal depression) 4 (Minimal depression) 4 (Minimal depression)   PHQ-9 Total Score 2  4  4  2  4  4  4        Patient-reported     GAD2:       5/14/2024     9:32 AM 8/22/2024     9:18 AM 9/16/2024     8:17 AM 11/4/2024    10:30 AM 2/22/2025     9:06 AM 3/10/2025     9:09 AM 4/30/2025    11:49 AM   ROCKY-2   Feeling nervous, anxious, or on edge 0 1 1 0 1 1 1   Not being able to stop or control worrying 0 0 0 0 0 0 0   ROCKY-2 Total Score 0 1 1 0  1  1  1        Patient-reported     GAD7:       1/22/2024    10:13 AM 2/19/2024     1:36 PM 3/25/2024     4:52 PM 6/12/2024     9:56 PM 8/27/2024     5:53 PM 11/18/2024     8:50 AM 5/2/2025     5:35 PM   ROCKY-7 SCORE   Total Score 6 (mild anxiety) 8 (mild anxiety) 10 (moderate anxiety)       Total Score 6 8 10 3 2 2 8     CAGE-AID:       2/9/2023    11:52 AM 7/17/2023     9:10 AM   CAGE-AID Total Score   Total Score 0 0   Total Score MyChart 0 (A total score of 2 or greater is considered clinically significant)      PROMIS 10-Global Health (all questions and answers displayed):       5/14/2024     9:34 AM 8/22/2024     9:19 AM 9/16/2024     8:19 AM 11/4/2024    10:32 AM 2/22/2025     9:08 AM 3/10/2025     9:10 AM 4/30/2025    11:51 AM   PROMIS 10   In general, would you say your health is: Very good Good Good Good Good Good Good   In general, would you say your quality of life is: Very good Very good Very good Very good Very good Very good Very good   In general, how would you rate your physical health? Good Good Good Good Good Good Good   In general, how would you rate your mental health, including your mood and your ability to think? Very good Very good Very good Very good Very good Very good Good   In general, how would you rate your satisfaction with your social activities and relationships?  Very good Very good Very good Very good Good Good Good   In general, please rate how well you carry out your usual social activities and roles Good Fair Good Good Good Good Good   To what extent are you able to carry out your everyday physical activities such as walking, climbing stairs, carrying groceries, or moving a chair? Moderately Moderately Moderately Moderately A little A little Moderately   In the past 7 days, how often have you been bothered by emotional problems such as feeling anxious, depressed, or irritable? Never Rarely Rarely Rarely Rarely Rarely Sometimes   In the past 7 days, how would you rate your fatigue on average? Moderate Moderate Moderate Moderate Moderate Moderate Moderate   In the past 7 days, how would you rate your pain on average, where 0 means no pain, and 10 means worst imaginable pain? 5 5 5 4 5 5 5   In general, would you say your health is: 4 3 3 3 3 3 3   In general, would you say your quality of life is: 4 4 4 4 4 4 4   In general, how would you rate your physical health? 3 3 3 3 3 3 3   In general, how would you rate your mental health, including your mood and your ability to think? 4 4 4 4 4 4 3   In general, how would you rate your satisfaction with your social activities and relationships? 4 4 4 4 3 3 3   In general, please rate how well you carry out your usual social activities and roles. (This includes activities at home, at work and in your community, and responsibilities as a parent, child, spouse, employee, friend, etc.) 3 2 3 3 3 3 3   To what extent are you able to carry out your everyday physical activities such as walking, climbing stairs, carrying groceries, or moving a chair? 3 3 3 3 2 2 3   In the past 7 days, how often have you been bothered by emotional problems such as feeling anxious, depressed, or irritable? 1 2 2 2 2 2 3   In the past 7 days, how would you rate your fatigue on average? 3 3 3 3 3 3 3   In the past 7 days, how would you rate your pain on  average, where 0 means no pain, and 10 means worst imaginable pain? 5 5 5 4 5 5 5   Global Mental Health Score 17 16 16 16 15  15  13    Global Physical Health Score 12 12 12 12 11  11  12    PROMIS TOTAL - SUBSCORES 29 28 28 28 26  26  25        Patient-reported     PROMIS 10-Global Health (only subscores and total score):       5/14/2024     9:34 AM 8/22/2024     9:19 AM 9/16/2024     8:19 AM 11/4/2024    10:32 AM 2/22/2025     9:08 AM 3/10/2025     9:10 AM 4/30/2025    11:51 AM   PROMIS-10 Scores Only   Global Mental Health Score 17 16 16 16 15  15  13    Global Physical Health Score 12 12 12 12 11  11  12    PROMIS TOTAL - SUBSCORES 29 28 28 28 26  26  25        Patient-reported     [place] Suicide Severity Rating Scale (Short Version)      7/17/2023     9:09 AM 9/13/2023     6:03 AM 12/6/2023     1:21 AM 2/16/2024    12:38 PM 4/13/2024     9:13 AM 5/24/2024     6:44 PM 10/19/2024     8:27 PM   [place] Suicide Severity Rating (Short Version)   Over the past 2 weeks have you felt down, depressed, or hopeless?  yes no no      Over the past 2 weeks have you had thoughts of killing yourself?  no no no      Have you ever attempted to kill yourself?  no no no      Q1 Wished to be Dead (Past Month)     0-->no 0-->no 0-->no   Q2 Suicidal Thoughts (Past Month)     0-->no 0-->no 0-->no   Q6 Suicide Behavior (Lifetime)     0-->no 0-->no 0-->no   Level of Risk per Screen     no risks indicated no risks indicated no risks indicated   1. Wish to be Dead (Since Last Contact) N         2. Non-Specific Active Suicidal Thoughts (Since Last Contact) N         Actual Attempt (Since Last Contact) N         Has subject engaged in non-suicidal self-injurious behavior? (Since Last Contact) N         Interrupted Attempts (Since Last Contact) N         Aborted or Self-Interrupted Attempt (Since Last Contact) N         Preparatory Acts or Behavior (Since Last Contact) N         Suicide (Since Last Contact) N         Calculated C-SSRS  "Risk Score (Since Last Contact) No Risk Indicated               ASSESSMENT: Current Emotional / Mental Status (status of significant symptoms):   Risk status (Self / Other harm or suicidal ideation)   Patient denies current fears or concerns for personal safety.   Patient denies current or recent suicidal ideation or behaviors.   Patient denies current or recent homicidal ideation or behaviors.   Patient denies current or recent self injurious behavior or ideation.   Patient denies other safety concerns.   Patient reports there has been no change in risk factors since their last session.     Patient reports there has been no change in protective factors since their last session.     Recommended that patient call 911 or go to the local ED should there be a change in any of these risk factors   Would call [phone] if any SI.     Appearance:   Appropriate    Eye Contact:   Good    Psychomotor Behavior: Normal    Attitude:   Cooperative    Orientation:   Person Place Time Situation   Speech    Rate / Production: Normal/ Responsive    Volume:  Normal    Mood:    Anxious  Depressed  Irritable    Affect:    Appropriate    Thought Content:  Clear    Thought Form:  Coherent  Logical    Insight:    Good      Medication Review:   No changes to current psychiatric medication(s)     Medication Compliance:   Yes     Changes in Health Issues:   None reported     Chemical Use Review:   Substance Use: Chemical use reviewed, no active concerns identified      Tobacco Use: No current tobacco use.      Diagnosis:  1. ROCKY (generalized anxiety disorder)      Collateral Reports Completed:   Not Applicable    PLAN: (Patient Tasks / Therapist Tasks / Other):  Patient will use the tips to keep her body temperature back to normal  Patient will watch Мария Davis, \" how to make stress your friend\"  Patient's next visit is in 2 weeks.   GERMAN Farmer         " "  ______________________________________________________________________________    Individual Treatment Plan    Patient's Name: Sherly Yeung  YOB: 1965    Date of Creation: 2/23/2023    Date Treatment Plan Last Reviewed/Revised: 5/02/202    DSM5 Diagnoses: 296.32 (F33.1) Major Depressive Disorder, Recurrent Episode, Moderate With anxious distress or 300.02 (F41.1) Generalized Anxiety Disorder  Grief reaction [F43.21]    Psychosocial / Contextual Factors: grief: father recently passed away. Family dynamic- relationship with son, mom. Feeling overwhelmed.    PROMIS (reviewed every 90 days): 25    Referral / Collaboration:    Referral to another professional/service is not indicated at this time..    Anticipated number of session for this episode of care: 9-12 sessions  Anticipation frequency of session: Biweekly  Anticipated Duration of each session: 38-52 minutes  Treatment plan will be reviewed in 90 days or when goals have been changed.     MeasurableTreatment Goal(s) related to diagnosis / functional impairment(s)    Goal 1: Patient will Accept the loss of beloved one and return to stable level of functioning as evidenced by a higher level of functioning as a result of acceptance.   Redevelop a supportive social system and improve interpersonal relationships and Express unresolved emotions regarding loss which brings anxiety and depression mood.      I will know I've met my goal when I have more energy and  I am able to celebrate good life with my father Vs the sadness of losing him.Redevelop a supportive social system and improve interpersonal relationships\"    Objective #A (Patient Action)    Patient will Identify negative self-talk and behaviors: challenge core beliefs, myths, and actions.  Status: Continued - Date(s):    5/02/2025    Intervention(s)  Therapist will teach emotional recognition/identification. Support in her in the process    Objective #B  Patient will identify at least 3 fears " / thoughts that contribute to feeling anxious.  Status: Continued - Date(s):    1/14/2025    Intervention(s)  Therapist will teach thought challenging with CBT.    Objective #C  Patient will Identify negative self-talk and behaviors: challenge core beliefs, myths, and actions.  Status: Continued - Date(s):    5/02/2025    Intervention(s)  Therapist will provide space and time to process thoughts and feelings around current stressors. provide support and coping skills to help move on in life.    Patient has reviewed and agreed to the above plan.    DEBI Farmer  May 2nd,2025    Answers for HPI/ROS submitted by the patient on 4/20/2023  If you checked off any problems, how difficult have these problems made it for you to do your work, take care of things at home, or get along with other people?: Somewhat difficult  PHQ9 TOTAL SCORE: 5    Answers for HPI/ROS submitted by the patient on 4/26/2023  If you checked off any problems, how difficult have these problems made it for you to do your work, take care of things at home, or get along with other people?: Somewhat difficult  PHQ9 TOTAL SCORE: 3    Answers for HPI/ROS submitted by the patient on 5/4/2023  If you checked off any problems, how difficult have these problems made it for you to do your work, take care of things at home, or get along with other people?: Somewhat difficult  PHQ9 TOTAL SCORE: 4  Answers for HPI/ROS submitted by the patient on 7/17/2023  If you checked off any problems, how difficult have these problems made it for you to do your work, take care of things at home, or get along with other people?: Somewhat difficult  PHQ9 TOTAL SCORE: 5    Answers submitted by the patient for this visit:  Patient Health Questionnaire (Submitted on 7/30/2023)  If you checked off any problems, how difficult have these problems made it for you to do your work, take care of things at home, or get along with other people?: Somewhat difficult  PHQ9 TOTAL  SCORE: 5  Answers submitted by the patient for this visit:  Patient Health Questionnaire (Submitted on 8/20/2023)  If you checked off any problems, how difficult have these problems made it for you to do your work, take care of things at home, or get along with other people?: Somewhat difficult  PHQ9 TOTAL SCORE: 5  Answers submitted by the patient for this visit:  Patient Health Questionnaire (Submitted on 9/18/2023)  If you checked off any problems, how difficult have these problems made it for you to do your work, take care of things at home, or get along with other people?: Somewhat difficult  PHQ9 TOTAL SCORE: 4  Answers submitted by the patient for this visit:  Patient Health Questionnaire (Submitted on 10/2/2023)  If you checked off any problems, how difficult have these problems made it for you to do your work, take care of things at home, or get along with other people?: Somewhat difficult  PHQ9 TOTAL SCORE: 5    Answers submitted by the patient for this visit:  Patient Health Questionnaire (Submitted on 10/15/2023)  If you checked off any problems, how difficult have these problems made it for you to do your work, take care of things at home, or get along with other people?: Somewhat difficult  PHQ9 TOTAL SCORE: 6  ROCKY-7 (Submitted on 10/10/2023)  ROCKY 7 TOTAL SCORE: 5    Answers submitted by the patient for this visit:  Patient Health Questionnaire (Submitted on 11/16/2023)  If you checked off any problems, how difficult have these problems made it for you to do your work, take care of things at home, or get along with other people?: Somewhat difficult  PHQ9 TOTAL SCORE: 4    Answers submitted by the patient for this visit:  Patient Health Questionnaire (Submitted on 11/28/2023)  If you checked off any problems, how difficult have these problems made it for you to do your work, take care of things at home, or get along with other people?: Somewhat difficult  PHQ9 TOTAL SCORE: 5    Answers submitted by the  patient for this visit:  Patient Health Questionnaire (Submitted on 12/13/2023)  If you checked off any problems, how difficult have these problems made it for you to do your work, take care of things at home, or get along with other people?: Somewhat difficult  PHQ9 TOTAL SCORE: 5    Answers submitted by the patient for this visit:  Patient Health Questionnaire (Submitted on 12/26/2023)  If you checked off any problems, how difficult have these problems made it for you to do your work, take care of things at home, or get along with other people?: Somewhat difficult  PHQ9 TOTAL SCORE: 5    Answers submitted by the patient for this visit:  Patient Health Questionnaire (Submitted on 1/10/2024)  If you checked off any problems, how difficult have these problems made it for you to do your work, take care of things at home, or get along with other people?: Somewhat difficult  PHQ9 TOTAL SCORE: 5  ROCKY-7 (Submitted on 1/7/2024)  ROCKY 7 TOTAL SCORE: 5    Answers submitted by the patient for this visit:  Patient Health Questionnaire (Submitted on 1/25/2024)  If you checked off any problems, how difficult have these problems made it for you to do your work, take care of things at home, or get along with other people?: Very difficult  PHQ9 TOTAL SCORE: 8  ROCKY-7 (Submitted on 1/22/2024)  ROCKY 7 TOTAL SCORE: 6    Answers submitted by the patient for this visit:  Patient Health Questionnaire (Submitted on 2/21/2024)  If you checked off any problems, how difficult have these problems made it for you to do your work, take care of things at home, or get along with other people?: Very difficult  PHQ9 TOTAL SCORE: 16  ROCKY-7 (Submitted on 2/19/2024)  ROCKY 7 TOTAL SCORE: 8    Answers submitted by the patient for this visit:  Patient Health Questionnaire (Submitted on 4/22/2024)  If you checked off any problems, how difficult have these problems made it for you to do your work, take care of things at home, or get along with other people?:  Somewhat difficult  PHQ9 TOTAL SCORE: 2    Answers submitted by the patient for this visit:  Patient Health Questionnaire (Submitted on 5/6/2024)  If you checked off any problems, how difficult have these problems made it for you to do your work, take care of things at home, or get along with other people?: Somewhat difficult  PHQ9 TOTAL SCORE: 1    Answers submitted by the patient for this visit:  Patient Health Questionnaire (Submitted on 5/21/2024)  If you checked off any problems, how difficult have these problems made it for you to do your work, take care of things at home, or get along with other people?: Not difficult at all  PHQ9 TOTAL SCORE: 1    Answers submitted by the patient for this visit:  Patient Health Questionnaire (Submitted on 6/11/2024)  If you checked off any problems, how difficult have these problems made it for you to do your work, take care of things at home, or get along with other people?: Somewhat difficult  PHQ9 TOTAL SCORE: 1    Answers submitted by the patient for this visit:  Patient Health Questionnaire (Submitted on 8/26/2024)  If you checked off any problems, how difficult have these problems made it for you to do your work, take care of things at home, or get along with other people?: Somewhat difficult  PHQ9 TOTAL SCORE: 3    Answers submitted by the patient for this visit:  Patient Health Questionnaire (Submitted on 9/16/2024)  If you checked off any problems, how difficult have these problems made it for you to do your work, take care of things at home, or get along with other people?: Somewhat difficult  PHQ9 TOTAL SCORE: 4    Answers submitted by the patient for this visit:  Patient Health Questionnaire (Submitted on 10/21/2024)  If you checked off any problems, how difficult have these problems made it for you to do your work, take care of things at home, or get along with other people?: Very difficult  PHQ9 TOTAL SCORE: 4    Answers submitted by the patient for this  visit:  Patient Health Questionnaire (Submitted on 11/17/2024)  If you checked off any problems, how difficult have these problems made it for you to do your work, take care of things at home, or get along with other people?: Somewhat difficult  PHQ9 TOTAL SCORE: 4    Answers submitted by the patient for this visit:  Patient Health Questionnaire (Submitted on 12/9/2024)  If you checked off any problems, how difficult have these problems made it for you to do your work, take care of things at home, or get along with other people?: Somewhat difficult  PHQ9 TOTAL SCORE: 4    Answers submitted by the patient for this visit:  Patient Health Questionnaire (Submitted on 1/14/2025)  If you checked off any problems, how difficult have these problems made it for you to do your work, take care of things at home, or get along with other people?: Somewhat difficult  PHQ9 TOTAL SCORE: 2    Answers submitted by the patient for this visit:  Patient Health Questionnaire (Submitted on 5/1/2025)  If you checked off any problems, how difficult have these problems made it for you to do your work, take care of things at home, or get along with other people?: Somewhat difficult  PHQ9 TOTAL SCORE: 4    Answers submitted by the patient for this visit:  Patient Health Questionnaire (Submitted on 6/9/2025)  If you checked off any problems, how difficult have these problems made it for you to do your work, take care of things at home, or get along with other people?: Somewhat difficult  PHQ9 TOTAL SCORE: 4

## 2025-06-19 DIAGNOSIS — K21.9 GASTROESOPHAGEAL REFLUX DISEASE WITHOUT ESOPHAGITIS: Primary | ICD-10-CM

## 2025-06-19 DIAGNOSIS — R06.02 SHORTNESS OF BREATH: ICD-10-CM

## 2025-06-22 RX ORDER — FAMOTIDINE 20 MG/1
20 TABLET, FILM COATED ORAL 2 TIMES DAILY
Qty: 60 TABLET | Refills: 5 | Status: SHIPPED | OUTPATIENT
Start: 2025-06-22

## 2025-06-25 ENCOUNTER — TELEPHONE (OUTPATIENT)
Dept: PSYCHIATRY | Facility: CLINIC | Age: 60
End: 2025-06-25
Payer: MEDICARE

## 2025-06-25 NOTE — TELEPHONE ENCOUNTER
[facility]    Phone Message    May a detailed message be left on voicemail: yes     Reason for Call: Medication Refill Request    Has the patient contacted the pharmacy for the refill? Yes     Name of medication being requested:  Lorazepam 0.5 MG     Provider who prescribed the medication: Tatum Mason     Pharmacy: [facility] [id number]   [address] [facility] [id number]     Date medication is needed: ASAP (Patient stated she is currently out of medication)       Action Taken: Other: [facility]     Travel Screening: Not Applicable

## 2025-06-29 ENCOUNTER — MYC MEDICAL ADVICE (OUTPATIENT)
Dept: PULMONOLOGY | Facility: CLINIC | Age: 60
End: 2025-06-29
Payer: MEDICARE

## 2025-06-29 DIAGNOSIS — R06.09 DYSPNEA ON EXERTION: ICD-10-CM

## 2025-06-29 DIAGNOSIS — R09.02 HYPOXIA: Primary | ICD-10-CM

## 2025-06-30 NOTE — TELEPHONE ENCOUNTER
LOV 11/7/25:  Recommendations as follows:  - Continue supplemental O2 with sleep/exertion  - continue symbicort vs. Advair bid, albuterol prn; neb machine ordered   - aerobika  - Start Protonix in place of Prevacid; continue famotidine twice daily. Referral to GI also placed.   - Consider pulmonary rehab  - Repeat pulmonary function testing, chest CT and echo  - try to increase regular activity with a goal of at least 3 sessions/week of 30 minutes of cardiovascular exercise; start slow and work up to this goal  -Stay up-to-date with yearly influenza vaccines, pneumonia series (prevnar 13 and pneumovax), and Covid shots     RTC in about 5 months.       Advised to go to the ED to be evaluated if they are still having symptoms and dipping oxygen saturations. Sent mychart to pt to get more information before routing to Dr. Gonsales.    Pt has appt with Dr. Gonsales 7/3. Advised to go to ED or PCP to be evaluated since not at baseline oxygen use.      Radha Mcdaniel RN

## 2025-07-01 ENCOUNTER — LAB (OUTPATIENT)
Dept: LAB | Facility: CLINIC | Age: 60
End: 2025-07-01
Payer: COMMERCIAL

## 2025-07-01 ENCOUNTER — HOSPITAL ENCOUNTER (OUTPATIENT)
Dept: GENERAL RADIOLOGY | Facility: CLINIC | Age: 60
Discharge: HOME | End: 2025-07-01
Attending: INTERNAL MEDICINE
Payer: MEDICARE

## 2025-07-01 DIAGNOSIS — R09.02 HYPOXIA: ICD-10-CM

## 2025-07-01 DIAGNOSIS — R60.9 EDEMA: ICD-10-CM

## 2025-07-01 DIAGNOSIS — N18.2 HYPERTENSIVE HEART AND CHRONIC KIDNEY DISEASE STAGE 2: Primary | ICD-10-CM

## 2025-07-01 DIAGNOSIS — R06.09 DYSPNEA ON EXERTION: ICD-10-CM

## 2025-07-01 DIAGNOSIS — I13.10 HYPERTENSIVE HEART AND CHRONIC KIDNEY DISEASE STAGE 2: Primary | ICD-10-CM

## 2025-07-01 LAB
ANION GAP SERPL CALCULATED.3IONS-SCNC: 14 MMOL/L (ref 7–15)
BUN SERPL-MCNC: 14.7 MG/DL (ref 8–23)
CALCIUM SERPL-MCNC: 9.5 MG/DL (ref 8.8–10.4)
CHLORIDE SERPL-SCNC: 100 MMOL/L (ref 98–107)
CREAT SERPL-MCNC: 1.02 MG/DL (ref 0.51–0.95)
EGFRCR SERPLBLD CKD-EPI 2021: 63 ML/MIN/1.73M2
GLUCOSE SERPL-MCNC: 106 MG/DL (ref 70–99)
HCO3 SERPL-SCNC: 25 MMOL/L (ref 22–29)
POTASSIUM SERPL-SCNC: 4.2 MMOL/L (ref 3.4–5.3)
SODIUM SERPL-SCNC: 139 MMOL/L (ref 135–145)

## 2025-07-01 PROCEDURE — 71046 X-RAY EXAM CHEST 2 VIEWS: CPT

## 2025-07-01 PROCEDURE — 36415 COLL VENOUS BLD VENIPUNCTURE: CPT

## 2025-07-01 PROCEDURE — 80048 BASIC METABOLIC PNL TOTAL CA: CPT

## 2025-07-03 ENCOUNTER — OFFICE VISIT (OUTPATIENT)
Dept: PULMONOLOGY | Facility: CLINIC | Age: 60
End: 2025-07-03
Payer: MEDICARE

## 2025-07-03 VITALS
OXYGEN SATURATION: 98 % | SYSTOLIC BLOOD PRESSURE: 127 MMHG | HEART RATE: 98 BPM | DIASTOLIC BLOOD PRESSURE: 91 MMHG | RESPIRATION RATE: 18 BRPM

## 2025-07-03 DIAGNOSIS — J47.9 BRONCHIECTASIS WITHOUT COMPLICATION (H): ICD-10-CM

## 2025-07-03 DIAGNOSIS — J98.4 RESTRICTIVE LUNG DISEASE: ICD-10-CM

## 2025-07-03 DIAGNOSIS — J96.11 CHRONIC HYPOXIC RESPIRATORY FAILURE (H): ICD-10-CM

## 2025-07-03 DIAGNOSIS — R06.09 DYSPNEA ON EXERTION: ICD-10-CM

## 2025-07-03 DIAGNOSIS — K21.9 GASTROESOPHAGEAL REFLUX DISEASE WITHOUT ESOPHAGITIS: Primary | ICD-10-CM

## 2025-07-03 DIAGNOSIS — J96.11 CHRONIC RESPIRATORY FAILURE WITH HYPOXIA (H): ICD-10-CM

## 2025-07-03 PROCEDURE — 99215 OFFICE O/P EST HI 40 MIN: CPT | Performed by: INTERNAL MEDICINE

## 2025-07-03 PROCEDURE — 3080F DIAST BP >= 90 MM HG: CPT | Performed by: INTERNAL MEDICINE

## 2025-07-03 PROCEDURE — 3074F SYST BP LT 130 MM HG: CPT | Performed by: INTERNAL MEDICINE

## 2025-07-03 RX ORDER — LORATADINE 10 MG/1
10 TABLET ORAL DAILY
Qty: 30 TABLET | Refills: 5 | Status: SHIPPED | OUTPATIENT
Start: 2025-07-03

## 2025-07-03 RX ORDER — OMEPRAZOLE 40 MG/1
40 CAPSULE, DELAYED RELEASE ORAL DAILY
Qty: 60 CAPSULE | Refills: 5 | Status: SHIPPED | OUTPATIENT
Start: 2025-07-03

## 2025-07-03 RX ORDER — DOXYCYCLINE 100 MG/1
100 CAPSULE ORAL 2 TIMES DAILY
Qty: 20 CAPSULE | Refills: 0 | Status: SHIPPED | OUTPATIENT
Start: 2025-07-03

## 2025-07-03 NOTE — PATIENT INSTRUCTIONS
"Marcia,    It was nice seeing you!     Recommendations as follows:   - start doxycycline   - continue therapies  - keep using your aerobika2-3x/day, more when sick   - start loratidine   - referral to GI; change protonix to omeprazole   - repeat chest CT in 6-8 weeks   - Please call the pulmonary clinic with any questions. The pulmonary clinic number is: [phone], press 2 for \"specialty clinic\" and follow the prompt.     Stay up to date with vaccinations including your yearly flu vaccine. Wash your hands regularly. Consider wearing a mask in crowded places to reduce the risk of respiratory illnesses. I recommend that you receive the COVID-19 vaccine and stay up to date with boosters.     Have a nice summer and stay well! Please let me know if you have questions or concerns.     Brenna Gonsales MD  Pulmonary, Allergy, Critical Care, and Sleep Medicine   [facility]        "

## 2025-07-03 NOTE — PROGRESS NOTES
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 fibrosis. This is  associated with some bronchiectasis at the left apex. No acute  airspace disease, or other acute abnormality identified.  2.  Additional stable findings as above.      CTPE - 5/1/23 - personally reviewed and I agree with the radiologist's read of: FINDINGS:  ANGIOGRAM CHEST: No evidence of pulmonary embolism.     LUNGS AND PLEURA: No pleural effusion or pneumothorax. Bibasilar pulmonary opacities, likely atelectasis. Anterior and apical left upper lobe area of bronchiectatic changes appears similar to prior.     MEDIASTINUM/AXILLAE: No cardiomegaly or significant pericardial effusion. Multiple prominent, but not significantly enlarged, mediastinal lymph nodes, likely reactive. There is right brachiocephalic/subclavian/axillary/brachial vein stents with no   luminal opacification, could be related to the site of injection. Enlarged venous collateral in the anterior right chest wall.     CORONARY ARTERY CALCIFICATION: Mild.     UPPER ABDOMEN: Limited evaluation of the upper abdomen due to lack of coverage and timing of contrast. Right upper quadrant post cholecystectomy clips.     MUSCULOSKELETAL: Pectus excavatum deformity.  Postsurgical changes of right first rib partial resection.                                                                      IMPRESSION:  1.  No evidence of pulmonary embolism.  2.  Post procedural changes of right brachiocephalic/subclavian/axillary/brachial vein stents with no luminal opacification, could be related to the site of injection or might be related to stent occlusion, can be further evaluated with CT angiogram if   clinically warranted.  3.  Anterior and apical left upper lobe area of bronchiectatic changes appears similar to prior.    CT chest w/o contrast - 5/24/24 - personally reviewed and I agree with radiologist read of: FINDINGS:   LUNGS AND PLEURA: Improved lung aeration. Stable fibrosis and bronchiectasis involving anterior left upper lobe and posterior right lower lobe. No new focal pneumonia or pleural effusion.     MEDIASTINUM/AXILLAE: No lymphadenopathy. No thoracic aortic aneurysm. Stable appearance of right subclavian and innominate venous stent.     CORONARY ARTERY CALCIFICATION: None.     UPPER ABDOMEN: Prior cholecystectomy. Punctate nonobstructing stone upper pole left kidney.     MUSCULOSKELETAL: Pectus excavatum configuration of low sternum unchanged. Surgical wires about the right sternoclavicular joint unchanged.                                                                      IMPRESSION:   1.  No etiology for patient's new symptoms. Significant scarring present within both lungs, overall lung volume and aeration has improved.    Chest x-ray-10/19/2024-personally reviewed and I agree the radiologist read of:MPRESSION: Heart is unchanged in size. Vascular stents and surgical changes of the right subclavian and brachiocephalic vessels are again noted, unchanged. There is mild central pulmonary venous congestion, improved from prior exam with scarring and   atelectasis seen in the right mid and lower lung zone.   right subclavian and innominate venous stent.     CORONARY ARTERY CALCIFICATION: None.     UPPER ABDOMEN: Prior cholecystectomy. Punctate nonobstructing stone upper pole left kidney.     MUSCULOSKELETAL: Pectus excavatum configuration of low sternum unchanged. Surgical wires about the right sternoclavicular joint unchanged.                                                                      IMPRESSION:   1.  No etiology for patient's new symptoms. Significant scarring present within both lungs, overall lung volume and aeration has improved.    Chest x-ray-10/19/2024-personally reviewed and I agree the radiologist read of:MPRESSION: Heart is unchanged in size. Vascular stents and surgical changes of the right subclavian and brachiocephalic vessels are again noted, unchanged. There is mild central pulmonary venous congestion, improved from prior exam with scarring and   atelectasis seen in the right mid and lower lung zone.    CXR - 7/1/25 - personally reviewed and I agree with the radiologist read of:  IMPRESSION: Unchanged elevation right hemidiaphragm. Interval increase in interstitial opacities both lung bases which could be due to a new acute interstitial pneumonitis. These are superimposed upon chronic fibrotic changes. Mild cardiac enlargement.   Normal pulmonary vascularity. Surgical changes project over the right upper hemithorax with vascular stent. Surgical clips right upper quadrant. Degenerative changes in the spine.    2010 - fluoroscopy - FINDINGS: Static image shows elevation of the right hemidiaphragm   compared to the left. There is limited motion of the right   hemidiaphragm with both inspiration and expiration. Sniff test shows   markedly limited mobility of the right hemidiaphragm compared to the   contralateral side. No paradoxical motion demonstrated.      IMPRESSION: Right hemidiaphragm paralysis. Normal motion of the left   hemidiaphragm

## 2025-07-03 NOTE — NURSING NOTE
"Initial BP (!) 127/91 (BP Location: Left arm, Patient Position: Chair, Cuff Size: Adult Regular)   Pulse 98   Resp 18   LMP 08/22/2017 (Approximate)   SpO2 98%  Estimated body mass index is 25.69 kg/m  as calculated from the following:    Height as of 4/17/25: 1.6 m (5' 3\").    Weight as of 4/17/25: 65.8 kg (145 lb). .  Patient is here in clinic for a recheck states is sob. Had a chest xray.  zahra narayan LPN    "

## 2025-07-09 ENCOUNTER — PATIENT OUTREACH (OUTPATIENT)
Dept: CARE COORDINATION | Facility: CLINIC | Age: 60
End: 2025-07-09
Payer: MEDICARE

## 2025-07-10 DIAGNOSIS — R05.1 ACUTE COUGH: Primary | ICD-10-CM

## 2025-07-12 ENCOUNTER — HEALTH MAINTENANCE LETTER (OUTPATIENT)
Age: 60
End: 2025-07-12

## 2025-07-13 ASSESSMENT — PATIENT HEALTH QUESTIONNAIRE - PHQ9
10. IF YOU CHECKED OFF ANY PROBLEMS, HOW DIFFICULT HAVE THESE PROBLEMS MADE IT FOR YOU TO DO YOUR WORK, TAKE CARE OF THINGS AT HOME, OR GET ALONG WITH OTHER PEOPLE: SOMEWHAT DIFFICULT
SUM OF ALL RESPONSES TO PHQ QUESTIONS 1-9: 1
SUM OF ALL RESPONSES TO PHQ QUESTIONS 1-9: 1

## 2025-07-14 ENCOUNTER — VIRTUAL VISIT (OUTPATIENT)
Dept: PSYCHOLOGY | Facility: CLINIC | Age: 60
End: 2025-07-14
Payer: MEDICARE

## 2025-07-14 DIAGNOSIS — F41.1 GAD (GENERALIZED ANXIETY DISORDER): Primary | ICD-10-CM

## 2025-07-14 PROCEDURE — 90837 PSYTX W PT 60 MINUTES: CPT | Mod: 95 | Performed by: SOCIAL WORKER

## 2025-07-14 ASSESSMENT — PATIENT HEALTH QUESTIONNAIRE - PHQ9: 5. POOR APPETITE OR OVEREATING: SEVERAL DAYS

## 2025-07-14 ASSESSMENT — ANXIETY QUESTIONNAIRES
2. NOT BEING ABLE TO STOP OR CONTROL WORRYING: SEVERAL DAYS
6. BECOMING EASILY ANNOYED OR IRRITABLE: SEVERAL DAYS
GAD7 TOTAL SCORE: 7
3. WORRYING TOO MUCH ABOUT DIFFERENT THINGS: SEVERAL DAYS
GAD7 TOTAL SCORE: 7
5. BEING SO RESTLESS THAT IT IS HARD TO SIT STILL: SEVERAL DAYS
7. FEELING AFRAID AS IF SOMETHING AWFUL MIGHT HAPPEN: NOT AT ALL
1. FEELING NERVOUS, ANXIOUS, OR ON EDGE: MORE THAN HALF THE DAYS
IF YOU CHECKED OFF ANY PROBLEMS ON THIS QUESTIONNAIRE, HOW DIFFICULT HAVE THESE PROBLEMS MADE IT FOR YOU TO DO YOUR WORK, TAKE CARE OF THINGS AT HOME, OR GET ALONG WITH OTHER PEOPLE: SOMEWHAT DIFFICULT

## 2025-07-15 RX ORDER — MONTELUKAST SODIUM 10 MG/1
1 TABLET ORAL AT BEDTIME
Qty: 30 TABLET | Refills: 5 | Status: SHIPPED | OUTPATIENT
Start: 2025-07-15

## 2025-07-16 ENCOUNTER — VIRTUAL VISIT (OUTPATIENT)
Dept: PSYCHIATRY | Facility: CLINIC | Age: 60
End: 2025-07-16
Attending: PSYCHIATRY & NEUROLOGY
Payer: MEDICARE

## 2025-07-16 DIAGNOSIS — F33.41 RECURRENT MAJOR DEPRESSIVE DISORDER, IN PARTIAL REMISSION: Primary | ICD-10-CM

## 2025-07-16 DIAGNOSIS — F41.1 GENERALIZED ANXIETY DISORDER: ICD-10-CM

## 2025-07-16 DIAGNOSIS — F40.01 PANIC DISORDER WITH AGORAPHOBIA: ICD-10-CM

## 2025-07-16 PROCEDURE — 1125F AMNT PAIN NOTED PAIN PRSNT: CPT | Mod: 95

## 2025-07-16 PROCEDURE — 90792 PSYCH DIAG EVAL W/MED SRVCS: CPT | Mod: 95

## 2025-07-16 RX ORDER — METHYLPHENIDATE HYDROCHLORIDE 40 MG/1
CAPSULE, EXTENDED RELEASE ORAL
Qty: 30 CAPSULE | Refills: 0 | Status: SHIPPED | OUTPATIENT
Start: 2025-09-01

## 2025-07-16 RX ORDER — METHYLPHENIDATE HYDROCHLORIDE 40 MG/1
CAPSULE, EXTENDED RELEASE ORAL
Qty: 30 CAPSULE | Refills: 0 | Status: SHIPPED | OUTPATIENT
Start: 2025-08-02

## 2025-07-16 RX ORDER — METHYLPHENIDATE HYDROCHLORIDE 40 MG/1
CAPSULE, EXTENDED RELEASE ORAL
Qty: 30 CAPSULE | Refills: 0 | Status: CANCELLED | OUTPATIENT
Start: 2025-07-16

## 2025-07-16 RX ORDER — DULOXETIN HYDROCHLORIDE 60 MG/1
120 CAPSULE, DELAYED RELEASE ORAL EVERY EVENING
Qty: 180 CAPSULE | Refills: 1 | Status: SHIPPED | OUTPATIENT
Start: 2025-07-16

## 2025-07-16 RX ORDER — LORAZEPAM 0.5 MG/1
0.5 TABLET ORAL DAILY PRN
Qty: 10 TABLET | Refills: 1 | Status: SHIPPED | OUTPATIENT
Start: 2025-07-26

## 2025-07-16 RX ORDER — METHYLPHENIDATE HYDROCHLORIDE 40 MG/1
CAPSULE, EXTENDED RELEASE ORAL
Qty: 30 CAPSULE | Refills: 0 | Status: SHIPPED | OUTPATIENT
Start: 2025-10-01

## 2025-07-16 RX ORDER — METHYLPHENIDATE HYDROCHLORIDE 20 MG/1
CAPSULE, EXTENDED RELEASE ORAL
Qty: 30 CAPSULE | Refills: 0 | Status: SHIPPED | OUTPATIENT
Start: 2025-10-01

## 2025-07-16 RX ORDER — METHYLPHENIDATE HYDROCHLORIDE 20 MG/1
CAPSULE, EXTENDED RELEASE ORAL
Qty: 30 CAPSULE | Refills: 0 | Status: CANCELLED | OUTPATIENT
Start: 2025-07-16

## 2025-07-16 RX ORDER — ARIPIPRAZOLE 5 MG/1
5 TABLET ORAL DAILY
Qty: 90 TABLET | Refills: 1 | Status: SHIPPED | OUTPATIENT
Start: 2025-07-16

## 2025-07-16 RX ORDER — METHYLPHENIDATE HYDROCHLORIDE 20 MG/1
CAPSULE, EXTENDED RELEASE ORAL
Qty: 30 CAPSULE | Refills: 0 | Status: SHIPPED | OUTPATIENT
Start: 2025-08-02

## 2025-07-16 RX ORDER — METHYLPHENIDATE HYDROCHLORIDE 20 MG/1
CAPSULE, EXTENDED RELEASE ORAL
Qty: 30 CAPSULE | Refills: 0 | Status: SHIPPED | OUTPATIENT
Start: 2025-09-01

## 2025-07-16 ASSESSMENT — PAIN SCALES - GENERAL: PAINLEVEL_OUTOF10: MILD PAIN (2)

## 2025-07-16 NOTE — PATIENT INSTRUCTIONS
Hi, Nice meeting you!    Please discontinue clonidine and keep the rest of your medications as is.     **For crisis resources, please see the information at the end of this document**   Patient Education    Thank you for coming to the [facility] [place] [facility].     Lab Testing:  If you had lab testing today and your results are reassuring or normal they will be mailed to you or sent through [employer] within 7 days. If the lab tests need quick action we will call you with the results. The phone number we will call with results is # [phone]. If this is not the best number please call our clinic and change the number.     Medication Refills:  If you need any refills please call your pharmacy and they will contact us. Our fax number for refills is [phone].   Three business days of notice are needed for general medication refill requests.   Five business days of notice are needed for controlled substance refill requests.   If you need to change to a different pharmacy, please contact the new pharmacy directly. The new pharmacy will help you get your medications transferred.     Contact Us:  Please call [phone] during business hours (8-5:00 M-F).   If you have medication related questions after clinic hours, or on the weekend, please call [phone].     Financial Assistance [phone]   Medical Records [phone]       MENTAL HEALTH CRISIS RESOURCES:  For a emergency help, please call 911 or go to the nearest Emergency Department.     Emergency Walk-In Options:   [facility] @ [facility] ([place]): [phone] - Specialized mental health emergency area designed to be calming  [facility] ([place]): [phone]  [facility] ([place]): [phone]  [facility]): [phone]    [facility] Crisis Information:   [place]: [phone]  [place]: [phone]  [place] ([place]) - Adult: [phone]      Child: [phone]  [place] - Adult: [phone]     Child: [phone]  [place]: [phone]  List of all [facility] resources:   https://[place].gov/dhs/people-we-serve/adults/health-care/mental-health/resources/crisis-contacts.jsp    National Crisis Information:   Crisis Text Line: Text  [place]  to 925370  Suicide & Crisis Lifeline: 988  National Suicide Prevention Lifeline: 2-419-127-TALK (1-528.693.3487)       For online chat options, visit https://suicidepreventionlifeline.org/chat/  Poison Control Center: 9-168-047-3486  Trans Lifeline: 1-258.839.5569 - Hotline for transgender people of all ages  [employer]: 2-151-296-4358 - Hotline for LGBT youth     For Non-Emergency Support:   Fast Tracker: Mental Health & Substance Use Disorder Resources -   https://www.[employer].org/

## 2025-07-16 NOTE — NURSING NOTE
Is the patient currently in the state of [place]? YES    Current patient location: [facility] [address]  [facility] [address]    Visit mode:Video    If the visit is dropped, the patient can be reconnected by: VIDEO VISIT: Text to cell phone:   Telephone Information:   Mobile [phone]       Will anyone else be joining the visit? No  (If patient encounters technical issues they should call [phone])    Are changes needed to the allergy or medication list? Yes pt flagged meds for removal    Are refills needed on medications prescribed by this physician? Yes    Rooming Documentation: Questionnaire(s) completed.    Reason for visit: RECHECK     TIAGO Jang

## 2025-07-16 NOTE — PROGRESS NOTES
Virtual Visit Details    Type of service:  Video Visit   Video Start Time: 9:05 AM  Video End Time:10:05    Originating Location (pt. Location): Home    Distant Location (provider location):  On-site  Platform used for Video Visit: Kourtney
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
## 2025-07-28 ENCOUNTER — TELEPHONE (OUTPATIENT)
Dept: SCHEDULING | Facility: CLINIC | Age: 60
End: 2025-07-28
Payer: MEDICARE

## 2025-07-29 ENCOUNTER — E-VISIT (OUTPATIENT)
Dept: URGENT CARE | Facility: CLINIC | Age: 60
End: 2025-07-29
Payer: MEDICARE

## 2025-07-29 DIAGNOSIS — B96.89 ACUTE BACTERIAL SINUSITIS: Primary | ICD-10-CM

## 2025-07-29 DIAGNOSIS — J01.90 ACUTE BACTERIAL SINUSITIS: Primary | ICD-10-CM

## 2025-07-29 PROCEDURE — 99207 PR NON-BILLABLE SERV PER CHARTING: CPT | Performed by: EMERGENCY MEDICINE

## 2025-07-29 NOTE — PATIENT INSTRUCTIONS
Acute Sinusitis: Care Instructions  Overview     Acute sinusitis is an inflammation of the mucous membranes inside the nose and sinuses. Sinuses are the hollow spaces in your skull around the eyes and nose. Acute sinusitis often follows a cold. Acute sinusitis causes thick, discolored mucus that drains from the nose or down the back of the throat. It also can cause pain and pressure in your head and face along with a stuffy or blocked nose.  In most cases, sinusitis gets better on its own in 1 to 2 weeks. But some mild symptoms may last for several weeks. Sometimes antibiotics are needed if there is a bacterial infection.  Follow-up care is a key part of your treatment and safety. Be sure to make and go to all appointments, and call your doctor if you are having problems. It's also a good idea to know your test results and keep a list of the medicines you take.  How can you care for yourself at home?  Use saline (saltwater) nasal washes. This can help keep your nasal passages open and wash out mucus and allergens.  You can buy saline nose washes at a grocery store or drugstore. Follow the instructions on the package.  You can make your own at home. Add 1 teaspoon of non-iodized salt and 1 teaspoon of baking soda to 2 cups of distilled or boiled and cooled water. Fill a squeeze bottle or a nasal cleansing pot (such as a neti pot) with the nasal wash. Then put the tip into your nostril, and lean over the sink. With your mouth open, gently squirt the liquid. Repeat on the other side.  Try a decongestant nasal spray like oxymetazoline (Afrin). Do not use it for more than 3 days in a row. Using it for more than 3 days can make your congestion worse.  If needed, take an over-the-counter pain medicine, such as acetaminophen (Tylenol), ibuprofen (Advil, Motrin), or naproxen (Aleve). Read and follow all instructions on the label.  If the doctor prescribed antibiotics, take them as directed. Do not stop taking them just  "because you feel better. You need to take the full course of antibiotics.  Be careful when taking over-the-counter cold or flu medicines and Tylenol at the same time. Many of these medicines have acetaminophen, which is Tylenol. Read the labels to make sure that you are not taking more than the recommended dose. Too much acetaminophen (Tylenol) can be harmful.  Try a steroid nasal spray. It may help with your symptoms.  Breathe warm, moist air. You can use a steamy shower, a hot bath, or a sink filled with hot water. Avoid cold, dry air. Using a humidifier in your home may help. Follow the directions for cleaning the machine.  When should you call for help?   Call your doctor now or seek immediate medical care if:    You have new or worse swelling, redness, or pain in your face or around one or both of your eyes.     You have double vision or a change in your vision.     You have a high fever.     You have a severe headache and a stiff neck.     You have mental changes, such as feeling confused or much less alert.   Watch closely for changes in your health, and be sure to contact your doctor if:    You are not getting better as expected.   Where can you learn more?  Go to https://www.[employer].net/patiented  Enter I933 in the search box to learn more about \"Acute Sinusitis: Care Instructions.\"  Current as of: October 27, 2024  Content Version: 14.5 2024-2025 [employer].   Care instructions adapted under license by your healthcare professional. If you have questions about a medical condition or this instruction, always ask your healthcare professional. [employer] disclaims any warranty or liability for your use of this information.    Dear Sherly Yeung      Based on your responses and diagnosis, I have prescribed augmentin to treat your symptoms. I have sent this to your pharmacy.?     It is also important to stay well hydrated, get lots of rest and take over-the-counter " decongestants,?tylenol?or ibuprofen if you?are able to?take those medications per your primary care provider to help relieve discomfort.?     It is important that you take?all of?your prescribed medication even if your symptoms are improving after a few doses.? Taking?all of?your medicine helps prevent the symptoms from returning.?     If your symptoms worsen, you develop severe headache, vomiting, high fever (>102), or are not improving in 7 days, please contact your primary care provider for an appointment or visit any of our convenient Walk-in Care or Urgent Care Centers to be seen which can be found on our website?here.?     Thanks again for choosing?us?as your health care partner,?   ?  Geovanny Headley MD?   Thank you for choosing us for your care. I have placed an order for a prescription so that you can start treatment:  Orders Placed This Encounter   Medications     amoxicillin-clavulanate (AUGMENTIN) 875-125 MG tablet     Sig: Take 1 tablet by mouth 2 times daily for 7 days.     Dispense:  14 tablet     Refill:  0        View your full visit summary for details by clicking on the link below. Your pharmacist will able to address any questions you may have about the medication.     If you're not feeling better within 5-7 days, please schedule an appointment.  You can schedule an appointment right here in [facility], or call [phone]  If the visit is for the same symptoms as your eVisit, we'll refund the cost of your eVisit if seen within seven days.

## 2025-07-31 ENCOUNTER — DOCUMENTATION ONLY (OUTPATIENT)
Dept: HOME HEALTH SERVICES | Facility: CLINIC | Age: 60
End: 2025-07-31

## 2025-07-31 NOTE — PROGRESS NOTES
Received order for overnight oximetry study to be compled on oxygen. Patient does not get O2 from [facility]. Order for ESE should go to company patient gets home oxygen from.

## 2025-07-31 NOTE — TELEPHONE ENCOUNTER
Good Afternoon,   Patient doesn't get home oxygen from [facility], the order for overnight oximetry study should go to the company patient gets home oxygen from.     Thank you!   Essie PACHECO   [facility]

## 2025-08-19 ENCOUNTER — OFFICE VISIT (OUTPATIENT)
Dept: GASTROENTEROLOGY | Facility: CLINIC | Age: 60
End: 2025-08-19
Attending: INTERNAL MEDICINE
Payer: MEDICARE

## 2025-08-19 VITALS
OXYGEN SATURATION: 95 % | DIASTOLIC BLOOD PRESSURE: 82 MMHG | SYSTOLIC BLOOD PRESSURE: 113 MMHG | HEIGHT: 63 IN | HEART RATE: 88 BPM | BODY MASS INDEX: 25.69 KG/M2

## 2025-08-19 DIAGNOSIS — K21.9 GASTROESOPHAGEAL REFLUX DISEASE WITHOUT ESOPHAGITIS: ICD-10-CM

## 2025-08-19 DIAGNOSIS — J96.11 CHRONIC HYPOXIC RESPIRATORY FAILURE (H): ICD-10-CM

## 2025-08-19 PROCEDURE — 1126F AMNT PAIN NOTED NONE PRSNT: CPT | Performed by: PHYSICIAN ASSISTANT

## 2025-08-19 PROCEDURE — 3074F SYST BP LT 130 MM HG: CPT | Performed by: PHYSICIAN ASSISTANT

## 2025-08-19 PROCEDURE — 99214 OFFICE O/P EST MOD 30 MIN: CPT | Performed by: PHYSICIAN ASSISTANT

## 2025-08-19 PROCEDURE — 3079F DIAST BP 80-89 MM HG: CPT | Performed by: PHYSICIAN ASSISTANT

## 2025-08-19 ASSESSMENT — PAIN SCALES - GENERAL: PAINLEVEL_OUTOF10: NO PAIN (0)

## 2025-08-20 ENCOUNTER — VIRTUAL VISIT (OUTPATIENT)
Dept: PSYCHOLOGY | Facility: CLINIC | Age: 60
End: 2025-08-20
Payer: MEDICARE

## 2025-08-20 DIAGNOSIS — F41.1 GAD (GENERALIZED ANXIETY DISORDER): Primary | ICD-10-CM

## 2025-08-20 PROCEDURE — 90837 PSYTX W PT 60 MINUTES: CPT | Mod: 95 | Performed by: SOCIAL WORKER

## 2025-08-20 ASSESSMENT — PATIENT HEALTH QUESTIONNAIRE - PHQ9
SUM OF ALL RESPONSES TO PHQ QUESTIONS 1-9: 2
10. IF YOU CHECKED OFF ANY PROBLEMS, HOW DIFFICULT HAVE THESE PROBLEMS MADE IT FOR YOU TO DO YOUR WORK, TAKE CARE OF THINGS AT HOME, OR GET ALONG WITH OTHER PEOPLE: SOMEWHAT DIFFICULT
SUM OF ALL RESPONSES TO PHQ QUESTIONS 1-9: 2

## (undated) DEVICE — KIT ENDO FIRST STEP DISINFECTANT 200ML W/POUCH EP-4

## (undated) DEVICE — SU VICRYL 4-0 PS-2 18" UND J496H

## (undated) DEVICE — ENDO BITE BLOCK ADULT OMNI-BLOC

## (undated) DEVICE — ENDO TUBING CO2 SMARTCAP STERILE DISP 100145CO2EXT

## (undated) DEVICE — ENDO TROCAR FIRST ENTRY KII FIOS Z-THRD 05X100MM CTF03

## (undated) DEVICE — ENDO TROCAR SLEEVE KII Z-THREADED 05X100MM CTS02

## (undated) DEVICE — ENDO CATH BALLOON DILATION HURRICANE 04MMX4X180CM M00545900

## (undated) DEVICE — ESU PENCIL W/COATED BLADE E2450H

## (undated) DEVICE — TUBING SUCTION 12"X1/4" N612

## (undated) DEVICE — GLOVE PROTEXIS BLUE W/NEU-THERA 8.0  2D73EB80

## (undated) DEVICE — ENDO SNARE POLYPECTOMY OVAL 15MM LOOP SD-240U-15

## (undated) DEVICE — GLOVE EXAM NITRILE LG PF LATEX FREE 5064

## (undated) DEVICE — SYR 30ML SLIP TIP W/O NDL 302833

## (undated) DEVICE — INFLATION DEVICE BIG 60 ENDO-AN6012

## (undated) DEVICE — ENDO POUCH UNIVERSAL RETRIEVAL SYSTEM INZII 5MM CD003

## (undated) DEVICE — BIOPSY VALVE BIOSHIELD 00711135

## (undated) DEVICE — TUBING SMOKE EVAC PNEUMOCLEAR HEATED 0620050350

## (undated) DEVICE — PACK HEART RIGHT CUSTOM SAN32RHF18

## (undated) DEVICE — SUCTION MANIFOLD NEPTUNE 2 SYS 4 PORT 0702-020-000

## (undated) DEVICE — SPECIMEN CONTAINER 3OZ W/FORMALIN 59901

## (undated) DEVICE — SUCTION MANIFOLD NEPTUNE 2 SYS 1 PORT 702-025-000

## (undated) DEVICE — SU VICRYL 0 UR-6 27" J603H

## (undated) DEVICE — SUCTION CATH AIRLIFE TRI-FLO W/CONTROL PORT 14FR  T60C

## (undated) DEVICE — ESU GROUND PAD ADULT W/CORD E7507

## (undated) DEVICE — SOL WATER IRRIG 1000ML BOTTLE 2F7114

## (undated) DEVICE — ENDO FUSION OMNI-TOME 21 FS-OMNI-21 G48675

## (undated) DEVICE — KIT MICROINTRODUCER VASCULAR  4FRX21GAX4CM

## (undated) DEVICE — GUIDEWIRE NOVAGOLD .018X260CM STR TIP M00552000

## (undated) DEVICE — CATH RETRIEVAL BALLOON EXTRACTOR PRO RX-S INJ ABOVE 9-12MM

## (undated) DEVICE — ENDO FUSION OMNI-TOME G31903

## (undated) DEVICE — WIRE GUIDE 0.025"X270CM STR VISIGLIDE G-240-2527S

## (undated) DEVICE — KIT CONNECTOR FOR OLYMPUS ENDOSCOPES DEFENDO 100310

## (undated) DEVICE — WIRE GUIDE 0.025"X270CM SHORT STR VISIGLIDE 2 G-260-2527S

## (undated) DEVICE — ENDO DEVICE LOCKING AND BIOPSY CAP M00545261

## (undated) DEVICE — CLIP APPLIER ENDO 5MM M/L LIGAMAX EL5ML

## (undated) DEVICE — ENDO FORCEP BX CAPTURA PRO SPIKE G50696

## (undated) DEVICE — KIT ENDO TURNOVER/PROCEDURE CARRY-ON 101822

## (undated) DEVICE — ANTIFOG SOLUTION W/FOAM PAD 31142527

## (undated) DEVICE — SU MONOCRYL 4-0 PS-2 27" UND Y426H

## (undated) DEVICE — ESU ENDO SCISSORS 5MM CVD 5DCS

## (undated) DEVICE — INTR ENDOSCOPIC STENT FUSION OASIS 09.0FRX200CM

## (undated) DEVICE — PACK ENDOSCOPY GI CUSTOM UMMC

## (undated) DEVICE — CANNULA BILIARY ERCP .035" TAPER TIP 3.2MM CHANNEL

## (undated) DEVICE — LABEL MEDICATION SYSTEM 3303-P

## (undated) DEVICE — GOWN IMPERVIOUS 2XL BLUE

## (undated) DEVICE — PREP CHLORAPREP 26ML TINTED HI-LITE ORANGE 930815

## (undated) DEVICE — LINEN TOWEL PACK X6 WHITE 5487

## (undated) DEVICE — SUCTION IRR STRYKERFLOW II W/TIP 250-070-520

## (undated) DEVICE — INTRO SHEATH 7FRX10CM PINNACLE RSS702

## (undated) DEVICE — KIT RIGHT HEART CATH 60130719

## (undated) DEVICE — GLOVE PROTEXIS W/NEU-THERA 7.5  2D73TE75

## (undated) DEVICE — WIRE GUIDE 0.025"X270CM ANG VISIGLIDE G-240-2527A

## (undated) DEVICE — WIRE GUIDE 0.025"X450CM ANG VISIGLIDE G-240-2545A

## (undated) DEVICE — ADH SKIN CLOSURE PREMIERPRO EXOFIN 1.0ML 3470

## (undated) DEVICE — ENDO TROCAR BLUNT TIP KII BALLOON 12X100MM C0R47

## (undated) DEVICE — Device

## (undated) DEVICE — ENDO TRAP POLYP E-TRAP 00711099

## (undated) DEVICE — SU VICRYL 0 TIE 54" J608H

## (undated) DEVICE — LINEN TOWEL PACK X30 5481

## (undated) DEVICE — SOL WATER IRRIG 500ML BOTTLE 2F7113

## (undated) RX ORDER — LIDOCAINE HYDROCHLORIDE 20 MG/ML
INJECTION, SOLUTION EPIDURAL; INFILTRATION; INTRACAUDAL; PERINEURAL
Status: DISPENSED
Start: 2022-02-14

## (undated) RX ORDER — FENTANYL CITRATE 50 UG/ML
INJECTION, SOLUTION INTRAMUSCULAR; INTRAVENOUS
Status: DISPENSED
Start: 2022-01-12

## (undated) RX ORDER — HYDROMORPHONE HYDROCHLORIDE 1 MG/ML
INJECTION, SOLUTION INTRAMUSCULAR; INTRAVENOUS; SUBCUTANEOUS
Status: DISPENSED
Start: 2019-10-17

## (undated) RX ORDER — PROPOFOL 10 MG/ML
INJECTION, EMULSION INTRAVENOUS
Status: DISPENSED
Start: 2019-10-17

## (undated) RX ORDER — ONDANSETRON 2 MG/ML
INJECTION INTRAMUSCULAR; INTRAVENOUS
Status: DISPENSED
Start: 2022-03-28

## (undated) RX ORDER — IPRATROPIUM BROMIDE AND ALBUTEROL SULFATE 2.5; .5 MG/3ML; MG/3ML
SOLUTION RESPIRATORY (INHALATION)
Status: DISPENSED
Start: 2022-05-06

## (undated) RX ORDER — INDOMETHACIN 50 MG/1
SUPPOSITORY RECTAL
Status: DISPENSED
Start: 2022-03-28

## (undated) RX ORDER — ONDANSETRON 2 MG/ML
INJECTION INTRAMUSCULAR; INTRAVENOUS
Status: DISPENSED
Start: 2022-02-14

## (undated) RX ORDER — LIDOCAINE HYDROCHLORIDE 20 MG/ML
INJECTION, SOLUTION EPIDURAL; INFILTRATION; INTRACAUDAL; PERINEURAL
Status: DISPENSED
Start: 2019-10-17

## (undated) RX ORDER — FENTANYL CITRATE-0.9 % NACL/PF 10 MCG/ML
PLASTIC BAG, INJECTION (ML) INTRAVENOUS
Status: DISPENSED
Start: 2022-02-14

## (undated) RX ORDER — ONDANSETRON 2 MG/ML
INJECTION INTRAMUSCULAR; INTRAVENOUS
Status: DISPENSED
Start: 2022-05-06

## (undated) RX ORDER — PROPOFOL 10 MG/ML
INJECTION, EMULSION INTRAVENOUS
Status: DISPENSED
Start: 2022-01-12

## (undated) RX ORDER — DEXAMETHASONE SODIUM PHOSPHATE 4 MG/ML
INJECTION, SOLUTION INTRA-ARTICULAR; INTRALESIONAL; INTRAMUSCULAR; INTRAVENOUS; SOFT TISSUE
Status: DISPENSED
Start: 2022-05-06

## (undated) RX ORDER — FENTANYL CITRATE-0.9 % NACL/PF 10 MCG/ML
PLASTIC BAG, INJECTION (ML) INTRAVENOUS
Status: DISPENSED
Start: 2022-05-06

## (undated) RX ORDER — FENTANYL CITRATE 50 UG/ML
INJECTION, SOLUTION INTRAMUSCULAR; INTRAVENOUS
Status: DISPENSED
Start: 2022-03-28

## (undated) RX ORDER — ONDANSETRON 2 MG/ML
INJECTION INTRAMUSCULAR; INTRAVENOUS
Status: DISPENSED
Start: 2025-04-11

## (undated) RX ORDER — EPHEDRINE SULFATE 50 MG/ML
INJECTION, SOLUTION INTRAMUSCULAR; INTRAVENOUS; SUBCUTANEOUS
Status: DISPENSED
Start: 2022-02-14

## (undated) RX ORDER — LIDOCAINE 40 MG/G
CREAM TOPICAL
Status: DISPENSED
Start: 2025-04-11

## (undated) RX ORDER — SODIUM CHLORIDE, SODIUM LACTATE, POTASSIUM CHLORIDE, CALCIUM CHLORIDE 600; 310; 30; 20 MG/100ML; MG/100ML; MG/100ML; MG/100ML
INJECTION, SOLUTION INTRAVENOUS
Status: DISPENSED
Start: 2022-01-12

## (undated) RX ORDER — EPINEPHRINE 1 MG/ML
INJECTION, SOLUTION INTRAMUSCULAR; SUBCUTANEOUS
Status: DISPENSED
Start: 2022-03-28

## (undated) RX ORDER — ROCURONIUM BROMIDE 50 MG/5 ML
SYRINGE (ML) INTRAVENOUS
Status: DISPENSED
Start: 2022-03-28

## (undated) RX ORDER — ONDANSETRON 2 MG/ML
INJECTION INTRAMUSCULAR; INTRAVENOUS
Status: DISPENSED
Start: 2021-06-23

## (undated) RX ORDER — FENTANYL CITRATE 50 UG/ML
INJECTION, SOLUTION INTRAMUSCULAR; INTRAVENOUS
Status: DISPENSED
Start: 2022-05-06

## (undated) RX ORDER — SIMETHICONE 40MG/0.6ML
SUSPENSION, DROPS(FINAL DOSAGE FORM)(ML) ORAL
Status: DISPENSED
Start: 2022-03-28

## (undated) RX ORDER — CEFAZOLIN SODIUM/WATER 2 G/20 ML
SYRINGE (ML) INTRAVENOUS
Status: DISPENSED
Start: 2022-05-06

## (undated) RX ORDER — DEXAMETHASONE SODIUM PHOSPHATE 4 MG/ML
INJECTION, SOLUTION INTRA-ARTICULAR; INTRALESIONAL; INTRAMUSCULAR; INTRAVENOUS; SOFT TISSUE
Status: DISPENSED
Start: 2022-03-28

## (undated) RX ORDER — DEXAMETHASONE SODIUM PHOSPHATE 4 MG/ML
INJECTION, SOLUTION INTRA-ARTICULAR; INTRALESIONAL; INTRAMUSCULAR; INTRAVENOUS; SOFT TISSUE
Status: DISPENSED
Start: 2022-02-14

## (undated) RX ORDER — METRONIDAZOLE 500 MG/100ML
INJECTION, SOLUTION INTRAVENOUS
Status: DISPENSED
Start: 2022-05-06

## (undated) RX ORDER — PROPOFOL 10 MG/ML
INJECTION, EMULSION INTRAVENOUS
Status: DISPENSED
Start: 2022-02-14

## (undated) RX ORDER — ONDANSETRON 2 MG/ML
INJECTION INTRAMUSCULAR; INTRAVENOUS
Status: DISPENSED
Start: 2022-01-12

## (undated) RX ORDER — LIDOCAINE HYDROCHLORIDE 20 MG/ML
JELLY TOPICAL
Status: DISPENSED
Start: 2020-10-01

## (undated) RX ORDER — IOPAMIDOL 510 MG/ML
INJECTION, SOLUTION INTRAVASCULAR
Status: DISPENSED
Start: 2022-03-28

## (undated) RX ORDER — LIDOCAINE HYDROCHLORIDE 10 MG/ML
INJECTION, SOLUTION EPIDURAL; INFILTRATION; INTRACAUDAL; PERINEURAL
Status: DISPENSED
Start: 2022-05-06

## (undated) RX ORDER — ROCURONIUM BROMIDE 50 MG/5 ML
SYRINGE (ML) INTRAVENOUS
Status: DISPENSED
Start: 2022-02-14

## (undated) RX ORDER — LIDOCAINE HYDROCHLORIDE 20 MG/ML
INJECTION, SOLUTION EPIDURAL; INFILTRATION; INTRACAUDAL; PERINEURAL
Status: DISPENSED
Start: 2022-03-28

## (undated) RX ORDER — FENTANYL CITRATE 50 UG/ML
INJECTION, SOLUTION INTRAMUSCULAR; INTRAVENOUS
Status: DISPENSED
Start: 2022-02-14

## (undated) RX ORDER — BUPIVACAINE HYDROCHLORIDE AND EPINEPHRINE 2.5; 5 MG/ML; UG/ML
INJECTION, SOLUTION INFILTRATION; PERINEURAL
Status: DISPENSED
Start: 2022-05-06